# Patient Record
Sex: FEMALE | Race: WHITE | Employment: OTHER | URBAN - METROPOLITAN AREA
[De-identification: names, ages, dates, MRNs, and addresses within clinical notes are randomized per-mention and may not be internally consistent; named-entity substitution may affect disease eponyms.]

---

## 2018-04-10 ENCOUNTER — HOSPITAL ENCOUNTER (OUTPATIENT)
Dept: ENDOSCOPY | Age: 63
Discharge: HOME | End: 2018-04-10
Attending: SURGERY | Admitting: SURGERY

## 2018-04-10 VITALS
OXYGEN SATURATION: 94 % | BODY MASS INDEX: 38.25 KG/M2 | WEIGHT: 238 LBS | TEMPERATURE: 97.9 F | RESPIRATION RATE: 16 BRPM | HEART RATE: 68 BPM | DIASTOLIC BLOOD PRESSURE: 93 MMHG | HEIGHT: 66 IN | SYSTOLIC BLOOD PRESSURE: 172 MMHG

## 2018-04-10 RX ORDER — ZOLPIDEM TARTRATE 10 MG/1
10 TABLET ORAL NIGHTLY PRN
COMMUNITY

## 2018-04-10 RX ORDER — ALBUTEROL SULFATE 90 UG/1
2 AEROSOL, METERED RESPIRATORY (INHALATION) EVERY 6 HOURS PRN
COMMUNITY

## 2018-04-10 RX ORDER — VENLAFAXINE 75 MG/1
75 TABLET ORAL DAILY
COMMUNITY

## 2018-04-10 RX ORDER — MELOXICAM 15 MG/1
15 TABLET ORAL DAILY
COMMUNITY
End: 2018-05-29 | Stop reason: ALTCHOICE

## 2018-04-10 RX ORDER — MIRTAZAPINE 15 MG/1
15 TABLET, FILM COATED ORAL NIGHTLY
COMMUNITY

## 2018-04-10 ASSESSMENT — PAIN SCALES - GENERAL
PAINLEVEL_OUTOF10: 0

## 2018-04-23 ENCOUNTER — HOSPITAL ENCOUNTER (OUTPATIENT)
Dept: PREADMISSION TESTING | Age: 63
Discharge: HOME | End: 2018-04-24
Attending: SURGERY | Admitting: SURGERY

## 2018-04-23 VITALS — HEIGHT: 66 IN | BODY MASS INDEX: 38.25 KG/M2 | WEIGHT: 238 LBS

## 2018-04-23 RX ORDER — DEXAMETHASONE SODIUM PHOSPHATE 4 MG/ML
10 INJECTION, SOLUTION INTRA-ARTICULAR; INTRALESIONAL; INTRAMUSCULAR; INTRAVENOUS; SOFT TISSUE ONCE
Status: CANCELLED | OUTPATIENT
Start: 2018-04-29 | End: 2018-04-29

## 2018-04-23 RX ORDER — APREPITANT 40 MG/1
40 CAPSULE ORAL ONCE
Status: CANCELLED | OUTPATIENT
Start: 2018-04-29 | End: 2018-04-29

## 2018-04-23 RX ORDER — ACETAMINOPHEN 10 MG/ML
1000 INJECTION, SOLUTION INTRAVENOUS ONCE
Status: CANCELLED | OUTPATIENT
Start: 2018-04-29 | End: 2018-04-29

## 2018-04-23 RX ORDER — ONDANSETRON 2 MG/ML
4 INJECTION INTRAMUSCULAR; INTRAVENOUS ONCE
Status: CANCELLED | OUTPATIENT
Start: 2018-04-29 | End: 2018-04-29

## 2018-04-23 RX ORDER — HEPARIN SODIUM 5000 [USP'U]/ML
5000 INJECTION, SOLUTION INTRAVENOUS; SUBCUTANEOUS ONCE
Status: CANCELLED | OUTPATIENT
Start: 2018-04-29 | End: 2018-04-29

## 2018-04-23 RX ORDER — METOCLOPRAMIDE HYDROCHLORIDE 5 MG/ML
10 INJECTION INTRAMUSCULAR; INTRAVENOUS ONCE
Status: CANCELLED | OUTPATIENT
Start: 2018-04-29 | End: 2018-04-29

## 2018-04-30 PROBLEM — E66.9 OBESITY: Status: ACTIVE | Noted: 2018-04-30

## 2018-08-31 ENCOUNTER — ANESTHESIA (OUTPATIENT)
Dept: ENDOSCOPY | Age: 63
End: 2018-08-31
Payer: MEDICARE

## 2018-08-31 ENCOUNTER — ANESTHESIA EVENT (OUTPATIENT)
Dept: ENDOSCOPY | Age: 63
End: 2018-08-31
Payer: MEDICARE

## 2018-08-31 ENCOUNTER — HOSPITAL ENCOUNTER (OUTPATIENT)
Age: 63
Setting detail: OUTPATIENT SURGERY
Discharge: HOME | End: 2018-08-31
Attending: SURGERY | Admitting: SURGERY
Payer: MEDICARE

## 2018-08-31 VITALS
OXYGEN SATURATION: 99 % | DIASTOLIC BLOOD PRESSURE: 92 MMHG | RESPIRATION RATE: 3 BRPM | SYSTOLIC BLOOD PRESSURE: 130 MMHG

## 2018-08-31 VITALS
HEIGHT: 66 IN | RESPIRATION RATE: 18 BRPM | OXYGEN SATURATION: 92 % | TEMPERATURE: 98.1 F | WEIGHT: 160 LBS | SYSTOLIC BLOOD PRESSURE: 145 MMHG | HEART RATE: 112 BPM | BODY MASS INDEX: 25.71 KG/M2 | DIASTOLIC BLOOD PRESSURE: 79 MMHG

## 2018-08-31 PROBLEM — M54.50 CHRONIC BILATERAL LOW BACK PAIN WITHOUT SCIATICA: Status: ACTIVE | Noted: 2017-09-12

## 2018-08-31 PROBLEM — G89.29 CHRONIC BILATERAL LOW BACK PAIN WITHOUT SCIATICA: Status: ACTIVE | Noted: 2017-09-12

## 2018-08-31 PROCEDURE — 3700000001 HC ADD 15 MINUTES (ANESTHESIA): Performed by: SURGERY

## 2018-08-31 PROCEDURE — 2580000003 HC RX 258: Performed by: NURSE ANESTHETIST, CERTIFIED REGISTERED

## 2018-08-31 PROCEDURE — C1773 RET DEV, INSERTABLE: HCPCS | Performed by: SURGERY

## 2018-08-31 PROCEDURE — 3609012400 HC EGD TRANSORAL BIOPSY SINGLE/MULTIPLE: Performed by: SURGERY

## 2018-08-31 PROCEDURE — 88305 TISSUE EXAM BY PATHOLOGIST: CPT

## 2018-08-31 PROCEDURE — 3700000000 HC ANESTHESIA ATTENDED CARE: Performed by: SURGERY

## 2018-08-31 PROCEDURE — 6360000002 HC RX W HCPCS: Performed by: NURSE ANESTHETIST, CERTIFIED REGISTERED

## 2018-08-31 PROCEDURE — 2500000003 HC RX 250 WO HCPCS: Performed by: NURSE ANESTHETIST, CERTIFIED REGISTERED

## 2018-08-31 PROCEDURE — 94640 AIRWAY INHALATION TREATMENT: CPT

## 2018-08-31 PROCEDURE — 7100000001 HC PACU RECOVERY - ADDTL 15 MIN: Performed by: SURGERY

## 2018-08-31 PROCEDURE — 7100000010 HC PHASE II RECOVERY - FIRST 15 MIN: Performed by: SURGERY

## 2018-08-31 PROCEDURE — 94664 DEMO&/EVAL PT USE INHALER: CPT

## 2018-08-31 PROCEDURE — 7100000011 HC PHASE II RECOVERY - ADDTL 15 MIN: Performed by: SURGERY

## 2018-08-31 PROCEDURE — 94761 N-INVAS EAR/PLS OXIMETRY MLT: CPT

## 2018-08-31 PROCEDURE — 2700000000 HC OXYGEN THERAPY PER DAY

## 2018-08-31 PROCEDURE — 6370000000 HC RX 637 (ALT 250 FOR IP): Performed by: ANESTHESIOLOGY

## 2018-08-31 PROCEDURE — 2709999900 HC NON-CHARGEABLE SUPPLY: Performed by: SURGERY

## 2018-08-31 PROCEDURE — 7100000000 HC PACU RECOVERY - FIRST 15 MIN: Performed by: SURGERY

## 2018-08-31 PROCEDURE — 6360000002 HC RX W HCPCS: Performed by: ANESTHESIOLOGY

## 2018-08-31 PROCEDURE — 3609013800 HC EGD SUBMUCOSAL/BOTOX INJECTION: Performed by: SURGERY

## 2018-08-31 RX ORDER — GLYCOPYRROLATE 0.2 MG/ML
INJECTION INTRAMUSCULAR; INTRAVENOUS PRN
Status: DISCONTINUED | OUTPATIENT
Start: 2018-08-31 | End: 2018-08-31 | Stop reason: SDUPTHER

## 2018-08-31 RX ORDER — SUCCINYLCHOLINE CHLORIDE 20 MG/ML
INJECTION INTRAMUSCULAR; INTRAVENOUS PRN
Status: DISCONTINUED | OUTPATIENT
Start: 2018-08-31 | End: 2018-08-31 | Stop reason: SDUPTHER

## 2018-08-31 RX ORDER — LABETALOL HYDROCHLORIDE 5 MG/ML
5 INJECTION, SOLUTION INTRAVENOUS EVERY 10 MIN PRN
Status: DISCONTINUED | OUTPATIENT
Start: 2018-08-31 | End: 2018-08-31 | Stop reason: HOSPADM

## 2018-08-31 RX ORDER — MIDAZOLAM HYDROCHLORIDE 1 MG/ML
INJECTION INTRAMUSCULAR; INTRAVENOUS PRN
Status: DISCONTINUED | OUTPATIENT
Start: 2018-08-31 | End: 2018-08-31 | Stop reason: SDUPTHER

## 2018-08-31 RX ORDER — MORPHINE SULFATE 2 MG/ML
2 INJECTION, SOLUTION INTRAMUSCULAR; INTRAVENOUS EVERY 5 MIN PRN
Status: DISCONTINUED | OUTPATIENT
Start: 2018-08-31 | End: 2018-08-31 | Stop reason: HOSPADM

## 2018-08-31 RX ORDER — PROPOFOL 10 MG/ML
INJECTION, EMULSION INTRAVENOUS PRN
Status: DISCONTINUED | OUTPATIENT
Start: 2018-08-31 | End: 2018-08-31 | Stop reason: SDUPTHER

## 2018-08-31 RX ORDER — ONDANSETRON 2 MG/ML
INJECTION INTRAMUSCULAR; INTRAVENOUS PRN
Status: DISCONTINUED | OUTPATIENT
Start: 2018-08-31 | End: 2018-08-31 | Stop reason: SDUPTHER

## 2018-08-31 RX ORDER — ONDANSETRON 2 MG/ML
4 INJECTION INTRAMUSCULAR; INTRAVENOUS
Status: DISCONTINUED | OUTPATIENT
Start: 2018-08-31 | End: 2018-08-31 | Stop reason: HOSPADM

## 2018-08-31 RX ORDER — NEOSTIGMINE METHYLSULFATE 5 MG/5 ML
SYRINGE (ML) INTRAVENOUS PRN
Status: DISCONTINUED | OUTPATIENT
Start: 2018-08-31 | End: 2018-08-31 | Stop reason: SDUPTHER

## 2018-08-31 RX ORDER — MEPERIDINE HYDROCHLORIDE 25 MG/ML
12.5 INJECTION INTRAMUSCULAR; INTRAVENOUS; SUBCUTANEOUS EVERY 5 MIN PRN
Status: DISCONTINUED | OUTPATIENT
Start: 2018-08-31 | End: 2018-08-31 | Stop reason: HOSPADM

## 2018-08-31 RX ORDER — FENTANYL CITRATE 50 UG/ML
INJECTION, SOLUTION INTRAMUSCULAR; INTRAVENOUS PRN
Status: DISCONTINUED | OUTPATIENT
Start: 2018-08-31 | End: 2018-08-31 | Stop reason: SDUPTHER

## 2018-08-31 RX ORDER — FENTANYL CITRATE 50 UG/ML
50 INJECTION, SOLUTION INTRAMUSCULAR; INTRAVENOUS EVERY 5 MIN PRN
Status: DISCONTINUED | OUTPATIENT
Start: 2018-08-31 | End: 2018-08-31 | Stop reason: HOSPADM

## 2018-08-31 RX ORDER — ALBUTEROL SULFATE 2.5 MG/3ML
2.5 SOLUTION RESPIRATORY (INHALATION) EVERY 10 MIN PRN
Status: DISCONTINUED | OUTPATIENT
Start: 2018-08-31 | End: 2018-08-31 | Stop reason: HOSPADM

## 2018-08-31 RX ORDER — ROCURONIUM BROMIDE 10 MG/ML
INJECTION, SOLUTION INTRAVENOUS PRN
Status: DISCONTINUED | OUTPATIENT
Start: 2018-08-31 | End: 2018-08-31 | Stop reason: SDUPTHER

## 2018-08-31 RX ORDER — SODIUM CHLORIDE, SODIUM LACTATE, POTASSIUM CHLORIDE, CALCIUM CHLORIDE 600; 310; 30; 20 MG/100ML; MG/100ML; MG/100ML; MG/100ML
INJECTION, SOLUTION INTRAVENOUS CONTINUOUS PRN
Status: DISCONTINUED | OUTPATIENT
Start: 2018-08-31 | End: 2018-08-31 | Stop reason: SDUPTHER

## 2018-08-31 RX ADMIN — PROPOFOL 150 MG: 10 INJECTION, EMULSION INTRAVENOUS at 07:41

## 2018-08-31 RX ADMIN — SODIUM CHLORIDE, SODIUM LACTATE, POTASSIUM CHLORIDE, AND CALCIUM CHLORIDE: 600; 310; 30; 20 INJECTION, SOLUTION INTRAVENOUS at 07:36

## 2018-08-31 RX ADMIN — GLYCOPYRROLATE 0.4 MG: 0.2 INJECTION INTRAMUSCULAR; INTRAVENOUS at 07:55

## 2018-08-31 RX ADMIN — FENTANYL CITRATE 50 MCG: 50 INJECTION INTRAMUSCULAR; INTRAVENOUS at 08:00

## 2018-08-31 RX ADMIN — ALBUTEROL SULFATE 2.5 MG: 2.5 SOLUTION RESPIRATORY (INHALATION) at 08:42

## 2018-08-31 RX ADMIN — MIDAZOLAM HYDROCHLORIDE 2 MG: 1 INJECTION INTRAMUSCULAR; INTRAVENOUS at 07:38

## 2018-08-31 RX ADMIN — GLYCOPYRROLATE 0.2 MG: 0.2 INJECTION INTRAMUSCULAR; INTRAVENOUS at 07:59

## 2018-08-31 RX ADMIN — ROCURONIUM BROMIDE 20 MG: 10 INJECTION, SOLUTION INTRAVENOUS at 07:49

## 2018-08-31 RX ADMIN — BENZOCAINE AND MENTHOL 1 LOZENGE: 15; 3.6 LOZENGE ORAL at 09:09

## 2018-08-31 RX ADMIN — Medication 2 MG: at 07:55

## 2018-08-31 RX ADMIN — FENTANYL CITRATE 50 MCG: 50 INJECTION INTRAMUSCULAR; INTRAVENOUS at 07:38

## 2018-08-31 RX ADMIN — Medication 1.5 MG: at 07:59

## 2018-08-31 RX ADMIN — ONDANSETRON 4 MG: 2 INJECTION INTRAMUSCULAR; INTRAVENOUS at 07:51

## 2018-08-31 RX ADMIN — Medication 80 MG: at 07:41

## 2018-08-31 ASSESSMENT — PULMONARY FUNCTION TESTS
PIF_VALUE: 1
PIF_VALUE: 21
PIF_VALUE: 26
PIF_VALUE: 1
PIF_VALUE: 4
PIF_VALUE: 25
PIF_VALUE: 2
PIF_VALUE: 26
PIF_VALUE: 1
PIF_VALUE: 2
PIF_VALUE: 22
PIF_VALUE: 23
PIF_VALUE: 1
PIF_VALUE: 20
PIF_VALUE: 24
PIF_VALUE: 26
PIF_VALUE: 21
PIF_VALUE: 23
PIF_VALUE: 21
PIF_VALUE: 1
PIF_VALUE: 2
PIF_VALUE: 21
PIF_VALUE: 1
PIF_VALUE: 22
PIF_VALUE: 23
PIF_VALUE: 21
PIF_VALUE: 1
PIF_VALUE: 19
PIF_VALUE: 21
PIF_VALUE: 20
PIF_VALUE: 21
PIF_VALUE: 1
PIF_VALUE: 0
PIF_VALUE: 2
PIF_VALUE: 3
PIF_VALUE: 22
PIF_VALUE: 2
PIF_VALUE: 22
PIF_VALUE: 23
PIF_VALUE: 21

## 2018-08-31 ASSESSMENT — LIFESTYLE VARIABLES: SMOKING_STATUS: 1

## 2018-08-31 NOTE — ANESTHESIA POSTPROCEDURE EVALUATION
Department of Anesthesiology  Postprocedure Note    Patient: Pily Steen  MRN: [id number]  YOB: 1955  Date of evaluation: 8/31/2018  Time:  8:36 AM     Procedure Summary     Date:  08/31/18 Room / Location:  [facility] ENDO 02 / [facility] ENDOSCOPY    Anesthesia Start:  0735 Anesthesia Stop:      Procedures:       EGD SUBMUCOSAL/BOTOX INJECTION (N/A )      EGD BIOPSY Diagnosis:  (DYSPHAGIA  R13.10, VOMITING)    Surgeon:  Symone Avalos DO Responsible Provider:  Layne Monteiro MD    Anesthesia Type:  general ASA Status:  3          Anesthesia Type: general    Rishabh Phase I: Rishabh Score: 7    Rishabh Phase II:      Last vitals: Reviewed and per EMR flowsheets.        Anesthesia Post Evaluation    Patient location during evaluation: PACU  Patient participation: complete - patient participated  Level of consciousness: awake and alert  Airway patency: patent  Nausea & Vomiting: no vomiting and no nausea  Complications: no  Cardiovascular status: hemodynamically stable  Respiratory status: acceptable  Hydration status: euvolemic

## 2018-08-31 NOTE — PROGRESS NOTES
PACU Transfer to [facility]    Vitals:    08/31/18 0916   BP: 125/66   Pulse: 91   Resp: 16   Temp: 98   SpO2: 92%         Intake/Output Summary (Last 24 hours) at 08/31/18 0926  Last data filed at 08/31/18 0916   Gross per 24 hour   Intake              800 ml   Output                0 ml   Net              800 ml       Pain assessment: 0       Patient transferred to care of [facility] RN.    8/31/2018 9:26 AM

## 2018-08-31 NOTE — PROGRESS NOTES
Ambulatory Surgery/Procedure Discharge Note    Vitals:    08/31/18 0925   BP: (!) 145/79   Pulse: 112   Resp: 18   Temp: 98.1 °F (36.7 °C)   SpO2: 92%       In: 800 [P.O.:50; I.V.:750]  Out: -     Pain assessment:  none       Patient discharged to home/self care. Patient discharged via wheel chair by transporter to waiting family/S. O. Pt has cough, drinking pop. Denies sore throat. Denies urge to void.   Ride called for , meets gabby discharge criteria      8/31/2018 9:40 AM

## 2018-08-31 NOTE — ANESTHESIA PRE PROCEDURE
Department of Anesthesiology  Preprocedure Note       Name:  Mark Lu   Age:  60 y.o.  :  1955                                          MRN:  [id number]         Date:  2018      Surgeon: Turner Barthel):  Jannette Abrams DO    Procedure: Procedure(s):  ESOPHAGOGASTRODUODENOSCOPY, POSSIBLE ACHALASIA BALLOON DILATION, BOTOX INJECTION, GENERAL ANESTHESIA    Medications prior to admission:   Prior to Admission medications    Medication Sig Start Date End Date Taking? Authorizing Provider   omeprazole (PRILOSEC) 20 MG delayed release capsule Take 40 mg by mouth 2 times daily    Historical Provider, MD   ondansetron (ZOFRAN ODT) 4 MG disintegrating tablet Take 1 tablet by mouth every 8 hours as needed for Nausea 18   Ginger Fish MD   Hyoscyamine Sulfate SL (LEVSIN/SL) 0.125 MG SUBL Place 0.125 mg under the tongue every 4 hours 18   Ginger Fish MD   venlafaxine (EFFEXOR) 75 MG tablet Take 75 mg by mouth daily    Historical Provider, MD   mirtazapine (REMERON) 15 MG tablet Take 15 mg by mouth nightly    Historical Provider, MD   zolpidem (AMBIEN) 10 MG tablet Take 10 mg by mouth nightly as needed for Sleep. Historical Provider, MD   albuterol sulfate  (90 Base) MCG/ACT inhaler Inhale 2 puffs into the lungs every 6 hours as needed for Wheezing    Historical Provider, MD   ROPINIRole HCl (REQUIP PO) Take 0.5 mg by mouth 2 times daily     Historical Provider, MD   hydrochlorothiazide (MICROZIDE) 12.5 MG capsule Take 12.5 mg by mouth daily. Historical Provider, MD       Current medications:    No current facility-administered medications for this encounter. Allergies:     Allergies   Allergen Reactions    Cat Hair Extract     Lisinopril Other (See Comments)     cough    Pcn [Penicillins] Hives and Swelling    Bactrim [Sulfamethoxazole-Trimethoprim] Rash    Red Dye Rash       Problem List:    Patient Active Problem List   Diagnosis Code    Obesity E66.9       Past 05/29/2018    ALKPHOS 82 05/29/2018    AST 33 05/29/2018    ALT 27 05/29/2018       POC Tests: No results for input(s): POCGLU, POCNA, POCK, POCCL, POCBUN, POCHEMO, POCHCT in the last 72 hours. Coags: No results found for: PROTIME, INR, APTT    HCG (If Applicable): No results found for: PREGTESTUR, PREGSERUM, HCG, HCGQUANT     ABGs: No results found for: PHART, PO2ART, UWR9WYY, SGF0GLD, BEART, K2HGJMXB     Type & Screen (If Applicable):  No results found for: LABABO, [address]    Anesthesia Evaluation  Patient summary reviewed no history of anesthetic complications:   Airway: Mallampati: II  TM distance: <3 FB   Neck ROM: full  Mouth opening: > = 3 FB Dental: normal exam   (+) partials      Pulmonary:normal exam    (+) sleep apnea: on CPAP,  current smoker                           Cardiovascular:Negative CV ROS                      Neuro/Psych:   Negative Neuro/Psych ROS              GI/Hepatic/Renal:   (+) GERD:,           Endo/Other: Negative Endo/Other ROS                    Abdominal:           Vascular:                                        Anesthesia Plan      general     ASA 3       Induction: intravenous. Anesthetic plan and risks discussed with patient. Plan discussed with CRNA.     Attending anesthesiologist reviewed and agrees with Tressa Wiggins MD   8/31/2018

## 2018-09-02 NOTE — H&P
Pre-Op History & Physical   18      Patient Name: Shawn Lu : 51-  Meg Olea is a 60year old female that presents for her endoscopic procedure today. s/p Sleeve gastrectomy with c/o  dysphagia. Risks of pain, bleeding, infection, failure to improve, perforation all discussed. All questions answered, ready to proceed. She and I have reviewed the procedure in detail, and have also reviewed other options treatment. She is ready to proceed  today. Surgical History  Previous surgeries include: Lap Sleeve Gastrectomy   Dr. Anibal Osborne  and Open Cholecystectomy. lumbar discectomy 2008  & 2001   Hospitalization History  No previous hospitalizations. Current Medications  SureScripts Medication 4 mg: Dissolve 1-2 tablet(s) under tongue every six hours  SureScripts Medication 25 mg: Take 1-2 tablet(s) by mouth every four to six hours  SureScripts Medication 40 mg/0.4 mL: Inject 1 syringe(s) subQ twice a day  SureScripts Medication 0.125 mg: Take 1-2 tablet(s) under tongue every six hours  HCTZ 25mg 1 PO QD 25 MG/1:  1 tablet(s) by mouth   Meloxicam 15 mg tablet 15 mg: Take 1 tablet(s) by mouth once a day  MIRTAZAPINE 15 MG/1:    ROPINIROLE 1 MG/1:    TRAMADOL:    Venlafaxine 75mg 1 PO QD 75 MG/1:  1 tablet(s) by mouth   Zantac 150mg 1 PO  MG/1:  1 tablet(s) by mouth   Zolpidem 6.25mg 1 PO QD 6.25 MG/1:  1 tablet(s) by mouth   Allergies  red dye    penicillin    Past Medical History   \" Dyspnea with Exertion  \" Hypertension  \" Peripheral Edema  \" Varicose Veins  \" GERD  \" Degenerative Disc Disease  \" Osteoarthritis-Hip  \" Osteoarthritis-Knee  \" Anxiety  \" Depression  \" Morbid Obesity  \" Sleep Apnea, CPAP   Weight History  Valentine has been dieting for many years with limited success including fasting, low carbohydrates and low fat. None of  these attempts to lose weight have been significantly successful or had durable results.  The patient has tried behavioral  changes like walking (and/or

## 2020-01-16 ENCOUNTER — HOSPITAL ENCOUNTER (EMERGENCY)
Facility: HOSPITAL | Age: 65
Discharge: HOME | End: 2020-01-16
Attending: FAMILY MEDICINE | Admitting: FAMILY MEDICINE

## 2020-01-16 ENCOUNTER — APPOINTMENT (OUTPATIENT)
Dept: GENERAL RADIOLOGY | Facility: HOSPITAL | Age: 65
End: 2020-01-16

## 2020-01-16 VITALS
RESPIRATION RATE: 18 BRPM | TEMPERATURE: 98.3 F | OXYGEN SATURATION: 100 % | HEIGHT: 66 IN | BODY MASS INDEX: 24.11 KG/M2 | WEIGHT: 150 LBS | HEART RATE: 60 BPM | DIASTOLIC BLOOD PRESSURE: 67 MMHG | SYSTOLIC BLOOD PRESSURE: 149 MMHG

## 2020-01-16 DIAGNOSIS — M16.11 OSTEOARTHRITIS OF RIGHT HIP, UNSPECIFIED OSTEOARTHRITIS TYPE: Primary | ICD-10-CM

## 2020-01-16 PROCEDURE — 73502 X-RAY EXAM HIP UNI 2-3 VIEWS: CPT

## 2020-01-16 PROCEDURE — 25010000002 KETOROLAC TROMETHAMINE PER 15 MG: Performed by: PHYSICIAN ASSISTANT

## 2020-01-16 PROCEDURE — 99283 EMERGENCY DEPT VISIT LOW MDM: CPT

## 2020-01-16 PROCEDURE — 25010000002 DEXAMETHASONE SODIUM PHOSPHATE 20 MG/5ML SOLUTION: Performed by: PHYSICIAN ASSISTANT

## 2020-01-16 PROCEDURE — 96372 THER/PROPH/DIAG INJ SC/IM: CPT

## 2020-01-16 RX ORDER — DEXAMETHASONE SODIUM PHOSPHATE 4 MG/ML
10 INJECTION, SOLUTION INTRA-ARTICULAR; INTRALESIONAL; INTRAMUSCULAR; INTRAVENOUS; SOFT TISSUE ONCE
Status: COMPLETED | OUTPATIENT
Start: 2020-01-16 | End: 2020-01-16

## 2020-01-16 RX ORDER — HYDROCODONE BITARTRATE AND ACETAMINOPHEN 5; 325 MG/1; MG/1
1 TABLET ORAL ONCE
Status: COMPLETED | OUTPATIENT
Start: 2020-01-16 | End: 2020-01-16

## 2020-01-16 RX ORDER — KETOROLAC TROMETHAMINE 30 MG/ML
30 INJECTION, SOLUTION INTRAMUSCULAR; INTRAVENOUS ONCE
Status: COMPLETED | OUTPATIENT
Start: 2020-01-16 | End: 2020-01-16

## 2020-01-16 RX ADMIN — DEXAMETHASONE SODIUM PHOSPHATE 10 MG: 4 INJECTION, SOLUTION INTRAMUSCULAR; INTRAVENOUS at 00:54

## 2020-01-16 RX ADMIN — HYDROCODONE BITARTRATE AND ACETAMINOPHEN 1 TABLET: 5; 325 TABLET ORAL at 01:45

## 2020-01-16 RX ADMIN — KETOROLAC TROMETHAMINE 30 MG: 30 INJECTION, SOLUTION INTRAMUSCULAR at 00:54

## 2020-01-16 NOTE — DISCHARGE INSTRUCTIONS
Please utilize cane, rest, and ice. Please follow up with ortho or return to ER if symptoms worsen.

## 2020-01-16 NOTE — ED PROVIDER NOTES
Subjective     Hip Pain   Location:  Right hip   Quality:  Constant aching  Severity:  Moderate  Onset quality:  Gradual  Duration:  6 months  Timing:  Constant  Progression:  Worsening  Chronicity:  New  Context:  No injury; diagnosed with osteoarthritis in the past   Relieved by:  Rest  Worsened by:  Ambulation and weight bearing   Ineffective treatments:  None tried  Associated symptoms: no abdominal pain, no chest pain, no fever and no myalgias        Review of Systems   Constitutional: Negative.  Negative for fever.   HENT: Negative.    Respiratory: Negative.    Cardiovascular: Negative.  Negative for chest pain.   Gastrointestinal: Negative.  Negative for abdominal pain.   Endocrine: Negative.    Genitourinary: Negative.  Negative for dysuria.   Musculoskeletal: Positive for arthralgias. Negative for back pain, gait problem, joint swelling, myalgias, neck pain and neck stiffness.        Right hip pain    Skin: Negative.    Neurological: Negative.    Psychiatric/Behavioral: Negative.    All other systems reviewed and are negative.      No past medical history on file.    Allergies   Allergen Reactions   • Penicillins Hives and Swelling       No past surgical history on file.    No family history on file.    Social History     Socioeconomic History   • Marital status: Single     Spouse name: Not on file   • Number of children: Not on file   • Years of education: Not on file   • Highest education level: Not on file           Objective   Physical Exam   Constitutional: She is oriented to person, place, and time. She appears well-developed and well-nourished. No distress.   HENT:   Head: Normocephalic and atraumatic.   Right Ear: External ear normal.   Left Ear: External ear normal.   Nose: Nose normal.   Eyes: Pupils are equal, round, and reactive to light. Conjunctivae and EOM are normal.   Neck: Normal range of motion. Neck supple. No JVD present. No tracheal deviation present.   Cardiovascular: Normal rate,  regular rhythm and normal heart sounds.   No murmur heard.  Pulmonary/Chest: Effort normal and breath sounds normal. No respiratory distress. She has no wheezes.   Abdominal: Soft. Bowel sounds are normal. There is no tenderness.   Musculoskeletal: She exhibits tenderness. She exhibits no edema or deformity.   Right hip skin intact with no bruising or abrasion or edema. ROM is active but limited and painful. Neurovascular status and sensation RLE intact.    Neurological: She is alert and oriented to person, place, and time. No cranial nerve deficit.   Skin: Skin is warm and dry. No rash noted. She is not diaphoretic. No erythema. No pallor.   Psychiatric: She has a normal mood and affect. Her behavior is normal. Thought content normal.   Nursing note and vitals reviewed.      Procedures           ED Course  ED Course as of Jan 16 0214   Thu Jan 16, 2020   0209 Patient diagnosed with right hip osteoarthritis. Will be d/c home with rx for cane and f/u with ortho. Will return to ER if symptoms worsen.     [MM]      ED Course User Index  [MM] Betina Fraser PA                                               MDM  Number of Diagnoses or Management Options  Osteoarthritis of right hip, unspecified osteoarthritis type:      Amount and/or Complexity of Data Reviewed  Tests in the radiology section of CPT®: ordered and reviewed  Discuss the patient with other providers: yes        Final diagnoses:   Osteoarthritis of right hip, unspecified osteoarthritis type            Betina Fraser PA  01/16/20 0214

## 2020-03-06 ENCOUNTER — LAB REQUISITION (OUTPATIENT)
Dept: LAB | Facility: HOSPITAL | Age: 65
End: 2020-03-06

## 2020-03-06 DIAGNOSIS — M62.81 MUSCLE WEAKNESS (GENERALIZED): ICD-10-CM

## 2020-03-06 LAB
ALBUMIN SERPL-MCNC: 3.33 G/DL (ref 3.5–5.2)
ALBUMIN/GLOB SERPL: 1.1 G/DL
ALP SERPL-CCNC: 77 U/L (ref 39–117)
ALT SERPL W P-5'-P-CCNC: 16 U/L (ref 1–33)
ANION GAP SERPL CALCULATED.3IONS-SCNC: 10.8 MMOL/L (ref 5–15)
AST SERPL-CCNC: 20 U/L (ref 1–32)
BASOPHILS # BLD AUTO: 0.09 10*3/MM3 (ref 0–0.2)
BASOPHILS NFR BLD AUTO: 1 % (ref 0–1.5)
BILIRUB SERPL-MCNC: 0.2 MG/DL (ref 0.2–1.2)
BUN BLD-MCNC: 25 MG/DL (ref 8–23)
BUN/CREAT SERPL: 31.3 (ref 7–25)
CALCIUM SPEC-SCNC: 9 MG/DL (ref 8.6–10.5)
CHLORIDE SERPL-SCNC: 104 MMOL/L (ref 98–107)
CO2 SERPL-SCNC: 28.2 MMOL/L (ref 22–29)
CREAT BLD-MCNC: 0.8 MG/DL (ref 0.57–1)
DEPRECATED RDW RBC AUTO: 51.7 FL (ref 37–54)
EOSINOPHIL # BLD AUTO: 0.56 10*3/MM3 (ref 0–0.4)
EOSINOPHIL NFR BLD AUTO: 6.4 % (ref 0.3–6.2)
ERYTHROCYTE [DISTWIDTH] IN BLOOD BY AUTOMATED COUNT: 16.7 % (ref 12.3–15.4)
GFR SERPL CREATININE-BSD FRML MDRD: 72 ML/MIN/1.73
GLOBULIN UR ELPH-MCNC: 3 GM/DL
GLUCOSE BLD-MCNC: 104 MG/DL (ref 65–99)
HCT VFR BLD AUTO: 26.3 % (ref 34–46.6)
HGB BLD-MCNC: 7.9 G/DL (ref 12–15.9)
IMM GRANULOCYTES # BLD AUTO: 0.03 10*3/MM3 (ref 0–0.05)
IMM GRANULOCYTES NFR BLD AUTO: 0.3 % (ref 0–0.5)
LYMPHOCYTES # BLD AUTO: 2.29 10*3/MM3 (ref 0.7–3.1)
LYMPHOCYTES NFR BLD AUTO: 26.3 % (ref 19.6–45.3)
MCH RBC QN AUTO: 25.9 PG (ref 26.6–33)
MCHC RBC AUTO-ENTMCNC: 30 G/DL (ref 31.5–35.7)
MCV RBC AUTO: 86.2 FL (ref 79–97)
MONOCYTES # BLD AUTO: 0.81 10*3/MM3 (ref 0.1–0.9)
MONOCYTES NFR BLD AUTO: 9.3 % (ref 5–12)
NEUTROPHILS # BLD AUTO: 4.93 10*3/MM3 (ref 1.7–7)
NEUTROPHILS NFR BLD AUTO: 56.7 % (ref 42.7–76)
NRBC BLD AUTO-RTO: 0 /100 WBC (ref 0–0.2)
PLATELET # BLD AUTO: 367 10*3/MM3 (ref 140–450)
PMV BLD AUTO: 10.2 FL (ref 6–12)
POTASSIUM BLD-SCNC: 3.2 MMOL/L (ref 3.5–5.2)
PROT SERPL-MCNC: 6.3 G/DL (ref 6–8.5)
RBC # BLD AUTO: 3.05 10*6/MM3 (ref 3.77–5.28)
SODIUM BLD-SCNC: 143 MMOL/L (ref 136–145)
WBC NRBC COR # BLD: 8.71 10*3/MM3 (ref 3.4–10.8)

## 2020-03-06 PROCEDURE — 85025 COMPLETE CBC W/AUTO DIFF WBC: CPT | Performed by: INTERNAL MEDICINE

## 2020-03-06 PROCEDURE — 80053 COMPREHEN METABOLIC PANEL: CPT | Performed by: INTERNAL MEDICINE

## 2020-03-07 ENCOUNTER — LAB REQUISITION (OUTPATIENT)
Dept: LAB | Facility: HOSPITAL | Age: 65
End: 2020-03-07

## 2020-03-07 DIAGNOSIS — E87.5 HYPERKALEMIA: ICD-10-CM

## 2020-03-07 LAB — POTASSIUM BLD-SCNC: 3.9 MMOL/L (ref 3.5–5.2)

## 2020-03-07 PROCEDURE — 84132 ASSAY OF SERUM POTASSIUM: CPT | Performed by: INTERNAL MEDICINE

## 2020-03-09 ENCOUNTER — LAB REQUISITION (OUTPATIENT)
Dept: LAB | Facility: HOSPITAL | Age: 65
End: 2020-03-09

## 2020-03-09 DIAGNOSIS — D64.9 ANEMIA, UNSPECIFIED: ICD-10-CM

## 2020-03-09 LAB
BASOPHILS # BLD AUTO: 0.08 10*3/MM3 (ref 0–0.2)
BASOPHILS NFR BLD AUTO: 1.1 % (ref 0–1.5)
DEPRECATED RDW RBC AUTO: 51.6 FL (ref 37–54)
EOSINOPHIL # BLD AUTO: 0.47 10*3/MM3 (ref 0–0.4)
EOSINOPHIL NFR BLD AUTO: 6.2 % (ref 0.3–6.2)
ERYTHROCYTE [DISTWIDTH] IN BLOOD BY AUTOMATED COUNT: 16.5 % (ref 12.3–15.4)
FOLATE SERPL-MCNC: 4.91 NG/ML (ref 4.78–24.2)
HCT VFR BLD AUTO: 25.7 % (ref 34–46.6)
HGB BLD-MCNC: 7.6 G/DL (ref 12–15.9)
IMM GRANULOCYTES # BLD AUTO: 0.02 10*3/MM3 (ref 0–0.05)
IMM GRANULOCYTES NFR BLD AUTO: 0.3 % (ref 0–0.5)
IRON 24H UR-MRATE: 21 MCG/DL (ref 37–145)
IRON SATN MFR SERPL: 5 % (ref 20–50)
LYMPHOCYTES # BLD AUTO: 2.76 10*3/MM3 (ref 0.7–3.1)
LYMPHOCYTES NFR BLD AUTO: 36.6 % (ref 19.6–45.3)
MCH RBC QN AUTO: 25.8 PG (ref 26.6–33)
MCHC RBC AUTO-ENTMCNC: 29.6 G/DL (ref 31.5–35.7)
MCV RBC AUTO: 87.1 FL (ref 79–97)
MONOCYTES # BLD AUTO: 0.48 10*3/MM3 (ref 0.1–0.9)
MONOCYTES NFR BLD AUTO: 6.4 % (ref 5–12)
NEUTROPHILS # BLD AUTO: 3.74 10*3/MM3 (ref 1.7–7)
NEUTROPHILS NFR BLD AUTO: 49.4 % (ref 42.7–76)
NRBC BLD AUTO-RTO: 0 /100 WBC (ref 0–0.2)
PLATELET # BLD AUTO: 392 10*3/MM3 (ref 140–450)
PMV BLD AUTO: 10.5 FL (ref 6–12)
RBC # BLD AUTO: 2.95 10*6/MM3 (ref 3.77–5.28)
TIBC SERPL-MCNC: 460 MCG/DL (ref 298–536)
TRANSFERRIN SERPL-MCNC: 309 MG/DL (ref 200–360)
VIT B12 BLD-MCNC: 326 PG/ML (ref 211–946)
WBC NRBC COR # BLD: 7.55 10*3/MM3 (ref 3.4–10.8)

## 2020-03-09 PROCEDURE — 83540 ASSAY OF IRON: CPT | Performed by: INTERNAL MEDICINE

## 2020-03-09 PROCEDURE — 82607 VITAMIN B-12: CPT | Performed by: INTERNAL MEDICINE

## 2020-03-09 PROCEDURE — 82746 ASSAY OF FOLIC ACID SERUM: CPT | Performed by: INTERNAL MEDICINE

## 2020-03-09 PROCEDURE — 85025 COMPLETE CBC W/AUTO DIFF WBC: CPT | Performed by: INTERNAL MEDICINE

## 2020-03-09 PROCEDURE — 84466 ASSAY OF TRANSFERRIN: CPT | Performed by: INTERNAL MEDICINE

## 2020-03-13 ENCOUNTER — LAB REQUISITION (OUTPATIENT)
Dept: LAB | Facility: HOSPITAL | Age: 65
End: 2020-03-13

## 2020-03-13 DIAGNOSIS — M62.81 MUSCLE WEAKNESS (GENERALIZED): ICD-10-CM

## 2020-03-13 LAB
HCT VFR BLD AUTO: 26 % (ref 34–46.6)
HGB BLD-MCNC: 7.7 G/DL (ref 12–15.9)

## 2020-03-13 PROCEDURE — 85014 HEMATOCRIT: CPT | Performed by: INTERNAL MEDICINE

## 2020-03-13 PROCEDURE — 85018 HEMOGLOBIN: CPT | Performed by: INTERNAL MEDICINE

## 2020-04-04 ENCOUNTER — HOSPITAL ENCOUNTER (EMERGENCY)
Facility: HOSPITAL | Age: 65
Discharge: HOME | End: 2020-04-04
Attending: FAMILY MEDICINE

## 2020-04-04 ENCOUNTER — APPOINTMENT (OUTPATIENT)
Dept: GENERAL RADIOLOGY | Facility: HOSPITAL | Age: 65
End: 2020-04-04

## 2020-04-04 VITALS
OXYGEN SATURATION: 99 % | RESPIRATION RATE: 20 BRPM | WEIGHT: 150 LBS | HEART RATE: 93 BPM | BODY MASS INDEX: 24.11 KG/M2 | HEIGHT: 66 IN | DIASTOLIC BLOOD PRESSURE: 97 MMHG | SYSTOLIC BLOOD PRESSURE: 159 MMHG | TEMPERATURE: 99.7 F

## 2020-04-04 DIAGNOSIS — R53.83 FATIGUE, UNSPECIFIED TYPE: Primary | ICD-10-CM

## 2020-04-04 LAB
ABO GROUP BLD: NORMAL
ABO GROUP BLD: NORMAL
ALBUMIN SERPL-MCNC: 3.67 G/DL (ref 3.5–5.2)
ALBUMIN/GLOB SERPL: 1.1 G/DL
ALP SERPL-CCNC: 86 U/L (ref 39–117)
ALT SERPL W P-5'-P-CCNC: 10 U/L (ref 1–33)
ANION GAP SERPL CALCULATED.3IONS-SCNC: 10 MMOL/L (ref 5–15)
ANISOCYTOSIS BLD QL: NORMAL
AST SERPL-CCNC: 14 U/L (ref 1–32)
BASOPHILS # BLD AUTO: 0.07 10*3/MM3 (ref 0–0.2)
BASOPHILS NFR BLD AUTO: 0.9 % (ref 0–1.5)
BILIRUB SERPL-MCNC: 0.2 MG/DL (ref 0.2–1.2)
BILIRUB UR QL STRIP: NEGATIVE
BLD GP AB SCN SERPL QL: NEGATIVE
BUN BLD-MCNC: 11 MG/DL (ref 8–23)
BUN/CREAT SERPL: 16.7 (ref 7–25)
CALCIUM SPEC-SCNC: 9.2 MG/DL (ref 8.6–10.5)
CHLORIDE SERPL-SCNC: 106 MMOL/L (ref 98–107)
CLARITY UR: CLEAR
CO2 SERPL-SCNC: 28 MMOL/L (ref 22–29)
COLOR UR: YELLOW
CREAT BLD-MCNC: 0.66 MG/DL (ref 0.57–1)
DEPRECATED RDW RBC AUTO: 48.3 FL (ref 37–54)
EOSINOPHIL # BLD AUTO: 0.27 10*3/MM3 (ref 0–0.4)
EOSINOPHIL NFR BLD AUTO: 3.4 % (ref 0.3–6.2)
ERYTHROCYTE [DISTWIDTH] IN BLOOD BY AUTOMATED COUNT: 15.8 % (ref 12.3–15.4)
GFR SERPL CREATININE-BSD FRML MDRD: 90 ML/MIN/1.73
GLOBULIN UR ELPH-MCNC: 3.3 GM/DL
GLUCOSE BLD-MCNC: 127 MG/DL (ref 65–99)
GLUCOSE UR STRIP-MCNC: NEGATIVE MG/DL
HCT VFR BLD AUTO: 25.3 % (ref 34–46.6)
HGB BLD-MCNC: 7.2 G/DL (ref 12–15.9)
HGB UR QL STRIP.AUTO: NEGATIVE
HYPOCHROMIA BLD QL: NORMAL
IMM GRANULOCYTES # BLD AUTO: 0.01 10*3/MM3 (ref 0–0.05)
IMM GRANULOCYTES NFR BLD AUTO: 0.1 % (ref 0–0.5)
KETONES UR QL STRIP: NEGATIVE
LEUKOCYTE ESTERASE UR QL STRIP.AUTO: NEGATIVE
LYMPHOCYTES # BLD AUTO: 2.44 10*3/MM3 (ref 0.7–3.1)
LYMPHOCYTES NFR BLD AUTO: 31.1 % (ref 19.6–45.3)
MAGNESIUM SERPL-MCNC: 1.8 MG/DL (ref 1.6–2.4)
MCH RBC QN AUTO: 24 PG (ref 26.6–33)
MCHC RBC AUTO-ENTMCNC: 28.5 G/DL (ref 31.5–35.7)
MCV RBC AUTO: 84.3 FL (ref 79–97)
MONOCYTES # BLD AUTO: 0.62 10*3/MM3 (ref 0.1–0.9)
MONOCYTES NFR BLD AUTO: 7.9 % (ref 5–12)
NEUTROPHILS # BLD AUTO: 4.44 10*3/MM3 (ref 1.7–7)
NEUTROPHILS NFR BLD AUTO: 56.6 % (ref 42.7–76)
NITRITE UR QL STRIP: NEGATIVE
NRBC BLD AUTO-RTO: 0 /100 WBC (ref 0–0.2)
NT-PROBNP SERPL-MCNC: 739.6 PG/ML (ref 5–900)
PH UR STRIP.AUTO: 8 [PH] (ref 5–8)
PLATELET # BLD AUTO: 290 10*3/MM3 (ref 140–450)
PMV BLD AUTO: 9.5 FL (ref 6–12)
POTASSIUM BLD-SCNC: 3.8 MMOL/L (ref 3.5–5.2)
PROT SERPL-MCNC: 7 G/DL (ref 6–8.5)
PROT UR QL STRIP: NEGATIVE
RBC # BLD AUTO: 3 10*6/MM3 (ref 3.77–5.28)
RH BLD: POSITIVE
RH BLD: POSITIVE
SMALL PLATELETS BLD QL SMEAR: ADEQUATE
SODIUM BLD-SCNC: 144 MMOL/L (ref 136–145)
SP GR UR STRIP: 1.02 (ref 1–1.03)
T&S EXPIRATION DATE: NORMAL
TROPONIN T SERPL-MCNC: <0.01 NG/ML (ref 0–0.03)
TSH SERPL DL<=0.05 MIU/L-ACNC: 3.13 UIU/ML (ref 0.27–4.2)
UROBILINOGEN UR QL STRIP: NORMAL
WBC NRBC COR # BLD: 7.85 10*3/MM3 (ref 3.4–10.8)

## 2020-04-04 PROCEDURE — 80053 COMPREHEN METABOLIC PANEL: CPT | Performed by: FAMILY MEDICINE

## 2020-04-04 PROCEDURE — 86900 BLOOD TYPING SEROLOGIC ABO: CPT | Performed by: FAMILY MEDICINE

## 2020-04-04 PROCEDURE — 85007 BL SMEAR W/DIFF WBC COUNT: CPT | Performed by: FAMILY MEDICINE

## 2020-04-04 PROCEDURE — 83880 ASSAY OF NATRIURETIC PEPTIDE: CPT | Performed by: FAMILY MEDICINE

## 2020-04-04 PROCEDURE — 71045 X-RAY EXAM CHEST 1 VIEW: CPT

## 2020-04-04 PROCEDURE — 84484 ASSAY OF TROPONIN QUANT: CPT | Performed by: FAMILY MEDICINE

## 2020-04-04 PROCEDURE — 93010 ELECTROCARDIOGRAM REPORT: CPT | Performed by: SPECIALIST

## 2020-04-04 PROCEDURE — 93005 ELECTROCARDIOGRAM TRACING: CPT | Performed by: FAMILY MEDICINE

## 2020-04-04 PROCEDURE — 86901 BLOOD TYPING SEROLOGIC RH(D): CPT | Performed by: FAMILY MEDICINE

## 2020-04-04 PROCEDURE — 81003 URINALYSIS AUTO W/O SCOPE: CPT | Performed by: FAMILY MEDICINE

## 2020-04-04 PROCEDURE — 84443 ASSAY THYROID STIM HORMONE: CPT | Performed by: FAMILY MEDICINE

## 2020-04-04 PROCEDURE — 86900 BLOOD TYPING SEROLOGIC ABO: CPT

## 2020-04-04 PROCEDURE — 85025 COMPLETE CBC W/AUTO DIFF WBC: CPT | Performed by: FAMILY MEDICINE

## 2020-04-04 PROCEDURE — 99284 EMERGENCY DEPT VISIT MOD MDM: CPT

## 2020-04-04 PROCEDURE — 86901 BLOOD TYPING SEROLOGIC RH(D): CPT

## 2020-04-04 PROCEDURE — 83735 ASSAY OF MAGNESIUM: CPT | Performed by: FAMILY MEDICINE

## 2020-04-04 PROCEDURE — 86850 RBC ANTIBODY SCREEN: CPT | Performed by: FAMILY MEDICINE

## 2020-04-04 RX ORDER — ROPINIROLE 5 MG/1
5 TABLET, FILM COATED ORAL NIGHTLY
COMMUNITY

## 2020-04-04 RX ORDER — MIRTAZAPINE 15 MG/1
15 TABLET, FILM COATED ORAL NIGHTLY
COMMUNITY

## 2020-04-04 RX ORDER — HYDROCHLOROTHIAZIDE 12.5 MG/1
12.5 TABLET ORAL DAILY
COMMUNITY
End: 2022-03-16

## 2020-04-04 RX ORDER — SODIUM CHLORIDE 0.9 % (FLUSH) 0.9 %
10 SYRINGE (ML) INJECTION AS NEEDED
Status: DISCONTINUED | OUTPATIENT
Start: 2020-04-04 | End: 2020-04-04 | Stop reason: HOSPADM

## 2020-04-04 RX ORDER — TRAMADOL HYDROCHLORIDE 50 MG/1
50 TABLET ORAL EVERY 6 HOURS PRN
COMMUNITY

## 2020-04-04 RX ORDER — DICLOFENAC SODIUM 75 MG/1
75 TABLET, DELAYED RELEASE ORAL 2 TIMES DAILY
COMMUNITY

## 2020-04-04 RX ORDER — VENLAFAXINE 75 MG/1
75 TABLET ORAL 2 TIMES DAILY
COMMUNITY

## 2020-04-04 RX ORDER — ZOLPIDEM TARTRATE 10 MG/1
10 TABLET ORAL NIGHTLY PRN
COMMUNITY

## 2020-04-04 RX ORDER — ONDANSETRON 4 MG/1
4 TABLET, FILM COATED ORAL EVERY 8 HOURS PRN
COMMUNITY
End: 2020-04-08

## 2020-04-04 RX ORDER — OXYCODONE AND ACETAMINOPHEN 10; 325 MG/1; MG/1
1 TABLET ORAL ONCE
Status: COMPLETED | OUTPATIENT
Start: 2020-04-04 | End: 2020-04-04

## 2020-04-04 RX ADMIN — OXYCODONE HYDROCHLORIDE AND ACETAMINOPHEN 1 TABLET: 10; 325 TABLET ORAL at 17:51

## 2020-04-04 NOTE — ED PROVIDER NOTES
Subjective   Patient is a 64-year-old female who presents emergency department saying she feels weak, fatigued, and is slightly short of breath.  She states that she has had the symptoms in the past when she is needed a blood transfusion.  She states that she is needed to blood transfusion since having her hip replaced in February.  She states that the reason for blood transfusion was blood loss from her surgery.  She denies any nausea, vomiting, abdominal pain or diarrhea.  She states that she is not on any blood thinners and has never been diagnosed with a GI bleed.  The patient denies any recent falls or injuries.  She states that her surgery was done in the middle of February.  She states that she has not had any coughing, fever, chills or any recent travel.  She mentions that she is had some lower extremity edema which is somewhat normal for her but is been worse over the past few days.  She states that overall she is felt fatigued for approximately 4 days.  She denies any chest pain, rash or additional symptoms at this time.          Review of Systems   Constitutional: Positive for activity change and fatigue. Negative for appetite change, chills, diaphoresis and fever.   HENT: Negative for congestion, sinus pressure, sinus pain, sneezing, sore throat and tinnitus.    Eyes: Negative for photophobia, pain, discharge, redness and itching.   Respiratory: Positive for shortness of breath. Negative for apnea, cough, choking, chest tightness, wheezing and stridor.    Cardiovascular: Positive for leg swelling. Negative for chest pain and palpitations.   Gastrointestinal: Negative for abdominal distention, abdominal pain, diarrhea, nausea and vomiting.   Genitourinary: Negative for difficulty urinating and flank pain.   Musculoskeletal: Negative for arthralgias and back pain.   Neurological: Negative for dizziness and headaches.   Psychiatric/Behavioral: Negative for agitation, confusion, decreased concentration and  dysphoric mood.       Past Medical History:   Diagnosis Date   • Anemia    • Anxiety    • Depression        Allergies   Allergen Reactions   • Penicillins Hives and Swelling       Past Surgical History:   Procedure Laterality Date   • HIP ARTHROSCOPY     • JOINT REPLACEMENT         History reviewed. No pertinent family history.    Social History     Socioeconomic History   • Marital status: Single     Spouse name: Not on file   • Number of children: Not on file   • Years of education: Not on file   • Highest education level: Not on file   Tobacco Use   • Smoking status: Current Every Day Smoker     Packs/day: 1.00     Types: Cigarettes   • Smokeless tobacco: Never Used   Substance and Sexual Activity   • Alcohol use: Yes     Alcohol/week: 1.0 standard drinks     Types: 1 Glasses of wine per week   • Drug use: Never   • Sexual activity: Defer           Objective   Physical Exam   Constitutional: She is oriented to person, place, and time. She appears well-developed and well-nourished.  Non-toxic appearance. She does not appear ill. No distress. She is not intubated.   HENT:   Head: Normocephalic and atraumatic.   Mouth/Throat: Oropharynx is clear and moist. No oropharyngeal exudate or posterior oropharyngeal edema.   Eyes: Pupils are equal, round, and reactive to light. EOM are normal.   Neck: Normal range of motion. Neck supple. No hepatojugular reflux and no JVD present. No tracheal deviation present. No thyromegaly present.   Cardiovascular: Normal rate, regular rhythm, normal heart sounds and intact distal pulses.  No extrasystoles are present. Exam reveals no gallop, no friction rub and no decreased pulses.   No murmur heard.  Pulmonary/Chest: Effort normal and breath sounds normal. No accessory muscle usage or stridor. No apnea, no tachypnea and no bradypnea. She is not intubated. No respiratory distress. She has no decreased breath sounds. She has no wheezes. She has no rhonchi. She has no rales. She exhibits  no mass, no tenderness, no laceration, no crepitus, no edema and no retraction.   Abdominal: Soft. Bowel sounds are normal. She exhibits no distension, no ascites and no mass. There is no splenomegaly or hepatomegaly. There is no tenderness. There is no rebound and no guarding.   Musculoskeletal: Normal range of motion.        Right lower leg: Normal. She exhibits no tenderness and no edema.        Left lower leg: Normal. She exhibits no tenderness and no edema.   Lymphadenopathy:     She has no cervical adenopathy.   Neurological: She is alert and oriented to person, place, and time. She is not disoriented. No cranial nerve deficit.   Skin: Skin is warm and dry. Capillary refill takes less than 2 seconds. No ecchymosis and no rash noted. She is not diaphoretic. No cyanosis or erythema. No pallor. Nails show no clubbing.   Psychiatric: She has a normal mood and affect. Her behavior is normal. Her mood appears not anxious. She is not agitated.   Nursing note and vitals reviewed.      Procedures           ED Course  ED Course as of Apr 04 1815   Sat Apr 04, 2020   1656 EKG is sinus rhythm with a sinus arrhythmia and a short LA interval rate 92 bpm LA interval 100 ms QRS duration 88 ms QT/QTc 382/472 ms there are no ST segment changes or any acute abnormalities on this EKG.    [EG]   1717 Patient's hemoglobin near baseline.    [EG]   1740 Patient states she is due for home dose of percocet    [EG]      ED Course User Index  [EG] Usha Trotter, DO                                           MDM  Number of Diagnoses or Management Options  Fatigue, unspecified type: new and requires workup     Amount and/or Complexity of Data Reviewed  Clinical lab tests: ordered and reviewed  Tests in the radiology section of CPT®: ordered and reviewed  Tests in the medicine section of CPT®: ordered and reviewed  Independent visualization of images, tracings, or specimens: yes    Risk of Complications, Morbidity, and/or  Mortality  Presenting problems: high  Diagnostic procedures: high  Management options: high    Patient Progress  Patient progress: stable      Final diagnoses:   Fatigue, unspecified type            Usha Trotter DO  04/04/20 0519

## 2020-04-04 NOTE — DISCHARGE INSTRUCTIONS
Call one of the offices below to establish a primary care provider.  If you are unable to get an appointment and feel it is an emergency and need to be seen immediately please return to the Emergency Department.    Call one of the office below to set up a primary care provider.    Dr. Kenneth Moctezuma                                                                                                       [address] [facility] [address]  [phone]    Dr. Garcia, Dr. DARSHAN Burton, Dr. GARCIA Burton ([facility])  [address] [facility] [address]  [phone]    Dr. Charlton, Dr. Mathew, Dr. Alvarado ([facility])  [address] [facility] [address]  [phone]    Dr. Hansen  [address] [facility] [address]  [phone]    Dr. Saldana, Dr. Self, Dr. Strong, Dr. Serrato ([facility])  [address] [place] [address] [address]  [facility] [address]  [phone]    Dr. Jihan Cote  [address] [facility] [place] [address]  [phone]    Dr. Pamela Colbert  [address]   [address]  [facility] [address]  [phone]    Dr. Moctezuma  [address] [facility] [address]  [phone]    Dr. Sloan, Dr. Mackey  [address] [facility] [place] [address]  [phone]    Dr. Shah  [address]                                                              [address]  [facility] [address]  [phone]    Dr. Finn  [address] [facility] [id number] [phone]    [address]. Lela Edwards  [address]  [address]  [facility] [address]  [phone]

## 2020-04-04 NOTE — ED NOTES
Patient arrives to the ER with complaints of abnormal labs. Patient states that she has trouble with being anemic and that she had to receive a blood transfusion. Patient denies any other problems at this time. Patient states that she feels like she is weaker than normal and feel tired. Patient also complains of swelling of the lower extremities. Patient denies shortness opf breath or chest pain. Patient denies any other problems. Patient is noted to have a normal physical exam.      Cristian Stewart RN  04/04/20 2590

## 2020-04-08 ENCOUNTER — CONSULT (OUTPATIENT)
Dept: ONCOLOGY | Facility: CLINIC | Age: 65
End: 2020-04-08

## 2020-04-08 ENCOUNTER — DOCUMENTATION (OUTPATIENT)
Dept: ONCOLOGY | Facility: HOSPITAL | Age: 65
End: 2020-04-08

## 2020-04-08 VITALS
RESPIRATION RATE: 18 BRPM | DIASTOLIC BLOOD PRESSURE: 84 MMHG | SYSTOLIC BLOOD PRESSURE: 143 MMHG | HEIGHT: 66 IN | BODY MASS INDEX: 24.91 KG/M2 | TEMPERATURE: 98.4 F | OXYGEN SATURATION: 97 % | WEIGHT: 155 LBS | HEART RATE: 87 BPM

## 2020-04-08 DIAGNOSIS — D50.0 IRON DEFICIENCY ANEMIA DUE TO CHRONIC BLOOD LOSS: Primary | ICD-10-CM

## 2020-04-08 DIAGNOSIS — D64.9 NORMOCYTIC ANEMIA: ICD-10-CM

## 2020-04-08 LAB
BASOPHILS # BLD AUTO: 0.1 10*3/MM3 (ref 0–0.2)
BASOPHILS NFR BLD AUTO: 0.9 % (ref 0–1.5)
CRP SERPL-MCNC: 0.36 MG/DL (ref 0–0.5)
DEPRECATED RDW RBC AUTO: 49.8 FL (ref 37–54)
EOSINOPHIL # BLD AUTO: 0.31 10*3/MM3 (ref 0–0.4)
EOSINOPHIL NFR BLD AUTO: 2.9 % (ref 0.3–6.2)
ERYTHROCYTE [DISTWIDTH] IN BLOOD BY AUTOMATED COUNT: 16.1 % (ref 12.3–15.4)
FERRITIN SERPL-MCNC: 27.16 NG/ML (ref 13–150)
HCT VFR BLD AUTO: 32.2 % (ref 34–46.6)
HGB BLD-MCNC: 9.7 G/DL (ref 12–15.9)
IMM GRANULOCYTES # BLD AUTO: 0.02 10*3/MM3 (ref 0–0.05)
IMM GRANULOCYTES NFR BLD AUTO: 0.2 % (ref 0–0.5)
IRON 24H UR-MRATE: 32 MCG/DL (ref 37–145)
IRON SATN MFR SERPL: 6 % (ref 20–50)
LDH SERPL-CCNC: 214 U/L (ref 135–214)
LYMPHOCYTES # BLD AUTO: 2.53 10*3/MM3 (ref 0.7–3.1)
LYMPHOCYTES NFR BLD AUTO: 23.8 % (ref 19.6–45.3)
MCH RBC QN AUTO: 25.3 PG (ref 26.6–33)
MCHC RBC AUTO-ENTMCNC: 30.1 G/DL (ref 31.5–35.7)
MCV RBC AUTO: 84.1 FL (ref 79–97)
MONOCYTES # BLD AUTO: 0.84 10*3/MM3 (ref 0.1–0.9)
MONOCYTES NFR BLD AUTO: 7.9 % (ref 5–12)
NEUTROPHILS # BLD AUTO: 6.82 10*3/MM3 (ref 1.7–7)
NEUTROPHILS NFR BLD AUTO: 64.3 % (ref 42.7–76)
NRBC BLD AUTO-RTO: 0 /100 WBC (ref 0–0.2)
PLATELET # BLD AUTO: 345 10*3/MM3 (ref 140–450)
PMV BLD AUTO: 9.3 FL (ref 6–12)
RBC # BLD AUTO: 3.83 10*6/MM3 (ref 3.77–5.28)
RETICS # AUTO: 0.08 10*6/MM3 (ref 0.02–0.13)
RETICS/RBC NFR AUTO: 1.98 % (ref 0.7–1.9)
TIBC SERPL-MCNC: 581 MCG/DL (ref 298–536)
TRANSFERRIN SERPL-MCNC: 390 MG/DL (ref 200–360)
WBC NRBC COR # BLD: 10.62 10*3/MM3 (ref 3.4–10.8)

## 2020-04-08 PROCEDURE — 84630 ASSAY OF ZINC: CPT | Performed by: INTERNAL MEDICINE

## 2020-04-08 PROCEDURE — 83540 ASSAY OF IRON: CPT | Performed by: INTERNAL MEDICINE

## 2020-04-08 PROCEDURE — 83516 IMMUNOASSAY NONANTIBODY: CPT | Performed by: INTERNAL MEDICINE

## 2020-04-08 PROCEDURE — 36415 COLL VENOUS BLD VENIPUNCTURE: CPT | Performed by: INTERNAL MEDICINE

## 2020-04-08 PROCEDURE — 99204 OFFICE O/P NEW MOD 45 MIN: CPT | Performed by: INTERNAL MEDICINE

## 2020-04-08 PROCEDURE — 85025 COMPLETE CBC W/AUTO DIFF WBC: CPT | Performed by: INTERNAL MEDICINE

## 2020-04-08 PROCEDURE — 83010 ASSAY OF HAPTOGLOBIN QUANT: CPT | Performed by: INTERNAL MEDICINE

## 2020-04-08 PROCEDURE — 83615 LACTATE (LD) (LDH) ENZYME: CPT | Performed by: INTERNAL MEDICINE

## 2020-04-08 PROCEDURE — 84466 ASSAY OF TRANSFERRIN: CPT | Performed by: INTERNAL MEDICINE

## 2020-04-08 PROCEDURE — 86140 C-REACTIVE PROTEIN: CPT | Performed by: INTERNAL MEDICINE

## 2020-04-08 PROCEDURE — 82525 ASSAY OF COPPER: CPT | Performed by: INTERNAL MEDICINE

## 2020-04-08 PROCEDURE — 85045 AUTOMATED RETICULOCYTE COUNT: CPT | Performed by: INTERNAL MEDICINE

## 2020-04-08 PROCEDURE — 82728 ASSAY OF FERRITIN: CPT | Performed by: INTERNAL MEDICINE

## 2020-04-08 PROCEDURE — 82607 VITAMIN B-12: CPT | Performed by: INTERNAL MEDICINE

## 2020-04-08 PROCEDURE — 82746 ASSAY OF FOLIC ACID SERUM: CPT | Performed by: INTERNAL MEDICINE

## 2020-04-08 RX ORDER — SODIUM CHLORIDE 9 MG/ML
250 INJECTION, SOLUTION INTRAVENOUS ONCE
Status: CANCELLED | OUTPATIENT
Start: 2020-04-20

## 2020-04-08 RX ORDER — SODIUM CHLORIDE 9 MG/ML
250 INJECTION, SOLUTION INTRAVENOUS ONCE
Status: CANCELLED | OUTPATIENT
Start: 2020-04-15

## 2020-04-08 NOTE — PROGRESS NOTES
Regine Beckham  [id number]  1955  4/8/2020      Referring Provider:   Dread Burton MD    Reason for Consultation:   Normocytic, Iron deficiency anemia    Chief Complaint:   Fatigue       History of Present Illness   Regine Beckham is a very pleasant 64 y.o.  female who presents in new consultation at the request of Dr. Dread Burton for further management and evaluation of iron deficiency anemia.    Ms. Beckham underwent hip surgery February 2020 in which she received two units of PRBCs. Since that time she has continued to remain quite fatigued and anemic in which she required another two units of blood yesterday in the infusion center. She also notes increased cravings for corn bread since her surgery. She denies of any known history significant for anemia, iron deficiency, or blood transfusions prior to her surgery. She also has a past medical history significant for gastric sleeve that was performed on April 30th 2018 and is currently only on a women's multivitamin daily. Since her surgery she has also been experiencing bilateral lower extremity edema and was given Lasix between her two blood transfusions yesterday. She denies of any current abnormal or active blood loss. She states that her last endoscopic procedure was several years ago and normal.       The following portions of the patient's history were reviewed and updated as appropriate: allergies, current medications, past family history, past medical history, past social history, past surgical history and problem list.    Allergies   Allergen Reactions   • Penicillins Hives and Swelling       Past Medical History:   Diagnosis Date   • Anemia    • Anxiety    • Arthritis    • Depression    • Sleep apnea          Past Surgical History:   Procedure Laterality Date   • BACK SURGERY     • GALLBLADDER SURGERY     • HIP ARTHROSCOPY     • JOINT REPLACEMENT     Gastric sleeve - April 30th 2018 in [place]         Social History     Socioeconomic  History   • Marital status:      Spouse name: Not on file   • Number of children: Not on file   • Years of education: Not on file   • Highest education level: Not on file   Tobacco Use   • Smoking status: Current Every Day Smoker     Packs/day: 1.00     Types: Cigarettes   • Smokeless tobacco: Never Used   Substance and Sexual Activity   • Alcohol use: Yes     Alcohol/week: 1.0 standard drinks     Types: 1 Glasses of wine per week     Comment: rarely   • Drug use: Never   • Sexual activity: Defer         History reviewed. No pertinent family history.        Current Outpatient Medications:   •  Albuterol (VENTOLIN IN), Inhale 90 mcg 2 (Two) Times a Day., Disp: , Rfl:   •  diclofenac (VOLTAREN) 75 MG EC tablet, Take 75 mg by mouth 2 (Two) Times a Day., Disp: , Rfl:   •  hydroCHLOROthiazide (HYDRODIURIL) 12.5 MG tablet, Take 12.5 mg by mouth Daily., Disp: , Rfl:   •  mirtazapine (REMERON) 15 MG tablet, Take 15 mg by mouth Every Night., Disp: , Rfl:   •  rOPINIRole (REQUIP) 5 MG tablet, Take 5 mg by mouth Every Night., Disp: , Rfl:   •  traMADol (ULTRAM) 50 MG tablet, Take 50 mg by mouth Every 6 (Six) Hours As Needed for Moderate Pain ., Disp: , Rfl:   •  venlafaxine (EFFEXOR) 75 MG tablet, Take 75 mg by mouth 2 (Two) Times a Day., Disp: , Rfl:   •  zolpidem (AMBIEN) 10 MG tablet, Take 10 mg by mouth At Night As Needed for Sleep., Disp: , Rfl:   Dexter   Lasix         Review of Systems  Constitutional: No fever, chills, night sweats, positive weight loss since gastric sleeve - 96 pounds.   HEENT:  No headaches, vision changes or hearing changes, no sinus drainage, sore throat.   Cardiovascular:  No palpitations, chest pain, syncopal episodes, positive lower extremity edema.   Pulmonary:  Positive shortness of breath on exertion, hemoptysis, or cough.   Gastrointestinal:  No nausea or vomiting. No constipation or diarrhea. No abdominal pain. No melena or hematochezia.   Genitourinary:  No hematuria, or changes in  urination.   Musculoskeletal: Positive hip pain, no joint problems.   Skin: No rashes or pruritus.   Endocrine:  No hot flashes or chills   Hematologic:  No history of free bleeding, spontaneous bleeding or clotting problems. No lymphadenopathy.    Immunologic:  No allergies or frequent infections.   Neurologic: No numbness, tingling, or weakness.   Psychiatric: Positive anxiety and depression controlled with medications             Physical Exam  Vital Signs: These were reviewed and listed as per patient’s electronic medical chart  Vitals:    04/08/20 1149   BP: 143/84   Pulse: 87   Resp: 18   Temp: 98.4 °F (36.9 °C)   SpO2: 97%     General: Awake, alert and oriented, in no distress, in wheelchair   HEENT: Head is atraumatic, normocephalic, extraocular movements full, oropharynx clear, no scleral icterus, pink moist mucous membranes  Neck: supple, no jvd, lymphadenopathy or masses  Cardiovascular: regular rate and rhythm without murmurs, rubs or gallops  Pulmonary: non-labored, clear to auscultation bilaterally, no wheezing  Abdomen: soft, non-tender, non-distended, normal active bowel sounds present   Extremities: No clubbing, cyanosis or edema  Lymph: No cervical, supraclavicular adenopathy  Neurologic: Mental status as above, alert, awake and oriented, grossly non-focal exam  Skin: warm, dry, intact        Pain Score:  Pain Score    04/08/20 1149   PainSc:   7   PainLoc: Hip       PHQ-Score Total:  PHQ-9 Total Score: 3        Labs / Studies:    Consult on 04/08/2020   Component Date Value   • Iron 04/08/2020 32*   • Iron Saturation 04/08/2020 6*   • Transferrin 04/08/2020 390*   • TIBC 04/08/2020 581*   • Ferritin 04/08/2020 27.16    • C-Reactive Protein 04/08/2020 0.36    • Reticulocyte % 04/08/2020 1.98*   • Reticulocyte Absolute 04/08/2020 0.0758    • LDH 04/08/2020 214    • WBC 04/08/2020 10.62    • RBC 04/08/2020 3.83    • Hemoglobin 04/08/2020 9.7*   • Hematocrit 04/08/2020 32.2*   • MCV 04/08/2020 84.1     • MCH 04/08/2020 25.3*   • MCHC 04/08/2020 30.1*   • RDW 04/08/2020 16.1*   • RDW-SD 04/08/2020 49.8    • MPV 04/08/2020 9.3    • Platelets 04/08/2020 345    • Neutrophil % 04/08/2020 64.3    • Lymphocyte % 04/08/2020 23.8    • Monocyte % 04/08/2020 7.9    • Eosinophil % 04/08/2020 2.9    • Basophil % 04/08/2020 0.9    • Immature Grans % 04/08/2020 0.2    • Neutrophils, Absolute 04/08/2020 6.82    • Lymphocytes, Absolute 04/08/2020 2.53    • Monocytes, Absolute 04/08/2020 0.84    • Eosinophils, Absolute 04/08/2020 0.31    • Basophils, Absolute 04/08/2020 0.10    • Immature Grans, Absolute 04/08/2020 0.02    • nRBC 04/08/2020 0.0    Admission on 04/04/2020, Discharged on 04/04/2020   Component Date Value   • Glucose 04/04/2020 127*   • BUN 04/04/2020 11    • Creatinine 04/04/2020 0.66    • Sodium 04/04/2020 144    • Potassium 04/04/2020 3.8    • Chloride 04/04/2020 106    • CO2 04/04/2020 28.0    • Calcium 04/04/2020 9.2    • Total Protein 04/04/2020 7.0    • Albumin 04/04/2020 3.67    • ALT (SGPT) 04/04/2020 10    • AST (SGOT) 04/04/2020 14    • Alkaline Phosphatase 04/04/2020 86    • Total Bilirubin 04/04/2020 0.2    • eGFR Non African Amer 04/04/2020 90    • Globulin 04/04/2020 3.3    • A/G Ratio 04/04/2020 1.1    • BUN/Creatinine Ratio 04/04/2020 16.7    • Anion Gap 04/04/2020 10.0    • Color, UA 04/04/2020 Yellow    • Appearance, UA 04/04/2020 Clear    • pH, UA 04/04/2020 8.0    • Specific Gravity, UA 04/04/2020 1.019    • Glucose, UA 04/04/2020 Negative    • Ketones, UA 04/04/2020 Negative    • Bilirubin, UA 04/04/2020 Negative    • Blood, UA 04/04/2020 Negative    • Protein, UA 04/04/2020 Negative    • Leuk Esterase, UA 04/04/2020 Negative    • Nitrite, UA 04/04/2020 Negative    • Urobilinogen, UA 04/04/2020 0.2 E.U./dL    • ABO Type 04/04/2020 A    • RH type 04/04/2020 Positive    • Antibody Screen 04/04/2020 Negative    • T&S Expiration Date 04/04/2020 4/7/2020 11:59:59 PM    • Troponin T 04/04/2020 <0.010     • proBNP 04/04/2020 739.6    • TSH 04/04/2020 3.130    • Magnesium 04/04/2020 1.8    • WBC 04/04/2020 7.85    • RBC 04/04/2020 3.00*   • Hemoglobin 04/04/2020 7.2*   • Hematocrit 04/04/2020 25.3*   • MCV 04/04/2020 84.3    • MCH 04/04/2020 24.0*   • MCHC 04/04/2020 28.5*   • RDW 04/04/2020 15.8*   • RDW-SD 04/04/2020 48.3    • MPV 04/04/2020 9.5    • Platelets 04/04/2020 290    • Neutrophil % 04/04/2020 56.6    • Lymphocyte % 04/04/2020 31.1    • Monocyte % 04/04/2020 7.9    • Eosinophil % 04/04/2020 3.4    • Basophil % 04/04/2020 0.9    • Immature Grans % 04/04/2020 0.1    • Neutrophils, Absolute 04/04/2020 4.44    • Lymphocytes, Absolute 04/04/2020 2.44    • Monocytes, Absolute 04/04/2020 0.62    • Eosinophils, Absolute 04/04/2020 0.27    • Basophils, Absolute 04/04/2020 0.07    • Immature Grans, Absolute 04/04/2020 0.01    • nRBC 04/04/2020 0.0    • Anisocytosis 04/04/2020 Slight/1+    • Hypochromia 04/04/2020 Slight/1+    • Platelet Estimate 04/04/2020 Adequate    • ABO Type 04/04/2020 A    • RH type 04/04/2020 Positive    Lab Requisition on 03/13/2020   Component Date Value   • Hemoglobin 03/13/2020 7.7*   • Hematocrit 03/13/2020 26.0*   Lab Requisition on 03/09/2020   Component Date Value   • Iron 03/09/2020 21*   • Iron Saturation 03/09/2020 5*   • Transferrin 03/09/2020 309    • TIBC 03/09/2020 460    • Vitamin B-12 03/09/2020 326    • Folate 03/09/2020 4.91    • WBC 03/09/2020 7.55    • RBC 03/09/2020 2.95*   • Hemoglobin 03/09/2020 7.6*   • Hematocrit 03/09/2020 25.7*   • MCV 03/09/2020 87.1    • MCH 03/09/2020 25.8*   • MCHC 03/09/2020 29.6*   • RDW 03/09/2020 16.5*   • RDW-SD 03/09/2020 51.6    • MPV 03/09/2020 10.5    • Platelets 03/09/2020 392    • Neutrophil % 03/09/2020 49.4    • Lymphocyte % 03/09/2020 36.6    • Monocyte % 03/09/2020 6.4    • Eosinophil % 03/09/2020 6.2    • Basophil % 03/09/2020 1.1    • Immature Grans % 03/09/2020 0.3    • Neutrophils, Absolute 03/09/2020 3.74    • Lymphocytes,  Absolute 03/09/2020 2.76    • Monocytes, Absolute 03/09/2020 0.48    • Eosinophils, Absolute 03/09/2020 0.47*   • Basophils, Absolute 03/09/2020 0.08    • Immature Grans, Absolute 03/09/2020 0.02    • nRBC 03/09/2020 0.0    Lab Requisition on 03/07/2020   Component Date Value   • Potassium 03/07/2020 3.9    Lab Requisition on 03/06/2020   Component Date Value   • Glucose 03/06/2020 104*   • BUN 03/06/2020 25*   • Creatinine 03/06/2020 0.80    • Sodium 03/06/2020 143    • Potassium 03/06/2020 3.2*   • Chloride 03/06/2020 104    • CO2 03/06/2020 28.2    • Calcium 03/06/2020 9.0    • Total Protein 03/06/2020 6.3    • Albumin 03/06/2020 3.33*   • ALT (SGPT) 03/06/2020 16    • AST (SGOT) 03/06/2020 20    • Alkaline Phosphatase 03/06/2020 77    • Total Bilirubin 03/06/2020 0.2    • eGFR Non African Amer 03/06/2020 72    • Globulin 03/06/2020 3.0    • A/G Ratio 03/06/2020 1.1    • BUN/Creatinine Ratio 03/06/2020 31.3*   • Anion Gap 03/06/2020 10.8    • WBC 03/06/2020 8.71    • RBC 03/06/2020 3.05*   • Hemoglobin 03/06/2020 7.9*   • Hematocrit 03/06/2020 26.3*   • MCV 03/06/2020 86.2    • MCH 03/06/2020 25.9*   • MCHC 03/06/2020 30.0*   • RDW 03/06/2020 16.7*   • RDW-SD 03/06/2020 51.7    • MPV 03/06/2020 10.2    • Platelets 03/06/2020 367    • Neutrophil % 03/06/2020 56.7    • Lymphocyte % 03/06/2020 26.3    • Monocyte % 03/06/2020 9.3    • Eosinophil % 03/06/2020 6.4*   • Basophil % 03/06/2020 1.0    • Immature Grans % 03/06/2020 0.3    • Neutrophils, Absolute 03/06/2020 4.93    • Lymphocytes, Absolute 03/06/2020 2.29    • Monocytes, Absolute 03/06/2020 0.81    • Eosinophils, Absolute 03/06/2020 0.56*   • Basophils, Absolute 03/06/2020 0.09    • Immature Grans, Absolute 03/06/2020 0.03    • nRBC 03/06/2020 0.0           Assessment/Plan   Regine Beckham is a very pleasant 64 y.o.  female who presents in new consultation at the request of Dr. Dread Burton for further management and evaluation of iron deficiency  anemia.    Iron deficiency, normocytic anemia  - Patients normocytic anemia is secondary to iron deficiency likely related to blood loss from her hip surgery as well as possible malabsorption due to gastric sleeve procedure. Given her significant anemia and likely malabsorption will start IV Feraheme. Side effects were discussed in clinic and she is agreeable.   - Fatigue and cornbread cravings are likely due to her iron deficiency anemia. She had good response to two units of PRBCs that she received yesterday.   - I will also assess for copper, zinc, and tissue transglutaminase to further evaluate as well as B12 and folate levels given her history of gastric sleeve procedure. Although this is likely related to her most surgery will also refer to gastroenterology for possible endoscopic evaluation.   - There does not appear to be any hemolysis and CRP is normal     ACO / MOE/Other  Quality measures  -  Patient's Body mass index is 25.02 kg/m². BMI is within normal parameters. No follow-up required..  -  Regine Beckham  reports that she has been smoking cigarettes. She has been smoking about 1.00 pack per day. She has never used smokeless tobacco. I advised her to quit and she is trying to cut back on her own and declines interventions such as chantix.  -  Regine Beckham received 2019 flu vaccine.  -  Regine Beckham reports a pain score of 7. Given her pain assessment as noted, she will continue to follow with her orthopedic physician for pain management.  -  Current outpatient and discharge medications have been reconciled for the patient.  Reviewed by: Daina Fletcher MD    I will have the patient return in six weeks for repeat laboratory evaluation and in 3 months for follow up appointment. She understands that should she have any questions or concerns prior to her appointment she should give us a call at any time and I would be happy to see her sooner. It was a pleasure to see this patient in clinic today,  thank you for allowing me to participate in the care of this patient.      Daina Fletcher MD  04/08/2020  16:00     Addendum:  Patient is also B12 deficient. Will start her on SC injections once weekly for four weeks, then monthly thereafter.

## 2020-04-09 LAB
FOLATE SERPL-MCNC: 8.27 NG/ML (ref 4.78–24.2)
HAPTOGLOB SERPL-MCNC: 156 MG/DL (ref 30–200)
TTG IGA SER-ACNC: <2 U/ML (ref 0–3)
VIT B12 BLD-MCNC: 197 PG/ML (ref 211–946)

## 2020-04-10 LAB
COPPER SERPL-MCNC: 168 UG/DL (ref 72–166)
ZINC SERPL-MCNC: 62 UG/DL (ref 56–134)

## 2020-04-15 ENCOUNTER — INFUSION (OUTPATIENT)
Dept: ONCOLOGY | Facility: HOSPITAL | Age: 65
End: 2020-04-15

## 2020-04-15 VITALS
WEIGHT: 170.9 LBS | OXYGEN SATURATION: 97 % | SYSTOLIC BLOOD PRESSURE: 160 MMHG | DIASTOLIC BLOOD PRESSURE: 87 MMHG | TEMPERATURE: 97.9 F | HEART RATE: 105 BPM | BODY MASS INDEX: 27.58 KG/M2 | RESPIRATION RATE: 18 BRPM

## 2020-04-15 DIAGNOSIS — D50.0 IRON DEFICIENCY ANEMIA DUE TO CHRONIC BLOOD LOSS: Primary | ICD-10-CM

## 2020-04-15 PROCEDURE — 25010000002 FERUMOXYTOL 510 MG/17ML SOLUTION 510 MG VIAL: Performed by: INTERNAL MEDICINE

## 2020-04-15 PROCEDURE — 96374 THER/PROPH/DIAG INJ IV PUSH: CPT

## 2020-04-15 PROCEDURE — 96365 THER/PROPH/DIAG IV INF INIT: CPT

## 2020-04-15 RX ORDER — SODIUM CHLORIDE 9 MG/ML
250 INJECTION, SOLUTION INTRAVENOUS ONCE
Status: COMPLETED | OUTPATIENT
Start: 2020-04-15 | End: 2020-04-15

## 2020-04-15 RX ADMIN — SODIUM CHLORIDE 250 ML: 9 INJECTION, SOLUTION INTRAVENOUS at 15:24

## 2020-04-15 RX ADMIN — FERUMOXYTOL 510 MG: 510 INJECTION INTRAVENOUS at 15:26

## 2020-04-17 ENCOUNTER — TELEPHONE (OUTPATIENT)
Dept: ONCOLOGY | Facility: HOSPITAL | Age: 65
End: 2020-04-17

## 2020-04-17 NOTE — TELEPHONE ENCOUNTER
Patient contacted for 2019 Novel Coronavirus screening.  Patient denies any fever, cough, or SOA.  Patient has not traveled out of the country in the past 14 days.  Patient has not had contact with anyone who is known to have, or is suspected of having the coronavirus.  Patient made aware of visitor policy.

## 2020-04-20 ENCOUNTER — INFUSION (OUTPATIENT)
Dept: ONCOLOGY | Facility: HOSPITAL | Age: 65
End: 2020-04-20

## 2020-04-20 VITALS
TEMPERATURE: 97.8 F | WEIGHT: 170 LBS | RESPIRATION RATE: 18 BRPM | HEART RATE: 89 BPM | SYSTOLIC BLOOD PRESSURE: 146 MMHG | OXYGEN SATURATION: 98 % | BODY MASS INDEX: 27.44 KG/M2 | DIASTOLIC BLOOD PRESSURE: 76 MMHG

## 2020-04-20 DIAGNOSIS — D50.0 IRON DEFICIENCY ANEMIA DUE TO CHRONIC BLOOD LOSS: Primary | ICD-10-CM

## 2020-04-20 PROCEDURE — 25010000002 FERUMOXYTOL 510 MG/17ML SOLUTION 510 MG VIAL: Performed by: INTERNAL MEDICINE

## 2020-04-20 PROCEDURE — 96374 THER/PROPH/DIAG INJ IV PUSH: CPT

## 2020-04-20 RX ORDER — SODIUM CHLORIDE 9 MG/ML
250 INJECTION, SOLUTION INTRAVENOUS ONCE
Status: COMPLETED | OUTPATIENT
Start: 2020-04-20 | End: 2020-04-20

## 2020-04-20 RX ADMIN — SODIUM CHLORIDE 250 ML: 9 INJECTION, SOLUTION INTRAVENOUS at 15:17

## 2020-04-20 RX ADMIN — FERUMOXYTOL 510 MG: 510 INJECTION INTRAVENOUS at 15:17

## 2020-04-22 ENCOUNTER — TELEPHONE (OUTPATIENT)
Dept: ONCOLOGY | Facility: CLINIC | Age: 65
End: 2020-04-22

## 2020-04-22 ENCOUNTER — APPOINTMENT (OUTPATIENT)
Dept: ONCOLOGY | Facility: HOSPITAL | Age: 65
End: 2020-04-22

## 2020-04-22 NOTE — TELEPHONE ENCOUNTER
Patient [place] a gastro referral for [place] and patient found a gastro dr closer to home.     Call patient for the dr. Information [phone]

## 2021-02-25 DIAGNOSIS — Z23 IMMUNIZATION DUE: ICD-10-CM

## 2021-04-26 ENCOUNTER — IMMUNIZATION (OUTPATIENT)
Dept: VACCINE CLINIC | Facility: HOSPITAL | Age: 66
End: 2021-04-26

## 2021-04-26 PROCEDURE — 0001A: CPT | Performed by: INTERNAL MEDICINE

## 2021-04-26 PROCEDURE — 91300 HC SARSCOV02 VAC 30MCG/0.3ML IM: CPT | Performed by: INTERNAL MEDICINE

## 2021-05-18 ENCOUNTER — IMMUNIZATION (OUTPATIENT)
Dept: VACCINE CLINIC | Facility: HOSPITAL | Age: 66
End: 2021-05-18

## 2021-05-18 PROCEDURE — 91300 HC SARSCOV02 VAC 30MCG/0.3ML IM: CPT | Performed by: INTERNAL MEDICINE

## 2021-05-18 PROCEDURE — 0002A: CPT | Performed by: INTERNAL MEDICINE

## 2022-02-28 ENCOUNTER — TRANSCRIBE ORDERS (OUTPATIENT)
Dept: ADMINISTRATIVE | Facility: HOSPITAL | Age: 67
End: 2022-02-28

## 2022-02-28 DIAGNOSIS — R06.00 DYSPNEA, UNSPECIFIED TYPE: Primary | ICD-10-CM

## 2022-03-09 ENCOUNTER — HOSPITAL ENCOUNTER (OUTPATIENT)
Dept: CARDIOLOGY | Facility: HOSPITAL | Age: 67
Discharge: HOME | End: 2022-03-09
Admitting: NURSE PRACTITIONER

## 2022-03-09 DIAGNOSIS — R06.00 DYSPNEA, UNSPECIFIED TYPE: ICD-10-CM

## 2022-03-09 PROCEDURE — 93306 TTE W/DOPPLER COMPLETE: CPT | Performed by: INTERNAL MEDICINE

## 2022-03-09 PROCEDURE — 93306 TTE W/DOPPLER COMPLETE: CPT

## 2022-03-11 LAB
BH CV ECHO MEAS - AO ROOT AREA (BSA CORRECTED): 1.3
BH CV ECHO MEAS - AO ROOT AREA: 4.9 CM^2
BH CV ECHO MEAS - AO ROOT DIAM: 2.5 CM
BH CV ECHO MEAS - BSA(HAYCOCK): 1.9 M^2
BH CV ECHO MEAS - BSA: 1.9 M^2
BH CV ECHO MEAS - BZI_BMI: 27.4 KILOGRAMS/M^2
BH CV ECHO MEAS - BZI_METRIC_HEIGHT: 167.6 CM
BH CV ECHO MEAS - BZI_METRIC_WEIGHT: 77.1 KG
BH CV ECHO MEAS - EDV(MOD-SP4): 47 ML
BH CV ECHO MEAS - EF(MOD-SP4): 59.4 %
BH CV ECHO MEAS - ESV(MOD-SP4): 19.1 ML
BH CV ECHO MEAS - LA DIMENSION: 4 CM
BH CV ECHO MEAS - LA/AO: 1.6
BH CV ECHO MEAS - LV DIASTOLIC VOL/BSA (35-75): 25.2 ML/M^2
BH CV ECHO MEAS - LV SYSTOLIC VOL/BSA (12-30): 10.2 ML/M^2
BH CV ECHO MEAS - LVLD AP4: 6.2 CM
BH CV ECHO MEAS - LVLS AP4: 6 CM
BH CV ECHO MEAS - LVOT AREA (M): 2.5 CM^2
BH CV ECHO MEAS - LVOT AREA: 2.5 CM^2
BH CV ECHO MEAS - LVOT DIAM: 1.8 CM
BH CV ECHO MEAS - MV A MAX VEL: 108 CM/SEC
BH CV ECHO MEAS - MV E MAX VEL: 124 CM/SEC
BH CV ECHO MEAS - MV E/A: 1.1
BH CV ECHO MEAS - RAP SYSTOLE: 10 MMHG
BH CV ECHO MEAS - RVSP: 43.2 MMHG
BH CV ECHO MEAS - SI(MOD-SP4): 14.9 ML/M^2
BH CV ECHO MEAS - SV(MOD-SP4): 27.9 ML
BH CV ECHO MEAS - TR MAX VEL: 288 CM/SEC
MAXIMAL PREDICTED HEART RATE: 153 BPM
STRESS TARGET HR: 130 BPM

## 2022-03-16 ENCOUNTER — OFFICE VISIT (OUTPATIENT)
Dept: CARDIOLOGY | Facility: CLINIC | Age: 67
End: 2022-03-16

## 2022-03-16 ENCOUNTER — PATIENT ROUNDING (BHMG ONLY) (OUTPATIENT)
Dept: CARDIOLOGY | Facility: CLINIC | Age: 67
End: 2022-03-16

## 2022-03-16 VITALS
WEIGHT: 189.2 LBS | HEIGHT: 65 IN | HEART RATE: 96 BPM | SYSTOLIC BLOOD PRESSURE: 178 MMHG | DIASTOLIC BLOOD PRESSURE: 83 MMHG | BODY MASS INDEX: 31.52 KG/M2

## 2022-03-16 DIAGNOSIS — R60.0 BILATERAL LEG EDEMA: Primary | ICD-10-CM

## 2022-03-16 DIAGNOSIS — D50.0 IRON DEFICIENCY ANEMIA DUE TO CHRONIC BLOOD LOSS: ICD-10-CM

## 2022-03-16 DIAGNOSIS — I50.32 CHRONIC DIASTOLIC CONGESTIVE HEART FAILURE: Primary | ICD-10-CM

## 2022-03-16 DIAGNOSIS — I10 PRIMARY HYPERTENSION: ICD-10-CM

## 2022-03-16 DIAGNOSIS — R60.0 BILATERAL LEG EDEMA: ICD-10-CM

## 2022-03-16 DIAGNOSIS — I50.32 CHRONIC DIASTOLIC CONGESTIVE HEART FAILURE: ICD-10-CM

## 2022-03-16 PROCEDURE — 99204 OFFICE O/P NEW MOD 45 MIN: CPT | Performed by: INTERNAL MEDICINE

## 2022-03-16 RX ORDER — CARVEDILOL 3.12 MG/1
3.12 TABLET ORAL 2 TIMES DAILY
Qty: 180 TABLET | Refills: 3 | Status: SHIPPED | OUTPATIENT
Start: 2022-03-16

## 2022-03-16 RX ORDER — FUROSEMIDE 20 MG/1
20 TABLET ORAL DAILY
Qty: 30 TABLET | Refills: 11 | Status: SHIPPED | OUTPATIENT
Start: 2022-03-16

## 2022-03-16 RX ORDER — DEXLANSOPRAZOLE 30 MG/1
30 CAPSULE, DELAYED RELEASE ORAL DAILY
COMMUNITY

## 2022-03-16 RX ORDER — OXYBUTYNIN CHLORIDE 5 MG/1
5 TABLET ORAL 3 TIMES DAILY
COMMUNITY

## 2022-03-16 RX ORDER — SPIRONOLACTONE 25 MG/1
25 TABLET ORAL DAILY
Qty: 90 TABLET | Refills: 3 | Status: SHIPPED | OUTPATIENT
Start: 2022-03-16

## 2022-03-16 NOTE — PROGRESS NOTES
[facility] CARDIOLOGY  [address] [facility] [place] [address]  Phone: [phone]  Fax: [phone]    03/16/2022    Chief complaint: Lower extremity edema, bilateral    History:   Regine Beckham is a 66 y.o. female who has no known past cardiac history was referred by Dr. Burton for evaluation of lower extremity edema.  She had a recent echocardiogram which showed a grade 2 diastolic dysfunction.  She said she has been having lower extremity swelling over the past 3 to 4 weeks, also noted pain in her bilateral lower extremities and it was tender to touch on exam.  She also has mild shortness of breath with no significant orthopnea.  Denies chest pain, palpitations.  Her blood pressure was elevated in the office along with higher heart rate.  Reviewed EKG done a few weeks ago which showed a sinus rhythm.      Past Medical History:   Diagnosis Date   • Anemia    • Anxiety    • Arthritis    • Depression    • Legally blind    • Restless leg syndrome    • Sleep apnea        Past Surgical History:   Procedure Laterality Date   • BACK SURGERY     • GALLBLADDER SURGERY     • HIP ARTHROSCOPY     • JOINT REPLACEMENT          Past Social History:  Social History     Socioeconomic History   • Marital status:    Tobacco Use   • Smoking status: Current Every Day Smoker     Packs/day: 1.00     Types: Cigarettes   • Smokeless tobacco: Never Used   Vaping Use   • Vaping Use: Never used   Substance and Sexual Activity   • Alcohol use: Yes     Alcohol/week: 1.0 standard drink     Types: 1 Glasses of wine per week     Comment: rarely   • Drug use: Never   • Sexual activity: Defer       Past Family History:  History reviewed. No pertinent family history.    Review of Systems:   ROS       Current Outpatient Medications   Medication Sig Dispense Refill   • Albuterol (VENTOLIN IN) Inhale 90 mcg 2 (Two) Times a Day.     • dexlansoprazole (DEXILANT) 30 MG capsule Take 30 mg by mouth Daily.     •  "diclofenac (VOLTAREN) 75 MG EC tablet Take 75 mg by mouth 2 (Two) Times a Day.     • mirtazapine (REMERON) 15 MG tablet Take 15 mg by mouth Every Night.     • oxybutynin (DITROPAN) 5 MG tablet Take 5 mg by mouth 3 (Three) Times a Day.     • rOPINIRole (REQUIP) 5 MG tablet Take 5 mg by mouth Every Night.     • venlafaxine (EFFEXOR) 75 MG tablet Take 75 mg by mouth 2 (Two) Times a Day.     • zolpidem (AMBIEN) 10 MG tablet Take 10 mg by mouth At Night As Needed for Sleep.     • carvedilol (COREG) 3.125 MG tablet Take 1 tablet by mouth 2 (Two) Times a Day. 180 tablet 3   • furosemide (LASIX) 20 MG tablet Take 1 tablet by mouth Daily. 30 tablet 11   • spironolactone (ALDACTONE) 25 MG tablet Take 1 tablet by mouth Daily. 90 tablet 3   • traMADol (ULTRAM) 50 MG tablet Take 50 mg by mouth Every 6 (Six) Hours As Needed for Moderate Pain .       No current facility-administered medications for this visit.        Allergies   Allergen Reactions   • Penicillins Hives and Swelling       Objective     /83   Pulse 96   Ht 165.1 cm (65\")   Wt 85.8 kg (189 lb 3.2 oz)   BMI 31.48 kg/m²     Physical Exam  Constitutional:       Appearance: She is well-developed.   HENT:      Head: Normocephalic and atraumatic.   Eyes:      Pupils: Pupils are equal, round, and reactive to light.   Neck:      Vascular: No JVD.   Cardiovascular:      Rate and Rhythm: Normal rate and regular rhythm.   Pulmonary:      Effort: Pulmonary effort is normal. No respiratory distress.      Breath sounds: Wheezing present.   Abdominal:      General: Bowel sounds are normal.      Palpations: Abdomen is soft.      Tenderness: There is no abdominal tenderness.   Musculoskeletal:         General: Swelling and tenderness present. Normal range of motion.      Cervical back: Normal range of motion and neck supple.      Right lower leg: Edema present.      Left lower leg: Edema present.   Skin:     General: Skin is warm and dry.      Capillary Refill: Capillary " refill takes less than 2 seconds.      Findings: No erythema.   Neurological:      Mental Status: She is alert and oriented to person, place, and time.   Psychiatric:         Behavior: Behavior normal.            DATA:   Results for orders placed during the hospital encounter of 03/09/22    Adult Transthoracic Echo Complete W/ Cont if Necessary Per Protocol    Interpretation Summary  · Left ventricular ejection fraction appears to be 56 - 60%. Left ventricular systolic function is normal.  · Left ventricular diastolic function is consistent with (grade II w/high LAP) pseudonormalization.  · Trace tricuspid valve regurgitation is present. Estimated right ventricular systolic pressure from tricuspid regurgitation is mildly elevated (35-45 mmHg). Mild pulmonary hypertension is present.  · There is no evidence of pericardial effusion       Procedures     No results found for: CHOL, CHLPL  No results found for: TRIG  No results found for: HDL  No results found for: LDL, LDLDIRECT    Lab Results   Component Value Date    TSH 3.130 04/04/2020               Invalid input(s): LABALBU, PROT        Assessment and Plan     Diagnosis Plan   1. Bilateral leg edema  Duplex Venous Lower Extremity - Bilateral CAR   2. Chronic diastolic congestive heart failure (HCC)   we will discontinue the HCTZ and put on Lasix 20 mg daily along with Aldactone 20 mg daily.  Check BMP BMP in 4 to 6 weeks before next follow-up visit.   3. Primary hypertension   we will add Coreg 3.125 twice daily, will consider adding ARB in the next follow-up visit.  She did not want to have to many medications at once.  If her CHF symptoms improve we will plan on getting a stress perfusion imaging study to evaluate possibility of CAD.           Recommended increase activity to 30 minutes of walking daily, most days of the week    Thank you for allowing me to participate in the care of Regine Beckham. Feel free to contact me directly with any further questions or  concerns.          Stefano Waters MD, FACC  Interventional Cardiology

## 2022-03-16 NOTE — PROGRESS NOTES
March 16, 2022    Hello, may I speak with Regine Beckham?    My name is LUIS ALBERTO      [facility] [place]  [facility] CARDIOLOGY  [address]  [place] [address] [phone].    Before we get started may I verify your date of birth? 1955    I am calling to officially welcome you to our practice and ask about your recent visit. Is this a good time to talk? yes    Tell me about your visit with us. What things went well?  PATIENT STATES SHE REALLY LIKES THE DR. AND EVERY THING WENT GOOD.       We're always looking for ways to make our patients' experiences even better. Do you have recommendations on ways we may improve?  no    Overall were you satisfied with your first visit to our practice? yes       I appreciate you taking the time to speak with me today. Is there anything else I can do for you? no      Thank you, and have a great day.

## 2022-03-18 DIAGNOSIS — R60.0 BILATERAL LEG EDEMA: Primary | ICD-10-CM

## 2022-03-21 ENCOUNTER — APPOINTMENT (OUTPATIENT)
Dept: CARDIOLOGY | Facility: HOSPITAL | Age: 67
End: 2022-03-21

## 2022-03-22 ENCOUNTER — TRANSCRIBE ORDERS (OUTPATIENT)
Dept: ADMINISTRATIVE | Facility: HOSPITAL | Age: 67
End: 2022-03-22

## 2022-03-22 DIAGNOSIS — R07.9 CHEST PAIN, UNSPECIFIED TYPE: Primary | ICD-10-CM

## 2022-03-23 ENCOUNTER — HOSPITAL ENCOUNTER (OUTPATIENT)
Dept: CARDIOLOGY | Facility: HOSPITAL | Age: 67
Discharge: HOME | End: 2022-03-23
Admitting: INTERNAL MEDICINE

## 2022-03-23 DIAGNOSIS — R60.0 BILATERAL LEG EDEMA: ICD-10-CM

## 2022-03-23 PROCEDURE — 93970 EXTREMITY STUDY: CPT | Performed by: RADIOLOGY

## 2022-03-23 PROCEDURE — 93970 EXTREMITY STUDY: CPT

## 2022-03-29 ENCOUNTER — TRANSCRIBE ORDERS (OUTPATIENT)
Dept: ADMINISTRATIVE | Facility: HOSPITAL | Age: 67
End: 2022-03-29

## 2022-03-29 DIAGNOSIS — F17.210 CIGARETTE SMOKER: Primary | ICD-10-CM

## 2022-04-14 ENCOUNTER — APPOINTMENT (OUTPATIENT)
Dept: NUCLEAR MEDICINE | Facility: HOSPITAL | Age: 67
End: 2022-04-14

## 2022-04-14 ENCOUNTER — APPOINTMENT (OUTPATIENT)
Dept: CARDIOLOGY | Facility: HOSPITAL | Age: 67
End: 2022-04-14

## 2022-04-27 ENCOUNTER — TELEPHONE (OUTPATIENT)
Dept: ONCOLOGY | Facility: CLINIC | Age: 67
End: 2022-04-27

## 2022-04-27 NOTE — TELEPHONE ENCOUNTER
Provider: DR SANTACRUZ  Caller: ARABELLA  Relationship to Patient: SELF    Reason for Call: ARABELLA HAS MISSED HER APPT YESTERDAY AND WOULD LIKE TO GET R/S    PLEASE ADVISE

## 2022-05-06 ENCOUNTER — TELEPHONE (OUTPATIENT)
Dept: CARDIOLOGY | Facility: CLINIC | Age: 67
End: 2022-05-06

## 2022-05-06 NOTE — TELEPHONE ENCOUNTER
----- Message from Stefano Waters MD sent at 4/8/2022  1:23 PM EDT -----  Please call patient to and tell him his test was normal.   Thanks.

## 2022-05-13 ENCOUNTER — TELEPHONE (OUTPATIENT)
Dept: ONCOLOGY | Facility: CLINIC | Age: 67
End: 2022-05-13

## 2022-05-18 ENCOUNTER — TELEPHONE (OUTPATIENT)
Dept: CARDIOLOGY | Facility: CLINIC | Age: 67
End: 2022-05-18

## 2023-03-18 ENCOUNTER — HOSPITAL ENCOUNTER (EMERGENCY)
Facility: HOSPITAL | Age: 68
Discharge: HOME | End: 2023-03-18
Admitting: STUDENT IN AN ORGANIZED HEALTH CARE EDUCATION/TRAINING PROGRAM
Payer: MEDICARE

## 2023-03-18 VITALS
RESPIRATION RATE: 16 BRPM | WEIGHT: 155 LBS | HEIGHT: 66 IN | BODY MASS INDEX: 24.91 KG/M2 | DIASTOLIC BLOOD PRESSURE: 111 MMHG | TEMPERATURE: 99 F | HEART RATE: 74 BPM | SYSTOLIC BLOOD PRESSURE: 174 MMHG | OXYGEN SATURATION: 100 %

## 2023-03-18 DIAGNOSIS — K04.7 DENTAL ABSCESS: Primary | ICD-10-CM

## 2023-03-18 PROCEDURE — 99283 EMERGENCY DEPT VISIT LOW MDM: CPT

## 2023-03-18 PROCEDURE — 25010000002 CEFTRIAXONE PER 250 MG: Performed by: PHYSICIAN ASSISTANT

## 2023-03-18 PROCEDURE — 96372 THER/PROPH/DIAG INJ SC/IM: CPT

## 2023-03-18 PROCEDURE — 25010000002 KETOROLAC TROMETHAMINE PER 15 MG: Performed by: PHYSICIAN ASSISTANT

## 2023-03-18 RX ORDER — CEPHALEXIN 500 MG/1
500 CAPSULE ORAL 4 TIMES DAILY
Qty: 28 CAPSULE | Refills: 0 | Status: SHIPPED | OUTPATIENT
Start: 2023-03-18 | End: 2023-03-25

## 2023-03-18 RX ORDER — KETOROLAC TROMETHAMINE 30 MG/ML
30 INJECTION, SOLUTION INTRAMUSCULAR; INTRAVENOUS ONCE
Status: COMPLETED | OUTPATIENT
Start: 2023-03-18 | End: 2023-03-18

## 2023-03-18 RX ADMIN — LIDOCAINE HYDROCHLORIDE 1 G: 10 INJECTION, SOLUTION EPIDURAL; INFILTRATION; INTRACAUDAL; PERINEURAL at 20:17

## 2023-03-18 RX ADMIN — BENZOCAINE: 200 LIQUID DENTAL; ORAL; PERIODONTAL at 20:17

## 2023-03-18 RX ADMIN — KETOROLAC TROMETHAMINE 30 MG: 30 INJECTION, SOLUTION INTRAMUSCULAR; INTRAVENOUS at 20:17

## 2023-03-19 NOTE — DISCHARGE INSTRUCTIONS
Please take your antibiotics and follow up with a dentist ASAP or return to ER if symptoms worsen.

## 2023-03-19 NOTE — ED PROVIDER NOTES
Subjective   History of Present Illness  This is a 67 year old female patient who presents to the ER with chief complaint of dental pain. Patient has had issues with her right sided lower dental region for several days to include pain, gum swelling and redness. Denies fever.         Review of Systems   Constitutional: Negative.  Negative for fever.   HENT: Positive for dental problem. Negative for congestion, drooling, ear discharge, ear pain, facial swelling, hearing loss, mouth sores, nosebleeds, postnasal drip, rhinorrhea, sinus pressure, sinus pain, sneezing, sore throat, tinnitus, trouble swallowing and voice change.    Respiratory: Negative.    Cardiovascular: Negative.  Negative for chest pain.   Gastrointestinal: Negative.  Negative for abdominal pain.   Endocrine: Negative.    Genitourinary: Negative.  Negative for dysuria.   Skin: Negative.    Neurological: Negative.    Psychiatric/Behavioral: Negative.    All other systems reviewed and are negative.      Past Medical History:   Diagnosis Date   • Anemia    • Anxiety    • Arthritis    • Depression    • Legally blind    • Restless leg syndrome    • Sleep apnea        Allergies   Allergen Reactions   • Penicillins Hives and Swelling       Past Surgical History:   Procedure Laterality Date   • BACK SURGERY     • GALLBLADDER SURGERY     • HIP ARTHROSCOPY     • JOINT REPLACEMENT         History reviewed. No pertinent family history.    Social History     Socioeconomic History   • Marital status:    Tobacco Use   • Smoking status: Every Day     Packs/day: 1.00     Types: Cigarettes   • Smokeless tobacco: Never   Vaping Use   • Vaping Use: Never used   Substance and Sexual Activity   • Alcohol use: Yes     Alcohol/week: 1.0 standard drink     Types: 1 Glasses of wine per week     Comment: rarely   • Drug use: Never   • Sexual activity: Defer           Objective   Physical Exam  Vitals and nursing note reviewed.   Constitutional:       General: She is not  in acute distress.     Appearance: She is well-developed. She is not diaphoretic.   HENT:      Head: Normocephalic and atraumatic.      Right Ear: External ear normal.      Left Ear: External ear normal.      Nose: Nose normal.      Mouth/Throat:      Comments: Multiple dental caries, gum edema/erythema noted on right lower front teeth.   Eyes:      Conjunctiva/sclera: Conjunctivae normal.      Pupils: Pupils are equal, round, and reactive to light.   Neck:      Vascular: No JVD.      Trachea: No tracheal deviation.   Cardiovascular:      Rate and Rhythm: Normal rate and regular rhythm.      Heart sounds: Normal heart sounds. No murmur heard.  Pulmonary:      Effort: Pulmonary effort is normal. No respiratory distress.      Breath sounds: Normal breath sounds. No wheezing.   Abdominal:      General: Bowel sounds are normal.      Palpations: Abdomen is soft.      Tenderness: There is no abdominal tenderness.   Musculoskeletal:         General: No deformity. Normal range of motion.      Cervical back: Normal range of motion and neck supple.   Skin:     General: Skin is warm and dry.      Coloration: Skin is not pale.      Findings: No erythema or rash.   Neurological:      Mental Status: She is alert and oriented to person, place, and time.      Cranial Nerves: No cranial nerve deficit.   Psychiatric:         Behavior: Behavior normal.         Thought Content: Thought content normal.         Procedures              ED Course  ED Course as of 03/18/23 2100   Sat Mar 18, 2023   2048 Patient diagnosed with dental abscess. Will be d/c home to f/u with dentist ASAP. Will be given rx for keflex. Will return to ER if symptoms worsen.  [MM]      ED Course User Index  [MM] Betina Fraser PA                                           Medical Decision Making    This is a 67 year old female patient who presents to the ER with chief complaint of dental pain. Patient has had issues with her right sided lower dental region for  several days to include pain, gum swelling and redness. Denies fever.         Dental abscess: complicated acute illness or injury  Risk  Prescription drug management.          Final diagnoses:   Dental abscess       ED Disposition  ED Disposition     ED Disposition   Discharge    Condition   Stable    Comment   --             [facility]  [address] [facility] [address]  [phone]  Call on 3/20/2023           Medication List      New Prescriptions    cephalexin 500 MG capsule  Commonly known as: KEFLEX  Take 1 capsule by mouth 4 (Four) Times a Day for 7 days.           Where to Get Your Medications      These medications were sent to [id number] - [place], [place] - [address] [facility] [address] - [phone] PH - [phone] FX  [id number] [place] [address]    Phone: [phone]   · cephalexin 500 MG capsule          Betina Fraser PA  03/18/23 9970

## 2023-12-20 ENCOUNTER — HOSPITAL ENCOUNTER (EMERGENCY)
Facility: HOSPITAL | Age: 68
Discharge: STILL ADMITTED OR ADMINISTRATIVE | DRG: 885 | End: 2023-12-21
Attending: STUDENT IN AN ORGANIZED HEALTH CARE EDUCATION/TRAINING PROGRAM
Payer: MEDICARE

## 2023-12-20 VITALS
HEIGHT: 66 IN | BODY MASS INDEX: 25.71 KG/M2 | TEMPERATURE: 98.1 F | WEIGHT: 160 LBS | SYSTOLIC BLOOD PRESSURE: 150 MMHG | DIASTOLIC BLOOD PRESSURE: 93 MMHG | OXYGEN SATURATION: 96 % | HEART RATE: 89 BPM | RESPIRATION RATE: 18 BRPM

## 2023-12-20 DIAGNOSIS — T14.91XA SUICIDAL BEHAVIOR WITH ATTEMPTED SELF-INJURY: Primary | ICD-10-CM

## 2023-12-20 LAB
ALBUMIN SERPL-MCNC: 3.8 G/DL (ref 3.5–5.2)
ALBUMIN/GLOB SERPL: 1 G/DL
ALP SERPL-CCNC: 98 U/L (ref 39–117)
ALT SERPL W P-5'-P-CCNC: 17 U/L (ref 1–33)
AMPHET+METHAMPHET UR QL: NEGATIVE
AMPHETAMINES UR QL: NEGATIVE
ANION GAP SERPL CALCULATED.3IONS-SCNC: 11.4 MMOL/L (ref 5–15)
APAP SERPL-MCNC: <5 MCG/ML (ref 0–30)
AST SERPL-CCNC: 34 U/L (ref 1–32)
BARBITURATES UR QL SCN: NEGATIVE
BASOPHILS # BLD AUTO: 0.1 10*3/MM3 (ref 0–0.2)
BASOPHILS NFR BLD AUTO: 0.7 % (ref 0–1.5)
BENZODIAZ UR QL SCN: NEGATIVE
BILIRUB SERPL-MCNC: 0.6 MG/DL (ref 0–1.2)
BILIRUB UR QL STRIP: NEGATIVE
BUN SERPL-MCNC: 22 MG/DL (ref 8–23)
BUN/CREAT SERPL: 20.6 (ref 7–25)
BUPRENORPHINE SERPL-MCNC: NEGATIVE NG/ML
CALCIUM SPEC-SCNC: 9.4 MG/DL (ref 8.6–10.5)
CANNABINOIDS SERPL QL: POSITIVE
CHLORIDE SERPL-SCNC: 100 MMOL/L (ref 98–107)
CLARITY UR: CLEAR
CO2 SERPL-SCNC: 24.6 MMOL/L (ref 22–29)
COCAINE UR QL: NEGATIVE
COLOR UR: YELLOW
CREAT SERPL-MCNC: 1.07 MG/DL (ref 0.57–1)
DEPRECATED RDW RBC AUTO: 43.3 FL (ref 37–54)
EGFRCR SERPLBLD CKD-EPI 2021: 56.3 ML/MIN/1.73
EOSINOPHIL # BLD AUTO: 0.04 10*3/MM3 (ref 0–0.4)
EOSINOPHIL NFR BLD AUTO: 0.3 % (ref 0.3–6.2)
ERYTHROCYTE [DISTWIDTH] IN BLOOD BY AUTOMATED COUNT: 12.5 % (ref 12.3–15.4)
ETHANOL BLD-MCNC: <10 MG/DL (ref 0–10)
ETHANOL UR QL: <0.01 %
FENTANYL UR-MCNC: NEGATIVE NG/ML
GLOBULIN UR ELPH-MCNC: 4 GM/DL
GLUCOSE SERPL-MCNC: 146 MG/DL (ref 65–99)
GLUCOSE UR STRIP-MCNC: NEGATIVE MG/DL
HCT VFR BLD AUTO: 47.7 % (ref 34–46.6)
HGB BLD-MCNC: 16 G/DL (ref 12–15.9)
HGB UR QL STRIP.AUTO: NEGATIVE
IMM GRANULOCYTES # BLD AUTO: 0.03 10*3/MM3 (ref 0–0.05)
IMM GRANULOCYTES NFR BLD AUTO: 0.2 % (ref 0–0.5)
KETONES UR QL STRIP: NEGATIVE
LEUKOCYTE ESTERASE UR QL STRIP.AUTO: NEGATIVE
LYMPHOCYTES # BLD AUTO: 2.68 10*3/MM3 (ref 0.7–3.1)
LYMPHOCYTES NFR BLD AUTO: 20 % (ref 19.6–45.3)
MAGNESIUM SERPL-MCNC: 1.9 MG/DL (ref 1.6–2.4)
MCH RBC QN AUTO: 31.7 PG (ref 26.6–33)
MCHC RBC AUTO-ENTMCNC: 33.5 G/DL (ref 31.5–35.7)
MCV RBC AUTO: 94.6 FL (ref 79–97)
METHADONE UR QL SCN: NEGATIVE
MONOCYTES # BLD AUTO: 1.26 10*3/MM3 (ref 0.1–0.9)
MONOCYTES NFR BLD AUTO: 9.4 % (ref 5–12)
NEUTROPHILS NFR BLD AUTO: 69.4 % (ref 42.7–76)
NEUTROPHILS NFR BLD AUTO: 9.3 10*3/MM3 (ref 1.7–7)
NITRITE UR QL STRIP: NEGATIVE
NRBC BLD AUTO-RTO: 0 /100 WBC (ref 0–0.2)
OPIATES UR QL: NEGATIVE
OXYCODONE UR QL SCN: NEGATIVE
PCP UR QL SCN: NEGATIVE
PH UR STRIP.AUTO: 6 [PH] (ref 5–8)
PLATELET # BLD AUTO: 269 10*3/MM3 (ref 140–450)
PMV BLD AUTO: 9.1 FL (ref 6–12)
POTASSIUM SERPL-SCNC: 3.7 MMOL/L (ref 3.5–5.2)
PROT SERPL-MCNC: 7.8 G/DL (ref 6–8.5)
PROT UR QL STRIP: NEGATIVE
RBC # BLD AUTO: 5.04 10*6/MM3 (ref 3.77–5.28)
SALICYLATES SERPL-MCNC: <0.3 MG/DL
SODIUM SERPL-SCNC: 136 MMOL/L (ref 136–145)
SP GR UR STRIP: 1.01 (ref 1–1.03)
TRICYCLICS UR QL SCN: NEGATIVE
TROPONIN T SERPL HS-MCNC: 19 NG/L
UROBILINOGEN UR QL STRIP: NORMAL
WBC NRBC COR # BLD AUTO: 13.41 10*3/MM3 (ref 3.4–10.8)

## 2023-12-20 PROCEDURE — 93005 ELECTROCARDIOGRAM TRACING: CPT | Performed by: EMERGENCY MEDICINE

## 2023-12-20 PROCEDURE — 80179 DRUG ASSAY SALICYLATE: CPT | Performed by: STUDENT IN AN ORGANIZED HEALTH CARE EDUCATION/TRAINING PROGRAM

## 2023-12-20 PROCEDURE — 83735 ASSAY OF MAGNESIUM: CPT | Performed by: PHYSICIAN ASSISTANT

## 2023-12-20 PROCEDURE — 36415 COLL VENOUS BLD VENIPUNCTURE: CPT

## 2023-12-20 PROCEDURE — 80143 DRUG ASSAY ACETAMINOPHEN: CPT | Performed by: STUDENT IN AN ORGANIZED HEALTH CARE EDUCATION/TRAINING PROGRAM

## 2023-12-20 PROCEDURE — 82077 ASSAY SPEC XCP UR&BREATH IA: CPT | Performed by: PHYSICIAN ASSISTANT

## 2023-12-20 PROCEDURE — 93005 ELECTROCARDIOGRAM TRACING: CPT | Performed by: STUDENT IN AN ORGANIZED HEALTH CARE EDUCATION/TRAINING PROGRAM

## 2023-12-20 PROCEDURE — 85025 COMPLETE CBC W/AUTO DIFF WBC: CPT | Performed by: PHYSICIAN ASSISTANT

## 2023-12-20 PROCEDURE — 80053 COMPREHEN METABOLIC PANEL: CPT | Performed by: PHYSICIAN ASSISTANT

## 2023-12-20 PROCEDURE — 99285 EMERGENCY DEPT VISIT HI MDM: CPT

## 2023-12-20 PROCEDURE — 81003 URINALYSIS AUTO W/O SCOPE: CPT | Performed by: PHYSICIAN ASSISTANT

## 2023-12-20 PROCEDURE — 93010 ELECTROCARDIOGRAM REPORT: CPT | Performed by: INTERNAL MEDICINE

## 2023-12-20 PROCEDURE — 84484 ASSAY OF TROPONIN QUANT: CPT | Performed by: EMERGENCY MEDICINE

## 2023-12-20 PROCEDURE — 80307 DRUG TEST PRSMV CHEM ANLYZR: CPT | Performed by: PHYSICIAN ASSISTANT

## 2023-12-20 RX ORDER — ACETAMINOPHEN 500 MG
1000 TABLET ORAL ONCE
Status: COMPLETED | OUTPATIENT
Start: 2023-12-20 | End: 2023-12-20

## 2023-12-20 RX ADMIN — ACETAMINOPHEN 1000 MG: 500 TABLET ORAL at 23:25

## 2023-12-20 NOTE — NURSING NOTE
Patient reports she took 60 ambien last night around 9pm in an attempt to overdose. ED provider aware.

## 2023-12-20 NOTE — NURSING NOTE
Spoke with Live from poison control, she recommends routine lab work and in addition a Tylenol and aspirin level, ETOH, and EKG. If everything comes back normal patient can be cleared.

## 2023-12-21 ENCOUNTER — APPOINTMENT (OUTPATIENT)
Dept: GENERAL RADIOLOGY | Facility: HOSPITAL | Age: 68
DRG: 885 | End: 2023-12-21
Payer: MEDICARE

## 2023-12-21 ENCOUNTER — APPOINTMENT (OUTPATIENT)
Dept: ULTRASOUND IMAGING | Facility: HOSPITAL | Age: 68
DRG: 885 | End: 2023-12-21
Payer: MEDICARE

## 2023-12-21 ENCOUNTER — HOSPITAL ENCOUNTER (INPATIENT)
Facility: HOSPITAL | Age: 68
LOS: 3 days | Discharge: HOME | DRG: 885 | End: 2023-12-24
Attending: PSYCHIATRY & NEUROLOGY | Admitting: PSYCHIATRY & NEUROLOGY
Payer: MEDICARE

## 2023-12-21 ENCOUNTER — APPOINTMENT (OUTPATIENT)
Dept: MAMMOGRAPHY | Facility: HOSPITAL | Age: 68
DRG: 885 | End: 2023-12-21
Payer: MEDICARE

## 2023-12-21 DIAGNOSIS — N63.20 MASS OF LEFT BREAST, UNSPECIFIED QUADRANT: Primary | ICD-10-CM

## 2023-12-21 DIAGNOSIS — G47.00 INSOMNIA, UNSPECIFIED TYPE: ICD-10-CM

## 2023-12-21 DIAGNOSIS — F33.2 SEVERE EPISODE OF RECURRENT MAJOR DEPRESSIVE DISORDER, WITHOUT PSYCHOTIC FEATURES: ICD-10-CM

## 2023-12-21 PROBLEM — R45.851 SUICIDAL IDEATION: Status: ACTIVE | Noted: 2023-12-21

## 2023-12-21 PROBLEM — Z88.0 ALLERGY TO PENICILLIN: Status: ACTIVE | Noted: 2023-12-21

## 2023-12-21 PROBLEM — G25.81 RLS (RESTLESS LEGS SYNDROME): Status: ACTIVE | Noted: 2023-12-21

## 2023-12-21 PROBLEM — F33.9 RECURRENT MAJOR DEPRESSION: Status: ACTIVE | Noted: 2023-12-21

## 2023-12-21 PROBLEM — K21.9 GERD (GASTROESOPHAGEAL REFLUX DISEASE): Status: ACTIVE | Noted: 2023-12-21

## 2023-12-21 LAB
ALBUMIN SERPL-MCNC: 3.5 G/DL (ref 3.5–5.2)
ALBUMIN/GLOB SERPL: 1.1 G/DL
ALP SERPL-CCNC: 84 U/L (ref 39–117)
ALT SERPL W P-5'-P-CCNC: 15 U/L (ref 1–33)
ANION GAP SERPL CALCULATED.3IONS-SCNC: 9.4 MMOL/L (ref 5–15)
AST SERPL-CCNC: 28 U/L (ref 1–32)
BASOPHILS # BLD AUTO: 0.11 10*3/MM3 (ref 0–0.2)
BASOPHILS NFR BLD AUTO: 1.1 % (ref 0–1.5)
BILIRUB SERPL-MCNC: 0.4 MG/DL (ref 0–1.2)
BUN SERPL-MCNC: 22 MG/DL (ref 8–23)
BUN/CREAT SERPL: 22.7 (ref 7–25)
CALCIUM SPEC-SCNC: 9.6 MG/DL (ref 8.6–10.5)
CHLORIDE SERPL-SCNC: 102 MMOL/L (ref 98–107)
CO2 SERPL-SCNC: 26.6 MMOL/L (ref 22–29)
CREAT SERPL-MCNC: 0.97 MG/DL (ref 0.57–1)
DEPRECATED RDW RBC AUTO: 43.2 FL (ref 37–54)
EGFRCR SERPLBLD CKD-EPI 2021: 63.4 ML/MIN/1.73
EOSINOPHIL # BLD AUTO: 0.12 10*3/MM3 (ref 0–0.4)
EOSINOPHIL NFR BLD AUTO: 1.3 % (ref 0.3–6.2)
ERYTHROCYTE [DISTWIDTH] IN BLOOD BY AUTOMATED COUNT: 12.5 % (ref 12.3–15.4)
GEN 5 2HR TROPONIN T REFLEX: 17 NG/L
GLOBULIN UR ELPH-MCNC: 3.3 GM/DL
GLUCOSE SERPL-MCNC: 133 MG/DL (ref 65–99)
HAV IGM SERPL QL IA: NORMAL
HBV CORE IGM SERPL QL IA: NORMAL
HBV SURFACE AG SERPL QL IA: NORMAL
HCT VFR BLD AUTO: 43.1 % (ref 34–46.6)
HCV AB SER DONR QL: NORMAL
HGB BLD-MCNC: 14.6 G/DL (ref 12–15.9)
IMM GRANULOCYTES # BLD AUTO: 0.02 10*3/MM3 (ref 0–0.05)
IMM GRANULOCYTES NFR BLD AUTO: 0.2 % (ref 0–0.5)
LYMPHOCYTES # BLD AUTO: 3.34 10*3/MM3 (ref 0.7–3.1)
LYMPHOCYTES NFR BLD AUTO: 34.8 % (ref 19.6–45.3)
MCH RBC QN AUTO: 31.8 PG (ref 26.6–33)
MCHC RBC AUTO-ENTMCNC: 33.9 G/DL (ref 31.5–35.7)
MCV RBC AUTO: 93.9 FL (ref 79–97)
MONOCYTES # BLD AUTO: 0.94 10*3/MM3 (ref 0.1–0.9)
MONOCYTES NFR BLD AUTO: 9.8 % (ref 5–12)
NEUTROPHILS NFR BLD AUTO: 5.07 10*3/MM3 (ref 1.7–7)
NEUTROPHILS NFR BLD AUTO: 52.8 % (ref 42.7–76)
NRBC BLD AUTO-RTO: 0 /100 WBC (ref 0–0.2)
PLATELET # BLD AUTO: 246 10*3/MM3 (ref 140–450)
PMV BLD AUTO: 9.1 FL (ref 6–12)
POTASSIUM SERPL-SCNC: 3.4 MMOL/L (ref 3.5–5.2)
PROT SERPL-MCNC: 6.8 G/DL (ref 6–8.5)
QT INTERVAL: 358 MS
QT INTERVAL: 374 MS
QTC INTERVAL: 442 MS
QTC INTERVAL: 469 MS
RBC # BLD AUTO: 4.59 10*6/MM3 (ref 3.77–5.28)
SODIUM SERPL-SCNC: 138 MMOL/L (ref 136–145)
TROPONIN T DELTA: 1 NG/L
TROPONIN T SERPL HS-MCNC: 16 NG/L
TROPONIN T SERPL HS-MCNC: 17 NG/L
WBC NRBC COR # BLD AUTO: 9.6 10*3/MM3 (ref 3.4–10.8)

## 2023-12-21 PROCEDURE — G0279 TOMOSYNTHESIS, MAMMO: HCPCS | Performed by: RADIOLOGY

## 2023-12-21 PROCEDURE — 93005 ELECTROCARDIOGRAM TRACING: CPT | Performed by: INTERNAL MEDICINE

## 2023-12-21 PROCEDURE — 63710000001 ONDANSETRON PER 8 MG: Performed by: PSYCHIATRY & NEUROLOGY

## 2023-12-21 PROCEDURE — 80074 ACUTE HEPATITIS PANEL: CPT | Performed by: PSYCHIATRY & NEUROLOGY

## 2023-12-21 PROCEDURE — 93010 ELECTROCARDIOGRAM REPORT: CPT | Performed by: INTERNAL MEDICINE

## 2023-12-21 PROCEDURE — 85025 COMPLETE CBC W/AUTO DIFF WBC: CPT | Performed by: PSYCHIATRY & NEUROLOGY

## 2023-12-21 PROCEDURE — 80053 COMPREHEN METABOLIC PANEL: CPT | Performed by: PSYCHIATRY & NEUROLOGY

## 2023-12-21 PROCEDURE — 77066 DX MAMMO INCL CAD BI: CPT | Performed by: RADIOLOGY

## 2023-12-21 PROCEDURE — 77066 DX MAMMO INCL CAD BI: CPT

## 2023-12-21 PROCEDURE — 84484 ASSAY OF TROPONIN QUANT: CPT | Performed by: EMERGENCY MEDICINE

## 2023-12-21 PROCEDURE — 84484 ASSAY OF TROPONIN QUANT: CPT | Performed by: PSYCHIATRY & NEUROLOGY

## 2023-12-21 PROCEDURE — 99223 1ST HOSP IP/OBS HIGH 75: CPT | Performed by: PSYCHIATRY & NEUROLOGY

## 2023-12-21 PROCEDURE — 71046 X-RAY EXAM CHEST 2 VIEWS: CPT | Performed by: RADIOLOGY

## 2023-12-21 PROCEDURE — 76642 ULTRASOUND BREAST LIMITED: CPT

## 2023-12-21 PROCEDURE — G0279 TOMOSYNTHESIS, MAMMO: HCPCS

## 2023-12-21 PROCEDURE — 76642 ULTRASOUND BREAST LIMITED: CPT | Performed by: RADIOLOGY

## 2023-12-21 PROCEDURE — 71046 X-RAY EXAM CHEST 2 VIEWS: CPT

## 2023-12-21 RX ORDER — FUROSEMIDE 20 MG/1
20 TABLET ORAL DAILY
Status: DISCONTINUED | OUTPATIENT
Start: 2023-12-21 | End: 2023-12-21 | Stop reason: ALTCHOICE

## 2023-12-21 RX ORDER — OXYBUTYNIN CHLORIDE 5 MG/1
5 TABLET ORAL 3 TIMES DAILY
Status: DISCONTINUED | OUTPATIENT
Start: 2023-12-21 | End: 2023-12-21 | Stop reason: ALTCHOICE

## 2023-12-21 RX ORDER — ROPINIROLE 1 MG/1
5 TABLET, FILM COATED ORAL 3 TIMES DAILY
Status: CANCELLED | OUTPATIENT
Start: 2023-12-21

## 2023-12-21 RX ORDER — IBUPROFEN 400 MG/1
400 TABLET ORAL EVERY 6 HOURS PRN
Status: DISCONTINUED | OUTPATIENT
Start: 2023-12-21 | End: 2023-12-24 | Stop reason: HOSPADM

## 2023-12-21 RX ORDER — ALBUTEROL SULFATE 90 UG/1
2 AEROSOL, METERED RESPIRATORY (INHALATION) EVERY 6 HOURS PRN
Status: CANCELLED | OUTPATIENT
Start: 2023-12-21

## 2023-12-21 RX ORDER — BENZONATATE 100 MG/1
100 CAPSULE ORAL 3 TIMES DAILY PRN
Status: DISCONTINUED | OUTPATIENT
Start: 2023-12-21 | End: 2023-12-24 | Stop reason: HOSPADM

## 2023-12-21 RX ORDER — ZOLPIDEM TARTRATE 5 MG/1
10 TABLET ORAL NIGHTLY PRN
Status: CANCELLED | OUTPATIENT
Start: 2023-12-21

## 2023-12-21 RX ORDER — LOSARTAN POTASSIUM 50 MG/1
50 TABLET ORAL DAILY
Status: DISCONTINUED | OUTPATIENT
Start: 2023-12-21 | End: 2023-12-24 | Stop reason: HOSPADM

## 2023-12-21 RX ORDER — ONDANSETRON 4 MG/1
4 TABLET, FILM COATED ORAL EVERY 6 HOURS PRN
Status: DISCONTINUED | OUTPATIENT
Start: 2023-12-21 | End: 2023-12-24 | Stop reason: HOSPADM

## 2023-12-21 RX ORDER — NICOTINE 21 MG/24HR
1 PATCH, TRANSDERMAL 24 HOURS TRANSDERMAL
Status: DISCONTINUED | OUTPATIENT
Start: 2023-12-21 | End: 2023-12-24 | Stop reason: HOSPADM

## 2023-12-21 RX ORDER — ONDANSETRON 4 MG/1
4 TABLET, ORALLY DISINTEGRATING ORAL EVERY 8 HOURS PRN
Status: ON HOLD | COMMUNITY

## 2023-12-21 RX ORDER — ALUMINA, MAGNESIA, AND SIMETHICONE 2400; 2400; 240 MG/30ML; MG/30ML; MG/30ML
15 SUSPENSION ORAL EVERY 6 HOURS PRN
Status: DISCONTINUED | OUTPATIENT
Start: 2023-12-21 | End: 2023-12-24 | Stop reason: HOSPADM

## 2023-12-21 RX ORDER — CARVEDILOL 3.12 MG/1
3.12 TABLET ORAL 2 TIMES DAILY WITH MEALS
Status: DISCONTINUED | OUTPATIENT
Start: 2023-12-21 | End: 2023-12-21 | Stop reason: ALTCHOICE

## 2023-12-21 RX ORDER — PANTOPRAZOLE SODIUM 40 MG/1
40 TABLET, DELAYED RELEASE ORAL
Status: CANCELLED | OUTPATIENT
Start: 2023-12-22

## 2023-12-21 RX ORDER — ACETAMINOPHEN 325 MG/1
650 TABLET ORAL EVERY 6 HOURS PRN
Status: DISCONTINUED | OUTPATIENT
Start: 2023-12-21 | End: 2023-12-24 | Stop reason: HOSPADM

## 2023-12-21 RX ORDER — HYDROCHLOROTHIAZIDE 12.5 MG/1
12.5 TABLET ORAL DAILY
Status: ON HOLD | COMMUNITY

## 2023-12-21 RX ORDER — LANOLIN ALCOHOL/MO/W.PET/CERES
3 CREAM (GRAM) TOPICAL NIGHTLY PRN
Status: DISCONTINUED | OUTPATIENT
Start: 2023-12-23 | End: 2023-12-24 | Stop reason: HOSPADM

## 2023-12-21 RX ORDER — LOSARTAN POTASSIUM 50 MG/1
50 TABLET ORAL DAILY
Status: ON HOLD | COMMUNITY

## 2023-12-21 RX ORDER — SPIRONOLACTONE 25 MG/1
25 TABLET ORAL DAILY
Status: DISCONTINUED | OUTPATIENT
Start: 2023-12-21 | End: 2023-12-24 | Stop reason: HOSPADM

## 2023-12-21 RX ORDER — ALBUTEROL SULFATE 90 UG/1
2 AEROSOL, METERED RESPIRATORY (INHALATION) EVERY 6 HOURS PRN
Status: DISCONTINUED | OUTPATIENT
Start: 2023-12-21 | End: 2023-12-24 | Stop reason: HOSPADM

## 2023-12-21 RX ORDER — OXYBUTYNIN CHLORIDE 5 MG/1
10 TABLET, EXTENDED RELEASE ORAL DAILY
Status: DISCONTINUED | OUTPATIENT
Start: 2023-12-21 | End: 2023-12-24 | Stop reason: HOSPADM

## 2023-12-21 RX ORDER — TRAZODONE HYDROCHLORIDE 50 MG/1
12.5 TABLET ORAL NIGHTLY PRN
Status: DISCONTINUED | OUTPATIENT
Start: 2023-12-21 | End: 2023-12-21

## 2023-12-21 RX ORDER — NAPROXEN 250 MG/1
500 TABLET ORAL 2 TIMES DAILY WITH MEALS
Status: CANCELLED | OUTPATIENT
Start: 2023-12-21

## 2023-12-21 RX ORDER — LOPERAMIDE HYDROCHLORIDE 2 MG/1
2 CAPSULE ORAL
Status: DISCONTINUED | OUTPATIENT
Start: 2023-12-21 | End: 2023-12-24 | Stop reason: HOSPADM

## 2023-12-21 RX ORDER — BISACODYL 5 MG/1
5 TABLET, DELAYED RELEASE ORAL DAILY PRN
Status: DISCONTINUED | OUTPATIENT
Start: 2023-12-21 | End: 2023-12-24 | Stop reason: HOSPADM

## 2023-12-21 RX ORDER — ROPINIROLE 1 MG/1
4 TABLET, FILM COATED ORAL NIGHTLY
Status: DISCONTINUED | OUTPATIENT
Start: 2023-12-21 | End: 2023-12-24 | Stop reason: HOSPADM

## 2023-12-21 RX ORDER — ECHINACEA PURPUREA EXTRACT 125 MG
2 TABLET ORAL AS NEEDED
Status: DISCONTINUED | OUTPATIENT
Start: 2023-12-21 | End: 2023-12-24 | Stop reason: HOSPADM

## 2023-12-21 RX ORDER — HYDROCHLOROTHIAZIDE 25 MG/1
12.5 TABLET ORAL DAILY
Status: DISCONTINUED | OUTPATIENT
Start: 2023-12-21 | End: 2023-12-24 | Stop reason: HOSPADM

## 2023-12-21 RX ORDER — ALBUTEROL SULFATE 2.5 MG/3ML
2.5 SOLUTION RESPIRATORY (INHALATION) EVERY 6 HOURS PRN
Status: DISCONTINUED | OUTPATIENT
Start: 2023-12-21 | End: 2023-12-21

## 2023-12-21 RX ORDER — ONDANSETRON 4 MG/1
4 TABLET, ORALLY DISINTEGRATING ORAL EVERY 8 HOURS PRN
Status: CANCELLED | OUTPATIENT
Start: 2023-12-21

## 2023-12-21 RX ORDER — MIRTAZAPINE 15 MG/1
30 TABLET, FILM COATED ORAL NIGHTLY
Status: DISCONTINUED | OUTPATIENT
Start: 2023-12-21 | End: 2023-12-24 | Stop reason: HOSPADM

## 2023-12-21 RX ORDER — MIRTAZAPINE 15 MG/1
15 TABLET, FILM COATED ORAL NIGHTLY
Status: CANCELLED | OUTPATIENT
Start: 2023-12-21

## 2023-12-21 RX ORDER — PANTOPRAZOLE SODIUM 40 MG/1
40 TABLET, DELAYED RELEASE ORAL
Status: DISCONTINUED | OUTPATIENT
Start: 2023-12-21 | End: 2023-12-24 | Stop reason: HOSPADM

## 2023-12-21 RX ADMIN — MIRTAZAPINE 30 MG: 15 TABLET, FILM COATED ORAL at 20:02

## 2023-12-21 RX ADMIN — ACETAMINOPHEN 650 MG: 325 TABLET ORAL at 17:03

## 2023-12-21 RX ADMIN — LOSARTAN POTASSIUM 50 MG: 50 TABLET, FILM COATED ORAL at 13:53

## 2023-12-21 RX ADMIN — TRAZODONE HYDROCHLORIDE 12.5 MG: 50 TABLET ORAL at 03:34

## 2023-12-21 RX ADMIN — ROPINIROLE HYDROCHLORIDE 4 MG: 1 TABLET, FILM COATED ORAL at 20:03

## 2023-12-21 RX ADMIN — ALUMINUM HYDROXIDE, MAGNESIUM HYDROXIDE, AND DIMETHICONE 15 ML: 400; 400; 40 SUSPENSION ORAL at 09:08

## 2023-12-21 RX ADMIN — PANTOPRAZOLE SODIUM 40 MG: 40 TABLET, DELAYED RELEASE ORAL at 10:12

## 2023-12-21 RX ADMIN — ALUMINUM HYDROXIDE, MAGNESIUM HYDROXIDE, AND DIMETHICONE 15 ML: 400; 400; 40 SUSPENSION ORAL at 15:56

## 2023-12-21 RX ADMIN — IBUPROFEN 400 MG: 400 TABLET, FILM COATED ORAL at 08:04

## 2023-12-21 RX ADMIN — ONDANSETRON HYDROCHLORIDE 4 MG: 4 TABLET, FILM COATED ORAL at 10:09

## 2023-12-21 RX ADMIN — OXYBUTYNIN CHLORIDE 10 MG: 5 TABLET, EXTENDED RELEASE ORAL at 13:56

## 2023-12-21 RX ADMIN — ONDANSETRON HYDROCHLORIDE 4 MG: 4 TABLET, FILM COATED ORAL at 20:04

## 2023-12-21 RX ADMIN — HYDROCHLOROTHIAZIDE 12.5 MG: 25 TABLET ORAL at 13:53

## 2023-12-21 RX ADMIN — SPIRONOLACTONE 25 MG: 25 TABLET, FILM COATED ORAL at 12:37

## 2023-12-21 NOTE — NURSING NOTE
Spoke to Dr. ARY Asher via phone. Intake information provided. Instructed to admit the patient. Admit orders received. SP3 routine orders. CBC, CMP in am. Troponin in 6 hrs. RBTOx2. Patient and ED provider/Shivanig made aware of plan of care. Safety precautions maintained.

## 2023-12-21 NOTE — PLAN OF CARE
Problem: Adult Behavioral Health Plan of Care  Goal: Plan of Care Review  Outcome: Ongoing, Progressing  Flowsheets (Taken 12/21/2023 1126)  Consent Given to Review Plan with: declines  Progress: no change  Patient Agreement with Plan of Care: agrees  Outcome Evaluation: New admit Completed social history and integrated summary  Goal: Patient-Specific Goal (Individualization)  Outcome: Ongoing, Progressing  Flowsheets  Taken 12/21/2023 1126  Patient-Specific Goals (Include Timeframe): Patient will deny SI/HI prior to discharge. Patient will identify 1 healthy coping skill for stress prior to discharge. Patient will consent to appropriate aftercare plan prior to discharge.  Individualized Care Needs: Therapist will offer 1-4 individual sessions, family education, aftercare planning  Anxieties, Fears or Concerns: none reported  Taken 12/21/2023 1121  Patient Personal Strengths:   resilient   resourceful   spiritual/Protestant support  Patient Vulnerabilities:   legal concerns   poor impulse control   limited support system  Goal: Optimized Coping Skills in Response to Life Stressors  Outcome: Ongoing, Progressing  Intervention: Promote Effective Coping Strategies  Flowsheets (Taken 12/21/2023 1126)  Supportive Measures:   counseling provided   active listening utilized  Goal: Develops/Participates in Therapeutic [place] to Support Successful Transition  Outcome: Ongoing, Progressing  Intervention: Foster Therapeutic [place]  Flowsheets (Taken 12/21/2023 1126)  Trust Relationship/Rapport:   care explained   questions encouraged   choices provided   reassurance provided   thoughts/feelings acknowledged   emotional support provided   empathic listening provided   questions answered  Intervention: Mutually Develop Transition Plan  Flowsheets  Taken 12/21/2023 1126  Outpatient/Agency/Support Group Needs:   outpatient counseling   outpatient medication management   outpatient psychiatric care (specify)  Transition  Support:   community resources reviewed   follow-up care discussed   crisis management plan promoted   crisis management plan verbalized   follow-up care coordinated  Anticipated Discharge Disposition: home or self-care  Taken 12/21/2023 1124  Discharge Coordination/Progress: Patient has [place] Medicare Replacement for discharge planning and consents to [facility] referral.  Concerns Comments: NA  Transportation Anticipated: family or friend will provide  Transportation Concerns: no car  Current Discharge Risk:   psychiatric illness   legal concerns  Concerns to be Addressed:   coping/stress   discharge planning   mental health   medication   home safety   grief and loss   suicidal   legal  Readmission Within the Last 30 Days: no previous admission in last 30 days  Patient/Family Anticipated Services at Transition:   outpatient care   mental health services  Patient's Choice of Community Agency(s): [facility]  Patient/Family Anticipates Transition to: home  Offered/Gave Vendor List: no   Goal Outcome Evaluation:     Patient Agreement with Plan of Care: agrees  Consent Given to Review Plan with: declines  Progress: no change  Outcome Evaluation: New admit Completed social history and integrated summary

## 2023-12-21 NOTE — PROGRESS NOTES
"1030      DATA:      Therapist met individually with patient this date to introduce role and to discuss hospitalization expectations. Patient agreeable. Reviewed medical record and staffed case with treatment team this date. No major issues identified.       Clinical Maneuvering/Intervention:     Therapist assisted patient in processing above session content; acknowledged and normalized patient’s thoughts, feelings, and concerns.  Discussed the therapist/patient relationship and explain the parameters and limitations of relative confidentiality.  Also discussed the importance of active participation, and honesty to the treatment process.  Encouraged the patient to discuss/vent their feelings, frustrations, and fears concerning their ongoing medical issues and validated their feelings..      Concern was voiced regarding substance use and educated patient on risks associated with use. Patient was advised to abstain from use and educated on community resources that can help with sobriety and recovery.      Therapist addressed discharge safety planning this date. Assisted patient in identifying risk factors which would indicate the need for higher level of care after discharge;  including thoughts to harm self or others and/or self-harming behavior. Encouraged patient to call [phone],  [phone], or present to the nearest emergency room should any of these events occur. Discussed crisis intervention services and means to access.  Encouraged securing any objects of harm.       Therapist completed integrated summary, treatment plan, and initiated social history this date.  Therapist is strongly encouraging family involvement in treatment.       ASSESSMENT:      The patient is a 68 year old  female. Per report, \"67 y/o presents with reports of feeling suicidal - \"I took about sixty Ambien last night\". Reports, \"I called a friend today and ask they bring me here\".  Pt expressed concern/statements regarding some alleged tax " "fraud against her.  Pt reports her spouse committed suicide ten yrs ago this month. \"I hate [place]\".  Pt denies HI/AVH. Depression 7/10. Anxiety 6/10.\"     Today, patient was seen 1-1 at bedside. Patient calm, cooperative and oriented x4.  Patient noted to have depressed affect, congruent mood, normal thought content.  Patient reports that she is here to get help with depression.  Patient now denies SI/HIAVH.  She rates depression/anxiety at 8/10.  Patient appears fatigued and is minimally talkative this date. Therapist introduced role and discussed the importance of safety planning. Patient reports that she will be going to her own place and takes care of herself. She reports that he does not want her friends involved in her treatment and would like every thing to stay private. Therapist updated Dr. Asher this date.      Goals for treatment:  SI attempt      Prior Hospitalizations / Dates/current treatment:  Denies      Childhood History:  Raised in [place] by parents, denies history of abuse. Currently living: Apartment in [facility] alone.  Current support; friends      Suicide Attempts:  Reports taking an overdose of Ambien PTA. History of spouse committing suicide.       Alcohol:  denies     Substance use: denies     Legal:  Possible IRS case     Relationship/support: , no children.     Spiritual:  Non-Shinto attending [employer]      Education:   1 year college, retired [employer]        Access to firearms:  denies          PLAN:       Patient to remain hospitalized this date.      Treatment team will focus efforts on stabilizing patient's acute symptoms while providing education on healthy coping and crisis management to reduce hospitalizations.   Patient requires daily psychiatrist evaluation and 24/7 nursing supervision to promote patient  safety.     Therapist will offer 1-4 individual sessions, family education, and appropriate referral.     Therapist recommends continued evaluation. " Patient gives verbal consent for [facility] referral.  Patient to return home to self care at discharge.  She was recommended to involve friend in safety planning before discharge.

## 2023-12-21 NOTE — NURSING NOTE
"67 y/o presents with reports of feeling suicidal - \"I took about sixty Ambien last night\". Reports, \"I called a friend today and ask they bring me here\".     Pt expressed concern/statements regarding some alleged tax fraud against her.     Pt reports her spouse committed suicide ten yrs ago this month. \"I hate [place]\".     Pt denies HI/AVH.    Depression 7/10.  Anxiety 6/10.    Reports poor sleep.     Complaints of tenderness to chest wall - questionable fall last night.     Creatinine 1.07  eGFT 56.3  Glucose 146  AST 34  WBC 13.41  Hgb 16.0  Hct 47.7  Troponin 19, 17    UDS  THC    EKG  Sinus rhythm with marked sinus arrhythmia with occasional premature ventricular complexes and fusion complexes. Cannot rule out inferior infarct, age undetermined.   Vent rate 95, Atrial rate 95    Repeat EKG - Sinus rhythm with marked sinus arrhythmia. Possible left atrial enlargement. T wave abnormality, consider inferior ischemia.     "

## 2023-12-21 NOTE — PLAN OF CARE
"Goal Outcome Evaluation:  Plan of Care Reviewed With: patient  Patient Agreement with Plan of Care: agrees     Progress: no change  Outcome Evaluation: PATIENT HAS BEEN IN BED MOST OF THE DAY.  HAVING FREQUENT COMPLAINTS OF \"HEARTBURN\".  PRN MEDS AS ORDERED WITH RELIEF NOTED AND PROTONIX STARTED TODAY.  ANXIETY 3, DEPRESSION 5, DENIES S/I IDEATIONS.         "

## 2023-12-21 NOTE — PLAN OF CARE
Goal Outcome Evaluation:  Plan of Care Reviewed With: patient  Patient Agreement with Plan of Care: agrees     Progress: no change  Outcome Evaluation: Pt new admit for suicidal ideations and an attempt to OD on Ambien.

## 2023-12-21 NOTE — H&P
"INITIAL PSYCHIATRIC HISTORY & PHYSICAL    Patient Identification:  Name:    Regine Beckham   Age:   68 y.o.  Sex:   female  :   1955  MRN:   [id number]  Visit Number:   [id number]  Primary Care Physician:   Dread Burton MD  Admission Date: 2023    SUBJECTIVE    CC/Focus of Exam: Depression/suicide attempt    HPI:     [facility] for Regine Beckham  a 68 y.o.  (13 years after 18 years of marriage) childless high school educated (1 year college) nonchurch attending [employer] retired (worked at a [employer] job 24 years retiring )  female who lives alone here in [place] who was hospitalized following a potentially serious overdose of Ambien (states she took 65 mg tablets) day of admission.  Patient states she had been depressed for several weeks and that she typically gets depressed at this time of year being the anniversary of her 's suicide and the holiday season.  Patient experienced a sense of hopelessness, helplessness and worthlessness, anhedoniistic and hypersomnia. Had planned suicide 2 days before the OD.  Patient states she had contacted a friend for support only to have a friend say: \"You have talked about this before why don't you just go ahead and do it\".  Additional stressor has been notification the patient received from the IRS regarding either being a part or a victim of a fraud scheme.  The way the patient presents the issue perhaps more the latter.  States the IRS offered her a whistle blower option.  In any event she received papers and talked to the IRS several days prior to the suicide attempt.  Patient stated she had taken Ambien for the past 3 to 4 years as prescribed 5 mg nightly for sleep.  Patient states she has 1 other friend that she can rely on who brought her to the hospital ER the day of admission.  Patient is admitted on a voluntary basis and participating develop a treatment plan.    Available " "medical/psychiatric records reviewed and incorporated into the current document.     PAST PSYCHIATRIC HX: No prior psychiatric treatment.    SUBSTANCE USE HX: Patient is sporadically smoked marijuana, denies other substance abuse.  Does smoke cigarettes 1 to 2 packs/day    FAMILY HX:Patient had a brothers who is , had major issues with alcohol dependency and depression.  No suicides among first-degree relatives or other major mental illness.         SOCIAL HX: Raised in [place] by her parents, no history of childhood abuse.  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  Lives alone in an apartment [place] for the past 4 years.  Returned to this area with family in [place] and old friends here.  Legal issues as described in HPI.    Past Medical History:   Diagnosis Date    Anemia     Anxiety     Arthritis     Depression     Legally blind     Restless leg syndrome     Sleep apnea     \"I don't use a cpap anymore since losing 106 lbs\"    Water retention        Past Surgical History:   Procedure Laterality Date    BACK SURGERY      GALLBLADDER SURGERY      HIP ARTHROSCOPY      JOINT REPLACEMENT Right      Status post gastric bypass surgery SLUMS 5 years ago, has lost 106 pounds.          Medications Prior to Admission   Medication Sig Dispense Refill Last Dose    Albuterol (VENTOLIN IN) Inhale 90 mcg 2 (Two) Times a Day.       carvedilol (COREG) 3.125 MG tablet Take 1 tablet by mouth 2 (Two) Times a Day. 180 tablet 3     dexlansoprazole (DEXILANT) 30 MG capsule Take 30 mg by mouth Daily.       diclofenac (VOLTAREN) 75 MG EC tablet Take 75 mg by mouth 2 (Two) Times a Day.       furosemide (LASIX) 20 MG tablet Take 1 tablet by mouth Daily. 30 tablet 11     mirtazapine (REMERON) 15 MG tablet Take 15 mg by mouth Every Night.       oxybutynin (DITROPAN) 5 MG tablet Take 5 mg by mouth 3 (Three) Times a Day.       rOPINIRole (REQUIP) 5 MG tablet Take 5 mg by mouth Every Night.       spironolactone (ALDACTONE) " 25 MG tablet Take 1 tablet by mouth Daily. 90 tablet 3     traMADol (ULTRAM) 50 MG tablet Take 50 mg by mouth Every 6 (Six) Hours As Needed for Moderate Pain .       venlafaxine (EFFEXOR) 75 MG tablet Take 75 mg by mouth 2 (Two) Times a Day.       zolpidem (AMBIEN) 10 MG tablet Take 10 mg by mouth At Night As Needed for Sleep.              ALLERGIES:  Penicillins    Temp:  [97 °F (36.1 °C)-98.1 °F (36.7 °C)] 97.8 °F (36.6 °C)  Heart Rate:  [] 78  Resp:  [16-18] 16  BP: (134-156)/(81-95) 156/94    REVIEW OF SYSTEMS:  Review of Systems   Constitutional: Negative.    HENT: Negative.     Eyes:  Positive for visual disturbance.   Respiratory:  Positive for cough.    Cardiovascular: Negative.    Gastrointestinal: Negative.    Endocrine: Negative.    Genitourinary: Negative.    Musculoskeletal: Negative.         Patient explains she had a fall with residual pain left lateral upper chest.   Skin: Negative.    Allergic/Immunologic: Negative.    Neurological: Negative.    Hematological: Negative.       See HPI for psychiatric ROS  OBJECTIVE    PHYSICAL EXAM:  Physical Exam  Constitutional:       Appearance: Normal appearance.   HENT:      Head: Normocephalic and atraumatic.      Right Ear: External ear normal.      Left Ear: External ear normal.      Nose: Nose normal.      Mouth/Throat:      Pharynx: Oropharynx is clear.   Eyes:      Extraocular Movements: Extraocular movements intact.      Conjunctiva/sclera: Conjunctivae normal.      Pupils: Pupils are equal, round, and reactive to light.   Cardiovascular:      Rate and Rhythm: Normal rate and regular rhythm.   Pulmonary:      Effort: Pulmonary effort is normal.      Breath sounds: Normal breath sounds.      Comments: Chest clear with auscultation and percussion, however exam of her left breast reveals a 10 cm tumor she attributes to having fallen day of admission.  Does not appear to be a bruise or hematoma, suspect mass.  Abdominal:      General: Abdomen is flat.  "     Palpations: Abdomen is soft.   Musculoskeletal:         General: Normal range of motion.      Cervical back: Normal range of motion.   Skin:     General: Skin is warm and dry.   Neurological:      General: No focal deficit present.      Mental Status: She is alert and oriented to person, place, and time.         MENTAL STATUS EXAM:   Hygiene:   fair  Cooperation:  Cooperative  Eye Contact:  Fair  Psychomotor Behavior:  Slow  Affect:   Depressed  Hopelessness: 8,\" at life's rope's end\"  Speech:  Normal, raspy voice  Thought Progression: Linear  Thought Content:  Mood congruent  Suicidal:   Denies currently but did make the suicide attempt after several days of consideration, clearly not impulsive  Homicidal:  None  Hallucinations:  Not demonstrated today  Delusion:  None  Memory:  Intact  Orientation:  Person, Place, Time, and Situation  Reliability:  fair  Insight:  Fair  Judgement:  Poor  Impulse Control:  Fair    Imaging Results (Last 24 Hours)       ** No results found for the last 24 hours. **             ECG/EMG Results (most recent)       None             Lab Results   Component Value Date    GLUCOSE 133 (H) 12/21/2023    BUN 22 12/21/2023    CREATININE 0.97 12/21/2023    EGFRIFNONA 90 04/04/2020    BCR 22.7 12/21/2023    CO2 26.6 12/21/2023    CALCIUM 9.6 12/21/2023    ALBUMIN 3.5 12/21/2023    AST 28 12/21/2023    ALT 15 12/21/2023       Lab Results   Component Value Date    WBC 9.60 12/21/2023    HGB 14.6 12/21/2023    HCT 43.1 12/21/2023    MCV 93.9 12/21/2023     12/21/2023       Pain Management Panel          Latest Ref Rng & Units 12/20/2023   Pain Management Panel   Amphetamine, Urine Qual Negative Negative    Barbiturates Screen, Urine Negative Negative    Benzodiazepine Screen, Urine Negative Negative    Buprenorphine, Screen, Urine Negative Negative    Cocaine Screen, Urine Negative Negative    Fentanyl, Urine Negative Negative    Methadone Screen , Urine Negative Negative  "   Methamphetamine, Ur Negative Negative        Brief Urine Lab Results  (Last result in the past 365 days)        Color   Clarity   Blood   Leuk Est   Nitrite   Protein   CREAT   Urine HCG        12/20/23 2003 Yellow   Clear   Negative   Negative   Negative   Negative                   Reviewed labs and studies done with this admission.       Hospital bed: Yes patient is fall risk.      ASSESSMENT & PLAN:      Suicidal ideation  Patient is on precautions      Recurrent major depression, severe, without psychosis  We will start with mirtazapine plus a individual and group psychotherapeutic effort      RLS (restless legs syndrome)  And would just think about you just looking at the medicine      GERD (gastroesophageal reflux disease)  Protonix      Left breast mass  Surgical consult    Tobacco abuse  Nicotine supplement    The patient has been admitted for safety and stabilization.  Patient will be monitored for suicidality daily and maintained on Special Precautions Level 3 (q15 min checks) . The patient will have individual and group therapy with a master's level therapist. A master treatment plan will be developed and agreed upon by the patient and his/her treatment team.  The patient's estimated length of stay in the hospital is 5-7 days.       I spent a total of 75 minutes in direct patient care, greater than 40 minutes (greater than 50%) were spent face-to-face with assessment, coordination of care, counseling,  and answering any questions the patient had regarding  her status and the treatment plan.     KARLEE Asher MD    12/21/23  10:21 AM EST

## 2023-12-21 NOTE — ED PROVIDER NOTES
"Subjective   History of Present Illness  See note from Nikole Bolivar PA-C        Review of Systems    Past Medical History:   Diagnosis Date    Anemia     Anxiety     Arthritis     Depression     Legally blind     Restless leg syndrome     Sleep apnea     \"I don't use a cpap anymore since losing 106 lbs\"    Water retention        Allergies   Allergen Reactions    Penicillins Hives and Swelling       Past Surgical History:   Procedure Laterality Date    BACK SURGERY      GALLBLADDER SURGERY      HIP ARTHROSCOPY      JOINT REPLACEMENT Right        History reviewed. No pertinent family history.    Social History     Socioeconomic History    Marital status:     Number of children: 0    Years of education: 1 yr college   Tobacco Use    Smoking status: Every Day     Packs/day: 1.50     Years: 51.00     Additional pack years: 0.00     Total pack years: 76.50     Types: Cigarettes    Smokeless tobacco: Never   Vaping Use    Vaping Use: Never used   Substance and Sexual Activity    Alcohol use: Yes     Alcohol/week: 1.0 standard drink of alcohol     Types: 1 Glasses of wine per week     Comment: \"occasionally - once every two months\"    Drug use: Yes     Comment: alcohol - occasionally    Sexual activity: Defer           Objective   Physical Exam    Procedures           ED Course  ED Course as of 12/21/23 0440   Wed Dec 20, 2023   2249 Vent. Rate :  92 BPM     Atrial Rate :  92 BPM     P-R Int : 132 ms          QRS Dur :  86 ms      QT Int : 358 ms       P-R-T Axes :  61  50  19 degrees     QTc Int : 442 ms     Sinus rhythm with marked sinus arrhythmia  Possible Left atrial enlargement  T wave abnormality, consider inferior ischemia  Abnormal ECG  When compared with ECG of 20-DEC-2023 20:13, (Unconfirmed)  fusion complexes are no longer present  premature ventricular complexes are no longer present  Minimal criteria for Inferior infarct are no longer present  ST no longer elevated in Inferior leads   [ES]   2251 ECG " 12 Lead Other; OD  Vent. Rate :  95 BPM     Atrial Rate :  95 BPM     P-R Int : 138 ms          QRS Dur :  86 ms      QT Int : 374 ms       P-R-T Axes :  49  56 -11 degrees     QTc Int : 469 ms     Sinus rhythm with marked sinus arrhythmia with occasional premature ventricular complexes and fusion complexes  Cannot rule out Inferior infarct , age undetermined  Abnormal ECG  When compared with ECG of 04-APR-2020 15:27,  fusion complexes are now present  premature ventricular complexes are now present  Minimal criteria for Inferior infarct are now present  ST elevation now present in Inferior leads  T wave inversion now evident in Inferior leads  T wave inversion now evident in Anterior leads   [ES]      ED Course User Index  [ES] Robert Sosa MD                                             Medical Decision Making  68-year-old with suicidal ideations attempted overdose    Problems Addressed:  Suicidal behavior with attempted self-injury: acute illness or injury     Details: Patient underwent evaluation and deemed this patient would benefit from inpatient behavioral health admission secondary to her suicidal ideations.    Amount and/or Complexity of Data Reviewed  Labs: ordered.  ECG/medicine tests: ordered. Decision-making details documented in ED Course.    Risk  OTC drugs.  Decision regarding hospitalization.        Final diagnoses:   Suicidal behavior with attempted self-injury       ED Disposition  ED Disposition       ED Disposition   DC/Transfer to Behavioral Health    Condition   --    Comment   --               No follow-up provider specified.       Medication List      No changes were made to your prescriptions during this visit.            Maverick Asencio II, PA  12/21/23 1588

## 2023-12-22 LAB
QT INTERVAL: 418 MS
QTC INTERVAL: 441 MS

## 2023-12-22 PROCEDURE — 99232 SBSQ HOSP IP/OBS MODERATE 35: CPT | Performed by: PSYCHIATRY & NEUROLOGY

## 2023-12-22 PROCEDURE — 99222 1ST HOSP IP/OBS MODERATE 55: CPT | Performed by: SPECIALIST

## 2023-12-22 RX ADMIN — MIRTAZAPINE 30 MG: 15 TABLET, FILM COATED ORAL at 20:55

## 2023-12-22 RX ADMIN — LOSARTAN POTASSIUM 50 MG: 50 TABLET, FILM COATED ORAL at 08:24

## 2023-12-22 RX ADMIN — HYDROCHLOROTHIAZIDE 12.5 MG: 25 TABLET ORAL at 08:24

## 2023-12-22 RX ADMIN — SPIRONOLACTONE 25 MG: 25 TABLET, FILM COATED ORAL at 08:24

## 2023-12-22 RX ADMIN — Medication 1 PATCH: at 08:24

## 2023-12-22 RX ADMIN — PANTOPRAZOLE SODIUM 40 MG: 40 TABLET, DELAYED RELEASE ORAL at 05:56

## 2023-12-22 RX ADMIN — ROPINIROLE HYDROCHLORIDE 4 MG: 1 TABLET, FILM COATED ORAL at 20:54

## 2023-12-22 RX ADMIN — OXYBUTYNIN CHLORIDE 10 MG: 5 TABLET, EXTENDED RELEASE ORAL at 08:24

## 2023-12-22 RX ADMIN — ACETAMINOPHEN 650 MG: 325 TABLET ORAL at 19:25

## 2023-12-22 NOTE — PLAN OF CARE
Goal Outcome Evaluation:  Plan of Care Reviewed With: patient  Patient Agreement with Plan of Care: agrees     Progress: no change  Outcome Evaluation: PATIENT HAS BEEN IN BED MOST OF DAY EXCEPT FOR SHOWER AND MEALS.  TOOK SHOWER TODAY, EDUCATED REGARDING NO CAFFEINE TONIGHT AFTER 7PM AND NPO AFTER MN.  NO COMPLAINTS TODAY, HAD ECHO TODAY.

## 2023-12-22 NOTE — PROGRESS NOTES
"INPATIENT PSYCHIATRIC PROGRESS NOTE    Name:  Regine Beckham  :  1955  MRN:  [id number]  Visit Number:  [id number]  Length of stay:  1    Behavioral Health Treatment Plan and Problem List: I have reviewed and approved the Behavioral Health Treatment Plan and Problem list.    SUBJECTIVE    CC/ Focus of exam:  depresson    Patient's subjective status: \"So so\"    INTERVAL HISTORY: The patient reports she is less depressed, slept reasonable well, no longer experiencing feeling that she would be better off dead. However, continues to feel hopeless and doesn't have plans beyond \"going back to my apartment\". She has contacted a friend Jihan ([phone]) but Jihan unable to be with the patient due to her job, thus patient would be alone till  when Jihan could be a .      Depression rating 5/10  Anxiety rating 5/10  Hopelessness: 5/10  Sleep:7-8 hours with several brief interruptions.      ROS: No cardiovascular or Neurological complaints. Complains of acid reflux and lumbar back pain (chronic). No drug seeking behavior.     Mass left breast hematoma from recent fall per surgical consult (no note as yet). Mammograms negative.   Cardiology consult results pending. (No note as yet)    IRS issue: patient had contacted them to report potential freud issue with a third party she had interacted with and had thought of working for. Not a scam.     Able to make an appointment with [facility]UNDY for  at 11 AM in the [facility]. Might be able to safely discharge  when her friend Jihan available to be with her.     OBJECTIVE    Vitals:    23 0800   BP:    Pulse: 88   Resp: 18   Temp: 98.2 °F (36.8 °C)   SpO2:       Temp:  [97.5 °F (36.4 °C)-98.2 °F (36.8 °C)] 98.2 °F (36.8 °C)  Heart Rate:  [82-94] 88  Resp:  [18-20] 18  BP: ()/(58-98) 96/58    MENTAL STATUS EXAM:       Psychomotor: No psychomotor agitation/retardation, No EPS, No motor tics  Speech-normal rate, " amount.  Mood/Affect: Depressed  Thought Processes: coherent  Thought Content: mood congruent  Hallucination(s): none  Hopelessness: Intermittent  Optimistic: minimally  Suicidal Thoughts: No  Suicidal Plan/Intent: No  Homicidal Thoughts: absent  Orientation: oriented x 3  Memory: Immediate, recent, recent remote, remote intact    Lab Results (last 24 hours)       ** No results found for the last 24 hours. **             Imaging Results (Last 24 Hours)       Procedure Component Value Units Date/Time    Mammo Diagnostic Digital Tomosynthesis Bilateral With CAD [[id number]] Collected: 12/21/23 1409     Updated: 12/21/23 1416    Narrative:      BILATERAL DIAGNOSTIC MAMMOGRAM AND LEFT BREAST ULTRASOUND     CLINICAL INDICATION: 68-year-old patient presents for a painful palpable  area of concern in the far superior aspect of the left breast which she  reports developed after a recent fall.     TECHNIQUE: Low dose full field digital breast tomosynthesis imaging was  performed with 2D and 3D acquisitions consisting of bilateral CC and MLO  views. Focused ultrasound evaluation of the left breast was also  performed.        COMPARISON: No prior exam is available for comparison.     FINDINGS: The breasts are almost entirely fatty.     The breasts are asymmetric in size, with the right larger than the left.  There is ill-defined increased tissue density present in the far  superior aspect of the left breast suggestive of trauma. No dominant  mass, suspicious calcifications, or areas of architectural distortion  are seen in either breast.     Physical exam demonstrates a large palpable area superior to the left  breast whose appearance is suggestive of a developing hematoma. This  mass is exquisitely tender to palpation. Focused sonographic evaluation  of this area demonstrates underlying hyperechogenicity in the tissue. No  discrete solid or cystic mass is seen nor is there an abnormal area of  shadowing.          Impression:          1. The patient's painful palpable area of concern likely represents an  evolving hematoma related to recent fall. Recommend clinical and short  interval mammographic follow-up.     2. Negative right mammogram.     BI-RADS CATEGORY: 3, PROBABLY BENIGN        RECOMMENDATION: Recommend clinical follow-up of the left breast. Also  recommend 6-month follow-up diagnostic left mammogram.     CAD was utilized.     The standard false-negative rate of mammography is between 10% and 25%.   Complex patterns or increased breast density will markedly elevate the  false-negative rate of mammography.        The patient was notified of the results and recommendations at the time  of her visit.  Additionally, a letter, in lay terminology, with the  results of this exam will be mailed to the patient.     This report was finalized on 12/21/2023 2:14 PM by Dr. Anca Majano MD.         US Breast Left Limited [[id number]] Collected: 12/21/23 1409     Updated: 12/21/23 1416    Narrative:      BILATERAL DIAGNOSTIC MAMMOGRAM AND LEFT BREAST ULTRASOUND     CLINICAL INDICATION: 68-year-old patient presents for a painful palpable  area of concern in the far superior aspect of the left breast which she  reports developed after a recent fall.     TECHNIQUE: Low dose full field digital breast tomosynthesis imaging was  performed with 2D and 3D acquisitions consisting of bilateral CC and MLO  views. Focused ultrasound evaluation of the left breast was also  performed.        COMPARISON: No prior exam is available for comparison.     FINDINGS: The breasts are almost entirely fatty.     The breasts are asymmetric in size, with the right larger than the left.  There is ill-defined increased tissue density present in the far  superior aspect of the left breast suggestive of trauma. No dominant  mass, suspicious calcifications, or areas of architectural distortion  are seen in either breast.     Physical exam demonstrates a large palpable area  superior to the left  breast whose appearance is suggestive of a developing hematoma. This  mass is exquisitely tender to palpation. Focused sonographic evaluation  of this area demonstrates underlying hyperechogenicity in the tissue. No  discrete solid or cystic mass is seen nor is there an abnormal area of  shadowing.          Impression:         1. The patient's painful palpable area of concern likely represents an  evolving hematoma related to recent fall. Recommend clinical and short  interval mammographic follow-up.     2. Negative right mammogram.     BI-RADS CATEGORY: 3, PROBABLY BENIGN        RECOMMENDATION: Recommend clinical follow-up of the left breast. Also  recommend 6-month follow-up diagnostic left mammogram.     CAD was utilized.     The standard false-negative rate of mammography is between 10% and 25%.   Complex patterns or increased breast density will markedly elevate the  false-negative rate of mammography.        The patient was notified of the results and recommendations at the time  of her visit.  Additionally, a letter, in lay terminology, with the  results of this exam will be mailed to the patient.     This report was finalized on 12/21/2023 2:14 PM by Dr. Anca Majano MD.         XR Chest PA & Lateral [[id number]] Collected: 12/21/23 1315     Updated: 12/21/23 1318    Narrative:      EXAM:    XR Chest, 2 Views     EXAM DATE:    12/21/2023 12:54 PM     CLINICAL HISTORY:    sob, cough, tumor left anterior upper lateral chest;  N63.20-Unspecified lump in the left breast, unspecified quadrant     TECHNIQUE:    Frontal and lateral views of the chest.     COMPARISON:    No relevant prior studies available.     FINDINGS:    LUNGS AND PLEURAL SPACES:  Unremarkable as visualized.  No  consolidation.  No pneumothorax.    HEART:  Unremarkable as visualized.  No cardiomegaly.    MEDIASTINUM:  Unremarkable as visualized.    BONES/JOINTS:  Unremarkable as visualized.       Impression:         Unremarkable radiographic appearance of the chest.        This report was finalized on 12/21/2023 1:16 PM by Dr. Raymundo Menon MD.                Lab and x-ray studies reviewed.    ECG/EMG Results (most recent)       Procedure Component Value Units Date/Time    ECG 12 Lead Other; abnormal ECG [[id number]] Collected: 12/21/23 1612     Updated: 12/21/23 1658     QT Interval 418 ms      QTC Interval 441 ms     Narrative:      Test Reason : Other~  Blood Pressure :   */*   mmHG  Vent. Rate :  67 BPM     Atrial Rate :  71 BPM     P-R Int : 152 ms          QRS Dur :  90 ms      QT Int : 418 ms       P-R-T Axes :  65  60  53 degrees     QTc Int : 441 ms    Sinus rhythm with marked sinus arrhythmia  Nonspecific ST and T wave abnormality  Abnormal ECG  When compared with ECG of 21-DEC-2023 16:12, (Unconfirmed)  premature atrial complexes are no longer present  Inverted T waves have replaced nonspecific T wave abnormality in Lateral leads    Referred By: BRENDA           Confirmed By:              ALLERGIES: Penicillins    Medications:     hydroCHLOROthiazide, 12.5 mg, Oral, Daily  losartan, 50 mg, Oral, Daily  mirtazapine, 30 mg, Oral, Nightly  nicotine, 1 patch, Transdermal, Q24H  oxybutynin XL, 10 mg, Oral, Daily  pantoprazole, 40 mg, Oral, Q AM  rOPINIRole, 4 mg, Oral, Nightly  spironolactone, 25 mg, Oral, Daily       ASSESSMENT & PLAN      Suicidal ideation  Patient is on precautions       Recurrent major depression, severe, without psychosis   mirtazapine plus a individual and group psychotherapeutic effort       RLS (restless legs syndrome)  And would just think about you just looking at the medicine       GERD (gastroesophageal reflux disease)  Protonix       Left breast mass  Surgical consult     Tobacco abuse  Nicotine supplement      Special precautions: Special Precautions Level 3 (q15 min checks)     Behavioral Health Treatment Plan and Problem List: I have reviewed and approved the Behavioral Health Treatment  Plan and Problem list.    I spent a total of 26 minutes in direct patient care including  17 minutes face to face with the patient assessment, coordination of care, and counseling the patient on the current and follow-up treatment plans regarding her status and situation.  Patient had no additional questions.     KARLEE Asher MD    Clinician:  Tulio Asher MD  12/22/23  08:55 EST    Dictated utilizing Dragon dictation

## 2023-12-22 NOTE — PAYOR COMM NOTE
"Regine Lock \"NEGIN\" (68 y.o. Female)       Date of Birth   1955    Social Security Number       Address   [address] [place], [place] [address] [address]    Home Phone   [phone]    MRN   [id number]       Adventist   [facility]    Marital Status                               Admission Date   12/21/23    Admission Type   Emergency    Admitting Provider   Agusto Asher MD    Attending Provider   Tulio Asher MD    Department, Room/Bed   [facility] SENIOR PSYCHIATRIC, 1025/1S       Discharge Date       Discharge Disposition       Discharge Destination                                 Attending Provider: Tulio Asher MD    Allergies: Penicillins    Isolation: None   Infection: None   Code Status: CPR    Ht: 167.6 cm (66\")   Wt: 70.5 kg (155 lb 7.7 oz)    Admission Cmt: None   Principal Problem: Recurrent major depression [F33.9]                   Active Insurance as of 12/21/2023       Primary Coverage       Payor Plan Insurance Group Employer/Plan Group    ANTH MEDICARE REPLACEMENT ANTH MEDICARE ADVANTAGE [id number]       Payor Plan Address Payor Plan Phone Number Payor Plan Fax Number Effective Dates    [address] [phone]  7/1/2023 - None Entered    [facility] [id number]         Subscriber Name Subscriber Birth Date Member ID       REGINE LOCK 1955 [id number]                     Emergency Contacts            No emergency contacts on file.            PLEASE ATTACH THIS CLINICAL UPDATE TO AUTH. # [id number]     INPATIENT BEHAVIORAL HEALTH CONTINUED STAY REQUEST   FACILITY: [facility] (NPI [id number]) (TAX ID [id number])  ATTENDING MD: TULIO ASHER (NPI [id number])  MD CLINIC PHONE NUMBER: [phone].  PLEASE CALL U.R. STAFF IF A MORE SPECIFIC PHONE NUMBER FOR THE MD IS NEEDED.  DIAGNOSIS PER H&P:   Recurrent major depression, severe, without psychosis    Recurrent major depression ICD-10-CM: F33.9  ICD-9-CM: " "296.30   12/21/2023 - Present        U.R. CONTACT:   ESTELLE KURTZ   PHONE [phone] EXT. [phone]  [phone]      DISCHARGE PLAN PER THERAPY NOTE 12/21/2023: \"Patient gives verbal consent for [facility] referral.  Patient to return home to self care at discharge.  She was recommended to involve friend in safety planning before discharge.\"      Housing/Living Environment    Row Name 12/21/23 1121 12/21/23 0319 12/20/23 1941     Housing/Living Environment   Current Living Arrangements -- home apartment     People in Home -- -- alone     How long have you lived with the others in your household? -- -- --     Will your living arrangements affect your treatment/recovery? No -- No     Do you need assistance with housing options? -- -- No     Living Arrangements -- -- --     Lives With -- -- --         Support System    Row Name 12/20/23 1941       Support Systems   Community resources/support systems current used --       Who would patient like involved in treatment? --       Will family be involved? No       Effect of patient's condition on family --       Effect of family on patient's condition --           BAL/UDS 12/20/2023:  TOX, BLOOD    Ethanol %  <0.010        Ethanol  <10        Acetaminophen  <5.0        TOX, URINE    Amphetamine, Urine Qual    Negative      Barbiturates Screen, Urine    Negative      Benzodiazepine Screen, Urine    Negative      Buprenorphine, Screen, Urine    Negative      Cocaine Screen, Urine    Negative      Fentanyl, Urine    Negative      Methamphetamine, Ur    Negative      Methadone Screen , Urine    Negative      Opiate Screen    Negative      Oxycodone Screen, Urine    Negative      Phencyclidine (PCP), Urine    Negative      THC Screen, Urine    Positive      Tricyclic Antidepressants Screen    Negative      SENDOUT GENERAL LAB      Fentanyl, Urine    Negative          Current Scheduled Medications  Expand  Hide  (From now, onward)    Start   Ordered Stop   12/21/23 2100  " mirtazapine (REMERON) tablet 30 mg  30 mg,   Oral,   Nightly         12/21/23 1125 --   12/21/23 2100  rOPINIRole (REQUIP) tablet 4 mg  4 mg,   Oral,   Nightly         12/21/23 1125 --   12/21/23 1315  hydroCHLOROthiazide (HYDRODIURIL) tablet 12.5 mg  12.5 mg,   Oral,   Daily         12/21/23 1220 --   12/21/23 1315  losartan (COZAAR) tablet 50 mg  50 mg,   Oral,   Daily         12/21/23 1220 --   12/21/23 1315  oxybutynin XL (DITROPAN-XL) 24 hr tablet 10 mg  10 mg,   Oral,   Daily         12/21/23 1220 --   12/21/23 1215  spironolactone (ALDACTONE) tablet 25 mg  25 mg,   Oral,   Daily         12/21/23 1125 --   12/21/23 1100  pantoprazole (PROTONIX) EC tablet 40 mg  40 mg,   Oral,   Every Early Morning         12/21/23 1009 --   12/21/23 0900  nicotine (NICODERM CQ) 21 MG/24HR patch 1 patch  1 patch,   Transdermal,   Every 24 Hours Scheduled         12/21/23 0237         PSYCH RELATED PRN MEDICATION UTILIZED:  traZODone (DESYREL) tablet 12.5 mg  Dose: 12.5 mg  Freq: Nightly PRN Route: PO  PRN Reason: Sleep  Start: 12/21/23 0237 End: 12/21/23 1125   RECEIVED 12/21/2023 AT 0334      PER NURSING  NOTES 12/22/2023 AT 0800: FLAT AFFECT, SAD, ANXIOUS, DEPRESSED, HOPELESSNESS, HELPLESSNESS, WORTHLESSNESS, SUICIDAL THOUGHTS:    Adult PCS Body System    Row Name 12/22/23 0800 12/21/23 2104 12/21/23 1919 12/21/23 1900 12/21/23 1800   Pain/Comfort/Sleep   Preferred Pain Scale number (Numeric Rating Pain Scale) -- number (Numeric Rating Pain Scale) -- --   Patient requested Medication Prescribed for Lower Pain Scale No -- -- -- --   Pain Goal/Acceptable Level -- -- -- -- --   Chronic Pain Baseline at Rest -- -- -- -- --   Chronic Pain Baseline with Activity -- -- -- -- --   (0-10) Pain Rating: Rest -- -- 4 -- --   (0-10) Pain Rating: Activity -- -- 4 -- --   Pain Location -- -- hip -- --   Pain Side/Orientation -- -- right -- --   Pain Description -- -- constant;aching -- --   Nonverbal Indicators of Pain -- -- -- -- --   Pain  Management Interventions -- -- -- -- --   Response to Pain Interventions -- -- -- -- --   POSS (Pasero Opioid-Induced Sed Scale) -- -- -- -- --   Sleep/Rest/Relaxation no problem identified -- difficulty falling asleep;unable to stay asleep -- --   Additional Documentation -- -- -- -- --   Patient requested Medication Prescribed for Lower Pain Scale -- -- -- -- --   Pain Rating: Rest -- -- -- -- --   Pain Rating: Activity -- -- -- -- --   Response to Pain Interventions -- -- -- -- --   Coping/Psychosocial   Observed Emotional State -- -- -- -- --   Verbalized Emotional State anxiety;depression;frustration -- anxiety;depression;frustration -- --   Trust Relationship/Rapport -- -- care explained;thoughts/feelings acknowledged -- --   Family/Support Persons -- -- -- -- --   Involvement in Care -- -- -- -- --   Family/Support System Care -- -- -- -- --   Diversional Activities -- -- -- -- --   Additional Documentation -- -- -- -- --   Coping/Psychosocial Interventions   Supportive Measures active listening utilized;goal-setting facilitated;relaxation techniques promoted;self-care encouraged;self-reflection promoted;self-responsibility promoted;verbalization of feelings encouraged -- active listening utilized;verbalization of feelings encouraged -- --   HEENT   HEENT WDL WDL -- WDL -- --   Additional Documentation -- -- -- -- --   Mouth/Teeth WDL   Mouth/Teeth WDL X;teeth -- X;teeth -- --   Teeth Symptoms -- -- dental appliance present;teeth absent -- --   HEENT Interventions   Visual Performance Enhancement -- -- corrective lenses used -- --   Cognitive   Cognitive/Neuro/Behavioral WDL X -- X -- --   Mood/Behavior flat affect;sad -- flat affect;sad -- --   Additional Documentation -- -- -- -- --   Retired Level of Consciousness -- -- -- -- --   CIWA-Ar (Alcohol Withdrawal Assessment)   Nausea and Vomiting -- -- -- -- --   COWS (Clinical Opiate Withdrawal Scale)   Resting Pulse Rate -- -- -- -- --   Cognitive  Interventions   Communication Enhancement Strategies -- -- -- -- --   Neuro   Additional Documentation -- -- -- -- --   Retired Left-Side Extremities Muscle Tone -- -- -- -- --   Retired Right-Side Extremities Muscle Tone -- -- -- -- --   Retired LUE Muscle Tone -- -- -- -- --   Retired RUE Muscle Tone -- -- -- -- --   Retired Bilateral Upper Extremities Muscle Tone -- -- -- -- --   Retired LLE Muscle Tone -- -- -- -- --   Retired RLE Muscle Tone -- -- -- -- --   Retired Jaw Muscle Tone -- -- -- -- --   Retired Neck Muscle Tone -- -- -- -- --   Retired Trunk Muscle Tone -- -- -- -- --   Retired Bilateral Lower Extremities Muscle Tone -- -- -- -- --   Pupils   Pupil PERRLA yes -- yes -- --   Timblin Coma Scale   Best Eye Response 4-->(E4) spontaneous -- 4-->(E4) spontaneous -- --   Best Motor Response 6-->(M6) obeys commands -- 6-->(M6) obeys commands -- --   Best Verbal Response 5-->(V5) oriented -- 5-->(V5) oriented -- --   Timblin Coma Scale Score 15 -- 15 -- --   Neurologic Interventions   Hearing/Communication Enhancement -- -- -- -- --   Behavioral   Additional Documentation -- -- -- -- --   General Appearance WDL   General Appearance WDL WDL -- WDL -- --   Behavior WDL   Behavior WDL WDL -- WDL -- --   Emotion Mood WDL   Emotion/Mood/Affect WDL X -- X -- --   Affect flat -- flat -- --   Emotion/Mood anxious;depressed;sad -- anxious;depressed;sad -- --   Patient rated anxiety level 4 -- 5 -- --   Patient rated depression level 3 -- 5 -- --   Speech WDL   Speech WDL WDL -- WDL -- --   Perceptual State WDL   Perceptual State WDL WDL -- WDL -- --   Thought Process WDL   Thought Process WDL X;judgment and insight;thought content -- X;judgment and insight;thought content -- --   Judgment and Insight insight not appropriate to situation;judgment not appropriate to situation -- insight not appropriate to situation;judgment not appropriate to situation -- --   Thought Content  "helplessness;hopelessness;worthlessness;suicidal thoughts -- helplessness;hopelessness;worthlessness;suicidal thoughts -- --   Intellectual Performance WDL   Intellectual Performance WDL WDL -- WDL -- --   Respiratory   Respiratory WDL WDL;breath sounds  pt denies any issues at this time -- WDL;breath sounds  pt denies any issues at this time -- --   Cough And Deep Breathing -- --  -- --   Additional Documentation --         LATEST MD NOTE/H&P 2023:     Tulio Asher MD   Physician  Psychiatry     H&P     Addendum     Date of Service: 23 1021  Creation Time: 23 102     Expand All Collapse All    INITIAL PSYCHIATRIC HISTORY & PHYSICAL     Patient Identification:  Name:    Regine Beckham   Age:   68 y.o.  Sex:   female  :   1955  MRN:   [id number]  Visit Number:   [id number]  Primary Care Physician:   Dread Burton MD  Admission Date: 2023     SUBJECTIVE     CC/Focus of Exam: Depression/suicide attempt     HPI:      First psychiatric and [facility] admission for Regine Beckham  a 68 y.o.  (13 years after 18 years of marriage) childless high school educated (1 year college) nonchurch attending [employer] retired (worked at a [employer] job 24 years retiring )  female who lives alone here in [place] who was hospitalized following a potentially serious overdose of Ambien (states she took 65 mg tablets) day of admission.  Patient states she had been depressed for several weeks and that she typically gets depressed at this time of year being the anniversary of her 's suicide and the holiday season.  Patient experienced a sense of hopelessness, helplessness and worthlessness, anhedoniistic and hypersomnia. Had planned suicide 2 days before the OD.  Patient states she had contacted a friend for support only to have a friend say: \"You have talked about this before why don't you just go ahead and do it\".  Additional stressor has been " "notification the patient received from the IRS regarding either being a part or a victim of a fraud scheme.  The way the patient presents the issue perhaps more the latter.  States the IRS offered her a whistle blower option.  In any event she received papers and talked to the IRS several days prior to the suicide attempt.  Patient stated she had taken Ambien for the past 3 to 4 years as prescribed 5 mg nightly for sleep.  Patient states she has 1 other friend that she can rely on who brought her to the hospital ER the day of admission.  Patient is admitted on a voluntary basis and participating develop a treatment plan.     Available medical/psychiatric records reviewed and incorporated into the current document.      PAST PSYCHIATRIC HX: No prior psychiatric treatment.     SUBSTANCE USE HX: Patient is sporadically smoked marijuana, denies other substance abuse.  Does smoke cigarettes 1 to 2 packs/day     FAMILY HX:Patient had a brothers who is , had major issues with alcohol dependency and depression.  No suicides among first-degree relatives or other major mental illness.          SOCIAL HX: Raised in [place] by her parents, no history of childhood abuse.  As noted worked for the [employer] 24 years before retiring.  Lives alone in an apartment [address] for the past 4 years.  Returned to this area with family in [place] and old friends here.  Legal issues as described in HPI.     Medical History        Past Medical History:   Diagnosis Date    Anemia      Anxiety      Arthritis      Depression      Legally blind      Restless leg syndrome      Sleep apnea       \"I don't use a cpap anymore since losing 106 lbs\"    Water retention              Surgical History         Past Surgical History:   Procedure Laterality Date    BACK SURGERY        GALLBLADDER SURGERY        HIP ARTHROSCOPY        JOINT REPLACEMENT Right           Status post gastric bypass surgery SL[facility]MS 5 years ago, has lost 106 pounds.            "   Prescriptions Prior to Admission           Medications Prior to Admission   Medication Sig Dispense Refill Last Dose    Albuterol (VENTOLIN IN) Inhale 90 mcg 2 (Two) Times a Day.          carvedilol (COREG) 3.125 MG tablet Take 1 tablet by mouth 2 (Two) Times a Day. 180 tablet 3      dexlansoprazole (DEXILANT) 30 MG capsule Take 30 mg by mouth Daily.          diclofenac (VOLTAREN) 75 MG EC tablet Take 75 mg by mouth 2 (Two) Times a Day.          furosemide (LASIX) 20 MG tablet Take 1 tablet by mouth Daily. 30 tablet 11      mirtazapine (REMERON) 15 MG tablet Take 15 mg by mouth Every Night.          oxybutynin (DITROPAN) 5 MG tablet Take 5 mg by mouth 3 (Three) Times a Day.          rOPINIRole (REQUIP) 5 MG tablet Take 5 mg by mouth Every Night.          spironolactone (ALDACTONE) 25 MG tablet Take 1 tablet by mouth Daily. 90 tablet 3      traMADol (ULTRAM) 50 MG tablet Take 50 mg by mouth Every 6 (Six) Hours As Needed for Moderate Pain .          venlafaxine (EFFEXOR) 75 MG tablet Take 75 mg by mouth 2 (Two) Times a Day.          zolpidem (AMBIEN) 10 MG tablet Take 10 mg by mouth At Night As Needed for Sleep.                        ALLERGIES:  Penicillins     Temp:  [97 °F (36.1 °C)-98.1 °F (36.7 °C)] 97.8 °F (36.6 °C)  Heart Rate:  [] 78  Resp:  [16-18] 16  BP: (134-156)/(81-95) 156/94     REVIEW OF SYSTEMS:  Review of Systems   Constitutional: Negative.    HENT: Negative.     Eyes:  Positive for visual disturbance.   Respiratory:  Positive for cough.    Cardiovascular: Negative.    Gastrointestinal: Negative.    Endocrine: Negative.    Genitourinary: Negative.    Musculoskeletal: Negative.         Patient explains she had a fall with residual pain left lateral upper chest.   Skin: Negative.    Allergic/Immunologic: Negative.    Neurological: Negative.    Hematological: Negative.       See HPI for psychiatric ROS  OBJECTIVE    PHYSICAL EXAM:  Physical Exam  Constitutional:       Appearance: Normal  "appearance.   HENT:      Head: Normocephalic and atraumatic.      Right Ear: External ear normal.      Left Ear: External ear normal.      Nose: Nose normal.      Mouth/Throat:      Pharynx: Oropharynx is clear.   Eyes:      Extraocular Movements: Extraocular movements intact.      Conjunctiva/sclera: Conjunctivae normal.      Pupils: Pupils are equal, round, and reactive to light.   Cardiovascular:      Rate and Rhythm: Normal rate and regular rhythm.   Pulmonary:      Effort: Pulmonary effort is normal.      Breath sounds: Normal breath sounds.      Comments: Chest clear with auscultation and percussion, however exam of her left breast reveals a 10 cm tumor she attributes to having fallen day of admission.  Does not appear to be a bruise or hematoma, suspect mass.  Abdominal:      General: Abdomen is flat.      Palpations: Abdomen is soft.   Musculoskeletal:         General: Normal range of motion.      Cervical back: Normal range of motion.   Skin:     General: Skin is warm and dry.   Neurological:      General: No focal deficit present.      Mental Status: She is alert and oriented to person, place, and time.            MENTAL STATUS EXAM:   Hygiene:   fair  Cooperation:  Cooperative  Eye Contact:  Fair  Psychomotor Behavior:  Slow  Affect:   Depressed  Hopelessness: 8,\" at life's rope's end\"  Speech:  Normal, raspy voice  Thought Progression: Linear  Thought Content:  Mood congruent  Suicidal:   Denies currently but did make the suicide attempt after several days of consideration, clearly not impulsive  Homicidal:  None  Hallucinations:  Not demonstrated today  Delusion:  None  Memory:  Intact  Orientation:  Person, Place, Time, and Situation  Reliability:  fair  Insight:  Fair  Judgement:  Poor  Impulse Control:  Fair     Imaging Results (Last 24 Hours)         ** No results found for the last 24 hours. **                ECG/EMG Results (most recent)         None                      Lab Results   Component " Value Date     GLUCOSE 133 (H) 12/21/2023     BUN 22 12/21/2023     CREATININE 0.97 12/21/2023     EGFRIFNONA 90 04/04/2020     BCR 22.7 12/21/2023     CO2 26.6 12/21/2023     CALCIUM 9.6 12/21/2023     ALBUMIN 3.5 12/21/2023     AST 28 12/21/2023     ALT 15 12/21/2023               Lab Results   Component Value Date     WBC 9.60 12/21/2023     HGB 14.6 12/21/2023     HCT 43.1 12/21/2023     MCV 93.9 12/21/2023      12/21/2023         Pain Management Panel               Latest Ref Rng & Units 12/20/2023   Pain Management Panel   Amphetamine, Urine Qual Negative Negative    Barbiturates Screen, Urine Negative Negative    Benzodiazepine Screen, Urine Negative Negative    Buprenorphine, Screen, Urine Negative Negative    Cocaine Screen, Urine Negative Negative    Fentanyl, Urine Negative Negative    Methadone Screen , Urine Negative Negative    Methamphetamine, Ur Negative Negative             Brief Urine Lab Results  (Last result in the past 365 days)          Color   Clarity   Blood   Leuk Est   Nitrite   Protein   CREAT   Urine HCG         12/20/23 2003 Yellow    Clear    Negative    Negative    Negative    Negative                             Reviewed labs and studies done with this admission.         Hospital bed: Yes patient is fall risk.        ASSESSMENT & PLAN:       Suicidal ideation  Patient is on precautions       Recurrent major depression, severe, without psychosis  We will start with mirtazapine plus a individual and group psychotherapeutic effort       RLS (restless legs syndrome)  And would just think about you just looking at the medicine       GERD (gastroesophageal reflux disease)  Protonix       Left breast mass  Surgical consult     Tobacco abuse  Nicotine supplement     The patient has been admitted for safety and stabilization.  Patient will be monitored for suicidality daily and maintained on Special Precautions Level 3 (q15 min checks) . The patient will have individual and group  therapy with a master's level therapist. A master treatment plan will be developed and agreed upon by the patient and his/her treatment team.  The patient's estimated length of stay in the hospital is 5-7 days.         I spent a total of 75 minutes in direct patient care, greater than 40 minutes (greater than 50%) were spent face-to-face with assessment, coordination of care, counseling,  and answering any questions the patient had regarding  her status and the treatment plan.      KARLEE Asher MD     12/21/23  10:21 AM EST        LATEST THERAPY NOTES FROM 12/21/2023:       Blanche Coy   Therapist  Psychiatry     Progress Notes     Addendum     Date of Service: 12/21/23 1029  Creation Time: 12/21/23 1029       1030        DATA:      Therapist met individually with patient this date to introduce role and to discuss hospitalization expectations. Patient agreeable. Reviewed medical record and staffed case with treatment team this date. No major issues identified.       Clinical Maneuvering/Intervention:     Therapist assisted patient in processing above session content; acknowledged and normalized patient’s thoughts, feelings, and concerns.  Discussed the therapist/patient relationship and explain the parameters and limitations of relative confidentiality.  Also discussed the importance of active participation, and honesty to the treatment process.  Encouraged the patient to discuss/vent their feelings, frustrations, and fears concerning their ongoing medical issues and validated their feelings..      Concern was voiced regarding substance use and educated patient on risks associated with use. Patient was advised to abstain from use and educated on community resources that can help with sobriety and recovery.       Therapist addressed discharge safety planning this date. Assisted patient in identifying risk factors which would indicate the need for higher level of care after discharge;  including  "thoughts to harm self or others and/or self-harming behavior. Encouraged patient to call [phone],  [phone], or present to the nearest emergency room should any of these events occur. Discussed crisis intervention services and means to access.  Encouraged securing any objects of harm.       Therapist completed integrated summary, treatment plan, and initiated social history this date.  Therapist is strongly encouraging family involvement in treatment.       ASSESSMENT:      The patient is a 68 year old  female. Per report, \"67 y/o presents with reports of feeling suicidal - \"I took about sixty Ambien last night\". Reports, \"I called a friend today and ask they bring me here\".  Pt expressed concern/statements regarding some alleged tax fraud against her.  Pt reports her spouse committed suicide ten yrs ago this month. \"I hate Maggie\".  Pt denies HI/AVH. Depression 7/10. Anxiety 6/10.\"      Today, patient was seen 1-1 at bedside. Patient calm, cooperative and oriented x4.  Patient noted to have depressed affect, congruent mood, normal thought content.  Patient reports that she is here to get help with depression.  Patient now denies SI/HIAVH.  She rates depression/anxiety at 8/10.  Patient appears fatigued and is minimally talkative this date. Therapist introduced role and discussed the importance of safety planning. Patient reports that she will be going to her own place and takes care of herself. She reports that he does not want her friends involved in her treatment and would like every thing to stay private. Therapist updated Dr. Asher this date.      Goals for treatment:  SI attempt      Prior Hospitalizations / Dates/current treatment:  Denies      Childhood History:  Raised in [place] by parents, denies history of abuse. Currently living: Apartment in [facility] alone.  Current support; friends      Suicide Attempts:  Reports taking an overdose of Ambien PTA. History of spouse committing suicide.       Alcohol: "  denies      Substance use: denies      Legal:  Possible IRS case      Relationship/support: , no children.      Spiritual:  Non-Confucianism attending Taoism      Education:   1 year college, retired [employer]         Access to firearms:  denies          PLAN:       Patient to remain hospitalized this date.      Treatment team will focus efforts on stabilizing patient's acute symptoms while providing education on healthy coping and crisis management to reduce hospitalizations.   Patient requires daily psychiatrist evaluation and 24/7 nursing supervision to promote patient  safety.     Therapist will offer 1-4 individual sessions, family education, and appropriate referral.     Therapist recommends continued evaluation. Patient gives verbal consent for [facility] referral.  Patient to return home to self care at discharge.  She was recommended to involve friend in safety planning before discharge.

## 2023-12-22 NOTE — CONSULTS
[facility]  CONSULT  NOTE      Patient information:  Date of Admit: 12/21/2023  Date of Consult: 12/22/23  Hospitalist/Referring MD:Agusto Asher MD;   PCP: Dread Burton MD  MRN:  [id number]  Visit Number:  [id number]    LOS: 1  CODE STATUS:  Code Status and Medical Interventions:   Ordered at: 12/21/23 0237     Code Status (Patient has no pulse and is not breathing):    CPR (Attempt to Resuscitate)     Medical Interventions (Patient has pulse or is breathing):    Full Support       PROBLEM LIST: Principal Problem:    Recurrent major depression  Active Problems:    Suicidal ideation    RLS (restless legs syndrome)    GERD (gastroesophageal reflux disease)    Left breast mass    Allergy to penicillin      Inpatient Cardiology Consult  Consult performed by: Bhavani Moore APRN  Consult ordered by: Tulio Asher MD        11:21 EST  12/22/2023    General Cardiology Consulting Physician: Dr. Louann Soria MD    Assessment    Mild elevated high-sensitivity troponin with mostly flat trend, with unclear significance, no chest pain reported  Suicidal ideation and attempt with major depression  Legally blind  Obstructive sleep apnea not compliant with CPAP  Ongoing tobacco abuse  Unknown lipid status          Recommendations   1.  Will proceed an echocardiac to assess cardiac function wall motion valve morphology and also stress testing for ruling out ischemia  2.  Will get lipid profile  3.  Strongly advised to quit smoking  4.  Blood pressure is little bit labile will monitor for now  5.  Advised to use CPAP for sleep apnea      Reason for Cardiology consultation: Abnormal EKG and elevated troponin: Cleared in ER but asking for second opinion.    Subjective Data   ADMISSION INFORMATION:  No chief complaint on file.    History of Present Illness    Regine Beckham is a 68 y.o. female with a past medical history significant for legally blind, sleep apnea  not using CPAP currently, RLS, GERD, CESIA, anxiety/ depression, and arthritis    Patient presented to [facility] emergency room (ER) on 12/21/2023 with complaints of suicidal ideations with an attempted overdose.     Cardiology has been consulted for further evaluation and management abnormal EKG and elevated troponin.     Primary Cardiologist: none since 03/16/2022    Patient is in room Senior psych 1025 A and was examined by Dr. Soria.  Patient denies any history of diabetes or hypertension.  Patient does report a history of smoking 1 1/2 PPD since she was 17 years old.  Patient reports family history of her mother having mitral valve repair.  She is unsure if her cholesterols ever been checked.  Patient does have a cough but currently denies any chest pain, shortness of breath or palpitations.  Patient does agree to have a Cielo stress test and echo done.    ORDERS:   N.p.o. after midnight 12/22/2023, Friday  No caffeine after 7 PM (1900) 12/22/2023, Friday  Cielo stress test 12/23/2023, Saturday    Cardiac risk factors:smoking      Last Echo: Results for orders placed during the hospital encounter of 03/09/22    Adult Transthoracic Echo Complete W/ Cont if Necessary Per Protocol    Interpretation Summary  · Left ventricular ejection fraction appears to be 56 - 60%. Left ventricular systolic function is normal.  · Left ventricular diastolic function is consistent with (grade II w/high LAP) pseudonormalization.  · Trace tricuspid valve regurgitation is present. Estimated right ventricular systolic pressure from tricuspid regurgitation is mildly elevated (35-45 mmHg). Mild pulmonary hypertension is present.  · There is no evidence of pericardial effusion         Last Stress: None found in patient's chart.      Last Cath:  None found in patient's chart.                           Past Medical History:   Diagnosis Date    Anemia     Anxiety     Arthritis     Depression     Legally blind     Restless leg  "syndrome     Sleep apnea     \"I don't use a cpap anymore since losing 106 lbs\"    Water retention      Past Surgical History:   Procedure Laterality Date    BACK SURGERY      GALLBLADDER SURGERY      HIP ARTHROSCOPY      JOINT REPLACEMENT Right      Family History   Problem Relation Age of Onset    Breast cancer Neg Hx      Social History     Tobacco Use    Smoking status: Every Day     Packs/day: 1.50     Years: 51.00     Additional pack years: 0.00     Total pack years: 76.50     Types: Cigarettes    Smokeless tobacco: Never   Vaping Use    Vaping Use: Never used   Substance Use Topics    Alcohol use: Yes     Alcohol/week: 1.0 standard drink of alcohol     Types: 1 Glasses of wine per week     Comment: \"occasionally - once every two months\"    Drug use: Yes     Comment: alcohol - occasionally     ALLERGIES: Penicillins    Medications listed below are reported home medications pulling from within the system:  Medications Prior to Admission   Medication Sig Dispense Refill Last Dose    Albuterol (VENTOLIN IN) Inhale 2 puffs Every 6 (Six) Hours As Needed.   Past Week    dexlansoprazole (DEXILANT) 30 MG capsule Take 1 capsule by mouth Daily.   Past Week    diclofenac (VOLTAREN) 75 MG EC tablet Take 1 tablet by mouth 2 (Two) Times a Day.   Past Week    hydroCHLOROthiazide (HYDRODIURIL) 12.5 MG tablet Take 1 tablet by mouth Daily.   Past Week    losartan (COZAAR) 50 MG tablet Take 1 tablet by mouth Daily.   Past Week    Mirabegron ER (MYRBETRIQ) 50 MG tablet sustained-release 24 hour 24 hr tablet Take 50 mg by mouth Daily.   Past Week    mirtazapine (REMERON) 15 MG tablet Take 1 tablet by mouth Every Night.   Past Week    ondansetron ODT (ZOFRAN-ODT) 4 MG disintegrating tablet Place 1 tablet on the tongue Every 8 (Eight) Hours As Needed for Nausea or Vomiting.   Past Week    rOPINIRole (REQUIP) 5 MG tablet Take 1 tablet by mouth 3 times a day.   Past Week    zolpidem (AMBIEN) 10 MG tablet Take 1 tablet by mouth At Night " As Needed for Sleep.   12/20/2023       Review of Systems   Constitutional:  Negative for activity change, appetite change and diaphoresis.   HENT:  Negative for facial swelling and trouble swallowing.    Eyes:  Negative for visual disturbance.   Respiratory:  Positive for cough. Negative for shortness of breath.    Cardiovascular:  Negative for chest pain, palpitations and leg swelling.        Left anterior chest wall tenderness   Gastrointestinal:  Negative for blood in stool, nausea and vomiting.   Endocrine: Negative.    Genitourinary:  Negative for hematuria.   Musculoskeletal:  Negative for gait problem.   Skin:  Negative for color change.   Neurological:  Negative for dizziness, syncope, speech difficulty, weakness, light-headedness and headaches.   Psychiatric/Behavioral:  Negative for agitation and behavioral problems.      Objective Data   Vital Signs  Temp:  [97.5 °F (36.4 °C)-98.2 °F (36.8 °C)] 98.2 °F (36.8 °C)  Heart Rate:  [82-94] 88  Resp:  [18-20] 18  BP: ()/(58-98) 96/58  Device (Oxygen Therapy): room air  Vital Signs (last 72 hrs)         12/19 0700  12/20 0659 12/20 0700  12/21 0659 12/21 0700  12/22 0659 12/22 0700  12/22 1121   Most Recent      Temp (°F)     97    97.5 -  97.8      98.2     98.2 (36.8) 12/22 0800    Heart Rate   70 -  74    78 -  94      88     88 12/22 0800    Resp     18    16 -  20      18     18 12/22 0800    BP   134/81 -  153/82    96/58 -  158/98       96/58 12/21 1925    SpO2 (%)     97    94 -  96       96 12/21 1900          BMI:   Body mass index is 25.1 kg/m².  WEIGHT:  Wt Readings from Last 3 Encounters:   12/21/23 70.5 kg (155 lb 7.7 oz)   12/20/23 72.6 kg (160 lb)   03/18/23 70.3 kg (155 lb)     DIET:  Diet: Regular/House Diet; Safe Tray; Texture: Regular Texture (IDDSI 7); Fluid Consistency: Thin (IDDSI 0)  I&O:  Intake & Output (last 3 days)       None            Physical Exam  Constitutional:       Appearance: Normal appearance.   HENT:      Head:  "Normocephalic and atraumatic.      Nose: Nose normal.      Mouth/Throat:      Mouth: Mucous membranes are moist.      Pharynx: Oropharynx is clear.   Eyes:      Conjunctiva/sclera: Conjunctivae normal.   Cardiovascular:      Rate and Rhythm: Normal rate and regular rhythm.      Pulses: Normal pulses.      Heart sounds: Normal heart sounds.      Comments: Left anterior chest wall tenderness with palpitation.  Pulmonary:      Effort: Pulmonary effort is normal.      Breath sounds: Normal breath sounds.   Abdominal:      General: Bowel sounds are normal.      Palpations: Abdomen is soft.   Musculoskeletal:         General: Normal range of motion.      Cervical back: Normal range of motion.   Skin:     General: Skin is warm and dry.   Neurological:      General: No focal deficit present.      Mental Status: She is alert and oriented to person, place, and time.   Psychiatric:         Mood and Affect: Mood normal.         Behavior: Behavior normal.       Results review   Results Review:    I have reviewed the patient's new clinical results. 12/22/23 12:21 EST    Results from last 7 days   Lab Units 12/21/23  0701 12/21/23  0509 12/21/23  0103 12/20/23  1840   HSTROP T ng/L 17* 16* 17* 19*     Lab Results   Component Value Date    PROBNP 739.6 04/04/2020     Results from last 7 days   Lab Units 12/21/23  0509 12/20/23  1840   WBC 10*3/mm3 9.60 13.41*   HEMOGLOBIN g/dL 14.6 16.0*   PLATELETS 10*3/mm3 246 269     Results from last 7 days   Lab Units 12/21/23  0509 12/20/23  1840   SODIUM mmol/L 138 136   POTASSIUM mmol/L 3.4* 3.7   CHLORIDE mmol/L 102 100   CO2 mmol/L 26.6 24.6   BUN mg/dL 22 22   CREATININE mg/dL 0.97 1.07*   CALCIUM mg/dL 9.6 9.4   GLUCOSE mg/dL 133* 146*   ALT (SGPT) U/L 15 17   AST (SGOT) U/L 28 34*     Lab Results   Component Value Date    MG 1.9 12/20/2023    MG 1.8 04/04/2020     No results found for: \"CHOL\", \"TRIG\", \"HDL\", \"LDL\"  Estimated Creatinine Clearance: 60.9 mL/min (by C-G formula based on " "SCr of 0.97 mg/dL).  No results found for: \"HGBA1C\"  No results found for: \"INR\"  No results found for: \"LABHEPA\"  No components found for: \"DIG\"  Lab Results   Component Value Date    TSH 3.130 2020      No results found for: \"URICACID\"  Pain Management Panel          Latest Ref Rng & Units 2023   Pain Management Panel   Amphetamine, Urine Qual Negative Negative    Barbiturates Screen, Urine Negative Negative    Benzodiazepine Screen, Urine Negative Negative    Buprenorphine, Screen, Urine Negative Negative    Cocaine Screen, Urine Negative Negative    Fentanyl, Urine Negative Negative    Methadone Screen , Urine Negative Negative    Methamphetamine, Ur Negative Negative      Microbiology Results (last 10 days)       ** No results found for the last 240 hours. **           No results found for: \"BLOODCX\"      EC2023            ECG/EMG Results (last 24 hours)       Procedure Component Value Units Date/Time    ECG 12 Lead Other; abnormal ECG [[id number]] Collected: 23 1612     Updated: 23 1658     QT Interval 418 ms      QTC Interval 441 ms     Narrative:      Test Reason : Other~  Blood Pressure :   */*   mmHG  Vent. Rate :  67 BPM     Atrial Rate :  71 BPM     P-R Int : 152 ms          QRS Dur :  90 ms      QT Int : 418 ms       P-R-T Axes :  65  60  53 degrees     QTc Int : 441 ms    Sinus rhythm with marked sinus arrhythmia  Nonspecific ST and T wave abnormality  Abnormal ECG  When compared with ECG of 21-DEC-2023 16:12, (Unconfirmed)  premature atrial complexes are no longer present  Inverted T waves have replaced nonspecific T wave abnormality in Lateral leads    Referred By: BRENDA           Confirmed By:           TELEMETRY:   Patient is not on continuous cardiac monitor at this time.    RADIOLOGY STUDIES:  Imaging Results (Last 72 Hours)       Procedure Component Value Units Date/Time    Mammo Diagnostic Digital Tomosynthesis Bilateral With CAD [[id number]] " Collected: 12/21/23 1409     Updated: 12/21/23 1416    Narrative:      BILATERAL DIAGNOSTIC MAMMOGRAM AND LEFT BREAST ULTRASOUND     CLINICAL INDICATION: 68-year-old patient presents for a painful palpable  area of concern in the far superior aspect of the left breast which she  reports developed after a recent fall.     TECHNIQUE: Low dose full field digital breast tomosynthesis imaging was  performed with 2D and 3D acquisitions consisting of bilateral CC and MLO  views. Focused ultrasound evaluation of the left breast was also  performed.        COMPARISON: No prior exam is available for comparison.     FINDINGS: The breasts are almost entirely fatty.     The breasts are asymmetric in size, with the right larger than the left.  There is ill-defined increased tissue density present in the far  superior aspect of the left breast suggestive of trauma. No dominant  mass, suspicious calcifications, or areas of architectural distortion  are seen in either breast.     Physical exam demonstrates a large palpable area superior to the left  breast whose appearance is suggestive of a developing hematoma. This  mass is exquisitely tender to palpation. Focused sonographic evaluation  of this area demonstrates underlying hyperechogenicity in the tissue. No  discrete solid or cystic mass is seen nor is there an abnormal area of  shadowing.          Impression:         1. The patient's painful palpable area of concern likely represents an  evolving hematoma related to recent fall. Recommend clinical and short  interval mammographic follow-up.     2. Negative right mammogram.     BI-RADS CATEGORY: 3, PROBABLY BENIGN        RECOMMENDATION: Recommend clinical follow-up of the left breast. Also  recommend 6-month follow-up diagnostic left mammogram.     CAD was utilized.     The standard false-negative rate of mammography is between 10% and 25%.   Complex patterns or increased breast density will markedly elevate the  false-negative  rate of mammography.        The patient was notified of the results and recommendations at the time  of her visit.  Additionally, a letter, in lay terminology, with the  results of this exam will be mailed to the patient.     This report was finalized on 12/21/2023 2:14 PM by Dr. Anca Majano MD.         US Breast Left Limited [[id number]] Collected: 12/21/23 1409     Updated: 12/21/23 1416    Narrative:      BILATERAL DIAGNOSTIC MAMMOGRAM AND LEFT BREAST ULTRASOUND     CLINICAL INDICATION: 68-year-old patient presents for a painful palpable  area of concern in the far superior aspect of the left breast which she  reports developed after a recent fall.     TECHNIQUE: Low dose full field digital breast tomosynthesis imaging was  performed with 2D and 3D acquisitions consisting of bilateral CC and MLO  views. Focused ultrasound evaluation of the left breast was also  performed.        COMPARISON: No prior exam is available for comparison.     FINDINGS: The breasts are almost entirely fatty.     The breasts are asymmetric in size, with the right larger than the left.  There is ill-defined increased tissue density present in the far  superior aspect of the left breast suggestive of trauma. No dominant  mass, suspicious calcifications, or areas of architectural distortion  are seen in either breast.     Physical exam demonstrates a large palpable area superior to the left  breast whose appearance is suggestive of a developing hematoma. This  mass is exquisitely tender to palpation. Focused sonographic evaluation  of this area demonstrates underlying hyperechogenicity in the tissue. No  discrete solid or cystic mass is seen nor is there an abnormal area of  shadowing.          Impression:         1. The patient's painful palpable area of concern likely represents an  evolving hematoma related to recent fall. Recommend clinical and short  interval mammographic follow-up.     2. Negative right mammogram.     BI-RADS  CATEGORY: 3, PROBABLY BENIGN        RECOMMENDATION: Recommend clinical follow-up of the left breast. Also  recommend 6-month follow-up diagnostic left mammogram.     CAD was utilized.     The standard false-negative rate of mammography is between 10% and 25%.   Complex patterns or increased breast density will markedly elevate the  false-negative rate of mammography.        The patient was notified of the results and recommendations at the time  of her visit.  Additionally, a letter, in lay terminology, with the  results of this exam will be mailed to the patient.     This report was finalized on 12/21/2023 2:14 PM by Dr. Anca Majano MD.         XR Chest PA & Lateral [[id number]] Collected: 12/21/23 1315     Updated: 12/21/23 1318    Narrative:      EXAM:    XR Chest, 2 Views     EXAM DATE:    12/21/2023 12:54 PM     CLINICAL HISTORY:    sob, cough, tumor left anterior upper lateral chest;  N63.20-Unspecified lump in the left breast, unspecified quadrant     TECHNIQUE:    Frontal and lateral views of the chest.     COMPARISON:    No relevant prior studies available.     FINDINGS:    LUNGS AND PLEURAL SPACES:  Unremarkable as visualized.  No  consolidation.  No pneumothorax.    HEART:  Unremarkable as visualized.  No cardiomegaly.    MEDIASTINUM:  Unremarkable as visualized.    BONES/JOINTS:  Unremarkable as visualized.       Impression:        Unremarkable radiographic appearance of the chest.        This report was finalized on 12/21/2023 1:16 PM by Dr. Raymundo Menon MD.               ALLERGIES: Penicillins    CURRENT MEDICATIONS:  Current list of medications may not reflect those currently placed in orders that are not signed or are being held.     hydroCHLOROthiazide, 12.5 mg, Oral, Daily  losartan, 50 mg, Oral, Daily  mirtazapine, 30 mg, Oral, Nightly  nicotine, 1 patch, Transdermal, Q24H  oxybutynin XL, 10 mg, Oral, Daily  pantoprazole, 40 mg, Oral, Q AM  rOPINIRole, 4 mg, Oral, Nightly  spironolactone, 25  mg, Oral, Daily           acetaminophen    albuterol sulfate HFA    aluminum-magnesium hydroxide-simethicone    benzonatate    bisacodyl    ibuprofen    loperamide    magnesium hydroxide    [START ON 12/23/2023] melatonin    ondansetron    sodium chloride        Thank you very much for asking us to be involved in this patient's care.  We will follow along with you. Please do not hesitate to call for any questions or concerns.     I have discussed the patients findings and recommendations with patient.    Electronically signed by NIKKIE Suárez, 12/22/23, 12:21 PM EST.   Electronically signed by Louann Soria MD, 12/22/23, 12:23 PM EST.                        Please note that portions of this note were copied and has been reviewed and is accurate as of 12/22/2023 .      Please note that portions of this note were completed with a voice recognition program.

## 2023-12-22 NOTE — PLAN OF CARE
Problem: Adult Behavioral Health Plan of Care  Goal: Develops/Participates in Therapeutic [place] to Support Successful Transition  Intervention: Mutually Develop Transition Plan  Recent Flowsheet Documentation  Taken 12/22/2023 1633 by Aminah Lorenz LCSW  Transportation Anticipated: family or friend will provide  Transportation Concerns: no car  Current Discharge Risk:   psychiatric illness   legal concerns  Concerns to be Addressed:   coping/stress   discharge planning   mental health   medication   home safety   grief and loss   suicidal   legal  Readmission Within the Last 30 Days: no previous admission in last 30 days  Patient/Family Anticipated Services at Transition:   mental health services   outpatient care  Patient/Family Anticipates Transition to: home  Offered/Gave Vendor List: no       Patient has been resting in bed today, reports ongoing hopelessness and depression.  Dr. Asher did reach out to a friend with patient who reported that she could be with patient [place] day but has to work up until that day, some concerns for patient to be safe alone if discharged today.  Patient will continue hospitalization and follow up in the [facility] clinic.                           64 y/o male PMH anxiety, morbid obesity, "fast heart rate" (not on blood thinners) who c/o SOB, N&V, night sweats and  palpitations for the last few days which progressively worsened. PT was brought in by EMS to [facility] found to be in rapid atrial fibrillation -200, HTN with expiratory wheezing. Given Cardizem and albuterol which decreased to HR and wheezing. Placed on heparin drip at that time and given lasix after CXR showed pulmonary edema. Troponin found to be elevated diagnosed with NSTEMI. Subsequent cardiac cath showed 2 vessel CAD mLAD 60% mCX 50% with EF 40% and medical management recommended. Subsequent OLEG showed severe central MR

## 2023-12-22 NOTE — PLAN OF CARE
Goal Outcome Evaluation:  Plan of Care Reviewed With: patient  Patient Agreement with Plan of Care: agrees     Progress: no change  Outcome Evaluation: Pt has been calm and cooperative. Reports difficulty falling asleep and fair appetite. Rates A/D 5/5. Denies SI/HI or hallucinations. Reports feeling helpless, hopeless and worthless.

## 2023-12-23 ENCOUNTER — APPOINTMENT (OUTPATIENT)
Dept: NUCLEAR MEDICINE | Facility: HOSPITAL | Age: 68
DRG: 885 | End: 2023-12-23
Payer: MEDICARE

## 2023-12-23 LAB
BH CV NUCLEAR PRIOR STUDY: 3
BH CV REST NUCLEAR ISOTOPE DOSE: 10 MCI
BH CV STRESS COMMENTS STAGE 1: NORMAL
BH CV STRESS DOSE REGADENOSON STAGE 1: 0.4
BH CV STRESS DURATION MIN STAGE 1: 0
BH CV STRESS DURATION SEC STAGE 1: 10
BH CV STRESS NUCLEAR ISOTOPE DOSE: 33.6 MCI
BH CV STRESS PROTOCOL 1: NORMAL
BH CV STRESS RECOVERY BP: NORMAL MMHG
BH CV STRESS RECOVERY HR: 85 BPM
BH CV STRESS STAGE 1: 1
LV EF NUC BP: 67 %
MAXIMAL PREDICTED HEART RATE: 151 BPM
PERCENT MAX PREDICTED HR: 70.86 %
STRESS BASELINE BP: NORMAL MMHG
STRESS BASELINE HR: 80 BPM
STRESS PERCENT HR: 83 %
STRESS POST PEAK BP: NORMAL MMHG
STRESS POST PEAK HR: 107 BPM
STRESS TARGET HR: 128 BPM

## 2023-12-23 PROCEDURE — 78452 HT MUSCLE IMAGE SPECT MULT: CPT | Performed by: INTERNAL MEDICINE

## 2023-12-23 PROCEDURE — 25010000002 REGADENOSON 0.4 MG/5ML SOLUTION: Performed by: PSYCHIATRY & NEUROLOGY

## 2023-12-23 PROCEDURE — 99232 SBSQ HOSP IP/OBS MODERATE 35: CPT | Performed by: PSYCHIATRY & NEUROLOGY

## 2023-12-23 PROCEDURE — 0 TECHNETIUM SESTAMIBI: Performed by: PSYCHIATRY & NEUROLOGY

## 2023-12-23 PROCEDURE — 78452 HT MUSCLE IMAGE SPECT MULT: CPT

## 2023-12-23 PROCEDURE — 93017 CV STRESS TEST TRACING ONLY: CPT

## 2023-12-23 PROCEDURE — 93018 CV STRESS TEST I&R ONLY: CPT | Performed by: INTERNAL MEDICINE

## 2023-12-23 PROCEDURE — 25010000002 AMINOPHYLLINE PER 250 MG: Performed by: NURSE PRACTITIONER

## 2023-12-23 PROCEDURE — A9500 TC99M SESTAMIBI: HCPCS | Performed by: PSYCHIATRY & NEUROLOGY

## 2023-12-23 RX ORDER — AMINOPHYLLINE 25 MG/ML
125 INJECTION, SOLUTION INTRAVENOUS
Status: COMPLETED | OUTPATIENT
Start: 2023-12-23 | End: 2023-12-23

## 2023-12-23 RX ORDER — REGADENOSON 0.08 MG/ML
0.4 INJECTION, SOLUTION INTRAVENOUS
Status: COMPLETED | OUTPATIENT
Start: 2023-12-23 | End: 2023-12-23

## 2023-12-23 RX ADMIN — ROPINIROLE HYDROCHLORIDE 4 MG: 1 TABLET, FILM COATED ORAL at 20:25

## 2023-12-23 RX ADMIN — REGADENOSON 0.4 MG: 0.08 INJECTION, SOLUTION INTRAVENOUS at 09:48

## 2023-12-23 RX ADMIN — IBUPROFEN 400 MG: 400 TABLET, FILM COATED ORAL at 14:54

## 2023-12-23 RX ADMIN — TECHNETIUM TC 99M SESTAMIBI 1 DOSE: 1 INJECTION INTRAVENOUS at 09:50

## 2023-12-23 RX ADMIN — IBUPROFEN 400 MG: 400 TABLET, FILM COATED ORAL at 20:25

## 2023-12-23 RX ADMIN — Medication 1 PATCH: at 11:09

## 2023-12-23 RX ADMIN — MIRTAZAPINE 30 MG: 15 TABLET, FILM COATED ORAL at 20:25

## 2023-12-23 RX ADMIN — OXYBUTYNIN CHLORIDE 10 MG: 5 TABLET, EXTENDED RELEASE ORAL at 11:10

## 2023-12-23 RX ADMIN — ACETAMINOPHEN 650 MG: 325 TABLET ORAL at 12:18

## 2023-12-23 RX ADMIN — TECHNETIUM TC 99M SESTAMIBI 1 DOSE: 1 INJECTION INTRAVENOUS at 08:50

## 2023-12-23 RX ADMIN — AMINOPHYLLINE 50 MG: 25 INJECTION, SOLUTION INTRAVENOUS at 09:50

## 2023-12-23 RX ADMIN — LOSARTAN POTASSIUM 50 MG: 50 TABLET, FILM COATED ORAL at 11:07

## 2023-12-23 RX ADMIN — SPIRONOLACTONE 25 MG: 25 TABLET, FILM COATED ORAL at 11:07

## 2023-12-23 RX ADMIN — HYDROCHLOROTHIAZIDE 12.5 MG: 25 TABLET ORAL at 11:07

## 2023-12-23 NOTE — PLAN OF CARE
Goal Outcome Evaluation:  Plan of Care Reviewed With: patient  Patient Agreement with Plan of Care: agrees     Progress: no change  Outcome Evaluation: PATIENT HAS BEEN IN BED MOST OF THE DAY.  TOLERATED STRESS TEST THIS AM, DENIES ANY PAINS.  ANXIETY 8, DEPRESSION 10, DENIES S/I IDEATIONS.

## 2023-12-23 NOTE — PLAN OF CARE
"Goal Outcome Evaluation:  Plan of Care Reviewed With: patient  Patient Agreement with Plan of Care: agrees        Outcome Evaluation: Patient reports appetite good and sleep fair. Patient anxiety 6/10 and depression 5/10 with overall mood \"upset with myself.\" Patient denies SI, HI, or AVH with no plans this shift. Patient complains of headache and right hip rated 5/10;medication discussed and given. Patient admits to bowel movement this shift. Patient up at [facility], talking with staff, called friends, and watched tv for free time. Patient is NPO after midnight, no coffee after 7:00 pm, lipid panel in the am, stress test also. SP3/FALLS         " details…

## 2023-12-23 NOTE — PROGRESS NOTES
"INPATIENT PSYCHIATRIC PROGRESS NOTE    Name:  Regine Beckham  :  1955  MRN:  [id number]  Visit Number:  [id number]  Length of stay:  2    SUBJECTIVE    CC/Focus of Exam: Suicide attempt    INTERVAL HISTORY:  First time seeing patient.  Chart, notes, vitals, labs and EKG personally reviewed.  Glucose 133, troponin 17, UDS + THC    Patient reports focus at this point is \"getting out of here and going back home.\"  Patient bluntly states that she took 60 Ambien, then called a friend to bring her to the hospital.  Denies significant effects or sedation from the overdose.  Patient reports feelings of hopelessness due to ongoing tax fraud committed against her and dealing with the IRS to sort that out.  Patient also reports that her  committed suicide in 2010, which continues to be a source of distress for her.  Patient denies significant depressive symptoms otherwise, saying the overdose was impulsive because she felt overwhelmed.  She denies current SI.  We are attempting further safety planning.    Depression rating 2/10  Anxiety rating 5/10  Sleep: Fair  Withdrawal sx: Denied  Cravin/10    Review of Systems   Constitutional: Negative.    Respiratory: Negative.     Cardiovascular: Negative.    Gastrointestinal: Negative.    Musculoskeletal: Negative.    Psychiatric/Behavioral:  The patient is nervous/anxious.        OBJECTIVE    Temp:  [97.3 °F (36.3 °C)-97.9 °F (36.6 °C)] 97.3 °F (36.3 °C)  Heart Rate:  [] 101  Resp:  [18] 18  BP: (108-130)/(62-74) 125/74    MENTAL STATUS EXAM:  Appearance: Casually dressed, good hygeine.   Cooperation: Cooperative  Psychomotor: No psychomotor agitation/retardation, No EPS, No motor tics  Speech: normal rate, amount.  Mood: \"Ready to go\"   Affect: congruent, appropriate  Thought Content: goal directed, no delusional material present  Thought process: linear, organized.  Suicidality: Denies SI  Homicidality: No HI  Perception: No AH/VH  Insight: fair "   Judgment: fair    Lab Results (last 24 hours)       ** No results found for the last 24 hours. **               Imaging Results (Last 24 Hours)       ** No results found for the last 24 hours. **               ECG/EMG Results (most recent)       Procedure Component Value Units Date/Time    Adult Transthoracic Echo Complete W/ Cont if Necessary Per Protocol [[id number]] Resulted: 12/22/23 1349     Updated: 12/22/23 1349     LVIDd 3.9 cm      LVIDs 2.9 cm      IVSd 1.04 cm      LVPWd 1.06 cm      FS 26.2 %      IVS/LVPW 0.98 cm      ESV(cubed) 23.6 ml      LV Sys Vol (BSA corrected) 8.0 cm2      EDV(cubed) 58.9 ml      LV Martino Vol (BSA corrected) 20.4 cm2      LV mass(C)d 130.4 grams      LVOT area 2.5 cm2      LVOT diam 1.80 cm      EDV(MOD-sp4) 36.6 ml      ESV(MOD-sp4) 14.4 ml      SV(MOD-sp4) 22.2 ml      SI(MOD-sp4) 12.4 ml/m2      EF(MOD-sp4) 60.7 %      MV E max roberto 71.0 cm/sec      MV A max roberto 113.0 cm/sec      MV E/A 0.63     LA ESV Index (BP) 21.7 ml/m2      Med Peak E' Roberto 4.2 cm/sec      Lat Peak E' Roberto 6.1 cm/sec      TR max roberto 241.0 cm/sec      Avg E/e' ratio 13.79     SV(LVOT) 65.1 ml      TAPSE (>1.6) 1.35 cm      LA dimension (2D)  3.6 cm      LV V1 max 145.0 cm/sec      LV V1 max PG 8.4 mmHg      LV V1 mean PG 5.0 mmHg      LV V1 VTI 25.6 cm      Ao pk roberto 179.0 cm/sec      Ao max PG 12.8 mmHg      Ao mean PG 7.0 mmHg      Ao V2 VTI 29.5 cm      RENNY(I,D) 2.21 cm2      TR max PG 23.2 mmHg      RVSP(TR) 33.2 mmHg      RAP systole 10.0 mmHg      PA acc time 0.12 sec      Ao root diam 3.1 cm      ACS 0.90 cm     Narrative:        Estimated right ventricular systolic pressure from tricuspid   regurgitation is normal (<35 mmHg).      Impression:          ECG 12 Lead Other; abnormal ECG [[id number]] Collected: 12/21/23 1612     Updated: 12/22/23 1820     QT Interval 418 ms      QTC Interval 441 ms     Narrative:      Test Reason : Other~  Blood Pressure :   */*   mmHG  Vent. Rate :  67 BPM     Atrial Rate  :  71 BPM     P-R Int : 152 ms          QRS Dur :  90 ms      QT Int : 418 ms       P-R-T Axes :  65  60  53 degrees     QTc Int : 441 ms    Sinus rhythm with marked sinus arrhythmia  Nonspecific ST and T wave abnormality  Abnormal ECG  When compared with ECG of 21-DEC-2023 16:12, (Unconfirmed)  premature atrial complexes are no longer present  Inverted T waves have replaced nonspecific T wave abnormality in Lateral leads  Confirmed by Susan Mederos ([id number]) on 12/22/2023 6:16:59 PM    Referred By: BRENDA           Confirmed By: Susan Mederos             ALLERGIES: Penicillins      Current Facility-Administered Medications:     acetaminophen (TYLENOL) tablet 650 mg, 650 mg, Oral, Q6H PRN, Agusto Asher MD, 650 mg at 12/22/23 1925    albuterol sulfate HFA (PROVENTIL HFA;VENTOLIN HFA;PROAIR HFA) inhaler 2 puff, 2 puff, Inhalation, Q6H PRN, Tulio Asher MD    aluminum-magnesium hydroxide-simethicone (MAALOX MAX) 400-400-40 MG/5ML suspension 15 mL, 15 mL, Oral, Q6H PRN, Agusto Asher MD, 15 mL at 12/21/23 1556    benzonatate (TESSALON) capsule 100 mg, 100 mg, Oral, TID PRN, Agusto Asher MD    bisacodyl (DULCOLAX) EC tablet 5 mg, 5 mg, Oral, Daily PRN, Agusto Asher MD    hydroCHLOROthiazide (HYDRODIURIL) tablet 12.5 mg, 12.5 mg, Oral, Daily, Tulio Asher MD, 12.5 mg at 12/23/23 1107    ibuprofen (ADVIL,MOTRIN) tablet 400 mg, 400 mg, Oral, Q6H PRN, Agusto Asher MD, 400 mg at 12/21/23 0804    loperamide (IMODIUM) capsule 2 mg, 2 mg, Oral, Q2H PRN, Agusto Asher MD    losartan (COZAAR) tablet 50 mg, 50 mg, Oral, Daily, Tulio Asher MD, 50 mg at 12/23/23 1107    magnesium hydroxide (MILK OF MAGNESIA) suspension 10 mL, 10 mL, Oral, Daily PRN, Agusto Asher MD    melatonin tablet 3 mg, 3 mg, Oral, Nightly PRN, Agusto Asher MD    mirtazapine (REMERON) tablet 30 mg, 30 mg, Oral, Nightly, Tulio Asher MD, 30 mg at 12/22/23 2055    nicotine  (NICODERM CQ) 21 MG/24HR patch 1 patch, 1 patch, Transdermal, Q24H, Agusto Asher MD, 1 patch at 12/22/23 0824    ondansetron (ZOFRAN) tablet 4 mg, 4 mg, Oral, Q6H PRN, Agusto Asher MD, 4 mg at 12/21/23 2004    oxybutynin XL (DITROPAN-XL) 24 hr tablet 10 mg, 10 mg, Oral, Daily, Tulio Asher MD, 10 mg at 12/22/23 0824    pantoprazole (PROTONIX) EC tablet 40 mg, 40 mg, Oral, Q AM, Tulio Asher MD, 40 mg at 12/22/23 0556    rOPINIRole (REQUIP) tablet 4 mg, 4 mg, Oral, Nightly, Tulio Asher MD, 4 mg at 12/22/23 2054    sodium chloride nasal spray 2 spray, 2 spray, Each Nare, PRN, Agusto Asher MD    spironolactone (ALDACTONE) tablet 25 mg, 25 mg, Oral, Daily, Tulio Asher MD, 25 mg at 12/23/23 1107    Reviewed chart, notes, vitals, labs and EKG personally reviewed.    ASSESSMENT & PLAN:        Recurrent major depression    Suicidal ideation    RLS (restless legs syndrome)    GERD (gastroesophageal reflux disease)    Left breast mass    Allergy to penicillin     Suicidal ideation  Patient is on precautions       Recurrent major depression, severe, without psychosis   mirtazapine plus a individual and group psychotherapeutic effort       RLS (restless legs syndrome)  Ropinirole 4 mg nightly       Hypertension  -Continue losartan 50 mg daily  -Continue HCTZ 12.5 mg daily  -Continue spironolactone 25 mg daily      Elevated troponin  -Cardiology consulted.  Appreciate involvement  -Stress test pending for this morning      GERD (gastroesophageal reflux disease)  Protonix       Left breast mass  Surgical consult     Tobacco abuse  Nicotine supplement    Special precautions: Special Precautions Level 3 (q15 min checks)     Behavioral Health Treatment Plan and Problem List: I have reviewed and approved the Behavioral Health Treatment Plan and Problem list.  The patient has had a chance to review and agrees with the treatment plan.    I have reviewed the copied  text and it is accurate as of 12/23/23     Clinician:  Manuelito Campa MD  12/23/23  11:09 EST

## 2023-12-23 NOTE — PLAN OF CARE
Goal Outcome Evaluation:  Plan of Care Reviewed With: patient  Patient Agreement with Plan of Care: agrees     Progress: no change  Outcome Evaluation: PATIENT HAS BEEN IN ROOM MOST OF THE DAY.  TOLERATED STRESS TEST, DENIES ANY CHEST PAINS.  ANXIETY 5, DEPRESSION 4.

## 2023-12-24 VITALS
RESPIRATION RATE: 18 BRPM | WEIGHT: 155.48 LBS | OXYGEN SATURATION: 97 % | HEART RATE: 75 BPM | TEMPERATURE: 97 F | DIASTOLIC BLOOD PRESSURE: 70 MMHG | SYSTOLIC BLOOD PRESSURE: 114 MMHG | HEIGHT: 66 IN | BODY MASS INDEX: 24.99 KG/M2

## 2023-12-24 PROBLEM — R45.851 SUICIDAL IDEATION: Status: RESOLVED | Noted: 2023-12-21 | Resolved: 2023-12-24

## 2023-12-24 PROBLEM — F33.2 SEVERE EPISODE OF RECURRENT MAJOR DEPRESSIVE DISORDER, WITHOUT PSYCHOTIC FEATURES: Status: ACTIVE | Noted: 2023-12-21

## 2023-12-24 LAB
CHOLEST SERPL-MCNC: 179 MG/DL (ref 0–200)
HDLC SERPL-MCNC: 49 MG/DL (ref 40–60)
LDLC SERPL CALC-MCNC: 108 MG/DL (ref 0–100)
LDLC/HDLC SERPL: 2.15 {RATIO}
TRIGL SERPL-MCNC: 124 MG/DL (ref 0–150)
VLDLC SERPL-MCNC: 22 MG/DL (ref 5–40)

## 2023-12-24 PROCEDURE — 80061 LIPID PANEL: CPT | Performed by: PSYCHIATRY & NEUROLOGY

## 2023-12-24 PROCEDURE — 99239 HOSP IP/OBS DSCHRG MGMT >30: CPT | Performed by: PSYCHIATRY & NEUROLOGY

## 2023-12-24 RX ORDER — MIRTAZAPINE 30 MG/1
30 TABLET, FILM COATED ORAL NIGHTLY
Qty: 30 TABLET | Refills: 0 | Status: SHIPPED | OUTPATIENT
Start: 2023-12-24 | End: 2023-12-24 | Stop reason: SDUPTHER

## 2023-12-24 RX ORDER — PANTOPRAZOLE SODIUM 40 MG/1
40 TABLET, DELAYED RELEASE ORAL
Qty: 30 TABLET | Refills: 0 | Status: SHIPPED | OUTPATIENT
Start: 2023-12-25 | End: 2023-12-24 | Stop reason: SDUPTHER

## 2023-12-24 RX ORDER — SPIRONOLACTONE 25 MG/1
25 TABLET ORAL DAILY
Qty: 30 TABLET | Refills: 0 | Status: SHIPPED | OUTPATIENT
Start: 2023-12-25 | End: 2023-12-24 | Stop reason: SDUPTHER

## 2023-12-24 RX ORDER — MIRTAZAPINE 30 MG/1
30 TABLET, FILM COATED ORAL NIGHTLY
Qty: 30 TABLET | Refills: 0 | Status: ON HOLD | OUTPATIENT
Start: 2023-12-24

## 2023-12-24 RX ORDER — ZOLPIDEM TARTRATE 5 MG/1
5 TABLET ORAL NIGHTLY PRN
Qty: 5 TABLET | Refills: 0 | Status: SHIPPED | OUTPATIENT
Start: 2023-12-24 | End: 2023-12-24 | Stop reason: SDUPTHER

## 2023-12-24 RX ORDER — ROPINIROLE 4 MG/1
4 TABLET, FILM COATED ORAL NIGHTLY
Qty: 30 TABLET | Refills: 0 | Status: ON HOLD | OUTPATIENT
Start: 2023-12-24

## 2023-12-24 RX ORDER — ROPINIROLE 4 MG/1
4 TABLET, FILM COATED ORAL NIGHTLY
Qty: 30 TABLET | Refills: 0 | Status: SHIPPED | OUTPATIENT
Start: 2023-12-24 | End: 2023-12-24 | Stop reason: SDUPTHER

## 2023-12-24 RX ORDER — SPIRONOLACTONE 25 MG/1
25 TABLET ORAL DAILY
Qty: 30 TABLET | Refills: 0 | Status: SHIPPED | OUTPATIENT
Start: 2023-12-25 | End: 2023-12-26

## 2023-12-24 RX ORDER — ZOLPIDEM TARTRATE 5 MG/1
5 TABLET ORAL NIGHTLY PRN
Qty: 5 TABLET | Refills: 0 | Status: ON HOLD | OUTPATIENT
Start: 2023-12-24

## 2023-12-24 RX ORDER — PANTOPRAZOLE SODIUM 40 MG/1
40 TABLET, DELAYED RELEASE ORAL
Qty: 30 TABLET | Refills: 0 | Status: SHIPPED | OUTPATIENT
Start: 2023-12-25 | End: 2023-12-26

## 2023-12-24 RX ADMIN — SPIRONOLACTONE 25 MG: 25 TABLET, FILM COATED ORAL at 08:32

## 2023-12-24 RX ADMIN — HYDROCHLOROTHIAZIDE 12.5 MG: 25 TABLET ORAL at 08:32

## 2023-12-24 RX ADMIN — LOSARTAN POTASSIUM 50 MG: 50 TABLET, FILM COATED ORAL at 08:32

## 2023-12-24 RX ADMIN — OXYBUTYNIN CHLORIDE 10 MG: 5 TABLET, EXTENDED RELEASE ORAL at 08:32

## 2023-12-24 RX ADMIN — PANTOPRAZOLE SODIUM 40 MG: 40 TABLET, DELAYED RELEASE ORAL at 08:31

## 2023-12-24 NOTE — PAYOR COMM NOTE
"Regine Lock \"NEGIN\" (68 y.o. Female)       Date of Birth   1955    Social Security Number       Address   [address] [place], [place] [address]    Home Phone   [phone]    MRN   [id number]       [facility]    Marital Status                               Admission Date   12/21/23    Admission Type   Emergency    Admitting Provider   Agusto Asher MD    Attending Provider       Department, Room/Bed   [facility], 1025/1S       Discharge Date   12/24/2023    Discharge Disposition   Home or Self Care    Discharge Destination   Home                              Attending Provider: (none)   Allergies: Penicillins    Isolation: None   Infection: None   Code Status: CPR    Ht: 167.6 cm (65.98\")   Wt: 70.5 kg (155 lb 7.7 oz)    Admission Cmt: None   Principal Problem: Severe episode of recurrent major depressive disorder, without psychotic features [F33.2]                   Active Insurance as of 12/21/2023       Primary Coverage       Payor Plan Insurance Group Employer/Plan Group    [facility] MEDICARE REPLACEMENT ANTH MEDICARE ADVANTAGE [id number]       Payor Plan Address Payor Plan Phone Number Payor Plan Fax Number Effective Dates    [address] [phone]  7/1/2023 - None Entered    [facility] [id number]         Subscriber Name Subscriber Birth Date Member ID       REGINE LOCK 1955 [id number]                     Emergency Contacts            No emergency contacts on file.          PLEASE ATTACH THIS DISCHARGE INFORMATION TO AUTHORIZATION # [id number]    ADMISSION DATE:  12/21/2023  DISCHARGE DATE:  12/24/2023  Discharge Diagnosis:  Principal Problem:    Severe episode of recurrent major depressive disorder, without psychotic features (F33.2)    FOLLOW UP:    DEC    28 Initial Evaluation with Ngoc Aguiar  Thursday Dec 28, 2023 11:00 AM  New: Please arrive 30 min prior to appointment and bring all medication, insurance cards, " and ID.  Established: Please make sure to bring all medication, insurance cards, and ID. [facility]  [address] [place], [place] [id number]  [phone]     DISCHARGE SUMMARY:     Manuelito Campa MD   Physician  Psychiatry     Discharge Summary     Signed     Date of Service: 23 1243  Creation Time: 23 125     Signed                 PSYCHIATRIC DISCHARGE SUMMARY      Patient Identification:  Name:  Regine Beckham  Age:  68 y.o.  Sex:  female  :  1955  MRN:  [id number]  Visit Number:  [id number]     Date of Admission:2023   Date of Discharge: 2023      Discharge Diagnosis:  Principal Problem:    Severe episode of recurrent major depressive disorder, without psychotic features  Active Problems:    RLS (restless legs syndrome)    GERD (gastroesophageal reflux disease)    Left breast mass    Allergy to penicillin        Admission Diagnosis:  Suicidal ideation [R45.851]          Hospital Course  Patient is a 68 y.o. female presented with mood disturbance and suicide attempt by overdose.  Admitted for crisis stabilization.  Admission labs with glucose 133, troponin 17, UDS + THC.  Cardiology consulted for elevated troponin and make medication recommendations.  Patient reported impulsively taking an overdose on Ambien, citing acutely worsening anxiety following the discovery that her boss appeared to have committed tax fraud by using her information and the IRS contacted her to say she owed them $300,000.  Patient also reports that her  completed suicide around Maggie 13 years ago.  Medications were continued.  Mirtazapine increased.  Patient adamantly denied further suicidality, saying the attempt was impulsive and she has no intention to do that again.  She also reports plans on how she plans to contact the IRS to deal with the fraud mentioned above.  Patient agreeable for safety planning with close friend, Jihan Elizondo, who vouched for patient's  "safety and agreed for patient to spend the holiday with her.  Patient reported improvement of symptoms, consistently denied SI and reported readiness to return home for the holiday.  Treatment and safe discharge planning completed.     By the conclusion of this hospitalization, patient is exhibiting no acutely concerning symptoms of mood, psychotic or thought disorder that would necessitate further inpatient care. Patient is also denying SI, HI, and AVH. Patient has shown improvement of presenting symptoms, exhibited no behavior concerning for harm to self or others, and is considered appropriate for discharge to a lower level of care today. Treatment and safe discharge planning completed. Outpatient care ascertained.      Mental Status Exam upon discharge:   Mood \"better\"   Affect-congruent, appropriate, stable  Thought Content-goal directed, no delusional material present  Thought process-linear, organized.  Suicidality: No SI  Homicidality: No HI  Perception: No AH/VH     Procedures Performed        Consults:   Consults         Date and Time Order Name Status Description     12/21/2023 11:25 AM Inpatient General Surgery Consult         12/21/2023 11:25 AM Inpatient Cardiology Consult Completed                  Pertinent Test Results:   Lab Results (last 7 days)         Procedure Component Value Units Date/Time     Lipid Panel [[id number]]  (Abnormal) Collected: 12/24/23 0721     Specimen: Blood Updated: 12/24/23 0818       Total Cholesterol 179 mg/dL         Triglycerides 124 mg/dL         HDL Cholesterol 49 mg/dL         LDL Cholesterol  108 mg/dL         VLDL Cholesterol 22 mg/dL         LDL/HDL Ratio 2.15     Narrative:       Cholesterol Reference Ranges  (U.S. Department of Health and Human Services ATP III Classifications)     Desirable          <200 mg/dL  Borderline High    200-239 mg/dL  High Risk          >240 mg/dL        Triglyceride Reference Ranges  (U.S. Department of Health and Human Services ATP " III Classifications)     Normal           <150 mg/dL  Borderline High  150-199 mg/dL  High             200-499 mg/dL  Very High        >500 mg/dL     HDL Reference Ranges  (U.S. Department of Health and Human Services ATP III Classifications)     Low     <40 mg/dl (major risk factor for CHD)  High    >60 mg/dl ('negative' risk factor for CHD)           LDL Reference Ranges  (U.S. Department of Health and Human Services ATP III Classifications)     Optimal          <100 mg/dL  Near Optimal     100-129 mg/dL  Borderline High  130-159 mg/dL  High             160-189 mg/dL  Very High        >189 mg/dL     High Sensitivity Troponin T 2Hr [[id number]]  (Abnormal) Collected: 12/21/23 0701     Specimen: Blood Updated: 12/21/23 0733       HS Troponin T 17 ng/L         Troponin T Delta 1 ng/L       Narrative:       High Sensitive Troponin T Reference Range:  <14.0 ng/L- Negative Female for AMI  <22.0 ng/L- Negative Male for AMI  >=14 - Abnormal Female indicating possible myocardial injury.  >=22 - Abnormal Male indicating possible myocardial injury.   Clinicians would have to utilize clinical acumen, EKG, Troponin, and serial changes to determine if it is an Acute Myocardial Infarction or myocardial injury due to an underlying chronic condition.           High Sensitivity Troponin T [[id number]]  (Abnormal) Collected: 12/21/23 0509     Specimen: Blood Updated: 12/21/23 0620       HS Troponin T 16 ng/L       Narrative:       High Sensitive Troponin T Reference Range:  <14.0 ng/L- Negative Female for AMI  <22.0 ng/L- Negative Male for AMI  >=14 - Abnormal Female indicating possible myocardial injury.  >=22 - Abnormal Male indicating possible myocardial injury.   Clinicians would have to utilize clinical acumen, EKG, Troponin, and serial changes to determine if it is an Acute Myocardial Infarction or myocardial injury due to an underlying chronic condition.           Hepatitis Panel, Acute [[id number]]  (Normal) Collected: 12/21/23  0509     Specimen: Blood Updated: 12/21/23 0609       Hepatitis B Surface Ag Non-Reactive       Hep A IgM Non-Reactive       Hep B C IgM Non-Reactive       Hepatitis C Ab Non-Reactive     Narrative:       Results may be falsely decreased if patient taking Biotin.      Comprehensive Metabolic Panel [[id number]]  (Abnormal) Collected: 12/21/23 0509     Specimen: Blood Updated: 12/21/23 0556       Glucose 133 mg/dL         BUN 22 mg/dL         Creatinine 0.97 mg/dL         Sodium 138 mmol/L         Potassium 3.4 mmol/L         Chloride 102 mmol/L         CO2 26.6 mmol/L         Calcium 9.6 mg/dL         Total Protein 6.8 g/dL         Albumin 3.5 g/dL         ALT (SGPT) 15 U/L         AST (SGOT) 28 U/L         Alkaline Phosphatase 84 U/L         Total Bilirubin 0.4 mg/dL         Globulin 3.3 gm/dL         A/G Ratio 1.1 g/dL         BUN/Creatinine Ratio 22.7       Anion Gap 9.4 mmol/L         eGFR 63.4 mL/min/1.73       Narrative:       GFR Normal >60  Chronic Kidney Disease <60  Kidney Failure <15        CBC & Differential [[id number]]  (Abnormal) Collected: 12/21/23 0509     Specimen: Blood Updated: 12/21/23 0520     Narrative:       The following orders were created for panel order CBC & Differential.  Procedure                               Abnormality         Status                     ---------                               -----------         ------                     CBC Auto Differential[[id number]]        Abnormal            Final result                  Please view results for these tests on the individual orders.     CBC Auto Differential [[id number]]  (Abnormal) Collected: 12/21/23 0509     Specimen: Blood Updated: 12/21/23 0520       WBC 9.60 10*3/mm3         RBC 4.59 10*6/mm3         Hemoglobin 14.6 g/dL         Hematocrit 43.1 %         MCV 93.9 fL         MCH 31.8 pg         MCHC 33.9 g/dL         RDW 12.5 %         RDW-SD 43.2 fl         MPV 9.1 fL         Platelets 246 10*3/mm3         Neutrophil % 52.8 %          Lymphocyte % 34.8 %         Monocyte % 9.8 %         Eosinophil % 1.3 %         Basophil % 1.1 %         Immature Grans % 0.2 %         Neutrophils, Absolute 5.07 10*3/mm3         Lymphocytes, Absolute 3.34 10*3/mm3         Monocytes, Absolute 0.94 10*3/mm3         Eosinophils, Absolute 0.12 10*3/mm3         Basophils, Absolute 0.11 10*3/mm3         Immature Grans, Absolute 0.02 10*3/mm3         nRBC 0.0 /100 WBC                  Condition on Discharge:  improved     Vital Signs  Temp:  [97 °F (36.1 °C)-97.3 °F (36.3 °C)] 97 °F (36.1 °C)  Heart Rate:  [75-93] 75  Resp:  [18] 18  BP: (114-164)/(64-79) 114/70     Discharge Disposition:  Home or Self Care     Discharge Medications:      Discharge Medications          New Medications         Instructions Start Date   pantoprazole 40 MG EC tablet  Commonly known as: PROTONIX    40 mg, Oral, Every Early Morning    Start Date: December 25, 2023      spironolactone 25 MG tablet  Commonly known as: ALDACTONE    25 mg, Oral, Daily    Start Date: December 25, 2023                Changes to Medications         Instructions Start Date   mirtazapine 30 MG tablet  Commonly known as: REMERON  What changed:   medication strength  how much to take    30 mg, Oral, Nightly        rOPINIRole 4 MG tablet  Commonly known as: REQUIP  What changed:   medication strength  how much to take  when to take this  additional instructions    4 mg, Oral, Nightly, Take 1 hour before bedtime.        zolpidem 5 MG tablet  Commonly known as: AMBIEN  What changed:   medication strength  how much to take    5 mg, Oral, Nightly PRN                  Continue These Medications         Instructions Start Date   diclofenac 75 MG EC tablet  Commonly known as: VOLTAREN    75 mg, Oral, 2 Times Daily        hydroCHLOROthiazide 12.5 MG tablet  Commonly known as: HYDRODIURIL    12.5 mg, Oral, Daily        losartan 50 MG tablet  Commonly known as: COZAAR    50 mg, Oral, Daily        Mirabegron ER 50 MG tablet  sustained-release 24 hour 24 hr tablet  Commonly known as: MYRBETRIQ    50 mg, Oral, Daily        ondansetron ODT 4 MG disintegrating tablet  Commonly known as: ZOFRAN-ODT    4 mg, Translingual, Every 8 Hours PRN        VENTOLIN IN    2 puffs, Inhalation, Every 6 Hours PRN                  Stop These Medications       dexlansoprazole 30 MG capsule  Commonly known as: DEXILANT                   Discharge Diet: Normal  Diet Instructions    As tolerated               Activity at Discharge: Normal  Activity Instructions    As tolerated               Follow-up Appointments         Future Appointments   Date Time Provider Department Center   12/28/2023 11:00 AM Ngoc Aguiar APRN [facility]            Test Results Pending at Discharge  None      Time: I spent greater than 30 minutes on this discharge activity which included: face-to-face encounter with the patient, reviewing the data in the system, coordination of the care with the nursing staff as well as consultants, documentation, and entering orders.       Clinician:   Manuelito Campa MD  12/24/23  12:55 EST

## 2023-12-24 NOTE — DISCHARGE SUMMARY
PSYCHIATRIC DISCHARGE SUMMARY     Patient Identification:  Name:  Regine Beckham  Age:  68 y.o.  Sex:  female  :  1955  MRN:  [id number]  Visit Number:  [id number]    Date of Admission:2023   Date of Discharge: 2023     Discharge Diagnosis:  Principal Problem:    Severe episode of recurrent major depressive disorder, without psychotic features  Active Problems:    RLS (restless legs syndrome)    GERD (gastroesophageal reflux disease)    Left breast mass    Allergy to penicillin      Admission Diagnosis:  Suicidal ideation [R45.851]     Hospital Course  Patient is a 68 y.o. female presented with mood disturbance and suicide attempt by overdose.  Admitted for crisis stabilization.  Admission labs with glucose 133, troponin 17, UDS + THC.  Cardiology consulted for elevated troponin and make medication recommendations.  Patient reported impulsively taking an overdose on Ambien, citing acutely worsening anxiety following the discovery that her boss appeared to have committed tax fraud by using her information and the IRS contacted her to say she owed them $300,000.  Patient also reports that her  completed suicide around [place] 13 years ago.  Medications were continued.  Mirtazapine increased.  Patient adamantly denied further suicidality, saying the attempt was impulsive and she has no intention to do that again.  She also reports plans on how she plans to contact the IRS to deal with the fraud mentioned above.  Patient agreeable for safety planning with close friend, Jihan Elizondo, who vouched for patient's safety and agreed for patient to spend the holiday with her.  Patient reported improvement of symptoms, consistently denied SI and reported readiness to return home for the holiday.  Treatment and safe discharge planning completed.    By the conclusion of this hospitalization, patient is exhibiting no acutely concerning symptoms of mood, psychotic or thought disorder that would  "necessitate further inpatient care. Patient is also denying SI, HI, and AVH. Patient has shown improvement of presenting symptoms, exhibited no behavior concerning for harm to self or others, and is considered appropriate for discharge to a lower level of care today. Treatment and safe discharge planning completed. Outpatient care ascertained.     Mental Status Exam upon discharge:   Mood \"better\"   Affect-congruent, appropriate, stable  Thought Content-goal directed, no delusional material present  Thought process-linear, organized.  Suicidality: No SI  Homicidality: No HI  Perception: No AH/VH    Procedures Performed         Consults:   Consults       Date and Time Order Name Status Description    12/21/2023 11:25 AM Inpatient General Surgery Consult      12/21/2023 11:25 AM Inpatient Cardiology Consult Completed             Pertinent Test Results:   Lab Results (last 7 days)       Procedure Component Value Units Date/Time    Lipid Panel [[id number]]  (Abnormal) Collected: 12/24/23 0721    Specimen: Blood Updated: 12/24/23 0818     Total Cholesterol 179 mg/dL      Triglycerides 124 mg/dL      HDL Cholesterol 49 mg/dL      LDL Cholesterol  108 mg/dL      VLDL Cholesterol 22 mg/dL      LDL/HDL Ratio 2.15    Narrative:      Cholesterol Reference Ranges  (U.S. Department of Health and Human Services ATP III Classifications)    Desirable          <200 mg/dL  Borderline High    200-239 mg/dL  High Risk          >240 mg/dL      Triglyceride Reference Ranges  (U.S. Department of Health and Human Services ATP III Classifications)    Normal           <150 mg/dL  Borderline High  150-199 mg/dL  High             200-499 mg/dL  Very High        >500 mg/dL    HDL Reference Ranges  (U.S. Department of Health and Human Services ATP III Classifications)    Low     <40 mg/dl (major risk factor for CHD)  High    >60 mg/dl ('negative' risk factor for CHD)        LDL Reference Ranges  (U.S. Department of Health and Human Services ATP " III Classifications)    Optimal          <100 mg/dL  Near Optimal     100-129 mg/dL  Borderline High  130-159 mg/dL  High             160-189 mg/dL  Very High        >189 mg/dL    High Sensitivity Troponin T 2Hr [[id number]]  (Abnormal) Collected: 12/21/23 0701    Specimen: Blood Updated: 12/21/23 0733     HS Troponin T 17 ng/L      Troponin T Delta 1 ng/L     Narrative:      High Sensitive Troponin T Reference Range:  <14.0 ng/L- Negative Female for AMI  <22.0 ng/L- Negative Male for AMI  >=14 - Abnormal Female indicating possible myocardial injury.  >=22 - Abnormal Male indicating possible myocardial injury.   Clinicians would have to utilize clinical acumen, EKG, Troponin, and serial changes to determine if it is an Acute Myocardial Infarction or myocardial injury due to an underlying chronic condition.         High Sensitivity Troponin T [[id number]]  (Abnormal) Collected: 12/21/23 0509    Specimen: Blood Updated: 12/21/23 0620     HS Troponin T 16 ng/L     Narrative:      High Sensitive Troponin T Reference Range:  <14.0 ng/L- Negative Female for AMI  <22.0 ng/L- Negative Male for AMI  >=14 - Abnormal Female indicating possible myocardial injury.  >=22 - Abnormal Male indicating possible myocardial injury.   Clinicians would have to utilize clinical acumen, EKG, Troponin, and serial changes to determine if it is an Acute Myocardial Infarction or myocardial injury due to an underlying chronic condition.         Hepatitis Panel, Acute [[id number]]  (Normal) Collected: 12/21/23 0509    Specimen: Blood Updated: 12/21/23 0609     Hepatitis B Surface Ag Non-Reactive     Hep A IgM Non-Reactive     Hep B C IgM Non-Reactive     Hepatitis C Ab Non-Reactive    Narrative:      Results may be falsely decreased if patient taking Biotin.     Comprehensive Metabolic Panel [[id number]]  (Abnormal) Collected: 12/21/23 0509    Specimen: Blood Updated: 12/21/23 0556     Glucose 133 mg/dL      BUN 22 mg/dL      Creatinine 0.97 mg/dL       Sodium 138 mmol/L      Potassium 3.4 mmol/L      Chloride 102 mmol/L      CO2 26.6 mmol/L      Calcium 9.6 mg/dL      Total Protein 6.8 g/dL      Albumin 3.5 g/dL      ALT (SGPT) 15 U/L      AST (SGOT) 28 U/L      Alkaline Phosphatase 84 U/L      Total Bilirubin 0.4 mg/dL      Globulin 3.3 gm/dL      A/G Ratio 1.1 g/dL      BUN/Creatinine Ratio 22.7     Anion Gap 9.4 mmol/L      eGFR 63.4 mL/min/1.73     Narrative:      GFR Normal >60  Chronic Kidney Disease <60  Kidney Failure <15      CBC & Differential [[id number]]  (Abnormal) Collected: 12/21/23 0509    Specimen: Blood Updated: 12/21/23 0520    Narrative:      The following orders were created for panel order CBC & Differential.  Procedure                               Abnormality         Status                     ---------                               -----------         ------                     CBC Auto Differential[[id number]]        Abnormal            Final result                 Please view results for these tests on the individual orders.    CBC Auto Differential [[id number]]  (Abnormal) Collected: 12/21/23 0509    Specimen: Blood Updated: 12/21/23 0520     WBC 9.60 10*3/mm3      RBC 4.59 10*6/mm3      Hemoglobin 14.6 g/dL      Hematocrit 43.1 %      MCV 93.9 fL      MCH 31.8 pg      MCHC 33.9 g/dL      RDW 12.5 %      RDW-SD 43.2 fl      MPV 9.1 fL      Platelets 246 10*3/mm3      Neutrophil % 52.8 %      Lymphocyte % 34.8 %      Monocyte % 9.8 %      Eosinophil % 1.3 %      Basophil % 1.1 %      Immature Grans % 0.2 %      Neutrophils, Absolute 5.07 10*3/mm3      Lymphocytes, Absolute 3.34 10*3/mm3      Monocytes, Absolute 0.94 10*3/mm3      Eosinophils, Absolute 0.12 10*3/mm3      Basophils, Absolute 0.11 10*3/mm3      Immature Grans, Absolute 0.02 10*3/mm3      nRBC 0.0 /100 WBC             Condition on Discharge:  improved    Vital Signs  Temp:  [97 °F (36.1 °C)-97.3 °F (36.3 °C)] 97 °F (36.1 °C)  Heart Rate:  [75-93] 75  Resp:  [18] 18  BP:  (964-164)/(24-79 114/70    Discharge Disposition:  Home or Self Care    Discharge Medications:     Discharge Medications        New Medications        Instructions Start Date   pantoprazole 40 MG EC tablet  Commonly known as: PROTONIX   40 mg, Oral, Every Early Morning   Start Date: December 25, 2023     spironolactone 25 MG tablet  Commonly known as: ALDACTONE   25 mg, Oral, Daily   Start Date: December 25, 2023            Changes to Medications        Instructions Start Date   mirtazapine 30 MG tablet  Commonly known as: REMERON  What changed:   medication strength  how much to take   30 mg, Oral, Nightly      rOPINIRole 4 MG tablet  Commonly known as: REQUIP  What changed:   medication strength  how much to take  when to take this  additional instructions   4 mg, Oral, Nightly, Take 1 hour before bedtime.      zolpidem 5 MG tablet  Commonly known as: AMBIEN  What changed:   medication strength  how much to take   5 mg, Oral, Nightly PRN             Continue These Medications        Instructions Start Date   diclofenac 75 MG EC tablet  Commonly known as: VOLTAREN   75 mg, Oral, 2 Times Daily      hydroCHLOROthiazide 12.5 MG tablet  Commonly known as: HYDRODIURIL   12.5 mg, Oral, Daily      losartan 50 MG tablet  Commonly known as: COZAAR   50 mg, Oral, Daily      Mirabegron ER 50 MG tablet sustained-release 24 hour 24 hr tablet  Commonly known as: MYRBETRIQ   50 mg, Oral, Daily      ondansetron ODT 4 MG disintegrating tablet  Commonly known as: ZOFRAN-ODT   4 mg, Translingual, Every 8 Hours PRN      VENTOLIN IN   2 puffs, Inhalation, Every 6 Hours PRN             Stop These Medications      dexlansoprazole 30 MG capsule  Commonly known as: DEXILANT              Discharge Diet: Normal  Diet Instructions    As tolerated           Activity at Discharge: Normal  Activity Instructions    As tolerated           Follow-up Appointments  Future Appointments   Date Time Provider Department Center   12/28/2023 11:00 AM  Ngoc Aguiar APRN [facility]         Test Results Pending at Discharge  None     Time: I spent greater than 30 minutes on this discharge activity which included: face-to-face encounter with the patient, reviewing the data in the system, coordination of the care with the nursing staff as well as consultants, documentation, and entering orders.      Clinician:   Manuelito Campa MD  12/24/23  12:55 EST

## 2023-12-24 NOTE — NURSING NOTE
Advised pt to call [facility] to make an follow up appointment. Patient verbalized understanding.

## 2023-12-24 NOTE — PLAN OF CARE
Goal Outcome Evaluation:  Plan of Care Reviewed With: patient  Patient Agreement with Plan of Care: agrees     Progress: improving          Pt reports poor sleep and good appetite. Pt rates anx 4/10 and dep 3/10. Pt denies SI/HI/AVH. Pt hopeful for discharge tomorrow.     Pt up and down first part of the shift. Pt disoriented to where she was and was wanting to call a friend to come get her and was wanting to get her purse so she could get her cigarettes out of it. Pt redirected.

## 2023-12-24 NOTE — PLAN OF CARE
Goal Outcome Evaluation:  Plan of Care Reviewed With: patient  Patient Agreement with Plan of Care: agrees           Patient discharged to home today.

## 2023-12-26 LAB
BH CV ECHO MEAS - ACS: 0.9 CM
BH CV ECHO MEAS - AO MAX PG: 12.8 MMHG
BH CV ECHO MEAS - AO MEAN PG: 7 MMHG
BH CV ECHO MEAS - AO ROOT DIAM: 3.1 CM
BH CV ECHO MEAS - AO V2 MAX: 179 CM/SEC
BH CV ECHO MEAS - AO V2 VTI: 29.5 CM
BH CV ECHO MEAS - AVA(I,D): 2.21 CM2
BH CV ECHO MEAS - EDV(CUBED): 58.9 ML
BH CV ECHO MEAS - EDV(MOD-SP4): 36.6 ML
BH CV ECHO MEAS - EF(MOD-SP4): 60.7 %
BH CV ECHO MEAS - ESV(CUBED): 23.6 ML
BH CV ECHO MEAS - ESV(MOD-SP4): 14.4 ML
BH CV ECHO MEAS - FS: 26.2 %
BH CV ECHO MEAS - IVS/LVPW: 0.98 CM
BH CV ECHO MEAS - IVSD: 1.04 CM
BH CV ECHO MEAS - LA DIMENSION: 3.6 CM
BH CV ECHO MEAS - LAT PEAK E' VEL: 6.1 CM/SEC
BH CV ECHO MEAS - LV DIASTOLIC VOL/BSA (35-75): 20.4 CM2
BH CV ECHO MEAS - LV MASS(C)D: 130.4 GRAMS
BH CV ECHO MEAS - LV MAX PG: 8.4 MMHG
BH CV ECHO MEAS - LV MEAN PG: 5 MMHG
BH CV ECHO MEAS - LV SYSTOLIC VOL/BSA (12-30): 8 CM2
BH CV ECHO MEAS - LV V1 MAX: 145 CM/SEC
BH CV ECHO MEAS - LV V1 VTI: 25.6 CM
BH CV ECHO MEAS - LVIDD: 3.9 CM
BH CV ECHO MEAS - LVIDS: 2.9 CM
BH CV ECHO MEAS - LVOT AREA: 2.5 CM2
BH CV ECHO MEAS - LVOT DIAM: 1.8 CM
BH CV ECHO MEAS - LVPWD: 1.06 CM
BH CV ECHO MEAS - MED PEAK E' VEL: 4.2 CM/SEC
BH CV ECHO MEAS - MV A MAX VEL: 113 CM/SEC
BH CV ECHO MEAS - MV E MAX VEL: 71 CM/SEC
BH CV ECHO MEAS - MV E/A: 0.63
BH CV ECHO MEAS - PA ACC TIME: 0.12 SEC
BH CV ECHO MEAS - RAP SYSTOLE: 10 MMHG
BH CV ECHO MEAS - RVSP: 33.2 MMHG
BH CV ECHO MEAS - SI(MOD-SP4): 12.4 ML/M2
BH CV ECHO MEAS - SV(LVOT): 65.1 ML
BH CV ECHO MEAS - SV(MOD-SP4): 22.2 ML
BH CV ECHO MEAS - TAPSE (>1.6): 1.35 CM
BH CV ECHO MEAS - TR MAX PG: 23.2 MMHG
BH CV ECHO MEAS - TR MAX VEL: 241 CM/SEC
BH CV ECHO MEASUREMENTS AVERAGE E/E' RATIO: 13.79
LEFT ATRIUM VOLUME INDEX: 21.7 ML/M2

## 2023-12-26 RX ORDER — PANTOPRAZOLE SODIUM 40 MG/1
TABLET, DELAYED RELEASE ORAL
Qty: 90 TABLET | Refills: 0 | Status: SHIPPED | OUTPATIENT
Start: 2023-12-26

## 2023-12-26 RX ORDER — SPIRONOLACTONE 25 MG/1
25 TABLET ORAL DAILY
Qty: 30 TABLET | Refills: 0 | Status: SHIPPED | OUTPATIENT
Start: 2023-12-26

## 2024-01-04 ENCOUNTER — APPOINTMENT (OUTPATIENT)
Dept: GENERAL RADIOLOGY | Facility: HOSPITAL | Age: 69
End: 2024-01-04
Payer: MEDICARE

## 2024-01-04 ENCOUNTER — HOSPITAL ENCOUNTER (EMERGENCY)
Facility: HOSPITAL | Age: 69
Discharge: HOME | End: 2024-01-04
Attending: STUDENT IN AN ORGANIZED HEALTH CARE EDUCATION/TRAINING PROGRAM
Payer: MEDICARE

## 2024-01-04 VITALS
OXYGEN SATURATION: 97 % | SYSTOLIC BLOOD PRESSURE: 162 MMHG | DIASTOLIC BLOOD PRESSURE: 87 MMHG | RESPIRATION RATE: 17 BRPM | HEIGHT: 66 IN | HEART RATE: 99 BPM | BODY MASS INDEX: 24.91 KG/M2 | WEIGHT: 155 LBS | TEMPERATURE: 98.4 F

## 2024-01-04 DIAGNOSIS — M79.18 MUSCULOSKELETAL PAIN: ICD-10-CM
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
## 2024-01-04 PROCEDURE — 99282 EMERGENCY DEPT VISIT SF MDM: CPT

## 2024-01-04 PROCEDURE — 73502 X-RAY EXAM HIP UNI 2-3 VIEWS: CPT

## 2024-01-04 PROCEDURE — 72220 X-RAY EXAM SACRUM TAILBONE: CPT | Performed by: RADIOLOGY

## 2024-01-04 PROCEDURE — 73502 X-RAY EXAM HIP UNI 2-3 VIEWS: CPT | Performed by: RADIOLOGY

## 2024-01-04 PROCEDURE — 72110 X-RAY EXAM L-2 SPINE 4/>VWS: CPT

## 2024-01-04 PROCEDURE — 72110 X-RAY EXAM L-2 SPINE 4/>VWS: CPT | Performed by: RADIOLOGY

## 2024-01-04 PROCEDURE — 72220 X-RAY EXAM SACRUM TAILBONE: CPT

## 2024-01-04 NOTE — ED PROVIDER NOTES
"Subjective   History of Present Illness  68 year old female with PMHx of anemia, anxiety, arthritis, depression, legal blindness, RLS, and ZORAIDA presents to the ED after a fall she sustained last night with left hip pain. Patient states she tripped, landing on her left hip and since that time has continued to have pain. She is ambulatory and reports prior right hip replacement, but denies any previous injuries to left side. Denies any other injuries, complaints, or concerns at this time.    History provided by:  Patient   used: No        Review of Systems   Constitutional: Negative.  Negative for fever.   HENT: Negative.     Respiratory: Negative.     Cardiovascular: Negative.  Negative for chest pain.   Gastrointestinal: Negative.  Negative for abdominal pain.   Endocrine: Negative.    Genitourinary: Negative.  Negative for dysuria.   Musculoskeletal:         (+) left hip pain   Skin: Negative.    Neurological: Negative.    Psychiatric/Behavioral: Negative.     All other systems reviewed and are negative.      Past Medical History:   Diagnosis Date    Anemia     Anxiety     Arthritis     Depression     Legally blind     Restless leg syndrome     Sleep apnea     \"I don't use a cpap anymore since losing 106 lbs\"    Water retention        Allergies   Allergen Reactions    Penicillins Hives and Swelling       Past Surgical History:   Procedure Laterality Date    BACK SURGERY      GALLBLADDER SURGERY      HIP ARTHROSCOPY      JOINT REPLACEMENT Right        Family History   Problem Relation Age of Onset    Breast cancer Neg Hx        Social History     Socioeconomic History    Marital status:     Number of children: 0    Years of education: 1 yr college   Tobacco Use    Smoking status: Every Day     Packs/day: 1.50     Years: 51.00     Additional pack years: 0.00     Total pack years: 76.50     Types: Cigarettes    Smokeless tobacco: Never   Vaping Use    Vaping Use: Never used   Substance and " "Sexual Activity    Alcohol use: Yes     Alcohol/week: 1.0 standard drink of alcohol     Types: 1 Glasses of wine per week     Comment: \"occasionally - once every two months\"    Drug use: Yes     Comment: alcohol - occasionally    Sexual activity: Defer           Objective   Physical Exam  Vitals and nursing note reviewed.   Constitutional:       General: She is not in acute distress.     Appearance: She is well-developed. She is not diaphoretic.   HENT:      Head: Normocephalic and atraumatic.      Right Ear: External ear normal.      Left Ear: External ear normal.      Nose: Nose normal.   Eyes:      Conjunctiva/sclera: Conjunctivae normal.      Pupils: Pupils are equal, round, and reactive to light.   Neck:      Vascular: No JVD.      Trachea: No tracheal deviation.   Cardiovascular:      Rate and Rhythm: Normal rate and regular rhythm.      Heart sounds: Normal heart sounds. No murmur heard.  Pulmonary:      Effort: Pulmonary effort is normal. No respiratory distress.      Breath sounds: Normal breath sounds. No wheezing.   Abdominal:      General: Bowel sounds are normal.      Palpations: Abdomen is soft.      Tenderness: There is no abdominal tenderness.   Musculoskeletal:         General: No deformity. Normal range of motion.      Cervical back: Normal range of motion and neck supple.        Back:       Right hip: Normal.      Left hip: Tenderness present. Decreased range of motion.   Skin:     General: Skin is warm and dry.      Coloration: Skin is not pale.      Findings: No erythema or rash.   Neurological:      Mental Status: She is alert and oriented to person, place, and time.      Cranial Nerves: No cranial nerve deficit.   Psychiatric:         Behavior: Behavior normal.         Thought Content: Thought content normal.         Procedures           ED Course  ED Course as of 01/04/24 2354   u Jan 04, 2024 2103 XR Hip With or Without Pelvis 2 - 3 View Left [TK]   2103 XR Spine Lumbar Complete 4+VW " [TK]   2103 XR Sacrum & Coccyx [TK]      ED Course User Index  [TK] Puja Brito PA-C                                   XR Spine Lumbar Complete 4+VW   Final Result   Impression:       No acute bony process.       This report was finalized on 1/4/2024 8:42 PM by Demarcus Cano MD.          XR Sacrum & Coccyx   Final Result   Impression:       No acute bony process.       This report was finalized on 1/4/2024 8:44 PM by Demarcus Cano MD.          XR Hip With or Without Pelvis 2 - 3 View Left   Final Result   Impression:       No acute bony process.       This report was finalized on 1/4/2024 8:40 PM by Demarcus Cano MD.                        Medical Decision Making  68 year old female with PMHx of anemia, anxiety, arthritis, depression, legal blindness, RLS, and ZORAIDA presents to the ED after a fall she sustained last night with left hip pain. Patient states she tripped, landing on her left hip and since that time has continued to have pain. She is ambulatory and reports prior right hip replacement, but denies any previous injuries to left side. Denies any other injuries, complaints, or concerns at this time.    Problems Addressed:  Fall, initial encounter: complicated acute illness or injury  Musculoskeletal pain: complicated acute illness or injury    Amount and/or Complexity of Data Reviewed  Radiology: ordered. Decision-making details documented in ED Course.        Final diagnoses:   Fall, initial encounter   Musculoskeletal pain       ED Disposition  ED Disposition       ED Disposition   Discharge    Condition   Stable    Comment   --               Dread Burton MD  [address] [place], [place] [id number]  [phone]    In 2 days           Medication List      No changes were made to your prescriptions during this visit.            Puja Brito PA-C  01/04/24 9673

## 2024-01-08 RX ORDER — ROPINIROLE 4 MG/1
TABLET, FILM COATED ORAL
Qty: 90 TABLET | OUTPATIENT
Start: 2024-01-08

## 2024-01-11 NOTE — ED PROVIDER NOTES
"Subjective   History of Present Illness  I tried to overdose last night     History provided by:  Patient   used: No    Mental Health Problem  Presenting symptoms: depression and suicidal thoughts    Degree of incapacity (severity):  Moderate  Onset quality:  Sudden  Chronicity:  New  Treatment compliance:  Untreated  Relieved by:  Nothing  Ineffective treatments:  None tried  Associated symptoms: anxiety and feelings of worthlessness    Risk factors: hx of mental illness        Review of Systems   Psychiatric/Behavioral:  Positive for suicidal ideas. The patient is nervous/anxious.        Past Medical History:   Diagnosis Date    Anemia     Anxiety     Arthritis     Depression     Legally blind     Restless leg syndrome     Sleep apnea     \"I don't use a cpap anymore since losing 106 lbs\"    Water retention        Allergies   Allergen Reactions    Penicillins Hives and Swelling       Past Surgical History:   Procedure Laterality Date    BACK SURGERY      GALLBLADDER SURGERY      HIP ARTHROSCOPY      JOINT REPLACEMENT Right        Family History   Problem Relation Age of Onset    Breast cancer Neg Hx        Social History     Socioeconomic History    Marital status:     Number of children: 0    Years of education: 1 yr college   Tobacco Use    Smoking status: Every Day     Packs/day: 1.50     Years: 51.00     Additional pack years: 0.00     Total pack years: 76.50     Types: Cigarettes    Smokeless tobacco: Never   Vaping Use    Vaping Use: Never used   Substance and Sexual Activity    Alcohol use: Yes     Alcohol/week: 1.0 standard drink of alcohol     Types: 1 Glasses of wine per week     Comment: \"occasionally - once every two months\"    Drug use: Yes     Comment: alcohol - occasionally    Sexual activity: Defer           Objective   Physical Exam  Vitals and nursing note reviewed.   Constitutional:       Appearance: She is well-developed.   HENT:      Head: Normocephalic. "   Cardiovascular:      Rate and Rhythm: Normal rate and regular rhythm.   Pulmonary:      Effort: Pulmonary effort is normal.      Breath sounds: Normal breath sounds.   Abdominal:      General: Bowel sounds are normal.      Palpations: Abdomen is soft.      Tenderness: There is no abdominal tenderness.   Musculoskeletal:         General: Normal range of motion.      Cervical back: Neck supple.   Skin:     General: Skin is warm and dry.   Neurological:      Mental Status: She is alert and oriented to person, place, and time.   Psychiatric:         Behavior: Behavior normal.         Thought Content: Thought content normal.         Judgment: Judgment normal.         Procedures       Results for orders placed or performed during the hospital encounter of 12/20/23   Magnesium    Specimen: Blood   Result Value Ref Range    Magnesium 1.9 1.6 - 2.4 mg/dL   Comprehensive Metabolic Panel    Specimen: Blood   Result Value Ref Range    Glucose 146 (H) 65 - 99 mg/dL    BUN 22 8 - 23 mg/dL    Creatinine 1.07 (H) 0.57 - 1.00 mg/dL    Sodium 136 136 - 145 mmol/L    Potassium 3.7 3.5 - 5.2 mmol/L    Chloride 100 98 - 107 mmol/L    CO2 24.6 22.0 - 29.0 mmol/L    Calcium 9.4 8.6 - 10.5 mg/dL    Total Protein 7.8 6.0 - 8.5 g/dL    Albumin 3.8 3.5 - 5.2 g/dL    ALT (SGPT) 17 1 - 33 U/L    AST (SGOT) 34 (H) 1 - 32 U/L    Alkaline Phosphatase 98 39 - 117 U/L    Total Bilirubin 0.6 0.0 - 1.2 mg/dL    Globulin 4.0 gm/dL    A/G Ratio 1.0 g/dL    BUN/Creatinine Ratio 20.6 7.0 - 25.0    Anion Gap 11.4 5.0 - 15.0 mmol/L    eGFR 56.3 (L) >60.0 mL/min/1.73   Ethanol    Specimen: Blood   Result Value Ref Range    Ethanol <10 0 - 10 mg/dL    Ethanol % <0.010 %   Urine Drug Screen - Urine, Clean Catch    Specimen: Urine, Clean Catch   Result Value Ref Range    THC, Screen, Urine Positive (A) Negative    Phencyclidine (PCP), Urine Negative Negative    Cocaine Screen, Urine Negative Negative    Methamphetamine, Ur Negative Negative    Opiate Screen  Negative Negative    Amphetamine Screen, Urine Negative Negative    Benzodiazepine Screen, Urine Negative Negative    Tricyclic Antidepressants Screen Negative Negative    Methadone Screen, Urine Negative Negative    Barbiturates Screen, Urine Negative Negative    Oxycodone Screen, Urine Negative Negative    Buprenorphine, Screen, Urine Negative Negative   Urinalysis With Culture If Indicated - Urine, Clean Catch    Specimen: Urine, Clean Catch   Result Value Ref Range    Color, UA Yellow Yellow, Straw    Appearance, UA Clear Clear    pH, UA 6.0 5.0 - 8.0    Specific Gravity, UA 1.013 1.005 - 1.030    Glucose, UA Negative Negative    Ketones, UA Negative Negative    Bilirubin, UA Negative Negative    Blood, UA Negative Negative    Protein, UA Negative Negative    Leuk Esterase, UA Negative Negative    Nitrite, UA Negative Negative    Urobilinogen, UA 0.2 E.U./dL 0.2 - 1.0 E.U./dL   CBC Auto Differential    Specimen: Blood   Result Value Ref Range    WBC 13.41 (H) 3.40 - 10.80 10*3/mm3    RBC 5.04 3.77 - 5.28 10*6/mm3    Hemoglobin 16.0 (H) 12.0 - 15.9 g/dL    Hematocrit 47.7 (H) 34.0 - 46.6 %    MCV 94.6 79.0 - 97.0 fL    MCH 31.7 26.6 - 33.0 pg    MCHC 33.5 31.5 - 35.7 g/dL    RDW 12.5 12.3 - 15.4 %    RDW-SD 43.3 37.0 - 54.0 fl    MPV 9.1 6.0 - 12.0 fL    Platelets 269 140 - 450 10*3/mm3    Neutrophil % 69.4 42.7 - 76.0 %    Lymphocyte % 20.0 19.6 - 45.3 %    Monocyte % 9.4 5.0 - 12.0 %    Eosinophil % 0.3 0.3 - 6.2 %    Basophil % 0.7 0.0 - 1.5 %    Immature Grans % 0.2 0.0 - 0.5 %    Neutrophils, Absolute 9.30 (H) 1.70 - 7.00 10*3/mm3    Lymphocytes, Absolute 2.68 0.70 - 3.10 10*3/mm3    Monocytes, Absolute 1.26 (H) 0.10 - 0.90 10*3/mm3    Eosinophils, Absolute 0.04 0.00 - 0.40 10*3/mm3    Basophils, Absolute 0.10 0.00 - 0.20 10*3/mm3    Immature Grans, Absolute 0.03 0.00 - 0.05 10*3/mm3    nRBC 0.0 0.0 - 0.2 /100 WBC   Acetaminophen Level    Specimen: Blood   Result Value Ref Range    Acetaminophen <5.0 0.0 -  30.0 mcg/mL   Salicylate Level    Specimen: Blood   Result Value Ref Range    Salicylate <0.3 <=30.0 mg/dL   Fentanyl, Urine - Urine, Clean Catch    Specimen: Urine, Clean Catch   Result Value Ref Range    Fentanyl, Urine Negative Negative   Single High Sensitivity Troponin T    Specimen: Blood   Result Value Ref Range    HS Troponin T 19 (H) <14 ng/L   Single High Sensitivity Troponin T    Specimen: Arm, Right; Blood   Result Value Ref Range    HS Troponin T 17 (H) <14 ng/L   ECG 12 Lead Other; OD   Result Value Ref Range    QT Interval 374 ms    QTC Interval 469 ms   ECG 12 Lead Chest Pain   Result Value Ref Range    QT Interval 358 ms    QTC Interval 442 ms          ED Course  ED Course as of 01/11/24 1027   Wed Dec 20, 2023   2249 Vent. Rate :  92 BPM     Atrial Rate :  92 BPM     P-R Int : 132 ms          QRS Dur :  86 ms      QT Int : 358 ms       P-R-T Axes :  61  50  19 degrees     QTc Int : 442 ms     Sinus rhythm with marked sinus arrhythmia  Possible Left atrial enlargement  T wave abnormality, consider inferior ischemia  Abnormal ECG  When compared with ECG of 20-DEC-2023 20:13, (Unconfirmed)  fusion complexes are no longer present  premature ventricular complexes are no longer present  Minimal criteria for Inferior infarct are no longer present  ST no longer elevated in Inferior leads   [ES]   2251 ECG 12 Lead Other; OD  Vent. Rate :  95 BPM     Atrial Rate :  95 BPM     P-R Int : 138 ms          QRS Dur :  86 ms      QT Int : 374 ms       P-R-T Axes :  49  56 -11 degrees     QTc Int : 469 ms     Sinus rhythm with marked sinus arrhythmia with occasional premature ventricular complexes and fusion complexes  Cannot rule out Inferior infarct , age undetermined  Abnormal ECG  When compared with ECG of 04-APR-2020 15:27,  fusion complexes are now present  premature ventricular complexes are now present  Minimal criteria for Inferior infarct are now present  ST elevation now present in Inferior leads  T wave  inversion now evident in Inferior leads  T wave inversion now evident in Anterior leads   [ES]      ED Course User Index  [ES] Robert Sosa MD                                             Medical Decision Making  Problems Addressed:  Suicidal behavior with attempted self-injury: complicated acute illness or injury    Amount and/or Complexity of Data Reviewed  Labs: ordered.  ECG/medicine tests: ordered. Decision-making details documented in ED Course.    Risk  OTC drugs.        Final diagnoses:   Suicidal behavior with attempted self-injury       ED Disposition  ED Disposition       ED Disposition   DC/Transfer to Behavioral Health Condition   --    Comment   --               No follow-up provider specified.       Medication List      No changes were made to your prescriptions during this visit.            Nikole Bolivar PA  01/11/24 102

## 2024-01-19 ENCOUNTER — APPOINTMENT (OUTPATIENT)
Dept: GENERAL RADIOLOGY | Facility: HOSPITAL | Age: 69
DRG: 023 | End: 2024-01-19
Payer: MEDICARE

## 2024-01-19 ENCOUNTER — APPOINTMENT (OUTPATIENT)
Dept: MRI IMAGING | Facility: HOSPITAL | Age: 69
DRG: 023 | End: 2024-01-19
Payer: MEDICARE

## 2024-01-19 ENCOUNTER — HOSPITAL ENCOUNTER (INPATIENT)
Facility: HOSPITAL | Age: 69
LOS: 13 days | Discharge: NURSING, REHAB OR LONG-TERM CARE | DRG: 023 | End: 2024-02-02
Attending: STUDENT IN AN ORGANIZED HEALTH CARE EDUCATION/TRAINING PROGRAM | Admitting: HOSPITALIST
Payer: MEDICARE

## 2024-01-19 ENCOUNTER — APPOINTMENT (OUTPATIENT)
Dept: CT IMAGING | Facility: HOSPITAL | Age: 69
DRG: 023 | End: 2024-01-19
Payer: MEDICARE

## 2024-01-19 ENCOUNTER — HOSPITAL ENCOUNTER (EMERGENCY)
Facility: HOSPITAL | Age: 69
Discharge: OTHER INSTITUTION | DRG: 023 | End: 2024-01-19
Attending: EMERGENCY MEDICINE
Payer: MEDICARE

## 2024-01-19 VITALS
WEIGHT: 155 LBS | RESPIRATION RATE: 16 BRPM | OXYGEN SATURATION: 96 % | TEMPERATURE: 97.8 F | HEIGHT: 66 IN | HEART RATE: 61 BPM | SYSTOLIC BLOOD PRESSURE: 175 MMHG | BODY MASS INDEX: 24.91 KG/M2 | DIASTOLIC BLOOD PRESSURE: 96 MMHG

## 2024-01-19 DIAGNOSIS — I63.511 ACUTE ISCHEMIC RIGHT MCA STROKE: ICD-10-CM

## 2024-01-19 DIAGNOSIS — R13.12 OROPHARYNGEAL DYSPHAGIA: Primary | ICD-10-CM

## 2024-01-19 DIAGNOSIS — R41.841 COGNITIVE COMMUNICATION DEFICIT: ICD-10-CM

## 2024-01-19 DIAGNOSIS — I63.511 CEREBROVASCULAR ACCIDENT (CVA) DUE TO OCCLUSION OF RIGHT MIDDLE CEREBRAL ARTERY: Primary | ICD-10-CM

## 2024-01-19 DIAGNOSIS — R47.1 DYSARTHRIA: ICD-10-CM

## 2024-01-19 PROBLEM — I51.89 GRADE II DIASTOLIC DYSFUNCTION: Chronic | Status: ACTIVE | Noted: 2024-01-19

## 2024-01-19 PROBLEM — F32.9 MAJOR DEPRESSIVE DISORDER: Chronic | Status: ACTIVE | Noted: 2023-12-21

## 2024-01-19 PROBLEM — F32.9 MAJOR DEPRESSIVE DISORDER: Status: ACTIVE | Noted: 2023-12-21

## 2024-01-19 PROBLEM — G25.81 RLS (RESTLESS LEGS SYNDROME): Chronic | Status: ACTIVE | Noted: 2023-12-21

## 2024-01-19 PROBLEM — D50.0 IRON DEFICIENCY ANEMIA DUE TO CHRONIC BLOOD LOSS: Chronic | Status: ACTIVE | Noted: 2020-04-08

## 2024-01-19 PROBLEM — F33.2 SEVERE EPISODE OF RECURRENT MAJOR DEPRESSIVE DISORDER, WITHOUT PSYCHOTIC FEATURES: Chronic | Status: ACTIVE | Noted: 2023-12-21

## 2024-01-19 PROBLEM — K21.9 GERD (GASTROESOPHAGEAL REFLUX DISEASE): Chronic | Status: ACTIVE | Noted: 2023-12-21

## 2024-01-19 PROBLEM — I51.89 GRADE II DIASTOLIC DYSFUNCTION: Status: ACTIVE | Noted: 2024-01-19

## 2024-01-19 LAB
ABO GROUP BLD: NORMAL
ALBUMIN SERPL-MCNC: 3.9 G/DL (ref 3.5–5.2)
ALBUMIN/GLOB SERPL: 1.1 G/DL
ALP SERPL-CCNC: 85 U/L (ref 39–117)
ALT SERPL W P-5'-P-CCNC: 10 U/L (ref 1–33)
ANION GAP SERPL CALCULATED.3IONS-SCNC: 8.8 MMOL/L (ref 5–15)
AST SERPL-CCNC: 17 U/L (ref 1–32)
BASOPHILS # BLD AUTO: 0.04 10*3/MM3 (ref 0–0.2)
BASOPHILS NFR BLD AUTO: 0.3 % (ref 0–1.5)
BILIRUB SERPL-MCNC: 0.4 MG/DL (ref 0–1.2)
BLD GP AB SCN SERPL QL: NEGATIVE
BUN SERPL-MCNC: 15 MG/DL (ref 8–23)
BUN/CREAT SERPL: 15.2 (ref 7–25)
CALCIUM SPEC-SCNC: 9.7 MG/DL (ref 8.6–10.5)
CHLORIDE SERPL-SCNC: 105 MMOL/L (ref 98–107)
CO2 SERPL-SCNC: 29.2 MMOL/L (ref 22–29)
CREAT BLDA-MCNC: 0.9 MG/DL (ref 0.6–1.3)
CREAT SERPL-MCNC: 0.99 MG/DL (ref 0.57–1)
DEPRECATED RDW RBC AUTO: 43.3 FL (ref 37–54)
EGFRCR SERPLBLD CKD-EPI 2021: 61.9 ML/MIN/1.73
EOSINOPHIL # BLD AUTO: 0.11 10*3/MM3 (ref 0–0.4)
EOSINOPHIL NFR BLD AUTO: 1 % (ref 0.3–6.2)
ERYTHROCYTE [DISTWIDTH] IN BLOOD BY AUTOMATED COUNT: 12.4 % (ref 12.3–15.4)
GLOBULIN UR ELPH-MCNC: 3.5 GM/DL
GLUCOSE BLDC GLUCOMTR-MCNC: 114 MG/DL (ref 70–130)
GLUCOSE BLDC GLUCOMTR-MCNC: 129 MG/DL (ref 70–130)
GLUCOSE SERPL-MCNC: 127 MG/DL (ref 65–99)
HCT VFR BLD AUTO: 45 % (ref 34–46.6)
HGB BLD-MCNC: 15.1 G/DL (ref 12–15.9)
HOLD SPECIMEN: NORMAL
HOLD SPECIMEN: NORMAL
IMM GRANULOCYTES # BLD AUTO: 0.04 10*3/MM3 (ref 0–0.05)
IMM GRANULOCYTES NFR BLD AUTO: 0.3 % (ref 0–0.5)
LYMPHOCYTES # BLD AUTO: 2.01 10*3/MM3 (ref 0.7–3.1)
LYMPHOCYTES NFR BLD AUTO: 17.5 % (ref 19.6–45.3)
MCH RBC QN AUTO: 31.7 PG (ref 26.6–33)
MCHC RBC AUTO-ENTMCNC: 33.6 G/DL (ref 31.5–35.7)
MCV RBC AUTO: 94.5 FL (ref 79–97)
MONOCYTES # BLD AUTO: 0.55 10*3/MM3 (ref 0.1–0.9)
MONOCYTES NFR BLD AUTO: 4.8 % (ref 5–12)
NEUTROPHILS NFR BLD AUTO: 76.1 % (ref 42.7–76)
NEUTROPHILS NFR BLD AUTO: 8.72 10*3/MM3 (ref 1.7–7)
NRBC BLD AUTO-RTO: 0 /100 WBC (ref 0–0.2)
PLATELET # BLD AUTO: 258 10*3/MM3 (ref 140–450)
PMV BLD AUTO: 9.4 FL (ref 6–12)
POTASSIUM SERPL-SCNC: 4.4 MMOL/L (ref 3.5–5.2)
PROT SERPL-MCNC: 7.4 G/DL (ref 6–8.5)
QT INTERVAL: 520 MS
QTC INTERVAL: 501 MS
RBC # BLD AUTO: 4.76 10*6/MM3 (ref 3.77–5.28)
RH BLD: POSITIVE
SODIUM SERPL-SCNC: 143 MMOL/L (ref 136–145)
T&S EXPIRATION DATE: NORMAL
WBC NRBC COR # BLD AUTO: 11.47 10*3/MM3 (ref 3.4–10.8)
WHOLE BLOOD HOLD COAG: NORMAL
WHOLE BLOOD HOLD SPECIMEN: NORMAL

## 2024-01-19 PROCEDURE — 25810000003 SODIUM CHLORIDE 0.9 % SOLUTION 250 ML FLEX CONT: Performed by: STUDENT IN AN ORGANIZED HEALTH CARE EDUCATION/TRAINING PROGRAM

## 2024-01-19 PROCEDURE — B316YZZ FLUOROSCOPY OF RIGHT INTERNAL CAROTID ARTERY USING OTHER CONTRAST: ICD-10-PCS | Performed by: STUDENT IN AN ORGANIZED HEALTH CARE EDUCATION/TRAINING PROGRAM

## 2024-01-19 PROCEDURE — 99291 CRITICAL CARE FIRST HOUR: CPT

## 2024-01-19 PROCEDURE — C1887 CATHETER, GUIDING: HCPCS | Performed by: STUDENT IN AN ORGANIZED HEALTH CARE EDUCATION/TRAINING PROGRAM

## 2024-01-19 PROCEDURE — C1769 GUIDE WIRE: HCPCS | Performed by: STUDENT IN AN ORGANIZED HEALTH CARE EDUCATION/TRAINING PROGRAM

## 2024-01-19 PROCEDURE — 37215 TRANSCATH STENT CCA W/EPS: CPT | Performed by: STUDENT IN AN ORGANIZED HEALTH CARE EDUCATION/TRAINING PROGRAM

## 2024-01-19 PROCEDURE — 61645 PERQ ART M-THROMBECT &/NFS: CPT | Performed by: STUDENT IN AN ORGANIZED HEALTH CARE EDUCATION/TRAINING PROGRAM

## 2024-01-19 PROCEDURE — 25010000002 EPTIFIBATIDE 20 MG/10ML SOLUTION: Performed by: STUDENT IN AN ORGANIZED HEALTH CARE EDUCATION/TRAINING PROGRAM

## 2024-01-19 PROCEDURE — 99152 MOD SED SAME PHYS/QHP 5/>YRS: CPT | Performed by: STUDENT IN AN ORGANIZED HEALTH CARE EDUCATION/TRAINING PROGRAM

## 2024-01-19 PROCEDURE — 25810000003 SODIUM CHLORIDE 0.9 % SOLUTION: Performed by: EMERGENCY MEDICINE

## 2024-01-19 PROCEDURE — 70496 CT ANGIOGRAPHY HEAD: CPT | Performed by: RADIOLOGY

## 2024-01-19 PROCEDURE — 25510000001 IOPAMIDOL PER 1 ML: Performed by: EMERGENCY MEDICINE

## 2024-01-19 PROCEDURE — 70496 CT ANGIOGRAPHY HEAD: CPT

## 2024-01-19 PROCEDURE — 74018 RADEX ABDOMEN 1 VIEW: CPT

## 2024-01-19 PROCEDURE — 85025 COMPLETE CBC W/AUTO DIFF WBC: CPT | Performed by: EMERGENCY MEDICINE

## 2024-01-19 PROCEDURE — 25010000002 NICARDIPINE 2.5 MG/ML SOLUTION 10 ML VIAL: Performed by: NURSE PRACTITIONER

## 2024-01-19 PROCEDURE — 99153 MOD SED SAME PHYS/QHP EA: CPT | Performed by: STUDENT IN AN ORGANIZED HEALTH CARE EDUCATION/TRAINING PROGRAM

## 2024-01-19 PROCEDURE — 25810000003 SODIUM CHLORIDE 0.9 % SOLUTION 250 ML FLEX CONT

## 2024-01-19 PROCEDURE — 25810000003 SODIUM CHLORIDE 0.9 % SOLUTION 250 ML FLEX CONT: Performed by: NURSE PRACTITIONER

## 2024-01-19 PROCEDURE — 70498 CT ANGIOGRAPHY NECK: CPT

## 2024-01-19 PROCEDURE — C1894 INTRO/SHEATH, NON-LASER: HCPCS | Performed by: STUDENT IN AN ORGANIZED HEALTH CARE EDUCATION/TRAINING PROGRAM

## 2024-01-19 PROCEDURE — 25010000002 NICARDIPINE 2.5 MG/ML SOLUTION 10 ML VIAL: Performed by: STUDENT IN AN ORGANIZED HEALTH CARE EDUCATION/TRAINING PROGRAM

## 2024-01-19 PROCEDURE — 25010000002 HEPARIN (PORCINE) PER 1000 UNITS: Performed by: STUDENT IN AN ORGANIZED HEALTH CARE EDUCATION/TRAINING PROGRAM

## 2024-01-19 PROCEDURE — C1766 INTRO/SHEATH,STRBLE,NON-PEEL: HCPCS | Performed by: STUDENT IN AN ORGANIZED HEALTH CARE EDUCATION/TRAINING PROGRAM

## 2024-01-19 PROCEDURE — B313YZZ FLUOROSCOPY OF RIGHT COMMON CAROTID ARTERY USING OTHER CONTRAST: ICD-10-PCS | Performed by: STUDENT IN AN ORGANIZED HEALTH CARE EDUCATION/TRAINING PROGRAM

## 2024-01-19 PROCEDURE — 86850 RBC ANTIBODY SCREEN: CPT | Performed by: EMERGENCY MEDICINE

## 2024-01-19 PROCEDURE — 99204 OFFICE O/P NEW MOD 45 MIN: CPT | Performed by: STUDENT IN AN ORGANIZED HEALTH CARE EDUCATION/TRAINING PROGRAM

## 2024-01-19 PROCEDURE — 0 IODIXANOL PER 1 ML: Performed by: STUDENT IN AN ORGANIZED HEALTH CARE EDUCATION/TRAINING PROGRAM

## 2024-01-19 PROCEDURE — 93005 ELECTROCARDIOGRAM TRACING: CPT | Performed by: EMERGENCY MEDICINE

## 2024-01-19 PROCEDURE — 96360 HYDRATION IV INFUSION INIT: CPT

## 2024-01-19 PROCEDURE — C1884 EMBOLIZATION PROTECT SYST: HCPCS | Performed by: STUDENT IN AN ORGANIZED HEALTH CARE EDUCATION/TRAINING PROGRAM

## 2024-01-19 PROCEDURE — C1876 STENT, NON-COA/NON-COV W/DEL: HCPCS | Performed by: STUDENT IN AN ORGANIZED HEALTH CARE EDUCATION/TRAINING PROGRAM

## 2024-01-19 PROCEDURE — 25010000002 ATROPINE PER 0.01 MG: Performed by: STUDENT IN AN ORGANIZED HEALTH CARE EDUCATION/TRAINING PROGRAM

## 2024-01-19 PROCEDURE — 80053 COMPREHEN METABOLIC PANEL: CPT | Performed by: EMERGENCY MEDICINE

## 2024-01-19 PROCEDURE — 03CK3Z7 EXTIRPATION OF MATTER FROM RIGHT INTERNAL CAROTID ARTERY USING STENT RETRIEVER, PERCUTANEOUS APPROACH: ICD-10-PCS | Performed by: STUDENT IN AN ORGANIZED HEALTH CARE EDUCATION/TRAINING PROGRAM

## 2024-01-19 PROCEDURE — 25010000002 NICARDIPINE 2.5 MG/ML SOLUTION 10 ML VIAL

## 2024-01-19 PROCEDURE — C1760 CLOSURE DEV, VASC: HCPCS

## 2024-01-19 PROCEDURE — 25010000002 HYDRALAZINE PER 20 MG

## 2024-01-19 PROCEDURE — 037K3DZ DILATION OF RIGHT INTERNAL CAROTID ARTERY WITH INTRALUMINAL DEVICE, PERCUTANEOUS APPROACH: ICD-10-PCS | Performed by: STUDENT IN AN ORGANIZED HEALTH CARE EDUCATION/TRAINING PROGRAM

## 2024-01-19 PROCEDURE — 70450 CT HEAD/BRAIN W/O DYE: CPT

## 2024-01-19 PROCEDURE — 36415 COLL VENOUS BLD VENIPUNCTURE: CPT

## 2024-01-19 PROCEDURE — 82948 REAGENT STRIP/BLOOD GLUCOSE: CPT

## 2024-01-19 PROCEDURE — 93010 ELECTROCARDIOGRAM REPORT: CPT | Performed by: INTERNAL MEDICINE

## 2024-01-19 PROCEDURE — 0042T HC CT CEREBRAL PERFUSION W/WO CONTRAST: CPT

## 2024-01-19 PROCEDURE — 82565 ASSAY OF CREATININE: CPT

## 2024-01-19 PROCEDURE — 25010000002 FENTANYL CITRATE (PF) 50 MCG/ML SOLUTION: Performed by: STUDENT IN AN ORGANIZED HEALTH CARE EDUCATION/TRAINING PROGRAM

## 2024-01-19 PROCEDURE — 86900 BLOOD TYPING SEROLOGIC ABO: CPT | Performed by: EMERGENCY MEDICINE

## 2024-01-19 PROCEDURE — C1725 CATH, TRANSLUMIN NON-LASER: HCPCS | Performed by: STUDENT IN AN ORGANIZED HEALTH CARE EDUCATION/TRAINING PROGRAM

## 2024-01-19 PROCEDURE — 25010000002 EPTIFIBATIDE PER 5 MG: Performed by: STUDENT IN AN ORGANIZED HEALTH CARE EDUCATION/TRAINING PROGRAM

## 2024-01-19 PROCEDURE — 93010 ELECTROCARDIOGRAM REPORT: CPT | Performed by: SPECIALIST

## 2024-01-19 PROCEDURE — 70498 CT ANGIOGRAPHY NECK: CPT | Performed by: RADIOLOGY

## 2024-01-19 PROCEDURE — 99222 1ST HOSP IP/OBS MODERATE 55: CPT | Performed by: INTERNAL MEDICINE

## 2024-01-19 PROCEDURE — 93005 ELECTROCARDIOGRAM TRACING: CPT

## 2024-01-19 PROCEDURE — 70551 MRI BRAIN STEM W/O DYE: CPT

## 2024-01-19 PROCEDURE — 0042T CT CEREBRAL PERFUSION W WO CONTRAST: CPT | Performed by: RADIOLOGY

## 2024-01-19 PROCEDURE — 86901 BLOOD TYPING SEROLOGIC RH(D): CPT | Performed by: EMERGENCY MEDICINE

## 2024-01-19 RX ORDER — EPTIFIBATIDE 2 MG/ML
INJECTION, SOLUTION INTRAVENOUS
Status: DISCONTINUED | OUTPATIENT
Start: 2024-01-19 | End: 2024-01-19 | Stop reason: HOSPADM

## 2024-01-19 RX ORDER — EPTIFIBATIDE 0.75 MG/ML
INJECTION, SOLUTION INTRAVENOUS
Status: COMPLETED | OUTPATIENT
Start: 2024-01-19 | End: 2024-01-19

## 2024-01-19 RX ORDER — LIDOCAINE HYDROCHLORIDE 10 MG/ML
INJECTION, SOLUTION EPIDURAL; INFILTRATION; INTRACAUDAL; PERINEURAL
Status: DISCONTINUED | OUTPATIENT
Start: 2024-01-19 | End: 2024-01-19 | Stop reason: HOSPADM

## 2024-01-19 RX ORDER — HEPARIN SODIUM 1000 [USP'U]/ML
INJECTION, SOLUTION INTRAVENOUS; SUBCUTANEOUS
Status: DISCONTINUED | OUTPATIENT
Start: 2024-01-19 | End: 2024-01-19 | Stop reason: HOSPADM

## 2024-01-19 RX ORDER — SODIUM CHLORIDE 0.9 % (FLUSH) 0.9 %
10 SYRINGE (ML) INJECTION AS NEEDED
Status: DISCONTINUED | OUTPATIENT
Start: 2024-01-19 | End: 2024-02-02 | Stop reason: HOSPADM

## 2024-01-19 RX ORDER — SODIUM CHLORIDE 0.9 % (FLUSH) 0.9 %
10 SYRINGE (ML) INJECTION EVERY 12 HOURS SCHEDULED
Status: DISCONTINUED | OUTPATIENT
Start: 2024-01-19 | End: 2024-02-02 | Stop reason: HOSPADM

## 2024-01-19 RX ORDER — ATORVASTATIN CALCIUM 40 MG/1
80 TABLET, FILM COATED ORAL NIGHTLY
Status: DISCONTINUED | OUTPATIENT
Start: 2024-01-20 | End: 2024-01-30

## 2024-01-19 RX ORDER — HYDRALAZINE HYDROCHLORIDE 20 MG/ML
20 INJECTION INTRAMUSCULAR; INTRAVENOUS EVERY 4 HOURS PRN
Status: DISCONTINUED | OUTPATIENT
Start: 2024-01-19 | End: 2024-01-23

## 2024-01-19 RX ORDER — IODIXANOL 320 MG/ML
INJECTION, SOLUTION INTRAVASCULAR
Status: DISCONTINUED | OUTPATIENT
Start: 2024-01-19 | End: 2024-01-19 | Stop reason: HOSPADM

## 2024-01-19 RX ORDER — ATORVASTATIN CALCIUM 40 MG/1
80 TABLET, FILM COATED ORAL NIGHTLY
Status: DISCONTINUED | OUTPATIENT
Start: 2024-01-19 | End: 2024-01-19

## 2024-01-19 RX ORDER — ASPIRIN 300 MG/1
300 SUPPOSITORY RECTAL DAILY
Status: DISCONTINUED | OUTPATIENT
Start: 2024-01-19 | End: 2024-01-20

## 2024-01-19 RX ORDER — FENTANYL CITRATE 50 UG/ML
INJECTION, SOLUTION INTRAMUSCULAR; INTRAVENOUS
Status: DISCONTINUED | OUTPATIENT
Start: 2024-01-19 | End: 2024-01-19 | Stop reason: HOSPADM

## 2024-01-19 RX ORDER — NICOTINE 21 MG/24HR
1 PATCH, TRANSDERMAL 24 HOURS TRANSDERMAL
Status: DISCONTINUED | OUTPATIENT
Start: 2024-01-19 | End: 2024-02-02 | Stop reason: HOSPADM

## 2024-01-19 RX ORDER — SODIUM CHLORIDE 9 MG/ML
75 INJECTION, SOLUTION INTRAVENOUS ONCE
Status: COMPLETED | OUTPATIENT
Start: 2024-01-19 | End: 2024-01-19

## 2024-01-19 RX ORDER — ATROPINE SULFATE 1 MG/ML
INJECTION, SOLUTION INTRAMUSCULAR; INTRAVENOUS; SUBCUTANEOUS
Status: DISCONTINUED | OUTPATIENT
Start: 2024-01-19 | End: 2024-01-19 | Stop reason: HOSPADM

## 2024-01-19 RX ORDER — SODIUM CHLORIDE 9 MG/ML
40 INJECTION, SOLUTION INTRAVENOUS AS NEEDED
Status: DISCONTINUED | OUTPATIENT
Start: 2024-01-19 | End: 2024-02-02 | Stop reason: HOSPADM

## 2024-01-19 RX ORDER — ASPIRIN 325 MG
325 TABLET ORAL DAILY
Status: DISCONTINUED | OUTPATIENT
Start: 2024-01-19 | End: 2024-01-20

## 2024-01-19 RX ORDER — SODIUM CHLORIDE 0.9 % (FLUSH) 0.9 %
10 SYRINGE (ML) INJECTION AS NEEDED
Status: DISCONTINUED | OUTPATIENT
Start: 2024-01-19 | End: 2024-01-19 | Stop reason: HOSPADM

## 2024-01-19 RX ADMIN — ASPIRIN 325 MG: 325 TABLET ORAL at 19:46

## 2024-01-19 RX ADMIN — SODIUM CHLORIDE 75 ML/HR: 9 INJECTION, SOLUTION INTRAVENOUS at 13:22

## 2024-01-19 RX ADMIN — Medication 10 ML: at 20:24

## 2024-01-19 RX ADMIN — NICARDIPINE HYDROCHLORIDE 15 MG/HR: 25 INJECTION, SOLUTION INTRAVENOUS at 20:24

## 2024-01-19 RX ADMIN — NICARDIPINE HYDROCHLORIDE 15 MG/HR: 25 INJECTION, SOLUTION INTRAVENOUS at 22:37

## 2024-01-19 RX ADMIN — NICARDIPINE HYDROCHLORIDE 5 MG/HR: 25 INJECTION, SOLUTION INTRAVENOUS at 18:23

## 2024-01-19 RX ADMIN — TICAGRELOR 180 MG: 90 TABLET ORAL at 19:46

## 2024-01-19 RX ADMIN — ATORVASTATIN CALCIUM 80 MG: 40 TABLET, FILM COATED ORAL at 20:24

## 2024-01-19 RX ADMIN — HYDRALAZINE HYDROCHLORIDE 20 MG: 20 INJECTION INTRAMUSCULAR; INTRAVENOUS at 19:13

## 2024-01-19 RX ADMIN — IOPAMIDOL 150 ML: 755 INJECTION, SOLUTION INTRAVENOUS at 13:13

## 2024-01-19 NOTE — H&P
"Intensive Care Admission Note     Acute ischemic right MCA stroke    History of Present Illness     Regine Beckham is a 68 year-old female with grade II diastolic dysfunction, anemia, RLS, ZORAIDA (no longer uses CPAP), severe depression with recent suicide attempt via OD (12/24/23), and legal blindness that presents to [facility] ICU from [facility] for higher level of care.     Patient was found by her family this morning with symptoms of left-sided weakness, neglect, and right gaze deviation. She presented to [facility] ED on 1/19/24 for evaluation. LKW 2200 on 1/18/24. NIHSS 26. CT head demonstrated right MCA territory ischemia. CTA showed right ICA complete occlusion. CT perfusion showed ischemic tissue volume of 180 cc in the R MCA territory. Thrombolytics not given 2* LKW > 4.5 hours. He was transferred to [facility] for potential thrombectomy with Dr. Hurley. Upon arrival to [facility], she was taken directly to Cath lab where she was found to have tandem ICA and ICA terminus occlusion. NIHSS 19. Mechanical thrombectomy performed of right ICA terminus occlusion resulting in TICI 3 flow and stent successfully placed to the proximal right ICA occlusion. Patient presents to the ICU postoperatively for further care.     Time spent: 4 minutes  Electronically signed by NIKKIE Ramos, 01/19/24, 6:00 PM EST.    Problem List, Surgical History, Family, Social History, and ROS     Past Medical History:   Diagnosis Date    Anemia     Anxiety     Arthritis     Depression     Legally blind     Restless leg syndrome     Sleep apnea     \"I don't use a cpap anymore since losing 106 lbs\"    Water retention       Past Surgical History:   Procedure Laterality Date    BACK SURGERY      GALLBLADDER SURGERY      HIP ARTHROSCOPY      JOINT REPLACEMENT Right        Allergies   Allergen Reactions    Penicillins Hives and Swelling     Current Facility-Administered Medications on File Prior to Encounter   Medication    [COMPLETED] iopamidol (ISOVUE-370) 76 % " "injection 100 mL    [COMPLETED] sodium chloride 0.9 % infusion    [DISCONTINUED] sodium chloride 0.9 % flush 10 mL     Current Outpatient Medications on File Prior to Encounter   Medication Sig    Albuterol (VENTOLIN IN) Inhale 2 puffs Every 6 (Six) Hours As Needed.    diclofenac (VOLTAREN) 75 MG EC tablet Take 1 tablet by mouth 2 (Two) Times a Day.    hydroCHLOROthiazide (HYDRODIURIL) 12.5 MG tablet Take 1 tablet by mouth Daily.    losartan (COZAAR) 50 MG tablet Take 1 tablet by mouth Daily.    Mirabegron ER (MYRBETRIQ) 50 MG tablet sustained-release 24 hour 24 hr tablet Take 50 mg by mouth Daily.    mirtazapine (REMERON) 30 MG tablet Take 1 tablet by mouth Every Night. Indications: Major Depressive Disorder    ondansetron ODT (ZOFRAN-ODT) 4 MG disintegrating tablet Place 1 tablet on the tongue Every 8 (Eight) Hours As Needed for Nausea or Vomiting.    pantoprazole (PROTONIX) 40 MG EC tablet TAKE 1 TABLET BY MOUTH EVERY MORNING FOR HEARTBURN    rOPINIRole (REQUIP) 4 MG tablet Take 1 tablet by mouth Every Night. Take 1 hour before bedtime.  Indications: Restless Leg Syndrome    spironolactone (ALDACTONE) 25 MG tablet TAKE 1 TABLET BY MOUTH DAILY FOR HIGH BLOOD PRESSURE    zolpidem (AMBIEN) 5 MG tablet Take 1 tablet by mouth At Night As Needed for Sleep. Indications: Trouble Sleeping     MEDICATION LIST AND ALLERGIES REVIEWED.    Family History   Problem Relation Age of Onset    Breast cancer Neg Hx      Social History     Tobacco Use    Smoking status: Every Day     Packs/day: 1.50     Years: 51.00     Additional pack years: 0.00     Total pack years: 76.50     Types: Cigarettes    Smokeless tobacco: Never   Vaping Use    Vaping Use: Never used   Substance Use Topics    Alcohol use: Yes     Alcohol/week: 1.0 standard drink of alcohol     Types: 1 Glasses of wine per week     Comment: \"occasionally - once every two months\"    Drug use: Yes     Comment: alcohol - occasionally     Social History     Social History " Narrative    Not on file     FAMILY AND SOCIAL HISTORY REVIEWED.    Review of Systems  ALL OTHER SYSTEMS REVIEWED AND ARE NEGATIVE.    Physical Exam and Clinical Information   /72   Pulse 98   Temp 97.5 °F (36.4 °C) (Axillary)   Resp 18   SpO2 98%   Physical Exam  General Appearance: Pt awake, no acute distress  Head:    Atraumatic, Normocephalic, without obvious abnormality, Pupils reactive & symmetrical B/L. Deviated eyes to right and left neglect. Left facial droop  Lungs:   B/L Breath sounds present with decreased breath sounds on bases, no wheezing heard, no crackles.   Heart: S1 and S2 present, no murmur  Abdomen: Soft, nontender, no guarding or rigidity, bowel sounds positive, no masses.  Extremities:  Groin access site without any hematoma or bruising,  no edema, warm to touch.  Pulses: Positive and symmetric.  Neurologic:  Dense hemiplegia left.   Interval:  (to 2B)  1a. Level of Consciousness: 2-->Not alert, requires repeated stimulation to attend, or is obtunded and requires strong or painful stimulation to make movements (not stereotyped)  1b. LOC Questions: 1-->Answers one question correctly  1c. LOC Commands: 1-->Performs one task correctly  2. Best Gaze: 2-->Forced deviation, or total gaze paresis not overcome by the oculocephalic maneuver  3. Visual: 2-->Complete hemianopia  4. Facial Palsy: 2-->Partial paralysis (total or near-total paralysis of lower face)  5a. Motor Arm, Left: 4-->No movement  5b. Motor Arm, Right: 0-->No drift, limb holds 90 (or 45) degrees for full 10 secs  6a. Motor Leg, Left: 3-->No effort against gravity, leg falls to bed immediately  6b. Motor Leg, Right: 0-->No drift, leg holds 30 degree position for full 5 secs  7. Limb Ataxia: 0-->Absent  8. Sensory: 2-->Severe to total sensory loss, patient is not aware of being touched in the face, arm, and leg  9. Best Language: 2-->Severe aphasia, all communication is through fragmentary expression, great need for inference,  "questioning, and guessing by the listener. Range of information that can be exchanged is limited, listener carries burden of. . . (see row details)  10. Dysarthria: 2-->Severe dysarthria, patients speech is so slurred as to be unintelligible in the absence of or out of proportion to any dysphasia, or is mute/anarthric  11. Extinction and Inattention (formerly Neglect): 1-->Visual, tactile, auditory, spatial, or personal inattention or extinction to bilateral simultaneous stimulation in one of the sensory modalities    Total (NIH Stroke Scale): 24       Results from last 7 days   Lab Units 01/19/24  1257   WBC 10*3/mm3 11.47*   HEMOGLOBIN g/dL 15.1   PLATELETS 10*3/mm3 258     Results from last 7 days   Lab Units 01/19/24  1302 01/19/24  1257   SODIUM mmol/L  --  143   POTASSIUM mmol/L  --  4.4   CO2 mmol/L  --  29.2*   BUN mg/dL  --  15   CREATININE mg/dL 0.90 0.99   GLUCOSE mg/dL  --  127*     Estimated Creatinine Clearance: 65.5 mL/min (by C-G formula based on SCr of 0.9 mg/dL).          No results found for: \"LACTATE\"     Images:   Abdominal x-ray reviewed and showed proximal port of NG tube above the diaphragm.  Will need advancement.  Nursing staff aware.    I reviewed the patient's results and images.       Report of CT perfusion reviewed.   IMPRESSION:   Results suggestive of recent ischemic event in the right middle cerebral  artery territory     This report was finalized on 1/19/2024 1:45 PM by Dr. Hu Obrien MD.      CTA neck:  IMPRESSION:  1.  Moderate to high-grade stenosis in the proximal left internal  carotid artery.  2.  Occlusion of the entire right internal carotid artery.        This report was finalized on 1/19/2024 1:43 PM by Dr. Hu Obrien MD.    EKG reviewed and showed normal sinus rhythm.  Q waves noted in lead III and aVF.  No acute ST changes.  QTc 490.    Impression     Stroke    Iron deficiency anemia due to chronic blood loss    Major depressive disorder    RLS (restless legs " syndrome)    GERD (gastroesophageal reflux disease)    Grade I diastolic dysfunction    Plan/Recommendations     68-year-old female with anemia, restless leg syndrome, sleep apnea not on any CPAP, severe depression with recent suicide attempt by overdose on December 24, 2023 and legal blindness.  Patient was found by family this morning with some dull left-sided weakness, left-sided neglect and right gaze deviation.  She presented to [facility] for evaluation.  Last known well was 10 PM on 1/18/2024.  NIHSS was 26.  CT head, CTA and perfusion study were consistent with right MCA territory ischemia.  Patient was discussed with stroke team and accepted in transfer for potential thrombectomy.  Patient was found to have tandem ICA and ICA terminus occlusion. Mechanical thrombectomy performed of right ICA terminus occlusion resulting in TICI 3 flow and stent successfully placed to the proximal right ICA occlusion.  Patient was transferred to ICU for closer monitoring.    Patient currently on Integrilin drip per protocol.  Patient is also on nicardipine 15 mg/h to keep systolic blood pressure less than 140.    1.  Admit to intensive care unit.  2.  Follow neurological status closely.  Patient has not shown any meaningful neurological recovery after thrombectomy and stent placement.  stroke neurology and interventional neurosurgery following closely.  3.  Aspirin, statin and Brilinta.  4.  Nicardipine infusion to keep systolic blood pressure less than 140.  5.  NG tube to be advanced.  6.  MRI brain pending.  7.  Check hemoglobin A1c and lipid panel.  8.  Echocardiogram with bubble study in the morning.  9.  PT/OT/speech to follow.    Check labs in the morning.    Patient with guarded prognosis.  Will continue supportive care at this point.    Time spent  40 min  (exclusive of procedure time)  including high complexity decision making to assess, manipulate, and support vital organ system failure in this  individual who has impairment of one or more vital organ systems such that there is a high probability of imminent or life threatening deterioration in the patient’s condition.      Gm Marie MD, [facility]  Pulmonary and Critical Care Medicine  01/19/24 18:55 EST     CC: Dread Burton MD

## 2024-01-19 NOTE — ED NOTES
Code stroke called at this time, Dr. Arreaga at bedside, pt taken to CT at this time with code stroke team.

## 2024-01-19 NOTE — ED PROVIDER NOTES
"Subjective   History of Present Illness  68-year-old female who presents today for possible stroke.  Last seen normal was last night.  Patient currently has no family, was at home by herself, she texted her neighbors last night, and apparently was in normal state of mind and health, however this morning, neighbors found the patient to be altered, therefore called EMS.  EMS arrived patient was found to have left-sided hemiparalysis, rightward gaze, left-sided facial droop, significant dysarthria.  Code stroke was activated in the field by EMS.  Blood glucose was 141, blood pressure 165/75.  On chart review, patient is on a blood thinning medication.        Review of Systems   All other systems reviewed and are negative.      Past Medical History:   Diagnosis Date    Anemia     Anxiety     Arthritis     Depression     Legally blind     Restless leg syndrome     Sleep apnea     \"I don't use a cpap anymore since losing 106 lbs\"    Water retention        Allergies   Allergen Reactions    Penicillins Hives and Swelling       Past Surgical History:   Procedure Laterality Date    BACK SURGERY      GALLBLADDER SURGERY      HIP ARTHROSCOPY      JOINT REPLACEMENT Right        Family History   Problem Relation Age of Onset    Breast cancer Neg Hx        Social History     Socioeconomic History    Marital status:     Number of children: 0    Years of education: 1 yr college   Tobacco Use    Smoking status: Every Day     Packs/day: 1.50     Years: 51.00     Additional pack years: 0.00     Total pack years: 76.50     Types: Cigarettes    Smokeless tobacco: Never   Vaping Use    Vaping Use: Never used   Substance and Sexual Activity    Alcohol use: Yes     Alcohol/week: 1.0 standard drink of alcohol     Types: 1 Glasses of wine per week     Comment: \"occasionally - once every two months\"    Drug use: Yes     Comment: alcohol - occasionally    Sexual activity: Defer           Objective   Physical Exam  Vitals and nursing note " reviewed.   Constitutional:       Appearance: She is well-developed.      Comments: 68-year-old female, lying in bed with rightward gaze   HENT:      Head: Normocephalic.      Nose: Nose normal.   Eyes:      Conjunctiva/sclera: Conjunctivae normal.      Pupils: Pupils are equal, round, and reactive to light.      Comments: Rightward gaze, she will move her eyes to midline however has very minimal movement of her eyes past midline to the left   Neck:      Vascular: No JVD.      Trachea: No tracheal deviation.   Cardiovascular:      Rate and Rhythm: Normal rate and regular rhythm.   Pulmonary:      Effort: Pulmonary effort is normal.   Abdominal:      Palpations: Abdomen is soft.   Musculoskeletal:         General: Normal range of motion.      Cervical back: Normal range of motion and neck supple.   Skin:     General: Skin is warm and dry.   Neurological:      Mental Status: She is alert.      Comments: Patient has facial droop and left-sided in the V1 and V2 distribution, there is forehead sparing, she has moderate dysarthria, there is no aphasia she is able to recognize writing object, tell me her name.  She has 5-5 strength the right upper and right lower extremity, she has 0-5 strength of her left upper and left lower extremity.  She has neglect         Critical Care    Performed by: Shiva Arreaga MD  Authorized by: Shiva Arreaga MD    Critical care provider statement:     Critical care time (minutes):  55    Critical care time was exclusive of:  Separately billable procedures and treating other patients and teaching time    Critical care was necessary to treat or prevent imminent or life-threatening deterioration of the following conditions:  CNS failure or compromise    Critical care was time spent personally by me on the following activities:  Examination of patient, obtaining history from patient or surrogate, discussions with consultants, development of treatment plan with patient or  surrogate, ordering and performing treatments and interventions, ordering and review of laboratory studies, ordering and review of radiographic studies, pulse oximetry, re-evaluation of patient's condition and review of old charts    Care discussed with: admitting provider               ED Course  ED Course as of 01/19/24 1509   Fri Jan 19, 2024   1357 ECG 12 Lead ED Triage Standing Order; Stroke (Onset >12 hrs)  Sinus bradycardia rate of 56, QRS 98 QTc 501, T wave version seen in the anterior lateral leads, no gross ST segment elevation, T wave versions may related to patient's acute CVA, no signs of acute dysrhythmia other than sinus bradycardia.  Interpreted by myself. [SM]      ED Course User Index  [SM] Shiva Arreaga MD                          Total (NIH Stroke Scale): 26                  Medical Decision Making  68-year-old female, who presents today for evaluation of possible CVA, found to have dense hemiparesis on the left, rightward gaze, and left-sided neglect.  CT head showed edema in the right MCA territory, CTA showed complete occlusion of the internal carotid artery, with no flow in the right middle cerebral artery.  Neurology was consulted, and arrange patient to be transferred to [facility] for neurointervention and possible thrombectomy.  Patient given rectal aspirin, and IV fluids here in the emergency department.  Patient will be transferred in critical condition for definitive neurointervention and management.  Last seen normal was last night at 10 PM, not a thrombolytic candidate.    Problems Addressed:  Cerebrovascular accident (CVA) due to occlusion of right middle cerebral artery: complicated acute illness or injury    Amount and/or Complexity of Data Reviewed  Labs: ordered.  Radiology: ordered.  ECG/medicine tests: ordered. Decision-making details documented in ED Course.    Risk  Prescription drug management.        Final diagnoses:   Cerebrovascular accident (CVA) due to  occlusion of right middle cerebral artery       ED Disposition  ED Disposition       ED Disposition   Transfer to Another Facility     Condition   --    Comment   --               No follow-up provider specified.       Medication List      No changes were made to your prescriptions during this visit.            Shiva Arreaga MD  01/19/24 3771

## 2024-01-19 NOTE — CONSULTS
[facility]   Teleneurology Note    Patient Name: Regine Beckham  : 1955  MRN: [id number]  Primary Care Physician: Dread Burton MD  Referring Site: [place]  Location of Neurologist: An    Subjective   Teleneurology Initial Data     Arrival Date Telestroke Site: 24 Arrival Time Telestroke Site: 1247   Neurologist Evaluation Date: 24 Neurologist Evaluation Time: 1318   Date Last Known Well: 24 Time Last Known Well:      History     Chief Complaint: left sided weakness  HPI: 68 female who was at her usual state of health last night as she texted family at around 10 PM, she was found by the family today morning  with left sided weakness and neglect and right gaze deviation.    Stroke Risk Factors/ Pertinent Data     Stroke risk factors: none  Anticoagulants prior to arrival: not applicable  Antiplatelets prior to arrival: not applicable  Statins prior to arrival: not applicable     Scoring Scales     Modified Kendall Scale  Pre-Stroke Modified Kendall Scale: 0 - No Symptoms at all.  Intracerebral Hemmorhage (ICH) Score  [place] Coma Score: 5-12  Age>=80: no  [place] Coma Scale  Best Eye Response: Spontaneous  Best Verbal Response: None  Best Motor Response: Follows commands  [place] Coma Scale Score: 11    NIH Stroke Scale     NIHSS Performed Date: 24 NIHSS Performed Time: 1322   Interval: baseline  1a. Level of Consciousness: 2-->Not alert, requires repeated stimulation to attend, or is obtunded and requires strong or painful stimulation to make movements (not stereotyped)  1b. LOC Questions: 1-->Answers one question correctly  1c. LOC Commands: 1-->Performs one task correctly  2. Best Gaze: 2-->Forced deviation, or total gaze paresis not overcome by the oculocephalic maneuver  3. Visual: 2-->Complete hemianopia  4. Facial Palsy: 2-->Partial paralysis (total or near-total paralysis of lower face)  5a. Motor Arm, Left: 4-->No movement  5b. Motor Arm, Right: 0-->No drift,  limb holds 90 (or 45) degrees for full 10 secs  6a. Motor Leg, Left: 4-->No movement  6b. Motor Leg, Right: 0-->No drift, leg holds 30 degree position for full 5 secs  7. Limb Ataxia: 0-->Absent  8. Sensory: 2-->Severe to total sensory loss, patient is not aware of being touched in the face, arm, and leg  9. Best Language: 3-->Mute, global aphasia, no usable speech or auditory comprehension  10. Dysarthria: 1-->Mild-to-moderate dysarthria, patient slurs at least some words and, at worst, can be understood with some difficulty  11. Extinction and Inattention (formerly Neglect): 2-->Profound dao-inattention/extinction more than 1 modality  Total (NIH Stroke Scale): 26     Review of Systems     Review of Systems  UTO  Objective   Exam     Exam performed with the help of support staff from the referring site  Neurological Exam   Patient mute, left arm and leg flaccid  Right gaze, and left dao-neglect   Result Review    Results          Personal review of CNS imaging:(Official report by radiologist pending)  Imaging  CT Imaging Review: CT Imaging reviewed, POSITIVE for abnormal finding (Reviewed the scanat 1:02 PM at 1/19/2024)  CTA Imaging Review: CTA Imaging reviewed, POSITIVE for large vessel occlusion or stenosis  Large vessel occlusion/ stenosis:  (Right ICA occlusion)  CT Perfusion Review: CT perfusion reviewed, ABNORMAL  ASPECTS Involved Locations: Internal capsule, Lentiform nucleus, Anterior MCA cortex, MCA cortex lateral to the insular ribbon  ASPECTS Score: 6    Thrombolytic   Thrombolytics: thrombolytic not given  Thrombolytic Exclusion Criteria: Onset unknown or GREATER than 4.5 hours     Assessment & Plan   Assessment/ Plan     Assessment:  Acute Stroke Evaluation: Acute ishemic stroke     68 year old female who is independent at baseline, presents as a wake up stroke with right MCA syndrome. CT head shows early ischemic changes. CTA showed right ICA occlusion. TNK was deffered due to changes on non Cont  CT. Patient is transferred to  [facility] for mechanical thrombectomy.    Discussed the case with neuro-intervention team at [facility] and Intensivist Dr. Sanchez.       Disposition     Disposition: The patient will be transfered to another institution for further evaluation and management    Medical Decision Making  Medical Data Reviewed: Data reviewed including: clinical labs, radiology and/or medical tests, Independent review of CNS images    Farhan SABA MD, saw the patient on 01/19/24 at 1318 for an initial in-patient or emergency room telememedicine face to face consult using interactive technology for  . The location of the patient was [place]. I was located at [place].    I have proceeded with this evaluation at the request of the referring practitioner as it is felt to be an emergency setting and no appropriate specialist is available to perform this evaluation. The originating hospital has reported that this is the correct patient and has obtained consent from the patient/surrogate to perform this telemedicine evaluation(including obtaining history, performing examination and reviewing data provided by the patient an/or originating site of care provider)    I have introduced myself to the patient, provided my credentials, disclosed my location, and determined that, based on review of the patient's chart and discussion with the patient's primary team, telemedicine via a HIPAA compliant, real-time, face-to-face two-way, interactive audio and video platform is an appropriate and effective means of providing the service.    The patient/surrogate has a right to refuse this evaluation as they have been explained risks including potential loss of confidentiality, benefits, alternatives, and the potential need for subsequent face-to-face care. In this evaluation, we will be providing recommendations only.  The ultimate decision to follow or not to follow these recommendations will be left  to the bedside treating/requesting practitioner.    The patient/surrogate has been notified that other healthcare professionals including technical person may be involved in this A/V evaluation.  All laws concerning confidentiality and patient access to medical records and copies of medical records apply to telemedicine.  The patient/surrogate has received the originating site's Health Notice of Privacy Practices.    Farhan Lai MD

## 2024-01-19 NOTE — ED NOTES
Spoke to [facility] and declined request for route to [facility]. Spoke to [facility] and will give call back on an update on a ride spoke to mindi [facility]

## 2024-01-19 NOTE — SIGNIFICANT NOTE
Regine Beckham is a 69 yo female with PMH anemia, RLS, ZORAIDA, anxiety, and legal blindness with LKW @ 2200 on 1/18. Family found her this morning with left-sided weakness, neglect, and right gaze deviation.  She was evaluated at [facility] ED. emergent CT heads showed right MCA territory ischemia.  CTA showed right ICA complete occlusion.  [facility] neurology was consulted and patient was arranged to transfer via EMS for neurointervention and thrombectomy. Patient arrived at [facility] ED @ 1555. On arrival patient was alert and oriented.  She has significant left-sided weakness.  Homonymous hemianopsia with a right gaze deviation, severe dysarthria, and mild to moderate sensory deficits.  NIH 19.  She was transported directly to the Cath Lab for mechanical thrombectomy.     Thrombectomy performed by Dr. Russell.  Patient had a tandem ICA and ICA terminus occlusion with a proximal ICA stent.  Patient given Brilinta 180 mg once on 1/19 and continue 60 mg twice daily starting on 1/20.  Orders to D/C Integrilin drip 30 minutes after Brilinta.     MODIFIED NEAL SCALE (to be assessed for each patient having history of stroke) []Stroke history but not assessed  [x]0: No symptoms at all  []1: No significant disability despite symptoms  []2: Slight disability  []3: Moderate disability  []4: Moderately severe disability  []5: Severe disability  []6: Death      [place] Coma Scale Score: 11       1a. Level of Consciousness: 0-->Alert, keenly responsive  1b. LOC Questions: 0-->Answers both questions correctly  1c. LOC Commands: 0-->Performs both tasks correctly  2. Best Gaze: 2-->Forced deviation, or total gaze paresis not overcome by the oculocephalic maneuver  3. Visual: 2-->Complete hemianopia  4. Facial Palsy: 2-->Partial paralysis (total or near-total paralysis of lower face)  5a. Motor Arm, Left: 4-->No movement  5b. Motor Arm, Right: 0-->No drift, limb holds 90 (or 45) degrees for full 10 secs  6a. Motor Leg, Left: 4-->No  movement  6b. Motor Leg, Right: 0-->No drift, leg holds 30 degree position for full 5 secs  7. Limb Ataxia: 0-->Absent  8. Sensory: 1-->Mild-to-moderate sensory loss, patient feels pinprick is less sharp or is dull on the affected side, or there is a loss of superficial pain with pinprick, but patient is aware of being touched  9. Best Language: 1-->Mild-to-moderate aphasia, some obvious loss of fluency or facility of comprehension, without significant limitation on ideas expressed or form of expression. Reduction of speech and/or comprehension, however, makes conversation. . . (see row details)  10. Dysarthria: 2-->Severe dysarthria, patients speech is so slurred as to be unintelligible in the absence of or out of proportion to any dysphasia, or is mute/anarthric  11. Extinction and Inattention (formerly Neglect): 1-->Visual, tactile, auditory, spatial, or personal inattention or extinction to bilateral simultaneous stimulation in one of the sensory modalities    Total (NIH Stroke Scale): 19     Plan of care discussed with Dr. Russell, Dr. Lai, and Hospital Medical Team.  Neurology stroke will continue to follow.  Please call with any questions or concerns

## 2024-01-19 NOTE — BRIEF OP NOTE
CV PERCUTANEOUS MANUAL THROMBECTOMY  Progress Note    Reigne Beckham  1/19/2024    Pre-op Diagnosis:   Tandem proximal ICA and ICA terminus occlusion on the right       Post-Op Diagnosis Codes:  Same as preoperative    Procedure/CPT® Codes:        Procedure(s):  Percutaneous Manual Thrombectomy              Surgeon(s):  Efrain Hurley MD    Anesthesia: Local    Staff:   Documenter: Jacques, Ju, RN  Invasive Nurse: Joceline Hay RN; Lela Chandler RN  Invasive Technologist: Ivy Weinstein         Estimated Blood Loss: minimal    Urine Voided: * No values recorded between 1/19/2024  3:56 PM and 1/19/2024  5:11 PM *    Specimens:                None          Drains: * No LDAs found *    Findings: Successful placement of carotid artery stent for proximal right ICA occlusion.  TICI 3 thrombectomy of an right ICA terminus occlusion.        Complications: None apparent          Efrain uHrley MD     Date: 1/19/2024  Time: 17:21 EST

## 2024-01-19 NOTE — ED NOTES
[facility] ADMISSIONS CALLED SPOKE TO DENNIS AND WILL CALL [phone] FOR CHARGE NURSE TO GIVE REPORT FOR PATIENT AND ADMIT PHYSICIANS NAME IS DR GALVIN

## 2024-01-20 ENCOUNTER — APPOINTMENT (OUTPATIENT)
Dept: GENERAL RADIOLOGY | Facility: HOSPITAL | Age: 69
DRG: 023 | End: 2024-01-20
Payer: MEDICARE

## 2024-01-20 ENCOUNTER — APPOINTMENT (OUTPATIENT)
Dept: CARDIOLOGY | Facility: HOSPITAL | Age: 69
DRG: 023 | End: 2024-01-20
Payer: MEDICARE

## 2024-01-20 ENCOUNTER — APPOINTMENT (OUTPATIENT)
Dept: CT IMAGING | Facility: HOSPITAL | Age: 69
DRG: 023 | End: 2024-01-20
Payer: MEDICARE

## 2024-01-20 LAB
ANION GAP SERPL CALCULATED.3IONS-SCNC: 12 MMOL/L (ref 5–15)
BASOPHILS # BLD AUTO: 0.02 10*3/MM3 (ref 0–0.2)
BASOPHILS NFR BLD AUTO: 0.2 % (ref 0–1.5)
BH CV ECHO MEAS - EDV(CUBED): 85.2 ML
BH CV ECHO MEAS - EDV(MOD-SP2): 99 ML
BH CV ECHO MEAS - EDV(MOD-SP4): 88 ML
BH CV ECHO MEAS - EF(MOD-BP): 63.6 %
BH CV ECHO MEAS - EF(MOD-SP2): 66.3 %
BH CV ECHO MEAS - EF(MOD-SP4): 61.5 %
BH CV ECHO MEAS - ESV(CUBED): 13.8 ML
BH CV ECHO MEAS - ESV(MOD-SP2): 33.4 ML
BH CV ECHO MEAS - ESV(MOD-SP4): 33.9 ML
BH CV ECHO MEAS - FS: 45.5 %
BH CV ECHO MEAS - IVS/LVPW: 1.14 CM
BH CV ECHO MEAS - IVSD: 0.8 CM
BH CV ECHO MEAS - LV DIASTOLIC VOL/BSA (35-75): 48.6 CM2
BH CV ECHO MEAS - LV MASS(C)D: 100.6 GRAMS
BH CV ECHO MEAS - LV SYSTOLIC VOL/BSA (12-30): 18.7 CM2
BH CV ECHO MEAS - LVIDD: 4.4 CM
BH CV ECHO MEAS - LVIDS: 2.4 CM
BH CV ECHO MEAS - LVPWD: 0.7 CM
BH CV ECHO MEAS - SI(MOD-SP2): 36.3 ML/M2
BH CV ECHO MEAS - SI(MOD-SP4): 29.9 ML/M2
BH CV ECHO MEAS - SV(MOD-SP2): 65.6 ML
BH CV ECHO MEAS - SV(MOD-SP4): 54.1 ML
BH CV ECHO SHUNT ASSESSMENT PERFORMED (HIDDEN SCRIPTING): 1
BH CV RIGHT CCA HIDDEN LRR: 1 CM/S
BH CV VAS CAROTID RIGHT DISTAL STENT EDV: 16.5 CM/S
BH CV VAS CAROTID RIGHT DISTAL STENT PSV: 57.1 CM/S
BH CV VAS CAROTID RIGHT DISTAL TO STENT NATIVE VESSEL E: 24.7 CM/S
BH CV VAS CAROTID RIGHT DISTAL TO STENT PSV: 91.1 CM/S
BH CV VAS CAROTID RIGHT MID STENT EDV: 17 CM/S
BH CV VAS CAROTID RIGHT MID STENT PSV: 93.3 CM/S
BH CV VAS CAROTID RIGHT PROXIMAL STENT EDV: 15.9 CM/S
BH CV VAS CAROTID RIGHT PROXIMAL STENT PSV: 87.3 CM/S
BH CV VAS CAROTID RIGHT STENT NATIVE VESSEL PROXIMAL EDV: 13.7 CM/S
BH CV VAS CAROTID RIGHT STENT NATIVE VESSEL PROXIMAL PS: 65.9 CM/S
BH CV XLRA MEAS LEFT DIST CCA EDV: 18 CM/SEC
BH CV XLRA MEAS LEFT DIST CCA PSV: 111 CM/SEC
BH CV XLRA MEAS LEFT DIST ICA EDV: 14.7 CM/SEC
BH CV XLRA MEAS LEFT DIST ICA PSV: 56 CM/SEC
BH CV XLRA MEAS LEFT ICA/CCA RATIO: 1.5
BH CV XLRA MEAS LEFT MID CCA EDV: 16.5 CM/SEC
BH CV XLRA MEAS LEFT MID CCA PSV: 105 CM/SEC
BH CV XLRA MEAS LEFT MID ICA EDV: 13 CM/SEC
BH CV XLRA MEAS LEFT MID ICA PSV: 75.2 CM/SEC
BH CV XLRA MEAS LEFT PROX CCA EDV: 11.8 CM/SEC
BH CV XLRA MEAS LEFT PROX CCA PSV: 89.4 CM/SEC
BH CV XLRA MEAS LEFT PROX ECA EDV: 9.8 CM/SEC
BH CV XLRA MEAS LEFT PROX ECA PSV: 87.5 CM/SEC
BH CV XLRA MEAS LEFT PROX ICA EDV: 26.7 CM/SEC
BH CV XLRA MEAS LEFT PROX ICA PSV: 136 CM/SEC
BH CV XLRA MEAS LEFT PROX SCLA PSV: 150 CM/SEC
BH CV XLRA MEAS LEFT VERTEBRAL A EDV: 8.2 CM/SEC
BH CV XLRA MEAS LEFT VERTEBRAL A PSV: 46.7 CM/SEC
BH CV XLRA MEAS RIGHT DIST CCA EDV: 15.9 CM/SEC
BH CV XLRA MEAS RIGHT DIST CCA PSV: 87.3 CM/SEC
BH CV XLRA MEAS RIGHT DIST ICA EDV: 27.4 CM/SEC
BH CV XLRA MEAS RIGHT DIST ICA PSV: 118 CM/SEC
BH CV XLRA MEAS RIGHT ICA/CCA RATIO: 1.3
BH CV XLRA MEAS RIGHT MID CCA EDV: 15.4 CM/SEC
BH CV XLRA MEAS RIGHT MID CCA PSV: 68.6 CM/SEC
BH CV XLRA MEAS RIGHT MID ICA EDV: 24.7 CM/SEC
BH CV XLRA MEAS RIGHT MID ICA PSV: 91.1 CM/SEC
BH CV XLRA MEAS RIGHT PROX CCA EDV: 14 CM/SEC
BH CV XLRA MEAS RIGHT PROX CCA PSV: 70.8 CM/SEC
BH CV XLRA MEAS RIGHT PROX ECA EDV: 19.1 CM/SEC
BH CV XLRA MEAS RIGHT PROX ECA PSV: 112 CM/SEC
BH CV XLRA MEAS RIGHT PROX ICA EDV: 16.5 CM/SEC
BH CV XLRA MEAS RIGHT PROX ICA PSV: 91.6 CM/SEC
BH CV XLRA MEAS RIGHT PROX SCLA PSV: 200 CM/SEC
BH CV XLRA MEAS RIGHT VERTEBRAL A EDV: 10.1 CM/SEC
BH CV XLRA MEAS RIGHT VERTEBRAL A PSV: 36.9 CM/SEC
BH CVPROX RIGHT ICA HIDDEN LRR: 1 CM
BUN SERPL-MCNC: 15 MG/DL (ref 8–23)
BUN/CREAT SERPL: 19.2 (ref 7–25)
CALCIUM SPEC-SCNC: 8.7 MG/DL (ref 8.6–10.5)
CHLORIDE SERPL-SCNC: 104 MMOL/L (ref 98–107)
CHOLEST SERPL-MCNC: 160 MG/DL (ref 0–200)
CO2 SERPL-SCNC: 22 MMOL/L (ref 22–29)
CREAT SERPL-MCNC: 0.78 MG/DL (ref 0.57–1)
DEPRECATED RDW RBC AUTO: 43.2 FL (ref 37–54)
EGFRCR SERPLBLD CKD-EPI 2021: 82.3 ML/MIN/1.73
EOSINOPHIL # BLD AUTO: 0.01 10*3/MM3 (ref 0–0.4)
EOSINOPHIL NFR BLD AUTO: 0.1 % (ref 0.3–6.2)
ERYTHROCYTE [DISTWIDTH] IN BLOOD BY AUTOMATED COUNT: 12.6 % (ref 12.3–15.4)
GLUCOSE BLDC GLUCOMTR-MCNC: 128 MG/DL (ref 70–130)
GLUCOSE BLDC GLUCOMTR-MCNC: 155 MG/DL (ref 70–130)
GLUCOSE BLDC GLUCOMTR-MCNC: 203 MG/DL (ref 70–130)
GLUCOSE SERPL-MCNC: 182 MG/DL (ref 65–99)
HBA1C MFR BLD: 6.2 % (ref 4.8–5.6)
HCT VFR BLD AUTO: 39.1 % (ref 34–46.6)
HDLC SERPL-MCNC: 44 MG/DL (ref 40–60)
HGB BLD-MCNC: 13.5 G/DL (ref 12–15.9)
IMM GRANULOCYTES # BLD AUTO: 0.04 10*3/MM3 (ref 0–0.05)
IMM GRANULOCYTES NFR BLD AUTO: 0.3 % (ref 0–0.5)
LDLC SERPL CALC-MCNC: 95 MG/DL (ref 0–100)
LDLC/HDLC SERPL: 2.1 {RATIO}
LEFT ARM BP: NORMAL MMHG
LV EF 2D ECHO EST: 60 %
LYMPHOCYTES # BLD AUTO: 1.21 10*3/MM3 (ref 0.7–3.1)
LYMPHOCYTES NFR BLD AUTO: 10.5 % (ref 19.6–45.3)
MAGNESIUM SERPL-MCNC: 1.8 MG/DL (ref 1.6–2.4)
MCH RBC QN AUTO: 32.2 PG (ref 26.6–33)
MCHC RBC AUTO-ENTMCNC: 34.5 G/DL (ref 31.5–35.7)
MCV RBC AUTO: 93.3 FL (ref 79–97)
MONOCYTES # BLD AUTO: 0.47 10*3/MM3 (ref 0.1–0.9)
MONOCYTES NFR BLD AUTO: 4.1 % (ref 5–12)
NEUTROPHILS NFR BLD AUTO: 84.8 % (ref 42.7–76)
NEUTROPHILS NFR BLD AUTO: 9.78 10*3/MM3 (ref 1.7–7)
NRBC BLD AUTO-RTO: 0 /100 WBC (ref 0–0.2)
PHOSPHATE SERPL-MCNC: 2.8 MG/DL (ref 2.5–4.5)
PLATELET # BLD AUTO: 262 10*3/MM3 (ref 140–450)
PMV BLD AUTO: 9.5 FL (ref 6–12)
POTASSIUM SERPL-SCNC: 3.4 MMOL/L (ref 3.5–5.2)
POTASSIUM SERPL-SCNC: 4.1 MMOL/L (ref 3.5–5.2)
PROCALCITONIN SERPL-MCNC: 0.15 NG/ML (ref 0–0.25)
RBC # BLD AUTO: 4.19 10*6/MM3 (ref 3.77–5.28)
SODIUM SERPL-SCNC: 138 MMOL/L (ref 136–145)
TRIGL SERPL-MCNC: 118 MG/DL (ref 0–150)
VLDLC SERPL-MCNC: 21 MG/DL (ref 5–40)
WBC NRBC COR # BLD AUTO: 11.53 10*3/MM3 (ref 3.4–10.8)

## 2024-01-20 PROCEDURE — 70450 CT HEAD/BRAIN W/O DYE: CPT

## 2024-01-20 PROCEDURE — 83735 ASSAY OF MAGNESIUM: CPT | Performed by: INTERNAL MEDICINE

## 2024-01-20 PROCEDURE — 25810000003 SODIUM CHLORIDE 0.9 % SOLUTION 250 ML FLEX CONT

## 2024-01-20 PROCEDURE — 93308 TTE F-UP OR LMTD: CPT | Performed by: INTERNAL MEDICINE

## 2024-01-20 PROCEDURE — 25010000002 NICARDIPINE 2.5 MG/ML SOLUTION 10 ML VIAL

## 2024-01-20 PROCEDURE — 84145 PROCALCITONIN (PCT): CPT

## 2024-01-20 PROCEDURE — 82948 REAGENT STRIP/BLOOD GLUCOSE: CPT

## 2024-01-20 PROCEDURE — 85025 COMPLETE CBC W/AUTO DIFF WBC: CPT | Performed by: INTERNAL MEDICINE

## 2024-01-20 PROCEDURE — 25010000002 NICARDIPINE 2.5 MG/ML SOLUTION 10 ML VIAL: Performed by: NURSE PRACTITIONER

## 2024-01-20 PROCEDURE — 93880 EXTRACRANIAL BILAT STUDY: CPT | Performed by: INTERNAL MEDICINE

## 2024-01-20 PROCEDURE — 80048 BASIC METABOLIC PNL TOTAL CA: CPT | Performed by: INTERNAL MEDICINE

## 2024-01-20 PROCEDURE — 97162 PT EVAL MOD COMPLEX 30 MIN: CPT

## 2024-01-20 PROCEDURE — 71045 X-RAY EXAM CHEST 1 VIEW: CPT

## 2024-01-20 PROCEDURE — 97530 THERAPEUTIC ACTIVITIES: CPT

## 2024-01-20 PROCEDURE — 84132 ASSAY OF SERUM POTASSIUM: CPT | Performed by: INTERNAL MEDICINE

## 2024-01-20 PROCEDURE — 83036 HEMOGLOBIN GLYCOSYLATED A1C: CPT

## 2024-01-20 PROCEDURE — 97166 OT EVAL MOD COMPLEX 45 MIN: CPT

## 2024-01-20 PROCEDURE — 93880 EXTRACRANIAL BILAT STUDY: CPT

## 2024-01-20 PROCEDURE — 93308 TTE F-UP OR LMTD: CPT

## 2024-01-20 PROCEDURE — 25810000003 SODIUM CHLORIDE 0.9 % SOLUTION 250 ML FLEX CONT: Performed by: NURSE PRACTITIONER

## 2024-01-20 PROCEDURE — 80061 LIPID PANEL: CPT

## 2024-01-20 PROCEDURE — 99024 POSTOP FOLLOW-UP VISIT: CPT | Performed by: STUDENT IN AN ORGANIZED HEALTH CARE EDUCATION/TRAINING PROGRAM

## 2024-01-20 PROCEDURE — 99232 SBSQ HOSP IP/OBS MODERATE 35: CPT | Performed by: INTERNAL MEDICINE

## 2024-01-20 PROCEDURE — 94640 AIRWAY INHALATION TREATMENT: CPT

## 2024-01-20 PROCEDURE — 84100 ASSAY OF PHOSPHORUS: CPT | Performed by: INTERNAL MEDICINE

## 2024-01-20 RX ORDER — ACETAMINOPHEN 325 MG/1
650 TABLET ORAL EVERY 6 HOURS PRN
Status: DISCONTINUED | OUTPATIENT
Start: 2024-01-20 | End: 2024-01-21

## 2024-01-20 RX ORDER — GUAIFENESIN 200 MG/10ML
200 LIQUID ORAL EVERY 4 HOURS PRN
Status: DISCONTINUED | OUTPATIENT
Start: 2024-01-20 | End: 2024-01-20

## 2024-01-20 RX ORDER — ROPINIROLE 2 MG/1
4 TABLET, FILM COATED ORAL NIGHTLY
Status: DISCONTINUED | OUTPATIENT
Start: 2024-01-21 | End: 2024-01-21

## 2024-01-20 RX ORDER — ALBUTEROL SULFATE 2.5 MG/3ML
2.5 SOLUTION RESPIRATORY (INHALATION) EVERY 6 HOURS PRN
Status: DISCONTINUED | OUTPATIENT
Start: 2024-01-20 | End: 2024-02-02 | Stop reason: HOSPADM

## 2024-01-20 RX ORDER — MIRTAZAPINE 15 MG/1
30 TABLET, FILM COATED ORAL NIGHTLY
Status: DISCONTINUED | OUTPATIENT
Start: 2024-01-20 | End: 2024-01-20

## 2024-01-20 RX ORDER — PANTOPRAZOLE SODIUM 40 MG/10ML
40 INJECTION, POWDER, LYOPHILIZED, FOR SOLUTION INTRAVENOUS
Status: DISCONTINUED | OUTPATIENT
Start: 2024-01-21 | End: 2024-01-30

## 2024-01-20 RX ORDER — GUAIFENESIN 200 MG/10ML
400 LIQUID ORAL EVERY 4 HOURS PRN
Status: DISCONTINUED | OUTPATIENT
Start: 2024-01-20 | End: 2024-01-21

## 2024-01-20 RX ORDER — POTASSIUM CHLORIDE 1.5 G/1.58G
40 POWDER, FOR SOLUTION ORAL EVERY 4 HOURS
Status: COMPLETED | OUTPATIENT
Start: 2024-01-20 | End: 2024-01-20

## 2024-01-20 RX ORDER — ASPIRIN 300 MG/1
300 SUPPOSITORY RECTAL DAILY
Status: DISCONTINUED | OUTPATIENT
Start: 2024-01-20 | End: 2024-01-24

## 2024-01-20 RX ORDER — PANTOPRAZOLE SODIUM 40 MG/1
40 TABLET, DELAYED RELEASE ORAL
Status: DISCONTINUED | OUTPATIENT
Start: 2024-01-21 | End: 2024-01-20

## 2024-01-20 RX ORDER — MIRTAZAPINE 15 MG/1
30 TABLET, FILM COATED ORAL NIGHTLY
Status: DISCONTINUED | OUTPATIENT
Start: 2024-01-20 | End: 2024-01-30

## 2024-01-20 RX ORDER — ASPIRIN 325 MG
325 TABLET ORAL DAILY
Status: DISCONTINUED | OUTPATIENT
Start: 2024-01-20 | End: 2024-01-24

## 2024-01-20 RX ORDER — ONDANSETRON 2 MG/ML
4 INJECTION INTRAMUSCULAR; INTRAVENOUS ONCE
Status: DISCONTINUED | OUTPATIENT
Start: 2024-01-20 | End: 2024-01-21

## 2024-01-20 RX ADMIN — TICAGRELOR 60 MG: 60 TABLET ORAL at 17:30

## 2024-01-20 RX ADMIN — GUAIFENESIN 200 MG: 200 SOLUTION ORAL at 20:42

## 2024-01-20 RX ADMIN — Medication 10 ML: at 20:42

## 2024-01-20 RX ADMIN — ASPIRIN 325 MG ORAL TABLET 325 MG: 325 PILL ORAL at 08:04

## 2024-01-20 RX ADMIN — TICAGRELOR 60 MG: 60 TABLET ORAL at 05:51

## 2024-01-20 RX ADMIN — PHENOL 1 SPRAY: 1.4 SPRAY ORAL at 19:04

## 2024-01-20 RX ADMIN — ALBUTEROL SULFATE 2.5 MG: 2.5 SOLUTION RESPIRATORY (INHALATION) at 22:43

## 2024-01-20 RX ADMIN — PHENOL 1 SPRAY: 1.4 SPRAY ORAL at 00:16

## 2024-01-20 RX ADMIN — NICARDIPINE HYDROCHLORIDE 7.5 MG/HR: 25 INJECTION, SOLUTION INTRAVENOUS at 21:54

## 2024-01-20 RX ADMIN — Medication 10 ML: at 08:21

## 2024-01-20 RX ADMIN — PHENOL 1 SPRAY: 1.4 SPRAY ORAL at 03:05

## 2024-01-20 RX ADMIN — POTASSIUM CHLORIDE 40 MEQ: 1.5 POWDER, FOR SOLUTION ORAL at 08:03

## 2024-01-20 RX ADMIN — NICARDIPINE HYDROCHLORIDE 10 MG/HR: 25 INJECTION, SOLUTION INTRAVENOUS at 00:16

## 2024-01-20 RX ADMIN — ATORVASTATIN CALCIUM 80 MG: 40 TABLET, FILM COATED ORAL at 20:42

## 2024-01-20 RX ADMIN — POTASSIUM CHLORIDE 40 MEQ: 1.5 POWDER, FOR SOLUTION ORAL at 11:49

## 2024-01-20 RX ADMIN — NICARDIPINE HYDROCHLORIDE 5 MG/HR: 25 INJECTION, SOLUTION INTRAVENOUS at 17:02

## 2024-01-20 RX ADMIN — Medication 1 PATCH: at 08:05

## 2024-01-20 RX ADMIN — ROPINIROLE HYDROCHLORIDE 4 MG: 2 TABLET, FILM COATED ORAL at 23:51

## 2024-01-20 RX ADMIN — ACETAMINOPHEN 650 MG: 325 TABLET ORAL at 17:31

## 2024-01-20 RX ADMIN — MIRTAZAPINE 30 MG: 15 TABLET, FILM COATED ORAL at 20:42

## 2024-01-20 RX ADMIN — ACETAMINOPHEN 650 MG: 325 TABLET ORAL at 12:45

## 2024-01-20 NOTE — PLAN OF CARE
Goal Outcome Evaluation:  Plan of Care Reviewed With: patient        Progress: no change  Outcome Evaluation: PT initial evaluation complted. Pt demonstrates L sided weakness (UE >LE), L sensation deficits, and decreased indep/ balance re: functional mobility, warranting further skilled PT services to promote PLOF. Limited today by L inattention, but pt very motivated to participate and able to perform STS (min x2) and steps to/ from recliner (4 total ft mod x2A). Recommend IP rehab at d/c.      Anticipated Discharge Disposition (PT): inpatient rehabilitation facility

## 2024-01-20 NOTE — THERAPY EVALUATION
"Patient Name: Regine Beckham  : 1955    MRN: [id number]                              Today's Date: 2024       Admit Date: 2024    Visit Dx: No diagnosis found.  Patient Active Problem List   Diagnosis    Iron deficiency anemia due to chronic blood loss    Major depressive disorder    RLS (restless legs syndrome)    GERD (gastroesophageal reflux disease)    Left breast mass    Allergy to penicillin    Stroke    Grade I diastolic dysfunction     Past Medical History:   Diagnosis Date    Anemia     Anxiety     Arthritis     Depression     Legally blind     Restless leg syndrome     Sleep apnea     \"I don't use a cpap anymore since losing 106 lbs\"    Water retention      Past Surgical History:   Procedure Laterality Date    BACK SURGERY      GALLBLADDER SURGERY      HIP ARTHROSCOPY      JOINT REPLACEMENT Right       General Information       Row Name 24 1212          Physical Therapy Time and Intention    Document Type evaluation  -     Mode of Treatment physical therapy  -       Row Name 24 1212          General Information    Patient Profile Reviewed yes  -LS     Prior Level of Function independent:;all household mobility;ADL's  -     Existing Precautions/Restrictions fall;other (see comments)  L weakness (UE>LE); dysarthria; L inattention  -     Barriers to Rehab medically complex  -       Row Name 24 1212          Living Environment    People in Home alone  -       Row Name 24 1212          Home Main Entrance    Number of Stairs, Main Entrance six  -       Row Name 24 1212          Cognition    Orientation Status (Cognition) oriented x 3  -       Row Name 24 1212          Safety Issues, Functional Mobility    Impairments Affecting Function (Mobility) balance;coordination;endurance/activity tolerance;shortness of breath;strength;postural/trunk control;sensation/sensory awareness;motor planning;muscle tone abnormal  -               User Key  " (r) = Recorded By, (t) = Taken By, (c) = Cosigned By      Initials Name Provider Type    Nikole Davis, PT Physical Therapist                   Mobility       Row Name 01/20/24 1214          Bed Mobility    Bed Mobility sit-supine  -LS     Sit-Supine [place] (Bed Mobility) maximum assist (25% patient effort);2 person assist;verbal cues  -LS     Comment, (Bed Mobility) EOB with OT upon transition to PT evaluation  -       Row Name 01/20/24 1214          Sit-Stand Transfer    Sit-Stand [place] (Transfers) minimum assist (75% patient effort);2 person assist;verbal cues;other (see comments)  BUE support  -LS       Row Name 01/20/24 1214          Gait/Stairs (Locomotion)    [place] Level (Gait) moderate assist (50% patient effort);2 person assist;verbal cues  -LS     Distance in Feet (Gait) 4 total  -LS     Deviations/Abnormal Patterns (Gait) memo decreased;weight shifting decreased  -LS     Bilateral Gait Deviations forward flexed posture  -LS     Comment, (Gait/Stairs) Pt able to take small steps EOB to recliner; assist required for weightshifting and appropriate advancement of LLE. Prior to sitting in recliner, RN requested pt return to bed for testing. Pt with increased difficulty taking small pivot steps towards her left side.  -LS               User Key  (r) = Recorded By, (t) = Taken By, (c) = Cosigned By      Initials Name Provider Type    Nikole Davis, PT Physical Therapist                   Obj/Interventions       Row Name 01/20/24 1217          Range of Motion Comprehensive    General Range of Motion bilateral lower extremity ROM WFL  -       Row Name 01/20/24 1217          Strength Comprehensive (MMT)    General Manual Muscle Testing (MMT) Assessment lower extremity strength deficits identified  -     Comment, General Manual Muscle Testing (MMT) Assessment RLE 3+/5; LLE 3-/5 grossly  -       Row Name 01/20/24 1217          Motor Skills    Motor Skills coordination  -      Coordination left;lower extremity;moderate impairment;heel to shin  -LS       Row Name 01/20/24 1217          Balance    Balance Assessment sitting static balance;standing static balance  -LS     Static Sitting Balance moderate assist  -LS     Position, Sitting Balance sitting edge of bed  -LS     Static Standing Balance minimal assist;2-person assist;verbal cues  -LS     Position/Device Used, Standing Balance supported  -LS     Balance Interventions standing;dynamic;weight shifting activity  -LS     Comment, Balance Cues for achieving midline standing posture prior to steps towards recliner  -LS       Row Name 01/20/24 1217          Sensory Assessment (Somatosensory)    Sensory Assessment (Somatosensory) left LE  -LS     Left LE Sensory Assessment light touch awareness;general sensation;light touch localization;impaired  -LS               User Key  (r) = Recorded By, (t) = Taken By, (c) = Cosigned By      Initials Name Provider Type    LS Nikole Herrmann, PT Physical Therapist                   Goals/Plan       Row Name 01/20/24 1220          Bed Mobility Goal 1 (PT)    Activity/Assistive Device (Bed Mobility Goal 1, PT) sit to supine/supine to sit  -LS     [place] Level/Cues Needed (Bed Mobility Goal 1, PT) minimum assist (75% or more patient effort)  -LS     Time Frame (Bed Mobility Goal 1, PT) 2 weeks;long term goal (LTG)  -       Row Name 01/20/24 1220          Transfer Goal 1 (PT)    Activity/Assistive Device (Transfer Goal 1, PT) sit-to-stand/stand-to-sit  -LS     [place] Level/Cues Needed (Transfer Goal 1, PT) supervision required  -LS     Time Frame (Transfer Goal 1, PT) 2 weeks;long term goal (LTG)  -       Row Name 01/20/24 1220          Gait Training Goal 1 (PT)    Activity/Assistive Device (Gait Training Goal 1, PT) gait (walking locomotion);assistive device use  -LS     [place] Level (Gait Training Goal 1, PT) minimum assist (75% or more patient effort)  -LS     Distance (Gait  Training Goal 1, PT) 50  -LS     Time Frame (Gait Training Goal 1, PT) 2 weeks;long term goal (LTG)  -       Row Name 01/20/24 1220          Therapy Assessment/Plan (PT)    Planned Therapy Interventions (PT) balance training;bed mobility training;gait training;home exercise program;stair training;transfer training;strengthening;patient/family education  -               User Key  (r) = Recorded By, (t) = Taken By, (c) = Cosigned By      Initials Name Provider Type     Nikole Herrmann, PT Physical Therapist                   Clinical Impression       Row Name 01/20/24 1222          Pain    Pretreatment Pain Rating 0/10 - no pain  -LS     Posttreatment Pain Rating 4/10  -     Pain Location - head  -LS     Pre/Posttreatment Pain Comment tolerated; RN aware  -     Pain Intervention(s) Repositioned;Ambulation/increased activity  -       Row Name 01/20/24 1222          Plan of Care Review    Plan of Care Reviewed With patient  -     Progress no change  -     Outcome Evaluation PT initial evaluation complted. Pt demonstrates L sided weakness (UE >LE), L sensation deficits, and decreased indep/ balance re: functional mobility, warranting further skilled PT services to promote PLOF. Limited today by L inattention, but pt very motivated to participate and able to perform STS (min x2) and steps to/ from recliner (4 total ft mod x2A). Recommend IP rehab at d/c.  -       Row Name 01/20/24 1222          Therapy Assessment/Plan (PT)    Patient/Family Therapy Goals Statement (PT) return to PLOF  -     Rehab Potential (PT) good, to achieve stated therapy goals  -     Criteria for Skilled Interventions Met (PT) yes;skilled treatment is necessary  -     Therapy Frequency (PT) daily  -       Row Name 01/20/24 1222          Vital Signs    Pre Systolic BP Rehab 129  -LS     Pre Treatment Diastolic BP 59  -LS     Post Systolic BP Rehab 124  -LS     Post Treatment Diastolic BP 61  -LS     Pretreatment Heart Rate  (beats/min) 69  -LS     Posttreatment Heart Rate (beats/min) 78  -LS     Pre SpO2 (%) 98  -LS     O2 Delivery Pre Treatment room air  -LS     O2 Delivery Intra Treatment room air  -LS     O2 Delivery Post Treatment room air  -LS     Pre Patient Position Sitting  -LS     Intra Patient Position Standing  -LS     Post Patient Position Supine  -LS       Row Name 01/20/24 1222          Positioning and Restraints    Pre-Treatment Position in bed  -LS     Post Treatment Position bed  -LS     In Bed notified nsg;side lying right;exit alarm on;call light within reach;with other staff;with nsg  -LS               User Key  (r) = Recorded By, (t) = Taken By, (c) = Cosigned By      Initials Name Provider Type    Nikole Davis, PT Physical Therapist                   Outcome Measures       Row Name 01/20/24 1220 01/20/24 0800       How much help from another person do you currently need...    Turning from your back to your side while in flat bed without using bedrails? 2  -LS 3  -DM    Moving from lying on back to sitting on the side of a flat bed without bedrails? 2  -LS 3  -DM    Moving to and from a bed to a chair (including a wheelchair)? 2  -LS 2  -DM    Standing up from a chair using your arms (e.g., wheelchair, bedside chair)? 3  -LS 2  -DM    Climbing 3-5 steps with a railing? 1  -LS 2  -DM    To walk in hospital room? 2  -LS 2  -DM    AM-PAC 6 Clicks Score (PT) 12  -LS 14  -DM    Highest Level of Mobility Goal 4 --> Transfer to chair/commode  -LS 4 --> Transfer to chair/commode  -DM      Row Name 01/20/24 1220          Modified [place] Scale    Pre-Stroke Modified Suzette Scale 6 - Unable to determine (UTD) from the medical record documentation  -LS     Modified [place] Scale 4 - Moderately severe disability.  Unable to walk without assistance, and unable to attend to own bodily needs without assistance.  -       Row Name 01/20/24 1220          Functional Assessment    Outcome Measure Options AM-PAC 6 Clicks Basic  Mobility (PT);Modified [place]  -               User Key  (r) = Recorded By, (t) = Taken By, (c) = Cosigned By      Initials Name Provider Type    LS Nikole Herrmann, PT Physical Therapist    Keyona Baig, RN Registered Nurse                                 Physical Therapy Education       Title: PT OT SLP Therapies (In Progress)       Topic: Physical Therapy (In Progress)       Point: Mobility training (Done)       Learning Progress Summary             Patient Acceptance, E,D, VU,NR by LS at 1/20/2024 1221                         Point: Home exercise program (Not Started)       Learner Progress:  Not documented in this visit.              Point: Body mechanics (Done)       Learning Progress Summary             Patient Acceptance, E,D, VU,NR by  at 1/20/2024 1221                         Point: Precautions (Done)       Learning Progress Summary             Patient Acceptance, E,D, VU,NR by  at 1/20/2024 1221                                         User Key       Initials Effective Dates Name Provider Type Discipline     02/03/23 -  Nikole Herrmann, PT Physical Therapist PT                  PT Recommendation and Plan  Planned Therapy Interventions (PT): balance training, bed mobility training, gait training, home exercise program, stair training, transfer training, strengthening, patient/family education  Plan of Care Reviewed With: patient  Progress: no change  Outcome Evaluation: PT initial evaluation complted. Pt demonstrates L sided weakness (UE >LE), L sensation deficits, and decreased indep/ balance re: functional mobility, warranting further skilled PT services to promote PLOF. Limited today by L inattention, but pt very motivated to participate and able to perform STS (min x2) and steps to/ from recliner (4 total ft mod x2A). Recommend IP rehab at d/c.     Time Calculation:   PT Evaluation Complexity  History, PT Evaluation Complexity: 3 or more personal factors and/or comorbidities  Examination  of Body Systems (PT Eval Complexity): total of 4 or more elements  Clinical Presentation (PT Evaluation Complexity): evolving  Clinical Decision Making (PT Evaluation Complexity): moderate complexity  Overall Complexity (PT Evaluation Complexity): moderate complexity     PT Charges       Row Name 01/20/24 1226             Time Calculation    Start Time 1016  -LS      PT Received On 01/20/24  -LS      PT Goal Re-Cert Due Date 01/30/24  -LS         Timed Charges    20243 - PT Therapeutic Activity Minutes 10  -LS         Untimed Charges    PT Eval/Re-eval Minutes 34  -LS         Total Minutes    Timed Charges Total Minutes 10  -LS      Untimed Charges Total Minutes 34  -LS       Total Minutes 44  -LS                User Key  (r) = Recorded By, (t) = Taken By, (c) = Cosigned By      Initials Name Provider Type     Nikole Herrmann, PT Physical Therapist                  Therapy Charges for Today       Code Description Service Date Service Provider Modifiers Qty    49328332874 HC PT EVAL MOD COMPLEXITY 3 1/20/2024 Nikole Herrmann, PT GP 1    24250501651 HC PT THERAPEUTIC ACT EA 15 MIN 1/20/2024 Nikole Herrmann, PT GP 1            PT G-Codes  Outcome Measure Options: AM-PAC 6 Clicks Basic Mobility (PT), Modified [place]  AM-PAC 6 Clicks Score (PT): 12  Modified [place] Scale: 4 - Moderately severe disability.  Unable to walk without assistance, and unable to attend to own bodily needs without assistance.  PT Discharge Summary  Anticipated Discharge Disposition (PT): inpatient rehabilitation facility    Nikole Herrmann, PT  1/20/2024

## 2024-01-20 NOTE — PROGRESS NOTES
"Critical Care Note     LOS: 1 day   Patient Care Team:  Dread Burton MD as PCP - General (Family Medicine)  Daina Fletcher MD as Consulting Physician (Hematology)    Chief Complaint/Reason for visit:      Acute ischemic stroke, right hemisphere  Right ICA stent post thrombectomy  Hypertension  Atrial fibrillation, new    Subjective     Interval History:     Admitted yesterday with left-sided weakness and neglect and found to have right ICA occlusion.  She underwent thrombectomy and stent.  NIH improved from 19 to a 16.  Blood pressure is 100-182.  Cardene drip has been tapered off.  She is in sinus rhythm.  Room air saturation is 98%.    Review of Systems:    All systems were reviewed and negative except as noted in subjective.    Medical history, surgical history, social history, family history reviewed    Objective     Intake/Output:    Intake/Output Summary (Last 24 hours) at 1/20/2024 1234  Last data filed at 1/20/2024 1200  Gross per 24 hour   Intake 1404.03 ml   Output 1175 ml   Net 229.03 ml       Nutrition:  NPO Diet NPO Type: Strict NPO    Infusions:  niCARdipine, 5-15 mg/hr, Last Rate: Stopped (01/20/24 0401)            Telemetry: Sinus rhythm             Vital Signs  Blood pressure 130/83, pulse 73, temperature 98.3 °F (36.8 °C), temperature source Oral, resp. rate 18, height 167.6 cm (65.98\"), weight 72 kg (158 lb 11.7 oz), SpO2 98%, not currently breastfeeding.    Physical Exam:  General Appearance:  Older woman supine in bed in no distress   Head:  No visible trauma   Eyes:          Eyes gazing toward the left   Ears:     Throat: No thrush   Neck: Trachea midline, no crepitus   Back:      Lungs:   Symmetric chest expansion without wheeze or rhonchi    Heart:  Irregular rhythm, S1, S2 auscultated   Abdomen:   Bowel sounds present, soft   Rectal:   Deferred   Extremities: Catheterization site without hematoma   Pulses: Palpable distal pulses   Skin: Warm and dry   Lymph nodes: No cervical " adenopathy   Neurologic: Tells me her name.  Eyes deviated to the right.  Weak left upper extremity, flaccid, lower extremity can move but drifts.  Severe dysarthria, follows commands with the right side    Interval:  (to 2B)  1a. Level of Consciousness: 1-->Not alert, but arousable by minor stimulation to obey, answer, or respond  1b. LOC Questions: 0-->Answers both questions correctly  1c. LOC Commands: 0-->Performs both tasks correctly  2. Best Gaze: 1-->Partial gaze palsy, gaze is abnormal in one or both eyes, but forced deviation or total gaze paresis is not present  3. Visual: 1-->Partial hemianopia  4. Facial Palsy: 1-->Minor paralysis (flattened nasolabial fold, asymmetry on smiling)  5a. Motor Arm, Left: 4-->No movement  5b. Motor Arm, Right: 0-->No drift, limb holds 90 (or 45) degrees for full 10 secs  6a. Motor Leg, Left: 2-->Some effort against gravity, leg falls to bed by 5 secs, but has some effort against gravity  6b. Motor Leg, Right: 0-->No drift, leg holds 30 degree position for full 5 secs  7. Limb Ataxia: 0-->Absent  8. Sensory: 1-->Mild-to-moderate sensory loss, patient feels pinprick is less sharp or is dull on the affected side, or there is a loss of superficial pain with pinprick, but patient is aware of being touched  9. Best Language: 2-->Severe aphasia, all communication is through fragmentary expression, great need for inference, questioning, and guessing by the listener. Range of information that can be exchanged is limited, listener carries burden of. . . (see row details)  10. Dysarthria: 2-->Severe dysarthria, patients speech is so slurred as to be unintelligible in the absence of or out of proportion to any dysphasia, or is mute/anarthric  11. Extinction and Inattention (formerly Neglect): 1-->Visual, tactile, auditory, spatial, or personal inattention or extinction to bilateral simultaneous stimulation in one of the sensory modalities    Total (NIH Stroke Scale): 16   Results  "Review:     I reviewed the patient's new clinical results.   Results from last 7 days   Lab Units 01/20/24  0408 01/19/24  1302 01/19/24  1257   SODIUM mmol/L 138  --  143   POTASSIUM mmol/L 3.4*  --  4.4   CHLORIDE mmol/L 104  --  105   CO2 mmol/L 22.0  --  29.2*   BUN mg/dL 15  --  15   CREATININE mg/dL 0.78 0.90 0.99   CALCIUM mg/dL 8.7  --  9.7   BILIRUBIN mg/dL  --   --  0.4   ALK PHOS U/L  --   --  85   ALT (SGPT) U/L  --   --  10   AST (SGOT) U/L  --   --  17   GLUCOSE mg/dL 182*  --  127*     Results from last 7 days   Lab Units 01/20/24  0408 01/19/24  1257   WBC 10*3/mm3 11.53* 11.47*   HEMOGLOBIN g/dL 13.5 15.1   HEMATOCRIT % 39.1 45.0   PLATELETS 10*3/mm3 262 258         No results found for: \"BLOODCX\"  No results found for: \"URINECX\"    I reviewed the patient's new imaging including images and reports.    CT HEAD WO CONTRAST    Date of Exam: 1/20/2024 4:50 AM EST    Indication: Stroke, follow up.    Comparison: CT head from earlier today    Technique: Axial CT images were obtained of the head without contrast administration.  Automated exposure control and iterative construction methods were used.      Findings:  There is an evolving right MCA territory subacute infarction. There is no hemorrhagic transformation. The ventricles are stable in caliber, with no midline shift. The basal cisterns appear patent. Degenerative changes appear stable.    The osseous structures appear intact. There is mild mucosal disease in the right sphenoid sinus. The mastoid air cells are well aerated.   Impression:     Impression:  1.Evolving subacute infarction within right MCA territory. No acute herniation.  2.No hemorrhagic transformation.        Electronically Signed: Ravi Hart MD   1/20/2024 10:48 AM EST       All medications reviewed.   aspirin, 325 mg, Nasogastric, Daily   Or  aspirin, 300 mg, Rectal, Daily  atorvastatin, 80 mg, Nasogastric, Nightly  nicotine, 1 patch, Transdermal, Q24H  ondansetron, 4 mg, " Intravenous, Once  sodium chloride, 10 mL, Intravenous, Q12H  ticagrelor, 60 mg, Nasogastric, BID          Assessment & Plan       Stroke    Iron deficiency anemia due to chronic blood loss    Major depressive disorder    RLS (restless legs syndrome)    GERD (gastroesophageal reflux disease)    Grade I diastolic dysfunction    68-year-old woman with hypertension, chronic anemia, sleep apnea intolerant of CPAP, severe depression with recent overdose attempt, legal blindness, presenting with left-sided weakness and found to have right middle cerebral artery ischemia and 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  She underwent thrombectomy and carotid stent.  She did not receive thrombolysis because she was outside the time window.  This morning her NIH stroke score is a 16 compared to 19.  Systolic blood pressure is 100-132 and Cardene has been tapered off.  Antihypertensives have not been restarted.  She does take losartan, hydrochlorothiazide, spironolactone as an outpatient.  Will avoid diuretics for now as I wish to avoid dehydration  She has a known history of iron deficiency anemia due to chronic blood loss.  This morning her hemoglobin is normal at 13.5.  In February 2022 her hemoglobin was 9.9.  She apparently has a previous history of gastric sleeve.  She received some IV iron infusion in 2020.  She was hospitalized December 21 through December 24 for severe depression with suicidal ideation, overdose of Ambien.  She is currently taking mirabegron  Risk factors for stroke include tobacco use, hypertension.  Her A1c is 6.2.  Lipid panel revealed a total cholesterol of 160.    PLAN:    PT, OT, speech therapy  Aspirin, statin  Brilinta  Nicotine patch  Monitor blood pressure, as needed Apresoline, Cardene systolic greater than 180  Monitor NIH stroke scale  24-hour CT      VTE Prophylaxis:SCDS    Stress Ulcer Prophylaxis:none    Vangie López MD  01/20/24  12:34 EST      Time: 25 minutes.

## 2024-01-20 NOTE — SIGNIFICANT NOTE
01/20/24 1529   SLP Deferred Reason   SLP Deferred Reason Unable to evaluate, medical status change  (Spoke with RN, pt not medically ready for swallow eval today. Will check back tomorrow for eval.)

## 2024-01-20 NOTE — PROGRESS NOTES
"NEUROSURGERY PROGRESS NOTE    Chief Complaint: Tandem proximal and terminus ICA occlusions on the right    Subjective: Patient's neurological exam has improved after carotid stent and thrombectomy.    Objective    Vital Signs: Blood pressure 124/63, pulse 76, temperature 98.5 °F (36.9 °C), temperature source Oral, resp. rate 16, height 167.6 cm (66\"), weight 72 kg (158 lb 11.7 oz), SpO2 96%, not currently breastfeeding.    Physical Exam  Awake, alert and conversive  Opens eyes spont  Pupils 3 mm reactive bilaterally  Right gaze deviation, but is able to cross midline to the left.  Extraocular muscles intact bilaterally  Left facial droop.  5 out of 5 in the right upper and right lower extremities.  She is antigravity in the left upper and left lower.    Intake/Output:   Intake/Output Summary (Last 24 hours) at 1/20/2024 0914  Last data filed at 1/20/2024 0800  Gross per 24 hour   Intake 1284.03 ml   Output 550 ml   Net 734.03 ml       Current Medications:   Current Facility-Administered Medications:     aspirin tablet 325 mg, 325 mg, Nasogastric, Daily, 325 mg at 01/20/24 0804 **OR** aspirin suppository 300 mg, 300 mg, Rectal, Daily, Vangie López MD    atorvastatin (LIPITOR) tablet 80 mg, 80 mg, Nasogastric, Nightly, Efrain Hurley MD    Calcium Replacement - Follow Nurse / BPA Driven Protocol, , Does not apply, PRN, Vangie López MD    hydrALAZINE (APRESOLINE) injection 20 mg, 20 mg, Intravenous, Q4H PRN, Cyndee Ardon, APRN, 20 mg at 01/19/24 1913    Magnesium Standard Dose Replacement - Follow Nurse / BPA Driven Protocol, , Does not apply, PRN, Vangie López MD    niCARdipine (CARDENE) 25mg in 250mL NS infusion, 5-15 mg/hr, Intravenous, Titrated, Jair Quinteros PA-C, Stopped at 01/20/24 0401    nicotine (NICODERM CQ) 21 MG/24HR patch 1 patch, 1 patch, Transdermal, Q24H, Gm Marie MD, 1 patch at 01/20/24 0805    ondansetron (ZOFRAN) injection 4 mg, 4 mg, Intravenous, " Once, Parul Alvarado APRN    phenol (CHLORASEPTIC) 1.4 % liquid 1 spray, 1 spray, Mouth/Throat, Q2H PRN, Cyndee Ardon APRN, 1 spray at 01/20/24 0305    Phosphorus Replacement - Follow Nurse / BPA Driven Protocol, , Does not apply, PRN, Vangie López MD    potassium chloride (KLOR-CON) packet 40 mEq, 40 mEq, Nasogastric, Q4H, Vangie López MD, 40 mEq at 01/20/24 0803    Potassium Replacement - Follow Nurse / BPA Driven Protocol, , Does not apply, PRN, Vangie López MD    sodium chloride 0.9 % flush 10 mL, 10 mL, Intravenous, Q12H, Jair Quinteros PA-C, 10 mL at 01/20/24 0821    sodium chloride 0.9 % flush 10 mL, 10 mL, Intravenous, PRN, Jair Quinteros PA-C    sodium chloride 0.9 % infusion 40 mL, 40 mL, Intravenous, PRN, Jair Quinteros PA-C    ticagrelor (BRILINTA) tablet 60 mg, 60 mg, Nasogastric, BID, Efrain Hurley MD, 60 mg at 01/20/24 0551     Laboratory Results:       Lab 01/20/24  0408 01/19/24  1257   WBC 11.53* 11.47*   HEMOGLOBIN 13.5 15.1   HEMATOCRIT 39.1 45.0   PLATELETS 262 258   NEUTROS ABS 9.78* 8.72*   IMMATURE GRANS (ABS) 0.04 0.04   LYMPHS ABS 1.21 2.01   MONOS ABS 0.47 0.55   EOS ABS 0.01 0.11   MCV 93.3 94.5         Lab 01/20/24  0408 01/19/24  1302 01/19/24  1257   SODIUM 138  --  143   POTASSIUM 3.4*  --  4.4   CHLORIDE 104  --  105   CO2 22.0  --  29.2*   ANION GAP 12.0  --  8.8   BUN 15  --  15   CREATININE 0.78 0.90 0.99   EGFR 82.3  --  61.9   GLUCOSE 182*  --  127*   CALCIUM 8.7  --  9.7   MAGNESIUM 1.8  --   --    PHOSPHORUS 2.8  --   --          Lab 01/19/24  1257   TOTAL PROTEIN 7.4   ALBUMIN 3.9   GLOBULIN 3.5   ALT (SGPT) 10   AST (SGOT) 17   BILIRUBIN 0.4   ALK PHOS 85             Lab 01/20/24  0408   CHOLESTEROL 160   LDL CHOL 95   HDL CHOL 44   TRIGLYCERIDES 118         Lab 01/19/24  1257   ABO TYPING A   RH TYPING Positive   ANTIBODY SCREEN Negative         Brief Urine Lab Results  (Last result in the past 365 days)        Color    Clarity   Blood   Leuk Est   Nitrite   Protein   CREAT   Urine HCG        12/20/23 2003 Yellow   Clear   Negative   Negative   Negative   Negative                 Microbiology Results (last 10 days)       ** No results found for the last 240 hours. **             Diagnostic Imaging: I reviewed and independently interpreted the new imaging.     Assessment/Plan:  This is a 68-year-old female status post carotid artery stent placement and thrombectomy for tandem right ICA occlusions on 1/19.  Neurologically, the patient is improved. Post-op MRI does show a moderate-sized stroke in the right hemisphere.  We will continue twice daily Brilinta and daily aspirin.  Will get carotid Dopplers today.  Appreciate neurology and ICU assistance.      Any copied data from previous notes included in the (1) History of Present Illness, (2) Physical Examination and (3) Medical Decision Making and/or Assessment and Plan has been reviewed and is accurate as of 01/20/24      Efrain Hurley MD  01/20/24  09:14 EST

## 2024-01-20 NOTE — PLAN OF CARE
Goal Outcome Evaluation:  Plan of Care Reviewed With: patient        Progress: improving  Outcome Evaluation: OT eval complete. Pt presents w/ L sided weakness (UE>LE), cognitive deficits, and L visual deficits warranting cont skilled IPOT POC to promote return to PLOF. Recommend pt DC to IP rehab.      Anticipated Discharge Disposition (OT): inpatient rehabilitation facility

## 2024-01-20 NOTE — THERAPY EVALUATION
"Patient Name: Regine Beckham  : 1955    MRN: [id number]                              Today's Date: 2024       Admit Date: 2024    Visit Dx: No diagnosis found.  Patient Active Problem List   Diagnosis    Iron deficiency anemia due to chronic blood loss    Major depressive disorder    RLS (restless legs syndrome)    GERD (gastroesophageal reflux disease)    Left breast mass    Allergy to penicillin    Stroke    Grade I diastolic dysfunction     Past Medical History:   Diagnosis Date    Anemia     Anxiety     Arthritis     Depression     Legally blind     Restless leg syndrome     Sleep apnea     \"I don't use a cpap anymore since losing 106 lbs\"    Water retention      Past Surgical History:   Procedure Laterality Date    BACK SURGERY      GALLBLADDER SURGERY      HIP ARTHROSCOPY      JOINT REPLACEMENT Right       General Information       Row Name 24 1349          OT Time and Intention    Document Type evaluation  -CS     Mode of Treatment occupational therapy  -CS       Row Name 24 1349          General Information    Patient Profile Reviewed yes  -CS     Prior Level of Function independent:;all household mobility;ADL's  -CS     Existing Precautions/Restrictions fall;other (see comments)  L weakness (UE>LE); dysarthria; L inattention  -CS     Barriers to Rehab medically complex;cognitive status;visual deficit  -CS       Row Name 24 1349          Living Environment    People in Home alone  -CS       Row Name 24 1349          Cognition    Orientation Status (Cognition) oriented x 3  -CS       Row Name 24 1349          Safety Issues, Functional Mobility    Impairments Affecting Function (Mobility) balance;coordination;endurance/activity tolerance;shortness of breath;strength;postural/trunk control;sensation/sensory awareness;motor planning;muscle tone abnormal;cognition;grasp  -CS     Cognitive Impairments, Mobility Safety/Performance attention;insight into " deficits/self-awareness;sequencing abilities;judgment;problem-solving/reasoning;awareness, need for assistance;safety precaution awareness;safety precaution follow-through;impulsivity  -CS               User Key  (r) = Recorded By, (t) = Taken By, (c) = Cosigned By      Initials Name Provider Type    CS Maribel Valdez OT Occupational Therapist                     Mobility/ADL's       Row Name 01/20/24 1350          Bed Mobility    Bed Mobility supine-sit  -CS     Supine-Sit Bradley (Bed Mobility) maximum assist (25% patient effort);2 person assist;verbal cues  -     Assistive Device (Bed Mobility) draw sheet;head of bed elevated;bed rails  -       Row Name 01/20/24 1350          Transfers    Transfers sit-stand transfer;stand-sit transfer  -     Comment, (Transfers) BUE support w/ L knee blocked  -       Row Name 01/20/24 1350          Sit-Stand Transfer    Sit-Stand Bradley (Transfers) minimum assist (75% patient effort);2 person assist;verbal cues  -       Row Name 01/20/24 1350          Stand-Sit Transfer    Stand-Sit Bradley (Transfers) minimum assist (75% patient effort);2 person assist;verbal cues  -CS       Row Name 01/20/24 1350          Activities of Daily Living    BADL Assessment/Intervention lower body dressing;grooming  -CS       Row Name 01/20/24 1350          Lower Body Dressing Assessment/Training    Bradley Level (Lower Body Dressing) don;socks;dependent (less than 25% patient effort)  -CS     Position (Lower Body Dressing) supine  -CS       Row Name 01/20/24 1350          Grooming Assessment/Training    [place] Level (Grooming) wash face, hands;maximum assist (25% patient effort)  -CS     Position (Grooming) sitting up in bed  -CS               User Key  (r) = Recorded By, (t) = Taken By, (c) = Cosigned By      Initials Name Provider Type    CS Maribel Valdez OT Occupational Therapist                   Obj/Interventions       Row Name 01/20/24 8253           Sensory Assessment (Somatosensory)    Sensory Assessment (Somatosensory) left UE  -CS     Left UE Sensory Assessment light touch awareness;impaired  -CS       Row Name 01/20/24 1351          Vision Assessment/Intervention    Visual Impairment/Limitations peripheral vision impaired left  -CS     Visual Motor Impairment visual tracking, left  -CS     Visual Processing Deficit visual attention, left  -CS       Row Name 01/20/24 1351          Range of Motion Comprehensive    General Range of Motion bilateral upper extremity ROM WFL  -       Row Name 01/20/24 1351          Strength Comprehensive (MMT)    Comment, General Manual Muscle Testing (MMT) Assessment LUE shldr 2-/5, remainder grossly 0/5, RUE grossly 3+/5  -CS       Row Name 01/20/24 1351          Balance    Balance Assessment sitting static balance;sitting dynamic balance;standing static balance;standing dynamic balance  -CS     Static Sitting Balance minimal assist  -CS     Dynamic Sitting Balance moderate assist  -CS     Position, Sitting Balance sitting edge of bed  -CS     Static Standing Balance minimal assist;2-person assist  -CS     Dynamic Standing Balance moderate assist;2-person assist  -CS     Position/Device Used, Standing Balance supported  -CS     Balance Interventions sitting;standing;static;dynamic;dynamic reaching;occupation based/functional task;weight shifting activity  -CS               User Key  (r) = Recorded By, (t) = Taken By, (c) = Cosigned By      Initials Name Provider Type    CS Maribel Valdez OT Occupational Therapist                   Goals/Plan       Row Name 01/20/24 135          Transfer Goal 1 (OT)    Activity/Assistive Device (Transfer Goal 1, OT) sit-to-stand/stand-to-sit;toilet  -CS     [place] Level/Cues Needed (Transfer Goal 1, OT) minimum assist (75% or more patient effort)  -CS     Time Frame (Transfer Goal 1, OT) long term goal (LTG);10 days  -CS     Progress/Outcome (Transfer Goal 1, OT) goal ongoing  -CS        Row Name 01/20/24 [facility]          Dressing Goal 1 (OT)    Activity/Device (Dressing Goal 1, OT) upper body dressing  -CS     [place]/Cues Needed (Dressing Goal 1, OT) moderate assist (50-74% patient effort)  -CS     Time Frame (Dressing Goal 1, OT) long term goal (LTG);10 days  -CS     Strategies/Barriers (Dressing Goal 1, OT) dao-technique w/ hosp gown  -CS     Progress/Outcome (Dressing Goal 1, OT) goal ongoing  -CS       Row Name 01/20/24 [facility]          Grooming Goal 1 (OT)    Activity/Device (Grooming Goal 1, OT) hair care;wash face, hands  -CS     [place] (Grooming Goal 1, OT) minimum assist (75% or more patient effort)  -CS     Time Frame (Grooming Goal 1, OT) long term goal (LTG);10 days  -CS     Strategies/Barriers (Grooming Goal 1, OT) unsupported sitting  -CS     Progress/Outcome (Grooming Goal 1, OT) goal ongoing  -CS       Row Name 01/20/24 [facility]          Therapy Assessment/Plan (OT)    Planned Therapy Interventions (OT) activity tolerance training;adaptive equipment training;BADL retraining;functional balance retraining;occupation/activity based interventions;ROM/therapeutic exercise;strengthening exercise;transfer/mobility retraining;cognitive/visual perception retraining;neuromuscular control/coordination retraining  -CS               User Key  (r) = Recorded By, (t) = Taken By, (c) = Cosigned By      Initials Name Provider Type    CS Maribel Valdez, OT Occupational Therapist                   Clinical Impression       Row Name 01/20/24 [facility]          Pain Scale: FACES Pre/Post-Treatment    Pain: FACES Scale, Pretreatment 0-->no hurt  -CS     Posttreatment Pain Rating 0-->no hurt  -CS       Row Name 01/20/24 [facility]          Plan of Care Review    Plan of Care Reviewed With patient  -CS     Progress improving  -CS     Outcome Evaluation OT eval complete. Pt presents w/ L sided weakness (UE>LE), cognitive deficits, and L visual deficits warranting cont skilled IPOT POC to promote return to  PLOF. Recommend pt DC to IP rehab.  -CS       Row Name 01/20/24 1351          Therapy Assessment/Plan (OT)    Patient/Family Therapy Goal Statement (OT) Return to PLOF  -CS     Rehab Potential (OT) good, to achieve stated therapy goals  -CS     Criteria for Skilled Therapeutic Interventions Met (OT) yes;skilled treatment is necessary  -CS     Therapy Frequency (OT) daily  -CS       Row Name 01/20/24 1351          Therapy Plan Review/Discharge Plan (OT)    Anticipated Discharge Disposition (OT) inpatient rehabilitation facility  -CS       Row Name 01/20/24 1351          Vital Signs    Pre Systolic BP Rehab 129  -CS     Pre Treatment Diastolic BP 59  -CS     Post Systolic BP Rehab 124  -CS     Post Treatment Diastolic BP 51  -CS     Pretreatment Heart Rate (beats/min) 69  -CS     Posttreatment Heart Rate (beats/min) 78  -CS     Pre SpO2 (%) 98  -CS     O2 Delivery Pre Treatment room air  -CS     Post SpO2 (%) 99  -CS     O2 Delivery Post Treatment room air  -CS     Pre Patient Position Supine  -CS     Intra Patient Position Standing  -CS     Post Patient Position Sitting  -CS       Row Name 01/20/24 1351          Positioning and Restraints    Pre-Treatment Position in bed  -CS     Post Treatment Position bed  -CS     In Bed notified nsg;sitting EOB;with PT;call light within reach;encouraged to call for assist;with nsg  -CS               User Key  (r) = Recorded By, (t) = Taken By, (c) = Cosigned By      Initials Name Provider Type    CS Maribel Valdez, BLU Occupational Therapist                   Outcome Measures       Row Name 01/20/24 1353          How much help from another is currently needed...    Putting on and taking off regular lower body clothing? 1  -CS     Bathing (including washing, rinsing, and drying) 2  -CS     Toileting (which includes using toilet bed pan or urinal) 1  -CS     Putting on and taking off regular upper body clothing 2  -CS     Taking care of personal grooming (such as brushing teeth) 2   -CS     Eating meals 2  -CS     AM-PAC 6 Clicks Score (OT) 10  -CS       Row Name 01/20/24 1220 01/20/24 0800       How much help from another person do you currently need...    Turning from your back to your side while in flat bed without using bedrails? 2  -LS 3  -DM    Moving from lying on back to sitting on the side of a flat bed without bedrails? 2  -LS 3  -DM    Moving to and from a bed to a chair (including a wheelchair)? 2  -LS 2  -DM    Standing up from a chair using your arms (e.g., wheelchair, bedside chair)? 3  -LS 2  -DM    Climbing 3-5 steps with a railing? 1  -LS 2  -DM    To walk in hospital room? 2  -LS 2  -DM    AM-PAC 6 Clicks Score (PT) 12  -LS 14  -DM    Highest Level of Mobility Goal 4 --> Transfer to chair/commode  -LS 4 --> Transfer to chair/commode  -DM      Row Name 01/20/24 1353 01/20/24 1220       Modified Walker Scale    Pre-Stroke Modified Walker Scale 6 - Unable to determine (UTD) from the medical record documentation  -CS 6 - Unable to determine (UTD) from the medical record documentation  -LS    Modified Suzette Scale 4 - Moderately severe disability.  Unable to walk without assistance, and unable to attend to own bodily needs without assistance.  -CS 4 - Moderately severe disability.  Unable to walk without assistance, and unable to attend to own bodily needs without assistance.  -      Row Name 01/20/24 1353 01/20/24 1220       Functional Assessment    Outcome Measure Options AM-PAC 6 Clicks Daily Activity (OT);Modified Walker  -CS AM-PAC 6 Clicks Basic Mobility (PT);Modified Walker  -LS              User Key  (r) = Recorded By, (t) = Taken By, (c) = Cosigned By      Initials Name Provider Type    Nikole Davis, PT Physical Therapist    Maribel Sterling, OT Occupational Therapist    Keyona Baig, RN Registered Nurse                    Occupational Therapy Education       Title: PT OT SLP Therapies (In Progress)       Topic: Occupational Therapy (In Progress)        Point: ADL training (Done)       Description:   Instruct learner(s) on proper safety adaptation and remediation techniques during self care or transfers.   Instruct in proper use of assistive devices.                  Learning Progress Summary             Patient Acceptance, E, VU by  at 1/20/2024 1354                         Point: Home exercise program (Not Started)       Description:   Instruct learner(s) on appropriate technique for monitoring, assisting and/or progressing therapeutic exercises/activities.                  Learner Progress:  Not documented in this visit.              Point: Precautions (Done)       Description:   Instruct learner(s) on prescribed precautions during self-care and functional transfers.                  Learning Progress Summary             Patient Acceptance, E, VU by  at 1/20/2024 1354                         Point: Body mechanics (Done)       Description:   Instruct learner(s) on proper positioning and spine alignment during self-care, functional mobility activities and/or exercises.                  Learning Progress Summary             Patient Acceptance, E, VU by  at 1/20/2024 1354                                         User Key       Initials Effective Dates Name Provider Type Discipline     09/02/21 -  Maribel Valdez OT Occupational Therapist OT                  OT Recommendation and Plan  Planned Therapy Interventions (OT): activity tolerance training, adaptive equipment training, BADL retraining, functional balance retraining, occupation/activity based interventions, ROM/therapeutic exercise, strengthening exercise, transfer/mobility retraining, cognitive/visual perception retraining, neuromuscular control/coordination retraining  Therapy Frequency (OT): daily  Plan of Care Review  Plan of Care Reviewed With: patient  Progress: improving  Outcome Evaluation: OT eval complete. Pt presents w/ L sided weakness (UE>LE), cognitive deficits, and L visual deficits  warranting cont skilled IPOT POC to promote return to PLOF. Recommend pt DC to IP rehab.     Time Calculation:   Evaluation Complexity (OT)  Review Occupational Profile/Medical/Therapy History Complexity: expanded/moderate complexity  Assessment, Occupational Performance/Identification of Deficit Complexity: 5 or more performance deficits  Clinical Decision Making Complexity (OT): detailed assessment/moderate complexity  Overall Complexity of Evaluation (OT): moderate complexity     Time Calculation- OT       Row Name 01/20/24 1354             Time Calculation- OT    OT Start Time 1001  -CS      OT Received On 01/20/24  -CS      OT Goal Re-Cert Due Date 01/30/24  -CS         Untimed Charges    OT Eval/Re-eval Minutes 46  -CS         Total Minutes    Untimed Charges Total Minutes 46  -CS       Total Minutes 46  -CS                User Key  (r) = Recorded By, (t) = Taken By, (c) = Cosigned By      Initials Name Provider Type    CS Maribel Valdez OT Occupational Therapist                  Therapy Charges for Today       Code Description Service Date Service Provider Modifiers Qty    [id number] HC OT EVAL MOD COMPLEXITY 4 1/20/2024 Maribel Valdez OT GO 1                 Maribel Valdez OT  1/20/2024

## 2024-01-21 ENCOUNTER — APPOINTMENT (OUTPATIENT)
Dept: CT IMAGING | Facility: HOSPITAL | Age: 69
DRG: 023 | End: 2024-01-21
Payer: MEDICARE

## 2024-01-21 ENCOUNTER — APPOINTMENT (OUTPATIENT)
Dept: GENERAL RADIOLOGY | Facility: HOSPITAL | Age: 69
DRG: 023 | End: 2024-01-21
Payer: MEDICARE

## 2024-01-21 LAB
ANION GAP SERPL CALCULATED.3IONS-SCNC: 11 MMOL/L (ref 5–15)
BASOPHILS # BLD AUTO: 0.04 10*3/MM3 (ref 0–0.2)
BASOPHILS NFR BLD AUTO: 0.3 % (ref 0–1.5)
BUN SERPL-MCNC: 10 MG/DL (ref 8–23)
BUN/CREAT SERPL: 14.9 (ref 7–25)
CALCIUM SPEC-SCNC: 9.1 MG/DL (ref 8.6–10.5)
CHLORIDE SERPL-SCNC: 107 MMOL/L (ref 98–107)
CO2 SERPL-SCNC: 21 MMOL/L (ref 22–29)
CREAT SERPL-MCNC: 0.67 MG/DL (ref 0.57–1)
DEPRECATED RDW RBC AUTO: 44.5 FL (ref 37–54)
EGFRCR SERPLBLD CKD-EPI 2021: 94.7 ML/MIN/1.73
EOSINOPHIL # BLD AUTO: 0.05 10*3/MM3 (ref 0–0.4)
EOSINOPHIL NFR BLD AUTO: 0.3 % (ref 0.3–6.2)
ERYTHROCYTE [DISTWIDTH] IN BLOOD BY AUTOMATED COUNT: 12.8 % (ref 12.3–15.4)
GLUCOSE BLDC GLUCOMTR-MCNC: 125 MG/DL (ref 70–130)
GLUCOSE BLDC GLUCOMTR-MCNC: 140 MG/DL (ref 70–130)
GLUCOSE BLDC GLUCOMTR-MCNC: 146 MG/DL (ref 70–130)
GLUCOSE BLDC GLUCOMTR-MCNC: 207 MG/DL (ref 70–130)
GLUCOSE SERPL-MCNC: 173 MG/DL (ref 65–99)
HCT VFR BLD AUTO: 38.7 % (ref 34–46.6)
HGB BLD-MCNC: 13.2 G/DL (ref 12–15.9)
IMM GRANULOCYTES # BLD AUTO: 0.06 10*3/MM3 (ref 0–0.05)
IMM GRANULOCYTES NFR BLD AUTO: 0.4 % (ref 0–0.5)
LYMPHOCYTES # BLD AUTO: 1.77 10*3/MM3 (ref 0.7–3.1)
LYMPHOCYTES NFR BLD AUTO: 11.9 % (ref 19.6–45.3)
MCH RBC QN AUTO: 32.4 PG (ref 26.6–33)
MCHC RBC AUTO-ENTMCNC: 34.1 G/DL (ref 31.5–35.7)
MCV RBC AUTO: 95.1 FL (ref 79–97)
MONOCYTES # BLD AUTO: 1.17 10*3/MM3 (ref 0.1–0.9)
MONOCYTES NFR BLD AUTO: 7.9 % (ref 5–12)
NEUTROPHILS NFR BLD AUTO: 11.75 10*3/MM3 (ref 1.7–7)
NEUTROPHILS NFR BLD AUTO: 79.2 % (ref 42.7–76)
NRBC BLD AUTO-RTO: 0 /100 WBC (ref 0–0.2)
PLATELET # BLD AUTO: 256 10*3/MM3 (ref 140–450)
PMV BLD AUTO: 9.7 FL (ref 6–12)
POTASSIUM SERPL-SCNC: 3.7 MMOL/L (ref 3.5–5.2)
QT INTERVAL: 386 MS
QTC INTERVAL: 490 MS
RBC # BLD AUTO: 4.07 10*6/MM3 (ref 3.77–5.28)
SODIUM SERPL-SCNC: 139 MMOL/L (ref 136–145)
WBC NRBC COR # BLD AUTO: 14.84 10*3/MM3 (ref 3.4–10.8)

## 2024-01-21 PROCEDURE — 82948 REAGENT STRIP/BLOOD GLUCOSE: CPT

## 2024-01-21 PROCEDURE — 25010000002 NICARDIPINE 2.5 MG/ML SOLUTION 10 ML VIAL

## 2024-01-21 PROCEDURE — 92610 EVALUATE SWALLOWING FUNCTION: CPT

## 2024-01-21 PROCEDURE — 71045 X-RAY EXAM CHEST 1 VIEW: CPT

## 2024-01-21 PROCEDURE — 70450 CT HEAD/BRAIN W/O DYE: CPT

## 2024-01-21 PROCEDURE — 85025 COMPLETE CBC W/AUTO DIFF WBC: CPT

## 2024-01-21 PROCEDURE — 99232 SBSQ HOSP IP/OBS MODERATE 35: CPT | Performed by: NURSE PRACTITIONER

## 2024-01-21 PROCEDURE — 99233 SBSQ HOSP IP/OBS HIGH 50: CPT | Performed by: INTERNAL MEDICINE

## 2024-01-21 PROCEDURE — 94799 UNLISTED PULMONARY SVC/PX: CPT

## 2024-01-21 PROCEDURE — 99024 POSTOP FOLLOW-UP VISIT: CPT | Performed by: STUDENT IN AN ORGANIZED HEALTH CARE EDUCATION/TRAINING PROGRAM

## 2024-01-21 PROCEDURE — 80048 BASIC METABOLIC PNL TOTAL CA: CPT

## 2024-01-21 PROCEDURE — 92523 SPEECH SOUND LANG COMPREHEN: CPT

## 2024-01-21 PROCEDURE — 25810000003 SODIUM CHLORIDE 0.9 % SOLUTION 250 ML FLEX CONT

## 2024-01-21 RX ORDER — ACETAMINOPHEN 325 MG/1
650 TABLET ORAL EVERY 4 HOURS PRN
Status: DISCONTINUED | OUTPATIENT
Start: 2024-01-21 | End: 2024-01-30

## 2024-01-21 RX ORDER — ROPINIROLE 1 MG/1
4 TABLET, FILM COATED ORAL NIGHTLY
Status: DISCONTINUED | OUTPATIENT
Start: 2024-01-21 | End: 2024-01-30

## 2024-01-21 RX ORDER — GUAIFENESIN 200 MG/10ML
400 LIQUID ORAL EVERY 4 HOURS
Status: DISCONTINUED | OUTPATIENT
Start: 2024-01-22 | End: 2024-01-26

## 2024-01-21 RX ORDER — LOSARTAN POTASSIUM 50 MG/1
50 TABLET ORAL
Status: DISCONTINUED | OUTPATIENT
Start: 2024-01-21 | End: 2024-01-30

## 2024-01-21 RX ORDER — GUAIFENESIN 200 MG/10ML
400 LIQUID ORAL EVERY 4 HOURS PRN
Status: DISCONTINUED | OUTPATIENT
Start: 2024-01-21 | End: 2024-01-21

## 2024-01-21 RX ORDER — IPRATROPIUM BROMIDE AND ALBUTEROL SULFATE 2.5; .5 MG/3ML; MG/3ML
3 SOLUTION RESPIRATORY (INHALATION)
Status: DISCONTINUED | OUTPATIENT
Start: 2024-01-21 | End: 2024-02-02 | Stop reason: HOSPADM

## 2024-01-21 RX ADMIN — NICARDIPINE HYDROCHLORIDE 7.5 MG/HR: 25 INJECTION, SOLUTION INTRAVENOUS at 08:41

## 2024-01-21 RX ADMIN — PHENOL 1 SPRAY: 1.4 SPRAY ORAL at 20:57

## 2024-01-21 RX ADMIN — PHENOL 1 SPRAY: 1.4 SPRAY ORAL at 00:02

## 2024-01-21 RX ADMIN — GUAIFENESIN 400 MG: 200 SOLUTION ORAL at 08:17

## 2024-01-21 RX ADMIN — TICAGRELOR 60 MG: 60 TABLET ORAL at 18:03

## 2024-01-21 RX ADMIN — LOSARTAN POTASSIUM 50 MG: 50 TABLET, FILM COATED ORAL at 13:58

## 2024-01-21 RX ADMIN — Medication 10 ML: at 08:17

## 2024-01-21 RX ADMIN — TICAGRELOR 60 MG: 60 TABLET ORAL at 05:30

## 2024-01-21 RX ADMIN — ACETAMINOPHEN 650 MG: 325 TABLET ORAL at 18:03

## 2024-01-21 RX ADMIN — PHENOL 1 SPRAY: 1.4 SPRAY ORAL at 18:03

## 2024-01-21 RX ADMIN — ROPINIROLE HYDROCHLORIDE 4 MG: 2 TABLET, FILM COATED ORAL at 20:54

## 2024-01-21 RX ADMIN — GUAIFENESIN 400 MG: 200 SOLUTION ORAL at 12:01

## 2024-01-21 RX ADMIN — NICARDIPINE HYDROCHLORIDE 7.5 MG/HR: 25 INJECTION, SOLUTION INTRAVENOUS at 05:30

## 2024-01-21 RX ADMIN — NICARDIPINE HYDROCHLORIDE 7.5 MG/HR: 25 INJECTION, SOLUTION INTRAVENOUS at 03:06

## 2024-01-21 RX ADMIN — Medication 1 PATCH: at 08:17

## 2024-01-21 RX ADMIN — ATORVASTATIN CALCIUM 80 MG: 40 TABLET, FILM COATED ORAL at 20:54

## 2024-01-21 RX ADMIN — PANTOPRAZOLE SODIUM 40 MG: 40 INJECTION, POWDER, FOR SOLUTION INTRAVENOUS at 05:30

## 2024-01-21 RX ADMIN — ACETAMINOPHEN 650 MG: 325 TABLET ORAL at 13:57

## 2024-01-21 RX ADMIN — ASPIRIN 325 MG ORAL TABLET 325 MG: 325 PILL ORAL at 08:17

## 2024-01-21 RX ADMIN — NICARDIPINE HYDROCHLORIDE 7.5 MG/HR: 25 INJECTION, SOLUTION INTRAVENOUS at 12:01

## 2024-01-21 RX ADMIN — Medication 10 ML: at 20:55

## 2024-01-21 RX ADMIN — MIRTAZAPINE 30 MG: 15 TABLET, FILM COATED ORAL at 20:54

## 2024-01-21 RX ADMIN — GUAIFENESIN 400 MG: 200 SOLUTION ORAL at 00:02

## 2024-01-21 RX ADMIN — ACETAMINOPHEN 650 MG: 325 TABLET ORAL at 04:21

## 2024-01-21 RX ADMIN — NICARDIPINE HYDROCHLORIDE 7.5 MG/HR: 25 INJECTION, SOLUTION INTRAVENOUS at 00:01

## 2024-01-21 RX ADMIN — IPRATROPIUM BROMIDE AND ALBUTEROL SULFATE 3 ML: 2.5; .5 SOLUTION RESPIRATORY (INHALATION) at 22:41

## 2024-01-21 RX ADMIN — PHENOL 1 SPRAY: 1.4 SPRAY ORAL at 04:21

## 2024-01-21 RX ADMIN — GUAIFENESIN 400 MG: 200 SOLUTION ORAL at 04:21

## 2024-01-21 RX ADMIN — ALBUTEROL SULFATE 2.5 MG: 2.5 SOLUTION RESPIRATORY (INHALATION) at 20:51

## 2024-01-21 RX ADMIN — GUAIFENESIN 400 MG: 200 SOLUTION ORAL at 20:54

## 2024-01-21 NOTE — PROGRESS NOTES
"Critical Care Note     LOS: 2 days   Patient Care Team:  Dread Burton MD as PCP - General (Family Medicine)  Daina Fletcher MD as Consulting Physician (Hematology)    Chief Complaint/Reason for visit:      Acute ischemic stroke, right hemisphere  Right ICA stent post thrombectomy  Hypertension  Atrial fibrillation, new    Subjective     Interval History:     Currently on Cardene 7.5 mg/h for blood pressure control.  Afebrile.  Remains in sinus rhythm.  Continues to have severe dysarthria.    Review of Systems:    All systems were reviewed and negative except as noted in subjective.    Medical history, surgical history, social history, family history reviewed    Objective     Intake/Output:    Intake/Output Summary (Last 24 hours) at 1/21/2024 1237  Last data filed at 1/21/2024 1201  Gross per 24 hour   Intake 2217.5 ml   Output 775 ml   Net 1442.5 ml       Nutrition:  NPO Diet NPO Type: Strict NPO    Infusions:  niCARdipine, 5-15 mg/hr, Last Rate: 7.5 mg/hr (01/21/24 1201)            Telemetry: Sinus rhythm             Vital Signs  Blood pressure 122/60, pulse 86, temperature 97.4 °F (36.3 °C), temperature source Axillary, resp. rate 18, height 167.6 cm (65.98\"), weight 70.1 kg (154 lb 8.7 oz), SpO2 98%, not currently breastfeeding.    Physical Exam:  General Appearance:  Older woman supine in bed in no distress   Head:  No visible trauma   Eyes:          Eyes gazing toward the left   Ears:     Throat: No thrush   Neck: Trachea midline, no crepitus   Back:      Lungs:   Symmetric chest expansion without wheeze or rhonchi    Heart:  Irregular rhythm, S1, S2 auscultated   Abdomen:   Bowel sounds present, soft   Rectal:   Deferred   Extremities: Catheterization site without hematoma   Pulses: Palpable distal pulses   Skin: Warm and dry   Lymph nodes: No cervical adenopathy   Neurologic: Tells me her name.  Eyes deviated to the right.  Weak left upper extremity, flaccid, lower extremity can move but drifts.  " Severe dysarthria, follows commands with the right side, neglecting the right side    Interval:  (handoff)  1a. Level of Consciousness: 0-->Alert, keenly responsive  1b. LOC Questions: 0-->Answers both questions correctly  1c. LOC Commands: 0-->Performs both tasks correctly  2. Best Gaze: 1-->Partial gaze palsy, gaze is abnormal in one or both eyes, but forced deviation or total gaze paresis is not present  3. Visual: 1-->Partial hemianopia  4. Facial Palsy: 1-->Minor paralysis (flattened nasolabial fold, asymmetry on smiling)  5a. Motor Arm, Left: 2-->Some effort against gravity, limb cannot get to or maintain (if cued) 90 (or 45) degrees, drifts down to bed, but has some effort against gravity  5b. Motor Arm, Right: 0-->No drift, limb holds 90 (or 45) degrees for full 10 secs  6a. Motor Leg, Left: 1-->Drift, leg falls by the end of the 5-sec period but does not hit bed  6b. Motor Leg, Right: 0-->No drift, leg holds 30 degree position for full 5 secs  7. Limb Ataxia: 0-->Absent  8. Sensory: 2-->Severe to total sensory loss, patient is not aware of being touched in the face, arm, and leg  9. Best Language: 1-->Mild-to-moderate aphasia, some obvious loss of fluency or facility of comprehension, without significant limitation on ideas expressed or form of expression. Reduction of speech and/or comprehension, however, makes conversation. . . (see row details)  10. Dysarthria: 1-->Mild-to-moderate dysarthria, patient slurs at least some words and, at worst, can be understood with some difficulty  11. Extinction and Inattention (formerly Neglect): 2-->Profound dao-inattention/extinction more than 1 modality    Total (NIH Stroke Scale): 12   Results Review:     I reviewed the patient's new clinical results.   Results from last 7 days   Lab Units 01/21/24  0227 01/20/24  1530 01/20/24  0408 01/19/24  1302 01/19/24  1257   SODIUM mmol/L 139  --  138  --  143   POTASSIUM mmol/L 3.7 4.1 3.4*  --  4.4   CHLORIDE mmol/L 107   "--  104  --  105   CO2 mmol/L 21.0*  --  22.0  --  29.2*   BUN mg/dL 10  --  15  --  15   CREATININE mg/dL 0.67  --  0.78 0.90 0.99   CALCIUM mg/dL 9.1  --  8.7  --  9.7   BILIRUBIN mg/dL  --   --   --   --  0.4   ALK PHOS U/L  --   --   --   --  85   ALT (SGPT) U/L  --   --   --   --  10   AST (SGOT) U/L  --   --   --   --  17   GLUCOSE mg/dL 173*  --  182*  --  127*     Results from last 7 days   Lab Units 01/21/24  0227 01/20/24  0408 01/19/24  1257   WBC 10*3/mm3 14.84* 11.53* 11.47*   HEMOGLOBIN g/dL 13.2 13.5 15.1   HEMATOCRIT % 38.7 39.1 45.0   PLATELETS 10*3/mm3 256 262 258         No results found for: \"BLOODCX\"  No results found for: \"URINECX\"    I reviewed the patient's new imaging including images and reports.    XR CHEST 1 VW    Date of Exam: 1/21/2024 3:20 AM EST    Indication: Persistent cough    Comparison: January 20, 2024    Findings:  An NG tube has its tip below the diaphragm. The lungs are clear. The heart and mediastinal contours appear normal. There is no pleural effusion. The pulmonary vasculature appears normal. The osseous structures appear intact.   Impression:     Impression:  No acute cardiopulmonary process.      Electronically Signed: Ravi Hart MD   1/21/2024 8:55 AM EST       CT HEAD WO CONTRAST    Date of Exam: 1/20/2024 4:50 AM EST    Indication: Stroke, follow up.    Comparison: CT head from earlier today    Technique: Axial CT images were obtained of the head without contrast administration.  Automated exposure control and iterative construction methods were used.      Findings:  There is an evolving right MCA territory subacute infarction. There is no hemorrhagic transformation. The ventricles are stable in caliber, with no midline shift. The basal cisterns appear patent. Degenerative changes appear stable.    The osseous structures appear intact. There is mild mucosal disease in the right sphenoid sinus. The mastoid air cells are well aerated.   Impression:   "   Impression:  1.Evolving subacute infarction within right MCA territory. No acute herniation.  2.No hemorrhagic transformation.        Electronically Signed: Ravi Hart MD   1/20/2024 10:48 AM EST       All medications reviewed.   aspirin, 325 mg, Nasogastric, Daily   Or  aspirin, 300 mg, Rectal, Daily  atorvastatin, 80 mg, Nasogastric, Nightly  [Held by provider] Mirabegron ER, 50 mg, Oral, Daily  mirtazapine, 30 mg, Nasogastric, Nightly  nicotine, 1 patch, Transdermal, Q24H  pantoprazole, 40 mg, Intravenous, Q AM  rOPINIRole, 4 mg, Nasogastric, Nightly  sodium chloride, 10 mL, Intravenous, Q12H  ticagrelor, 60 mg, Nasogastric, BID          Assessment & Plan       Stroke    Iron deficiency anemia due to chronic blood loss    Major depressive disorder    RLS (restless legs syndrome)    GERD (gastroesophageal reflux disease)    Grade I diastolic dysfunction    68-year-old woman with hypertension, chronic anemia, sleep apnea intolerant of CPAP, severe depression with recent overdose attempt, legal blindness, presenting with left-sided weakness and found to have right middle cerebral artery ischemia and 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  She underwent thrombectomy and carotid stent.  She did not receive thrombolysis because she was outside the time window.  This morning her NIH stroke score is a 12 compared to 19 on admission.  Systolic blood pressure is 110-135, Cardene has been restarted and is currently at 7.5 mg/h    and Cardene has been tapered off.    She does take losartan, hydrochlorothiazide, spironolactone as an outpatient.  Will avoid diuretics for now as I wish to avoid dehydration.   She has a known history of iron deficiency anemia due to chronic blood loss.  This morning her hemoglobin is normal at 13.5.  In February 2022 her hemoglobin was 9.9.  She apparently has a previous history of gastric sleeve.  She received some IV iron infusion in 2020.  She was hospitalized December 21  through December 24 for severe depression with suicidal ideation, overdose of Ambien.  She is currently taking mirabegron  Risk factors for stroke include tobacco use, hypertension.  Her A1c is 6.2.  Lipid panel revealed a total cholesterol of 160.Today glucose 140-207.    PLAN:    PT, OT, speech therapy  Aspirin, statin  Brilinta  Nicotine patch  Start losartan  Monitor blood pressure, as needed Apresoline, Cardene systolic greater than 180  Monitor NIH stroke scale  Consider tube feeding, although NIHSS is improving, still has dysarthria  SS insulin    VTE Prophylaxis:SCDS    Stress Ulcer Prophylaxis:none    Vangie López MD  01/21/24  12:37 EST      Time: 25 minutes.

## 2024-01-21 NOTE — PLAN OF CARE
Goal Outcome Evaluation:  Plan of Care Reviewed With: patient        Progress: improving         Anticipated Discharge Disposition (SLP): inpatient rehabilitation facility    SLP Diagnosis: cognitive-linguistic disorder, severe, dysarthria (01/21/24 0830)  SLP Diagnosis Comments: Will need further dxtx when pt better able to participate/less lethargic. Suspect that difficulty with language tasks are more cognitive based. (01/21/24 0830)  SLP Swallowing Diagnosis: R/O pharyngeal dysphagia (01/21/24 0830)

## 2024-01-21 NOTE — PROGRESS NOTES
"NEUROSURGERY PROGRESS NOTE    Chief Complaint: Stroke    Subjective: Stable overnight.  Neurological exam slightly improved.    Objective    Vital Signs: Blood pressure 110/58, pulse 89, temperature 97.9 °F (36.6 °C), temperature source Oral, resp. rate 20, height 167.6 cm (65.98\"), weight 70.1 kg (154 lb 8.7 oz), SpO2 97%, not currently breastfeeding.    Physical Exam  Awake, alert and conversive  Opens eyes spont  Pupils 3 mm reactive bilaterally  Right gaze deviation, but is able to cross midline to the left.  Extraocular muscles intact bilaterally  Left facial droop.  5 out of 5 in the right upper and right lower extremities.  She is antigravity in the left upper and left lower.    Intake/Output:   Intake/Output Summary (Last 24 hours) at 1/21/2024 0934  Last data filed at 1/21/2024 0800  Gross per 24 hour   Intake 2069.7 ml   Output 775 ml   Net 1294.7 ml       Current Medications:   Current Facility-Administered Medications:     acetaminophen (TYLENOL) tablet 650 mg, 650 mg, Nasogastric, Q6H PRN, Valdemar Zaidi, APRN, 650 mg at 01/21/24 0421    albuterol (PROVENTIL) nebulizer solution 0.083% 2.5 mg/3mL, 2.5 mg, Nebulization, Q6H PRN, Cyndee Ardon APRN, 2.5 mg at 01/20/24 2243    aspirin tablet 325 mg, 325 mg, Nasogastric, Daily, 325 mg at 01/21/24 0817 **OR** aspirin suppository 300 mg, 300 mg, Rectal, Daily, Vangie López MD    atorvastatin (LIPITOR) tablet 80 mg, 80 mg, Nasogastric, Nightly, Efrain Hurley MD, 80 mg at 01/20/24 2042    Calcium Replacement - Follow Nurse / BPA Driven Protocol, , Does not apply, PRN, Vangie López MD    guaifenesin (ROBITUSSIN) 100 MG/5ML liquid 400 mg, 400 mg, Nasogastric, Q4H PRN, Melanie Salmon, PharmD, 400 mg at 01/21/24 0817    hydrALAZINE (APRESOLINE) injection 20 mg, 20 mg, Intravenous, Q4H PRN, Cyndee Ardon, APRN, 20 mg at 01/19/24 1913    Magnesium Standard Dose Replacement - Follow Nurse / BPA Driven Protocol, , Does not apply, PRN, " Vangie López MD    [Held by provider] Mirabegron ER (MYRBETRIQ) 24 hr tablet 50 mg, 50 mg, Oral, Daily, Vangie López MD    mirtazapine (REMERON) tablet 30 mg, 30 mg, Nasogastric, Nightly, Vangie López MD, 30 mg at 01/20/24 2042    niCARdipine (CARDENE) 25mg in 250mL NS infusion, 5-15 mg/hr, Intravenous, Titrated, Jair Quinteros PA-C, Last Rate: 75 mL/hr at 01/21/24 0841, 7.5 mg/hr at 01/21/24 0841    nicotine (NICODERM CQ) 21 MG/24HR patch 1 patch, 1 patch, Transdermal, Q24H, Gm Marie MD, 1 patch at 01/21/24 0817    pantoprazole (PROTONIX) injection 40 mg, 40 mg, Intravenous, Q AM, Melanie Salmon PharmD, 40 mg at 01/21/24 0530    phenol (CHLORASEPTIC) 1.4 % liquid 1 spray, 1 spray, Mouth/Throat, Q2H PRN, Cyndee Ardon, APRN, 1 spray at 01/21/24 0421    Phosphorus Replacement - Follow Nurse / BPA Driven Protocol, , Does not apply, PRN, Vangie López MD    Potassium Replacement - Follow Nurse / BPA Driven Protocol, , Does not apply, PRN, Vangie López MD    rOPINIRole (REQUIP) tablet 4 mg, 4 mg, Nasogastric, Nightly, Melanie Salmon, PharmD    sodium chloride 0.9 % flush 10 mL, 10 mL, Intravenous, Q12H, Jair Quinteros PA-C, 10 mL at 01/21/24 0817    sodium chloride 0.9 % flush 10 mL, 10 mL, Intravenous, PRN, Jair Quinteros PA-C    sodium chloride 0.9 % infusion 40 mL, 40 mL, Intravenous, PRN, Jair Quinteros PA-C    ticagrelor (BRILINTA) tablet 60 mg, 60 mg, Nasogastric, BID, Efrain Hurley MD, 60 mg at 01/21/24 0530     Laboratory Results:       Lab 01/21/24  0227 01/20/24  2224 01/20/24  0408 01/19/24  1257   WBC 14.84*  --  11.53* 11.47*   HEMOGLOBIN 13.2  --  13.5 15.1   HEMATOCRIT 38.7  --  39.1 45.0   PLATELETS 256  --  262 258   NEUTROS ABS 11.75*  --  9.78* 8.72*   IMMATURE GRANS (ABS) 0.06*  --  0.04 0.04   LYMPHS ABS 1.77  --  1.21 2.01   MONOS ABS 1.17*  --  0.47 0.55   EOS ABS 0.05  --  0.01 0.11   MCV 95.1  --  93.3 94.5   PROCALCITONIN  --   0.15  --   --          Lab 01/21/24  0227 01/20/24  1530 01/20/24  0408 01/19/24  1302 01/19/24  1257   SODIUM 139  --  138  --  143   POTASSIUM 3.7 4.1 3.4*  --  4.4   CHLORIDE 107  --  104  --  105   CO2 21.0*  --  22.0  --  29.2*   ANION GAP 11.0  --  12.0  --  8.8   BUN 10  --  15  --  15   CREATININE 0.67  --  0.78 0.90 0.99   EGFR 94.7  --  82.3  --  61.9   GLUCOSE 173*  --  182*  --  127*   CALCIUM 9.1  --  8.7  --  9.7   MAGNESIUM  --   --  1.8  --   --    PHOSPHORUS  --   --  2.8  --   --    HEMOGLOBIN A1C  --   --  6.20*  --   --          Lab 01/19/24  1257   TOTAL PROTEIN 7.4   ALBUMIN 3.9   GLOBULIN 3.5   ALT (SGPT) 10   AST (SGOT) 17   BILIRUBIN 0.4   ALK PHOS 85             Lab 01/20/24  0408   CHOLESTEROL 160   LDL CHOL 95   HDL CHOL 44   TRIGLYCERIDES 118         Lab 01/19/24  1257   ABO TYPING A   RH TYPING Positive   ANTIBODY SCREEN Negative         Brief Urine Lab Results  (Last result in the past 365 days)        Color   Clarity   Blood   Leuk Est   Nitrite   Protein   CREAT   Urine HCG        12/20/23 2003 Yellow   Clear   Negative   Negative   Negative   Negative                 Microbiology Results (last 10 days)       ** No results found for the last 240 hours. **             Diagnostic Imaging: I reviewed and independently interpreted the new imaging.     Assessment/Plan:  This is a 68-year-old female status post carotid artery stent placement and thrombectomy for tandem right ICA occlusions on 1/19.  Neurologically, the patient is improved. Post-op MRI does show a moderate-sized stroke in the right hemisphere.  We will continue twice daily Brilinta and daily aspirin.  Carotid Doppler showed good patency of stent.  Appreciate neurology and ICU assistance.  We will plan for the patient to follow-up in the neurosurgery clinic in 4 weeks.  Please call with any questions.      Any copied data from previous notes included in the (1) History of Present Illness, (2) Physical Examination and (3)  Medical Decision Making and/or Assessment and Plan has been reviewed and is accurate as of 01/21/24      Efrain Hurley MD  01/21/24  09:34 EST

## 2024-01-21 NOTE — THERAPY EVALUATION
"Acute Care - Speech Language Pathology Initial Evaluation  [facility]  Cognitive-Communication Evaluation  + Clinical Swallow Evaluation       Patient Name: Regine Beckham  : 1955  MRN: [id number]  Today's Date: 2024               Admit Date: 2024     Visit Dx:    ICD-10-CM ICD-9-CM   1. Dysphagia, unspecified type  R13.10 787.20   2. Dysarthria  R47.1 784.51   3. Cognitive communication deficit  R41.841 799.52     Patient Active Problem List   Diagnosis    Iron deficiency anemia due to chronic blood loss    Major depressive disorder    RLS (restless legs syndrome)    GERD (gastroesophageal reflux disease)    Left breast mass    Allergy to penicillin    Stroke    Grade I diastolic dysfunction     Past Medical History:   Diagnosis Date    Anemia     Anxiety     Arthritis     Depression     Legally blind     Restless leg syndrome     Sleep apnea     \"I don't use a cpap anymore since losing 106 lbs\"    Water retention      Past Surgical History:   Procedure Laterality Date    BACK SURGERY      GALLBLADDER SURGERY      HIP ARTHROSCOPY      JOINT REPLACEMENT Right        SLP Recommendation and Plan  SLP Diagnosis: cognitive-linguistic disorder, severe, dysarthria (24)  SLP Diagnosis Comments: Will need further dxtx when pt better able to participate/less lethargic. Suspect that difficulty with language tasks are more cognitive based. (24)        Swallow Criteria for Skilled Therapeutic Interventions Met: demonstrates skilled criteria (24)  SLC Criteria for Skilled Therapy Interventions Met: yes (24)  Anticipated Discharge Disposition (SLP): inpatient rehabilitation facility (24)     Therapy Frequency (Swallow): 5 days per week (24)  Therapy Frequency (SLP SLC): 5 days per week (24)  Predicted Duration Therapy Intervention (Days): until discharge (24)  Oral Care Recommendations: Oral Care BID/PRN, Suction " toothbrush (01/21/24 0830)                          Plan of Care Reviewed With: patient (01/21/24 0921)  Progress: improving (01/21/24 0921)      SLP EVALUATION (last 72 hours)       SLP SLC Evaluation       Row Name 01/21/24 0830                   General Information    Prior Level of Function-Communication WFL  -RS        Plans/Goals Discussed with patient  -RS        Patient's Goals for Discharge patient did not state  -RS           Pain    Additional Documentation Pain Scale: FACES Pre/Post-Treatment (Group)  -RS           Pain Scale: FACES Pre/Post-Treatment    Pain: FACES Scale, Pretreatment 4-->hurts little more  -RS        Posttreatment Pain Rating 4-->hurts little more  -RS        Pain Location - Side/Orientation Bilateral  -RS        Pain Location generalized  -RS        Pain Location - throat  -RS        Pre/Posttreatment Pain Comment RN notified and aware  -RS           Comprehension Assessment/Intervention    Comprehension Assessment/Intervention Auditory Comprehension  -RS           Auditory Comprehension Assessment/Intervention    Able to Follow Commands (Communication) 2-step;moderate impairment  -RS           Expression Assessment/Intervention    Expression Assessment/Intervention verbal expression  -RS           Verbal Expression Assessment/Intervention    Phrase Completion mild impairment  -RS           Oral Musculature and Cranial Nerve Assessment    Oral Motor General Assessment oral labial or buccal impairment;lingual impairment;generalized oral motor weakness  -RS        Oral Labial or Buccal Impairment, Detail, Cranial Nerve VII (Facial): reduced strength bilaterally;left labial droop  -RS        Lingual Impairment, Detail. Cranial Nerves IX, XII (Glossopharyngeal and Hypoglossal) reduced lingual ROM;reduced strength;other (see comments)  tongue thrust posture  -RS           Motor Speech Assessment/Intervention    Motor Speech Function severe impairment  -RS        Characteristics Consistent  with Dysarthria decreased articulation  -RS           Cognitive Assessment Intervention- SLP    Cognitive Function (Cognition) moderate impairment  -RS        Orientation Status (Cognition) moderate impairment  -RS           SLP Evaluation Clinical Impressions    SLP Diagnosis cognitive-linguistic disorder;severe;dysarthria  -RS        SLP Diagnosis Comments Will need further dxtx when pt better able to participate/less lethargic. Suspect that difficulty with language tasks are more cognitive based.  -RS        Rehab Potential/Prognosis good  -RS        SLC Criteria for Skilled Therapy Interventions Met yes  -RS        Functional Impact difficulty communicating wants, needs  -RS           Recommendations    Therapy Frequency (SLP SLC) 5 days per week  -RS           Communication Treatment Objective and Progress Goals (SLP)    [facility] Patient will demonstrate functional speech skills for return to discharge environment;Patient will demonstrate functional cognitive-linguistic skills for return to discharge environment  -RS        Diagnostic Treatment Objective Diagnostic Treatment Objectives (Group)  -RS        Motor Speech/Dysarthria Treatment Objectives Motor Speech/Dysarthria Treatment Objectives (Group)  -RS           Patient will demonstrate functional speech skills for return to discharge environment    [place] with moderate cues  -RS        Time frame by discharge  -RS        Progress/Outcomes new goal  -RS           Patient will demonstrate functional cognitive-linguistic skills for return to discharge environment    [place] with moderate cues  -RS        Time frame by discharge  -RS        Progress/Outcomes new goal  -RS           Diagnostic Treatment Objectives    Diagnostic Treatment Objective Selection Diagnostic Treatment Objectives  -RS           SLP Diagnostic Treatment     Patient will participate in further assessment in the following areas cognitive-linguistic  -RS        Time Frame  (Diagnostic) short term goal (STG)  -RS        Barriers (Diagnostic) Lethargy;Cognitive status  -RS        Progress/Outcomes (Additional Goal 1, SLP) new goal  -RS           Motor Speech/Dysarthria Treatment Objectives    Articulation Selection articulation, SLP goal 1  -RS           Articulation Goal 1 (SLP)    Improve Articulation Goal 1 (SLP) by over-articulating at word level;80%;with moderate cues (50-74%)  -RS        Time Frame (Articulation Goal 1, SLP) short term goal (STG)  -RS        Progress/Outcomes (Articulation Goal 1, SLP) new goal  -RS                  User Key  (r) = Recorded By, (t) = Taken By, (c) = Cosigned By      Initials Name Effective Dates    RS Rito Morrissey MS CCC-SLP 09/14/23 -                        EDUCATION  The patient has been educated in the following areas:     Cognitive Impairment Communication Impairment Dysphagia (Swallowing Impairment) NPO rationale.           SLP GOALS       Row Name 01/21/24 0830             Patient will demonstrate functional speech skills for return to discharge environment    [place] with moderate cues  -RS      Time frame by discharge  -RS      Progress/Outcomes new goal  -RS         Patient will demonstrate functional cognitive-linguistic skills for return to discharge environment    [place] with moderate cues  -RS      Time frame by discharge  -RS      Progress/Outcomes new goal  -RS         SLP Diagnostic Treatment     Patient will participate in further assessment in the following areas cognitive-linguistic  -RS      Time Frame (Diagnostic) short term goal (STG)  -RS      Barriers (Diagnostic) Lethargy;Cognitive status  -RS      Progress/Outcomes (Additional Goal 1, SLP) new goal  -RS         Articulation Goal 1 (SLP)    Improve Articulation Goal 1 (SLP) by over-articulating at word level;80%;with moderate cues (50-74%)  -RS      Time Frame (Articulation Goal 1, SLP) short term goal (STG)  -RS      Progress/Outcomes (Articulation Goal 1, SLP)  new goal  -RS                User Key  (r) = Recorded By, (t) = Taken By, (c) = Cosigned By      Initials Name Provider Type    Rito Mata MS CCC-SLP Speech and Language Pathologist                            Time Calculation:      Time Calculation- SLP       Row Name 24 0921             Time Calculation- SLP    SLP Start Time 0840  -RS      SLP Received On 24  -RS         Untimed Charges    54013-ZA Eval Speech and Production w/ Language Minutes 30  -RS      38204-KC Eval Oral Pharyng Swallow Minutes 40  -RS         Total Minutes    Untimed Charges Total Minutes 70  -RS       Total Minutes 70  -RS                User Key  (r) = Recorded By, (t) = Taken By, (c) = Cosigned By      Initials Name Provider Type    Rito Mata MS CCC-SLP Speech and Language Pathologist                    Therapy Charges for Today       Code Description Service Date Service Provider Modifiers Qty    25982721757 HC ST EVAL ORAL PHARYNG SWALLOW 3 2024 Rito Morrissey MS CCC-SLP GN 1    87528140345 HC ST EVAL SPEECH AND PROD W LANG  2 2024 Rito Morrissey MS CCC-CHAYO GN 1                       MS VIGNESH Stark  2024   and Acute Care - Speech Language Pathology   Swallow Initial Evaluation [facility]  Clinical Swallow Evaluation       Patient Name: Regine Beckham  : 1955  MRN: [id number]  Today's Date: 2024               Admit Date: 2024    Visit Dx:     ICD-10-CM ICD-9-CM   1. Dysphagia, unspecified type  R13.10 787.20   2. Dysarthria  R47.1 784.51   3. Cognitive communication deficit  R41.841 799.52     Patient Active Problem List   Diagnosis    Iron deficiency anemia due to chronic blood loss    Major depressive disorder    RLS (restless legs syndrome)    GERD (gastroesophageal reflux disease)    Left breast mass    Allergy to penicillin    Stroke    Grade I diastolic dysfunction     Past Medical History:   Diagnosis Date    Anemia     Anxiety     Arthritis     Depression      "Legally blind     Restless leg syndrome     Sleep apnea     \"I don't use a cpap anymore since losing 106 lbs\"    Water retention      Past Surgical History:   Procedure Laterality Date    BACK SURGERY      GALLBLADDER SURGERY      HIP ARTHROSCOPY      JOINT REPLACEMENT Right        SLP Recommendation and Plan  SLP Swallowing Diagnosis: R/O pharyngeal dysphagia (01/21/24 0830)  SLP Diet Recommendation: NPO, temporary alternate methods of nutrition/hydration (01/21/24 0830)  Recommended Precautions and Strategies: general aspiration precautions (01/21/24 0830)  SLP Rec. for Method of Medication Administration: meds via alternate route (01/21/24 0830)     Monitor for Signs of Aspiration: yes, notify SLP if any concerns (01/21/24 0830)  Recommended Diagnostics: reassess via clinical swallow evaluation (01/21/24 0830)  Swallow Criteria for Skilled Therapeutic Interventions Met: demonstrates skilled criteria (01/21/24 0830)  Anticipated Discharge Disposition (SLP): inpatient rehabilitation facility (01/21/24 0830)  Rehab Potential/Prognosis, Swallowing: good, to achieve stated therapy goals (01/21/24 0830)  Therapy Frequency (Swallow): 5 days per week (01/21/24 0830)  Predicted Duration Therapy Intervention (Days): until discharge (01/21/24 0830)  Oral Care Recommendations: Oral Care BID/PRN, Suction toothbrush (01/21/24 0830)                                      Oral Care Recommendations: Oral Care BID/PRN, Suction toothbrush (01/21/24 0830)    Plan of Care Reviewed With: patient  Progress: improving      SWALLOW EVALUATION (last 72 hours)       SLP Adult Swallow Evaluation       Row Name 01/21/24 0830                   Rehab Evaluation    Document Type evaluation  -RS        Subjective Information no complaints  -RS        Patient Observations lethargic  -RS        Patient/Family/Caregiver Comments/Observations no family present  -RS        Patient Effort fair  -RS        Symptoms Noted During/After Treatment none  -RS  "       Oral Care teeth brushed - suction toothbrush  -RS           General Information    Patient Profile Reviewed yes  -RS        Pertinent History Of Current Problem Pt is a 68-year-old woman with PMHx of HTN, , chronic anemia, ZORAIDA, severe depression with recent overdose attempt, legal blindness, presenting with left-sided weakness and found to have right middle cerebral artery ischemia and 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  She underwent thrombectomy and carotid stent. Her NIH has improved from a 19 to a 16  -RS        Current Method of Nutrition NPO;nasogastric feedings;large-bore  -RS        Precautions/Limitations, Vision vision impairment, bilaterally  -RS        Precautions/Limitations, Hearing WFL;for purposes of eval  -RS        Prior Level of Function-Communication WFL  -RS        Prior Level of Function-Swallowing no diet consistency restrictions  -RS           Oral Motor Structure and Function    Secretion Management wet vocal quality;problems swallowing secretions;requires suctioning to control secretions  -RS        Mucosal Quality moist, healthy  -RS        Volitional Swallow delayed  -RS        Volitional Cough unable to elicit  -RS           General Eating/Swallowing Observations    Respiratory Support Currently in Use nasal cannula  -RS        Eating/Swallowing Skills fed by SLP;unable to perform self-feeding  -RS        Positioning During Eating upright in bed  -RS        Utensils Used spoon  -RS        Consistencies Trialed ice chips  -RS           Respiratory    Respiratory Status increase in respiratory rate  -RS           Clinical Swallow Eval    Oral Prep Phase impaired  -RS        Oral Transit impaired  -RS        Oral Residue WFL  -RS        Pharyngeal Phase suspected pharyngeal impairment  -RS        Esophageal Phase suspected esophageal impairment  -RS        Clinical Swallow Evaluation Summary Significant oral and pharyngeal dysphagia patterns with ice chips. Pt is not  managing her own secretions and requires oral suction throughout. Immediate and violent coughing with limited ice chips. Difficult to appreciate laryngeal movement given submental tissue. Not safe for PO intake or appropriate for instrumental at this time  -RS           Oral Prep Concerns    Oral Prep Concerns poor rotary chew;reduced lip opening;incomplete or weak lip closure around spoon;anterior loss;incomplete bolus preparation  -RS        Poor Rotary Chew all consistencies  -RS        Reduced Lip Opening all consistencies  -RS        Incomplete or Weak Lip Closure Around Spoon all consistencies  -RS        Anterior Loss all consistencies  -RS        Incomplete Bolus Preparation all consistencies  -RS           Oral Transit Concerns    Increased Oral Transit Time all consistencies  -RS        Oral Transit Concerns, Comment Extremely rapid oral transit, suspect that pt swallowed ice chips whole  -RS           Pharyngeal Phase Concerns    Pharyngeal Phase Concerns wet vocal quality;multiple swallows;cough  -RS        Wet Vocal Quality thin  -RS        Multiple Swallows thin  -RS        Cough thin  -RS           Esophageal Phase Concerns    Esophageal Phase Concerns belching  -RS           SLP Evaluation Clinical Impression    SLP Swallowing Diagnosis R/O pharyngeal dysphagia  -RS        Functional Impact risk of aspiration/pneumonia  -RS        Rehab Potential/Prognosis, Swallowing good, to achieve stated therapy goals  -RS        Swallow Criteria for Skilled Therapeutic Interventions Met demonstrates skilled criteria  -RS           Recommendations    Therapy Frequency (Swallow) 5 days per week  -RS        Predicted Duration Therapy Intervention (Days) until discharge  -RS        SLP Diet Recommendation NPO;temporary alternate methods of nutrition/hydration  -RS        Recommended Diagnostics reassess via clinical swallow evaluation  -RS        Recommended Precautions and Strategies general aspiration precautions   -RS        Oral Care Recommendations Oral Care BID/PRN;Suction toothbrush  -RS        SLP Rec. for Method of Medication Administration meds via alternate route  -RS        Monitor for Signs of Aspiration yes;notify SLP if any concerns  -RS        Anticipated Discharge Disposition (SLP) inpatient rehabilitation facility  -RS                  User Key  (r) = Recorded By, (t) = Taken By, (c) = Cosigned By      Initials Name Effective Dates    Rito Mata MS CCC-SLP 09/14/23 -                     EDUCATION  The patient has been educated in the following areas:   Cognitive Impairment Communication Impairment Dysphagia (Swallowing Impairment) NPO rationale.        SLP GOALS       Row Name 01/21/24 0830             Patient will demonstrate functional speech skills for return to discharge environment    [place] with moderate cues  -RS      Time frame by discharge  -RS      Progress/Outcomes new goal  -RS         Patient will demonstrate functional cognitive-linguistic skills for return to discharge environment    [place] with moderate cues  -RS      Time frame by discharge  -RS      Progress/Outcomes new goal  -RS         SLP Diagnostic Treatment     Patient will participate in further assessment in the following areas cognitive-linguistic  -RS      Time Frame (Diagnostic) short term goal (STG)  -RS      Barriers (Diagnostic) Lethargy;Cognitive status  -RS      Progress/Outcomes (Additional Goal 1, SLP) new goal  -RS         Articulation Goal 1 (SLP)    Improve Articulation Goal 1 (SLP) by over-articulating at word level;80%;with moderate cues (50-74%)  -RS      Time Frame (Articulation Goal 1, SLP) short term goal (STG)  -RS      Progress/Outcomes (Articulation Goal 1, SLP) new goal  -RS                User Key  (r) = Recorded By, (t) = Taken By, (c) = Cosigned By      Initials Name Provider Type    Rito Mata MS CCC-SLP Speech and Language Pathologist                       Time Calculation:    Time  Calculation- SLP       Row Name 01/21/24 0921             Time Calculation- SLP    SLP Start Time 0840  -RS      SLP Received On 01/21/24  -RS         Untimed Charges    47495-[id number] Eval Speech and Production w/ Language Minutes 30  -RS      [id number] Eval Oral Pharyng Swallow Minutes 40  -RS         Total Minutes    Untimed Charges Total Minutes 70  -RS       Total Minutes 70  -RS                User Key  (r) = Recorded By, (t) = Taken By, (c) = Cosigned By      Initials Name Provider Type    RS Rito Morrissey MS CCC-SLP Speech and Language Pathologist                    Therapy Charges for Today       Code Description Service Date Service Provider Modifiers Qty    65721868141 HC ST EVAL ORAL PHARYNG SWALLOW 3 1/21/2024 Rito Morrissey MS CCC-SLP GN 1    03995807573 HC ST EVAL SPEECH AND PROD W LANG  2 1/21/2024 Rito Morrissey MS CCC-CHAYO GN 1                 MS VIGNESH Stark  1/21/2024

## 2024-01-21 NOTE — PROGRESS NOTES
Stroke Progress Note       Chief Complaint:  Left sided weakness    Subjective    Subjective     Subjective: No adverse events overnight.  Patient seen and evaluated at the bedside alone.  She was evaluated by speech this morning, she will remain n.p.o. with plans for a fees possibly on Tuesday.  She continues to have left hemiparesis, facial droop and dysarthria.  Currently on a Cardene drip to maintain systolics <140      Review of Systems   HENT:  Positive for trouble swallowing.    Eyes:  Negative for visual disturbance.   Respiratory:  Positive for cough. Negative for shortness of breath.    Cardiovascular:  Negative for chest pain and palpitations.   Neurological:  Positive for facial asymmetry, speech difficulty, weakness, numbness and headaches.            Objective    Objective      Temp:  [97.5 °F (36.4 °C)-98.7 °F (37.1 °C)] 97.9 °F (36.6 °C)  Heart Rate:  [] 89  Resp:  [14-24] 20  BP: (102-163)/() 110/58        Neurological Exam  Mental Status  Alert. Oriented to person, place, and time. Moderate dysarthria present. Expressive aphasia present.    Cranial Nerves  CN II: Visual fields full to confrontation.  CN III, IV, VI: Extraocular movements intact bilaterally. Normal lids and orbits bilaterally. Pupils equal round and reactive to light bilaterally.  CN V:  Right: Facial sensation is normal.  Left: Diminished sensation of the entire left side of the face.  CN VII:  Right: There is no facial weakness.  Left: There is central facial weakness.    Motor  Normal muscle bulk throughout. No fasciculations present. Normal muscle tone. No abnormal involuntary movements. Strength is 5/5 throughout all four extremities.  LUE strength 1/5, LLE strength 2/5.    Sensory  Light touch abnormality:   Decreased on the left.    Coordination    No dysmetria out of proportion to weakness.    Gait    Not tested.      Physical Exam  Vitals reviewed.   HENT:      Head: Normocephalic and atraumatic.   Eyes:       General: Lids are normal.      Extraocular Movements: Extraocular movements intact.      Pupils: Pupils are equal, round, and reactive to light.   Cardiovascular:      Rate and Rhythm: Normal rate.   Pulmonary:      Effort: Pulmonary effort is normal. No respiratory distress.   Abdominal:      Comments: NG in place   Musculoskeletal:      Cervical back: Normal range of motion.      Right lower leg: No edema.      Left lower leg: No edema.   Skin:     General: Skin is warm and dry.   Neurological:      Mental Status: She is alert and oriented to person, place, and time.      Cranial Nerves: Cranial nerve deficit and dysarthria present.      Sensory: Sensory deficit present.      Motor: Motor strength is normal.Weakness present.   Psychiatric:         Mood and Affect: Mood normal.         Behavior: Behavior normal.         Results Review:    I reviewed the patient's new clinical results.  WBC   Date Value Ref Range Status   01/21/2024 14.84 (H) 3.40 - 10.80 10*3/mm3 Final     RBC   Date Value Ref Range Status   01/21/2024 4.07 3.77 - 5.28 10*6/mm3 Final     Hemoglobin   Date Value Ref Range Status   01/21/2024 13.2 12.0 - 15.9 g/dL Final     Hematocrit   Date Value Ref Range Status   01/21/2024 38.7 34.0 - 46.6 % Final     MCV   Date Value Ref Range Status   01/21/2024 95.1 79.0 - 97.0 fL Final     MCH   Date Value Ref Range Status   01/21/2024 32.4 26.6 - 33.0 pg Final     MCHC   Date Value Ref Range Status   01/21/2024 34.1 31.5 - 35.7 g/dL Final     RDW   Date Value Ref Range Status   01/21/2024 12.8 12.3 - 15.4 % Final     RDW-SD   Date Value Ref Range Status   01/21/2024 44.5 37.0 - 54.0 fl Final     MPV   Date Value Ref Range Status   01/21/2024 9.7 6.0 - 12.0 fL Final     Platelets   Date Value Ref Range Status   01/21/2024 256 140 - 450 10*3/mm3 Final     Neutrophil %   Date Value Ref Range Status   01/21/2024 79.2 (H) 42.7 - 76.0 % Final     Lymphocyte %   Date Value Ref Range Status   01/21/2024 11.9 (L)  19.6 - 45.3 % Final     Monocyte %   Date Value Ref Range Status   01/21/2024 7.9 5.0 - 12.0 % Final     Eosinophil %   Date Value Ref Range Status   01/21/2024 0.3 0.3 - 6.2 % Final     Basophil %   Date Value Ref Range Status   01/21/2024 0.3 0.0 - 1.5 % Final     Immature Grans %   Date Value Ref Range Status   01/21/2024 0.4 0.0 - 0.5 % Final     Neutrophils, Absolute   Date Value Ref Range Status   01/21/2024 11.75 (H) 1.70 - 7.00 10*3/mm3 Final     Lymphocytes, Absolute   Date Value Ref Range Status   01/21/2024 1.77 0.70 - 3.10 10*3/mm3 Final     Monocytes, Absolute   Date Value Ref Range Status   01/21/2024 1.17 (H) 0.10 - 0.90 10*3/mm3 Final     Eosinophils, Absolute   Date Value Ref Range Status   01/21/2024 0.05 0.00 - 0.40 10*3/mm3 Final     Basophils, Absolute   Date Value Ref Range Status   01/21/2024 0.04 0.00 - 0.20 10*3/mm3 Final     Immature Grans, Absolute   Date Value Ref Range Status   01/21/2024 0.06 (H) 0.00 - 0.05 10*3/mm3 Final     nRBC   Date Value Ref Range Status   01/21/2024 0.0 0.0 - 0.2 /100 WBC Final     Lab Results   Component Value Date    GLUCOSE 173 (H) 01/21/2024    BUN 10 01/21/2024    CREATININE 0.67 01/21/2024    EGFR 94.7 01/21/2024    BCR 14.9 01/21/2024    K 3.7 01/21/2024    CO2 21.0 (L) 01/21/2024    CALCIUM 9.1 01/21/2024    ALBUMIN 3.9 01/19/2024    BILITOT 0.4 01/19/2024    AST 17 01/19/2024    ALT 10 01/19/2024     A1c 6.2  LDL 95      CT Head Without Contrast    Result Date: 1/20/2024  Impression: Evolving subacute right MCA territory infarct without additional acute process identified. Electronically Signed: Hayden Robison MD  1/20/2024 3:42 PM CST  Workstation ID: [id number]    CT Head Without Contrast    Result Date: 1/20/2024  Impression: 1.Evolving subacute infarction within right MCA territory. No acute herniation. 2.No hemorrhagic transformation. Electronically Signed: Ravi Hart MD  1/20/2024 10:48 AM EST  Workstation ID: [id number]    CT Head Without  Contrast    Result Date: 1/20/2024  Impression: 1.Stable appearance of right MCA territory infarct. 2.Stable advanced chronic small vessel ischemic change. 3.Stable minor petechial bleeding in the right caudate nucleus and putamen. Electronically Signed: Teo Carpio MD  1/20/2024 1:14 AM EST  Workstation ID: [id number]    MRI Brain Without Contrast    Result Date: 1/19/2024  Impression: Moderate sized acute right-sided MCA infarct as described above. There is evidence of small foci petechial hemorrhage in the posterior putamen, corona radiata, and high anterior right parietal lobe. Extensive changes of chronic microvascular ischemia. Findings discussed with Parul from the stroke team performed on January 19, 2024 at 10:19 p.m. Electronically Signed: Mo Marshall MD  1/19/2024 10:21 PM EST  Workstation ID: [id number]    CT CEREBRAL PERFUSION WITH & WITHOUT CONTRAST    Result Date: 1/19/2024   IMPRESSION: Results suggestive of recent ischemic event in the right middle cerebral artery territory  This report was finalized on 1/19/2024 1:45 PM by Dr. Hu Obrien MD.      CT Angiogram Neck    Result Date: 1/19/2024  1.  Moderate to high-grade stenosis in the proximal left internal carotid artery. 2.  Occlusion of the entire right internal carotid artery.   This report was finalized on 1/19/2024 1:43 PM by Dr. Hu Obrien MD.      CT Angiogram Head    Result Date: 1/19/2024   1. Barely detectable flow in the proximal right middle cerebral artery and no flow distally in the right middle cerebral artery. 2. Occlusion of the right internal carotid artery.   This report was finalized on 1/19/2024 1:27 PM by Dr. Hu Obrien MD.      CT Head Without Contrast Stroke Protocol    Result Date: 1/19/2024   1. Appearance concerning for recent right MCA ischemia with sulcal effacement but no evidence of hemorrhage, mass, or midline shift  Results have been relayed to the emergency department at 1:03 p.m.  This report was  finalized on 1/19/2024 1:04 PM by Dr. Hu Obrien MD.       Results for orders placed during the hospital encounter of 01/19/24    Adult Transthoracic Echo Limited W/ Cont if Necessary Per Protocol    Interpretation Summary    Left ventricular systolic function is normal. Estimated left ventricular EF = 60%    Saline test results are negative for intracardiac shunting..            Assessment/Plan     Assessment/Plan: 68-year-old female with history of grade 2 diastolic dysfunction, iron deficiency anemia, ZORAIDA (not currently using CPAP), RLS, history of gastric sleeve, severe depression with recent suicide attempt (OD 12/24/2023) the presents as a transfer from [facility] after developing left-sided weakness neglect and right gaze deviation.  LKW 2200 on 1/18, NIHSS 26, CT head showed right MCA ischemia.  CTA showed complete right ICA occlusion corresponding to a large perfusion defect on CTP.  Not a candidate for thrombolytics secondary to last known well > 4.5 hours.  NIHSS 19 on arrival to [facility] emergently transferred to [facility] directly to the Cath Lab where she is found to have a tandem  occlusion and underwent a thrombectomy and right ICA stent placement resulting in TICI 3 flow.    PTA antiplatelet: None  PTA anticoagulant: None      Acute right MCA stroke secondary to tandem right ICA occlusions s/p mechanical thrombectomy and right ICA stent placement on 1/19  -MRI shows moderately sized right MCA territory stroke  -TTE shows EF of 60%, negative bubble study  -Carotid ultrasound showing patency of right carotid stent without evidence of restenosis, <50% left ICA stenosis  -Continue aspirin and Brilinta 60 mg twice daily per Dr. Hurley  -HTN: Normal blood pressure parameters, avoid hypotension.  Wean Cardene drip when able  -HLD: LDL 95, goal <70; ontinue with atorvastatin 80 mg daily  -Suicide precautions  -Continue PT/OT, therapy recommending inpatient rehab at discharge  -N.p.o.; SLP  following-planning for fees on Tuesday  -Stroke neurology following  -Outpatient follow-up neurosurgery in 4 weeks    Plan of care reviewed with patient, Dr. Sanchez and patient's nurse.  Hopefully can transfer to telemetry once off of Cardene.  Stroke neurology will continue to follow    Maureen Anguiano, NIKKIE  01/21/24  09:29 EST

## 2024-01-22 ENCOUNTER — APPOINTMENT (OUTPATIENT)
Dept: GENERAL RADIOLOGY | Facility: HOSPITAL | Age: 69
DRG: 023 | End: 2024-01-22
Payer: MEDICARE

## 2024-01-22 LAB
ANION GAP SERPL CALCULATED.3IONS-SCNC: 12 MMOL/L (ref 5–15)
BASOPHILS # BLD AUTO: 0.04 10*3/MM3 (ref 0–0.2)
BASOPHILS NFR BLD AUTO: 0.3 % (ref 0–1.5)
BUN SERPL-MCNC: 13 MG/DL (ref 8–23)
BUN/CREAT SERPL: 19.7 (ref 7–25)
CALCIUM SPEC-SCNC: 8.8 MG/DL (ref 8.6–10.5)
CHLORIDE SERPL-SCNC: 104 MMOL/L (ref 98–107)
CO2 SERPL-SCNC: 21 MMOL/L (ref 22–29)
CREAT SERPL-MCNC: 0.66 MG/DL (ref 0.57–1)
DEPRECATED RDW RBC AUTO: 44.6 FL (ref 37–54)
EGFRCR SERPLBLD CKD-EPI 2021: 95.1 ML/MIN/1.73
EOSINOPHIL # BLD AUTO: 0.01 10*3/MM3 (ref 0–0.4)
EOSINOPHIL NFR BLD AUTO: 0.1 % (ref 0.3–6.2)
ERYTHROCYTE [DISTWIDTH] IN BLOOD BY AUTOMATED COUNT: 12.8 % (ref 12.3–15.4)
GLUCOSE BLDC GLUCOMTR-MCNC: 115 MG/DL (ref 70–130)
GLUCOSE BLDC GLUCOMTR-MCNC: 117 MG/DL (ref 70–130)
GLUCOSE BLDC GLUCOMTR-MCNC: 138 MG/DL (ref 70–130)
GLUCOSE BLDC GLUCOMTR-MCNC: 140 MG/DL (ref 70–130)
GLUCOSE SERPL-MCNC: 143 MG/DL (ref 65–99)
HCT VFR BLD AUTO: 39.2 % (ref 34–46.6)
HGB BLD-MCNC: 13.2 G/DL (ref 12–15.9)
IMM GRANULOCYTES # BLD AUTO: 0.04 10*3/MM3 (ref 0–0.05)
IMM GRANULOCYTES NFR BLD AUTO: 0.3 % (ref 0–0.5)
LYMPHOCYTES # BLD AUTO: 1.1 10*3/MM3 (ref 0.7–3.1)
LYMPHOCYTES NFR BLD AUTO: 9.1 % (ref 19.6–45.3)
MAGNESIUM SERPL-MCNC: 1.8 MG/DL (ref 1.6–2.4)
MCH RBC QN AUTO: 31.7 PG (ref 26.6–33)
MCHC RBC AUTO-ENTMCNC: 33.7 G/DL (ref 31.5–35.7)
MCV RBC AUTO: 94 FL (ref 79–97)
MONOCYTES # BLD AUTO: 0.81 10*3/MM3 (ref 0.1–0.9)
MONOCYTES NFR BLD AUTO: 6.7 % (ref 5–12)
NEUTROPHILS NFR BLD AUTO: 10.13 10*3/MM3 (ref 1.7–7)
NEUTROPHILS NFR BLD AUTO: 83.5 % (ref 42.7–76)
NRBC BLD AUTO-RTO: 0 /100 WBC (ref 0–0.2)
PHOSPHATE SERPL-MCNC: 2.6 MG/DL (ref 2.5–4.5)
PLATELET # BLD AUTO: 242 10*3/MM3 (ref 140–450)
PMV BLD AUTO: 9.4 FL (ref 6–12)
POTASSIUM SERPL-SCNC: 3.7 MMOL/L (ref 3.5–5.2)
RBC # BLD AUTO: 4.17 10*6/MM3 (ref 3.77–5.28)
SODIUM SERPL-SCNC: 137 MMOL/L (ref 136–145)
WBC NRBC COR # BLD AUTO: 12.13 10*3/MM3 (ref 3.4–10.8)

## 2024-01-22 PROCEDURE — 85025 COMPLETE CBC W/AUTO DIFF WBC: CPT | Performed by: INTERNAL MEDICINE

## 2024-01-22 PROCEDURE — 94664 DEMO&/EVAL PT USE INHALER: CPT

## 2024-01-22 PROCEDURE — 25010000002 MAGNESIUM SULFATE IN D5W 1G/100ML (PREMIX) 1-5 GM/100ML-% SOLUTION: Performed by: NURSE PRACTITIONER

## 2024-01-22 PROCEDURE — 97110 THERAPEUTIC EXERCISES: CPT

## 2024-01-22 PROCEDURE — 71045 X-RAY EXAM CHEST 1 VIEW: CPT

## 2024-01-22 PROCEDURE — 82948 REAGENT STRIP/BLOOD GLUCOSE: CPT

## 2024-01-22 PROCEDURE — 94761 N-INVAS EAR/PLS OXIMETRY MLT: CPT

## 2024-01-22 PROCEDURE — 84100 ASSAY OF PHOSPHORUS: CPT | Performed by: INTERNAL MEDICINE

## 2024-01-22 PROCEDURE — 97116 GAIT TRAINING THERAPY: CPT

## 2024-01-22 PROCEDURE — 94799 UNLISTED PULMONARY SVC/PX: CPT

## 2024-01-22 PROCEDURE — 99232 SBSQ HOSP IP/OBS MODERATE 35: CPT | Performed by: NURSE PRACTITIONER

## 2024-01-22 PROCEDURE — 83735 ASSAY OF MAGNESIUM: CPT | Performed by: INTERNAL MEDICINE

## 2024-01-22 PROCEDURE — 92610 EVALUATE SWALLOWING FUNCTION: CPT | Performed by: SPEECH-LANGUAGE PATHOLOGIST

## 2024-01-22 PROCEDURE — 87205 SMEAR GRAM STAIN: CPT | Performed by: INTERNAL MEDICINE

## 2024-01-22 PROCEDURE — 92507 TX SP LANG VOICE COMM INDIV: CPT | Performed by: SPEECH-LANGUAGE PATHOLOGIST

## 2024-01-22 PROCEDURE — 25010000002 HYDRALAZINE PER 20 MG

## 2024-01-22 PROCEDURE — 99233 SBSQ HOSP IP/OBS HIGH 50: CPT | Performed by: INTERNAL MEDICINE

## 2024-01-22 PROCEDURE — 87070 CULTURE OTHR SPECIMN AEROBIC: CPT | Performed by: INTERNAL MEDICINE

## 2024-01-22 PROCEDURE — 80048 BASIC METABOLIC PNL TOTAL CA: CPT | Performed by: INTERNAL MEDICINE

## 2024-01-22 RX ORDER — MAGNESIUM SULFATE 1 G/100ML
1 INJECTION INTRAVENOUS ONCE
Status: COMPLETED | OUTPATIENT
Start: 2024-01-22 | End: 2024-01-22

## 2024-01-22 RX ADMIN — PHENOL 1 SPRAY: 1.4 SPRAY ORAL at 04:49

## 2024-01-22 RX ADMIN — GUAIFENESIN 400 MG: 200 SOLUTION ORAL at 16:21

## 2024-01-22 RX ADMIN — MAGNESIUM SULFATE HEPTAHYDRATE 1 G: 1 INJECTION, SOLUTION INTRAVENOUS at 14:47

## 2024-01-22 RX ADMIN — ACETAMINOPHEN 650 MG: 325 TABLET ORAL at 04:47

## 2024-01-22 RX ADMIN — GUAIFENESIN 400 MG: 200 SOLUTION ORAL at 04:47

## 2024-01-22 RX ADMIN — IPRATROPIUM BROMIDE AND ALBUTEROL SULFATE 3 ML: 2.5; .5 SOLUTION RESPIRATORY (INHALATION) at 20:18

## 2024-01-22 RX ADMIN — PHENOL 1 SPRAY: 1.4 SPRAY ORAL at 08:09

## 2024-01-22 RX ADMIN — ACETAMINOPHEN 650 MG: 325 TABLET ORAL at 22:16

## 2024-01-22 RX ADMIN — IPRATROPIUM BROMIDE AND ALBUTEROL SULFATE 3 ML: 2.5; .5 SOLUTION RESPIRATORY (INHALATION) at 10:43

## 2024-01-22 RX ADMIN — Medication 10 ML: at 08:00

## 2024-01-22 RX ADMIN — IPRATROPIUM BROMIDE AND ALBUTEROL SULFATE 3 ML: 2.5; .5 SOLUTION RESPIRATORY (INHALATION) at 16:49

## 2024-01-22 RX ADMIN — Medication 1 PATCH: at 08:00

## 2024-01-22 RX ADMIN — ROPINIROLE HYDROCHLORIDE 4 MG: 2 TABLET, FILM COATED ORAL at 20:11

## 2024-01-22 RX ADMIN — GUAIFENESIN 400 MG: 200 SOLUTION ORAL at 20:11

## 2024-01-22 RX ADMIN — PHENOL 1 SPRAY: 1.4 SPRAY ORAL at 14:58

## 2024-01-22 RX ADMIN — TICAGRELOR 60 MG: 60 TABLET ORAL at 18:10

## 2024-01-22 RX ADMIN — PHENOL 1 SPRAY: 1.4 SPRAY ORAL at 12:20

## 2024-01-22 RX ADMIN — MIRTAZAPINE 30 MG: 15 TABLET, FILM COATED ORAL at 20:11

## 2024-01-22 RX ADMIN — ACETAMINOPHEN 650 MG: 325 TABLET ORAL at 13:06

## 2024-01-22 RX ADMIN — ACETAMINOPHEN 650 MG: 325 TABLET ORAL at 18:10

## 2024-01-22 RX ADMIN — GUAIFENESIN 400 MG: 200 SOLUTION ORAL at 12:10

## 2024-01-22 RX ADMIN — IPRATROPIUM BROMIDE AND ALBUTEROL SULFATE 3 ML: 2.5; .5 SOLUTION RESPIRATORY (INHALATION) at 22:56

## 2024-01-22 RX ADMIN — TICAGRELOR 60 MG: 60 TABLET ORAL at 05:50

## 2024-01-22 RX ADMIN — PANTOPRAZOLE SODIUM 40 MG: 40 INJECTION, POWDER, FOR SOLUTION INTRAVENOUS at 05:49

## 2024-01-22 RX ADMIN — GUAIFENESIN 400 MG: 200 SOLUTION ORAL at 00:22

## 2024-01-22 RX ADMIN — ATORVASTATIN CALCIUM 80 MG: 40 TABLET, FILM COATED ORAL at 20:11

## 2024-01-22 RX ADMIN — GUAIFENESIN 400 MG: 200 SOLUTION ORAL at 08:00

## 2024-01-22 RX ADMIN — ASPIRIN 325 MG ORAL TABLET 325 MG: 325 PILL ORAL at 08:00

## 2024-01-22 RX ADMIN — PHENOL 1 SPRAY: 1.4 SPRAY ORAL at 20:13

## 2024-01-22 RX ADMIN — IPRATROPIUM BROMIDE AND ALBUTEROL SULFATE 3 ML: 2.5; .5 SOLUTION RESPIRATORY (INHALATION) at 07:10

## 2024-01-22 RX ADMIN — HYDRALAZINE HYDROCHLORIDE 20 MG: 20 INJECTION INTRAMUSCULAR; INTRAVENOUS at 18:10

## 2024-01-22 RX ADMIN — LOSARTAN POTASSIUM 50 MG: 50 TABLET, FILM COATED ORAL at 08:00

## 2024-01-22 RX ADMIN — Medication 10 ML: at 20:12

## 2024-01-22 NOTE — PROGRESS NOTES
"                  Clinical Nutrition     Nutrition Support Assessment  Reason for Visit: MST score 2+, Difficulty chewing/swallowing, \"Unsure\" unintentional weight loss    Patient Name: Regine Beckham  YOB: 1955  MRN: [id number]  Date of Encounter: 01/22/24 15:35 EST  Admission date: 1/19/2024    Pt day 3 NPO and failed SLP clinical re-eval today. Tentative plan for FEES tomorrow, if fails will need EN.     Pt with hx gastric bypass recommend [facility] surgery 4 years ago at [facility], unsure exact procedure.     If needed, recommend the following for Nutrition Support:  (Once tube placement verified postpyloric)  Initiate continuous EN regimen of Vital 1.5 @ 15 ml/hr and advance by 10 ml/hr Q 8 hr as tolerated to goal rate of 55 ml/hr, water flushes 20 ml/hr. Provide prosource QD.     At goal this regimen will provide: 1100 ml, 1710 kcal(101% est. needs), 90 g protein(99% est. needs), 6 g soluble fiber, and 736 ml FW in formula +400 ml flushes = 1436ml total water    Nutrition Assessment   Admission Diagnosis:  Acute ischemic right MCA stroke [I63.511]      Problem List:    Stroke    Iron deficiency anemia due to chronic blood loss    Major depressive disorder    RLS (restless legs syndrome)    GERD (gastroesophageal reflux disease)    Grade I diastolic dysfunction        PMH:   She  has a past medical history of Anemia, Anxiety, Arthritis, Depression, Legally blind, Restless leg syndrome, Sleep apnea, and Water retention.    PSH:  She  has a past surgical history that includes Hip arthroscopy; Joint replacement (Right); Gallbladder surgery; and Back surgery.      Applicable Nutrition Concerns:   Skin:  Oral:  GI: hx gastric bypass 4 years ago      Applicable Interval History:         Reported/Observed/Food/Nutrition Related History:     Pt presented with stroke and is day 3 NPO due to residual dysphagia. Has not been appropriate for instrumental eval, failed repeat clinical eval " today and FEES now tentatively planned for tomorrow. Visited with pt at bedside, alert and oriented. Denies any significant nutritional concerns PTA. Does let me know she had gastric bypass 4 years ago at [facility], unsure exact procedure. States she has been eating normally recently PTA. Of note pt recently admitted less than a month ago for attempted OD/SI. Discussed nutritional care plan with pt, agreeable to EN if needed but hopeful to have NG removed.     Labs    Labs Reviewed: Yes     Results from last 7 days   Lab Units 01/22/24  0230 01/21/24  0227 01/20/24  1530 01/20/24  0408 01/19/24  1302 01/19/24  1257   GLUCOSE mg/dL 143* 173*  --  182*  --  127*   BUN mg/dL 13 10  --  15  --  15   CREATININE mg/dL 0.66 0.67  --  0.78   < > 0.99   SODIUM mmol/L 137 139  --  138  --  143   CHLORIDE mmol/L 104 107  --  104  --  105   POTASSIUM mmol/L 3.7 3.7 4.1 3.4*  --  4.4   PHOSPHORUS mg/dL 2.6  --   --  2.8  --   --    MAGNESIUM mg/dL 1.8  --   --  1.8  --   --    ALT (SGPT) U/L  --   --   --   --   --  10    < > = values in this interval not displayed.       Results from last 7 days   Lab Units 01/20/24  0408 01/19/24  1257   ALBUMIN g/dL  --  3.9   CHOLESTEROL mg/dL 160  --    TRIGLYCERIDES mg/dL 118  --        Results from last 7 days   Lab Units 01/22/24  1122 01/22/24  0525 01/21/24  2336 01/21/24  1746 01/21/24  1146 01/21/24  0538   GLUCOSE mg/dL 115 140* 146* 125 140* 207*     Lab Results   Lab Value Date/Time    HGBA1C 6.20 (H) 01/20/2024 0408                 Medications    Medications Reviewed: Yes  Pertinent: remeron, protonix  Infusion:  PRN:     Intake/Ouptut 24 hrs (0701 - 0700)   I&O's Reviewed: Yes   Intake & Output (last day)         01/21 0701 01/22 0700 01/22 0701 01/23 0700    P.O. 0     I.V. (mL/kg) 564.5 (8.1)     Other 310     NG/ 300    Total Intake(mL/kg) 1404.5 (20) 300 (4.3)    Urine (mL/kg/hr) 950 (0.6) 650 (1.1)    Stool 0 0    Total Output 950 650    Net +454.5 -350        "   Urine Unmeasured Occurrence 1 x     Stool Unmeasured Occurrence 1 x 1 x          Anthropometrics     Height: Height: 167.6 cm (65.98\")  Last Filed Weight: Weight: 70.1 kg (154 lb 8.7 oz) (01/21/24 0000)  Method: Weight Method: Bed scale  BMI: BMI (Calculated): 25  BMI classification: Normal: 18.5-24.9kg/m2  IBW:      UBW: ~150 lb several years per EMR   Weight       Weight (kg) Weight (lbs) Weight Method Visit Report   12/20/2023 72.576 kg  160 lb  Stated     12/21/2023 70.525 kg  155 lb 7.7 oz      12/22/2023 70.525 kg  155 lb 7.7 oz  Standing scale     1/4/2024 70.308 kg  155 lb  Stated     1/19/2024 70.308 kg  155 lb  Stated     1/20/2024 72 kg  158 lb 11.7 oz  Bed scale      72 kg  158 lb 11.7 oz      1/21/2024 70.1 kg  154 lb 8.7 oz  Bed scale       Weight change: unchanged    Nutrition Focused Physical Exam     Date: 1/22  NFPE completed, patient does not meet criteria for MSA at this time. Some mild muscle wasting and moderate to dorsal noted.     Needs Assessment   Date:1/22    Height used:Height: 167.6 cm (65.98\")  Weights used: 70 kg (ABW)      Estimated Calorie needs: ~1700 Kcal/day  Method:  Kcals/KG 25 =1753  Method:   HBD  1303 X 1.3 = 1694    Estimated Protein needs: ~91 g PRO/day  Method: 1.3 g/Kg    Estimated Fluid needs: ~ ml/day   Per clinical status    Current Nutrition Prescription     PO: NPO Diet NPO Type: Strict NPO  Oral Nutrition Supplement:   Intake: N/A    Nutrition Diagnosis   Date:  Updated:   Problem Inadequate oral intake   Etiology Stroke, residual dysphagia   Signs/Symptoms NPO per SLP   Status: new    Goal:   General: Nutrition support treatment, Nutrition to support treatment  PO: Advace diet as medically feasible/appropriate  EN/PN: Initiate EN if needed    Nutrition Intervention      Follow treatment progress, Care plan reviewed, Education provided for dysphagia, nutrition support    Initiate EN if needed s/p FEES    Monitoring/Evaluation:   Per protocol, I&O, Pertinent labs, " Weight, GI status, Symptoms, POC/GOC, Swallow function      Jaqueline Zavala RD, CNSC  Time Spent: 35m

## 2024-01-22 NOTE — THERAPY RE-EVALUATION
"Acute Care - Speech Language Pathology   Swallow Re-Evaluation [facility]  Clinical Swallow Evaluation       Patient Name: Regine Beckham  : 1955  MRN: [id number]  Today's Date: 2024               Admit Date: 2024    Visit Dx:     ICD-10-CM ICD-9-CM   1. Dysphagia, unspecified type  R13.10 787.20   2. Dysarthria  R47.1 784.51   3. Cognitive communication deficit  R41.841 799.52     Patient Active Problem List   Diagnosis    Iron deficiency anemia due to chronic blood loss    Major depressive disorder    RLS (restless legs syndrome)    GERD (gastroesophageal reflux disease)    Left breast mass    Allergy to penicillin    Stroke    Grade I diastolic dysfunction     Past Medical History:   Diagnosis Date    Anemia     Anxiety     Arthritis     Depression     Legally blind     Restless leg syndrome     Sleep apnea     \"I don't use a cpap anymore since losing 106 lbs\"    Water retention      Past Surgical History:   Procedure Laterality Date    BACK SURGERY      GALLBLADDER SURGERY      HIP ARTHROSCOPY      JOINT REPLACEMENT Right        SLP Recommendation and Plan  SLP Swallowing Diagnosis: suspected pharyngeal dysphagia (24 133)  SLP Diet Recommendation: NPO, temporary alternate methods of nutrition/hydration (1-2 ice chips per hour as tolerated) (24 1330)  Recommended Precautions and Strategies: general aspiration precautions (24 133)  SLP Rec. for Method of Medication Administration: meds via alternate route (24 133)     Monitor for Signs of Aspiration: yes, notify SLP if any concerns (24 1330)  Recommended Diagnostics: reassess via FEES (tentative plan for FEES tomorrow) (24 1330)  Swallow Criteria for Skilled Therapeutic Interventions Met: demonstrates skilled criteria (24 1330)  Anticipated Discharge Disposition (SLP): inpatient rehabilitation facility (24 1330)  Rehab Potential/Prognosis, Swallowing: good, to achieve stated therapy goals " (01/22/24 1330)  Therapy Frequency (Swallow): 5 days per week (01/22/24 1330)  Predicted Duration Therapy Intervention (Days): until discharge (01/22/24 1330)  Oral Care Recommendations: Oral Care BID/PRN, Suction toothbrush (01/22/24 1330)        Daily Summary of Progress (SLP): progress towards functional goals is fair (01/22/24 1330)               Treatment Assessment (SLP): continued, dysarthria, cognitive-linguistic disorder (01/22/24 1330)  Treatment Assessment Comments (SLP): Pt seen for tx this date. Somewhat improved dysarthria as after cued cough able to communicate at phrase level. Pt eager for po intake. Will continue to address deficits (01/22/24 1330)  Plan for Continued Treatment (SLP): continue treatment per plan of care, further assessment needed (see comments) (01/22/24 1330)       Oral Care Recommendations: Oral Care BID/PRN, Suction toothbrush (01/22/24 1330)    Plan of Care Reviewed With: patient  Progress: improving      SWALLOW EVALUATION (last 72 hours)       SLP Adult Swallow Evaluation       Row Name 01/22/24 1330       Rehab Evaluation    Document Type re-evaluation  -CJ    Subjective Information no complaints  -CJ    Patient Observations alert;cooperative  -CJ    Patient/Family/Caregiver Comments/Observations no family present  -CJ    Patient Effort good  -CJ    Symptoms Noted During/After Treatment none  -CJ    Oral Care --       General Information    Patient Profile Reviewed yes  -CJ    Pertinent History Of Current Problem see initial eval;  -CJ    Current Method of Nutrition NPO;nasogastric feedings;large-bore  -CJ    Precautions/Limitations, Vision vision impairment, bilaterally  -CJ    Precautions/Limitations, Hearing WFL;for purposes of eval  -CJ    Prior Level of Function-Communication WFL  -CJ    Prior Level of Function-Swallowing no diet consistency restrictions  -CJ    Plans/Goals Discussed with patient;agreed upon  -    Barriers to Rehab medically complex  -CJ    Patient's  Goals for Discharge return to PO diet  -       Pain    Additional Documentation Pain Scale: FACES Pre/Post-Treatment (Group)  -       Pain Scale: FACES Pre/Post-Treatment    Pain: FACES Scale, Pretreatment 0-->no hurt  -CJ    Posttreatment Pain Rating 0-->no hurt  -CJ       Oral Motor Structure and Function    Secretion Management wet vocal quality  -    Mucosal Quality moist, healthy  -    Volitional Swallow --    Volitional Cough --       Oral Musculature and Cranial Nerve Assessment    Oral Motor General Assessment oral labial or buccal impairment;lingual impairment;generalized oral motor weakness  -    Oral Labial or Buccal Impairment, Detail, Cranial Nerve VII (Facial): left labial droop  -    Lingual Impairment, Detail. Cranial Nerves IX, XII (Glossopharyngeal and Hypoglossal) reduced strength left  -       General Eating/Swallowing Observations    Respiratory Support Currently in Use nasal cannula  -    Eating/Swallowing Skills fed by SLP  -    Positioning During Eating upright in chair  -    Utensils Used spoon;cup;straw  -    Consistencies Trialed ice chips;thin liquids  -       Respiratory    Respiratory Status --       Clinical Swallow Eval    Oral Prep Phase --    Oral Transit --    Oral Residue --    Pharyngeal Phase suspected pharyngeal impairment  -CJ    Esophageal Phase --    Clinical Swallow Evaluation Summary Pt w/ improved alertness. able to manipulate ice chips w/o difficulty. Cough response in ~50% of trials of ice chips or thin liquids. After cued cough significant improvement in vocal quality. Will tentatively plan for FEES tomorrow to help determine further POC.  -       Oral Prep Concerns    Oral Prep Concerns --    Poor Rotary Chew --    Reduced Lip Opening --    Incomplete or Weak Lip Closure Around Spoon --    Anterior Loss --    Incomplete Bolus Preparation --       Oral Transit Concerns    Increased Oral Transit Time --    Oral Transit Concerns, Comment --        Pharyngeal Phase Concerns    Pharyngeal Phase Concerns --    Wet Vocal Quality --    Multiple Swallows --    Cough --       Esophageal Phase Concerns    Esophageal Phase Concerns --       SLP Evaluation Clinical Impression    SLP Swallowing Diagnosis suspected pharyngeal dysphagia  -    Functional Impact risk of aspiration/pneumonia;risk of malnutrition;risk of dehydration  -    Rehab Potential/Prognosis, Swallowing good, to achieve stated therapy goals  -    Swallow Criteria for Skilled Therapeutic Interventions Met demonstrates skilled criteria  -       SLP Treatment Clinical Impressions    Treatment Assessment (SLP) continued;dysarthria;cognitive-linguistic disorder  -    Treatment Assessment Comments (SLP) Pt seen for tx this date. Somewhat improved dysarthria as after cued cough able to communicate at phrase level. Pt eager for po intake. Will continue to address deficits  -    Daily Summary of Progress (SLP) progress towards functional goals is fair  -    Barriers to Overall Progress (SLP) Medically complex  -    Plan for Continued Treatment (SLP) continue treatment per plan of care;further assessment needed (see comments)  -    Care Plan Review evaluation/treatment results reviewed;care plan/treatment goals reviewed  -       Recommendations    Therapy Frequency (Swallow) 5 days per week  -    Predicted Duration Therapy Intervention (Days) until discharge  -    SLP Diet Recommendation NPO;temporary alternate methods of nutrition/hydration  1-2 ice chips per hour as tolerated  -    Recommended Diagnostics reassess via FEES  tentative plan for FEES tomorrow  -    Recommended Precautions and Strategies general aspiration precautions  -    Oral Care Recommendations Oral Care BID/PRN;Suction toothbrush  -    SLP Rec. for Method of Medication Administration meds via alternate route  -    Monitor for Signs of Aspiration yes;notify SLP if any concerns  -    Anticipated Discharge  Disposition (SLP) inpatient rehabilitation facility  -              User Key  (r) = Recorded By, (t) = Taken By, (c) = Cosigned By      Initials Name Effective Dates    CJ Anh Ross, MS CCC-SLP 07/11/23 -     RS Rito Morrissey, MS CCC-SLP 09/14/23 -                     EDUCATION  The patient has been educated in the following areas:   Cognitive Impairment Communication Impairment Dysphagia (Swallowing Impairment) Oral Care/Hydration NPO rationale.        SLP GOALS       Row Name 01/22/24 1330       Patient will demonstrate functional speech skills for return to discharge environment    [place] with moderate cues  -CJ    Time frame by discharge  -CJ    Progress/Outcomes continuing progress toward goal  -CJ       Patient will demonstrate functional cognitive-linguistic skills for return to discharge environment    [place] with moderate cues  -CJ    Time frame by discharge  -CJ    Progress/Outcomes continuing progress toward goal  -CJ       SLP Diagnostic Treatment     Patient will participate in further assessment in the following areas cognitive-linguistic  -CJ    Time Frame (Diagnostic) short term goal (STG)  -CJ    Barriers (Diagnostic) --    Progress/Outcomes (Additional Goal 1, SLP) goal ongoing  -CJ       Phonation Goal 1 (SLP)    Improve Phonation By Goal 1 (SLP) using loud speech;80%;with moderate cues (50-74%)  -CJ    Time Frame (Phonation Goal 1, SLP) short term goal (STG)  -CJ    Progress (Phonation Goal 1, SLP) 60%;with moderate cues (50-74%)  -CJ    Progress/Outcomes (Phonation Goal 1, SLP) new goal;continuing progress toward goal  -CJ       Articulation Goal 1 (SLP)    Improve Articulation Goal 1 (SLP) by over-articulating at word level;80%;with moderate cues (50-74%)  -CJ    Time Frame (Articulation Goal 1, SLP) short term goal (STG)  -CJ    Progress (Articulation Goal 1, SLP) 60%;70%;with moderate cues (50-74%)  -CJ    Progress/Outcomes (Articulation Goal 1, SLP) continuing progress  toward goal  -CJ    Comment (Articulation Goal 1, SLP) short phrase  -CJ              User Key  (r) = Recorded By, (t) = Taken By, (c) = Cosigned By      Initials Name Provider Type    Anh Cloud MS CCC-SLP Speech and Language Pathologist    Rito Mata MS CCC-SLP Speech and Language Pathologist                       Time Calculation:    Time Calculation- SLP       Row Name 01/22/24 1426             Time Calculation- SLP    SLP Start Time 1330  -CJ      SLP Received On 01/22/24  -CJ         Untimed Charges    94302-RT Eval Oral Pharyng Swallow Minutes 35  -CJ      06870-JE Treatment/ST Modification Prosth Aug Alter  23  -CJ         Total Minutes    Untimed Charges Total Minutes 58  -CJ       Total Minutes 58  -CJ                User Key  (r) = Recorded By, (t) = Taken By, (c) = Cosigned By      Initials Name Provider Type    Anh Cloud MS CCC-SLP Speech and Language Pathologist                    Therapy Charges for Today       Code Description Service Date Service Provider Modifiers Qty    74600695029  ST TREATMENT SPEECH 2 1/22/2024 Anh Ross MS CCC-SLP GN 1    [id number]  ST EVAL ORAL PHARYNG SWALLOW 2 1/22/2024 Anh Ross MS CCC-SLP GN 1                 Anh Ross MS CCC-CHAYO  1/22/2024

## 2024-01-22 NOTE — PLAN OF CARE
Goal Outcome Evaluation:  Plan of Care Reviewed With: patient        Progress: improving  Outcome Evaluation: Patient demonstrates improving activity tolerance and independence w/ mobility, but continues to be limited by L sided weakness, impaired balance and coordination, gait instability, L side inattention, and remains below her baseline. She completed sit to stand transfers w/ min A x 2 and ambulated 4ft x 2 w/ BUE support and mod A x 2. PT continues to recommend IP rehab at [facility].      Anticipated Discharge Disposition (PT): inpatient rehabilitation facility

## 2024-01-22 NOTE — THERAPY TREATMENT NOTE
"Patient Name: Regine Beckham  : 1955    MRN: [id number]                              Today's Date: 2024       Admit Date: 2024    Visit Dx:     ICD-10-CM ICD-9-CM   1. Dysphagia, unspecified type  R13.10 787.20   2. Dysarthria  R47.1 784.51   3. Cognitive communication deficit  R41.841 799.52     Patient Active Problem List   Diagnosis    Iron deficiency anemia due to chronic blood loss    Major depressive disorder    RLS (restless legs syndrome)    GERD (gastroesophageal reflux disease)    Left breast mass    Allergy to penicillin    Stroke    Grade I diastolic dysfunction     Past Medical History:   Diagnosis Date    Anemia     Anxiety     Arthritis     Depression     Legally blind     Restless leg syndrome     Sleep apnea     \"I don't use a cpap anymore since losing 106 lbs\"    Water retention      Past Surgical History:   Procedure Laterality Date    BACK SURGERY      GALLBLADDER SURGERY      HIP ARTHROSCOPY      JOINT REPLACEMENT Right       General Information       Row Name 24 1046          OT Time and Intention    Document Type therapy note (daily note)  -CS     Mode of Treatment occupational therapy  -CS       Row Name 24 1046          General Information    Existing Precautions/Restrictions fall;other (see comments)  L weakness (UE>LE); dysarthria; L inattention  -CS     Barriers to Rehab medically complex;cognitive status;visual deficit  -CS       Row Name 24 1046          Cognition    Orientation Status (Cognition) oriented x 3  -CS       Row Name 24 1046          Safety Issues, Functional Mobility    Impairments Affecting Function (Mobility) balance;coordination;endurance/activity tolerance;shortness of breath;strength;postural/trunk control;sensation/sensory awareness;motor planning;muscle tone abnormal;cognition;grasp  -CS     Cognitive Impairments, Mobility Safety/Performance attention;insight into deficits/self-awareness;sequencing abilities  -CS          "      User Key  (r) = Recorded By, (t) = Taken By, (c) = Cosigned By      Initials Name Provider Type    Maribel Sterling OT Occupational Therapist                     Mobility/ADL's       Row Name 01/22/24 1046          Transfers    Transfers sit-stand transfer;stand-sit transfer  -       Row Name 01/22/24 1046          Sit-Stand Transfer    Sit-Stand [place] (Transfers) minimum assist (75% patient effort);2 person assist;verbal cues  -       Row Name 01/22/24 1046          Stand-Sit Transfer    Stand-Sit [place] (Transfers) minimum assist (75% patient effort);verbal cues  -       Row Name 01/22/24 1046          Activities of Daily Living    BADL Assessment/Intervention grooming;lower body dressing  -       Row Name 01/22/24 1046          Lower Body Dressing Assessment/Training    [place] Level (Lower Body Dressing) don;doff;socks;dependent (less than 25% patient effort)  -     Position (Lower Body Dressing) supine  -       Row Name 01/22/24 1046          Grooming Assessment/Training    [place] Level (Grooming) hair care, combing/brushing;oral care regimen;wash face, hands;maximum assist (25% patient effort)  -     Position (Grooming) supported sitting  -               User Key  (r) = Recorded By, (t) = Taken By, (c) = Cosigned By      Initials Name Provider Type    Maribel Sterling OT Occupational Therapist                   Obj/Interventions       Row Name 01/22/24 1047          Balance    Balance Assessment sitting static balance;sitting dynamic balance;standing static balance  -     Static Sitting Balance contact guard  -CS     Dynamic Sitting Balance minimal assist  -CS     Position, Sitting Balance sitting in chair  -     Static Standing Balance minimal assist;2-person assist  -     Position/Device Used, Standing Balance supported  -     Balance Interventions sitting;standing;static;dynamic;occupation based/functional task;weight shifting activity;dynamic  reaching  -CS               User Key  (r) = Recorded By, (t) = Taken By, (c) = Cosigned By      Initials Name Provider Type    CS Maribel Valdez, BLU Occupational Therapist                   Goals/Plan    No documentation.                  Clinical Impression       Row Name 01/22/24 1047          Pain Assessment    Pain Intervention(s) Repositioned;Ambulation/increased activity  -       Row Name 01/22/24 1047          Pain Scale: FACES Pre/Post-Treatment    Pain: FACES Scale, Pretreatment 4-->hurts little more  -CS     Posttreatment Pain Rating 4-->hurts little more  -CS     Pain Location generalized  -CS     Pain Location - head  -CS     Pre/Posttreatment Pain Comment tolerated  -CS       Row Name 01/22/24 1047          Plan of Care Review    Plan of Care Reviewed With patient  -CS     Progress improving  -CS     Outcome Evaluation Pt conts to require Min Ax2 for t/fs and is Max A for grooming tasks, though session limited d/t nausea. Pt conts to presents w/ L sided weakness/inattention and balance deficits warranting cont skilled IPOT POC to promote return to PLOF. Recommend pt DC to IP rehab.  -CS       Row Name 01/22/24 1047          Therapy Plan Review/Discharge Plan (OT)    Anticipated Discharge Disposition (OT) inpatient rehabilitation facility  -       Row Name 01/22/24 1047          Vital Signs    Pre Systolic BP Rehab 143  -CS     Pre Treatment Diastolic BP 71  -CS     Post Systolic BP Rehab 137  -CS     Post Treatment Diastolic BP 73  -CS     Pretreatment Heart Rate (beats/min) 72  -CS     Posttreatment Heart Rate (beats/min) 79  -CS     Pre SpO2 (%) 97  -CS     O2 Delivery Pre Treatment room air  -CS     Post SpO2 (%) 99  -CS     O2 Delivery Post Treatment room air  -CS     Pre Patient Position Sitting  -CS     Intra Patient Position Standing  -CS     Post Patient Position Sitting  -CS       Row Name 01/22/24 1047          Positioning and Restraints    Pre-Treatment Position sitting in chair/recliner   -CS     Post Treatment Position chair  -CS     In Chair notified nsg;reclined;call light within reach;encouraged to call for assist;exit alarm on;RUE elevated;LUE elevated;waffle cushion;legs elevated;heels elevated  -CS               User Key  (r) = Recorded By, (t) = Taken By, (c) = Cosigned By      Initials Name Provider Type    Maribel Sterling OT Occupational Therapist                   Outcome Measures       Row Name 01/22/24 1049          How much help from another is currently needed...    Putting on and taking off regular lower body clothing? 1  -CS     Bathing (including washing, rinsing, and drying) 2  -CS     Toileting (which includes using toilet bed pan or urinal) 1  -CS     Putting on and taking off regular upper body clothing 2  -CS     Taking care of personal grooming (such as brushing teeth) 2  -CS     Eating meals 2  -CS     AM-PAC 6 Clicks Score (OT) 10  -CS       Row Name 01/22/24 0800          How much help from another person do you currently need...    Turning from your back to your side while in flat bed without using bedrails? 2  -JV     Moving from lying on back to sitting on the side of a flat bed without bedrails? 2  -JV     Moving to and from a bed to a chair (including a wheelchair)? 1  -JV     Standing up from a chair using your arms (e.g., wheelchair, bedside chair)? 1  -JV     Climbing 3-5 steps with a railing? 1  -JV     To walk in hospital room? 1  -JV     AM-PAC 6 Clicks Score (PT) 8  -JV     Highest Level of Mobility Goal 3 --> Sit at edge of bed  -JV       Row Name 01/22/24 1049          Modified Suzette Scale    Modified Suzette Scale 4 - Moderately severe disability.  Unable to walk without assistance, and unable to attend to own bodily needs without assistance.  -CS               User Key  (r) = Recorded By, (t) = Taken By, (c) = Cosigned By      Initials Name Provider Type    Maribel Sterling OT Occupational Therapist    Brady Butt, RN Registered Nurse                     Occupational Therapy Education       Title: PT OT SLP Therapies (In Progress)       Topic: Occupational Therapy (In Progress)       Point: ADL training (In Progress)       Description:   Instruct learner(s) on proper safety adaptation and remediation techniques during self care or transfers.   Instruct in proper use of assistive devices.                  Learning Progress Summary             Patient Acceptance, E, NR by  at 1/22/2024 1055    Acceptance, E, VU by CS at 1/20/2024 1354                         Point: Home exercise program (In Progress)       Description:   Instruct learner(s) on appropriate technique for monitoring, assisting and/or progressing therapeutic exercises/activities.                  Learning Progress Summary             Patient Acceptance, E, NR by CS at 1/22/2024 1055                         Point: Precautions (In Progress)       Description:   Instruct learner(s) on prescribed precautions during self-care and functional transfers.                  Learning Progress Summary             Patient Acceptance, E, NR by  at 1/22/2024 1055    Acceptance, E, VU by CS at 1/20/2024 1354                         Point: Body mechanics (In Progress)       Description:   Instruct learner(s) on proper positioning and spine alignment during self-care, functional mobility activities and/or exercises.                  Learning Progress Summary             Patient Acceptance, E, NR by  at 1/22/2024 1055    Acceptance, E, VU by CS at 1/20/2024 1354                                         User Key       Initials Effective Dates Name Provider Type Discipline     09/02/21 -  Maribel Valdez OT Occupational Therapist OT                  OT Recommendation and Plan  Planned Therapy Interventions (OT): activity tolerance training, adaptive equipment training, BADL retraining, functional balance retraining, occupation/activity based interventions, ROM/therapeutic exercise, strengthening exercise,  transfer/mobility retraining, cognitive/visual perception retraining, neuromuscular control/coordination retraining  Therapy Frequency (OT): daily  Plan of Care Review  Plan of Care Reviewed With: patient  Progress: improving  Outcome Evaluation: Pt conts to require Min Ax2 for t/fs and is Max A for grooming tasks, though session limited d/t nausea. Pt conts to presents w/ L sided weakness/inattention and balance deficits warranting cont skilled IPOT POC to promote return to PLOF. Recommend pt DC to IP rehab.     Time Calculation:   Evaluation Complexity (OT)  Review Occupational Profile/Medical/Therapy History Complexity: expanded/moderate complexity  Assessment, Occupational Performance/Identification of Deficit Complexity: 5 or more performance deficits  Clinical Decision Making Complexity (OT): detailed assessment/moderate complexity  Overall Complexity of Evaluation (OT): moderate complexity     Time Calculation- OT       Row Name 01/22/24 1059             Time Calculation- OT    OT Start Time 0927  -CS      OT Received On 01/22/24  -CS      OT Goal Re-Cert Due Date 01/30/24  -CS         Timed Charges    43759 - OT Therapeutic Exercise Minutes 5  -CS      34473 - OT Therapeutic Activity Minutes 5  -CS      46390 - OT Self Care/Mgmt Minutes 5  -CS         Total Minutes    Timed Charges Total Minutes 15  -CS       Total Minutes 15  -CS                User Key  (r) = Recorded By, (t) = Taken By, (c) = Cosigned By      Initials Name Provider Type    CS Maribel Valdez OT Occupational Therapist                  Therapy Charges for Today       Code Description Service Date Service Provider Modifiers Qty    07327350943 HC OT THER PROC EA 15 MIN 1/22/2024 Maribel Valdez OT GO 1                 Maribel Valdez OT  1/22/2024

## 2024-01-22 NOTE — PLAN OF CARE
Neurointerventional Metrics    Last Known Well:  2200 on 1/18/24    [facility] Arrival (ED/transfer):  1553 on 1/19/24    Code Stroke Initiated (Inpatient):  n/a    Initial NIHSS:  26    Baseline MRS:  0    IV thrombolytic:  No    CT Head: @ [facility], direct transfer to cath lab    CT Perfusion: @ [facility], direct transfer to cath lab    Arrival to Cath Lab:  1556    Groin Access: 1607    Initial Thrombectomy/Intervention (stent retriever deployment/aspiration/IA tPA):  1700    Reperfusion:  1703    Final Angiogram:  1705    End of Procedure:  1711    Pre-Procedure TICI flow:  0    Post-Procedure TICI flow:  3    Emboli to New Vascular Territory:  No

## 2024-01-22 NOTE — THERAPY TREATMENT NOTE
"Patient Name: Regine Beckham  : 1955    MRN: [id number]                              Today's Date: 2024       Admit Date: 2024    Visit Dx:     ICD-10-CM ICD-9-CM   1. Dysphagia, unspecified type  R13.10 787.20   2. Dysarthria  R47.1 784.51   3. Cognitive communication deficit  R41.841 799.52     Patient Active Problem List   Diagnosis    Iron deficiency anemia due to chronic blood loss    Major depressive disorder    RLS (restless legs syndrome)    GERD (gastroesophageal reflux disease)    Left breast mass    Allergy to penicillin    Stroke    Grade I diastolic dysfunction     Past Medical History:   Diagnosis Date    Anemia     Anxiety     Arthritis     Depression     Legally blind     Restless leg syndrome     Sleep apnea     \"I don't use a cpap anymore since losing 106 lbs\"    Water retention      Past Surgical History:   Procedure Laterality Date    BACK SURGERY      GALLBLADDER SURGERY      HIP ARTHROSCOPY      JOINT REPLACEMENT Right       General Information       Row Name 24          Physical Therapy Time and Intention    Document Type therapy note (daily note)  -MB     Mode of Treatment physical therapy  -MB       Row Name 24          General Information    Patient Profile Reviewed yes  -MB     Existing Precautions/Restrictions fall;other (see comments)  L side weakness (UE>LE); dysarthria, L inattention  -MB     Barriers to Rehab medically complex;cognitive status;visual deficit  -MB       Row Name 24          Cognition    Orientation Status (Cognition) oriented x 3  -MB       Row Name 24          Safety Issues, Functional Mobility    Safety Issues Affecting Function (Mobility) insight into deficits/self-awareness;judgment;problem-solving;safety precaution awareness;safety precautions follow-through/compliance;sequencing abilities  -MB     Impairments Affecting Function (Mobility) balance;coordination;endurance/activity tolerance;shortness " of breath;strength;postural/trunk control;sensation/sensory awareness;motor planning;muscle tone abnormal;cognition;grasp  -MB     Cognitive Impairments, Mobility Safety/Performance attention;insight into deficits/self-awareness;judgment;problem-solving/reasoning;safety precaution awareness;safety precaution follow-through;sequencing abilities  -MB               User Key  (r) = Recorded By, (t) = Taken By, (c) = Cosigned By      Initials Name Provider Type    MB Joanne Bermudez, PT Physical Therapist                   Mobility       Row Name 01/22/24 1314          Bed Mobility    Bed Mobility supine-sit  -MB     Supine-Sit [place] (Bed Mobility) moderate assist (50% patient effort);2 person assist;verbal cues;nonverbal cues (demo/gesture)  -MB     Sit-Supine [place] (Bed Mobility) not tested  -MB     Assistive Device (Bed Mobility) draw sheet;head of bed elevated;bed rails  -MB     Comment, (Bed Mobility) Pt. required assist to move LLE towards EOB, raise trunk/shoulders, and scoot hips forward to EOB.  -MB       Row Name 01/22/24 1314          Transfers    Comment, (Transfers) STS from EOB/recliner w/ assist for set up and VCs/tactile cues to push w/ RUE to stand, extend trunk in standing, increase FLORI, and reach back for armrest w/ RUE for controlled sit. No L knee buckling observed.  -MB       Row Name 01/22/24 1314          Sit-Stand Transfer    Sit-Stand [place] (Transfers) minimum assist (75% patient effort);2 person assist;verbal cues  -MB     Assistive Device (Sit-Stand Transfers) other (see comments)  BUE support  -MB       Row Name 01/22/24 1314          Gait/Stairs (Locomotion)    [place] Level (Gait) moderate assist (50% patient effort);2 person assist;verbal cues;nonverbal cues (demo/gesture)  -MB     Assistive Device (Gait) other (see comments)  BUE support  -MB     Distance in Feet (Gait) 4ft x 2  -MB     Deviations/Abnormal Patterns (Gait) memo decreased;weight shifting  decreased;gait speed decreased  -MB     Bilateral Gait Deviations forward flexed posture;heel strike decreased  -MB     Left Sided Gait Deviations weight shift ability decreased;heel strike decreased  -MB     Comment, (Gait/Stairs) Pt. ambulated w/ step to gait mechanics w/ assist to shift weight laterally and advance each LE. VCs for step sequence, increased FLORI, and upright posture.  -MB               User Key  (r) = Recorded By, (t) = Taken By, (c) = Cosigned By      Initials Name Provider Type    MB Joanne Bermudez, PT Physical Therapist                   Obj/Interventions       Row Name 01/22/24 1320          Motor Skills    Therapeutic Exercise hip;knee;ankle  -MB       Row Name 01/22/24 1320          Hip (Therapeutic Exercise)    Hip (Therapeutic Exercise) strengthening exercise  -MB     Hip Strengthening (Therapeutic Exercise) bilateral;heel slides;marching while seated;10 repetitions  -MB       Row Name 01/22/24 1320          Knee (Therapeutic Exercise)    Knee (Therapeutic Exercise) strengthening exercise;isometric exercises;AROM (active range of motion)  -MB     Knee Isometrics (Therapeutic Exercise) bilateral;quad sets;10 repetitions  -MB     Knee Strengthening (Therapeutic Exercise) bilateral;LAQ (long arc quad);10 repetitions  -MB       Row Name 01/22/24 1320          Ankle (Therapeutic Exercise)    Ankle (Therapeutic Exercise) AROM (active range of motion)  -MB     Ankle AROM (Therapeutic Exercise) bilateral;dorsiflexion;plantarflexion;10 repetitions  -MB       Row Name 01/22/24 1320          Balance    Static Sitting Balance contact guard  -MB     Dynamic Sitting Balance minimal assist  -MB     Position, Sitting Balance unsupported;sitting edge of bed  -MB     Static Standing Balance minimal assist;2-person assist  -MB     Dynamic Standing Balance moderate assist;2-person assist  -MB     Position/Device Used, Standing Balance supported;other (see comments)  BUE support  -MB     Balance  Interventions standing;sit to stand;weight shifting activity  -MB               User Key  (r) = Recorded By, (t) = Taken By, (c) = Cosigned By      Initials Name Provider Type    Joanne Strong, PT Physical Therapist                   Goals/Plan    No documentation.                  Clinical Impression       Row Name 01/22/24 1412          Pain    Pretreatment Pain Rating 4/10  -MB     Posttreatment Pain Rating 4/10  -MB     Pain Location - head  -MB     Pre/Posttreatment Pain Comment RN aware and managing.  -MB     Pain Intervention(s) Ambulation/increased activity;Repositioned  -MB       Row Name 01/22/24 1412          Pain Scale: FACES Pre/Post-Treatment    Pain: FACES Scale, Pretreatment 4-->hurts little more  -MB     Posttreatment Pain Rating 4-->hurts little more  -MB       Row Name 01/22/24 1412          Plan of Care Review    Plan of Care Reviewed With patient  -MB     Progress improving  -MB     Outcome Evaluation Patient demonstrates improving activity tolerance and independence w/ mobility, but continues to be limited by L sided weakness, impaired balance and coordination, gait instability, L side inattention, and remains below her baseline. She completed sit to stand transfers w/ min A x 2 and ambulated 4ft x 2 w/ BUE support and mod A x 2. PT continues to recommend IP rehab at D/C.  -MB       Row Name 01/22/24 1412          Vital Signs    Pre Systolic BP Rehab 122  -MB     Pre Treatment Diastolic BP 80  -MB     Intra Systolic BP Rehab 149  -MB     Intra Treatment Diastolic BP 83  -MB     Post Systolic BP Rehab 137  -MB     Post Treatment Diastolic BP 73  -MB     Pretreatment Heart Rate (beats/min) 79  -MB     Posttreatment Heart Rate (beats/min) 74  -MB     Pre SpO2 (%) 99  -MB     O2 Delivery Pre Treatment room air  -MB     O2 Delivery Intra Treatment room air  -MB     Post SpO2 (%) 99  -MB     O2 Delivery Post Treatment room air  -MB     Pre Patient Position Supine  -MB     Intra Patient  Position Standing  -MB     Post Patient Position Sitting  -MB       Row Name 01/22/24 1412          Positioning and Restraints    Pre-Treatment Position in bed  -MB     Post Treatment Position chair  -MB     In Chair notified nsg;reclined;call light within reach;encouraged to call for assist;exit alarm on;on mechanical lift sling;waffle cushion;legs elevated;heels elevated;LUE elevated  -MB               User Key  (r) = Recorded By, (t) = Taken By, (c) = Cosigned By      Initials Name Provider Type    Joanne Strong, PT Physical Therapist                   Outcome Measures       Row Name 01/22/24 1417 01/22/24 0800       How much help from another person do you currently need...    Turning from your back to your side while in flat bed without using bedrails? 2  -MB 2  -JV    Moving from lying on back to sitting on the side of a flat bed without bedrails? 2  -MB 2  -JV    Moving to and from a bed to a chair (including a wheelchair)? 2  -MB 1  -JV    Standing up from a chair using your arms (e.g., wheelchair, bedside chair)? 2  -MB 1  -JV    Climbing 3-5 steps with a railing? 2  -MB 1  -JV    To walk in hospital room? 2  -MB 1  -JV    AM-PAC 6 Clicks Score (PT) 12  -MB 8  -JV    Highest Level of Mobility Goal 4 --> Transfer to chair/commode  -MB 3 --> Sit at edge of bed  -JV      Row Name 01/22/24 1049          Modified [place] Scale    Modified [place] Scale 4 - Moderately severe disability.  Unable to walk without assistance, and unable to attend to own bodily needs without assistance.  -       Row Name 01/22/24 1417          Functional Assessment    Outcome Measure Options AM-PAC 6 Clicks Basic Mobility (PT)  -MB               User Key  (r) = Recorded By, (t) = Taken By, (c) = Cosigned By      Initials Name Provider Type    Joanne Strong, PT Physical Therapist    Maribel Sterling, OT Occupational Therapist    Brady Butt, RN Registered Nurse                                 Physical Therapy  Education       Title: PT OT SLP Therapies (In Progress)       Topic: Physical Therapy (Done)       Point: Mobility training (Done)       Learning Progress Summary             Patient Acceptance, E,D, VU,NR by MB at 1/22/2024 1418    Acceptance, E,D, VU,NR by LS at 1/20/2024 1221                         Point: Home exercise program (Done)       Learning Progress Summary             Patient Acceptance, E,D, VU,NR by MB at 1/22/2024 1418                         Point: Body mechanics (Done)       Learning Progress Summary             Patient Acceptance, E,D, VU,NR by MB at 1/22/2024 1418    Acceptance, E,D, VU,NR by LS at 1/20/2024 1221                         Point: Precautions (Done)       Learning Progress Summary             Patient Acceptance, E,D, VU,NR by MB at 1/22/2024 1418    Acceptance, E,D, VU,NR by LS at 1/20/2024 1221                                         User Key       Initials Effective Dates Name Provider Type Discipline     02/03/23 -  Nikole Herrmann, PT Physical Therapist PT    MB 06/16/21 -  Joanne Bermudez, PT Physical Therapist PT                  PT Recommendation and Plan     Plan of Care Reviewed With: patient  Progress: improving  Outcome Evaluation: Patient demonstrates improving activity tolerance and independence w/ mobility, but continues to be limited by L sided weakness, impaired balance and coordination, gait instability, L side inattention, and remains below her baseline. She completed sit to stand transfers w/ min A x 2 and ambulated 4ft x 2 w/ BUE support and mod A x 2. PT continues to recommend IP rehab at D/C.     Time Calculation:         PT Charges       Row Name 01/22/24 1418             Time Calculation    Start Time 0903  -MB      PT Received On 01/22/24  -MB      PT Goal Re-Cert Due Date 01/30/24  -MB         Timed Charges    65554 - PT Therapeutic Exercise Minutes 18  -MB      35274 - Gait Training Minutes  14  -MB         Total Minutes    Timed Charges Total Minutes  32  -MB       Total Minutes 32  -MB                User Key  (r) = Recorded By, (t) = Taken By, (c) = Cosigned By      Initials Name Provider Type    Joanne Strong, PT Physical Therapist                  Therapy Charges for Today       Code Description Service Date Service Provider Modifiers Qty    [id number]  PT THER PROC EA 15 MIN 1/22/2024 Joanne Bermudez, PT GP 1    [id number] HC GAIT TRAINING EA 15 MIN 1/22/2024 Joanne Bermudez, PT GP 1            PT G-Codes  Outcome Measure Options: AM-PAC 6 Clicks Basic Mobility (PT)  AM-PAC 6 Clicks Score (PT): 12  AM-PAC 6 Clicks Score (OT): 10  Modified [place] Scale: 4 - Moderately severe disability.  Unable to walk without assistance, and unable to attend to own bodily needs without assistance.  PT Discharge Summary  Anticipated Discharge Disposition (PT): inpatient rehabilitation facility    Joanne Bermudez, BRUCE  1/22/2024

## 2024-01-22 NOTE — PROGRESS NOTES
Stroke Progress Note       Chief Complaint:  Left sided weakness    Subjective    Subjective     Subjective:   Sitting up in bed. Audible congestion noted. Requiring frequent NT suctioning per nursing. NG in place. Continues with left sided weakness, LFD, Left tactile neglect.     Review of Systems   HENT:  Positive for trouble swallowing.    Eyes:  Negative for visual disturbance.   Respiratory:  Positive for cough. Negative for shortness of breath.    Cardiovascular:  Negative for chest pain and palpitations.   Neurological:  Positive for facial asymmetry, speech difficulty, weakness, numbness and headaches.            Objective    Objective      Temp:  [97.4 °F (36.3 °C)-98.2 °F (36.8 °C)] 98 °F (36.7 °C)  Heart Rate:  [] 112  Resp:  [16-20] 20  BP: ()/() 129/74        Neurological Exam  Mental Status  Alert. Oriented to person, place, and time. Moderate dysarthria present. Expressive aphasia present.    Cranial Nerves  CN II: Visual fields full to confrontation.  CN III, IV, VI: Extraocular movements intact bilaterally. Normal lids and orbits bilaterally. Pupils equal round and reactive to light bilaterally.  CN V:  Right: Facial sensation is normal.  Left: Diminished sensation of the entire left side of the face.  CN VII:  Right: There is no facial weakness.  Left: There is central facial weakness.    Motor  Normal muscle bulk throughout. No fasciculations present. Normal muscle tone. No abnormal involuntary movements. Strength is 5/5 throughout all four extremities.  LUE strength 1/5, LLE strength 2/5.    Sensory  Light touch abnormality:   Decreased on the left.    Coordination    No dysmetria out of proportion to weakness.    Gait    Not tested.      Physical Exam  Vitals reviewed.   HENT:      Head: Normocephalic and atraumatic.   Eyes:      General: Lids are normal.      Extraocular Movements: Extraocular movements intact.      Pupils: Pupils are equal, round, and reactive to light.    Cardiovascular:      Rate and Rhythm: Normal rate.   Pulmonary:      Effort: Pulmonary effort is normal. No respiratory distress.   Abdominal:      Comments: NG in place   Musculoskeletal:      Cervical back: Normal range of motion.      Right lower leg: No edema.      Left lower leg: No edema.   Skin:     General: Skin is warm and dry.   Neurological:      Mental Status: She is alert and oriented to person, place, and time.      Cranial Nerves: Cranial nerve deficit and dysarthria present.      Sensory: Sensory deficit present.      Motor: Motor strength is normal.Weakness present.      Comments: Left tactile neglect   Psychiatric:         Mood and Affect: Mood normal.         Behavior: Behavior normal.         Results Review:    I reviewed the patient's new clinical results.  WBC   Date Value Ref Range Status   01/22/2024 12.13 (H) 3.40 - 10.80 10*3/mm3 Final     RBC   Date Value Ref Range Status   01/22/2024 4.17 3.77 - 5.28 10*6/mm3 Final     Hemoglobin   Date Value Ref Range Status   01/22/2024 13.2 12.0 - 15.9 g/dL Final     Hematocrit   Date Value Ref Range Status   01/22/2024 39.2 34.0 - 46.6 % Final     MCV   Date Value Ref Range Status   01/22/2024 94.0 79.0 - 97.0 fL Final     MCH   Date Value Ref Range Status   01/22/2024 31.7 26.6 - 33.0 pg Final     MCHC   Date Value Ref Range Status   01/22/2024 33.7 31.5 - 35.7 g/dL Final     RDW   Date Value Ref Range Status   01/22/2024 12.8 12.3 - 15.4 % Final     RDW-SD   Date Value Ref Range Status   01/22/2024 44.6 37.0 - 54.0 fl Final     MPV   Date Value Ref Range Status   01/22/2024 9.4 6.0 - 12.0 fL Final     Platelets   Date Value Ref Range Status   01/22/2024 242 140 - 450 10*3/mm3 Final     Neutrophil %   Date Value Ref Range Status   01/22/2024 83.5 (H) 42.7 - 76.0 % Final     Lymphocyte %   Date Value Ref Range Status   01/22/2024 9.1 (L) 19.6 - 45.3 % Final     Monocyte %   Date Value Ref Range Status   01/22/2024 6.7 5.0 - 12.0 % Final      Eosinophil %   Date Value Ref Range Status   01/22/2024 0.1 (L) 0.3 - 6.2 % Final     Basophil %   Date Value Ref Range Status   01/22/2024 0.3 0.0 - 1.5 % Final     Immature Grans %   Date Value Ref Range Status   01/22/2024 0.3 0.0 - 0.5 % Final     Neutrophils, Absolute   Date Value Ref Range Status   01/22/2024 10.13 (H) 1.70 - 7.00 10*3/mm3 Final     Lymphocytes, Absolute   Date Value Ref Range Status   01/22/2024 1.10 0.70 - 3.10 10*3/mm3 Final     Monocytes, Absolute   Date Value Ref Range Status   01/22/2024 0.81 0.10 - 0.90 10*3/mm3 Final     Eosinophils, Absolute   Date Value Ref Range Status   01/22/2024 0.01 0.00 - 0.40 10*3/mm3 Final     Basophils, Absolute   Date Value Ref Range Status   01/22/2024 0.04 0.00 - 0.20 10*3/mm3 Final     Immature Grans, Absolute   Date Value Ref Range Status   01/22/2024 0.04 0.00 - 0.05 10*3/mm3 Final     nRBC   Date Value Ref Range Status   01/22/2024 0.0 0.0 - 0.2 /100 WBC Final     Lab Results   Component Value Date    GLUCOSE 143 (H) 01/22/2024    BUN 13 01/22/2024    CREATININE 0.66 01/22/2024    EGFR 95.1 01/22/2024    BCR 19.7 01/22/2024    K 3.7 01/22/2024    CO2 21.0 (L) 01/22/2024    CALCIUM 8.8 01/22/2024    ALBUMIN 3.9 01/19/2024    BILITOT 0.4 01/19/2024    AST 17 01/19/2024    ALT 10 01/19/2024     A1c 6.2  LDL 95      CT Head Without Contrast    Result Date: 1/20/2024  Impression: Evolving subacute right MCA territory infarct without additional acute process identified. Electronically Signed: Hayden Robison MD  1/20/2024 3:42 PM CST  Workstation ID: [id number]    CT Head Without Contrast    Result Date: 1/20/2024  Impression: 1.Evolving subacute infarction within right MCA territory. No acute herniation. 2.No hemorrhagic transformation. Electronically Signed: Ravi Hart MD  1/20/2024 10:48 AM EST  Workstation ID: [id number]    CT Head Without Contrast    Result Date: 1/20/2024  Impression: 1.Stable appearance of right MCA territory infarct. 2.Stable  advanced chronic small vessel ischemic change. 3.Stable minor petechial bleeding in the right caudate nucleus and putamen. Electronically Signed: Teo Carpio MD  1/20/2024 1:14 AM EST  Workstation ID: [id number]    MRI Brain Without Contrast    Result Date: 1/19/2024  Impression: Moderate sized acute right-sided MCA infarct as described above. There is evidence of small foci petechial hemorrhage in the posterior putamen, corona radiata, and high anterior right parietal lobe. Extensive changes of chronic microvascular ischemia. Findings discussed with Parul from the stroke team performed on January 19, 2024 at 10:19 p.m. Electronically Signed: Mo Marshall MD  1/19/2024 10:21 PM EST  Workstation ID: [id number]    CT CEREBRAL PERFUSION WITH & WITHOUT CONTRAST    Result Date: 1/19/2024   IMPRESSION: Results suggestive of recent ischemic event in the right middle cerebral artery territory  This report was finalized on 1/19/2024 1:45 PM by Dr. Hu Obrien MD.      CT Angiogram Neck    Result Date: 1/19/2024  1.  Moderate to high-grade stenosis in the proximal left internal carotid artery. 2.  Occlusion of the entire right internal carotid artery.   This report was finalized on 1/19/2024 1:43 PM by Dr. Hu Obrien MD.      CT Angiogram Head    Result Date: 1/19/2024   1. Barely detectable flow in the proximal right middle cerebral artery and no flow distally in the right middle cerebral artery. 2. Occlusion of the right internal carotid artery.   This report was finalized on 1/19/2024 1:27 PM by Dr. Hu Obrien MD.      CT Head Without Contrast Stroke Protocol    Result Date: 1/19/2024   1. Appearance concerning for recent right MCA ischemia with sulcal effacement but no evidence of hemorrhage, mass, or midline shift  Results have been relayed to the emergency department at 1:03 p.m.  This report was finalized on 1/19/2024 1:04 PM by Dr. Hu Obrien MD.       Results for orders placed during the hospital  encounter of 01/19/24    Adult Transthoracic Echo Limited W/ Cont if Necessary Per Protocol    Interpretation Summary    Left ventricular systolic function is normal. Estimated left ventricular EF = 60%    Saline test results are negative for intracardiac shunting..            Assessment/Plan     Assessment/Plan: 68-year-old female with history of grade 2 diastolic dysfunction, iron deficiency anemia, ZORAIDA (not currently using CPAP), RLS, history of gastric sleeve, severe depression with recent suicide attempt (OD 12/24/2023) the presents as a transfer from [facility] after developing left-sided weakness neglect and right gaze deviation.  LKW 2200 on 1/18, NIHSS 26, CTH with RMCA ischemia.  CTA with complete right ICA occlusion corresponding to a large perfusion defect on CTP.  Not a candidate for thrombolytics secondary to last known well > 4.5 hours.  NIHSS 19 on arrival to [facility] emergently transferred to [facility] directly to the Cath Lab where she was found to have a tandem  occlusion and underwent a thrombectomy and RADHA stent placement resulting in TICI 3 flow.    PTA antiplatelet: None  PTA anticoagulant: None      AIS in the R MCA territory D/T tandem right ICA occlusion s/p MT and RADHA stent placement on 1/19/2024  -NIH 12 this a.m.  -MRI with moderate sized right MCA territory stroke  -TTE shows EF of 60%, negative bubble study  -CUS with patency of right carotid stent without evidence of restenosis, <50% left ICA stenosis  -Continue aspirin and Brilinta 60 mg twice daily per Dr. Hurley  -HTN: Normal blood pressure parameters, avoid hypotension.   -HLD: LDL 95, goal <70; ontinue with atorvastatin 80 mg daily  -Suicide precautions; history of major depressive disorder with suicidal ideation.  Home Remeron restarted.   -Continue PT/OT, IPR recommended at d/c  -N.p.o.; SLP following-planning for fees on Tuesday. May ultimately need PEG  -Aspiration precautions d/t diffculty clearing her airway. Requiring frequent NT  suctioning per nursing.   -Outpatient follow-up neurosurgery in 4 weeks    Plan of care reviewed with patient, Dr. Sanchez and patient's nurse.  Recommend that the patient remain in ICU for now due to difficulty maintaining her secretions.  Fees scheduled for tomorrow stroke neurology will continue to follow.  Please call with any questions or concerns.      NIKKIE Felipe, AGACNP-BC  01/22/24  07:28 EST

## 2024-01-22 NOTE — PLAN OF CARE
Goal Outcome Evaluation:  Plan of Care Reviewed With: patient        Progress: improving       SLP re-evaluation and treatment completed. Will continue to address deficits, will tentatively plan for FEES tomorrow. Please see note for further details and recommendations.    Anticipated Discharge Disposition (SLP): inpatient rehabilitation facility          SLP Swallowing Diagnosis: suspected pharyngeal dysphagia (01/22/24 1330)  Treatment Assessment (SLP): continued, dysarthria, cognitive-linguistic disorder (01/22/24 1330)  Treatment Assessment Comments (SLP): Pt seen for tx this date. Somewhat improved dysarthria as after cued cough able to communicate at phrase level. Pt eager for po intake. Will continue to address deficits (01/22/24 1330)  Plan for Continued Treatment (SLP): continue treatment per plan of care, further assessment needed (see comments) (01/22/24 1330)

## 2024-01-22 NOTE — PLAN OF CARE
Goal Outcome Evaluation:  Plan of Care Reviewed With: patient        Progress: improving  Outcome Evaluation: Pt conts to require Min Ax2 for t/fs and is Max A for grooming tasks, though session limited d/t nausea. Pt conts to presents w/ L sided weakness/inattention and balance deficits warranting cont skilled IPOT POC to promote return to PLOF. Recommend pt DC to IP rehab.      Anticipated Discharge Disposition (OT): inpatient rehabilitation facility

## 2024-01-22 NOTE — CASE MANAGEMENT/SOCIAL WORK
Discharge Planning Assessment  [facility]     Patient Name: Regine Beckham  MRN: [id number]  Today's Date: 1/22/2024    Admit Date: 1/19/2024    Plan: IDP   Discharge Needs Assessment       Row Name 01/22/24 1335       Living Environment    People in Home alone    Current Living Arrangements home    Potentially Unsafe Housing Conditions unable to assess    In the past 12 months has the electric, gas, oil, or water company threatened to shut off services in your home? No    Primary Care Provided by self    Provides Primary Care For no one, unable/limited ability to care for self    Family Caregiver if Needed friend(s)    Family Caregiver Names Yaa Mikhail, friend and neighbor    Quality of Family Relationships helpful       Resource/Environmental Concerns    Resource/Environmental Concerns none    Transportation Concerns none       Transportation Needs    In the past 12 months, has lack of transportation kept you from medical appointments or from getting medications? no    In the past 12 months, has lack of transportation kept you from meetings, work, or from getting things needed for daily living? No       Transition Planning    Patient/Family Anticipates Transition to inpatient rehabilitation facility    Patient/Family Anticipated Services at Transition     Transportation Anticipated health plan transportation       Discharge Needs Assessment    Equipment Currently Used at Home none    Concerns to be Addressed discharge planning                   Discharge Plan       Row Name 01/22/24 8356       Plan    Plan IDP    Patient/Family in Agreement with Plan yes    Plan Comments Spoke with patient at bedside to initiate discharge planning.  Patient confirms address in [facility]; PCP is [facility]; insurance is [employer].  Patient reports she is independent with ADLs and mobility prior to this admission; no DME or home health.  Patient is agreeable to short term rehab and will review options from  patient choice list.  CM will continue to follow.                  Continued Care and Services - Admitted Since 1/19/2024    Coordination has not been started for this encounter.       Expected Discharge Date and Time       Expected Discharge Date Expected Discharge Time    Jan 26, 2024            Demographic Summary       Row Name 01/22/24 4010       General Information    Arrived From hospital    Referral Source admission list    Reason for Consult discharge planning    Preferred Language English       Contact Information    Permission Granted to Share Info With                    Functional Status       Row Name 01/22/24 2254       Functional Status    Usual Activity Tolerance moderate    Current Activity Tolerance other (see comments)  see therapy notes       Functional Status, IADL    Medications independent    Meal Preparation independent    Housekeeping independent    Laundry independent    Shopping independent                   Psychosocial    No documentation.                  Abuse/Neglect    No documentation.                  Legal    No documentation.                  Substance Abuse    No documentation.                  Patient Forms    No documentation.                     Ana Pozo RN
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
## 2024-01-22 NOTE — PAYOR COMM NOTE
"Ref# [id number]   Transfer from OSH to [facility] on 1/19 1/20 Admit order put in    Utilization Review  Phone [phone]  Fax [phone]    [place] [address]         Regine Lock \"NEGIN\" (68 y.o. Female)       Date of Birth   1955    Social Security Number       Address   [address] [place], [place] [address]    Home Phone   [phone]    MRN   [id number]       [facility]    Marital Status                               Admission Date   1/19/24    Admission Type   Urgent    Admitting Provider   Gm Marie MD    Attending Provider   Gm Marie MD    Department, Room/Bed   [facility] 2B ICU, N223/1       Discharge Date       Discharge Disposition       Discharge Destination                                 Attending Provider: Gm Marie MD    Allergies: Penicillins    Isolation: None   Infection: None   Code Status: Prior    Ht: 167.6 cm (65.98\")   Wt: 70.1 kg (154 lb 8.7 oz)    Admission Cmt: None   Principal Problem: Stroke [I63.511]                   Active Insurance as of 1/19/2024       Primary Coverage       Payor Plan Insurance Group Employer/Plan Group    AETNA MEDICARE REPLACEMENT AETNA MEDICARE REPLACEMENT [id number]       Payor Plan Address Payor Plan Phone Number Payor Plan Fax Number Effective Dates    [address] [phone]  1/1/2024 - None Entered    [facility] [id number]         Subscriber Name Subscriber Birth Date Member ID       REGINE LOCK 1955 [id number]                     Emergency Contacts        (Rel.) Home Phone Work Phone Mobile Phone    Yaa Elizondo (Friend) -- [phone] --              [place]: NPI [id number] Tax ID [id number]  Insurance Information                  AETNA MEDICARE REPLACEMENT/AETNA MEDICARE REPLACEMENT Phone: [phone]    Subscriber: Regine Lock Subscriber#: [id number]    Group#: [id number] Precert#: -- " "            History & Physical        Gm Marie MD at 01/19/24 5213            Intensive Care Admission Note     Acute ischemic right MCA stroke    History of Present Illness     Regine Beckham is a 68 year-old female with grade II diastolic dysfunction, anemia, RLS, ZORAIDA (no longer uses CPAP), severe depression with recent suicide attempt via OD (12/24/23), and legal blindness that presents to [facility] ICU from [facility] for higher level of care.     Patient was found by her family this morning with symptoms of left-sided weakness, neglect, and right gaze deviation. She presented to [facility] ED on 1/19/24 for evaluation. LKW 2200 on 1/18/24. NIHSS 26. CT head demonstrated right MCA territory ischemia. CTA showed right ICA complete occlusion. CT perfusion showed ischemic tissue volume of 180 cc in the R MCA territory. Thrombolytics not given 2* LKW > 4.5 hours. He was transferred to [facility] for potential thrombectomy with Dr. Hurley. Upon arrival to [facility], she was taken directly to Cath lab where she was found to have tandem ICA and ICA terminus occlusion. NIHSS 19. Mechanical thrombectomy performed of right ICA terminus occlusion resulting in TICI 3 flow and stent successfully placed to the proximal right ICA occlusion. Patient presents to the ICU postoperatively for further care.     Time spent: 4 minutes  Electronically signed by NIKKIE Ramos, 01/19/24, 6:00 PM EST.    Problem List, Surgical History, Family, Social History, and ROS     Past Medical History:   Diagnosis Date    Anemia     Anxiety     Arthritis     Depression     Legally blind     Restless leg syndrome     Sleep apnea     \"I don't use a cpap anymore since losing 106 lbs\"    Water retention       Past Surgical History:   Procedure Laterality Date    BACK SURGERY      GALLBLADDER SURGERY      HIP ARTHROSCOPY      JOINT REPLACEMENT Right        Allergies   Allergen Reactions    Penicillins Hives and Swelling     Current Facility-Administered Medications " "on File Prior to Encounter   Medication    [COMPLETED] iopamidol (ISOVUE-370) 76 % injection 100 mL    [COMPLETED] sodium chloride 0.9 % infusion    [DISCONTINUED] sodium chloride 0.9 % flush 10 mL     Current Outpatient Medications on File Prior to Encounter   Medication Sig    Albuterol (VENTOLIN IN) Inhale 2 puffs Every 6 (Six) Hours As Needed.    diclofenac (VOLTAREN) 75 MG EC tablet Take 1 tablet by mouth 2 (Two) Times a Day.    hydroCHLOROthiazide (HYDRODIURIL) 12.5 MG tablet Take 1 tablet by mouth Daily.    losartan (COZAAR) 50 MG tablet Take 1 tablet by mouth Daily.    Mirabegron ER (MYRBETRIQ) 50 MG tablet sustained-release 24 hour 24 hr tablet Take 50 mg by mouth Daily.    mirtazapine (REMERON) 30 MG tablet Take 1 tablet by mouth Every Night. Indications: Major Depressive Disorder    ondansetron ODT (ZOFRAN-ODT) 4 MG disintegrating tablet Place 1 tablet on the tongue Every 8 (Eight) Hours As Needed for Nausea or Vomiting.    pantoprazole (PROTONIX) 40 MG EC tablet TAKE 1 TABLET BY MOUTH EVERY MORNING FOR HEARTBURN    rOPINIRole (REQUIP) 4 MG tablet Take 1 tablet by mouth Every Night. Take 1 hour before bedtime.  Indications: Restless Leg Syndrome    spironolactone (ALDACTONE) 25 MG tablet TAKE 1 TABLET BY MOUTH DAILY FOR HIGH BLOOD PRESSURE    zolpidem (AMBIEN) 5 MG tablet Take 1 tablet by mouth At Night As Needed for Sleep. Indications: Trouble Sleeping     MEDICATION LIST AND ALLERGIES REVIEWED.    Family History   Problem Relation Age of Onset    Breast cancer Neg Hx      Social History     Tobacco Use    Smoking status: Every Day     Packs/day: 1.50     Years: 51.00     Additional pack years: 0.00     Total pack years: 76.50     Types: Cigarettes    Smokeless tobacco: Never   Vaping Use    Vaping Use: Never used   Substance Use Topics    Alcohol use: Yes     Alcohol/week: 1.0 standard drink of alcohol     Types: 1 Glasses of wine per week     Comment: \"occasionally - once every two months\"    Drug use: " Yes     Comment: alcohol - occasionally     Social History     Social History Narrative    Not on file     FAMILY AND SOCIAL HISTORY REVIEWED.    Review of Systems  ALL OTHER SYSTEMS REVIEWED AND ARE NEGATIVE.    Physical Exam and Clinical Information   /72   Pulse 98   Temp 97.5 °F (36.4 °C) (Axillary)   Resp 18   SpO2 98%   Physical Exam  General Appearance: Pt awake, no acute distress  Head:    Atraumatic, Normocephalic, without obvious abnormality, Pupils reactive & symmetrical B/L. Deviated eyes to right and left neglect. Left facial droop  Lungs:   B/L Breath sounds present with decreased breath sounds on bases, no wheezing heard, no crackles.   Heart: S1 and S2 present, no murmur  Abdomen: Soft, nontender, no guarding or rigidity, bowel sounds positive, no masses.  Extremities:  Groin access site without any hematoma or bruising,  no edema, warm to touch.  Pulses: Positive and symmetric.  Neurologic:  Dense hemiplegia left.   Interval:  (to 2B)  1a. Level of Consciousness: 2-->Not alert, requires repeated stimulation to attend, or is obtunded and requires strong or painful stimulation to make movements (not stereotyped)  1b. LOC Questions: 1-->Answers one question correctly  1c. LOC Commands: 1-->Performs one task correctly  2. Best Gaze: 2-->Forced deviation, or total gaze paresis not overcome by the oculocephalic maneuver  3. Visual: 2-->Complete hemianopia  4. Facial Palsy: 2-->Partial paralysis (total or near-total paralysis of lower face)  5a. Motor Arm, Left: 4-->No movement  5b. Motor Arm, Right: 0-->No drift, limb holds 90 (or 45) degrees for full 10 secs  6a. Motor Leg, Left: 3-->No effort against gravity, leg falls to bed immediately  6b. Motor Leg, Right: 0-->No drift, leg holds 30 degree position for full 5 secs  7. Limb Ataxia: 0-->Absent  8. Sensory: 2-->Severe to total sensory loss, patient is not aware of being touched in the face, arm, and leg  9. Best Language: 2-->Severe aphasia,  "all communication is through fragmentary expression, great need for inference, questioning, and guessing by the listener. Range of information that can be exchanged is limited, listener carries burden of. . . (see row details)  10. Dysarthria: 2-->Severe dysarthria, patients speech is so slurred as to be unintelligible in the absence of or out of proportion to any dysphasia, or is mute/anarthric  11. Extinction and Inattention (formerly Neglect): 1-->Visual, tactile, auditory, spatial, or personal inattention or extinction to bilateral simultaneous stimulation in one of the sensory modalities    Total (NIH Stroke Scale): 24       Results from last 7 days   Lab Units 01/19/24  1257   WBC 10*3/mm3 11.47*   HEMOGLOBIN g/dL 15.1   PLATELETS 10*3/mm3 258     Results from last 7 days   Lab Units 01/19/24  1302 01/19/24  1257   SODIUM mmol/L  --  143   POTASSIUM mmol/L  --  4.4   CO2 mmol/L  --  29.2*   BUN mg/dL  --  15   CREATININE mg/dL 0.90 0.99   GLUCOSE mg/dL  --  127*     Estimated Creatinine Clearance: 65.5 mL/min (by C-G formula based on SCr of 0.9 mg/dL).          No results found for: \"LACTATE\"     Images:   Abdominal x-ray reviewed and showed proximal port of NG tube above the diaphragm.  Will need advancement.  Nursing staff aware.    I reviewed the patient's results and images.       Report of CT perfusion reviewed.   IMPRESSION:   Results suggestive of recent ischemic event in the right middle cerebral  artery territory     This report was finalized on 1/19/2024 1:45 PM by Dr. Hu Obrien MD.      CTA neck:  IMPRESSION:  1.  Moderate to high-grade stenosis in the proximal left internal  carotid artery.  2.  Occlusion of the entire right internal carotid artery.        This report was finalized on 1/19/2024 1:43 PM by Dr. Hu Obrien MD.    EKG reviewed and showed normal sinus rhythm.  Q waves noted in lead III and aVF.  No acute ST changes.  QTc 490.    Impression     Stroke    Iron deficiency anemia due " to chronic blood loss    Major depressive disorder    RLS (restless legs syndrome)    GERD (gastroesophageal reflux disease)    Grade I diastolic dysfunction    Plan/Recommendations     68-year-old female with anemia, restless leg syndrome, sleep apnea not on any CPAP, severe depression with recent suicide attempt by overdose on December 24, 2023 and legal blindness.  Patient was found by family this morning with some dull left-sided weakness, left-sided neglect and right gaze deviation.  She presented to [facility] for evaluation.  Last known well was 10 PM on 1/18/2024.  NIHSS was 26.  CT head, CTA and perfusion study were consistent with right MCA territory ischemia.  Patient was discussed with stroke team and accepted in transfer for potential thrombectomy.  Patient was found to have tandem ICA and ICA terminus occlusion. Mechanical thrombectomy performed of right ICA terminus occlusion resulting in TICI 3 flow and stent successfully placed to the proximal right ICA occlusion.  Patient was transferred to ICU for closer monitoring.    Patient currently on Integrilin drip per protocol.  Patient is also on nicardipine 15 mg/h to keep systolic blood pressure less than 140.    1.  Admit to intensive care unit.  2.  Follow neurological status closely.  Patient has not shown any meaningful neurological recovery after thrombectomy and stent placement.  stroke neurology and interventional neurosurgery following closely.  3.  Aspirin, statin and Brilinta.  4.  Nicardipine infusion to keep systolic blood pressure less than 140.  5.  NG tube to be advanced.  6.  MRI brain pending.  7.  Check hemoglobin A1c and lipid panel.  8.  Echocardiogram with bubble study in the morning.  9.  PT/OT/speech to follow.    Check labs in the morning.    Patient with guarded prognosis.  Will continue supportive care at this point.    Time spent  40 min  (exclusive of procedure time)  including high complexity decision making to  assess, manipulate, and support vital organ system failure in this individual who has impairment of one or more vital organ systems such that there is a high probability of imminent or life threatening deterioration in the patient’s condition.      Gm Marie MD, [facility]  Pulmonary and Critical Care Medicine  01/19/24 18:55 EST     CC: Dread Burton MD     Electronically signed by Gm Marie MD at 01/19/24 1921          Operative/Procedure Notes (all)        Efrain Hurley MD at 01/19/24 1556  Version 1 of 1         CV PERCUTANEOUS MANUAL THROMBECTOMY  Progress Note    Regine Beckham  1/19/2024    Pre-op Diagnosis:   Tandem proximal ICA and ICA terminus occlusion on the right       Post-Op Diagnosis Codes:  Same as preoperative    Procedure/CPT® Codes:        Procedure(s):  Percutaneous Manual Thrombectomy              Surgeon(s):  Efrain Hurley MD    Anesthesia: Local    Staff:   Documenter: Jacques, Ju, RN  Invasive Nurse: Joceline Hay RN; Lela Chandler RN  Invasive Technologist: Ivy Weinstein         Estimated Blood Loss: minimal    Urine Voided: * No values recorded between 1/19/2024  3:56 PM and 1/19/2024  5:11 PM *    Specimens:                None          Drains: * No LDAs found *    Findings: Successful placement of carotid artery stent for proximal right ICA occlusion.  TICI 3 thrombectomy of an right ICA terminus occlusion.        Complications: None apparent          Efrain Hurley MD     Date: 1/19/2024  Time: 17:21 EST          Electronically signed by Efrain Hurley MD at 01/19/24 1723          Physician Progress Notes (last 4 days)        Vangie López MD at 01/21/24 1237          Critical Care Note     LOS: 2 days   Patient Care Team:  Dread Burton MD as PCP - General (Family Medicine)  Daina Fletcher MD as Consulting Physician (Hematology)    Chief Complaint/Reason for visit:      Acute ischemic stroke, right hemisphere  Right ICA  "stent post thrombectomy  Hypertension  Atrial fibrillation, new    Subjective     Interval History:     Currently on Cardene 7.5 mg/h for blood pressure control.  Afebrile.  Remains in sinus rhythm.  Continues to have severe dysarthria.    Review of Systems:    All systems were reviewed and negative except as noted in subjective.    Medical history, surgical history, social history, family history reviewed    Objective     Intake/Output:    Intake/Output Summary (Last 24 hours) at 1/21/2024 1237  Last data filed at 1/21/2024 1201  Gross per 24 hour   Intake 2217.5 ml   Output 775 ml   Net 1442.5 ml       Nutrition:  NPO Diet NPO Type: Strict NPO    Infusions:  niCARdipine, 5-15 mg/hr, Last Rate: 7.5 mg/hr (01/21/24 1201)            Telemetry: Sinus rhythm             Vital Signs  Blood pressure 122/60, pulse 86, temperature 97.4 °F (36.3 °C), temperature source Axillary, resp. rate 18, height 167.6 cm (65.98\"), weight 70.1 kg (154 lb 8.7 oz), SpO2 98%, not currently breastfeeding.    Physical Exam:  General Appearance:  Older woman supine in bed in no distress   Head:  No visible trauma   Eyes:          Eyes gazing toward the left   Ears:     Throat: No thrush   Neck: Trachea midline, no crepitus   Back:      Lungs:   Symmetric chest expansion without wheeze or rhonchi    Heart:  Irregular rhythm, S1, S2 auscultated   Abdomen:   Bowel sounds present, soft   Rectal:   Deferred   Extremities: Catheterization site without hematoma   Pulses: Palpable distal pulses   Skin: Warm and dry   Lymph nodes: No cervical adenopathy   Neurologic: Tells me her name.  Eyes deviated to the right.  Weak left upper extremity, flaccid, lower extremity can move but drifts.  Severe dysarthria, follows commands with the right side, neglecting the right side    Interval:  (handoff)  1a. Level of Consciousness: 0-->Alert, keenly responsive  1b. LOC Questions: 0-->Answers both questions correctly  1c. LOC Commands: 0-->Performs both tasks " correctly  2. Best Gaze: 1-->Partial gaze palsy, gaze is abnormal in one or both eyes, but forced deviation or total gaze paresis is not present  3. Visual: 1-->Partial hemianopia  4. Facial Palsy: 1-->Minor paralysis (flattened nasolabial fold, asymmetry on smiling)  5a. Motor Arm, Left: 2-->Some effort against gravity, limb cannot get to or maintain (if cued) 90 (or 45) degrees, drifts down to bed, but has some effort against gravity  5b. Motor Arm, Right: 0-->No drift, limb holds 90 (or 45) degrees for full 10 secs  6a. Motor Leg, Left: 1-->Drift, leg falls by the end of the 5-sec period but does not hit bed  6b. Motor Leg, Right: 0-->No drift, leg holds 30 degree position for full 5 secs  7. Limb Ataxia: 0-->Absent  8. Sensory: 2-->Severe to total sensory loss, patient is not aware of being touched in the face, arm, and leg  9. Best Language: 1-->Mild-to-moderate aphasia, some obvious loss of fluency or facility of comprehension, without significant limitation on ideas expressed or form of expression. Reduction of speech and/or comprehension, however, makes conversation. . . (see row details)  10. Dysarthria: 1-->Mild-to-moderate dysarthria, patient slurs at least some words and, at worst, can be understood with some difficulty  11. Extinction and Inattention (formerly Neglect): 2-->Profound dao-inattention/extinction more than 1 modality    Total (NIH Stroke Scale): 12   Results Review:     I reviewed the patient's new clinical results.   Results from last 7 days   Lab Units 01/21/24  0227 01/20/24  1530 01/20/24  0408 01/19/24  1302 01/19/24  1257   SODIUM mmol/L 139  --  138  --  143   POTASSIUM mmol/L 3.7 4.1 3.4*  --  4.4   CHLORIDE mmol/L 107  --  104  --  105   CO2 mmol/L 21.0*  --  22.0  --  29.2*   BUN mg/dL 10  --  15  --  15   CREATININE mg/dL 0.67  --  0.78 0.90 0.99   CALCIUM mg/dL 9.1  --  8.7  --  9.7   BILIRUBIN mg/dL  --   --   --   --  0.4   ALK PHOS U/L  --   --   --   --  85   ALT (SGPT) U/L  " --   --   --   --  10   AST (SGOT) U/L  --   --   --   --  17   GLUCOSE mg/dL 173*  --  182*  --  127*     Results from last 7 days   Lab Units 01/21/24  0227 01/20/24  0408 01/19/24  1257   WBC 10*3/mm3 14.84* 11.53* 11.47*   HEMOGLOBIN g/dL 13.2 13.5 15.1   HEMATOCRIT % 38.7 39.1 45.0   PLATELETS 10*3/mm3 256 262 258         No results found for: \"BLOODCX\"  No results found for: \"URINECX\"    I reviewed the patient's new imaging including images and reports.    XR CHEST 1 VW    Date of Exam: 1/21/2024 3:20 AM EST    Indication: Persistent cough    Comparison: January 20, 2024    Findings:  An NG tube has its tip below the diaphragm. The lungs are clear. The heart and mediastinal contours appear normal. There is no pleural effusion. The pulmonary vasculature appears normal. The osseous structures appear intact.   Impression:     Impression:  No acute cardiopulmonary process.      Electronically Signed: Ravi Hart MD   1/21/2024 8:55 AM EST       CT HEAD WO CONTRAST    Date of Exam: 1/20/2024 4:50 AM EST    Indication: Stroke, follow up.    Comparison: CT head from earlier today    Technique: Axial CT images were obtained of the head without contrast administration.  Automated exposure control and iterative construction methods were used.      Findings:  There is an evolving right MCA territory subacute infarction. There is no hemorrhagic transformation. The ventricles are stable in caliber, with no midline shift. The basal cisterns appear patent. Degenerative changes appear stable.    The osseous structures appear intact. There is mild mucosal disease in the right sphenoid sinus. The mastoid air cells are well aerated.   Impression:     Impression:  1.Evolving subacute infarction within right MCA territory. No acute herniation.  2.No hemorrhagic transformation.        Electronically Signed: Ravi Hart MD   1/20/2024 10:48 AM EST       All medications reviewed.   aspirin, 325 mg, Nasogastric, Daily   " Or  aspirin, 300 mg, Rectal, Daily  atorvastatin, 80 mg, Nasogastric, Nightly  [Held by provider] Mirabegron ER, 50 mg, Oral, Daily  mirtazapine, 30 mg, Nasogastric, Nightly  nicotine, 1 patch, Transdermal, Q24H  pantoprazole, 40 mg, Intravenous, Q AM  rOPINIRole, 4 mg, Nasogastric, Nightly  sodium chloride, 10 mL, Intravenous, Q12H  ticagrelor, 60 mg, Nasogastric, BID          Assessment & Plan       Stroke    Iron deficiency anemia due to chronic blood loss    Major depressive disorder    RLS (restless legs syndrome)    GERD (gastroesophageal reflux disease)    Grade I diastolic dysfunction    68-year-old woman with hypertension, chronic anemia, sleep apnea intolerant of CPAP, severe depression with recent overdose attempt, legal blindness, presenting with left-sided weakness and found to have right middle cerebral artery ischemia and 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  She underwent thrombectomy and carotid stent.  She did not receive thrombolysis because she was outside the time window.  This morning her NIH stroke score is a 12 compared to 19 on admission.  Systolic blood pressure is 110-135, Cardene has been restarted and is currently at 7.5 mg/h    and Cardene has been tapered off.    She does take losartan, hydrochlorothiazide, spironolactone as an outpatient.  Will avoid diuretics for now as I wish to avoid dehydration.   She has a known history of iron deficiency anemia due to chronic blood loss.  This morning her hemoglobin is normal at 13.5.  In February 2022 her hemoglobin was 9.9.  She apparently has a previous history of gastric sleeve.  She received some IV iron infusion in 2020.  She was hospitalized December 21 through December 24 for severe depression with suicidal ideation, overdose of Ambien.  She is currently taking mirabegron  Risk factors for stroke include tobacco use, hypertension.  Her A1c is 6.2.  Lipid panel revealed a total cholesterol of 160.Today glucose  "140-207.    PLAN:    PT, OT, speech therapy  Aspirin, statin  Brilinta  Nicotine patch  Start losartan  Monitor blood pressure, as needed Apresoline, Cardene systolic greater than 180  Monitor NIH stroke scale  Consider tube feeding, although NIHSS is improving, still has dysarthria  SS insulin    VTE Prophylaxis:SCDS    Stress Ulcer Prophylaxis:none    Vangie López MD  01/21/24  12:37 EST      Time: 25 minutes.     Electronically signed by Vangie López MD at 01/21/24 1252       Efrain Hurley MD at 01/21/24 0934          NEUROSURGERY PROGRESS NOTE    Chief Complaint: Stroke    Subjective: Stable overnight.  Neurological exam slightly improved.    Objective    Vital Signs: Blood pressure 110/58, pulse 89, temperature 97.9 °F (36.6 °C), temperature source Oral, resp. rate 20, height 167.6 cm (65.98\"), weight 70.1 kg (154 lb 8.7 oz), SpO2 97%, not currently breastfeeding.    Physical Exam  Awake, alert and conversive  Opens eyes spont  Pupils 3 mm reactive bilaterally  Right gaze deviation, but is able to cross midline to the left.  Extraocular muscles intact bilaterally  Left facial droop.  5 out of 5 in the right upper and right lower extremities.  She is antigravity in the left upper and left lower.    Intake/Output:   Intake/Output Summary (Last 24 hours) at 1/21/2024 0934  Last data filed at 1/21/2024 0800  Gross per 24 hour   Intake 2069.7 ml   Output 775 ml   Net 1294.7 ml       Current Medications:   Current Facility-Administered Medications:     acetaminophen (TYLENOL) tablet 650 mg, 650 mg, Nasogastric, Q6H PRN, Valdemar Zaidi APRN, 650 mg at 01/21/24 0421    albuterol (PROVENTIL) nebulizer solution 0.083% 2.5 mg/3mL, 2.5 mg, Nebulization, Q6H PRN, Cyndee Ardon APRN, 2.5 mg at 01/20/24 2243    aspirin tablet 325 mg, 325 mg, Nasogastric, Daily, 325 mg at 01/21/24 0817 **OR** aspirin suppository 300 mg, 300 mg, Rectal, Daily, Vangie López MD    atorvastatin " (LIPITOR) tablet 80 mg, 80 mg, Nasogastric, Nightly, Efrain Hurley MD, 80 mg at 01/20/24 2042    Calcium Replacement - Follow Nurse / BPA Driven Protocol, , Does not apply, Maribel BARBOSA Donna C, MD    guaifenesin (ROBITUSSIN) 100 MG/5ML liquid 400 mg, 400 mg, Nasogastric, Q4H PRN, Melanie Salmon, PharmD, 400 mg at 01/21/24 0817    hydrALAZINE (APRESOLINE) injection 20 mg, 20 mg, Intravenous, Q4H PRN, Cyndee Ardon APRN, 20 mg at 01/19/24 1913    Magnesium Standard Dose Replacement - Follow Nurse / BPA Driven Protocol, , Does not apply, Maribel BARBOSA Donna C, MD    [Held by provider] Mirabegron ER (MYRBETRIQ) 24 hr tablet 50 mg, 50 mg, Oral, Daily, Vangie López MD    mirtazapine (REMERON) tablet 30 mg, 30 mg, Nasogastric, Nightly, Vangie López MD, 30 mg at 01/20/24 2042    niCARdipine (CARDENE) 25mg in 250mL NS infusion, 5-15 mg/hr, Intravenous, Titrated, Jair Quinteros PA-C, Last Rate: 75 mL/hr at 01/21/24 0841, 7.5 mg/hr at 01/21/24 0841    nicotine (NICODERM CQ) 21 MG/24HR patch 1 patch, 1 patch, Transdermal, Q24H, Gm Marie MD, 1 patch at 01/21/24 0817    pantoprazole (PROTONIX) injection 40 mg, 40 mg, Intravenous, Q AM, Melanie Salmon, PharmD, 40 mg at 01/21/24 0530    phenol (CHLORASEPTIC) 1.4 % liquid 1 spray, 1 spray, Mouth/Throat, Q2H PRN, Cyndee Ardon APRN, 1 spray at 01/21/24 0421    Phosphorus Replacement - Follow Nurse / BPA Driven Protocol, , Does not apply, Maribel BARBOSA Donna C, MD    Potassium Replacement - Follow Nurse / BPA Driven Protocol, , Does not apply, PRN, Vangie López MD    rOPINIRole (REQUIP) tablet 4 mg, 4 mg, Nasogastric, Nightly, Melanie Salmon, PharmD    sodium chloride 0.9 % flush 10 mL, 10 mL, Intravenous, Q12H, Jair Quinteros PA-C, 10 mL at 01/21/24 0817    sodium chloride 0.9 % flush 10 mL, 10 mL, Intravenous, PRN, Jair Quinteros PA-C    sodium chloride 0.9 % infusion 40 mL, 40 mL, Intravenous, PRN, Jair Quinteros,  ANGELES    ticagrelor (BRILINTA) tablet 60 mg, 60 mg, Nasogastric, BID, Efrain Hurley MD, 60 mg at 01/21/24 0530     Laboratory Results:       Lab 01/21/24 0227 01/20/24  2224 01/20/24  0408 01/19/24  1257   WBC 14.84*  --  11.53* 11.47*   HEMOGLOBIN 13.2  --  13.5 15.1   HEMATOCRIT 38.7  --  39.1 45.0   PLATELETS 256  --  262 258   NEUTROS ABS 11.75*  --  9.78* 8.72*   IMMATURE GRANS (ABS) 0.06*  --  0.04 0.04   LYMPHS ABS 1.77  --  1.21 2.01   MONOS ABS 1.17*  --  0.47 0.55   EOS ABS 0.05  --  0.01 0.11   MCV 95.1  --  93.3 94.5   PROCALCITONIN  --  0.15  --   --          Lab 01/21/24 0227 01/20/24  1530 01/20/24  0408 01/19/24  1302 01/19/24  1257   SODIUM 139  --  138  --  143   POTASSIUM 3.7 4.1 3.4*  --  4.4   CHLORIDE 107  --  104  --  105   CO2 21.0*  --  22.0  --  29.2*   ANION GAP 11.0  --  12.0  --  8.8   BUN 10  --  15  --  15   CREATININE 0.67  --  0.78 0.90 0.99   EGFR 94.7  --  82.3  --  61.9   GLUCOSE 173*  --  182*  --  127*   CALCIUM 9.1  --  8.7  --  9.7   MAGNESIUM  --   --  1.8  --   --    PHOSPHORUS  --   --  2.8  --   --    HEMOGLOBIN A1C  --   --  6.20*  --   --          Lab 01/19/24  1257   TOTAL PROTEIN 7.4   ALBUMIN 3.9   GLOBULIN 3.5   ALT (SGPT) 10   AST (SGOT) 17   BILIRUBIN 0.4   ALK PHOS 85             Lab 01/20/24  0408   CHOLESTEROL 160   LDL CHOL 95   HDL CHOL 44   TRIGLYCERIDES 118         Lab 01/19/24  1257   ABO TYPING A   RH TYPING Positive   ANTIBODY SCREEN Negative         Brief Urine Lab Results  (Last result in the past 365 days)        Color   Clarity   Blood   Leuk Est   Nitrite   Protein   CREAT   Urine HCG        12/20/23 2003 Yellow   Clear   Negative   Negative   Negative   Negative                 Microbiology Results (last 10 days)       ** No results found for the last 240 hours. **             Diagnostic Imaging: I reviewed and independently interpreted the new imaging.     Assessment/Plan:  This is a 68-year-old female status post carotid artery stent  placement and thrombectomy for tandem right ICA occlusions on 1/19.  Neurologically, the patient is improved. Post-op MRI does show a moderate-sized stroke in the right hemisphere.  We will continue twice daily Brilinta and daily aspirin.  Carotid Doppler showed good patency of stent.  Appreciate neurology and ICU assistance.  We will plan for the patient to follow-up in the neurosurgery clinic in 4 weeks.  Please call with any questions.      Any copied data from previous notes included in the (1) History of Present Illness, (2) Physical Examination and (3) Medical Decision Making and/or Assessment and Plan has been reviewed and is accurate as of 01/21/24      Efrain Hurley MD  01/21/24  09:34 EST        Electronically signed by Efrain Hurley MD at 01/21/24 0936       Maureen Anguiano APRN at 01/21/24 0929          Stroke Progress Note       Chief Complaint:  Left sided weakness    Subjective    Subjective     Subjective: No adverse events overnight.  Patient seen and evaluated at the bedside alone.  She was evaluated by speech this morning, she will remain n.p.o. with plans for a fees possibly on Tuesday.  She continues to have left hemiparesis, facial droop and dysarthria.  Currently on a Cardene drip to maintain systolics <140      Review of Systems   HENT:  Positive for trouble swallowing.    Eyes:  Negative for visual disturbance.   Respiratory:  Positive for cough. Negative for shortness of breath.    Cardiovascular:  Negative for chest pain and palpitations.   Neurological:  Positive for facial asymmetry, speech difficulty, weakness, numbness and headaches.           Objective    Objective      Temp:  [97.5 °F (36.4 °C)-98.7 °F (37.1 °C)] 97.9 °F (36.6 °C)  Heart Rate:  [] 89  Resp:  [14-24] 20  BP: (102-163)/() 110/58        Neurological Exam  Mental Status  Alert. Oriented to person, place, and time. Moderate dysarthria present. Expressive aphasia present.    Cranial Nerves  CN II:  Visual fields full to confrontation.  CN III, IV, VI: Extraocular movements intact bilaterally. Normal lids and orbits bilaterally. Pupils equal round and reactive to light bilaterally.  CN V:  Right: Facial sensation is normal.  Left: Diminished sensation of the entire left side of the face.  CN VII:  Right: There is no facial weakness.  Left: There is central facial weakness.    Motor  Normal muscle bulk throughout. No fasciculations present. Normal muscle tone. No abnormal involuntary movements. Strength is 5/5 throughout all four extremities.  LUE strength 1/5, LLE strength 2/5.    Sensory  Light touch abnormality:   Decreased on the left.    Coordination    No dysmetria out of proportion to weakness.    Gait    Not tested.      Physical Exam  Vitals reviewed.   HENT:      Head: Normocephalic and atraumatic.   Eyes:      General: Lids are normal.      Extraocular Movements: Extraocular movements intact.      Pupils: Pupils are equal, round, and reactive to light.   Cardiovascular:      Rate and Rhythm: Normal rate.   Pulmonary:      Effort: Pulmonary effort is normal. No respiratory distress.   Abdominal:      Comments: NG in place   Musculoskeletal:      Cervical back: Normal range of motion.      Right lower leg: No edema.      Left lower leg: No edema.   Skin:     General: Skin is warm and dry.   Neurological:      Mental Status: She is alert and oriented to person, place, and time.      Cranial Nerves: Cranial nerve deficit and dysarthria present.      Sensory: Sensory deficit present.      Motor: Motor strength is normal.Weakness present.   Psychiatric:         Mood and Affect: Mood normal.         Behavior: Behavior normal.         Results Review:    I reviewed the patient's new clinical results.  WBC   Date Value Ref Range Status   01/21/2024 14.84 (H) 3.40 - 10.80 10*3/mm3 Final     RBC   Date Value Ref Range Status   01/21/2024 4.07 3.77 - 5.28 10*6/mm3 Final     Hemoglobin   Date Value Ref Range Status    01/21/2024 13.2 12.0 - 15.9 g/dL Final     Hematocrit   Date Value Ref Range Status   01/21/2024 38.7 34.0 - 46.6 % Final     MCV   Date Value Ref Range Status   01/21/2024 95.1 79.0 - 97.0 fL Final     MCH   Date Value Ref Range Status   01/21/2024 32.4 26.6 - 33.0 pg Final     MCHC   Date Value Ref Range Status   01/21/2024 34.1 31.5 - 35.7 g/dL Final     RDW   Date Value Ref Range Status   01/21/2024 12.8 12.3 - 15.4 % Final     RDW-SD   Date Value Ref Range Status   01/21/2024 44.5 37.0 - 54.0 fl Final     MPV   Date Value Ref Range Status   01/21/2024 9.7 6.0 - 12.0 fL Final     Platelets   Date Value Ref Range Status   01/21/2024 256 140 - 450 10*3/mm3 Final     Neutrophil %   Date Value Ref Range Status   01/21/2024 79.2 (H) 42.7 - 76.0 % Final     Lymphocyte %   Date Value Ref Range Status   01/21/2024 11.9 (L) 19.6 - 45.3 % Final     Monocyte %   Date Value Ref Range Status   01/21/2024 7.9 5.0 - 12.0 % Final     Eosinophil %   Date Value Ref Range Status   01/21/2024 0.3 0.3 - 6.2 % Final     Basophil %   Date Value Ref Range Status   01/21/2024 0.3 0.0 - 1.5 % Final     Immature Grans %   Date Value Ref Range Status   01/21/2024 0.4 0.0 - 0.5 % Final     Neutrophils, Absolute   Date Value Ref Range Status   01/21/2024 11.75 (H) 1.70 - 7.00 10*3/mm3 Final     Lymphocytes, Absolute   Date Value Ref Range Status   01/21/2024 1.77 0.70 - 3.10 10*3/mm3 Final     Monocytes, Absolute   Date Value Ref Range Status   01/21/2024 1.17 (H) 0.10 - 0.90 10*3/mm3 Final     Eosinophils, Absolute   Date Value Ref Range Status   01/21/2024 0.05 0.00 - 0.40 10*3/mm3 Final     Basophils, Absolute   Date Value Ref Range Status   01/21/2024 0.04 0.00 - 0.20 10*3/mm3 Final     Immature Grans, Absolute   Date Value Ref Range Status   01/21/2024 0.06 (H) 0.00 - 0.05 10*3/mm3 Final     nRBC   Date Value Ref Range Status   01/21/2024 0.0 0.0 - 0.2 /100 WBC Final     Lab Results   Component Value Date    GLUCOSE 173 (H) 01/21/2024     BUN 10 01/21/2024    CREATININE 0.67 01/21/2024    EGFR 94.7 01/21/2024    BCR 14.9 01/21/2024    K 3.7 01/21/2024    CO2 21.0 (L) 01/21/2024    CALCIUM 9.1 01/21/2024    ALBUMIN 3.9 01/19/2024    BILITOT 0.4 01/19/2024    AST 17 01/19/2024    ALT 10 01/19/2024     A1c 6.2  LDL 95      CT Head Without Contrast    Result Date: 1/20/2024  Impression: Evolving subacute right MCA territory infarct without additional acute process identified. Electronically Signed: Hayden Robison MD  1/20/2024 3:42 PM CST  Workstation ID: [id number]    CT Head Without Contrast    Result Date: 1/20/2024  Impression: 1.Evolving subacute infarction within right MCA territory. No acute herniation. 2.No hemorrhagic transformation. Electronically Signed: Ravi Hart MD  1/20/2024 10:48 AM EST  Workstation ID: [id number]    CT Head Without Contrast    Result Date: 1/20/2024  Impression: 1.Stable appearance of right MCA territory infarct. 2.Stable advanced chronic small vessel ischemic change. 3.Stable minor petechial bleeding in the right caudate nucleus and putamen. Electronically Signed: Teo Carpio MD  1/20/2024 1:14 AM EST  Workstation ID: [id number]    MRI Brain Without Contrast    Result Date: 1/19/2024  Impression: Moderate sized acute right-sided MCA infarct as described above. There is evidence of small foci petechial hemorrhage in the posterior putamen, corona radiata, and high anterior right parietal lobe. Extensive changes of chronic microvascular ischemia. Findings discussed with Parul from the stroke team performed on January 19, 2024 at 10:19 p.m. Electronically Signed: Mo Marshall MD  1/19/2024 10:21 PM EST  Workstation ID: [id number]    CT CEREBRAL PERFUSION WITH & WITHOUT CONTRAST    Result Date: 1/19/2024   IMPRESSION: Results suggestive of recent ischemic event in the right middle cerebral artery territory  This report was finalized on 1/19/2024 1:45 PM by Dr. Hu Obrien MD.      CT Angiogram Neck    Result Date:  1/19/2024  1.  Moderate to high-grade stenosis in the proximal left internal carotid artery. 2.  Occlusion of the entire right internal carotid artery.   This report was finalized on 1/19/2024 1:43 PM by Dr. Hu Obrien MD.      CT Angiogram Head    Result Date: 1/19/2024   1. Barely detectable flow in the proximal right middle cerebral artery and no flow distally in the right middle cerebral artery. 2. Occlusion of the right internal carotid artery.   This report was finalized on 1/19/2024 1:27 PM by Dr. Hu Obrien MD.      CT Head Without Contrast Stroke Protocol    Result Date: 1/19/2024   1. Appearance concerning for recent right MCA ischemia with sulcal effacement but no evidence of hemorrhage, mass, or midline shift  Results have been relayed to the emergency department at 1:03 p.m.  This report was finalized on 1/19/2024 1:04 PM by Dr. Hu Obrien MD.       Results for orders placed during the hospital encounter of 01/19/24    Adult Transthoracic Echo Limited W/ Cont if Necessary Per Protocol    Interpretation Summary    Left ventricular systolic function is normal. Estimated left ventricular EF = 60%    Saline test results are negative for intracardiac shunting..           Assessment/Plan     Assessment/Plan: 68-year-old female with history of grade 2 diastolic dysfunction, iron deficiency anemia, ZORAIDA (not currently using CPAP), RLS, history of gastric sleeve, severe depression with recent suicide attempt (OD 12/24/2023) the presents as a transfer from [facility] after developing left-sided weakness neglect and right gaze deviation.  LKW 2200 on 1/18, NIHSS 26, CT head showed right MCA ischemia.  CTA showed complete right ICA occlusion corresponding to a large perfusion defect on CTP.  Not a candidate for thrombolytics secondary to last known well > 4.5 hours.  NIHSS 19 on arrival to [facility] emergently transferred to [facility] directly to the Cath Lab where she is found to have a tandem  occlusion  and underwent a thrombectomy and right ICA stent placement resulting in TICI 3 flow.    PTA antiplatelet: None  PTA anticoagulant: None      Acute right MCA stroke secondary to tandem right ICA occlusions s/p mechanical thrombectomy and right ICA stent placement on 1/19  -MRI shows moderately sized right MCA territory stroke  -TTE shows EF of 60%, negative bubble study  -Carotid ultrasound showing patency of right carotid stent without evidence of restenosis, <50% left ICA stenosis  -Continue aspirin and Brilinta 60 mg twice daily per Dr. Hurley  -HTN: Normal blood pressure parameters, avoid hypotension.  Wean Cardene drip when able  -HLD: LDL 95, goal <70; ontinue with atorvastatin 80 mg daily  -Suicide precautions  -Continue PT/OT, therapy recommending inpatient rehab at discharge  -N.p.o.; SLP following-planning for fees on Tuesday  -Stroke neurology following  -Outpatient follow-up neurosurgery in 4 weeks    Plan of care reviewed with patient, Dr. Sanchez and patient's nurse.  Hopefully can transfer to telemetry once off of Cardene.  Stroke neurology will continue to follow    NIKKIE Bailey  01/21/24  09:29 EST        Electronically signed by Maureen Anguiano APRN at 01/21/24 1113       Vangie López MD at 01/20/24 1234          Critical Care Note     LOS: 1 day   Patient Care Team:  Dread Burton MD as PCP - General (Family Medicine)  Daina Fletcher MD as Consulting Physician (Hematology)    Chief Complaint/Reason for visit:      Acute ischemic stroke, right hemisphere  Right ICA stent post thrombectomy  Hypertension  Atrial fibrillation, new    Subjective     Interval History:     Admitted yesterday with left-sided weakness and neglect and found to have right ICA occlusion.  She underwent thrombectomy and stent.  NIH improved from 19 to a 16.  Blood pressure is 100-182.  Cardene drip has been tapered off.  She is in sinus rhythm.  Room air saturation is 98%.    Review of Systems:  "   All systems were reviewed and negative except as noted in subjective.    Medical history, surgical history, social history, family history reviewed    Objective     Intake/Output:    Intake/Output Summary (Last 24 hours) at 1/20/2024 1234  Last data filed at 1/20/2024 1200  Gross per 24 hour   Intake 1404.03 ml   Output 1175 ml   Net 229.03 ml       Nutrition:  NPO Diet NPO Type: Strict NPO    Infusions:  niCARdipine, 5-15 mg/hr, Last Rate: Stopped (01/20/24 0401)            Telemetry: Sinus rhythm             Vital Signs  Blood pressure 130/83, pulse 73, temperature 98.3 °F (36.8 °C), temperature source Oral, resp. rate 18, height 167.6 cm (65.98\"), weight 72 kg (158 lb 11.7 oz), SpO2 98%, not currently breastfeeding.    Physical Exam:  General Appearance:  Older woman supine in bed in no distress   Head:  No visible trauma   Eyes:          Eyes gazing toward the left   Ears:     Throat: No thrush   Neck: Trachea midline, no crepitus   Back:      Lungs:   Symmetric chest expansion without wheeze or rhonchi    Heart:  Irregular rhythm, S1, S2 auscultated   Abdomen:   Bowel sounds present, soft   Rectal:   Deferred   Extremities: Catheterization site without hematoma   Pulses: Palpable distal pulses   Skin: Warm and dry   Lymph nodes: No cervical adenopathy   Neurologic: Tells me her name.  Eyes deviated to the right.  Weak left upper extremity, flaccid, lower extremity can move but drifts.  Severe dysarthria, follows commands with the right side    Interval:  (to 2B)  1a. Level of Consciousness: 1-->Not alert, but arousable by minor stimulation to obey, answer, or respond  1b. LOC Questions: 0-->Answers both questions correctly  1c. LOC Commands: 0-->Performs both tasks correctly  2. Best Gaze: 1-->Partial gaze palsy, gaze is abnormal in one or both eyes, but forced deviation or total gaze paresis is not present  3. Visual: 1-->Partial hemianopia  4. Facial Palsy: 1-->Minor paralysis (flattened nasolabial fold, " asymmetry on smiling)  5a. Motor Arm, Left: 4-->No movement  5b. Motor Arm, Right: 0-->No drift, limb holds 90 (or 45) degrees for full 10 secs  6a. Motor Leg, Left: 2-->Some effort against gravity, leg falls to bed by 5 secs, but has some effort against gravity  6b. Motor Leg, Right: 0-->No drift, leg holds 30 degree position for full 5 secs  7. Limb Ataxia: 0-->Absent  8. Sensory: 1-->Mild-to-moderate sensory loss, patient feels pinprick is less sharp or is dull on the affected side, or there is a loss of superficial pain with pinprick, but patient is aware of being touched  9. Best Language: 2-->Severe aphasia, all communication is through fragmentary expression, great need for inference, questioning, and guessing by the listener. Range of information that can be exchanged is limited, listener carries burden of. . . (see row details)  10. Dysarthria: 2-->Severe dysarthria, patients speech is so slurred as to be unintelligible in the absence of or out of proportion to any dysphasia, or is mute/anarthric  11. Extinction and Inattention (formerly Neglect): 1-->Visual, tactile, auditory, spatial, or personal inattention or extinction to bilateral simultaneous stimulation in one of the sensory modalities    Total (NIH Stroke Scale): 16   Results Review:     I reviewed the patient's new clinical results.   Results from last 7 days   Lab Units 01/20/24  0408 01/19/24  1302 01/19/24  1257   SODIUM mmol/L 138  --  143   POTASSIUM mmol/L 3.4*  --  4.4   CHLORIDE mmol/L 104  --  105   CO2 mmol/L 22.0  --  29.2*   BUN mg/dL 15  --  15   CREATININE mg/dL 0.78 0.90 0.99   CALCIUM mg/dL 8.7  --  9.7   BILIRUBIN mg/dL  --   --  0.4   ALK PHOS U/L  --   --  85   ALT (SGPT) U/L  --   --  10   AST (SGOT) U/L  --   --  17   GLUCOSE mg/dL 182*  --  127*     Results from last 7 days   Lab Units 01/20/24  0408 01/19/24  1257   WBC 10*3/mm3 11.53* 11.47*   HEMOGLOBIN g/dL 13.5 15.1   HEMATOCRIT % 39.1 45.0   PLATELETS 10*3/mm3 262 258  "        No results found for: \"BLOODCX\"  No results found for: \"URINECX\"    I reviewed the patient's new imaging including images and reports.    CT HEAD WO CONTRAST    Date of Exam: 1/20/2024 4:50 AM EST    Indication: Stroke, follow up.    Comparison: CT head from earlier today    Technique: Axial CT images were obtained of the head without contrast administration.  Automated exposure control and iterative construction methods were used.      Findings:  There is an evolving right MCA territory subacute infarction. There is no hemorrhagic transformation. The ventricles are stable in caliber, with no midline shift. The basal cisterns appear patent. Degenerative changes appear stable.    The osseous structures appear intact. There is mild mucosal disease in the right sphenoid sinus. The mastoid air cells are well aerated.   Impression:     Impression:  1.Evolving subacute infarction within right MCA territory. No acute herniation.  2.No hemorrhagic transformation.        Electronically Signed: Ravi Hart MD   1/20/2024 10:48 AM EST       All medications reviewed.   aspirin, 325 mg, Nasogastric, Daily   Or  aspirin, 300 mg, Rectal, Daily  atorvastatin, 80 mg, Nasogastric, Nightly  nicotine, 1 patch, Transdermal, Q24H  ondansetron, 4 mg, Intravenous, Once  sodium chloride, 10 mL, Intravenous, Q12H  ticagrelor, 60 mg, Nasogastric, BID          Assessment & Plan       Stroke    Iron deficiency anemia due to chronic blood loss    Major depressive disorder    RLS (restless legs syndrome)    GERD (gastroesophageal reflux disease)    Grade I diastolic dysfunction    68-year-old woman with hypertension, chronic anemia, sleep apnea intolerant of CPAP, severe depression with recent overdose attempt, legal blindness, presenting with left-sided weakness and found to have right middle cerebral artery ischemia and 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  She underwent thrombectomy and carotid stent.  She did not " "receive thrombolysis because she was outside the time window.  This morning her NIH stroke score is a 16 compared to 19.  Systolic blood pressure is 100-132 and Cardene has been tapered off.  Antihypertensives have not been restarted.  She does take losartan, hydrochlorothiazide, spironolactone as an outpatient.  Will avoid diuretics for now as I wish to avoid dehydration  She has a known history of iron deficiency anemia due to chronic blood loss.  This morning her hemoglobin is normal at 13.5.  In February 2022 her hemoglobin was 9.9.  She apparently has a previous history of gastric sleeve.  She received some IV iron infusion in 2020.  She was hospitalized December 21 through December 24 for severe depression with suicidal ideation, overdose of Ambien.  She is currently taking mirabegron  Risk factors for stroke include tobacco use, hypertension.  Her A1c is 6.2.  Lipid panel revealed a total cholesterol of 160.    PLAN:    PT, OT, speech therapy  Aspirin, statin  Brilinta  Nicotine patch  Monitor blood pressure, as needed Apresoline, Cardene systolic greater than 180  Monitor NIH stroke scale  24-hour CT      VTE Prophylaxis:SCDS    Stress Ulcer Prophylaxis:none    Vangie López MD  01/20/24  12:34 EST      Time: 25 minutes.     Electronically signed by Vangie López MD at 01/20/24 1252       Efrain Hurley MD at 01/20/24 0914          NEUROSURGERY PROGRESS NOTE    Chief Complaint: Tandem proximal and terminus ICA occlusions on the right    Subjective: Patient's neurological exam has improved after carotid stent and thrombectomy.    Objective    Vital Signs: Blood pressure 124/63, pulse 76, temperature 98.5 °F (36.9 °C), temperature source Oral, resp. rate 16, height 167.6 cm (66\"), weight 72 kg (158 lb 11.7 oz), SpO2 96%, not currently breastfeeding.    Physical Exam  Awake, alert and conversive  Opens eyes spont  Pupils 3 mm reactive bilaterally  Right gaze deviation, but is able to cross " midline to the left.  Extraocular muscles intact bilaterally  Left facial droop.  5 out of 5 in the right upper and right lower extremities.  She is antigravity in the left upper and left lower.    Intake/Output:   Intake/Output Summary (Last 24 hours) at 1/20/2024 0914  Last data filed at 1/20/2024 0800  Gross per 24 hour   Intake 1284.03 ml   Output 550 ml   Net 734.03 ml       Current Medications:   Current Facility-Administered Medications:     aspirin tablet 325 mg, 325 mg, Nasogastric, Daily, 325 mg at 01/20/24 0804 **OR** aspirin suppository 300 mg, 300 mg, Rectal, Daily, Vangie López MD    atorvastatin (LIPITOR) tablet 80 mg, 80 mg, Nasogastric, Nightly, Efrain Hurley MD    Calcium Replacement - Follow Nurse / BPA Driven Protocol, , Does not apply, PRN, Vangie López MD    hydrALAZINE (APRESOLINE) injection 20 mg, 20 mg, Intravenous, Q4H PRN, Cyndee Ardon APRN, 20 mg at 01/19/24 1913    Magnesium Standard Dose Replacement - Follow Nurse / BPA Driven Protocol, , Does not apply, PRN, Vangie López MD    niCARdipine (CARDENE) 25mg in 250mL NS infusion, 5-15 mg/hr, Intravenous, Titrated, Jair Quinteros PA-C, Stopped at 01/20/24 0401    nicotine (NICODERM CQ) 21 MG/24HR patch 1 patch, 1 patch, Transdermal, Q24H, Gm Marie MD, 1 patch at 01/20/24 0805    ondansetron (ZOFRAN) injection 4 mg, 4 mg, Intravenous, Once, Parul Alvarado APRN    phenol (CHLORASEPTIC) 1.4 % liquid 1 spray, 1 spray, Mouth/Throat, Q2H PRN, Cyndee Ardon APRN, 1 spray at 01/20/24 0305    Phosphorus Replacement - Follow Nurse / BPA Driven Protocol, , Does not apply, PRN, Vangie López MD    potassium chloride (KLOR-CON) packet 40 mEq, 40 mEq, Nasogastric, Q4H, Vangie López MD, 40 mEq at 01/20/24 0803    Potassium Replacement - Follow Nurse / BPA Driven Protocol, , Does not apply, PRN, Vangie López MD    sodium chloride 0.9 % flush 10 mL, 10 mL, Intravenous,  Q12H, Jair Quinteros PA-C, 10 mL at 01/20/24 0821    sodium chloride 0.9 % flush 10 mL, 10 mL, Intravenous, PRN, Jair Quinteros PA-C    sodium chloride 0.9 % infusion 40 mL, 40 mL, Intravenous, PRN, Jair Quinteros PA-C    ticagrelor (BRILINTA) tablet 60 mg, 60 mg, Nasogastric, BID, Efrain Hurley MD, 60 mg at 01/20/24 0551     Laboratory Results:       Lab 01/20/24  0408 01/19/24  1257   WBC 11.53* 11.47*   HEMOGLOBIN 13.5 15.1   HEMATOCRIT 39.1 45.0   PLATELETS 262 258   NEUTROS ABS 9.78* 8.72*   IMMATURE GRANS (ABS) 0.04 0.04   LYMPHS ABS 1.21 2.01   MONOS ABS 0.47 0.55   EOS ABS 0.01 0.11   MCV 93.3 94.5         Lab 01/20/24  0408 01/19/24  1302 01/19/24  1257   SODIUM 138  --  143   POTASSIUM 3.4*  --  4.4   CHLORIDE 104  --  105   CO2 22.0  --  29.2*   ANION GAP 12.0  --  8.8   BUN 15  --  15   CREATININE 0.78 0.90 0.99   EGFR 82.3  --  61.9   GLUCOSE 182*  --  127*   CALCIUM 8.7  --  9.7   MAGNESIUM 1.8  --   --    PHOSPHORUS 2.8  --   --          Lab 01/19/24  1257   TOTAL PROTEIN 7.4   ALBUMIN 3.9   GLOBULIN 3.5   ALT (SGPT) 10   AST (SGOT) 17   BILIRUBIN 0.4   ALK PHOS 85             Lab 01/20/24  0408   CHOLESTEROL 160   LDL CHOL 95   HDL CHOL 44   TRIGLYCERIDES 118         Lab 01/19/24  1257   ABO TYPING A   RH TYPING Positive   ANTIBODY SCREEN Negative         Brief Urine Lab Results  (Last result in the past 365 days)        Color   Clarity   Blood   Leuk Est   Nitrite   Protein   CREAT   Urine HCG        12/20/23 2003 Yellow   Clear   Negative   Negative   Negative   Negative                 Microbiology Results (last 10 days)       ** No results found for the last 240 hours. **             Diagnostic Imaging: I reviewed and independently interpreted the new imaging.     Assessment/Plan:  This is a 68-year-old female status post carotid artery stent placement and thrombectomy for tandem right ICA occlusions on 1/19.  Neurologically, the patient is improved. Post-op MRI does show a  moderate-sized stroke in the right hemisphere.  We will continue twice daily Brilinta and daily aspirin.  Will get carotid Dopplers today.  Appreciate neurology and ICU assistance.      Any copied data from previous notes included in the (1) History of Present Illness, (2) Physical Examination and (3) Medical Decision Making and/or Assessment and Plan has been reviewed and is accurate as of 01/20/24      Efrain Hurley MD  01/20/24  09:14 EST        Electronically signed by Efrain Hurley MD at 01/20/24 0916       Consult Notes (last 4 days)  Notes from 01/18/24 0749 through 01/22/24 0749   No notes of this type exist for this encounter.

## 2024-01-23 ENCOUNTER — ANCILLARY PROCEDURE (OUTPATIENT)
Dept: SPEECH THERAPY | Facility: HOSPITAL | Age: 69
DRG: 023 | End: 2024-01-23
Payer: MEDICARE

## 2024-01-23 ENCOUNTER — APPOINTMENT (OUTPATIENT)
Dept: GENERAL RADIOLOGY | Facility: HOSPITAL | Age: 69
DRG: 023 | End: 2024-01-23
Payer: MEDICARE

## 2024-01-23 ENCOUNTER — APPOINTMENT (OUTPATIENT)
Dept: CT IMAGING | Facility: HOSPITAL | Age: 69
DRG: 023 | End: 2024-01-23
Payer: MEDICARE

## 2024-01-23 LAB
ANION GAP SERPL CALCULATED.3IONS-SCNC: 9 MMOL/L (ref 5–15)
BUN SERPL-MCNC: 14 MG/DL (ref 8–23)
BUN/CREAT SERPL: 18.2 (ref 7–25)
CALCIUM SPEC-SCNC: 9.1 MG/DL (ref 8.6–10.5)
CHLORIDE SERPL-SCNC: 101 MMOL/L (ref 98–107)
CO2 SERPL-SCNC: 23 MMOL/L (ref 22–29)
CREAT SERPL-MCNC: 0.77 MG/DL (ref 0.57–1)
EGFRCR SERPLBLD CKD-EPI 2021: 83.6 ML/MIN/1.73
GLUCOSE BLDC GLUCOMTR-MCNC: 114 MG/DL (ref 70–130)
GLUCOSE BLDC GLUCOMTR-MCNC: 120 MG/DL (ref 70–130)
GLUCOSE BLDC GLUCOMTR-MCNC: 122 MG/DL (ref 70–130)
GLUCOSE BLDC GLUCOMTR-MCNC: 138 MG/DL (ref 70–130)
GLUCOSE BLDC GLUCOMTR-MCNC: 144 MG/DL (ref 70–130)
GLUCOSE SERPL-MCNC: 125 MG/DL (ref 65–99)
MAGNESIUM SERPL-MCNC: 2 MG/DL (ref 1.6–2.4)
PHOSPHATE SERPL-MCNC: 3.1 MG/DL (ref 2.5–4.5)
POTASSIUM SERPL-SCNC: 3.6 MMOL/L (ref 3.5–5.2)
POTASSIUM SERPL-SCNC: 4.5 MMOL/L (ref 3.5–5.2)
SODIUM SERPL-SCNC: 133 MMOL/L (ref 136–145)

## 2024-01-23 PROCEDURE — 82948 REAGENT STRIP/BLOOD GLUCOSE: CPT

## 2024-01-23 PROCEDURE — 99232 SBSQ HOSP IP/OBS MODERATE 35: CPT | Performed by: NURSE PRACTITIONER

## 2024-01-23 PROCEDURE — 94761 N-INVAS EAR/PLS OXIMETRY MLT: CPT

## 2024-01-23 PROCEDURE — 99233 SBSQ HOSP IP/OBS HIGH 50: CPT | Performed by: INTERNAL MEDICINE

## 2024-01-23 PROCEDURE — 92507 TX SP LANG VOICE COMM INDIV: CPT

## 2024-01-23 PROCEDURE — 94664 DEMO&/EVAL PT USE INHALER: CPT

## 2024-01-23 PROCEDURE — 94799 UNLISTED PULMONARY SVC/PX: CPT

## 2024-01-23 PROCEDURE — 70450 CT HEAD/BRAIN W/O DYE: CPT

## 2024-01-23 PROCEDURE — 84100 ASSAY OF PHOSPHORUS: CPT | Performed by: INTERNAL MEDICINE

## 2024-01-23 PROCEDURE — 83735 ASSAY OF MAGNESIUM: CPT | Performed by: INTERNAL MEDICINE

## 2024-01-23 PROCEDURE — 80048 BASIC METABOLIC PNL TOTAL CA: CPT | Performed by: INTERNAL MEDICINE

## 2024-01-23 PROCEDURE — 74018 RADEX ABDOMEN 1 VIEW: CPT

## 2024-01-23 PROCEDURE — 25010000002 HYDRALAZINE PER 20 MG

## 2024-01-23 PROCEDURE — 92612 ENDOSCOPY SWALLOW (FEES) VID: CPT

## 2024-01-23 PROCEDURE — 84132 ASSAY OF SERUM POTASSIUM: CPT | Performed by: INTERNAL MEDICINE

## 2024-01-23 RX ORDER — HYDRALAZINE HYDROCHLORIDE 20 MG/ML
20 INJECTION INTRAMUSCULAR; INTRAVENOUS EVERY 4 HOURS PRN
Status: DISCONTINUED | OUTPATIENT
Start: 2024-01-23 | End: 2024-02-02 | Stop reason: HOSPADM

## 2024-01-23 RX ORDER — POTASSIUM CHLORIDE 1.5 G/1.58G
40 POWDER, FOR SOLUTION ORAL EVERY 4 HOURS
Status: DISCONTINUED | OUTPATIENT
Start: 2024-01-23 | End: 2024-01-23

## 2024-01-23 RX ORDER — POTASSIUM CHLORIDE 1.5 G/1.58G
40 POWDER, FOR SOLUTION ORAL EVERY 4 HOURS
Status: COMPLETED | OUTPATIENT
Start: 2024-01-23 | End: 2024-01-23

## 2024-01-23 RX ADMIN — TICAGRELOR 60 MG: 60 TABLET ORAL at 05:56

## 2024-01-23 RX ADMIN — MIRTAZAPINE 30 MG: 15 TABLET, FILM COATED ORAL at 20:00

## 2024-01-23 RX ADMIN — Medication 10 ML: at 06:32

## 2024-01-23 RX ADMIN — Medication 10 ML: at 08:45

## 2024-01-23 RX ADMIN — GUAIFENESIN 400 MG: 200 SOLUTION ORAL at 13:04

## 2024-01-23 RX ADMIN — ASPIRIN 325 MG ORAL TABLET 325 MG: 325 PILL ORAL at 08:46

## 2024-01-23 RX ADMIN — GUAIFENESIN 400 MG: 200 SOLUTION ORAL at 00:03

## 2024-01-23 RX ADMIN — IPRATROPIUM BROMIDE AND ALBUTEROL SULFATE 3 ML: 2.5; .5 SOLUTION RESPIRATORY (INHALATION) at 19:03

## 2024-01-23 RX ADMIN — ACETAMINOPHEN 650 MG: 325 TABLET ORAL at 08:45

## 2024-01-23 RX ADMIN — IPRATROPIUM BROMIDE AND ALBUTEROL SULFATE 3 ML: 2.5; .5 SOLUTION RESPIRATORY (INHALATION) at 07:40

## 2024-01-23 RX ADMIN — ACETAMINOPHEN 650 MG: 325 TABLET ORAL at 19:53

## 2024-01-23 RX ADMIN — LOSARTAN POTASSIUM 50 MG: 50 TABLET, FILM COATED ORAL at 08:45

## 2024-01-23 RX ADMIN — Medication 1 PATCH: at 08:46

## 2024-01-23 RX ADMIN — ATORVASTATIN CALCIUM 80 MG: 40 TABLET, FILM COATED ORAL at 20:00

## 2024-01-23 RX ADMIN — ACETAMINOPHEN 650 MG: 325 TABLET ORAL at 03:55

## 2024-01-23 RX ADMIN — GUAIFENESIN 400 MG: 200 SOLUTION ORAL at 03:55

## 2024-01-23 RX ADMIN — POTASSIUM CHLORIDE 40 MEQ: 1.5 POWDER, FOR SOLUTION ORAL at 13:04

## 2024-01-23 RX ADMIN — ROPINIROLE HYDROCHLORIDE 4 MG: 2 TABLET, FILM COATED ORAL at 20:00

## 2024-01-23 RX ADMIN — IPRATROPIUM BROMIDE AND ALBUTEROL SULFATE 3 ML: 2.5; .5 SOLUTION RESPIRATORY (INHALATION) at 15:25

## 2024-01-23 RX ADMIN — Medication 10 ML: at 05:40

## 2024-01-23 RX ADMIN — POTASSIUM CHLORIDE 40 MEQ: 1.5 POWDER, FOR SOLUTION ORAL at 08:45

## 2024-01-23 RX ADMIN — PANTOPRAZOLE SODIUM 40 MG: 40 INJECTION, POWDER, FOR SOLUTION INTRAVENOUS at 05:40

## 2024-01-23 RX ADMIN — PHENOL 1 SPRAY: 1.4 SPRAY ORAL at 03:56

## 2024-01-23 RX ADMIN — IPRATROPIUM BROMIDE AND ALBUTEROL SULFATE 3 ML: 2.5; .5 SOLUTION RESPIRATORY (INHALATION) at 03:37

## 2024-01-23 RX ADMIN — IPRATROPIUM BROMIDE AND ALBUTEROL SULFATE 3 ML: 2.5; .5 SOLUTION RESPIRATORY (INHALATION) at 10:56

## 2024-01-23 RX ADMIN — Medication 10 ML: at 20:01

## 2024-01-23 RX ADMIN — TICAGRELOR 60 MG: 60 TABLET ORAL at 18:45

## 2024-01-23 RX ADMIN — IPRATROPIUM BROMIDE AND ALBUTEROL SULFATE 3 ML: 2.5; .5 SOLUTION RESPIRATORY (INHALATION) at 23:14

## 2024-01-23 RX ADMIN — HYDRALAZINE HYDROCHLORIDE 20 MG: 20 INJECTION INTRAMUSCULAR; INTRAVENOUS at 06:32

## 2024-01-23 RX ADMIN — HYDRALAZINE HYDROCHLORIDE 20 MG: 20 INJECTION INTRAMUSCULAR; INTRAVENOUS at 11:59

## 2024-01-23 RX ADMIN — GUAIFENESIN 400 MG: 200 SOLUTION ORAL at 19:52

## 2024-01-23 RX ADMIN — GUAIFENESIN 400 MG: 200 SOLUTION ORAL at 08:45

## 2024-01-23 NOTE — PROGRESS NOTES
Stroke Progress Note       Chief Complaint:  Left sided weakness    Subjective    Subjective     Subjective:   Sitting up in bed.  Complains of increased tachypnea this a.m.  Continues with increased secretions.  Continues with left sided weakness, LFD, Left tactile neglect.     Review of Systems   HENT:  Positive for trouble swallowing.    Eyes:  Negative for visual disturbance.   Respiratory:  Positive for cough. Negative for shortness of breath.    Cardiovascular:  Negative for chest pain and palpitations.   Neurological:  Positive for facial asymmetry, speech difficulty, weakness, numbness and headaches.            Objective    Objective      Temp:  [97.7 °F (36.5 °C)-98.2 °F (36.8 °C)] 97.9 °F (36.6 °C)  Heart Rate:  [] 98  Resp:  [16-22] 20  BP: (107-165)/() 142/78        Neurological Exam  Mental Status  Alert. Oriented to person, place, and time. Moderate dysarthria present. Expressive aphasia present.    Cranial Nerves  CN II: Visual fields full to confrontation.  CN III, IV, VI: Extraocular movements intact bilaterally. Normal lids and orbits bilaterally. Pupils equal round and reactive to light bilaterally.  CN V:  Right: Facial sensation is normal.  Left: Diminished sensation of the entire left side of the face.  CN VII:  Right: There is no facial weakness.  Left: There is central facial weakness.    Motor  Normal muscle bulk throughout. No fasciculations present. Normal muscle tone. No abnormal involuntary movements. Strength is 5/5 throughout all four extremities.  LUE strength 1/5, LLE strength 2/5.    Sensory  Light touch abnormality:   Decreased on the left.    Coordination    No dysmetria out of proportion to weakness.    Gait    Not tested.      Physical Exam  Vitals reviewed.   HENT:      Head: Normocephalic and atraumatic.   Eyes:      General: Lids are normal.      Extraocular Movements: Extraocular movements intact.      Pupils: Pupils are equal, round, and reactive to light.    Cardiovascular:      Rate and Rhythm: Normal rate.   Pulmonary:      Effort: Pulmonary effort is normal. No respiratory distress.   Abdominal:      Comments: NG in place   Musculoskeletal:      Cervical back: Normal range of motion.      Right lower leg: No edema.      Left lower leg: No edema.   Skin:     General: Skin is warm and dry.   Neurological:      Mental Status: She is alert and oriented to person, place, and time.      Cranial Nerves: Cranial nerve deficit and dysarthria present.      Sensory: Sensory deficit present.      Motor: Motor strength is normal.Weakness present.      Comments: Left tactile neglect   Psychiatric:         Mood and Affect: Mood normal.         Behavior: Behavior normal.         Results Review:    I reviewed the patient's new clinical results.  WBC   Date Value Ref Range Status   01/22/2024 12.13 (H) 3.40 - 10.80 10*3/mm3 Final     RBC   Date Value Ref Range Status   01/22/2024 4.17 3.77 - 5.28 10*6/mm3 Final     Hemoglobin   Date Value Ref Range Status   01/22/2024 13.2 12.0 - 15.9 g/dL Final     Hematocrit   Date Value Ref Range Status   01/22/2024 39.2 34.0 - 46.6 % Final     MCV   Date Value Ref Range Status   01/22/2024 94.0 79.0 - 97.0 fL Final     MCH   Date Value Ref Range Status   01/22/2024 31.7 26.6 - 33.0 pg Final     MCHC   Date Value Ref Range Status   01/22/2024 33.7 31.5 - 35.7 g/dL Final     RDW   Date Value Ref Range Status   01/22/2024 12.8 12.3 - 15.4 % Final     RDW-SD   Date Value Ref Range Status   01/22/2024 44.6 37.0 - 54.0 fl Final     MPV   Date Value Ref Range Status   01/22/2024 9.4 6.0 - 12.0 fL Final     Platelets   Date Value Ref Range Status   01/22/2024 242 140 - 450 10*3/mm3 Final     Neutrophil %   Date Value Ref Range Status   01/22/2024 83.5 (H) 42.7 - 76.0 % Final     Lymphocyte %   Date Value Ref Range Status   01/22/2024 9.1 (L) 19.6 - 45.3 % Final     Monocyte %   Date Value Ref Range Status   01/22/2024 6.7 5.0 - 12.0 % Final      Eosinophil %   Date Value Ref Range Status   01/22/2024 0.1 (L) 0.3 - 6.2 % Final     Basophil %   Date Value Ref Range Status   01/22/2024 0.3 0.0 - 1.5 % Final     Immature Grans %   Date Value Ref Range Status   01/22/2024 0.3 0.0 - 0.5 % Final     Neutrophils, Absolute   Date Value Ref Range Status   01/22/2024 10.13 (H) 1.70 - 7.00 10*3/mm3 Final     Lymphocytes, Absolute   Date Value Ref Range Status   01/22/2024 1.10 0.70 - 3.10 10*3/mm3 Final     Monocytes, Absolute   Date Value Ref Range Status   01/22/2024 0.81 0.10 - 0.90 10*3/mm3 Final     Eosinophils, Absolute   Date Value Ref Range Status   01/22/2024 0.01 0.00 - 0.40 10*3/mm3 Final     Basophils, Absolute   Date Value Ref Range Status   01/22/2024 0.04 0.00 - 0.20 10*3/mm3 Final     Immature Grans, Absolute   Date Value Ref Range Status   01/22/2024 0.04 0.00 - 0.05 10*3/mm3 Final     nRBC   Date Value Ref Range Status   01/22/2024 0.0 0.0 - 0.2 /100 WBC Final     Lab Results   Component Value Date    GLUCOSE 125 (H) 01/23/2024    BUN 14 01/23/2024    CREATININE 0.77 01/23/2024    EGFR 83.6 01/23/2024    BCR 18.2 01/23/2024    K 3.6 01/23/2024    CO2 23.0 01/23/2024    CALCIUM 9.1 01/23/2024    ALBUMIN 3.9 01/19/2024    BILITOT 0.4 01/19/2024    AST 17 01/19/2024    ALT 10 01/19/2024     A1c 6.2  LDL 95      CT Head Without Contrast    Result Date: 1/20/2024  Impression: Evolving subacute right MCA territory infarct without additional acute process identified. Electronically Signed: Hayden Robison MD  1/20/2024 3:42 PM CST  Workstation ID: [id number]    CT Head Without Contrast    Result Date: 1/20/2024  Impression: 1.Evolving subacute infarction within right MCA territory. No acute herniation. 2.No hemorrhagic transformation. Electronically Signed: Ravi Hart MD  1/20/2024 10:48 AM EST  Workstation ID: [id number]    CT Head Without Contrast    Result Date: 1/20/2024  Impression: 1.Stable appearance of right MCA territory infarct. 2.Stable  advanced chronic small vessel ischemic change. 3.Stable minor petechial bleeding in the right caudate nucleus and putamen. Electronically Signed: Teo Carpio MD  1/20/2024 1:14 AM EST  Workstation ID: [id number]    MRI Brain Without Contrast    Result Date: 1/19/2024  Impression: Moderate sized acute right-sided MCA infarct as described above. There is evidence of small foci petechial hemorrhage in the posterior putamen, corona radiata, and high anterior right parietal lobe. Extensive changes of chronic microvascular ischemia. Findings discussed with Parul from the stroke team performed on January 19, 2024 at 10:19 p.m. Electronically Signed: Mo Marshall MD  1/19/2024 10:21 PM EST  Workstation ID: [id number]    CT CEREBRAL PERFUSION WITH & WITHOUT CONTRAST    Result Date: 1/19/2024   IMPRESSION: Results suggestive of recent ischemic event in the right middle cerebral artery territory  This report was finalized on 1/19/2024 1:45 PM by Dr. Hu Obrien MD.      CT Angiogram Neck    Result Date: 1/19/2024  1.  Moderate to high-grade stenosis in the proximal left internal carotid artery. 2.  Occlusion of the entire right internal carotid artery.   This report was finalized on 1/19/2024 1:43 PM by Dr. Hu Obrien MD.      CT Angiogram Head    Result Date: 1/19/2024   1. Barely detectable flow in the proximal right middle cerebral artery and no flow distally in the right middle cerebral artery. 2. Occlusion of the right internal carotid artery.   This report was finalized on 1/19/2024 1:27 PM by Dr. Hu Obrien MD.      CT Head Without Contrast Stroke Protocol    Result Date: 1/19/2024   1. Appearance concerning for recent right MCA ischemia with sulcal effacement but no evidence of hemorrhage, mass, or midline shift  Results have been relayed to the emergency department at 1:03 p.m.  This report was finalized on 1/19/2024 1:04 PM by Dr. Hu Obrien MD.       Results for orders placed during the hospital  encounter of 01/19/24    Adult Transthoracic Echo Limited W/ Cont if Necessary Per Protocol    Interpretation Summary    Left ventricular systolic function is normal. Estimated left ventricular EF = 60%    Saline test results are negative for intracardiac shunting..            Assessment/Plan     Assessment/Plan: 68-year-old female with history of grade 2 diastolic dysfunction, iron deficiency anemia, ZORAIDA (not currently using CPAP), RLS, history of gastric sleeve, severe depression with recent suicide attempt (OD 12/24/2023) the presents as a transfer from [facility] after developing left-sided weakness neglect and right gaze deviation.  LKW 2200 on 1/18, NIHSS 26, CTH with RMCA ischemia.  CTA with complete right ICA occlusion corresponding to a large perfusion defect on CTP.  Not a candidate for thrombolytics secondary to last known well > 4.5 hours.  NIHSS 19 on arrival to [facility] emergently transferred to [facility] directly to the Cath Lab where she was found to have a tandem  occlusion and underwent a thrombectomy and RADHA stent placement resulting in TICI 3 flow.    PTA antiplatelet: None  PTA anticoagulant: None      AIS in the R MCA territory D/T tandem right ICA occlusion s/p MT and RADHA stent placement on 1/19/2024  -CTH 1/23, evolving RMCA stroke, small areas of petechial hemorrhage stable, Present on prior CTH 1/21  -Continue ASA and Brilinta, benefit outweighs the risk d/t RADHA stent placement  -MRI with moderate sized right MCA territory stroke  -TTE shows EF of 60%, negative bubble study  -CUS with patency of right carotid stent without evidence of restenosis, <50% left ICA stenosis  -HTN: Normal blood pressure parameters, avoid hypotension.   -HLD: LDL 95, goal <70; Continue with atorvastatin 80 mg daily  -Suicide precautions; history of major depressive disorder with suicidal ideation.  Home Remeron restarted.   -Continue PT/OT, IPR recommended at d/c  -N.p.o.; SLP following-planning for fees on Tuesday. May  ultimately need PEG  -Aspiration precautions d/t diffculty clearing her airway. Requiring frequent NT suctioning per nursing.   -Outpatient follow-up neurosurgery in 4 weeks    Plan of care reviewed with patient, Dr. Lai, and patient's nurse.  Recommend that the patient remain in ICU for now due to difficulty maintaining her secretions.  Fees scheduled for today. Stroke neurology will continue to follow.  Please call with any questions or concerns.      NIKKIE Felipe, AGACNP-BC  01/23/24  07:54 EST

## 2024-01-23 NOTE — PLAN OF CARE
Goal Outcome Evaluation:  Plan of Care Reviewed With: patient        Progress: no change  Outcome Evaluation: Pt remains in ICU, Repeat head CT completed. NIH 11-14, L sided weakness, decreased sensation, and facial droop continued. Pt also has significant dysarthria and oral secretions. Weak productive cough continued, suction provided. Prn  hydralazine given for hypertension. Sinus rhythm/ sinus tach. Pt is AAO X 4, occasionally drowsy.

## 2024-01-23 NOTE — PROGRESS NOTES
INTENSIVIST   PROGRESS NOTE     Hospital:  LOS: 4 days     Ms. Regine Beckham, 68 y.o. female is followed for a Chief Complaint of: CVA      Subjective   S     Interval History:  No acute events overnight. Continues to require intermittent suctioning.        The patient's relevant past medical, surgical and social history were reviewed and updated in Epic as appropriate.      ROS:   Constitutional: Negative for fever.   Respiratory: Negative for dyspnea.   Cardiovascular: Negative for chest pain.   Gastrointestinal: Negative for  nausea, vomiting and diarrhea.     Objective   O     Vitals:  Temp  Min: 97.5 °F (36.4 °C)  Max: 98 °F (36.7 °C)  BP  Min: 107/82  Max: 165/100  Pulse  Min: 67  Max: 120  Resp  Min: 16  Max: 24  SpO2  Min: 96 %  Max: 100 % Flow (L/min)  Min: 2  Max: 2    Intake/Ouptut 24 hrs (7:00AM - 6:59 AM)  Intake & Output (last 3 days)         01/19 0701 01/20 0700 01/20 0701  01/21 0700 01/21 0701  01/22 0700 01/22 0701  01/23 0700    P.O. 0 0 0     I.V. (mL/kg) 1014 (14.1) 1036.5 (14.8) 564.5 (8.1)     Other 150 320 310     NG/GT  560 530 180    Total Intake(mL/kg) 1164 (16.2) 1916.5 (27.3) 1404.5 (20) 180 (2.6)    Urine (mL/kg/hr) 550 1300 (0.8) 950 (0.6) 300 (1)    Stool  0 0     Total Output 550 1300 950 300    Net +614 +616.5 +454.5 -120            Urine Unmeasured Occurrence 1 x 2 x 1 x     Stool Unmeasured Occurrence  2 x 1 x             Medications (drips):  niCARdipine, Last Rate: Stopped (01/21/24 1423)          Physical Examination  Telemetry:  Normal sinus rhythm.    Constitutional:  No acute distress.  Sitting up in a chair.    Eyes: No scleral icterus.   PERRL, EOM intact.    Neck:  Supple, FROM   Cardiovascular: Normal rate, regular and rhythm. Normal heart sounds.  No murmurs, gallop or rub.   Respiratory: No respiratory distress. Normal respiratory effort.  Upper airway rhonchi.    Abdominal:  Soft. No masses. Nontender. No distension. No HSM.   Extremities: No digital cyanosis.  No clubbing.  No peripheral edema.   Skin: No rashes, lesions or ulcers   Neurological:   Alert and interactive.  Answers questions.        Interval:  (handoff)  1a. Level of Consciousness: 0-->Alert, keenly responsive  1b. LOC Questions: 0-->Answers both questions correctly  1c. LOC Commands: 0-->Performs both tasks correctly  2. Best Gaze: 0-->Normal  3. Visual: 1-->Partial hemianopia  4. Facial Palsy: 2-->Partial paralysis (total or near-total paralysis of lower face)  5a. Motor Arm, Left: 2-->Some effort against gravity, limb cannot get to or maintain (if cued) 90 (or 45) degrees, drifts down to bed, but has some effort against gravity  5b. Motor Arm, Right: 0-->No drift, limb holds 90 (or 45) degrees for full 10 secs  6a. Motor Leg, Left: 1-->Drift, leg falls by the end of the 5-sec period but does not hit bed  6b. Motor Leg, Right: 0-->No drift, leg holds 30 degree position for full 5 secs  7. Limb Ataxia: 0-->Absent  8. Sensory: 2-->Severe to total sensory loss, patient is not aware of being touched in the face, arm, and leg  9. Best Language: 2-->Severe aphasia, all communication is through fragmentary expression, great need for inference, questioning, and guessing by the listener. Range of information that can be exchanged is limited, listener carries burden of. . . (see row details)  10. Dysarthria: 2-->Severe dysarthria, patients speech is so slurred as to be unintelligible in the absence of or out of proportion to any dysphasia, or is mute/anarthric  11. Extinction and Inattention (formerly Neglect): 2-->Profound dao-inattention/extinction more than 1 modality    Total (NIH Stroke Scale): 14         Results from last 7 days   Lab Units 01/22/24  0245 01/21/24  0227 01/20/24  0408   WBC 10*3/mm3 12.13* 14.84* 11.53*   HEMOGLOBIN g/dL 13.2 13.2 13.5   MCV fL 94.0 95.1 93.3   PLATELETS 10*3/mm3 242 256 262     Results from last 7 days   Lab Units 01/23/24  0419 01/22/24  0230 01/21/24  0227 01/20/24  1536  01/20/24  0408   SODIUM mmol/L 133* 137 139  --  138   POTASSIUM mmol/L 3.6 3.7 3.7   < > 3.4*   CO2 mmol/L 23.0 21.0* 21.0*  --  22.0   CREATININE mg/dL 0.77 0.66 0.67  --  0.78   GLUCOSE mg/dL 125* 143* 173*  --  182*   MAGNESIUM mg/dL 2.0 1.8  --   --  1.8   PHOSPHORUS mg/dL 3.1 2.6  --   --  2.8    < > = values in this interval not displayed.     Estimated Creatinine Clearance: 76.3 mL/min (by C-G formula based on SCr of 0.77 mg/dL).  Results from last 7 days   Lab Units 01/19/24  1257   ALK PHOS U/L 85   BILIRUBIN mg/dL 0.4   ALT (SGPT) U/L 10   AST (SGOT) U/L 17             Images:  Imaging Results (Last 24 Hours)       Procedure Component Value Units Date/Time    SLP FEES - Fiberoptic Endo Eval Swallow [[id number]] Resulted: 01/23/24 1226     Updated: 01/23/24 1226    Narrative:      This procedure was auto-finalized with no dictation required.    CT Head Without Contrast [[id number]] Collected: 01/23/24 0533     Updated: 01/23/24 0538    Narrative:      CT HEAD WO CONTRAST    Date of Exam: 1/23/2024 5:19 AM EST    Indication: Stroke, follow up.    Comparison: None available.    Technique: Axial CT images were obtained of the head without contrast administration.  Automated exposure control and iterative construction methods were used.      Findings:  Stable appearance of right MCA territory infarct with hypoattenuation in the medial right temporal lobe, lateral right frontal lobe and right parietal lobe. There are small areas of increased density within the region of infarct likely reflecting   petechial hemorrhages. There is no discrete measurable hematoma in the brain. There is mild mass effect without midline shift or herniation. No new hemorrhage. Cerebellum is unremarkable. No abnormal extra-axial collection. The orbits and sinuses appear   normal. The mastoids are clear.      Impression:      Impression:  1.Stable appearance of right MCA territory infarct. There are small areas of increased density  within the region of infarct likely reflecting petechial hemorrhages. There is mild mass effect without midline shift or herniation.  2.No new acute abnormality.        Electronically Signed: Teo Carpio MD    1/23/2024 5:35 AM EST    Workstation ID: [id number]    CT Head Without Contrast [[id number]] Collected: 01/23/24 0059     Updated: 01/23/24 0105    Narrative:      CT HEAD WO CONTRAST    Date of Exam: 1/23/2024 12:18 AM EST    Indication: Mental status change, unknown cause  Stroke, follow up.    Comparison: 1/21/2024.    Technique: Axial CT images were obtained of the head without contrast administration.  Automated exposure control and iterative construction methods were used.      Findings:  There is an evolving subacute appearing right MCA territory infarct with areas of hypoattenuation and loss of gray-white differentiation within the lateral inferior right frontal lobe, medial anterior right temporal lobe and right parietal lobe. There   are continued areas of patchy hyperdensity within the regions of infarct likely reflecting  petechial hemorrhages. No measurable parenchymal hematoma. There is stable moderate to advanced white matter hypoattenuation primarily in the periventricular   region. There is mild mass effect in the region of infarct without evidence of midline shift or herniation. No new focal hypoattenuating lesions in the brain. The cerebellum appears unremarkable. There are intracranial vascular calcifications. No acute   calvarial abnormality. The paranasal sinuses and mastoid air cells appear well aerated. Superficial soft tissues are within normal limits.      Impression:      Impression:  1.Evolving subacute appearing right MCA territory infarct with areas of petechial hemorrhages. No measurable parenchymal hematoma.  2.Stable moderate to advanced chronic small vessel ischemic change.        Electronically Signed: Teo Carpio MD    1/23/2024 1:02 AM EST    Workstation ID: [id number]                Results: Reviewed.  I reviewed the patient's new laboratory and imaging results.  I independently reviewed the patient's new images.    Medications: Reviewed.    Assessment & Plan   A / P     Ms. Beckham is a 69yo F with a history of HTN, chronic anemia, sleep apnea intolerant of Cpap, severe depression with an overdose attempt in December 2023, and legally blind who presented to [facility] on 1/19/24 with left sided weakness and was found to have a RMCA infarct and 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 She underwent thrombectomy and carotid stent placement. She was outside the window for thrombolytics.     Cardene has been weaned off. Her hospital course has been complicated by ongoing dysphagia and difficulty managing secretions requiring frequent NT suctioning.      Nutrition:   NPO Diet NPO Type: Strict NPO  Advance Directives:   Code Status and Medical Interventions:   Ordered at: 01/22/24 1115     Code Status (Patient has no pulse and is not breathing):    CPR (Attempt to Resuscitate)     Medical Interventions (Patient has pulse or is breathing):    Full Support       Active Hospital Problems    Diagnosis     **Stroke     Grade I diastolic dysfunction     GERD (gastroesophageal reflux disease)     RLS (restless legs syndrome)     Major depressive disorder     Iron deficiency anemia due to chronic blood loss        Assessment / Plan:    Continue ICU care secondary to frequency of NT suctioning. At risk for respiratory failure.   Speech therapy following. FEES today. Plan to initiate tube feeds if she fails. She may need PEG placement.   PT/OT  Brilinta.   Continue Losartan.   Replace potassium  AM labs    High risk for worsening secondary to inability to manage secretions.     High level of risk due to:  severe exacerbation of chronic illness and illness with threat to life or bodily function.    Plan of care and goals reviewed during interdisciplinary rounds.  I discussed the patient's findings  and my recommendations with patient and nursing staff      Radha Atkinson, DO    Intensive Care Medicine and Pulmonary Medicine

## 2024-01-23 NOTE — MBS/VFSS/FEES
"Acute Care - Speech Language Pathology   Swallow Initial Evaluation [facility]  Fiberoptic Endoscopic Evaluation of Swallowing (FEES)  & Cognitive Communication Treatment      Patient Name: Regine Beckham  : 1955  MRN: [id number]  Today's Date: 2024               Admit Date: 2024    Visit Dx:     ICD-10-CM ICD-9-CM   1. Oropharyngeal dysphagia  R13.12 787.22   2. Dysarthria  R47.1 784.51   3. Cognitive communication deficit  R41.841 799.52     Patient Active Problem List   Diagnosis    Iron deficiency anemia due to chronic blood loss    Major depressive disorder    RLS (restless legs syndrome)    GERD (gastroesophageal reflux disease)    Left breast mass    Allergy to penicillin    Stroke    Grade I diastolic dysfunction     Past Medical History:   Diagnosis Date    Anemia     Anxiety     Arthritis     Depression     Legally blind     Restless leg syndrome     Sleep apnea     \"I don't use a cpap anymore since losing 106 lbs\"    Water retention      Past Surgical History:   Procedure Laterality Date    BACK SURGERY      CARDIAC CATHETERIZATION N/A 2024    Procedure: Percutaneous Manual Thrombectomy;  Surgeon: Efrain Hurley MD;  Location: [facility] INVASIVE LOCATION;  Service: Interventional Radiology;  Laterality: N/A;    GALLBLADDER SURGERY      HIP ARTHROSCOPY      JOINT REPLACEMENT Right        SLP Recommendation and Plan  SLP Swallowing Diagnosis: moderate, oral dysphagia, mod-severe, pharyngeal dysphagia (24 1020)  SLP Diet Recommendation: NPO, temporary alternate methods of nutrition/hydration, other (see comments) (ok three 4oz puree snacks per day, once at breakfast, once at lunch, once at dinner ; ok'd ice chips in between puree snacks after oral care) (24 1020)  Recommended Precautions and Strategies: general aspiration precautions (24 1020)  SLP Rec. for Method of Medication Administration: meds via alternate route (24 1020)     Monitor for Signs " of Aspiration: yes, notify SLP if any concerns (01/23/24 1020)     Swallow Criteria for Skilled Therapeutic Interventions Met: demonstrates skilled criteria (01/23/24 1020)  Anticipated Discharge Disposition (SLP): inpatient rehabilitation facility (01/23/24 1020)  Rehab Potential/Prognosis, Swallowing: good, to achieve stated therapy goals (01/23/24 1020)  Therapy Frequency (Swallow): 5 days per week (01/23/24 1020)  Predicted Duration Therapy Intervention (Days): until discharge (01/23/24 1020)  Oral Care Recommendations: Oral Care BID/PRN, Suction toothbrush (01/23/24 1020)                                        Plan of Care Reviewed With: patient      SWALLOW EVALUATION (last 72 hours)       SLP Adult Swallow Evaluation       Row Name 01/23/24 1020 01/22/24 1330 01/21/24 0830             Rehab Evaluation    Document Type evaluation  -EN re-evaluation  -CJ evaluation  -RS      Subjective Information no complaints  -EN no complaints  -CJ no complaints  -RS      Patient Observations cooperative;alert;agree to therapy  -EN alert;cooperative  -CJ lethargic  -RS      Patient/Family/Caregiver Comments/Observations no family present  -EN no family present  -CJ no family present  -RS      Patient Effort good  -EN good  -CJ fair  -RS      Symptoms Noted During/After Treatment none  -EN none  -CJ none  -RS      Oral Care -- -- teeth brushed - suction toothbrush  -RS         General Information    Patient Profile Reviewed yes  -EN yes  -CJ yes  -RS      Pertinent History Of Current Problem See initial evaluation. FEES today.  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  She underwent thrombectomy and carotid stent. Her NIH has improved from a 19 to a 16  -RS       Current Method of Nutrition NPO;nasogastric feedings;large-bore  -EN NPO;nasogastric feedings;large-bore  -CJ NPO;nasogastric feedings;large-bore  -RS      Precautions/Limitations, Vision vision impairment, bilaterally  -EN vision impairment, bilaterally  -CJ vision impairment, bilaterally  -RS      Precautions/Limitations, Hearing WFL;for purposes of eval  -EN WFL;for purposes of eval  -CJ WFL;for purposes of eval  -RS      Prior Level of Function-Communication WFL  -EN WFL  -CJ WFL  -RS      Prior Level of Function-Swallowing no diet consistency restrictions  -EN no diet consistency restrictions  -CJ no diet consistency restrictions  -RS      Plans/Goals Discussed with patient;agreed upon  -EN patient;agreed upon  -CJ --      Barriers to Rehab medically complex  -EN medically complex  -CJ --      Patient's Goals for Discharge return to PO diet  -EN return to PO diet  -CJ --         Pain    Additional Documentation Pain Scale: FACES Pre/Post-Treatment (Group)  -EN Pain Scale: FACES Pre/Post-Treatment (Group)  -CJ --         Pain Scale: FACES Pre/Post-Treatment    Pain: FACES Scale, Pretreatment 0-->no hurt  -EN 0-->no hurt  -CJ --      Posttreatment Pain Rating 0-->no hurt  -EN 0-->no hurt  -CJ --         Oral Motor Structure and Function    Secretion Management -- wet vocal quality  -CJ wet vocal quality;problems swallowing secretions;requires suctioning to control secretions  -RS      Mucosal Quality -- moist, healthy  -CJ moist, healthy  -RS      Volitional Swallow -- -- delayed  -RS      Volitional Cough -- -- unable to elicit  -RS         Oral Musculature and Cranial Nerve Assessment    Oral Motor General Assessment -- oral labial or buccal impairment;lingual impairment;generalized oral motor weakness  -CJ --      Oral Labial or Buccal Impairment, Detail, Cranial Nerve VII (Facial): -- left labial droop  -CJ --      Lingual Impairment, Detail. Cranial Nerves IX, XII (Glossopharyngeal and Hypoglossal) --  reduced strength left  - --         General Eating/Swallowing Observations    Respiratory Support Currently in Use -- nasal cannula  - nasal cannula  -RS      Eating/Swallowing Skills -- fed by SLP  -CJ fed by SLP;unable to perform self-feeding  -RS      Positioning During Eating -- upright in chair  - upright in bed  -RS      Utensils Used -- spoon;cup;straw  - spoon  -RS      Consistencies Trialed -- ice chips;thin liquids  - ice chips  -RS         Respiratory    Respiratory Status -- -- increase in respiratory rate  -RS         Clinical Swallow Eval    Oral Prep Phase -- -- impaired  -RS      Oral Transit -- -- impaired  -RS      Oral Residue -- -- WFL  -RS      Pharyngeal Phase -- suspected pharyngeal impairment  -CJ suspected pharyngeal impairment  -RS      Esophageal Phase -- -- suspected esophageal impairment  -RS      Clinical Swallow Evaluation Summary -- Pt w/ improved alertness. able to manipulate ice chips w/o difficulty. Cough response in ~50% of trials of ice chips or thin liquids. After cued cough significant improvement in vocal quality. Will tentatively plan for FEES tomorrow to help determine further POC.  - Significant oral and pharyngeal dysphagia patterns with ice chips. Pt is not managing her own secretions and requires oral suction throughout. Immediate and violent coughing with limited ice chips. Difficult to appreciate laryngeal movement given submental tissue. Not safe for PO intake or appropriate for instrumental at this time  -RS         Oral Prep Concerns    Oral Prep Concerns -- -- poor rotary chew;reduced lip opening;incomplete or weak lip closure around spoon;anterior loss;incomplete bolus preparation  -RS      Poor Rotary Chew -- -- all consistencies  -RS      Reduced Lip Opening -- -- all consistencies  -RS      Incomplete or Weak Lip Closure Around Spoon -- -- all consistencies  -RS      Anterior Loss -- -- all consistencies  -RS      Incomplete Bolus Preparation -- --  all consistencies  -RS         Oral Transit Concerns    Increased Oral Transit Time -- -- all consistencies  -RS      Oral Transit Concerns, Comment -- -- Extremely rapid oral transit, suspect that pt swallowed ice chips whole  -RS         Pharyngeal Phase Concerns    Pharyngeal Phase Concerns -- -- wet vocal quality;multiple swallows;cough  -RS      Wet Vocal Quality -- -- thin  -RS      Multiple Swallows -- -- thin  -RS      Cough -- -- thin  -RS         Esophageal Phase Concerns    Esophageal Phase Concerns -- -- belching  -RS         Fiberoptic Endoscopic Evaluation of Swallowing (FEES)    Risks/Benefits Reviewed risks/benefits explained;patient;agreed to eval  -EN -- --      Nasal Entry right:  -EN -- --      Scope serial number/identification 338  -EN -- --         Anatomy and Physiology    Anatomic Considerations edema;erythema;vocal folds;false vocal folds;arytenoids  -EN -- --      Comment Diffuse edema  -EN -- --      Base of Tongue symmetrical;range reduced  -EN -- --      Epiglottis edematous  -EN -- --      Laryngeal Function Breathing symmetrical  -EN -- --      Laryngeal Function Phonation symmetrical  -EN -- --      Laryngeal Function to Breath Hold can't sustain closure  -EN -- --      Secretion Rating Scale (Pradeep et al. 1996) 1- secretions present around the laryngeal vestibule  -EN -- --      Secretion Description thin  -EN -- --      Ice Chips DNA  -EN -- --      Spontaneous Swallow frequency reduced  -EN -- --      Sensory sensed scope  -EN -- --      Utensils Used Straw;Cup;Spoon  -EN -- --      Consistencies Trialed thin liquids;nectar-thick liquids;honey-thick liquids;pudding/puree;regular textures  -EN -- --         FEES Interpretation    Oral Phase reduced lingual control;increased A-P transit time  -EN -- --         Initiation of Pharyngeal Swallow    Initiation of Pharyngeal Swallow bolus in valleculae  -EN -- --      Pharyngeal Phase impaired pharyngeal phase of swallowing  -EN -- --       Aspiration During the Swallow thin liquids;nectar-thick liquids;honey-thick liquids;secondary to reduced laryngeal elevation;secondary to reduced vestibular closure;secondary to delayed swallow initiation or mistiming  -EN -- --      Aspiration After the Swallow thin liquids;nectar-thick liquids;honey-thick liquids;secondary to residue;in valleculae;in pyriform sinuses;in laryngeal vestibule  -EN -- --      Response to Aspiration No  -EN -- --      No spontaneous response to aspiration with non-effective subglottic clearance with cue (see comments);other (see comments)  cued cough; weak and reduced respiratory support  -EN -- --      Rosenbek's Scale thin:;nectar:;honey:;8-->Level 8;pudding/puree:;regular textures:;1-->Level 1  -EN -- --      Residue all consistencies tested;secondary to reduced base of tongue retraction;secondary to reduced posterior pharyngeal wall stripping;secondary to reduced laryngeal elevation;secondary to reduced hyolaryngeal excursion  -EN -- --      Response to Residue cleared residue;with spontaneous subsequent swallow;other (see comments)  cleared reg solid residue hat was evidenced to retrograde through the UES into the pyriform sinuses; this was suspected to be worsened d/t large bore NG tube.  -EN -- --      Attempted Compensatory Maneuvers bolus size;bolus presentation style;additional subsequent swallow  -EN -- --      Response to Attempted Compensatory Maneuvers prevented aspiration;did not reduce residue;other (see comments)  did not reduce residue w/ liquids of any viscosity; able to reduce min pyriform residue from retrograde flow through UES w/ solids.  -EN -- --      FEES Summary Moderate oral and Moderate-severe pharyngeal dysphagia. Pt w/ diffuse weakness and pharyngeal edema contributing to deficts during pharyngeal phase of the swallow. There was aspiration during the swallow w/ trials of thin, nectar-thick, and honey-thick liquids w/ subsequent moderate-severe amount  of residue diffusely in pharynx after the swallow 2' weakness/ reduced squeeze/retraction and elevation/excursion. Did also evidence CRISTOPHER w/ all consistences -- able to sense and subsequently swallow retrograde material w/ puree and regular solids. W/ liquids, retrograde material just contributed to amount of residue. Would benefit from downsizing NG tube. Ok to have three 4oz puree snacks per day (once at breakfast, once at lunch, and once at dinner). Notified RN and RD. SLP will follow for therapy.  -EN -- --         SLP Evaluation Clinical Impression    SLP Swallowing Diagnosis moderate;oral dysphagia;mod-severe;pharyngeal dysphagia  -EN suspected pharyngeal dysphagia  -CJ R/O pharyngeal dysphagia  -RS      Functional Impact risk of aspiration/pneumonia;risk of dehydration;risk of malnutrition  -EN risk of aspiration/pneumonia;risk of malnutrition;risk of dehydration  -CJ risk of aspiration/pneumonia  -RS      Rehab Potential/Prognosis, Swallowing good, to achieve stated therapy goals  -EN good, to achieve stated therapy goals  -CJ good, to achieve stated therapy goals  -RS      Swallow Criteria for Skilled Therapeutic Interventions Met demonstrates skilled criteria  -EN demonstrates skilled criteria  -CJ demonstrates skilled criteria  -RS         SLP Treatment Clinical Impressions    Treatment Assessment (SLP) -- continued;dysarthria;cognitive-linguistic disorder  -CJ --      Treatment Assessment Comments (SLP) -- Pt seen for tx this date. Somewhat improved dysarthria as after cued cough able to communicate at phrase level. Pt eager for po intake. Will continue to address deficits  -CJ --      Daily Summary of Progress (SLP) -- progress towards functional goals is fair  -CJ --      Barriers to Overall Progress (SLP) -- Medically complex  -CJ --      Plan for Continued Treatment (SLP) -- continue treatment per plan of care;further assessment needed (see comments)  -CJ --      Care Plan Review --  evaluation/treatment results reviewed;care plan/treatment goals reviewed  -CJ --         Recommendations    Therapy Frequency (Swallow) 5 days per week  -EN 5 days per week  -CJ 5 days per week  -RS      Predicted Duration Therapy Intervention (Days) until discharge  -EN until discharge  -CJ until discharge  -RS      SLP Diet Recommendation NPO;temporary alternate methods of nutrition/hydration;other (see comments)  ok three 4oz puree snacks per day, once at breakfast, once at lunch, once at dinner ; ok'd ice chips in between puree snacks after oral care  -EN NPO;temporary alternate methods of nutrition/hydration  1-2 ice chips per hour as tolerated  -CJ NPO;temporary alternate methods of nutrition/hydration  -RS      Recommended Diagnostics -- reassess via FEES  tentative plan for FEES tomorrow  -CJ reassess via clinical swallow evaluation  -RS      Recommended Precautions and Strategies general aspiration precautions  -EN general aspiration precautions  -CJ general aspiration precautions  -RS      Oral Care Recommendations Oral Care BID/PRN;Suction toothbrush  -EN Oral Care BID/PRN;Suction toothbrush  -CJ Oral Care BID/PRN;Suction toothbrush  -RS      SLP Rec. for Method of Medication Administration meds via alternate route  -EN meds via alternate route  -CJ meds via alternate route  -RS      Monitor for Signs of Aspiration yes;notify SLP if any concerns  -EN yes;notify SLP if any concerns  -CJ yes;notify SLP if any concerns  -RS      Anticipated Discharge Disposition (SLP) inpatient rehabilitation facility  -EN inpatient rehabilitation facility  -CJ inpatient rehabilitation facility  -RS                User Key  (r) = Recorded By, (t) = Taken By, (c) = Cosigned By      Initials Name Effective Dates     Anh Ross, MS CCC-SLP 07/11/23 -     EN Zakiya Desouza MS CCC-SLP 06/22/22 -     RS Rito Morrissey MS CCC-SLP 09/14/23 -                     EDUCATION  The patient has been educated in the following areas:    Cognitive Impairment Communication Impairment Dysphagia (Swallowing Impairment).        SLP GOALS       Row Name 01/23/24 1020 01/22/24 1330 01/21/24 0893       (LTG) Patient will demonstrate functional swallow for    Diet Texture (Demonstrate functional swallow) regular textures  -EN -- --    Liquid viscosity (Demonstrate functional swallow) thin liquids  -EN -- --    Routt (Demonstrate functional swallow) with minimal cues (75-90% accuracy)  -EN -- --    Time Frame (Demonstrate functional swallow) by discharge  -EN -- --       (STG) Patient will tolerate trials of    Consistencies Trialed (Tolerate trials) pureed textures  -EN -- --    Desired Outcome (Tolerate trials) without signs/symptoms of aspiration  -EN -- --    Routt (Tolerate trials) with minimal cues (75-90% accuracy)  -EN -- --    Time Frame (Tolerate trials) by discharge  -EN -- --       (STG) Patient will tolerate therapeutic trials of    Consistencies Trialed (Tolerate therapeutic trials) nectar/ mildly thick liquids;thin liquids  -EN -- --    Desired Outcome (Tolerate therapeutic trials) without signs/symptoms of aspiration  -EN -- --    Routt (Tolerate therapeutic trials) with minimal cues (75-90% accuracy)  -EN -- --    Time Frame (Tolerate therapeutic trials) by discharge  -EN -- --       (STG) Labial Strengthening Goal 1 (SLP)    Activity (Labial Strengthening Goal 1, SLP) increase labial tone  -EN -- --    Increase Labial Tone labial resistance exercises;swallow trials  -EN -- --    Routt/Accuracy (Labial Strengthening Goal 1, SLP) with minimal cues (75-90% accuracy)  -EN -- --    Time Frame (Labial Strengthening Goal 1, SLP) short term goal (STG)  -EN -- --       (STG) Lingual Strengthening Goal 1 (SLP)    Activity (Lingual Strengthening Goal 1, SLP) increase lingual tone/sensation/control/coordination/movement;increase tongue back strength  -EN -- --    Increase Lingual  Tone/Sensation/Control/Coordination/Movement lingual movement exercises;lingual resistance exercises  -EN -- --    Increase Tongue Back Strength lingual movement exercises;swallow trials;lingual resistance exercises  -EN -- --    [place]/Accuracy (Lingual Strengthening Goal 1, SLP) with minimal cues (75-90% accuracy)  -EN -- --    Time Frame (Lingual Strengthening Goal 1, SLP) short term goal (STG)  -EN -- --       (STG) Pharyngeal Strengthening Exercise Goal 1 (SLP)    Activity (Pharyngeal Strengthening Goal 1, SLP) increase timing;increase superior movement of the hyolaryngeal complex;increase anterior movement of the hyolaryngeal complex;increase closure at entrance to airway/closure of airway at glottis;increase squeeze/positive pressure generation;increase tongue base retraction  -EN -- --    Increase Timing prepping - 3 second prep or suck swallow or 3-step swallow  -EN -- --    Increase Superior Movement of the Hyolaryngeal Complex hard effortful swallow;falsetto  -EN -- --    Increase Anterior Movement of the Hyolaryngeal Complex chin tuck against resistance (CTAR);hard effortful swallow  -EN -- --    Increase Closure at Entrance to Airway/Closure of Airway at Glottis supraglottic swallow;breath hold exercises  -EN -- --    Increase Squeeze/Positive Pressure Generation hard effortful swallow  -EN -- --    Increase Tongue Base Retraction hard effortful swallow  -EN -- --    [place]/Accuracy (Pharyngeal Strengthening Goal 1, SLP) with moderate cues (50-74% accuracy)  -EN -- --    Time Frame (Pharyngeal Strengthening Goal 1, SLP) short term goal (STG)  -EN -- --       Patient will demonstrate functional speech skills for return to discharge environment    [place] -- with moderate cues  -CJ with moderate cues  -RS    Time frame -- by discharge  -CJ by discharge  -RS    Progress/Outcomes -- continuing progress toward goal  -CJ new goal  -RS       Patient will demonstrate functional  cognitive-linguistic skills for return to discharge environment    [place] with moderate cues  -EN with moderate cues  -CJ with moderate cues  -RS    Time frame by discharge  -EN by discharge  -CJ by discharge  -RS    Progress/Outcomes continuing progress toward goal  -EN continuing progress toward goal  -CJ new goal  -RS       SLP Diagnostic Treatment     Patient will participate in further assessment in the following areas cognitive-linguistic  -EN cognitive-linguistic  -CJ cognitive-linguistic  -RS    Time Frame (Diagnostic) short term goal (STG)  -EN short term goal (STG)  -CJ short term goal (STG)  -RS    Barriers (Diagnostic) -- -- Lethargy;Cognitive status  -RS    Progress/Outcomes (Additional Goal 1, SLP) continuing progress toward goal  -EN goal ongoing  -CJ new goal  -RS       Respiratory Support Goal 1 (SLP)    Improve Respiratory Support Goal 1 (SLP) increasing length of exhalation;sustaining a vowel sound on exhalation;80%;with minimal cues (75-90%)  -EN -- --    Time Frame (Respiratory Support Goal 1, SLP) short term goal (STG)  -EN -- --    Progress/Outcomes (Respiratory Support Goal 1, SLP) new goal  -EN -- --       Phonation Goal 1 (SLP)    Improve Phonation By Goal 1 (SLP) using loud speech;80%;with moderate cues (50-74%)  -EN using loud speech;80%;with moderate cues (50-74%)  -CJ --    Time Frame (Phonation Goal 1, SLP) short term goal (STG)  -EN short term goal (STG)  -CJ --    Progress (Phonation Goal 1, SLP) 60%;with moderate cues (50-74%)  -EN 60%;with moderate cues (50-74%)  -CJ --    Progress/Outcomes (Phonation Goal 1, SLP) continuing progress toward goal  -EN new goal;continuing progress toward goal  -CJ --       Articulation Goal 1 (SLP)    Improve Articulation Goal 1 (SLP) by over-articulating at word level;80%;with moderate cues (50-74%)  -EN by over-articulating at word level;80%;with moderate cues (50-74%)  -CJ by over-articulating at word level;80%;with moderate cues (50-74%)   -RS    Time Frame (Articulation Goal 1, SLP) short term goal (STG)  -EN short term goal (STG)  -CJ short term goal (STG)  -RS    Progress (Articulation Goal 1, SLP) 60%;with moderate cues (50-74%)  -EN 60%;70%;with moderate cues (50-74%)  -CJ --    Progress/Outcomes (Articulation Goal 1, SLP) continuing progress toward goal  -EN continuing progress toward goal  -CJ new goal  -RS    Comment (Articulation Goal 1, SLP) -- short phrase  -CJ --       Attention Goal 1 (SLP)    Improve Attention by Goal 1 (SLP) attending to task;complete sustained attention task;80%;with minimal cues (75-90%)  -EN -- --    Time Frame (Attention Goal 1, SLP) short term goal (STG)  -EN -- --    Progress/Outcomes (Attention Goal 1, SLP) new goal  -EN -- --       Reasoning Goal 1 (SLP)    Improve Reasoning Through Goal 1 (SLP) complete basic reasoning task;80%;with minimal cues (75-90%)  -EN -- --    Time Frame (Reasoning Goal 1, SLP) short term goal (STG)  -EN -- --              User Key  (r) = Recorded By, (t) = Taken By, (c) = Cosigned By      Initials Name Provider Type    Anh Cloud, MS CCC-SLP Speech and Language Pathologist    Zakiya King, MS CCC-SLP Speech and Language Pathologist    Rito Mata, MS CCC-SLP Speech and Language Pathologist                       Time Calculation:    Time Calculation- SLP       Row Name 01/23/24 1221             Time Calculation- SLP    SLP Start Time 1020  -EN      SLP Received On 01/23/24  -EN         Untimed Charges    SLP Eval/Re-eval  ST Fiberoptic Endo Eval Swallow - 66744  -EN      [id number] Fiberoptic Endo Eval Swallow Minutes 90  -EN      [id number] Treatment/ST Modification Prosth Aug Alter  38  -EN         Total Minutes    Untimed Charges Total Minutes 128  -EN       Total Minutes 128  -EN                User Key  (r) = Recorded By, (t) = Taken By, (c) = Cosigned By      Initials Name Provider Type    Zakiya King, MS CCC-SLP Speech and Language Pathologist                     Therapy Charges for Today       Code Description Service Date Service Provider Modifiers Qty    [id number] [facility] FIBEROPTIC ENDO EVAL SWALL 6 1/23/2024 Zakiya Desouza, MS CCC-SLP GN 1    [id number]  ST TREATMENT SPEECH 3 1/23/2024 Zakiya Desouza, MS CCC-SLP GN 1                 Zakiya Desouza MS CCC-SLP  1/23/2024

## 2024-01-23 NOTE — PLAN OF CARE
Goal Outcome Evaluation:  Plan of Care Reviewed With: patient                  Anticipated Discharge Disposition (SLP): inpatient rehabilitation facility          SLP Swallowing Diagnosis: moderate, oral dysphagia, mod-severe, pharyngeal dysphagia (01/23/24 2616)

## 2024-01-24 PROBLEM — E44.0 MODERATE MALNUTRITION: Status: ACTIVE | Noted: 2024-01-24

## 2024-01-24 LAB
ANION GAP SERPL CALCULATED.3IONS-SCNC: 10 MMOL/L (ref 5–15)
BACTERIA SPEC RESP CULT: NORMAL
BUN SERPL-MCNC: 16 MG/DL (ref 8–23)
BUN/CREAT SERPL: 21.6 (ref 7–25)
CALCIUM SPEC-SCNC: 9.2 MG/DL (ref 8.6–10.5)
CHLORIDE SERPL-SCNC: 103 MMOL/L (ref 98–107)
CO2 SERPL-SCNC: 23 MMOL/L (ref 22–29)
CREAT SERPL-MCNC: 0.74 MG/DL (ref 0.57–1)
EGFRCR SERPLBLD CKD-EPI 2021: 87.7 ML/MIN/1.73
GLUCOSE BLDC GLUCOMTR-MCNC: 107 MG/DL (ref 70–130)
GLUCOSE BLDC GLUCOMTR-MCNC: 126 MG/DL (ref 70–130)
GLUCOSE BLDC GLUCOMTR-MCNC: 133 MG/DL (ref 70–130)
GLUCOSE BLDC GLUCOMTR-MCNC: 134 MG/DL (ref 70–130)
GLUCOSE SERPL-MCNC: 110 MG/DL (ref 65–99)
GRAM STN SPEC: NORMAL
MAGNESIUM SERPL-MCNC: 2.1 MG/DL (ref 1.6–2.4)
PHOSPHATE SERPL-MCNC: 2.7 MG/DL (ref 2.5–4.5)
POTASSIUM SERPL-SCNC: 4.1 MMOL/L (ref 3.5–5.2)
SODIUM SERPL-SCNC: 136 MMOL/L (ref 136–145)

## 2024-01-24 PROCEDURE — 97110 THERAPEUTIC EXERCISES: CPT

## 2024-01-24 PROCEDURE — 99232 SBSQ HOSP IP/OBS MODERATE 35: CPT | Performed by: INTERNAL MEDICINE

## 2024-01-24 PROCEDURE — 92526 ORAL FUNCTION THERAPY: CPT

## 2024-01-24 PROCEDURE — 99232 SBSQ HOSP IP/OBS MODERATE 35: CPT | Performed by: NURSE PRACTITIONER

## 2024-01-24 PROCEDURE — 25010000002 HYDRALAZINE PER 20 MG: Performed by: NURSE PRACTITIONER

## 2024-01-24 PROCEDURE — 84100 ASSAY OF PHOSPHORUS: CPT | Performed by: INTERNAL MEDICINE

## 2024-01-24 PROCEDURE — 97530 THERAPEUTIC ACTIVITIES: CPT

## 2024-01-24 PROCEDURE — 80048 BASIC METABOLIC PNL TOTAL CA: CPT | Performed by: INTERNAL MEDICINE

## 2024-01-24 PROCEDURE — 82948 REAGENT STRIP/BLOOD GLUCOSE: CPT

## 2024-01-24 PROCEDURE — 92507 TX SP LANG VOICE COMM INDIV: CPT

## 2024-01-24 PROCEDURE — 83735 ASSAY OF MAGNESIUM: CPT | Performed by: INTERNAL MEDICINE

## 2024-01-24 PROCEDURE — 25010000002 ONDANSETRON PER 1 MG: Performed by: INTERNAL MEDICINE

## 2024-01-24 PROCEDURE — 94664 DEMO&/EVAL PT USE INHALER: CPT

## 2024-01-24 PROCEDURE — 94799 UNLISTED PULMONARY SVC/PX: CPT

## 2024-01-24 RX ORDER — GABAPENTIN 100 MG/1
100 CAPSULE ORAL EVERY 8 HOURS SCHEDULED
Status: DISCONTINUED | OUTPATIENT
Start: 2024-01-24 | End: 2024-01-24

## 2024-01-24 RX ORDER — ONDANSETRON 2 MG/ML
4 INJECTION INTRAMUSCULAR; INTRAVENOUS EVERY 6 HOURS PRN
Status: DISCONTINUED | OUTPATIENT
Start: 2024-01-24 | End: 2024-02-02 | Stop reason: HOSPADM

## 2024-01-24 RX ORDER — GABAPENTIN 100 MG/1
100 CAPSULE ORAL EVERY 8 HOURS SCHEDULED
Status: DISCONTINUED | OUTPATIENT
Start: 2024-01-24 | End: 2024-01-30

## 2024-01-24 RX ORDER — ASPIRIN 300 MG/1
300 SUPPOSITORY RECTAL DAILY
Status: CANCELLED | OUTPATIENT
Start: 2024-01-25

## 2024-01-24 RX ORDER — ASPIRIN 81 MG/1
81 TABLET ORAL DAILY
Status: DISCONTINUED | OUTPATIENT
Start: 2024-01-25 | End: 2024-01-24

## 2024-01-24 RX ORDER — ASPIRIN 81 MG/1
81 TABLET, CHEWABLE ORAL DAILY
Status: DISCONTINUED | OUTPATIENT
Start: 2024-01-25 | End: 2024-01-30

## 2024-01-24 RX ORDER — ASPIRIN 81 MG/1
81 TABLET, CHEWABLE ORAL DAILY
Status: CANCELLED | OUTPATIENT
Start: 2024-01-25

## 2024-01-24 RX ADMIN — IPRATROPIUM BROMIDE AND ALBUTEROL SULFATE 3 ML: 2.5; .5 SOLUTION RESPIRATORY (INHALATION) at 19:44

## 2024-01-24 RX ADMIN — GUAIFENESIN 400 MG: 200 SOLUTION ORAL at 12:50

## 2024-01-24 RX ADMIN — ASPIRIN 325 MG ORAL TABLET 325 MG: 325 PILL ORAL at 08:37

## 2024-01-24 RX ADMIN — MIRTAZAPINE 30 MG: 15 TABLET, FILM COATED ORAL at 20:05

## 2024-01-24 RX ADMIN — IPRATROPIUM BROMIDE AND ALBUTEROL SULFATE 3 ML: 2.5; .5 SOLUTION RESPIRATORY (INHALATION) at 12:29

## 2024-01-24 RX ADMIN — HYDRALAZINE HYDROCHLORIDE 20 MG: 20 INJECTION INTRAMUSCULAR; INTRAVENOUS at 05:12

## 2024-01-24 RX ADMIN — GUAIFENESIN 400 MG: 200 SOLUTION ORAL at 20:06

## 2024-01-24 RX ADMIN — PHENOL 1 SPRAY: 1.4 SPRAY ORAL at 02:03

## 2024-01-24 RX ADMIN — IPRATROPIUM BROMIDE AND ALBUTEROL SULFATE 3 ML: 2.5; .5 SOLUTION RESPIRATORY (INHALATION) at 07:35

## 2024-01-24 RX ADMIN — MAGNESIUM OXIDE TAB 400 MG (241.3 MG ELEMENTAL MG) 400 MG: 400 (241.3 MG) TAB at 10:18

## 2024-01-24 RX ADMIN — GUAIFENESIN 400 MG: 200 SOLUTION ORAL at 23:28

## 2024-01-24 RX ADMIN — ONDANSETRON 4 MG: 2 INJECTION INTRAMUSCULAR; INTRAVENOUS at 10:18

## 2024-01-24 RX ADMIN — GUAIFENESIN 400 MG: 200 SOLUTION ORAL at 15:54

## 2024-01-24 RX ADMIN — ATORVASTATIN CALCIUM 80 MG: 40 TABLET, FILM COATED ORAL at 20:05

## 2024-01-24 RX ADMIN — Medication 10 ML: at 05:12

## 2024-01-24 RX ADMIN — TICAGRELOR 60 MG: 60 TABLET ORAL at 05:12

## 2024-01-24 RX ADMIN — PANTOPRAZOLE SODIUM 40 MG: 40 INJECTION, POWDER, FOR SOLUTION INTRAVENOUS at 05:12

## 2024-01-24 RX ADMIN — Medication 10 ML: at 20:12

## 2024-01-24 RX ADMIN — LOSARTAN POTASSIUM 50 MG: 50 TABLET, FILM COATED ORAL at 08:37

## 2024-01-24 RX ADMIN — Medication 10 ML: at 10:18

## 2024-01-24 RX ADMIN — TICAGRELOR 60 MG: 60 TABLET ORAL at 20:05

## 2024-01-24 RX ADMIN — GABAPENTIN 100 MG: 100 CAPSULE ORAL at 23:28

## 2024-01-24 RX ADMIN — GUAIFENESIN 400 MG: 200 SOLUTION ORAL at 08:37

## 2024-01-24 RX ADMIN — ACETAMINOPHEN 650 MG: 325 TABLET ORAL at 08:37

## 2024-01-24 RX ADMIN — GABAPENTIN 100 MG: 100 CAPSULE ORAL at 13:36

## 2024-01-24 RX ADMIN — ROPINIROLE HYDROCHLORIDE 4 MG: 2 TABLET, FILM COATED ORAL at 20:05

## 2024-01-24 RX ADMIN — ACETAMINOPHEN 650 MG: 325 TABLET ORAL at 17:47

## 2024-01-24 RX ADMIN — GUAIFENESIN 400 MG: 200 SOLUTION ORAL at 00:07

## 2024-01-24 RX ADMIN — IPRATROPIUM BROMIDE AND ALBUTEROL SULFATE 3 ML: 2.5; .5 SOLUTION RESPIRATORY (INHALATION) at 03:29

## 2024-01-24 RX ADMIN — ACETAMINOPHEN 650 MG: 325 TABLET ORAL at 05:12

## 2024-01-24 RX ADMIN — Medication 1 PATCH: at 08:37

## 2024-01-24 RX ADMIN — GUAIFENESIN 400 MG: 200 SOLUTION ORAL at 03:56

## 2024-01-24 NOTE — PROGRESS NOTES
Clinical Nutrition     Nutrition Support Assessment  Reason for Visit: Follow-up protocol, EN    Patient Name: Regine Beckham  YOB: 1955  MRN: [id number]  Date of Encounter: 01/24/24 10:26 EST  Admission date: 1/19/2024    Pt meets criteria moderate/non-severe malnutrition in the context of acute illness, see MSA for full assessment.     Per SLP remains NPO other than allowed a singular 4 oz puree snack 3x/day. Plan for repeat FEES Friday. EN to meet needs at this time.     At risk refeeding syndrome due to 5 days NPO, advance EN slow/low per order and monitor lytes per protocol.     RD recommends the following for Nutrition Support:  Initiate continuous EN regimen of Vital 1.5 @ 15 ml/hr and advance by 10 ml/hr Q 8 hr as tolerated to goal rate of 55 ml/hr, water flushes 20 ml/hr. Provide prosource QD.     At goal this regimen will provide: 1100 ml, 1710 kcal(101% est. needs), 90 g protein(99% est. needs), 6 g soluble fiber, and 736 ml FW in formula +400 ml flushes = 1436ml total water    Nutrition Assessment   Admission Diagnosis:  Acute ischemic right MCA stroke [I63.511]      Problem List:    Stroke    Iron deficiency anemia due to chronic blood loss    Major depressive disorder    RLS (restless legs syndrome)    GERD (gastroesophageal reflux disease)    Grade I diastolic dysfunction        PMH:   She  has a past medical history of Anemia, Anxiety, Arthritis, Depression, Legally blind, Restless leg syndrome, Sleep apnea, and Water retention.    PSH:  She  has a past surgical history that includes Hip arthroscopy; Joint replacement (Right); Gallbladder surgery; Back surgery; and Cardiac catheterization (N/A, 1/19/2024).      Applicable Nutrition Concerns:   Skin:  Oral:  GI: hx gastric bypass 4 years ago      Applicable Interval History:       Reported/Observed/Food/Nutrition Related History:     1/24) Pt had FEES yesterday and remains NPO other than allowed a singular 4 oz  "puree snack 3x/day. Due to this providng <10% kcal, pt requires EN to meet needs. Corepak placed yesterday evening. Pt understands plan and hopeful to regain swallowing. Will continue to work with SLP with repeat FEES planned Friday. Pt tells me \"I just want mashed potatoes\" and \"I never thought id have a stroke.\" RD provided empathetic listening. Encouraged pt to stay positive and motivated by progress. She has her speech exercise print out in front of her and tells me will continue working on them. She voices understanding to listen to body as EN initiated/advanced. She states she had some mild nausea this morning that resolved with zofran and she believes and RD agrees likely due to medication on empty stomach. Bowels moving.     1/22) Pt presented with stroke and is day 3 NPO due to residual dysphagia. Has not been appropriate for instrumental eval, failed repeat clinical eval today and FEES now tentatively planned for tomorrow. Visited with pt at bedside, alert and oriented. Denies any significant nutritional concerns PTA. Does let me know she had gastric bypass 4 years ago at [facility], unsure exact procedure. States she has been eating normally recently PTA. Of note pt recently admitted less than a month ago for attempted OD/SI. Discussed nutritional care plan with pt, agreeable to EN if needed but hopeful to have NG removed.     Labs    Labs Reviewed: Yes     Results from last 7 days   Lab Units 01/24/24  0349 01/23/24  1801 01/23/24  0419 01/22/24  0230 01/19/24  1302 01/19/24  1257   GLUCOSE mg/dL 110*  --  125* 143*   < > 127*   BUN mg/dL 16  --  14 13   < > 15   CREATININE mg/dL 0.74  --  0.77 0.66   < > 0.99   SODIUM mmol/L 136  --  133* 137   < > 143   CHLORIDE mmol/L 103  --  101 104   < > 105   POTASSIUM mmol/L 4.1 4.5 3.6 3.7   < > 4.4   PHOSPHORUS mg/dL 2.7  --  3.1 2.6   < >  --    MAGNESIUM mg/dL 2.1  --  2.0 1.8   < >  --    ALT (SGPT) U/L  --   --   --   --   --  10    < > = values in " "this interval not displayed.       Results from last 7 days   Lab Units 01/20/24  0408 01/19/24  1257   ALBUMIN g/dL  --  3.9   CHOLESTEROL mg/dL 160  --    TRIGLYCERIDES mg/dL 118  --        Results from last 7 days   Lab Units 01/24/24  0615 01/24/24  0008 01/23/24  1737 01/23/24  1130 01/23/24  0504 01/22/24  2309   GLUCOSE mg/dL 126 133* 122 114 120 138*     Lab Results   Lab Value Date/Time    HGBA1C 6.20 (H) 01/20/2024 0408                 Medications    Medications Reviewed: Yes  Pertinent: remeron, protonix  Infusion:  PRN: zofran    Intake/Ouptut 24 hrs (0701 - 0700)   I&O's Reviewed: Yes   Intake & Output (last day)         01/23 0701  01/24 0700 01/24 0701 01/25 0700    P.O. 0     I.V. (mL/kg) 10 (0.1)     Other 430 150    NG/GT 90     Total Intake(mL/kg) 530 (7.6) 150 (2.1)    Urine (mL/kg/hr) 1125 (0.7)     Stool 0     Total Output 1125     Net -595 +150          Stool Unmeasured Occurrence 4 x           Anthropometrics     Height: Height: 167.6 cm (65.98\")  Last Filed Weight: Weight: 70.1 kg (154 lb 8.7 oz) (01/21/24 0000)  Method: Weight Method: Bed scale  BMI: BMI (Calculated): 25  BMI classification: Normal: 18.5-24.9kg/m2  IBW:      UBW: ~150 lb several years per EMR   Weight       Weight (kg) Weight (lbs) Weight Method Visit Report   12/20/2023 72.576 kg  160 lb  Stated     12/21/2023 70.525 kg  155 lb 7.7 oz      12/22/2023 70.525 kg  155 lb 7.7 oz  Standing scale     1/4/2024 70.308 kg  155 lb  Stated     1/19/2024 70.308 kg  155 lb  Stated     1/20/2024 72 kg  158 lb 11.7 oz  Bed scale      72 kg  158 lb 11.7 oz      1/21/2024 70.1 kg  154 lb 8.7 oz  Bed scale       Weight change: unchanged    Nutrition Focused Physical Exam     Date: 1/24  Pt meets criteria malnutrition, see MSA for full assessment.     Needs Assessment   Date:1/22    Height used:Height: 167.6 cm (65.98\")  Weights used: 70 kg (ABW)      Estimated Calorie needs: ~1700 Kcal/day  Method:  Kcals/KG 25 =1753  Method:   HBD  1303 X " 1.3 = 1694    Estimated Protein needs: ~91 g PRO/day  Method: 1.3 g/Kg    Estimated Fluid needs: ~ ml/day   Per clinical status    Current Nutrition Prescription     PO: NPO Diet NPO Type: Strict NPO  Oral Nutrition Supplement:   Intake: N/A - dysphagia diet of singular 4 oz puree snack 3x/day     EN: order placed, to initiate today 1/24    Nutrition Diagnosis     Date:  1/24 Updated:  Problem Malnutrition moderate/acute   Etiology Stroke complicated by dysphagia   Signs/Symptoms PO intakes <50% EEN >/=5 days, mild muscle wasting, mild fluid accumulation (1+ edema)   Status:    Date: 1/22 Updated: 1/24  Problem Inadequate oral intake   Etiology Stroke, residual dysphagia   Signs/Symptoms NPO per SLP   Status: ongoing, EN to initiate and now with minimal dysphagia diet    Goal:   General: Nutrition support treatment, Nutrition to support treatment  PO: Advace diet as medically feasible/appropriate  EN/PN: Initiate EN if needed    Nutrition Intervention      Follow treatment progress, Care plan reviewed, Nutrition support order placed, Education provided for dysphagia, nutrition support    Initiate EN     Monitoring/Evaluation:   Per protocol, I&O, Pertinent labs, Weight, GI status, Symptoms, POC/GOC, Swallow function      Jaqueline Zavala RD, CNSC  Time Spent: 35m

## 2024-01-24 NOTE — PLAN OF CARE
Goal Outcome Evaluation:  Plan of Care Reviewed With: patient        Progress: improving  Outcome Evaluation: Pt remains in ICU, Neuro status improving. L side strength improving. Pt able to sit up in chair this am. Vitals stable on room air.

## 2024-01-24 NOTE — THERAPY TREATMENT NOTE
"Acute Care - Speech Language Pathology Treatment Note  [facility]     Patient Name: Regine Beckham  : 1955  MRN: [id number]  Today's Date: 2024               Admit Date: 2024     Visit Dx:    ICD-10-CM ICD-9-CM   1. Oropharyngeal dysphagia  R13.12 787.22   2. Dysarthria  R47.1 784.51   3. Cognitive communication deficit  R41.841 799.52     Patient Active Problem List   Diagnosis    Iron deficiency anemia due to chronic blood loss    Major depressive disorder    RLS (restless legs syndrome)    GERD (gastroesophageal reflux disease)    Left breast mass    Allergy to penicillin    Stroke    Grade I diastolic dysfunction     Past Medical History:   Diagnosis Date    Anemia     Anxiety     Arthritis     Depression     Legally blind     Restless leg syndrome     Sleep apnea     \"I don't use a cpap anymore since losing 106 lbs\"    Water retention      Past Surgical History:   Procedure Laterality Date    BACK SURGERY      CARDIAC CATHETERIZATION N/A 2024    Procedure: Percutaneous Manual Thrombectomy;  Surgeon: Efrain Hurley MD;  Location: [facility] INVASIVE LOCATION;  Service: Interventional Radiology;  Laterality: N/A;    GALLBLADDER SURGERY      HIP ARTHROSCOPY      JOINT REPLACEMENT Right        SLP Recommendation and Plan                 Anticipated Discharge Disposition (SLP): inpatient rehabilitation facility (24)     Therapy Frequency (Swallow): 5 days per week (24)  Therapy Frequency (SLP SLC): 5 days per week (24)  Predicted Duration Therapy Intervention (Days): until discharge (24)  Oral Care Recommendations: Oral Care BID/PRN, Suction toothbrush (24)     Daily Summary of Progress (SLP): progress toward functional goals as expected (24)           Treatment Assessment (SLP): continued, dysarthria, cognitive-linguistic disorder, oral dysphagia, pharyngeal dysphagia (24)  Treatment Assessment Comments " (SLP): Pt seen for tx. Cognitive goals added, multiple packets of exs printed, reviewed, practiced. Pt struggles to elicit second and third cued reswallow. She is anxious for return to PO intake (01/24/24 0853)  Plan for Continued Treatment (SLP): continue treatment per plan of care (01/24/24 0853)  Plan of Care Reviewed With: patient (01/24/24 0953)  Progress: no change (01/24/24 0953)      SLP EVALUATION (last 72 hours)       SLP SLC Evaluation       Row Name 01/24/24 0853                   Communication Assessment/Intervention    Document Type therapy note (daily note)  -RS        Subjective Information no complaints  -RS        Patient Observations alert;cooperative;agree to therapy  -RS        Patient/Family/Caregiver Comments/Observations Pt on edge of chair, feet on blanket on the floor, attempting to lean down and look for phone which was on the floor. Redirected pt and RN and PT assisted with repositioning pt safely in chair. Chair alarm in place  -RS        Patient Effort good  -RS        Symptoms Noted During/After Treatment none  -RS        Oral Care teeth brushed - suction toothbrush  -RS           Pain    Additional Documentation Pain Scale: FACES Pre/Post-Treatment (Group)  -RS           Pain Scale: FACES Pre/Post-Treatment    Pain: FACES Scale, Pretreatment 0-->no hurt  -RS        Posttreatment Pain Rating 0-->no hurt  -RS           SLP Treatment Clinical Impressions    Daily Summary of Progress (SLP) progress toward functional goals as expected  -RS        Plan for Continued Treatment (SLP) continue treatment per plan of care  -RS        Care Plan Review care plan/treatment goals reviewed;patient/other agree to care plan  -RS           Recommendations    Therapy Frequency (SLP SLC) 5 days per week  -RS        Predicted Duration Therapy Intervention (Days) until discharge  -RS        Anticipated Discharge Disposition (SLP) inpatient rehabilitation facility  -RS                  User Key  (r) =  Recorded By, (t) = Taken By, (c) = Cosigned By      Initials Name Effective Dates    RS Rito Morrissey MS CCC-SLP 09/14/23 -                        EDUCATION  The patient has been educated in the following areas:     Cognitive Impairment Communication Impairment Dysphagia (Swallowing Impairment) NPO rationale.           SLP GOALS       Row Name 01/24/24 0853 01/23/24 1020 01/22/24 1330       (LTG) Patient will demonstrate functional swallow for    Diet Texture (Demonstrate functional swallow) regular textures  -RS regular textures  -EN --    Liquid viscosity (Demonstrate functional swallow) thin liquids  -RS thin liquids  -EN --    [place] (Demonstrate functional swallow) with minimal cues (75-90% accuracy)  -RS with minimal cues (75-90% accuracy)  -EN --    Time Frame (Demonstrate functional swallow) by discharge  -RS by discharge  -EN --    Progress/Outcomes (Demonstrate functional swallow) goal ongoing  -RS -- --       (STG) Patient will tolerate trials of    Consistencies Trialed (Tolerate trials) pureed textures  -RS pureed textures  -EN --    Desired Outcome (Tolerate trials) without signs/symptoms of aspiration  -RS without signs/symptoms of aspiration  -EN --    [place] (Tolerate trials) with minimal cues (75-90% accuracy)  -RS with minimal cues (75-90% accuracy)  -EN --    Time Frame (Tolerate trials) by discharge  -RS by discharge  -EN --    Progress/Outcomes (Tolerate trials) continuing progress toward goal  -RS -- --    Comment (Tolerate trials) tolerated pudding snack cup with the addition of pharyngeal exs  -RS -- --       (STG) Patient will tolerate therapeutic trials of    Consistencies Trialed (Tolerate therapeutic trials) nectar/ mildly thick liquids;thin liquids  -RS nectar/ mildly thick liquids;thin liquids  -EN --    Desired Outcome (Tolerate therapeutic trials) without signs/symptoms of aspiration  -RS without signs/symptoms of aspiration  -EN --    [place] (Tolerate therapeutic  trials) with minimal cues (75-90% accuracy)  -RS with minimal cues (75-90% accuracy)  -EN --    Time Frame (Tolerate therapeutic trials) by discharge  -RS by discharge  -EN --    Progress/Outcomes (Tolerate therapeutic trials) goal ongoing  -RS -- --       (STG) Labial Strengthening Goal 1 (SLP)    Activity (Labial Strengthening Goal 1, SLP) increase labial tone  -RS increase labial tone  -EN --    Increase Labial Tone labial resistance exercises;swallow trials  -RS labial resistance exercises;swallow trials  -EN --    [place]/Accuracy (Labial Strengthening Goal 1, SLP) with minimal cues (75-90% accuracy)  -RS with minimal cues (75-90% accuracy)  -EN --    Time Frame (Labial Strengthening Goal 1, SLP) short term goal (STG)  -RS short term goal (STG)  -EN --    Progress/Outcomes (Labial Strengthening Goal 1, SLP) continuing progress toward goal  -RS -- --    Comment (Labial Strengthening Goal 1, SLP) x10  -RS -- --       (STG) Lingual Strengthening Goal 1 (SLP)    Activity (Lingual Strengthening Goal 1, SLP) increase lingual tone/sensation/control/coordination/movement;increase tongue back strength  -RS increase lingual tone/sensation/control/coordination/movement;increase tongue back strength  -EN --    Increase Lingual Tone/Sensation/Control/Coordination/Movement lingual movement exercises;lingual resistance exercises  -RS lingual movement exercises;lingual resistance exercises  -EN --    Increase Tongue Back Strength lingual movement exercises;swallow trials;lingual resistance exercises  -RS lingual movement exercises;swallow trials;lingual resistance exercises  -EN --    [place]/Accuracy (Lingual Strengthening Goal 1, SLP) with minimal cues (75-90% accuracy)  -RS with minimal cues (75-90% accuracy)  -EN --    Time Frame (Lingual Strengthening Goal 1, SLP) short term goal (STG)  -RS short term goal (STG)  -EN --    Progress/Outcomes (Lingual Strengthening Goal 1, SLP) continuing progress toward goal  -RS  -- --    Comment (Lingual Strengthening Goal 1, SLP) x10 each  -RS -- --       (STG) Pharyngeal Strengthening Exercise Goal 1 (SLP)    Activity (Pharyngeal Strengthening Goal 1, SLP) increase timing;increase superior movement of the hyolaryngeal complex;increase anterior movement of the hyolaryngeal complex;increase closure at entrance to airway/closure of airway at glottis;increase squeeze/positive pressure generation;increase tongue base retraction  -RS increase timing;increase superior movement of the hyolaryngeal complex;increase anterior movement of the hyolaryngeal complex;increase closure at entrance to airway/closure of airway at glottis;increase squeeze/positive pressure generation;increase tongue base retraction  -EN --    Increase Timing prepping - 3 second prep or suck swallow or 3-step swallow  -RS prepping - 3 second prep or suck swallow or 3-step swallow  -EN --    Increase Superior Movement of the Hyolaryngeal Complex hard effortful swallow;falsetto  -RS hard effortful swallow;falsetto  -EN --    Increase Anterior Movement of the Hyolaryngeal Complex chin tuck against resistance (CTAR);hard effortful swallow  -RS chin tuck against resistance (CTAR);hard effortful swallow  -EN --    Increase Closure at Entrance to Airway/Closure of Airway at Glottis supraglottic swallow;breath hold exercises  -RS supraglottic swallow;breath hold exercises  -EN --    Increase Squeeze/Positive Pressure Generation hard effortful swallow  -RS hard effortful swallow  -EN --    Increase Tongue Base Retraction hard effortful swallow  -RS hard effortful swallow  -EN --    [place]/Accuracy (Pharyngeal Strengthening Goal 1, SLP) with moderate cues (50-74% accuracy)  -RS with moderate cues (50-74% accuracy)  -EN --    Time Frame (Pharyngeal Strengthening Goal 1, SLP) short term goal (STG)  -RS short term goal (STG)  -EN --    Progress/Outcomes (Pharyngeal Strengthening Goal 1, SLP) continuing progress toward goal  -RS -- --     Comment (Pharyngeal Strengthening Goal 1, SLP) unable to maintain elevation for Mendelsohn. Sequential effortful swallows also challenging  -RS -- --       Patient will demonstrate functional speech skills for return to discharge environment    [place] with moderate cues  -RS -- with moderate cues  -CJ    Time frame by discharge  -RS -- by discharge  -CJ    Progress/Outcomes continuing progress toward goal  -RS -- continuing progress toward goal  -CJ       Patient will demonstrate functional cognitive-linguistic skills for return to discharge environment    [place] with moderate cues  -RS with moderate cues  -EN with moderate cues  -CJ    Time frame by discharge  -RS by discharge  -EN by discharge  -CJ    Progress/Outcomes continuing progress toward goal  -RS continuing progress toward goal  -EN continuing progress toward goal  -CJ       SLP Diagnostic Treatment     Patient will participate in further assessment in the following areas cognitive-linguistic  -RS cognitive-linguistic  -EN cognitive-linguistic  -CJ    Time Frame (Diagnostic) short term goal (STG)  -RS short term goal (STG)  -EN short term goal (STG)  -CJ    Progress/Outcomes (Additional Goal 1, SLP) continuing progress toward goal  -RS continuing progress toward goal  -EN goal ongoing  -CJ       Respiratory Support Goal 1 (SLP)    Improve Respiratory Support Goal 1 (SLP) increasing length of exhalation;sustaining a vowel sound on exhalation;80%;with minimal cues (75-90%)  -RS increasing length of exhalation;sustaining a vowel sound on exhalation;80%;with minimal cues (75-90%)  -EN --    Time Frame (Respiratory Support Goal 1, SLP) short term goal (STG)  -RS short term goal (STG)  -EN --    Progress (Respiratory Support Skills Goal 1, SLP) 50%;with moderate cues (50-74%)  -RS -- --    Progress/Outcomes (Respiratory Support Goal 1, SLP) continuing progress toward goal  -RS new goal  -EN --       Phonation Goal 1 (SLP)    Improve Phonation By  Goal 1 (SLP) using loud speech;80%;with moderate cues (50-74%)  -RS using loud speech;80%;with moderate cues (50-74%)  -EN using loud speech;80%;with moderate cues (50-74%)  -CJ    Time Frame (Phonation Goal 1, SLP) short term goal (STG)  -RS short term goal (STG)  -EN short term goal (STG)  -CJ    Progress (Phonation Goal 1, SLP) 60%;with moderate cues (50-74%)  -RS 60%;with moderate cues (50-74%)  -EN 60%;with moderate cues (50-74%)  -CJ    Progress/Outcomes (Phonation Goal 1, SLP) continuing progress toward goal  -RS continuing progress toward goal  -EN new goal;continuing progress toward goal  -CJ       Articulation Goal 1 (SLP)    Improve Articulation Goal 1 (SLP) by over-articulating at word level;80%;with moderate cues (50-74%)  -RS by over-articulating at word level;80%;with moderate cues (50-74%)  -EN by over-articulating at word level;80%;with moderate cues (50-74%)  -CJ    Time Frame (Articulation Goal 1, SLP) short term goal (STG)  -RS short term goal (STG)  -EN short term goal (STG)  -CJ    Progress (Articulation Goal 1, SLP) 60%;with moderate cues (50-74%)  -RS 60%;with moderate cues (50-74%)  -EN 60%;70%;with moderate cues (50-74%)  -CJ    Progress/Outcomes (Articulation Goal 1, SLP) continuing progress toward goal  -RS continuing progress toward goal  -EN continuing progress toward goal  -CJ    Comment (Articulation Goal 1, SLP) 6-10 word phrases  -RS -- short phrase  -CJ       Attention Goal 1 (SLP)    Improve Attention by Goal 1 (SLP) attending to task;complete sustained attention task;80%;with minimal cues (75-90%)  -RS attending to task;complete sustained attention task;80%;with minimal cues (75-90%)  -EN --    Time Frame (Attention Goal 1, SLP) short term goal (STG)  -RS short term goal (STG)  -EN --    Progress (Attention Goal 1, SLP) 50%;with maximum cues (25-49%)  -RS -- --    Progress/Outcomes (Attention Goal 1, SLP) continuing progress toward goal  -RS new goal  -EN --    Comment (Attention  Goal 1, SLP) pt requires almost constant cues for sustained attention to task  -RS -- --       Reasoning Goal 1 (SLP)    Improve Reasoning Through Goal 1 (SLP) complete basic reasoning task;80%;with minimal cues (75-90%)  -RS complete basic reasoning task;80%;with minimal cues (75-90%)  -EN --    Time Frame (Reasoning Goal 1, SLP) short term goal (STG)  -RS short term goal (STG)  -EN --    Progress/Outcomes (Reasoning Goal 1, SLP) goal ongoing  -RS -- --              User Key  (r) = Recorded By, (t) = Taken By, (c) = Cosigned By      Initials Name Provider Type    Anh Cloud, MS CCC-SLP Speech and Language Pathologist    EN Zakiya Desouza, MS CCC-SLP Speech and Language Pathologist    Rito Mata MS CCC-SLP Speech and Language Pathologist                            Time Calculation:      Time Calculation- SLP       Row Name 01/24/24 0952 01/24/24 0951 01/24/24 0950       Time Calculation- SLP    SLP Start Time -- -- 0853  -RS    SLP Received On -- -- 01/24/24  -RS       Untimed Charges    07298-UI Treatment/ST Modification Prosth Aug Alter  -- -- 38  -RS    68734-RD Treatment Swallow Minutes 60  -RS --  60 mins  -RS 45  -RS       Total Minutes    Untimed Charges Total Minutes 60  -RS -- 83  -RS     Total Minutes 60  -RS -- 83  -RS              User Key  (r) = Recorded By, (t) = Taken By, (c) = Cosigned By      Initials Name Provider Type    Rito Mata MS CCC-SLP Speech and Language Pathologist                    Therapy Charges for Today       Code Description Service Date Service Provider Modifiers Qty    15587721287 HC ST TREATMENT SWALLOW 4 1/24/2024 Rito Morrissey MS CCC-SLP GN 1    [id number] HC ST TREATMENT SPEECH 3 1/24/2024 Rito Morrissey MS CCC-CHAYO GN 1                       MS VIGNESH Stark  1/24/2024

## 2024-01-24 NOTE — PROGRESS NOTES
Malnutrition Severity Assessment    Patient Name:  Regine Beckham  YOB: 1955  MRN: [id number]  Admit Date:  1/19/2024    Patient meets criteria for : Moderate (non-severe) Malnutrition (PO intakes <50% EEN >/=5 days, mild muscle wasting, mild fluid accumulation (1+ edema))    Malnutrition Severity Assessment  Malnutrition Type: Acute Disease or Injury - Related Malnutrition  Malnutrition Type (last 8 hours)       Malnutrition Severity Assessment       Row Name 01/24/24 1041       Malnutrition Severity Assessment    Malnutrition Type Acute Disease or Injury - Related Malnutrition      Row Name 01/24/24 1041       Insufficient Energy Intake     Insufficient Energy Intake Findings Severe  PO intakes <50% EEN >/=5 days    Insufficient Energy Intake  < or equal to 50% of est. energy requirement for > or equal to 5d)      Row Name 01/24/24 1041       Muscle Loss    Loss of Muscle Mass Findings Mild  mild clavicular, temporalis, posterior      Row Name 01/24/24 1041       Fluid Accumulation (Edema)    Fluid Accumulation  Mild equals 1+ pitting edema  mild fluid accumulation (1+ edema)      Row Name 01/24/24 1041       Criteria Met (Must meet criteria for severity in at least 2 of these categories: M Wasting, Fat Loss, Fluid, Secondary Signs, Wt. Status, Intake)    Patient meets criteria for  Moderate (non-severe) Malnutrition  PO intakes <50% EEN >/=5 days, mild muscle wasting, mild fluid accumulation (1+ edema)                    Electronically signed by:  Jaqueline Zavala RD  01/24/24 10:42 EST

## 2024-01-24 NOTE — THERAPY TREATMENT NOTE
"Patient Name: Regine Beckham  : 1955    MRN: [id number]                              Today's Date: 2024       Admit Date: 2024    Visit Dx:     ICD-10-CM ICD-9-CM   1. Oropharyngeal dysphagia  R13.12 787.22   2. Dysarthria  R47.1 784.51   3. Cognitive communication deficit  R41.841 799.52     Patient Active Problem List   Diagnosis    Iron deficiency anemia due to chronic blood loss    Major depressive disorder    RLS (restless legs syndrome)    GERD (gastroesophageal reflux disease)    Left breast mass    Allergy to penicillin    Stroke    Grade I diastolic dysfunction    Moderate malnutrition     Past Medical History:   Diagnosis Date    Anemia     Anxiety     Arthritis     Depression     Legally blind     Restless leg syndrome     Sleep apnea     \"I don't use a cpap anymore since losing 106 lbs\"    Water retention      Past Surgical History:   Procedure Laterality Date    BACK SURGERY      CARDIAC CATHETERIZATION N/A 2024    Procedure: Percutaneous Manual Thrombectomy;  Surgeon: Efrain Hurley MD;  Location: [facility] INVASIVE LOCATION;  Service: Interventional Radiology;  Laterality: N/A;    GALLBLADDER SURGERY      HIP ARTHROSCOPY      JOINT REPLACEMENT Right       General Information       Row Name 24 1307          OT Time and Intention    Document Type therapy note (daily note)  -CS     Mode of Treatment occupational therapy  -CS       Row Name 24 1307          General Information    Existing Precautions/Restrictions fall;other (see comments)  L weakness (UE>LE); dysarthria; L inattention  -CS     Barriers to Rehab medically complex;cognitive status;visual deficit  -CS       Row Name 24 1300          Cognition    Orientation Status (Cognition) oriented to;person;place;disoriented to;time;situation  -CS       Row Name 24 1300          Safety Issues, Functional Mobility    Impairments Affecting Function (Mobility) balance;coordination;endurance/activity " tolerance;shortness of breath;strength;postural/trunk control;sensation/sensory awareness;motor planning;muscle tone abnormal;cognition;grasp  -CS     Cognitive Impairments, Mobility Safety/Performance attention;insight into deficits/self-awareness;sequencing abilities  -CS               User Key  (r) = Recorded By, (t) = Taken By, (c) = Cosigned By      Initials Name Provider Type    CS Maribel Valdez OT Occupational Therapist                     Mobility/ADL's       Row Name 01/24/24 1308          Transfers    Transfers sit-stand transfer;stand-sit transfer  -     Comment, (Transfers) x2 reps w/ BUE support  -       Row Name 01/24/24 1308          Sit-Stand Transfer    Sit-Stand [place] (Transfers) moderate assist (50% patient effort);2 person assist;verbal cues  -       Row Name 01/24/24 1308          Stand-Sit Transfer    Stand-Sit [place] (Transfers) moderate assist (50% patient effort);2 person assist;verbal cues  -       Row Name 01/24/24 1308          Activities of Daily Living    BADL Assessment/Intervention lower body dressing;grooming  -       Row Name 01/24/24 1308          Lower Body Dressing Assessment/Training    [place] Level (Lower Body Dressing) don;doff;socks;dependent (less than 25% patient effort)  -     Position (Lower Body Dressing) supported sitting  -       Row Name 01/24/24 1308          Grooming Assessment/Training    [place] Level (Grooming) hair care, combing/brushing;set up  -CS     Position (Grooming) supported sitting  -               User Key  (r) = Recorded By, (t) = Taken By, (c) = Cosigned By      Initials Name Provider Type    Maribel Sterling OT Occupational Therapist                   Obj/Interventions       Row Name 01/24/24 1308          Shoulder (Therapeutic Exercise)    Shoulder (Therapeutic Exercise) AAROM (active assistive range of motion)  -     Shoulder AAROM (Therapeutic Exercise) right assist left;flexion;10 repetitions  -CS        Row Name 01/24/24 1308          Elbow/Forearm (Therapeutic Exercise)    Elbow/Forearm (Therapeutic Exercise) AAROM (active assistive range of motion)  -     Elbow/Forearm AAROM (Therapeutic Exercise) right assist left;flexion;extension;10 repetitions  -CS       Row Name 01/24/24 1308          Motor Skills    Therapeutic Exercise shoulder;elbow/forearm  -       Row Name 01/24/24 1308          Balance    Balance Assessment sitting static balance;sitting dynamic balance  -CS     Static Sitting Balance contact guard  -CS     Dynamic Sitting Balance minimal assist  -CS     Position, Sitting Balance sitting in chair  -CS     Static Standing Balance minimal assist;2-person assist  -CS     Dynamic Standing Balance moderate assist;2-person assist  -CS     Position/Device Used, Standing Balance supported  -CS     Balance Interventions sitting;standing;static;dynamic;dynamic reaching;occupation based/functional task;weight shifting activity  -CS     Comment, Balance Pt performed R/L weight shifting and took 3 steps foward w/ Mod Ax2  -               User Key  (r) = Recorded By, (t) = Taken By, (c) = Cosigned By      Initials Name Provider Type    CS Maribel Valdez OT Occupational Therapist                   Goals/Plan    No documentation.                  Clinical Impression       Row Name 01/24/24 1310          Pain Assessment    Pain Intervention(s) Repositioned;Ambulation/increased activity  -     Additional Documentation Pain Scale: FACES Pre/Post-Treatment (Group)  -       Row Name 01/24/24 1310          Pain Scale: FACES Pre/Post-Treatment    Pain: FACES Scale, Pretreatment 2-->hurts little bit  -CS     Posttreatment Pain Rating 2-->hurts little bit  -CS     Pain Location generalized  -CS     Pre/Posttreatment Pain Comment tolerated  -       Row Name 01/24/24 1310          Plan of Care Review    Plan of Care Reviewed With patient  -CS     Progress improving  -     Outcome Evaluation Pt demo improved  independence by performing grooming tasks w/ Set-up. Pt conts to be limited d/t L sided weakness and balance deficits warranting cont skilled IPOT POC to promote return to PLOF. Pt educated on UE HEP and encouraged to perform outside of treatment session. Recommend pt DC to IP rehab.  -CS       Row Name 01/24/24 1310          Therapy Plan Review/Discharge Plan (OT)    Anticipated Discharge Disposition (OT) inpatient rehabilitation facility  -CS       Row Name 01/24/24 1310          Vital Signs    Pre Systolic BP Rehab 125  -CS     Pre Treatment Diastolic BP 73  -CS     Post Systolic BP Rehab 123  -CS     Post Treatment Diastolic BP 84  -CS     Pretreatment Heart Rate (beats/min) 84  -CS     Posttreatment Heart Rate (beats/min) 79  -CS     Pre SpO2 (%) 99  -CS     O2 Delivery Pre Treatment room air  -CS     Post SpO2 (%) 99  -CS     O2 Delivery Post Treatment room air  -CS     Pre Patient Position Sitting  -CS     Intra Patient Position Standing  -CS     Post Patient Position Sitting  -CS       Row Name 01/24/24 1310          Positioning and Restraints    Pre-Treatment Position sitting in chair/recliner  -CS     Post Treatment Position chair  -CS     In Chair notified nsg;reclined;call light within reach;encouraged to call for assist;exit alarm on;RUE elevated;LUE elevated;waffle cushion;on mechanical lift sling;with nsg;legs elevated  -CS               User Key  (r) = Recorded By, (t) = Taken By, (c) = Cosigned By      Initials Name Provider Type    CS Maribel Valdez, OT Occupational Therapist                   Outcome Measures       Row Name 01/24/24 1311          How much help from another is currently needed...    Putting on and taking off regular lower body clothing? 1  -CS     Bathing (including washing, rinsing, and drying) 2  -CS     Toileting (which includes using toilet bed pan or urinal) 2  -CS     Putting on and taking off regular upper body clothing 2  -CS     Taking care of personal grooming (such as  brushing teeth) 2  -CS     Eating meals 2  -CS     AM-PAC 6 Clicks Score (OT) 11  -CS       Row Name 01/24/24 1115 01/24/24 0800       How much help from another person do you currently need...    Turning from your back to your side while in flat bed without using bedrails? 3  -AY 3  -BS    Moving from lying on back to sitting on the side of a flat bed without bedrails? 3  -AY 3  -BS    Moving to and from a bed to a chair (including a wheelchair)? 3  -AY 3  -BS    Standing up from a chair using your arms (e.g., wheelchair, bedside chair)? 3  -AY 3  -BS    Climbing 3-5 steps with a railing? 2  -AY 2  -BS    To walk in hospital room? 2  -AY 2  -BS    AM-PAC 6 Clicks Score (PT) 16  -AY 16  -BS    Highest Level of Mobility Goal 5 --> Static standing  -AY 5 --> Static standing  -BS      Row Name 01/24/24 1311          Modified [place] Scale    Modified [place] Scale 4 - Moderately severe disability.  Unable to walk without assistance, and unable to attend to own bodily needs without assistance.  -CS       Row Name 01/24/24 1311 01/24/24 1115       Functional Assessment    Outcome Measure Options AM-PAC 6 Clicks Daily Activity (OT);Modified [place]  -CS AM-PAC 6 Clicks Basic Mobility (PT)  -AY              User Key  (r) = Recorded By, (t) = Taken By, (c) = Cosigned By      Initials Name Provider Type    CS Maribel Valdez, OT Occupational Therapist    Ngoc Yusuf, PT Physical Therapist    Sanaz Holden RN Registered Nurse                    Occupational Therapy Education       Title: PT OT SLP Therapies (In Progress)       Topic: Occupational Therapy (In Progress)       Point: ADL training (In Progress)       Description:   Instruct learner(s) on proper safety adaptation and remediation techniques during self care or transfers.   Instruct in proper use of assistive devices.                  Learning Progress Summary             Patient Acceptance, E, NR by CS at 1/24/2024 1312    Acceptance, E, NR by CS at 1/22/2024  1055    Acceptance, E, VU by CS at 1/20/2024 1354                         Point: Home exercise program (In Progress)       Description:   Instruct learner(s) on appropriate technique for monitoring, assisting and/or progressing therapeutic exercises/activities.                  Learning Progress Summary             Patient Acceptance, E, NR by CS at 1/24/2024 1312    Acceptance, E, NR by CS at 1/22/2024 1055                         Point: Precautions (In Progress)       Description:   Instruct learner(s) on prescribed precautions during self-care and functional transfers.                  Learning Progress Summary             Patient Acceptance, E, NR by CS at 1/24/2024 1312    Acceptance, E, NR by CS at 1/22/2024 1055    Acceptance, E, VU by CS at 1/20/2024 1354                         Point: Body mechanics (In Progress)       Description:   Instruct learner(s) on proper positioning and spine alignment during self-care, functional mobility activities and/or exercises.                  Learning Progress Summary             Patient Acceptance, E, NR by CS at 1/24/2024 1312    Acceptance, E, NR by CS at 1/22/2024 1055    Acceptance, E, VU by CS at 1/20/2024 1354                                         User Key       Initials Effective Dates Name Provider Type Discipline     09/02/21 -  Maribel Valdez, OT Occupational Therapist OT                  OT Recommendation and Plan  Planned Therapy Interventions (OT): activity tolerance training, adaptive equipment training, BADL retraining, functional balance retraining, occupation/activity based interventions, ROM/therapeutic exercise, strengthening exercise, transfer/mobility retraining, cognitive/visual perception retraining, neuromuscular control/coordination retraining  Therapy Frequency (OT): daily  Plan of Care Review  Plan of Care Reviewed With: patient  Progress: improving  Outcome Evaluation: Pt demo improved independence by performing grooming tasks w/ Set-up. Pt  conts to be limited d/t L sided weakness and balance deficits warranting cont skilled IPOT POC to promote return to PLOF. Pt educated on UE HEP and encouraged to perform outside of treatment session. Recommend pt DC to IP rehab.     Time Calculation:   Evaluation Complexity (OT)  Review Occupational Profile/Medical/Therapy History Complexity: expanded/moderate complexity  Assessment, Occupational Performance/Identification of Deficit Complexity: 5 or more performance deficits  Clinical Decision Making Complexity (OT): detailed assessment/moderate complexity  Overall Complexity of Evaluation (OT): moderate complexity     Time Calculation- OT       Row Name 01/24/24 1312             Time Calculation- OT    OT Start Time 0955  -CS      OT Received On 01/24/24  -CS      OT Goal Re-Cert Due Date 01/30/24  -CS         Timed Charges    04511 - OT Therapeutic Exercise Minutes 5  -CS      83397 - OT Therapeutic Activity Minutes 15  -CS      00241 - OT Self Care/Mgmt Minutes 3  -CS         Total Minutes    Timed Charges Total Minutes 23  -CS       Total Minutes 23  -CS                User Key  (r) = Recorded By, (t) = Taken By, (c) = Cosigned By      Initials Name Provider Type    CS Maribel Valdez OT Occupational Therapist                  Therapy Charges for Today       Code Description Service Date Service Provider Modifiers Qty    21146936288 HC OT THER PROC EA 15 MIN 1/24/2024 Maribel Valdez OT GO 1    20306361494 HC OT THERAPEUTIC ACT EA 15 MIN 1/24/2024 Maribel Valdez OT GO 1                 Maribel Valdez OT  1/24/2024

## 2024-01-24 NOTE — PLAN OF CARE
Goal Outcome Evaluation:  Plan of Care Reviewed With: patient        Progress: improving  Outcome Evaluation: Pt demo improved independence by performing grooming tasks w/ Set-up. Pt conts to be limited d/t L sided weakness and balance deficits warranting cont skilled IPOT POC to promote return to PLOF. Pt educated on UE HEP and encouraged to perform outside of treatment session. Recommend pt DC to IP rehab.      Anticipated Discharge Disposition (OT): inpatient rehabilitation facility

## 2024-01-24 NOTE — PLAN OF CARE
Goal Outcome Evaluation:  Plan of Care Reviewed With: patient        Progress: no change  Outcome Evaluation: Pt limited by L weakness/inattention, balance deficit, and decreased functional endurance compared to baseline. Pt progressed to min A to stand, further mobility limited by L knee buckling with weight shifting. Cont to progress as able. d/c rec for IRF.      Anticipated Discharge Disposition (PT): inpatient rehabilitation facility

## 2024-01-24 NOTE — PLAN OF CARE
Goal Outcome Evaluation:  Plan of Care Reviewed With: patient        Progress: no change         Anticipated Discharge Disposition (SLP): inpatient rehabilitation facility             Treatment Assessment (SLP): continued, dysarthria, cognitive-linguistic disorder, oral dysphagia, pharyngeal dysphagia (01/24/24 0853)  Treatment Assessment Comments (SLP): Pt seen for tx. Cognitive goals added, multiple packets of exs printed, reviewed, practiced. Pt struggles to elicit second and third cued reswallow. She is anxious for return to PO intake (01/24/24 0853)  Plan for Continued Treatment (SLP): continue treatment per plan of care (01/24/24 0853)

## 2024-01-24 NOTE — PROGRESS NOTES
Stroke Progress Note       Chief Complaint:  Left sided weakness    Subjective    Subjective     Subjective:   Sitting up in bed.Tachypnea improved. Complains of headache this am. Neuro exam unchanged. Continues with left sided weakness, LFD, Left tactile neglect.     Review of Systems   HENT:  Positive for trouble swallowing.    Eyes:  Negative for visual disturbance.   Respiratory:  Positive for cough. Negative for shortness of breath.    Cardiovascular:  Negative for chest pain and palpitations.   Neurological:  Positive for facial asymmetry, speech difficulty, weakness, numbness and headaches.            Objective    Objective      Temp:  [97.9 °F (36.6 °C)-98.3 °F (36.8 °C)] 98.3 °F (36.8 °C)  Heart Rate:  [] 77  Resp:  [16-24] 16  BP: (106-156)/() 111/70        Neurological Exam  Mental Status  Alert. Oriented to person, place, and time. Moderate dysarthria present. Expressive aphasia present.    Cranial Nerves  CN II: Visual fields full to confrontation.  CN III, IV, VI: Extraocular movements intact bilaterally. Normal lids and orbits bilaterally. Pupils equal round and reactive to light bilaterally.  CN V:  Right: Facial sensation is normal.  Left: Diminished sensation of the entire left side of the face.  CN VII:  Right: There is no facial weakness.  Left: There is central facial weakness.    Motor  Normal muscle bulk throughout. No fasciculations present. Normal muscle tone. No abnormal involuntary movements. Strength is 5/5 throughout all four extremities.  LUE strength 1/5, LLE strength 2/5.    Sensory  Light touch abnormality:   Decreased on the left.    Coordination    No dysmetria out of proportion to weakness.    Gait    Not tested.      Physical Exam  Vitals reviewed.   HENT:      Head: Normocephalic and atraumatic.   Eyes:      General: Lids are normal.      Extraocular Movements: Extraocular movements intact.      Pupils: Pupils are equal, round, and reactive to light.    Cardiovascular:      Rate and Rhythm: Normal rate.   Pulmonary:      Effort: Pulmonary effort is normal. No respiratory distress.   Abdominal:      Comments: NG in place   Musculoskeletal:      Cervical back: Normal range of motion.      Right lower leg: No edema.      Left lower leg: No edema.   Skin:     General: Skin is warm and dry.   Neurological:      Mental Status: She is alert and oriented to person, place, and time.      Cranial Nerves: Cranial nerve deficit and dysarthria present.      Sensory: Sensory deficit present.      Motor: Motor strength is normal.Weakness present.      Comments: Left tactile neglect   Psychiatric:         Mood and Affect: Mood normal.         Behavior: Behavior normal.         Results Review:    I reviewed the patient's new clinical results.  WBC   Date Value Ref Range Status   01/22/2024 12.13 (H) 3.40 - 10.80 10*3/mm3 Final     RBC   Date Value Ref Range Status   01/22/2024 4.17 3.77 - 5.28 10*6/mm3 Final     Hemoglobin   Date Value Ref Range Status   01/22/2024 13.2 12.0 - 15.9 g/dL Final     Hematocrit   Date Value Ref Range Status   01/22/2024 39.2 34.0 - 46.6 % Final     MCV   Date Value Ref Range Status   01/22/2024 94.0 79.0 - 97.0 fL Final     MCH   Date Value Ref Range Status   01/22/2024 31.7 26.6 - 33.0 pg Final     MCHC   Date Value Ref Range Status   01/22/2024 33.7 31.5 - 35.7 g/dL Final     RDW   Date Value Ref Range Status   01/22/2024 12.8 12.3 - 15.4 % Final     RDW-SD   Date Value Ref Range Status   01/22/2024 44.6 37.0 - 54.0 fl Final     MPV   Date Value Ref Range Status   01/22/2024 9.4 6.0 - 12.0 fL Final     Platelets   Date Value Ref Range Status   01/22/2024 242 140 - 450 10*3/mm3 Final     Neutrophil %   Date Value Ref Range Status   01/22/2024 83.5 (H) 42.7 - 76.0 % Final     Lymphocyte %   Date Value Ref Range Status   01/22/2024 9.1 (L) 19.6 - 45.3 % Final     Monocyte %   Date Value Ref Range Status   01/22/2024 6.7 5.0 - 12.0 % Final      Eosinophil %   Date Value Ref Range Status   01/22/2024 0.1 (L) 0.3 - 6.2 % Final     Basophil %   Date Value Ref Range Status   01/22/2024 0.3 0.0 - 1.5 % Final     Immature Grans %   Date Value Ref Range Status   01/22/2024 0.3 0.0 - 0.5 % Final     Neutrophils, Absolute   Date Value Ref Range Status   01/22/2024 10.13 (H) 1.70 - 7.00 10*3/mm3 Final     Lymphocytes, Absolute   Date Value Ref Range Status   01/22/2024 1.10 0.70 - 3.10 10*3/mm3 Final     Monocytes, Absolute   Date Value Ref Range Status   01/22/2024 0.81 0.10 - 0.90 10*3/mm3 Final     Eosinophils, Absolute   Date Value Ref Range Status   01/22/2024 0.01 0.00 - 0.40 10*3/mm3 Final     Basophils, Absolute   Date Value Ref Range Status   01/22/2024 0.04 0.00 - 0.20 10*3/mm3 Final     Immature Grans, Absolute   Date Value Ref Range Status   01/22/2024 0.04 0.00 - 0.05 10*3/mm3 Final     nRBC   Date Value Ref Range Status   01/22/2024 0.0 0.0 - 0.2 /100 WBC Final     Lab Results   Component Value Date    GLUCOSE 110 (H) 01/24/2024    BUN 16 01/24/2024    CREATININE 0.74 01/24/2024    EGFR 87.7 01/24/2024    BCR 21.6 01/24/2024    K 4.1 01/24/2024    CO2 23.0 01/24/2024    CALCIUM 9.2 01/24/2024    ALBUMIN 3.9 01/19/2024    BILITOT 0.4 01/19/2024    AST 17 01/19/2024    ALT 10 01/19/2024     A1c 6.2  LDL 95      CT Head Without Contrast    Result Date: 1/20/2024  Impression: Evolving subacute right MCA territory infarct without additional acute process identified. Electronically Signed: Hayden Robison MD  1/20/2024 3:42 PM CST  Workstation ID: [id number]    CT Head Without Contrast    Result Date: 1/20/2024  Impression: 1.Evolving subacute infarction within right MCA territory. No acute herniation. 2.No hemorrhagic transformation. Electronically Signed: Ravi Hart MD  1/20/2024 10:48 AM EST  Workstation ID: [id number]    CT Head Without Contrast    Result Date: 1/20/2024  Impression: 1.Stable appearance of right MCA territory infarct. 2.Stable  advanced chronic small vessel ischemic change. 3.Stable minor petechial bleeding in the right caudate nucleus and putamen. Electronically Signed: Teo Carpio MD  1/20/2024 1:14 AM EST  Workstation ID: [id number]    MRI Brain Without Contrast    Result Date: 1/19/2024  Impression: Moderate sized acute right-sided MCA infarct as described above. There is evidence of small foci petechial hemorrhage in the posterior putamen, corona radiata, and high anterior right parietal lobe. Extensive changes of chronic microvascular ischemia. Findings discussed with Parul from the stroke team performed on January 19, 2024 at 10:19 p.m. Electronically Signed: Mo Marshall MD  1/19/2024 10:21 PM EST  Workstation ID: [id number]    CT CEREBRAL PERFUSION WITH & WITHOUT CONTRAST    Result Date: 1/19/2024   IMPRESSION: Results suggestive of recent ischemic event in the right middle cerebral artery territory  This report was finalized on 1/19/2024 1:45 PM by Dr. Hu Obrien MD.      CT Angiogram Neck    Result Date: 1/19/2024  1.  Moderate to high-grade stenosis in the proximal left internal carotid artery. 2.  Occlusion of the entire right internal carotid artery.   This report was finalized on 1/19/2024 1:43 PM by Dr. Hu Obrien MD.      CT Angiogram Head    Result Date: 1/19/2024   1. Barely detectable flow in the proximal right middle cerebral artery and no flow distally in the right middle cerebral artery. 2. Occlusion of the right internal carotid artery.   This report was finalized on 1/19/2024 1:27 PM by Dr. Hu Obrien MD.      CT Head Without Contrast Stroke Protocol    Result Date: 1/19/2024   1. Appearance concerning for recent right MCA ischemia with sulcal effacement but no evidence of hemorrhage, mass, or midline shift  Results have been relayed to the emergency department at 1:03 p.m.  This report was finalized on 1/19/2024 1:04 PM by Dr. Hu Obrien MD.       Results for orders placed during the hospital  encounter of 01/19/24    Adult Transthoracic Echo Limited W/ Cont if Necessary Per Protocol    Interpretation Summary    Left ventricular systolic function is normal. Estimated left ventricular EF = 60%    Saline test results are negative for intracardiac shunting..            Assessment/Plan     Assessment/Plan: 68-year-old female with history of grade 2 diastolic dysfunction, iron deficiency anemia, ZORAIDA (not currently using CPAP), RLS, history of gastric sleeve, severe depression with recent suicide attempt (OD 12/24/2023) the presents as a transfer from [facility] after developing left-sided weakness neglect and right gaze deviation.  LKW 2200 on 1/18, NIHSS 26, CTH with RMCA ischemia.  CTA with complete right ICA occlusion corresponding to a large perfusion defect on CTP.  Not a candidate for thrombolytics secondary to last known well > 4.5 hours.  NIHSS 19 on arrival to [facility] emergently transferred to [facility] directly to the Cath Lab where she was found to have a tandem  occlusion and underwent a thrombectomy and RADHA stent placement resulting in TICI 3 flow.    PTA antiplatelet: None  PTA anticoagulant: None      AIS in the R MCA territory D/T tandem right ICA occlusion s/p MT and RADHA stent placement on 1/19/2024  -CTH 1/23, evolving RMCA stroke, small areas of petechial hemorrhage stable, Present on prior CTH 1/21  -Continue ASA and Brilinta, benefit outweighs the risk d/t RADHA stent placement  -MRI with moderate sized right MCA territory stroke  -TTE shows EF of 60%, negative bubble study  -CUS with patency of right carotid stent without evidence of restenosis, <50% left ICA stenosis  -HTN: Normal blood pressure parameters, avoid hypotension.   -HLD: LDL 95, goal <70; Continue with atorvastatin 80 mg daily  -Headache; Start gabapentin 100mg TID and  Magnesium 400mg daily.   -Suicide precautions; history of major depressive disorder with suicidal ideation.  Home Remeron restarted.   -Continue PT/OT, IPR recommended at  d/c  -N.p.o.; SLP following-planning for fees on Tuesday. May ultimately need PEG  -Aspiration precautions d/t diffculty clearing her airway. Secretions improving.Consider transfer to the floor this evening or tomorrow if respiratory status remains stable.   -Outpatient follow-up neurosurgery in 4 weeks    Plan of care reviewed with patient, Dr. Lai, and patient's nurse.  Consider transfer to the floor this evening or tomorrow if respiratory status remains stable. Stroke neurology will continue to follow.  Please call with any questions or concerns.      NIKKIE Felipe, AGACNP-BC  01/24/24  09:29 EST

## 2024-01-24 NOTE — THERAPY TREATMENT NOTE
"Patient Name: Regine Beckham  : 1955    MRN: [id number]                              Today's Date: 2024       Admit Date: 2024    Visit Dx:     ICD-10-CM ICD-9-CM   1. Oropharyngeal dysphagia  R13.12 787.22   2. Dysarthria  R47.1 784.51   3. Cognitive communication deficit  R41.841 799.52     Patient Active Problem List   Diagnosis    Iron deficiency anemia due to chronic blood loss    Major depressive disorder    RLS (restless legs syndrome)    GERD (gastroesophageal reflux disease)    Left breast mass    Allergy to penicillin    Stroke    Grade I diastolic dysfunction     Past Medical History:   Diagnosis Date    Anemia     Anxiety     Arthritis     Depression     Legally blind     Restless leg syndrome     Sleep apnea     \"I don't use a cpap anymore since losing 106 lbs\"    Water retention      Past Surgical History:   Procedure Laterality Date    BACK SURGERY      CARDIAC CATHETERIZATION N/A 2024    Procedure: Percutaneous Manual Thrombectomy;  Surgeon: Efrain Hurley MD;  Location: [facility] CATH INVASIVE LOCATION;  Service: Interventional Radiology;  Laterality: N/A;    GALLBLADDER SURGERY      HIP ARTHROSCOPY      JOINT REPLACEMENT Right       General Information       Row Name 24 1111          Physical Therapy Time and Intention    Document Type therapy note (daily note)  -AY     Mode of Treatment physical therapy  -AY       Row Name 24 1111          General Information    Patient Profile Reviewed yes  -AY     Existing Precautions/Restrictions fall;other (see comments)  L weakness (UE>LE); dysarthria; L inattention  -AY     Barriers to Rehab visual deficit;medically complex;cognitive status  -AY       Row Name 24 1111          Cognition    Orientation Status (Cognition) oriented to;person;place;disoriented to;time;situation  -AY       Row Name 24 1111          Safety Issues, Functional Mobility    Safety Issues Affecting Function (Mobility) insight into " deficits/self-awareness;sequencing abilities;awareness of need for assistance;safety precaution awareness;impulsivity  -AY     Impairments Affecting Function (Mobility) balance;coordination;endurance/activity tolerance;shortness of breath;strength;postural/trunk control;sensation/sensory awareness;motor planning;muscle tone abnormal;cognition;grasp  -AY               User Key  (r) = Recorded By, (t) = Taken By, (c) = Cosigned By      Initials Name Provider Type    Ngoc Yusuf PT Physical Therapist                   Mobility       Row Name 01/24/24 1112          Bed Mobility    Comment, (Bed Mobility) recieved attempting to get up from chair impulsively.  -AY       Row Name 01/24/24 1112          Sit-Stand Transfer    Sit-Stand [place] (Transfers) moderate assist (50% patient effort);1 person assist;verbal cues;nonverbal cues (demo/gesture)  -AY     Assistive Device (Sit-Stand Transfers) other (see comments)  BUE support  -AY     Comment, (Sit-Stand Transfer) perfomed x2 reps. progressed to min A with cueing  -AY       Row Name 01/24/24 1112          Gait/Stairs (Locomotion)    Comment, (Gait/Stairs) deferred d/t L knee buckling with standing weight shifting  -AY               User Key  (r) = Recorded By, (t) = Taken By, (c) = Cosigned By      Initials Name Provider Type    Ngoc Yusuf PT Physical Therapist                   Obj/Interventions       Row Name 01/24/24 1113          Motor Skills    Therapeutic Exercise hip;knee;ankle  -AY       Row Name 01/24/24 1113          Hip (Therapeutic Exercise)    Hip (Therapeutic Exercise) strengthening exercise  -AY     Hip Strengthening (Therapeutic Exercise) left;aBduction;aDduction;sitting;5 repetitions  -AY       Row Name 01/24/24 1113          Knee (Therapeutic Exercise)    Knee (Therapeutic Exercise) strengthening exercise  -AY     Knee Strengthening (Therapeutic Exercise) left;LAQ (long arc quad);5 repetitions  -AY       Row Name 01/24/24 1113           Ankle (Therapeutic Exercise)    Ankle (Therapeutic Exercise) AROM (active range of motion)  -AY     Ankle AROM (Therapeutic Exercise) bilateral;dorsiflexion;plantarflexion;10 repetitions;sitting  -AY       Row Name 01/24/24 1113          Balance    Balance Assessment sitting static balance;sitting dynamic balance;sit to stand dynamic balance;standing dynamic balance;standing static balance  -AY     Static Sitting Balance contact guard  -AY     Dynamic Sitting Balance contact guard  -AY     Position, Sitting Balance unsupported;sitting in chair  -AY     Sit to Stand Dynamic Balance minimal assist  -AY     Static Standing Balance minimal assist  -AY     Dynamic Standing Balance moderate assist  -AY     Position/Device Used, Standing Balance supported  -AY     Comment, Balance Lateral weight shifting. L knee buckling noted. Frequent cueing required for attention to task throughout.  -AY               User Key  (r) = Recorded By, (t) = Taken By, (c) = Cosigned By      Initials Name Provider Type    Ngoc Yusuf, BRUCE Physical Therapist                   Goals/Plan    No documentation.                  Clinical Impression       Row Name 01/24/24 1114          Pain    Pain Intervention(s) Ambulation/increased activity;Repositioned  -AY     Additional Documentation Pain Scale: FACES Pre/Post-Treatment (Group)  -AY       Row Name 01/24/24 1114          Pain Scale: FACES Pre/Post-Treatment    Pain: FACES Scale, Pretreatment 2-->hurts little bit  -AY     Posttreatment Pain Rating 2-->hurts little bit  -AY     Pain Location generalized  -AY       Row Name 01/24/24 1114          Plan of Care Review    Plan of Care Reviewed With patient  -AY     Progress no change  -AY     Outcome Evaluation Pt limited by L weakness/inattention, balance deficit, and decreased functional endurance compared to baseline. Pt progressed to min A to stand, further mobility limited by L knee buckling with weight shifting. Cont to progress as able.  d/c rec for IRF.  -AY       Row Name 01/24/24 1114          Vital Signs    O2 Delivery Pre Treatment room air  -AY     O2 Delivery Intra Treatment room air  -AY     O2 Delivery Post Treatment room air  -AY     Pre Patient Position Sitting  -AY     Intra Patient Position Standing  -AY     Post Patient Position Sitting  -AY       Row Name 01/24/24 1114          Positioning and Restraints    Pre-Treatment Position sitting in chair/recliner  -AY     Post Treatment Position chair  -AY     In Chair notified nsg;reclined;sitting;call light within reach;encouraged to call for assist;exit alarm on;legs elevated;waffle cushion;on mechanical lift sling;with SLP;LUE elevated;RUE elevated  -AY               User Key  (r) = Recorded By, (t) = Taken By, (c) = Cosigned By      Initials Name Provider Type    Ngoc Yusuf, BRUCE Physical Therapist                   Outcome Measures       Row Name 01/24/24 1115 01/24/24 0800       How much help from another person do you currently need...    Turning from your back to your side while in flat bed without using bedrails? 3  -AY 3  -BS    Moving from lying on back to sitting on the side of a flat bed without bedrails? 3  -AY 3  -BS    Moving to and from a bed to a chair (including a wheelchair)? 3  -AY 3  -BS    Standing up from a chair using your arms (e.g., wheelchair, bedside chair)? 3  -AY 3  -BS    Climbing 3-5 steps with a railing? 2  -AY 2  -BS    To walk in hospital room? 2  -AY 2  -BS    AM-PAC 6 Clicks Score (PT) 16  -AY 16  -BS    Highest Level of Mobility Goal 5 --> Static standing  -AY 5 --> Static standing  -BS      Row Name 01/24/24 1115          Functional Assessment    Outcome Measure Options AM-PAC 6 Clicks Basic Mobility (PT)  -AY               User Key  (r) = Recorded By, (t) = Taken By, (c) = Cosigned By      Initials Name Provider Type    Ngoc Yusuf, PT Physical Therapist    Sanaz Holden, RN Registered Nurse                                 Physical Therapy  Education       Title: PT OT SLP Therapies (In Progress)       Topic: Physical Therapy (In Progress)       Point: Mobility training (In Progress)       Learning Progress Summary             Patient Acceptance, E,TB, NR by AY at 1/24/2024 1116    Acceptance, E,D, VU,NR by MB at 1/22/2024 1418    Acceptance, E,D, VU,NR by LS at 1/20/2024 1221                         Point: Home exercise program (In Progress)       Learning Progress Summary             Patient Acceptance, E,TB, NR by AY at 1/24/2024 1116    Acceptance, E,D, VU,NR by MB at 1/22/2024 1418                         Point: Body mechanics (In Progress)       Learning Progress Summary             Patient Acceptance, E,TB, NR by AY at 1/24/2024 1116    Acceptance, E,D, VU,NR by MB at 1/22/2024 1418    Acceptance, E,D, VU,NR by LS at 1/20/2024 1221                         Point: Precautions (In Progress)       Learning Progress Summary             Patient Acceptance, E,TB, NR by AY at 1/24/2024 1116    Acceptance, E,D, VU,NR by MB at 1/22/2024 1418    Acceptance, E,D, VU,NR by LS at 1/20/2024 1221                                         User Key       Initials Effective Dates Name Provider Type Discipline     02/03/23 -  Nikole Herrmann, PT Physical Therapist PT    MB 06/16/21 -  Joanne Bermudez, PT Physical Therapist PT     11/10/20 -  Ngoc Glynn, PT Physical Therapist PT                  PT Recommendation and Plan     Plan of Care Reviewed With: patient  Progress: no change  Outcome Evaluation: Pt limited by L weakness/inattention, balance deficit, and decreased functional endurance compared to baseline. Pt progressed to min A to stand, further mobility limited by L knee buckling with weight shifting. Cont to progress as able. d/c rec for IRF.     Time Calculation:         PT Charges       Row Name 01/24/24 1116             Time Calculation    Start Time 0900  -AY      PT Received On 01/24/24  -AY         Timed Charges    34448 - PT Therapeutic  Exercise Minutes 5  -AY      22695 - PT Therapeutic Activity Minutes 4  -AY         Total Minutes    Timed Charges Total Minutes 9  -AY       Total Minutes 9  -AY                User Key  (r) = Recorded By, (t) = Taken By, (c) = Cosigned By      Initials Name Provider Type    Ngoc Yusuf, BRUCE Physical Therapist                  Therapy Charges for Today       Code Description Service Date Service Provider Modifiers Qty    40407006111 HC PT THER PROC EA 15 MIN 1/24/2024 Ngoc Glynn PT GP 1            PT G-Codes  Outcome Measure Options: AM-PAC 6 Clicks Basic Mobility (PT)  AM-PAC 6 Clicks Score (PT): 16  AM-PAC 6 Clicks Score (OT): 10  Modified [place] Scale: 4 - Moderately severe disability.  Unable to walk without assistance, and unable to attend to own bodily needs without assistance.  PT Discharge Summary  Anticipated Discharge Disposition (PT): inpatient rehabilitation facility    Ngoc Glynn PT  1/24/2024

## 2024-01-24 NOTE — PROGRESS NOTES
INTENSIVIST   PROGRESS NOTE     Hospital:  LOS: 5 days     Ms. Regine Beckham, 68 y.o. female is followed for a Chief Complaint of: CVA      Subjective   S     Interval History:  No acute events overnight. Clearance of secretions is improving.        The patient's relevant past medical, surgical and social history were reviewed and updated in Epic as appropriate.      ROS:   Constitutional: Negative for fever.   Respiratory: Negative for dyspnea.   Cardiovascular: Negative for chest pain.   Gastrointestinal: Negative for  nausea, vomiting and diarrhea.     Objective   O     Vitals:  Temp  Min: 97.6 °F (36.4 °C)  Max: 98.3 °F (36.8 °C)  BP  Min: 106/64  Max: 156/90  Pulse  Min: 67  Max: 113  Resp  Min: 16  Max: 24  SpO2  Min: 91 %  Max: 100 % Flow (L/min)  Min: 2  Max: 2    Intake/Ouptut 24 hrs (7:00AM - 6:59 AM)  Intake & Output (last 3 days)         01/19 0701 01/20 0700 01/20 0701  01/21 0700 01/21 0701  01/22 0700 01/22 0701  01/23 0700    P.O. 0 0 0     I.V. (mL/kg) 1014 (14.1) 1036.5 (14.8) 564.5 (8.1)     Other 150 320 310     NG/GT  560 530 180    Total Intake(mL/kg) 1164 (16.2) 1916.5 (27.3) 1404.5 (20) 180 (2.6)    Urine (mL/kg/hr) 550 1300 (0.8) 950 (0.6) 300 (1)    Stool  0 0     Total Output 550 1300 950 300    Net +614 +616.5 +454.5 -120            Urine Unmeasured Occurrence 1 x 2 x 1 x     Stool Unmeasured Occurrence  2 x 1 x             Medications (drips):           Physical Examination  Telemetry:  Normal sinus rhythm.    Constitutional:  No acute distress.  Sitting up in a chair.   Keofeed in place.    Eyes: No scleral icterus.   PERRL, EOM intact.    Neck:  Supple, FROM   Cardiovascular: Normal rate, regular and rhythm. Normal heart sounds.  No murmurs, gallop or rub.   Respiratory: No respiratory distress. Normal respiratory effort.  Upper airway rhonchi.    Abdominal:  Soft. No masses. Nontender. No distension. No HSM.   Extremities: No digital cyanosis. No clubbing.  No peripheral edema.    Skin: No rashes, lesions or ulcers   Neurological:   Alert and interactive.  Answers questions.        Interval:  (shift change)  1a. Level of Consciousness: 0-->Alert, keenly responsive  1b. LOC Questions: 0-->Answers both questions correctly  1c. LOC Commands: 0-->Performs both tasks correctly  2. Best Gaze: 0-->Normal  3. Visual: 1-->Partial hemianopia  4. Facial Palsy: 2-->Partial paralysis (total or near-total paralysis of lower face)  5a. Motor Arm, Left: 1-->Drift, limb holds 90 (or 45) degrees, but drifts down before full 10 seconds, does not hit bed or other support  5b. Motor Arm, Right: 0-->No drift, limb holds 90 (or 45) degrees for full 10 secs  6a. Motor Leg, Left: 0-->No drift, leg holds 30 degree position for full 5 secs  6b. Motor Leg, Right: 0-->No drift, leg holds 30 degree position for full 5 secs  7. Limb Ataxia: 0-->Absent  8. Sensory: 1-->Mild-to-moderate sensory loss, patient feels pinprick is less sharp or is dull on the affected side, or there is a loss of superficial pain with pinprick, but patient is aware of being touched  9. Best Language: 1-->Mild-to-moderate aphasia, some obvious loss of fluency or facility of comprehension, without significant limitation on ideas expressed or form of expression. Reduction of speech and/or comprehension, however, makes conversation. . . (see row details)  10. Dysarthria: 2-->Severe dysarthria, patients speech is so slurred as to be unintelligible in the absence of or out of proportion to any dysphasia, or is mute/anarthric  11. Extinction and Inattention (formerly Neglect): 2-->Profound dao-inattention/extinction more than 1 modality    Total (NIH Stroke Scale): 10         Results from last 7 days   Lab Units 01/22/24  0245 01/21/24  0227 01/20/24  0408   WBC 10*3/mm3 12.13* 14.84* 11.53*   HEMOGLOBIN g/dL 13.2 13.2 13.5   MCV fL 94.0 95.1 93.3   PLATELETS 10*3/mm3 242 256 262     Results from last 7 days   Lab Units 01/24/24  0349 01/23/24  1805  01/23/24  0419 01/22/24  0230   SODIUM mmol/L 136  --  133* 137   POTASSIUM mmol/L 4.1 4.5 3.6 3.7   CO2 mmol/L 23.0  --  23.0 21.0*   CREATININE mg/dL 0.74  --  0.77 0.66   GLUCOSE mg/dL 110*  --  125* 143*   MAGNESIUM mg/dL 2.1  --  2.0 1.8   PHOSPHORUS mg/dL 2.7  --  3.1 2.6     Estimated Creatinine Clearance: 79.4 mL/min (by C-G formula based on SCr of 0.74 mg/dL).  Results from last 7 days   Lab Units 01/19/24  1257   ALK PHOS U/L 85   BILIRUBIN mg/dL 0.4   ALT (SGPT) U/L 10   AST (SGOT) U/L 17             Images:  Imaging Results (Last 24 Hours)       Procedure Component Value Units Date/Time    XR Abdomen KUB [[id number]] Collected: 01/23/24 1814     Updated: 01/23/24 1819    Narrative:      XR ABDOMEN KUB    Date of Exam: 1/23/2024 5:54 PM EST    Indication: tube placement 1/19/2024    Comparison: None available.    Findings:  Weighted enteric tube is noted with the distal tip in the distal stomach/proximal duodenum.    Nonspecific but nonobstructive bowel gas pattern.    The visualized soft tissues are unremarkable.    No definite acute osseous abnormality.      Impression:      Impression:  Weighted enteric tube with the distal tip in the distal stomach/proximal duodenum      Electronically Signed: Randolph Silva DO    1/23/2024 6:15 PM EST    Workstation ID: [id number]    SLP FEES - Fiberoptic Endo Eval Swallow [[id number]] Resulted: 01/23/24 1226     Updated: 01/23/24 1226    Narrative:      This procedure was auto-finalized with no dictation required.               Results: Reviewed.  I reviewed the patient's new laboratory and imaging results.  I independently reviewed the patient's new images.    Medications: Reviewed.    Assessment & Plan   A / P     Ms. Beckham is a 69yo F with a history of HTN, chronic anemia, sleep apnea intolerant of Cpap, severe depression with an overdose attempt in December 2023, and legally blind who presented to [facility] on 1/19/24 with left sided weakness and was found to have a  RMCA infarct and 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 She underwent thrombectomy and carotid stent placement. She was outside the window for thrombolytics.     Cardene has been weaned off. Her hospital course has been complicated by ongoing dysphagia and difficulty managing secretions.     Speech therapy is following and recommends a repeat FEES on 1/26 as she has shown improvement.      Nutrition:   NPO Diet NPO Type: Strict NPO  Advance Directives:   Code Status and Medical Interventions:   Ordered at: 01/22/24 1115     Code Status (Patient has no pulse and is not breathing):    CPR (Attempt to Resuscitate)     Medical Interventions (Patient has pulse or is breathing):    Full Support       Active Hospital Problems    Diagnosis     **Stroke     Moderate malnutrition     Grade I diastolic dysfunction     GERD (gastroesophageal reflux disease)     RLS (restless legs syndrome)     Major depressive disorder     Iron deficiency anemia due to chronic blood loss        Assessment / Plan:     Speech therapy following. FEES again on 1/26.   Start tube feeds via Keofeed until cleared by Speech therapy.   PT/OT. Will need Rehab placement when medically ready.   Brilinta.   Continue Losartan.   Neurontin for headache per Neurology.   AM labs   Okay to transfer to telemetry.     High level of risk due to:  severe exacerbation of chronic illness and illness with threat to life or bodily function.    Plan of care and goals reviewed during interdisciplinary rounds.  I discussed the patient's findings and my recommendations with patient and nursing staff      Radha Atkinson, DO    Intensive Care Medicine and Pulmonary Medicine

## 2024-01-25 LAB
ANION GAP SERPL CALCULATED.3IONS-SCNC: 12 MMOL/L (ref 5–15)
BUN SERPL-MCNC: 15 MG/DL (ref 8–23)
BUN/CREAT SERPL: 23.4 (ref 7–25)
CALCIUM SPEC-SCNC: 8.7 MG/DL (ref 8.6–10.5)
CHLORIDE SERPL-SCNC: 102 MMOL/L (ref 98–107)
CO2 SERPL-SCNC: 25 MMOL/L (ref 22–29)
CREAT SERPL-MCNC: 0.64 MG/DL (ref 0.57–1)
EGFRCR SERPLBLD CKD-EPI 2021: 95.8 ML/MIN/1.73
GLUCOSE BLDC GLUCOMTR-MCNC: 120 MG/DL (ref 70–130)
GLUCOSE BLDC GLUCOMTR-MCNC: 124 MG/DL (ref 70–130)
GLUCOSE BLDC GLUCOMTR-MCNC: 127 MG/DL (ref 70–130)
GLUCOSE BLDC GLUCOMTR-MCNC: 132 MG/DL (ref 70–130)
GLUCOSE BLDC GLUCOMTR-MCNC: 134 MG/DL (ref 70–130)
GLUCOSE SERPL-MCNC: 118 MG/DL (ref 65–99)
MAGNESIUM SERPL-MCNC: 2 MG/DL (ref 1.6–2.4)
PHOSPHATE SERPL-MCNC: 2.4 MG/DL (ref 2.5–4.5)
POTASSIUM SERPL-SCNC: 3.6 MMOL/L (ref 3.5–5.2)
POTASSIUM SERPL-SCNC: 4.5 MMOL/L (ref 3.5–5.2)
SODIUM SERPL-SCNC: 139 MMOL/L (ref 136–145)

## 2024-01-25 PROCEDURE — 99232 SBSQ HOSP IP/OBS MODERATE 35: CPT | Performed by: NURSE PRACTITIONER

## 2024-01-25 PROCEDURE — 82948 REAGENT STRIP/BLOOD GLUCOSE: CPT

## 2024-01-25 PROCEDURE — 80048 BASIC METABOLIC PNL TOTAL CA: CPT | Performed by: INTERNAL MEDICINE

## 2024-01-25 PROCEDURE — 99232 SBSQ HOSP IP/OBS MODERATE 35: CPT | Performed by: INTERNAL MEDICINE

## 2024-01-25 PROCEDURE — 94799 UNLISTED PULMONARY SVC/PX: CPT

## 2024-01-25 PROCEDURE — 84100 ASSAY OF PHOSPHORUS: CPT | Performed by: INTERNAL MEDICINE

## 2024-01-25 PROCEDURE — 83735 ASSAY OF MAGNESIUM: CPT | Performed by: INTERNAL MEDICINE

## 2024-01-25 PROCEDURE — 94664 DEMO&/EVAL PT USE INHALER: CPT

## 2024-01-25 PROCEDURE — 84132 ASSAY OF SERUM POTASSIUM: CPT | Performed by: INTERNAL MEDICINE

## 2024-01-25 RX ORDER — POTASSIUM CHLORIDE 1.5 G/1.58G
40 POWDER, FOR SOLUTION ORAL EVERY 4 HOURS
Status: COMPLETED | OUTPATIENT
Start: 2024-01-25 | End: 2024-01-25

## 2024-01-25 RX ADMIN — LOSARTAN POTASSIUM 50 MG: 50 TABLET, FILM COATED ORAL at 08:39

## 2024-01-25 RX ADMIN — TICAGRELOR 60 MG: 60 TABLET ORAL at 17:46

## 2024-01-25 RX ADMIN — POTASSIUM CHLORIDE 40 MEQ: 1.5 POWDER, FOR SOLUTION ORAL at 14:20

## 2024-01-25 RX ADMIN — Medication 1 PATCH: at 09:51

## 2024-01-25 RX ADMIN — PANTOPRAZOLE SODIUM 40 MG: 40 INJECTION, POWDER, FOR SOLUTION INTRAVENOUS at 05:09

## 2024-01-25 RX ADMIN — IPRATROPIUM BROMIDE AND ALBUTEROL SULFATE 3 ML: 2.5; .5 SOLUTION RESPIRATORY (INHALATION) at 07:25

## 2024-01-25 RX ADMIN — GABAPENTIN 100 MG: 100 CAPSULE ORAL at 20:23

## 2024-01-25 RX ADMIN — MAGNESIUM OXIDE TAB 400 MG (241.3 MG ELEMENTAL MG) 400 MG: 400 (241.3 MG) TAB at 08:38

## 2024-01-25 RX ADMIN — ASPIRIN 81 MG: 81 TABLET, CHEWABLE ORAL at 08:39

## 2024-01-25 RX ADMIN — Medication 10 ML: at 20:22

## 2024-01-25 RX ADMIN — MIRTAZAPINE 30 MG: 15 TABLET, FILM COATED ORAL at 20:22

## 2024-01-25 RX ADMIN — ROPINIROLE HYDROCHLORIDE 4 MG: 2 TABLET, FILM COATED ORAL at 20:22

## 2024-01-25 RX ADMIN — IPRATROPIUM BROMIDE AND ALBUTEROL SULFATE 3 ML: 2.5; .5 SOLUTION RESPIRATORY (INHALATION) at 16:20

## 2024-01-25 RX ADMIN — POTASSIUM CHLORIDE 40 MEQ: 1.5 POWDER, FOR SOLUTION ORAL at 08:38

## 2024-01-25 RX ADMIN — ACETAMINOPHEN 650 MG: 325 TABLET ORAL at 05:09

## 2024-01-25 RX ADMIN — ATORVASTATIN CALCIUM 80 MG: 40 TABLET, FILM COATED ORAL at 20:22

## 2024-01-25 RX ADMIN — GABAPENTIN 100 MG: 100 CAPSULE ORAL at 05:09

## 2024-01-25 RX ADMIN — IPRATROPIUM BROMIDE AND ALBUTEROL SULFATE 3 ML: 2.5; .5 SOLUTION RESPIRATORY (INHALATION) at 19:59

## 2024-01-25 RX ADMIN — Medication 10 ML: at 09:50

## 2024-01-25 RX ADMIN — GUAIFENESIN 400 MG: 200 SOLUTION ORAL at 20:21

## 2024-01-25 RX ADMIN — IPRATROPIUM BROMIDE AND ALBUTEROL SULFATE 3 ML: 2.5; .5 SOLUTION RESPIRATORY (INHALATION) at 11:57

## 2024-01-25 RX ADMIN — GUAIFENESIN 400 MG: 200 SOLUTION ORAL at 08:38

## 2024-01-25 RX ADMIN — GUAIFENESIN 400 MG: 200 SOLUTION ORAL at 05:09

## 2024-01-25 RX ADMIN — TICAGRELOR 60 MG: 60 TABLET ORAL at 06:57

## 2024-01-25 RX ADMIN — GABAPENTIN 100 MG: 100 CAPSULE ORAL at 14:21

## 2024-01-25 RX ADMIN — GUAIFENESIN 400 MG: 200 SOLUTION ORAL at 17:43

## 2024-01-25 NOTE — PROGRESS NOTES
[facility] Medicine Services  PROGRESS NOTE    Patient Name: Regine Beckham  : 1955  MRN: [id number]    Date of Admission: 2024  Primary Care Physician: Dread Burton MD    Subjective   Subjective     CC: f/u CVA    HPI: Up in bed. Asking for phone  and says she really just wants a feliz.      Objective   Objective     Vital Signs:   Temp:  [97.3 °F (36.3 °C)-98.8 °F (37.1 °C)] 97.3 °F (36.3 °C)  Heart Rate:  [77-95] 95  Resp:  [16-18] 18  BP: (102-155)/() 155/86     Physical Exam:  Constitutional: No acute distress, awake, alert  HENT: NCAT, mucous membranes moist, keofeed  Respiratory: Clear to auscultation bilaterally, respiratory effort normal   Cardiovascular: RRR, no murmurs, rubs, or gallops  Gastrointestinal: Positive bowel sounds, soft, nontender, nondistended  Musculoskeletal: No bilateral ankle edema  Psychiatric: Appropriate affect, cooperative  Neurologic: Oriented x 3, dysarthria, left sided weakness  Skin: No rashes     Results Reviewed:  LAB RESULTS:      Lab 24  0245 24  0227 24  2224 24  0408 24  1257   WBC 12.13* 14.84*  --  11.53* 11.47*   HEMOGLOBIN 13.2 13.2  --  13.5 15.1   HEMATOCRIT 39.2 38.7  --  39.1 45.0   PLATELETS 242 256  --  262 258   NEUTROS ABS 10.13* 11.75*  --  9.78* 8.72*   IMMATURE GRANS (ABS) 0.04 0.06*  --  0.04 0.04   LYMPHS ABS 1.10 1.77  --  1.21 2.01   MONOS ABS 0.81 1.17*  --  0.47 0.55   EOS ABS 0.01 0.05  --  0.01 0.11   MCV 94.0 95.1  --  93.3 94.5   PROCALCITONIN  --   --  0.15  --   --          Lab 24  0418 24  0349 24  1801 24  0419 24  0230 24  0227 24  1530 24  0408   SODIUM 139 136  --  133* 137 139  --  138   POTASSIUM 3.6 4.1 4.5 3.6 3.7 3.7   < > 3.4*   CHLORIDE 102 103  --  101 104 107  --  104   CO2 25.0 23.0  --  23.0 21.0* 21.0*  --  22.0   ANION GAP 12.0 10.0  --  9.0 12.0 11.0  --  12.0   BUN 15 16  --  14 13 10  --   15   CREATININE 0.64 0.74  --  0.77 0.66 0.67  --  0.78   EGFR 95.8 87.7  --  83.6 95.1 94.7  --  82.3   GLUCOSE 118* 110*  --  125* 143* 173*  --  182*   CALCIUM 8.7 9.2  --  9.1 8.8 9.1  --  8.7   MAGNESIUM 2.0 2.1  --  2.0 1.8  --   --  1.8   PHOSPHORUS 2.4* 2.7  --  3.1 2.6  --   --  2.8   HEMOGLOBIN A1C  --   --   --   --   --   --   --  6.20*    < > = values in this interval not displayed.         Lab 01/19/24  1257   TOTAL PROTEIN 7.4   ALBUMIN 3.9   GLOBULIN 3.5   ALT (SGPT) 10   AST (SGOT) 17   BILIRUBIN 0.4   ALK PHOS 85             Lab 01/20/24  0408   CHOLESTEROL 160   LDL CHOL 95   HDL CHOL 44   TRIGLYCERIDES 118         Lab 01/19/24  1257   ABO TYPING A   RH TYPING Positive   ANTIBODY SCREEN Negative         Brief Urine Lab Results  (Last result in the past 365 days)        Color   Clarity   Blood   Leuk Est   Nitrite   Protein   CREAT   Urine HCG        12/20/23 2003 Yellow   Clear   Negative   Negative   Negative   Negative                   Microbiology Results Abnormal       Procedure Component Value - Date/Time    Respiratory Culture - Sputum, NT Suction [[id number]] Collected: 01/22/24 0808    Lab Status: Final result Specimen: Sputum from NT Suction Updated: 01/24/24 1052     Respiratory Culture Light growth (2+) Normal respiratory sudhir. No S. aureus or Pseudomonas aeruginosa detected. Final report.     Gram Stain Moderate (3+) WBCs per low power field      Rare (1+) Epithelial cells per low power field      Few (2+) Gram positive cocci in pairs            XR Abdomen KUB    Result Date: 1/23/2024  XR ABDOMEN KUB Date of Exam: 1/23/2024 5:54 PM EST Indication: tube placement 1/19/2024 Comparison: None available. Findings: Weighted enteric tube is noted with the distal tip in the distal stomach/proximal duodenum. Nonspecific but nonobstructive bowel gas pattern. The visualized soft tissues are unremarkable. No definite acute osseous abnormality.     Impression: Impression: Weighted enteric tube  with the distal tip in the distal stomach/proximal duodenum Electronically Signed: Randolph DO Shira  1/23/2024 6:15 PM EST  Workstation ID: [id number]    SLP FEES - Fiberoptic Endo Eval Swallow    Result Date: 1/23/2024  This procedure was auto-finalized with no dictation required.     Results for orders placed during the hospital encounter of 01/19/24    Adult Transthoracic Echo Limited W/ Cont if Necessary Per Protocol    Interpretation Summary    Left ventricular systolic function is normal. Estimated left ventricular EF = 60%    Saline test results are negative for intracardiac shunting..      Current medications:  Scheduled Meds:aspirin, 81 mg, Nasogastric, Daily  atorvastatin, 80 mg, Nasogastric, Nightly  gabapentin, 100 mg, Nasogastric, Q8H  guaifenesin, 400 mg, Nasogastric, Q4H  ipratropium-albuterol, 3 mL, Nebulization, Q4H - RT  losartan, 50 mg, Nasogastric, Q24H  magnesium oxide, 400 mg, Nasogastric, Daily  mirtazapine, 30 mg, Nasogastric, Nightly  nicotine, 1 patch, Transdermal, Q24H  pantoprazole, 40 mg, Intravenous, Q AM  potassium chloride, 40 mEq, Oral, Q4H  rOPINIRole, 4 mg, Nasogastric, Nightly  sodium chloride, 10 mL, Intravenous, Q12H  ticagrelor, 60 mg, Nasogastric, BID      Continuous Infusions:   PRN Meds:.  acetaminophen    albuterol    Calcium Replacement - Follow Nurse / BPA Driven Protocol    hydrALAZINE    Magnesium Standard Dose Replacement - Follow Nurse / BPA Driven Protocol    ondansetron    phenol    Phosphorus Replacement - Follow Nurse / BPA Driven Protocol    Potassium Replacement - Follow Nurse / BPA Driven Protocol    sodium chloride    sodium chloride    Assessment & Plan   Assessment & Plan     Active Hospital Problems    Diagnosis  POA    **Stroke [I63.511]  Unknown    Moderate malnutrition [E44.0]  Yes    Grade I diastolic dysfunction [I51.89]  Unknown    GERD (gastroesophageal reflux disease) [K21.9]  Yes    RLS (restless legs syndrome) [G25.81]  Yes    Major depressive  disorder [F32.9]  Yes    Iron deficiency anemia due to chronic blood loss [D50.0]  Yes      Resolved Hospital Problems   No resolved problems to display.        Brief Hospital Course to date:  Ms. Beckham is a 69yo F with a history of HTN, chronic anemia, sleep apnea intolerant of Cpap, severe depression with an overdose attempt in December 2023, and legally blind who presented to [facility] on 1/19/24 with left sided weakness and was found to have a RMCA infarct and 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 She underwent thrombectomy and carotid stent placement. She was outside the window for thrombolytics. Initially placed on Cardene which has been successfully weaned off. Her hospital course has been complicated by ongoing dysphagia and difficulty managing secretions. Transferred to tele 1/25.    CVA  Dysphagia  --Stroke team follows, continue DAPT/statin.  --SLP follows, repeat FEES 1/26.  --PT/OT recs IRF - referrals pending however until dysphagia addressed on hold.    Chronic HFpEF  Chronic CESIA  GERD  RLS    Expected Discharge Location and Transportation:   Expected Discharge   Expected Discharge Date: 1/29/2024; Expected Discharge Time:      DVT prophylaxis:  Mechanical DVT prophylaxis orders are present.         AM-PAC 6 Clicks Score (PT): 16 (01/24/24 1115)    CODE STATUS:   Code Status and Medical Interventions:   Ordered at: 01/22/24 1115     Code Status (Patient has no pulse and is not breathing):    CPR (Attempt to Resuscitate)     Medical Interventions (Patient has pulse or is breathing):    Full Support       Roya Mohan II, DO  01/25/24

## 2024-01-25 NOTE — DISCHARGE INSTRUCTIONS
Please continue to take aspirin 81 mg once daily and Brilinta 60 mg twice a day until your neurosurgery follow up appointment    Please continue to take atorvastatin 80 mg daily

## 2024-01-25 NOTE — PROGRESS NOTES
Brief EN Review Note    Patient Name: Regine Beckham  Date of Encounter: 01/25/24 09:58 EST  MRN: [id number]  Admission date: 1/19/2024    Reason for visit: EN review . RD to continue to follow per protocol.     Information Obtained: EN running @ 30ml/hr, water flushes @ 20ml/hr. Patient feels she is tolerating feeding well.  Reports some nausea, however she feels that is medication related. She had 3BM yesterday, typically has one BM a day.  Eager to a start a diet and be able to eat mashed potato. Advised patient diet initiation will be based on SLP eval/monitoring/assessment.      No documented BM yesterday. Patient reports she had 3 BM.      EMR reviewed:   Medication reviewed: magnesium oxide, remeron, protonix,   Labs reviewed:  phos 2.4, mag 2.0, K 3.6.     Estimated Calorie needs: ~1700 Kcal/day  Method:  Kcals/KG 25 =1753  Method:   HBD  1303 X 1.3 = 1694     Estimated Protein needs: ~91 g PRO/day  Method: 1.3 g/Kg    Current diet: NPO Diet NPO Type: Strict NPO    EN:  Vital 1.5  Goal Rate: 55ml/hr Water Flushes: 20ml/hr  Modular: Prosource -no carb 1/day  Route: NG in stomach per KUBW 1/23  Tube: Small bore    At goal over: 22Hrs/day    Rx will supply:   Goal Volume 1210 mL/day     Flush Volume 440 mL/day     Energy 1875 Kcal/day 110 % Est Need   Protein 96 g/day 105 % Est Need   Fiber 7.2 g/day     Water in   mL     Total Water 1360 mL     Meet DRI Yes        --------------------------------------------------------------------------  Product/Rate verified at bedside: Yes  Infusing Rate at time of visit: 30ml/hr; water 20ml/hr     Average Delivery from Charting: Insufficient data    Intervention:  Follow treatment plan  Care plan reviewed  To better meet needs will adjust EN to goal rate 50nl/hr. GV x 22hrs infusion 1100ml, + prosource to provide: 1710 kcal(101% est. needs), 90 g protein(99% est. needs), 6 g soluble fiber, and 736 ml FW in formula +400 ml flushes = 1436ml total water       Follow  up:   Per protocol      Ondina Garcia, DENYS  09:58 EST  Time: 30min

## 2024-01-25 NOTE — CASE MANAGEMENT/SOCIAL WORK
Continued Stay Note   An     Patient Name: Regine Beckham  MRN: [id number]  Today's Date: 1/25/2024    Admit Date: 1/19/2024    Plan: Ongoing   Discharge Plan       Row Name 01/25/24 1052       Plan    Plan Ongoing    Patient/Family in Agreement with Plan yes    Plan Comments Discussed patient is MDR. She is scheduled for a FEES on friday to determine if she can have a diet or if a PEG is needed. Rehab is recommended. CM will make referrals when appropriate. CM will continue to follow up.    Final Discharge Disposition Code 30 - still a patient                   Discharge Codes    No documentation.                 Expected Discharge Date and Time       Expected Discharge Date Expected Discharge Time    Jan 29, 2024               Debbie Mcghee RN

## 2024-01-25 NOTE — PROGRESS NOTES
Stroke Progress Note       Chief Complaint:  Left sided weakness    Subjective    Subjective     Subjective: No adverse events overnight patient is sitting up in bed.  Follows commands, able to squeeze my hand on the left.      Review of Systems   HENT:  Positive for trouble swallowing.    Eyes:  Negative for visual disturbance.   Respiratory:  Positive for cough. Negative for shortness of breath.    Cardiovascular:  Negative for chest pain and palpitations.   Neurological:  Positive for facial asymmetry, speech difficulty, weakness, numbness and headaches.            Objective    Objective      Temp:  [97.3 °F (36.3 °C)-98.8 °F (37.1 °C)] 97.3 °F (36.3 °C)  Heart Rate:  [77-95] 95  Resp:  [16-18] 18  BP: (102-155)/() 155/86        Neurological Exam  Mental Status  Alert. Oriented to person, place, and time. Mild dysarthria present. Expressive aphasia present.    Cranial Nerves  CN II: Visual fields full to confrontation.  CN III, IV, VI: Extraocular movements intact bilaterally. Normal lids and orbits bilaterally. Pupils equal round and reactive to light bilaterally.  CN V:  Right: Facial sensation is normal.  Left: Diminished sensation of the entire left side of the face.  CN VII:  Right: There is no facial weakness.  Left: There is central facial weakness.    Motor  Normal muscle bulk throughout. No fasciculations present. Normal muscle tone. No abnormal involuntary movements. Strength is 5/5 throughout all four extremities.  LUE strength 2/5, LLE strength 3/5.    Sensory  Light touch abnormality:   Decreased on the left.    Coordination    No dysmetria out of proportion to weakness.    Gait    Not tested.      Physical Exam  Vitals reviewed.   HENT:      Head: Normocephalic and atraumatic.   Eyes:      General: Lids are normal.      Extraocular Movements: Extraocular movements intact.      Pupils: Pupils are equal, round, and reactive to light.   Cardiovascular:      Rate and Rhythm: Normal rate.  "  Pulmonary:      Effort: Pulmonary effort is normal. No respiratory distress.   Abdominal:      Comments: NG in place   Musculoskeletal:      Cervical back: Normal range of motion.      Right lower leg: No edema.      Left lower leg: No edema.   Skin:     General: Skin is warm and dry.   Neurological:      Mental Status: She is alert and oriented to person, place, and time.      Cranial Nerves: Cranial nerve deficit and dysarthria present.      Sensory: Sensory deficit present.      Motor: Motor strength is normal.Weakness present.      Comments: Left tactile neglect   Psychiatric:         Mood and Affect: Mood normal.         Behavior: Behavior normal.         Results Review:    I reviewed the patient's new clinical results.  No results found for: \"WBC\", \"RBC\", \"HGB\", \"HCT\", \"MCV\", \"MCH\", \"MCHC\", \"RDW\", \"RDWSD\", \"MPV\", \"PLT\", \"NEUTRORELPCT\", \"LYMPHORELPCT\", \"MONORELPCT\", \"EOSRELPCT\", \"BASORELPCT\", \"AUTOIGPER\", \"NEUTROABS\", \"LYMPHSABS\", \"MONOSABS\", \"EOSABS\", \"BASOSABS\", \"AUTOIGNUM\", \"NRBC\"    Lab Results   Component Value Date    GLUCOSE 118 (H) 01/25/2024    BUN 15 01/25/2024    CREATININE 0.64 01/25/2024    EGFR 95.8 01/25/2024    BCR 23.4 01/25/2024    K 3.6 01/25/2024    CO2 25.0 01/25/2024    CALCIUM 8.7 01/25/2024    ALBUMIN 3.9 01/19/2024    BILITOT 0.4 01/19/2024    AST 17 01/19/2024    ALT 10 01/19/2024     A1c 6.2  LDL 95    CT head 1/23/2024 shows stable right MCA infarct.  Small areas of increased density reflective of petechial hemorrhage.  No acute abnormality  CT Head Without Contrast    Result Date: 1/20/2024  Impression: Evolving subacute right MCA territory infarct without additional acute process identified. Electronically Signed: Hayden Robison MD  1/20/2024 3:42 PM CST  Workstation ID: [id number]    CT Head Without Contrast    Result Date: 1/20/2024  Impression: 1.Evolving subacute infarction within right MCA territory. No acute herniation. 2.No hemorrhagic transformation. Electronically Signed: " Ravi Hart MD  1/20/2024 10:48 AM EST  Workstation ID: [id number]    CT Head Without Contrast    Result Date: 1/20/2024  Impression: 1.Stable appearance of right MCA territory infarct. 2.Stable advanced chronic small vessel ischemic change. 3.Stable minor petechial bleeding in the right caudate nucleus and putamen. Electronically Signed: Teo Carpio MD  1/20/2024 1:14 AM EST  Workstation ID: [id number]    MRI Brain Without Contrast    Result Date: 1/19/2024  Impression: Moderate sized acute right-sided MCA infarct as described above. There is evidence of small foci petechial hemorrhage in the posterior putamen, corona radiata, and high anterior right parietal lobe. Extensive changes of chronic microvascular ischemia. Findings discussed with Parul from the stroke team performed on January 19, 2024 at 10:19 p.m. Electronically Signed: Mo Marshall MD  1/19/2024 10:21 PM EST  Workstation ID: [id number]    CT CEREBRAL PERFUSION WITH & WITHOUT CONTRAST    Result Date: 1/19/2024   IMPRESSION: Results suggestive of recent ischemic event in the right middle cerebral artery territory  This report was finalized on 1/19/2024 1:45 PM by Dr. Hu Obrien MD.      CT Angiogram Neck    Result Date: 1/19/2024  1.  Moderate to high-grade stenosis in the proximal left internal carotid artery. 2.  Occlusion of the entire right internal carotid artery.   This report was finalized on 1/19/2024 1:43 PM by Dr. Hu Obrien MD.      CT Angiogram Head    Result Date: 1/19/2024   1. Barely detectable flow in the proximal right middle cerebral artery and no flow distally in the right middle cerebral artery. 2. Occlusion of the right internal carotid artery.   This report was finalized on 1/19/2024 1:27 PM by Dr. Hu Obrien MD.      CT Head Without Contrast Stroke Protocol    Result Date: 1/19/2024   1. Appearance concerning for recent right MCA ischemia with sulcal effacement but no evidence of hemorrhage, mass, or midline shift   Results have been relayed to the emergency department at 1:03 p.m.  This report was finalized on 1/19/2024 1:04 PM by Dr. Hu Obrien MD.       Results for orders placed during the hospital encounter of 01/19/24    Adult Transthoracic Echo Limited W/ Cont if Necessary Per Protocol    Interpretation Summary    Left ventricular systolic function is normal. Estimated left ventricular EF = 60%    Saline test results are negative for intracardiac shunting..            Assessment/Plan     Assessment/Plan: 68-year-old female with history of grade 2 diastolic dysfunction, iron deficiency anemia, ZORAIDA (not currently using CPAP), RLS, history of gastric sleeve, severe depression with recent suicide attempt (OD 12/24/2023) the presents as a transfer from [facility] after developing left-sided weakness neglect and right gaze deviation.  LKW 2200 on 1/18, NIHSS 26, CTH with RMCA ischemia.  CTA with complete right ICA occlusion corresponding to a large perfusion defect on CTP.  Not a candidate for thrombolytics secondary to last known well > 4.5 hours.  NIHSS 19 on arrival to [facility] emergently transferred to [facility] directly to the Cath Lab where she was found to have a tandem  occlusion and underwent a thrombectomy and RADHA stent placement resulting in TICI 3 flow.    PTA antiplatelet: None  PTA anticoagulant: None      AIS in the R MCA territory D/T tandem right ICA occlusion s/p MT and RADHA stent placement on 1/19/2024  -CTH 1/23, evolving RMCA stroke, small areas of petechial hemorrhage stable, Present on prior CTH 1/21  -Continue ASA and Brilinta, benefit outweighs the risk d/t RADHA stent placement  -MRI with moderate sized right MCA territory stroke  -TTE shows EF of 60%, negative bubble study  -CUS with patency of right carotid stent without evidence of restenosis, <50% left ICA stenosis  -HTN: Normal blood pressure parameters, avoid hypotension.   -HLD: LDL 95, goal <70; Continue with atorvastatin 80 mg daily  -Headache improved;  continue gabapentin 100mg TID and  Magnesium 400mg daily.   -Suicide precautions; history of major depressive disorder with suicidal ideation.  Home Remeron restarted.   -Continue PT/OT, IPR recommended at d/c.  -N.p.o.; SLP following-planning for fees on Friday. May ultimately need PEG  -Outpatient follow-up neurosurgery in 4 weeks  -Follow-up in stroke clinic in 6 to 8 weeks    Discussed the importance of medication compliance Aspirin 81mg daily, Brilinta 60 mg twice daily, and Atorvastatin 80mg nightly and lifestyle modifications adequate control of blood pressure, adequate control of cholesterol (goal LDL <70), increased physical activity, compliance with CPAP therapy, and implementation of healthy diet to help reduce the risk of future cerebrovascular events.  Also discussed the signs symptoms that would warrant the patient return back to the emergency department including unilateral weakness, unilateral numbness, visual disturbances, loss of balance, speech difficulties, and/or a sudden severe headache.  Patient verbalized understanding.      Plan of care reviewed with patient, Dr. Lai, and patient's nurse.  No further stroke work up is needed, will sign off and follow in clinic.  Please call [phone] if questions arise, thank you.      NIKKIE Bailey, AGACNP-BC  01/25/24  09:30 EST

## 2024-01-26 ENCOUNTER — ANCILLARY PROCEDURE (OUTPATIENT)
Dept: SPEECH THERAPY | Facility: HOSPITAL | Age: 69
DRG: 023 | End: 2024-01-26
Payer: MEDICARE

## 2024-01-26 LAB
GLUCOSE BLDC GLUCOMTR-MCNC: 115 MG/DL (ref 70–130)
GLUCOSE BLDC GLUCOMTR-MCNC: 116 MG/DL (ref 70–130)
GLUCOSE BLDC GLUCOMTR-MCNC: 129 MG/DL (ref 70–130)
GLUCOSE BLDC GLUCOMTR-MCNC: 132 MG/DL (ref 70–130)
GLUCOSE BLDC GLUCOMTR-MCNC: 45 MG/DL (ref 70–130)
QT INTERVAL: 376 MS
QTC INTERVAL: 472 MS

## 2024-01-26 PROCEDURE — 93005 ELECTROCARDIOGRAM TRACING: CPT | Performed by: INTERNAL MEDICINE

## 2024-01-26 PROCEDURE — 82948 REAGENT STRIP/BLOOD GLUCOSE: CPT

## 2024-01-26 PROCEDURE — 94664 DEMO&/EVAL PT USE INHALER: CPT

## 2024-01-26 PROCEDURE — 99232 SBSQ HOSP IP/OBS MODERATE 35: CPT | Performed by: INTERNAL MEDICINE

## 2024-01-26 PROCEDURE — 94799 UNLISTED PULMONARY SVC/PX: CPT

## 2024-01-26 PROCEDURE — 25010000002 ONDANSETRON PER 1 MG: Performed by: INTERNAL MEDICINE

## 2024-01-26 PROCEDURE — 92612 ENDOSCOPY SWALLOW (FEES) VID: CPT | Performed by: SPEECH-LANGUAGE PATHOLOGIST

## 2024-01-26 PROCEDURE — 94761 N-INVAS EAR/PLS OXIMETRY MLT: CPT

## 2024-01-26 PROCEDURE — 93010 ELECTROCARDIOGRAM REPORT: CPT | Performed by: INTERNAL MEDICINE

## 2024-01-26 RX ORDER — POLYETHYLENE GLYCOL 3350 17 G/17G
17 POWDER, FOR SOLUTION ORAL DAILY PRN
Status: DISCONTINUED | OUTPATIENT
Start: 2024-01-26 | End: 2024-02-02 | Stop reason: HOSPADM

## 2024-01-26 RX ORDER — AMOXICILLIN 250 MG
2 CAPSULE ORAL 2 TIMES DAILY
Status: DISCONTINUED | OUTPATIENT
Start: 2024-01-26 | End: 2024-02-02 | Stop reason: HOSPADM

## 2024-01-26 RX ORDER — FAMOTIDINE 20 MG/1
20 TABLET, FILM COATED ORAL
Status: DISCONTINUED | OUTPATIENT
Start: 2024-01-26 | End: 2024-01-28

## 2024-01-26 RX ORDER — BISACODYL 5 MG/1
5 TABLET, DELAYED RELEASE ORAL DAILY PRN
Status: DISCONTINUED | OUTPATIENT
Start: 2024-01-26 | End: 2024-02-02 | Stop reason: HOSPADM

## 2024-01-26 RX ORDER — GUAIFENESIN 600 MG/1
600 TABLET, EXTENDED RELEASE ORAL EVERY 12 HOURS SCHEDULED
Status: DISCONTINUED | OUTPATIENT
Start: 2024-01-26 | End: 2024-02-02 | Stop reason: HOSPADM

## 2024-01-26 RX ORDER — BISACODYL 10 MG
10 SUPPOSITORY, RECTAL RECTAL DAILY PRN
Status: DISCONTINUED | OUTPATIENT
Start: 2024-01-26 | End: 2024-02-02 | Stop reason: HOSPADM

## 2024-01-26 RX ADMIN — GABAPENTIN 100 MG: 100 CAPSULE ORAL at 14:02

## 2024-01-26 RX ADMIN — FAMOTIDINE 20 MG: 20 TABLET ORAL at 20:54

## 2024-01-26 RX ADMIN — Medication 10 ML: at 21:00

## 2024-01-26 RX ADMIN — GUAIFENESIN 400 MG: 200 SOLUTION ORAL at 03:55

## 2024-01-26 RX ADMIN — TICAGRELOR 60 MG: 60 TABLET ORAL at 06:37

## 2024-01-26 RX ADMIN — IPRATROPIUM BROMIDE AND ALBUTEROL SULFATE 3 ML: 2.5; .5 SOLUTION RESPIRATORY (INHALATION) at 15:56

## 2024-01-26 RX ADMIN — GUAIFENESIN 400 MG: 200 SOLUTION ORAL at 08:01

## 2024-01-26 RX ADMIN — ACETAMINOPHEN 650 MG: 325 TABLET ORAL at 11:59

## 2024-01-26 RX ADMIN — GABAPENTIN 100 MG: 100 CAPSULE ORAL at 06:37

## 2024-01-26 RX ADMIN — GABAPENTIN 100 MG: 100 CAPSULE ORAL at 20:55

## 2024-01-26 RX ADMIN — MAGNESIUM OXIDE TAB 400 MG (241.3 MG ELEMENTAL MG) 400 MG: 400 (241.3 MG) TAB at 08:00

## 2024-01-26 RX ADMIN — GUAIFENESIN 600 MG: 600 TABLET, EXTENDED RELEASE ORAL at 12:01

## 2024-01-26 RX ADMIN — ONDANSETRON 4 MG: 2 INJECTION INTRAMUSCULAR; INTRAVENOUS at 12:51

## 2024-01-26 RX ADMIN — TICAGRELOR 60 MG: 60 TABLET ORAL at 20:54

## 2024-01-26 RX ADMIN — SENNOSIDES AND DOCUSATE SODIUM 2 TABLET: 8.6; 5 TABLET ORAL at 20:54

## 2024-01-26 RX ADMIN — ATORVASTATIN CALCIUM 80 MG: 40 TABLET, FILM COATED ORAL at 20:54

## 2024-01-26 RX ADMIN — IPRATROPIUM BROMIDE AND ALBUTEROL SULFATE 3 ML: 2.5; .5 SOLUTION RESPIRATORY (INHALATION) at 07:30

## 2024-01-26 RX ADMIN — ASPIRIN 81 MG: 81 TABLET, CHEWABLE ORAL at 08:00

## 2024-01-26 RX ADMIN — GUAIFENESIN 400 MG: 200 SOLUTION ORAL at 00:34

## 2024-01-26 RX ADMIN — Medication 1 PATCH: at 08:04

## 2024-01-26 RX ADMIN — ROPINIROLE HYDROCHLORIDE 4 MG: 2 TABLET, FILM COATED ORAL at 20:55

## 2024-01-26 RX ADMIN — LOSARTAN POTASSIUM 50 MG: 50 TABLET, FILM COATED ORAL at 08:00

## 2024-01-26 RX ADMIN — IPRATROPIUM BROMIDE AND ALBUTEROL SULFATE 3 ML: 2.5; .5 SOLUTION RESPIRATORY (INHALATION) at 12:10

## 2024-01-26 RX ADMIN — IPRATROPIUM BROMIDE AND ALBUTEROL SULFATE 3 ML: 2.5; .5 SOLUTION RESPIRATORY (INHALATION) at 20:03

## 2024-01-26 RX ADMIN — Medication 10 ML: at 08:04

## 2024-01-26 RX ADMIN — PANTOPRAZOLE SODIUM 40 MG: 40 INJECTION, POWDER, FOR SOLUTION INTRAVENOUS at 06:37

## 2024-01-26 NOTE — PLAN OF CARE
Goal Outcome Evaluation:  Plan of Care Reviewed With: patient        Progress: improving       SLP re-evaluation completed. Will continue to address dysphagia. FEES completed okay for puree w/ honey thick liquids. Please see note for further details and recommendations. Would most benefit from inpatient rehab to maximize function.    Anticipated Discharge Disposition (SLP): inpatient rehabilitation facility          SLP Swallowing Diagnosis: moderate, oral dysphagia, pharyngeal dysphagia (01/26/24 2258)

## 2024-01-26 NOTE — PROGRESS NOTES
[facility] Medicine Services  PROGRESS NOTE    Patient Name: Regine Beckham  : 1955  MRN: [id number]    Date of Admission: 2024  Primary Care Physician: Dread Burton MD    Subjective   Subjective     CC: f/u CVA    HPI: Up in bed. Asking for coffee. Hopeful that she will pass her FEES.      Objective   Objective     Vital Signs:   Temp:  [97.2 °F (36.2 °C)-98.8 °F (37.1 °C)] 98.3 °F (36.8 °C)  Heart Rate:  [] 104  Resp:  [18-20] 18  BP: (121-153)/(71-91) 135/82  Flow (L/min):  [2] 2     Physical Exam:  Constitutional: No acute distress, awake, alert,   HENT: NCAT, mucous membranes moist, keofeed, visually impaired  Respiratory: Clear to auscultation bilaterally, respiratory effort normal   Cardiovascular: RRR, no murmurs, rubs, or gallops  Gastrointestinal: Positive bowel sounds, soft, nontender, nondistended  Musculoskeletal: No bilateral ankle edema  Psychiatric: Appropriate affect, cooperative  Neurologic: Oriented x 3, dysarthria, left hemiparesis  Skin: No rashes     Results Reviewed:  LAB RESULTS:      Lab 24  0245 24  0227 24  2224 24  0408 24  1257   WBC 12.13* 14.84*  --  11.53* 11.47*   HEMOGLOBIN 13.2 13.2  --  13.5 15.1   HEMATOCRIT 39.2 38.7  --  39.1 45.0   PLATELETS 242 256  --  262 258   NEUTROS ABS 10.13* 11.75*  --  9.78* 8.72*   IMMATURE GRANS (ABS) 0.04 0.06*  --  0.04 0.04   LYMPHS ABS 1.10 1.77  --  1.21 2.01   MONOS ABS 0.81 1.17*  --  0.47 0.55   EOS ABS 0.01 0.05  --  0.01 0.11   MCV 94.0 95.1  --  93.3 94.5   PROCALCITONIN  --   --  0.15  --   --          Lab 24  1630 24  0418 24  0349 24  1801 24  0419 24  0230 24  0227 24  1530 24  0408   SODIUM  --  139 136  --  133* 137 139  --  138   POTASSIUM 4.5 3.6 4.1 4.5 3.6 3.7 3.7   < > 3.4*   CHLORIDE  --  102 103  --  101 104 107  --  104   CO2  --  25.0 23.0  --  23.0 21.0* 21.0*  --  22.0   ANION GAP  --   12.0 10.0  --  9.0 12.0 11.0  --  12.0   BUN  --  15 16  --  14 13 10  --  15   CREATININE  --  0.64 0.74  --  0.77 0.66 0.67  --  0.78   EGFR  --  95.8 87.7  --  83.6 95.1 94.7  --  82.3   GLUCOSE  --  118* 110*  --  125* 143* 173*  --  182*   CALCIUM  --  8.7 9.2  --  9.1 8.8 9.1  --  8.7   MAGNESIUM  --  2.0 2.1  --  2.0 1.8  --   --  1.8   PHOSPHORUS  --  2.4* 2.7  --  3.1 2.6  --   --  2.8   HEMOGLOBIN A1C  --   --   --   --   --   --   --   --  6.20*    < > = values in this interval not displayed.         Lab 01/19/24  1257   TOTAL PROTEIN 7.4   ALBUMIN 3.9   GLOBULIN 3.5   ALT (SGPT) 10   AST (SGOT) 17   BILIRUBIN 0.4   ALK PHOS 85             Lab 01/20/24  0408   CHOLESTEROL 160   LDL CHOL 95   HDL CHOL 44   TRIGLYCERIDES 118         Lab 01/19/24  1257   ABO TYPING A   RH TYPING Positive   ANTIBODY SCREEN Negative         Brief Urine Lab Results  (Last result in the past 365 days)        Color   Clarity   Blood   Leuk Est   Nitrite   Protein   CREAT   Urine HCG        12/20/23 2003 Yellow   Clear   Negative   Negative   Negative   Negative                   Microbiology Results Abnormal       Procedure Component Value - Date/Time    Respiratory Culture - Sputum, NT Suction [[id number]] Collected: 01/22/24 0808    Lab Status: Final result Specimen: Sputum from NT Suction Updated: 01/24/24 1052     Respiratory Culture Light growth (2+) Normal respiratory sudhir. No S. aureus or Pseudomonas aeruginosa detected. Final report.     Gram Stain Moderate (3+) WBCs per low power field      Rare (1+) Epithelial cells per low power field      Few (2+) Gram positive cocci in pairs            SLP FEES - Fiberoptic Endo Eval Swallow    Result Date: 1/26/2024  This procedure was auto-finalized with no dictation required.     Results for orders placed during the hospital encounter of 01/19/24    Adult Transthoracic Echo Limited W/ Cont if Necessary Per Protocol    Interpretation Summary    Left ventricular systolic function is  normal. Estimated left ventricular EF = 60%    Saline test results are negative for intracardiac shunting..      Current medications:  Scheduled Meds:aspirin, 81 mg, Nasogastric, Daily  atorvastatin, 80 mg, Nasogastric, Nightly  gabapentin, 100 mg, Nasogastric, Q8H  guaifenesin, 400 mg, Nasogastric, Q4H  ipratropium-albuterol, 3 mL, Nebulization, Q4H - RT  losartan, 50 mg, Nasogastric, Q24H  magnesium oxide, 400 mg, Nasogastric, Daily  mirtazapine, 30 mg, Nasogastric, Nightly  nicotine, 1 patch, Transdermal, Q24H  pantoprazole, 40 mg, Intravenous, Q AM  rOPINIRole, 4 mg, Nasogastric, Nightly  sodium chloride, 10 mL, Intravenous, Q12H  ticagrelor, 60 mg, Nasogastric, BID      Continuous Infusions:   PRN Meds:.  acetaminophen    albuterol    Calcium Replacement - Follow Nurse / BPA Driven Protocol    hydrALAZINE    Magnesium Standard Dose Replacement - Follow Nurse / BPA Driven Protocol    ondansetron    phenol    Phosphorus Replacement - Follow Nurse / BPA Driven Protocol    Potassium Replacement - Follow Nurse / BPA Driven Protocol    sodium chloride    sodium chloride    Assessment & Plan   Assessment & Plan     Active Hospital Problems    Diagnosis  POA    **Stroke [I63.511]  Unknown    Moderate malnutrition [E44.0]  Yes    Grade I diastolic dysfunction [I51.89]  Unknown    GERD (gastroesophageal reflux disease) [K21.9]  Yes    RLS (restless legs syndrome) [G25.81]  Yes    Major depressive disorder [F32.9]  Yes    Iron deficiency anemia due to chronic blood loss [D50.0]  Yes      Resolved Hospital Problems   No resolved problems to display.        Brief Hospital Course to date:  Ms. Beckhma is a 67yo F with a history of HTN, chronic anemia, sleep apnea intolerant of Cpap, severe depression with an overdose attempt in December 2023, and legally blind who presented to [facility] on 1/19/24 with left sided weakness and was found to have a RMCA infarct and 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 She underwent  thrombectomy and carotid stent placement. She was outside the window for thrombolytics. Initially placed on Cardene which has been successfully weaned off. Her hospital course has been complicated by ongoing dysphagia and difficulty managing secretions. Transferred to tele 1/25.     CVA  Dysphagia  --Stroke team follows, continue DAPT/statin. D/W Dr. Lai today. NS follow up in 1 month, stroke clinic in 2 months.  --SLP follows, d/w them today - passed fees w/ modified diet recommended. Pull keofeed.  --PT/OT recs IRF - referrals pending.      Chronic HFpEF  Chronic CESIA  GERD  RLS    Expected Discharge Location and Transportation:   Expected Discharge   Expected Discharge Date: 1/29/2024; Expected Discharge Time:      DVT prophylaxis:  Mechanical DVT prophylaxis orders are present.         AM-PAC 6 Clicks Score (PT): 13 (01/26/24 0800)    CODE STATUS:   Code Status and Medical Interventions:   Ordered at: 01/22/24 1115     Code Status (Patient has no pulse and is not breathing):    CPR (Attempt to Resuscitate)     Medical Interventions (Patient has pulse or is breathing):    Full Support       Roya Mohan II, DO  01/26/24

## 2024-01-26 NOTE — CASE MANAGEMENT/SOCIAL WORK
Continued Stay Note   [facility]     Patient Name: Regine Beckham  MRN: [id number]  Today's Date: 1/26/2024    Admit Date: 1/19/2024    Plan: Rehab   Discharge Plan       Row Name 01/26/24 1121       Plan    Plan Rehab    Patient/Family in Agreement with Plan other (see comments)    Plan Comments Pt was discussed in Medical Rounds today. D/C plan is for rehab. Dr. Mohan states that the pt passed her FEES today. He states to remove pt's keofeed.  attempted to call Milady at [facility], left a voicemail and faxed referral today. ANGELICA spoke with Jeremy at [facility] and made referral.  will continue to follow.    Final Discharge Disposition Code 30 - still a patient                   Discharge Codes    No documentation.                 Expected Discharge Date and Time       Expected Discharge Date Expected Discharge Time    Jan 29, 2024               Radha Greenwood RN

## 2024-01-26 NOTE — DISCHARGE PLACEMENT REQUEST
" [phone]    Regine Beckham \"NEGIN\" (68 y.o. Female)       Date of Birth   1955    Social Security Number       Address   [address] [place], [place] [address]    Home Phone   [phone]    MRN   [id number]       [facility]    Marital Status                               Admission Date   24    Admission Type   Urgent    Admitting Provider   Roya Mohan II, DO    Attending Provider   Roya Mohan II, DO    Department, Room/Bed   [facility] 3F, S302/1       Discharge Date       Discharge Disposition       Discharge Destination                                 Attending Provider: Roya Mohan II, DO    Allergies: Penicillins    Isolation: None   Infection: None   Code Status: CPR    Ht: 167.6 cm (65.98\")   Wt: 71.8 kg (158 lb 4.8 oz)    Admission Cmt: None   Principal Problem: Stroke [I63.511]                   Active Insurance as of 2024       Primary Coverage       Payor Plan Insurance Group Employer/Plan Group    AETNA MEDICARE REPLACEMENT AETNA MEDICARE REPLACEMENT [id number]       Payor Plan Address Payor Plan Phone Number Payor Plan Fax Number Effective Dates    [address] [phone]  2024 - None Entered    [facility] [id number]         Subscriber Name Subscriber Birth Date Member ID       REGINE BECKHAM 1955 [id number]                     Emergency Contacts        (Rel.) Home Phone Work Phone Mobile Phone    MikhailYaa (Friend) -- [phone] --                 Physical Therapy Notes (most recent note)        Ngoc Glynn, PT at 24 0900  Version 1 of 1         Patient Name: Regine Beckham  : 1955    MRN: [id number]                              Today's Date: 2024       Admit Date: 2024    Visit Dx:     ICD-10-CM ICD-9-CM   1. Oropharyngeal dysphagia  R13.12 787.22   2. Dysarthria  R47.1 784.51   3. Cognitive communication deficit  R41.841 799.52     Patient Active " "Problem List   Diagnosis    Iron deficiency anemia due to chronic blood loss    Major depressive disorder    RLS (restless legs syndrome)    GERD (gastroesophageal reflux disease)    Left breast mass    Allergy to penicillin    Stroke    Grade I diastolic dysfunction     Past Medical History:   Diagnosis Date    Anemia     Anxiety     Arthritis     Depression     Legally blind     Restless leg syndrome     Sleep apnea     \"I don't use a cpap anymore since losing 106 lbs\"    Water retention      Past Surgical History:   Procedure Laterality Date    BACK SURGERY      CARDIAC CATHETERIZATION N/A 1/19/2024    Procedure: Percutaneous Manual Thrombectomy;  Surgeon: Efrain Hurley MD;  Location: [facility] CATH INVASIVE LOCATION;  Service: Interventional Radiology;  Laterality: N/A;    GALLBLADDER SURGERY      HIP ARTHROSCOPY      JOINT REPLACEMENT Right       General Information       Row Name 01/24/24 1111          Physical Therapy Time and Intention    Document Type therapy note (daily note)  -AY     Mode of Treatment physical therapy  -AY       Row Name 01/24/24 1111          General Information    Patient Profile Reviewed yes  -AY     Existing Precautions/Restrictions fall;other (see comments)  L weakness (UE>LE); dysarthria; L inattention  -AY     Barriers to Rehab visual deficit;medically complex;cognitive status  -AY       Row Name 01/24/24 1111          Cognition    Orientation Status (Cognition) oriented to;person;place;disoriented to;time;situation  -AY       Row Name 01/24/24 1111          Safety Issues, Functional Mobility    Safety Issues Affecting Function (Mobility) insight into deficits/self-awareness;sequencing abilities;awareness of need for assistance;safety precaution awareness;impulsivity  -AY     Impairments Affecting Function (Mobility) balance;coordination;endurance/activity tolerance;shortness of breath;strength;postural/trunk control;sensation/sensory awareness;motor planning;muscle tone " abnormal;cognition;grasp  -AY               User Key  (r) = Recorded By, (t) = Taken By, (c) = Cosigned By      Initials Name Provider Type    Ngoc Yusuf, PT Physical Therapist                   Mobility       Row Name 01/24/24 1112          Bed Mobility    Comment, (Bed Mobility) recieved attempting to get up from chair impulsively.  -AY       Row Name 01/24/24 1112          Sit-Stand Transfer    Sit-Stand [place] (Transfers) moderate assist (50% patient effort);1 person assist;verbal cues;nonverbal cues (demo/gesture)  -AY     Assistive Device (Sit-Stand Transfers) other (see comments)  BUE support  -AY     Comment, (Sit-Stand Transfer) perfomed x2 reps. progressed to min A with cueing  -AY       Row Name 01/24/24 1112          Gait/Stairs (Locomotion)    Comment, (Gait/Stairs) deferred d/t L knee buckling with standing weight shifting  -AY               User Key  (r) = Recorded By, (t) = Taken By, (c) = Cosigned By      Initials Name Provider Type    Ngoc Yusuf PT Physical Therapist                   Obj/Interventions       Row Name 01/24/24 1113          Motor Skills    Therapeutic Exercise hip;knee;ankle  -AY       Row Name 01/24/24 1113          Hip (Therapeutic Exercise)    Hip (Therapeutic Exercise) strengthening exercise  -AY     Hip Strengthening (Therapeutic Exercise) left;aBduction;aDduction;sitting;5 repetitions  -AY       Row Name 01/24/24 1113          Knee (Therapeutic Exercise)    Knee (Therapeutic Exercise) strengthening exercise  -AY     Knee Strengthening (Therapeutic Exercise) left;LAQ (long arc quad);5 repetitions  -AY       Row Name 01/24/24 1113          Ankle (Therapeutic Exercise)    Ankle (Therapeutic Exercise) AROM (active range of motion)  -AY     Ankle AROM (Therapeutic Exercise) bilateral;dorsiflexion;plantarflexion;10 repetitions;sitting  -AY       Row Name 01/24/24 1113          Balance    Balance Assessment sitting static balance;sitting dynamic balance;sit to  stand dynamic balance;standing dynamic balance;standing static balance  -AY     Static Sitting Balance contact guard  -AY     Dynamic Sitting Balance contact guard  -AY     Position, Sitting Balance unsupported;sitting in chair  -AY     Sit to Stand Dynamic Balance minimal assist  -AY     Static Standing Balance minimal assist  -AY     Dynamic Standing Balance moderate assist  -AY     Position/Device Used, Standing Balance supported  -AY     Comment, Balance Lateral weight shifting. L knee buckling noted. Frequent cueing required for attention to task throughout.  -AY               User Key  (r) = Recorded By, (t) = Taken By, (c) = Cosigned By      Initials Name Provider Type    AY Ngoc Glynn, PT Physical Therapist                   Goals/Plan    No documentation.                  Clinical Impression       Row Name 01/24/24 1114          Pain    Pain Intervention(s) Ambulation/increased activity;Repositioned  -AY     Additional Documentation Pain Scale: FACES Pre/Post-Treatment (Group)  -AY       Row Name 01/24/24 1114          Pain Scale: FACES Pre/Post-Treatment    Pain: FACES Scale, Pretreatment 2-->hurts little bit  -AY     Posttreatment Pain Rating 2-->hurts little bit  -AY     Pain Location generalized  -AY       Row Name 01/24/24 1114          Plan of Care Review    Plan of Care Reviewed With patient  -AY     Progress no change  -AY     Outcome Evaluation Pt limited by L weakness/inattention, balance deficit, and decreased functional endurance compared to baseline. Pt progressed to min A to stand, further mobility limited by L knee buckling with weight shifting. Cont to progress as able. d/c rec for IRF.  -AY       Row Name 01/24/24 1114          Vital Signs    O2 Delivery Pre Treatment room air  -AY     O2 Delivery Intra Treatment room air  -AY     O2 Delivery Post Treatment room air  -AY     Pre Patient Position Sitting  -AY     Intra Patient Position Standing  -AY     Post Patient Position Sitting  -AY        Row Name 01/24/24 1114          Positioning and Restraints    Pre-Treatment Position sitting in chair/recliner  -AY     Post Treatment Position chair  -AY     In Chair notified nsg;reclined;sitting;call light within reach;encouraged to call for assist;exit alarm on;legs elevated;waffle cushion;on mechanical lift sling;with SLP;LUE elevated;RUE elevated  -AY               User Key  (r) = Recorded By, (t) = Taken By, (c) = Cosigned By      Initials Name Provider Type    Ngoc Yusuf PT Physical Therapist                   Outcome Measures       Row Name 01/24/24 1115 01/24/24 0800       How much help from another person do you currently need...    Turning from your back to your side while in flat bed without using bedrails? 3  -AY 3  -BS    Moving from lying on back to sitting on the side of a flat bed without bedrails? 3  -AY 3  -BS    Moving to and from a bed to a chair (including a wheelchair)? 3  -AY 3  -BS    Standing up from a chair using your arms (e.g., wheelchair, bedside chair)? 3  -AY 3  -BS    Climbing 3-5 steps with a railing? 2  -AY 2  -BS    To walk in hospital room? 2  -AY 2  -BS    AM-PAC 6 Clicks Score (PT) 16  -AY 16  -BS    Highest Level of Mobility Goal 5 --> Static standing  -AY 5 --> Static standing  -BS      Row Name 01/24/24 1115          Functional Assessment    Outcome Measure Options AM-PAC 6 Clicks Basic Mobility (PT)  -AY               User Key  (r) = Recorded By, (t) = Taken By, (c) = Cosigned By      Initials Name Provider Type    Ngoc Yusuf, PT Physical Therapist    Sanaz Holden RN Registered Nurse                                 Physical Therapy Education       Title: PT OT SLP Therapies (In Progress)       Topic: Physical Therapy (In Progress)       Point: Mobility training (In Progress)       Learning Progress Summary             Patient Acceptance, E,TB, NR by ETSEVAN at 1/24/2024 1116    Acceptance, E,D, VU,NR by MB at 1/22/2024 1418    Acceptance, E,D, VU,NR by LUIS FERNANDO  at 1/20/2024 1221                         Point: Home exercise program (In Progress)       Learning Progress Summary             Patient Acceptance, E,TB, NR by AY at 1/24/2024 1116    Acceptance, E,D, VU,NR by MB at 1/22/2024 1418                         Point: Body mechanics (In Progress)       Learning Progress Summary             Patient Acceptance, E,TB, NR by AY at 1/24/2024 1116    Acceptance, E,D, VU,NR by MB at 1/22/2024 1418    Acceptance, E,D, VU,NR by LS at 1/20/2024 1221                         Point: Precautions (In Progress)       Learning Progress Summary             Patient Acceptance, E,TB, NR by AY at 1/24/2024 1116    Acceptance, E,D, VU,NR by MB at 1/22/2024 1418    Acceptance, E,D, VU,NR by LS at 1/20/2024 1221                                         User Key       Initials Effective Dates Name Provider Type Discipline     02/03/23 -  Nikole Herrmann, PT Physical Therapist PT    MB 06/16/21 -  Joanne Bermudez, PT Physical Therapist PT    ESTEVAN 11/10/20 -  Ngoc Glynn, BRUCE Physical Therapist PT                  PT Recommendation and Plan     Plan of Care Reviewed With: patient  Progress: no change  Outcome Evaluation: Pt limited by L weakness/inattention, balance deficit, and decreased functional endurance compared to baseline. Pt progressed to min A to stand, further mobility limited by L knee buckling with weight shifting. Cont to progress as able. d/c rec for IRF.     Time Calculation:         PT Charges       Row Name 01/24/24 1116             Time Calculation    Start Time 0900  -AY      PT Received On 01/24/24  -AY         Timed Charges    74708 - PT Therapeutic Exercise Minutes 5  -AY      21712 - PT Therapeutic Activity Minutes 4  -AY         Total Minutes    Timed Charges Total Minutes 9  -AY       Total Minutes 9  -AY                User Key  (r) = Recorded By, (t) = Taken By, (c) = Cosigned By      Initials Name Provider Type    AY Ngoc Glynn, PT Physical Therapist               "    Therapy Charges for Today       Code Description Service Date Service Provider Modifiers Qty    41444503223 HC PT THER PROC EA 15 MIN 2024 Ngoc Glynn, PT GP 1            PT G-Codes  Outcome Measure Options: AM-PAC 6 Clicks Basic Mobility (PT)  AM-PAC 6 Clicks Score (PT): 16  AM-PAC 6 Clicks Score (OT): 10  Modified [place] Scale: 4 - Moderately severe disability.  Unable to walk without assistance, and unable to attend to own bodily needs without assistance.  PT Discharge Summary  Anticipated Discharge Disposition (PT): inpatient rehabilitation facility    Ngoc Glynn PT  2024      Electronically signed by Ngoc Glynn, PT at 24 1116          Occupational Therapy Notes (most recent note)        Maribel Valdez, OT at 24 0955          Patient Name: Regine Beckham  : 1955    MRN: [id number]                              Today's Date: 2024       Admit Date: 2024    Visit Dx:     ICD-10-CM ICD-9-CM   1. Oropharyngeal dysphagia  R13.12 787.22   2. Dysarthria  R47.1 784.51   3. Cognitive communication deficit  R41.841 799.52     Patient Active Problem List   Diagnosis    Iron deficiency anemia due to chronic blood loss    Major depressive disorder    RLS (restless legs syndrome)    GERD (gastroesophageal reflux disease)    Left breast mass    Allergy to penicillin    Stroke    Grade I diastolic dysfunction    Moderate malnutrition     Past Medical History:   Diagnosis Date    Anemia     Anxiety     Arthritis     Depression     Legally blind     Restless leg syndrome     Sleep apnea     \"I don't use a cpap anymore since losing 106 lbs\"    Water retention      Past Surgical History:   Procedure Laterality Date    BACK SURGERY      CARDIAC CATHETERIZATION N/A 2024    Procedure: Percutaneous Manual Thrombectomy;  Surgeon: Efrain Hurley MD;  Location: [facility] CATH INVASIVE LOCATION;  Service: Interventional Radiology;  Laterality: N/A;    GALLBLADDER SURGERY      HIP " ARTHROSCOPY      JOINT REPLACEMENT Right       General Information       Row Name 01/24/24 1307          OT Time and Intention    Document Type therapy note (daily note)  -CS     Mode of Treatment occupational therapy  -       Row Name 01/24/24 1307          General Information    Existing Precautions/Restrictions fall;other (see comments)  L weakness (UE>LE); dysarthria; L inattention  -CS     Barriers to Rehab medically complex;cognitive status;visual deficit  -       Row Name 01/24/24 1307          Cognition    Orientation Status (Cognition) oriented to;person;place;disoriented to;time;situation  -       Row Name 01/24/24 1307          Safety Issues, Functional Mobility    Impairments Affecting Function (Mobility) balance;coordination;endurance/activity tolerance;shortness of breath;strength;postural/trunk control;sensation/sensory awareness;motor planning;muscle tone abnormal;cognition;grasp  -CS     Cognitive Impairments, Mobility Safety/Performance attention;insight into deficits/self-awareness;sequencing abilities  -CS               User Key  (r) = Recorded By, (t) = Taken By, (c) = Cosigned By      Initials Name Provider Type    CS Maribel Valdez, BLU Occupational Therapist                     Mobility/ADL's       Row Name 01/24/24 1308          Transfers    Transfers sit-stand transfer;stand-sit transfer  -     Comment, (Transfers) x2 reps w/ BUE support  -       Row Name 01/24/24 1308          Sit-Stand Transfer    Sit-Stand [place] (Transfers) moderate assist (50% patient effort);2 person assist;verbal cues  -       Row Name 01/24/24 1308          Stand-Sit Transfer    Stand-Sit [place] (Transfers) moderate assist (50% patient effort);2 person assist;verbal cues  -       Row Name 01/24/24 1308          Activities of Daily Living    BADL Assessment/Intervention lower body dressing;grooming  -       Row Name 01/24/24 1308          Lower Body Dressing Assessment/Training     [place] Level (Lower Body Dressing) don;doff;socks;dependent (less than 25% patient effort)  -     Position (Lower Body Dressing) supported sitting  -       Row Name 01/24/24 1308          Grooming Assessment/Training    [place] Level (Grooming) hair care, combing/brushing;set up  -     Position (Grooming) supported sitting  -               User Key  (r) = Recorded By, (t) = Taken By, (c) = Cosigned By      Initials Name Provider Type     Maribel Valdez OT Occupational Therapist                   Obj/Interventions       Row Name 01/24/24 1308          Shoulder (Therapeutic Exercise)    Shoulder (Therapeutic Exercise) AAROM (active assistive range of motion)  -     Shoulder AAROM (Therapeutic Exercise) right assist left;flexion;10 repetitions  -       Row Name 01/24/24 1308          Elbow/Forearm (Therapeutic Exercise)    Elbow/Forearm (Therapeutic Exercise) AAROM (active assistive range of motion)  -     Elbow/Forearm AAROM (Therapeutic Exercise) right assist left;flexion;extension;10 repetitions  -       Row Name 01/24/24 1308          Motor Skills    Therapeutic Exercise shoulder;elbow/forearm  -       Row Name 01/24/24 1308          Balance    Balance Assessment sitting static balance;sitting dynamic balance  -     Static Sitting Balance contact guard  -     Dynamic Sitting Balance minimal assist  -     Position, Sitting Balance sitting in chair  -     Static Standing Balance minimal assist;2-person assist  -     Dynamic Standing Balance moderate assist;2-person assist  -     Position/Device Used, Standing Balance supported  -     Balance Interventions sitting;standing;static;dynamic;dynamic reaching;occupation based/functional task;weight shifting activity  -     Comment, Balance Pt performed R/L weight shifting and took 3 steps foward w/ Mod Ax2  -               User Key  (r) = Recorded By, (t) = Taken By, (c) = Cosigned By      Initials Name Provider Type      Maribel Valdez, OT Occupational Therapist                   Goals/Plan    No documentation.                  Clinical Impression       Row Name 01/24/24 1310          Pain Assessment    Pain Intervention(s) Repositioned;Ambulation/increased activity  -CS     Additional Documentation Pain Scale: FACES Pre/Post-Treatment (Group)  -       Row Name 01/24/24 1310          Pain Scale: FACES Pre/Post-Treatment    Pain: FACES Scale, Pretreatment 2-->hurts little bit  -CS     Posttreatment Pain Rating 2-->hurts little bit  -CS     Pain Location generalized  -CS     Pre/Posttreatment Pain Comment tolerated  -       Row Name 01/24/24 1310          Plan of Care Review    Plan of Care Reviewed With patient  -CS     Progress improving  -CS     Outcome Evaluation Pt demo improved independence by performing grooming tasks w/ Set-up. Pt conts to be limited d/t L sided weakness and balance deficits warranting cont skilled IPOT POC to promote return to PLOF. Pt educated on UE HEP and encouraged to perform outside of treatment session. Recommend pt DC to IP rehab.  -       Row Name 01/24/24 1310          Therapy Plan Review/Discharge Plan (OT)    Anticipated Discharge Disposition (OT) inpatient rehabilitation facility  -       Row Name 01/24/24 1310          Vital Signs    Pre Systolic BP Rehab 125  -CS     Pre Treatment Diastolic BP 73  -CS     Post Systolic BP Rehab 123  -CS     Post Treatment Diastolic BP 84  -CS     Pretreatment Heart Rate (beats/min) 84  -CS     Posttreatment Heart Rate (beats/min) 79  -CS     Pre SpO2 (%) 99  -CS     O2 Delivery Pre Treatment room air  -CS     Post SpO2 (%) 99  -CS     O2 Delivery Post Treatment room air  -CS     Pre Patient Position Sitting  -CS     Intra Patient Position Standing  -CS     Post Patient Position Sitting  -CS       Row Name 01/24/24 1310          Positioning and Restraints    Pre-Treatment Position sitting in chair/recliner  -CS     Post Treatment Position chair  -CS      In Chair notified nsg;reclined;call light within reach;encouraged to call for assist;exit alarm on;RUE elevated;LUE elevated;waffle cushion;on mechanical lift sling;with nsg;legs elevated  -CS               User Key  (r) = Recorded By, (t) = Taken By, (c) = Cosigned By      Initials Name Provider Type    CS Maribel Valdez, BLU Occupational Therapist                   Outcome Measures       Row Name 01/24/24 1311          How much help from another is currently needed...    Putting on and taking off regular lower body clothing? 1  -CS     Bathing (including washing, rinsing, and drying) 2  -CS     Toileting (which includes using toilet bed pan or urinal) 2  -CS     Putting on and taking off regular upper body clothing 2  -CS     Taking care of personal grooming (such as brushing teeth) 2  -CS     Eating meals 2  -CS     AM-PAC 6 Clicks Score (OT) 11  -CS       Row Name 01/24/24 1115 01/24/24 0800       How much help from another person do you currently need...    Turning from your back to your side while in flat bed without using bedrails? 3  -AY 3  -BS    Moving from lying on back to sitting on the side of a flat bed without bedrails? 3  -AY 3  -BS    Moving to and from a bed to a chair (including a wheelchair)? 3  -AY 3  -BS    Standing up from a chair using your arms (e.g., wheelchair, bedside chair)? 3  -AY 3  -BS    Climbing 3-5 steps with a railing? 2  -AY 2  -BS    To walk in hospital room? 2  -AY 2  -BS    AM-PAC 6 Clicks Score (PT) 16  -AY 16  -BS    Highest Level of Mobility Goal 5 --> Static standing  -AY 5 --> Static standing  -BS      Row Name 01/24/24 1311          Modified Davis Scale    Modified Davis Scale 4 - Moderately severe disability.  Unable to walk without assistance, and unable to attend to own bodily needs without assistance.  -CS       Row Name 01/24/24 1311 01/24/24 1115       Functional Assessment    Outcome Measure Options AM-PAC 6 Clicks Daily Activity (OT);Modified Davis  -CS AM-PAC  6 Clicks Basic Mobility (PT)  -AY              User Key  (r) = Recorded By, (t) = Taken By, (c) = Cosigned By      Initials Name Provider Type    CS Maribel Valdez, OT Occupational Therapist    Ngoc Yusuf, PT Physical Therapist    Sanaz Holden, RN Registered Nurse                    Occupational Therapy Education       Title: PT OT SLP Therapies (In Progress)       Topic: Occupational Therapy (In Progress)       Point: ADL training (In Progress)       Description:   Instruct learner(s) on proper safety adaptation and remediation techniques during self care or transfers.   Instruct in proper use of assistive devices.                  Learning Progress Summary             Patient Acceptance, E, NR by CS at 1/24/2024 1312    Acceptance, E, NR by CS at 1/22/2024 1055    Acceptance, E, VU by CS at 1/20/2024 1354                         Point: Home exercise program (In Progress)       Description:   Instruct learner(s) on appropriate technique for monitoring, assisting and/or progressing therapeutic exercises/activities.                  Learning Progress Summary             Patient Acceptance, E, NR by CS at 1/24/2024 1312    Acceptance, E, NR by CS at 1/22/2024 1055                         Point: Precautions (In Progress)       Description:   Instruct learner(s) on prescribed precautions during self-care and functional transfers.                  Learning Progress Summary             Patient Acceptance, E, NR by CS at 1/24/2024 1312    Acceptance, E, NR by CS at 1/22/2024 1055    Acceptance, E, VU by CS at 1/20/2024 1354                         Point: Body mechanics (In Progress)       Description:   Instruct learner(s) on proper positioning and spine alignment during self-care, functional mobility activities and/or exercises.                  Learning Progress Summary             Patient Acceptance, E, NR by CS at 1/24/2024 1312    Acceptance, E, NR by CS at 1/22/2024 1055    Acceptance, E, VU by CS at  1/20/2024 1354                                         User Key       Initials Effective Dates Name Provider Type Discipline     09/02/21 -  Maribel Valdez, OT Occupational Therapist OT                  OT Recommendation and Plan  Planned Therapy Interventions (OT): activity tolerance training, adaptive equipment training, BADL retraining, functional balance retraining, occupation/activity based interventions, ROM/therapeutic exercise, strengthening exercise, transfer/mobility retraining, cognitive/visual perception retraining, neuromuscular control/coordination retraining  Therapy Frequency (OT): daily  Plan of Care Review  Plan of Care Reviewed With: patient  Progress: improving  Outcome Evaluation: Pt demo improved independence by performing grooming tasks w/ Set-up. Pt conts to be limited d/t L sided weakness and balance deficits warranting cont skilled IPOT POC to promote return to PLOF. Pt educated on UE HEP and encouraged to perform outside of treatment session. Recommend pt DC to IP rehab.     Time Calculation:   Evaluation Complexity (OT)  Review Occupational Profile/Medical/Therapy History Complexity: expanded/moderate complexity  Assessment, Occupational Performance/Identification of Deficit Complexity: 5 or more performance deficits  Clinical Decision Making Complexity (OT): detailed assessment/moderate complexity  Overall Complexity of Evaluation (OT): moderate complexity     Time Calculation- OT       Row Name 01/24/24 1312             Time Calculation- OT    OT Start Time 0955  -CS      OT Received On 01/24/24  -CS      OT Goal Re-Cert Due Date 01/30/24  -CS         Timed Charges    98537 - OT Therapeutic Exercise Minutes 5  -CS      63527 - OT Therapeutic Activity Minutes 15  -CS      32616 - OT Self Care/Mgmt Minutes 3  -CS         Total Minutes    Timed Charges Total Minutes 23  -CS       Total Minutes 23  -CS                User Key  (r) = Recorded By, (t) = Taken By, (c) = Cosigned By       "Initials Name Provider Type     Maribel Valdez OT Occupational Therapist                  Therapy Charges for Today       Code Description Service Date Service Provider Modifiers Qty    15373700006  OT THER PROC EA 15 MIN 2024 Maribel Valdez OT GO 1    [id number]  OT THERAPEUTIC ACT EA 15 MIN 2024 Maribel Valdez OT GO 1                 Maribel Valdez OT  2024    Electronically signed by Maribel Valdez OT at 24 1313          Speech Language Pathology Notes (most recent note)        Anh Ross MS CCC-SLP at 24 1108          Acute Care - Speech Language Pathology   Swallow Re-Evaluation [facility]  Fiberoptic Endoscopic Evaluation of Swallowing (FEES)       Patient Name: Regine Beckham  : 1955  MRN: [id number]  Today's Date: 2024               Admit Date: 2024    Visit Dx:     ICD-10-CM ICD-9-CM   1. Oropharyngeal dysphagia  R13.12 787.22   2. Dysarthria  R47.1 784.51   3. Cognitive communication deficit  R41.841 799.52   4. Stroke  I63.511 434.91     Patient Active Problem List   Diagnosis    Iron deficiency anemia due to chronic blood loss    Major depressive disorder    RLS (restless legs syndrome)    GERD (gastroesophageal reflux disease)    Left breast mass    Allergy to penicillin    Stroke    Grade I diastolic dysfunction    Moderate malnutrition     Past Medical History:   Diagnosis Date    Anemia     Anxiety     Arthritis     Depression     Legally blind     Restless leg syndrome     Sleep apnea     \"I don't use a cpap anymore since losing 106 lbs\"    Water retention      Past Surgical History:   Procedure Laterality Date    BACK SURGERY      CARDIAC CATHETERIZATION N/A 2024    Procedure: Percutaneous Manual Thrombectomy;  Surgeon: Efrain Hurley MD;  Location: [facility] INVASIVE LOCATION;  Service: Interventional Radiology;  Laterality: N/A;    GALLBLADDER SURGERY      HIP ARTHROSCOPY      JOINT REPLACEMENT Right        SLP " Recommendation and Plan  SLP Swallowing Diagnosis: moderate, oral dysphagia, pharyngeal dysphagia (01/26/24 0940)  SLP Diet Recommendation: puree, no mixed consistencies, honey thick liquids (okay for straws) (01/26/24 0940)  Recommended Precautions and Strategies: upright posture during/after eating, small bites of food and sips of liquid, general aspiration precautions, multiple swallows per bite of food, multiple swallows per sip of liquid, reflux precautions, fatigue precautions (01/26/24 0940)  SLP Rec. for Method of Medication Administration: meds whole, meds crushed, with puree, as tolerated (01/26/24 0940)     Monitor for Signs of Aspiration: yes, notify SLP if any concerns (01/26/24 0940)     Swallow Criteria for Skilled Therapeutic Interventions Met: demonstrates skilled criteria (01/26/24 0940)  Anticipated Discharge Disposition (SLP): inpatient rehabilitation facility (01/26/24 0940)  Rehab Potential/Prognosis, Swallowing: good, to achieve stated therapy goals (01/26/24 0940)  Therapy Frequency (Swallow): 5 days per week (01/26/24 0940)  Predicted Duration Therapy Intervention (Days): until discharge (01/26/24 0940)  Oral Care Recommendations: Oral Care BID/PRN, Suction toothbrush (01/26/24 0940)           Plan of Care Reviewed With: patient  Progress: improving      SWALLOW EVALUATION (last 72 hours)       SLP Adult Swallow Evaluation       Row Name 01/26/24 0940       Rehab Evaluation    Document Type re-evaluation  -CJ    Subjective Information no complaints  -CJ    Patient Observations alert;cooperative  -CJ    Patient/Family/Caregiver Comments/Observations no family present  -CJ    Patient Effort good  -CJ    Symptoms Noted During/After Treatment none  -CJ       General Information    Patient Profile Reviewed yes  -CJ    Pertinent History Of Current Problem see initial eval; referred for repeat FEES to determine need for long term nutrition  -CJ    Current Method of Nutrition NPO;nasogastric  feedings;small-bore  -CJ    Precautions/Limitations, Vision WFL with corrective lenses;for purposes of eval  -CJ    Precautions/Limitations, Hearing WFL;for purposes of eval  -CJ    Prior Level of Function-Communication WFL  -CJ    Prior Level of Function-Swallowing NPO  -CJ    Plans/Goals Discussed with patient;agreed upon  -CJ    Barriers to Rehab medically complex  -CJ    Patient's Goals for Discharge return to PO diet  -CJ       Pain    Additional Documentation Pain Scale: FACES Pre/Post-Treatment (Group)  -CJ       Pain Scale: FACES Pre/Post-Treatment    Pain: FACES Scale, Pretreatment 2-->hurts little bit  -CJ    Posttreatment Pain Rating 2-->hurts little bit  -CJ       Fiberoptic Endoscopic Evaluation of Swallowing (FEES)    Risks/Benefits Reviewed risks/benefits explained;patient;agreed to eval  -    Nasal Entry right:  -    Scope serial number/identification [id number]  -    Special Considerations keofeed in L nare  -CJ       Anatomy and Physiology    Anatomic Considerations edema;erythema;vocal folds;false vocal folds;arytenoids  -CJ    Comment seems improved from previous FEES  -CJ    Velopharyngeal WFL  -CJ    Base of Tongue symmetrical;range reduced  -CJ    Epiglottis edematous  -CJ    Laryngeal Function Breathing symmetrical  -CJ    Laryngeal Function Phonation symmetrical  -CJ    Laryngeal Function to Breath Hold incomplete closure  -CJ    Secretion Rating Scale (Pradeep et al. 1996) 2- secretions initially outside the vestibule but later entered the vestibule  -CJ    Secretion Description thin;clear;white  white tinged  -CJ    Ice Chips DNA  -CJ    Spontaneous Swallow frequency reduced  -CJ    Sensory sensed scope  -CJ    Utensils Used Straw;Cup;Spoon  -CJ    Consistencies Trialed thin liquids;nectar-thick liquids;honey-thick liquids;pudding/puree;regular textures  -CJ       FEES Interpretation    Oral Phase reduced lingual control;increased A-P transit time  -CJ       Initiation of Pharyngeal Swallow     Initiation of Pharyngeal Swallow bolus in valleculae  -CJ    Pharyngeal Phase impaired pharyngeal phase of swallowing  -CJ    Aspiration During the Swallow thin liquids;nectar-thick liquids;secondary to delayed swallow initiation or mistiming;secondary to reduced laryngeal elevation;secondary to reduced vestibular closure  -CJ    Aspiration After the Swallow thin liquids;nectar-thick liquids;secondary to residue;in pyriform sinuses;in laryngeal vestibule  -CJ    Depth of Penetration deep  -CJ    Response to Penetration No  -CJ    No spontaneous response to penetration and non-effective laryngeal clearance with cue (see comments)  -CJ    Response to Aspiration No  -CJ    No spontaneous response to aspiration with non-effective subglottic clearance with cue (see comments);other (see comments)  weak cough,reduced respiratory support  -CJ    Rosenbek's Scale thin:;nectar:;8-->Level 8  -CJ    Residue all consistencies tested;secondary to reduced base of tongue retraction;secondary to reduced posterior pharyngeal wall stripping;secondary to reduced laryngeal elevation;secondary to reduced hyolaryngeal excursion  worse w/ thin/nectar  -CJ    Response to Residue cleared residue;with spontaneous subsequent swallow;other (see comments)  cleared majority  -CJ    Attempted Compensatory Maneuvers bolus size;bolus presentation style;additional subsequent swallow;head turn to left;throat clear after swallow  -CJ    Response to Attempted Compensatory Maneuvers did not prevent aspiration  -CJ    Successful Compensatory Maneuver Competency patient able to;demonstrate compensations  -CJ    FEES Summary Moderate oropharyngeal dysphagia. Continues w/ diffuse weakness and edema contributing but has improved. Silent aspiration of thin and nectar thick liquids occurring during the swallow. Continued silent aspiration of thin/nectar residue after the swallow. Cued cough is weak and ineffective to clear aspirated material. No penetration  or aspiration w/ pudding, solids or honey thick liquids. Pt able to sense majority of residue and clear w/ consecutive swallow. [place] to be somewhat at risk for difficulty w/ solids 2/2 edentulous and lingual weakness. Can likely advance solids at bedside. Recommend puree w/ honey thick liquids, okay for straws, no mixed consistencies at this time. Will continue to f/u for dysphagia tx. Would benefit from inpatient rehab to maximize function  -       SLP Evaluation Clinical Impression    SLP Swallowing Diagnosis moderate;oral dysphagia;pharyngeal dysphagia  -    Functional Impact risk of aspiration/pneumonia;risk of dehydration;risk of malnutrition  -    Rehab Potential/Prognosis, Swallowing good, to achieve stated therapy goals  -    Swallow Criteria for Skilled Therapeutic Interventions Met demonstrates skilled criteria  -       SLP Treatment Clinical Impressions    Treatment Assessment (SLP) --    Treatment Assessment Comments (SLP) --       Recommendations    Therapy Frequency (Swallow) 5 days per week  -    Predicted Duration Therapy Intervention (Days) until discharge  -    SLP Diet Recommendation puree;no mixed consistencies;honey thick liquids  okay for straws  -    Recommended Precautions and Strategies upright posture during/after eating;small bites of food and sips of liquid;general aspiration precautions;multiple swallows per bite of food;multiple swallows per sip of liquid;reflux precautions;fatigue precautions  -    Oral Care Recommendations Oral Care BID/PRN;Suction toothbrush  -    SLP Rec. for Method of Medication Administration meds whole;meds crushed;with puree;as tolerated  -    Monitor for Signs of Aspiration yes;notify SLP if any concerns  -    Anticipated Discharge Disposition (SLP) inpatient rehabilitation facility  -              User Key  (r) = Recorded By, (t) = Taken By, (c) = Cosigned By      Initials Name Effective Dates    Anh Cloud, MS CCC-SLP  07/11/23 -     Rito Mata MS CCC-SLP 09/14/23 -                     EDUCATION  The patient has been educated in the following areas:   Dysphagia (Swallowing Impairment) Oral Care/Hydration Modified Diet Instruction.        SLP GOALS       Row Name 01/26/24 0940       (LTG) Patient will demonstrate functional swallow for    Diet Texture (Demonstrate functional swallow) regular textures  -CJ    Liquid viscosity (Demonstrate functional swallow) thin liquids  -CJ    [place] (Demonstrate functional swallow) with minimal cues (75-90% accuracy)  -CJ    Time Frame (Demonstrate functional swallow) by discharge  -CJ    Progress/Outcomes (Demonstrate functional swallow) goal ongoing  -CJ       (STG) Patient will tolerate trials of    Consistencies Trialed (Tolerate trials) pureed textures;honey/ moderately thick liquids  -CJ    Desired Outcome (Tolerate trials) without signs/symptoms of aspiration  -CJ    [place] (Tolerate trials) with minimal cues (75-90% accuracy)  -CJ    Time Frame (Tolerate trials) by discharge  -CJ    Progress/Outcomes (Tolerate trials) goal revised this date  -CJ    Comment (Tolerate trials) --       (STG) Patient will tolerate therapeutic trials of    Consistencies Trialed (Tolerate therapeutic trials) --    Desired Outcome (Tolerate therapeutic trials) --    Eddy (Tolerate therapeutic trials) --    Time Frame (Tolerate therapeutic trials) --    Progress/Outcomes (Tolerate therapeutic trials) goal no longer appropriate  -CJ       (STG) Labial Strengthening Goal 1 (SLP)    Activity (Labial Strengthening Goal 1, SLP) increase labial tone  -CJ    Increase Labial Tone labial resistance exercises;swallow trials  -CJ    [place]/Accuracy (Labial Strengthening Goal 1, SLP) with minimal cues (75-90% accuracy)  -CJ    Time Frame (Labial Strengthening Goal 1, SLP) short term goal (STG)  -CJ    Progress/Outcomes (Labial Strengthening Goal 1, SLP) goal ongoing  -CJ    Comment (Labial  Strengthening Goal 1, SLP) --       (STG) Lingual Strengthening Goal 1 (SLP)    Activity (Lingual Strengthening Goal 1, SLP) increase lingual tone/sensation/control/coordination/movement;increase tongue back strength  -CJ    Increase Lingual Tone/Sensation/Control/Coordination/Movement lingual movement exercises;lingual resistance exercises  -CJ    Increase Tongue Back Strength lingual movement exercises;swallow trials;lingual resistance exercises  -CJ    [place]/Accuracy (Lingual Strengthening Goal 1, SLP) with minimal cues (75-90% accuracy)  -CJ    Time Frame (Lingual Strengthening Goal 1, SLP) short term goal (STG)  -CJ    Progress/Outcomes (Lingual Strengthening Goal 1, SLP) goal ongoing  -CJ    Comment (Lingual Strengthening Goal 1, SLP) --       (STG) Pharyngeal Strengthening Exercise Goal 1 (SLP)    Activity (Pharyngeal Strengthening Goal 1, SLP) increase timing;increase superior movement of the hyolaryngeal complex;increase anterior movement of the hyolaryngeal complex;increase closure at entrance to airway/closure of airway at glottis;increase squeeze/positive pressure generation;increase tongue base retraction  -CJ    Increase Timing prepping - 3 second prep or suck swallow or 3-step swallow  -CJ    Increase Superior Movement of the Hyolaryngeal Complex hard effortful swallow;falsetto  -CJ    Increase Anterior Movement of the Hyolaryngeal Complex chin tuck against resistance (CTAR);hard effortful swallow  -CJ    Increase Closure at Entrance to Airway/Closure of Airway at Glottis supraglottic swallow;breath hold exercises;EMST  -CJ    Increase Squeeze/Positive Pressure Generation hard effortful swallow  -CJ    Increase Tongue Base Retraction hard effortful swallow  -CJ    [place]/Accuracy (Pharyngeal Strengthening Goal 1, SLP) with moderate cues (50-74% accuracy)  -CJ    Time Frame (Pharyngeal Strengthening Goal 1, SLP) short term goal (STG)  -CJ    Progress/Outcomes (Pharyngeal Strengthening Goal  1, SLP) goal revised this date  -CJ    Comment (Pharyngeal Strengthening Goal 1, SLP) added EMST  -CJ       Patient will demonstrate functional speech skills for return to discharge environment    [place] with moderate cues  -CJ    Time frame by discharge  -CJ    Progress/Outcomes goal ongoing  -CJ       Patient will demonstrate functional cognitive-linguistic skills for return to discharge environment    [place] with moderate cues  -CJ    Time frame by discharge  -CJ    Progress/Outcomes goal ongoing  -CJ       SLP Diagnostic Treatment     Patient will participate in further assessment in the following areas cognitive-linguistic  -CJ    Time Frame (Diagnostic) short term goal (STG)  -CJ    Progress/Outcomes (Additional Goal 1, SLP) goal ongoing  -CJ       Respiratory Support Goal 1 (SLP)    Improve Respiratory Support Goal 1 (SLP) increasing length of exhalation;sustaining a vowel sound on exhalation;80%;with minimal cues (75-90%)  -CJ    Time Frame (Respiratory Support Goal 1, SLP) short term goal (STG)  -CJ    Progress (Respiratory Support Skills Goal 1, SLP) --    Progress/Outcomes (Respiratory Support Goal 1, SLP) goal ongoing  -CJ       Phonation Goal 1 (SLP)    Improve Phonation By Goal 1 (SLP) using loud speech;80%;with moderate cues (50-74%)  -CJ    Time Frame (Phonation Goal 1, SLP) short term goal (STG)  -CJ    Progress (Phonation Goal 1, SLP) --    Progress/Outcomes (Phonation Goal 1, SLP) goal ongoing  -CJ       Articulation Goal 1 (SLP)    Improve Articulation Goal 1 (SLP) by over-articulating at word level;80%;with moderate cues (50-74%)  -CJ    Time Frame (Articulation Goal 1, SLP) short term goal (STG)  -CJ    Progress (Articulation Goal 1, SLP) --    Progress/Outcomes (Articulation Goal 1, SLP) goal ongoing  -CJ    Comment (Articulation Goal 1, SLP) --       Attention Goal 1 (SLP)    Improve Attention by Goal 1 (SLP) attending to task;complete sustained attention task;80%;with minimal  cues (75-90%)  -CJ    Time Frame (Attention Goal 1, SLP) short term goal (STG)  -CJ    Progress (Attention Goal 1, SLP) --    Progress/Outcomes (Attention Goal 1, SLP) goal ongoing  -CJ    Comment (Attention Goal 1, SLP) --       Reasoning Goal 1 (SLP)    Improve Reasoning Through Goal 1 (SLP) complete basic reasoning task;80%;with minimal cues (75-90%)  -CJ    Time Frame (Reasoning Goal 1, SLP) short term goal (STG)  -CJ    Progress/Outcomes (Reasoning Goal 1, SLP) goal ongoing  -CJ              User Key  (r) = Recorded By, (t) = Taken By, (c) = Cosigned By      Initials Name Provider Type    Anh Cloud MS CCC-SLP Speech and Language Pathologist    Rito Mata MS CCC-SLP Speech and Language Pathologist                       Time Calculation:    Time Calculation- SLP       Row Name 01/26/24 1107             Time Calculation- SLP    SLP Start Time 0940  -CJ      SLP Received On 01/26/24  -CJ         Untimed Charges    60317-XZ Treatment Swallow Minutes 99  -CJ         Total Minutes    Untimed Charges Total Minutes 99  -CJ       Total Minutes 99  -CJ                User Key  (r) = Recorded By, (t) = Taken By, (c) = Cosigned By      Initials Name Provider Type    Anh Cloud MS CCC-SLP Speech and Language Pathologist                    Therapy Charges for Today       Code Description Service Date Service Provider Modifiers Qty    [id number] HC ST FIBEROPTIC ENDO EVAL SWALL 7 1/26/2024 Anh Ross MS CCC-SLP GN 1    [id number] HC ST FIBEROPTIC ENDO EVAL SWALL 7 1/26/2024 Anh Ross MS CCC-SLP GN 1                 Anh Ross MS CCC-CHAYO  1/26/2024    Electronically signed by Anh Ross MS CCC-SLP at 01/26/24 1111

## 2024-01-26 NOTE — MBS/VFSS/FEES
"Acute Care - Speech Language Pathology   Swallow Re-Evaluation [facility]  Fiberoptic Endoscopic Evaluation of Swallowing (FEES)       Patient Name: Regine Beckham  : 1955  MRN: [id number]  Today's Date: 2024               Admit Date: 2024    Visit Dx:     ICD-10-CM ICD-9-CM   1. Oropharyngeal dysphagia  R13.12 787.22   2. Dysarthria  R47.1 784.51   3. Cognitive communication deficit  R41.841 799.52   4. Stroke  I63.511 434.91     Patient Active Problem List   Diagnosis    Iron deficiency anemia due to chronic blood loss    Major depressive disorder    RLS (restless legs syndrome)    GERD (gastroesophageal reflux disease)    Left breast mass    Allergy to penicillin    Stroke    Grade I diastolic dysfunction    Moderate malnutrition     Past Medical History:   Diagnosis Date    Anemia     Anxiety     Arthritis     Depression     Legally blind     Restless leg syndrome     Sleep apnea     \"I don't use a cpap anymore since losing 106 lbs\"    Water retention      Past Surgical History:   Procedure Laterality Date    BACK SURGERY      CARDIAC CATHETERIZATION N/A 2024    Procedure: Percutaneous Manual Thrombectomy;  Surgeon: Efrain Hurley MD;  Location: [facility] INVASIVE LOCATION;  Service: Interventional Radiology;  Laterality: N/A;    GALLBLADDER SURGERY      HIP ARTHROSCOPY      JOINT REPLACEMENT Right        SLP Recommendation and Plan  SLP Swallowing Diagnosis: moderate, oral dysphagia, pharyngeal dysphagia (24)  SLP Diet Recommendation: puree, no mixed consistencies, honey thick liquids (okay for straws) (24)  Recommended Precautions and Strategies: upright posture during/after eating, small bites of food and sips of liquid, general aspiration precautions, multiple swallows per bite of food, multiple swallows per sip of liquid, reflux precautions, fatigue precautions (24)  SLP Rec. for Method of Medication Administration: meds whole, meds crushed, " with puree, as tolerated (01/26/24 0940)     Monitor for Signs of Aspiration: yes, notify SLP if any concerns (01/26/24 0940)     Swallow Criteria for Skilled Therapeutic Interventions Met: demonstrates skilled criteria (01/26/24 0940)  Anticipated Discharge Disposition (SLP): inpatient rehabilitation facility (01/26/24 0940)  Rehab Potential/Prognosis, Swallowing: good, to achieve stated therapy goals (01/26/24 0940)  Therapy Frequency (Swallow): 5 days per week (01/26/24 0940)  Predicted Duration Therapy Intervention (Days): until discharge (01/26/24 0940)  Oral Care Recommendations: Oral Care BID/PRN, Suction toothbrush (01/26/24 0940)           Plan of Care Reviewed With: patient  Progress: improving      SWALLOW EVALUATION (last 72 hours)       SLP Adult Swallow Evaluation       Row Name 01/26/24 0940       Rehab Evaluation    Document Type re-evaluation  -CJ    Subjective Information no complaints  -CJ    Patient Observations alert;cooperative  -CJ    Patient/Family/Caregiver Comments/Observations no family present  -CJ    Patient Effort good  -CJ    Symptoms Noted During/After Treatment none  -CJ       General Information    Patient Profile Reviewed yes  -CJ    Pertinent History Of Current Problem see initial eval; referred for repeat FEES to determine need for long term nutrition  -CJ    Current Method of Nutrition NPO;nasogastric feedings;small-bore  -CJ    Precautions/Limitations, Vision WFL with corrective lenses;for purposes of eval  -CJ    Precautions/Limitations, Hearing WFL;for purposes of eval  -CJ    Prior Level of Function-Communication WFL  -CJ    Prior Level of Function-Swallowing NPO  -CJ    Plans/Goals Discussed with patient;agreed upon  -    Barriers to Rehab medically complex  -CJ    Patient's Goals for Discharge return to PO diet  -CJ       Pain    Additional Documentation Pain Scale: FACES Pre/Post-Treatment (Group)  -CJ       Pain Scale: FACES Pre/Post-Treatment    Pain: FACES Scale,  Pretreatment 2-->hurts little bit  -CJ    Posttreatment Pain Rating 2-->hurts little bit  -CJ       Fiberoptic Endoscopic Evaluation of Swallowing (FEES)    Risks/Benefits Reviewed risks/benefits explained;patient;agreed to eval  -CJ    Nasal Entry right:  -CJ    Scope serial number/identification [id number]  -CJ    Special Considerations keofeed in L nare  -CJ       Anatomy and Physiology    Anatomic Considerations edema;erythema;vocal folds;false vocal folds;arytenoids  -CJ    Comment seems improved from previous FEES  -CJ    Velopharyngeal WFL  -CJ    Base of Tongue symmetrical;range reduced  -CJ    Epiglottis edematous  -CJ    Laryngeal Function Breathing symmetrical  -CJ    Laryngeal Function Phonation symmetrical  -CJ    Laryngeal Function to Breath Hold incomplete closure  -CJ    Secretion Rating Scale (Pradeep et al. 1996) 2- secretions initially outside the vestibule but later entered the vestibule  -CJ    Secretion Description thin;clear;white  white tinged  -CJ    Ice Chips DNA  -CJ    Spontaneous Swallow frequency reduced  -CJ    Sensory sensed scope  -CJ    Utensils Used Straw;Cup;Spoon  -CJ    Consistencies Trialed thin liquids;nectar-thick liquids;honey-thick liquids;pudding/puree;regular textures  -CJ       FEES Interpretation    Oral Phase reduced lingual control;increased A-P transit time  -CJ       Initiation of Pharyngeal Swallow    Initiation of Pharyngeal Swallow bolus in valleculae  -CJ    Pharyngeal Phase impaired pharyngeal phase of swallowing  -CJ    Aspiration During the Swallow thin liquids;nectar-thick liquids;secondary to delayed swallow initiation or mistiming;secondary to reduced laryngeal elevation;secondary to reduced vestibular closure  -CJ    Aspiration After the Swallow thin liquids;nectar-thick liquids;secondary to residue;in pyriform sinuses;in laryngeal vestibule  -CJ    Depth of Penetration deep  -CJ    Response to Penetration No  -CJ    No spontaneous response to penetration and  non-effective laryngeal clearance with cue (see comments)  -CJ    Response to Aspiration No  -CJ    No spontaneous response to aspiration with non-effective subglottic clearance with cue (see comments);other (see comments)  weak cough,reduced respiratory support  -CJ    Rosenbek's Scale thin:;nectar:;8-->Level 8  -CJ    Residue all consistencies tested;secondary to reduced base of tongue retraction;secondary to reduced posterior pharyngeal wall stripping;secondary to reduced laryngeal elevation;secondary to reduced hyolaryngeal excursion  worse w/ thin/nectar  -CJ    Response to Residue cleared residue;with spontaneous subsequent swallow;other (see comments)  cleared majority  -CJ    Attempted Compensatory Maneuvers bolus size;bolus presentation style;additional subsequent swallow;head turn to left;throat clear after swallow  -CJ    Response to Attempted Compensatory Maneuvers did not prevent aspiration  -CJ    Successful Compensatory Maneuver Competency patient able to;demonstrate compensations  -CJ    FEES Summary Moderate oropharyngeal dysphagia. Continues w/ diffuse weakness and edema contributing but has improved. Silent aspiration of thin and nectar thick liquids occurring during the swallow. Continued silent aspiration of thin/nectar residue after the swallow. Cued cough is weak and ineffective to clear aspirated material. No penetration or aspiration w/ pudding, solids or honey thick liquids. Pt able to sense majority of residue and clear w/ consecutive swallow. Dolores to be somewhat at risk for difficulty w/ solids 2/2 edentulous and lingual weakness. Can likely advance solids at bedside. Recommend puree w/ honey thick liquids, okay for straws, no mixed consistencies at this time. Will continue to f/u for dysphagia tx. Would benefit from inpatient rehab to maximize function  -CJ       SLP Evaluation Clinical Impression    SLP Swallowing Diagnosis moderate;oral dysphagia;pharyngeal dysphagia  -CJ    Functional  Impact risk of aspiration/pneumonia;risk of dehydration;risk of malnutrition  -    Rehab Potential/Prognosis, Swallowing good, to achieve stated therapy goals  -    Swallow Criteria for Skilled Therapeutic Interventions Met demonstrates skilled criteria  -       SLP Treatment Clinical Impressions    Treatment Assessment (SLP) --    Treatment Assessment Comments (SLP) --       Recommendations    Therapy Frequency (Swallow) 5 days per week  -    Predicted Duration Therapy Intervention (Days) until discharge  -    SLP Diet Recommendation puree;no mixed consistencies;honey thick liquids  okay for straws  -    Recommended Precautions and Strategies upright posture during/after eating;small bites of food and sips of liquid;general aspiration precautions;multiple swallows per bite of food;multiple swallows per sip of liquid;reflux precautions;fatigue precautions  -    Oral Care Recommendations Oral Care BID/PRN;Suction toothbrush  -    SLP Rec. for Method of Medication Administration meds whole;meds crushed;with puree;as tolerated  -    Monitor for Signs of Aspiration yes;notify SLP if any concerns  -    Anticipated Discharge Disposition (SLP) inpatient rehabilitation facility  -              User Key  (r) = Recorded By, (t) = Taken By, (c) = Cosigned By      Initials Name Effective Dates     Anh Ross, MS CCC-SLP 07/11/23 -     RS Rito Morrissey MS CCC-SLP 09/14/23 -                     EDUCATION  The patient has been educated in the following areas:   Dysphagia (Swallowing Impairment) Oral Care/Hydration Modified Diet Instruction.        SLP GOALS       Row Name 01/26/24 0940       (LTG) Patient will demonstrate functional swallow for    Diet Texture (Demonstrate functional swallow) regular textures  -CJ    Liquid viscosity (Demonstrate functional swallow) thin liquids  -    [place] (Demonstrate functional swallow) with minimal cues (75-90% accuracy)  -    Time Frame (Demonstrate  functional swallow) by discharge  -CJ    Progress/Outcomes (Demonstrate functional swallow) goal ongoing  -CJ       (STG) Patient will tolerate trials of    Consistencies Trialed (Tolerate trials) pureed textures;honey/ moderately thick liquids  -CJ    Desired Outcome (Tolerate trials) without signs/symptoms of aspiration  -CJ    [place] (Tolerate trials) with minimal cues (75-90% accuracy)  -CJ    Time Frame (Tolerate trials) by discharge  -CJ    Progress/Outcomes (Tolerate trials) goal revised this date  -CJ    Comment (Tolerate trials) --       (STG) Patient will tolerate therapeutic trials of    Consistencies Trialed (Tolerate therapeutic trials) --    Desired Outcome (Tolerate therapeutic trials) --    [place] (Tolerate therapeutic trials) --    Time Frame (Tolerate therapeutic trials) --    Progress/Outcomes (Tolerate therapeutic trials) goal no longer appropriate  -CJ       (STG) Labial Strengthening Goal 1 (SLP)    Activity (Labial Strengthening Goal 1, SLP) increase labial tone  -CJ    Increase Labial Tone labial resistance exercises;swallow trials  -CJ    [place]/Accuracy (Labial Strengthening Goal 1, SLP) with minimal cues (75-90% accuracy)  -CJ    Time Frame (Labial Strengthening Goal 1, SLP) short term goal (STG)  -CJ    Progress/Outcomes (Labial Strengthening Goal 1, SLP) goal ongoing  -CJ    Comment (Labial Strengthening Goal 1, SLP) --       (STG) Lingual Strengthening Goal 1 (SLP)    Activity (Lingual Strengthening Goal 1, SLP) increase lingual tone/sensation/control/coordination/movement;increase tongue back strength  -CJ    Increase Lingual Tone/Sensation/Control/Coordination/Movement lingual movement exercises;lingual resistance exercises  -CJ    Increase Tongue Back Strength lingual movement exercises;swallow trials;lingual resistance exercises  -CJ    [place]/Accuracy (Lingual Strengthening Goal 1, SLP) with minimal cues (75-90% accuracy)  -CJ    Time Frame (Lingual  Strengthening Goal 1, SLP) short term goal (STG)  -CJ    Progress/Outcomes (Lingual Strengthening Goal 1, SLP) goal ongoing  -CJ    Comment (Lingual Strengthening Goal 1, SLP) --       (STG) Pharyngeal Strengthening Exercise Goal 1 (SLP)    Activity (Pharyngeal Strengthening Goal 1, SLP) increase timing;increase superior movement of the hyolaryngeal complex;increase anterior movement of the hyolaryngeal complex;increase closure at entrance to airway/closure of airway at glottis;increase squeeze/positive pressure generation;increase tongue base retraction  -CJ    Increase Timing prepping - 3 second prep or suck swallow or 3-step swallow  -CJ    Increase Superior Movement of the Hyolaryngeal Complex hard effortful swallow;falsetto  -CJ    Increase Anterior Movement of the Hyolaryngeal Complex chin tuck against resistance (CTAR);hard effortful swallow  -CJ    Increase Closure at Entrance to Airway/Closure of Airway at Glottis supraglottic swallow;breath hold exercises;EMST  -CJ    Increase Squeeze/Positive Pressure Generation hard effortful swallow  -CJ    Increase Tongue Base Retraction hard effortful swallow  -CJ    [place]/Accuracy (Pharyngeal Strengthening Goal 1, SLP) with moderate cues (50-74% accuracy)  -CJ    Time Frame (Pharyngeal Strengthening Goal 1, SLP) short term goal (STG)  -CJ    Progress/Outcomes (Pharyngeal Strengthening Goal 1, SLP) goal revised this date  -CJ    Comment (Pharyngeal Strengthening Goal 1, SLP) added EMST  -CJ       Patient will demonstrate functional speech skills for return to discharge environment    [place] with moderate cues  -CJ    Time frame by discharge  -CJ    Progress/Outcomes goal ongoing  -CJ       Patient will demonstrate functional cognitive-linguistic skills for return to discharge environment    [place] with moderate cues  -CJ    Time frame by discharge  -CJ    Progress/Outcomes goal ongoing  -CJ       SLP Diagnostic Treatment     Patient will participate  in further assessment in the following areas cognitive-linguistic  -CJ    Time Frame (Diagnostic) short term goal (STG)  -CJ    Progress/Outcomes (Additional Goal 1, SLP) goal ongoing  -CJ       Respiratory Support Goal 1 (SLP)    Improve Respiratory Support Goal 1 (SLP) increasing length of exhalation;sustaining a vowel sound on exhalation;80%;with minimal cues (75-90%)  -CJ    Time Frame (Respiratory Support Goal 1, SLP) short term goal (STG)  -CJ    Progress (Respiratory Support Skills Goal 1, SLP) --    Progress/Outcomes (Respiratory Support Goal 1, SLP) goal ongoing  -CJ       Phonation Goal 1 (SLP)    Improve Phonation By Goal 1 (SLP) using loud speech;80%;with moderate cues (50-74%)  -CJ    Time Frame (Phonation Goal 1, SLP) short term goal (STG)  -CJ    Progress (Phonation Goal 1, SLP) --    Progress/Outcomes (Phonation Goal 1, SLP) goal ongoing  -CJ       Articulation Goal 1 (SLP)    Improve Articulation Goal 1 (SLP) by over-articulating at word level;80%;with moderate cues (50-74%)  -CJ    Time Frame (Articulation Goal 1, SLP) short term goal (STG)  -CJ    Progress (Articulation Goal 1, SLP) --    Progress/Outcomes (Articulation Goal 1, SLP) goal ongoing  -CJ    Comment (Articulation Goal 1, SLP) --       Attention Goal 1 (SLP)    Improve Attention by Goal 1 (SLP) attending to task;complete sustained attention task;80%;with minimal cues (75-90%)  -CJ    Time Frame (Attention Goal 1, SLP) short term goal (STG)  -CJ    Progress (Attention Goal 1, SLP) --    Progress/Outcomes (Attention Goal 1, SLP) goal ongoing  -CJ    Comment (Attention Goal 1, SLP) --       Reasoning Goal 1 (SLP)    Improve Reasoning Through Goal 1 (SLP) complete basic reasoning task;80%;with minimal cues (75-90%)  -CJ    Time Frame (Reasoning Goal 1, SLP) short term goal (STG)  -CJ    Progress/Outcomes (Reasoning Goal 1, SLP) goal ongoing  -CJ              User Key  (r) = Recorded By, (t) = Taken By, (c) = Cosigned By      Initials Name  Provider Type    Anh Cloud MS CCC-SLP Speech and Language Pathologist    Rito Mata MS CCC-SLP Speech and Language Pathologist                       Time Calculation:    Time Calculation- SLP       Row Name 01/26/24 1107             Time Calculation- SLP    SLP Start Time 0940  -      SLP Received On 01/26/24  -         Untimed Charges    25953-CJ Treatment Swallow Minutes 99  -CJ         Total Minutes    Untimed Charges Total Minutes 99  -CJ       Total Minutes 99  -CJ                User Key  (r) = Recorded By, (t) = Taken By, (c) = Cosigned By      Initials Name Provider Type    Anh Cloud MS CCC-SLP Speech and Language Pathologist                    Therapy Charges for Today       Code Description Service Date Service Provider Modifiers Qty    89803692350 HC ST FIBEROPTIC ENDO EVAL SWALL 7 1/26/2024 Anh Ross MS CCC-SLP GN 1    16392212955 HC ST FIBEROPTIC ENDO EVAL SWALL 7 1/26/2024 Anh Ross MS CCC-SLP GN 1                 Anh Ross MS CCC-SLP  1/26/2024

## 2024-01-27 LAB
ANION GAP SERPL CALCULATED.3IONS-SCNC: 11 MMOL/L (ref 5–15)
BUN SERPL-MCNC: 16 MG/DL (ref 8–23)
BUN/CREAT SERPL: 22.5 (ref 7–25)
CALCIUM SPEC-SCNC: 9.1 MG/DL (ref 8.6–10.5)
CHLORIDE SERPL-SCNC: 102 MMOL/L (ref 98–107)
CO2 SERPL-SCNC: 27 MMOL/L (ref 22–29)
CREAT SERPL-MCNC: 0.71 MG/DL (ref 0.57–1)
EGFRCR SERPLBLD CKD-EPI 2021: 92.2 ML/MIN/1.73
GLUCOSE BLDC GLUCOMTR-MCNC: 116 MG/DL (ref 70–130)
GLUCOSE BLDC GLUCOMTR-MCNC: 139 MG/DL (ref 70–130)
GLUCOSE BLDC GLUCOMTR-MCNC: 150 MG/DL (ref 70–130)
GLUCOSE BLDC GLUCOMTR-MCNC: 173 MG/DL (ref 70–130)
GLUCOSE SERPL-MCNC: 122 MG/DL (ref 65–99)
POTASSIUM SERPL-SCNC: 4.3 MMOL/L (ref 3.5–5.2)
SODIUM SERPL-SCNC: 140 MMOL/L (ref 136–145)

## 2024-01-27 PROCEDURE — 94799 UNLISTED PULMONARY SVC/PX: CPT

## 2024-01-27 PROCEDURE — 99232 SBSQ HOSP IP/OBS MODERATE 35: CPT | Performed by: NURSE PRACTITIONER

## 2024-01-27 PROCEDURE — 94761 N-INVAS EAR/PLS OXIMETRY MLT: CPT

## 2024-01-27 PROCEDURE — 80048 BASIC METABOLIC PNL TOTAL CA: CPT | Performed by: INTERNAL MEDICINE

## 2024-01-27 PROCEDURE — 82948 REAGENT STRIP/BLOOD GLUCOSE: CPT

## 2024-01-27 RX ADMIN — GABAPENTIN 100 MG: 100 CAPSULE ORAL at 21:15

## 2024-01-27 RX ADMIN — MIRTAZAPINE 30 MG: 15 TABLET, FILM COATED ORAL at 21:15

## 2024-01-27 RX ADMIN — IPRATROPIUM BROMIDE AND ALBUTEROL SULFATE 3 ML: 2.5; .5 SOLUTION RESPIRATORY (INHALATION) at 19:01

## 2024-01-27 RX ADMIN — GUAIFENESIN 600 MG: 600 TABLET, EXTENDED RELEASE ORAL at 09:07

## 2024-01-27 RX ADMIN — FAMOTIDINE 20 MG: 20 TABLET ORAL at 17:08

## 2024-01-27 RX ADMIN — Medication 1 PATCH: at 09:07

## 2024-01-27 RX ADMIN — GABAPENTIN 100 MG: 100 CAPSULE ORAL at 14:28

## 2024-01-27 RX ADMIN — FAMOTIDINE 20 MG: 20 TABLET ORAL at 09:07

## 2024-01-27 RX ADMIN — ROPINIROLE HYDROCHLORIDE 4 MG: 2 TABLET, FILM COATED ORAL at 21:15

## 2024-01-27 RX ADMIN — ASPIRIN 81 MG: 81 TABLET, CHEWABLE ORAL at 09:07

## 2024-01-27 RX ADMIN — ACETAMINOPHEN 650 MG: 325 TABLET ORAL at 10:06

## 2024-01-27 RX ADMIN — MAGNESIUM OXIDE TAB 400 MG (241.3 MG ELEMENTAL MG) 400 MG: 400 (241.3 MG) TAB at 09:06

## 2024-01-27 RX ADMIN — SENNOSIDES AND DOCUSATE SODIUM 2 TABLET: 8.6; 5 TABLET ORAL at 21:15

## 2024-01-27 RX ADMIN — IPRATROPIUM BROMIDE AND ALBUTEROL SULFATE 3 ML: 2.5; .5 SOLUTION RESPIRATORY (INHALATION) at 07:00

## 2024-01-27 RX ADMIN — IPRATROPIUM BROMIDE AND ALBUTEROL SULFATE 3 ML: 2.5; .5 SOLUTION RESPIRATORY (INHALATION) at 10:46

## 2024-01-27 RX ADMIN — LOSARTAN POTASSIUM 50 MG: 50 TABLET, FILM COATED ORAL at 09:06

## 2024-01-27 RX ADMIN — GABAPENTIN 100 MG: 100 CAPSULE ORAL at 05:22

## 2024-01-27 RX ADMIN — Medication 10 ML: at 21:16

## 2024-01-27 RX ADMIN — IPRATROPIUM BROMIDE AND ALBUTEROL SULFATE 3 ML: 2.5; .5 SOLUTION RESPIRATORY (INHALATION) at 14:55

## 2024-01-27 RX ADMIN — TICAGRELOR 60 MG: 60 TABLET ORAL at 21:15

## 2024-01-27 RX ADMIN — PANTOPRAZOLE SODIUM 40 MG: 40 INJECTION, POWDER, FOR SOLUTION INTRAVENOUS at 05:22

## 2024-01-27 RX ADMIN — TICAGRELOR 60 MG: 60 TABLET ORAL at 09:06

## 2024-01-27 RX ADMIN — IPRATROPIUM BROMIDE AND ALBUTEROL SULFATE 3 ML: 2.5; .5 SOLUTION RESPIRATORY (INHALATION) at 22:38

## 2024-01-27 RX ADMIN — ATORVASTATIN CALCIUM 80 MG: 40 TABLET, FILM COATED ORAL at 21:15

## 2024-01-27 NOTE — PROGRESS NOTES
[facility] Medicine Services  PROGRESS NOTE    Patient Name: Regine Beckham  : 1955  MRN: [id number]    Date of Admission: 2024  Primary Care Physician: Dread Burton MD    Subjective   Subjective     CC: f/u CVA    HPI: Upright in bed. Ate her modified breakfast today. States that she isn't so sure about nectar thick liquids.  Wants to talk to  about discharge plans.        Objective   Objective     Vital Signs:   Temp:  [97.7 °F (36.5 °C)-98.1 °F (36.7 °C)] 98 °F (36.7 °C)  Heart Rate:  [] 104  Resp:  [16-18] 16  BP: (115-152)/(69-83) 152/83     Physical Exam:  Constitutional: No acute distress, awake, alert  HENT: NCAT, mucous membranes moist  Respiratory: Clear to auscultation bilaterally, respiratory effort normal   Cardiovascular: RRR, no murmurs, rubs, or gallops  Gastrointestinal: Positive bowel sounds, soft, nontender, nondistended  Musculoskeletal: No bilateral ankle edema  Psychiatric: Appropriate affect, cooperative  Neurologic: Oriented x 3, dysarthria, left sided weakness  Skin: No rashes  noted to exposed skin     Results Reviewed:  LAB RESULTS:      Lab 24  0245 24  0227 24  2224   WBC 12.13* 14.84*  --    HEMOGLOBIN 13.2 13.2  --    HEMATOCRIT 39.2 38.7  --    PLATELETS 242 256  --    NEUTROS ABS 10.13* 11.75*  --    IMMATURE GRANS (ABS) 0.04 0.06*  --    LYMPHS ABS 1.10 1.77  --    MONOS ABS 0.81 1.17*  --    EOS ABS 0.01 0.05  --    MCV 94.0 95.1  --    PROCALCITONIN  --   --  0.15         Lab 24  0505 24  1630 24  0418 24  0349 24  1801 24  0419 24  0230   SODIUM 140  --  139 136  --  133* 137   POTASSIUM 4.3 4.5 3.6 4.1 4.5 3.6 3.7   CHLORIDE 102  --  102 103  --  101 104   CO2 27.0  --  25.0 23.0  --  23.0 21.0*   ANION GAP 11.0  --  12.0 10.0  --  9.0 12.0   BUN 16  --  15 16  --  14 13   CREATININE 0.71  --  0.64 0.74  --  0.77 0.66   EGFR 92.2  --  95.8 87.7  --  83.6  95.1   GLUCOSE 122*  --  118* 110*  --  125* 143*   CALCIUM 9.1  --  8.7 9.2  --  9.1 8.8   MAGNESIUM  --   --  2.0 2.1  --  2.0 1.8   PHOSPHORUS  --   --  2.4* 2.7  --  3.1 2.6                               Brief Urine Lab Results  (Last result in the past 365 days)        Color   Clarity   Blood   Leuk Est   Nitrite   Protein   CREAT   Urine HCG        12/20/23 2003 Yellow   Clear   Negative   Negative   Negative   Negative                   Microbiology Results Abnormal       Procedure Component Value - Date/Time    Respiratory Culture - Sputum, NT Suction [[id number]] Collected: 01/22/24 0808    Lab Status: Final result Specimen: Sputum from NT Suction Updated: 01/24/24 1052     Respiratory Culture Light growth (2+) Normal respiratory sudhir. No S. aureus or Pseudomonas aeruginosa detected. Final report.     Gram Stain Moderate (3+) WBCs per low power field      Rare (1+) Epithelial cells per low power field      Few (2+) Gram positive cocci in pairs            SLP FEES - Fiberoptic Endo Eval Swallow    Result Date: 1/26/2024  This procedure was auto-finalized with no dictation required.     Results for orders placed during the hospital encounter of 01/19/24    Adult Transthoracic Echo Limited W/ Cont if Necessary Per Protocol    Interpretation Summary    Left ventricular systolic function is normal. Estimated left ventricular EF = 60%    Saline test results are negative for intracardiac shunting..      Current medications:  Scheduled Meds:aspirin, 81 mg, Nasogastric, Daily  atorvastatin, 80 mg, Nasogastric, Nightly  famotidine, 20 mg, Oral, BID AC  gabapentin, 100 mg, Nasogastric, Q8H  guaiFENesin, 600 mg, Oral, Q12H  ipratropium-albuterol, 3 mL, Nebulization, Q4H - RT  losartan, 50 mg, Nasogastric, Q24H  magnesium oxide, 400 mg, Nasogastric, Daily  mirtazapine, 30 mg, Nasogastric, Nightly  nicotine, 1 patch, Transdermal, Q24H  pantoprazole, 40 mg, Intravenous, Q AM  rOPINIRole, 4 mg, Nasogastric,  Nightly  senna-docusate sodium, 2 tablet, Oral, BID  sodium chloride, 10 mL, Intravenous, Q12H  ticagrelor, 60 mg, Nasogastric, BID      Continuous Infusions:   PRN Meds:.  acetaminophen    albuterol    senna-docusate sodium **AND** polyethylene glycol **AND** bisacodyl **AND** bisacodyl    Calcium Replacement - Follow Nurse / BPA Driven Protocol    hydrALAZINE    Magnesium Standard Dose Replacement - Follow Nurse / BPA Driven Protocol    ondansetron    phenol    Phosphorus Replacement - Follow Nurse / BPA Driven Protocol    Potassium Replacement - Follow Nurse / BPA Driven Protocol    sodium chloride    sodium chloride    Assessment & Plan   Assessment & Plan     Active Hospital Problems    Diagnosis  POA    **Stroke [I63.511]  Unknown    Moderate malnutrition [E44.0]  Yes    Grade I diastolic dysfunction [I51.89]  Unknown    GERD (gastroesophageal reflux disease) [K21.9]  Yes    RLS (restless legs syndrome) [G25.81]  Yes    Major depressive disorder [F32.9]  Yes    Iron deficiency anemia due to chronic blood loss [D50.0]  Yes      Resolved Hospital Problems   No resolved problems to display.        Brief Hospital Course to date:  Ms. 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 She underwent thrombectomy and carotid stent placement. She was outside the window for thrombolytics. Initially placed on Cardene which has been successfully weaned off. Her hospital course has been complicated by ongoing dysphagia and difficulty managing secretions. Transferred to [facility] 1/25.    CVA  Dysphagia  --Stroke team follows, continue DAPT/statin.  --SLP follows, repeat FEES 1/26. Now on nectar thickened liquids  --PT/OT recs IRF     Chronic HFpEF  Chronic  CESIA  GERD  RLS    Expected Discharge Location and Transportation:   Expected Discharge   Expected Discharge Date: 1/29/2024; Expected Discharge Time:      DVT prophylaxis:  Mechanical DVT prophylaxis orders are present.         AM-PAC 6 Clicks Score (PT): 13 (01/26/24 0800)    CODE STATUS:   Code Status and Medical Interventions:   Ordered at: 01/22/24 1115     Code Status (Patient has no pulse and is not breathing):    CPR (Attempt to Resuscitate)     Medical Interventions (Patient has pulse or is breathing):    Full Support       Thalia Weiss, NIKKIE  01/27/24

## 2024-01-27 NOTE — CASE MANAGEMENT/SOCIAL WORK
Continued Stay Note  [facility]     Patient Name: Regine Beckham  MRN: [id number]  Today's Date: 1/27/2024    Admit Date: 1/19/2024    Plan: Rehab   Discharge Plan       Row Name 01/27/24 1236       Plan    Plan Rehab    Patient/Family in Agreement with Plan yes    Plan Comments Pt requested to speak with . CM spoke with Ms. Beckham, at the bedside. She states she does not want to go to [facility] for rehab. She only wants to go to [facility]. Pt states she has been there previously, for rehab, and loves their rehab staff.  will follow up with both facilities, on Monday. No other needs voiced or identified.    Final Discharge Disposition Code 03 - skilled nursing facility (SNF)                   Discharge Codes    No documentation.                 Expected Discharge Date and Time       Expected Discharge Date Expected Discharge Time    Jan 29, 2024               Radha Greenwood RN

## 2024-01-28 LAB
GLUCOSE BLDC GLUCOMTR-MCNC: 126 MG/DL (ref 70–130)
GLUCOSE BLDC GLUCOMTR-MCNC: 151 MG/DL (ref 70–130)
GLUCOSE BLDC GLUCOMTR-MCNC: 165 MG/DL (ref 70–130)
GLUCOSE BLDC GLUCOMTR-MCNC: 186 MG/DL (ref 70–130)

## 2024-01-28 PROCEDURE — 94761 N-INVAS EAR/PLS OXIMETRY MLT: CPT

## 2024-01-28 PROCEDURE — 99232 SBSQ HOSP IP/OBS MODERATE 35: CPT | Performed by: PEDIATRICS

## 2024-01-28 PROCEDURE — 94799 UNLISTED PULMONARY SVC/PX: CPT

## 2024-01-28 PROCEDURE — 94664 DEMO&/EVAL PT USE INHALER: CPT

## 2024-01-28 PROCEDURE — 97110 THERAPEUTIC EXERCISES: CPT

## 2024-01-28 PROCEDURE — 82948 REAGENT STRIP/BLOOD GLUCOSE: CPT

## 2024-01-28 RX ADMIN — Medication 1 PATCH: at 08:07

## 2024-01-28 RX ADMIN — GUAIFENESIN 600 MG: 600 TABLET, EXTENDED RELEASE ORAL at 08:06

## 2024-01-28 RX ADMIN — GABAPENTIN 100 MG: 100 CAPSULE ORAL at 06:28

## 2024-01-28 RX ADMIN — IPRATROPIUM BROMIDE AND ALBUTEROL SULFATE 3 ML: 2.5; .5 SOLUTION RESPIRATORY (INHALATION) at 10:34

## 2024-01-28 RX ADMIN — TICAGRELOR 60 MG: 60 TABLET ORAL at 20:23

## 2024-01-28 RX ADMIN — Medication 10 ML: at 08:06

## 2024-01-28 RX ADMIN — IPRATROPIUM BROMIDE AND ALBUTEROL SULFATE 3 ML: 2.5; .5 SOLUTION RESPIRATORY (INHALATION) at 19:15

## 2024-01-28 RX ADMIN — BISACODYL 5 MG: 5 TABLET, COATED ORAL at 13:21

## 2024-01-28 RX ADMIN — ASPIRIN 81 MG: 81 TABLET, CHEWABLE ORAL at 08:06

## 2024-01-28 RX ADMIN — ACETAMINOPHEN 650 MG: 325 TABLET ORAL at 06:28

## 2024-01-28 RX ADMIN — IPRATROPIUM BROMIDE AND ALBUTEROL SULFATE 3 ML: 2.5; .5 SOLUTION RESPIRATORY (INHALATION) at 02:44

## 2024-01-28 RX ADMIN — IPRATROPIUM BROMIDE AND ALBUTEROL SULFATE 3 ML: 2.5; .5 SOLUTION RESPIRATORY (INHALATION) at 14:41

## 2024-01-28 RX ADMIN — ROPINIROLE HYDROCHLORIDE 4 MG: 2 TABLET, FILM COATED ORAL at 20:23

## 2024-01-28 RX ADMIN — IPRATROPIUM BROMIDE AND ALBUTEROL SULFATE 3 ML: 2.5; .5 SOLUTION RESPIRATORY (INHALATION) at 23:22

## 2024-01-28 RX ADMIN — LOSARTAN POTASSIUM 50 MG: 50 TABLET, FILM COATED ORAL at 08:05

## 2024-01-28 RX ADMIN — Medication 10 ML: at 20:23

## 2024-01-28 RX ADMIN — FAMOTIDINE 20 MG: 20 TABLET ORAL at 08:06

## 2024-01-28 RX ADMIN — ATORVASTATIN CALCIUM 80 MG: 40 TABLET, FILM COATED ORAL at 20:23

## 2024-01-28 RX ADMIN — GABAPENTIN 100 MG: 100 CAPSULE ORAL at 22:48

## 2024-01-28 RX ADMIN — IPRATROPIUM BROMIDE AND ALBUTEROL SULFATE 3 ML: 2.5; .5 SOLUTION RESPIRATORY (INHALATION) at 06:41

## 2024-01-28 RX ADMIN — GABAPENTIN 100 MG: 100 CAPSULE ORAL at 13:21

## 2024-01-28 RX ADMIN — GUAIFENESIN 600 MG: 600 TABLET, EXTENDED RELEASE ORAL at 20:23

## 2024-01-28 RX ADMIN — PANTOPRAZOLE SODIUM 40 MG: 40 INJECTION, POWDER, FOR SOLUTION INTRAVENOUS at 06:28

## 2024-01-28 RX ADMIN — MAGNESIUM OXIDE TAB 400 MG (241.3 MG ELEMENTAL MG) 400 MG: 400 (241.3 MG) TAB at 08:06

## 2024-01-28 RX ADMIN — MIRTAZAPINE 30 MG: 15 TABLET, FILM COATED ORAL at 20:23

## 2024-01-28 RX ADMIN — TICAGRELOR 60 MG: 60 TABLET ORAL at 11:29

## 2024-01-28 RX ADMIN — ACETAMINOPHEN 650 MG: 325 TABLET ORAL at 11:29

## 2024-01-28 RX ADMIN — ACETAMINOPHEN 650 MG: 325 TABLET ORAL at 22:48

## 2024-01-28 RX ADMIN — SENNOSIDES AND DOCUSATE SODIUM 2 TABLET: 8.6; 5 TABLET ORAL at 08:06

## 2024-01-28 NOTE — PROGRESS NOTES
[facility] Medicine Services  PROGRESS NOTE    Patient Name: Regine Beckham  : 1955  MRN: [id number]    Date of Admission: 2024  Primary Care Physician: Dread Burton MD    Subjective   Subjective     CC: f/u CVA    HPI:   No issues this morning.  Currently awaiting placement.  Tolerated some of her breakfast this morning.  No nausea or vomiting.      Objective   Objective     Vital Signs:   Temp:  [97.2 °F (36.2 °C)-99.3 °F (37.4 °C)] 98.3 °F (36.8 °C)  Heart Rate:  [] 84  Resp:  [16] 16  BP: (102-123)/(57-78) 118/78     Physical Exam:  Constitutional: No acute distress, awake, alert  HENT: NCAT, mucous membranes moist  Respiratory: Clear to auscultation bilaterally, respiratory effort normal   Cardiovascular: RRR, no murmurs, rubs, or gallops  Gastrointestinal: Positive bowel sounds, soft, nontender, nondistended  Musculoskeletal: No bilateral ankle edema  Psychiatric: Appropriate affect, cooperative  Neurologic: Oriented x 3, dysarthria with left-sided facial droop, left sided weakness most pronounced in her left upper extremity  Skin: No rashes  noted to exposed skin     Results Reviewed:  LAB RESULTS:      Lab 24  0245   WBC 12.13*   HEMOGLOBIN 13.2   HEMATOCRIT 39.2   PLATELETS 242   NEUTROS ABS 10.13*   IMMATURE GRANS (ABS) 0.04   LYMPHS ABS 1.10   MONOS ABS 0.81   EOS ABS 0.01   MCV 94.0         Lab 24  0505 24  1630 24  0418 24  0349 24  1801 24  0419 24  0230   SODIUM 140  --  139 136  --  133* 137   POTASSIUM 4.3 4.5 3.6 4.1 4.5 3.6 3.7   CHLORIDE 102  --  102 103  --  101 104   CO2 27.0  --  25.0 23.0  --  23.0 21.0*   ANION GAP 11.0  --  12.0 10.0  --  9.0 12.0   BUN 16  --  15 16  --  14 13   CREATININE 0.71  --  0.64 0.74  --  0.77 0.66   EGFR 92.2  --  95.8 87.7  --  83.6 95.1   GLUCOSE 122*  --  118* 110*  --  125* 143*   CALCIUM 9.1  --  8.7 9.2  --  9.1 8.8   MAGNESIUM  --   --  2.0 2.1  --  2.0 1.8    PHOSPHORUS  --   --  2.4* 2.7  --  3.1 2.6                               Brief Urine Lab Results  (Last result in the past 365 days)        Color   Clarity   Blood   Leuk Est   Nitrite   Protein   CREAT   Urine HCG        12/20/23 2003 Yellow   Clear   Negative   Negative   Negative   Negative                   Microbiology Results Abnormal       Procedure Component Value - Date/Time    Respiratory Culture - Sputum, NT Suction [[id number]] Collected: 01/22/24 0808    Lab Status: Final result Specimen: Sputum from NT Suction Updated: 01/24/24 1052     Respiratory Culture Light growth (2+) Normal respiratory sudhir. No S. aureus or Pseudomonas aeruginosa detected. Final report.     Gram Stain Moderate (3+) WBCs per low power field      Rare (1+) Epithelial cells per low power field      Few (2+) Gram positive cocci in pairs            No radiology results from the last 24 hrs    Results for orders placed during the hospital encounter of 01/19/24    Adult Transthoracic Echo Limited W/ Cont if Necessary Per Protocol    Interpretation Summary    Left ventricular systolic function is normal. Estimated left ventricular EF = 60%    Saline test results are negative for intracardiac shunting..      Current medications:  Scheduled Meds:aspirin, 81 mg, Nasogastric, Daily  atorvastatin, 80 mg, Nasogastric, Nightly  famotidine, 20 mg, Oral, BID AC  gabapentin, 100 mg, Nasogastric, Q8H  guaiFENesin, 600 mg, Oral, Q12H  ipratropium-albuterol, 3 mL, Nebulization, Q4H - RT  losartan, 50 mg, Nasogastric, Q24H  magnesium oxide, 400 mg, Nasogastric, Daily  mirtazapine, 30 mg, Nasogastric, Nightly  nicotine, 1 patch, Transdermal, Q24H  pantoprazole, 40 mg, Intravenous, Q AM  rOPINIRole, 4 mg, Nasogastric, Nightly  senna-docusate sodium, 2 tablet, Oral, BID  sodium chloride, 10 mL, Intravenous, Q12H  ticagrelor, 60 mg, Nasogastric, BID      Continuous Infusions:   PRN Meds:.  acetaminophen    albuterol    senna-docusate sodium **AND**  polyethylene glycol **AND** bisacodyl **AND** bisacodyl    Calcium Replacement - Follow Nurse / BPA Driven Protocol    hydrALAZINE    Magnesium Standard Dose Replacement - Follow Nurse / BPA Driven Protocol    ondansetron    phenol    Phosphorus Replacement - Follow Nurse / BPA Driven Protocol    Potassium Replacement - Follow Nurse / BPA Driven Protocol    sodium chloride    sodium chloride    Assessment & Plan   Assessment & Plan     Active Hospital Problems    Diagnosis  POA    **Stroke [I63.511]  Unknown    Moderate malnutrition [E44.0]  Yes    Grade I diastolic dysfunction [I51.89]  Unknown    GERD (gastroesophageal reflux disease) [K21.9]  Yes    RLS (restless legs syndrome) [G25.81]  Yes    Major depressive disorder [F32.9]  Yes    Iron deficiency anemia due to chronic blood loss [D50.0]  Yes      Resolved Hospital Problems   No resolved problems to display.        Brief Hospital Course to date:  Ms. Beckham is a 67yo F with a history of HTN, chronic anemia, sleep apnea intolerant of Cpap, severe depression with an overdose attempt in December 2023, and legally blind who presented to [facility] on 1/19/24 with left sided weakness and was found to have a RMCA infarct and 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 She underwent thrombectomy and carotid stent placement on 1/19. She was outside the window for thrombolytics. Initially placed on Cardene which has been successfully weaned off. Her hospital course has been complicated by ongoing dysphagia and difficulty managing secretions. Transferred to [facility] 1/25.    CVA-right MCA  Right internal carotid artery occlusion status post thrombectomy and carotid stent placement  Dysphagia  --Stroke team follows, continue DAPT with brilinta/statin.  --SLP follows, repeat FEES 1/26. Now on nectar thickened liquids  --PT/OT recs IRF     Chronic HFpEF  HTN  --cont ASA, statin, losartan.  Holding HCTZ and aldactone for now    Chronic CESIA  -CBC in AM     GERD  -Cont  protonix    RLS  -Stable, continue requip, neurontin    Tob dependency  -cont nicotine patch  -discuss cessation    Expected Discharge Location and Transportation:   Expected Discharge   Expected Discharge Date: 1/29/2024; Expected Discharge Time:      DVT prophylaxis:  Mechanical DVT prophylaxis orders are present.         AM-PAC 6 Clicks Score (PT): 12 (01/28/24 1012)    CODE STATUS:   Code Status and Medical Interventions:   Ordered at: 01/22/24 1115     Code Status (Patient has no pulse and is not breathing):    CPR (Attempt to Resuscitate)     Medical Interventions (Patient has pulse or is breathing):    Full Support       Avani Herrera MD  01/28/24

## 2024-01-28 NOTE — THERAPY TREATMENT NOTE
"Patient Name: Regine Beckham  : 1955    MRN: [id number]                              Today's Date: 2024       Admit Date: 2024    Visit Dx:     ICD-10-CM ICD-9-CM   1. Oropharyngeal dysphagia  R13.12 787.22   2. Dysarthria  R47.1 784.51   3. Cognitive communication deficit  R41.841 799.52   4. Stroke  I63.511 434.91     Patient Active Problem List   Diagnosis    Iron deficiency anemia due to chronic blood loss    Major depressive disorder    RLS (restless legs syndrome)    GERD (gastroesophageal reflux disease)    Left breast mass    Allergy to penicillin    Stroke    Grade I diastolic dysfunction    Moderate malnutrition     Past Medical History:   Diagnosis Date    Anemia     Anxiety     Arthritis     Depression     Legally blind     Restless leg syndrome     Sleep apnea     \"I don't use a cpap anymore since losing 106 lbs\"    Water retention      Past Surgical History:   Procedure Laterality Date    BACK SURGERY      CARDIAC CATHETERIZATION N/A 2024    Procedure: Percutaneous Manual Thrombectomy;  Surgeon: Efrain Hurley MD;  Location: [facility] INVASIVE LOCATION;  Service: Interventional Radiology;  Laterality: N/A;    GALLBLADDER SURGERY      HIP ARTHROSCOPY      JOINT REPLACEMENT Right       General Information       Row Name 24 0956          Physical Therapy Time and Intention    Document Type therapy note (daily note)  -AS     Mode of Treatment physical therapy  -AS       Row Name 24 0956          General Information    Patient Profile Reviewed yes  -AS     Existing Precautions/Restrictions fall;other (see comments)  L inattention, L weakness (UE>LE), dysarthria, lethargy  -AS     Barriers to Rehab medically complex;cognitive status;visual deficit  -AS       Row Name 24 0956          Cognition    Orientation Status (Cognition) oriented to;person;place;disoriented to;time;situation  -AS       Row Name 24 0956          Safety Issues, Functional Mobility "    Safety Issues Affecting Function (Mobility) awareness of need for assistance;insight into deficits/self-awareness;safety precaution awareness;safety precautions follow-through/compliance;ability to follow commands;sequencing abilities  -AS     Impairments Affecting Function (Mobility) balance;coordination;endurance/activity tolerance;shortness of breath;strength;postural/trunk control;sensation/sensory awareness;motor planning;muscle tone abnormal;cognition;grasp  -AS     Comment, Safety Issues/Impairments (Mobility) patient with increased lethargy this morning, decreasing activity tolerance  -AS               User Key  (r) = Recorded By, (t) = Taken By, (c) = Cosigned By      Initials Name Provider Type    AS Ngoc Grayson PTA Physical Therapist Assistant                   Mobility       Row Name 01/28/24 0959          Bed Mobility    Scooting/Bridging [place] (Bed Mobility) dependent (less than 25% patient effort);2 person assist  -AS     Assistive Device (Bed Mobility) draw sheet  -AS     Comment, (Bed Mobility) patient repositioned in supine for comfort, deferred EOB/OOB activity due to increased lethargy and safety concerns  -AS       Row Name 01/28/24 0959          Transfers    Comment, (Transfers) deferred due to weakness, lethargy and decreased activity tolerance  -AS       Row Name 01/28/24 0959          Bed-Chair Transfer    Bed-Chair [place] (Transfers) unable to assess  -AS       Row Name 01/28/24 0959          Sit-Stand Transfer    Sit-Stand [place] (Transfers) unable to assess  -AS       Row Name 01/28/24 0959          Gait/Stairs (Locomotion)    [place] Level (Gait) unable to assess  -AS               User Key  (r) = Recorded By, (t) = Taken By, (c) = Cosigned By      Initials Name Provider Type    AS Ngoc Grayson PTA Physical Therapist Assistant                   Obj/Interventions       Row Name 01/28/24 1001          Motor Skills    Therapeutic Exercise  shoulder;hip;knee;ankle  -AS       Row Name 01/28/24 1001          Shoulder (Therapeutic Exercise)    Shoulder AAROM (Therapeutic Exercise) bilateral;flexion;extension;aBduction;aDduction;supine;10 repetitions  bicep curls x10, increased time and effort to complete exercises due to increased lethargy/weakness, cues for attention to task.  -AS       Row Name 01/28/24 1001          Hip (Therapeutic Exercise)    Hip (Therapeutic Exercise) AAROM (active assistive range of motion)  -AS     Hip Strengthening (Therapeutic Exercise) bilateral;aBduction;aDduction;external rotation;internal rotation;supine;10 repetitions  -AS       Row Name 01/28/24 1001          Knee (Therapeutic Exercise)    Knee (Therapeutic Exercise) AAROM (active assistive range of motion)  -AS     Knee AAROM (Therapeutic Exercise) bilateral;flexion;extension;supine;10 repetitions  -AS       Row Name 01/28/24 1001          Ankle (Therapeutic Exercise)    Ankle (Therapeutic Exercise) AAROM (active assistive range of motion)  -AS     Ankle AROM (Therapeutic Exercise) bilateral;dorsiflexion;plantarflexion;supine;10 repetitions  -AS               User Key  (r) = Recorded By, (t) = Taken By, (c) = Cosigned By      Initials Name Provider Type    AS Ngoc Grayson PTA Physical Therapist Assistant                   Goals/Plan    No documentation.                  Clinical Impression       Row Name 01/28/24 1002          Pain Scale: FACES Pre/Post-Treatment    Pain: FACES Scale, Pretreatment 0-->no hurt  -AS     Posttreatment Pain Rating 0-->no hurt  -AS       Row Name 01/28/24 1002          Plan of Care Review    Plan of Care Reviewed With patient  -AS     Progress no change  -AS     Outcome Evaluation Patient completed AAROM BUE/LE exercises with constant verbal cues for attention to task and completion due to increased lethargy, weakness and decreased activity tolerance, nursing notified and aware. Recommend IRF at D/C for best functional outcome.  -AS        Row Name 01/28/24 1002          Positioning and Restraints    Pre-Treatment Position in bed  -AS     Post Treatment Position bed  -AS     In Bed supine;call light within reach;encouraged to call for assist;exit alarm on  -AS               User Key  (r) = Recorded By, (t) = Taken By, (c) = Cosigned By      Initials Name Provider Type    AS Ngoc Grayson PTA Physical Therapist Assistant                   Outcome Measures       Row Name 01/28/24 1012          How much help from another person do you currently need...    Turning from your back to your side while in flat bed without using bedrails? 2  -AS     Moving from lying on back to sitting on the side of a flat bed without bedrails? 2  -AS     Moving to and from a bed to a chair (including a wheelchair)? 2  -AS     Standing up from a chair using your arms (e.g., wheelchair, bedside chair)? 2  -AS     Climbing 3-5 steps with a railing? 2  -AS     To walk in hospital room? 2  -AS     AM-PAC 6 Clicks Score (PT) 12  -AS     Highest Level of Mobility Goal 4 --> Transfer to chair/commode  -AS       Row Name 01/28/24 1012          Functional Assessment    Outcome Measure Options AM-PAC 6 Clicks Basic Mobility (PT)  -AS               User Key  (r) = Recorded By, (t) = Taken By, (c) = Cosigned By      Initials Name Provider Type    AS Ngoc Grayson PTA Physical Therapist Assistant                                 Physical Therapy Education       Title: PT OT SLP Therapies (In Progress)       Topic: Physical Therapy (In Progress)       Point: Mobility training (In Progress)       Learning Progress Summary             Patient Acceptance, E, NR by AS at 1/28/2024 1013    Acceptance, E,TB, NR by ESTEVAN at 1/24/2024 1116    Acceptance, E,D, VU,NR by MB at 1/22/2024 1418    Acceptance, E,D, VU,NR by LUIS FERNANDO at 1/20/2024 1221                         Point: Home exercise program (In Progress)       Learning Progress Summary             Patient Acceptance, E, NR by AS at  1/28/2024 1013    Acceptance, E,TB, NR by AY at 1/24/2024 1116    Acceptance, E,D, VU,NR by MB at 1/22/2024 1418                         Point: Body mechanics (In Progress)       Learning Progress Summary             Patient Acceptance, E, NR by AS at 1/28/2024 1013    Acceptance, E,TB, NR by AY at 1/24/2024 1116    Acceptance, E,D, VU,NR by MB at 1/22/2024 1418    Acceptance, E,D, VU,NR by LS at 1/20/2024 1221                         Point: Precautions (In Progress)       Learning Progress Summary             Patient Acceptance, E, NR by AS at 1/28/2024 1013    Acceptance, E,TB, NR by AY at 1/24/2024 1116    Acceptance, E,D, VU,NR by MB at 1/22/2024 1418    Acceptance, E,D, VU,NR by LS at 1/20/2024 1221                                         User Key       Initials Effective Dates Name Provider Type Discipline    AS 04/28/23 -  Ngoc Grayson, PTA Physical Therapist Assistant PT     02/03/23 -  Nikole Herrmann, PT Physical Therapist PT    MB 06/16/21 -  Joanne Bermudez, PT Physical Therapist PT     11/10/20 -  Ngoc Glynn, PT Physical Therapist PT                  PT Recommendation and Plan     Plan of Care Reviewed With: patient  Progress: no change  Outcome Evaluation: Patient completed AAROM BUE/LE exercises with constant verbal cues for attention to task and completion due to increased lethargy, weakness and decreased activity tolerance, nursing notified and aware. Recommend IRF at D/C for best functional outcome.     Time Calculation:         PT Charges       Row Name 01/28/24 1013             Time Calculation    Start Time 0921  -AS      PT Received On 01/28/24  -AS      PT Goal Re-Cert Due Date 01/30/24  -AS         Timed Charges    56349 - PT Therapeutic Exercise Minutes 23  -AS         Total Minutes    Timed Charges Total Minutes 23  -AS       Total Minutes 23  -AS                User Key  (r) = Recorded By, (t) = Taken By, (c) = Cosigned By      Initials Name Provider Type    AS Kenan  Ngoc Perez PTA Physical Therapist Assistant                  Therapy Charges for Today       Code Description Service Date Service Provider Modifiers Qty    [id number] HC PT THER PROC EA 15 MIN 1/28/2024 Ngoc Grayson PTA GP 2            PT G-Codes  Outcome Measure Options: AM-PAC 6 Clicks Basic Mobility (PT)  AM-PAC 6 Clicks Score (PT): 12  AM-PAC 6 Clicks Score (OT): 11  Modified Suzette Scale: 4 - Moderately severe disability.  Unable to walk without assistance, and unable to attend to own bodily needs without assistance.       Ngoc Grayson PTA  1/28/2024

## 2024-01-28 NOTE — PLAN OF CARE
Goal Outcome Evaluation:  Plan of Care Reviewed With: patient        Progress: no change  Outcome Evaluation: Patient completed AAROM BUE/LE exercises with constant verbal cues for attention to task and completion due to increased lethargy, weakness and decreased activity tolerance, nursing notified and aware. Recommend IRF at D/C for best functional outcome.

## 2024-01-29 ENCOUNTER — APPOINTMENT (OUTPATIENT)
Dept: GENERAL RADIOLOGY | Facility: HOSPITAL | Age: 69
DRG: 023 | End: 2024-01-29
Payer: MEDICARE

## 2024-01-29 LAB
ANION GAP SERPL CALCULATED.3IONS-SCNC: 9 MMOL/L (ref 5–15)
B PARAPERT DNA SPEC QL NAA+PROBE: NOT DETECTED
B PERT DNA SPEC QL NAA+PROBE: NOT DETECTED
BASOPHILS # BLD AUTO: 0.05 10*3/MM3 (ref 0–0.2)
BASOPHILS NFR BLD AUTO: 0.5 % (ref 0–1.5)
BUN SERPL-MCNC: 19 MG/DL (ref 8–23)
BUN/CREAT SERPL: 26 (ref 7–25)
C PNEUM DNA NPH QL NAA+NON-PROBE: NOT DETECTED
CALCIUM SPEC-SCNC: 9.6 MG/DL (ref 8.6–10.5)
CHLORIDE SERPL-SCNC: 99 MMOL/L (ref 98–107)
CO2 SERPL-SCNC: 27 MMOL/L (ref 22–29)
CREAT SERPL-MCNC: 0.73 MG/DL (ref 0.57–1)
DEPRECATED RDW RBC AUTO: 46.5 FL (ref 37–54)
EGFRCR SERPLBLD CKD-EPI 2021: 89.2 ML/MIN/1.73
EOSINOPHIL # BLD AUTO: 0.16 10*3/MM3 (ref 0–0.4)
EOSINOPHIL NFR BLD AUTO: 1.6 % (ref 0.3–6.2)
ERYTHROCYTE [DISTWIDTH] IN BLOOD BY AUTOMATED COUNT: 13.2 % (ref 12.3–15.4)
FLUAV SUBTYP SPEC NAA+PROBE: NOT DETECTED
FLUBV RNA ISLT QL NAA+PROBE: NOT DETECTED
GLUCOSE BLDC GLUCOMTR-MCNC: 142 MG/DL (ref 70–130)
GLUCOSE BLDC GLUCOMTR-MCNC: 147 MG/DL (ref 70–130)
GLUCOSE BLDC GLUCOMTR-MCNC: 154 MG/DL (ref 70–130)
GLUCOSE BLDC GLUCOMTR-MCNC: 160 MG/DL (ref 70–130)
GLUCOSE SERPL-MCNC: 137 MG/DL (ref 65–99)
HADV DNA SPEC NAA+PROBE: NOT DETECTED
HCOV 229E RNA SPEC QL NAA+PROBE: NOT DETECTED
HCOV HKU1 RNA SPEC QL NAA+PROBE: NOT DETECTED
HCOV NL63 RNA SPEC QL NAA+PROBE: NOT DETECTED
HCOV OC43 RNA SPEC QL NAA+PROBE: NOT DETECTED
HCT VFR BLD AUTO: 32.7 % (ref 34–46.6)
HGB BLD-MCNC: 11.3 G/DL (ref 12–15.9)
HMPV RNA NPH QL NAA+NON-PROBE: NOT DETECTED
HPIV1 RNA ISLT QL NAA+PROBE: NOT DETECTED
HPIV2 RNA SPEC QL NAA+PROBE: NOT DETECTED
HPIV3 RNA NPH QL NAA+PROBE: NOT DETECTED
HPIV4 P GENE NPH QL NAA+PROBE: NOT DETECTED
IMM GRANULOCYTES # BLD AUTO: 0.16 10*3/MM3 (ref 0–0.05)
IMM GRANULOCYTES NFR BLD AUTO: 1.6 % (ref 0–0.5)
LYMPHOCYTES # BLD AUTO: 2.29 10*3/MM3 (ref 0.7–3.1)
LYMPHOCYTES NFR BLD AUTO: 22.4 % (ref 19.6–45.3)
M PNEUMO IGG SER IA-ACNC: NOT DETECTED
MAGNESIUM SERPL-MCNC: 2.1 MG/DL (ref 1.6–2.4)
MCH RBC QN AUTO: 33.1 PG (ref 26.6–33)
MCHC RBC AUTO-ENTMCNC: 34.6 G/DL (ref 31.5–35.7)
MCV RBC AUTO: 95.9 FL (ref 79–97)
MONOCYTES # BLD AUTO: 1.22 10*3/MM3 (ref 0.1–0.9)
MONOCYTES NFR BLD AUTO: 11.9 % (ref 5–12)
NEUTROPHILS NFR BLD AUTO: 6.33 10*3/MM3 (ref 1.7–7)
NEUTROPHILS NFR BLD AUTO: 62 % (ref 42.7–76)
NRBC BLD AUTO-RTO: 0 /100 WBC (ref 0–0.2)
PLATELET # BLD AUTO: 300 10*3/MM3 (ref 140–450)
PMV BLD AUTO: 9.4 FL (ref 6–12)
POTASSIUM SERPL-SCNC: 3.9 MMOL/L (ref 3.5–5.2)
RBC # BLD AUTO: 3.41 10*6/MM3 (ref 3.77–5.28)
RHINOVIRUS RNA SPEC NAA+PROBE: NOT DETECTED
RSV RNA NPH QL NAA+NON-PROBE: NOT DETECTED
SARS-COV-2 RNA NPH QL NAA+NON-PROBE: NOT DETECTED
SODIUM SERPL-SCNC: 135 MMOL/L (ref 136–145)
WBC NRBC COR # BLD AUTO: 10.21 10*3/MM3 (ref 3.4–10.8)

## 2024-01-29 PROCEDURE — 94799 UNLISTED PULMONARY SVC/PX: CPT

## 2024-01-29 PROCEDURE — 97530 THERAPEUTIC ACTIVITIES: CPT

## 2024-01-29 PROCEDURE — 97110 THERAPEUTIC EXERCISES: CPT

## 2024-01-29 PROCEDURE — 80048 BASIC METABOLIC PNL TOTAL CA: CPT | Performed by: PEDIATRICS

## 2024-01-29 PROCEDURE — 99232 SBSQ HOSP IP/OBS MODERATE 35: CPT | Performed by: PEDIATRICS

## 2024-01-29 PROCEDURE — 93005 ELECTROCARDIOGRAM TRACING: CPT | Performed by: NURSE PRACTITIONER

## 2024-01-29 PROCEDURE — 0202U NFCT DS 22 TRGT SARS-COV-2: CPT | Performed by: NURSE PRACTITIONER

## 2024-01-29 PROCEDURE — 82948 REAGENT STRIP/BLOOD GLUCOSE: CPT

## 2024-01-29 PROCEDURE — 83735 ASSAY OF MAGNESIUM: CPT | Performed by: PEDIATRICS

## 2024-01-29 PROCEDURE — 71045 X-RAY EXAM CHEST 1 VIEW: CPT

## 2024-01-29 PROCEDURE — 85025 COMPLETE CBC W/AUTO DIFF WBC: CPT | Performed by: PEDIATRICS

## 2024-01-29 PROCEDURE — 25810000003 SODIUM CHLORIDE 0.9 % SOLUTION: Performed by: NURSE PRACTITIONER

## 2024-01-29 PROCEDURE — 93010 ELECTROCARDIOGRAM REPORT: CPT | Performed by: INTERNAL MEDICINE

## 2024-01-29 RX ADMIN — Medication 10 ML: at 08:00

## 2024-01-29 RX ADMIN — GABAPENTIN 100 MG: 100 CAPSULE ORAL at 06:35

## 2024-01-29 RX ADMIN — IPRATROPIUM BROMIDE AND ALBUTEROL SULFATE 3 ML: 2.5; .5 SOLUTION RESPIRATORY (INHALATION) at 22:55

## 2024-01-29 RX ADMIN — ACETAMINOPHEN 650 MG: 325 TABLET ORAL at 03:49

## 2024-01-29 RX ADMIN — ATORVASTATIN CALCIUM 80 MG: 40 TABLET, FILM COATED ORAL at 22:32

## 2024-01-29 RX ADMIN — GABAPENTIN 100 MG: 100 CAPSULE ORAL at 13:23

## 2024-01-29 RX ADMIN — ACETAMINOPHEN 650 MG: 325 TABLET ORAL at 17:54

## 2024-01-29 RX ADMIN — GUAIFENESIN 600 MG: 600 TABLET, EXTENDED RELEASE ORAL at 22:31

## 2024-01-29 RX ADMIN — IPRATROPIUM BROMIDE AND ALBUTEROL SULFATE 3 ML: 2.5; .5 SOLUTION RESPIRATORY (INHALATION) at 11:56

## 2024-01-29 RX ADMIN — TICAGRELOR 60 MG: 60 TABLET ORAL at 08:00

## 2024-01-29 RX ADMIN — PANTOPRAZOLE SODIUM 40 MG: 40 INJECTION, POWDER, FOR SOLUTION INTRAVENOUS at 06:35

## 2024-01-29 RX ADMIN — ACETAMINOPHEN 650 MG: 325 TABLET ORAL at 22:31

## 2024-01-29 RX ADMIN — Medication 10 ML: at 22:32

## 2024-01-29 RX ADMIN — SENNOSIDES AND DOCUSATE SODIUM 2 TABLET: 8.6; 5 TABLET ORAL at 08:00

## 2024-01-29 RX ADMIN — SODIUM CHLORIDE 250 ML: 9 INJECTION, SOLUTION INTRAVENOUS at 01:24

## 2024-01-29 RX ADMIN — IPRATROPIUM BROMIDE AND ALBUTEROL SULFATE 3 ML: 2.5; .5 SOLUTION RESPIRATORY (INHALATION) at 03:18

## 2024-01-29 RX ADMIN — IPRATROPIUM BROMIDE AND ALBUTEROL SULFATE 3 ML: 2.5; .5 SOLUTION RESPIRATORY (INHALATION) at 18:26

## 2024-01-29 RX ADMIN — TICAGRELOR 60 MG: 60 TABLET ORAL at 22:32

## 2024-01-29 RX ADMIN — LOSARTAN POTASSIUM 50 MG: 50 TABLET, FILM COATED ORAL at 08:00

## 2024-01-29 RX ADMIN — SENNOSIDES AND DOCUSATE SODIUM 2 TABLET: 8.6; 5 TABLET ORAL at 22:30

## 2024-01-29 RX ADMIN — ROPINIROLE HYDROCHLORIDE 4 MG: 2 TABLET, FILM COATED ORAL at 22:31

## 2024-01-29 RX ADMIN — MIRTAZAPINE 30 MG: 15 TABLET, FILM COATED ORAL at 22:31

## 2024-01-29 RX ADMIN — Medication 1 PATCH: at 08:01

## 2024-01-29 RX ADMIN — MAGNESIUM OXIDE TAB 400 MG (241.3 MG ELEMENTAL MG) 400 MG: 400 (241.3 MG) TAB at 08:00

## 2024-01-29 RX ADMIN — GABAPENTIN 100 MG: 100 CAPSULE ORAL at 22:32

## 2024-01-29 RX ADMIN — GUAIFENESIN 600 MG: 600 TABLET, EXTENDED RELEASE ORAL at 08:00

## 2024-01-29 RX ADMIN — IPRATROPIUM BROMIDE AND ALBUTEROL SULFATE 3 ML: 2.5; .5 SOLUTION RESPIRATORY (INHALATION) at 07:53

## 2024-01-29 RX ADMIN — ACETAMINOPHEN 650 MG: 325 TABLET ORAL at 08:11

## 2024-01-29 RX ADMIN — ASPIRIN 81 MG: 81 TABLET, CHEWABLE ORAL at 08:00

## 2024-01-29 NOTE — SIGNIFICANT NOTE
Pt w/ productive cough (tan sputum production per RN) and tachycardia. Unclear if cough is new. Current HR ~110-117bpm, /51 (68), temp 98.4, 95% on room air. Per RN, pt currently sleepy but does wake and follow commands, no new focal deficits noted. Pt did receive Requip, Remeron and Gabapentin w/ nightly meds. Pt w/ no urine output this shift. Will bladder scan now.   -CXR, respiratory panel pcr  -EKG  -250ml NS bolus now for worsening tachycardia, decreased urine output.   Will monitor closely.

## 2024-01-29 NOTE — PROGRESS NOTES
[facility] Medicine Services  PROGRESS NOTE    Patient Name: Regine Beckham  : 1955  MRN: [id number]    Date of Admission: 2024  Primary Care Physician: Dread Burton MD    Subjective   Subjective     CC: f/u CVA    HPI:   Last night developed cough and SOB.  Seems better today.  States feels well.  Having continued weakness of her L UE.      Objective   Objective     Vital Signs:   Temp:  [97.7 °F (36.5 °C)-98.1 °F (36.7 °C)] 97.9 °F (36.6 °C)  Heart Rate:  [] 85  Resp:  [16-20] 16  BP: (115-140)/(57-95) 115/57     Physical Exam:  Constitutional: No acute distress, awake, alert  HENT: NCAT, mucous membranes moist  Respiratory: Clear to auscultation bilaterally, respiratory effort normal   Cardiovascular: RRR, no murmurs, rubs, or gallops  Gastrointestinal: Positive bowel sounds, soft, nontender, nondistended  Musculoskeletal: No bilateral ankle edema  Psychiatric: Appropriate affect, cooperative  Neurologic: Oriented x 3, dysarthria with left-sided facial droop, left sided weakness most pronounced in her left upper extremity-unchanged  Skin: No rashes  noted to exposed skin     Results Reviewed:  LAB RESULTS:      Lab 24  0607   WBC 10.21   HEMOGLOBIN 11.3*   HEMATOCRIT 32.7*   PLATELETS 300   NEUTROS ABS 6.33   IMMATURE GRANS (ABS) 0.16*   LYMPHS ABS 2.29   MONOS ABS 1.22*   EOS ABS 0.16   MCV 95.9         Lab 24  0607 24  0505 24  1630 24  0418 24  0349 24  1801 24  0419   SODIUM 135* 140  --  139 136  --  133*   POTASSIUM 3.9 4.3 4.5 3.6 4.1   < > 3.6   CHLORIDE 99 102  --  102 103  --  101   CO2 27.0 27.0  --  25.0 23.0  --  23.0   ANION GAP 9.0 11.0  --  12.0 10.0  --  9.0   BUN 19 16  --  15 16  --  14   CREATININE 0.73 0.71  --  0.64 0.74  --  0.77   EGFR 89.2 92.2  --  95.8 87.7  --  83.6   GLUCOSE 137* 122*  --  118* 110*  --  125*   CALCIUM 9.6 9.1  --  8.7 9.2  --  9.1   MAGNESIUM 2.1  --   --  2.0 2.1  --   2.0   PHOSPHORUS  --   --   --  2.4* 2.7  --  3.1    < > = values in this interval not displayed.                               Brief Urine Lab Results  (Last result in the past 365 days)        Color   Clarity   Blood   Leuk Est   Nitrite   Protein   CREAT   Urine HCG        12/20/23 2003 Yellow   Clear   Negative   Negative   Negative   Negative                   Microbiology Results Abnormal       Procedure Component Value - Date/Time    Respiratory Panel PCR w/COVID-19(SARS-CoV-2) SHEILA/TAYLOR/ANDRÉS/PAD/COR/KOREY In-House, NP Swab in UTM/VTM, 2 HR TAT - Swab, Nasopharynx [[id number]]  (Normal) Collected: 01/29/24 0132    Lab Status: Final result Specimen: Swab from Nasopharynx Updated: 01/29/24 0249     ADENOVIRUS, PCR Not Detected     Coronavirus 229E Not Detected     Coronavirus HKU1 Not Detected     Coronavirus NL63 Not Detected     Coronavirus OC43 Not Detected     COVID19 Not Detected     Human Metapneumovirus Not Detected     Human Rhinovirus/Enterovirus Not Detected     Influenza A PCR Not Detected     Influenza B PCR Not Detected     Parainfluenza Virus 1 Not Detected     Parainfluenza Virus 2 Not Detected     Parainfluenza Virus 3 Not Detected     Parainfluenza Virus 4 Not Detected     RSV, PCR Not Detected     Bordetella pertussis pcr Not Detected     Bordetella parapertussis PCR Not Detected     Chlamydophila pneumoniae PCR Not Detected     Mycoplasma pneumo by PCR Not Detected    Narrative:      In the setting of a positive respiratory panel with a viral infection PLUS a negative procalcitonin without other underlying concern for bacterial infection, consider observing off antibiotics or discontinuation of antibiotics and continue supportive care. If the respiratory panel is positive for atypical bacterial infection (Bordetella pertussis, Chlamydophila pneumoniae, or Mycoplasma pneumoniae), consider antibiotic de-escalation to target atypical bacterial infection.    Respiratory Culture - Sputum, NT Suction  [[id number]] Collected: 01/22/24 0808    Lab Status: Final result Specimen: Sputum from NT Suction Updated: 01/24/24 1052     Respiratory Culture Light growth (2+) Normal respiratory sudhir. No S. aureus or Pseudomonas aeruginosa detected. Final report.     Gram Stain Moderate (3+) WBCs per low power field      Rare (1+) Epithelial cells per low power field      Few (2+) Gram positive cocci in pairs            XR Chest 1 View    Result Date: 1/29/2024  XR CHEST 1 VW Date of Exam: 1/29/2024 12:58 AM EST Indication: cough Comparison: 1/22/2024. Findings: There are no airspace consolidations. No pleural fluid. No pneumothorax. The pulmonary vasculature appears within normal limits. The cardiac and mediastinal silhouette appear unremarkable. No acute osseous abnormality identified.     Impression: Impression: No acute cardiopulmonary process. Electronically Signed: Steffanie Ramirez MD  1/29/2024 1:31 AM EST  Workstation ID: [id number]     Results for orders placed during the hospital encounter of 01/19/24    Adult Transthoracic Echo Limited W/ Cont if Necessary Per Protocol    Interpretation Summary    Left ventricular systolic function is normal. Estimated left ventricular EF = 60%    Saline test results are negative for intracardiac shunting..      Current medications:  Scheduled Meds:aspirin, 81 mg, Nasogastric, Daily  atorvastatin, 80 mg, Nasogastric, Nightly  gabapentin, 100 mg, Nasogastric, Q8H  guaiFENesin, 600 mg, Oral, Q12H  ipratropium-albuterol, 3 mL, Nebulization, Q4H - RT  losartan, 50 mg, Nasogastric, Q24H  magnesium oxide, 400 mg, Nasogastric, Daily  mirtazapine, 30 mg, Nasogastric, Nightly  nicotine, 1 patch, Transdermal, Q24H  pantoprazole, 40 mg, Intravenous, Q AM  rOPINIRole, 4 mg, Nasogastric, Nightly  senna-docusate sodium, 2 tablet, Oral, BID  sodium chloride, 10 mL, Intravenous, Q12H  ticagrelor, 60 mg, Nasogastric, BID      Continuous Infusions:   PRN Meds:.  acetaminophen    albuterol    senna-docusate  sodium **AND** polyethylene glycol **AND** bisacodyl **AND** bisacodyl    Calcium Replacement - Follow Nurse / BPA Driven Protocol    hydrALAZINE    Magnesium Standard Dose Replacement - Follow Nurse / BPA Driven Protocol    ondansetron    phenol    Phosphorus Replacement - Follow Nurse / BPA Driven Protocol    Potassium Replacement - Follow Nurse / BPA Driven Protocol    sodium chloride    sodium chloride    Assessment & Plan   Assessment & Plan     Active Hospital Problems    Diagnosis  POA    **Stroke [I63.511]  Unknown    Moderate malnutrition [E44.0]  Yes    Grade I diastolic dysfunction [I51.89]  Unknown    GERD (gastroesophageal reflux disease) [K21.9]  Yes    RLS (restless legs syndrome) [G25.81]  Yes    Major depressive disorder [F32.9]  Yes    Iron deficiency anemia due to chronic blood loss [D50.0]  Yes      Resolved Hospital Problems   No resolved problems to display.        Brief Hospital Course to date:  Ms. Beckham is a 67yo F with a history of HTN, chronic anemia, sleep apnea intolerant of Cpap, severe depression with an overdose attempt in December 2023, and legally blind who presented to [facility] on 1/19/24 with left sided weakness and was found to have a RMCA infarct and 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 She underwent thrombectomy and carotid stent placement on 1/19. She was outside the window for thrombolytics. Initially placed on Cardene which has been successfully weaned off. Her hospital course has been complicated by ongoing dysphagia and difficulty managing secretions. Transferred to [facility] 1/25.    CVA-right MCA  Right internal carotid artery occlusion status post thrombectomy and carotid stent placement  Dysphagia  --Stroke team follows, continue DAPT with brilinta/statin.  --SLP follows, repeat FEES 1/26. Now on nectar thickened liquids  --PT/OT recs IRF     Cough, SOB  --Resp viral panel negative  --CXR negative    Chronic HFpEF  HTN  --cont ASA, statin, losartan.  Holding HCTZ and  aldactone for now    Chronic CESIA  H/H stable.    GERD  -Cont protonix    RLS  -Stable, continue requip, neurontin    Tob dependency  -cont nicotine patch  -discuss cessation    Expected Discharge Location and Transportation: Awaiting placement  Expected Discharge   Expected Discharge Date: 1/31/2024; Expected Discharge Time:      DVT prophylaxis:  Mechanical DVT prophylaxis orders are present.         AM-PAC 6 Clicks Score (PT): 12 (01/28/24 1012)    CODE STATUS:   Code Status and Medical Interventions:   Ordered at: 01/22/24 1115     Code Status (Patient has no pulse and is not breathing):    CPR (Attempt to Resuscitate)     Medical Interventions (Patient has pulse or is breathing):    Full Support       Avani Herrera MD  01/29/24

## 2024-01-29 NOTE — PLAN OF CARE
Goal Outcome Evaluation:  Plan of Care Reviewed With: patient        Progress: no change  Outcome Evaluation: Pt continues to present below baseline function d/t L sided deficits (UE>LE), decreased balance, and decreased activity tolerace. Pt required modA for STS and to take 4 steps forward and backward w/ B UE support. No knee buckling noted this date. Pt puts forth good effort toward all mobility. Pt would continue to benefit from skilled IP PT. Recommend IRF at d/c for best functional outcome.      Anticipated Discharge Disposition (PT): inpatient rehabilitation facility

## 2024-01-29 NOTE — CASE MANAGEMENT/SOCIAL WORK
Continued Stay Note   An     Patient Name: Regine Beckham  MRN: [id number]  Today's Date: 1/29/2024    Admit Date: 1/19/2024    Plan: Rehab   Discharge Plan       Row Name 01/29/24 1441       Plan    Plan Rehab    Patient/Family in Agreement with Plan yes    Plan Comments Spoke with Ms. Beckham at the bedside. Attempted to follow up on rehab referral for [facility]. Left message with Milady, awaiting a call back. Ms. Beckham is also interested in [facility]. Spoke with Suad with [facility] and faxed referral to [phone]. CM will continue to follow up.    Final Discharge Disposition Code 03 - skilled nursing facility (SNF)                   Discharge Codes    No documentation.                 Expected Discharge Date and Time       Expected Discharge Date Expected Discharge Time    Jan 31, 2024               Debbie Mcghee RN

## 2024-01-29 NOTE — DISCHARGE PLACEMENT REQUEST
"Regine Lock \"NEGIN\" (68 y.o. Female)     ANGELICA Mcghee BSN, RN    Rehab Referral      [phone]        Date of Birth   1955    Social Security Number       Address   [address] [place] [id number]    Home Phone   [phone]    MRN   [id number]       Christianity   Christianity    Marital Status                               Admission Date   1/19/24    Admission Type   Urgent    Admitting Provider   Avani Herrera MD    Attending Provider   Avani Herrera MD    Department, Room/Bed   [address], S302/1       Discharge Date       Discharge Disposition       Discharge Destination                                 Attending Provider: Avani Herrera MD    Allergies: Penicillins    Isolation: None   Infection: None   Code Status: CPR    Ht: 167.6 cm (65.98\")   Wt: 71.8 kg (158 lb 4.8 oz)    Admission Cmt: None   Principal Problem: Stroke [I63.511]                   Active Insurance as of 1/19/2024       Primary Coverage       Payor Plan Insurance Group Employer/Plan Group    AETNA MEDICARE REPLACEMENT AETNA MEDICARE REPLACEMENT [id number]       Payor Plan Address Payor Plan Phone Number Payor Plan Fax Number Effective Dates    [address] [phone]  1/1/2024 - None Entered    [facility] [id number]         Subscriber Name Subscriber Birth Date Member ID       REGINE LOCK 1955 [id number]                     Emergency Contacts        (Rel.) Home Phone Work Phone Mobile Phone    Yaa Elizondo (Friend) -- [phone] --                 History & Physical        Gm Marie MD at 01/19/24 1834            Intensive Care Admission Note     Acute ischemic right MCA stroke    History of Present Illness     Regine Lock is a 68 year-old female with grade II diastolic dysfunction, anemia, RLS, ZORAIDA (no longer uses CPAP), severe depression with recent suicide attempt via OD (12/24/23), and legal blindness that presents to " "[facility] ICU from [facility] for higher level of care.     Patient was found by her family this morning with symptoms of left-sided weakness, neglect, and right gaze deviation. She presented to [facility] ED on 1/19/24 for evaluation. LKW 2200 on 1/18/24. NIHSS 26. CT head demonstrated right MCA territory ischemia. CTA showed right ICA complete occlusion. CT perfusion showed ischemic tissue volume of 180 cc in the R MCA territory. Thrombolytics not given 2* LKW > 4.5 hours. He was transferred to [facility] for potential thrombectomy with Dr. Hurlye. Upon arrival to [facility], she was taken directly to Cath lab where she was found to have tandem ICA and ICA terminus occlusion. NIHSS 19. Mechanical thrombectomy performed of right ICA terminus occlusion resulting in TICI 3 flow and stent successfully placed to the proximal right ICA occlusion. Patient presents to the ICU postoperatively for further care.     Time spent: 4 minutes  Electronically signed by NIKKIE Ramos, 01/19/24, 6:00 PM EST.    Problem List, Surgical History, Family, Social History, and ROS     Past Medical History:   Diagnosis Date    Anemia     Anxiety     Arthritis     Depression     Legally blind     Restless leg syndrome     Sleep apnea     \"I don't use a cpap anymore since losing 106 lbs\"    Water retention       Past Surgical History:   Procedure Laterality Date    BACK SURGERY      GALLBLADDER SURGERY      HIP ARTHROSCOPY      JOINT REPLACEMENT Right        Allergies   Allergen Reactions    Penicillins Hives and Swelling     Current Facility-Administered Medications on File Prior to Encounter   Medication    [COMPLETED] iopamidol (ISOVUE-370) 76 % injection 100 mL    [COMPLETED] sodium chloride 0.9 % infusion    [DISCONTINUED] sodium chloride 0.9 % flush 10 mL     Current Outpatient Medications on File Prior to Encounter   Medication Sig    Albuterol (VENTOLIN IN) Inhale 2 puffs Every 6 (Six) Hours As Needed.    diclofenac (VOLTAREN) 75 MG EC tablet Take 1 " "tablet by mouth 2 (Two) Times a Day.    hydroCHLOROthiazide (HYDRODIURIL) 12.5 MG tablet Take 1 tablet by mouth Daily.    losartan (COZAAR) 50 MG tablet Take 1 tablet by mouth Daily.    Mirabegron ER (MYRBETRIQ) 50 MG tablet sustained-release 24 hour 24 hr tablet Take 50 mg by mouth Daily.    mirtazapine (REMERON) 30 MG tablet Take 1 tablet by mouth Every Night. Indications: Major Depressive Disorder    ondansetron ODT (ZOFRAN-ODT) 4 MG disintegrating tablet Place 1 tablet on the tongue Every 8 (Eight) Hours As Needed for Nausea or Vomiting.    pantoprazole (PROTONIX) 40 MG EC tablet TAKE 1 TABLET BY MOUTH EVERY MORNING FOR HEARTBURN    rOPINIRole (REQUIP) 4 MG tablet Take 1 tablet by mouth Every Night. Take 1 hour before bedtime.  Indications: Restless Leg Syndrome    spironolactone (ALDACTONE) 25 MG tablet TAKE 1 TABLET BY MOUTH DAILY FOR HIGH BLOOD PRESSURE    zolpidem (AMBIEN) 5 MG tablet Take 1 tablet by mouth At Night As Needed for Sleep. Indications: Trouble Sleeping     MEDICATION LIST AND ALLERGIES REVIEWED.    Family History   Problem Relation Age of Onset    Breast cancer Neg Hx      Social History     Tobacco Use    Smoking status: Every Day     Packs/day: 1.50     Years: 51.00     Additional pack years: 0.00     Total pack years: 76.50     Types: Cigarettes    Smokeless tobacco: Never   Vaping Use    Vaping Use: Never used   Substance Use Topics    Alcohol use: Yes     Alcohol/week: 1.0 standard drink of alcohol     Types: 1 Glasses of wine per week     Comment: \"occasionally - once every two months\"    Drug use: Yes     Comment: alcohol - occasionally     Social History     Social History Narrative    Not on file     FAMILY AND SOCIAL HISTORY REVIEWED.    Review of Systems  ALL OTHER SYSTEMS REVIEWED AND ARE NEGATIVE.    Physical Exam and Clinical Information   /72   Pulse 98   Temp 97.5 °F (36.4 °C) (Axillary)   Resp 18   SpO2 98%   Physical Exam  General Appearance: Pt awake, no acute " distress  Head:    Atraumatic, Normocephalic, without obvious abnormality, Pupils reactive & symmetrical B/L. Deviated eyes to right and left neglect. Left facial droop  Lungs:   B/L Breath sounds present with decreased breath sounds on bases, no wheezing heard, no crackles.   Heart: S1 and S2 present, no murmur  Abdomen: Soft, nontender, no guarding or rigidity, bowel sounds positive, no masses.  Extremities:  Groin access site without any hematoma or bruising,  no edema, warm to touch.  Pulses: Positive and symmetric.  Neurologic:  Dense hemiplegia left.   Interval:  (to 2B)  1a. Level of Consciousness: 2-->Not alert, requires repeated stimulation to attend, or is obtunded and requires strong or painful stimulation to make movements (not stereotyped)  1b. LOC Questions: 1-->Answers one question correctly  1c. LOC Commands: 1-->Performs one task correctly  2. Best Gaze: 2-->Forced deviation, or total gaze paresis not overcome by the oculocephalic maneuver  3. Visual: 2-->Complete hemianopia  4. Facial Palsy: 2-->Partial paralysis (total or near-total paralysis of lower face)  5a. Motor Arm, Left: 4-->No movement  5b. Motor Arm, Right: 0-->No drift, limb holds 90 (or 45) degrees for full 10 secs  6a. Motor Leg, Left: 3-->No effort against gravity, leg falls to bed immediately  6b. Motor Leg, Right: 0-->No drift, leg holds 30 degree position for full 5 secs  7. Limb Ataxia: 0-->Absent  8. Sensory: 2-->Severe to total sensory loss, patient is not aware of being touched in the face, arm, and leg  9. Best Language: 2-->Severe aphasia, all communication is through fragmentary expression, great need for inference, questioning, and guessing by the listener. Range of information that can be exchanged is limited, listener carries burden of. . . (see row details)  10. Dysarthria: 2-->Severe dysarthria, patients speech is so slurred as to be unintelligible in the absence of or out of proportion to any dysphasia, or is  "mute/anarthric  11. Extinction and Inattention (formerly Neglect): 1-->Visual, tactile, auditory, spatial, or personal inattention or extinction to bilateral simultaneous stimulation in one of the sensory modalities    Total (NIH Stroke Scale): 24       Results from last 7 days   Lab Units 01/19/24  1257   WBC 10*3/mm3 11.47*   HEMOGLOBIN g/dL 15.1   PLATELETS 10*3/mm3 258     Results from last 7 days   Lab Units 01/19/24  1302 01/19/24  1257   SODIUM mmol/L  --  143   POTASSIUM mmol/L  --  4.4   CO2 mmol/L  --  29.2*   BUN mg/dL  --  15   CREATININE mg/dL 0.90 0.99   GLUCOSE mg/dL  --  127*     Estimated Creatinine Clearance: 65.5 mL/min (by C-G formula based on SCr of 0.9 mg/dL).          No results found for: \"LACTATE\"     Images:   Abdominal x-ray reviewed and showed proximal port of NG tube above the diaphragm.  Will need advancement.  Nursing staff aware.    I reviewed the patient's results and images.       Report of CT perfusion reviewed.   IMPRESSION:   Results suggestive of recent ischemic event in the right middle cerebral  artery territory     This report was finalized on 1/19/2024 1:45 PM by Dr. Hu Obrien MD.      CTA neck:  IMPRESSION:  1.  Moderate to high-grade stenosis in the proximal left internal  carotid artery.  2.  Occlusion of the entire right internal carotid artery.        This report was finalized on 1/19/2024 1:43 PM by Dr. Hu Obrien MD.    EKG reviewed and showed normal sinus rhythm.  Q waves noted in lead III and aVF.  No acute ST changes.  QTc 490.    Impression     Stroke    Iron deficiency anemia due to chronic blood loss    Major depressive disorder    RLS (restless legs syndrome)    GERD (gastroesophageal reflux disease)    Grade I diastolic dysfunction    Plan/Recommendations     68-year-old female with anemia, restless leg syndrome, sleep apnea not on any CPAP, severe depression with recent suicide attempt by overdose on December 24, 2023 and legal blindness.  Patient was " found by family this morning with some dull left-sided weakness, left-sided neglect and right gaze deviation.  She presented to [facility] for evaluation.  Last known well was 10 PM on 1/18/2024.  NIHSS was 26.  CT head, CTA and perfusion study were consistent with right MCA territory ischemia.  Patient was discussed with stroke team and accepted in transfer for potential thrombectomy.  Patient was found to have tandem ICA and ICA terminus occlusion. Mechanical thrombectomy performed of right ICA terminus occlusion resulting in TICI 3 flow and stent successfully placed to the proximal right ICA occlusion.  Patient was transferred to ICU for closer monitoring.    Patient currently on Integrilin drip per protocol.  Patient is also on nicardipine 15 mg/h to keep systolic blood pressure less than 140.    1.  Admit to intensive care unit.  2.  Follow neurological status closely.  Patient has not shown any meaningful neurological recovery after thrombectomy and stent placement.  stroke neurology and interventional neurosurgery following closely.  3.  Aspirin, statin and Brilinta.  4.  Nicardipine infusion to keep systolic blood pressure less than 140.  5.  NG tube to be advanced.  6.  MRI brain pending.  7.  Check hemoglobin A1c and lipid panel.  8.  Echocardiogram with bubble study in the morning.  9.  PT/OT/speech to follow.    Check labs in the morning.    Patient with guarded prognosis.  Will continue supportive care at this point.    Time spent  40 min  (exclusive of procedure time)  including high complexity decision making to assess, manipulate, and support vital organ system failure in this individual who has impairment of one or more vital organ systems such that there is a high probability of imminent or life threatening deterioration in the patient’s condition.      Gm Marie MD, [facility]  Pulmonary and Critical Care Medicine  01/19/24 18:55 EST     CC: Dread Burton MD     Electronically  signed by Gm Marie MD at 24 1921          Physician Progress Notes (most recent note)        Avani Herrera MD at 24 1105              [facility] Medicine Services  PROGRESS NOTE    Patient Name: Regine Beckham  : 1955  MRN: [id number]    Date of Admission: 2024  Primary Care Physician: Dread Burton MD    Subjective   Subjective     CC: f/u CVA    HPI:   No issues this morning.  Currently awaiting placement.  Tolerated some of her breakfast this morning.  No nausea or vomiting.      Objective   Objective     Vital Signs:   Temp:  [97.2 °F (36.2 °C)-99.3 °F (37.4 °C)] 98.3 °F (36.8 °C)  Heart Rate:  [] 84  Resp:  [16] 16  BP: (102-123)/(57-78) 118/78     Physical Exam:  Constitutional: No acute distress, awake, alert  HENT: NCAT, mucous membranes moist  Respiratory: Clear to auscultation bilaterally, respiratory effort normal   Cardiovascular: RRR, no murmurs, rubs, or gallops  Gastrointestinal: Positive bowel sounds, soft, nontender, nondistended  Musculoskeletal: No bilateral ankle edema  Psychiatric: Appropriate affect, cooperative  Neurologic: Oriented x 3, dysarthria with left-sided facial droop, left sided weakness most pronounced in her left upper extremity  Skin: No rashes  noted to exposed skin     Results Reviewed:  LAB RESULTS:      Lab 24  0245   WBC 12.13*   HEMOGLOBIN 13.2   HEMATOCRIT 39.2   PLATELETS 242   NEUTROS ABS 10.13*   IMMATURE GRANS (ABS) 0.04   LYMPHS ABS 1.10   MONOS ABS 0.81   EOS ABS 0.01   MCV 94.0         Lab 24  0505 24  1630 24  0418 24  0349 24  1801 24  0419 24  0230   SODIUM 140  --  139 136  --  133* 137   POTASSIUM 4.3 4.5 3.6 4.1 4.5 3.6 3.7   CHLORIDE 102  --  102 103  --  101 104   CO2 27.0  --  25.0 23.0  --  23.0 21.0*   ANION GAP 11.0  --  12.0 10.0  --  9.0 12.0   BUN 16  --  15 16  --  14 13   CREATININE 0.71  --  0.64 0.74  --  0.77 0.66   EGFR  92.2  --  95.8 87.7  --  83.6 95.1   GLUCOSE 122*  --  118* 110*  --  125* 143*   CALCIUM 9.1  --  8.7 9.2  --  9.1 8.8   MAGNESIUM  --   --  2.0 2.1  --  2.0 1.8   PHOSPHORUS  --   --  2.4* 2.7  --  3.1 2.6                               Brief Urine Lab Results  (Last result in the past 365 days)        Color   Clarity   Blood   Leuk Est   Nitrite   Protein   CREAT   Urine HCG        12/20/23 2003 Yellow   Clear   Negative   Negative   Negative   Negative                   Microbiology Results Abnormal       Procedure Component Value - Date/Time    Respiratory Culture - Sputum, NT Suction [[id number]] Collected: 01/22/24 0808    Lab Status: Final result Specimen: Sputum from NT Suction Updated: 01/24/24 1052     Respiratory Culture Light growth (2+) Normal respiratory sudhir. No S. aureus or Pseudomonas aeruginosa detected. Final report.     Gram Stain Moderate (3+) WBCs per low power field      Rare (1+) Epithelial cells per low power field      Few (2+) Gram positive cocci in pairs            No radiology results from the last 24 hrs    Results for orders placed during the hospital encounter of 01/19/24    Adult Transthoracic Echo Limited W/ Cont if Necessary Per Protocol    Interpretation Summary    Left ventricular systolic function is normal. Estimated left ventricular EF = 60%    Saline test results are negative for intracardiac shunting..      Current medications:  Scheduled Meds:aspirin, 81 mg, Nasogastric, Daily  atorvastatin, 80 mg, Nasogastric, Nightly  famotidine, 20 mg, Oral, BID AC  gabapentin, 100 mg, Nasogastric, Q8H  guaiFENesin, 600 mg, Oral, Q12H  ipratropium-albuterol, 3 mL, Nebulization, Q4H - RT  losartan, 50 mg, Nasogastric, Q24H  magnesium oxide, 400 mg, Nasogastric, Daily  mirtazapine, 30 mg, Nasogastric, Nightly  nicotine, 1 patch, Transdermal, Q24H  pantoprazole, 40 mg, Intravenous, Q AM  rOPINIRole, 4 mg, Nasogastric, Nightly  senna-docusate sodium, 2 tablet, Oral, BID  sodium chloride, 10  mL, Intravenous, Q12H  ticagrelor, 60 mg, Nasogastric, BID      Continuous Infusions:   PRN Meds:.  acetaminophen    albuterol    senna-docusate sodium **AND** polyethylene glycol **AND** bisacodyl **AND** bisacodyl    Calcium Replacement - Follow Nurse / BPA Driven Protocol    hydrALAZINE    Magnesium Standard Dose Replacement - Follow Nurse / BPA Driven Protocol    ondansetron    phenol    Phosphorus Replacement - Follow Nurse / BPA Driven Protocol    Potassium Replacement - Follow Nurse / BPA Driven Protocol    sodium chloride    sodium chloride    Assessment & Plan   Assessment & Plan     Active Hospital Problems    Diagnosis  POA    **Stroke [I63.511]  Unknown    Moderate malnutrition [E44.0]  Yes    Grade I diastolic dysfunction [I51.89]  Unknown    GERD (gastroesophageal reflux disease) [K21.9]  Yes    RLS (restless legs syndrome) [G25.81]  Yes    Major depressive disorder [F32.9]  Yes    Iron deficiency anemia due to chronic blood loss [D50.0]  Yes      Resolved Hospital Problems   No resolved problems to display.        Brief Hospital Course to date:  Ms. Beckham is a 69yo F with a history of HTN, chronic anemia, sleep apnea intolerant of Cpap, severe depression with an overdose attempt in December 2023, and legally blind who presented to [facility] on 1/19/24 with left sided weakness and was found to have a RMCA infarct and 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 She underwent thrombectomy and carotid stent placement on 1/19. She was outside the window for thrombolytics. Initially placed on Cardene which has been successfully weaned off. Her hospital course has been complicated by ongoing dysphagia and difficulty managing secretions. Transferred to [facility] 1/25.    CVA-right MCA  Right internal carotid artery occlusion status post thrombectomy and carotid stent placement  Dysphagia  --Stroke team follows, continue DAPT with brilinta/statin.  --SLP follows, repeat FEES 1/26. Now on nectar thickened  "liquids  --PT/OT recs IRF     Chronic HFpEF  HTN  --cont ASA, statin, losartan.  Holding HCTZ and aldactone for now    Chronic CESIA  -CBC in AM     GERD  -Cont protonix    RLS  -Stable, continue requip, neurontin    Tob dependency  -cont nicotine patch  -discuss cessation    Expected Discharge Location and Transportation:   Expected Discharge   Expected Discharge Date: 2024; Expected Discharge Time:      DVT prophylaxis:  Mechanical DVT prophylaxis orders are present.         AM-PAC 6 Clicks Score (PT): 12 (24 1012)    CODE STATUS:   Code Status and Medical Interventions:   Ordered at: 24 1115     Code Status (Patient has no pulse and is not breathing):    CPR (Attempt to Resuscitate)     Medical Interventions (Patient has pulse or is breathing):    Full Support       Avani Herrera MD  24       Electronically signed by Avani Herrera MD at 24 1112          Physical Therapy Notes (most recent note)        Ngoc Grayson PTA at 24 0921  Version 1 of 1         Patient Name: Regine Beckham  : 1955    MRN: [id number]                              Today's Date: 2024       Admit Date: 2024    Visit Dx:     ICD-10-CM ICD-9-CM   1. Oropharyngeal dysphagia  R13.12 787.22   2. Dysarthria  R47.1 784.51   3. Cognitive communication deficit  R41.841 799.52   4. Stroke  I63.511 434.91     Patient Active Problem List   Diagnosis    Iron deficiency anemia due to chronic blood loss    Major depressive disorder    RLS (restless legs syndrome)    GERD (gastroesophageal reflux disease)    Left breast mass    Allergy to penicillin    Stroke    Grade I diastolic dysfunction    Moderate malnutrition     Past Medical History:   Diagnosis Date    Anemia     Anxiety     Arthritis     Depression     Legally blind     Restless leg syndrome     Sleep apnea     \"I don't use a cpap anymore since losing 106 lbs\"    Water retention      Past Surgical History:   Procedure " Laterality Date    BACK SURGERY      CARDIAC CATHETERIZATION N/A 1/19/2024    Procedure: Percutaneous Manual Thrombectomy;  Surgeon: Efrain Hurley MD;  Location: [facility] CATH INVASIVE LOCATION;  Service: Interventional Radiology;  Laterality: N/A;    GALLBLADDER SURGERY      HIP ARTHROSCOPY      JOINT REPLACEMENT Right       General Information       Row Name 01/28/24 0956          Physical Therapy Time and Intention    Document Type therapy note (daily note)  -AS     Mode of Treatment physical therapy  -AS       Row Name 01/28/24 0956          General Information    Patient Profile Reviewed yes  -AS     Existing Precautions/Restrictions fall;other (see comments)  L inattention, L weakness (UE>LE), dysarthria, lethargy  -AS     Barriers to Rehab medically complex;cognitive status;visual deficit  -AS       Row Name 01/28/24 0956          Cognition    Orientation Status (Cognition) oriented to;person;place;disoriented to;time;situation  -AS       Row Name 01/28/24 0956          Safety Issues, Functional Mobility    Safety Issues Affecting Function (Mobility) awareness of need for assistance;insight into deficits/self-awareness;safety precaution awareness;safety precautions follow-through/compliance;ability to follow commands;sequencing abilities  -AS     Impairments Affecting Function (Mobility) balance;coordination;endurance/activity tolerance;shortness of breath;strength;postural/trunk control;sensation/sensory awareness;motor planning;muscle tone abnormal;cognition;grasp  -AS     Comment, Safety Issues/Impairments (Mobility) patient with increased lethargy this morning, decreasing activity tolerance  -AS               User Key  (r) = Recorded By, (t) = Taken By, (c) = Cosigned By      Initials Name Provider Type    AS Ngoc Grayson PTA Physical Therapist Assistant                   Mobility       Row Name 01/28/24 0959          Bed Mobility    Scooting/Bridging [place] (Bed Mobility) dependent (less  than 25% patient effort);2 person assist  -AS     Assistive Device (Bed Mobility) draw sheet  -AS     Comment, (Bed Mobility) patient repositioned in supine for comfort, deferred EOB/OOB activity due to increased lethargy and safety concerns  -AS       Row Name 01/28/24 0959          Transfers    Comment, (Transfers) deferred due to weakness, lethargy and decreased activity tolerance  -AS       Row Name 01/28/24 0959          Bed-Chair Transfer    Bed-Chair [place] (Transfers) unable to assess  -AS       Row Name 01/28/24 0959          Sit-Stand Transfer    Sit-Stand [place] (Transfers) unable to assess  -AS       Row Name 01/28/24 0959          Gait/Stairs (Locomotion)    [place] Level (Gait) unable to assess  -AS               User Key  (r) = Recorded By, (t) = Taken By, (c) = Cosigned By      Initials Name Provider Type    AS Ngoc Grayson PTA Physical Therapist Assistant                   Obj/Interventions       [facility] Name 01/28/24 1001          Motor Skills    Therapeutic Exercise shoulder;hip;knee;ankle  -AS       Row Name 01/28/24 1001          Shoulder (Therapeutic Exercise)    Shoulder AAROM (Therapeutic Exercise) bilateral;flexion;extension;aBduction;aDduction;supine;10 repetitions  bicep curls x10, increased time and effort to complete exercises due to increased lethargy/weakness, cues for attention to task.  -AS       Row Name 01/28/24 1001          Hip (Therapeutic Exercise)    Hip (Therapeutic Exercise) AAROM (active assistive range of motion)  -AS     Hip Strengthening (Therapeutic Exercise) bilateral;aBduction;aDduction;external rotation;internal rotation;supine;10 repetitions  -AS       Row Name 01/28/24 1001          Knee (Therapeutic Exercise)    Knee (Therapeutic Exercise) AAROM (active assistive range of motion)  -AS     Knee AAROM (Therapeutic Exercise) bilateral;flexion;extension;supine;10 repetitions  -AS       Row Name 01/28/24 1001          Ankle (Therapeutic Exercise)     Ankle (Therapeutic Exercise) AAROM (active assistive range of motion)  -AS     Ankle AROM (Therapeutic Exercise) bilateral;dorsiflexion;plantarflexion;supine;10 repetitions  -AS               User Key  (r) = Recorded By, (t) = Taken By, (c) = Cosigned By      Initials Name Provider Type    AS Ngoc Grayson PTA Physical Therapist Assistant                   Goals/Plan    No documentation.                  Clinical Impression       Row Name 01/28/24 1002          Pain Scale: FACES Pre/Post-Treatment    Pain: FACES Scale, Pretreatment 0-->no hurt  -AS     Posttreatment Pain Rating 0-->no hurt  -AS       Row Name 01/28/24 1002          Plan of Care Review    Plan of Care Reviewed With patient  -AS     Progress no change  -AS     Outcome Evaluation Patient completed AAROM BUE/LE exercises with constant verbal cues for attention to task and completion due to increased lethargy, weakness and decreased activity tolerance, nursing notified and aware. Recommend IRF at D/C for best functional outcome.  -AS       Row Name 01/28/24 1002          Positioning and Restraints    Pre-Treatment Position in bed  -AS     Post Treatment Position bed  -AS     In Bed supine;call light within reach;encouraged to call for assist;exit alarm on  -AS               User Key  (r) = Recorded By, (t) = Taken By, (c) = Cosigned By      Initials Name Provider Type    AS Ngoc Grayson PTA Physical Therapist Assistant                   Outcome Measures       Row Name 01/28/24 1012          How much help from another person do you currently need...    Turning from your back to your side while in flat bed without using bedrails? 2  -AS     Moving from lying on back to sitting on the side of a flat bed without bedrails? 2  -AS     Moving to and from a bed to a chair (including a wheelchair)? 2  -AS     Standing up from a chair using your arms (e.g., wheelchair, bedside chair)? 2  -AS     Climbing 3-5 steps with a railing? 2  -AS     To  walk in hospital room? 2  -AS     AM-PAC 6 Clicks Score (PT) 12  -AS     Highest Level of Mobility Goal 4 --> Transfer to chair/commode  -AS       Row Name 01/28/24 1012          Functional Assessment    Outcome Measure Options AM-PAC 6 Clicks Basic Mobility (PT)  -AS               User Key  (r) = Recorded By, (t) = Taken By, (c) = Cosigned By      Initials Name Provider Type    AS Ngoc Grayson, TK Physical Therapist Assistant                                 Physical Therapy Education       Title: PT OT SLP Therapies (In Progress)       Topic: Physical Therapy (In Progress)       Point: Mobility training (In Progress)       Learning Progress Summary             Patient Acceptance, E, NR by AS at 1/28/2024 1013    Acceptance, E,TB, NR by ESTEVAN at 1/24/2024 1116    Acceptance, E,D, VU,NR by MB at 1/22/2024 1418    Acceptance, E,D, VU,NR by LS at 1/20/2024 1221                         Point: Home exercise program (In Progress)       Learning Progress Summary             Patient Acceptance, E, NR by AS at 1/28/2024 1013    Acceptance, E,TB, NR by ESTEVAN at 1/24/2024 1116    Acceptance, E,D, VU,NR by MB at 1/22/2024 1418                         Point: Body mechanics (In Progress)       Learning Progress Summary             Patient Acceptance, E, NR by AS at 1/28/2024 1013    Acceptance, E,TB, NR by ESTEVAN at 1/24/2024 1116    Acceptance, E,D, VU,NR by MB at 1/22/2024 1418    Acceptance, E,D, VU,NR by LS at 1/20/2024 1221                         Point: Precautions (In Progress)       Learning Progress Summary             Patient Acceptance, E, NR by AS at 1/28/2024 1013    Acceptance, E,TB, NR by ESTEVAN at 1/24/2024 1116    Acceptance, E,D, VU,NR by MB at 1/22/2024 1418    Acceptance, E,D, VU,NR by LS at 1/20/2024 1221                                         User Key       Initials Effective Dates Name Provider Type Discipline    AS 04/28/23 -  Ngoc Grayson, TK Physical Therapist Assistant PT    LS 02/03/23 -  Alize  Nikole JUDGE, PT Physical Therapist PT    MB 21 -  Joanne Bermudez, PT Physical Therapist PT    AY 11/10/20 -  Ngoc Glynn PT Physical Therapist PT                  PT Recommendation and Plan     Plan of Care Reviewed With: patient  Progress: no change  Outcome Evaluation: Patient completed AAROM BUE/LE exercises with constant verbal cues for attention to task and completion due to increased lethargy, weakness and decreased activity tolerance, nursing notified and aware. Recommend IRF at D/C for best functional outcome.     Time Calculation:         PT Charges       Row Name 24 1013             Time Calculation    Start Time 09  -AS      PT Received On 24  -AS      PT Goal Re-Cert Due Date 24  -AS         Timed Charges    22022 - PT Therapeutic Exercise Minutes 23  -AS         Total Minutes    Timed Charges Total Minutes 23  -AS       Total Minutes 23  -AS                User Key  (r) = Recorded By, (t) = Taken By, (c) = Cosigned By      Initials Name Provider Type    AS Ngoc Grayson PTA Physical Therapist Assistant                  Therapy Charges for Today       Code Description Service Date Service Provider Modifiers Qty    48002349780 HC PT THER PROC EA 15 MIN 2024 Ngoc Grayson PTA GP 2            PT G-Codes  Outcome Measure Options: AM-PAC 6 Clicks Basic Mobility (PT)  AM-PAC 6 Clicks Score (PT): 12  AM-PAC 6 Clicks Score (OT): 11  Modified Suzette Scale: 4 - Moderately severe disability.  Unable to walk without assistance, and unable to attend to own bodily needs without assistance.       Ngoc Grayson PTA  2024      Electronically signed by Ngoc Grayson PTA at 24 1014          Occupational Therapy Notes (most recent note)        Maribel Valdez, OT at 24 0932          Patient Name: Regine Beckham  : 1955    MRN: [id number]                              Today's Date: 2024       Admit Date: 2024    Visit Dx:      "ICD-10-CM ICD-9-CM   1. Oropharyngeal dysphagia  R13.12 787.22   2. Dysarthria  R47.1 784.51   3. Cognitive communication deficit  R41.841 799.52     Patient Active Problem List   Diagnosis    Iron deficiency anemia due to chronic blood loss    Major depressive disorder    RLS (restless legs syndrome)    GERD (gastroesophageal reflux disease)    Left breast mass    Allergy to penicillin    Stroke    Grade I diastolic dysfunction    Moderate malnutrition     Past Medical History:   Diagnosis Date    Anemia     Anxiety     Arthritis     Depression     Legally blind     Restless leg syndrome     Sleep apnea     \"I don't use a cpap anymore since losing 106 lbs\"    Water retention      Past Surgical History:   Procedure Laterality Date    BACK SURGERY      CARDIAC CATHETERIZATION N/A 1/19/2024    Procedure: Percutaneous Manual Thrombectomy;  Surgeon: Efrain Hurley MD;  Location: [facility] INVASIVE LOCATION;  Service: Interventional Radiology;  Laterality: N/A;    GALLBLADDER SURGERY      HIP ARTHROSCOPY      JOINT REPLACEMENT Right       General Information       Row Name 01/24/24 1302          OT Time and Intention    Document Type therapy note (daily note)  -CS     Mode of Treatment occupational therapy  -CS       Row Name 01/24/24 1307          General Information    Existing Precautions/Restrictions fall;other (see comments)  L weakness (UE>LE); dysarthria; L inattention  -CS     Barriers to Rehab medically complex;cognitive status;visual deficit  -CS       Row Name 01/24/24 1307          Cognition    Orientation Status (Cognition) oriented to;person;place;disoriented to;time;situation  -CS       Row Name 01/24/24 1309          Safety Issues, Functional Mobility    Impairments Affecting Function (Mobility) balance;coordination;endurance/activity tolerance;shortness of breath;strength;postural/trunk control;sensation/sensory awareness;motor planning;muscle tone abnormal;cognition;grasp  -CS     Cognitive " Impairments, Mobility Safety/Performance attention;insight into deficits/self-awareness;sequencing abilities  -               User Key  (r) = Recorded By, (t) = Taken By, (c) = Cosigned By      Initials Name Provider Type    CS Maribel Valdez OT Occupational Therapist                     Mobility/ADL's       Row Name 01/24/24 1308          Transfers    Transfers sit-stand transfer;stand-sit transfer  -     Comment, (Transfers) x2 reps w/ BUE support  -       Row Name 01/24/24 1308          Sit-Stand Transfer    Sit-Stand [place] (Transfers) moderate assist (50% patient effort);2 person assist;verbal cues  -       Row Name 01/24/24 1308          Stand-Sit Transfer    Stand-Sit [place] (Transfers) moderate assist (50% patient effort);2 person assist;verbal cues  -       Row Name 01/24/24 1308          Activities of Daily Living    BADL Assessment/Intervention lower body dressing;grooming  -       Row Name 01/24/24 1308          Lower Body Dressing Assessment/Training    [place] Level (Lower Body Dressing) don;doff;socks;dependent (less than 25% patient effort)  -CS     Position (Lower Body Dressing) supported sitting  -       Row Name 01/24/24 1308          Grooming Assessment/Training    [place] Level (Grooming) hair care, combing/brushing;set up  -CS     Position (Grooming) supported sitting  -               User Key  (r) = Recorded By, (t) = Taken By, (c) = Cosigned By      Initials Name Provider Type    Maribel Sterling OT Occupational Therapist                   Obj/Interventions       Row Name 01/24/24 1308          Shoulder (Therapeutic Exercise)    Shoulder (Therapeutic Exercise) AAROM (active assistive range of motion)  -     Shoulder AAROM (Therapeutic Exercise) right assist left;flexion;10 repetitions  -       Row Name 01/24/24 1308          Elbow/Forearm (Therapeutic Exercise)    Elbow/Forearm (Therapeutic Exercise) AAROM (active assistive range of motion)  -      Elbow/Forearm AAROM (Therapeutic Exercise) right assist left;flexion;extension;10 repetitions  -CS       Row Name 01/24/24 1308          Motor Skills    Therapeutic Exercise shoulder;elbow/forearm  -       Row Name 01/24/24 1308          Balance    Balance Assessment sitting static balance;sitting dynamic balance  -CS     Static Sitting Balance contact guard  -CS     Dynamic Sitting Balance minimal assist  -CS     Position, Sitting Balance sitting in chair  -CS     Static Standing Balance minimal assist;2-person assist  -CS     Dynamic Standing Balance moderate assist;2-person assist  -CS     Position/Device Used, Standing Balance supported  -CS     Balance Interventions sitting;standing;static;dynamic;dynamic reaching;occupation based/functional task;weight shifting activity  -CS     Comment, Balance Pt performed R/L weight shifting and took 3 steps foward w/ Mod Ax2  -CS               User Key  (r) = Recorded By, (t) = Taken By, (c) = Cosigned By      Initials Name Provider Type    CS Maribel Valdez, BLU Occupational Therapist                   Goals/Plan    No documentation.                  Clinical Impression       Row Name 01/24/24 1310          Pain Assessment    Pain Intervention(s) Repositioned;Ambulation/increased activity  -     Additional Documentation Pain Scale: FACES Pre/Post-Treatment (Group)  -       Row Name 01/24/24 1310          Pain Scale: FACES Pre/Post-Treatment    Pain: FACES Scale, Pretreatment 2-->hurts little bit  -CS     Posttreatment Pain Rating 2-->hurts little bit  -CS     Pain Location generalized  -CS     Pre/Posttreatment Pain Comment tolerated  -       Row Name 01/24/24 1310          Plan of Care Review    Plan of Care Reviewed With patient  -CS     Progress improving  -     Outcome Evaluation Pt demo improved independence by performing grooming tasks w/ Set-up. Pt conts to be limited d/t L sided weakness and balance deficits warranting cont skilled IPOT POC to promote  return to PLOF. Pt educated on UE HEP and encouraged to perform outside of treatment session. Recommend pt DC to IP rehab.  -CS       Row Name 01/24/24 1310          Therapy Plan Review/Discharge Plan (OT)    Anticipated Discharge Disposition (OT) inpatient rehabilitation facility  -CS       Row Name 01/24/24 1310          Vital Signs    Pre Systolic BP Rehab 125  -CS     Pre Treatment Diastolic BP 73  -CS     Post Systolic BP Rehab 123  -CS     Post Treatment Diastolic BP 84  -CS     Pretreatment Heart Rate (beats/min) 84  -CS     Posttreatment Heart Rate (beats/min) 79  -CS     Pre SpO2 (%) 99  -CS     O2 Delivery Pre Treatment room air  -CS     Post SpO2 (%) 99  -CS     O2 Delivery Post Treatment room air  -CS     Pre Patient Position Sitting  -CS     Intra Patient Position Standing  -CS     Post Patient Position Sitting  -CS       Row Name 01/24/24 1310          Positioning and Restraints    Pre-Treatment Position sitting in chair/recliner  -CS     Post Treatment Position chair  -CS     In Chair notified nsg;reclined;call light within reach;encouraged to call for assist;exit alarm on;RUE elevated;LUE elevated;waffle cushion;on mechanical lift sling;with nsg;legs elevated  -CS               User Key  (r) = Recorded By, (t) = Taken By, (c) = Cosigned By      Initials Name Provider Type    CS Maribel Valdez, OT Occupational Therapist                   Outcome Measures       Row Name 01/24/24 1311          How much help from another is currently needed...    Putting on and taking off regular lower body clothing? 1  -CS     Bathing (including washing, rinsing, and drying) 2  -CS     Toileting (which includes using toilet bed pan or urinal) 2  -CS     Putting on and taking off regular upper body clothing 2  -CS     Taking care of personal grooming (such as brushing teeth) 2  -CS     Eating meals 2  -CS     AM-PAC 6 Clicks Score (OT) 11  -CS       Row Name 01/24/24 1115 01/24/24 0800       How much help from another  person do you currently need...    Turning from your back to your side while in flat bed without using bedrails? 3  -AY 3  -BS    Moving from lying on back to sitting on the side of a flat bed without bedrails? 3  -AY 3  -BS    Moving to and from a bed to a chair (including a wheelchair)? 3  -AY 3  -BS    Standing up from a chair using your arms (e.g., wheelchair, bedside chair)? 3  -AY 3  -BS    Climbing 3-5 steps with a railing? 2  -AY 2  -BS    To walk in hospital room? 2  -AY 2  -BS    AM-PAC 6 Clicks Score (PT) 16  -AY 16  -BS    Highest Level of Mobility Goal 5 --> Static standing  -AY 5 --> Static standing  -BS      Row Name 01/24/24 1311          Modified [place] Scale    Modified [place] Scale 4 - Moderately severe disability.  Unable to walk without assistance, and unable to attend to own bodily needs without assistance.  -       Row Name 01/24/24 1311 01/24/24 1115       Functional Assessment    Outcome Measure Options AM-PAC 6 Clicks Daily Activity (OT);Modified Suzette  -CS AM-PAC 6 Clicks Basic Mobility (PT)  -AY              User Key  (r) = Recorded By, (t) = Taken By, (c) = Cosigned By      Initials Name Provider Type    CS Maribel Valdez, OT Occupational Therapist    Ngoc Yusuf, PT Physical Therapist    Sanaz Holden, RN Registered Nurse                    Occupational Therapy Education       Title: PT OT SLP Therapies (In Progress)       Topic: Occupational Therapy (In Progress)       Point: ADL training (In Progress)       Description:   Instruct learner(s) on proper safety adaptation and remediation techniques during self care or transfers.   Instruct in proper use of assistive devices.                  Learning Progress Summary             Patient Acceptance, E, NR by CS at 1/24/2024 1312    Acceptance, E, NR by CS at 1/22/2024 1055    Acceptance, E, VU by  at 1/20/2024 1354                         Point: Home exercise program (In Progress)       Description:   Instruct learner(s) on  appropriate technique for monitoring, assisting and/or progressing therapeutic exercises/activities.                  Learning Progress Summary             Patient Acceptance, E, NR by CS at 1/24/2024 1312    Acceptance, E, NR by CS at 1/22/2024 1055                         Point: Precautions (In Progress)       Description:   Instruct learner(s) on prescribed precautions during self-care and functional transfers.                  Learning Progress Summary             Patient Acceptance, E, NR by CS at 1/24/2024 1312    Acceptance, E, NR by CS at 1/22/2024 1055    Acceptance, E, VU by CS at 1/20/2024 1354                         Point: Body mechanics (In Progress)       Description:   Instruct learner(s) on proper positioning and spine alignment during self-care, functional mobility activities and/or exercises.                  Learning Progress Summary             Patient Acceptance, E, NR by  at 1/24/2024 1312    Acceptance, E, NR by CS at 1/22/2024 1055    Acceptance, E, VU by CS at 1/20/2024 1354                                         User Key       Initials Effective Dates Name Provider Type Discipline     09/02/21 -  Maribel Valdez, BLU Occupational Therapist OT                  OT Recommendation and Plan  Planned Therapy Interventions (OT): activity tolerance training, adaptive equipment training, BADL retraining, functional balance retraining, occupation/activity based interventions, ROM/therapeutic exercise, strengthening exercise, transfer/mobility retraining, cognitive/visual perception retraining, neuromuscular control/coordination retraining  Therapy Frequency (OT): daily  Plan of Care Review  Plan of Care Reviewed With: patient  Progress: improving  Outcome Evaluation: Pt demo improved independence by performing grooming tasks w/ Set-up. Pt conts to be limited d/t L sided weakness and balance deficits warranting cont skilled IPOT POC to promote return to PLOF. Pt educated on UE HEP and encouraged  to perform outside of treatment session. Recommend pt DC to IP rehab.     Time Calculation:   Evaluation Complexity (OT)  Review Occupational Profile/Medical/Therapy History Complexity: expanded/moderate complexity  Assessment, Occupational Performance/Identification of Deficit Complexity: 5 or more performance deficits  Clinical Decision Making Complexity (OT): detailed assessment/moderate complexity  Overall Complexity of Evaluation (OT): moderate complexity     Time Calculation- OT       Row Name 24 1312             Time Calculation- OT    OT Start Time 0955  -CS      OT Received On 24  -CS      OT Goal Re-Cert Due Date 24  -CS         Timed Charges    33374 - OT Therapeutic Exercise Minutes 5  -CS      96441 - OT Therapeutic Activity Minutes 15  -CS      00453 - OT Self Care/Mgmt Minutes 3  -CS         Total Minutes    Timed Charges Total Minutes 23  -CS       Total Minutes 23  -CS                User Key  (r) = Recorded By, (t) = Taken By, (c) = Cosigned By      Initials Name Provider Type    CS Maribel Valdez OT Occupational Therapist                  Therapy Charges for Today       Code Description Service Date Service Provider Modifiers Qty    22012252654  OT THER PROC EA 15 MIN 2024 Maribel Valdez OT GO 1    [id number]  OT THERAPEUTIC ACT EA 15 MIN 2024 Maribel Valdez OT GO 1                 Maribel Valdez OT  2024    Electronically signed by Maribel Valdez OT at 24 1313          Speech Language Pathology Notes (most recent note)        Anh Ross MS CCC-SLP at 24 1108          Acute Care - Speech Language Pathology   Swallow Re-Evaluation [facility]  Fiberoptic Endoscopic Evaluation of Swallowing (FEES)       Patient Name: Regine Beckham  : 1955  MRN: [id number]  Today's Date: 2024               Admit Date: 2024    Visit Dx:     ICD-10-CM ICD-9-CM   1. Oropharyngeal dysphagia  R13.12 787.22   2. Dysarthria  R47.1 784.51  "  3. Cognitive communication deficit  R41.841 799.52   4. Stroke  I63.511 434.91     Patient Active Problem List   Diagnosis    Iron deficiency anemia due to chronic blood loss    Major depressive disorder    RLS (restless legs syndrome)    GERD (gastroesophageal reflux disease)    Left breast mass    Allergy to penicillin    Stroke    Grade I diastolic dysfunction    Moderate malnutrition     Past Medical History:   Diagnosis Date    Anemia     Anxiety     Arthritis     Depression     Legally blind     Restless leg syndrome     Sleep apnea     \"I don't use a cpap anymore since losing 106 lbs\"    Water retention      Past Surgical History:   Procedure Laterality Date    BACK SURGERY      CARDIAC CATHETERIZATION N/A 1/19/2024    Procedure: Percutaneous Manual Thrombectomy;  Surgeon: Efrain Hurley MD;  Location: [facility] CATH INVASIVE LOCATION;  Service: Interventional Radiology;  Laterality: N/A;    GALLBLADDER SURGERY      HIP ARTHROSCOPY      JOINT REPLACEMENT Right        SLP Recommendation and Plan  SLP Swallowing Diagnosis: moderate, oral dysphagia, pharyngeal dysphagia (01/26/24 0940)  SLP Diet Recommendation: puree, no mixed consistencies, honey thick liquids (okay for straws) (01/26/24 0940)  Recommended Precautions and Strategies: upright posture during/after eating, small bites of food and sips of liquid, general aspiration precautions, multiple swallows per bite of food, multiple swallows per sip of liquid, reflux precautions, fatigue precautions (01/26/24 0940)  SLP Rec. for Method of Medication Administration: meds whole, meds crushed, with puree, as tolerated (01/26/24 0940)     Monitor for Signs of Aspiration: yes, notify SLP if any concerns (01/26/24 0940)     Swallow Criteria for Skilled Therapeutic Interventions Met: demonstrates skilled criteria (01/26/24 0940)  Anticipated Discharge Disposition (SLP): inpatient rehabilitation facility (01/26/24 0940)  Rehab Potential/Prognosis, Swallowing: " good, to achieve stated therapy goals (01/26/24 0940)  Therapy Frequency (Swallow): 5 days per week (01/26/24 0940)  Predicted Duration Therapy Intervention (Days): until discharge (01/26/24 0940)  Oral Care Recommendations: Oral Care BID/PRN, Suction toothbrush (01/26/24 0940)           Plan of Care Reviewed With: patient  Progress: improving      SWALLOW EVALUATION (last 72 hours)       SLP Adult Swallow Evaluation       Row Name 01/26/24 0940       Rehab Evaluation    Document Type re-evaluation  -CJ    Subjective Information no complaints  -CJ    Patient Observations alert;cooperative  -CJ    Patient/Family/Caregiver Comments/Observations no family present  -CJ    Patient Effort good  -CJ    Symptoms Noted During/After Treatment none  -CJ       General Information    Patient Profile Reviewed yes  -CJ    Pertinent History Of Current Problem see initial eval; referred for repeat FEES to determine need for long term nutrition  -CJ    Current Method of Nutrition NPO;nasogastric feedings;small-bore  -CJ    Precautions/Limitations, Vision WFL with corrective lenses;for purposes of eval  -CJ    Precautions/Limitations, Hearing WFL;for purposes of eval  -CJ    Prior Level of Function-Communication WFL  -    Prior Level of Function-Swallowing NPO  -    Plans/Goals Discussed with patient;agreed upon  -    Barriers to Rehab medically complex  -    Patient's Goals for Discharge return to PO diet  -       Pain    Additional Documentation Pain Scale: FACES Pre/Post-Treatment (Group)  -       Pain Scale: FACES Pre/Post-Treatment    Pain: FACES Scale, Pretreatment 2-->hurts little bit  -CJ    Posttreatment Pain Rating 2-->hurts little bit  -       Fiberoptic Endoscopic Evaluation of Swallowing (FEES)    Risks/Benefits Reviewed risks/benefits explained;patient;agreed to eval  -    Nasal Entry right:  -    Scope serial number/identification [id number]  -    Special Considerations keofeed in L nare  -       Anatomy  and Physiology    Anatomic Considerations edema;erythema;vocal folds;false vocal folds;arytenoids  -CJ    Comment seems improved from previous FEES  -CJ    Velopharyngeal WFL  -CJ    Base of Tongue symmetrical;range reduced  -CJ    Epiglottis edematous  -CJ    Laryngeal Function Breathing symmetrical  -CJ    Laryngeal Function Phonation symmetrical  -CJ    Laryngeal Function to Breath Hold incomplete closure  -CJ    Secretion Rating Scale (Pradeep et al. 1996) 2- secretions initially outside the vestibule but later entered the vestibule  -CJ    Secretion Description thin;clear;white  white tinged  -CJ    Ice Chips DNA  -CJ    Spontaneous Swallow frequency reduced  -CJ    Sensory sensed scope  -CJ    Utensils Used Straw;Cup;Spoon  -CJ    Consistencies Trialed thin liquids;nectar-thick liquids;honey-thick liquids;pudding/puree;regular textures  -CJ       FEES Interpretation    Oral Phase reduced lingual control;increased A-P transit time  -CJ       Initiation of Pharyngeal Swallow    Initiation of Pharyngeal Swallow bolus in valleculae  -CJ    Pharyngeal Phase impaired pharyngeal phase of swallowing  -CJ    Aspiration During the Swallow thin liquids;nectar-thick liquids;secondary to delayed swallow initiation or mistiming;secondary to reduced laryngeal elevation;secondary to reduced vestibular closure  -CJ    Aspiration After the Swallow thin liquids;nectar-thick liquids;secondary to residue;in pyriform sinuses;in laryngeal vestibule  -CJ    Depth of Penetration deep  -CJ    Response to Penetration No  -CJ    No spontaneous response to penetration and non-effective laryngeal clearance with cue (see comments)  -CJ    Response to Aspiration No  -CJ    No spontaneous response to aspiration with non-effective subglottic clearance with cue (see comments);other (see comments)  weak cough,reduced respiratory support  -CJ    Rosenbek's Scale thin:;nectar:;8-->Level 8  -CJ    Residue all consistencies tested;secondary to  reduced base of tongue retraction;secondary to reduced posterior pharyngeal wall stripping;secondary to reduced laryngeal elevation;secondary to reduced hyolaryngeal excursion  worse w/ thin/nectar  -CJ    Response to Residue cleared residue;with spontaneous subsequent swallow;other (see comments)  cleared majority  -CJ    Attempted Compensatory Maneuvers bolus size;bolus presentation style;additional subsequent swallow;head turn to left;throat clear after swallow  -CJ    Response to Attempted Compensatory Maneuvers did not prevent aspiration  -    Successful Compensatory Maneuver Competency patient able to;demonstrate compensations  -    FEES Summary Moderate oropharyngeal dysphagia. Continues w/ diffuse weakness and edema contributing but has improved. Silent aspiration of thin and nectar thick liquids occurring during the swallow. Continued silent aspiration of thin/nectar residue after the swallow. Cued cough is weak and ineffective to clear aspirated material. No penetration or aspiration w/ pudding, solids or honey thick liquids. Pt able to sense majority of residue and clear w/ consecutive swallow. [place] to be somewhat at risk for difficulty w/ solids 2/2 edentulous and lingual weakness. Can likely advance solids at bedside. Recommend puree w/ honey thick liquids, okay for straws, no mixed consistencies at this time. Will continue to f/u for dysphagia tx. Would benefit from inpatient rehab to maximize function  -       SLP Evaluation Clinical Impression    SLP Swallowing Diagnosis moderate;oral dysphagia;pharyngeal dysphagia  -    Functional Impact risk of aspiration/pneumonia;risk of dehydration;risk of malnutrition  -    Rehab Potential/Prognosis, Swallowing good, to achieve stated therapy goals  -    Swallow Criteria for Skilled Therapeutic Interventions Met demonstrates skilled criteria  -       SLP Treatment Clinical Impressions    Treatment Assessment (SLP) --    Treatment Assessment  Comments (SLP) --       Recommendations    Therapy Frequency (Swallow) 5 days per week  -CJ    Predicted Duration Therapy Intervention (Days) until discharge  -    SLP Diet Recommendation puree;no mixed consistencies;honey thick liquids  okay for straws  -CJ    Recommended Precautions and Strategies upright posture during/after eating;small bites of food and sips of liquid;general aspiration precautions;multiple swallows per bite of food;multiple swallows per sip of liquid;reflux precautions;fatigue precautions  -    Oral Care Recommendations Oral Care BID/PRN;Suction toothbrush  -    SLP Rec. for Method of Medication Administration meds whole;meds crushed;with puree;as tolerated  -    Monitor for Signs of Aspiration yes;notify SLP if any concerns  -CJ    Anticipated Discharge Disposition (SLP) inpatient rehabilitation facility  -              User Key  (r) = Recorded By, (t) = Taken By, (c) = Cosigned By      Initials Name Effective Dates     Anh Ross MS CCC-SLP 07/11/23 -     RS Rito Morrissey MS CCC-SLP 09/14/23 -                     EDUCATION  The patient has been educated in the following areas:   Dysphagia (Swallowing Impairment) Oral Care/Hydration Modified Diet Instruction.        SLP GOALS       Row Name 01/26/24 0940       (LTG) Patient will demonstrate functional swallow for    Diet Texture (Demonstrate functional swallow) regular textures  -CJ    Liquid viscosity (Demonstrate functional swallow) thin liquids  -CJ    [place] (Demonstrate functional swallow) with minimal cues (75-90% accuracy)  -CJ    Time Frame (Demonstrate functional swallow) by discharge  -CJ    Progress/Outcomes (Demonstrate functional swallow) goal ongoing  -CJ       (STG) Patient will tolerate trials of    Consistencies Trialed (Tolerate trials) pureed textures;honey/ moderately thick liquids  -CJ    Desired Outcome (Tolerate trials) without signs/symptoms of aspiration  -CJ    [place] (Tolerate trials)  with minimal cues (75-90% accuracy)  -CJ    Time Frame (Tolerate trials) by discharge  -CJ    Progress/Outcomes (Tolerate trials) goal revised this date  -CJ    Comment (Tolerate trials) --       (STG) Patient will tolerate therapeutic trials of    Consistencies Trialed (Tolerate therapeutic trials) --    Desired Outcome (Tolerate therapeutic trials) --    [place] (Tolerate therapeutic trials) --    Time Frame (Tolerate therapeutic trials) --    Progress/Outcomes (Tolerate therapeutic trials) goal no longer appropriate  -CJ       (STG) Labial Strengthening Goal 1 (SLP)    Activity (Labial Strengthening Goal 1, SLP) increase labial tone  -CJ    Increase Labial Tone labial resistance exercises;swallow trials  -CJ    [place]/Accuracy (Labial Strengthening Goal 1, SLP) with minimal cues (75-90% accuracy)  -CJ    Time Frame (Labial Strengthening Goal 1, SLP) short term goal (STG)  -CJ    Progress/Outcomes (Labial Strengthening Goal 1, SLP) goal ongoing  -CJ    Comment (Labial Strengthening Goal 1, SLP) --       (STG) Lingual Strengthening Goal 1 (SLP)    Activity (Lingual Strengthening Goal 1, SLP) increase lingual tone/sensation/control/coordination/movement;increase tongue back strength  -CJ    Increase Lingual Tone/Sensation/Control/Coordination/Movement lingual movement exercises;lingual resistance exercises  -CJ    Increase Tongue Back Strength lingual movement exercises;swallow trials;lingual resistance exercises  -CJ    [place]/Accuracy (Lingual Strengthening Goal 1, SLP) with minimal cues (75-90% accuracy)  -CJ    Time Frame (Lingual Strengthening Goal 1, SLP) short term goal (STG)  -CJ    Progress/Outcomes (Lingual Strengthening Goal 1, SLP) goal ongoing  -CJ    Comment (Lingual Strengthening Goal 1, SLP) --       (STG) Pharyngeal Strengthening Exercise Goal 1 (SLP)    Activity (Pharyngeal Strengthening Goal 1, SLP) increase timing;increase superior movement of the hyolaryngeal complex;increase  anterior movement of the hyolaryngeal complex;increase closure at entrance to airway/closure of airway at glottis;increase squeeze/positive pressure generation;increase tongue base retraction  -CJ    Increase Timing prepping - 3 second prep or suck swallow or 3-step swallow  -CJ    Increase Superior Movement of the Hyolaryngeal Complex hard effortful swallow;falsetto  -CJ    Increase Anterior Movement of the Hyolaryngeal Complex chin tuck against resistance (CTAR);hard effortful swallow  -CJ    Increase Closure at Entrance to Airway/Closure of Airway at Glottis supraglottic swallow;breath hold exercises;EMST  -CJ    Increase Squeeze/Positive Pressure Generation hard effortful swallow  -CJ    Increase Tongue Base Retraction hard effortful swallow  -CJ    [place]/Accuracy (Pharyngeal Strengthening Goal 1, SLP) with moderate cues (50-74% accuracy)  -CJ    Time Frame (Pharyngeal Strengthening Goal 1, SLP) short term goal (STG)  -CJ    Progress/Outcomes (Pharyngeal Strengthening Goal 1, SLP) goal revised this date  -CJ    Comment (Pharyngeal Strengthening Goal 1, SLP) added EMST  -CJ       Patient will demonstrate functional speech skills for return to discharge environment    [place] with moderate cues  -CJ    Time frame by discharge  -CJ    Progress/Outcomes goal ongoing  -CJ       Patient will demonstrate functional cognitive-linguistic skills for return to discharge environment    [place] with moderate cues  -CJ    Time frame by discharge  -CJ    Progress/Outcomes goal ongoing  -CJ       SLP Diagnostic Treatment     Patient will participate in further assessment in the following areas cognitive-linguistic  -CJ    Time Frame (Diagnostic) short term goal (STG)  -CJ    Progress/Outcomes (Additional Goal 1, SLP) goal ongoing  -CJ       Respiratory Support Goal 1 (SLP)    Improve Respiratory Support Goal 1 (SLP) increasing length of exhalation;sustaining a vowel sound on exhalation;80%;with minimal cues  (75-90%)  -CJ    Time Frame (Respiratory Support Goal 1, SLP) short term goal (STG)  -CJ    Progress (Respiratory Support Skills Goal 1, SLP) --    Progress/Outcomes (Respiratory Support Goal 1, SLP) goal ongoing  -CJ       Phonation Goal 1 (SLP)    Improve Phonation By Goal 1 (SLP) using loud speech;80%;with moderate cues (50-74%)  -CJ    Time Frame (Phonation Goal 1, SLP) short term goal (STG)  -CJ    Progress (Phonation Goal 1, SLP) --    Progress/Outcomes (Phonation Goal 1, SLP) goal ongoing  -CJ       Articulation Goal 1 (SLP)    Improve Articulation Goal 1 (SLP) by over-articulating at word level;80%;with moderate cues (50-74%)  -CJ    Time Frame (Articulation Goal 1, SLP) short term goal (STG)  -CJ    Progress (Articulation Goal 1, SLP) --    Progress/Outcomes (Articulation Goal 1, SLP) goal ongoing  -CJ    Comment (Articulation Goal 1, SLP) --       Attention Goal 1 (SLP)    Improve Attention by Goal 1 (SLP) attending to task;complete sustained attention task;80%;with minimal cues (75-90%)  -CJ    Time Frame (Attention Goal 1, SLP) short term goal (STG)  -CJ    Progress (Attention Goal 1, SLP) --    Progress/Outcomes (Attention Goal 1, SLP) goal ongoing  -CJ    Comment (Attention Goal 1, SLP) --       Reasoning Goal 1 (SLP)    Improve Reasoning Through Goal 1 (SLP) complete basic reasoning task;80%;with minimal cues (75-90%)  -CJ    Time Frame (Reasoning Goal 1, SLP) short term goal (STG)  -CJ    Progress/Outcomes (Reasoning Goal 1, SLP) goal ongoing  -CJ              User Key  (r) = Recorded By, (t) = Taken By, (c) = Cosigned By      Initials Name Provider Type    Anh Cloud MS CCC-SLP Speech and Language Pathologist    Rito Mata MS CCC-SLP Speech and Language Pathologist                       Time Calculation:    Time Calculation- SLP       Row Name 01/26/24 1107             Time Calculation- SLP    SLP Start Time 0940  -      SLP Received On 01/26/24  -         Untimed Charges     [id number] Treatment Swallow Minutes 99  -CJ         Total Minutes    Untimed Charges Total Minutes 99  -CJ       Total Minutes 99  -CJ                User Key  (r) = Recorded By, (t) = Taken By, (c) = Cosigned By      Initials Name Provider Type    Anh Cloud, MS CCC-SLP Speech and Language Pathologist                    Therapy Charges for Today       Code Description Service Date Service Provider Modifiers Qty    [id number]  ST FIBEROPTIC ENDO EVAL SWALL 7 1/26/2024 Anh Ross MS CCC-SLP GN 1    [id number]  ST FIBEROPTIC ENDO EVAL SWALL 7 1/26/2024 Anh Ross MS CCC-SLP GN 1                 MS VIGNESH Alvarenga  1/26/2024    Electronically signed by Anh Ross MS CCC-SLP at 01/26/24 1111

## 2024-01-29 NOTE — THERAPY PROGRESS REPORT/RE-CERT
"Patient Name: Regine Beckham  : 1955    MRN: [id number]                              Today's Date: 2024       Admit Date: 2024    Visit Dx:     ICD-10-CM ICD-9-CM   1. Oropharyngeal dysphagia  R13.12 787.22   2. Dysarthria  R47.1 784.51   3. Cognitive communication deficit  R41.841 799.52   4. Stroke  I63.511 434.91     Patient Active Problem List   Diagnosis    Iron deficiency anemia due to chronic blood loss    Major depressive disorder    RLS (restless legs syndrome)    GERD (gastroesophageal reflux disease)    Left breast mass    Allergy to penicillin    Stroke    Grade I diastolic dysfunction    Moderate malnutrition     Past Medical History:   Diagnosis Date    Anemia     Anxiety     Arthritis     Depression     Legally blind     Restless leg syndrome     Sleep apnea     \"I don't use a cpap anymore since losing 106 lbs\"    Water retention      Past Surgical History:   Procedure Laterality Date    BACK SURGERY      CARDIAC CATHETERIZATION N/A 2024    Procedure: Percutaneous Manual Thrombectomy;  Surgeon: Efrain Hurley MD;  Location: [facility] INVASIVE LOCATION;  Service: Interventional Radiology;  Laterality: N/A;    GALLBLADDER SURGERY      HIP ARTHROSCOPY      JOINT REPLACEMENT Right       General Information       Row Name 24 1517          OT Time and Intention    Document Type progress note/recertification  -CS     Mode of Treatment occupational therapy  -CS       Row Name 24 1517          General Information    Patient Profile Reviewed yes  -CS     Existing Precautions/Restrictions fall;other (see comments)  L inattention, L weakness (UE>LE), dysarthria  -CS     Barriers to Rehab medically complex;cognitive status;visual deficit  -CS       Row Name 24 1517          Cognition    Orientation Status (Cognition) oriented to;person;place;situation;verbal cues/prompts needed for orientation;time  -CS       Row Name 24 1517          Safety Issues, " Functional Mobility    Safety Issues Affecting Function (Mobility) awareness of need for assistance;insight into deficits/self-awareness;safety precaution awareness;safety precautions follow-through/compliance;sequencing abilities  -CS     Impairments Affecting Function (Mobility) balance;coordination;endurance/activity tolerance;shortness of breath;strength;postural/trunk control;sensation/sensory awareness;motor planning;muscle tone abnormal;cognition;grasp;motor control;visual/perceptual  -CS     Cognitive Impairments, Mobility Safety/Performance insight into deficits/self-awareness;safety precaution awareness;safety precaution follow-through;sequencing abilities  -CS               User Key  (r) = Recorded By, (t) = Taken By, (c) = Cosigned By      Initials Name Provider Type    CS Adam Claros OT Occupational Therapist                     Mobility/ADL's       Row Name 01/29/24 1518          Bed Mobility    Bed Mobility rolling right;rolling left  -     Rolling Left [place] (Bed Mobility) moderate assist (50% patient effort);2 person assist;verbal cues;nonverbal cues (demo/gesture)  -     Rolling Right [place] (Bed Mobility) maximum assist (25% patient effort);2 person assist;verbal cues;nonverbal cues (demo/gesture)  -     Assistive Device (Bed Mobility) draw sheet;bed rails  -CS     Comment, (Bed Mobility) cues for sequencing RUE reach to bedrail and LE positioning/pushthrough, AAROM assist for LUE reach, performed for sling placement  -       Row Name 01/29/24 1518          Transfers    Transfers sit-stand transfer;stand-sit transfer;bed-chair transfer  -     Comment, (Transfers) lifted for safety and energy conservation, STS x 5 during session w/ LUE support and cues for RUE pushthrough/reachback  -       Row Name 01/29/24 1518          Bed-Chair Transfer    Bed-Chair [place] (Transfers) dependent (less than 25% patient effort);2 person assist;verbal cues  -       Row Name  01/29/24 1518          Sit-Stand Transfer    Sit-Stand [place] (Transfers) minimum assist (75% patient effort);nonverbal cues (demo/gesture);verbal cues;2 person assist  -     Assistive Device (Sit-Stand Transfers) other (see comments)  -       Row Name 01/29/24 1518          Functional Mobility    Functional Mobility- Comment Pt tolerated 2 x10ft with BUE support and assist for lateral weight shifting in order to facilitate LLE step-through, LLE buckling observed, limited by strength/fatigue  -       Row Name 01/29/24 1518          Activities of Daily Living    BADL Assessment/Intervention lower body dressing;grooming  -       Row Name 01/29/24 1518          Lower Body Dressing Assessment/Training    [place] Level (Lower Body Dressing) don;socks;maximum assist (25% patient effort)  -CS     Position (Lower Body Dressing) unsupported sitting  -CS     Comment, (Lower Body Dressing) Pt able to assist with RUE pulling up sock in supported figure four  -       Row Name 01/29/24 1518          Grooming Assessment/Training    [place] Level (Grooming) wash face, hands;set up  -CS     Position (Grooming) supported sitting  -CS     Comment, (Grooming) RUE, progressing LUE strength to facilite improved bimanual skills at midline  -CS               User Key  (r) = Recorded By, (t) = Taken By, (c) = Cosigned By      Initials Name Provider Type     Adam Claros, OT Occupational Therapist                   Obj/Interventions       Row Name 01/29/24 1522          Shoulder (Therapeutic Exercise)    Shoulder (Therapeutic Exercise) AAROM (active assistive range of motion)  -     Shoulder AAROM (Therapeutic Exercise) right assist left;flexion;extension;10 repetitions;other (see comments);left  x2 sets  -       Row Name 01/29/24 1522          Elbow/Forearm (Therapeutic Exercise)    Elbow/Forearm (Therapeutic Exercise) AAROM (active assistive range of motion)  -     Elbow/Forearm AAROM (Therapeutic  Exercise) right assist left;flexion;extension;10 repetitions  x2 sets  -       Row Name 01/29/24 1522          Motor Skills    Motor Skills motor control/coordination interventions  -     Motor Control/Coordination Interventions gross motor coordination activities;therapeutic exercise/ROM;occupation/activity based treatment  -     Therapeutic Exercise shoulder;elbow/forearm  -       Row Name 01/29/24 1522          Balance    Balance Assessment sitting static balance;sitting dynamic balance;standing static balance;standing dynamic balance  -     Static Sitting Balance standby assist  -     Dynamic Sitting Balance contact guard  -     Position, Sitting Balance unsupported;sitting in chair  -     Static Standing Balance minimal assist;2-person assist  -     Dynamic Standing Balance 2-person assist;moderate assist  -CS     Position/Device Used, Standing Balance supported  -     Balance Interventions sitting;sit to stand;standing;occupation based/functional task;UE activity with balance activity;dynamic reaching;weight shifting activity  -               User Key  (r) = Recorded By, (t) = Taken By, (c) = Cosigned By      Initials Name Provider Type     Adam Claros, OT Occupational Therapist                   Goals/Plan       [facility] Name 01/29/24 1527          Transfer Goal 1 (OT)    Activity/Assistive Device (Transfer Goal 1, OT) bed-to-chair/chair-to-bed;toilet  -     [place] Level/Cues Needed (Transfer Goal 1, OT) minimum assist (75% or more patient effort)  -CS     Time Frame (Transfer Goal 1, OT) long term goal (LTG);10 days  -     Progress/Outcome (Transfer Goal 1, OT) continuing progress toward goal  -       Row Name 01/29/24 1527          Dressing Goal 1 (OT)    Activity/Device (Dressing Goal 1, OT) upper body dressing  -CS     [place]/Cues Needed (Dressing Goal 1, OT) minimum assist (75% or more patient effort)  -CS     Time Frame (Dressing Goal 1, OT) long term goal  (LTG);10 days  -CS     Strategies/Barriers (Dressing Goal 1, OT) dao-technique w/ hosp gown  -CS     Progress/Outcome (Dressing Goal 1, OT) continuing progress toward goal  -CS       Row Name 01/29/24 1527          Grooming Goal 1 (OT)    Activity/Device (Grooming Goal 1, OT) hair care;wash face, hands  -CS     [place] (Grooming Goal 1, OT) minimum assist (75% or more patient effort)  -CS     Time Frame (Grooming Goal 1, OT) long term goal (LTG);10 days  -CS     Strategies/Barriers (Grooming Goal 1, OT) unsupported sitting  -CS     Progress/Outcome (Grooming Goal 1, OT) continuing progress toward goal  -CS       Row Name 01/29/24 1527          Therapy Assessment/Plan (OT)    Planned Therapy Interventions (OT) functional balance retraining;occupation/activity based interventions;ROM/therapeutic exercise;strengthening exercise;transfer/mobility retraining;neuromuscular control/coordination retraining;patient/caregiver education/training;adaptive equipment training  -CS               User Key  (r) = Recorded By, (t) = Taken By, (c) = Cosigned By      Initials Name Provider Type    CS Adam Claros, OT Occupational Therapist                   Clinical Impression       Row Name 01/29/24 1523          Pain Assessment    Additional Documentation Pain Scale: FACES Pre/Post-Treatment (Group)  -[facility] Name 01/29/24 1523          Pain Scale: FACES Pre/Post-Treatment    Pain: FACES Scale, Pretreatment 0-->no hurt  -CS     Posttreatment Pain Rating 0-->no hurt  -CS       [facility] Name 01/29/24 1523          Plan of Care Review    Plan of Care Reviewed With patient  -CS     Progress improving  -CS     Outcome Evaluation OT re-cert completed, goals adjusted appropriately. Baseline ADL completion remains limited by R-sided strength and visual-perceptual deficits, impaired balance/coordination, and decreased functional endurance warranting continued IP OT services to promote return to PLOF. Excellent effort this date  during LUE ther-ex, balance activities, and 10ft x 2 forward mobility with ModA and chair follow. Rec d/c to IRF.  -[facility] Name 01/29/24 1523          Therapy Plan Review/Discharge Plan (OT)    Anticipated Discharge Disposition (OT) inpatient rehabilitation facility  -[facility] Name 01/29/24 1523          Vital Signs    Pre Patient Position Supine  -CS     Intra Patient Position Standing  -CS     Post Patient Position Sitting  -[facility] Name 01/29/24 1523          Positioning and Restraints    Pre-Treatment Position in bed  -CS     Post Treatment Position chair  -CS     In Chair notified nsg;reclined;sitting;call light within reach;encouraged to call for assist;exit alarm on;LUE elevated;RUE elevated;legs elevated;on mechanical lift sling;waffle cushion  -CS               User Key  (r) = Recorded By, (t) = Taken By, (c) = Cosigned By      Initials Name Provider Type    Adam Bhakta, OT Occupational Therapist                   Outcome Measures       [facility] Name 01/29/24 1528          How much help from another is currently needed...    Putting on and taking off regular lower body clothing? 2  -CS     Bathing (including washing, rinsing, and drying) 2  -CS     Toileting (which includes using toilet bed pan or urinal) 2  -CS     Putting on and taking off regular upper body clothing 2  -CS     Taking care of personal grooming (such as brushing teeth) 2  -CS     Eating meals 2  -CS     AM-PAC 6 Clicks Score (OT) 12  -CS       [facility] Name 01/29/24 1528          Modified [place] Scale    Modified [place] Scale 4 - Moderately severe disability.  Unable to walk without assistance, and unable to attend to own bodily needs without assistance.  -[facility] Name 01/29/24 1528          Functional Assessment    Outcome Measure Options AM-PAC 6 Clicks Daily Activity (OT)  -CS               User Key  (r) = Recorded By, (t) = Taken By, (c) = Cosigned By      Initials Name Provider Type    Adam Bhakta, OT  Occupational Therapist                    Occupational Therapy Education       Title: PT OT SLP Therapies (In Progress)       Topic: Occupational Therapy (Done)       Point: ADL training (Done)       Description:   Instruct learner(s) on proper safety adaptation and remediation techniques during self care or transfers.   Instruct in proper use of assistive devices.                  Learning Progress Summary             Patient Acceptance, E,D, VU,DU by CS at 1/29/2024 1529    Acceptance, E, NR by CS1 at 1/24/2024 1312    Acceptance, E, NR by CS1 at 1/22/2024 1055    Acceptance, E, VU by 1 at 1/20/2024 1354                         Point: Home exercise program (Done)       Description:   Instruct learner(s) on appropriate technique for monitoring, assisting and/or progressing therapeutic exercises/activities.                  Learning Progress Summary             Patient Acceptance, E,D, VU,DU by CS at 1/29/2024 1529    Acceptance, E, NR by CS1 at 1/24/2024 1312    Acceptance, E, NR by CS at 1/22/2024 1055                         Point: Precautions (Done)       Description:   Instruct learner(s) on prescribed precautions during self-care and functional transfers.                  Learning Progress Summary             Patient Acceptance, E,D, VU,DU by  at 1/29/2024 1529    Acceptance, E, NR by 1 at 1/24/2024 1312    Acceptance, E, NR by [facility] at 1/22/2024 1055    Acceptance, E, VU by [facility] at 1/20/2024 1354                         Point: Body mechanics (Done)       Description:   Instruct learner(s) on proper positioning and spine alignment during self-care, functional mobility activities and/or exercises.                  Learning Progress Summary             Patient Acceptance, E,D, VU,DU by  at 1/29/2024 1529    Acceptance, E, NR by CS1 at 1/24/2024 1312    Acceptance, E, NR by CS at 1/22/2024 1055    Acceptance, E, VU by [facility] at 1/20/2024 1354                                         User Key       Initials  Effective Dates Name Provider Type Discipline    CS1 09/02/21 -  Maribel Valdez OT Occupational Therapist OT     06/16/21 -  Adam Claros OT Occupational Therapist OT                  OT Recommendation and Plan  Planned Therapy Interventions (OT): functional balance retraining, occupation/activity based interventions, ROM/therapeutic exercise, strengthening exercise, transfer/mobility retraining, neuromuscular control/coordination retraining, patient/caregiver education/training, adaptive equipment training  Plan of Care Review  Plan of Care Reviewed With: patient  Progress: improving  Outcome Evaluation: OT re-cert completed, goals adjusted appropriately. Baseline ADL completion remains limited by R-sided strength and visual-perceptual deficits, impaired balance/coordination, and decreased functional endurance warranting continued IP OT services to promote return to PLOF. Excellent effort this date during LUE ther-ex, balance activities, and 10ft x 2 forward mobility with ModA and chair follow. Rec d/c to IRF.     Time Calculation:         Time Calculation- OT       Row Name 01/29/24 1509             Time Calculation- OT    OT Start Time 1436  -CS      OT Received On 01/29/24  -CS      OT Goal Re-Cert Due Date 02/08/24  -CS         Timed Charges    76496 - OT Therapeutic Exercise Minutes 10  -CS      92256 - OT Therapeutic Activity Minutes 15  -CS         Total Minutes    Timed Charges Total Minutes 25  -CS       Total Minutes 25  -CS                User Key  (r) = Recorded By, (t) = Taken By, (c) = Cosigned By      Initials Name Provider Type    CS Adam Claros OT Occupational Therapist                  Therapy Charges for Today       Code Description Service Date Service Provider Modifiers Qty    29637384111 HC OT THER PROC EA 15 MIN 1/29/2024 Adam Claros OT GO 1    66252354432 HC OT THERAPEUTIC ACT EA 15 MIN 1/29/2024 Adam Claros OT GO 1    72025763039 HC OT THER SUPP EA 15 MIN 1/29/2024  [place], Adam DENIS, OT GO 2                 Adam Claros, OT  1/29/2024

## 2024-01-29 NOTE — PLAN OF CARE
Goal Outcome Evaluation:  Plan of Care Reviewed With: patient        Progress: improving  Outcome Evaluation: OT re-cert completed, goals adjusted appropriately. Baseline ADL completion remains limited by R-sided strength and visual-perceptual deficits, impaired balance/coordination, and decreased functional endurance warranting continued IP OT services to promote return to PLOF. Excellent effort this date during LUE ther-ex, balance activities, and 10ft x 2 forward mobility with ModA and chair follow. Rec d/c to IRF.      Anticipated Discharge Disposition (OT): inpatient rehabilitation facility

## 2024-01-29 NOTE — PROGRESS NOTES
"                  Clinical Nutrition     Nutrition Support Assessment  Reason for Visit: Follow-up protocol, EN    Patient Name: eRgine Beckham  YOB: 1955  MRN: [id number]  Date of Encounter: 01/29/24 10:14 EST  Admission date: 1/19/2024    Comments:  - Modified diet explained   - No intake documented, unable to determine how much patient is consuming. Anticipate poor intake due to current diet modification restriction.    - unable to send ONS due to thickened liquids order - in house ONS not compatible with current diet.     Nutrition Assessment   Admission Diagnosis:  Acute ischemic right MCA stroke [I63.511]      Problem List:    Stroke    Iron deficiency anemia due to chronic blood loss    Major depressive disorder    RLS (restless legs syndrome)    GERD (gastroesophageal reflux disease)    Grade I diastolic dysfunction    Moderate malnutrition        PMH:   She  has a past medical history of Anemia, Anxiety, Arthritis, Depression, Legally blind, Restless leg syndrome, Sleep apnea, and Water retention.    PSH:  She  has a past surgical history that includes Hip arthroscopy; Joint replacement (Right); Gallbladder surgery; Back surgery; and Cardiac catheterization (N/A, 1/19/2024).      Applicable Nutrition Concerns:   Skin:  Oral:  GI: hx gastric bypass 4 years ago    Applicable Interval History:   (1/26) FEES: pureed, honey thick liquids; EN D/Stephen     Reported/Observed/Food/Nutrition Related History:   1/29  Very drowsy at time of visit. Reports eating ok, asking for cheese burger. Current diet explained. Report she is going to rehab this afternoon.       1/24) Pt had FEES yesterday and remains NPO other than allowed a singular 4 oz puree snack 3x/day. Due to this providng <10% kcal, pt requires EN to meet needs. Corepak placed yesterday evening. Pt understands plan and hopeful to regain swallowing. Will continue to work with SLP with repeat FEES planned Friday. Pt tells me \"I just want mashed " "potatoes\" and \"I never thought id have a stroke.\" RD provided empathetic listening. Encouraged pt to stay positive and motivated by progress. She has her speech exercise print out in front of her and tells me will continue working on them. She voices understanding to listen to body as EN initiated/advanced. She states she had some mild nausea this morning that resolved with zofran and she believes and RD agrees likely due to medication on empty stomach. Bowels moving.     1/22) Pt presented with stroke and is day 3 NPO due to residual dysphagia. Has not been appropriate for instrumental eval, failed repeat clinical eval today and FEES now tentatively planned for tomorrow. Visited with pt at bedside, alert and oriented. Denies any significant nutritional concerns PTA. Does let me know she had gastric bypass 4 years ago at [facility], unsure exact procedure. States she has been eating normally recently PTA. Of note pt recently admitted less than a month ago for attempted OD/SI. Discussed nutritional care plan with pt, agreeable to EN if needed but hopeful to have NG removed.     Labs    Labs Reviewed: Yes     Results from last 7 days   Lab Units 01/29/24  0607 01/27/24  0505 01/25/24  1630 01/25/24  0418 01/24/24  0349 01/23/24  1801 01/23/24  0419   GLUCOSE mg/dL 137* 122*  --  118* 110*  --  125*   BUN mg/dL 19 16  --  15 16  --  14   CREATININE mg/dL 0.73 0.71  --  0.64 0.74  --  0.77   SODIUM mmol/L 135* 140  --  139 136  --  133*   CHLORIDE mmol/L 99 102  --  102 103  --  101   POTASSIUM mmol/L 3.9 4.3 4.5 3.6 4.1   < > 3.6   PHOSPHORUS mg/dL  --   --   --  2.4* 2.7  --  3.1   MAGNESIUM mg/dL 2.1  --   --  2.0 2.1  --  2.0    < > = values in this interval not displayed.             Invalid input(s): \"PLAT\"      Results from last 7 days   Lab Units 01/29/24  0657 01/28/24  1920 01/28/24  1701 01/28/24  1146 01/28/24  0755 01/27/24 2014   GLUCOSE mg/dL 142* 165* 126 151* 186* 116     Lab Results   Lab Value " "Date/Time    HGBA1C 6.20 (H) 01/20/2024 0408                 Medications    Medications Reviewed: Yes  Pertinent: remeron, protonix, pericolace   Infusion:  PRN: zofran    Intake/Ouptut 24 hrs (0701 - 0700)   I&O's Reviewed: Yes   Intake & Output (last day)         01/28 0701  01/29 0700 01/29 0701 01/30 0700    Urine (mL/kg/hr) 700 (0.4)     Total Output 700     Net -700                 Anthropometrics     Height: Height: 167.6 cm (65.98\")  Last Filed Weight: Weight: 71.8 kg (158 lb 4.8 oz) (01/25/24 0243)  Method: Weight Method: Bed scale  BMI: BMI (Calculated): 25.6  BMI classification: Normal: 18.5-24.9kg/m2  IBW:      UBW: ~150 lb several years per EMR   Weight       Weight (kg) Weight (lbs) Weight Method Visit Report   12/20/2023 72.576 kg  160 lb  Stated     12/21/2023 70.525 kg  155 lb 7.7 oz      12/22/2023 70.525 kg  155 lb 7.7 oz  Standing scale     1/4/2024 70.308 kg  155 lb  Stated     1/19/2024 70.308 kg  155 lb  Stated     1/20/2024 72 kg  158 lb 11.7 oz  Bed scale      72 kg  158 lb 11.7 oz      1/21/2024 70.1 kg  154 lb 8.7 oz  Bed scale       Weight change: unchanged    Nutrition Focused Physical Exam     Date: 1/24  Pt meets criteria malnutrition, see MSA for full assessment.     Needs Assessment   Date:1/22    Height used:Height: 167.6 cm (65.98\")  Weights used: 70 kg (ABW)      Estimated Calorie needs: ~1700 Kcal/day  Method:  Kcals/KG 25 =1753  Method:   HBD  1303 X 1.3 = 1694    Estimated Protein needs: ~91 g PRO/day  Method: 1.3 g/Kg    Estimated Fluid needs: ~ ml/day   Per clinical status    Current Nutrition Prescription     PO: Diet: Cardiac Diets, Diabetic Diets; Healthy Heart (2-3 Na+); Consistent Carbohydrate; No Mixed Consistencies; Texture: Pureed (NDD 1); Fluid Consistency: Honey Thick  Oral Nutrition Supplement:   Intake: Insufficient data no intake documented      EN: order placed, to initiate today 1/24- D/Stephen 1/26    Nutrition Diagnosis     Date:  1/24 Updated:  Problem " Malnutrition moderate/acute   Etiology Stroke complicated by dysphagia   Signs/Symptoms PO intakes <50% EEN >/=5 days, mild muscle wasting, mild fluid accumulation (1+ edema)   Status:    Date: 1/22 Updated: 1/24, 1/29  Problem Inadequate oral intake   Etiology Stroke, residual dysphagia   Signs/Symptoms NPO per SLP   Status: modified diet started; EN D/Stephen    Goal:   General: Nutrition support treatment, Nutrition to support treatment  PO: Advace diet as medically feasible/appropriate  EN/PN: Initiate EN if needed    Nutrition Intervention      Follow treatment progress, Care plan reviewed, Interview for preferences f    - Modified diet explained   - No intake documented, unable to determine how much patient is consuming. Anticipate poor intake due to current diet modification restriction.    - unable to send ONS due to thickened liquids order - in house ONS not compatible with current diet.     Monitoring/Evaluation:   Per protocol, I&O, Pertinent labs, Weight, GI status, Symptoms, POC/GOC, Swallow function      Ondina Garcia RD, CNSC  Time Spent: 35m

## 2024-01-29 NOTE — THERAPY TREATMENT NOTE
"Patient Name: Regine Beckham  : 1955    MRN: [id number]                              Today's Date: 2024       Admit Date: 2024    Visit Dx:     ICD-10-CM ICD-9-CM   1. Oropharyngeal dysphagia  R13.12 787.22   2. Dysarthria  R47.1 784.51   3. Cognitive communication deficit  R41.841 799.52   4. Stroke  I63.511 434.91     Patient Active Problem List   Diagnosis    Iron deficiency anemia due to chronic blood loss    Major depressive disorder    RLS (restless legs syndrome)    GERD (gastroesophageal reflux disease)    Left breast mass    Allergy to penicillin    Stroke    Grade I diastolic dysfunction    Moderate malnutrition     Past Medical History:   Diagnosis Date    Anemia     Anxiety     Arthritis     Depression     Legally blind     Restless leg syndrome     Sleep apnea     \"I don't use a cpap anymore since losing 106 lbs\"    Water retention      Past Surgical History:   Procedure Laterality Date    BACK SURGERY      CARDIAC CATHETERIZATION N/A 2024    Procedure: Percutaneous Manual Thrombectomy;  Surgeon: Efrain Hurley MD;  Location: [facility] INVASIVE LOCATION;  Service: Interventional Radiology;  Laterality: N/A;    GALLBLADDER SURGERY      HIP ARTHROSCOPY      JOINT REPLACEMENT Right       General Information       Row Name 24 1557          Physical Therapy Time and Intention    Document Type therapy note (daily note)  -     Mode of Treatment physical therapy  -       Row Name 24 1557          General Information    Patient Profile Reviewed yes  -     Existing Precautions/Restrictions fall;other (see comments)  L inattention, L weakness (UE>LE), dysarthria  -     Barriers to Rehab medically complex;cognitive status;visual deficit  -       Row Name 24 3827          Cognition    Orientation Status (Cognition) oriented to;person;place;verbal cues/prompts needed for orientation;time  -       Row Name 24 1559          Safety Issues, Functional " Mobility    Safety Issues Affecting Function (Mobility) awareness of need for assistance;insight into deficits/self-awareness;safety precaution awareness;safety precautions follow-through/compliance;sequencing abilities;judgment  -     Impairments Affecting Function (Mobility) balance;coordination;endurance/activity tolerance;shortness of breath;strength;postural/trunk control;sensation/sensory awareness;motor planning;muscle tone abnormal;cognition;grasp;motor control;visual/perceptual;pain  -               User Key  (r) = Recorded By, (t) = Taken By, (c) = Cosigned By      Initials Name Provider Type     Nereida Goode, PT Physical Therapist                   Mobility       Row Name 01/29/24 1558          Bed Mobility    Comment, (Bed Mobility) Pt received and left UIC  -       Row Name 01/29/24 1558          Transfers    Comment, (Transfers) VCs for hand placement and sequencing  -       Row Name 01/29/24 1558          Sit-Stand Transfer    Sit-Stand [place] (Transfers) moderate assist (50% patient effort);1 person assist;verbal cues  -     Assistive Device (Sit-Stand Transfers) other (see comments)  B UE support  -     Comment, (Sit-Stand Transfer) STS x3 from recliner. Cues to push up from chair and for optimal foot position. Cues for upright posture and forward gaze upon standing  -       Row Name 01/29/24 1551          Gait/Stairs (Locomotion)    [place] Level (Gait) moderate assist (50% patient effort);1 person assist;verbal cues  -     Assistive Device (Gait) other (see comments)  B UE support  -     Distance in Feet (Gait) 4+4  -     Deviations/Abnormal Patterns (Gait) bilateral deviations;base of support, narrow;memo decreased;gait speed decreased;weight shifting decreased  -     Bilateral Gait Deviations forward flexed posture;heel strike decreased  -     Left Sided Gait Deviations weight shift ability decreased;heel strike decreased  -     Comment, (Gait/Stairs)  Pt took 4 steps forward and 4 steps backward at very slow pace. VCs for sequencing, upright posture, forward gaze, and to widen FLORI. Pt mildly unsteady but no overt LOB or buckling noted. Distance limited by fatigue and weakness  -               User Key  (r) = Recorded By, (t) = Taken By, (c) = Cosigned By      Initials Name Provider Type     Nereida Goode PT Physical Therapist                   Obj/Interventions       Row Name 01/29/24 1602          Motor Skills    Therapeutic Exercise hip;knee;ankle  -       Row Name 01/29/24 1602          Hip (Therapeutic Exercise)    Hip (Therapeutic Exercise) strengthening exercise  -     Hip Strengthening (Therapeutic Exercise) bilateral;heel slides;10 repetitions  Angela w/ L heel slides  -       Row Name 01/29/24 1602          Knee (Therapeutic Exercise)    Knee (Therapeutic Exercise) strengthening exercise  -     Knee Strengthening (Therapeutic Exercise) bilateral;SLR (straight leg raise);LAQ (long arc quad);10 repetitions  Angela w/ L SLR  -       Row Name 01/29/24 1602          Ankle (Therapeutic Exercise)    Ankle (Therapeutic Exercise) AROM (active range of motion)  -     Ankle AROM (Therapeutic Exercise) bilateral;dorsiflexion;plantarflexion;10 repetitions  -       Row Name 01/29/24 1602          Balance    Balance Assessment sitting static balance;sitting dynamic balance;sit to stand dynamic balance;standing static balance;standing dynamic balance  -     Static Sitting Balance standby assist  -     Dynamic Sitting Balance contact guard  -     Position, Sitting Balance unsupported;sitting in chair  -     Sit to Stand Dynamic Balance moderate assist;1-person assist;verbal cues  -     Static Standing Balance minimal assist;1-person assist;verbal cues  -     Dynamic Standing Balance moderate assist;1-person assist;verbal cues  -     Position/Device Used, Standing Balance supported;other (see comments)  B UE support  -     Balance  Interventions sitting;standing;sit to stand;supported;static;dynamic  -     Comment, Balance Lateral weigthshifting performed in standing w/ Angela  -               User Key  (r) = Recorded By, (t) = Taken By, (c) = Cosigned By      Initials Name Provider Type     Nereida Goode, PT Physical Therapist                   Goals/Plan    No documentation.                  Clinical Impression       [facility] Name 01/29/24 1606          Pain    Pretreatment Pain Rating 5/10  -     Posttreatment Pain Rating 5/10  -     Pain Location - Side/Orientation Right  -     Pain Location generalized  -     Pain Location - hip  -     Pain Intervention(s) Repositioned;Ambulation/increased activity;Elevated  -       Row Name 01/29/24 1606          Plan of Care Review    Plan of Care Reviewed With patient  -     Progress no change  -     Outcome Evaluation Pt continues to present below baseline function d/t L sided deficits (UE>LE), decreased balance, and decreased activity tolerace. Pt required modA for STS and to take 4 steps forward and backward w/ B UE support. No knee buckling noted this date. Pt puts forth good effort toward all mobility. Pt would continue to benefit from skilled IP PT. Recommend IRF at d/c for best functional outcome.  -       Row Name 01/29/24 1606          Therapy Assessment/Plan (PT)    Rehab Potential (PT) good, to achieve stated therapy goals  -     Criteria for Skilled Interventions Met (PT) yes;skilled treatment is necessary  -     Therapy Frequency (PT) daily  -       Row Name 01/29/24 1606          Vital Signs    Pre Systolic BP Rehab 121  -     Pre Treatment Diastolic BP 96  -     Post Systolic BP Rehab 131  -LH     Post Treatment Diastolic BP 97  -     Pretreatment Heart Rate (beats/min) 99  -     Intratreatment Heart Rate (beats/min) 120  -     Posttreatment Heart Rate (beats/min) 106  -     O2 Delivery Pre Treatment room air  -     O2 Delivery Intra Treatment room  air  -     O2 Delivery Post Treatment room air  -     Pre Patient Position Sitting  -     Intra Patient Position Standing  -LH     Post Patient Position Sitting  -       Row Name 01/29/24 1606          Positioning and Restraints    Pre-Treatment Position sitting in chair/recliner  -LH     Post Treatment Position chair  -LH     In Chair reclined;sitting;call light within reach;encouraged to call for assist;exit alarm on;waffle cushion;on mechanical lift sling;RUE elevated;LUE elevated;legs elevated;notified ns  -               User Key  (r) = Recorded By, (t) = Taken By, (c) = Cosigned By      Initials Name Provider Type     Nereida Goode, PT Physical Therapist                   Outcome Measures       Row Name 01/29/24 1611          How much help from another person do you currently need...    Turning from your back to your side while in flat bed without using bedrails? 2  -LH     Moving from lying on back to sitting on the side of a flat bed without bedrails? 2  -LH     Moving to and from a bed to a chair (including a wheelchair)? 2  -LH     Standing up from a chair using your arms (e.g., wheelchair, bedside chair)? 2  -LH     Climbing 3-5 steps with a railing? 2  -LH     To walk in hospital room? 2  -     AM-PAC 6 Clicks Score (PT) 12  -     Highest Level of Mobility Goal 4 --> Transfer to chair/commode  -       Row Name 01/29/24 1611 01/29/24 1528       Modified Suzette Scale    Modified Sanders Scale 4 - Moderately severe disability.  Unable to walk without assistance, and unable to attend to own bodily needs without assistance.  - 4 - Moderately severe disability.  Unable to walk without assistance, and unable to attend to own bodily needs without assistance.  -      Row Name 01/29/24 1611 01/29/24 1528       Functional Assessment    Outcome Measure Options AM-PAC 6 Clicks Basic Mobility (PT)  - AM-PAC 6 Clicks Daily Activity (OT)  -              User Key  (r) = Recorded By, (t) =  Taken By, (c) = Cosigned By      Initials Name Provider Type     Adam Claros, OT Occupational Therapist     Nereida Goode, PT Physical Therapist                                 Physical Therapy Education       Title: PT OT SLP Therapies (In Progress)       Topic: Physical Therapy (In Progress)       Point: Mobility training (Done)       Learning Progress Summary             Patient Acceptance, E, VU,NR by  at 1/29/2024 1611    Acceptance, E, NR by AS at 1/28/2024 1013    Acceptance, E,TB, NR by AY at 1/24/2024 1116    Acceptance, E,D, VU,NR by MB at 1/22/2024 1418    Acceptance, E,D, VU,NR by LS at 1/20/2024 1221                         Point: Home exercise program (In Progress)       Learning Progress Summary             Patient Acceptance, E, NR by AS at 1/28/2024 1013    Acceptance, E,TB, NR by AY at 1/24/2024 1116    Acceptance, E,D, VU,NR by MB at 1/22/2024 1418                         Point: Body mechanics (Done)       Learning Progress Summary             Patient Acceptance, E, VU,NR by  at 1/29/2024 1611    Acceptance, E, NR by AS at 1/28/2024 1013    Acceptance, E,TB, NR by AY at 1/24/2024 1116    Acceptance, E,D, VU,NR by MB at 1/22/2024 1418    Acceptance, E,D, VU,NR by LS at 1/20/2024 1221                         Point: Precautions (Done)       Learning Progress Summary             Patient Acceptance, E, VU,NR by  at 1/29/2024 1611    Acceptance, E, NR by AS at 1/28/2024 1013    Acceptance, E,TB, NR by AY at 1/24/2024 1116    Acceptance, E,D, VU,NR by MB at 1/22/2024 1418    Acceptance, E,D, VU,NR by LS at 1/20/2024 1221                                         User Key       Initials Effective Dates Name Provider Type Discipline    AS 04/28/23 -  Ngoc Grayson, PTA Physical Therapist Assistant PT    LS 02/03/23 -  Nikole Herrmann, PT Physical Therapist PT    MB 06/16/21 -  Joanne Bermudez, PT Physical Therapist PT    AY 11/10/20 -  Ngoc Glynn, PT Physical Therapist PT    LH  09/21/23 -  Nereida Goode, PT Physical Therapist PT                  PT Recommendation and Plan     Plan of Care Reviewed With: patient  Progress: no change  Outcome Evaluation: Pt continues to present below baseline function d/t L sided deficits (UE>LE), decreased balance, and decreased activity tolerace. Pt required modA for STS and to take 4 steps forward and backward w/ B UE support. No knee buckling noted this date. Pt puts forth good effort toward all mobility. Pt would continue to benefit from skilled IP PT. Recommend IRF at d/c for best functional outcome.     Time Calculation:         PT Charges       Row Name 01/29/24 1611             Time Calculation    Start Time 1525  -LH      PT Received On 01/29/24  -      PT Goal Re-Cert Due Date 01/30/24  -         Timed Charges    72630 - PT Therapeutic Exercise Minutes 10  -      50664 - Gait Training Minutes  5  -LH      13303 - PT Therapeutic Activity Minutes 13  -LH         Total Minutes    Timed Charges Total Minutes 28  -       Total Minutes 28  -                User Key  (r) = Recorded By, (t) = Taken By, (c) = Cosigned By      Initials Name Provider Type     Nereida Goode PT Physical Therapist                  Therapy Charges for Today       Code Description Service Date Service Provider Modifiers Qty    71790189111 HC PT THER PROC EA 15 MIN 1/29/2024 Nereida Goode, PT GP 1    15470923153 HC PT THERAPEUTIC ACT EA 15 MIN 1/29/2024 Nereida Goode, PT GP 1            PT G-Codes  Outcome Measure Options: AM-PAC 6 Clicks Basic Mobility (PT)  AM-PAC 6 Clicks Score (PT): 12  AM-PAC 6 Clicks Score (OT): 12  Modified Suzette Scale: 4 - Moderately severe disability.  Unable to walk without assistance, and unable to attend to own bodily needs without assistance.  PT Discharge Summary  Anticipated Discharge Disposition (PT): inpatient rehabilitation facility    Nereida Goode PT  1/29/2024

## 2024-01-30 LAB
GLUCOSE BLDC GLUCOMTR-MCNC: 120 MG/DL (ref 70–130)
GLUCOSE BLDC GLUCOMTR-MCNC: 161 MG/DL (ref 70–130)
GLUCOSE BLDC GLUCOMTR-MCNC: 167 MG/DL (ref 70–130)
GLUCOSE BLDC GLUCOMTR-MCNC: 201 MG/DL (ref 70–130)

## 2024-01-30 PROCEDURE — 92526 ORAL FUNCTION THERAPY: CPT | Performed by: SPEECH-LANGUAGE PATHOLOGIST

## 2024-01-30 PROCEDURE — 92507 TX SP LANG VOICE COMM INDIV: CPT | Performed by: SPEECH-LANGUAGE PATHOLOGIST

## 2024-01-30 PROCEDURE — 82948 REAGENT STRIP/BLOOD GLUCOSE: CPT

## 2024-01-30 PROCEDURE — 94799 UNLISTED PULMONARY SVC/PX: CPT

## 2024-01-30 PROCEDURE — 94664 DEMO&/EVAL PT USE INHALER: CPT

## 2024-01-30 PROCEDURE — 97530 THERAPEUTIC ACTIVITIES: CPT

## 2024-01-30 PROCEDURE — 97110 THERAPEUTIC EXERCISES: CPT

## 2024-01-30 PROCEDURE — 99232 SBSQ HOSP IP/OBS MODERATE 35: CPT | Performed by: NURSE PRACTITIONER

## 2024-01-30 RX ORDER — ROPINIROLE 1 MG/1
4 TABLET, FILM COATED ORAL NIGHTLY
Status: DISCONTINUED | OUTPATIENT
Start: 2024-01-30 | End: 2024-02-02 | Stop reason: HOSPADM

## 2024-01-30 RX ORDER — PANTOPRAZOLE SODIUM 40 MG/1
40 TABLET, DELAYED RELEASE ORAL
Status: DISCONTINUED | OUTPATIENT
Start: 2024-01-31 | End: 2024-02-02 | Stop reason: HOSPADM

## 2024-01-30 RX ORDER — ACETAMINOPHEN 325 MG/1
650 TABLET ORAL EVERY 4 HOURS PRN
Status: DISCONTINUED | OUTPATIENT
Start: 2024-01-30 | End: 2024-02-02 | Stop reason: HOSPADM

## 2024-01-30 RX ORDER — LOSARTAN POTASSIUM 50 MG/1
50 TABLET ORAL
Status: DISCONTINUED | OUTPATIENT
Start: 2024-01-31 | End: 2024-02-02 | Stop reason: HOSPADM

## 2024-01-30 RX ORDER — MIRTAZAPINE 15 MG/1
30 TABLET, FILM COATED ORAL NIGHTLY
Status: DISCONTINUED | OUTPATIENT
Start: 2024-01-30 | End: 2024-02-02 | Stop reason: HOSPADM

## 2024-01-30 RX ORDER — ATORVASTATIN CALCIUM 40 MG/1
80 TABLET, FILM COATED ORAL NIGHTLY
Status: DISCONTINUED | OUTPATIENT
Start: 2024-01-30 | End: 2024-02-02 | Stop reason: HOSPADM

## 2024-01-30 RX ORDER — ASPIRIN 81 MG/1
81 TABLET, CHEWABLE ORAL DAILY
Status: DISCONTINUED | OUTPATIENT
Start: 2024-01-31 | End: 2024-02-02 | Stop reason: HOSPADM

## 2024-01-30 RX ORDER — GABAPENTIN 100 MG/1
100 CAPSULE ORAL EVERY 8 HOURS SCHEDULED
Status: DISCONTINUED | OUTPATIENT
Start: 2024-01-30 | End: 2024-02-02 | Stop reason: HOSPADM

## 2024-01-30 RX ADMIN — PANTOPRAZOLE SODIUM 40 MG: 40 INJECTION, POWDER, FOR SOLUTION INTRAVENOUS at 05:53

## 2024-01-30 RX ADMIN — IPRATROPIUM BROMIDE AND ALBUTEROL SULFATE 3 ML: 2.5; .5 SOLUTION RESPIRATORY (INHALATION) at 22:48

## 2024-01-30 RX ADMIN — GABAPENTIN 100 MG: 100 CAPSULE ORAL at 14:28

## 2024-01-30 RX ADMIN — IPRATROPIUM BROMIDE AND ALBUTEROL SULFATE 3 ML: 2.5; .5 SOLUTION RESPIRATORY (INHALATION) at 07:45

## 2024-01-30 RX ADMIN — MIRTAZAPINE 30 MG: 15 TABLET, FILM COATED ORAL at 21:39

## 2024-01-30 RX ADMIN — MAGNESIUM OXIDE TAB 400 MG (241.3 MG ELEMENTAL MG) 400 MG: 400 (241.3 MG) TAB at 08:01

## 2024-01-30 RX ADMIN — ATORVASTATIN CALCIUM 80 MG: 40 TABLET, FILM COATED ORAL at 21:39

## 2024-01-30 RX ADMIN — TICAGRELOR 60 MG: 60 TABLET ORAL at 21:39

## 2024-01-30 RX ADMIN — ACETAMINOPHEN 650 MG: 325 TABLET ORAL at 21:39

## 2024-01-30 RX ADMIN — Medication 1 PATCH: at 08:02

## 2024-01-30 RX ADMIN — GABAPENTIN 100 MG: 100 CAPSULE ORAL at 05:53

## 2024-01-30 RX ADMIN — GABAPENTIN 100 MG: 100 CAPSULE ORAL at 21:39

## 2024-01-30 RX ADMIN — TICAGRELOR 60 MG: 60 TABLET ORAL at 08:01

## 2024-01-30 RX ADMIN — GUAIFENESIN 600 MG: 600 TABLET, EXTENDED RELEASE ORAL at 21:39

## 2024-01-30 RX ADMIN — IPRATROPIUM BROMIDE AND ALBUTEROL SULFATE 3 ML: 2.5; .5 SOLUTION RESPIRATORY (INHALATION) at 13:24

## 2024-01-30 RX ADMIN — GUAIFENESIN 600 MG: 600 TABLET, EXTENDED RELEASE ORAL at 08:01

## 2024-01-30 RX ADMIN — Medication 10 ML: at 21:40

## 2024-01-30 RX ADMIN — ASPIRIN 81 MG: 81 TABLET, CHEWABLE ORAL at 08:01

## 2024-01-30 RX ADMIN — IPRATROPIUM BROMIDE AND ALBUTEROL SULFATE 3 ML: 2.5; .5 SOLUTION RESPIRATORY (INHALATION) at 18:33

## 2024-01-30 RX ADMIN — ROPINIROLE HYDROCHLORIDE 4 MG: 1 TABLET, FILM COATED ORAL at 21:38

## 2024-01-30 RX ADMIN — Medication 10 ML: at 08:01

## 2024-01-30 RX ADMIN — SENNOSIDES AND DOCUSATE SODIUM 2 TABLET: 8.6; 5 TABLET ORAL at 08:01

## 2024-01-30 RX ADMIN — LOSARTAN POTASSIUM 50 MG: 50 TABLET, FILM COATED ORAL at 08:01

## 2024-01-30 NOTE — THERAPY TREATMENT NOTE
"Acute Care - Speech Language Pathology   Swallow Treatment Note [facility]     Patient Name: Regine Beckham  : 1955  MRN: [id number]  Today's Date: 2024               Admit Date: 2024    Visit Dx:     ICD-10-CM ICD-9-CM   1. Oropharyngeal dysphagia  R13.12 787.22   2. Dysarthria  R47.1 784.51   3. Cognitive communication deficit  R41.841 799.52   4. Stroke  I63.511 434.91     Patient Active Problem List   Diagnosis    Iron deficiency anemia due to chronic blood loss    Major depressive disorder    RLS (restless legs syndrome)    GERD (gastroesophageal reflux disease)    Left breast mass    Allergy to penicillin    Stroke    Grade I diastolic dysfunction    Moderate malnutrition     Past Medical History:   Diagnosis Date    Anemia     Anxiety     Arthritis     Depression     Legally blind     Restless leg syndrome     Sleep apnea     \"I don't use a cpap anymore since losing 106 lbs\"    Water retention      Past Surgical History:   Procedure Laterality Date    BACK SURGERY      CARDIAC CATHETERIZATION N/A 2024    Procedure: Percutaneous Manual Thrombectomy;  Surgeon: Efrain Hurley MD;  Location: [facility] INVASIVE LOCATION;  Service: Interventional Radiology;  Laterality: N/A;    GALLBLADDER SURGERY      HIP ARTHROSCOPY      JOINT REPLACEMENT Right        SLP Recommendation and Plan     SLP Diet Recommendation: puree, no mixed consistencies, honey thick liquids (24 1025)  Recommended Precautions and Strategies: upright posture during/after eating, small bites of food and sips of liquid, general aspiration precautions, multiple swallows per bite of food, multiple swallows per sip of liquid, reflux precautions, fatigue precautions (24 1025)  SLP Rec. for Method of Medication Administration: meds whole, meds crushed, with puree, as tolerated (24 1025)     Monitor for Signs of Aspiration: yes, notify SLP if any concerns (24 1025)        Anticipated Discharge " Disposition (SLP): inpatient rehabilitation facility (01/30/24 1025)     Therapy Frequency (Swallow): 5 days per week (01/30/24 1025)  Predicted Duration Therapy Intervention (Days): until discharge (01/30/24 1025)  Oral Care Recommendations: Oral Care BID/PRN, Suction toothbrush (01/30/24 1025)        Daily Summary of Progress (SLP): progress toward functional goals as expected (01/30/24 1025)               Treatment Assessment (SLP): continued, oral dysphagia, pharyngeal dysphagia, toleration of diet, dysarthria (01/30/24 1025)  Treatment Assessment Comments (SLP): Pt participate in tx this date, no overt s/s of aspiration. Provided EMST-lite and reviewed dysphagia tx exercises again. Will continue to f/u to address deficits. Would benefit from Inpatient rehab to maximize function (01/30/24 1025)  Plan for Continued Treatment (SLP): continue treatment per plan of care (01/30/24 1025)         Plan of Care Reviewed With: patient  Progress: no change      SWALLOW EVALUATION (last 72 hours)       SLP Adult Swallow Evaluation       Row Name 01/30/24 1025       Rehab Evaluation    Document Type therapy note (daily note)  -CJ    Subjective Information no complaints  -CJ    Patient Observations alert;cooperative;agree to therapy  -CJ    Patient/Family/Caregiver Comments/Observations no family present  -CJ    Patient Effort good  -CJ    Symptoms Noted During/After Treatment none  -CJ       Pain    Additional Documentation Pain Scale: FACES Pre/Post-Treatment (Group)  -CJ       Pain Scale: FACES Pre/Post-Treatment    Pain: FACES Scale, Pretreatment 0-->no hurt  -CJ    Posttreatment Pain Rating 0-->no hurt  -CJ       SLP Treatment Clinical Impressions    Treatment Assessment (SLP) continued;oral dysphagia;pharyngeal dysphagia;toleration of diet;dysarthria  -CJ    Treatment Assessment Comments (SLP) Pt participate in tx this date, no overt s/s of aspiration. Provided EMST-lite and reviewed dysphagia tx exercises again. Will  continue to f/u to address deficits. Would benefit from Inpatient rehab to maximize function  -    Daily Summary of Progress (SLP) progress toward functional goals as expected  -CJ    Plan for Continued Treatment (SLP) continue treatment per plan of care  -    Care Plan Review care plan/treatment goals reviewed  -       Recommendations    Therapy Frequency (Swallow) 5 days per week  -    Predicted Duration Therapy Intervention (Days) until discharge  -    SLP Diet Recommendation puree;no mixed consistencies;honey thick liquids  -    Recommended Precautions and Strategies upright posture during/after eating;small bites of food and sips of liquid;general aspiration precautions;multiple swallows per bite of food;multiple swallows per sip of liquid;reflux precautions;fatigue precautions  -    Oral Care Recommendations Oral Care BID/PRN;Suction toothbrush  -    SLP Rec. for Method of Medication Administration meds whole;meds crushed;with puree;as tolerated  -    Monitor for Signs of Aspiration yes;notify SLP if any concerns  -    Anticipated Discharge Disposition (SLP) inpatient rehabilitation facility  -              User Key  (r) = Recorded By, (t) = Taken By, (c) = Cosigned By      Initials Name Effective Dates    Anh Cloud, MS CCC-SLP 07/11/23 -                     EDUCATION  The patient has been educated in the following areas:   Dysphagia (Swallowing Impairment) Oral Care/Hydration Modified Diet Instruction.        SLP GOALS       Row Name 01/30/24 1025             (LTG) Patient will demonstrate functional swallow for    Diet Texture (Demonstrate functional swallow) regular textures  -CJ      Liquid viscosity (Demonstrate functional swallow) thin liquids  -CJ      [place] (Demonstrate functional swallow) with minimal cues (75-90% accuracy)  -CJ      Time Frame (Demonstrate functional swallow) by discharge  -      Progress/Outcomes (Demonstrate functional swallow) continuing  progress toward goal  -CJ         (STG) Patient will tolerate trials of    Consistencies Trialed (Tolerate trials) pureed textures;honey/ moderately thick liquids  -CJ      Desired Outcome (Tolerate trials) without signs/symptoms of aspiration  -CJ      [place] (Tolerate trials) with minimal cues (75-90% accuracy)  -CJ      Time Frame (Tolerate trials) by discharge  -CJ      Progress/Outcomes (Tolerate trials) continuing progress toward goal  -CJ      Comment (Tolerate trials) no overt s/s of aspiration  -CJ         (STG) Patient will tolerate therapeutic trials of    Progress/Outcomes (Tolerate therapeutic trials) goal no longer appropriate  -CJ         (STG) Labial Strengthening Goal 1 (SLP)    Activity (Labial Strengthening Goal 1, SLP) increase labial tone  -CJ      Increase Labial Tone labial resistance exercises;swallow trials  -CJ      [place]/Accuracy (Labial Strengthening Goal 1, SLP) with minimal cues (75-90% accuracy)  -CJ      Time Frame (Labial Strengthening Goal 1, SLP) short term goal (STG)  -CJ      Progress/Outcomes (Labial Strengthening Goal 1, SLP) continuing progress toward goal  -CJ      Comment (Labial Strengthening Goal 1, SLP) x10  -CJ         (STG) Lingual Strengthening Goal 1 (SLP)    Activity (Lingual Strengthening Goal 1, SLP) increase lingual tone/sensation/control/coordination/movement;increase tongue back strength  -CJ      Increase Lingual Tone/Sensation/Control/Coordination/Movement lingual movement exercises;lingual resistance exercises  -CJ      Increase Tongue Back Strength lingual movement exercises;swallow trials;lingual resistance exercises  -CJ      [place]/Accuracy (Lingual Strengthening Goal 1, SLP) with minimal cues (75-90% accuracy)  -CJ      Time Frame (Lingual Strengthening Goal 1, SLP) short term goal (STG)  -CJ      Progress/Outcomes (Lingual Strengthening Goal 1, SLP) continuing progress toward goal  -CJ      Comment (Lingual Strengthening Goal 1, SLP)  x2 sets x5 reps  -CJ         (STG) Pharyngeal Strengthening Exercise Goal 1 (SLP)    Activity (Pharyngeal Strengthening Goal 1, SLP) increase timing;increase superior movement of the hyolaryngeal complex;increase anterior movement of the hyolaryngeal complex;increase closure at entrance to airway/closure of airway at glottis;increase squeeze/positive pressure generation;increase tongue base retraction  -CJ      Increase Timing prepping - 3 second prep or suck swallow or 3-step swallow  -CJ      Increase Superior Movement of the Hyolaryngeal Complex hard effortful swallow;falsetto  -CJ      Increase Anterior Movement of the Hyolaryngeal Complex chin tuck against resistance (CTAR);hard effortful swallow  -CJ      Increase Closure at Entrance to Airway/Closure of Airway at Glottis supraglottic swallow;breath hold exercises;EMST  -CJ      Increase Squeeze/Positive Pressure Generation hard effortful swallow  -CJ      Increase Tongue Base Retraction hard effortful swallow  -CJ      [place]/Accuracy (Pharyngeal Strengthening Goal 1, SLP) with moderate cues (50-74% accuracy)  -CJ      Time Frame (Pharyngeal Strengthening Goal 1, SLP) short term goal (STG)  -CJ      Progress/Outcomes (Pharyngeal Strengthening Goal 1, SLP) continuing progress toward goal  -CJ      Comment (Pharyngeal Strengthening Goal 1, SLP) CTAR, effortful, EMST-lite, effortful, SG all performed x @ least 5 reps  -CJ         Patient will demonstrate functional speech skills for return to discharge environment    [place] with moderate cues  -CJ      Time frame by discharge  -CJ      Progress/Outcomes continuing progress toward goal  -CJ         Patient will demonstrate functional cognitive-linguistic skills for return to discharge environment    [place] with moderate cues  -CJ      Time frame by discharge  -CJ      Progress/Outcomes continuing progress toward goal  -CJ         SLP Diagnostic Treatment     Patient will participate in further  assessment in the following areas cognitive-linguistic  -CJ      Time Frame (Diagnostic) short term goal (STG)  -CJ      Barriers (Diagnostic) Lethargy;Cognitive status  -CJ      Progress/Outcomes (Additional Goal 1, SLP) continuing progress toward goal  -CJ         Respiratory Support Goal 1 (SLP)    Improve Respiratory Support Goal 1 (SLP) increasing length of exhalation;sustaining a vowel sound on exhalation;80%;with minimal cues (75-90%)  -CJ      Time Frame (Respiratory Support Goal 1, SLP) short term goal (STG)  -CJ      Progress (Respiratory Support Skills Goal 1, SLP) 60%;with moderate cues (50-74%)  -CJ      Progress/Outcomes (Respiratory Support Goal 1, SLP) continuing progress toward goal  -CJ         Phonation Goal 1 (SLP)    Improve Phonation By Goal 1 (SLP) using loud speech;80%;with moderate cues (50-74%)  -CJ      Time Frame (Phonation Goal 1, SLP) short term goal (STG)  -CJ      Progress (Phonation Goal 1, SLP) 60%;with moderate cues (50-74%)  -CJ      Progress/Outcomes (Phonation Goal 1, SLP) continuing progress toward goal  -CJ         Articulation Goal 1 (SLP)    Improve Articulation Goal 1 (SLP) by over-articulating at phrase level;80%;with moderate cues (50-74%)  -CJ      Time Frame (Articulation Goal 1, SLP) short term goal (STG)  -CJ      Progress (Articulation Goal 1, SLP) 50%;with moderate cues (50-74%)  -CJ      Progress/Outcomes (Articulation Goal 1, SLP) goal revised this date  -CJ      Comment (Articulation Goal 1, SLP) revised to phrases  -CJ         Attention Goal 1 (SLP)    Improve Attention by Goal 1 (SLP) attending to task;complete sustained attention task;80%;with minimal cues (75-90%)  -CJ      Time Frame (Attention Goal 1, SLP) short term goal (STG)  -CJ      Progress (Attention Goal 1, SLP) 50%;with moderate cues (50-74%)  -CJ      Progress/Outcomes (Attention Goal 1, SLP) continuing progress toward goal  -CJ         Reasoning Goal 1 (SLP)    Improve Reasoning Through Goal 1  (SLP) complete basic reasoning task;80%;with minimal cues (75-90%)  -CJ      Time Frame (Reasoning Goal 1, SLP) short term goal (STG)  -CJ      Progress (Reasoning Goal 1, SLP) 60%;with moderate cues (50-74%)  -CJ      Progress/Outcomes (Reasoning Goal 1, SLP) continuing progress toward goal  -CJ                User Key  (r) = Recorded By, (t) = Taken By, (c) = Cosigned By      Initials Name Provider Type    Anh Cloud MS CCC-SLP Speech and Language Pathologist                       Time Calculation:    Time Calculation- SLP       Row Name 01/30/24 1106             Time Calculation- SLP    SLP Start Time 1025  -CJ      SLP Received On 01/30/24  -         Untimed Charges    07888-ZU Treatment/ST Modification Prosth Aug Alter  23  -CJ      22939-JL Treatment Swallow Minutes 24  -CJ         Total Minutes    Untimed Charges Total Minutes 47  -CJ       Total Minutes 47  -CJ                User Key  (r) = Recorded By, (t) = Taken By, (c) = Cosigned By      Initials Name Provider Type    Anh Cloud MS CCC-SLP Speech and Language Pathologist                    Therapy Charges for Today       Code Description Service Date Service Provider Modifiers Qty    10769632588 HC ST TREATMENT SPEECH 2 1/30/2024 Anh Ross MS CCC-SLP GN 1    89683714087 HC ST TREATMENT SWALLOW 2 1/30/2024 Anh Ross MS CCC-SLP GN 1                 MS VIGNESH Alvarenga  1/30/2024

## 2024-01-30 NOTE — PLAN OF CARE
Goal Outcome Evaluation:  Plan of Care Reviewed With: patient           Outcome Evaluation: PT NEEDS CUES FOR SEQUENCING AND SAFETY WITH MOBILITY. REQUIRES MOD TO MAX ASSIST FOR TRANSFERS AND GAIT SHORT DISTANCE. PT IS MOTIVATED TO WORK WITH THERAPY. CONTINUES WITH L SIDED WEAKNESS, IMPAIRED BALANCE AND DECLINE IN FUNCTIONAL MOBILITY AND IS ALSO IMPACTED BY BASELINE VISUAL DEFICITS. RECOMMEND IRF AT D/C FOR BEST OUTCOME. GOALS MODIFIED TO REFLECT CURRENT FUNCTIONAL STAUS.      Anticipated Discharge Disposition (PT): inpatient rehabilitation facility

## 2024-01-30 NOTE — DISCHARGE PLACEMENT REQUEST
"Regine Lock \"NEGIN\" (68 y.o. Female)     CM Debbie Mcghee BSN RN    [phone]  Rehab Referral                Date of Birth   1955    Social Security Number       Address   [address] [place], [place] [address]    Home Phone   [phone]    MRN   [id number]       Adventism   Voodoo    Marital Status                               Admission Date   1/19/24    Admission Type   Urgent    Admitting Provider   Avani Herrera MD    Attending Provider   Avani Herrera MD    Department, Room/Bed   [address], S302/1       Discharge Date       Discharge Disposition       Discharge Destination                                 Attending Provider: Avani Herrera MD    Allergies: Penicillins    Isolation: None   Infection: None   Code Status: CPR    Ht: 167.6 cm (65.98\")   Wt: 71.8 kg (158 lb 4.8 oz)    Admission Cmt: None   Principal Problem: Stroke [I63.511]                   Active Insurance as of 1/19/2024       Primary Coverage       Payor Plan Insurance Group Employer/Plan Group    AETNA MEDICARE REPLACEMENT AETNA MEDICARE REPLACEMENT [id number]       Payor Plan Address Payor Plan Phone Number Payor Plan Fax Number Effective Dates    [address] [phone]  1/1/2024 - None Entered    [place] [address]         Subscriber Name Subscriber Birth Date Member ID       REGINE LOCK 1955 [id number]                     Emergency Contacts        (Rel.) Home Phone Work Phone Mobile Phone    Yaa Elizondo (Friend) -- [phone] --                 History & Physical        Gm Marie MD at 01/19/24 1834            Intensive Care Admission Note     Acute ischemic right MCA stroke    History of Present Illness     Regine Lock is a 68 year-old female with grade II diastolic dysfunction, anemia, RLS, ZORAIDA (no longer uses CPAP), severe depression with recent suicide attempt via OD (12/24/23), and legal blindness that presents " "to [facility] ICU from [facility] for higher level of care.     Patient was found by her family this morning with symptoms of left-sided weakness, neglect, and right gaze deviation. She presented to [facility] ED on 1/19/24 for evaluation. LKW 2200 on 1/18/24. NIHSS 26. CT head demonstrated right MCA territory ischemia. CTA showed right ICA complete occlusion. CT perfusion showed ischemic tissue volume of 180 cc in the R MCA territory. Thrombolytics not given 2* LKW > 4.5 hours. He was transferred to [facility] for potential thrombectomy with Dr. Hurley. Upon arrival to [facility], she was taken directly to Cath lab where she was found to have tandem ICA and ICA terminus occlusion. NIHSS 19. Mechanical thrombectomy performed of right ICA terminus occlusion resulting in TICI 3 flow and stent successfully placed to the proximal right ICA occlusion. Patient presents to the ICU postoperatively for further care.     Time spent: 4 minutes  Electronically signed by NIKKIE Ramos, 01/19/24, 6:00 PM EST.    Problem List, Surgical History, Family, Social History, and ROS     Past Medical History:   Diagnosis Date    Anemia     Anxiety     Arthritis     Depression     Legally blind     Restless leg syndrome     Sleep apnea     \"I don't use a cpap anymore since losing 106 lbs\"    Water retention       Past Surgical History:   Procedure Laterality Date    BACK SURGERY      GALLBLADDER SURGERY      HIP ARTHROSCOPY      JOINT REPLACEMENT Right        Allergies   Allergen Reactions    Penicillins Hives and Swelling     Current Facility-Administered Medications on File Prior to Encounter   Medication    [COMPLETED] iopamidol (ISOVUE-370) 76 % injection 100 mL    [COMPLETED] sodium chloride 0.9 % infusion    [DISCONTINUED] sodium chloride 0.9 % flush 10 mL     Current Outpatient Medications on File Prior to Encounter   Medication Sig    Albuterol (VENTOLIN IN) Inhale 2 puffs Every 6 (Six) Hours As Needed.    diclofenac (VOLTAREN) 75 MG EC tablet Take 1 " "tablet by mouth 2 (Two) Times a Day.    hydroCHLOROthiazide (HYDRODIURIL) 12.5 MG tablet Take 1 tablet by mouth Daily.    losartan (COZAAR) 50 MG tablet Take 1 tablet by mouth Daily.    Mirabegron ER (MYRBETRIQ) 50 MG tablet sustained-release 24 hour 24 hr tablet Take 50 mg by mouth Daily.    mirtazapine (REMERON) 30 MG tablet Take 1 tablet by mouth Every Night. Indications: Major Depressive Disorder    ondansetron ODT (ZOFRAN-ODT) 4 MG disintegrating tablet Place 1 tablet on the tongue Every 8 (Eight) Hours As Needed for Nausea or Vomiting.    pantoprazole (PROTONIX) 40 MG EC tablet TAKE 1 TABLET BY MOUTH EVERY MORNING FOR HEARTBURN    rOPINIRole (REQUIP) 4 MG tablet Take 1 tablet by mouth Every Night. Take 1 hour before bedtime.  Indications: Restless Leg Syndrome    spironolactone (ALDACTONE) 25 MG tablet TAKE 1 TABLET BY MOUTH DAILY FOR HIGH BLOOD PRESSURE    zolpidem (AMBIEN) 5 MG tablet Take 1 tablet by mouth At Night As Needed for Sleep. Indications: Trouble Sleeping     MEDICATION LIST AND ALLERGIES REVIEWED.    Family History   Problem Relation Age of Onset    Breast cancer Neg Hx      Social History     Tobacco Use    Smoking status: Every Day     Packs/day: 1.50     Years: 51.00     Additional pack years: 0.00     Total pack years: 76.50     Types: Cigarettes    Smokeless tobacco: Never   Vaping Use    Vaping Use: Never used   Substance Use Topics    Alcohol use: Yes     Alcohol/week: 1.0 standard drink of alcohol     Types: 1 Glasses of wine per week     Comment: \"occasionally - once every two months\"    Drug use: Yes     Comment: alcohol - occasionally     Social History     Social History Narrative    Not on file     FAMILY AND SOCIAL HISTORY REVIEWED.    Review of Systems  ALL OTHER SYSTEMS REVIEWED AND ARE NEGATIVE.    Physical Exam and Clinical Information   /72   Pulse 98   Temp 97.5 °F (36.4 °C) (Axillary)   Resp 18   SpO2 98%   Physical Exam  General Appearance: Pt awake, no acute " distress  Head:    Atraumatic, Normocephalic, without obvious abnormality, Pupils reactive & symmetrical B/L. Deviated eyes to right and left neglect. Left facial droop  Lungs:   B/L Breath sounds present with decreased breath sounds on bases, no wheezing heard, no crackles.   Heart: S1 and S2 present, no murmur  Abdomen: Soft, nontender, no guarding or rigidity, bowel sounds positive, no masses.  Extremities:  Groin access site without any hematoma or bruising,  no edema, warm to touch.  Pulses: Positive and symmetric.  Neurologic:  Dense hemiplegia left.   Interval:  (to 2B)  1a. Level of Consciousness: 2-->Not alert, requires repeated stimulation to attend, or is obtunded and requires strong or painful stimulation to make movements (not stereotyped)  1b. LOC Questions: 1-->Answers one question correctly  1c. LOC Commands: 1-->Performs one task correctly  2. Best Gaze: 2-->Forced deviation, or total gaze paresis not overcome by the oculocephalic maneuver  3. Visual: 2-->Complete hemianopia  4. Facial Palsy: 2-->Partial paralysis (total or near-total paralysis of lower face)  5a. Motor Arm, Left: 4-->No movement  5b. Motor Arm, Right: 0-->No drift, limb holds 90 (or 45) degrees for full 10 secs  6a. Motor Leg, Left: 3-->No effort against gravity, leg falls to bed immediately  6b. Motor Leg, Right: 0-->No drift, leg holds 30 degree position for full 5 secs  7. Limb Ataxia: 0-->Absent  8. Sensory: 2-->Severe to total sensory loss, patient is not aware of being touched in the face, arm, and leg  9. Best Language: 2-->Severe aphasia, all communication is through fragmentary expression, great need for inference, questioning, and guessing by the listener. Range of information that can be exchanged is limited, listener carries burden of. . . (see row details)  10. Dysarthria: 2-->Severe dysarthria, patients speech is so slurred as to be unintelligible in the absence of or out of proportion to any dysphasia, or is  "mute/anarthric  11. Extinction and Inattention (formerly Neglect): 1-->Visual, tactile, auditory, spatial, or personal inattention or extinction to bilateral simultaneous stimulation in one of the sensory modalities    Total (NIH Stroke Scale): 24       Results from last 7 days   Lab Units 01/19/24  1257   WBC 10*3/mm3 11.47*   HEMOGLOBIN g/dL 15.1   PLATELETS 10*3/mm3 258     Results from last 7 days   Lab Units 01/19/24  1302 01/19/24  1257   SODIUM mmol/L  --  143   POTASSIUM mmol/L  --  4.4   CO2 mmol/L  --  29.2*   BUN mg/dL  --  15   CREATININE mg/dL 0.90 0.99   GLUCOSE mg/dL  --  127*     Estimated Creatinine Clearance: 65.5 mL/min (by C-G formula based on SCr of 0.9 mg/dL).          No results found for: \"LACTATE\"     Images:   Abdominal x-ray reviewed and showed proximal port of NG tube above the diaphragm.  Will need advancement.  Nursing staff aware.    I reviewed the patient's results and images.       Report of CT perfusion reviewed.   IMPRESSION:   Results suggestive of recent ischemic event in the right middle cerebral  artery territory     This report was finalized on 1/19/2024 1:45 PM by Dr. Hu Obrien MD.      CTA neck:  IMPRESSION:  1.  Moderate to high-grade stenosis in the proximal left internal  carotid artery.  2.  Occlusion of the entire right internal carotid artery.        This report was finalized on 1/19/2024 1:43 PM by Dr. Hu Obrien MD.    EKG reviewed and showed normal sinus rhythm.  Q waves noted in lead III and aVF.  No acute ST changes.  QTc 490.    Impression     Stroke    Iron deficiency anemia due to chronic blood loss    Major depressive disorder    RLS (restless legs syndrome)    GERD (gastroesophageal reflux disease)    Grade I diastolic dysfunction    Plan/Recommendations     68-year-old female with anemia, restless leg syndrome, sleep apnea not on any CPAP, severe depression with recent suicide attempt by overdose on December 24, 2023 and legal blindness.  Patient was " found by family this morning with some dull left-sided weakness, left-sided neglect and right gaze deviation.  She presented to [facility] for evaluation.  Last known well was 10 PM on 1/18/2024.  NIHSS was 26.  CT head, CTA and perfusion study were consistent with right MCA territory ischemia.  Patient was discussed with stroke team and accepted in transfer for potential thrombectomy.  Patient was found to have tandem ICA and ICA terminus occlusion. Mechanical thrombectomy performed of right ICA terminus occlusion resulting in TICI 3 flow and stent successfully placed to the proximal right ICA occlusion.  Patient was transferred to ICU for closer monitoring.    Patient currently on Integrilin drip per protocol.  Patient is also on nicardipine 15 mg/h to keep systolic blood pressure less than 140.    1.  Admit to intensive care unit.  2.  Follow neurological status closely.  Patient has not shown any meaningful neurological recovery after thrombectomy and stent placement.  stroke neurology and interventional neurosurgery following closely.  3.  Aspirin, statin and Brilinta.  4.  Nicardipine infusion to keep systolic blood pressure less than 140.  5.  NG tube to be advanced.  6.  MRI brain pending.  7.  Check hemoglobin A1c and lipid panel.  8.  Echocardiogram with bubble study in the morning.  9.  PT/OT/speech to follow.    Check labs in the morning.    Patient with guarded prognosis.  Will continue supportive care at this point.    Time spent  40 min  (exclusive of procedure time)  including high complexity decision making to assess, manipulate, and support vital organ system failure in this individual who has impairment of one or more vital organ systems such that there is a high probability of imminent or life threatening deterioration in the patient’s condition.      Gm Marie MD, [facility]  Pulmonary and Critical Care Medicine  01/19/24 18:55 EST     CC: Dread Burton MD     Electronically  signed by Gm Marie MD at 24 1921          Physician Progress Notes (most recent note)        Radha Carvalho, APRN at 24 1015              [facility] Medicine Services  PROGRESS NOTE    Patient Name: Regine Beckham  : 1955  MRN: [id number]    Date of Admission: 2024  Primary Care Physician: Dread Burton MD    Subjective   Subjective     CC:  F/u CVA    HPI:  Patient is resting in bed in NAD. She says she feels soa at times. Denies any congestion. Plan for rehab       Objective   Objective     Vital Signs:   Temp:  [98.3 °F (36.8 °C)-98.9 °F (37.2 °C)] 98.3 °F (36.8 °C)  Heart Rate:  [] 95  Resp:  [16-18] 18  BP: (115-124)/(71-94) 120/71     Physical Exam:  Constitutional: No acute distress, awake, alert  HENT: NCAT, mucous membranes moist  Respiratory: Clear to auscultation bilaterally, respiratory effort normal room air 96%  Cardiovascular: RRR, no murmurs, rubs, or gallops  Gastrointestinal: Positive bowel sounds, soft, nontender, nondistended  Musculoskeletal: No bilateral ankle edema  Psychiatric: Appropriate affect, cooperative  Neurologic: Oriented x 3, left sided weakness, left facial droop  dysarthria   Skin: No rashes      Results Reviewed:  LAB RESULTS:      Lab 24  0607   WBC 10.21   HEMOGLOBIN 11.3*   HEMATOCRIT 32.7*   PLATELETS 300   NEUTROS ABS 6.33   IMMATURE GRANS (ABS) 0.16*   LYMPHS ABS 2.29   MONOS ABS 1.22*   EOS ABS 0.16   MCV 95.9         Lab 24  0607 24  0505 24  1630 24  0418 24  0349   SODIUM 135* 140  --  139 136   POTASSIUM 3.9 4.3 4.5 3.6 4.1   CHLORIDE 99 102  --  102 103   CO2 27.0 27.0  --  25.0 23.0   ANION GAP 9.0 11.0  --  12.0 10.0   BUN 19 16  --  15 16   CREATININE 0.73 0.71  --  0.64 0.74   EGFR 89.2 92.2  --  95.8 87.7   GLUCOSE 137* 122*  --  118* 110*   CALCIUM 9.6 9.1  --  8.7 9.2   MAGNESIUM 2.1  --   --  2.0 2.1   PHOSPHORUS  --   --   --  2.4* 2.7                          Brief Urine Lab Results  (Last result in the past 365 days)        Color   Clarity   Blood   Leuk Est   Nitrite   Protein   CREAT   Urine HCG        12/20/23 2003 Yellow   Clear   Negative   Negative   Negative   Negative                   Microbiology Results Abnormal       Procedure Component Value - Date/Time    Respiratory Panel PCR w/COVID-19(SARS-CoV-2) SHEILA/TAYLOR/ANDRÉS/PAD/COR/KOREY In-House, NP Swab in UTM/VTM, 2 HR TAT - Swab, Nasopharynx [[id number]]  (Normal) Collected: 01/29/24 0132    Lab Status: Final result Specimen: Swab from Nasopharynx Updated: 01/29/24 0249     ADENOVIRUS, PCR Not Detected     Coronavirus 229E Not Detected     Coronavirus HKU1 Not Detected     Coronavirus NL63 Not Detected     Coronavirus OC43 Not Detected     COVID19 Not Detected     Human Metapneumovirus Not Detected     Human Rhinovirus/Enterovirus Not Detected     Influenza A PCR Not Detected     Influenza B PCR Not Detected     Parainfluenza Virus 1 Not Detected     Parainfluenza Virus 2 Not Detected     Parainfluenza Virus 3 Not Detected     Parainfluenza Virus 4 Not Detected     RSV, PCR Not Detected     Bordetella pertussis pcr Not Detected     Bordetella parapertussis PCR Not Detected     Chlamydophila pneumoniae PCR Not Detected     Mycoplasma pneumo by PCR Not Detected    Narrative:      In the setting of a positive respiratory panel with a viral infection PLUS a negative procalcitonin without other underlying concern for bacterial infection, consider observing off antibiotics or discontinuation of antibiotics and continue supportive care. If the respiratory panel is positive for atypical bacterial infection (Bordetella pertussis, Chlamydophila pneumoniae, or Mycoplasma pneumoniae), consider antibiotic de-escalation to target atypical bacterial infection.    Respiratory Culture - Sputum, NT Suction [[id number]] Collected: 01/22/24 0808    Lab Status: Final result Specimen: Sputum from NT Suction Updated: 01/24/24 1052      Respiratory Culture Light growth (2+) Normal respiratory sudhir. No S. aureus or Pseudomonas aeruginosa detected. Final report.     Gram Stain Moderate (3+) WBCs per low power field      Rare (1+) Epithelial cells per low power field      Few (2+) Gram positive cocci in pairs            XR Chest 1 View    Result Date: 1/29/2024  XR CHEST 1 VW Date of Exam: 1/29/2024 12:58 AM EST Indication: cough Comparison: 1/22/2024. Findings: There are no airspace consolidations. No pleural fluid. No pneumothorax. The pulmonary vasculature appears within normal limits. The cardiac and mediastinal silhouette appear unremarkable. No acute osseous abnormality identified.     Impression: Impression: No acute cardiopulmonary process. Electronically Signed: Steffanie Ramirez MD  1/29/2024 1:31 AM EST  Workstation ID: [id number]     Results for orders placed during the hospital encounter of 01/19/24    Adult Transthoracic Echo Limited W/ Cont if Necessary Per Protocol    Interpretation Summary    Left ventricular systolic function is normal. Estimated left ventricular EF = 60%    Saline test results are negative for intracardiac shunting..      Current medications:  Scheduled Meds:[START ON 1/31/2024] aspirin, 81 mg, Oral, Daily  atorvastatin, 80 mg, Oral, Nightly  gabapentin, 100 mg, Oral, Q8H  guaiFENesin, 600 mg, Oral, Q12H  ipratropium-albuterol, 3 mL, Nebulization, Q4H - RT  [START ON 1/31/2024] losartan, 50 mg, Oral, Q24H  [START ON 1/31/2024] magnesium oxide, 400 mg, Oral, Daily  mirtazapine, 30 mg, Oral, Nightly  nicotine, 1 patch, Transdermal, Q24H  [START ON 1/31/2024] pantoprazole, 40 mg, Oral, QAM AC  rOPINIRole, 4 mg, Oral, Nightly  senna-docusate sodium, 2 tablet, Oral, BID  sodium chloride, 10 mL, Intravenous, Q12H  ticagrelor, 60 mg, Oral, BID      Continuous Infusions:   PRN Meds:.  acetaminophen    albuterol    senna-docusate sodium **AND** polyethylene glycol **AND** bisacodyl **AND** bisacodyl    Calcium Replacement -  Follow Nurse / BPA Driven Protocol    hydrALAZINE    Magnesium Standard Dose Replacement - Follow Nurse / BPA Driven Protocol    ondansetron    phenol    Phosphorus Replacement - Follow Nurse / BPA Driven Protocol    Potassium Replacement - Follow Nurse / BPA Driven Protocol    sodium chloride    sodium chloride    Assessment & Plan   Assessment & Plan     Active Hospital Problems    Diagnosis  POA    **Stroke [I63.511]  Unknown    Moderate malnutrition [E44.0]  Yes    Grade I diastolic dysfunction [I51.89]  Unknown    GERD (gastroesophageal reflux disease) [K21.9]  Yes    RLS (restless legs syndrome) [G25.81]  Yes    Major depressive disorder [F32.9]  Yes    Iron deficiency anemia due to chronic blood loss [D50.0]  Yes      Resolved Hospital Problems   No resolved problems to display.        Brief Hospital Course to date:  Regine Beckham is a 68 y.o. female  with a history of HTN, chronic anemia, sleep apnea intolerant of Cpap, severe depression with an overdose attempt in December 2023, and legally blind who presented to [facility] on 1/19/24 with left sided weakness and was found to have a RMCA infarct and 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 She underwent thrombectomy and carotid stent placement on 1/19. She was outside the window for thrombolytics. Initially placed on Cardene which has been successfully weaned off. Her hospital course has been complicated by ongoing dysphagia and difficulty managing secretions. Transferred to [facility] 1/25.    Plan was partially entered by my partner and I have reviewed and updated as appropriate on 1/30/24     CVA-right MCA  Right internal carotid artery occlusion status post thrombectomy and carotid stent placement  Dysphagia  --Stroke team follows, continue DAPT with brilinta/statin.  --SLP follows, repeat FEES 1/26. Now on nectar thickened liquids  --PT/OT recs IRF   -- follow up in stroke clinic 6-8 weeks      Cough, SOB  --Resp viral panel negative  --CXR negative  --  "stable on room air   -- nebs prn      Chronic HFpEF  HTN  --cont ASA, statin, losartan.  Holding HCTZ and aldactone for now     Chronic CESIA  H/H stable.     GERD  -Cont protonix     RLS  -Stable, continue requip, neurontin     Tob dependency  -cont nicotine patch  -discuss cessation    Expected Discharge Location and Transportation: rehab   Expected Discharge   Expected Discharge Date: 2024; Expected Discharge Time:      DVT prophylaxis:  Mechanical DVT prophylaxis orders are present.         AM-PAC 6 Clicks Score (PT): 12 (24 1611)    CODE STATUS:   Code Status and Medical Interventions:   Ordered at: 24 1115     Code Status (Patient has no pulse and is not breathing):    CPR (Attempt to Resuscitate)     Medical Interventions (Patient has pulse or is breathing):    Full Support       NIKKIE Woodward  24        Electronically signed by Radha Carvalho APRN at 24 1204          Physical Therapy Notes (most recent note)        Nereida Goode, PT at 24 1525  Version 1 of 1         Patient Name: Regine Beckham  : 1955    MRN: [id number]                              Today's Date: 2024       Admit Date: 2024    Visit Dx:     ICD-10-CM ICD-9-CM   1. Oropharyngeal dysphagia  R13.12 787.22   2. Dysarthria  R47.1 784.51   3. Cognitive communication deficit  R41.841 799.52   4. Stroke  I63.511 434.91     Patient Active Problem List   Diagnosis    Iron deficiency anemia due to chronic blood loss    Major depressive disorder    RLS (restless legs syndrome)    GERD (gastroesophageal reflux disease)    Left breast mass    Allergy to penicillin    Stroke    Grade I diastolic dysfunction    Moderate malnutrition     Past Medical History:   Diagnosis Date    Anemia     Anxiety     Arthritis     Depression     Legally blind     Restless leg syndrome     Sleep apnea     \"I don't use a cpap anymore since losing 106 lbs\"    Water retention      Past Surgical History: "   Procedure Laterality Date    BACK SURGERY      CARDIAC CATHETERIZATION N/A 1/19/2024    Procedure: Percutaneous Manual Thrombectomy;  Surgeon: Efrain Hurley MD;  Location: [facility] CATH INVASIVE LOC[facility];  Service: Interventional Radiology;  Laterality: N/A;    GALLBLADDER SURGERY      HIP ARTHROSCOPY      JOINT REPLACEMENT Right       General Information       [facility] Name 01/29/24 1557          Physical Therapy Time and Intention    Document Type therapy note (daily note)  -     Mode of Treatment physical therapy  -       Row Name 01/29/24 1558          General Information    Patient Profile Reviewed yes  -     Existing Precautions/Restrictions fall;other (see comments)  L inattention, L weakness (UE>LE), dysarthria  -     Barriers to Rehab medically complex;cognitive status;visual deficit  -       Row Name 01/29/24 8647          Cognition    Orientation Status (Cognition) oriented to;person;place;verbal cues/prompts needed for orientation;time  -       Row Name 01/29/24 7966          Safety Issues, Functional Mobility    Safety Issues Affecting Function (Mobility) awareness of need for assistance;insight into deficits/self-awareness;safety precaution awareness;safety precautions follow-through/compliance;sequencing abilities;judgment  -     Impairments Affecting Function (Mobility) balance;coordination;endurance/activity tolerance;shortness of breath;strength;postural/trunk control;sensation/sensory awareness;motor planning;muscle tone abnormal;cognition;grasp;motor control;visual/perceptual;pain  -               User Key  (r) = Recorded By, (t) = Taken By, (c) = Cosigned By      Initials Name Provider Type     Nereida Goode PT Physical Therapist                   Mobility       Row Name 01/29/24 1558          Bed Mobility    Comment, (Bed Mobility) Pt received and left UIC  -[facility] Name 01/29/24 1554          Transfers    Comment, (Transfers) VCs for hand placement and sequencing  -        Row Name 01/29/24 1558          Sit-Stand Transfer    Sit-Stand [place] (Transfers) moderate assist (50% patient effort);1 person assist;verbal cues  -     Assistive Device (Sit-Stand Transfers) other (see comments)  B UE support  -     Comment, (Sit-Stand Transfer) STS x3 from recliner. Cues to push up from chair and for optimal foot position. Cues for upright posture and forward gaze upon standing  -       Row Name 01/29/24 1558          Gait/Stairs (Locomotion)    [place] Level (Gait) moderate assist (50% patient effort);1 person assist;verbal cues  -     Assistive Device (Gait) other (see comments)  B UE support  -     Distance in Feet (Gait) 4+4  -     Deviations/Abnormal Patterns (Gait) bilateral deviations;base of support, narrow;memo decreased;gait speed decreased;weight shifting decreased  -     Bilateral Gait Deviations forward flexed posture;heel strike decreased  -     Left Sided Gait Deviations weight shift ability decreased;heel strike decreased  -     Comment, (Gait/Stairs) Pt took 4 steps forward and 4 steps backward at very slow pace. VCs for sequencing, upright posture, forward gaze, and to widen FLORI. Pt mildly unsteady but no overt LOB or buckling noted. Distance limited by fatigue and weakness  -               User Key  (r) = Recorded By, (t) = Taken By, (c) = Cosigned By      Initials Name Provider Type     Nereida Goode PT Physical Therapist                   Obj/Interventions       Row Name 01/29/24 1602          Motor Skills    Therapeutic Exercise hip;knee;ankle  -[facility] Name 01/29/24 1602          Hip (Therapeutic Exercise)    Hip (Therapeutic Exercise) strengthening exercise  -     Hip Strengthening (Therapeutic Exercise) bilateral;heel slides;10 repetitions  Angela w/ L heel slides  -[facility] Name 01/29/24 1602          Knee (Therapeutic Exercise)    Knee (Therapeutic Exercise) strengthening exercise  -     Knee Strengthening  (Therapeutic Exercise) bilateral;SLR (straight leg raise);LAQ (long arc quad);10 repetitions  Angela w/ L SLR  -[facility] Name 01/29/24 1602          Ankle (Therapeutic Exercise)    Ankle (Therapeutic Exercise) AROM (active range of motion)  -     Ankle AROM (Therapeutic Exercise) bilateral;dorsiflexion;plantarflexion;10 repetitions  -[facility] Name 01/29/24 1602          Balance    Balance Assessment sitting static balance;sitting dynamic balance;sit to stand dynamic balance;standing static balance;standing dynamic balance  -     Static Sitting Balance standby assist  -     Dynamic Sitting Balance contact guard  -     Position, Sitting Balance unsupported;sitting in chair  -     Sit to Stand Dynamic Balance moderate assist;1-person assist;verbal cues  -     Static Standing Balance minimal assist;1-person assist;verbal cues  -     Dynamic Standing Balance moderate assist;1-person assist;verbal cues  -     Position/Device Used, Standing Balance supported;other (see comments)  B UE support  -     Balance Interventions sitting;standing;sit to stand;supported;static;dynamic  -     Comment, Balance Lateral weigthshifting performed in standing w/ Angela  -               User Key  (r) = Recorded By, (t) = Taken By, (c) = Cosigned By      Initials Name Provider Type     Nereida Goode, BRUCE Physical Therapist                   Goals/Plan    No documentation.                  Clinical Impression       [facility] Name 01/29/24 1606          Pain    Pretreatment Pain Rating 5/10  -     Posttreatment Pain Rating 5/10  -     Pain Location - Side/Orientation Right  -     Pain Location generalized  -     Pain Location - hip  -     Pain Intervention(s) Repositioned;Ambulation/increased activity;Elevated  -[facility] Name 01/29/24 1606          Plan of Care Review    Plan of Care Reviewed With patient  -     Progress no change  -     Outcome Evaluation Pt continues to present below baseline function  d/t L sided deficits (UE>LE), decreased balance, and decreased activity tolerace. Pt required modA for STS and to take 4 steps forward and backward w/ B UE support. No knee buckling noted this date. Pt puts forth good effort toward all mobility. Pt would continue to benefit from skilled IP PT. Recommend IRF at d/c for best functional outcome.  -       Row Name 01/29/24 1606          Therapy Assessment/Plan (PT)    Rehab Potential (PT) good, to achieve stated therapy goals  -     Criteria for Skilled Interventions Met (PT) yes;skilled treatment is necessary  -     Therapy Frequency (PT) daily  -       Row Name 01/29/24 1606          Vital Signs    Pre Systolic BP Rehab 121  -     Pre Treatment Diastolic BP 96  -     Post Systolic BP Rehab 131  -     Post Treatment Diastolic BP 97  -     Pretreatment Heart Rate (beats/min) 99  -     Intratreatment Heart Rate (beats/min) 120  -     Posttreatment Heart Rate (beats/min) 106  -     O2 Delivery Pre Treatment room air  -     O2 Delivery Intra Treatment room air  -     O2 Delivery Post Treatment room air  -     Pre Patient Position Sitting  -     Intra Patient Position Standing  -     Post Patient Position Sitting  -       Row Name 01/29/24 1606          Positioning and Restraints    Pre-Treatment Position sitting in chair/recliner  -     Post Treatment Position chair  -     In Chair reclined;sitting;call light within reach;encouraged to call for assist;exit alarm on;waffle cushion;on mechanical lift sling;RUE elevated;LUE elevated;legs elevated;notified [facility]               User Key  (r) = Recorded By, (t) = Taken By, (c) = Cosigned By      Initials Name Provider Type     Nereida Goode, PT Physical Therapist                   Outcome Measures       Row Name 01/29/24 1611          How much help from another person do you currently need...    Turning from your back to your side while in flat bed without using bedrails? 2  -      Moving from lying on back to sitting on the side of a flat bed without bedrails? 2  -LH     Moving to and from a bed to a chair (including a wheelchair)? 2  -LH     Standing up from a chair using your arms (e.g., wheelchair, bedside chair)? 2  -LH     Climbing 3-5 steps with a railing? 2  -LH     To walk in hospital room? 2  -     AM-PAC 6 Clicks Score (PT) 12  -     Highest Level of Mobility Goal 4 --> Transfer to chair/commode  -       Row Name 01/29/24 1611 01/29/24 1528       Modified [place] Scale    Modified [place] Scale 4 - Moderately severe disability.  Unable to walk without assistance, and unable to attend to own bodily needs without assistance.  - 4 - Moderately severe disability.  Unable to walk without assistance, and unable to attend to own bodily needs without assistance.  -      Row Name 01/29/24 1611 01/29/24 1528       Functional Assessment    Outcome Measure Options AM-PAC 6 Clicks Basic Mobility (PT)  - AM-PAC 6 Clicks Daily Activity (OT)  -CS              User Key  (r) = Recorded By, (t) = Taken By, (c) = Cosigned By      Initials Name Provider Type    CS Adam Claros OT Occupational Therapist     Nereida Goode PT Physical Therapist                                 Physical Therapy Education       Title: PT OT SLP Therapies (In Progress)       Topic: Physical Therapy (In Progress)       Point: Mobility training (Done)       Learning Progress Summary             Patient Acceptance, E, VU,NR by  at 1/29/2024 1611    Acceptance, E, NR by AS at 1/28/2024 1013    Acceptance, E,TB, NR by ESTEVAN at 1/24/2024 1116    Acceptance, E,D, VU,NR by MB at 1/22/2024 1418    Acceptance, E,D, VU,NR by LUIS FERNANDO at 1/20/2024 1221                         Point: Home exercise program (In Progress)       Learning Progress Summary             Patient Acceptance, E, NR by AS at 1/28/2024 1013    Acceptance, E,TB, NR by ESTEVAN at 1/24/2024 1116    Acceptance, E,D, VU,NR by MB at 1/22/2024 1418                          Point: Body mechanics (Done)       Learning Progress Summary             Patient Acceptance, E, VU,NR by  at 1/29/2024 1611    Acceptance, E, NR by AS at 1/28/2024 1013    Acceptance, E,TB, NR by AY at 1/24/2024 1116    Acceptance, E,D, VU,NR by MB at 1/22/2024 1418    Acceptance, E,D, VU,NR by LS at 1/20/2024 1221                         Point: Precautions (Done)       Learning Progress Summary             Patient Acceptance, E, VU,NR by  at 1/29/2024 1611    Acceptance, E, NR by AS at 1/28/2024 1013    Acceptance, E,TB, NR by AY at 1/24/2024 1116    Acceptance, E,D, VU,NR by MB at 1/22/2024 1418    Acceptance, E,D, VU,NR by LS at 1/20/2024 1221                                         User Key       Initials Effective Dates Name Provider Type Discipline    AS 04/28/23 -  Ngoc Grayson, PTA Physical Therapist Assistant PT     02/03/23 -  Nikole Herrmann, PT Physical Therapist PT    MB 06/16/21 -  Joanne Bermudez, PT Physical Therapist PT     11/10/20 -  Ngoc Glynn, PT Physical Therapist PT     09/21/23 -  Nereida Goode, PT Physical Therapist PT                  PT Recommendation and Plan     Plan of Care Reviewed With: patient  Progress: no change  Outcome Evaluation: Pt continues to present below baseline function d/t L sided deficits (UE>LE), decreased balance, and decreased activity tolerace. Pt required modA for STS and to take 4 steps forward and backward w/ B UE support. No knee buckling noted this date. Pt puts forth good effort toward all mobility. Pt would continue to benefit from skilled IP PT. Recommend IRF at d/c for best functional outcome.     Time Calculation:         PT Charges       Row Name 01/29/24 1611             Time Calculation    Start Time 1525  -      PT Received On 01/29/24  -      PT Goal Re-Cert Due Date 01/30/24  -         Timed Charges    33290 - PT Therapeutic Exercise Minutes 10  -      66795 - Gait Training Minutes  5  -      80587 - PT  Therapeutic Activity Minutes 13  -LH         Total Minutes    Timed Charges Total Minutes 28  -LH       Total Minutes 28  -LH                User Key  (r) = Recorded By, (t) = Taken By, (c) = Cosigned By      Initials Name Provider Type     Nereida Goode, PT Physical Therapist                  Therapy Charges for Today       Code Description Service Date Service Provider Modifiers Qty    88917591595  PT THER PROC EA 15 MIN 2024 Nereida Goode, PT GP 1    30814274132 HC PT THERAPEUTIC ACT EA 15 MIN 2024 Nereida Goode, PT GP 1            PT G-Codes  Outcome Measure Options: AM-PAC 6 Clicks Basic Mobility (PT)  AM-PAC 6 Clicks Score (PT): 12  AM-PAC 6 Clicks Score (OT): 12  Modified [place] Scale: 4 - Moderately severe disability.  Unable to walk without assistance, and unable to attend to own bodily needs without assistance.  PT Discharge Summary  Anticipated Discharge Disposition (PT): inpatient rehabilitation facility    Nereida Goode PT  2024      Electronically signed by Nereida Goode, PT at 24 1613          Occupational Therapy Notes (most recent note)        Adam Claros, OT at 24 1436          Patient Name: Regine Beckham  : 1955    MRN: [id number]                              Today's Date: 2024       Admit Date: 2024    Visit Dx:     ICD-10-CM ICD-9-CM   1. Oropharyngeal dysphagia  R13.12 787.22   2. Dysarthria  R47.1 784.51   3. Cognitive communication deficit  R41.841 799.52   4. Stroke  I63.511 434.91     Patient Active Problem List   Diagnosis    Iron deficiency anemia due to chronic blood loss    Major depressive disorder    RLS (restless legs syndrome)    GERD (gastroesophageal reflux disease)    Left breast mass    Allergy to penicillin    Stroke    Grade I diastolic dysfunction    Moderate malnutrition     Past Medical History:   Diagnosis Date    Anemia     Anxiety     Arthritis     Depression     Legally blind     Restless leg syndrome   "   Sleep apnea     \"I don't use a cpap anymore since losing 106 lbs\"    Water retention      Past Surgical History:   Procedure Laterality Date    BACK SURGERY      CARDIAC CATHETERIZATION N/A 1/19/2024    Procedure: Percutaneous Manual Thrombectomy;  Surgeon: Efrain Hurley MD;  Location: [facility] INVASIVE LOCATION;  Service: Interventional Radiology;  Laterality: N/A;    GALLBLADDER SURGERY      HIP ARTHROSCOPY      JOINT REPLACEMENT Right       General Information       Row Name 01/29/24 1517          OT Time and Intention    Document Type progress note/recertification  -CS     Mode of Treatment occupational therapy  -CS       Row Name 01/29/24 1517          General Information    Patient Profile Reviewed yes  -CS     Existing Precautions/Restrictions fall;other (see comments)  L inattention, L weakness (UE>LE), dysarthria  -CS     Barriers to Rehab medically complex;cognitive status;visual deficit  -CS       Row Name 01/29/24 1517          Cognition    Orientation Status (Cognition) oriented to;person;place;situation;verbal cues/prompts needed for orientation;time  -CS       Row Name 01/29/24 1517          Safety Issues, Functional Mobility    Safety Issues Affecting Function (Mobility) awareness of need for assistance;insight into deficits/self-awareness;safety precaution awareness;safety precautions follow-through/compliance;sequencing abilities  -CS     Impairments Affecting Function (Mobility) balance;coordination;endurance/activity tolerance;shortness of breath;strength;postural/trunk control;sensation/sensory awareness;motor planning;muscle tone abnormal;cognition;grasp;motor control;visual/perceptual  -CS     Cognitive Impairments, Mobility Safety/Performance insight into deficits/self-awareness;safety precaution awareness;safety precaution follow-through;sequencing abilities  -CS               User Key  (r) = Recorded By, (t) = Taken By, (c) = Cosigned By      Initials Name Provider Type    CS " Adam Claros, OT Occupational Therapist                     Mobility/ADL's       Row Name 01/29/24 1518          Bed Mobility    Bed Mobility rolling right;rolling left  -     Rolling Left [place] (Bed Mobility) moderate assist (50% patient effort);2 person assist;verbal cues;nonverbal cues (demo/gesture)  -     Rolling Right [place] (Bed Mobility) maximum assist (25% patient effort);2 person assist;verbal cues;nonverbal cues (demo/gesture)  -     Assistive Device (Bed Mobility) draw sheet;bed rails  -CS     Comment, (Bed Mobility) cues for sequencing RUE reach to bedrail and LE positioning/pushthrough, AAROM assist for LUE reach, performed for sling placement  -       Row Name 01/29/24 1518          Transfers    Transfers sit-stand transfer;stand-sit transfer;bed-chair transfer  -     Comment, (Transfers) lifted for safety and energy conservation, STS x 5 during session w/ LUE support and cues for RUE pushthrough/reachback  -       Row Name 01/29/24 1518          Bed-Chair Transfer    Bed-Chair [place] (Transfers) dependent (less than 25% patient effort);2 person assist;verbal cues  -       Row Name 01/29/24 1518          Sit-Stand Transfer    Sit-Stand [place] (Transfers) minimum assist (75% patient effort);nonverbal cues (demo/gesture);verbal cues;2 person assist  -     Assistive Device (Sit-Stand Transfers) other (see comments)  -       Row Name 01/29/24 1518          Functional Mobility    Functional Mobility- Comment Pt tolerated 2 x10ft with BUE support and assist for lateral weight shifting in order to facilitate LLE step-through, LLE buckling observed, limited by strength/fatigue  -       Row Name 01/29/24 1518          Activities of Daily Living    BADL Assessment/Intervention lower body dressing;grooming  -       Row Name 01/29/24 1518          Lower Body Dressing Assessment/Training    [place] Level (Lower Body Dressing) don;socks;maximum assist (25%  patient effort)  -CS     Position (Lower Body Dressing) unsupported sitting  -CS     Comment, (Lower Body Dressing) Pt able to assist with RUE pulling up sock in supported figure four  -       Row Name 01/29/24 1518          Grooming Assessment/Training    [place] Level (Grooming) wash face, hands;set up  -CS     Position (Grooming) supported sitting  -CS     Comment, (Grooming) RUE, progressing LUE strength to facilite improved bimanual skills at midline  -               User Key  (r) = Recorded By, (t) = Taken By, (c) = Cosigned By      Initials Name Provider Type     Adam Claros, OT Occupational Therapist                   Obj/Interventions       Row Name 01/29/24 1522          Shoulder (Therapeutic Exercise)    Shoulder (Therapeutic Exercise) AAROM (active assistive range of motion)  -     Shoulder AAROM (Therapeutic Exercise) right assist left;flexion;extension;10 repetitions;other (see comments);left  x2 sets  -       Row Name 01/29/24 1522          Elbow/Forearm (Therapeutic Exercise)    Elbow/Forearm (Therapeutic Exercise) AAROM (active assistive range of motion)  -     Elbow/Forearm AAROM (Therapeutic Exercise) right assist left;flexion;extension;10 repetitions  x2 sets  -       Row Name 01/29/24 1522          Motor Skills    Motor Skills motor control/coordination interventions  -     Motor Control/Coordination Interventions gross motor coordination activities;therapeutic exercise/ROM;occupation/activity based treatment  -     Therapeutic Exercise shoulder;elbow/forearm  -       Row Name 01/29/24 1522          Balance    Balance Assessment sitting static balance;sitting dynamic balance;standing static balance;standing dynamic balance  -     Static Sitting Balance standby assist  -     Dynamic Sitting Balance contact guard  -CS     Position, Sitting Balance unsupported;sitting in chair  -     Static Standing Balance minimal assist;2-person assist  -     Dynamic Standing  Balance 2-person assist;moderate assist  -CS     Position/Device Used, Standing Balance supported  -CS     Balance Interventions sitting;sit to stand;standing;occupation based/functional task;UE activity with balance activity;dynamic reaching;weight shifting activity  -CS               User Key  (r) = Recorded By, (t) = Taken By, (c) = Cosigned By      Initials Name Provider Type    CS Adam Claros, OT Occupational Therapist                   Goals/Plan       Row Name 01/29/24 1527          Transfer Goal 1 (OT)    Activity/Assistive Device (Transfer Goal 1, OT) bed-to-chair/chair-to-bed;toilet  -CS     [place] Level/Cues Needed (Transfer Goal 1, OT) minimum assist (75% or more patient effort)  -CS     Time Frame (Transfer Goal 1, OT) long term goal (LTG);10 days  -CS     Progress/Outcome (Transfer Goal 1, OT) continuing progress toward goal  -CS       [facility] Name 01/29/24 1527          Dressing Goal 1 (OT)    Activity/Device (Dressing Goal 1, OT) upper body dressing  -CS     [place]/Cues Needed (Dressing Goal 1, OT) minimum assist (75% or more patient effort)  -CS     Time Frame (Dressing Goal 1, OT) long term goal (LTG);10 days  -CS     Strategies/Barriers (Dressing Goal 1, OT) dao-technique w/ hosp gown  -CS     Progress/Outcome (Dressing Goal 1, OT) continuing progress toward goal  -CS       Row Name 01/29/24 1527          Grooming Goal 1 (OT)    Activity/Device (Grooming Goal 1, OT) hair care;wash face, hands  -CS     [place] (Grooming Goal 1, OT) minimum assist (75% or more patient effort)  -CS     Time Frame (Grooming Goal 1, OT) long term goal (LTG);10 days  -CS     Strategies/Barriers (Grooming Goal 1, OT) unsupported sitting  -CS     Progress/Outcome (Grooming Goal 1, OT) continuing progress toward goal  -CS       Row Name 01/29/24 1527          Therapy Assessment/Plan (OT)    Planned Therapy Interventions (OT) functional balance retraining;occupation/activity based  interventions;ROM/therapeutic exercise;strengthening exercise;transfer/mobility retraining;neuromuscular control/coordination retraining;patient/caregiver education/training;adaptive equipment training  -               User Key  (r) = Recorded By, (t) = Taken By, (c) = Cosigned By      Initials Name Provider Type    CS Adam Claros OT Occupational Therapist                   Clinical Impression       Row Name 01/29/24 1523          Pain Assessment    Additional Documentation Pain Scale: FACES Pre/Post-Treatment (Group)  -       Row Name 01/29/24 1523          Pain Scale: FACES Pre/Post-Treatment    Pain: FACES Scale, Pretreatment 0-->no hurt  -CS     Posttreatment Pain Rating 0-->no hurt  -CS       Row Name 01/29/24 1523          Plan of Care Review    Plan of Care Reviewed With patient  -CS     Progress improving  -CS     Outcome Evaluation OT re-cert completed, goals adjusted appropriately. Baseline ADL completion remains limited by R-sided strength and visual-perceptual deficits, impaired balance/coordination, and decreased functional endurance warranting continued IP OT services to promote return to PLOF. Excellent effort this date during LUE ther-ex, balance activities, and 10ft x 2 forward mobility with ModA and chair follow. Rec d/c to IRF.  -       Row Name 01/29/24 1523          Therapy Plan Review/Discharge Plan (OT)    Anticipated Discharge Disposition (OT) inpatient rehabilitation facility  -       Row Name 01/29/24 1523          Vital Signs    Pre Patient Position Supine  -CS     Intra Patient Position Standing  -CS     Post Patient Position Sitting  -       Row Name 01/29/24 1523          Positioning and Restraints    Pre-Treatment Position in bed  -CS     Post Treatment Position chair  -CS     In Chair notified nsg;reclined;sitting;call light within reach;encouraged to call for assist;exit alarm on;LUE elevated;RUE elevated;legs elevated;on mechanical lift sling;waffle cushion  -CS                User Key  (r) = Recorded By, (t) = Taken By, (c) = Cosigned By      Initials Name Provider Type    Adam Bhakta OT Occupational Therapist                   Outcome Measures       Row Name 01/29/24 1528          How much help from another is currently needed...    Putting on and taking off regular lower body clothing? 2  -CS     Bathing (including washing, rinsing, and drying) 2  -CS     Toileting (which includes using toilet bed pan or urinal) 2  -CS     Putting on and taking off regular upper body clothing 2  -CS     Taking care of personal grooming (such as brushing teeth) 2  -CS     Eating meals 2  -CS     AM-PAC 6 Clicks Score (OT) 12  -CS       Row Name 01/29/24 1528          Modified [place] Scale    Modified [place] Scale 4 - Moderately severe disability.  Unable to walk without assistance, and unable to attend to own bodily needs without assistance.  -CS       Row Name 01/29/24 1528          Functional Assessment    Outcome Measure Options AM-PAC 6 Clicks Daily Activity (OT)  -CS               User Key  (r) = Recorded By, (t) = Taken By, (c) = Cosigned By      Initials Name Provider Type    Adam Bhakta OT Occupational Therapist                    Occupational Therapy Education       Title: PT OT SLP Therapies (In Progress)       Topic: Occupational Therapy (Done)       Point: ADL training (Done)       Description:   Instruct learner(s) on proper safety adaptation and remediation techniques during self care or transfers.   Instruct in proper use of assistive devices.                  Learning Progress Summary             Patient Acceptance, E,D, VU,DU by  at 1/29/2024 1529    Acceptance, E, NR by [facility] at 1/24/2024 1312    Acceptance, E, NR by [facility] at 1/22/2024 1055    Acceptance, E, VU by [facility] at 1/20/2024 1354                         Point: Home exercise program (Done)       Description:   Instruct learner(s) on appropriate technique for monitoring, assisting and/or progressing  therapeutic exercises/activities.                  Learning Progress Summary             Patient Acceptance, E,D, VU,DU by  at 1/29/2024 1529    Acceptance, E, NR by 1 at 1/24/2024 1312    Acceptance, E, NR by 1 at 1/22/2024 1055                         Point: Precautions (Done)       Description:   Instruct learner(s) on prescribed precautions during self-care and functional transfers.                  Learning Progress Summary             Patient Acceptance, E,D, VU,DU by  at 1/29/2024 1529    Acceptance, E, NR by 1 at 1/24/2024 1312    Acceptance, E, NR by 1 at 1/22/2024 1055    Acceptance, E, VU by 1 at 1/20/2024 1354                         Point: Body mechanics (Done)       Description:   Instruct learner(s) on proper positioning and spine alignment during self-care, functional mobility activities and/or exercises.                  Learning Progress Summary             Patient Acceptance, E,D, VU,DU by  at 1/29/2024 1529    Acceptance, E, NR by [facility] at 1/24/2024 1312    Acceptance, E, NR by [facility] at 1/22/2024 1055    Acceptance, E, VU by [facility] at 1/20/2024 1354                                         User Key       Initials Effective Dates Name Provider Type Discipline    [facility] 09/02/21 -  Maribel [place], OT Occupational Therapist OT     06/16/21 -  Adam Claros OT Occupational Therapist OT                  OT Recommendation and Plan  Planned Therapy Interventions (OT): functional balance retraining, occupation/activity based interventions, ROM/therapeutic exercise, strengthening exercise, transfer/mobility retraining, neuromuscular control/coordination retraining, patient/caregiver education/training, adaptive equipment training  Plan of Care Review  Plan of Care Reviewed With: patient  Progress: improving  Outcome Evaluation: OT re-cert completed, goals adjusted appropriately. Baseline ADL completion remains limited by R-sided strength and visual-perceptual deficits, impaired  balance/coordination, and decreased functional endurance warranting continued IP OT services to promote return to PLOF. Excellent effort this date during LUE ther-ex, balance activities, and 10ft x 2 forward mobility with ModA and chair follow. Rec d/c to IRF.     Time Calculation:         Time Calculation- OT       Row Name 24 1509             Time Calculation- OT    OT Start Time 1436  -CS      OT Received On 24  -CS      OT Goal Re-Cert Due Date 24  -CS         Timed Charges    69298 - OT Therapeutic Exercise Minutes 10  -CS      37569 - OT Therapeutic Activity Minutes 15  -CS         Total Minutes    Timed Charges Total Minutes 25  -CS       Total Minutes 25  -CS                User Key  (r) = Recorded By, (t) = Taken By, (c) = Cosigned By      Initials Name Provider Type    CS Adam Claros OT Occupational Therapist                  Therapy Charges for Today       Code Description Service Date Service Provider Modifiers Qty    54941789563 HC OT THER PROC EA 15 MIN 2024 Adam Claros OT GO 1    96379360072 HC OT THERAPEUTIC ACT EA 15 MIN 2024 Adam Claros, OT GO 1    13913526128 HC OT THER SUPP EA 15 MIN 2024 Adam Claros, OT GO 2                 Adam Claros OT  2024    Electronically signed by Adam Claros OT at 24 1530          Speech Language Pathology Notes (most recent note)        Anh Ross, MS CCC-SLP at 24 1106          Acute Care - Speech Language Pathology   Swallow Treatment Note  An     Patient Name: Regine Beckham  : 1955  MRN: [id number]  Today's Date: 2024               Admit Date: 2024    Visit Dx:     ICD-10-CM ICD-9-CM   1. Oropharyngeal dysphagia  R13.12 787.22   2. Dysarthria  R47.1 784.51   3. Cognitive communication deficit  R41.841 799.52   4. Stroke  I63.511 434.91     Patient Active Problem List   Diagnosis    Iron deficiency anemia due to chronic blood loss    Major  "depressive disorder    RLS (restless legs syndrome)    GERD (gastroesophageal reflux disease)    Left breast mass    Allergy to penicillin    Stroke    Grade I diastolic dysfunction    Moderate malnutrition     Past Medical History:   Diagnosis Date    Anemia     Anxiety     Arthritis     Depression     Legally blind     Restless leg syndrome     Sleep apnea     \"I don't use a cpap anymore since losing 106 lbs\"    Water retention      Past Surgical History:   Procedure Laterality Date    BACK SURGERY      CARDIAC CATHETERIZATION N/A 1/19/2024    Procedure: Percutaneous Manual Thrombectomy;  Surgeon: Efrain Hurley MD;  Location: [facility] INVASIVE LOCATION;  Service: Interventional Radiology;  Laterality: N/A;    GALLBLADDER SURGERY      HIP ARTHROSCOPY      JOINT REPLACEMENT Right        SLP Recommendation and Plan     SLP Diet Recommendation: puree, no mixed consistencies, honey thick liquids (01/30/24 1025)  Recommended Precautions and Strategies: upright posture during/after eating, small bites of food and sips of liquid, general aspiration precautions, multiple swallows per bite of food, multiple swallows per sip of liquid, reflux precautions, fatigue precautions (01/30/24 1025)  SLP Rec. for Method of Medication Administration: meds whole, meds crushed, with puree, as tolerated (01/30/24 1025)     Monitor for Signs of Aspiration: yes, notify SLP if any concerns (01/30/24 1025)        Anticipated Discharge Disposition (SLP): inpatient rehabilitation facility (01/30/24 1025)     Therapy Frequency (Swallow): 5 days per week (01/30/24 1025)  Predicted Duration Therapy Intervention (Days): until discharge (01/30/24 1025)  Oral Care Recommendations: Oral Care BID/PRN, Suction toothbrush (01/30/24 1025)        Daily Summary of Progress (SLP): progress toward functional goals as expected (01/30/24 1025)               Treatment Assessment (SLP): continued, oral dysphagia, pharyngeal dysphagia, toleration of diet, " dysarthria (01/30/24 1025)  Treatment Assessment Comments (SLP): Pt participate in tx this date, no overt s/s of aspiration. Provided EMST-lite and reviewed dysphagia tx exercises again. Will continue to f/u to address deficits. Would benefit from Inpatient rehab to maximize function (01/30/24 1025)  Plan for Continued Treatment (SLP): continue treatment per plan of care (01/30/24 1025)         Plan of Care Reviewed With: patient  Progress: no change      SWALLOW EVALUATION (last 72 hours)       SLP Adult Swallow Evaluation       Row Name 01/30/24 1025       Rehab Evaluation    Document Type therapy note (daily note)  -CJ    Subjective Information no complaints  -CJ    Patient Observations alert;cooperative;agree to therapy  -CJ    Patient/Family/Caregiver Comments/Observations no family present  -CJ    Patient Effort good  -CJ    Symptoms Noted During/After Treatment none  -CJ       Pain    Additional Documentation Pain Scale: FACES Pre/Post-Treatment (Group)  -       Pain Scale: FACES Pre/Post-Treatment    Pain: FACES Scale, Pretreatment 0-->no hurt  -CJ    Posttreatment Pain Rating 0-->no hurt  -CJ       SLP Treatment Clinical Impressions    Treatment Assessment (SLP) continued;oral dysphagia;pharyngeal dysphagia;toleration of diet;dysarthria  -CJ    Treatment Assessment Comments (SLP) Pt participate in tx this date, no overt s/s of aspiration. Provided EMST-lite and reviewed dysphagia tx exercises again. Will continue to f/u to address deficits. Would benefit from Inpatient rehab to maximize function  -CJ    Daily Summary of Progress (SLP) progress toward functional goals as expected  -CJ    Plan for Continued Treatment (SLP) continue treatment per plan of care  -CJ    Care Plan Review care plan/treatment goals reviewed  -CJ       Recommendations    Therapy Frequency (Swallow) 5 days per week  -CJ    Predicted Duration Therapy Intervention (Days) until discharge  -    SLP Diet Recommendation puree;no mixed  consistencies;honey thick liquids  -    Recommended Precautions and Strategies upright posture during/after eating;small bites of food and sips of liquid;general aspiration precautions;multiple swallows per bite of food;multiple swallows per sip of liquid;reflux precautions;fatigue precautions  -CJ    Oral Care Recommendations Oral Care BID/PRN;Suction toothbrush  -    SLP Rec. for Method of Medication Administration meds whole;meds crushed;with puree;as tolerated  -CJ    Monitor for Signs of Aspiration yes;notify SLP if any concerns  -CJ    Anticipated Discharge Disposition (SLP) inpatient rehabilitation facility  -              User Key  (r) = Recorded By, (t) = Taken By, (c) = Cosigned By      Initials Name Effective Dates    Anh Cloud, MS CCC-SLP 07/11/23 -                     EDUCATION  The patient has been educated in the following areas:   Dysphagia (Swallowing Impairment) Oral Care/Hydration Modified Diet Instruction.        SLP GOALS       Row Name 01/30/24 1025             (LTG) Patient will demonstrate functional swallow for    Diet Texture (Demonstrate functional swallow) regular textures  -CJ      Liquid viscosity (Demonstrate functional swallow) thin liquids  -CJ      [place] (Demonstrate functional swallow) with minimal cues (75-90% accuracy)  -CJ      Time Frame (Demonstrate functional swallow) by discharge  -CJ      Progress/Outcomes (Demonstrate functional swallow) continuing progress toward goal  -CJ         (STG) Patient will tolerate trials of    Consistencies Trialed (Tolerate trials) pureed textures;honey/ moderately thick liquids  -CJ      Desired Outcome (Tolerate trials) without signs/symptoms of aspiration  -CJ      [place] (Tolerate trials) with minimal cues (75-90% accuracy)  -CJ      Time Frame (Tolerate trials) by discharge  -CJ      Progress/Outcomes (Tolerate trials) continuing progress toward goal  -CJ      Comment (Tolerate trials) no overt s/s of  aspiration  -CJ         (STG) Patient will tolerate therapeutic trials of    Progress/Outcomes (Tolerate therapeutic trials) goal no longer appropriate  -CJ         (STG) Labial Strengthening Goal 1 (SLP)    Activity (Labial Strengthening Goal 1, SLP) increase labial tone  -CJ      Increase Labial Tone labial resistance exercises;swallow trials  -CJ      [place]/Accuracy (Labial Strengthening Goal 1, SLP) with minimal cues (75-90% accuracy)  -CJ      Time Frame (Labial Strengthening Goal 1, SLP) short term goal (STG)  -CJ      Progress/Outcomes (Labial Strengthening Goal 1, SLP) continuing progress toward goal  -CJ      Comment (Labial Strengthening Goal 1, SLP) x10  -CJ         (STG) Lingual Strengthening Goal 1 (SLP)    Activity (Lingual Strengthening Goal 1, SLP) increase lingual tone/sensation/control/coordination/movement;increase tongue back strength  -CJ      Increase Lingual Tone/Sensation/Control/Coordination/Movement lingual movement exercises;lingual resistance exercises  -CJ      Increase Tongue Back Strength lingual movement exercises;swallow trials;lingual resistance exercises  -CJ      [place]/Accuracy (Lingual Strengthening Goal 1, SLP) with minimal cues (75-90% accuracy)  -CJ      Time Frame (Lingual Strengthening Goal 1, SLP) short term goal (STG)  -CJ      Progress/Outcomes (Lingual Strengthening Goal 1, SLP) continuing progress toward goal  -CJ      Comment (Lingual Strengthening Goal 1, SLP) x2 sets x5 reps  -CJ         (STG) Pharyngeal Strengthening Exercise Goal 1 (SLP)    Activity (Pharyngeal Strengthening Goal 1, SLP) increase timing;increase superior movement of the hyolaryngeal complex;increase anterior movement of the hyolaryngeal complex;increase closure at entrance to airway/closure of airway at glottis;increase squeeze/positive pressure generation;increase tongue base retraction  -CJ      Increase Timing prepping - 3 second prep or suck swallow or 3-step swallow  -CJ       Increase Superior Movement of the Hyolaryngeal Complex hard effortful swallow;falsetto  -CJ      Increase Anterior Movement of the Hyolaryngeal Complex chin tuck against resistance (CTAR);hard effortful swallow  -CJ      Increase Closure at Entrance to Airway/Closure of Airway at Glottis supraglottic swallow;breath hold exercises;EMST  -CJ      Increase Squeeze/Positive Pressure Generation hard effortful swallow  -CJ      Increase Tongue Base Retraction hard effortful swallow  -CJ      [place]/Accuracy (Pharyngeal Strengthening Goal 1, SLP) with moderate cues (50-74% accuracy)  -CJ      Time Frame (Pharyngeal Strengthening Goal 1, SLP) short term goal (STG)  -CJ      Progress/Outcomes (Pharyngeal Strengthening Goal 1, SLP) continuing progress toward goal  -CJ      Comment (Pharyngeal Strengthening Goal 1, SLP) CTAR, effortful, EMST-lite, effortful, SG all performed x @ least 5 reps  -CJ         Patient will demonstrate functional speech skills for return to discharge environment    [place] with moderate cues  -CJ      Time frame by discharge  -CJ      Progress/Outcomes continuing progress toward goal  -CJ         Patient will demonstrate functional cognitive-linguistic skills for return to discharge environment    [place] with moderate cues  -CJ      Time frame by discharge  -CJ      Progress/Outcomes continuing progress toward goal  -CJ         SLP Diagnostic Treatment     Patient will participate in further assessment in the following areas cognitive-linguistic  -CJ      Time Frame (Diagnostic) short term goal (STG)  -CJ      Barriers (Diagnostic) Lethargy;Cognitive status  -CJ      Progress/Outcomes (Additional Goal 1, SLP) continuing progress toward goal  -CJ         Respiratory Support Goal 1 (SLP)    Improve Respiratory Support Goal 1 (SLP) increasing length of exhalation;sustaining a vowel sound on exhalation;80%;with minimal cues (75-90%)  -CJ      Time Frame (Respiratory Support Goal 1, SLP)  short term goal (STG)  -CJ      Progress (Respiratory Support Skills Goal 1, SLP) 60%;with moderate cues (50-74%)  -CJ      Progress/Outcomes (Respiratory Support Goal 1, SLP) continuing progress toward goal  -CJ         Phonation Goal 1 (SLP)    Improve Phonation By Goal 1 (SLP) using loud speech;80%;with moderate cues (50-74%)  -CJ      Time Frame (Phonation Goal 1, SLP) short term goal (STG)  -CJ      Progress (Phonation Goal 1, SLP) 60%;with moderate cues (50-74%)  -CJ      Progress/Outcomes (Phonation Goal 1, SLP) continuing progress toward goal  -CJ         Articulation Goal 1 (SLP)    Improve Articulation Goal 1 (SLP) by over-articulating at phrase level;80%;with moderate cues (50-74%)  -CJ      Time Frame (Articulation Goal 1, SLP) short term goal (STG)  -CJ      Progress (Articulation Goal 1, SLP) 50%;with moderate cues (50-74%)  -CJ      Progress/Outcomes (Articulation Goal 1, SLP) goal revised this date  -CJ      Comment (Articulation Goal 1, SLP) revised to phrases  -CJ         Attention Goal 1 (SLP)    Improve Attention by Goal 1 (SLP) attending to task;complete sustained attention task;80%;with minimal cues (75-90%)  -CJ      Time Frame (Attention Goal 1, SLP) short term goal (STG)  -CJ      Progress (Attention Goal 1, SLP) 50%;with moderate cues (50-74%)  -CJ      Progress/Outcomes (Attention Goal 1, SLP) continuing progress toward goal  -CJ         Reasoning Goal 1 (SLP)    Improve Reasoning Through Goal 1 (SLP) complete basic reasoning task;80%;with minimal cues (75-90%)  -CJ      Time Frame (Reasoning Goal 1, SLP) short term goal (STG)  -CJ      Progress (Reasoning Goal 1, SLP) 60%;with moderate cues (50-74%)  -CJ      Progress/Outcomes (Reasoning Goal 1, SLP) continuing progress toward goal  -CJ                User Key  (r) = Recorded By, (t) = Taken By, (c) = Cosigned By      Initials Name Provider Type    Anh Cloud MS CCC-SLP Speech and Language Pathologist                       Time  Calculation:    Time Calculation- SLP       Row Name 01/30/24 1106             Time Calculation- SLP    SLP Start Time 1025  -CJ      SLP Received On 01/30/24  -         Untimed Charges    80866-RO Treatment/ST Modification Prosth Aug Alter  23  -      31446-QJ Treatment Swallow Minutes 24  -CJ         Total Minutes    Untimed Charges Total Minutes 47  -CJ       Total Minutes 47  -CJ                User Key  (r) = Recorded By, (t) = Taken By, (c) = Cosigned By      Initials Name Provider Type     Anh Ross MS CCC-SLP Speech and Language Pathologist                    Therapy Charges for Today       Code Description Service Date Service Provider Modifiers Qty    42241389988 HC ST TREATMENT SPEECH 2 1/30/2024 Anh Ross, MS CCC-SLP GN 1    65167909049 HC ST TREATMENT SWALLOW 2 1/30/2024 Anh Ross, MS HUYNH-SLP GN 1                 MS VIGNESH Alvarenga  1/30/2024    Electronically signed by Anh Ross MS CCC-SLP at 01/30/24 110

## 2024-01-30 NOTE — PLAN OF CARE
Goal Outcome Evaluation:  Plan of Care Reviewed With: patient        Progress: no change         Anticipated Discharge Disposition (SLP): inpatient rehabilitation facility             Treatment Assessment (SLP): continued, oral dysphagia, pharyngeal dysphagia, toleration of diet, dysarthria (01/30/24 1025)  Treatment Assessment Comments (SLP): Pt participate in tx this date, no overt s/s of aspiration. Provided EMST-lite and reviewed dysphagia tx exercises again. Will continue to f/u to address deficits. Would benefit from Inpatient rehab to maximize function (01/30/24 1025)  Plan for Continued Treatment (SLP): continue treatment per plan of care (01/30/24 1025)

## 2024-01-30 NOTE — PROGRESS NOTES
[facility] Medicine Services  PROGRESS NOTE    Patient Name: Regine Beckham  : 1955  MRN: [id number]    Date of Admission: 2024  Primary Care Physician: Dread Burton MD    Subjective   Subjective     CC:  F/u CVA    HPI:  Patient is resting in bed in NAD. She says she feels soa at times. Denies any congestion. Plan for rehab       Objective   Objective     Vital Signs:   Temp:  [98.3 °F (36.8 °C)-98.9 °F (37.2 °C)] 98.3 °F (36.8 °C)  Heart Rate:  [] 95  Resp:  [16-18] 18  BP: (115-124)/(71-94) 120/71     Physical Exam:  Constitutional: No acute distress, awake, alert  HENT: NCAT, mucous membranes moist  Respiratory: Clear to auscultation bilaterally, respiratory effort normal room air 96%  Cardiovascular: RRR, no murmurs, rubs, or gallops  Gastrointestinal: Positive bowel sounds, soft, nontender, nondistended  Musculoskeletal: No bilateral ankle edema  Psychiatric: Appropriate affect, cooperative  Neurologic: Oriented x 3, left sided weakness, left facial droop  dysarthria   Skin: No rashes      Results Reviewed:  LAB RESULTS:      Lab 24  0607   WBC 10.21   HEMOGLOBIN 11.3*   HEMATOCRIT 32.7*   PLATELETS 300   NEUTROS ABS 6.33   IMMATURE GRANS (ABS) 0.16*   LYMPHS ABS 2.29   MONOS ABS 1.22*   EOS ABS 0.16   MCV 95.9         Lab 24  0607 24  0505 24  1630 24  0418 24  0349   SODIUM 135* 140  --  139 136   POTASSIUM 3.9 4.3 4.5 3.6 4.1   CHLORIDE 99 102  --  102 103   CO2 27.0 27.0  --  25.0 23.0   ANION GAP 9.0 11.0  --  12.0 10.0   BUN 19 16  --  15 16   CREATININE 0.73 0.71  --  0.64 0.74   EGFR 89.2 92.2  --  95.8 87.7   GLUCOSE 137* 122*  --  118* 110*   CALCIUM 9.6 9.1  --  8.7 9.2   MAGNESIUM 2.1  --   --  2.0 2.1   PHOSPHORUS  --   --   --  2.4* 2.7                         Brief Urine Lab Results  (Last result in the past 365 days)        Color   Clarity   Blood   Leuk Est   Nitrite   Protein   CREAT   Urine HCG         12/20/23 2003 Yellow   Clear   Negative   Negative   Negative   Negative                   Microbiology Results Abnormal       Procedure Component Value - Date/Time    Respiratory Panel PCR w/COVID-19(SARS-CoV-2) SHEILA/TAYLOR/ANDRÉS/PAD/COR/KOREY In-House, NP Swab in UTM/VTM, 2 HR TAT - Swab, Nasopharynx [[id number]]  (Normal) Collected: 01/29/24 0132    Lab Status: Final result Specimen: Swab from Nasopharynx Updated: 01/29/24 0249     ADENOVIRUS, PCR Not Detected     Coronavirus 229E Not Detected     Coronavirus HKU1 Not Detected     Coronavirus NL63 Not Detected     Coronavirus OC43 Not Detected     COVID19 Not Detected     Human Metapneumovirus Not Detected     Human Rhinovirus/Enterovirus Not Detected     Influenza A PCR Not Detected     Influenza B PCR Not Detected     Parainfluenza Virus 1 Not Detected     Parainfluenza Virus 2 Not Detected     Parainfluenza Virus 3 Not Detected     Parainfluenza Virus 4 Not Detected     RSV, PCR Not Detected     Bordetella pertussis pcr Not Detected     Bordetella parapertussis PCR Not Detected     Chlamydophila pneumoniae PCR Not Detected     Mycoplasma pneumo by PCR Not Detected    Narrative:      In the setting of a positive respiratory panel with a viral infection PLUS a negative procalcitonin without other underlying concern for bacterial infection, consider observing off antibiotics or discontinuation of antibiotics and continue supportive care. If the respiratory panel is positive for atypical bacterial infection (Bordetella pertussis, Chlamydophila pneumoniae, or Mycoplasma pneumoniae), consider antibiotic de-escalation to target atypical bacterial infection.    Respiratory Culture - Sputum, NT Suction [[id number]] Collected: 01/22/24 0808    Lab Status: Final result Specimen: Sputum from NT Suction Updated: 01/24/24 1052     Respiratory Culture Light growth (2+) Normal respiratory sudhir. No S. aureus or Pseudomonas aeruginosa detected. Final report.     Gram Stain Moderate (3+)  WBCs per low power field      Rare (1+) Epithelial cells per low power field      Few (2+) Gram positive cocci in pairs            XR Chest 1 View    Result Date: 1/29/2024  XR CHEST 1 VW Date of Exam: 1/29/2024 12:58 AM EST Indication: cough Comparison: 1/22/2024. Findings: There are no airspace consolidations. No pleural fluid. No pneumothorax. The pulmonary vasculature appears within normal limits. The cardiac and mediastinal silhouette appear unremarkable. No acute osseous abnormality identified.     Impression: Impression: No acute cardiopulmonary process. Electronically Signed: Steffanie Ramirez MD  1/29/2024 1:31 AM EST  Workstation ID: [id number]     Results for orders placed during the hospital encounter of 01/19/24    Adult Transthoracic Echo Limited W/ Cont if Necessary Per Protocol    Interpretation Summary    Left ventricular systolic function is normal. Estimated left ventricular EF = 60%    Saline test results are negative for intracardiac shunting..      Current medications:  Scheduled Meds:[START ON 1/31/2024] aspirin, 81 mg, Oral, Daily  atorvastatin, 80 mg, Oral, Nightly  gabapentin, 100 mg, Oral, Q8H  guaiFENesin, 600 mg, Oral, Q12H  ipratropium-albuterol, 3 mL, Nebulization, Q4H - RT  [START ON 1/31/2024] losartan, 50 mg, Oral, Q24H  [START ON 1/31/2024] magnesium oxide, 400 mg, Oral, Daily  mirtazapine, 30 mg, Oral, Nightly  nicotine, 1 patch, Transdermal, Q24H  [START ON 1/31/2024] pantoprazole, 40 mg, Oral, QAM AC  rOPINIRole, 4 mg, Oral, Nightly  senna-docusate sodium, 2 tablet, Oral, BID  sodium chloride, 10 mL, Intravenous, Q12H  ticagrelor, 60 mg, Oral, BID      Continuous Infusions:   PRN Meds:.  acetaminophen    albuterol    senna-docusate sodium **AND** polyethylene glycol **AND** bisacodyl **AND** bisacodyl    Calcium Replacement - Follow Nurse / BPA Driven Protocol    hydrALAZINE    Magnesium Standard Dose Replacement - Follow Nurse / BPA Driven Protocol    ondansetron    phenol     Phosphorus Replacement - Follow Nurse / BPA Driven Protocol    Potassium Replacement - Follow Nurse / BPA Driven Protocol    sodium chloride    sodium chloride    Assessment & Plan   Assessment & Plan     Active Hospital Problems    Diagnosis  POA    **Stroke [I63.511]  Unknown    Moderate malnutrition [E44.0]  Yes    Grade I diastolic dysfunction [I51.89]  Unknown    GERD (gastroesophageal reflux disease) [K21.9]  Yes    RLS (restless legs syndrome) [G25.81]  Yes    Major depressive disorder [F32.9]  Yes    Iron deficiency anemia due to chronic blood loss [D50.0]  Yes      Resolved Hospital Problems   No resolved problems to display.        Brief Hospital Course to date:  Regine Beckham is a 68 y.o. female  with a history of HTN, chronic anemia, sleep apnea intolerant of Cpap, severe depression with an overdose attempt in December 2023, and legally blind who presented to [facility] on 1/19/24 with left sided weakness and was found to have a RMCA infarct and 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 She underwent thrombectomy and carotid stent placement on 1/19. She was outside the window for thrombolytics. Initially placed on Cardene which has been successfully weaned off. Her hospital course has been complicated by ongoing dysphagia and difficulty managing secretions. Transferred to tele 1/25.    Plan was partially entered by my partner and I have reviewed and updated as appropriate on 1/30/24     CVA-right MCA  Right internal carotid artery occlusion status post thrombectomy and carotid stent placement  Dysphagia  --Stroke team follows, continue DAPT with brilinta/statin.  --SLP follows, repeat FEES 1/26. Now on nectar thickened liquids  --PT/OT recs IRF   -- follow up in stroke clinic 6-8 weeks      Cough, SOB  --Resp viral panel negative  --CXR negative  -- stable on room air   -- nebs prn      Chronic HFpEF  HTN  --cont ASA, statin, losartan.  Holding HCTZ and aldactone for now     Chronic CESIA  H/H stable.      GERD  -Cont protonix     RLS  -Stable, continue requip, neurontin     Tob dependency  -cont nicotine patch  -discuss cessation    Expected Discharge Location and Transportation: rehab   Expected Discharge   Expected Discharge Date: 2/1/2024; Expected Discharge Time:      DVT prophylaxis:  Mechanical DVT prophylaxis orders are present.         AM-PAC 6 Clicks Score (PT): 12 (01/29/24 3451)    CODE STATUS:   Code Status and Medical Interventions:   Ordered at: 01/22/24 1115     Code Status (Patient has no pulse and is not breathing):    CPR (Attempt to Resuscitate)     Medical Interventions (Patient has pulse or is breathing):    Full Support       Radha Carvalho, APRN  01/30/24

## 2024-01-30 NOTE — THERAPY PROGRESS REPORT/RE-CERT
"Patient Name: Regine Beckham  : 1955    MRN: [id number]                              Today's Date: 2024       Admit Date: 2024    Visit Dx:     ICD-10-CM ICD-9-CM   1. Oropharyngeal dysphagia  R13.12 787.22   2. Dysarthria  R47.1 784.51   3. Cognitive communication deficit  R41.841 799.52   4. Stroke  I63.511 434.91     Patient Active Problem List   Diagnosis    Iron deficiency anemia due to chronic blood loss    Major depressive disorder    RLS (restless legs syndrome)    GERD (gastroesophageal reflux disease)    Left breast mass    Allergy to penicillin    Stroke    Grade I diastolic dysfunction    Moderate malnutrition     Past Medical History:   Diagnosis Date    Anemia     Anxiety     Arthritis     Depression     Legally blind     Restless leg syndrome     Sleep apnea     \"I don't use a cpap anymore since losing 106 lbs\"    Water retention      Past Surgical History:   Procedure Laterality Date    BACK SURGERY      CARDIAC CATHETERIZATION N/A 2024    Procedure: Percutaneous Manual Thrombectomy;  Surgeon: Efrain Hurley MD;  Location: [facility] INVASIVE LOCATION;  Service: Interventional Radiology;  Laterality: N/A;    GALLBLADDER SURGERY      HIP ARTHROSCOPY      JOINT REPLACEMENT Right       General Information       Row Name 24 1541          Physical Therapy Time and Intention    Document Type therapy note (daily note);progress note/recertification  -CD     Mode of Treatment physical therapy  -CD       Row Name 24 1541          General Information    Patient Profile Reviewed yes  -CD     Existing Precautions/Restrictions fall  L inattention, L weakness (UE>LE), dysarthria, LEGALLY BLIND  -CD     Barriers to Rehab medically complex;cognitive status;visual deficit  -CD       Row Name 24 1541          Cognition    Orientation Status (Cognition) oriented x 3  -CD       Row Name 24 1541          Safety Issues, Functional Mobility    Safety Issues Affecting " Function (Mobility) safety precaution awareness;safety precautions follow-through/compliance;sequencing abilities;insight into deficits/self-awareness  -CD     Impairments Affecting Function (Mobility) balance;coordination;endurance/activity tolerance;strength;visual/perceptual;sensation/sensory awareness;motor planning;motor control;pain  -CD     Cognitive Impairments, Mobility Safety/Performance awareness, need for assistance;insight into deficits/self-awareness;safety precaution awareness;safety precaution follow-through;sequencing abilities  -CD               User Key  (r) = Recorded By, (t) = Taken By, (c) = Cosigned By      Initials Name Provider Type    CD Pamela Price, PT Physical Therapist                   Mobility       Row Name 01/30/24 1544          Bed Mobility    Bed Mobility sit-supine  -CD     Sit-Supine [place] (Bed Mobility) maximum assist (25% patient effort)  -CD     Comment, (Bed Mobility) MAX ASSIST TO LIFT LE'S INTO BED FROM R SIDELYING.  -CD       Row Name 01/30/24 1545          Transfers    Comment, (Transfers) CUES FOR HAND PLACEMENT. STS FROM RECLINER X1, FROM BSC X 1 AND FROM EOB X 3 REPS VIA B UE SUPPORT.. COMPLETED STAND STEP PIVOT TO THE R X 2 VIA B UE SUPPORT. NSG IN TO ASSIST WITH HYGIENE AFTER BM ON BSC.  -CD       Row Name 01/30/24 1548          Bed-Chair Transfer    Bed-Chair [place] (Transfers) maximum assist (25% patient effort);verbal cues  -CD     Assistive Device (Bed-Chair Transfers) --  B UE SUPPORT.  -CD       Row Name 01/30/24 1540          Sit-Stand Transfer    Sit-Stand [place] (Transfers) moderate assist (50% patient effort);verbal cues  -CD     Assistive Device (Sit-Stand Transfers) --  B UE SUPPORT.  -CD     Comment, (Sit-Stand Transfer) CUES FOR UPRIGHT POSTURE AND FOWARWD GAZE IN STANDING.  -CD       Row Name 01/30/24 1549          Gait/Stairs (Locomotion)    [place] Level (Gait) verbal cues;maximum assist (25% patient effort)  -CD      Assistive Device (Gait) --  VIA B UE SUPPORT.  -CD     Distance in Feet (Gait) 2+2+ 2  -CD     Deviations/Abnormal Patterns (Gait) base of support, narrow;gait speed decreased;stride length decreased  -CD     Bilateral Gait Deviations forward flexed posture  -CD     Left Sided Gait Deviations weight shift ability decreased;heel strike decreased  -CD     Comment, (Gait/Stairs) PIVOT STEPS TO BSC AND EOB AND SIDESTEPPING TO THE L TOWARDS HOB.  -CD               User Key  (r) = Recorded By, (t) = Taken By, (c) = Cosigned By      Initials Name Provider Type     Pamela Price PT Physical Therapist                   Obj/Interventions       Row Name 01/30/24 1549          Motor Skills    Therapeutic Exercise --  COMPLETED SUPINE THER EX WITH MANUAL RESISTANCE: HEEL SLIDES, HIP ABD/ADD, AP'S X 10 REPS EACH.,  -CD       Row Name 01/30/24 1549          Balance    Static Sitting Balance standby assist  -CD     Dynamic Sitting Balance contact guard  -CD     Position, Sitting Balance unsupported;sitting in chair;sitting edge of bed  -CD     Sit to Stand Dynamic Balance moderate assist  -CD     Static Standing Balance minimal assist  -CD     Dynamic Standing Balance maximum assist  PROGRESSED TO MOD ASSIST.  -CD     Position/Device Used, Standing Balance supported  B UE SUPPORT.  -CD     Balance Interventions sitting;standing;sit to stand;supported;static;dynamic;weight shifting activity  -CD     Comment, Balance WORKED ON STANDING MIDLINE ORIENTATION AND WEIGHTSHIFTING R/L WITH UPRIGHT POSTURE VIA B UE SUPPORT.  -CD               User Key  (r) = Recorded By, (t) = Taken By, (c) = Cosigned By      Initials Name Provider Type     Pamela Price PT Physical Therapist                   Goals/Plan       Row Name 01/30/24 1555          Bed Mobility Goal 1 (PT)    Activity/Assistive Device (Bed Mobility Goal 1, PT) sit to supine/supine to sit  -CD     [place] Level/Cues Needed (Bed Mobility Goal 1, PT) minimum assist (75%  or more patient effort)  -CD     Time Frame (Bed Mobility Goal 1, PT) 2 weeks;long term goal (LTG)  -CD     Progress/Outcomes (Bed Mobility Goal 1, PT) goal ongoing  -CD       Row Name 01/30/24 1695          Transfer Goal 1 (PT)    Activity/Assistive Device (Transfer Goal 1, PT) sit-to-stand/stand-to-sit  -CD     [place] Level/Cues Needed (Transfer Goal 1, PT) contact guard required  -CD     Time Frame (Transfer Goal 1, PT) 2 weeks;long term goal (LTG)  -CD     Progress/Outcome (Transfer Goal 1, PT) goal revised this date;continuing progress toward goal  -CD       Row Name 01/30/24 0052          Gait Training Goal 1 (PT)    Activity/Assistive Device (Gait Training Goal 1, PT) gait (walking locomotion);assistive device use  -CD     S[place]th Level (Gait Training Goal 1, PT) minimum assist (75% or more patient effort)  -CD     Distance (Gait Training Goal 1, PT) 50  -CD     Time Frame (Gait Training Goal 1, PT) 2 weeks;long term goal (LTG)  -CD     Progress/Outcome (Gait Training Goal 1, PT) goal ongoing;continuing progress toward goal  -CD       Row Name 01/30/24 7420          Therapy Assessment/Plan (PT)    Planned Therapy Interventions (PT) balance training;bed mobility training;gait training;strengthening;patient/family education;transfer training;neuromuscular re-education;motor coordination training;postural re-education;home exercise program  -CD               User Key  (r) = Recorded By, (t) = Taken By, (c) = Cosigned By      Initials Name Provider Type    Pamela Avitia, PT Physical Therapist                   Clinical Impression       Row Name 01/30/24 1555          Pain    Pretreatment Pain Rating 0/10 - no pain  -CD     Posttreatment Pain Rating 0/10 - no pain  -CD       Row Name 01/30/24 1557          Plan of Care Review    Plan of Care Reviewed With patient  -CD     Outcome Evaluation PT NEEDS CUES FOR SEQUENCING AND SAFETY WITH MOBILITY. REQUIRES MOD TO MAX ASSIST FOR TRANSFERS AND GAIT SHORT  DISTANCE. PT IS MOTIVATED TO WORK WITH THERAPY. CONTINUES WITH L SIDED WEAKNESS, IMPAIRED BALANCE AND DECLINE IN FUNCTIONAL MOBILITY AND IS ALSO IMPACTED BY BASELINE VISUAL DEFICITS. RECOMMEND IRF AT D/C FOR BEST OUTCOME. GOALS MODIFIED TO REFLECT CURRENT FUNCTIONAL STAUS.  -CD       Row Name 01/30/24 1372          Therapy Assessment/Plan (PT)    Patient/Family Therapy Goals Statement (PT) TO RETURN TO PLOF.  -CD     Rehab Potential (PT) good, to achieve stated therapy goals  -CD     Criteria for Skilled Interventions Met (PT) yes;skilled treatment is necessary  -CD     Therapy Frequency (PT) daily  -CD       Row Name 01/30/24 3812          Vital Signs    Pre Systolic BP Rehab 135  -CD     Pre Treatment Diastolic BP 80  -CD     Post Systolic BP Rehab 125  -CD     Post Treatment Diastolic BP 72  -CD     Pre SpO2 (%) 99  -CD     O2 Delivery Pre Treatment room air  -CD     O2 Delivery Intra Treatment room air  -CD     Post SpO2 (%) 99  -CD     O2 Delivery Post Treatment room air  -CD     Pre Patient Position Supine  -CD     Intra Patient Position Standing  -CD     Post Patient Position Sitting  -CD       Row Name 01/30/24 6581          Positioning and Restraints    Pre-Treatment Position in bed  -CD     Post Treatment Position chair  -CD     In Bed supine;call light within reach;exit alarm on;notified nsg;RUE elevated;LUE elevated;heels elevated  -CD               User Key  (r) = Recorded By, (t) = Taken By, (c) = Cosigned By      Initials Name Provider Type    CD Pamela Price, PT Physical Therapist                   Outcome Measures       Row Name 01/30/24 0170          How much help from another person do you currently need...    Turning from your back to your side while in flat bed without using bedrails? 2  -CD     Moving from lying on back to sitting on the side of a flat bed without bedrails? 2  -CD     Standing up from a chair using your arms (e.g., wheelchair, bedside chair)? 2  -CD     Climbing 3-5 steps  with a railing? 2  -CD     To walk in hospital room? 2  -CD       Row Name 01/30/24 1558          Modified [place] Scale    Modified Suzette Scale 4 - Moderately severe disability.  Unable to walk without assistance, and unable to attend to own bodily needs without assistance.  -CD       Row Name 01/30/24 1558          Functional Assessment    Outcome Measure Options AM-PAC 6 Clicks Basic Mobility (PT);Modified Suzette  -CD               User Key  (r) = Recorded By, (t) = Taken By, (c) = Cosigned By      Initials Name Provider Type    Pamela Avitia PT Physical Therapist                                 Physical Therapy Education       Title: PT OT SLP Therapies (In Progress)       Topic: Physical Therapy (Done)       Point: Mobility training (Done)       Learning Progress Summary             Patient Acceptance, E, VU,NR by CD at 1/30/2024 1558    Comment: SEE FLOWSHEET    Acceptance, E, VU,NR by  at 1/29/2024 1611    Acceptance, E, NR by AS at 1/28/2024 1013    Acceptance, E,TB, NR by ESTEVAN at 1/24/2024 1116    Acceptance, E,D, VU,NR by MB at 1/22/2024 1418    Acceptance, E,D, VU,NR by LUIS FERNANDO at 1/20/2024 1221                         Point: Home exercise program (Done)       Learning Progress Summary             Patient Acceptance, E, VU,NR by CD at 1/30/2024 1558    Comment: SEE FLOWSHEET    Acceptance, E, NR by AS at 1/28/2024 1013    Acceptance, E,TB, NR by ESTEVAN at 1/24/2024 1116    Acceptance, E,D, VU,NR by MB at 1/22/2024 1418                         Point: Body mechanics (Done)       Learning Progress Summary             Patient Acceptance, E, VU,NR by CD at 1/30/2024 1558    Comment: SEE FLOWSHEET    Acceptance, E, VU,NR by  at 1/29/2024 1611    Acceptance, E, NR by AS at 1/28/2024 1013    Acceptance, E,TB, NR by ESTEVAN at 1/24/2024 1116    Acceptance, E,D, VU,NR by MB at 1/22/2024 1418    Acceptance, E,D, VU,NR by LS at 1/20/2024 1221                         Point: Precautions (Done)       Learning Progress Summary              Patient Acceptance, E, VU,NR by CD at 1/30/2024 1558    Comment: SEE FLOWSHEET    Acceptance, E, VU,NR by LH at 1/29/2024 1611    Acceptance, E, NR by AS at 1/28/2024 1013    Acceptance, E,TB, NR by AY at 1/24/2024 1116    Acceptance, E,D, VU,NR by MB at 1/22/2024 1418    Acceptance, E,D, VU,NR by LS at 1/20/2024 1221                                         User Key       Initials Effective Dates Name Provider Type Discipline    CD 02/03/23 -  Pamela Price, PT Physical Therapist PT    AS 04/28/23 -  Ngoc Grayson, PTA Physical Therapist Assistant PT    LS 02/03/23 -  Nikole Herrmann, PT Physical Therapist PT    MB 06/16/21 -  Joanne Bermudez, PT Physical Therapist PT    AY 11/10/20 -  Ngco Glynn, PT Physical Therapist PT     09/21/23 -  Nereida Goode, PT Physical Therapist PT                  PT Recommendation and Plan  Planned Therapy Interventions (PT): balance training, bed mobility training, gait training, strengthening, patient/family education, transfer training, neuromuscular re-education, motor coordination training, postural re-education, home exercise program  Plan of Care Reviewed With: patient  Outcome Evaluation: PT NEEDS CUES FOR SEQUENCING AND SAFETY WITH MOBILITY. REQUIRES MOD TO MAX ASSIST FOR TRANSFERS AND GAIT SHORT DISTANCE. PT IS MOTIVATED TO WORK WITH THERAPY. CONTINUES WITH L SIDED WEAKNESS, IMPAIRED BALANCE AND DECLINE IN FUNCTIONAL MOBILITY AND IS ALSO IMPACTED BY BASELINE VISUAL DEFICITS. RECOMMEND IRF AT D/C FOR BEST OUTCOME. GOALS MODIFIED TO REFLECT CURRENT FUNCTIONAL STAUS.     Time Calculation:         PT Charges       Row Name 01/30/24 1559             Time Calculation    Start Time 1505  -CD      PT Received On 01/30/24  -CD         Time Calculation- PT    Total Timed Code Minutes- PT 33 minute(s)  -CD         Timed Charges    05975 - PT Therapeutic Exercise Minutes 13  -CD      02122 - PT Therapeutic Activity Minutes 20  -CD         Total Minutes     Timed Charges Total Minutes 33  -CD       Total Minutes 33  -CD                User Key  (r) = Recorded By, (t) = Taken By, (c) = Cosigned By      Initials Name Provider Type    CD Pamela Price, PT Physical Therapist                  Therapy Charges for Today       Code Description Service Date Service Provider Modifiers Qty    [id number]  PT THER PROC EA 15 MIN 1/30/2024 Pamela Price, PT GP 1    [id number]  PT THERAPEUTIC ACT EA 15 MIN 1/30/2024 Pamela Price, PT GP 1            PT G-Codes  Outcome Measure Options: AM-PAC 6 Clicks Basic Mobility (PT), Modified Suzette  AM-PAC 6 Clicks Score (PT): 12  AM-PAC 6 Clicks Score (OT): 12  Modified [place] Scale: 4 - Moderately severe disability.  Unable to walk without assistance, and unable to attend to own bodily needs without assistance.  PT Discharge Summary  Anticipated Discharge Disposition (PT): inpatient rehabilitation facility    Pamela Price, PT  1/30/2024

## 2024-01-30 NOTE — CASE MANAGEMENT/SOCIAL WORK
Continued Stay Note  [facility]     Patient Name: Regine Beckham  MRN: [id number]  Today's Date: 1/30/2024    Admit Date: 1/19/2024    Plan: Rehab   Discharge Plan       Row Name 01/30/24 1439       Plan    Plan Rehab    Patient/Family in Agreement with Plan yes    Plan Comments Spoke with Ms. Beckham at the bedside. Rehab referrals were sent to [facility] and [facility] and they do not have any rehab beds available. Discussed with patient and she is agreeable to [facility] and [facility]. [facility] is out of network with patient's insurance and [facility] does not have any beds. CM faxed additonal referrals to [facility] and rehab at [phone] and [facility] and Rehab at [phone]. CM will continue to follow up.    Final Discharge Disposition Code 03 - skilled nursing facility (SNF)                   Discharge Codes    No documentation.                 Expected Discharge Date and Time       Expected Discharge Date Expected Discharge Time    Feb 1, 2024               Debbie Mcghee RN

## 2024-01-31 LAB
GLUCOSE BLDC GLUCOMTR-MCNC: 142 MG/DL (ref 70–130)
GLUCOSE BLDC GLUCOMTR-MCNC: 157 MG/DL (ref 70–130)
GLUCOSE BLDC GLUCOMTR-MCNC: 157 MG/DL (ref 70–130)
GLUCOSE BLDC GLUCOMTR-MCNC: 160 MG/DL (ref 70–130)
QT INTERVAL: 348 MS
QTC INTERVAL: 464 MS

## 2024-01-31 PROCEDURE — 94799 UNLISTED PULMONARY SVC/PX: CPT

## 2024-01-31 PROCEDURE — 94761 N-INVAS EAR/PLS OXIMETRY MLT: CPT

## 2024-01-31 PROCEDURE — 82948 REAGENT STRIP/BLOOD GLUCOSE: CPT

## 2024-01-31 PROCEDURE — 94664 DEMO&/EVAL PT USE INHALER: CPT

## 2024-01-31 PROCEDURE — 99232 SBSQ HOSP IP/OBS MODERATE 35: CPT | Performed by: NURSE PRACTITIONER

## 2024-01-31 RX ADMIN — GUAIFENESIN 600 MG: 600 TABLET, EXTENDED RELEASE ORAL at 20:50

## 2024-01-31 RX ADMIN — TICAGRELOR 60 MG: 60 TABLET ORAL at 09:17

## 2024-01-31 RX ADMIN — GABAPENTIN 100 MG: 100 CAPSULE ORAL at 05:39

## 2024-01-31 RX ADMIN — GABAPENTIN 100 MG: 100 CAPSULE ORAL at 21:03

## 2024-01-31 RX ADMIN — TICAGRELOR 60 MG: 60 TABLET ORAL at 20:49

## 2024-01-31 RX ADMIN — Medication 1 PATCH: at 09:16

## 2024-01-31 RX ADMIN — GABAPENTIN 100 MG: 100 CAPSULE ORAL at 14:52

## 2024-01-31 RX ADMIN — MAGNESIUM OXIDE TAB 400 MG (241.3 MG ELEMENTAL MG) 400 MG: 400 (241.3 MG) TAB at 09:15

## 2024-01-31 RX ADMIN — Medication 10 ML: at 20:50

## 2024-01-31 RX ADMIN — IPRATROPIUM BROMIDE AND ALBUTEROL SULFATE 3 ML: 2.5; .5 SOLUTION RESPIRATORY (INHALATION) at 11:31

## 2024-01-31 RX ADMIN — ASPIRIN 81 MG: 81 TABLET, CHEWABLE ORAL at 09:15

## 2024-01-31 RX ADMIN — PANTOPRAZOLE SODIUM 40 MG: 40 TABLET, DELAYED RELEASE ORAL at 09:15

## 2024-01-31 RX ADMIN — GUAIFENESIN 600 MG: 600 TABLET, EXTENDED RELEASE ORAL at 09:15

## 2024-01-31 RX ADMIN — ATORVASTATIN CALCIUM 80 MG: 40 TABLET, FILM COATED ORAL at 20:49

## 2024-01-31 RX ADMIN — Medication 10 ML: at 09:15

## 2024-01-31 RX ADMIN — SENNOSIDES AND DOCUSATE SODIUM 2 TABLET: 8.6; 5 TABLET ORAL at 09:15

## 2024-01-31 RX ADMIN — ROPINIROLE HYDROCHLORIDE 4 MG: 1 TABLET, FILM COATED ORAL at 20:49

## 2024-01-31 RX ADMIN — ACETAMINOPHEN 650 MG: 325 TABLET ORAL at 05:39

## 2024-01-31 RX ADMIN — ACETAMINOPHEN 650 MG: 325 TABLET ORAL at 22:27

## 2024-01-31 RX ADMIN — IPRATROPIUM BROMIDE AND ALBUTEROL SULFATE 3 ML: 2.5; .5 SOLUTION RESPIRATORY (INHALATION) at 15:17

## 2024-01-31 RX ADMIN — MIRTAZAPINE 30 MG: 15 TABLET, FILM COATED ORAL at 20:49

## 2024-01-31 RX ADMIN — SENNOSIDES AND DOCUSATE SODIUM 2 TABLET: 8.6; 5 TABLET ORAL at 20:49

## 2024-01-31 RX ADMIN — LOSARTAN POTASSIUM 50 MG: 50 TABLET, FILM COATED ORAL at 09:15

## 2024-01-31 RX ADMIN — IPRATROPIUM BROMIDE AND ALBUTEROL SULFATE 3 ML: 2.5; .5 SOLUTION RESPIRATORY (INHALATION) at 23:46

## 2024-01-31 RX ADMIN — IPRATROPIUM BROMIDE AND ALBUTEROL SULFATE 3 ML: 2.5; .5 SOLUTION RESPIRATORY (INHALATION) at 19:49

## 2024-01-31 RX ADMIN — IPRATROPIUM BROMIDE AND ALBUTEROL SULFATE 3 ML: 2.5; .5 SOLUTION RESPIRATORY (INHALATION) at 06:59

## 2024-01-31 NOTE — CASE MANAGEMENT/SOCIAL WORK
Continued Stay Note  [facility]     Patient Name: Regine Beckham  MRN: [id number]  Today's Date: 1/31/2024    Admit Date: 1/19/2024    Plan: Rehab   Discharge Plan       Row Name 01/31/24 1537       Plan    Plan Rehab    Patient/Family in Agreement with Plan yes    Plan Comments Spoke with Ms. Beckham at the bedside. [facility] are unable to offer Ms. Beckham a bed. Referral given to Jeremy with [facility] and he is unable to offer a bed as well. Referral given to Daphne with [facility] facilities. CM will continue to follow up.    Final Discharge Disposition Code 03 - skilled nursing facility (SNF)                   Discharge Codes    No documentation.                 Expected Discharge Date and Time       Expected Discharge Date Expected Discharge Time    Feb 2, 2024               Debbie Mcghee RN

## 2024-01-31 NOTE — PROGRESS NOTES
[facility] Medicine Services  PROGRESS NOTE    Patient Name: Regine Beckham  : 1955  MRN: [id number]    Date of Admission: 2024  Primary Care Physician: Dread Burton MD    Subjective   Subjective     CC:  F/u CVA    HPI:  Patient is sleeping in bed. She is drowsy but does awaken and talk to me. She states she did not sleep well last night. No acute events overnight per nursing       Objective   Objective     Vital Signs:   Temp:  [97.6 °F (36.4 °C)-98.5 °F (36.9 °C)] 98.1 °F (36.7 °C)  Heart Rate:  [] 80  Resp:  [16-18] 17  BP: (123)/(67-73) 123/67     Physical Exam:  Constitutional: No acute distress, awake, alert  HENT: NCAT, mucous membranes moist  Respiratory: Clear to auscultation bilaterally, respiratory effort normal room air 96%  Cardiovascular: RRR, no murmurs, rubs, or gallops  Gastrointestinal: Positive bowel sounds, soft, nontender, nondistended  Musculoskeletal: No bilateral ankle edema  Psychiatric: Appropriate affect, cooperative  Neurologic: Oriented x 3, left sided weakness, left facial droop  dysarthria   Skin: No rashes      Results Reviewed:  LAB RESULTS:      Lab 24  0607   WBC 10.21   HEMOGLOBIN 11.3*   HEMATOCRIT 32.7*   PLATELETS 300   NEUTROS ABS 6.33   IMMATURE GRANS (ABS) 0.16*   LYMPHS ABS 2.29   MONOS ABS 1.22*   EOS ABS 0.16   MCV 95.9         Lab 24  0607 24  0505 24  1630 24  0418   SODIUM 135* 140  --  139   POTASSIUM 3.9 4.3 4.5 3.6   CHLORIDE 99 102  --  102   CO2 27.0 27.0  --  25.0   ANION GAP 9.0 11.0  --  12.0   BUN 19 16  --  15   CREATININE 0.73 0.71  --  0.64   EGFR 89.2 92.2  --  95.8   GLUCOSE 137* 122*  --  118*   CALCIUM 9.6 9.1  --  8.7   MAGNESIUM 2.1  --   --  2.0   PHOSPHORUS  --   --   --  2.4*                         Brief Urine Lab Results  (Last result in the past 365 days)        Color   Clarity   Blood   Leuk Est   Nitrite   Protein   CREAT   Urine HCG        23  Yellow   Clear   Negative   Negative   Negative   Negative                   Microbiology Results Abnormal       Procedure Component Value - Date/Time    Respiratory Panel PCR w/COVID-19(SARS-CoV-2) SHEILA/TAYLOR/ANDRÉS/PAD/COR/KOREY In-House, NP Swab in UTM/VTM, 2 HR TAT - Swab, Nasopharynx [[id number]]  (Normal) Collected: 01/29/24 0132    Lab Status: Final result Specimen: Swab from Nasopharynx Updated: 01/29/24 0249     ADENOVIRUS, PCR Not Detected     Coronavirus 229E Not Detected     Coronavirus HKU1 Not Detected     Coronavirus NL63 Not Detected     Coronavirus OC43 Not Detected     COVID19 Not Detected     Human Metapneumovirus Not Detected     Human Rhinovirus/Enterovirus Not Detected     Influenza A PCR Not Detected     Influenza B PCR Not Detected     Parainfluenza Virus 1 Not Detected     Parainfluenza Virus 2 Not Detected     Parainfluenza Virus 3 Not Detected     Parainfluenza Virus 4 Not Detected     RSV, PCR Not Detected     Bordetella pertussis pcr Not Detected     Bordetella parapertussis PCR Not Detected     Chlamydophila pneumoniae PCR Not Detected     Mycoplasma pneumo by PCR Not Detected    Narrative:      In the setting of a positive respiratory panel with a viral infection PLUS a negative procalcitonin without other underlying concern for bacterial infection, consider observing off antibiotics or discontinuation of antibiotics and continue supportive care. If the respiratory panel is positive for atypical bacterial infection (Bordetella pertussis, Chlamydophila pneumoniae, or Mycoplasma pneumoniae), consider antibiotic de-escalation to target atypical bacterial infection.    Respiratory Culture - Sputum, NT Suction [[id number]] Collected: 01/22/24 0808    Lab Status: Final result Specimen: Sputum from NT Suction Updated: 01/24/24 1052     Respiratory Culture Light growth (2+) Normal respiratory sudhir. No S. aureus or Pseudomonas aeruginosa detected. Final report.     Gram Stain Moderate (3+) WBCs per low  power field      Rare (1+) Epithelial cells per low power field      Few (2+) Gram positive cocci in pairs            No radiology results from the last 24 hrs    Results for orders placed during the hospital encounter of 01/19/24    Adult Transthoracic Echo Limited W/ Cont if Necessary Per Protocol    Interpretation Summary    Left ventricular systolic function is normal. Estimated left ventricular EF = 60%    Saline test results are negative for intracardiac shunting..      Current medications:  Scheduled Meds:aspirin, 81 mg, Oral, Daily  atorvastatin, 80 mg, Oral, Nightly  gabapentin, 100 mg, Oral, Q8H  guaiFENesin, 600 mg, Oral, Q12H  ipratropium-albuterol, 3 mL, Nebulization, Q4H - RT  losartan, 50 mg, Oral, Q24H  magnesium oxide, 400 mg, Oral, Daily  mirtazapine, 30 mg, Oral, Nightly  nicotine, 1 patch, Transdermal, Q24H  pantoprazole, 40 mg, Oral, QAM AC  rOPINIRole, 4 mg, Oral, Nightly  senna-docusate sodium, 2 tablet, Oral, BID  sodium chloride, 10 mL, Intravenous, Q12H  ticagrelor, 60 mg, Oral, BID      Continuous Infusions:   PRN Meds:.  acetaminophen    albuterol    senna-docusate sodium **AND** polyethylene glycol **AND** bisacodyl **AND** bisacodyl    Calcium Replacement - Follow Nurse / BPA Driven Protocol    hydrALAZINE    Magnesium Standard Dose Replacement - Follow Nurse / BPA Driven Protocol    ondansetron    phenol    Phosphorus Replacement - Follow Nurse / BPA Driven Protocol    Potassium Replacement - Follow Nurse / BPA Driven Protocol    sodium chloride    sodium chloride    Assessment & Plan   Assessment & Plan     Active Hospital Problems    Diagnosis  POA    **Stroke [I63.511]  Unknown    Moderate malnutrition [E44.0]  Yes    Grade I diastolic dysfunction [I51.89]  Unknown    GERD (gastroesophageal reflux disease) [K21.9]  Yes    RLS (restless legs syndrome) [G25.81]  Yes    Major depressive disorder [F32.9]  Yes    Iron deficiency anemia due to chronic blood loss [D50.0]  Yes      Resolved  Hospital Problems   No resolved problems to display.        Brief Hospital Course to date:  Regine Beckham is a 68 y.o. female  with a history of HTN, chronic anemia, sleep apnea intolerant of Cpap, severe depression with an overdose attempt in December 2023, and legally blind who presented to [facility] on 1/19/24 with left sided weakness and was found to have a RMCA infarct and 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 She underwent thrombectomy and carotid stent placement on 1/19. She was outside the window for thrombolytics. Initially placed on Cardene which has been successfully weaned off. Her hospital course has been complicated by ongoing dysphagia and difficulty managing secretions. Transferred to tele 1/25.    Plan was partially entered by my partner and I have reviewed and updated as appropriate on 1/31/24     CVA-right MCA  Right internal carotid artery occlusion status post thrombectomy and carotid stent placement  Dysphagia  --Stroke team follows, continue DAPT with brilinta/statin.  --SLP follows, repeat FEES 1/26. Now on nectar thickened liquids  --PT/OT recs IRF   -- follow up in stroke clinic 6-8 weeks      Cough, SOB  --Resp viral panel negative  --CXR negative  -- stable on room air   -- nebs prn      Chronic HFpEF  HTN  --cont ASA, statin, losartan.  Holding HCTZ and aldactone for now     Chronic CESIA  H/H stable.     GERD  -Cont protonix     RLS  -Stable, continue requip, neurontin     Tob dependency  -cont nicotine patch  -discuss cessation    Expected Discharge Location and Transportation: rehab   Expected Discharge   Expected Discharge Date: 2/2/2024; Expected Discharge Time:      DVT prophylaxis:  Mechanical DVT prophylaxis orders are present.         AM-PAC 6 Clicks Score (PT): 12 (01/30/24 2107)    CODE STATUS:   Code Status and Medical Interventions:   Ordered at: 01/22/24 1116     Code Status (Patient has no pulse and is not breathing):    CPR (Attempt to Resuscitate)     Medical  Interventions (Patient has pulse or is breathing):    Full Support       Radha Carvalho, APRN  01/31/24

## 2024-02-01 ENCOUNTER — ANCILLARY PROCEDURE (OUTPATIENT)
Dept: SPEECH THERAPY | Facility: HOSPITAL | Age: 69
DRG: 023 | End: 2024-02-01
Payer: MEDICARE

## 2024-02-01 LAB
GLUCOSE BLDC GLUCOMTR-MCNC: 117 MG/DL (ref 70–130)
GLUCOSE BLDC GLUCOMTR-MCNC: 153 MG/DL (ref 70–130)
GLUCOSE BLDC GLUCOMTR-MCNC: 191 MG/DL (ref 70–130)

## 2024-02-01 PROCEDURE — 99232 SBSQ HOSP IP/OBS MODERATE 35: CPT | Performed by: HOSPITALIST

## 2024-02-01 PROCEDURE — 94664 DEMO&/EVAL PT USE INHALER: CPT

## 2024-02-01 PROCEDURE — 92612 ENDOSCOPY SWALLOW (FEES) VID: CPT

## 2024-02-01 PROCEDURE — 92526 ORAL FUNCTION THERAPY: CPT

## 2024-02-01 PROCEDURE — 82948 REAGENT STRIP/BLOOD GLUCOSE: CPT

## 2024-02-01 PROCEDURE — 97110 THERAPEUTIC EXERCISES: CPT

## 2024-02-01 PROCEDURE — 94799 UNLISTED PULMONARY SVC/PX: CPT

## 2024-02-01 PROCEDURE — 92507 TX SP LANG VOICE COMM INDIV: CPT

## 2024-02-01 PROCEDURE — 97116 GAIT TRAINING THERAPY: CPT

## 2024-02-01 PROCEDURE — 94761 N-INVAS EAR/PLS OXIMETRY MLT: CPT

## 2024-02-01 RX ORDER — CALCIUM CARBONATE 500 MG/1
1 TABLET, CHEWABLE ORAL ONCE
Status: COMPLETED | OUTPATIENT
Start: 2024-02-01 | End: 2024-02-01

## 2024-02-01 RX ADMIN — GUAIFENESIN 600 MG: 600 TABLET, EXTENDED RELEASE ORAL at 20:13

## 2024-02-01 RX ADMIN — LOSARTAN POTASSIUM 50 MG: 50 TABLET, FILM COATED ORAL at 08:45

## 2024-02-01 RX ADMIN — MIRTAZAPINE 30 MG: 15 TABLET, FILM COATED ORAL at 20:13

## 2024-02-01 RX ADMIN — PANTOPRAZOLE SODIUM 40 MG: 40 TABLET, DELAYED RELEASE ORAL at 08:45

## 2024-02-01 RX ADMIN — IPRATROPIUM BROMIDE AND ALBUTEROL SULFATE 3 ML: 2.5; .5 SOLUTION RESPIRATORY (INHALATION) at 06:58

## 2024-02-01 RX ADMIN — Medication 1 PATCH: at 08:45

## 2024-02-01 RX ADMIN — MAGNESIUM OXIDE TAB 400 MG (241.3 MG ELEMENTAL MG) 400 MG: 400 (241.3 MG) TAB at 08:45

## 2024-02-01 RX ADMIN — ASPIRIN 81 MG: 81 TABLET, CHEWABLE ORAL at 08:45

## 2024-02-01 RX ADMIN — GABAPENTIN 100 MG: 100 CAPSULE ORAL at 13:14

## 2024-02-01 RX ADMIN — TICAGRELOR 60 MG: 60 TABLET ORAL at 08:45

## 2024-02-01 RX ADMIN — IPRATROPIUM BROMIDE AND ALBUTEROL SULFATE 3 ML: 2.5; .5 SOLUTION RESPIRATORY (INHALATION) at 11:37

## 2024-02-01 RX ADMIN — ROPINIROLE HYDROCHLORIDE 4 MG: 1 TABLET, FILM COATED ORAL at 20:12

## 2024-02-01 RX ADMIN — IPRATROPIUM BROMIDE AND ALBUTEROL SULFATE 3 ML: 2.5; .5 SOLUTION RESPIRATORY (INHALATION) at 03:09

## 2024-02-01 RX ADMIN — GABAPENTIN 100 MG: 100 CAPSULE ORAL at 05:42

## 2024-02-01 RX ADMIN — IPRATROPIUM BROMIDE AND ALBUTEROL SULFATE 3 ML: 2.5; .5 SOLUTION RESPIRATORY (INHALATION) at 15:08

## 2024-02-01 RX ADMIN — SENNOSIDES AND DOCUSATE SODIUM 2 TABLET: 8.6; 5 TABLET ORAL at 20:12

## 2024-02-01 RX ADMIN — IPRATROPIUM BROMIDE AND ALBUTEROL SULFATE 3 ML: 2.5; .5 SOLUTION RESPIRATORY (INHALATION) at 23:34

## 2024-02-01 RX ADMIN — TICAGRELOR 60 MG: 60 TABLET ORAL at 21:31

## 2024-02-01 RX ADMIN — ATORVASTATIN CALCIUM 80 MG: 40 TABLET, FILM COATED ORAL at 20:12

## 2024-02-01 RX ADMIN — GABAPENTIN 100 MG: 100 CAPSULE ORAL at 22:50

## 2024-02-01 RX ADMIN — IPRATROPIUM BROMIDE AND ALBUTEROL SULFATE 3 ML: 2.5; .5 SOLUTION RESPIRATORY (INHALATION) at 19:38

## 2024-02-01 RX ADMIN — Medication 10 ML: at 08:46

## 2024-02-01 RX ADMIN — Medication 10 ML: at 20:13

## 2024-02-01 RX ADMIN — CALCIUM CARBONATE (ANTACID) CHEW TAB 500 MG 1 TABLET: 500 CHEW TAB at 22:52

## 2024-02-01 RX ADMIN — GUAIFENESIN 600 MG: 600 TABLET, EXTENDED RELEASE ORAL at 08:45

## 2024-02-01 NOTE — MBS/VFSS/FEES
"Acute Care - Speech Language Pathology   Swallow Re-Evaluation [facility]  Fiberoptic Endoscopic Evaluation of Swallowing (FEES)     Patient Name: Regine Beckham  : 1955  MRN: [id number]  Today's Date: 2024               Admit Date: 2024    Visit Dx:     ICD-10-CM ICD-9-CM   1. Oropharyngeal dysphagia  R13.12 787.22   2. Dysarthria  R47.1 784.51   3. Cognitive communication deficit  R41.841 799.52   4. Stroke  I63.511 434.91     Patient Active Problem List   Diagnosis    Iron deficiency anemia due to chronic blood loss    Major depressive disorder    RLS (restless legs syndrome)    GERD (gastroesophageal reflux disease)    Left breast mass    Allergy to penicillin    Stroke    Grade I diastolic dysfunction    Moderate malnutrition     Past Medical History:   Diagnosis Date    Anemia     Anxiety     Arthritis     Depression     Legally blind     Restless leg syndrome     Sleep apnea     \"I don't use a cpap anymore since losing 106 lbs\"    Water retention      Past Surgical History:   Procedure Laterality Date    BACK SURGERY      CARDIAC CATHETERIZATION N/A 2024    Procedure: Percutaneous Manual Thrombectomy;  Surgeon: Efrain Hurley MD;  Location: [facility] INVASIVE LOCATION;  Service: Interventional Radiology;  Laterality: N/A;    GALLBLADDER SURGERY      HIP ARTHROSCOPY      JOINT REPLACEMENT Right        SLP Recommendation and Plan  SLP Swallowing Diagnosis: mild-moderate, oral dysphagia, mild, pharyngeal dysphagia (24 1205)  SLP Diet Recommendation: soft to chew textures, chopped, thin liquids, other (see comments) (single sips only) (24 1205)  Recommended Precautions and Strategies: upright posture during/after eating, small bites of food and sips of liquid, general aspiration precautions, reflux precautions, fatigue precautions, other (see comments) (single sips) (24 1205)  SLP Rec. for Method of Medication Administration: meds whole, with puree, as tolerated " (02/01/24 1205)     Monitor for Signs of Aspiration: yes, notify SLP if any concerns (02/01/24 1205)  Recommended Diagnostics: reassess via FEES (02/01/24 0845)  Swallow Criteria for Skilled Therapeutic Interventions Met: demonstrates skilled criteria (02/01/24 1205)  Anticipated Discharge Disposition (SLP): inpatient rehabilitation facility (02/01/24 1205)  Rehab Potential/Prognosis, Swallowing: good, to achieve stated therapy goals (02/01/24 1205)  Therapy Frequency (Swallow): 5 days per week (02/01/24 1205)  Predicted Duration Therapy Intervention (Days): until discharge (02/01/24 1205)  Oral Care Recommendations: Oral Care BID/PRN, Suction toothbrush (02/01/24 1205)        Daily Summary of Progress (SLP): progress toward functional goals is good (02/01/24 0845)               Treatment Assessment (SLP): improved, clinical signs of, aspiration (02/01/24 0845)  Treatment Assessment Comments (SLP): Ready for repeat FEES. Will plan to complete today. Further recommendations to follow. Speech more intelligible and pt continues to utilize EMST-lite. (02/01/24 0845)  Plan for Continued Treatment (SLP): continue treatment per plan of care (02/01/24 0845)         Plan of Care Reviewed With: patient  Progress: improving      SWALLOW EVALUATION (last 72 hours)       SLP Adult Swallow Evaluation       Row Name 02/01/24 1205 02/01/24 0845 01/30/24 1025             Rehab Evaluation    Document Type re-evaluation  -EN therapy note (daily note)  -EN therapy note (daily note)  -CJ      Subjective Information no complaints  -EN no complaints  -EN no complaints  -CJ      Patient Observations alert;cooperative;agree to therapy  -EN alert;cooperative;agree to therapy  -EN alert;cooperative;agree to therapy  -CJ      Patient/Family/Caregiver Comments/Observations none  -EN none  -EN no family present  -CJ      Patient Effort good  -EN good  -EN good  -CJ      Symptoms Noted During/After Treatment none  -EN none  -EN none  -CJ          General Information    Patient Profile Reviewed yes  -EN yes  -EN --      Pertinent History Of Current Problem See initial evaluation; repeat FEES for potential upgrade  -EN -- --      Current Method of Nutrition soft to chew textures;no mixed consistencies;honey-thick liquids  -EN -- --      Precautions/Limitations, Vision WFL with corrective lenses;for purposes of eval  -EN -- --      Precautions/Limitations, Hearing WFL;for purposes of eval  -EN -- --      Prior Level of Function-Communication WFL  -EN -- --      Prior Level of Function-Swallowing safe, efficient swallowing in all situations  -EN -- --      Plans/Goals Discussed with patient;agreed upon  -EN -- --      Barriers to Rehab medically complex  -EN -- --      Patient's Goals for Discharge return to regular diet  -EN -- --      Family Goals for Discharge family did not state  -EN -- --         Pain    Additional Documentation Pain Scale: FACES Pre/Post-Treatment (Group)  -EN -- Pain Scale: FACES Pre/Post-Treatment (Group)  -CJ         Pain Scale: FACES Pre/Post-Treatment    Pain: FACES Scale, Pretreatment 0-->no hurt  -EN 0-->no hurt  -EN 0-->no hurt  -CJ      Posttreatment Pain Rating 0-->no hurt  -EN 0-->no hurt  -EN 0-->no hurt  -CJ         Fiberoptic Endoscopic Evaluation of Swallowing (FEES)    Risks/Benefits Reviewed risks/benefits explained;patient;agreed to eval  -EN -- --      Nasal Entry right:  -EN -- --      Scope serial number/identification [id number]  -EN -- --         Anatomy and Physiology    Anatomic Considerations edema;arytenoids  -EN -- --      Velopharyngeal WFL  -EN -- --      Base of Tongue asymmetrical;range reduced  -EN -- --      Epiglottis WFL  -EN -- --      Laryngeal Function Breathing symmetrical  -EN -- --      Laryngeal Function Phonation symmetrical  -EN -- --      Laryngeal Function to Breath Hold TVF/FVF/Arytenoid;can't sustain closure  -EN -- --      Secretion Rating Scale (Pradeep et alJami 1996) 0- normal, no visible  secretions  -EN -- --      Sensory sensed scope  -EN -- --      Utensils Used Spoon;Cup;Straw  -EN -- --      Consistencies Trialed thin liquids;pudding/puree;regular textures  -EN -- --         FEES Interpretation    Oral Phase reduced lingual control;increased A-P transit time  -EN -- --         Initiation of Pharyngeal Swallow    Initiation of Pharyngeal Swallow bolus in pyriform sinuses  -EN -- --      Pharyngeal Phase impaired pharyngeal phase of swallowing  -EN -- --      Penetration During the Swallow thin liquids;secondary to reduced laryngeal elevation;secondary to delayed swallow initiation or mistiming;secondary to reduced vestibular closure;other (see comments)  thin via consecutive sips only  -EN -- --      Aspiration After the Swallow thin liquids;secondary to residue;in pyriform sinuses;in laryngeal vestibule  -EN -- --      Depth of Penetration deep  -EN -- --      Response to Penetration Yes  -EN -- --      Responsed to penetration with delayed;throat clear;non-effective  -EN -- --      Response to Aspiration Yes  -EN -- --      Responsed to aspiration with inconsistent;throat clear;cough;non-effective  -EN -- --      No spontaneous response to aspiration with effective subglottic clearance with cue (see comments);other (see comments)  multiple hard coughs  -EN -- --      Rosenbek's Scale thin:;6-->Level 6;pudding/puree:;regular textures:;1-->Level 1  thin via consecutive straw sips only  -EN -- --      Residue thin liquids;laryngeal vestibule;secondary to reduced base of tongue retraction;secondary to reduced posterior pharyngeal wall stripping;secondary to reduced laryngeal elevation;secondary to reduced hyolaryngeal excursion  -EN -- --      Response to Residue cleared residue;with compensatory maneuver (see comments);other (see comments)  w/ cued hard cough; unable to expel vestibular residue w/o cues  -EN -- --      Attempted Compensatory Maneuvers bolus size  -EN -- --      Response to  Attempted Compensatory Maneuvers prevented penetration;prevented aspiration;reduced residue  -EN -- --      Successful Compensatory Maneuver Competency patient able to;teach back compensations;demonstrate compensations;with cues  -EN -- --      FEES Summary Improved oral and pharyngeal phase of the swallow. Pt's only demonstration of penetration/aspiration was w/ thin liquids via large consecutive straw sips. No penetration or aspiration w/ thin liquids via single straw or cup sips. Pt was able to demonstrate ability to slow pace of liquid intake. Unable to independently clear aspirated material -- was eventually able to w/ multiple cued coughs. Recommend soft/chopped diet w/ thin liquids; single sips only.  -EN -- --         SLP Evaluation Clinical Impression    SLP Swallowing Diagnosis mild-moderate;oral dysphagia;mild;pharyngeal dysphagia  -EN moderate;oral dysphagia;pharyngeal dysphagia  -EN --      Functional Impact risk of aspiration/pneumonia  -EN risk of aspiration/pneumonia;risk of dehydration;risk of malnutrition  -EN --      Rehab Potential/Prognosis, Swallowing good, to achieve stated therapy goals  -EN good, to achieve stated therapy goals  -EN --      Swallow Criteria for Skilled Therapeutic Interventions Met demonstrates skilled criteria  -EN demonstrates skilled criteria  -EN --         SLP Treatment Clinical Impressions    Treatment Assessment (SLP) -- improved;clinical signs of;aspiration  -EN continued;oral dysphagia;pharyngeal dysphagia;toleration of diet;dysarthria  -CJ      Treatment Assessment Comments (SLP) -- Ready for repeat FEES. Will plan to complete today. Further recommendations to follow. Speech more intelligible and pt continues to utilize EMST-lite.  -EN Pt participate in tx this date, no overt s/s of aspiration. Provided EMST-lite and reviewed dysphagia tx exercises again. Will continue to f/u to address deficits. Would benefit from Inpatient rehab to maximize function  -CJ       Daily Summary of Progress (SLP) -- progress toward functional goals is good  -EN progress toward functional goals as expected  -      Plan for Continued Treatment (SLP) -- continue treatment per plan of care  -EN continue treatment per plan of care  -CJ      Care Plan Review -- care plan/treatment goals reviewed  -EN care plan/treatment goals reviewed  -         Recommendations    Therapy Frequency (Swallow) 5 days per week  -EN 5 days per week  -EN 5 days per week  -CJ      Predicted Duration Therapy Intervention (Days) until discharge  -EN until discharge  -EN until discharge  -      SLP Diet Recommendation soft to chew textures;chopped;thin liquids;other (see comments)  single sips only  -EN soft to chew textures;chopped;no mixed consistencies;honey thick liquids  -EN puree;no mixed consistencies;honey thick liquids  -      Recommended Diagnostics -- reassess via FEES  -EN --      Recommended Precautions and Strategies upright posture during/after eating;small bites of food and sips of liquid;general aspiration precautions;reflux precautions;fatigue precautions;other (see comments)  single sips  -EN upright posture during/after eating;small bites of food and sips of liquid;general aspiration precautions;multiple swallows per bite of food;multiple swallows per sip of liquid;reflux precautions;fatigue precautions  -EN upright posture during/after eating;small bites of food and sips of liquid;general aspiration precautions;multiple swallows per bite of food;multiple swallows per sip of liquid;reflux precautions;fatigue precautions  -CJ      Oral Care Recommendations Oral Care BID/PRN;Suction toothbrush  -EN Oral Care BID/PRN;Suction toothbrush  -EN Oral Care BID/PRN;Suction toothbrush  -CJ      SLP Rec. for Method of Medication Administration meds whole;with puree;as tolerated  -EN meds whole;meds crushed;with puree;as tolerated  -EN meds whole;meds crushed;with puree;as tolerated  -CJ      Monitor for Signs  of Aspiration yes;notify SLP if any concerns  -EN yes;notify SLP if any concerns  -EN yes;notify SLP if any concerns  -CJ      Anticipated Discharge Disposition (SLP) inpatient rehabilitation facility  -EN inpatient rehabilitation facility  -EN inpatient rehabilitation facility  -CJ                User Key  (r) = Recorded By, (t) = Taken By, (c) = Cosigned By      Initials Name Effective Dates     Sonja Rosssea ADEEL, MS CCC-SLP 07/11/23 -     EN Zakiya Desouza, MS CCC-SLP 06/22/22 -                     EDUCATION  The patient has been educated in the following areas:   Dysphagia (Swallowing Impairment).        SLP GOALS       Row Name 02/01/24 1205 02/01/24 0845 01/30/24 1025       (LTG) Patient will demonstrate functional swallow for    Diet Texture (Demonstrate functional swallow) regular textures  -EN regular textures  -EN regular textures  -CJ    Liquid viscosity (Demonstrate functional swallow) thin liquids  -EN thin liquids  -EN thin liquids  -CJ    [place] (Demonstrate functional swallow) with minimal cues (75-90% accuracy)  -EN with minimal cues (75-90% accuracy)  -EN with minimal cues (75-90% accuracy)  -CJ    Time Frame (Demonstrate functional swallow) by discharge  -EN by discharge  -EN by discharge  -CJ    Progress/Outcomes (Demonstrate functional swallow) goal ongoing  -EN continuing progress toward goal  -EN continuing progress toward goal  -CJ       (STG) Patient will tolerate trials of    Consistencies Trialed (Tolerate trials) soft to chew (chopped) textures;thin liquids  -EN pureed textures;honey/ moderately thick liquids  -EN pureed textures;honey/ moderately thick liquids  -CJ    Desired Outcome (Tolerate trials) without signs/symptoms of aspiration;with use of compensatory strategies (see comments)  -EN without signs/symptoms of aspiration  -EN without signs/symptoms of aspiration  -CJ    [place] (Tolerate trials) with minimal cues (75-90% accuracy)  -EN with minimal cues (75-90%  accuracy)  -EN with minimal cues (75-90% accuracy)  -CJ    Time Frame (Tolerate trials) by discharge  -EN by discharge  -EN by discharge  -CJ    Progress/Outcomes (Tolerate trials) goal revised this date  -EN continuing progress toward goal  -EN continuing progress toward goal  -CJ    Comment (Tolerate trials) -- -- no overt s/s of aspiration  -CJ       (STG) Patient will tolerate therapeutic trials of    Consistencies Trialed (Tolerate therapeutic trials) nectar/ mildly thick liquids;thin liquids  -EN nectar/ mildly thick liquids;thin liquids  -EN --    Desired Outcome (Tolerate therapeutic trials) without signs/symptoms of aspiration  -EN without signs/symptoms of aspiration  -EN --    [place] (Tolerate therapeutic trials) with minimal cues (75-90% accuracy)  -EN with minimal cues (75-90% accuracy)  -EN --    Time Frame (Tolerate therapeutic trials) by discharge  -EN by discharge  -EN --    Progress/Outcomes (Tolerate therapeutic trials) goal no longer appropriate  -EN goal no longer appropriate  -EN goal no longer appropriate  -CJ       (STG) Labial Strengthening Goal 1 (SLP)    Activity (Labial Strengthening Goal 1, SLP) increase labial tone  -EN increase labial tone  -EN increase labial tone  -CJ    Increase Labial Tone labial resistance exercises;swallow trials  -EN labial resistance exercises;swallow trials  -EN labial resistance exercises;swallow trials  -CJ    [place]/Accuracy (Labial Strengthening Goal 1, SLP) with minimal cues (75-90% accuracy)  -EN with minimal cues (75-90% accuracy)  -EN with minimal cues (75-90% accuracy)  -CJ    Time Frame (Labial Strengthening Goal 1, SLP) short term goal (STG)  -EN short term goal (STG)  -EN short term goal (STG)  -CJ    Progress/Outcomes (Labial Strengthening Goal 1, SLP) goal ongoing  -EN good progress toward goal  -EN continuing progress toward goal  -CJ    Comment (Labial Strengthening Goal 1, SLP) -- -- x10  -CJ       (STG) Lingual Strengthening Goal  1 (SLP)    Activity (Lingual Strengthening Goal 1, SLP) increase lingual tone/sensation/control/coordination/movement;increase tongue back strength  -EN increase lingual tone/sensation/control/coordination/movement;increase tongue back strength  -EN increase lingual tone/sensation/control/coordination/movement;increase tongue back strength  -CJ    Increase Lingual Tone/Sensation/Control/Coordination/Movement lingual movement exercises;lingual resistance exercises  -EN lingual movement exercises;lingual resistance exercises  -EN lingual movement exercises;lingual resistance exercises  -CJ    Increase Tongue Back Strength lingual movement exercises;swallow trials;lingual resistance exercises  -EN lingual movement exercises;swallow trials;lingual resistance exercises  -EN lingual movement exercises;swallow trials;lingual resistance exercises  -CJ    [place]/Accuracy (Lingual Strengthening Goal 1, SLP) with minimal cues (75-90% accuracy)  -EN with minimal cues (75-90% accuracy)  -EN with minimal cues (75-90% accuracy)  -CJ    Time Frame (Lingual Strengthening Goal 1, SLP) short term goal (STG)  -EN short term goal (STG)  -EN short term goal (STG)  -CJ    Progress/Outcomes (Lingual Strengthening Goal 1, SLP) goal ongoing  -EN continuing progress toward goal  -EN continuing progress toward goal  -CJ    Comment (Lingual Strengthening Goal 1, SLP) -- -- x2 sets x5 reps  -CJ       (STG) Pharyngeal Strengthening Exercise Goal 1 (SLP)    Activity (Pharyngeal Strengthening Goal 1, SLP) increase timing;increase superior movement of the hyolaryngeal complex;increase anterior movement of the hyolaryngeal complex;increase closure at entrance to airway/closure of airway at glottis;increase squeeze/positive pressure generation;increase tongue base retraction  -EN increase timing;increase superior movement of the hyolaryngeal complex;increase anterior movement of the hyolaryngeal complex;increase closure at entrance to  airway/closure of airway at glottis;increase squeeze/positive pressure generation;increase tongue base retraction  -EN increase timing;increase superior movement of the hyolaryngeal complex;increase anterior movement of the hyolaryngeal complex;increase closure at entrance to airway/closure of airway at glottis;increase squeeze/positive pressure generation;increase tongue base retraction  -CJ    Increase Timing prepping - 3 second prep or suck swallow or 3-step swallow  -EN prepping - 3 second prep or suck swallow or 3-step swallow  -EN prepping - 3 second prep or suck swallow or 3-step swallow  -CJ    Increase Superior Movement of the Hyolaryngeal Complex hard effortful swallow;falsetto  -EN hard effortful swallow;falsetto  -EN hard effortful swallow;falsetto  -CJ    Increase Anterior Movement of the Hyolaryngeal Complex chin tuck against resistance (CTAR);hard effortful swallow  -EN chin tuck against resistance (CTAR);hard effortful swallow  -EN chin tuck against resistance (CTAR);hard effortful swallow  -CJ    Increase Closure at Entrance to Airway/Closure of Airway at Glottis supraglottic swallow;breath hold exercises;EMST  -EN supraglottic swallow;breath hold exercises;EMST  -EN supraglottic swallow;breath hold exercises;EMST  -CJ    Increase Squeeze/Positive Pressure Generation hard effortful swallow  -EN hard effortful swallow  -EN hard effortful swallow  -CJ    Increase Tongue Base Retraction hard effortful swallow  -EN hard effortful swallow  -EN hard effortful swallow  -CJ    [place]/Accuracy (Pharyngeal Strengthening Goal 1, SLP) with moderate cues (50-74% accuracy)  -EN with moderate cues (50-74% accuracy)  -EN with moderate cues (50-74% accuracy)  -CJ    Time Frame (Pharyngeal Strengthening Goal 1, SLP) short term goal (STG)  -EN short term goal (STG)  -EN short term goal (STG)  -CJ    Progress/Outcomes (Pharyngeal Strengthening Goal 1, SLP) goal ongoing  -EN continuing progress toward goal  -EN  continuing progress toward goal  -CJ    Comment (Pharyngeal Strengthening Goal 1, SLP) -- EMST-lite  -EN CTAR, effortful, EMST-lite, effortful, SG all performed x @ least 5 reps  -CJ       Patient will demonstrate functional speech skills for return to discharge environment    [place] -- -- with moderate cues  -CJ    Time frame -- -- by discharge  -CJ    Progress/Outcomes -- -- continuing progress toward goal  -CJ       Patient will demonstrate functional cognitive-linguistic skills for return to discharge environment    [place] -- -- with moderate cues  -CJ    Time frame -- -- by discharge  -CJ    Progress/Outcomes -- -- continuing progress toward goal  -CJ       SLP Diagnostic Treatment     Patient will participate in further assessment in the following areas -- cognitive-linguistic  -EN cognitive-linguistic  -CJ    Time Frame (Diagnostic) -- short term goal (STG)  -EN short term goal (STG)  -CJ    Barriers (Diagnostic) -- -- Lethargy;Cognitive status  -CJ    Progress/Outcomes (Additional Goal 1, SLP) -- continuing progress toward goal  -EN continuing progress toward goal  -CJ       Respiratory Support Goal 1 (SLP)    Improve Respiratory Support Goal 1 (SLP) -- increasing length of exhalation;sustaining a vowel sound on exhalation;80%;with minimal cues (75-90%)  -EN increasing length of exhalation;sustaining a vowel sound on exhalation;80%;with minimal cues (75-90%)  -CJ    Time Frame (Respiratory Support Goal 1, SLP) -- short term goal (STG)  -EN short term goal (STG)  -CJ    Progress (Respiratory Support Skills Goal 1, SLP) -- 70%;with minimal cues (75-90%)  -EN 60%;with moderate cues (50-74%)  -CJ    Progress/Outcomes (Respiratory Support Goal 1, SLP) -- continuing progress toward goal  -EN continuing progress toward goal  -CJ       Phonation Goal 1 (SLP)    Improve Phonation By Goal 1 (SLP) -- using loud speech;80%;with moderate cues (50-74%)  -EN using loud speech;80%;with moderate cues (50-74%)   -CJ    Time Frame (Phonation Goal 1, SLP) -- short term goal (STG)  -EN short term goal (STG)  -CJ    Progress (Phonation Goal 1, SLP) -- 80%;with minimal cues (75-90%)  -EN 60%;with moderate cues (50-74%)  -CJ    Progress/Outcomes (Phonation Goal 1, SLP) -- continuing progress toward goal  -EN continuing progress toward goal  -CJ       Articulation Goal 1 (SLP)    Improve Articulation Goal 1 (SLP) -- by over-articulating at phrase level;80%;with moderate cues (50-74%)  -EN by over-articulating at phrase level;80%;with moderate cues (50-74%)  -CJ    Time Frame (Articulation Goal 1, SLP) -- short term goal (STG)  -EN short term goal (STG)  -CJ    Progress (Articulation Goal 1, SLP) -- 50%;with moderate cues (50-74%)  -EN 50%;with moderate cues (50-74%)  -CJ    Progress/Outcomes (Articulation Goal 1, SLP) -- continuing progress toward goal  -EN goal revised this date  -CJ    Comment (Articulation Goal 1, SLP) -- -- revised to phrases  -CJ       Attention Goal 1 (SLP)    Improve Attention by Goal 1 (SLP) -- attending to task;complete sustained attention task;80%;with minimal cues (75-90%)  -EN attending to task;complete sustained attention task;80%;with minimal cues (75-90%)  -CJ    Time Frame (Attention Goal 1, SLP) -- short term goal (STG)  -EN short term goal (STG)  -CJ    Progress (Attention Goal 1, SLP) -- 70%;with moderate cues (50-74%)  -EN 50%;with moderate cues (50-74%)  -CJ    Progress/Outcomes (Attention Goal 1, SLP) -- continuing progress toward goal  -EN continuing progress toward goal  -CJ       Reasoning Goal 1 (SLP)    Improve Reasoning Through Goal 1 (SLP) -- complete basic reasoning task;80%;with minimal cues (75-90%)  -EN complete basic reasoning task;80%;with minimal cues (75-90%)  -CJ    Time Frame (Reasoning Goal 1, SLP) -- short term goal (STG)  -EN short term goal (STG)  -CJ    Progress (Reasoning Goal 1, SLP) -- 60%;with moderate cues (50-74%)  -EN 60%;with moderate cues (50-74%)  -CJ     Progress/Outcomes (Reasoning Goal 1, SLP) -- continuing progress toward goal  -EN continuing progress toward goal  -CJ              User Key  (r) = Recorded By, (t) = Taken By, (c) = Cosigned By      Initials Name Provider Type    Anh Cloud, MS CCC-SLP Speech and Language Pathologist    EN Zakiya Desouza MS CCC-SLP Speech and Language Pathologist                       Time Calculation:    Time Calculation- SLP       Row Name 02/01/24 1358 02/01/24 1005          Time Calculation- SLP    SLP Start Time 1205  -EN 0845  -EN     SLP Received On 02/01/24  -EN 02/01/24  -EN        Untimed Charges    SLP Eval/Re-eval  [facility] Fiberoptic Endo Eval Swallow - [id number]  -EN --     92472-PK Fiberoptic Endo Eval Swallow Minutes 90  -EN --     28198-FR Treatment/ST Modification Prosth Aug Alter  -- 25  -EN     04234-YS Treatment Swallow Minutes -- 25  -EN        Total Minutes    Untimed Charges Total Minutes 90  -EN 50  -EN      Total Minutes 90  -EN 50  -EN               User Key  (r) = Recorded By, (t) = Taken By, (c) = Cosigned By      Initials Name Provider Type    Zakiya King MS CCC-SLP Speech and Language Pathologist                    Therapy Charges for Today       Code Description Service Date Service Provider Modifiers Qty    23482063613 HC ST TREATMENT SWALLOW 2 2/1/2024 Zakiya Desouza, MS CCC-SLP GN 1    87185125391 HC ST TREATMENT SPEECH 2 2/1/2024 Zakiya Desouza MS CCC-SLP GN 1    44395324371 HC ST FIBEROPTIC ENDO EVAL SWALL 6 2/1/2024 Zakiya Desouza MS CCC-SLP GN 1                 MS JORDAN GutierrezSLP  2/1/2024

## 2024-02-01 NOTE — PLAN OF CARE
Goal Outcome Evaluation:  Plan of Care Reviewed With: patient        Progress: improving         Anticipated Discharge Disposition (SLP): inpatient rehabilitation facility          SLP Swallowing Diagnosis: moderate, oral dysphagia, pharyngeal dysphagia (02/01/24 0845)  Treatment Assessment (SLP): improved, clinical signs of, aspiration (02/01/24 0845)  Treatment Assessment Comments (SLP): Ready for repeat FEES. Will plan to complete today. Further recommendations to follow. Speech more intelligible and pt continues to utilize EMST-lite. (02/01/24 0845)  Plan for Continued Treatment (SLP): continue treatment per plan of care (02/01/24 0845)

## 2024-02-01 NOTE — CASE MANAGEMENT/SOCIAL WORK
Discharge Planning Assessment  [facility]     Patient Name: Regine Beckham  MRN: [id number]  Today's Date: 2/1/2024    Admit Date: 1/19/2024    Plan: SNF   Discharge Needs Assessment    No documentation.                  Discharge Plan       Row Name 02/01/24 1133       Plan    Plan SNF    Patient/Family in Agreement with Plan yes    Plan Comments Spoke with Ms. Beckham at the bedside. [facility] in [facility] is able to offer a bed and Ms. Beckham accepted. She will need insurance approval from her [employer] for the rehab at [facility]. CM will continue to follow up.                          Debbie Mcghee RN

## 2024-02-01 NOTE — THERAPY TREATMENT NOTE
"Patient Name: Regine Beckham  : 1955    MRN: [id number]                              Today's Date: 2024       Admit Date: 2024    Visit Dx:     ICD-10-CM ICD-9-CM   1. Oropharyngeal dysphagia  R13.12 787.22   2. Dysarthria  R47.1 784.51   3. Cognitive communication deficit  R41.841 799.52   4. Stroke  I63.511 434.91     Patient Active Problem List   Diagnosis    Iron deficiency anemia due to chronic blood loss    Major depressive disorder    RLS (restless legs syndrome)    GERD (gastroesophageal reflux disease)    Left breast mass    Allergy to penicillin    Stroke    Grade I diastolic dysfunction    Moderate malnutrition     Past Medical History:   Diagnosis Date    Anemia     Anxiety     Arthritis     Depression     Legally blind     Restless leg syndrome     Sleep apnea     \"I don't use a cpap anymore since losing 106 lbs\"    Water retention      Past Surgical History:   Procedure Laterality Date    BACK SURGERY      CARDIAC CATHETERIZATION N/A 2024    Procedure: Percutaneous Manual Thrombectomy;  Surgeon: Efrain Hurley MD;  Location: [facility] INVASIVE LOCATION;  Service: Interventional Radiology;  Laterality: N/A;    GALLBLADDER SURGERY      HIP ARTHROSCOPY      JOINT REPLACEMENT Right       General Information       Row Name 24 1027          Physical Therapy Time and Intention    Document Type therapy note (daily note)  -CD     Mode of Treatment physical therapy  -CD       Row Name 24 1027          General Information    Patient Profile Reviewed yes  -CD     Prior Level of Function independent:;all household mobility;community mobility;ADL's  -CD     Existing Precautions/Restrictions fall  L inattention, L weakness (UE>LE), dysarthria, LEGALLY BLIND  -CD     Barriers to Rehab medically complex;cognitive status;visual deficit  -CD       Row Name 24 1027          Cognition    Orientation Status (Cognition) oriented x 3  -CD       Row Name 24 1027          " Safety Issues, Functional Mobility    Safety Issues Affecting Function (Mobility) safety precaution awareness;safety precautions follow-through/compliance;insight into deficits/self-awareness;sequencing abilities;positioning of assistive device  -CD     Impairments Affecting Function (Mobility) balance;coordination;endurance/activity tolerance;grasp;strength;sensation/sensory awareness;muscle tone abnormal;pain  -CD     Cognitive Impairments, Mobility Safety/Performance insight into deficits/self-awareness;safety precaution awareness;safety precaution follow-through;sequencing abilities  -CD     Comment, Safety Issues/Impairments (Mobility) PT ALERT AND FOLLOWING ALL COMMANDS. LUE >LLE.  -CD               User Key  (r) = Recorded By, (t) = Taken By, (c) = Cosigned By      Initials Name Provider Type    CD Pamela Price, PT Physical Therapist                   Mobility       Row Name 02/01/24 1040          Bed Mobility    Scooting/Bridging [place] (Bed Mobility) moderate assist (50% patient effort)  -CD     Supine-Sit [place] (Bed Mobility) moderate assist (50% patient effort)  -CD     Assistive Device (Bed Mobility) draw sheet;head of bed elevated  -CD     Comment, (Bed Mobility) MOD ASSIST VIA DRAW SHEET TO SCOOT L HIP TO EOB ONCE SITTING.  -CD       Row Name 02/01/24 1040          Transfers    Comment, (Transfers) CUES FOR HAND PLACEMENT. STS FROM EOB X 1 AND FROM RECLINER X 2. REQUIRES MANUAL ASSIST TO MAINTAIN FUNCTIONAL L  ON WALKER.  -CD       Row Name 02/01/24 1040          Sit-Stand Transfer    Sit-Stand [place] (Transfers) minimum assist (75% patient effort);2 person assist  -CD     Assistive Device (Sit-Stand Transfers) walker, front-wheeled  -CD       Row Name 02/01/24 1040          Gait/Stairs (Locomotion)    [place] Level (Gait) moderate assist (50% patient effort);verbal cues  -CD     Assistive Device (Gait) walker, front-wheeled  -CD     Distance in Feet (Gait) 8+38+40 FEET   -CD     Deviations/Abnormal Patterns (Gait) base of support, narrow;gait speed decreased;stride length decreased;memo decreased;weight shifting decreased  -CD     Bilateral Gait Deviations forward flexed posture  -CD     Left Sided Gait Deviations weight shift ability decreased  -CD     Comment, (Gait/Stairs) MANUAL ASSIST TO GUIDE R WALKER, WT SHIFT AND TO MAINTAIN L  ON WALKER. FOLLOWED CLOSELY WITH RECLINER AND REQUIRED SEVERAL SITTING REST BREAKS.  -CD               User Key  (r) = Recorded By, (t) = Taken By, (c) = Cosigned By      Initials Name Provider Type    CD Pamela Price PT Physical Therapist                   Obj/Interventions       Row Name 02/01/24 1046          Motor Skills    Therapeutic Exercise --  COMPLETED SEATED RECIPROCAL LAQ AND HIP FLEXION  X 10 REPS EACH WITH CUES FOR FULL AVAILABLE RANGE ON L.  -CD       Row Name 02/01/24 1046          Balance    Balance Assessment standing dynamic balance  -CD     Static Sitting Balance standby assist  -CD     Dynamic Sitting Balance contact guard  -CD     Position, Sitting Balance unsupported;sitting in chair;sitting edge of bed  -CD     Sit to Stand Dynamic Balance minimal assist  -CD     Static Standing Balance minimal assist;verbal cues  -CD     Dynamic Standing Balance verbal cues;moderate assist  -CD     Position/Device Used, Standing Balance walker, rolling  -CD     Balance Interventions sitting;standing;sit to stand;supported;static;dynamic  -CD     Comment, Balance WORKED ON MIDLINE ORIENTATION AND LATERAL WT SHIFTING IN STANDING AT WALKER. PT LEANS L AND NEEDS MANUAL ASSIST TO WT SHIFT R DURING GAIT FOR IMPROVED ADVANCEMENT OF L LE.  -CD               User Key  (r) = Recorded By, (t) = Taken By, (c) = Cosigned By      Initials Name Provider Type    Pamela Avitia PT Physical Therapist                   Goals/Plan       Row Name 02/01/24 1105          Bed Mobility Goal 1 (PT)    Activity/Assistive Device (Bed Mobility Goal 1, PT)  sit to supine/supine to sit  -CD     [place] Level/Cues Needed (Bed Mobility Goal 1, PT) minimum assist (75% or more patient effort)  -CD     Time Frame (Bed Mobility Goal 1, PT) 2 weeks;long term goal (LTG)  -CD               User Key  (r) = Recorded By, (t) = Taken By, (c) = Cosigned By      Initials Name Provider Type     Pamela Price, PT Physical Therapist                   Clinical Impression       Row Name 02/01/24 1055          Pain    Pretreatment Pain Rating 0/10 - no pain  -CD     Posttreatment Pain Rating 0/10 - no pain  -CD       Row Name 02/01/24 1055          Plan of Care Review    Plan of Care Reviewed With patient  -CD     Outcome Evaluation INCREASED DISTANCE AMBULATED. REQUIRED MANUAL ASSIST TO GUIDE R WALKER, WT SHIFT AND TO MAINTAIN L  ON WALKER. PT IS MOTIVATED TO WORK WITH THERAPY. CONTINUES WITH L SIDED WEAKNESS, IMPAIRED BALANCE BUT IS SHOWING IMPROVEMENT WITH FUNCTIONAL MOBILITY. RECOMMEND IRF AT D/C.  -CD       Row Name 02/01/24 1055          Therapy Assessment/Plan (PT)    Rehab Potential (PT) good, to achieve stated therapy goals  -CD     Criteria for Skilled Interventions Met (PT) yes;meets criteria;skilled treatment is necessary  -CD     Therapy Frequency (PT) daily  -CD       Row Name 02/01/24 1055          Vital Signs    Post Systolic BP Rehab 127  -CD     Post Treatment Diastolic BP 80  -CD     Posttreatment Heart Rate (beats/min) 90  -CD     Pre SpO2 (%) 99  -CD     O2 Delivery Pre Treatment room air  -CD     O2 Delivery Intra Treatment room air  -CD     Post SpO2 (%) 97  -CD     O2 Delivery Post Treatment room air  -CD     Pre Patient Position Supine  -CD     Intra Patient Position Standing  -CD     Post Patient Position Sitting  -CD       Row Name 02/01/24 1055          Positioning and Restraints    Pre-Treatment Position in bed  -CD     Post Treatment Position chair  -CD     In Chair reclined;call light within reach;encouraged to call for assist;exit alarm on;RUE  elevated;LUE elevated;on mechanical lift sling;waffle cushion;notified nsg;legs elevated  -CD               User Key  (r) = Recorded By, (t) = Taken By, (c) = Cosigned By      Initials Name Provider Type    CD Pamela Price PT Physical Therapist                   Outcome Measures       Row Name 02/01/24 1103 02/01/24 0800       How much help from another person do you currently need...    Turning from your back to your side while in flat bed without using bedrails? 2  -CD 2  -AS    Moving from lying on back to sitting on the side of a flat bed without bedrails? 2  -CD 2  -AS    Moving to and from a bed to a chair (including a wheelchair)? 2  -CD 2  -AS    Standing up from a chair using your arms (e.g., wheelchair, bedside chair)? 3  -CD 2  -AS    Climbing 3-5 steps with a railing? 2  -CD 2  -AS    To walk in hospital room? 2  -CD 3  -AS    AM-PAC 6 Clicks Score (PT) 13  -CD 13  -AS    Highest Level of Mobility Goal 4 --> Transfer to chair/commode  -CD 4 --> Transfer to chair/commode  -AS      Row Name 02/01/24 1103          Modified Monte[place] Scale    Modified [place] Scale 4 - Moderately severe disability.  Unable to walk without assistance, and unable to attend to own bodily needs without assistance.  -CD       Row Name 02/01/24 1103          Functional Assessment    Outcome Measure Options AM-PAC 6 Clicks Basic Mobility (PT);Modified [place]  -CD               User Key  (r) = Recorded By, (t) = Taken By, (c) = Cosigned By      Initials Name Provider Type    CD Pamela Price PT Physical Therapist    AS Rica Wade, RN Registered Nurse                                 Physical Therapy Education       Title: PT OT SLP Therapies (In Progress)       Topic: Physical Therapy (Done)       Point: Mobility training (Done)       Learning Progress Summary             Patient Acceptance, E, VU,NR by CD at 2/1/2024 1104    Comment: SEE FLOWSHEET    Acceptance, E, VU,NR by CD at 1/30/2024 2208    Comment: SEE FLOWSHEET     Acceptance, E, VU,NR by LH at 1/29/2024 1611    Acceptance, E, NR by AS at 1/28/2024 1013    Acceptance, E,TB, NR by AY at 1/24/2024 1116    Acceptance, E,D, VU,NR by MB at 1/22/2024 1418    Acceptance, E,D, VU,NR by LUIS FERNANDO at 1/20/2024 1221                         Point: Home exercise program (Done)       Learning Progress Summary             Patient Acceptance, E, VU,NR by CD at 2/1/2024 1104    Comment: SEE FLOWSHEET    Acceptance, E, VU,NR by CD at 1/30/2024 1558    Comment: SEE FLOWSHEET    Acceptance, E, NR by AS at 1/28/2024 1013    Acceptance, E,TB, NR by ESTEVAN at 1/24/2024 1116    Acceptance, E,D, VU,NR by MB at 1/22/2024 1418                         Point: Body mechanics (Done)       Learning Progress Summary             Patient Acceptance, E, VU,NR by CD at 2/1/2024 1104    Comment: SEE FLOWSHEET    Acceptance, E, VU,NR by CD at 1/30/2024 1558    Comment: SEE FLOWSHEET    Acceptance, E, VU,NR by LH at 1/29/2024 1611    Acceptance, E, NR by AS at 1/28/2024 1013    Acceptance, E,TB, NR by ESTEVAN at 1/24/2024 1116    Acceptance, E,D, VU,NR by MB at 1/22/2024 1418    Acceptance, E,D, VU,NR by LUIS FERNANDO at 1/20/2024 1221                         Point: Precautions (Done)       Learning Progress Summary             Patient Acceptance, E, VU,NR by CD at 2/1/2024 1104    Comment: SEE FLOWSHEET    Acceptance, E, VU,NR by CD at 1/30/2024 1558    Comment: SEE FLOWSHEET    Acceptance, E, VU,NR by  at 1/29/2024 1611    Acceptance, E, NR by AS at 1/28/2024 1013    Acceptance, E,TB, NR by ESTEVAN at 1/24/2024 1116    Acceptance, E,D, VU,NR by MB at 1/22/2024 1418    Acceptance, E,D, VU,NR by LUIS FERNANDO at 1/20/2024 1221                                         User Key       Initials Effective Dates Name Provider Type Discipline    CD 02/03/23 -  Pamela Price, PT Physical Therapist PT    AS 04/28/23 -  Ngoc Grayson, PTA Physical Therapist Assistant PT    LS 02/03/23 -  Nikole Herrmann, PT Physical Therapist PT    MB 06/16/21 -  Lara,  Joanne CARRILLO, PT Physical Therapist PT    AY 11/10/20 -  Ngoc Glynn, PT Physical Therapist PT     09/21/23 -  Nereida Goode, PT Physical Therapist PT                  PT Recommendation and Plan  Planned Therapy Interventions (PT): balance training, bed mobility training, gait training, strengthening, patient/family education, transfer training, neuromuscular re-education, motor coordination training, postural re-education, home exercise program  Plan of Care Reviewed With: patient  Outcome Evaluation: INCREASED DISTANCE AMBULATED. REQUIRED MANUAL ASSIST TO GUIDE R WALKER, WT SHIFT AND TO MAINTAIN L  ON WALKER. PT IS MOTIVATED TO WORK WITH THERAPY. CONTINUES WITH L SIDED WEAKNESS, IMPAIRED BALANCE BUT IS SHOWING IMPROVEMENT WITH FUNCTIONAL MOBILITY. RECOMMEND IRF AT D/C.     Time Calculation:         PT Charges       Row Name 02/01/24 1104             Time Calculation    Start Time 0951  -CD      PT Received On 02/01/24  -CD      PT Goal Re-Cert Due Date 02/09/24  -CD         Time Calculation- PT    Total Timed Code Minutes- PT 30 minute(s)  -CD         Timed Charges    69334 - PT Therapeutic Exercise Minutes 5  -CD      02069 - Gait Training Minutes  20  -CD      69079 - PT Therapeutic Activity Minutes 5  -CD         Total Minutes    Timed Charges Total Minutes 30  -CD       Total Minutes 30  -CD                User Key  (r) = Recorded By, (t) = Taken By, (c) = Cosigned By      Initials Name Provider Type    CD Pamela Price PT Physical Therapist                  Therapy Charges for Today       Code Description Service Date Service Provider Modifiers Qty    86420429862 HC PT THER PROC EA 15 MIN 2/1/2024 Pamela Price, PT GP 1    11135324359 HC GAIT TRAINING EA 15 MIN 2/1/2024 Pamela Price, PT GP 1    54128528588 HC PT THER SUPP EA 15 MIN 2/1/2024 Pamela Price, PT GP 2            PT G-Codes  Outcome Measure Options: AM-PAC 6 Clicks Basic Mobility (PT), Modified [place]  AM-PAC 6 Clicks Score (PT):  13  AM-PAC 6 Clicks Score (OT): 12  Modified [place] Scale: 4 - Moderately severe disability.  Unable to walk without assistance, and unable to attend to own bodily needs without assistance.  PT Discharge Summary  Anticipated Discharge Disposition (PT): inpatient rehabilitation facility    Pamela Price, PT  2/1/2024

## 2024-02-01 NOTE — THERAPY TREATMENT NOTE
"Acute Care - Speech Language Pathology   Swallow Treatment Note [facility]     Patient Name: Regine Beckham  : 1955  MRN: [id number]  Today's Date: 2024               Admit Date: 2024    Visit Dx:     ICD-10-CM ICD-9-CM   1. Oropharyngeal dysphagia  R13.12 787.22   2. Dysarthria  R47.1 784.51   3. Cognitive communication deficit  R41.841 799.52   4. Stroke  I63.511 434.91     Patient Active Problem List   Diagnosis    Iron deficiency anemia due to chronic blood loss    Major depressive disorder    RLS (restless legs syndrome)    GERD (gastroesophageal reflux disease)    Left breast mass    Allergy to penicillin    Stroke    Grade I diastolic dysfunction    Moderate malnutrition     Past Medical History:   Diagnosis Date    Anemia     Anxiety     Arthritis     Depression     Legally blind     Restless leg syndrome     Sleep apnea     \"I don't use a cpap anymore since losing 106 lbs\"    Water retention      Past Surgical History:   Procedure Laterality Date    BACK SURGERY      CARDIAC CATHETERIZATION N/A 2024    Procedure: Percutaneous Manual Thrombectomy;  Surgeon: Efrain Hurley MD;  Location: [facility] INVASIVE LOCATION;  Service: Interventional Radiology;  Laterality: N/A;    GALLBLADDER SURGERY      HIP ARTHROSCOPY      JOINT REPLACEMENT Right        SLP Recommendation and Plan  SLP Swallowing Diagnosis: moderate, oral dysphagia, pharyngeal dysphagia (24)  SLP Diet Recommendation: soft to chew textures, chopped, no mixed consistencies, honey thick liquids (24)  Recommended Precautions and Strategies: upright posture during/after eating, small bites of food and sips of liquid, general aspiration precautions, multiple swallows per bite of food, multiple swallows per sip of liquid, reflux precautions, fatigue precautions (24)  SLP Rec. for Method of Medication Administration: meds whole, meds crushed, with puree, as tolerated (24)   "   Monitor for Signs of Aspiration: yes, notify SLP if any concerns (02/01/24 0845)  Recommended Diagnostics: reassess via FEES (02/01/24 0845)  Swallow Criteria for Skilled Therapeutic Interventions Met: demonstrates skilled criteria (02/01/24 0845)  Anticipated Discharge Disposition (SLP): inpatient rehabilitation facility (02/01/24 0845)  Rehab Potential/Prognosis, Swallowing: good, to achieve stated therapy goals (02/01/24 0845)  Therapy Frequency (Swallow): 5 days per week (02/01/24 0845)  Predicted Duration Therapy Intervention (Days): until discharge (02/01/24 0845)  Oral Care Recommendations: Oral Care BID/PRN, Suction toothbrush (02/01/24 0845)        Daily Summary of Progress (SLP): progress toward functional goals is good (02/01/24 0845)               Treatment Assessment (SLP): improved, clinical signs of, aspiration (02/01/24 0845)  Treatment Assessment Comments (SLP): Ready for repeat FEES. Will plan to complete today. Further recommendations to follow. Speech more intelligible and pt continues to utilize EMST-lite. (02/01/24 0845)  Plan for Continued Treatment (SLP): continue treatment per plan of care (02/01/24 0845)         Plan of Care Reviewed With: patient  Progress: improving      SWALLOW EVALUATION (last 72 hours)       SLP Adult Swallow Evaluation       Row Name 02/01/24 0845 01/30/24 1025                Rehab Evaluation    Document Type therapy note (daily note)  -EN therapy note (daily note)  -CJ       Subjective Information no complaints  -EN no complaints  -CJ       Patient Observations alert;cooperative;agree to therapy  -EN alert;cooperative;agree to therapy  -CJ       Patient/Family/Caregiver Comments/Observations none  -EN no family present  -CJ       Patient Effort good  -EN good  -CJ       Symptoms Noted During/After Treatment none  -EN none  -CJ          General Information    Patient Profile Reviewed yes  -EN --          Pain    Additional Documentation -- Pain Scale: FACES  Pre/Post-Treatment (Group)  -          Pain Scale: FACES Pre/Post-Treatment    Pain: FACES Scale, Pretreatment 0-->no hurt  -EN 0-->no hurt  -       Posttreatment Pain Rating 0-->no hurt  -EN 0-->no hurt  -CJ          SLP Evaluation Clinical Impression    SLP Swallowing Diagnosis moderate;oral dysphagia;pharyngeal dysphagia  -EN --       Functional Impact risk of aspiration/pneumonia;risk of dehydration;risk of malnutrition  -EN --       Rehab Potential/Prognosis, Swallowing good, to achieve stated therapy goals  -EN --       Swallow Criteria for Skilled Therapeutic Interventions Met demonstrates skilled criteria  -EN --          SLP Treatment Clinical Impressions    Treatment Assessment (SLP) improved;clinical signs of;aspiration  -EN continued;oral dysphagia;pharyngeal dysphagia;toleration of diet;dysarthria  -       Treatment Assessment Comments (SLP) Ready for repeat FEES. Will plan to complete today. Further recommendations to follow. Speech more intelligible and pt continues to utilize EMST-lite.  -EN Pt participate in tx this date, no overt s/s of aspiration. Provided EMST-lite and reviewed dysphagia tx exercises again. Will continue to f/u to address deficits. Would benefit from Inpatient rehab to maximize function  -       Daily Summary of Progress (SLP) progress toward functional goals is good  -EN progress toward functional goals as expected  -       Plan for Continued Treatment (SLP) continue treatment per plan of care  -EN continue treatment per plan of care  -CJ       Care Plan Review care plan/treatment goals reviewed  -EN care plan/treatment goals reviewed  -          Recommendations    Therapy Frequency (Swallow) 5 days per week  -EN 5 days per week  -       Predicted Duration Therapy Intervention (Days) until discharge  -EN until discharge  -       SLP Diet Recommendation soft to chew textures;chopped;no mixed consistencies;honey thick liquids  -EN puree;no mixed consistencies;honey  thick liquids  -CJ       Recommended Diagnostics reassess via FEES  -EN --       Recommended Precautions and Strategies upright posture during/after eating;small bites of food and sips of liquid;general aspiration precautions;multiple swallows per bite of food;multiple swallows per sip of liquid;reflux precautions;fatigue precautions  -EN upright posture during/after eating;small bites of food and sips of liquid;general aspiration precautions;multiple swallows per bite of food;multiple swallows per sip of liquid;reflux precautions;fatigue precautions  -CJ       Oral Care Recommendations Oral Care BID/PRN;Suction toothbrush  -EN Oral Care BID/PRN;Suction toothbrush  -CJ       SLP Rec. for Method of Medication Administration meds whole;meds crushed;with puree;as tolerated  -EN meds whole;meds crushed;with puree;as tolerated  -CJ       Monitor for Signs of Aspiration yes;notify SLP if any concerns  -EN yes;notify SLP if any concerns  -CJ       Anticipated Discharge Disposition (SLP) inpatient rehabilitation facility  -EN inpatient rehabilitation facility  -                 User Key  (r) = Recorded By, (t) = Taken By, (c) = Cosigned By      Initials Name Effective Dates     Anh Ross, MS CCC-SLP 07/11/23 -     EN Zakiya Desouza, MS CCC-SLP 06/22/22 -                     EDUCATION  The patient has been educated in the following areas:   Cognitive Impairment Communication Impairment Dysphagia (Swallowing Impairment) Oral Care/Hydration Modified Diet Instruction.        SLP GOALS       Row Name 02/01/24 0845 01/30/24 1025          (LTG) Patient will demonstrate functional swallow for    Diet Texture (Demonstrate functional swallow) regular textures  -EN regular textures  -CJ     Liquid viscosity (Demonstrate functional swallow) thin liquids  -EN thin liquids  -CJ     [place] (Demonstrate functional swallow) with minimal cues (75-90% accuracy)  -EN with minimal cues (75-90% accuracy)  -CJ     Time Frame  (Demonstrate functional swallow) by discharge  -EN by discharge  -CJ     Progress/Outcomes (Demonstrate functional swallow) continuing progress toward goal  -EN continuing progress toward goal  -CJ        (STG) Patient will tolerate trials of    Consistencies Trialed (Tolerate trials) pureed textures;honey/ moderately thick liquids  -EN pureed textures;honey/ moderately thick liquids  -CJ     Desired Outcome (Tolerate trials) without signs/symptoms of aspiration  -EN without signs/symptoms of aspiration  -CJ     [place] (Tolerate trials) with minimal cues (75-90% accuracy)  -EN with minimal cues (75-90% accuracy)  -CJ     Time Frame (Tolerate trials) by discharge  -EN by discharge  -CJ     Progress/Outcomes (Tolerate trials) continuing progress toward goal  -EN continuing progress toward goal  -CJ     Comment (Tolerate trials) -- no overt s/s of aspiration  -CJ        (STG) Patient will tolerate therapeutic trials of    Consistencies Trialed (Tolerate therapeutic trials) nectar/ mildly thick liquids;thin liquids  -EN --     Desired Outcome (Tolerate therapeutic trials) without signs/symptoms of aspiration  -EN --     [place] (Tolerate therapeutic trials) with minimal cues (75-90% accuracy)  -EN --     Time Frame (Tolerate therapeutic trials) by discharge  -EN --     Progress/Outcomes (Tolerate therapeutic trials) goal no longer appropriate  -EN goal no longer appropriate  -CJ        (STG) Labial Strengthening Goal 1 (SLP)    Activity (Labial Strengthening Goal 1, SLP) increase labial tone  -EN increase labial tone  -CJ     Increase Labial Tone labial resistance exercises;swallow trials  -EN labial resistance exercises;swallow trials  -CJ     [place]/Accuracy (Labial Strengthening Goal 1, SLP) with minimal cues (75-90% accuracy)  -EN with minimal cues (75-90% accuracy)  -CJ     Time Frame (Labial Strengthening Goal 1, SLP) short term goal (STG)  -EN short term goal (STG)  -CJ     Progress/Outcomes  (Labial Strengthening Goal 1, SLP) good progress toward goal  -EN continuing progress toward goal  -CJ     Comment (Labial Strengthening Goal 1, SLP) -- x10  -CJ        (STG) Lingual Strengthening Goal 1 (SLP)    Activity (Lingual Strengthening Goal 1, SLP) increase lingual tone/sensation/control/coordination/movement;increase tongue back strength  -EN increase lingual tone/sensation/control/coordination/movement;increase tongue back strength  -CJ     Increase Lingual Tone/Sensation/Control/Coordination/Movement lingual movement exercises;lingual resistance exercises  -EN lingual movement exercises;lingual resistance exercises  -CJ     Increase Tongue Back Strength lingual movement exercises;swallow trials;lingual resistance exercises  -EN lingual movement exercises;swallow trials;lingual resistance exercises  -CJ     [place]/Accuracy (Lingual Strengthening Goal 1, SLP) with minimal cues (75-90% accuracy)  -EN with minimal cues (75-90% accuracy)  -CJ     Time Frame (Lingual Strengthening Goal 1, SLP) short term goal (STG)  -EN short term goal (STG)  -CJ     Progress/Outcomes (Lingual Strengthening Goal 1, SLP) continuing progress toward goal  -EN continuing progress toward goal  -CJ     Comment (Lingual Strengthening Goal 1, SLP) -- x2 sets x5 reps  -CJ        ([facility]) Pharyngeal Strengthening Exercise Goal 1 (SLP)    Activity (Pharyngeal Strengthening Goal 1, SLP) increase timing;increase superior movement of the hyolaryngeal complex;increase anterior movement of the hyolaryngeal complex;increase closure at entrance to airway/closure of airway at glottis;increase squeeze/positive pressure generation;increase tongue base retraction  -EN increase timing;increase superior movement of the hyolaryngeal complex;increase anterior movement of the hyolaryngeal complex;increase closure at entrance to airway/closure of airway at glottis;increase squeeze/positive pressure generation;increase tongue base retraction  -CJ      Increase Timing prepping - 3 second prep or suck swallow or 3-step swallow  -EN prepping - 3 second prep or suck swallow or 3-step swallow  -CJ     Increase Superior Movement of the Hyolaryngeal Complex hard effortful swallow;falsetto  -EN hard effortful swallow;falsetto  -CJ     Increase Anterior Movement of the Hyolaryngeal Complex chin tuck against resistance (CTAR);hard effortful swallow  -EN chin tuck against resistance (CTAR);hard effortful swallow  -CJ     Increase Closure at Entrance to Airway/Closure of Airway at Glottis supraglottic swallow;breath hold exercises;EMST  -EN supraglottic swallow;breath hold exercises;EMST  -CJ     Increase Squeeze/Positive Pressure Generation hard effortful swallow  -EN hard effortful swallow  -CJ     Increase Tongue Base Retraction hard effortful swallow  -EN hard effortful swallow  -CJ     [place]/Accuracy (Pharyngeal Strengthening Goal 1, SLP) with moderate cues (50-74% accuracy)  -EN with moderate cues (50-74% accuracy)  -CJ     Time Frame (Pharyngeal Strengthening Goal 1, SLP) short term goal (STG)  -EN short term goal (STG)  -CJ     Progress/Outcomes (Pharyngeal Strengthening Goal 1, SLP) continuing progress toward goal  -EN continuing progress toward goal  -CJ     Comment (Pharyngeal Strengthening Goal 1, SLP) EMST-lite  -EN CTAR, effortful, EMST-lite, effortful, SG all performed x @ least 5 reps  -CJ        Patient will demonstrate functional speech skills for return to discharge environment    [place] -- with moderate cues  -CJ     Time frame -- by discharge  -CJ     Progress/Outcomes -- continuing progress toward goal  -CJ        Patient will demonstrate functional cognitive-linguistic skills for return to discharge environment    [place] -- with moderate cues  -CJ     Time frame -- by discharge  -CJ     Progress/Outcomes -- continuing progress toward goal  -CJ        SLP Diagnostic Treatment     Patient will participate in further assessment in the  following areas cognitive-linguistic  -EN cognitive-linguistic  -CJ     Time Frame (Diagnostic) short term goal (STG)  -EN short term goal (STG)  -CJ     Barriers (Diagnostic) -- Lethargy;Cognitive status  -CJ     Progress/Outcomes (Additional Goal 1, SLP) continuing progress toward goal  -EN continuing progress toward goal  -CJ        Respiratory Support Goal 1 (SLP)    Improve Respiratory Support Goal 1 (SLP) increasing length of exhalation;sustaining a vowel sound on exhalation;80%;with minimal cues (75-90%)  -EN increasing length of exhalation;sustaining a vowel sound on exhalation;80%;with minimal cues (75-90%)  -CJ     Time Frame (Respiratory Support Goal 1, SLP) short term goal (STG)  -EN short term goal (STG)  -CJ     Progress (Respiratory Support Skills Goal 1, SLP) 70%;with minimal cues (75-90%)  -EN 60%;with moderate cues (50-74%)  -CJ     Progress/Outcomes (Respiratory Support Goal 1, SLP) continuing progress toward goal  -EN continuing progress toward goal  -CJ        Phonation Goal 1 (SLP)    Improve Phonation By Goal 1 (SLP) using loud speech;80%;with moderate cues (50-74%)  -EN using loud speech;80%;with moderate cues (50-74%)  -CJ     Time Frame (Phonation Goal 1, SLP) short term goal (STG)  -EN short term goal (STG)  -CJ     Progress (Phonation Goal 1, SLP) 80%;with minimal cues (75-90%)  -EN 60%;with moderate cues (50-74%)  -CJ     Progress/Outcomes (Phonation Goal 1, SLP) continuing progress toward goal  -EN continuing progress toward goal  -CJ        Articulation Goal 1 (SLP)    Improve Articulation Goal 1 (SLP) by over-articulating at phrase level;80%;with moderate cues (50-74%)  -EN by over-articulating at phrase level;80%;with moderate cues (50-74%)  -CJ     Time Frame (Articulation Goal 1, SLP) short term goal (STG)  -EN short term goal (STG)  -CJ     Progress (Articulation Goal 1, SLP) 50%;with moderate cues (50-74%)  -EN 50%;with moderate cues (50-74%)  -CJ     Progress/Outcomes  (Articulation Goal 1, SLP) continuing progress toward goal  -EN goal revised this date  -CJ     Comment (Articulation Goal 1, SLP) -- revised to phrases  -CJ        Attention Goal 1 (SLP)    Improve Attention by Goal 1 (SLP) attending to task;complete sustained attention task;80%;with minimal cues (75-90%)  -EN attending to task;complete sustained attention task;80%;with minimal cues (75-90%)  -CJ     Time Frame (Attention Goal 1, SLP) short term goal (STG)  -EN short term goal (STG)  -CJ     Progress (Attention Goal 1, SLP) 70%;with moderate cues (50-74%)  -EN 50%;with moderate cues (50-74%)  -CJ     Progress/Outcomes (Attention Goal 1, SLP) continuing progress toward goal  -EN continuing progress toward goal  -CJ        Reasoning Goal 1 (SLP)    Improve Reasoning Through Goal 1 (SLP) complete basic reasoning task;80%;with minimal cues (75-90%)  -EN complete basic reasoning task;80%;with minimal cues (75-90%)  -CJ     Time Frame (Reasoning Goal 1, SLP) short term goal (STG)  -EN short term goal (STG)  -CJ     Progress (Reasoning Goal 1, SLP) 60%;with moderate cues (50-74%)  -EN 60%;with moderate cues (50-74%)  -CJ     Progress/Outcomes (Reasoning Goal 1, SLP) continuing progress toward goal  -EN continuing progress toward goal  -CJ               User Key  (r) = Recorded By, (t) = Taken By, (c) = Cosigned By      Initials Name Provider Type    CJ Anh Ross, MS CCC-SLP Speech and Language Pathologist    Zakiya King MS CCC-SLP Speech and Language Pathologist                       Time Calculation:    Time Calculation- SLP       Row Name 02/01/24 1005             Time Calculation- SLP    SLP Start Time 0845  -EN      SLP Received On 02/01/24  -EN         Untimed Charges    83168-CT Treatment/ST Modification Prosth Aug Alter  25  -EN      68642-RN Treatment Swallow Minutes 25  -EN         Total Minutes    Untimed Charges Total Minutes 50  -EN       Total Minutes 50  -EN                User Key  (r) =  Recorded By, (t) = Taken By, (c) = Cosigned By      Initials Name Provider Type    EN Zakiya Desouza, MS CCC-SLP Speech and Language Pathologist                    Therapy Charges for Today       Code Description Service Date Service Provider Modifiers Qty    77645763346 HC ST TREATMENT SWALLOW 2 2/1/2024 Zakiya Desouza, MS CCC-SLP GN 1    05190080690 HC ST TREATMENT SPEECH 2 2/1/2024 Zakiya Desouza, MS HUYNH-SLP GN 1                 MS VIGNESH Gutierrez  2/1/2024

## 2024-02-01 NOTE — PLAN OF CARE
0951  Goal Outcome Evaluation:  Plan of Care Reviewed With: patient           Outcome Evaluation: INCREASED DISTANCE AMBULATED. REQUIRED MANUAL ASSIST TO GUIDE R WALKER, WT SHIFT AND TO MAINTAIN L  ON WALKER. PT IS MOTIVATED TO WORK WITH THERAPY. CONTINUES WITH L SIDED WEAKNESS, IMPAIRED BALANCE BUT IS SHOWING IMPROVEMENT WITH FUNCTIONAL MOBILITY. RECOMMEND IRF AT D/C.      Anticipated Discharge Disposition (PT): inpatient rehabilitation facility

## 2024-02-01 NOTE — PROGRESS NOTES
[facility] Medicine Services  PROGRESS NOTE    Patient Name: Regine Beckham  : 1955  MRN: [id number]    Date of Admission: 2024  Primary Care Physician: Dread Burton MD    Subjective   Subjective     CC:  F/u CVA    HPI:  No acute events over the night.  Patient was working with therapy this morning.  Blood sugar stable.  DC frequent blood sugar checks.  Waiting for rehab.      Objective   Objective     Vital Signs:   Temp:  [96.4 °F (35.8 °C)-98.3 °F (36.8 °C)] 96.4 °F (35.8 °C)  Heart Rate:  [] 92  Resp:  [16-20] 18  BP: (112-123)/(59-77) 123/63     Physical Exam:  Constitutional: No acute distress, awake, alert  HENT: NCAT, mucous membranes moist  Respiratory: Clear to auscultation bilaterally, respiratory effort normal room air 96%  Cardiovascular: RRR, no murmurs, rubs, or gallops  Gastrointestinal: Positive bowel sounds, soft, nontender, nondistended  Musculoskeletal: No bilateral ankle edema  Psychiatric: Appropriate affect, cooperative  Neurologic: Oriented x 3, left sided weakness, left facial droop  dysarthria   Skin: No rashes      Results Reviewed:  LAB RESULTS:      Lab 24  0607   WBC 10.21   HEMOGLOBIN 11.3*   HEMATOCRIT 32.7*   PLATELETS 300   NEUTROS ABS 6.33   IMMATURE GRANS (ABS) 0.16*   LYMPHS ABS 2.29   MONOS ABS 1.22*   EOS ABS 0.16   MCV 95.9         Lab 24  0607 24  0505   SODIUM 135* 140   POTASSIUM 3.9 4.3   CHLORIDE 99 102   CO2 27.0 27.0   ANION GAP 9.0 11.0   BUN 19 16   CREATININE 0.73 0.71   EGFR 89.2 92.2   GLUCOSE 137* 122*   CALCIUM 9.6 9.1   MAGNESIUM 2.1  --                          Brief Urine Lab Results  (Last result in the past 365 days)        Color   Clarity   Blood   Leuk Est   Nitrite   Protein   CREAT   Urine HCG        23 Yellow   Clear   Negative   Negative   Negative   Negative                   Microbiology Results Abnormal       Procedure Component Value - Date/Time    Respiratory Panel  PCR w/COVID-19(SARS-CoV-2) SHEILA/TAYLOR/ANDRÉS/PAD/COR/KOREY In-House, NP Swab in UTM/VTM, 2 HR TAT - Swab, Nasopharynx [[id number]]  (Normal) Collected: 01/29/24 0132    Lab Status: Final result Specimen: Swab from Nasopharynx Updated: 01/29/24 0249     ADENOVIRUS, PCR Not Detected     Coronavirus 229E Not Detected     Coronavirus HKU1 Not Detected     Coronavirus NL63 Not Detected     Coronavirus OC43 Not Detected     COVID19 Not Detected     Human Metapneumovirus Not Detected     Human Rhinovirus/Enterovirus Not Detected     Influenza A PCR Not Detected     Influenza B PCR Not Detected     Parainfluenza Virus 1 Not Detected     Parainfluenza Virus 2 Not Detected     Parainfluenza Virus 3 Not Detected     Parainfluenza Virus 4 Not Detected     RSV, PCR Not Detected     Bordetella pertussis pcr Not Detected     Bordetella parapertussis PCR Not Detected     Chlamydophila pneumoniae PCR Not Detected     Mycoplasma pneumo by PCR Not Detected    Narrative:      In the setting of a positive respiratory panel with a viral infection PLUS a negative procalcitonin without other underlying concern for bacterial infection, consider observing off antibiotics or discontinuation of antibiotics and continue supportive care. If the respiratory panel is positive for atypical bacterial infection (Bordetella pertussis, Chlamydophila pneumoniae, or Mycoplasma pneumoniae), consider antibiotic de-escalation to target atypical bacterial infection.    Respiratory Culture - Sputum, NT Suction [[id number]] Collected: 01/22/24 0808    Lab Status: Final result Specimen: Sputum from NT Suction Updated: 01/24/24 1052     Respiratory Culture Light growth (2+) Normal respiratory sudhir. No S. aureus or Pseudomonas aeruginosa detected. Final report.     Gram Stain Moderate (3+) WBCs per low power field      Rare (1+) Epithelial cells per low power field      Few (2+) Gram positive cocci in pairs            SLP FEES - Fiberoptic Endo Eval Swallow    Result  Date: 2/1/2024  This procedure was auto-finalized with no dictation required.     Results for orders placed during the hospital encounter of 01/19/24    Adult Transthoracic Echo Limited W/ Cont if Necessary Per Protocol    Interpretation Summary    Left ventricular systolic function is normal. Estimated left ventricular EF = 60%    Saline test results are negative for intracardiac shunting..      Current medications:  Scheduled Meds:aspirin, 81 mg, Oral, Daily  atorvastatin, 80 mg, Oral, Nightly  gabapentin, 100 mg, Oral, Q8H  guaiFENesin, 600 mg, Oral, Q12H  ipratropium-albuterol, 3 mL, Nebulization, Q4H - RT  losartan, 50 mg, Oral, Q24H  magnesium oxide, 400 mg, Oral, Daily  mirtazapine, 30 mg, Oral, Nightly  nicotine, 1 patch, Transdermal, Q24H  pantoprazole, 40 mg, Oral, QAM AC  rOPINIRole, 4 mg, Oral, Nightly  senna-docusate sodium, 2 tablet, Oral, BID  sodium chloride, 10 mL, Intravenous, Q12H  ticagrelor, 60 mg, Oral, BID      Continuous Infusions:   PRN Meds:.  acetaminophen    albuterol    senna-docusate sodium **AND** polyethylene glycol **AND** bisacodyl **AND** bisacodyl    Calcium Replacement - Follow Nurse / BPA Driven Protocol    hydrALAZINE    Magnesium Standard Dose Replacement - Follow Nurse / BPA Driven Protocol    ondansetron    phenol    Phosphorus Replacement - Follow Nurse / BPA Driven Protocol    Potassium Replacement - Follow Nurse / BPA Driven Protocol    sodium chloride    sodium chloride    Assessment & Plan   Assessment & Plan     Active Hospital Problems    Diagnosis  POA    **Stroke [I63.511]  Unknown    Moderate malnutrition [E44.0]  Yes    Grade I diastolic dysfunction [I51.89]  Unknown    GERD (gastroesophageal reflux disease) [K21.9]  Yes    RLS (restless legs syndrome) [G25.81]  Yes    Major depressive disorder [F32.9]  Yes    Iron deficiency anemia due to chronic blood loss [D50.0]  Yes      Resolved Hospital Problems   No resolved problems to display.        Brief Hospital  Course to date:  Regine Beckham is a 68 y.o. female  with a history of HTN, chronic anemia, sleep apnea intolerant of Cpap, severe depression with an overdose attempt in December 2023, and legally blind who presented to [facility] on 1/19/24 with left sided weakness and was found to have a RMCA infarct and 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 She underwent thrombectomy and carotid stent placement on 1/19. She was outside the window for thrombolytics. Initially placed on Cardene which has been successfully weaned off. Her hospital course has been complicated by ongoing dysphagia and difficulty managing secretions. Transferred to [facility] 1/25.    Plan was partially entered by my partner and I have reviewed and updated as appropriate on 02/01/24     CVA-right MCA  Right internal carotid artery occlusion status post thrombectomy and carotid stent placement  Dysphagia  --Stroke team follows, continue DAPT with brilinta/statin.  --SLP follows, repeat FEES 1/26. Now on soft to chew textures, chopped, thin liquid   --PT/OT recs IRF   -- follow up in stroke clinic 6-8 weeks      Cough, SOB  --Resp viral panel negative  --CXR negative  -- stable on room air   -- nebs prn      Chronic HFpEF  HTN  --cont ASA, statin, losartan.  Holding HCTZ and aldactone for now     Chronic CESIA  H/H stable.     GERD  -Cont protonix     RLS  -Stable, continue requip, neurontin     Tob dependency  -cont nicotine patch  -discuss cessation    Expected Discharge Location and Transportation: Medically stable for discharge.  Waiting for placement.  Rehab   Expected Discharge   Expected Discharge Date: 2/3/2024; Expected Discharge Time:      DVT prophylaxis:  Mechanical DVT prophylaxis orders are present.         AM-PAC 6 Clicks Score (PT): 13 (02/01/24 1103)    CODE STATUS:   Code Status and Medical Interventions:   Ordered at: 01/22/24 1115     Code Status (Patient has no pulse and is not breathing):    CPR (Attempt to Resuscitate)     Medical  Interventions (Patient has pulse or is breathing):    Full Support       Irais Boyer MD  02/01/24

## 2024-02-02 VITALS
WEIGHT: 158.3 LBS | HEIGHT: 66 IN | SYSTOLIC BLOOD PRESSURE: 115 MMHG | TEMPERATURE: 98.2 F | DIASTOLIC BLOOD PRESSURE: 85 MMHG | OXYGEN SATURATION: 97 % | HEART RATE: 90 BPM | BODY MASS INDEX: 25.44 KG/M2 | RESPIRATION RATE: 18 BRPM

## 2024-02-02 LAB
GLUCOSE BLDC GLUCOMTR-MCNC: 115 MG/DL (ref 70–130)
GLUCOSE BLDC GLUCOMTR-MCNC: 146 MG/DL (ref 70–130)

## 2024-02-02 PROCEDURE — 94799 UNLISTED PULMONARY SVC/PX: CPT

## 2024-02-02 PROCEDURE — 94761 N-INVAS EAR/PLS OXIMETRY MLT: CPT

## 2024-02-02 PROCEDURE — 97530 THERAPEUTIC ACTIVITIES: CPT

## 2024-02-02 PROCEDURE — 99239 HOSP IP/OBS DSCHRG MGMT >30: CPT | Performed by: HOSPITALIST

## 2024-02-02 PROCEDURE — 97535 SELF CARE MNGMENT TRAINING: CPT

## 2024-02-02 PROCEDURE — 82948 REAGENT STRIP/BLOOD GLUCOSE: CPT

## 2024-02-02 PROCEDURE — 94664 DEMO&/EVAL PT USE INHALER: CPT

## 2024-02-02 PROCEDURE — 97110 THERAPEUTIC EXERCISES: CPT

## 2024-02-02 RX ORDER — CALCIUM CARBONATE 500 MG/1
2 TABLET, CHEWABLE ORAL 3 TIMES DAILY PRN
Status: DISCONTINUED | OUTPATIENT
Start: 2024-02-02 | End: 2024-02-02 | Stop reason: HOSPADM

## 2024-02-02 RX ORDER — ATORVASTATIN CALCIUM 80 MG/1
80 TABLET, FILM COATED ORAL NIGHTLY
Qty: 90 TABLET | Refills: 0 | Status: SHIPPED | OUTPATIENT
Start: 2024-02-02 | End: 2024-02-02 | Stop reason: SDUPTHER

## 2024-02-02 RX ORDER — ASPIRIN 81 MG/1
81 TABLET, CHEWABLE ORAL DAILY
Qty: 30 TABLET | Refills: 0 | Status: SHIPPED | OUTPATIENT
Start: 2024-02-03 | End: 2024-02-02 | Stop reason: SDUPTHER

## 2024-02-02 RX ORDER — ATORVASTATIN CALCIUM 80 MG/1
80 TABLET, FILM COATED ORAL NIGHTLY
Qty: 90 TABLET | Refills: 0 | Status: SHIPPED | OUTPATIENT
Start: 2024-02-02

## 2024-02-02 RX ORDER — ASPIRIN 81 MG/1
81 TABLET, CHEWABLE ORAL DAILY
Qty: 30 TABLET | Refills: 0 | Status: SHIPPED | OUTPATIENT
Start: 2024-02-03

## 2024-02-02 RX ADMIN — ACETAMINOPHEN 650 MG: 325 TABLET ORAL at 00:04

## 2024-02-02 RX ADMIN — GUAIFENESIN 600 MG: 600 TABLET, EXTENDED RELEASE ORAL at 09:50

## 2024-02-02 RX ADMIN — ASPIRIN 81 MG: 81 TABLET, CHEWABLE ORAL at 09:50

## 2024-02-02 RX ADMIN — Medication 10 ML: at 09:51

## 2024-02-02 RX ADMIN — IPRATROPIUM BROMIDE AND ALBUTEROL SULFATE 3 ML: 2.5; .5 SOLUTION RESPIRATORY (INHALATION) at 03:43

## 2024-02-02 RX ADMIN — Medication 1 PATCH: at 09:51

## 2024-02-02 RX ADMIN — ACETAMINOPHEN 650 MG: 325 TABLET ORAL at 09:50

## 2024-02-02 RX ADMIN — GABAPENTIN 100 MG: 100 CAPSULE ORAL at 05:54

## 2024-02-02 RX ADMIN — IPRATROPIUM BROMIDE AND ALBUTEROL SULFATE 3 ML: 2.5; .5 SOLUTION RESPIRATORY (INHALATION) at 07:24

## 2024-02-02 RX ADMIN — PANTOPRAZOLE SODIUM 40 MG: 40 TABLET, DELAYED RELEASE ORAL at 09:50

## 2024-02-02 RX ADMIN — MAGNESIUM OXIDE TAB 400 MG (241.3 MG ELEMENTAL MG) 400 MG: 400 (241.3 MG) TAB at 09:50

## 2024-02-02 RX ADMIN — LOSARTAN POTASSIUM 50 MG: 50 TABLET, FILM COATED ORAL at 09:50

## 2024-02-02 NOTE — CASE MANAGEMENT/SOCIAL WORK
Continued Stay Note  [facility]     Patient Name: Regine Beckham  MRN: [id number]  Today's Date: 2/2/2024    Admit Date: 1/19/2024    Plan: SNF   Discharge Plan       Row Name 02/02/24 1114       Plan    Plan Comments MS[facility]. MSW assisted the facility in getting the [facility] hospital exempt form. Form was signed by the physician. MSW faxed signed [facility] form to [facility] with [facility] in [facility].                   Discharge Codes    No documentation.                 Expected Discharge Date and Time       Expected Discharge Date Expected Discharge Time    Feb 5, 2024               SAMARA Bustamante

## 2024-02-02 NOTE — DISCHARGE SUMMARY
[facility] Medicine Services  DISCHARGE SUMMARY    Patient Name: Regine Beckham  : 1955  MRN: [id number]    Date of Admission: 2024  5:30 PM  Date of Discharge:  24  Primary Care Physician: Dread Burton MD    Consults       Date and Time Order Name Status Description    2023 11:25 AM Inpatient Cardiology Consult Completed             Hospital Course     Presenting Problem: Stroke    Active Hospital Problems    Diagnosis  POA   • **Stroke [I63.511]  Unknown   • Moderate malnutrition [E44.0]  Yes   • Grade I diastolic dysfunction [I51.89]  Unknown   • GERD (gastroesophageal reflux disease) [K21.9]  Yes   • RLS (restless legs syndrome) [G25.81]  Yes   • Major depressive disorder [F32.9]  Yes   • Iron deficiency anemia due to chronic blood loss [D50.0]  Yes      Resolved Hospital Problems   No resolved problems to display.          Hospital Course:  Regine Beckham is a 68 y.o. female  with a history of HTN, chronic anemia, sleep apnea intolerant of Cpap, severe depression with an overdose attempt in 2023, and legally blind who presented to [facility] on 24 with left sided weakness and was found to have a RMCA infarct and 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 She underwent thrombectomy and carotid stent placement on . She was outside the window for thrombolytics. Initially placed on Cardene which has been successfully weaned off. Her hospital course has been complicated by ongoing dysphagia and difficulty managing secretions. Transferred to [facility] .     Plan was partially entered by my partner and I have reviewed and updated as appropriate on 24     CVA-right MCA  Right internal carotid artery occlusion status post thrombectomy and carotid stent placement  Dysphagia  --Stroke consulted. continue DAPT with ASA/brilinta/statin.  --SLP follows, repeat FEES . Now on soft to chew textures, chopped, thin liquid   -- follow up in  stroke clinic 6-8 weeks   -- Discharge to rehab.        Chronic HFpEF  HTN  --cont ASA, statin, losartan.  Holding HCTZ and aldactone on discharge due to soft BP.     Chronic CESIA  H/H stable.     GERD  -Cont protonix     RLS  -Stable, continue requip,       Pt was discharged to rehab in a stable condition.    Discharge Follow Up Recommendations for outpatient labs/diagnostics:  Follow up with stroke clinic in 6-8 weeks.    Day of Discharge     HPI:   Seen and examined today.    Review of Systems  No fever, chills, sob or CP.    Vital Signs:   Temp:  [96.4 °F (35.8 °C)-98.4 °F (36.9 °C)] 98.2 °F (36.8 °C)  Heart Rate:  [] 93  Resp:  [16-18] 18  BP: (109-138)/(63-85) 115/85      Physical Exam:  Constitutional: No acute distress, awake, alert  HENT: NCAT, mucous membranes moist  Respiratory:  respiratory effort normal room air 96%  Gastrointestinal:nondistended  Psychiatric:cooperative  Neurologic: Oriented x 2, left sided weakness, left facial droop  dysarthria   Skin: No rashes    Pertinent  and/or Most Recent Results     LAB RESULTS:      Lab 01/29/24  0607   WBC 10.21   HEMOGLOBIN 11.3*   HEMATOCRIT 32.7*   PLATELETS 300   NEUTROS ABS 6.33   IMMATURE GRANS (ABS) 0.16*   LYMPHS ABS 2.29   MONOS ABS 1.22*   EOS ABS 0.16   MCV 95.9         Lab 01/29/24  0607 01/27/24  0505   SODIUM 135* 140   POTASSIUM 3.9 4.3   CHLORIDE 99 102   CO2 27.0 27.0   ANION GAP 9.0 11.0   BUN 19 16   CREATININE 0.73 0.71   EGFR 89.2 92.2   GLUCOSE 137* 122*   CALCIUM 9.6 9.1   MAGNESIUM 2.1  --                          Brief Urine Lab Results  (Last result in the past 365 days)        Color   Clarity   Blood   Leuk Est   Nitrite   Protein   CREAT   Urine HCG        12/20/23 2003 Yellow   Clear   Negative   Negative   Negative   Negative                 Microbiology Results (last 10 days)       Procedure Component Value - Date/Time    Respiratory Panel PCR w/COVID-19(SARS-CoV-2) SHEILA/TAYLOR/ANDRÉS/PAD/COR/KOREY In-House, NP Swab in UTM/VTM, 2  HR TAT - Swab, Nasopharynx [[id number]]  (Normal) Collected: 01/29/24 0132    Lab Status: Final result Specimen: Swab from Nasopharynx Updated: 01/29/24 0249     ADENOVIRUS, PCR Not Detected     Coronavirus 229E Not Detected     Coronavirus HKU1 Not Detected     Coronavirus NL63 Not Detected     Coronavirus OC43 Not Detected     COVID19 Not Detected     Human Metapneumovirus Not Detected     Human Rhinovirus/Enterovirus Not Detected     Influenza A PCR Not Detected     Influenza B PCR Not Detected     Parainfluenza Virus 1 Not Detected     Parainfluenza Virus 2 Not Detected     Parainfluenza Virus 3 Not Detected     Parainfluenza Virus 4 Not Detected     RSV, PCR Not Detected     Bordetella pertussis pcr Not Detected     Bordetella parapertussis PCR Not Detected     Chlamydophila pneumoniae PCR Not Detected     Mycoplasma pneumo by PCR Not Detected    Narrative:      In the setting of a positive respiratory panel with a viral infection PLUS a negative procalcitonin without other underlying concern for bacterial infection, consider observing off antibiotics or discontinuation of antibiotics and continue supportive care. If the respiratory panel is positive for atypical bacterial infection (Bordetella pertussis, Chlamydophila pneumoniae, or Mycoplasma pneumoniae), consider antibiotic de-escalation to target atypical bacterial infection.            SLP FEES - Fiberoptic Endo Eval Swallow    Result Date: 2/1/2024  This procedure was auto-finalized with no dictation required.    XR Chest 1 View    Result Date: 1/29/2024  XR CHEST 1 VW Date of Exam: 1/29/2024 12:58 AM EST Indication: cough Comparison: 1/22/2024. Findings: There are no airspace consolidations. No pleural fluid. No pneumothorax. The pulmonary vasculature appears within normal limits. The cardiac and mediastinal silhouette appear unremarkable. No acute osseous abnormality identified.     Impression: No acute cardiopulmonary process. Electronically Signed:  Steffanie Ramirez MD  1/29/2024 1:31 AM EST  Workstation ID: [id number]    [facility] FEES - Fiberoptic Endo Eval Swallow    Result Date: 1/26/2024  This procedure was auto-finalized with no dictation required.    XR Abdomen KUB    Result Date: 1/23/2024  XR ABDOMEN KUB Date of Exam: 1/23/2024 5:54 PM EST Indication: tube placement 1/19/2024 Comparison: None available. Findings: Weighted enteric tube is noted with the distal tip in the distal stomach/proximal duodenum. Nonspecific but nonobstructive bowel gas pattern. The visualized soft tissues are unremarkable. No definite acute osseous abnormality.     Impression: Weighted enteric tube with the distal tip in the distal stomach/proximal duodenum Electronically Signed: Randolph Silva DO  1/23/2024 6:15 PM EST  Workstation ID: [id number]    [facility] FEES - Fiberoptic Endo Eval Swallow    Result Date: 1/23/2024  This procedure was auto-finalized with no dictation required.    CT Head Without Contrast    Result Date: 1/23/2024  CT HEAD WO CONTRAST Date of Exam: 1/23/2024 5:19 AM EST Indication: Stroke, follow up. Comparison: None available. Technique: Axial CT images were obtained of the head without contrast administration.  Automated exposure control and iterative construction methods were used. Findings: Stable appearance of right MCA territory infarct with hypoattenuation in the medial right temporal lobe, lateral right frontal lobe and right parietal lobe. There are small areas of increased density within the region of infarct likely reflecting petechial hemorrhages. There is no discrete measurable hematoma in the brain. There is mild mass effect without midline shift or herniation. No new hemorrhage. Cerebellum is unremarkable. No abnormal extra-axial collection. The orbits and sinuses appear normal. The mastoids are clear.     Impression: 1.Stable appearance of right MCA territory infarct. There are small areas of increased density within the region of infarct likely reflecting  petechial hemorrhages. There is mild mass effect without midline shift or herniation. 2.No new acute abnormality. Electronically Signed: Teo Carpio MD  1/23/2024 5:35 AM EST  Workstation ID: [id number]    CT Head Without Contrast    Result Date: 1/23/2024  CT HEAD WO CONTRAST Date of Exam: 1/23/2024 12:18 AM EST Indication: Mental status change, unknown cause Stroke, follow up. Comparison: 1/21/2024. Technique: Axial CT images were obtained of the head without contrast administration.  Automated exposure control and iterative construction methods were used. Findings: There is an evolving subacute appearing right MCA territory infarct with areas of hypoattenuation and loss of gray-white differentiation within the lateral inferior right frontal lobe, medial anterior right temporal lobe and right parietal lobe. There are continued areas of patchy hyperdensity within the regions of infarct likely reflecting  petechial hemorrhages. No measurable parenchymal hematoma. There is stable moderate to advanced white matter hypoattenuation primarily in the periventricular region. There is mild mass effect in the region of infarct without evidence of midline shift or herniation. No new focal hypoattenuating lesions in the brain. The cerebellum appears unremarkable. There are intracranial vascular calcifications. No acute calvarial abnormality. The paranasal sinuses and mastoid air cells appear well aerated. Superficial soft tissues are within normal limits.     Impression: 1.Evolving subacute appearing right MCA territory infarct with areas of petechial hemorrhages. No measurable parenchymal hematoma. 2.Stable moderate to advanced chronic small vessel ischemic change. Electronically Signed: Teo Carpio MD  1/23/2024 1:02 AM EST  Workstation ID: [id number]     Results for orders placed during the hospital encounter of 01/19/24    Duplex Carotid Ultrasound CAR    Interpretation Summary  •  Right carotid stent is patent without  evidence of in-stent restenosis.  •  Left internal carotid artery demonstrates a less than 50% stenosis.  Mild heterogeneous plaque      Results for orders placed during the hospital encounter of 01/19/24    Duplex Carotid Ultrasound CAR    Interpretation Summary  •  Right carotid stent is patent without evidence of in-stent restenosis.  •  Left internal carotid artery demonstrates a less than 50% stenosis.  Mild heterogeneous plaque      Results for orders placed during the hospital encounter of 01/19/24    Adult Transthoracic Echo Limited W/ Cont if Necessary Per Protocol    Interpretation Summary  •  Left ventricular systolic function is normal. Estimated left ventricular EF = 60%  •  Saline test results are negative for intracardiac shunting..      Plan for Follow-up of Pending Labs/Results:     Discharge Details        Discharge Medications        New Medications        Instructions Start Date   aspirin 81 MG chewable tablet   81 mg, Oral, Daily   Start Date: February 3, 2024     atorvastatin 80 MG tablet  Commonly known as: LIPITOR   80 mg, Oral, Nightly      ticagrelor 60 MG tablet tablet  Commonly known as: BRILINTA   60 mg, Oral, 2 Times Daily             Continue These Medications        Instructions Start Date   losartan 50 MG tablet  Commonly known as: COZAAR   50 mg, Oral, Daily      Mirabegron ER 50 MG tablet sustained-release 24 hour 24 hr tablet  Commonly known as: MYRBETRIQ   50 mg, Oral, Daily      mirtazapine 30 MG tablet  Commonly known as: REMERON   30 mg, Oral, Nightly      ondansetron ODT 4 MG disintegrating tablet  Commonly known as: ZOFRAN-ODT   4 mg, Translingual, Every 8 Hours PRN      pantoprazole 40 MG EC tablet  Commonly known as: PROTONIX   TAKE 1 TABLET BY MOUTH EVERY MORNING FOR HEARTBURN      rOPINIRole 4 MG tablet  Commonly known as: REQUIP   4 mg, Oral, Nightly, Take 1 hour before bedtime.      VENTOLIN IN   2 puffs, Inhalation, Every 6 Hours PRN      zolpidem 5 MG  tablet  Commonly known as: AMBIEN   5 mg, Oral, Nightly PRN             Stop These Medications      diclofenac 75 MG EC tablet  Commonly known as: VOLTAREN     hydroCHLOROthiazide 12.5 MG tablet  Commonly known as: HYDRODIURIL     spironolactone 25 MG tablet  Commonly known as: ALDACTONE              Allergies   Allergen Reactions   • Penicillins Hives and Swelling         Discharge Disposition:  Rehab Facility or Unit (DC - External)    Diet:  Hospital:  Diet Order   Procedures   • Diet: Cardiac Diets, Diabetic Diets; Healthy Heart (2-3 Na+); Consistent Carbohydrate; Texture: Soft to Chew (NDD 3); Soft to Chew: Chopped Meat; Fluid Consistency: Thin (IDDSI 0)            Activity: as tolerated.           CODE STATUS:    Code Status and Medical Interventions:   Ordered at: 01/22/24 1115     Code Status (Patient has no pulse and is not breathing):    CPR (Attempt to Resuscitate)     Medical Interventions (Patient has pulse or is breathing):    Full Support       Future Appointments   Date Time Provider Department Center   2/16/2024 11:00 AM Jillian Danielle PA-C [facility]                 Irais Boeyr MD  02/02/24      Time Spent on Discharge:  I spent  35  minutes on this discharge activity which included: face-to-face encounter with the patient, reviewing the data in the system, coordination of the care with the nursing staff as well as consultants, documentation, and entering orders.

## 2024-02-02 NOTE — THERAPY TREATMENT NOTE
"Patient Name: Regine Beckham  : 1955    MRN: [id number]                              Today's Date: 2024       Admit Date: 2024    Visit Dx:     ICD-10-CM ICD-9-CM   1. Oropharyngeal dysphagia  R13.12 787.22   2. Dysarthria  R47.1 784.51   3. Cognitive communication deficit  R41.841 799.52   4. Stroke  I63.511 434.91     Patient Active Problem List   Diagnosis    Iron deficiency anemia due to chronic blood loss    Major depressive disorder    RLS (restless legs syndrome)    GERD (gastroesophageal reflux disease)    Left breast mass    Allergy to penicillin    Stroke    Grade I diastolic dysfunction    Moderate malnutrition     Past Medical History:   Diagnosis Date    Anemia     Anxiety     Arthritis     Depression     Legally blind     Restless leg syndrome     Sleep apnea     \"I don't use a cpap anymore since losing 106 lbs\"    Water retention      Past Surgical History:   Procedure Laterality Date    BACK SURGERY      CARDIAC CATHETERIZATION N/A 2024    Procedure: Percutaneous Manual Thrombectomy;  Surgeon: Efrain Hurley MD;  Location: [facility] INVASIVE LOCATION;  Service: Interventional Radiology;  Laterality: N/A;    GALLBLADDER SURGERY      HIP ARTHROSCOPY      JOINT REPLACEMENT Right       General Information       Row Name 24 1015          OT Time and Intention    Document Type therapy note (daily note)  -CS     Mode of Treatment occupational therapy  -CS       Row Name 24 1015          General Information    Patient Profile Reviewed yes  -CS     Existing Precautions/Restrictions fall;other (see comments)  L inattention, L weakness (UE>LE), dysarthria, LEGALLY BLIND  -CS     Barriers to Rehab medically complex;cognitive status;visual deficit  -CS       Row Name 24 1015          Cognition    Orientation Status (Cognition) oriented x 3  -CS       Row Name 24 1015          Safety Issues, Functional Mobility    Safety Issues Affecting Function (Mobility) " insight into deficits/self-awareness;safety precaution awareness;safety precautions follow-through/compliance  -     Impairments Affecting Function (Mobility) balance;coordination;endurance/activity tolerance;grasp;strength;sensation/sensory awareness;muscle tone abnormal;pain  -               User Key  (r) = Recorded By, (t) = Taken By, (c) = Cosigned By      Initials Name Provider Type     Adam Claros OT Occupational Therapist                     Mobility/ADL's       Row Name 02/02/24 1018          Bed Mobility    Comment, (Bed Mobility) Pt received on BSC, conluded in recliner  -[facility] Name 02/02/24 1018          Transfers    Transfers sit-stand transfer;toilet transfer  -     Comment, (Transfers) Yocha Dehe and AAROM assist to for functional use of LUE during RW use  -[facility] Name 02/02/24 1018          Sit-Stand Transfer    Sit-Stand [place] (Transfers) minimum assist (75% patient effort);2 person assist  -     Assistive Device (Sit-Stand Transfers) walker, front-wheeled  -[facility] Name 02/02/24 1018          Toilet Transfer    Type (Toilet Transfer) stand pivot/stand step  -     [place] Level (Toilet Transfer) minimum assist (75% patient effort);1 person assist;1 person to manage equipment;verbal cues;nonverbal cues (demo/gesture)  -     Assistive Device (Toilet Transfer) walker, front-wheeled  -       Row Name 02/02/24 1018          Functional Mobility    Functional Mobility- Comment Pt tolerated 2 x 15ft w/ ModA and chair follow, LLE fatigues easily requiring increased cueing for improved step-through  -[facility] Name 02/02/24 1018          Activities of Daily Living    BADL Assessment/Intervention lower body dressing;upper body dressing;toileting;grooming  -       Row Name 02/02/24 1018          Lower Body Dressing Assessment/Training    [place] Level (Lower Body Dressing) doff;don;socks;maximum assist (25% patient effort)  -[facility] Name 02/02/24 1018           Grooming Assessment/Training    [place] Level (Grooming) wash face, hands;set up;hair care, combing/brushing;moderate assist (50% patient effort)  -     Position (Grooming) supported sitting  -       Row Name 02/02/24 1018          Upper Body Dressing Assessment/Training    [place] Level (Upper Body Dressing) don;pajama/robe;moderate assist (50% patient effort)  -     Comment, (Upper Body Dressing) educated on simple dao-dressing concepts this date  -       Row Name 02/02/24 1018          Toileting Assessment/Training    [place] Level (Toileting) perform perineal hygiene;moderate assist (50% patient effort);adjust/manage clothing;maximum assist (25% patient effort)  -               User Key  (r) = Recorded By, (t) = Taken By, (c) = Cosigned By      Initials Name Provider Type     Adam Claros, OT Occupational Therapist                   Obj/Interventions       [facility] Name 02/02/24 1023          Shoulder (Therapeutic Exercise)    Shoulder (Therapeutic Exercise) AAROM (active assistive range of motion)  -     Shoulder AAROM (Therapeutic Exercise) right assist left;flexion;extension;horizontal aBduction/aDduction;10 repetitions  -       Row Name 02/02/24 1023          Elbow/Forearm (Therapeutic Exercise)    Elbow/Forearm (Therapeutic Exercise) AAROM (active assistive range of motion)  -     Elbow/Forearm AAROM (Therapeutic Exercise) right assist left;flexion;extension;10 repetitions  -       Row Name 02/02/24 1023          Motor Skills    Motor Skills functional endurance;motor control/coordination interventions  -     Functional Endurance improving  -     Motor Control/Coordination Interventions gross motor coordination activities;therapeutic exercise/ROM;occupation/activity based treatment  -     Therapeutic Exercise shoulder;elbow/forearm  -       Row Name 02/02/24 1023          Balance    Balance Assessment sitting static balance;sitting dynamic balance;standing  static balance;standing dynamic balance  -CS     Static Sitting Balance standby assist  -CS     Dynamic Sitting Balance contact guard  -CS     Position, Sitting Balance unsupported;sitting edge of bed  -CS     Static Standing Balance minimal assist  -CS     Dynamic Standing Balance moderate assist  -CS     Position/Device Used, Standing Balance supported;walker, front-wheeled  -CS     Balance Interventions sitting;sit to stand;standing;occupation based/functional task;weight shifting activity;UE activity with balance activity;dynamic reaching  -CS     Comment, Balance L lateral lean persists in standing  -CS               User Key  (r) = Recorded By, (t) = Taken By, (c) = Cosigned By      Initials Name Provider Type    Adam Bhakta, OT Occupational Therapist                   Goals/Plan    No documentation.                  Clinical Impression       Row Name 02/02/24 1025          Pain Assessment    Additional Documentation Pain Scale: FACES Pre/Post-Treatment (Group)  -       Row Name 02/02/24 1025          Pain Scale: FACES Pre/Post-Treatment    Pain: FACES Scale, Pretreatment 0-->no hurt  -CS     Posttreatment Pain Rating 0-->no hurt  -CS       Row Name 02/02/24 1025          Plan of Care Review    Plan of Care Reviewed With patient  -CS     Progress improving  -     Outcome Evaluation Pt demonstrates good recall on LUE HEP, excellent effort during ADL transfer training, and engaged in dao-dressing education. Remains below baseline due to ongoing L-sided deficits, will cont IPOT per POC as tolerated. Rec d/c to IRF for best functional outcomes.  -       Row Name 02/02/24 1025          Therapy Plan Review/Discharge Plan (OT)    Anticipated Discharge Disposition (OT) inpatient rehabilitation facility  -       Row Name 02/02/24 1025          Vital Signs    Pre Systolic BP Rehab --  RN cleared for tx  -CS     O2 Delivery Pre Treatment room air  -CS     O2 Delivery Intra Treatment room air  -CS     O2  Delivery Post Treatment room air  -CS     Pre Patient Position Sitting  -CS     Intra Patient Position Standing  -CS     Post Patient Position Sitting  -CS       Row Name 02/02/24 1025          Positioning and Restraints    Pre-Treatment Position bedside commode  -CS     Post Treatment Position chair  -CS     In Chair notified nsg;reclined;call light within reach;sitting;with nsg;encouraged to call for assist;exit alarm on;legs elevated;LUE elevated  -CS               User Key  (r) = Recorded By, (t) = Taken By, (c) = Cosigned By      Initials Name Provider Type    CS Adam Claros, OT Occupational Therapist                   Outcome Measures       Row Name 02/02/24 1028          How much help from another is currently needed...    Putting on and taking off regular lower body clothing? 2  -CS     Bathing (including washing, rinsing, and drying) 2  -CS     Toileting (which includes using toilet bed pan or urinal) 2  -CS     Putting on and taking off regular upper body clothing 3  -CS     Taking care of personal grooming (such as brushing teeth) 3  -CS     Eating meals 2  -CS     AM-PAC 6 Clicks Score (OT) 14  -CS       Row Name 02/02/24 0800          How much help from another person do you currently need...    Turning from your back to your side while in flat bed without using bedrails? 2  -AS     Moving from lying on back to sitting on the side of a flat bed without bedrails? 2  -AS     Moving to and from a bed to a chair (including a wheelchair)? 3  -AS     Standing up from a chair using your arms (e.g., wheelchair, bedside chair)? 3  -AS     Climbing 3-5 steps with a railing? 2  -AS     To walk in hospital room? 3  -AS     AM-PAC 6 Clicks Score (PT) 15  -AS     Highest Level of Mobility Goal 4 --> Transfer to chair/commode  -AS       Row Name 02/02/24 1028          Modified [place] Scale    Modified [place] Scale 4 - Moderately severe disability.  Unable to walk without assistance, and unable to attend to own  bodily needs without assistance.  -       Row Name 02/02/24 1028          Functional Assessment    Outcome Measure Options AM-PAC 6 Clicks Daily Activity (OT);Modified [place]  -               User Key  (r) = Recorded By, (t) = Taken By, (c) = Cosigned By      Initials Name Provider Type     Adam Claros OT Occupational Therapist    AS Rica Wade, RN Registered Nurse                    Occupational Therapy Education       Title: PT OT SLP Therapies (In Progress)       Topic: Occupational Therapy (Done)       Point: ADL training (Done)       Description:   Instruct learner(s) on proper safety adaptation and remediation techniques during self care or transfers.   Instruct in proper use of assistive devices.                  Learning Progress Summary             Patient Acceptance, E,TB,D, VU,DU by  at 2/2/2024 1028    Acceptance, E,D, VU,DU by  at 1/29/2024 1529    Acceptance, E, NR by 1 at 1/24/2024 1312    Acceptance, E, NR by [facility] at 1/22/2024 1055    Acceptance, E, VU by [facility] at 1/20/2024 1354                         Point: Home exercise program (Done)       Description:   Instruct learner(s) on appropriate technique for monitoring, assisting and/or progressing therapeutic exercises/activities.                  Learning Progress Summary             Patient Acceptance, E,TB,D, VU,DU by  at 2/2/2024 1028    Acceptance, E,D, VU,DU by  at 1/29/2024 1529    Acceptance, E, NR by 1 at 1/24/2024 1312    Acceptance, E, NR by [facility] at 1/22/2024 1055                         Point: Precautions (Done)       Description:   Instruct learner(s) on prescribed precautions during self-care and functional transfers.                  Learning Progress Summary             Patient Acceptance, E,TB,D, VU,DU by  at 2/2/2024 1028    Acceptance, E,D, VU,DU by  at 1/29/2024 1529    Acceptance, E, NR by 1 at 1/24/2024 1312    Acceptance, E, NR by [facility] at 1/22/2024 1055    Acceptance, E, VU by [facility] at 1/20/2024 1354                          Point: Body mechanics (Done)       Description:   Instruct learner(s) on proper positioning and spine alignment during self-care, functional mobility activities and/or exercises.                  Learning Progress Summary             Patient Acceptance, E,TB,D, VU,DU by  at 2/2/2024 1028    Acceptance, E,D, VU,DU by  at 1/29/2024 1529    Acceptance, E, NR by 1 at 1/24/2024 1312    Acceptance, E, NR by 1 at 1/22/2024 1055    Acceptance, E, VU by [facility] at 1/20/2024 1354                                         User Key       Initials Effective Dates Name Provider Type Discipline    [facility] 09/02/21 -  Maribel Valdez, OT Occupational Therapist OT     06/16/21 -  Adam Claros OT Occupational Therapist OT                  OT Recommendation and Plan  Planned Therapy Interventions (OT): functional balance retraining, occupation/activity based interventions, ROM/therapeutic exercise, strengthening exercise, transfer/mobility retraining, neuromuscular control/coordination retraining, patient/caregiver education/training, adaptive equipment training  Plan of Care Review  Plan of Care Reviewed With: patient  Progress: improving  Outcome Evaluation: Pt demonstrates good recall on LUE HEP, excellent effort during ADL transfer training, and engaged in dao-dressing education. Remains below baseline due to ongoing L-sided deficits, will cont IPOT per POC as tolerated. Rec d/c to IRF for best functional outcomes.     Time Calculation:         Time Calculation- OT       Row Name 02/02/24 1028             Time Calculation- OT    OT Start Time 0951  -CS      OT Received On 02/02/24  -      OT Goal Re-Cert Due Date 02/08/24  -CS         Timed Charges    72277 - OT Therapeutic Exercise Minutes 10  -CS      79896 - OT Therapeutic Activity Minutes 20  -CS      20719 - OT Self Care/Mgmt Minutes 10  -CS         Total Minutes    Timed Charges Total Minutes 40  -CS       Total Minutes 40  -CS                 User Key  (r) = Recorded By, (t) = Taken By, (c) = Cosigned By      Initials Name Provider Type    CS Adam Claros OT Occupational Therapist                  Therapy Charges for Today       Code Description Service Date Service Provider Modifiers Qty    70153597015 HC OT THER PROC EA 15 MIN 2/2/2024 Adam Claros, OT GO 1    77443281305 HC OT THERAPEUTIC ACT EA 15 MIN 2/2/2024 Adam Claros OT GO 1    06856120790 HC OT SELF CARE/MGMT/TRAIN EA 15 MIN 2/2/2024 Adam Claros, OT GO 1    07717681164 HC OT THER SUPP EA 15 MIN 2/2/2024 Adam Claros OT GO 2                 Adam Claros OT  2/2/2024

## 2024-02-02 NOTE — CASE MANAGEMENT/SOCIAL WORK
Case Management Discharge Note      Final Note: Ms. Beckham has a rehab bed at [facility] today if medically ready. Confirmed with Daphne with facility. Updated Ms. Beckham and she is agreeable with discharge plan. Per patient, she has no one to transport her to facility. She will be transported by [employer] wheelchair van at 1:00pm. Transportation approved by Karen PASCUAL. [facility] by facility [facility]. Please call report to [phone]. No need to fax discharge summary.         Selected Continued Care - Admitted Since 1/19/2024       Destination Coordination complete.      Service Provider Selected Services Address Phone Fax Patient Preferred    [address] [address] [phone] --                          Transportation Services  W/C Van:  ([employer] wheelchair van)    Final Discharge Disposition Code: 03 - skilled nursing facility (SNF)

## 2024-02-02 NOTE — PLAN OF CARE
Goal Outcome Evaluation:  Plan of Care Reviewed With: patient        Progress: improving  Outcome Evaluation: Pt demonstrates good recall on LUE HEP, excellent effort during ADL transfer training, and engaged in dao-dressing education. Remains below baseline due to ongoing L-sided deficits, will cont IPOT per POC as tolerated. Rec d/c to IRF for best functional outcomes.      Anticipated Discharge Disposition (OT): inpatient rehabilitation facility

## 2024-02-04 ENCOUNTER — APPOINTMENT (OUTPATIENT)
Dept: CT IMAGING | Facility: HOSPITAL | Age: 69
End: 2024-02-04
Payer: MEDICARE

## 2024-02-04 ENCOUNTER — APPOINTMENT (OUTPATIENT)
Dept: ULTRASOUND IMAGING | Facility: HOSPITAL | Age: 69
End: 2024-02-04
Payer: MEDICARE

## 2024-02-04 ENCOUNTER — APPOINTMENT (OUTPATIENT)
Dept: GENERAL RADIOLOGY | Facility: HOSPITAL | Age: 69
End: 2024-02-04
Payer: MEDICARE

## 2024-02-04 ENCOUNTER — HOSPITAL ENCOUNTER (EMERGENCY)
Facility: HOSPITAL | Age: 69
Discharge: NURSING, REHAB OR LONG-TERM CARE | End: 2024-02-05
Attending: EMERGENCY MEDICINE | Admitting: EMERGENCY MEDICINE
Payer: MEDICARE

## 2024-02-04 DIAGNOSIS — M79.602 LEFT ARM PAIN: ICD-10-CM

## 2024-02-04 DIAGNOSIS — R51.9 INTRACTABLE HEADACHE, UNSPECIFIED CHRONICITY PATTERN, UNSPECIFIED HEADACHE TYPE: Primary | ICD-10-CM

## 2024-02-04 LAB
ALBUMIN SERPL-MCNC: 3.4 G/DL (ref 3.5–5.2)
ALBUMIN/GLOB SERPL: 0.9 G/DL
ALP SERPL-CCNC: 103 U/L (ref 39–117)
ALT SERPL W P-5'-P-CCNC: 34 U/L (ref 1–33)
ANION GAP SERPL CALCULATED.3IONS-SCNC: 10.6 MMOL/L (ref 5–15)
AST SERPL-CCNC: 28 U/L (ref 1–32)
BASOPHILS # BLD AUTO: 0.07 10*3/MM3 (ref 0–0.2)
BASOPHILS NFR BLD AUTO: 0.7 % (ref 0–1.5)
BILIRUB SERPL-MCNC: 0.2 MG/DL (ref 0–1.2)
BUN SERPL-MCNC: 18 MG/DL (ref 8–23)
BUN/CREAT SERPL: 18 (ref 7–25)
CALCIUM SPEC-SCNC: 9.2 MG/DL (ref 8.6–10.5)
CHLORIDE SERPL-SCNC: 107 MMOL/L (ref 98–107)
CO2 SERPL-SCNC: 23.4 MMOL/L (ref 22–29)
CREAT SERPL-MCNC: 1 MG/DL (ref 0.57–1)
DEPRECATED RDW RBC AUTO: 47 FL (ref 37–54)
EGFRCR SERPLBLD CKD-EPI 2021: 61.1 ML/MIN/1.73
EOSINOPHIL # BLD AUTO: 0.25 10*3/MM3 (ref 0–0.4)
EOSINOPHIL NFR BLD AUTO: 2.4 % (ref 0.3–6.2)
ERYTHROCYTE [DISTWIDTH] IN BLOOD BY AUTOMATED COUNT: 13.3 % (ref 12.3–15.4)
GLOBULIN UR ELPH-MCNC: 3.6 GM/DL
GLUCOSE SERPL-MCNC: 152 MG/DL (ref 65–99)
HCT VFR BLD AUTO: 30 % (ref 34–46.6)
HGB BLD-MCNC: 9.9 G/DL (ref 12–15.9)
HOLD SPECIMEN: NORMAL
HOLD SPECIMEN: NORMAL
IMM GRANULOCYTES # BLD AUTO: 0.05 10*3/MM3 (ref 0–0.05)
IMM GRANULOCYTES NFR BLD AUTO: 0.5 % (ref 0–0.5)
LYMPHOCYTES # BLD AUTO: 1.67 10*3/MM3 (ref 0.7–3.1)
LYMPHOCYTES NFR BLD AUTO: 16.2 % (ref 19.6–45.3)
MCH RBC QN AUTO: 32.2 PG (ref 26.6–33)
MCHC RBC AUTO-ENTMCNC: 33 G/DL (ref 31.5–35.7)
MCV RBC AUTO: 97.7 FL (ref 79–97)
MONOCYTES # BLD AUTO: 0.77 10*3/MM3 (ref 0.1–0.9)
MONOCYTES NFR BLD AUTO: 7.5 % (ref 5–12)
NEUTROPHILS NFR BLD AUTO: 7.49 10*3/MM3 (ref 1.7–7)
NEUTROPHILS NFR BLD AUTO: 72.7 % (ref 42.7–76)
NRBC BLD AUTO-RTO: 0 /100 WBC (ref 0–0.2)
PLATELET # BLD AUTO: 426 10*3/MM3 (ref 140–450)
PMV BLD AUTO: 8.7 FL (ref 6–12)
POTASSIUM SERPL-SCNC: 3.7 MMOL/L (ref 3.5–5.2)
PROT SERPL-MCNC: 7 G/DL (ref 6–8.5)
RBC # BLD AUTO: 3.07 10*6/MM3 (ref 3.77–5.28)
SODIUM SERPL-SCNC: 141 MMOL/L (ref 136–145)
WBC NRBC COR # BLD AUTO: 10.3 10*3/MM3 (ref 3.4–10.8)
WHOLE BLOOD HOLD COAG: NORMAL
WHOLE BLOOD HOLD SPECIMEN: NORMAL

## 2024-02-04 PROCEDURE — 93005 ELECTROCARDIOGRAM TRACING: CPT | Performed by: EMERGENCY MEDICINE

## 2024-02-04 PROCEDURE — 70450 CT HEAD/BRAIN W/O DYE: CPT

## 2024-02-04 PROCEDURE — 80053 COMPREHEN METABOLIC PANEL: CPT | Performed by: EMERGENCY MEDICINE

## 2024-02-04 PROCEDURE — 73090 X-RAY EXAM OF FOREARM: CPT

## 2024-02-04 PROCEDURE — 84484 ASSAY OF TROPONIN QUANT: CPT | Performed by: EMERGENCY MEDICINE

## 2024-02-04 PROCEDURE — 73130 X-RAY EXAM OF HAND: CPT

## 2024-02-04 PROCEDURE — 99284 EMERGENCY DEPT VISIT MOD MDM: CPT

## 2024-02-04 PROCEDURE — 73060 X-RAY EXAM OF HUMERUS: CPT

## 2024-02-04 PROCEDURE — 93971 EXTREMITY STUDY: CPT

## 2024-02-04 PROCEDURE — 93010 ELECTROCARDIOGRAM REPORT: CPT | Performed by: INTERNAL MEDICINE

## 2024-02-04 PROCEDURE — 70450 CT HEAD/BRAIN W/O DYE: CPT | Performed by: RADIOLOGY

## 2024-02-04 PROCEDURE — 85025 COMPLETE CBC W/AUTO DIFF WBC: CPT | Performed by: EMERGENCY MEDICINE

## 2024-02-04 RX ORDER — HYDROCODONE BITARTRATE AND ACETAMINOPHEN 5; 325 MG/1; MG/1
1 TABLET ORAL ONCE
Status: COMPLETED | OUTPATIENT
Start: 2024-02-04 | End: 2024-02-04

## 2024-02-04 RX ADMIN — HYDROCODONE BITARTRATE AND ACETAMINOPHEN 1 TABLET: 5; 325 TABLET ORAL at 23:21

## 2024-02-05 VITALS
SYSTOLIC BLOOD PRESSURE: 142 MMHG | RESPIRATION RATE: 16 BRPM | HEIGHT: 66 IN | BODY MASS INDEX: 25.55 KG/M2 | WEIGHT: 159 LBS | HEART RATE: 78 BPM | OXYGEN SATURATION: 97 % | DIASTOLIC BLOOD PRESSURE: 65 MMHG | TEMPERATURE: 98.1 F

## 2024-02-05 LAB
QT INTERVAL: 330 MS
QTC INTERVAL: 412 MS
TROPONIN T SERPL HS-MCNC: 18 NG/L
TROPONIN T SERPL HS-MCNC: 20 NG/L

## 2024-02-05 PROCEDURE — 73090 X-RAY EXAM OF FOREARM: CPT | Performed by: RADIOLOGY

## 2024-02-05 PROCEDURE — 36415 COLL VENOUS BLD VENIPUNCTURE: CPT

## 2024-02-05 PROCEDURE — 93971 EXTREMITY STUDY: CPT | Performed by: RADIOLOGY

## 2024-02-05 PROCEDURE — 73130 X-RAY EXAM OF HAND: CPT | Performed by: RADIOLOGY

## 2024-02-05 PROCEDURE — 73060 X-RAY EXAM OF HUMERUS: CPT | Performed by: RADIOLOGY

## 2024-02-05 PROCEDURE — 84484 ASSAY OF TROPONIN QUANT: CPT | Performed by: EMERGENCY MEDICINE

## 2024-02-05 NOTE — DISCHARGE INSTRUCTIONS
We have consulted  to try to get you placed soon at the [facility].  Follow-up with your primary care doctor in the next couple days for recheck.  Return to the emergency department right away if symptoms worsen/any problems.

## 2024-02-05 NOTE — CASE MANAGEMENT/SOCIAL WORK
Discharge Planning Assessment  [facility]     Patient Name: Regine Beckham  MRN: [id number]  Today's Date: 2/5/2024    Admit Date: 2/4/2024        Discharge Plan       Row Name 02/05/24 1130       Plan    Final Discharge Disposition Code 03 - skilled nursing facility (SNF)    Final Note SS received consult pt currently in nursing home in [facility], pt requesting to see about being moved to nursing home in [place] due to family and self from [place] ([facility]errably the [facility]). Pt was discharged from ED. No needs identified.                  Selected Continued Care - Prior Encounters Includes continued care and service providers with selected services from prior encounters from 11/6/2023 to 2/5/2024      Discharged on 2/2/2024 Admission date: 1/19/2024 - Discharge disposition: Rehab Facility or Unit (DC - External)      Destination       Service Provider Selected Services Address Phone Fax Patient Preferred    [facility] [address] [address] [phone] --                        SAMARA Burnett

## 2024-02-05 NOTE — ED PROVIDER NOTES
"Subjective   History of Present Illness  Patient is 68-year-old female who is sent from [facility] in [facility].  They reported that she slid out of her wheelchair without apparent injury but then complained of pain in her left arm.  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  Her chief complaint to me upon arrival here is \"I don't like the nursing home where I am staying, I want you to get me admitted to the [facility] here in [place]\".  She states that she is from [place] and all of her friends and family are here.  She states that there were no beds available at the [facility] or in [place] when she was discharged from [facility] on Friday.  She does complain that she has had a right frontal headache ever since having this stroke.  She complains that she has had pain throughout her left arm, but primarily in the hand at the base of the left thumb, since sliding out of her wheelchair.  She states that the recent stroke left her with a left-sided facial droop and left arm weakness, she states that these symptoms are at baseline and unchanged from previous.  She denies any new neurological symptoms of any kind.  She denies head injury, neck pain, back pain, chest pain, shortness of breath, abdominal pain, vomiting, diarrhea, hematemesis, hematochezia or melena, syncope or near syncope, focal numbness or weakness, other symptoms or other complaints.      Review of Systems   All other systems reviewed and are negative.      Past Medical History:   Diagnosis Date    Anemia     Anxiety     Arthritis     Depression     Legally blind     Restless leg syndrome     Sleep apnea     \"I don't use a cpap anymore since losing 106 lbs\"    Water retention        Allergies   Allergen Reactions    Penicillins Hives and Swelling       Past Surgical History:   Procedure Laterality Date    BACK SURGERY      CARDIAC " "CATHETERIZATION N/A 1/19/2024    Procedure: Percutaneous Manual Thrombectomy;  Surgeon: Efrain Hurley MD;  Location: [facility] CATH INVASIVE LOCATION;  Service: Interventional Radiology;  Laterality: N/A;    GALLBLADDER SURGERY      HIP ARTHROSCOPY      JOINT REPLACEMENT Right        Family History   Problem Relation Age of Onset    Breast cancer Neg Hx        Social History     Socioeconomic History    Marital status:     Number of children: 0    Years of education: 1 yr college   Tobacco Use    Smoking status: Every Day     Packs/day: 1.50     Years: 51.00     Additional pack years: 0.00     Total pack years: 76.50     Types: Cigarettes    Smokeless tobacco: Never   Vaping Use    Vaping Use: Never used   Substance and Sexual Activity    Alcohol use: Yes     Alcohol/week: 1.0 standard drink of alcohol     Types: 1 Glasses of wine per week     Comment: \"occasionally - once every two months\"    Drug use: Yes     Comment: alcohol - occasionally    Sexual activity: Defer           Objective   Physical Exam  Vitals and nursing note reviewed.   Constitutional:       General: She is not in acute distress.     Appearance: She is well-developed. She is not toxic-appearing or diaphoretic.      Comments: Well-developed well-nourished female, alert and well-oriented, in no apparent acute distress.  She does not appear acutely ill.   HENT:      Head: Normocephalic and atraumatic.   Eyes:      General: No scleral icterus.     Pupils: Pupils are equal, round, and reactive to light.   Neck:      Trachea: No tracheal deviation.   Cardiovascular:      Rate and Rhythm: Normal rate and regular rhythm.   Pulmonary:      Effort: Pulmonary effort is normal. No respiratory distress.      Breath sounds: Normal breath sounds.   Chest:      Chest wall: No tenderness.   Abdominal:      General: Bowel sounds are normal.      Palpations: Abdomen is soft.      Tenderness: There is no abdominal tenderness. There is no guarding or " rebound.   Musculoskeletal:         General: No tenderness. Normal range of motion.      Cervical back: Normal range of motion and neck supple. No rigidity or tenderness.      Right lower leg: No edema.      Left lower leg: No edema.      Comments: There is some tenderness of the left hand at the first metacarpal area.  There is a Coban dressing in the mid left forearm that the patient states is still in place from when she left [facility], presumably at an IV site.  This appears to be a bit tight, and there is some soft tissue swelling distal to this.  Good radial pulse, brisk capillary refill.  This dressing is removed.  No other localized musculoskeletal tenderness.   Skin:     General: Skin is warm and dry.      Capillary Refill: Capillary refill takes less than 2 seconds.      Coloration: Skin is not pale.   Neurological:      Mental Status: She is alert and oriented to person, place, and time.      GCS: GCS eye subscore is 4. GCS verbal subscore is 5. GCS motor subscore is 6.      Motor: No abnormal muscle tone.      Comments: Left facial droop.  Left arm drift, does not hit the bed.  No other focal neurological deficits.   Psychiatric:         Mood and Affect: Mood normal.         Behavior: Behavior normal.         Procedures  EKG shows sinus rhythm, rate 94.  MD interval 142, QRS duration 92, QTc 412 ms.  Nonspecific T wave abnormality.  No evidence for STEMI.  US Venous Doppler Upper Extremity Left (duplex)   Final Result       1.  No DVT in the left upper extremity.       This report was finalized on 2/5/2024 1:29 AM by Michael Abarca MD.          XR Hand 3+ View Left   Final Result       1.  No acute fracture or dislocation.   2.  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   3.  Small osteochondral bodies noted proximal to the base of the left   first metacarpal bone.       This report was finalized on 2/5/2024 1:48 AM by Michael Abarca MD.          XR Forearm 2 View Left   Final Result        1.  No acute fracture or dislocation.   2.  No soft tissue swelling.   3.  No foreign body.       This report was finalized on 2/5/2024 1:46 AM by Michael Abarca MD.          XR Humerus Left   Final Result   Impression:       1.  No acute fracture or dislocation.   2.  No lytic or blastic lesion.       This report was finalized on 2/5/2024 1:45 AM by Michael Abarca MD.          CT Head Without Contrast   Final Result       1.  Mild generalized brain volume loss with moderate chronic small   vessel ischemic type changes in the white matter.   2.  No acute intracranial hemorrhage, mass, infarct, or edema.   3.  Acute sinusitis involving the right sphenoid sinus.   4.  No fracture or scalp hematoma.       This report was finalized on 2/4/2024 10:47 PM by Michael Abarca MD.            Results for orders placed or performed during the hospital encounter of 02/04/24   Comprehensive Metabolic Panel    Specimen: Arm, Left; Blood   Result Value Ref Range    Glucose 152 (H) 65 - 99 mg/dL    BUN 18 8 - 23 mg/dL    Creatinine 1.00 0.57 - 1.00 mg/dL    Sodium 141 136 - 145 mmol/L    Potassium 3.7 3.5 - 5.2 mmol/L    Chloride 107 98 - 107 mmol/L    CO2 23.4 22.0 - 29.0 mmol/L    Calcium 9.2 8.6 - 10.5 mg/dL    Total Protein 7.0 6.0 - 8.5 g/dL    Albumin 3.4 (L) 3.5 - 5.2 g/dL    ALT (SGPT) 34 (H) 1 - 33 U/L    AST (SGOT) 28 1 - 32 U/L    Alkaline Phosphatase 103 39 - 117 U/L    Total Bilirubin 0.2 0.0 - 1.2 mg/dL    Globulin 3.6 gm/dL    A/G Ratio 0.9 g/dL    BUN/Creatinine Ratio 18.0 7.0 - 25.0    Anion Gap 10.6 5.0 - 15.0 mmol/L    eGFR 61.1 >60.0 mL/min/1.73   CBC Auto Differential    Specimen: Arm, Left; Blood   Result Value Ref Range    WBC 10.30 3.40 - 10.80 10*3/mm3    RBC 3.07 (L) 3.77 - 5.28 10*6/mm3    Hemoglobin 9.9 (L) 12.0 - 15.9 g/dL    Hematocrit 30.0 (L) 34.0 - 46.6 %    MCV 97.7 (H) 79.0 - 97.0 fL    MCH 32.2 26.6 - 33.0 pg    MCHC 33.0 31.5 - 35.7 g/dL    RDW 13.3 12.3 - 15.4 %    RDW-SD 47.0 37.0 - 54.0  fl    MPV 8.7 6.0 - 12.0 fL    Platelets 426 140 - 450 10*3/mm3    Neutrophil % 72.7 42.7 - 76.0 %    Lymphocyte % 16.2 (L) 19.6 - 45.3 %    Monocyte % 7.5 5.0 - 12.0 %    Eosinophil % 2.4 0.3 - 6.2 %    Basophil % 0.7 0.0 - 1.5 %    Immature Grans % 0.5 0.0 - 0.5 %    Neutrophils, Absolute 7.49 (H) 1.70 - 7.00 10*3/mm3    Lymphocytes, Absolute 1.67 0.70 - 3.10 10*3/mm3    Monocytes, Absolute 0.77 0.10 - 0.90 10*3/mm3    Eosinophils, Absolute 0.25 0.00 - 0.40 10*3/mm3    Basophils, Absolute 0.07 0.00 - 0.20 10*3/mm3    Immature Grans, Absolute 0.05 0.00 - 0.05 10*3/mm3    nRBC 0.0 0.0 - 0.2 /100 WBC   Single High Sensitivity Troponin T    Specimen: Arm, Left; Blood   Result Value Ref Range    HS Troponin T 18 (H) <14 ng/L   Single High Sensitivity Troponin T    Specimen: Arm, Left; Blood   Result Value Ref Range    HS Troponin T 20 (H) <14 ng/L   ECG 12 Lead QT Measurement   Result Value Ref Range    QT Interval 330 ms    QTC Interval 412 ms   Green Top (Gel)   Result Value Ref Range    Extra Tube Hold for add-ons.    Lavender Top   Result Value Ref Range    Extra Tube hold for add-on    Gold Top - SST   Result Value Ref Range    Extra Tube Hold for add-ons.    Light Blue Top   Result Value Ref Range    Extra Tube Hold for add-ons.                 ED Course  ED Course as of 02/05/24 0243   Mon Feb 05, 2024   0105 CT head has been resulted.  Still awaiting radiologist interpretation of the other studies.  Advised radiology to contact the radiologist regarding these study results. [CM]   0204 Radiology studies have now resulted.  The patient's emergency department stay has been uneventful.  She has shown no signs of acute distress.  Our lead nurse did put in a  consult to have them work on getting the patient transferred to a nursing home here in [place], preferably the [facility]. [CM]      ED Course User Index  [CM] Tulio Perez MD                                             Medical  Decision Making  Amount and/or Complexity of Data Reviewed  Labs: ordered. Decision-making details documented in ED Course.  Radiology: ordered. Decision-making details documented in ED Course.  ECG/medicine tests: ordered. Decision-making details documented in ED Course.    Risk  Prescription drug management.        Final diagnoses:   Intractable headache, unspecified chronicity pattern, unspecified headache type   Left arm pain       ED Disposition  ED Disposition       ED Disposition   Discharge    Condition   Stable    Comment   --               Dread Burton MD  [address] [place] [id number]  [phone]    Go in 2 days      [facility] EMERGENCY DEPARTMENT  [address] [place] [id number]  [phone]  Go to            Medication List      No changes were made to your prescriptions during this visit.       Please note that portions of this note were completed with a voice recognition program.        Tulio Perez MD  02/05/24 1582

## 2024-02-05 NOTE — ED NOTES
Contacted [facility] at this time and provided report back to her. I informed her about the  consult to be placed in a closer facility.

## 2024-02-13 ENCOUNTER — APPOINTMENT (OUTPATIENT)
Dept: GENERAL RADIOLOGY | Facility: HOSPITAL | Age: 69
End: 2024-02-13
Payer: MEDICARE

## 2024-02-13 ENCOUNTER — HOSPITAL ENCOUNTER (EMERGENCY)
Facility: HOSPITAL | Age: 69
Discharge: NURSING, REHAB OR LONG-TERM CARE | End: 2024-02-13
Attending: STUDENT IN AN ORGANIZED HEALTH CARE EDUCATION/TRAINING PROGRAM | Admitting: STUDENT IN AN ORGANIZED HEALTH CARE EDUCATION/TRAINING PROGRAM
Payer: MEDICARE

## 2024-02-13 VITALS
WEIGHT: 159 LBS | HEIGHT: 66 IN | HEART RATE: 86 BPM | OXYGEN SATURATION: 96 % | SYSTOLIC BLOOD PRESSURE: 147 MMHG | BODY MASS INDEX: 25.55 KG/M2 | RESPIRATION RATE: 18 BRPM | TEMPERATURE: 98.2 F | DIASTOLIC BLOOD PRESSURE: 104 MMHG

## 2024-02-13 DIAGNOSIS — M79.602 LEFT ARM PAIN: Primary | ICD-10-CM

## 2024-02-13 DIAGNOSIS — S30.0XXA CONTUSION OF BUTTOCK, INITIAL ENCOUNTER: ICD-10-CM
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
## 2024-02-13 PROCEDURE — 72170 X-RAY EXAM OF PELVIS: CPT | Performed by: RADIOLOGY

## 2024-02-13 PROCEDURE — 72170 X-RAY EXAM OF PELVIS: CPT

## 2024-02-13 PROCEDURE — 99283 EMERGENCY DEPT VISIT LOW MDM: CPT

## 2024-02-13 RX ORDER — CYCLOBENZAPRINE HCL 10 MG
5 TABLET ORAL ONCE
Status: COMPLETED | OUTPATIENT
Start: 2024-02-13 | End: 2024-02-13

## 2024-02-13 RX ADMIN — CYCLOBENZAPRINE 5 MG: 10 TABLET, FILM COATED ORAL at 06:31

## 2024-02-13 NOTE — DISCHARGE INSTRUCTIONS
The pain in your arm is likely from your muscles being tight, also called spasticity, caused by your stroke. Please follow up with your doctor for your pain.    We did an xray of you pelvis which did not show any fractures.

## 2024-02-13 NOTE — ED PROVIDER NOTES
"Subjective   History of Present Illness    68-year-old female with past ministry of recent CVA with left-sided deficits and speech deficits  and spasticity presenting for multiple complaints.  Primary complaint is that she feels that the nursing home staff are not caring enough and she wants to go to a different nursing home.  Patient has chronic left arm pain since her stroke which is unchanged from baseline.  No new deficits.  Patient states that she fell several days ago onto her buttocks and reports pain to her buttock area.  No reported head strike.  States that she was evaluated at the nursing home but denies having any imaging done of her pelvis area.    Review of Systems    Past Medical History:   Diagnosis Date    Anemia     Anxiety     Arthritis     Depression     Legally blind     Restless leg syndrome     Sleep apnea     \"I don't use a cpap anymore since losing 106 lbs\"    Water retention        Allergies   Allergen Reactions    Penicillins Hives and Swelling       Past Surgical History:   Procedure Laterality Date    BACK SURGERY      CARDIAC CATHETERIZATION N/A 1/19/2024    Procedure: Percutaneous Manual Thrombectomy;  Surgeon: Efrain Hurley MD;  [facility]: [facility] C[facility] INVASIVE LOCATION;  Service: Interventional Radiology;  Laterality: N/A;    GALLBLADDER SURGERY      HIP ARTHROSCOPY      JOINT REPLACEMENT Right        Family History   Problem Relation Age of Onset    Breast cancer Neg Hx        Social History     Socioeconomic History    Marital status:     Number of children: 0    Years of education: 1 yr college   Tobacco Use    Smoking status: Every Day     Packs/day: 1.50     Years: 51.00     Additional pack years: 0.00     Total pack years: 76.50     Types: Cigarettes    Smokeless tobacco: Never   Vaping Use    Vaping Use: Never used   Substance and Sexual Activity    Alcohol use: Yes     Alcohol/week: 1.0 standard drink of alcohol     Types: 1 Glasses of wine per week     Comment: " "\"occasionally - once every two months\"    Drug use: Yes     Comment: alcohol - occasionally    Sexual activity: Defer           Objective   Physical Exam  Vitals and nursing note reviewed.   Constitutional:       General: She is not in acute distress.  HENT:      Head: Normocephalic and atraumatic.      Mouth/Throat:      Mouth: Mucous membranes are moist.   Eyes:      Extraocular Movements: Extraocular movements intact.      Pupils: Pupils are equal, round, and reactive to light.   Cardiovascular:      Rate and Rhythm: Normal rate and regular rhythm.      Pulses: Normal pulses.   Pulmonary:      Effort: Pulmonary effort is normal.      Breath sounds: Normal breath sounds.   Abdominal:      General: Abdomen is flat. There is no distension.      Palpations: Abdomen is soft.      Tenderness: There is no abdominal tenderness.   Musculoskeletal:      Cervical back: Normal range of motion and neck supple.   Skin:     General: Skin is warm and dry.      Comments: Small healing contusion to right buttock    Neurological:      Mental Status: She is alert and oriented to person, place, and time.      Comments: Mild left facial droop, mild dysarthria, left arm and leg weakness, left arm with mild spasticity         Procedures           ED Course                                             Medical Decision Making  Problems Addressed:  Contusion of buttock, initial encounter: complicated acute illness or injury  Fall, initial encounter: complicated acute illness or injury  Left arm pain: complicated acute illness or injury    Amount and/or Complexity of Data Reviewed  Radiology: ordered.    Risk  Prescription drug management.      Left arm pain chronic at baseline.  Appears to be related to spasticity from her prior stroke.  Will give dose of Flexeril.  Patient denies any injury to her left arm.  Doubt fracture.    Small healing contusion to right buttock, xray imaging reviewed and without obvious fracture.  Low suspicion for " occult fracture.    Patient does not have any acute neurologic deficits.  Doubt acute CVA.    Patient primary complaint is that she wishes to be in a different nursing home.  Explained to patient that there is a process that she or family can initiate at the nursing home to start transfer.  Patient then states that she is aware of this process but it is taking too long. Explained to patient that she needs to continue to pursue transfer as an outpatient.  Do not have any concerns for abuse or neglect at this time.    Final diagnoses:   Left arm pain   Fall, initial encounter   Contusion of buttock, initial encounter       ED Disposition  ED Disposition       ED Disposition   Discharge    Condition   Stable    Comment   --               Dread Burton MD  [address] [place], [place] [id number]  [phone]    Schedule an appointment as soon as possible for a visit            Medication List      No changes were made to your prescriptions during this visit.            Jihan Gross MD  02/13/24 0645

## 2024-03-17 ENCOUNTER — APPOINTMENT (OUTPATIENT)
Dept: CT IMAGING | Facility: HOSPITAL | Age: 69
End: 2024-03-17
Payer: MEDICARE

## 2024-03-17 ENCOUNTER — APPOINTMENT (OUTPATIENT)
Dept: GENERAL RADIOLOGY | Facility: HOSPITAL | Age: 69
End: 2024-03-17
Payer: MEDICARE

## 2024-03-17 ENCOUNTER — HOSPITAL ENCOUNTER (INPATIENT)
Facility: HOSPITAL | Age: 69
LOS: 7 days | Discharge: ANOTHER HOSPITAL | End: 2024-03-27
Attending: STUDENT IN AN ORGANIZED HEALTH CARE EDUCATION/TRAINING PROGRAM | Admitting: HOSPITALIST
Payer: MEDICARE
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
## 2024-03-17 DIAGNOSIS — R62.7 FAILURE TO THRIVE IN ADULT: ICD-10-CM

## 2024-03-17 DIAGNOSIS — M25.512 ACUTE PAIN OF LEFT SHOULDER: ICD-10-CM

## 2024-03-17 DIAGNOSIS — M25.552 LEFT HIP PAIN: Primary | ICD-10-CM

## 2024-03-17 PROBLEM — R29.6 FALLS FREQUENTLY: Status: ACTIVE | Noted: 2024-03-17

## 2024-03-17 LAB
ALBUMIN SERPL-MCNC: 3.8 G/DL (ref 3.5–5.2)
ALBUMIN/GLOB SERPL: 1.2 G/DL
ALP SERPL-CCNC: 97 U/L (ref 39–117)
ALT SERPL W P-5'-P-CCNC: 12 U/L (ref 1–33)
ANION GAP SERPL CALCULATED.3IONS-SCNC: 11.3 MMOL/L (ref 5–15)
AST SERPL-CCNC: 18 U/L (ref 1–32)
BASOPHILS # BLD AUTO: 0.09 10*3/MM3 (ref 0–0.2)
BASOPHILS NFR BLD AUTO: 1.1 % (ref 0–1.5)
BILIRUB SERPL-MCNC: 0.6 MG/DL (ref 0–1.2)
BUN SERPL-MCNC: 20 MG/DL (ref 8–23)
BUN/CREAT SERPL: 25.3 (ref 7–25)
CALCIUM SPEC-SCNC: 10 MG/DL (ref 8.6–10.5)
CHLORIDE SERPL-SCNC: 104 MMOL/L (ref 98–107)
CO2 SERPL-SCNC: 29.7 MMOL/L (ref 22–29)
CREAT SERPL-MCNC: 0.79 MG/DL (ref 0.57–1)
DEPRECATED RDW RBC AUTO: 45.6 FL (ref 37–54)
EGFRCR SERPLBLD CKD-EPI 2021: 81.1 ML/MIN/1.73
EOSINOPHIL # BLD AUTO: 0.26 10*3/MM3 (ref 0–0.4)
EOSINOPHIL NFR BLD AUTO: 3 % (ref 0.3–6.2)
ERYTHROCYTE [DISTWIDTH] IN BLOOD BY AUTOMATED COUNT: 13.3 % (ref 12.3–15.4)
GLOBULIN UR ELPH-MCNC: 3.3 GM/DL
GLUCOSE SERPL-MCNC: 116 MG/DL (ref 65–99)
HCT VFR BLD AUTO: 38.1 % (ref 34–46.6)
HGB BLD-MCNC: 12.2 G/DL (ref 12–15.9)
IMM GRANULOCYTES # BLD AUTO: 0.01 10*3/MM3 (ref 0–0.05)
IMM GRANULOCYTES NFR BLD AUTO: 0.1 % (ref 0–0.5)
LYMPHOCYTES # BLD AUTO: 2.28 10*3/MM3 (ref 0.7–3.1)
LYMPHOCYTES NFR BLD AUTO: 26.6 % (ref 19.6–45.3)
MCH RBC QN AUTO: 30.3 PG (ref 26.6–33)
MCHC RBC AUTO-ENTMCNC: 32 G/DL (ref 31.5–35.7)
MCV RBC AUTO: 94.8 FL (ref 79–97)
MONOCYTES # BLD AUTO: 0.64 10*3/MM3 (ref 0.1–0.9)
MONOCYTES NFR BLD AUTO: 7.5 % (ref 5–12)
NEUTROPHILS NFR BLD AUTO: 5.29 10*3/MM3 (ref 1.7–7)
NEUTROPHILS NFR BLD AUTO: 61.7 % (ref 42.7–76)
NRBC BLD AUTO-RTO: 0 /100 WBC (ref 0–0.2)
PLATELET # BLD AUTO: 307 10*3/MM3 (ref 140–450)
PMV BLD AUTO: 9.4 FL (ref 6–12)
POTASSIUM SERPL-SCNC: 4.3 MMOL/L (ref 3.5–5.2)
PROT SERPL-MCNC: 7.1 G/DL (ref 6–8.5)
RBC # BLD AUTO: 4.02 10*6/MM3 (ref 3.77–5.28)
SODIUM SERPL-SCNC: 145 MMOL/L (ref 136–145)
TSH SERPL DL<=0.05 MIU/L-ACNC: 2.88 UIU/ML (ref 0.27–4.2)
WBC NRBC COR # BLD AUTO: 8.57 10*3/MM3 (ref 3.4–10.8)

## 2024-03-17 PROCEDURE — 73030 X-RAY EXAM OF SHOULDER: CPT | Performed by: RADIOLOGY

## 2024-03-17 PROCEDURE — 99285 EMERGENCY DEPT VISIT HI MDM: CPT

## 2024-03-17 PROCEDURE — 82746 ASSAY OF FOLIC ACID SERUM: CPT | Performed by: HOSPITALIST

## 2024-03-17 PROCEDURE — 85025 COMPLETE CBC W/AUTO DIFF WBC: CPT | Performed by: STUDENT IN AN ORGANIZED HEALTH CARE EDUCATION/TRAINING PROGRAM

## 2024-03-17 PROCEDURE — 70450 CT HEAD/BRAIN W/O DYE: CPT | Performed by: RADIOLOGY

## 2024-03-17 PROCEDURE — 73502 X-RAY EXAM HIP UNI 2-3 VIEWS: CPT | Performed by: RADIOLOGY

## 2024-03-17 PROCEDURE — 82607 VITAMIN B-12: CPT | Performed by: HOSPITALIST

## 2024-03-17 PROCEDURE — 71045 X-RAY EXAM CHEST 1 VIEW: CPT

## 2024-03-17 PROCEDURE — 73502 X-RAY EXAM HIP UNI 2-3 VIEWS: CPT

## 2024-03-17 PROCEDURE — 71045 X-RAY EXAM CHEST 1 VIEW: CPT | Performed by: RADIOLOGY

## 2024-03-17 PROCEDURE — 80053 COMPREHEN METABOLIC PANEL: CPT | Performed by: STUDENT IN AN ORGANIZED HEALTH CARE EDUCATION/TRAINING PROGRAM

## 2024-03-17 PROCEDURE — G0378 HOSPITAL OBSERVATION PER HR: HCPCS

## 2024-03-17 PROCEDURE — 99223 1ST HOSP IP/OBS HIGH 75: CPT | Performed by: HOSPITALIST

## 2024-03-17 PROCEDURE — 70450 CT HEAD/BRAIN W/O DYE: CPT

## 2024-03-17 PROCEDURE — 73030 X-RAY EXAM OF SHOULDER: CPT

## 2024-03-17 PROCEDURE — P9612 CATHETERIZE FOR URINE SPEC: HCPCS

## 2024-03-17 PROCEDURE — 84443 ASSAY THYROID STIM HORMONE: CPT | Performed by: HOSPITALIST

## 2024-03-17 PROCEDURE — 93005 ELECTROCARDIOGRAM TRACING: CPT | Performed by: STUDENT IN AN ORGANIZED HEALTH CARE EDUCATION/TRAINING PROGRAM

## 2024-03-17 PROCEDURE — 25010000002 HEPARIN (PORCINE) PER 1000 UNITS: Performed by: HOSPITALIST

## 2024-03-17 RX ORDER — ASPIRIN 81 MG/1
81 TABLET ORAL DAILY
Status: CANCELLED | OUTPATIENT
Start: 2024-03-17

## 2024-03-17 RX ORDER — HEPARIN SODIUM 5000 [USP'U]/ML
5000 INJECTION, SOLUTION INTRAVENOUS; SUBCUTANEOUS EVERY 12 HOURS SCHEDULED
Status: DISCONTINUED | OUTPATIENT
Start: 2024-03-17 | End: 2024-03-27 | Stop reason: HOSPADM

## 2024-03-17 RX ORDER — PANTOPRAZOLE SODIUM 40 MG/1
40 TABLET, DELAYED RELEASE ORAL
Status: DISCONTINUED | OUTPATIENT
Start: 2024-03-17 | End: 2024-03-27 | Stop reason: HOSPADM

## 2024-03-17 RX ORDER — AMOXICILLIN 250 MG
2 CAPSULE ORAL 2 TIMES DAILY
Status: DISCONTINUED | OUTPATIENT
Start: 2024-03-17 | End: 2024-03-27 | Stop reason: HOSPADM

## 2024-03-17 RX ORDER — ASPIRIN 81 MG/1
81 TABLET ORAL DAILY
Status: ON HOLD | COMMUNITY

## 2024-03-17 RX ORDER — ASPIRIN 81 MG/1
81 TABLET, CHEWABLE ORAL DAILY
Status: DISCONTINUED | OUTPATIENT
Start: 2024-03-17 | End: 2024-03-27 | Stop reason: HOSPADM

## 2024-03-17 RX ORDER — ATORVASTATIN CALCIUM 40 MG/1
80 TABLET, FILM COATED ORAL NIGHTLY
Status: DISCONTINUED | OUTPATIENT
Start: 2024-03-17 | End: 2024-03-27 | Stop reason: HOSPADM

## 2024-03-17 RX ORDER — BISACODYL 5 MG/1
5 TABLET, DELAYED RELEASE ORAL DAILY PRN
Status: DISCONTINUED | OUTPATIENT
Start: 2024-03-17 | End: 2024-03-27 | Stop reason: HOSPADM

## 2024-03-17 RX ORDER — SODIUM CHLORIDE 9 MG/ML
40 INJECTION, SOLUTION INTRAVENOUS AS NEEDED
Status: DISCONTINUED | OUTPATIENT
Start: 2024-03-17 | End: 2024-03-27 | Stop reason: HOSPADM

## 2024-03-17 RX ORDER — GABAPENTIN 300 MG/1
300 CAPSULE ORAL 3 TIMES DAILY
Status: ON HOLD | COMMUNITY

## 2024-03-17 RX ORDER — MIRTAZAPINE 15 MG/1
30 TABLET, FILM COATED ORAL NIGHTLY
Status: DISCONTINUED | OUTPATIENT
Start: 2024-03-17 | End: 2024-03-27 | Stop reason: HOSPADM

## 2024-03-17 RX ORDER — LOSARTAN POTASSIUM 50 MG/1
50 TABLET ORAL DAILY
Status: DISCONTINUED | OUTPATIENT
Start: 2024-03-17 | End: 2024-03-27 | Stop reason: HOSPADM

## 2024-03-17 RX ORDER — SODIUM CHLORIDE 0.9 % (FLUSH) 0.9 %
10 SYRINGE (ML) INJECTION EVERY 12 HOURS SCHEDULED
Status: DISCONTINUED | OUTPATIENT
Start: 2024-03-17 | End: 2024-03-27 | Stop reason: HOSPADM

## 2024-03-17 RX ORDER — BISACODYL 10 MG
10 SUPPOSITORY, RECTAL RECTAL DAILY PRN
Status: DISCONTINUED | OUTPATIENT
Start: 2024-03-17 | End: 2024-03-27 | Stop reason: HOSPADM

## 2024-03-17 RX ORDER — POLYETHYLENE GLYCOL 3350 17 G/17G
17 POWDER, FOR SOLUTION ORAL DAILY PRN
Status: DISCONTINUED | OUTPATIENT
Start: 2024-03-17 | End: 2024-03-27 | Stop reason: HOSPADM

## 2024-03-17 RX ORDER — SODIUM CHLORIDE 0.9 % (FLUSH) 0.9 %
10 SYRINGE (ML) INJECTION AS NEEDED
Status: DISCONTINUED | OUTPATIENT
Start: 2024-03-17 | End: 2024-03-27 | Stop reason: HOSPADM

## 2024-03-17 RX ADMIN — Medication 10 ML: at 20:07

## 2024-03-17 RX ADMIN — MIRTAZAPINE 30 MG: 15 TABLET, FILM COATED ORAL at 20:07

## 2024-03-17 RX ADMIN — LOSARTAN POTASSIUM 50 MG: 50 TABLET, FILM COATED ORAL at 17:41

## 2024-03-17 RX ADMIN — PANTOPRAZOLE SODIUM 40 MG: 40 TABLET, DELAYED RELEASE ORAL at 17:41

## 2024-03-17 RX ADMIN — DOCUSATE SODIUM 50 MG AND SENNOSIDES 8.6 MG 2 TABLET: 8.6; 5 TABLET, FILM COATED ORAL at 20:07

## 2024-03-17 RX ADMIN — ATORVASTATIN CALCIUM 80 MG: 40 TABLET, FILM COATED ORAL at 20:07

## 2024-03-17 RX ADMIN — ASPIRIN 81 MG: 81 TABLET, CHEWABLE ORAL at 17:41

## 2024-03-17 RX ADMIN — HEPARIN SODIUM 5000 UNITS: 5000 INJECTION INTRAVENOUS; SUBCUTANEOUS at 20:07

## 2024-03-17 RX ADMIN — TICAGRELOR 60 MG: 60 TABLET ORAL at 17:41

## 2024-03-17 NOTE — PLAN OF CARE
Goal Outcome Evaluation:  Plan of Care Reviewed With: patient        Progress: improving               Patient admitted from ER this shift. No s/s of acute distress noted at this time. Plan of care ongoing.

## 2024-03-17 NOTE — ED PROVIDER NOTES
"Subjective   History of Present Illness  68-year-old female with a past medical history of stroke with chronic left-sided weakness presents to the ER after a fall at home.  Patient notes she was attempting to ambulate with her walker when she fell and struck her left side.  Patient had left hip pain and left shoulder pain.  No chest pain.  No syncopal symptoms.  Patient cannot confirm or deny head trauma.  No obvious signs of loss of consciousness.  Patient is awake and alert.  GCS 15.  Vital signs stable      Review of Systems   Musculoskeletal:         Left shoulder pain, left hip pain.   All other systems reviewed and are negative.      Past Medical History:   Diagnosis Date    Anemia     Anxiety     Arthritis     Depression     Legally blind     Restless leg syndrome     Sleep apnea     \"I don't use a cpap anymore since losing 106 lbs\"    Water retention        Allergies   Allergen Reactions    Penicillins Hives and Swelling       Past Surgical History:   Procedure Laterality Date    BACK SURGERY      CARDIAC CATHETERIZATION N/A 1/19/2024    Procedure: Percutaneous Manual Thrombectomy;  Surgeon: Efrain Hurley MD;  Location: [facility] CATH INVASIVE LOCATION;  Service: Interventional Radiology;  Laterality: N/A;    GALLBLADDER SURGERY      HIP ARTHROSCOPY      JOINT REPLACEMENT Right        Family History   Problem Relation Age of Onset    Breast cancer Neg Hx        Social History     Socioeconomic History    Marital status:     Number of children: 0    Years of education: 1 yr college   Tobacco Use    Smoking status: Every Day     Current packs/day: 1.50     Average packs/day: 1.5 packs/day for 51.0 years (76.5 ttl pk-yrs)     Types: Cigarettes    Smokeless tobacco: Never   Vaping Use    Vaping status: Never Used   Substance and Sexual Activity    Alcohol use: Yes     Alcohol/week: 1.0 standard drink of alcohol     Types: 1 Glasses of wine per week     Comment: \"occasionally - once every two months\"    " Drug use: Yes     Comment: alcohol - occasionally    Sexual activity: Defer           Objective   Physical Exam  Constitutional:       General: She is not in acute distress.     Appearance: Normal appearance. She is not ill-appearing.   HENT:      Head: Normocephalic and atraumatic.      Right Ear: External ear normal.      Left Ear: External ear normal.      Nose: Nose normal.      Mouth/Throat:      Mouth: Mucous membranes are moist.   Eyes:      Extraocular Movements: Extraocular movements intact.      Pupils: Pupils are equal, round, and reactive to light.   Cardiovascular:      Rate and Rhythm: Normal rate and regular rhythm.      Heart sounds: No murmur heard.  Pulmonary:      Effort: Pulmonary effort is normal. No respiratory distress.      Breath sounds: Normal breath sounds. No wheezing.   Abdominal:      General: Bowel sounds are normal.      Palpations: Abdomen is soft.      Tenderness: There is no abdominal tenderness.   Musculoskeletal:         General: No deformity or signs of injury. Normal range of motion.      Cervical back: Normal range of motion and neck supple.   Skin:     General: Skin is warm and dry.      Findings: No erythema.   Neurological:      General: No focal deficit present.      Mental Status: She is alert and oriented to person, place, and time. Mental status is at baseline.      Cranial Nerves: No cranial nerve deficit.      Comments: Chronic left-sided weakness.   Psychiatric:         Mood and Affect: Mood normal.         Behavior: Behavior normal.         Thought Content: Thought content normal.         Procedures           ED Course  ED Course as of 03/17/24 1409   Sun Mar 17, 2024   1351 EKG notes sinus rhythm.  63 bpm.  No acute ST elevation.  QTc 435.  Electronically signed by Fransisco Marie DO, 03/17/24, 1:51 PM EDT.   [SF]      ED Course User Index  [SF] Fransisco Marie DO                                 XR Chest 1 View    Result Date: 3/17/2024  No radiographic evidence  of acute cardiac or pulmonary disease.    This report was finalized on 3/17/2024 1:50 PM by Dr. Hu Obrien MD.      XR Shoulder 2+ View Left    Result Date: 3/17/2024    No acute findings in the left shoulder.   This report was finalized on 3/17/2024 11:13 AM by Dr. Hu Obrien MD.      XR Hip With or Without Pelvis 2 - 3 View Left    Result Date: 3/17/2024  1.  No acute fracture or dislocation. 2.  Moderate to extensive degenerative osteoarthritis in the left hip.   This report was finalized on 3/17/2024 11:13 AM by Dr. Hu Obrien MD.      CT Head Without Contrast    Result Date: 3/17/2024   1. Atrophy and chronic microangiopathic changes 2. Apparent suggestive of encephalomalacia in the right parietal region 3. No focal parenchymal mass, hemorrhage, or midline shift  This report was finalized on 3/17/2024 10:59 AM by Dr. Hu Obrien MD.                     Medical Decision Making  CT imaging of the head revealed no acute abnormality.  Plain film imaging of the left shoulder and left hip revealed no acute abnormality.  During preparations for possible discharge, it was revealed that this patient has no family at home available to help take care of her.  This patient has also been evaluated in the ER several times for multiple falls.  Given the patient's stroke history and difficulty with movement at home, concerns for further falls and possible fracture/injury.  Recommend admission for further work up and treatment.  Hospitalist team consulted and made aware of the patient.  Consults and orders placed per hospitalist request.  Patient was agreeable to admission plan.  Vitals stable on admission.    Problems Addressed:  Acute pain of left shoulder: complicated acute illness or injury  Failure to thrive in adult: complicated acute illness or injury  Fall from standing, initial encounter: complicated acute illness or injury  Left hip pain: complicated acute illness or injury    Amount and/or Complexity of  Data Reviewed  Labs: ordered. Decision-making details documented in ED Course.  Radiology: ordered. Decision-making details documented in ED Course.  ECG/medicine tests: ordered.        Final diagnoses:   Left hip pain   Acute pain of left shoulder   Fall from standing, initial encounter   Failure to thrive in adult       ED Disposition  ED Disposition       ED Disposition   Intended Admit    Condition   --    Comment   --               Dread Burton MD  [address] [place] [address]  [phone]    In 1 week      [facility] EMERGENCY DEPARTMENT  [address] [place] [phone]  [phone]    If symptoms worsen         Medication List      No changes were made to your prescriptions during this visit.            Fransisco Marie,   03/17/24 1148       Fransisco Marie,   03/17/24 140

## 2024-03-17 NOTE — H&P
[facility]    Patient Identification:  Name:  Regine Beckham  Age:  68 y.o.  Sex:  female  :  1955  MRN:  [id number]   Visit Number:  [id number]  Admit Date: 3/17/2024   Room number:  109/09  Primary Care Physician:  Dread Burton MD     Chief complaint:    Chief Complaint   Patient presents with    Fall       History of presenting illness:  68 y.o. female with history significant for CVA January of this year with right internal carotid artery disease status post thrombectomy and stent placement, patient claims after stroke she was temporary placed at the nursing home.  she was eventually discharged.  She lives alone she has no family, she uses a walker at home.  Her neighbors help her prepare her food.  She stated that since then she has been frequently falling, at the minimum once a day fortunately she never sustained fracture from falling.  She did claim that yesterday she did not fall.  Today while attempting to walk she fell again she has EMS was contacted.  Denies loss of consciousness no incontinence no paresthesia no seizure.  Emergency room patient was complaining of pain mostly left shoulder and left hip workup was negative for fracture, for safety, patient agreed for admission and evaluation by PT OT.  ---------------------------------------------------------------------------------------------------------------------   Review of Systems   Constitutional:  Negative for activity change, appetite change, chills, diaphoresis, fatigue, fever and unexpected weight change.   HENT: Negative.     Eyes: Negative.    Respiratory: Negative.     Cardiovascular: Negative.    Gastrointestinal: Negative.    Endocrine: Negative.    Genitourinary: Negative.    Musculoskeletal: Negative.    Skin: Negative.    Allergic/Immunologic: Negative.    Neurological:  Positive for weakness (Chronic left-sided weakness).   Hematological: Negative.   "  Psychiatric/Behavioral: Negative.         ---------------------------------------------------------------------------------------------------------------------   Past Medical History:   Diagnosis Date    Anemia     Anxiety     Arthritis     Depression     Legally blind     Restless leg syndrome     Sleep apnea     \"I don't use a cpap anymore since losing 106 lbs\"    Water retention      Past Surgical History:   Procedure Laterality Date    BACK SURGERY      CARDIAC CATHETERIZATION N/A 1/19/2024    Procedure: Percutaneous Manual Thrombectomy;  Surgeon: Efrain Hurley MD;  Location:  [facility];  Service: Interventional Radiology;  Laterality: N/A;    GALLBLADDER SURGERY      HIP ARTHROSCOPY      JOINT REPLACEMENT Right      Family History   Problem Relation Age of Onset    Breast cancer Neg Hx      Social History     Socioeconomic History    Marital status:     Number of children: 0    Years of education: 1 yr college   Tobacco Use    Smoking status: Every Day     Current packs/day: 1.50     Average packs/day: 1.5 packs/day for 51.0 years (76.5 ttl pk-yrs)     Types: Cigarettes    Smokeless tobacco: Never   Vaping Use    Vaping status: Never Used   Substance and Sexual Activity    Alcohol use: Yes     Alcohol/week: 1.0 standard drink of alcohol     Types: 1 Glasses of wine per week     Comment: \"occasionally - once every two months\"    Drug use: Yes     Comment: alcohol - occasionally    Sexual activity: Defer     ---------------------------------------------------------------------------------------------------------------------   Allergies:  Penicillins  ---------------------------------------------------------------------------------------------------------------------   Prior to Admission Medications       Prescriptions Last Dose Informant Patient Reported? Taking?    Albuterol (VENTOLIN IN)  Pharmacy Yes No    Inhale 2 puffs Every 6 (Six) Hours As Needed.    aspirin 81 MG chewable " tablet   No No    Chew 1 tablet Daily.    atorvastatin (LIPITOR) 80 MG tablet   No No    Take 1 tablet by mouth Every Night.    losartan (COZAAR) 50 MG tablet  Pharmacy Yes No    Take 1 tablet by mouth Daily. Indications: High Blood Pressure Disorder    Mirabegron ER (MYRBETRIQ) 50 MG tablet sustained-release 24 hour 24 hr tablet  Pharmacy Yes No    Take 50 mg by mouth Daily. Indications: Urinary Urgency    mirtazapine (REMERON) 30 MG tablet   No No    Take 1 tablet by mouth Every Night. Indications: Major Depressive Disorder    ondansetron ODT (ZOFRAN-ODT) 4 MG disintegrating tablet  Pharmacy Yes No    Place 1 tablet on the tongue Every 8 (Eight) Hours As Needed for Nausea or Vomiting.    pantoprazole (PROTONIX) 40 MG EC tablet   No No    TAKE 1 TABLET BY MOUTH EVERY MORNING FOR HEARTBURN    rOPINIRole (REQUIP) 4 MG tablet   No No    Take 1 tablet by mouth Every Night. Take 1 hour before bedtime.  Indications: Restless Leg Syndrome    ticagrelor (BRILINTA) 60 MG tablet tablet   No No    Take 1 tablet by mouth 2 (Two) Times a Day.    zolpidem (AMBIEN) 5 MG tablet   No No    Take 1 tablet by mouth At Night As Needed for Sleep. Indications: Trouble Sleeping          ---------------------------------------------------------------------------------------------------------------------   Vital Signs:  Temp:  [97.7 °F (36.5 °C)] 97.7 °F (36.5 °C)  Heart Rate:  [62-84] 74  Resp:  [18] 18  BP: (100-167)/(72-96) 118/88    Mean Arterial Pressure (Non-Invasive) for the past 24 hrs (Last 3 readings):   Noninvasive MAP (mmHg)   03/17/24 1400 100   03/17/24 1345 137   03/17/24 1330 114     SpO2:  [93 %-98 %] 98 %  on   ;   Device (Oxygen Therapy): room air  Body mass index is 25.66 kg/m².    Wt Readings from Last 3 Encounters:   03/17/24 72.1 kg (159 lb)   02/13/24 72.1 kg (159 lb)   02/04/24 72.1 kg (159 lb)                ---------------------------------------------------------------------------------------------------------------------   Physical Exam:  Constitutional:  Well-developed, awake alert oriented, no confusion.    No respiratory distress.      HENT:  Head: Normocephalic and atraumatic.  Mouth:  Moist mucous membranes.    Eyes:  Conjunctivae and EOM are normal.  Pupils are equal, round, and reactive to light.  No scleral icterus.  Neck:  Neck supple.  No JVD present.  No bruit  Cardiovascular:  Normal rate, regular rhythm and normal heart sounds with no murmur.  Pulmonary/Chest: Fading hematoma yellowish hue left anterior chest wall along the subclavian area no respiratory distress, no wheezes, no crackles, with normal breath sounds and good air movement.  Abdominal:  Soft.  Bowel sounds are normal.  No distension and no tenderness.   Musculoskeletal: Trace edema left upper extremity/hands, good radial pulse good pedal pulse bilateral.  Neurological:  Alert and oriented to person, place, and time.  No cranial nerve deficit.  No tongue deviation.  No facial droop.  No slurred speech.  Motor strength left upper extremity is 3/5, lower extremity left is 3-4/5.  Skin:  Skin is warm and dry.  No rash noted.  No pallor.   Peripheral vascular:  No edema and strong pulses on all 4 extremities.  Genitourinary: No Arce catheter  ---------------------------------------------------------------------------------------------------------------------  EKG:        Telemetry: Rhythm 61 bpm  I have personally looked at both the EKG and the telemetry strips.  --------------------------------------------------------------------------------------------------------------------    Results from last 7 days   Lab Units 03/17/24  1303   WBC 10*3/mm3 8.57   HEMOGLOBIN g/dL 12.2   HEMATOCRIT % 38.1   MCV fL 94.8   MCHC g/dL 32.0   PLATELETS 10*3/mm3 307     Results from last 7 days   Lab Units 03/17/24  1303   SODIUM mmol/L 145   POTASSIUM mmol/L 4.3  "  CHLORIDE mmol/L 104   CO2 mmol/L 29.7*   BUN mg/dL 20   CREATININE mg/dL 0.79   CALCIUM mg/dL 10.0   GLUCOSE mg/dL 116*   ALBUMIN g/dL 3.8   BILIRUBIN mg/dL 0.6   ALK PHOS U/L 97   AST (SGOT) U/L 18   ALT (SGPT) U/L 12   Estimated Creatinine Clearance: 68.3 mL/min (by C-G formula based on SCr of 0.79 mg/dL).    No results found for: \"HGBA1C\", \"POCGLU\"  No results found for: \"AMMONIA\"        No results found for: \"BLOODCX\"No results found for: \"RESPCX\"No results found for: \"URINECX\"No results found for: \"WOUNDCX\"No results found for: \"BODYFLDCX\"No results found for: \"STOOLCX\"  pH No results found for: \"PHART\"   pO2 No results found for: \"PO2ART\"   pCO2 No results found for: \"MTF5BWX\"   HCO3 No results found for: \"GWM5CNE\"     I have personally looked at the labs and they are summarized above.  ----------------------------------------------------------------------------------------------------------------------  Imaging Results (Last 24 Hours)       Procedure Component Value Units Date/Time    XR Chest 1 View [[id number]] Collected: 03/17/24 1347     Updated: 03/17/24 1353    Narrative:      XR CHEST 1 VW-     CLINICAL INDICATION: screening; M25.552-Pain in left hip; M25.512-Pain  in left shoulder; W19.XXXA-Unspecified fall, initial encounter        COMPARISON: None immediately available      TECHNIQUE: Single frontal view of the chest.     FINDINGS:     LUNGS: Lungs are adequately aerated.      HEART AND MEDIASTINUM: Heart and mediastinal contours are unremarkable        SKELETON: Bony and soft tissue structures are unremarkable.             Impression:      No radiographic evidence of acute cardiac or pulmonary disease.           This report was finalized on 3/17/2024 1:50 PM by Dr. Hu Obrien MD.       XR Shoulder 2+ View Left [[id number]] Collected: 03/17/24 1113     Updated: 03/17/24 1115    Narrative:      EXAM:    XR Left Shoulder Complete, 2 or More Views     EXAM DATE:    3/17/2024 11:00 AM     CLINICAL " HISTORY:    pain     TECHNIQUE:    Two or more views of the left shoulder.     COMPARISON:    No relevant prior studies available.     FINDINGS:    BONES/JOINTS:  Unremarkable.  No acute fracture.  No dislocation.    SOFT TISSUES:  Unremarkable.       Impression:        No acute findings in the left shoulder.        This report was finalized on 3/17/2024 11:13 AM by Dr. Hu Obrien MD.       XR Hip With or Without Pelvis 2 - 3 View Left [[id number]] Collected: 03/17/24 1112     Updated: 03/17/24 1115    Narrative:      EXAM:    XR Left Hip With Pelvis When Performed, 2 or 3 Views     EXAM DATE:    3/17/2024 11:00 AM     CLINICAL HISTORY:    pain     TECHNIQUE:    Two or three views of the left hip with pelvis when performed.     COMPARISON:    2/13/2024     FINDINGS:    Bones/joints:  Moderate to extensive degenerative osteoarthritis in  the left hip.  No acute fracture or dislocation.    Soft tissues:  Unremarkable.       Impression:      1.  No acute fracture or dislocation.  2.  Moderate to extensive degenerative osteoarthritis in the left hip.        This report was finalized on 3/17/2024 11:13 AM by Dr. Hu Obrien MD.       CT Head Without Contrast [[id number]] Collected: 03/17/24 1058     Updated: 03/17/24 1101    Narrative:      CT HEAD WO CONTRAST-     CLINICAL INDICATION: Head trauma, minor (Age >= 65y)        COMPARISON: 2/4/2024     TECHNIQUE: Axial images of the brain were obtained with out intravenous  contrast.  Reformatted images were created in the sagittal and coronal  planes.     DOSE:     Radiation dose reduction techniques were utilized per ALARA protocol.  Automated exposure control was initiated through either or [employer] or  D[employer]Right software packages by  protocol.        FINDINGS:    BRAIN: Apparent suggestive of encephalomalacia in the right parietal  region.    VENTRICLES:  Unremarkable.  No ventriculomegaly.       BONES/JOINTS:  Unremarkable.  No acute fracture.       SOFT  TISSUES:  Unremarkable.       SINUSES:  no air fluid levels       MASTOID AIR CELLS:  Unremarkable as visualized.  No mastoid effusion.          Impression:         1. Atrophy and chronic microangiopathic changes  2. Apparent suggestive of encephalomalacia in the right parietal region  3. No focal parenchymal mass, hemorrhage, or midline shift     This report was finalized on 3/17/2024 10:59 AM by Dr. Hu Obrien MD.             I have personally reviewed the radiology images and read the final radiology report.  ----------------------------------------------------------------------------------------------------------------------  Assessment and Plan:  -Frequent fall  -History of CVA right MCA with left-sided weakness  -History of right internal carotid artery disease with occlusion status post thrombectomy and stent placement   -History of iron deficiency anemia   -History of dysphagia as a complication of CVA   -History of restless leg syndrome   -History of heart failure with preserved ejection fraction   -History of hyperlipidemia  -Severe arthritis left hip  -History of right hip arthroplasty from osteoarthritis  -History of tobacco use patient quit January of this year after the stroke.    Admit observation, pain control, PT OT and speech consult.   consult, aspiration precaution, DVT and GI prophylaxis.      Yaritza Rao MD  03/17/24  14:20 EDT

## 2024-03-17 NOTE — CASE MANAGEMENT/SOCIAL WORK
Discharge Planning Assessment  [facility]     Patient Name: Regine Beckham  MRN: [id number]  Today's Date: 3/17/2024    Admit Date: 3/17/2024    Plan: Pt lives at home alone states she was recently discharged from [facility]. Pt is a  and states she has no children. She states she lives at [address] [place] [address] and her phone number is [phone]. She says she has two neighbors Shaneka Ortega and Yaa Elizondo who check on her and a friend Teo Sanchez who helps her occasionally with adl's at home. She uses a rollator and she does not use home o2. Pt states she was not set up with home health at discharge from nursing home. 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   Discharge Needs Assessment       Row Name 03/17/24 1534       Living Environment    People in Home alone    Current Living Arrangements apartment    Primary Care Provided by self    Family Caregiver if Needed none    Quality of Family Relationships other (see comments)    Able to Return to Prior Arrangements other (see comments)       Resource/Environmental Concerns    Resource/Environmental Concerns none       Transportation Needs    In the past 12 months, has lack of transportation kept you from medical appointments or from getting medications? no    In the past 12 months, has lack of transportation kept you from meetings, work, or from getting things needed for daily living? No       Food Insecurity    Within the past 12 months, you worried that your food would run out before you got the money to buy more. Never true    Within the past 12 months, the food you bought just didn't last and you didn't have money to get more. Never true       Transition Planning    Patient/Family Anticipates Transition to home with help/services;long-term care facility    Patient/Family Anticipated Services at Transition        Discharge Needs Assessment    Readmission Within the Last 30 Days no previous  admission in last 30 days    Equipment Currently Used at Home rollator    Concerns to be Addressed discharge planning    Anticipated Changes Related to Illness inability to care for self                   Discharge Plan       Row Name 03/17/24 1535       Plan    Plan Pt lives at home alone states she was recently discharged from [facility]. Pt is a  and states she has no children. She states she lives at [address] [place] [address] and her phone number is [phone]. She says she has two neighbors Shaneka Ortega and Yaa Mikhail who check on her and a friend Teo Sanchez who helps her occasionally with adl's at home. She uses a rollator and she does not use home o2. Pt states she was not set up with home health at discharge from nursing home. 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    Patient/Family in Agreement with Plan yes                  Continued Care and Services - Admitted Since 3/17/2024    No active coordination exists for this encounter.       Selected Continued Care - Prior Encounters Includes continued care and service providers with selected services from prior encounters from 12/18/2023 to 3/17/2024      Discharged on 2/2/2024 Admission date: 1/19/2024 - Discharge disposition: Skilled Nursing Facility (DC - External)      Destination       Service Provider Selected Services Address Phone Fax Patient Preferred    [facility] [address] [id number] [phone] --                          Expected Discharge Date and Time       Expected Discharge Date Expected Discharge Time    Mar 19, 2024            Demographic Summary       Row Name 03/17/24 1536       General Information    Admission Type observation    Arrived From home    Referral Source emergency department    Reason for Consult discharge planning                   Functional Status       Row Name 03/17/24 153        Functional Status    Usual Activity Tolerance fair    Current Activity Tolerance poor                      Evelin Stephenson RN

## 2024-03-18 LAB
A-A DO2: 22.7 MMHG (ref 0–300)
ALBUMIN SERPL-MCNC: 3.5 G/DL (ref 3.5–5.2)
ALBUMIN/GLOB SERPL: 1 G/DL
ALP SERPL-CCNC: 95 U/L (ref 39–117)
ALT SERPL W P-5'-P-CCNC: 13 U/L (ref 1–33)
ANION GAP SERPL CALCULATED.3IONS-SCNC: 14.5 MMOL/L (ref 5–15)
ARTERIAL PATENCY WRIST A: POSITIVE
AST SERPL-CCNC: 19 U/L (ref 1–32)
ATMOSPHERIC PRESS: 729 MMHG
BACTERIA UR QL AUTO: ABNORMAL /HPF
BASE EXCESS BLDA CALC-SCNC: 2.4 MMOL/L (ref 0–2)
BASOPHILS # BLD AUTO: 0.1 10*3/MM3 (ref 0–0.2)
BASOPHILS NFR BLD AUTO: 1.3 % (ref 0–1.5)
BDY SITE: ABNORMAL
BILIRUB SERPL-MCNC: 0.8 MG/DL (ref 0–1.2)
BILIRUB UR QL STRIP: NEGATIVE
BUN SERPL-MCNC: 20 MG/DL (ref 8–23)
BUN/CREAT SERPL: 26 (ref 7–25)
CALCIUM SPEC-SCNC: 9.6 MG/DL (ref 8.6–10.5)
CHLORIDE SERPL-SCNC: 105 MMOL/L (ref 98–107)
CLARITY UR: CLEAR
CO2 BLDA-SCNC: 27.4 MMOL/L (ref 22–33)
CO2 SERPL-SCNC: 23.5 MMOL/L (ref 22–29)
COHGB MFR BLD: 2.4 % (ref 0–5)
COLOR UR: ABNORMAL
CREAT SERPL-MCNC: 0.77 MG/DL (ref 0.57–1)
DEPRECATED RDW RBC AUTO: 45.9 FL (ref 37–54)
EGFRCR SERPLBLD CKD-EPI 2021: 83.6 ML/MIN/1.73
EOSINOPHIL # BLD AUTO: 0.45 10*3/MM3 (ref 0–0.4)
EOSINOPHIL NFR BLD AUTO: 6 % (ref 0.3–6.2)
ERYTHROCYTE [DISTWIDTH] IN BLOOD BY AUTOMATED COUNT: 13.2 % (ref 12.3–15.4)
FOLATE SERPL-MCNC: 4.47 NG/ML (ref 4.78–24.2)
GLOBULIN UR ELPH-MCNC: 3.6 GM/DL
GLUCOSE BLDC GLUCOMTR-MCNC: 111 MG/DL (ref 70–130)
GLUCOSE SERPL-MCNC: 102 MG/DL (ref 65–99)
GLUCOSE UR STRIP-MCNC: NEGATIVE MG/DL
HCO3 BLDA-SCNC: 26.3 MMOL/L (ref 20–26)
HCT VFR BLD AUTO: 37.3 % (ref 34–46.6)
HCT VFR BLD CALC: 37.3 % (ref 38–51)
HGB BLD-MCNC: 11.9 G/DL (ref 12–15.9)
HGB BLDA-MCNC: 12.2 G/DL (ref 13.5–17.5)
HGB UR QL STRIP.AUTO: NEGATIVE
HYALINE CASTS UR QL AUTO: ABNORMAL /LPF
IMM GRANULOCYTES # BLD AUTO: 0.03 10*3/MM3 (ref 0–0.05)
IMM GRANULOCYTES NFR BLD AUTO: 0.4 % (ref 0–0.5)
INHALED O2 CONCENTRATION: 21 %
KETONES UR QL STRIP: ABNORMAL
LEUKOCYTE ESTERASE UR QL STRIP.AUTO: ABNORMAL
LYMPHOCYTES # BLD AUTO: 2.52 10*3/MM3 (ref 0.7–3.1)
LYMPHOCYTES NFR BLD AUTO: 33.7 % (ref 19.6–45.3)
Lab: ABNORMAL
MAGNESIUM SERPL-MCNC: 1.8 MG/DL (ref 1.6–2.4)
MCH RBC QN AUTO: 30.3 PG (ref 26.6–33)
MCHC RBC AUTO-ENTMCNC: 31.9 G/DL (ref 31.5–35.7)
MCV RBC AUTO: 94.9 FL (ref 79–97)
METHGB BLD QL: <-0.1 % (ref 0–3)
MODALITY: ABNORMAL
MONOCYTES # BLD AUTO: 0.66 10*3/MM3 (ref 0.1–0.9)
MONOCYTES NFR BLD AUTO: 8.8 % (ref 5–12)
NEUTROPHILS NFR BLD AUTO: 3.71 10*3/MM3 (ref 1.7–7)
NEUTROPHILS NFR BLD AUTO: 49.8 % (ref 42.7–76)
NITRITE UR QL STRIP: NEGATIVE
NRBC BLD AUTO-RTO: 0 /100 WBC (ref 0–0.2)
OXYHGB MFR BLDV: 95 % (ref 94–99)
PCO2 BLDA: 37.4 MM HG (ref 35–45)
PCO2 TEMP ADJ BLD: ABNORMAL MM[HG]
PH BLDA: 7.46 PH UNITS (ref 7.35–7.45)
PH UR STRIP.AUTO: 7.5 [PH] (ref 5–8)
PH, TEMP CORRECTED: ABNORMAL
PLATELET # BLD AUTO: 324 10*3/MM3 (ref 140–450)
PMV BLD AUTO: 9.7 FL (ref 6–12)
PO2 BLDA: 78.3 MM HG (ref 83–108)
PO2 TEMP ADJ BLD: ABNORMAL MM[HG]
POTASSIUM SERPL-SCNC: 3.1 MMOL/L (ref 3.5–5.2)
POTASSIUM SERPL-SCNC: 4 MMOL/L (ref 3.5–5.2)
PROT SERPL-MCNC: 7.1 G/DL (ref 6–8.5)
PROT UR QL STRIP: ABNORMAL
QT INTERVAL: 426 MS
QTC INTERVAL: 435 MS
RBC # BLD AUTO: 3.93 10*6/MM3 (ref 3.77–5.28)
RBC # UR STRIP: ABNORMAL /HPF
REF LAB TEST METHOD: ABNORMAL
SAO2 % BLDCOA: 96.6 % (ref 94–99)
SODIUM SERPL-SCNC: 143 MMOL/L (ref 136–145)
SP GR UR STRIP: 1.02 (ref 1–1.03)
SQUAMOUS #/AREA URNS HPF: ABNORMAL /HPF
UROBILINOGEN UR QL STRIP: ABNORMAL
VENTILATOR MODE: ABNORMAL
VIT B12 BLD-MCNC: 392 PG/ML (ref 211–946)
WBC # UR STRIP: ABNORMAL /HPF
WBC NRBC COR # BLD AUTO: 7.47 10*3/MM3 (ref 3.4–10.8)

## 2024-03-18 PROCEDURE — 25010000002 HEPARIN (PORCINE) PER 1000 UNITS: Performed by: HOSPITALIST

## 2024-03-18 PROCEDURE — 97162 PT EVAL MOD COMPLEX 30 MIN: CPT

## 2024-03-18 PROCEDURE — 80053 COMPREHEN METABOLIC PANEL: CPT | Performed by: HOSPITALIST

## 2024-03-18 PROCEDURE — 82375 ASSAY CARBOXYHB QUANT: CPT

## 2024-03-18 PROCEDURE — G0378 HOSPITAL OBSERVATION PER HR: HCPCS

## 2024-03-18 PROCEDURE — 99231 SBSQ HOSP IP/OBS SF/LOW 25: CPT | Performed by: INTERNAL MEDICINE

## 2024-03-18 PROCEDURE — 97166 OT EVAL MOD COMPLEX 45 MIN: CPT

## 2024-03-18 PROCEDURE — 81001 URINALYSIS AUTO W/SCOPE: CPT | Performed by: STUDENT IN AN ORGANIZED HEALTH CARE EDUCATION/TRAINING PROGRAM

## 2024-03-18 PROCEDURE — 83050 HGB METHEMOGLOBIN QUAN: CPT

## 2024-03-18 PROCEDURE — 36600 WITHDRAWAL OF ARTERIAL BLOOD: CPT

## 2024-03-18 PROCEDURE — 82948 REAGENT STRIP/BLOOD GLUCOSE: CPT

## 2024-03-18 PROCEDURE — 92610 EVALUATE SWALLOWING FUNCTION: CPT | Performed by: SPEECH-LANGUAGE PATHOLOGIST

## 2024-03-18 PROCEDURE — 85025 COMPLETE CBC W/AUTO DIFF WBC: CPT | Performed by: HOSPITALIST

## 2024-03-18 PROCEDURE — 82805 BLOOD GASES W/O2 SATURATION: CPT

## 2024-03-18 PROCEDURE — 83735 ASSAY OF MAGNESIUM: CPT | Performed by: INTERNAL MEDICINE

## 2024-03-18 PROCEDURE — 84132 ASSAY OF SERUM POTASSIUM: CPT

## 2024-03-18 RX ORDER — FOLIC ACID 1 MG/1
1 TABLET ORAL DAILY
Status: DISCONTINUED | OUTPATIENT
Start: 2024-03-18 | End: 2024-03-27 | Stop reason: HOSPADM

## 2024-03-18 RX ORDER — POTASSIUM CHLORIDE 20 MEQ/1
40 TABLET, EXTENDED RELEASE ORAL EVERY 4 HOURS
Status: COMPLETED | OUTPATIENT
Start: 2024-03-18 | End: 2024-03-18

## 2024-03-18 RX ADMIN — LOSARTAN POTASSIUM 50 MG: 50 TABLET, FILM COATED ORAL at 08:25

## 2024-03-18 RX ADMIN — HEPARIN SODIUM 5000 UNITS: 5000 INJECTION INTRAVENOUS; SUBCUTANEOUS at 21:32

## 2024-03-18 RX ADMIN — TICAGRELOR 60 MG: 60 TABLET ORAL at 08:25

## 2024-03-18 RX ADMIN — Medication 10 ML: at 21:33

## 2024-03-18 RX ADMIN — HEPARIN SODIUM 5000 UNITS: 5000 INJECTION INTRAVENOUS; SUBCUTANEOUS at 08:25

## 2024-03-18 RX ADMIN — ASPIRIN 81 MG: 81 TABLET, CHEWABLE ORAL at 08:25

## 2024-03-18 RX ADMIN — Medication 10 ML: at 08:25

## 2024-03-18 RX ADMIN — DOCUSATE SODIUM 50 MG AND SENNOSIDES 8.6 MG 2 TABLET: 8.6; 5 TABLET, FILM COATED ORAL at 08:25

## 2024-03-18 RX ADMIN — POTASSIUM CHLORIDE 40 MEQ: 1500 TABLET, EXTENDED RELEASE ORAL at 12:09

## 2024-03-18 RX ADMIN — PANTOPRAZOLE SODIUM 40 MG: 40 TABLET, DELAYED RELEASE ORAL at 05:44

## 2024-03-18 RX ADMIN — POTASSIUM CHLORIDE 40 MEQ: 1500 TABLET, EXTENDED RELEASE ORAL at 02:31

## 2024-03-18 RX ADMIN — POTASSIUM CHLORIDE 40 MEQ: 1500 TABLET, EXTENDED RELEASE ORAL at 05:45

## 2024-03-18 NOTE — PROGRESS NOTES
[facility] HOSPITALIST PROGRESS NOTE     Patient Identification:  Name:  Regine Beckham  Age:  68 y.o.  Sex:  female  :  1955  MRN:  [id number]  Visit Number:  [id number]  ROOM: [address] [place] [address]     Primary Care Provider:  Dread Burton MD    Length of stay in inpatient status:  0    Subjective     Chief Compliant:    Chief Complaint   Patient presents with    Fall       History of Presenting Illness:    Patient denies any new complaints. Plans to work with PT/OT today. No family bedside.     ROS:  Otherwise 10 point ROS negative other than documented above in HPI.     Objective     Current Hospital Meds:aspirin, 81 mg, Oral, Daily  atorvastatin, 80 mg, Oral, Nightly  heparin (porcine), 5,000 Units, Subcutaneous, Q12H  losartan, 50 mg, Oral, Daily  mirtazapine, 30 mg, Oral, Nightly  pantoprazole, 40 mg, Oral, Q AM  senna-docusate sodium, 2 tablet, Oral, BID  sodium chloride, 10 mL, Intravenous, Q12H  ticagrelor, 60 mg, Oral, BID         Current Antimicrobial Therapy:  Anti-Infectives (From admission, onward)      None          Current Diuretic Therapy:  Diuretics (From admission, onward)      None          ----------------------------------------------------------------------------------------------------------------------  Vital Signs:  Temp:  [97.8 °F (36.6 °C)-98.2 °F (36.8 °C)] 98 °F (36.7 °C)  Heart Rate:  [73-89] 87  Resp:  [18-20] 18  BP: (114-178)/(61-98) 114/61  SpO2:  [96 %-97 %] 97 %  on   ;   Device (Oxygen Therapy): room air  Body mass index is 23.58 kg/m².    Wt Readings from Last 3 Encounters:   24 66.3 kg (146 lb 1.6 oz)   24 72.1 kg (159 lb)   24 72.1 kg (159 lb)     Intake & Output (last 3 days)          07 07 07 07 07 0700    P.O.   360    Total Intake(mL/kg)   360 (5.4)    Urine (mL/kg/hr)  300     Total Output  300     Net  -300 +360           Urine Unmeasured Occurrence  1 x 1 x    Stool Unmeasured Occurrence    8 x          Diet: Cardiac; Healthy Heart (2-3 Na+); Texture: Soft to Chew (NDD 3); Soft to Chew: Chopped Meat; Fluid Consistency: Thin (IDDSI 0)  ----------------------------------------------------------------------------------------------------------------------  Physical exam:  Constitutional:  Well-developed and well-nourished.  No respiratory distress.      HENT:  Head:  Normocephalic and atraumatic.  Mouth:  Moist mucous membranes.    Eyes:  Conjunctivae and EOM are normal. No scleral icterus.    Neck:  Neck supple.  No JVD present.    Cardiovascular:  Normal rate, regular rhythm and normal heart sounds with no murmur.  Pulmonary/Chest:  No respiratory distress, no wheezes, no crackles, with normal breath sounds and good air movement.  Abdominal:  Soft.  Bowel sounds are normal.  No distension and no tenderness.   Musculoskeletal:  No edema, no tenderness, and no deformity.  No red or swollen joints anywhere.    Neurological:  Alert and oriented to person, place, and time.  No cranial nerve deficit.  No tongue deviation.  No facial droop.  No slurred speech.   Skin:  Skin is warm and dry. No rash noted. No pallor.   Peripheral vascular:  Pulses in all 4 extremities with no clubbing, no cyanosis, no edema.  ----------------------------------------------------------------------------------------------------------------------  Tele:    ----------------------------------------------------------------------------------------------------------------------  Results from last 7 days   Lab Units 03/18/24  0058 03/17/24  1303   WBC 10*3/mm3 7.47 8.57   HEMOGLOBIN g/dL 11.9* 12.2   HEMATOCRIT % 37.3 38.1   MCV fL 94.9 94.8   MCHC g/dL 31.9 32.0   PLATELETS 10*3/mm3 324 307         Results from last 7 days   Lab Units 03/18/24  1506 03/18/24  0058 03/17/24  1303   SODIUM mmol/L  --  143 145   POTASSIUM mmol/L 4.0 3.1* 4.3   MAGNESIUM mg/dL  --  1.8  --    CHLORIDE mmol/L  --  105 104   CO2 mmol/L  --  23.5 29.7*   BUN  "mg/dL  --  20 20   CREATININE mg/dL  --  0.77 0.79   CALCIUM mg/dL  --  9.6 10.0   GLUCOSE mg/dL  --  102* 116*   ALBUMIN g/dL  --  3.5 3.8   BILIRUBIN mg/dL  --  0.8 0.6   ALK PHOS U/L  --  95 97   AST (SGOT) U/L  --  19 18   ALT (SGPT) U/L  --  13 12   Estimated Creatinine Clearance: 72.2 mL/min (by C-G formula based on SCr of 0.77 mg/dL).  No results found for: \"AMMONIA\"              No results found for: \"HGBA1C\", \"POCGLU\"  Lab Results   Component Value Date    TSH 2.880 03/17/2024     No results found for: \"PREGTESTUR\", \"PREGSERUM\", \"HCG\", \"HCGQUANT\"  Pain Management Panel          Latest Ref Rng & Units 12/20/2023   Pain Management Panel   Amphetamine, Urine Qual Negative Negative    Barbiturates Screen, Urine Negative Negative    Benzodiazepine Screen, Urine Negative Negative    Buprenorphine, Screen, Urine Negative Negative    Cocaine Screen, Urine Negative Negative    Fentanyl, Urine Negative Negative    Methadone Screen , Urine Negative Negative    Methamphetamine, Ur Negative Negative      Brief Urine Lab Results  (Last result in the past 365 days)        Color   Clarity   Blood   Leuk Est   Nitrite   Protein   CREAT   Urine HCG        03/18/24 0132 Dark Yellow   Clear   Negative   Trace   Negative   Trace                 No results found for: \"BLOODCX\"  No results found for: \"URINECX\"  No results found for: \"WOUNDCX\"  No results found for: \"STOOLCX\"  No results found for: \"RESPCX\"  No results found for: \"AFBCX\"        I have personally looked at the labs and they are summarized above.  ----------------------------------------------------------------------------------------------------------------------  Detailed radiology reports for the last 24 hours:    Imaging Results (Last 24 Hours)       ** No results found for the last 24 hours. **          Assessment & Plan    -Frequent fall  -Hypokalemia  -Folate deficency   -History of CVA right MCA with left-sided weakness  -History of right internal carotid " artery disease with occlusion status post thrombectomy and stent placement   -History of iron deficiency anemia   -History of dysphagia as a complication of CVA   -History of restless leg syndrome   -History of heart failure with preserved ejection fraction   -History of hyperlipidemia  -Severe arthritis left hip  -History of right hip arthroplasty from osteoarthritis  -History of tobacco use patient quit January of this year after the stroke.    PT/OT/SW/SLP all following. Potassium replaced and mag level ordered. Folate replaced.     Code status :Full     Dispo: Pending PT/OT    VTE Prophylaxis:   Mechanical Order History:       None          Pharmalogical Order History:        Ordered     Dose Route Frequency Stop    03/17/24 1611  heparin (porcine) 5000 UNIT/ML injection 5,000 Units         5,000 Units SC Every 12 Hours Scheduled --                    Ok Marin MD  [facility]  03/18/24  19:32 EDT

## 2024-03-18 NOTE — THERAPY EVALUATION
"Acute Care - Physical Therapy Initial Evaluation   [place]     Patient Name: Regine Beckham  : 1955  MRN: [id number]  Today's Date: 3/18/2024      Visit Dx:     ICD-10-CM ICD-9-CM   1. Left hip pain  M25.552 719.45   2. Acute pain of left shoulder  M25.512 719.41   3. Fall from standing, initial encounter  W19.XXXA E888.9   4. Failure to thrive in adult  R62.7 783.7     Patient Active Problem List   Diagnosis    Iron deficiency anemia due to chronic blood loss    Major depressive disorder    RLS (restless legs syndrome)    GERD (gastroesophageal reflux disease)    Left breast mass    Allergy to penicillin    Stroke    Grade I diastolic dysfunction    Moderate malnutrition    Falls frequently     Past Medical History:   Diagnosis Date    Anemia     Anxiety     Arthritis     Depression     Legally blind     Restless leg syndrome     Sleep apnea     \"I don't use a cpap anymore since losing 106 lbs\"    Water retention      Past Surgical History:   Procedure Laterality Date    BACK SURGERY      CARDIAC CATHETERIZATION N/A 2024    Procedure: Percutaneous Manual Thrombectomy;  Surgeon: Efrain Hurley MD;  Location:  [facility];  Service: Interventional Radiology;  Laterality: N/A;    GALLBLADDER SURGERY      HIP ARTHROSCOPY      JOINT REPLACEMENT Right      PT Assessment (Last 12 Hours)       PT Evaluation and Treatment       Row Name 24 1523          Physical Therapy Time and Intention    Document Type evaluation  -KM     Mode of Treatment physical therapy  -KM     Patient Effort adequate  -KM     Symptoms Noted During/After Treatment fatigue  -KM       Row Name 24 1523          General Information    Patient Profile Reviewed yes  -KM     Patient Observations alert;cooperative;agree to therapy  -KM     Prior Level of Function --  pt. had CVA in January, but was able to recover. She has since had functional decline w/ mobility and ADLs  -KM     Existing " Precautions/Restrictions fall  -KM     Risks Reviewed patient:;LOB;nausea/vomiting;dizziness;increased discomfort  -KM     Benefits Reviewed patient:;improve function;increase independence;increase strength;increase balance  -KM     Barriers to Rehab previous functional deficit  -KM       Row Name 03/18/24 1523          Living Environment    Current Living Arrangements apartment  -KM     People in Home alone  -KM     Primary Care Provided by self  -KM       Row Name 03/18/24 1523          Home Use of Assistive/Adaptive Equipment    Equipment Currently Used at Home walker, rolling  -KM       Row Name 03/18/24 1523          Cognition    Affect/Mental Status (Cognition) WFL  -KM     Orientation Status (Cognition) oriented x 3  -KM     Follows Commands (Cognition) follows one-step commands  -KM       Row Name 03/18/24 1523          Range of Motion (ROM)    Range of Motion bilateral lower extremities;ROM is WFL  -[facility] Name 03/18/24 1523          Strength (Manual Muscle Testing)    Strength (Manual Muscle Testing) --  BLE strength grossly 4/5  -KM       Row Name 03/18/24 1523          Bed Mobility    Bed Mobility supine-sit-supine  -KM     Supine-Sit-Supine [place] (Bed Mobility) moderate assist (50% patient effort)  -KM     Bed Mobility, Safety Issues decreased use of arms for pushing/pulling  -KM     Assistive Device (Bed Mobility) bed rails;head of bed elevated  -KM       Row Name 03/18/24 1523          Transfers    Transfers sit-stand transfer;stand-sit transfer  -KM       Row Name 03/18/24 1523          Sit-Stand Transfer    Sit-Stand [place] (Transfers) minimum assist (75% patient effort);moderate assist (50% patient effort);2 person assist  -KM     Assistive Device (Sit-Stand Transfers) --  HHA  -KM       Row Name 03/18/24 1523          Stand-Sit Transfer    Stand-Sit [place] (Transfers) minimum assist (75% patient effort);2 person assist  -KM     Assistive Device (Stand-Sit Transfers) --   HHA  -KM       Row Name 03/18/24 1523          Gait/Stairs (Locomotion)    Comment, (Gait/Stairs) pt. attempted side stepping L and was unable to lift BLEs to perform stepping  -KM       Row Name 03/18/24 1523          Safety Issues, Functional Mobility    Safety Issues Affecting Function (Mobility) problem-solving  -KM     Impairments Affecting Function (Mobility) balance;coordination;endurance/activity tolerance;muscle tone abnormal;strength  -KM       Row Name 03/18/24 1523          Balance    Balance Assessment sitting static balance;standing static balance  -KM     Static Sitting Balance minimal assist  -KM     Position, Sitting Balance sitting edge of bed;unsupported  -KM     Static Standing Balance minimal assist;moderate assist  -KM       Row Name 03/18/24 1523          Plan of Care Review    Plan of Care Reviewed With patient  -KM     Outcome Evaluation Pt. evaluation completed during PT session. She was able to perform bed mobility w/ modA. She was able to perform transfers w/ Angela-modA. She was unable to ambulate d/t weakness. Pt. would benefit from skilled PT services.  -       Row Name 03/18/24 1523          Therapy Assessment/Plan (PT)    Patient/Family Therapy Goals Statement (PT) improve mobility  -KM     Functional Level at Time of Evaluation (PT) modA  -KM     PT Diagnosis (PT) decreased mobility  -KM     Rehab Potential (PT) good, to achieve stated therapy goals  -KM     Criteria for Skilled Interventions Met (PT) yes;skilled treatment is necessary  -     Therapy Frequency (PT) 2 times/wk  2-5x/wk  -KM     Predicted Duration of Therapy Intervention (PT) until discharge  -     Problem List (PT) problems related to;balance;mobility;strength  -KM     Activity Limitations Related to Problem List (PT) unable to ambulate safely;unable to transfer safely  -KM       Row Name 03/18/24 1523          Therapy Plan Review/Discharge Plan (PT)    Therapy Plan Review (PT) evaluation/treatment results  reviewed;care plan/treatment goals reviewed;risks/benefits reviewed;patient  -KM       Row Name 03/18/24 1523          Physical Therapy Goals    Bed Mobility Goal Selection (PT) bed mobility, PT goal 1  -KM     Transfer Goal Selection (PT) transfer, PT goal 1  -KM     Gait Training Goal Selection (PT) gait training, PT goal 1  -KM       Row Name 03/18/24 1523          Bed Mobility Goal 1 (PT)    Activity/Assistive Device (Bed Mobility Goal 1, PT) bed mobility activities, all  -KM     [place] Level/Cues Needed (Bed Mobility Goal 1, PT) modified independence  -KM     Time Frame (Bed Mobility Goal 1, PT) by discharge  -KM       Row Name 03/18/24 1523          Transfer Goal 1 (PT)    Activity/Assistive Device (Transfer Goal 1, PT) transfers, all;walker, rolling  -KM     [place] Level/Cues Needed (Transfer Goal 1, PT) modified independence  -KM     Time Frame (Transfer Goal 1, PT) by discharge  -KM       Row Name 03/18/24 1523          Gait Training Goal 1 (PT)    Activity/Assistive Device (Gait Training Goal 1, PT) gait (walking locomotion);assistive device use;walker, rolling  -KM     [employer] Level (Gait Training Goal 1, PT) standby assist  -KM     Distance (Gait Training Goal 1, PT) 10'  -KM     Time Frame (Gait Training Goal 1, PT) by discharge  -               User Key  (r) = Recorded By, (t) = Taken By, (c) = Cosigned By      Initials Name Provider Type    Eugene Angel, PT Physical Therapist                    Physical Therapy Education       Title: PT OT SLP Therapies (Done)       Topic: Physical Therapy (Done)       Point: Mobility training (Done)       Learning Progress Summary             Patient Acceptance, E,TB, VU by IRENE at 3/18/2024 1547                         Point: Home exercise program (Done)       Learning Progress Summary             Patient Acceptance, E,TB, VU by IRENE at 3/18/2024 1547                         Point: Body mechanics (Done)       Learning Progress Summary              Patient Acceptance, E,TB, VU by  at 3/18/2024 1547                         Point: Precautions (Done)       Learning Progress Summary             Patient Acceptance, E,TB, VU by  at 3/18/2024 1547                                         User Key       Initials Effective Dates Name Provider Type Discipline     05/24/22 -  Eugene Cuevas, PT Physical Therapist PT                  PT Recommendation and Plan  Anticipated Discharge Disposition (PT):  (TBD based on pt. progress)  Planned Therapy Interventions (PT): balance training, bed mobility training, gait training, home exercise program, patient/family education, postural re-education, ROM (range of motion), stair training, strengthening, stretching, transfer training  Therapy Frequency (PT): 2 times/wk (2-5x/wk)  Plan of Care Reviewed With: patient  Outcome Evaluation: Pt. evaluation completed during PT session. She was able to perform bed mobility w/ modA. She was able to perform transfers w/ Angela-modA. She was unable to ambulate d/t weakness. Pt. would benefit from skilled PT services.       Time Calculation:    PT Charges       Row Name 03/18/24 1522             Time Calculation    PT Received On 03/18/24  -KM      PT Goal Re-Cert Due Date 04/01/24  -                User Key  (r) = Recorded By, (t) = Taken By, (c) = Cosigned By      Initials Name Provider Type    Eugene Angel, BRUCE Physical Therapist                  Therapy Charges for Today       Code Description Service Date Service Provider Modifiers Qty    84042013869 HC PT EVAL MOD COMPLEXITY 4 3/18/2024 Eugene Cuevas, PT GP 1            PT G-Codes  AM-PAC 6 Clicks Score (PT): 14    Eugene Cuevas PT  3/18/2024

## 2024-03-18 NOTE — PLAN OF CARE
Goal Outcome Evaluation:      Pt has been resting this shift. Pt refused bath this shift, stated she had a bath before she came into the hospital. Pt was reeducated on the importance of a bath. Currently replacing pt's potassium. No signs and symptoms of acute distress noted. No complaints of chest pain or SOB reported.

## 2024-03-18 NOTE — PLAN OF CARE
Goal Outcome Evaluation:   Patient has been pleasant. Compliant with care and medications as ordered. Patient is currently resting in bed. No S&S of distress noted at this time. Bed alarm on, bed in lowest position, call light within reach. Plan of care ongoing.

## 2024-03-18 NOTE — PHARMACY PATIENT ASSISTANCE
Pharmacy evaluated the cost of Brilinta for patient. Brilinta is covered on the patient's insurance with a copay of $11.20.     Thank you,    Collette Servin, PharmD  03/18/24  07:56 EDT

## 2024-03-18 NOTE — THERAPY EVALUATION
"Patient Name: Regine Beckham  : 1955    MRN: [id number]                              Today's Date: 3/18/2024       Admit Date: 3/17/2024    Visit Dx:     ICD-10-CM ICD-9-CM   1. Left hip pain  M25.552 719.45   2. Acute pain of left shoulder  M25.512 719.41   3. Fall from standing, initial encounter  W19.XXXA E888.9   4. Failure to thrive in adult  R62.7 783.7     Patient Active Problem List   Diagnosis    Iron deficiency anemia due to chronic blood loss    Major depressive disorder    RLS (restless legs syndrome)    GERD (gastroesophageal reflux disease)    Left breast mass    Allergy to penicillin    Stroke    Grade I diastolic dysfunction    Moderate malnutrition    Falls frequently     Past Medical History:   Diagnosis Date    Anemia     Anxiety     Arthritis     Depression     Legally blind     Restless leg syndrome     Sleep apnea     \"I don't use a cpap anymore since losing 106 lbs\"    Water retention      Past Surgical History:   Procedure Laterality Date    BACK SURGERY      CARDIAC CATHETERIZATION N/A 2024    Procedure: Percutaneous Manual Thrombectomy;  Surgeon: Efrain Hurley MD;  Location: [facility] INVAS[facility] LOC[facility];  Service: Interventional Radiology;  Laterality: N/A;    GALLBLADDER SURGERY      HIP ARTHROSCOPY      JOINT REPLACEMENT Right       General Information       Row Name 24 1339          OT Time and Intention    Document Type evaluation  -     Mode of Treatment individual therapy;occupational therapy  -       Row Name 24 3589          General Information    Patient Profile Reviewed yes  -     Prior Level of Function --  CVA 2024 with residual deficits; patient had completed rehab and returned home then with progress decline; frequent falls; walker  -     Existing Precautions/Restrictions fall  -     Barriers to Rehab previous functional deficit  -       Row Name 24 0923          Occupational Profile    Reason for Services/Referral " (Occupational Profile) Patient admitted to [facility] on 3/17/2024. She was referred for OT evaluation due to change in functional performance with ADLs, functional mobility, and/or transfers.  -       Row Name 03/18/24 1339          Living Environment    People in Home alone  -       Row Name 03/18/24 1339          Cognition    Orientation Status (Cognition) oriented to;person;place;situation  -       Row Name 03/18/24 1339          Safety Issues, Functional Mobility    Safety Issues Affecting Function (Mobility) problem-solving;other (see comments)  slow processing at times  -     Impairments Affecting Function (Mobility) balance;coordination;endurance/activity tolerance;muscle tone abnormal;strength  -               User Key  (r) = Recorded By, (t) = Taken By, (c) = Cosigned By      Initials Name Provider Type     Maria Del Rosario Padgett, OT Occupational Therapist                     Mobility/ADL's       Row Name 03/18/24 1342          Bed Mobility    Bed Mobility supine-sit-supine  -     Supine-Sit-Supine [place] (Bed Mobility) moderate assist (50% patient effort)  -     Bed Mobility, Safety Issues decreased use of arms for pushing/pulling  -     Assistive Device (Bed Mobility) bed rails;head of bed elevated  -       Row Name 03/18/24 1343          Transfers    Transfers sit-stand transfer;stand-sit transfer  -[facility] Name 03/18/24 1343          Sit-Stand Transfer    Sit-Stand [place] (Transfers) minimum assist (75% patient effort);moderate assist (50% patient effort);2 person assist  -[facility] Name 03/18/24 1343          Stand-Sit Transfer    Stand-Sit [place] (Transfers) minimum assist (75% patient effort);2 person assist  -[facility] Name 03/18/24 1343          Activities of Daily Living    BADL Assessment/Intervention bathing;upper body dressing;lower body dressing;grooming;toileting  -       Row Name 03/18/24 1343          Bathing Assessment/Intervention     [place] Level (Bathing) bathing skills;maximum assist (25% patient effort)  -KP       Row Name 03/18/24 1343          Upper Body Dressing Assessment/Training    [place] Level (Upper Body Dressing) upper body dressing skills;moderate assist (50% patient effort)  -KP       Row Name 03/18/24 1343          Lower Body Dressing Assessment/Training    [place] Level (Lower Body Dressing) lower body dressing skills;moderate assist (50% patient effort)  -KP       Row Name 03/18/24 1343          Grooming Assessment/Training    [place] Level (Grooming) grooming skills;moderate assist (50% patient effort)  -KP       Row Name 03/18/24 1343          Toileting Assessment/Training    [place] Level (Toileting) toileting skills;maximum assist (25% patient effort)  -               User Key  (r) = Recorded By, (t) = Taken By, (c) = Cosigned By      Initials Name Provider Type     Maria Del Rosario Pagdett OT Occupational Therapist                   Obj/Interventions       Row Name 03/18/24 1343          Sensory Assessment (Somatosensory)    Sensory Assessment (Somatosensory) left UE  -     Left UE Sensory Assessment general sensation;impaired  -KP       Row Name 03/18/24 1343          Vision Assessment/Intervention    Visual Impairment/Limitations legally blind  -     Vision Assessment Comment macular degeneratio - per patient report  -KP       Row Name 03/18/24 1343          Strength Comprehensive (MMT)    Comment, General Manual Muscle Testing (MMT) Assessment LUE 2-/5, RUE 4+/5  -KP       Row Name 03/18/24 1343          Motor Skills    Motor Skills coordination;functional endurance  -     Coordination left;upper extremity;severe impairment  -     Functional Endurance fair minus  -KP       Row Name 03/18/24 1343          Balance    Balance Assessment sitting static balance;standing static balance  -     Static Sitting Balance minimal assist  -     Position, Sitting Balance unsupported;sitting edge of bed   -KP     Static Standing Balance minimal assist;moderate assist  -               User Key  (r) = Recorded By, (t) = Taken By, (c) = Cosigned By      Initials Name Provider Type    Maria Del Rosario Briceño OT Occupational Therapist                   Goals/Plan       [facility] Name 03/18/24 7090          Transfer Goal 1 (OT)    Activity/Assistive Device (Transfer Goal 1, OT) toilet  -     [place] Level/Cues Needed (Transfer Goal 1, OT) standby assist  -     Time Frame (Transfer Goal 1, OT) by discharge  -[facility] Name 03/18/24 1341          Dressing Goal 1 (OT)    Activity/Device (Dressing Goal 1, OT) dressing skills, all  -KP     [place]/Cues Needed (Dressing Goal 1, OT) standby assist  -     Time Frame (Dressing Goal 1, OT) by discharge  -[facility] Name 03/18/24 8567          Problem Specific Goal 1 (OT)    Problem Specific Goal 1 (OT) Patient will perform sustained activity X10 minutes to promote highest level of independence and safety prior to discharge.  -     Time Frame (Problem Specific Goal 1, OT) by discharge  -[facility] Name 03/18/24 0360          Therapy Assessment/Plan (OT)    Planned Therapy Interventions (OT) activity tolerance training;BADL retraining;adaptive equipment training;functional balance retraining;transfer/mobility retraining;passive ROM/stretching;occupation/activity based interventions;ROM/therapeutic exercise;strengthening exercise;patient/caregiver education/training;neuromuscular control/coordination retraining  -               User Key  (r) = Recorded By, (t) = Taken By, (c) = Cosigned By      Initials Name Provider Type    Maria Del Rosario Briceño OT Occupational Therapist                   Clinical Impression       Row Name 03/18/24 6019          Pain Assessment    Pretreatment Pain Rating 0/10 - no pain  -     Posttreatment Pain Rating 0/10 - no pain  -[facility] Name 03/18/24 1349          Plan of Care Review    Plan of Care Reviewed With patient  -     Progress  no change  -     Outcome Evaluation Patient seen for OT evaluation. She presents with functional limitations including generalized weakness, impaired activity tolerance/functional endurance, weakness LUE, and impaired balance. Safety limitations as well with increased processing observed at times. Patient would benefit from ongoing OT services to promote highest level of safety and independence prior to discharge.  -       Row Name 03/18/24 1345          Therapy Assessment/Plan (OT)    Patient/Family Therapy Goal Statement (OT) return home  -     Rehab Potential (OT) good, to achieve stated therapy goals  -     Criteria for Skilled Therapeutic Interventions Met (OT) yes;meets criteria;skilled treatment is necessary  -     Therapy Frequency (OT) 3 times/wk  3-5x/week as able and available  -     Predicted Duration of Therapy Intervention (OT) discharge  -       Row Name 03/18/24 1347          Therapy Plan Review/Discharge Plan (OT)    Anticipated Discharge Disposition (OT) skilled nursing facility  -       Row Name 03/18/24 1345          Positioning and Restraints    Pre-Treatment Position in bed  -     Post Treatment Position bed  -     In Bed fowlers;call light within reach;encouraged to call for assist;exit alarm on  -               User Key  (r) = Recorded By, (t) = Taken By, (c) = Cosigned By      Initials Name Provider Type     Maria Del Rosario Padgett, BLU Occupational Therapist                   Outcome Measures       Row Name 03/18/24 0706          How much help from another person do you currently need...    Turning from your back to your side while in flat bed without using bedrails? 3  -KF     Moving from lying on back to sitting on the side of a flat bed without bedrails? 3  -KF     Moving to and from a bed to a chair (including a wheelchair)? 2  -KF     Standing up from a chair using your arms (e.g., wheelchair, bedside chair)? 2  -KF     Climbing 3-5 steps with a railing? 2  -KF     To  walk in hospital room? 2  -KF     AM-PAC 6 Clicks Score (PT) 14  -KF     Highest Level of Mobility Goal 4 --> Transfer to chair/commode  -               User Key  (r) = Recorded By, (t) = Taken By, (c) = Cosigned By      Initials Name Provider Type    Abebe Jean Baptiste, RN Registered Nurse                      OT Recommendation and Plan  Planned Therapy Interventions (OT): activity tolerance training, BADL retraining, adaptive equipment training, functional balance retraining, transfer/mobility retraining, passive ROM/stretching, occupation/activity based interventions, ROM/therapeutic exercise, strengthening exercise, patient/caregiver education/training, neuromuscular control/coordination retraining  Therapy Frequency (OT): 3 times/wk (3-5x/week as able and available)  Plan of Care Review  Plan of Care Reviewed With: patient  Progress: no change  Outcome Evaluation: Patient seen for OT evaluation. She presents with functional limitations including generalized weakness, impaired activity tolerance/functional endurance, weakness LUE, and impaired balance. Safety limitations as well with increased processing observed at times. Patient would benefit from ongoing OT services to promote highest level of safety and independence prior to discharge.     Time Calculation:         Time Calculation- OT       Row Name 03/18/24 1348             Time Calculation- OT    OT Received On 03/18/24  -                User Key  (r) = Recorded By, (t) = Taken By, (c) = Cosigned By      Initials Name Provider Type    Maria Del Rosario Briceño OT Occupational Therapist                  Therapy Charges for Today       Code Description Service Date Service Provider Modifiers Qty    96266559955  OT EVAL MOD COMPLEXITY 4 3/18/2024 Maria Del Rosario Padgett OT GO 1                 Maria Del Rosario Padgett OT  3/18/2024

## 2024-03-18 NOTE — CASE MANAGEMENT/SOCIAL WORK
Discharge Planning Assessment  [facility]     Patient Name: Regine Beckham  MRN: [id number]  Today's Date: 3/18/2024    Admit Date: 3/17/2024    Plan: SS received consult per Case Management for discharge planning.  SS noted ED Case Management completed initial referral.  SS spoke with pt at bedside.  Pt resides at home alone at [address] [place] [address] [address] [facility] in [facility] and plans to return home at discharge.  Pt currently does not utilize home health services.  Pt requests home heatlh at discharge.  Pt stated no preference for home health provider.  Pt has rollator via unknown provider.  SS  will follow     Discharge Plan       Row Name 03/18/24 1400       Plan    Plan SS received consult per Case Management for discharge planning.  SS noted ED Case Management completed initial referral.  SS spoke with pt at bedside.  Pt resides at home alone at [address] [place] [address] [address] [facility] in [facility] and plans to return home at discharge.  Pt currently does not utilize home health services.  Pt requests home heatlh at discharge.  Pt stated no preference for home health provider.  Pt has rollator via unknown provider.  SS  will follow                    BATOOL DunneW

## 2024-03-18 NOTE — THERAPY EVALUATION
"Acute Care - Speech Language Pathology   Swallow Initial Evaluation  [place]  CLINICAL DYSPHAGIA ASSESSMENT     Patient Name: Regine Beckham  : 1955  MRN: [id number]  Today's Date: 3/18/2024               Admit Date: 3/17/2024    Visit Dx:     ICD-10-CM ICD-9-CM   1. Left hip pain  M25.552 719.45   2. Acute pain of left shoulder  M25.512 719.41   3. Fall from standing, initial encounter  W19.XXXA E888.9   4. Failure to thrive in adult  R62.7 783.7     Patient Active Problem List   Diagnosis    Iron deficiency anemia due to chronic blood loss    Major depressive disorder    RLS (restless legs syndrome)    GERD (gastroesophageal reflux disease)    Left breast mass    Allergy to penicillin    Stroke    Grade I diastolic dysfunction    Moderate malnutrition    Falls frequently     Past Medical History:   Diagnosis Date    Anemia     Anxiety     Arthritis     Depression     Legally blind     Restless leg syndrome     Sleep apnea     \"I don't use a cpap anymore since losing 106 lbs\"    Water retention      Past Surgical History:   Procedure Laterality Date    BACK SURGERY      CARDIAC CATHETERIZATION N/A 2024    Procedure: Percutaneous Manual Thrombectomy;  Surgeon: Efrain Hurley MD;  Location:  [facility];  Service: Interventional Radiology;  Laterality: N/A;    GALLBLADDER SURGERY      HIP ARTHROSCOPY      JOINT REPLACEMENT Right      Regine Beckham  was seen at bedside this AM on 3S to assess safety/efficacy of swallowing fnx, determine safest/least restrictive diet tolerance.     Per report: \"68 y.o. female with history significant for CVA January of this year with right internal carotid artery disease status post thrombectomy and stent placement, patient claims after stroke she was temporary placed at the nursing home.  she was eventually discharged.  She lives alone she has no family, she uses a walker at home.  Her neighbors help her prepare her food.  She stated that " "since then she has been frequently falling, at the minimum once a day fortunately she never sustained fracture from falling.  She did claim that yesterday she did not fall.  Today while attempting to walk she fell again she has EMS was contacted.  Denies loss of consciousness no incontinence no paresthesia no seizure.  Emergency room patient was complaining of pain mostly left shoulder and left hip workup was negative for fracture, for safety, patient agreed for admission and evaluation by PT OT.\"  Social History     Socioeconomic History    Marital status:     Number of children: 0    Years of education: 1 yr college   Tobacco Use    Smoking status: Every Day     Current packs/day: 1.50     Average packs/day: 1.5 packs/day for 51.0 years (76.5 ttl pk-yrs)     Types: Cigarettes    Smokeless tobacco: Never   Vaping Use    Vaping status: Never Used   Substance and Sexual Activity    Alcohol use: Yes     Alcohol/week: 1.0 standard drink of alcohol     Types: 1 Glasses of wine per week     Comment: \"occasionally - once every two months\"    Drug use: Not Currently     Comment: alcohol - occasionally    Sexual activity: Defer        Imaging:  Study Result    Narrative & Impression   XR CHEST 1 VW-     CLINICAL INDICATION: screening; M25.552-Pain in left hip; M25.512-Pain  in left shoulder; W19.XXXA-Unspecified fall, initial encounter        COMPARISON: None immediately available      TECHNIQUE: Single frontal view of the chest.     FINDINGS:     LUNGS: Lungs are adequately aerated.      HEART AND MEDIASTINUM: Heart and mediastinal contours are unremarkable        SKELETON: Bony and soft tissue structures are unremarkable.           IMPRESSION:  No radiographic evidence of acute cardiac or pulmonary disease.           This report was finalized on 3/17/2024 1:50 PM by Dr. Hu Obrine MD.     Labs:   Latest Reference Range & Units 02/04/24 21:54 03/17/24 13:03 03/18/24 00:58   Sodium 136 - 145 mmol/L 141 145 143 "   Potassium 3.5 - 5.2 mmol/L 3.7 4.3 3.1 (L)   Chloride 98 - 107 mmol/L 107 104 105   CO2 22.0 - 29.0 mmol/L 23.4 29.7 (H) 23.5   Anion Gap 5.0 - 15.0 mmol/L 10.6 11.3 14.5   BUN 8 - 23 mg/dL 18 20 20   Creatinine 0.57 - 1.00 mg/dL 1.00 0.79 0.77   BUN/Creatinine Ratio 7.0 - 25.0  18.0 25.3 (H) 26.0 (H)   eGFR >60.0 mL/min/1.73 61.1 81.1 83.6   Glucose 65 - 99 mg/dL 152 (H) 116 (H) 102 (H)   Calcium 8.6 - 10.5 mg/dL 9.2 10.0 9.6   Magnesium 1.6 - 2.4 mg/dL   1.8   Alkaline Phosphatase 39 - 117 U/L 103 97 95   Total Protein 6.0 - 8.5 g/dL 7.0 7.1 7.1   Albumin 3.5 - 5.2 g/dL 3.4 (L) 3.8 3.5   Globulin gm/dL 3.6 3.3 3.6   A/G Ratio g/dL 0.9 1.2 1.0   AST (SGOT) 1 - 32 U/L 28 18 19   ALT (SGPT) 1 - 33 U/L 34 (H) 12 13   Total Bilirubin 0.0 - 1.2 mg/dL 0.2 0.6 0.8   (L): Data is abnormally low  (H): Data is abnormally high  Diet Orders (active) (From admission, onward)       Start     Ordered    03/17/24 1612  Diet: Cardiac; Healthy Heart (2-3 Na+); Fluid Consistency: Thin (IDDSI 0)  Diet Effective Now         03/17/24 1611                Pt w/ h/o CVA 1/19/24 w/ dysphagia and cognitive communication disorder reported. Pt w/ FEES 2/1/24 at [facility] with recommendations for soft foods, thin liquids w/ small sips.     Pt observed on RA.     Patient was positioned upright and centered in bed to accept multiple po presentations of ice chips, solid cracker, puree, and thin liquids via spoon, cup, and straw.      Facial/oral structures were slightly aymmetrical upon observation. Lingual protrusion revealed no deviation. Oral mucosa were moist, pink, and clean. Secretions were clear, thin, and well controlled. OROM/GRACE was wfl to imitate oral postures. Gag is not assessed. Volitional cough was intact w/ adequate  intensity, clear in quality, non-productive. Voice was adequate in intensity, clear in quality w/ intelligible speech.    Upon po presentations, adequate bolus anticipation and acceptance w/ good labial seal for bolus  clearance via spoon bowl, cup rim stability and suction via straw. Bolus formation, manipulation and control were generally weak with extended mastication with solids. A-p transit was timely w/o significant oral residue appreciated. No overt s/s aspiration before the swallow.      Pharyngeal swallow was timely w/ adequate hyolaryngeal elevation per palpation. No overt s/s aspiration evidenced across this evaluation. No silent aspiration suspected. Patient denied odynophagia.    Impression: Patient presented w/ mild oral dysphagia, wfl pharyngeal swallow w/o s/s aspiration.No s/s indicative of silent aspiration. No odynophagia reported.      SLP Recommendation and Plan      1. Mechanical soft consistencies, thin liquids.    2. Medications whole in puree/thins.   3. Upright and centered for all po intake.  4. CRISTOPHER precautions.  5. Oral care protocol.    SLP to follow up for diet safety, evaluate for S/L/C.     D/w patient results and recommendations w/ verbal agreement.    Thank you for allowing me to participate in the care of your patient-  Marbella Padgett M.A., CCC-SLP     EDUCATION  The patient has been educated in the following areas:   Oral Care/Hydration.     Time Calculation:    Time Calculation- SLP       Row Name 03/18/24 1238             Time Calculation- SLP    SLP - Next Appointment 03/19/24  -                User Key  (r) = Recorded By, (t) = Taken By, (c) = Cosigned By      Initials Name Provider Type     Marbella Padgett MA,CCC-SLP Speech and Language Pathologist                    Therapy Charges for Today       Code Description Service Date Service Provider Modifiers Qty    11205572341  ST EVAL ORAL PHARYNG SWALLOW 4 3/18/2024 Marbella Padgett MA,CCC-SLP GN 1                 Marbella Padgett MA, CCC-SLP  3/18/2024

## 2024-03-19 LAB
ANION GAP SERPL CALCULATED.3IONS-SCNC: 10.6 MMOL/L (ref 5–15)
BUN SERPL-MCNC: 23 MG/DL (ref 8–23)
BUN/CREAT SERPL: 24.7 (ref 7–25)
CALCIUM SPEC-SCNC: 9.8 MG/DL (ref 8.6–10.5)
CHLORIDE SERPL-SCNC: 110 MMOL/L (ref 98–107)
CO2 SERPL-SCNC: 24.4 MMOL/L (ref 22–29)
CREAT SERPL-MCNC: 0.93 MG/DL (ref 0.57–1)
EGFRCR SERPLBLD CKD-EPI 2021: 66.7 ML/MIN/1.73
GLUCOSE BLDC GLUCOMTR-MCNC: 120 MG/DL (ref 70–130)
GLUCOSE SERPL-MCNC: 114 MG/DL (ref 65–99)
MAGNESIUM SERPL-MCNC: 1.9 MG/DL (ref 1.6–2.4)
POTASSIUM SERPL-SCNC: 3.9 MMOL/L (ref 3.5–5.2)
SODIUM SERPL-SCNC: 145 MMOL/L (ref 136–145)

## 2024-03-19 PROCEDURE — 25010000002 CYANOCOBALAMIN PER 1000 MCG: Performed by: STUDENT IN AN ORGANIZED HEALTH CARE EDUCATION/TRAINING PROGRAM

## 2024-03-19 PROCEDURE — 83735 ASSAY OF MAGNESIUM: CPT | Performed by: INTERNAL MEDICINE

## 2024-03-19 PROCEDURE — 97116 GAIT TRAINING THERAPY: CPT

## 2024-03-19 PROCEDURE — 25010000002 HEPARIN (PORCINE) PER 1000 UNITS: Performed by: HOSPITALIST

## 2024-03-19 PROCEDURE — 99231 SBSQ HOSP IP/OBS SF/LOW 25: CPT | Performed by: INTERNAL MEDICINE

## 2024-03-19 PROCEDURE — 97530 THERAPEUTIC ACTIVITIES: CPT

## 2024-03-19 PROCEDURE — G0378 HOSPITAL OBSERVATION PER HR: HCPCS

## 2024-03-19 PROCEDURE — 99214 OFFICE O/P EST MOD 30 MIN: CPT | Performed by: STUDENT IN AN ORGANIZED HEALTH CARE EDUCATION/TRAINING PROGRAM

## 2024-03-19 PROCEDURE — 82948 REAGENT STRIP/BLOOD GLUCOSE: CPT

## 2024-03-19 PROCEDURE — 92523 SPEECH SOUND LANG COMPREHEN: CPT | Performed by: SPEECH-LANGUAGE PATHOLOGIST

## 2024-03-19 PROCEDURE — 80048 BASIC METABOLIC PNL TOTAL CA: CPT | Performed by: INTERNAL MEDICINE

## 2024-03-19 RX ORDER — CYANOCOBALAMIN 1000 UG/ML
1000 INJECTION, SOLUTION INTRAMUSCULAR; SUBCUTANEOUS ONCE
Status: COMPLETED | OUTPATIENT
Start: 2024-03-19 | End: 2024-03-19

## 2024-03-19 RX ORDER — OXYBUTYNIN CHLORIDE 5 MG/1
10 TABLET, EXTENDED RELEASE ORAL DAILY
Status: DISCONTINUED | OUTPATIENT
Start: 2024-03-19 | End: 2024-03-27 | Stop reason: HOSPADM

## 2024-03-19 RX ORDER — ROPINIROLE 1 MG/1
4 TABLET, FILM COATED ORAL NIGHTLY
Status: DISCONTINUED | OUTPATIENT
Start: 2024-03-19 | End: 2024-03-27 | Stop reason: HOSPADM

## 2024-03-19 RX ORDER — GABAPENTIN 300 MG/1
300 CAPSULE ORAL 3 TIMES DAILY
Status: DISCONTINUED | OUTPATIENT
Start: 2024-03-19 | End: 2024-03-19

## 2024-03-19 RX ORDER — ZOLPIDEM TARTRATE 5 MG/1
5 TABLET ORAL NIGHTLY PRN
Status: DISCONTINUED | OUTPATIENT
Start: 2024-03-19 | End: 2024-03-27 | Stop reason: HOSPADM

## 2024-03-19 RX ADMIN — LOSARTAN POTASSIUM 50 MG: 50 TABLET, FILM COATED ORAL at 08:29

## 2024-03-19 RX ADMIN — TICAGRELOR 60 MG: 60 TABLET ORAL at 20:04

## 2024-03-19 RX ADMIN — OXYBUTYNIN CHLORIDE 10 MG: 5 TABLET, EXTENDED RELEASE ORAL at 10:20

## 2024-03-19 RX ADMIN — DOCUSATE SODIUM 50 MG AND SENNOSIDES 8.6 MG 2 TABLET: 8.6; 5 TABLET, FILM COATED ORAL at 20:05

## 2024-03-19 RX ADMIN — PANTOPRAZOLE SODIUM 40 MG: 40 TABLET, DELAYED RELEASE ORAL at 05:01

## 2024-03-19 RX ADMIN — TICAGRELOR 60 MG: 60 TABLET ORAL at 08:30

## 2024-03-19 RX ADMIN — Medication 1 MG: at 08:31

## 2024-03-19 RX ADMIN — HEPARIN SODIUM 5000 UNITS: 5000 INJECTION INTRAVENOUS; SUBCUTANEOUS at 20:04

## 2024-03-19 RX ADMIN — ASPIRIN 81 MG: 81 TABLET, CHEWABLE ORAL at 08:30

## 2024-03-19 RX ADMIN — Medication 10 ML: at 08:32

## 2024-03-19 RX ADMIN — HEPARIN SODIUM 5000 UNITS: 5000 INJECTION INTRAVENOUS; SUBCUTANEOUS at 08:31

## 2024-03-19 RX ADMIN — MIRTAZAPINE 30 MG: 15 TABLET, FILM COATED ORAL at 20:05

## 2024-03-19 RX ADMIN — ROPINIROLE HYDROCHLORIDE 4 MG: 1 TABLET, FILM COATED ORAL at 20:05

## 2024-03-19 RX ADMIN — ATORVASTATIN CALCIUM 80 MG: 40 TABLET, FILM COATED ORAL at 20:05

## 2024-03-19 RX ADMIN — CYANOCOBALAMIN 1000 MCG: 1000 INJECTION, SOLUTION INTRAMUSCULAR; SUBCUTANEOUS at 17:45

## 2024-03-19 RX ADMIN — Medication 10 ML: at 20:04

## 2024-03-19 NOTE — CONSULTS
"Neurology Consult Note    Consult Date: 3/19/2024    Referring MD: Provider, No Known    Reason for Consult I have been asked to see the patient in neurological consultation to render advice and opinion regarding left-sided weakness, frequent falls    Regine Beckham is a 68 y.o. handed white female with known diagnosis of anxiety, depression, restless leg syndrome, obstructive sleep apnea not compliant with CPAP machine, right MCA stroke in January 2024 secondary to right ICA occlusion status post mechanical thrombectomy tiki 3 with residual left-sided weakness presented to the hospital complaining of frequent falls.  From prior note her exam was noticeable for decree sensation on the left side, 2/5 on the left upper extremity, 3/5 on the left lower extremity, left-sided neglect and left hemianopsia, on my examination today exam was exactly the same.  I asked the patient if her exam improved after rehab and she denied and stated that it was the same when she was discharged from rehab.  She stated compliant with her medications.  Losing consciousness or seizure-like activity.    Past Medical History:   Diagnosis Date    Anemia     Anxiety     Arthritis     Depression     Legally blind     Restless leg syndrome     Sleep apnea     \"I don't use a cpap anymore since losing 106 lbs\"    Water retention        Exam  /91 (BP Location: Left arm, Patient Position: Lying)   Pulse 77   Temp 97.9 °F (36.6 °C) (Infrared)   Resp 16   Ht 167.6 cm (66\")   Wt 61.5 kg (135 lb 9.6 oz) Comment: STALIN Krishnamurthy aware  SpO2 96%   BMI 21.89 kg/m²   Gen: NAD, vitals reviewed  MS: oriented x3, recent/remote memory intact, normal attention/concentration, language intact, tactile neglect on the left.  CN: Left hemianopsia, PERRL, EOMI, left lower facial droop, mild to moderate dysarthria  Motor: 2/5 on the left upper extremity, 3/5 on the left lower extremity extremities  Sensory exam: Decreased on the left    NIH Stroke Scale " :    (0_) 1a. Level of consciousness (LOC): 0=alert;1=arousable by minor stimulation;2=obtunded or needs strong stimulation to attend;3=unresponsive or reflex responses only  (0_) 1b. LOC Questions: 0=answers both;1=answers one;2=answers neither  (0_) 1c. LOC Commands: 0=performs both tasks;1=performs one task;2=performs neither task  (0_) 2. Best Gaze: 0=normal;1=partial gaze palsy;2=forced deviation or total gaze paresis  (2_) 3. Visual: 0=normal;1=partial hemianopia;2=complete hemianopia;3=blind  (1_) 4. Facial palsy: 0=normal;1=minor paresis;2=partial paralysis;3=complete paralysis  FOR 5 AND 6 BELOW: 0=normal;1=drifts but maintains in air;2=unable to maintain in air;3=moves but unable to lift against gravity;4=no movement  (0_)0 (_)1 (_)2 (x)3 (_)4 (_)NA 5a. Motor arm-left  (0_)0 (_)1 (_)2 (_)3 (_)4 (_)NA 5b. Motor arm-right  (0_)0 (_)1 (x)2 (_)3 (_)4 (_)NA 6a. Motor leg-left  (0_)0 (_)1 (_)2 (_)3 (_)4 (_)NA 6ba. Motor leg-right  (0_) 7. Limb ataxia:0=absent;1=unilateral;2=bilateral; NA=unable to test  (1_) 8. Sensory: 0=normal;1=mild-moderate loss;2-severe or total loss  (0_) 9. Best language: 0=normal;1=mild-moderate aphasia, some deficits apparent but able to communicate;2=severe aphasia, fragmentary expression only, unable to communicate well;3=global aphasia, mute and no comprehension  (1_)10. Dysarthria: 0=normal;1=mild-moderate, slurs some words;2=severe, speech mostly unintelligible; NA=unable to test (e.g.,intubation)  (1_)11. Extinction/Inattention: 0=normal;1=visual,tactile,auditory or other extinction to bilateral simultaneous stimulation, but no severe neglect;2=answers neither      NIH Score: Complete  10    DATA:    Lab Results   Component Value Date    GLUCOSE 114 (H) 03/19/2024    CALCIUM 9.8 03/19/2024     03/19/2024    K 3.9 03/19/2024    CO2 24.4 03/19/2024     (H) 03/19/2024    BUN 23 03/19/2024    CREATININE 0.93 03/19/2024    EGFRIFNONA 90 04/04/2020    BCR 24.7 03/19/2024     "ANIONGAP 10.6 03/19/2024     Lab Results   Component Value Date    WBC 7.47 03/18/2024    HGB 11.9 (L) 03/18/2024    HCT 37.3 03/18/2024    MCV 94.9 03/18/2024     03/18/2024       Lab review: TSH normal, B12 392, urinalysis negative for UTI    Imaging review: I personally reviewed her prior imaging from January 2024, MRI showed large right MCA ischemic stroke.  CT angio head and neck showed right ICA occlusion.  CT head on this admission showed encephalomalacia in the right parietal region, no acute hypodensities..    Diagnoses:  -Frequent falls likely related to her disability from the stroke in January 2024, rule out more strokes.    Pre-stroke MRS: 4  NIHSS: 10    PLAN:   -Repeat MRI brain without contrast to rule out new strokes  -PT/OT evaluation  -Falls precautions  -Goal B12 more than 400 currently at 392 recommend replacement.  -Official swallow evaluation due to dysarthria and facial droop    Will follow-up on the MRI brain for further recommendations.    Discussed with Dr. Crabtree, patient and nursing staff.    This was an audio and video enabled telemedicine encounter.    \"Dictated utilizing Dragon dictation\".    Addendum:  I personally reviewed MRI brain on this admission which showed no acute ischemic stroke.  No new neurological complaint and her weakness related to prior right MCA stroke that happened on January 19th, 2024.   IMPRESSION:  Findings consistent with chronic small vessel ischemic  change with encephalomalacia in the right frontal lobe from prior  Infarct.    -Continue same stroke home medications prior to this admission  -Follow-up with [place] [facility] as needed    Will sign off and see again as needed.       "

## 2024-03-19 NOTE — PLAN OF CARE
Speech Language Cognition Plan of Care:      Regine Beckham will benefit from formal s/l cognition therapy  3-5 days per week at 15-60 minute sessions, as functionally tolerated, for 7-10 days, to address: dysarthria, aphasia.     Long Term Goal:  Patient will demonstrate cognitive-linguistic skills representative of  premorbid baseline functional status to allow for participation in complex conversation and participation in ADLS.     Short Term Goals:  1. Patient will tolerate facial massage to L facial surface for 3-7 minutes to increase surface blood flow, sensation and ROM over 3 consecutive sessions.     2. Patient will demonstrate orientation to time to improve safety with return to home environment over 3 consecutive sessions.    3. Patient will complete complex word finding tasks in conversation and structured tasks to improve ability to make needs known, complete ADLs  over 3 consecutive sessions.    4. Patient will read simple sentences with minimal delay to improve safety with return to home environment over 3 consecutive sessions.     5. Patient will complete problem solving tasks for ADLs to improve safety with return to home environment over 3 consecutive sessions.     6. Patient will recall recent events and ADLs with minimal delay to improve safety with return to home environment over 3 consecutive sessions.    7. Patient will respond to OE questions with minimal delay to improve language processing  over 3 consecutive sessions.    8. Patient will compare/contrast with minimal delay to improve language processing over 3 consecutive sessions.      Thank you for allowing me to participate in the care of your patient-  Marbella Padgett M.A., CCC-SLP

## 2024-03-19 NOTE — THERAPY TREATMENT NOTE
"Patient Name: Regine Beckham  : 1955    MRN: [id number]                              Today's Date: 3/19/2024       Admit Date: 3/17/2024    Visit Dx:     ICD-10-CM ICD-9-CM   1. Left hip pain  M25.552 719.45   2. Acute pain of left shoulder  M25.512 719.41   3. Fall from standing, initial encounter  W19.XXXA E888.9   4. Failure to thrive in adult  R62.7 783.7     Patient Active Problem List   Diagnosis    Iron deficiency anemia due to chronic blood loss    Major depressive disorder    RLS (restless legs syndrome)    GERD (gastroesophageal reflux disease)    Left breast mass    Allergy to penicillin    Stroke    Grade I diastolic dysfunction    Moderate malnutrition    Falls frequently     Past Medical History:   Diagnosis Date    Anemia     Anxiety     Arthritis     Depression     Legally blind     Restless leg syndrome     Sleep apnea     \"I don't use a cpap anymore since losing 106 lbs\"    Water retention      Past Surgical History:   Procedure Laterality Date    BACK SURGERY      CARDIAC CATHETERIZATION N/A 2024    Procedure: Percutaneous Manual Thrombectomy;  Surgeon: Efrain Hurley MD;  Location: [facility] INVAS[facility] LOCATION;  Service: Interventional Radiology;  Laterality: N/A;    GALLBLADDER SURGERY      HIP ARTHROSCOPY      JOINT REPLACEMENT Right       General Information       Row Name 24 1534          OT Time and Intention    Document Type therapy note (daily note)  -     Mode of Treatment individual therapy;occupational therapy  -       Row Name 24 1534          General Information    Patient Profile Reviewed yes  -KP     Existing Precautions/Restrictions fall  -KP     Barriers to Rehab previous functional deficit  -KP       Row Name 24 1534          Cognition    Orientation Status (Cognition) oriented x 3  -KP       Row Name 24 1530          Safety Issues, Functional Mobility    Impairments Affecting Function (Mobility) " balance;coordination;endurance/activity tolerance;muscle tone abnormal;strength  -               User Key  (r) = Recorded By, (t) = Taken By, (c) = Cosigned By      Initials Name Provider Type    Maria Del Rosario Briceño OT Occupational Therapist                     Mobility/ADL's       Row Name 03/19/24 1534          Bed Mobility    Bed Mobility supine-sit;sit-supine  -     Supine-Sit [place] (Bed Mobility) moderate assist (50% patient effort);verbal cues  -     Sit-Supine [place] (Bed Mobility) minimum assist (75% patient effort);verbal cues  -     Assistive Device (Bed Mobility) bed rails;head of bed elevated  -       Row Name 03/19/24 1534          Transfers    Transfers sit-stand transfer;stand-sit transfer  -       Row Name 03/19/24 1534          Sit-Stand Transfer    Sit-Stand [place] (Transfers) minimum assist (75% patient effort)  -       Row Name 03/19/24 1534          Stand-Sit Transfer    Stand-Sit [place] (Transfers) minimum assist (75% patient effort)  -       Row Name 03/19/24 1534          Bathing Assessment/Intervention    [place] Level (Bathing) bathing skills;moderate assist (50% patient effort)  -       Row Name 03/19/24 1534          Upper Body Dressing Assessment/Training    [place] Level (Upper Body Dressing) upper body dressing skills;minimum assist (75% patient effort)  -[facility] Name 03/19/24 1534          Lower Body Dressing Assessment/Training    [place] Level (Lower Body Dressing) lower body dressing skills;moderate assist (50% patient effort)  -               User Key  (r) = Recorded By, (t) = Taken By, (c) = Cosigned By      Initials Name Provider Type    Maria Del Rosario Briceño OT Occupational Therapist                   Obj/Interventions       Row Name 03/19/24 1535          Motor Skills    Motor Skills functional endurance  -     Functional Endurance fair plus  -               User Key  (r) = Recorded By, (t) = Taken By, (c) =  Cosigned By      Initials Name Provider Type    Maria Del Rosario Briceño OT Occupational Therapist                   Goals/Plan    No documentation.                  Clinical Impression       Row Name 03/19/24 1535          Pain Assessment    Pretreatment Pain Rating 0/10 - no pain  -     Posttreatment Pain Rating 0/10 - no pain  -       Row Name 03/19/24 1535          Plan of Care Review    Plan of Care Reviewed With patient  -     Progress improving  -     Outcome Evaluation Patient seen for OT treatment. She presented with improved activity tolerance on this date and tolerated out of bed activity. Continue plan of care.  -       Row Name 03/19/24 1535          Therapy Plan Review/Discharge Plan (OT)    Anticipated Discharge Disposition (OT) skilled nursing facility;home with 24/7 care  -       Row Name 03/19/24 1535          Positioning and Restraints    Pre-Treatment Position in bed  -     Post Treatment Position bed  -     In Bed fowlers;call light within reach;encouraged to call for assist;exit alarm on  -               User Key  (r) = Recorded By, (t) = Taken By, (c) = Cosigned By      Initials Name Provider Type    Maria Del Rosario Briceño OT Occupational Therapist                   Outcome Measures       Row Name 03/19/24 0830          How much help from another person do you currently need...    Turning from your back to your side while in flat bed without using bedrails? 3  -KA     Moving from lying on back to sitting on the side of a flat bed without bedrails? 3  -KA     Moving to and from a bed to a chair (including a wheelchair)? 2  -KA     Standing up from a chair using your arms (e.g., wheelchair, bedside chair)? 2  -KA     Climbing 3-5 steps with a railing? 2  -KA     To walk in hospital room? 2  -KA     AM-PAC 6 Clicks Score (PT) 14  -KA     Highest Level of Mobility Goal 4 --> Transfer to chair/commode  -               User Key  (r) = Recorded By, (t) = Taken By, (c) = Cosigned By       Initials Name Provider Type    Ivy Lugo, RN Registered Nurse                      OT Recommendation and Plan  Planned Therapy Interventions (OT): activity tolerance training, BADL retraining, adaptive equipment training, functional balance retraining, transfer/mobility retraining, passive ROM/stretching, occupation/activity based interventions, ROM/therapeutic exercise, strengthening exercise, patient/caregiver education/training, neuromuscular control/coordination retraining  Therapy Frequency (OT): 3 times/wk (3-5x/week as able and available)  Plan of Care Review  Plan of Care Reviewed With: patient  Progress: improving  Outcome Evaluation: Patient seen for OT treatment. She presented with improved activity tolerance on this date and tolerated out of bed activity. Continue plan of care.     Time Calculation:         Time Calculation- OT       Row Name 03/19/24 1536             Time Calculation- OT    OT Received On 03/19/24  -                User Key  (r) = Recorded By, (t) = Taken By, (c) = Cosigned By      Initials Name Provider Type    Maria Del Rosario Briceño OT Occupational Therapist                  Therapy Charges for Today       Code Description Service Date Service Provider Modifiers Qty    76343629871  OT EVAL MOD COMPLEXITY 4 3/18/2024 Maria Del Rosario Padgett OT GO 1    [id number]  OT THERAPEUTIC ACT EA 15 MIN 3/19/2024 Maria Del Rosario Padgett OT GO 1                 Maria Del Rosario Padgett OT  3/19/2024

## 2024-03-19 NOTE — PLAN OF CARE
Goal Outcome Evaluation:      Pt has been resting this shift. No signs and symptoms of acute distress noted. No complaints of chest pain or SOB reported.

## 2024-03-19 NOTE — PLAN OF CARE
Goal Outcome Evaluation:  Plan of Care Reviewed With: patient        Progress: no change  Outcome Evaluation: Patient is resting in bed at this time. Patient has had no complaints or concerns this shift. VSS. No acute changes noted at this time. Will continue with POC.

## 2024-03-19 NOTE — THERAPY TREATMENT NOTE
"Acute Care - Physical Therapy Treatment Note   [facility]     Patient Name: Regine Beckham  : 1955  MRN: [id number]  Today's Date: 3/19/2024      Visit Dx:     ICD-10-CM ICD-9-CM   1. Left hip pain  M25.552 719.45   2. Acute pain of left shoulder  M25.512 719.41   3. Fall from standing, initial encounter  W19.XXXA E888.9   4. Failure to thrive in adult  R62.7 783.7     Patient Active Problem List   Diagnosis    Iron deficiency anemia due to chronic blood loss    Major depressive disorder    RLS (restless legs syndrome)    GERD (gastroesophageal reflux disease)    Left breast mass    Allergy to penicillin    Stroke    Grade I diastolic dysfunction    Moderate malnutrition    Falls frequently     Past Medical History:   Diagnosis Date    Anemia     Anxiety     Arthritis     Depression     Legally blind     Restless leg syndrome     Sleep apnea     \"I don't use a cpap anymore since losing 106 lbs\"    Water retention      Past Surgical History:   Procedure Laterality Date    BACK SURGERY      CARDIAC CATHETERIZATION N/A 2024    Procedure: Percutaneous Manual Thrombectomy;  Surgeon: Efrain Hurley MD;  Location:  [facility];  Service: Interventional Radiology;  Laterality: N/A;    GALLBLADDER SURGERY      HIP ARTHROSCOPY      JOINT REPLACEMENT Right      PT Assessment (Last 12 Hours)       PT Evaluation and Treatment       Row Name 24 1501          Physical Therapy Time and Intention    Document Type therapy note (daily note)  -KM     Mode of Treatment physical therapy  -KM     Patient Effort good  -KM     Symptoms Noted During/After Treatment fatigue  -KM       Row Name 24 1501          General Information    Patient Profile Reviewed yes  -KM     Patient Observations alert;cooperative;agree to therapy  -KM     Existing Precautions/Restrictions fall  -KM       Row Name 24 1501          Cognition    Affect/Mental Status (Cognition) WFL  -KM     Follows Commands " (Cognition) WFL  -KM       Row Name 03/19/24 1501          Bed Mobility    Bed Mobility supine-sit;sit-supine  -KM     Supine-Sit Grainger (Bed Mobility) moderate assist (50% patient effort);verbal cues  -KM     Sit-Supine Grainger (Bed Mobility) minimum assist (75% patient effort);verbal cues  -KM     Bed Mobility, Safety Issues decreased use of arms for pushing/pulling  -KM     Assistive Device (Bed Mobility) bed rails;head of bed elevated  -KM       Row Name 03/19/24 1501          Transfers    Transfers sit-stand transfer;stand-sit transfer  -KM       Row Name 03/19/24 1501          Sit-Stand Transfer    Sit-Stand Grainger (Transfers) minimum assist (75% patient effort)  -KM     Assistive Device (Sit-Stand Transfers) --  HHA  -KM       Row Name 03/19/24 1501          Stand-Sit Transfer    Stand-Sit Grainger (Transfers) minimum assist (75% patient effort)  -KM     Assistive Device (Stand-Sit Transfers) --  HHA  -KM       Row Name 03/19/24 1501          Gait/Stairs (Locomotion)    Gait/Stairs Locomotion gait/ambulation independence;distance ambulated  -KM     Grainger Level (Gait) minimum assist (75% patient effort);verbal cues;2 person assist  2nd person for balance  -KM     Assistive Device (Gait) --  BUE HHA  -KM     Patient was able to Ambulate yes  -KM     Distance in Feet (Gait) 40  -KM     Pattern (Gait) step-to  -KM     Deviations/Abnormal Patterns (Gait) ataxic;base of support, narrow;gait speed decreased  -KM     Left Sided Gait Deviations weight shift ability decreased  -KM       Row Name 03/19/24 1501          Safety Issues, Functional Mobility    Safety Issues Affecting Function (Mobility) problem-solving  -KM     Impairments Affecting Function (Mobility) balance;coordination;endurance/activity tolerance;muscle tone abnormal;strength  -KM       Row Name 03/19/24 1501          Balance    Balance Assessment sitting static balance;standing dynamic balance  -KM     Static Sitting  Balance standby assist;contact guard  -KM     Position, Sitting Balance sitting edge of bed;unsupported  -KM     Dynamic Standing Balance minimal assist;2-person assist;verbal cues  -KM       Row Name 03/19/24 1501          Progress Summary (PT)    Daily Progress Summary (PT) Pt. was able to perform bed mobility w/ Angela-modA. She was able to perform transfers w/ Angela. She ambulated short distance w/ Angela and bilateral HHA for steadying. Pt. would continue to benefit from skilled PT services to address fall risk, safety precautions, and decreased mobility.  -KM               User Key  (r) = Recorded By, (t) = Taken By, (c) = Cosigned By      Initials Name Provider Type    Eugene Angel, BRUCE Physical Therapist                    Physical Therapy Education       Title: PT OT SLP Therapies (Done)       Topic: Physical Therapy (Done)       Point: Mobility training (Done)       Learning Progress Summary             Patient Acceptance, E,TB, VU by  at 3/18/2024 1547                         Point: Home exercise program (Done)       Learning Progress Summary             Patient Acceptance, E,TB, VU by  at 3/18/2024 1547                         Point: Body mechanics (Done)       Learning Progress Summary             Patient Acceptance, E,TB, VU by  at 3/18/2024 1547                         Point: Precautions (Done)       Learning Progress Summary             Patient Acceptance, E,TB, VU by  at 3/18/2024 1547                                         User Key       Initials Effective Dates Name Provider Type Jacqui BONILLA 05/24/22 -  Eugene Cuevas, PT Physical Therapist PT                  PT Recommendation and Plan  Anticipated Discharge Disposition (PT): inpatient rehabilitation facility, skilled nursing facility (further physical rehab needed prior to return home by herself)  Planned Therapy Interventions (PT): balance training, bed mobility training, gait training, home exercise program, patient/family  education, postural re-education, ROM (range of motion), stair training, strengthening, stretching, transfer training  Therapy Frequency (PT): 2 times/wk (2-5x/wk)  Progress Summary (PT)  Daily Progress Summary (PT): Pt. was able to perform bed mobility w/ Angela-modA. She was able to perform transfers w/ Angela. She ambulated short distance w/ Angela and bilateral HHA for steadying. Pt. would continue to benefit from skilled PT services to address fall risk, safety precautions, and decreased mobility.  Plan of Care Reviewed With: patient  Outcome Evaluation: Pt. evaluation completed during PT session. She was able to perform bed mobility w/ modA. She was able to perform transfers w/ Angela-modA. She was unable to ambulate d/t weakness. Pt. would benefit from skilled PT services.       Time Calculation:    PT Charges       Row Name 03/19/24 1459             Time Calculation    PT Received On 03/19/24  -KM         Time Calculation- PT    Total Timed Code Minutes- PT 23 minute(s)  -KM                User Key  (r) = Recorded By, (t) = Taken By, (c) = Cosigned By      Initials Name Provider Type    Eugene Angel, PT Physical Therapist                  Therapy Charges for Today       Code Description Service Date Service Provider Modifiers Qty    26615731298 HC PT EVAL MOD COMPLEXITY 4 3/18/2024 Eugene Cuevas, PT GP 1    61017564186 HC PT THERAPEUTIC ACT EA 15 MIN 3/19/2024 Eugene Cuevas, PT GP 1    08184982187 HC GAIT TRAINING EA 15 MIN 3/19/2024 Eugene Cuevas, PT GP 1            PT G-Codes  AM-PAC 6 Clicks Score (PT): 14    Eugene Cuevas PT  3/19/2024

## 2024-03-19 NOTE — THERAPY EVALUATION
"Acute Care - Speech Language Pathology Initial Evaluation  [facility]  Speech Language Cognitive Assessment     Patient Name: Regine Beckham  : 1955  MRN: [id number]  Today's Date: 3/19/2024               Admit Date: 3/17/2024   Regine Beckham  was seen this PM on 3S to participate in s/l and cognitive assessment for safety/function in adls. Patient was positioned upright in bed to cooperatively participate. All results and observations of this evaluation are felt to be representative of patient's current functional status.    Social History     Socioeconomic History    Marital status:     Number of children: 0    Years of education: 1 yr college   Tobacco Use    Smoking status: Every Day     Current packs/day: 1.50     Average packs/day: 1.5 packs/day for 51.0 years (76.5 ttl pk-yrs)     Types: Cigarettes    Smokeless tobacco: Never   Vaping Use    Vaping status: Never Used   Substance and Sexual Activity    Alcohol use: Yes     Alcohol/week: 1.0 standard drink of alcohol     Types: 1 Glasses of wine per week     Comment: \"occasionally - once every two months\"    Drug use: Not Currently     Comment: alcohol - occasionally    Sexual activity: Defer      Per report: \"68 y.o. female with history significant for CVA January of this year with right internal carotid artery disease status post thrombectomy and stent placement, patient claims after stroke she was temporary placed at the nursing home.  she was eventually discharged.  She lives alone she has no family, she uses a walker at home. Her neighbors help her prepare her food.  She stated that since then she has been frequently falling, at the minimum once a day fortunately she never sustained fracture from falling.  She did claim that yesterday she did not fall.  Today while attempting to walk she fell again she has EMS was contacted.  Denies loss of consciousness no incontinence no paresthesia no seizure.  Emergency room patient was complaining " "of pain mostly left shoulder and left hip workup was negative for fracture, for safety, patient agreed for admission and evaluation by PT OT.\"   Diet Orders (active) (From admission, onward)       Start     Ordered    03/18/24 1253  Diet: Cardiac; Healthy Heart (2-3 Na+); Texture: Soft to Chew (NDD 3); Soft to Chew: Chopped Meat; Fluid Consistency: Thin (IDDSI 0)  Diet Effective Now         03/18/24 1252                  Ms. Beckham is familiar to SLP as Clinical Dysphagia Assessment performed 3/18/24. Pt with h/o dysarthria, aphasia following CVA in January 2023.     Pt is observed on RA.     Receptive language skills were intact. Patient was able to id common objects and personal body parts, follow simple and multi-step directives, understand complex \"wh\" questions and participate in conversational exchange w/o difficulties.    Expressive language skills were impaired. Patient was able to complete automatic speech tasks, confrontation naming tasks, mild difficulty completing complex oe sentences, respond to complete oe \"wh\" questions, repeat at word/sentence and multiple digit levels. Pt with word finding difficulty noted during conversation.    Patient was independently oriented to person, place, with incorrect month/year. Immediate, STM, and LTM were WFL w/ patient recalling recent adls and providing personal information w/ minimal delays. Thought organization, processing, and problem solving were mildly impaired. Pt with delayed processing noted in conversation and OE questions.Pt with appropriate comparing/contrasting, understanding of idiomatic language, convergent and divergent thinking skills.    Facial/oral structures were asymmetrical upon observation w/ mild L side facial weakness. Oral mucosa were moist, pink and clean. Secretions were clear, thin, and controlled. OROM/GRACE was WFL w/o lingual deviation upon protrusion. Speech intensity, clarity, and intelligibility were WFL w/ mild dysarthria " "noted.    Graphic and reading skills were impaired. Pt with premorbid vision deficits. Pt is unable to read sentences with SLP; pt does read individual words/letters. Pt able to write name.     Pragmatic skills were WFL w/ appropriate eye gaze patterns and visual tracking. Language was appropriate in context w/ topic initiation and maintenance independently. No neglect evidenced. Humor response was intact w/ appropriate affect.    Impression:  Patient presents with mild speech, language, and cognitive deficits as a result of recent CVA.     Visit Dx:    ICD-10-CM ICD-9-CM   1. Left hip pain  M25.552 719.45   2. Acute pain of left shoulder  M25.512 719.41   3. Fall from standing, initial encounter  W19.XXXA E888.9   4. Failure to thrive in adult  R62.7 783.7     Patient Active Problem List   Diagnosis    Iron deficiency anemia due to chronic blood loss    Major depressive disorder    RLS (restless legs syndrome)    GERD (gastroesophageal reflux disease)    Left breast mass    Allergy to penicillin    Stroke    Grade I diastolic dysfunction    Moderate malnutrition    Falls frequently     Past Medical History:   Diagnosis Date    Anemia     Anxiety     Arthritis     Depression     Legally blind     Restless leg syndrome     Sleep apnea     \"I don't use a cpap anymore since losing 106 lbs\"    Water retention      Past Surgical History:   Procedure Laterality Date    BACK SURGERY      CARDIAC CATHETERIZATION N/A 1/19/2024    Procedure: Percutaneous Manual Thrombectomy;  Surgeon: Efrain Hurley MD;  Location: [facility] INVASIVE LOCATION;  Service: Interventional Radiology;  Laterality: N/A;    GALLBLADDER SURGERY      HIP ARTHROSCOPY      JOINT REPLACEMENT Right        SLP Recommendation and Plan      SLP to follow up with dysarthria and aphasia POC.     D/w patient results and recommendations w/ verbal understanding and agreement.    Thank you for allowing me to participate in the care of your patient-  Marbella" MARINA Padgett, CCC-SLP        EDUCATION  The patient has been educated in the following areas:     Cognitive Impairment Communication Impairment.    Time Calculation:      Time Calculation- SLP       Row Name 03/19/24 1445             Time Calculation- SLP    SLP - Next Appointment 03/20/24  -                User Key  (r) = Recorded By, (t) = Taken By, (c) = Cosigned By      Initials Name Provider Type     Marbella Padgett MA,CCC-SLP Speech and Language Pathologist                    Therapy Charges for Today       Code Description Service Date Service Provider Modifiers Qty    04359908334 HC ST EVAL ORAL PHARYNG SWALLOW 4 3/18/2024 Marbella Padgett MA,CCC-SLP GN 1    42325587730 HC ST EVAL SPEECH AND PROD W LANG  4 3/19/2024 Marbella Padgett MA,CCC-SLP GN 1                       Marbella Padgett MA,CCC-SLP  3/19/2024

## 2024-03-19 NOTE — PROGRESS NOTES
[facility] HOSPITALIST PROGRESS NOTE     Patient Identification:  Name:  Regine Beckham  Age:  68 y.o.  Sex:  female  :  1955  MRN:  [id number]  Visit Number:  [id number]  ROOM: [address] [place] [address]     Primary Care Provider:  Dread Burton MD    Length of stay in inpatient status:  0    Subjective     Chief Compliant:    Chief Complaint   Patient presents with    Fall       History of Presenting Illness:    Patient drowsy overnight but she reports she was just tired and resting. Today she is more awake and interactive. Reports continued weakness otherwise no new complaints. NO familysupportive bedside.     ROS:  Otherwise 10 point ROS negative other than documented above in HPI.     Objective     Current Hospital Meds:aspirin, 81 mg, Oral, Daily  atorvastatin, 80 mg, Oral, Nightly  folic acid, 1 mg, Oral, Daily  heparin (porcine), 5,000 Units, Subcutaneous, Q12H  losartan, 50 mg, Oral, Daily  mirtazapine, 30 mg, Oral, Nightly  oxybutynin XL, 10 mg, Oral, Daily  pantoprazole, 40 mg, Oral, Q AM  rOPINIRole, 4 mg, Oral, Nightly  senna-docusate sodium, 2 tablet, Oral, BID  sodium chloride, 10 mL, Intravenous, Q12H  ticagrelor, 60 mg, Oral, BID         Current Antimicrobial Therapy:  Anti-Infectives (From admission, onward)      None          Current Diuretic Therapy:  Diuretics (From admission, onward)      None          ----------------------------------------------------------------------------------------------------------------------  Vital Signs:  Temp:  [97.7 °F (36.5 °C)-98.6 °F (37 °C)] 97.9 °F (36.6 °C)  Heart Rate:  [] 77  Resp:  [16-18] 16  BP: (143-178)/(69-99) 154/91  SpO2:  [94 %-98 %] 96 %  on   ;   Device (Oxygen Therapy): room air  Body mass index is 21.89 kg/m².    Wt Readings from Last 3 Encounters:   24 61.5 kg (135 lb 9.6 oz)   24 72.1 kg (159 lb)   24 72.1 kg (159 lb)     Intake & Output (last 3 days)          0701   0700  0701   0700  03/18 0701  03/19 0700 03/19 0701  03/20 0700    P.O.   360 240    Total Intake(mL/kg)   360 (5.9) 240 (3.9)    Urine (mL/kg/hr)  300 250 (0.2) 300 (0.4)    Total Output  300 250 300    Net  -300 +110 -60            Urine Unmeasured Occurrence  1 x 2 x     Stool Unmeasured Occurrence   8 x           Diet: Cardiac; Healthy Heart (2-3 Na+); Texture: Soft to Chew (NDD 3); Soft to Chew: Chopped Meat; Fluid Consistency: Thin (IDDSI 0)  ----------------------------------------------------------------------------------------------------------------------  Physical exam:  Constitutional:  Well-developed and well-nourished.  No respiratory distress.      HENT:  Head:  Normocephalic and atraumatic.  Mouth:  Moist mucous membranes.    Eyes:  Conjunctivae and EOM are normal. No scleral icterus.    Neck:  Neck supple.  No JVD present.    Cardiovascular:  Normal rate, regular rhythm and normal heart sounds with no murmur.  Pulmonary/Chest:  No respiratory distress, no wheezes, no crackles, with normal breath sounds and good air movement.  Abdominal:  Soft.  Bowel sounds are normal.  No distension and no tenderness.   Musculoskeletal:  No edema, no tenderness, and no deformity.  No red or swollen joints anywhere.    Neurological:  Alert and oriented to person, place, and time.  No cranial nerve deficit.  No tongue deviation.  No facial droop.  No slurred speech.   Skin:  Skin is warm and dry. No rash noted. No pallor.   Peripheral vascular:  Pulses in all 4 extremities with no clubbing, no cyanosis, no edema.  ----------------------------------------------------------------------------------------------------------------------  Tele:    ----------------------------------------------------------------------------------------------------------------------  Results from last 7 days   Lab Units 03/18/24  0058 03/17/24  1303   WBC 10*3/mm3 7.47 8.57   HEMOGLOBIN g/dL 11.9* 12.2   HEMATOCRIT % 37.3 38.1   MCV fL 94.9 94.8   MCHC g/dL  "31.9 32.0   PLATELETS 10*3/mm3 324 307     Results from last 7 days   Lab Units 03/18/24  2231   PH, ARTERIAL pH units 7.455*   PO2 ART mm Hg 78.3*   PCO2, ARTERIAL mm Hg 37.4   HCO3 ART mmol/L 26.3*     Results from last 7 days   Lab Units 03/19/24  0218 03/18/24  1506 03/18/24  0058 03/17/24  1303   SODIUM mmol/L 145  --  143 145   POTASSIUM mmol/L 3.9 4.0 3.1* 4.3   MAGNESIUM mg/dL 1.9  --  1.8  --    CHLORIDE mmol/L 110*  --  105 104   CO2 mmol/L 24.4  --  23.5 29.7*   BUN mg/dL 23  --  20 20   CREATININE mg/dL 0.93  --  0.77 0.79   CALCIUM mg/dL 9.8  --  9.6 10.0   GLUCOSE mg/dL 114*  --  102* 116*   ALBUMIN g/dL  --   --  3.5 3.8   BILIRUBIN mg/dL  --   --  0.8 0.6   ALK PHOS U/L  --   --  95 97   AST (SGOT) U/L  --   --  19 18   ALT (SGPT) U/L  --   --  13 12   Estimated Creatinine Clearance: 55.4 mL/min (by C-G formula based on SCr of 0.93 mg/dL).  No results found for: \"AMMONIA\"              Glucose   Date/Time Value Ref Range Status   03/18/2024 2157 111 70 - 130 mg/dL Final     Lab Results   Component Value Date    TSH 2.880 03/17/2024     No results found for: \"PREGTESTUR\", \"PREGSERUM\", \"HCG\", \"HCGQUANT\"  Pain Management Panel          Latest Ref Rng & Units 12/20/2023   Pain Management Panel   Amphetamine, Urine Qual Negative Negative    Barbiturates Screen, Urine Negative Negative    Benzodiazepine Screen, Urine Negative Negative    Buprenorphine, Screen, Urine Negative Negative    Cocaine Screen, Urine Negative Negative    Fentanyl, Urine Negative Negative    Methadone Screen , Urine Negative Negative    Methamphetamine, Ur Negative Negative      Brief Urine Lab Results  (Last result in the past 365 days)        Color   Clarity   Blood   Leuk Est   Nitrite   Protein   CREAT   Urine HCG        03/18/24 0132 Dark Yellow   Clear   Negative   Trace   Negative   Trace                 No results found for: \"BLOODCX\"  No results found for: \"URINECX\"  No results found for: \"WOUNDCX\"  No results found for: " "\"STOOLCX\"  No results found for: \"RESPCX\"  No results found for: \"AFBCX\"        I have personally looked at the labs and they are summarized above.  ----------------------------------------------------------------------------------------------------------------------  Detailed radiology reports for the last 24 hours:    Imaging Results (Last 24 Hours)       ** No results found for the last 24 hours. **          Assessment & Plan    -Frequent fall  -Hypokalemia  -Folate deficency   -History of CVA right MCA with left-sided weakness  -History of right internal carotid artery disease with occlusion status post thrombectomy and stent placement   -History of iron deficiency anemia   -History of dysphagia as a complication of CVA   -History of restless leg syndrome   -History of heart failure with preserved ejection fraction   -History of hyperlipidemia  -Severe arthritis left hip  -History of right hip arthroplasty from osteoarthritis  -History of tobacco use patient quit January of this year after the stroke.     PT/OT/SW/SLP all following. Potassium replaced and mag level ordered. Folate replaced.     ABG did not reveal hypercapnia overnight and AMS has resolved. Patient still weak and repeat CT head showed encephalomalacia of right parietal lobe. I was not sure if this was normal evolution of stroke and consulted neurology who has ordered MRI.      Code status :Full      Dispo: Pending PT/OT    VTE Prophylaxis:   Mechanical Order History:       None          Pharmalogical Order History:        Ordered     Dose Route Frequency Stop    03/17/24 1611  heparin (porcine) 5000 UNIT/ML injection 5,000 Units         5,000 Units SC Every 12 Hours Scheduled --                      Ok Marin MD  [facility]ist  03/19/24  18:51 EDT  "

## 2024-03-20 ENCOUNTER — APPOINTMENT (OUTPATIENT)
Dept: MRI IMAGING | Facility: HOSPITAL | Age: 69
End: 2024-03-20
Payer: MEDICARE

## 2024-03-20 PROBLEM — R29.90 STROKE-LIKE SYMPTOMS: Status: ACTIVE | Noted: 2024-03-20

## 2024-03-20 PROCEDURE — 25010000002 HEPARIN (PORCINE) PER 1000 UNITS: Performed by: HOSPITALIST

## 2024-03-20 PROCEDURE — 97530 THERAPEUTIC ACTIVITIES: CPT

## 2024-03-20 PROCEDURE — 70551 MRI BRAIN STEM W/O DYE: CPT | Performed by: RADIOLOGY

## 2024-03-20 PROCEDURE — 97116 GAIT TRAINING THERAPY: CPT

## 2024-03-20 PROCEDURE — 70551 MRI BRAIN STEM W/O DYE: CPT

## 2024-03-20 PROCEDURE — 99231 SBSQ HOSP IP/OBS SF/LOW 25: CPT | Performed by: INTERNAL MEDICINE

## 2024-03-20 RX ORDER — ACETAMINOPHEN 325 MG/1
650 TABLET ORAL EVERY 6 HOURS PRN
Status: DISCONTINUED | OUTPATIENT
Start: 2024-03-20 | End: 2024-03-23

## 2024-03-20 RX ADMIN — ATORVASTATIN CALCIUM 80 MG: 40 TABLET, FILM COATED ORAL at 20:27

## 2024-03-20 RX ADMIN — OXYBUTYNIN CHLORIDE 10 MG: 5 TABLET, EXTENDED RELEASE ORAL at 08:13

## 2024-03-20 RX ADMIN — TICAGRELOR 60 MG: 60 TABLET ORAL at 08:12

## 2024-03-20 RX ADMIN — Medication 10 ML: at 08:13

## 2024-03-20 RX ADMIN — PANTOPRAZOLE SODIUM 40 MG: 40 TABLET, DELAYED RELEASE ORAL at 06:24

## 2024-03-20 RX ADMIN — ZOLPIDEM TARTRATE 5 MG: 5 TABLET ORAL at 21:26

## 2024-03-20 RX ADMIN — ACETAMINOPHEN 650 MG: 325 TABLET ORAL at 21:26

## 2024-03-20 RX ADMIN — MIRTAZAPINE 30 MG: 15 TABLET, FILM COATED ORAL at 20:27

## 2024-03-20 RX ADMIN — TICAGRELOR 60 MG: 60 TABLET ORAL at 20:27

## 2024-03-20 RX ADMIN — Medication 1 MG: at 08:12

## 2024-03-20 RX ADMIN — ASPIRIN 81 MG: 81 TABLET, CHEWABLE ORAL at 08:10

## 2024-03-20 RX ADMIN — HEPARIN SODIUM 5000 UNITS: 5000 INJECTION INTRAVENOUS; SUBCUTANEOUS at 20:26

## 2024-03-20 RX ADMIN — ROPINIROLE HYDROCHLORIDE 4 MG: 1 TABLET, FILM COATED ORAL at 20:27

## 2024-03-20 RX ADMIN — HEPARIN SODIUM 5000 UNITS: 5000 INJECTION INTRAVENOUS; SUBCUTANEOUS at 08:06

## 2024-03-20 RX ADMIN — Medication 10 ML: at 20:27

## 2024-03-20 RX ADMIN — LOSARTAN POTASSIUM 50 MG: 50 TABLET, FILM COATED ORAL at 08:11

## 2024-03-20 NOTE — PLAN OF CARE
Goal Outcome Evaluation:   Patient has been pleasant. Compliant with care and medications as ordered. Patient is currently resting in bed. No S&S of distress noted at this time. VSS. Bed alarm on, call light within reach, bed in lowest position. Friends/family at bedside. Plan of care ongoing.

## 2024-03-20 NOTE — THERAPY TREATMENT NOTE
"Acute Care - Physical Therapy Treatment Note   [facility]     Patient Name: Regine Beckham  : 1955  MRN: [id number]  Today's Date: 3/20/2024      Visit Dx:     ICD-10-CM ICD-9-CM   1. Left hip pain  M25.552 719.45   2. Acute pain of left shoulder  M25.512 719.41   3. Fall from standing, initial encounter  W19.XXXA E888.9   4. Failure to thrive in adult  R62.7 783.7     Patient Active Problem List   Diagnosis    Iron deficiency anemia due to chronic blood loss    Major depressive disorder    RLS (restless legs syndrome)    GERD (gastroesophageal reflux disease)    Left breast mass    Allergy to penicillin    Stroke    Grade I diastolic dysfunction    Moderate malnutrition    Falls frequently     Past Medical History:   Diagnosis Date    Anemia     Anxiety     Arthritis     Depression     Legally blind     Restless leg syndrome     Sleep apnea     \"I don't use a cpap anymore since losing 106 lbs\"    Water retention      Past Surgical History:   Procedure Laterality Date    BACK SURGERY      CARDIAC CATHETERIZATION N/A 2024    Procedure: Percutaneous Manual Thrombectomy;  Surgeon: Efrain Hurley MD;  Location:  [facility];  Service: Interventional Radiology;  Laterality: N/A;    GALLBLADDER SURGERY      HIP ARTHROSCOPY      JOINT REPLACEMENT Right      PT Assessment (Last 12 Hours)       PT Evaluation and Treatment       Row Name 24 4037          Physical Therapy Time and Intention    Document Type therapy note (daily note)  -KM     Mode of Treatment individual therapy;physical therapy  -KM     Patient Effort good  -KM     Symptoms Noted During/After Treatment fatigue;other (see comments)  impaired balance  -KM       Row Name 24 6440          General Information    Patient Profile Reviewed yes  -KM     Patient Observations alert;cooperative;agree to therapy  -KM     Existing Precautions/Restrictions fall  -KM       Row Name 24 9190          Cognition    " Affect/Mental Status (Cognition) WFL  -KM     Follows Commands (Cognition) WFL  -KM       Row Name 03/20/24 1337          Bed Mobility    Bed Mobility supine-sit;sit-supine  -KM     Supine-Sit Preston (Bed Mobility) moderate assist (50% patient effort);verbal cues  -KM     Sit-Supine Preston (Bed Mobility) minimum assist (75% patient effort);verbal cues  -KM     Bed Mobility, Safety Issues decreased use of arms for pushing/pulling  -     Assistive Device (Bed Mobility) bed rails;head of bed elevated  -       Row Name 03/20/24 1337          Transfers    Transfers sit-stand transfer;stand-sit transfer  -       Row Name 03/20/24 1337          Sit-Stand Transfer    Sit-Stand Preston (Transfers) minimum assist (75% patient effort)  -     Assistive Device (Sit-Stand Transfers) --  A  -KM       Row Name 03/20/24 1337          Stand-Sit Transfer    Stand-Sit Preston (Transfers) minimum assist (75% patient effort)  -     Assistive Device (Stand-Sit Transfers) --  A  -KM       Row Name 03/20/24 1337          Gait/Stairs (Locomotion)    Gait/Stairs Locomotion gait/ambulation independence;distance ambulated  -KM     Preston Level (Gait) minimum assist (75% patient effort);verbal cues;2 person assist  2nd person for balance  -     Assistive Device (Gait) --  A  [facility]     Patient was able to Ambulate yes  -KM     Distance in Feet (Gait) 80  -KM     Pattern (Gait) step-to  -KM     Deviations/Abnormal Patterns (Gait) ataxic;base of support, narrow;gait speed decreased  -KM     Left Sided Gait Deviations weight shift ability decreased  -KM     Comment, (Gait/Stairs) pt. demonstrates increased difficulty turning left while ambulating  -       Row Name 03/20/24 1337          Safety Issues, Functional Mobility    Impairments Affecting Function (Mobility) balance;coordination;endurance/activity tolerance;muscle tone abnormal;strength  -       Row Name 03/20/24 0387          Progress Summary  (PT)    Daily Progress Summary (PT) Pt. was able to demonstrate improved functional mobility skills during PT session. She was able to demonstrate improved ambulation distance w/ HHA. She tolerated increased activity compared to prior session. Pt. would conitnue to benefit from PT services at this time.  -               User Key  (r) = Recorded By, (t) = Taken By, (c) = Cosigned By      Initials Name Provider Type    Eugene Angel, PT Physical Therapist                    Physical Therapy Education       Title: PT OT SLP Therapies (In Progress)       Topic: Physical Therapy (In Progress)       Point: Mobility training (In Progress)       Learning Progress Summary             Patient Acceptance, E,TB, NR by PIETRO at 3/19/2024 1521    Acceptance, E,TB, VU by IRENE at 3/18/2024 1547                         Point: Home exercise program (In Progress)       Learning Progress Summary             Patient Acceptance, E,TB, NR by PIETRO at 3/19/2024 1521    Acceptance, E,TB, VU by IRENE at 3/18/2024 1547                         Point: Body mechanics (In Progress)       Learning Progress Summary             Patient Acceptance, E,TB, NR by PIETRO at 3/19/2024 1521    Acceptance, E,TB, VU by IRENE at 3/18/2024 1547                         Point: Precautions (In Progress)       Learning Progress Summary             Patient Acceptance, E,TB, NR by PIETRO at 3/19/2024 1521    Acceptance, E,TB, VU by IRENE at 3/18/2024 1547                                         User Key       Initials Effective Dates Name Provider Type Discipline     06/06/23 -  Ivy Gordillo RN Registered Nurse Nurse     05/24/22 -  Eugene Cuevas, PT Physical Therapist PT                  PT Recommendation and Plan  Anticipated Discharge Disposition (PT): inpatient rehabilitation facility, skilled nursing facility (further physical rehab needed prior to return home by herself)  Planned Therapy Interventions (PT): balance training, bed mobility training, gait training, home  exercise program, patient/family education, postural re-education, ROM (range of motion), stair training, strengthening, stretching, transfer training  Therapy Frequency (PT): 2 times/wk (2-5x/wk)  Progress Summary (PT)  Daily Progress Summary (PT): Pt. was able to demonstrate improved functional mobility skills during PT session. She was able to demonstrate improved ambulation distance w/ HHA. She tolerated increased activity compared to prior session. Pt. would conitnue to benefit from PT services at this time.  Plan of Care Reviewed With: patient  Outcome Evaluation: Pt. evaluation completed during PT session. She was able to perform bed mobility w/ modA. She was able to perform transfers w/ Angela-modA. She was unable to ambulate d/t weakness. Pt. would benefit from skilled PT services.       Time Calculation:    PT Charges       Row Name 03/20/24 1337             Time Calculation    PT Received On 03/20/24  -KM         Time Calculation- PT    Total Timed Code Minutes- PT 23 minute(s)  -KM                User Key  (r) = Recorded By, (t) = Taken By, (c) = Cosigned By      Initials Name Provider Type    Eugene Angel, PT Physical Therapist                  Therapy Charges for Today       Code Description Service Date Service Provider Modifiers Qty    10697734887 HC PT THERAPEUTIC ACT EA 15 MIN 3/19/2024 Eugene Cuevas, PT GP 1    65508941621 HC GAIT TRAINING EA 15 MIN 3/19/2024 Eugene Cuevas, PT GP 1    12633803975 HC PT THERAPEUTIC ACT EA 15 MIN 3/20/2024 Eugene Cuevas, PT GP 1    21927091383 HC GAIT TRAINING EA 15 MIN 3/20/2024 Eugene Cuevas, PT GP 1            PT G-Codes  AM-PAC 6 Clicks Score (PT): 15    Eugene Cuevas PT  3/20/2024

## 2024-03-20 NOTE — PROGRESS NOTES
[facility] HOSPITALIST PROGRESS NOTE     Patient Identification:  Name:  Regine Beckham  Age:  68 y.o.  Sex:  female  :  1955  MRN:  [id number]  Visit Number:  [id number]  ROOM: [address] [place] [address]     Primary Care Provider:  Dread Burton MD    Length of stay in inpatient status:  0    Subjective     Chief Compliant:    Chief Complaint   Patient presents with    Fall       History of Presenting Illness:    Patient denies any new complaints. She reports overall feeling slightly better. Patient's neighbors supportive bedside. Patient agrees with them she would not be safe at home and is interested in short term rehab before trying to get back home.     ROS:  Otherwise 10 point ROS negative other than documented above in HPI.     Objective     Current Hospital Meds:aspirin, 81 mg, Oral, Daily  atorvastatin, 80 mg, Oral, Nightly  folic acid, 1 mg, Oral, Daily  heparin (porcine), 5,000 Units, Subcutaneous, Q12H  losartan, 50 mg, Oral, Daily  mirtazapine, 30 mg, Oral, Nightly  oxybutynin XL, 10 mg, Oral, Daily  pantoprazole, 40 mg, Oral, Q AM  rOPINIRole, 4 mg, Oral, Nightly  senna-docusate sodium, 2 tablet, Oral, BID  sodium chloride, 10 mL, Intravenous, Q12H  ticagrelor, 60 mg, Oral, BID         Current Antimicrobial Therapy:  Anti-Infectives (From admission, onward)      None          Current Diuretic Therapy:  Diuretics (From admission, onward)      None          ----------------------------------------------------------------------------------------------------------------------  Vital Signs:  Temp:  [97.6 °F (36.4 °C)-98.3 °F (36.8 °C)] 97.8 °F (36.6 °C)  Heart Rate:  [68-99] 68  Resp:  [16-20] 18  BP: (129-175)/(63-93) 129/63  SpO2:  [94 %-97 %] 95 %  on   ;   Device (Oxygen Therapy): room air  Body mass index is 22.55 kg/m².    Wt Readings from Last 3 Encounters:   24 63.4 kg (139 lb 11.2 oz)   24 72.1 kg (159 lb)   24 72.1 kg (159 lb)     Intake & Output (last 3 days)          03/17 0701  03/18 0700 03/18 0701  03/19 0700 03/19 0701  03/20 0700 03/20 0701  03/21 0700    P.O.  360 240 840    Total Intake(mL/kg)  360 (5.9) 240 (3.8) 840 (13.2)    Urine (mL/kg/hr) 300 250 (0.2) 300 (0.2) 1050 (1.6)    Total Output 300     Net -300 +110 -60 -210            Urine Unmeasured Occurrence 1 x 2 x  2 x    Stool Unmeasured Occurrence  8 x            Diet: Cardiac; Healthy Heart (2-3 Na+); Texture: Soft to Chew (NDD 3); Soft to Chew: Chopped Meat; Fluid Consistency: Thin (IDDSI 0)  ----------------------------------------------------------------------------------------------------------------------  Physical exam:  Constitutional:  Well-developed and well-nourished.  No respiratory distress.      HENT:  Head:  Normocephalic and atraumatic.  Mouth:  Moist mucous membranes.    Eyes:  Conjunctivae and EOM are normal. No scleral icterus.    Neck:  Neck supple.  No JVD present.    Cardiovascular:  Normal rate, regular rhythm and normal heart sounds with no murmur.  Pulmonary/Chest:  No respiratory distress, no wheezes, no crackles, with normal breath sounds and good air movement.  Abdominal:  Soft.  Bowel sounds are normal.  No distension and no tenderness.   Musculoskeletal:  No edema, no tenderness, and no deformity.  No red or swollen joints anywhere.    Neurological:  Alert and oriented to person, place, and time.  No cranial nerve deficit.  No tongue deviation.  No facial droop.  No slurred speech.   Skin:  Skin is warm and dry. No rash noted. No pallor.   Peripheral vascular:  Pulses in all 4 extremities with no clubbing, no cyanosis, no edema.  ----------------------------------------------------------------------------------------------------------------------  Tele:    ----------------------------------------------------------------------------------------------------------------------  Results from last 7 days   Lab Units 03/18/24  0058 03/17/24  1303   WBC 10*3/mm3 7.47 8.57  "  HEMOGLOBIN g/dL 11.9* 12.2   HEMATOCRIT % 37.3 38.1   MCV fL 94.9 94.8   MCHC g/dL 31.9 32.0   PLATELETS 10*3/mm3 324 307     Results from last 7 days   Lab Units 03/18/24  2231   PH, ARTERIAL pH units 7.455*   PO2 ART mm Hg 78.3*   PCO2, ARTERIAL mm Hg 37.4   HCO3 ART mmol/L 26.3*     Results from last 7 days   Lab Units 03/19/24  0218 03/18/24  1506 03/18/24  0058 03/17/24  1303   SODIUM mmol/L 145  --  143 145   POTASSIUM mmol/L 3.9 4.0 3.1* 4.3   MAGNESIUM mg/dL 1.9  --  1.8  --    CHLORIDE mmol/L 110*  --  105 104   CO2 mmol/L 24.4  --  23.5 29.7*   BUN mg/dL 23  --  20 20   CREATININE mg/dL 0.93  --  0.77 0.79   CALCIUM mg/dL 9.8  --  9.6 10.0   GLUCOSE mg/dL 114*  --  102* 116*   ALBUMIN g/dL  --   --  3.5 3.8   BILIRUBIN mg/dL  --   --  0.8 0.6   ALK PHOS U/L  --   --  95 97   AST (SGOT) U/L  --   --  19 18   ALT (SGPT) U/L  --   --  13 12   Estimated Creatinine Clearance: 57.1 mL/min (by C-G formula based on SCr of 0.93 mg/dL).  No results found for: \"AMMONIA\"              Glucose   Date/Time Value Ref Range Status   03/19/2024 1922 120 70 - 130 mg/dL Final   03/18/2024 2157 111 70 - 130 mg/dL Final     Lab Results   Component Value Date    TSH 2.880 03/17/2024     No results found for: \"PREGTESTUR\", \"PREGSERUM\", \"HCG\", \"HCGQUANT\"  Pain Management Panel          Latest Ref Rng & Units 12/20/2023   Pain Management Panel   Amphetamine, Urine Qual Negative Negative    Barbiturates Screen, Urine Negative Negative    Benzodiazepine Screen, Urine Negative Negative    Buprenorphine, Screen, Urine Negative Negative    Cocaine Screen, Urine Negative Negative    Fentanyl, Urine Negative Negative    Methadone Screen , Urine Negative Negative    Methamphetamine, Ur Negative Negative      Brief Urine Lab Results  (Last result in the past 365 days)        Color   Clarity   Blood   Leuk Est   Nitrite   Protein   CREAT   Urine HCG        03/18/24 0132 Dark Yellow   Clear   Negative   Trace   Negative   Trace             " "    No results found for: \"BLOODCX\"  No results found for: \"URINECX\"  No results found for: \"WOUNDCX\"  No results found for: \"STOOLCX\"  No results found for: \"RESPCX\"  No results found for: \"AFBCX\"        I have personally looked at the labs and they are summarized above.  ----------------------------------------------------------------------------------------------------------------------  Detailed radiology reports for the last 24 hours:    Imaging Results (Last 24 Hours)       Procedure Component Value Units Date/Time    MRI Brain Without Contrast [[id number]] Collected: 03/20/24 1331     Updated: 03/20/24 1337    Narrative:      PROCEDURE: MRI BRAIN WO CONTRAST-     HISTORY: Worsening left sided weakness; M25.552-Pain in left hip;  M25.512-Pain in left shoulder; W19.XXXA-Unspecified fall, initial  encounter; R62.7-Adult failure to thrive     PROCEDURE: Multiplanar multisequence imaging of the brain was performed  without the use of intravenous contrast.     COMPARISON: 1/19/2024.     FINDINGS: There is generalized cerebral volume loss. There is an area of  encephalomalacia in the right frontal lobe from prior infarct. There is  no mass, mass effect or midline shift. There is no hydrocephalus. There  are no areas of restricted diffusion.     The midbrain, michael, cerebellum and craniocervical junction are  unremarkable. The sella and pituitary gland are within normal limits.  The major intracranial vasculature demonstrates the expected flow  related signal. The paranasal sinuses are clear.       Impression:      Findings consistent with chronic small vessel ischemic  change with encephalomalacia in the right frontal lobe from prior  infarct.              This report was finalized on 3/20/2024 1:35 PM by Marianna Caicedo M.D..             Assessment & Plan    -Frequent fall  -Hypokalemia  -Folate deficency   -History of CVA right MCA with left-sided weakness  -History of right internal carotid artery disease with " occlusion status post thrombectomy and stent placement   -History of iron deficiency anemia   -History of dysphagia as a complication of CVA   -History of restless leg syndrome   -History of heart failure with preserved ejection fraction   -History of hyperlipidemia  -Severe arthritis left hip  -History of right hip arthroplasty from osteoarthritis  -History of tobacco use patient quit January of this year after the stroke.     PT/OT/SW/SLP all following. Potassium replaced and mag level ordered. Folate replaced.      ABG did not reveal hypercapnia overnight and AMS has resolved. Patient still weak and repeat CT head showed encephalomalacia of right parietal lobe. I was not sure if this was normal evolution of stroke and consulted neurology who has ordered MRI which was unrevealing.      Code status :Full      Dispo: SW consulted. IPR consulted given recent stroke.     VTE Prophylaxis:   Mechanical Order History:       None          Pharmalogical Order History:        Ordered     Dose Route Frequency Stop    03/17/24 1611  heparin (porcine) 5000 UNIT/ML injection 5,000 Units         5,000 Units SC Every 12 Hours Scheduled --                    Ok Marin MD  [facility] Hospitalist  03/20/24  17:31 EDT

## 2024-03-21 LAB
BACTERIA UR QL AUTO: ABNORMAL /HPF
BILIRUB UR QL STRIP: NEGATIVE
CLARITY UR: ABNORMAL
COD CRY URNS QL: ABNORMAL /HPF
COLOR UR: ABNORMAL
GLUCOSE UR STRIP-MCNC: NEGATIVE MG/DL
HGB UR QL STRIP.AUTO: ABNORMAL
HYALINE CASTS UR QL AUTO: ABNORMAL /LPF
KETONES UR QL STRIP: ABNORMAL
LEUKOCYTE ESTERASE UR QL STRIP.AUTO: ABNORMAL
NITRITE UR QL STRIP: POSITIVE
PH UR STRIP.AUTO: 5.5 [PH] (ref 5–8)
PROT UR QL STRIP: ABNORMAL
QT INTERVAL: 402 MS
QTC INTERVAL: 460 MS
RBC # UR STRIP: ABNORMAL /HPF
REF LAB TEST METHOD: ABNORMAL
SP GR UR STRIP: >=1.03 (ref 1–1.03)
SQUAMOUS #/AREA URNS HPF: ABNORMAL /HPF
UROBILINOGEN UR QL STRIP: ABNORMAL
WBC # UR STRIP: ABNORMAL /HPF

## 2024-03-21 PROCEDURE — 87186 SC STD MICRODIL/AGAR DIL: CPT | Performed by: INTERNAL MEDICINE

## 2024-03-21 PROCEDURE — 99232 SBSQ HOSP IP/OBS MODERATE 35: CPT | Performed by: INTERNAL MEDICINE

## 2024-03-21 PROCEDURE — 25010000002 HEPARIN (PORCINE) PER 1000 UNITS: Performed by: HOSPITALIST

## 2024-03-21 PROCEDURE — 93005 ELECTROCARDIOGRAM TRACING: CPT | Performed by: INTERNAL MEDICINE

## 2024-03-21 PROCEDURE — 81001 URINALYSIS AUTO W/SCOPE: CPT | Performed by: INTERNAL MEDICINE

## 2024-03-21 PROCEDURE — 93010 ELECTROCARDIOGRAM REPORT: CPT | Performed by: INTERNAL MEDICINE

## 2024-03-21 PROCEDURE — 87077 CULTURE AEROBIC IDENTIFY: CPT | Performed by: INTERNAL MEDICINE

## 2024-03-21 PROCEDURE — 87086 URINE CULTURE/COLONY COUNT: CPT | Performed by: INTERNAL MEDICINE

## 2024-03-21 PROCEDURE — 25010000002 AZTREONAM PER 500 MG: Performed by: INTERNAL MEDICINE

## 2024-03-21 RX ORDER — OXYCODONE HYDROCHLORIDE 5 MG/1
5 TABLET ORAL ONCE
Status: COMPLETED | OUTPATIENT
Start: 2024-03-21 | End: 2024-03-21

## 2024-03-21 RX ADMIN — Medication 10 ML: at 08:01

## 2024-03-21 RX ADMIN — OXYCODONE 5 MG: 5 TABLET ORAL at 18:34

## 2024-03-21 RX ADMIN — DOCUSATE SODIUM 50 MG AND SENNOSIDES 8.6 MG 2 TABLET: 8.6; 5 TABLET, FILM COATED ORAL at 08:00

## 2024-03-21 RX ADMIN — MIRTAZAPINE 30 MG: 15 TABLET, FILM COATED ORAL at 20:20

## 2024-03-21 RX ADMIN — Medication 10 ML: at 20:21

## 2024-03-21 RX ADMIN — HEPARIN SODIUM 5000 UNITS: 5000 INJECTION INTRAVENOUS; SUBCUTANEOUS at 08:00

## 2024-03-21 RX ADMIN — TICAGRELOR 60 MG: 60 TABLET ORAL at 08:00

## 2024-03-21 RX ADMIN — HEPARIN SODIUM 5000 UNITS: 5000 INJECTION INTRAVENOUS; SUBCUTANEOUS at 20:21

## 2024-03-21 RX ADMIN — AZTREONAM 1000 MG: 1 INJECTION, POWDER, LYOPHILIZED, FOR SOLUTION INTRAMUSCULAR; INTRAVENOUS at 19:41

## 2024-03-21 RX ADMIN — PANTOPRAZOLE SODIUM 40 MG: 40 TABLET, DELAYED RELEASE ORAL at 05:13

## 2024-03-21 RX ADMIN — OXYBUTYNIN CHLORIDE 10 MG: 5 TABLET, EXTENDED RELEASE ORAL at 08:00

## 2024-03-21 RX ADMIN — ATORVASTATIN CALCIUM 80 MG: 40 TABLET, FILM COATED ORAL at 20:20

## 2024-03-21 RX ADMIN — ROPINIROLE HYDROCHLORIDE 4 MG: 1 TABLET, FILM COATED ORAL at 20:21

## 2024-03-21 RX ADMIN — ZOLPIDEM TARTRATE 5 MG: 5 TABLET ORAL at 20:20

## 2024-03-21 RX ADMIN — Medication 1 MG: at 08:00

## 2024-03-21 RX ADMIN — LOSARTAN POTASSIUM 50 MG: 50 TABLET, FILM COATED ORAL at 08:00

## 2024-03-21 RX ADMIN — ASPIRIN 81 MG: 81 TABLET, CHEWABLE ORAL at 08:00

## 2024-03-21 RX ADMIN — ACETAMINOPHEN 650 MG: 325 TABLET ORAL at 16:28

## 2024-03-21 RX ADMIN — TICAGRELOR 60 MG: 60 TABLET ORAL at 20:21

## 2024-03-21 NOTE — PROGRESS NOTES
[facility] HOSPITALIST PROGRESS NOTE     Patient Identification:  Name:  Regine Beckhma  Age:  68 y.o.  Sex:  female  :  1955  MRN:  [id number]  Visit Number:  [id number]  ROOM: [address] [place] [address]     Primary Care Provider:  Dread Burton MD    Length of stay in inpatient status:  1    Subjective     Chief Compliant:    Chief Complaint   Patient presents with    Fall       History of Presenting Illness:    Patient reporting some side pain this PM. Earlier no complaints on my evaluation. Still reasonably wanting physical therapy before returning home.     ROS:  Otherwise 10 point ROS negative other than documented above in HPI.     Objective     Current Hospital Meds:aspirin, 81 mg, Oral, Daily  atorvastatin, 80 mg, Oral, Nightly  aztreonam, 1,000 mg, Intravenous, Once  [START ON 3/22/2024] aztreonam, 1,000 mg, Intravenous, Q8H  folic acid, 1 mg, Oral, Daily  heparin (porcine), 5,000 Units, Subcutaneous, Q12H  losartan, 50 mg, Oral, Daily  mirtazapine, 30 mg, Oral, Nightly  oxybutynin XL, 10 mg, Oral, Daily  oxyCODONE, 5 mg, Oral, Once  pantoprazole, 40 mg, Oral, Q AM  rOPINIRole, 4 mg, Oral, Nightly  senna-docusate sodium, 2 tablet, Oral, BID  sodium chloride, 10 mL, Intravenous, Q12H  ticagrelor, 60 mg, Oral, BID         Current Antimicrobial Therapy:  Anti-Infectives (From admission, onward)      Ordered     Dose/Rate Route Frequency Start Stop    24 1743  aztreonam (AZACTAM) 1,000 mg in sodium chloride 0.9 % 100 mL IVPB-VTB        Ordering Provider: Ok Marin MD    1,000 mg  over 4 Hours Intravenous Every 8 Hours 24 0300 24 0259    24 1743  aztreonam (AZACTAM) 1,000 mg in sodium chloride 0.9 % 100 mL IVPB-VTB        Ordering Provider: Ok Marin MD    1,000 mg  200 mL/hr over 30 Minutes Intravenous Once 24 1830            Current Diuretic Therapy:  Diuretics (From admission, onward)      None           ----------------------------------------------------------------------------------------------------------------------  Vital Signs:  Temp:  [97.6 °F (36.4 °C)-98.5 °F (36.9 °C)] 98.5 °F (36.9 °C)  Heart Rate:  [68-94] 68  Resp:  [18-22] 22  BP: (108-182)/(60-95) 132/89  SpO2:  [96 %-98 %] 97 %  on   ;   Device (Oxygen Therapy): room air  Body mass index is 22.03 kg/m².    Wt Readings from Last 3 Encounters:   03/21/24 61.9 kg (136 lb 8 oz)   02/13/24 72.1 kg (159 lb)   02/04/24 72.1 kg (159 lb)     Intake & Output (last 3 days)         03/18 0701 03/19 0700 03/19 0701 03/20 0700 03/20 0701  03/21 0700 03/21 0701  03/22 0700    P.O.  600    Total Intake(mL/kg) 360 (5.9) 240 (3.8) 1200 (19.4) 600 (9.7)    Urine (mL/kg/hr) 250 (0.2) 300 (0.2) 1050 (0.7) 350 (0.5)    Total Output  350    Net +110 -60 +150 +250            Urine Unmeasured Occurrence 2 x  2 x     Stool Unmeasured Occurrence 8 x             Diet: Cardiac; Healthy Heart (2-3 Na+); Texture: Soft to Chew (NDD 3); Soft to Chew: Chopped Meat; Fluid Consistency: Thin (IDDSI 0)  ----------------------------------------------------------------------------------------------------------------------  Physical exam:  Constitutional:  Well-developed and well-nourished.  No respiratory distress.      HENT:  Head:  Normocephalic and atraumatic.  Mouth:  Moist mucous membranes.    Eyes:  Conjunctivae and EOM are normal. No scleral icterus.    Neck:  Neck supple.  No JVD present.    Cardiovascular:  Normal rate, regular rhythm and normal heart sounds with no murmur.  Pulmonary/Chest:  No respiratory distress, no wheezes, no crackles, with normal breath sounds and good air movement.  Abdominal:  Soft.  Bowel sounds are normal.  No distension and no tenderness.   Musculoskeletal:  No edema, no tenderness, and no deformity.  No red or swollen joints anywhere.    Neurological:  Alert and oriented to person, place, and time.  Chronic left sided  "weakness.   Skin:  Skin is warm and dry. No rash noted. No pallor.   Peripheral vascular:  Pulses in all 4 extremities with no clubbing, no cyanosis, no edema.  ----------------------------------------------------------------------------------------------------------------------  Tele:    ----------------------------------------------------------------------------------------------------------------------  Results from last 7 days   Lab Units 03/18/24 0058 03/17/24  1303   WBC 10*3/mm3 7.47 8.57   HEMOGLOBIN g/dL 11.9* 12.2   HEMATOCRIT % 37.3 38.1   MCV fL 94.9 94.8   MCHC g/dL 31.9 32.0   PLATELETS 10*3/mm3 324 307     Results from last 7 days   Lab Units 03/18/24  2231   PH, ARTERIAL pH units 7.455*   PO2 ART mm Hg 78.3*   PCO2, ARTERIAL mm Hg 37.4   HCO3 ART mmol/L 26.3*     Results from last 7 days   Lab Units 03/19/24  0218 03/18/24  1506 03/18/24  0058 03/17/24  1303   SODIUM mmol/L 145  --  143 145   POTASSIUM mmol/L 3.9 4.0 3.1* 4.3   MAGNESIUM mg/dL 1.9  --  1.8  --    CHLORIDE mmol/L 110*  --  105 104   CO2 mmol/L 24.4  --  23.5 29.7*   BUN mg/dL 23  --  20 20   CREATININE mg/dL 0.93  --  0.77 0.79   CALCIUM mg/dL 9.8  --  9.6 10.0   GLUCOSE mg/dL 114*  --  102* 116*   ALBUMIN g/dL  --   --  3.5 3.8   BILIRUBIN mg/dL  --   --  0.8 0.6   ALK PHOS U/L  --   --  95 97   AST (SGOT) U/L  --   --  19 18   ALT (SGPT) U/L  --   --  13 12   Estimated Creatinine Clearance: 55.8 mL/min (by C-G formula based on SCr of 0.93 mg/dL).  No results found for: \"AMMONIA\"              Glucose   Date/Time Value Ref Range Status   03/19/2024 1922 120 70 - 130 mg/dL Final   03/18/2024 2157 111 70 - 130 mg/dL Final     Lab Results   Component Value Date    TSH 2.880 03/17/2024     No results found for: \"PREGTESTUR\", \"PREGSERUM\", \"HCG\", \"HCGQUANT\"  Pain Management Panel          Latest Ref Rng & Units 12/20/2023   Pain Management Panel   Amphetamine, Urine Qual Negative Negative    Barbiturates Screen, Urine Negative Negative  " "  Benzodiazepine Screen, Urine Negative Negative    Buprenorphine, Screen, Urine Negative Negative    Cocaine Screen, Urine Negative Negative    Fentanyl, Urine Negative Negative    Methadone Screen , Urine Negative Negative    Methamphetamine, Ur Negative Negative      Brief Urine Lab Results  (Last result in the past 365 days)        Color   Clarity   Blood   Leuk Est   Nitrite   Protein   CREAT   Urine HCG        03/21/24 1613 Dark Yellow   Turbid   Trace   Moderate (2+)   Positive   30 mg/dL (1+)                 No results found for: \"BLOODCX\"  No results found for: \"URINECX\"  No results found for: \"WOUNDCX\"  No results found for: \"STOOLCX\"  No results found for: \"RESPCX\"  No results found for: \"AFBCX\"        I have personally looked at the labs and they are summarized above.  ----------------------------------------------------------------------------------------------------------------------  Detailed radiology reports for the last 24 hours:    Imaging Results (Last 24 Hours)       ** No results found for the last 24 hours. **          Assessment & Plan    -Frequent falls  -Hypokalemia  -UTI  -Folate deficency   -History of CVA right MCA with left-sided weakness  -History of right internal carotid artery disease with occlusion status post thrombectomy and stent placement   -History of iron deficiency anemia   -History of dysphagia as a complication of CVA   -History of restless leg syndrome   -History of heart failure with preserved ejection fraction   -History of hyperlipidemia  -Severe arthritis left hip  -History of right hip arthroplasty from osteoarthritis  -History of tobacco use patient quit January of this year after the stroke.     PT/OT/SW/SLP all following. Potassium replaced and mag level ordered. Folate replaced.      ABG did not reveal hypercapnia overnight and AMS has resolved. Patient still weak and repeat CT head showed encephalomalacia of right parietal lobe. I was not sure if this was normal " evolution of stroke and consulted neurology who has ordered MRI which was unrevealing.     Symptoms and pyuria likely representing UTI. Culture pending. Penicillin allergy. Ordered aztreonam for now.      Code status :Full      Dispo: SW consulted. IPR consulted given recent stroke.        VTE Prophylaxis:   Mechanical Order History:       None          Pharmalogical Order History:        Ordered     Dose Route Frequency Stop    03/17/24 1611  heparin (porcine) 5000 UNIT/ML injection 5,000 Units         5,000 Units SC Every 12 Hours Scheduled --                      Ok Marin MD  [facility] Hospitalist  03/21/24  17:59 EDT

## 2024-03-21 NOTE — CASE MANAGEMENT/SOCIAL WORK
Discharge Planning Assessment   [place]     Patient Name: Regine Beckham  MRN: [id number]  Today's Date: 3/21/2024    Admit Date: 3/17/2024    Plan: SS spoke with pt at bedside.  Pt requests inpt rehab consult if a candidate.  Pt stated she would be agreeable to short term nursing home placement if not a candidate for inpt rehab or if insurance denies.  SS notified Physician via Secure Chat.  SS will follow.       Discharge Plan       Row Name 03/21/24 1144       Plan    Plan SS spoke with pt at bedside.  Pt requests inpt rehab consult if a candidate.  Pt stated she would be agreeable to short term nursing home placement if not a candidate for inpt rehab or if insurance denies.  SS notified Physician via Secure Chat.  SS will follow.      Row Name 03/21/24 1058                    PAULINO Dunne

## 2024-03-21 NOTE — SIGNIFICANT NOTE
03/21/24 1340   Post Acute Pre-Cert Documentation   Request Submitted by Facility - Type: Post Acute   Post-Acute Authorization Type Submitted: IRF   Date Post Acute Pre-Cert Inititated per Facility 03/21/24   Verification from Payer Yes   All Clinicals Submitted? Yes   Had Accepting Facility at Time of Submission Yes   Response Communicated to:    Authorization Number: [id number]   Post Acute Pre-Cert Initiated Comment Prior auth request has been submitted to [employer] via fax: [phone].

## 2024-03-21 NOTE — PLAN OF CARE
Goal Outcome Evaluation: Patient has been pleasant. Compliant with care. Patient remains on room air, saturations remaining above 90%. Patient resting in bed. Call light in reach.

## 2024-03-21 NOTE — PAYOR COMM NOTE
"[facility]  NPI:[id number]    Utilization Review  Contact: Sharmin Matthews RN  Phone: [phone]  Fax:[phone]    INITIATE INPATIENT AUTHORIZATION   Observation 3/17/24 converted to inpatient 3/20/24      Regine Lock \"NEGIN\" (68 y.o. Female)       Date of Birth   1955    Social Security Number       Address   [address] [place], [place] [address]    Home Phone   [phone]    MRN   [id number]       Mandaen   [facility]    Marital Status                               Admission Date   3/17/24    Admission Type   Emergency    Admitting Provider   Yaritza Rao MD    Attending Provider   Ok Marin MD    Department, Room/Bed   [address], 3305/2S       Discharge Date       Discharge Disposition       Discharge Destination                                 Attending Provider: Ok Marin MD    Allergies: Penicillins    Isolation: None   Infection: None   Code Status: CPR    Ht: 167.6 cm (66\")   Wt: 63.4 kg (139 lb 11.2 oz)    Admission Cmt: None   Principal Problem: Falls frequently [R29.6]                   Active Insurance as of 3/17/2024       Primary Coverage       Payor Plan Insurance Group Employer/Plan Group    AETNA MEDICARE REPLACEMENT AETNA MEDICARE REPLACEMENT [id number]       Payor Plan Address Payor Plan Phone Number Payor Plan Fax Number Effective Dates    [address] [phone]  1/1/2024 - None Entered    [facility] [id number]         Subscriber Name Subscriber Birth Date Member ID       REGINE LOCK 1955 [id number]                     Emergency Contacts        (Rel.) Home Phone Work Phone Mobile Phone    Yaa Elizondo (Friend) -- [phone] --    Karla Patel (Friend) [phone] -- --                 History & Physical        Yaritza Rao MD at 03/17/24 1420              [facility] HOSPITALIST HISTORY AND PHYSICAL    Patient Identification:  Name:  Regine Lock  Age:  " 68 y.o.  Sex:  female  :  1955  MRN:  [id number]   Visit Number:  [id number]  Admit Date: 3/17/2024   Room number:  109/09  Primary Care Physician:  Dread Burton MD     Chief complaint:    Chief Complaint   Patient presents with    Fall       History of presenting illness:  68 y.o. female with history significant for CVA January of this year with right internal carotid artery disease status post thrombectomy and stent placement, patient claims after stroke she was temporary placed at the nursing home.  she was eventually discharged.  She lives alone she has no family, she uses a walker at home.  Her neighbors help her prepare her food.  She stated that since then she has been frequently falling, at the minimum once a day fortunately she never sustained fracture from falling.  She did claim that yesterday she did not fall.  Today while attempting to walk she fell again she has EMS was contacted.  Denies loss of consciousness no incontinence no paresthesia no seizure.  Emergency room patient was complaining of pain mostly left shoulder and left hip workup was negative for fracture, for safety, patient agreed for admission and evaluation by PT OT.  ---------------------------------------------------------------------------------------------------------------------   Review of Systems   Constitutional:  Negative for activity change, appetite change, chills, diaphoresis, fatigue, fever and unexpected weight change.   HENT: Negative.     Eyes: Negative.    Respiratory: Negative.     Cardiovascular: Negative.    Gastrointestinal: Negative.    Endocrine: Negative.    Genitourinary: Negative.    Musculoskeletal: Negative.    Skin: Negative.    Allergic/Immunologic: Negative.    Neurological:  Positive for weakness (Chronic left-sided weakness).   Hematological: Negative.    Psychiatric/Behavioral: Negative.    "      ---------------------------------------------------------------------------------------------------------------------   Past Medical History:   Diagnosis Date    Anemia     Anxiety     Arthritis     Depression     Legally blind     Restless leg syndrome     Sleep apnea     \"I don't use a cpap anymore since losing 106 lbs\"    Water retention      Past Surgical History:   Procedure Laterality Date    BACK SURGERY      CARDIAC CATHETERIZATION N/A 1/19/2024    Procedure: Percutaneous Manual Thrombectomy;  Surgeon: Efrain Hurley MD;  Location: [facility] INVASIVE LOCATION;  Service: Interventional Radiology;  Laterality: N/A;    GALLBLADDER SURGERY      HIP ARTHROSCOPY      JOINT REPLACEMENT Right      Family History   Problem Relation Age of Onset    Breast cancer Neg Hx      Social History     Socioeconomic History    Marital status:     Number of children: 0    Years of education: 1 yr college   Tobacco Use    Smoking status: Every Day     Current packs/day: 1.50     Average packs/day: 1.5 packs/day for 51.0 years (76.5 ttl pk-yrs)     Types: Cigarettes    Smokeless tobacco: Never   Vaping Use    Vaping status: Never Used   Substance and Sexual Activity    Alcohol use: Yes     Alcohol/week: 1.0 standard drink of alcohol     Types: 1 Glasses of wine per week     Comment: \"occasionally - once every two months\"    Drug use: Yes     Comment: alcohol - occasionally    Sexual activity: Defer     ---------------------------------------------------------------------------------------------------------------------   Allergies:  Penicillins  ---------------------------------------------------------------------------------------------------------------------   Prior to Admission Medications       Prescriptions Last Dose Informant Patient Reported? Taking?    Albuterol (VENTOLIN IN)  Pharmacy Yes No    Inhale 2 puffs Every 6 (Six) Hours As Needed.    aspirin 81 MG chewable tablet   No No    Chew 1 tablet Daily. "    atorvastatin (LIPITOR) 80 MG tablet   No No    Take 1 tablet by mouth Every Night.    losartan (COZAAR) 50 MG tablet  Pharmacy Yes No    Take 1 tablet by mouth Daily. Indications: High Blood Pressure Disorder    Mirabegron ER (MYRBETRIQ) 50 MG tablet sustained-release 24 hour 24 hr tablet  Pharmacy Yes No    Take 50 mg by mouth Daily. Indications: Urinary Urgency    mirtazapine (REMERON) 30 MG tablet   No No    Take 1 tablet by mouth Every Night. Indications: Major Depressive Disorder    ondansetron ODT (ZOFRAN-ODT) 4 MG disintegrating tablet  Pharmacy Yes No    Place 1 tablet on the tongue Every 8 (Eight) Hours As Needed for Nausea or Vomiting.    pantoprazole (PROTONIX) 40 MG EC tablet   No No    TAKE 1 TABLET BY MOUTH EVERY MORNING FOR HEARTBURN    rOPINIRole (REQUIP) 4 MG tablet   No No    Take 1 tablet by mouth Every Night. Take 1 hour before bedtime.  Indications: Restless Leg Syndrome    ticagrelor (BRILINTA) 60 MG tablet tablet   No No    Take 1 tablet by mouth 2 (Two) Times a Day.    zolpidem (AMBIEN) 5 MG tablet   No No    Take 1 tablet by mouth At Night As Needed for Sleep. Indications: Trouble Sleeping          ---------------------------------------------------------------------------------------------------------------------   Vital Signs:  Temp:  [97.7 °F (36.5 °C)] 97.7 °F (36.5 °C)  Heart Rate:  [62-84] 74  Resp:  [18] 18  BP: (100-167)/(72-96) 118/88    Mean Arterial Pressure (Non-Invasive) for the past 24 hrs (Last 3 readings):   Noninvasive MAP (mmHg)   03/17/24 1400 100   03/17/24 1345 137   03/17/24 1330 114     SpO2:  [93 %-98 %] 98 %  on   ;   Device (Oxygen Therapy): room air  Body mass index is 25.66 kg/m².    Wt Readings from Last 3 Encounters:   03/17/24 72.1 kg (159 lb)   02/13/24 72.1 kg (159 lb)   02/04/24 72.1 kg (159 lb)               ---------------------------------------------------------------------------------------------------------------------   Physical  Exam:  Constitutional:  Well-developed, awake alert oriented, no confusion.    No respiratory distress.      HENT:  Head: Normocephalic and atraumatic.  Mouth:  Moist mucous membranes.    Eyes:  Conjunctivae and EOM are normal.  Pupils are equal, round, and reactive to light.  No scleral icterus.  Neck:  Neck supple.  No JVD present.  No bruit  Cardiovascular:  Normal rate, regular rhythm and normal heart sounds with no murmur.  Pulmonary/Chest: Fading hematoma yellowish hue left anterior chest wall along the subclavian area no respiratory distress, no wheezes, no crackles, with normal breath sounds and good air movement.  Abdominal:  Soft.  Bowel sounds are normal.  No distension and no tenderness.   Musculoskeletal: Trace edema left upper extremity/hands, good radial pulse good pedal pulse bilateral.  Neurological:  Alert and oriented to person, place, and time.  No cranial nerve deficit.  No tongue deviation.  No facial droop.  No slurred speech.  Motor strength left upper extremity is 3/5, lower extremity left is 3-4/5.  Skin:  Skin is warm and dry.  No rash noted.  No pallor.   Peripheral vascular:  No edema and strong pulses on all 4 extremities.  Genitourinary: No Arce catheter  ---------------------------------------------------------------------------------------------------------------------  EKG:        Telemetry: Rhythm 61 bpm  I have personally looked at both the EKG and the telemetry strips.  --------------------------------------------------------------------------------------------------------------------    Results from last 7 days   Lab Units 03/17/24  1303   WBC 10*3/mm3 8.57   HEMOGLOBIN g/dL 12.2   HEMATOCRIT % 38.1   MCV fL 94.8   MCHC g/dL 32.0   PLATELETS 10*3/mm3 307     Results from last 7 days   Lab Units 03/17/24  1303   SODIUM mmol/L 145   POTASSIUM mmol/L 4.3   CHLORIDE mmol/L 104   CO2 mmol/L 29.7*   BUN mg/dL 20   CREATININE mg/dL 0.79   CALCIUM mg/dL 10.0   GLUCOSE mg/dL 116*  "  ALBUMIN g/dL 3.8   BILIRUBIN mg/dL 0.6   ALK PHOS U/L 97   AST (SGOT) U/L 18   ALT (SGPT) U/L 12   Estimated Creatinine Clearance: 68.3 mL/min (by C-G formula based on SCr of 0.79 mg/dL).    No results found for: \"HGBA1C\", \"POCGLU\"  No results found for: \"AMMONIA\"        No results found for: \"BLOODCX\"No results found for: \"RESPCX\"No results found for: \"URINECX\"No results found for: \"WOUNDCX\"No results found for: \"BODYFLDCX\"No results found for: \"STOOLCX\"  pH No results found for: \"PHART\"   pO2 No results found for: \"PO2ART\"   pCO2 No results found for: \"DBJ2DLR\"   HCO3 No results found for: \"NBK4LOV\"     I have personally looked at the labs and they are summarized above.  ----------------------------------------------------------------------------------------------------------------------  Imaging Results (Last 24 Hours)       Procedure Component Value Units Date/Time    XR Chest 1 View [[id number]] Collected: 03/17/24 1347     Updated: 03/17/24 1353    Narrative:      XR CHEST 1 VW-     CLINICAL INDICATION: screening; M25.552-Pain in left hip; M25.512-Pain  in left shoulder; W19.XXXA-Unspecified fall, initial encounter        COMPARISON: None immediately available      TECHNIQUE: Single frontal view of the chest.     FINDINGS:     LUNGS: Lungs are adequately aerated.      HEART AND MEDIASTINUM: Heart and mediastinal contours are unremarkable        SKELETON: Bony and soft tissue structures are unremarkable.             Impression:      No radiographic evidence of acute cardiac or pulmonary disease.           This report was finalized on 3/17/2024 1:50 PM by Dr. Hu Obrien MD.       XR Shoulder 2+ View Left [[id number]] Collected: 03/17/24 1113     Updated: 03/17/24 1115    Narrative:      EXAM:    XR Left Shoulder Complete, 2 or More Views     EXAM DATE:    3/17/2024 11:00 AM     CLINICAL HISTORY:    pain     TECHNIQUE:    Two or more views of the left shoulder.     COMPARISON:    No relevant prior studies " available.     FINDINGS:    BONES/JOINTS:  Unremarkable.  No acute fracture.  No dislocation.    SOFT TISSUES:  Unremarkable.       Impression:        No acute findings in the left shoulder.        This report was finalized on 3/17/2024 11:13 AM by Dr. Hu Obrien MD.       XR Hip With or Without Pelvis 2 - 3 View Left [[id number]] Collected: 03/17/24 1112     Updated: 03/17/24 1115    Narrative:      EXAM:    XR Left Hip With Pelvis When Performed, 2 or 3 Views     EXAM DATE:    3/17/2024 11:00 AM     CLINICAL HISTORY:    pain     TECHNIQUE:    Two or three views of the left hip with pelvis when performed.     COMPARISON:    2/13/2024     FINDINGS:    Bones/joints:  Moderate to extensive degenerative osteoarthritis in  the left hip.  No acute fracture or dislocation.    Soft tissues:  Unremarkable.       Impression:      1.  No acute fracture or dislocation.  2.  Moderate to extensive degenerative osteoarthritis in the left hip.        This report was finalized on 3/17/2024 11:13 AM by Dr. Hu Obrien MD.       CT Head Without Contrast [[id number]] Collected: 03/17/24 1058     Updated: 03/17/24 1101    Narrative:      CT HEAD WO CONTRAST-     CLINICAL INDICATION: Head trauma, minor (Age >= 65y)        COMPARISON: 2/4/2024     TECHNIQUE: Axial images of the brain were obtained with out intravenous  contrast.  Reformatted images were created in the sagittal and coronal  planes.     DOSE:     Radiation dose reduction techniques were utilized per ALARA protocol.  Automated exposure control was initiated through either or CareDose or  DoseRigWebdyn software packages by  protocol.        FINDINGS:    BRAIN: Apparent suggestive of encephalomalacia in the right parietal  region.    VENTRICLES:  Unremarkable.  No ventriculomegaly.       BONES/JOINTS:  Unremarkable.  No acute fracture.       SOFT TISSUES:  Unremarkable.       SINUSES:  no air fluid levels       MASTOID AIR CELLS:  Unremarkable as visualized.  No  mastoid effusion.          Impression:         1. Atrophy and chronic microangiopathic changes  2. Apparent suggestive of encephalomalacia in the right parietal region  3. No focal parenchymal mass, hemorrhage, or midline shift     This report was finalized on 3/17/2024 10:59 AM by Dr. Hu Obrien MD.             I have personally reviewed the radiology images and read the final radiology report.  ----------------------------------------------------------------------------------------------------------------------  Assessment and Plan:  -Frequent fall  -History of CVA right MCA with left-sided weakness  -History of right internal carotid artery disease with occlusion status post thrombectomy and stent placement   -History of iron deficiency anemia   -History of dysphagia as a complication of CVA   -History of restless leg syndrome   -History of heart failure with preserved ejection fraction   -History of hyperlipidemia  -Severe arthritis left hip  -History of right hip arthroplasty from osteoarthritis  -History of tobacco use patient quit January of this year after the stroke.    Admit observation, pain control, PT OT and speech consult.   consult, aspiration precaution, DVT and GI prophylaxis.      Yaritza Rao MD  03/17/24  14:20 EDT    Electronically signed by Yaritza Rao MD at 03/17/24 2872          Emergency Department Notes        Fransisco Marie DO at 03/17/24 1146          Subjective   History of Present Illness  68-year-old female with a past medical history of stroke with chronic left-sided weakness presents to the ER after a fall at home.  Patient notes she was attempting to ambulate with her walker when she fell and struck her left side.  Patient had left hip pain and left shoulder pain.  No chest pain.  No syncopal symptoms.  Patient cannot confirm or deny head trauma.  No obvious signs of loss of consciousness.  Patient is awake and alert.  GCS 15.  Vital signs  "stable      Review of Systems   Musculoskeletal:         Left shoulder pain, left hip pain.   All other systems reviewed and are negative.      Past Medical History:   Diagnosis Date    Anemia     Anxiety     Arthritis     Depression     Legally blind     Restless leg syndrome     Sleep apnea     \"I don't use a cpap anymore since losing 106 lbs\"    Water retention        Allergies   Allergen Reactions    Penicillins Hives and Swelling       Past Surgical History:   Procedure Laterality Date    BACK SURGERY      CARDIAC CATHETERIZATION N/A 1/19/2024    Procedure: Percutaneous Manual Thrombectomy;  Surgeon: Efrain Hurley MD;  Location: [facility] INVASIVE LOC[facility];  Service: Interventional Radiology;  Laterality: N/A;    GALLBLADDER SURGERY      HIP ARTHROSCOPY      JOINT REPLACEMENT Right        Family History   Problem Relation Age of Onset    Breast cancer Neg Hx        Social History     Socioeconomic History    Marital status:     Number of children: 0    Years of education: 1 yr college   Tobacco Use    Smoking status: Every Day     Current packs/day: 1.50     Average packs/day: 1.5 packs/day for 51.0 years (76.5 ttl pk-yrs)     Types: Cigarettes    Smokeless tobacco: Never   Vaping Use    Vaping status: Never Used   Substance and Sexual Activity    Alcohol use: Yes     Alcohol/week: 1.0 standard drink of alcohol     Types: 1 Glasses of wine per week     Comment: \"occasionally - once every two months\"    Drug use: Yes     Comment: alcohol - occasionally    Sexual activity: Defer           Objective   Physical Exam  Constitutional:       General: She is not in acute distress.     Appearance: Normal appearance. She is not ill-appearing.   HENT:      Head: Normocephalic and atraumatic.      Right Ear: External ear normal.      Left Ear: External ear normal.      Nose: Nose normal.      Mouth/Throat:      Mouth: Mucous membranes are moist.   Eyes:      Extraocular Movements: Extraocular movements " intact.      Pupils: Pupils are equal, round, and reactive to light.   Cardiovascular:      Rate and Rhythm: Normal rate and regular rhythm.      Heart sounds: No murmur heard.  Pulmonary:      Effort: Pulmonary effort is normal. No respiratory distress.      Breath sounds: Normal breath sounds. No wheezing.   Abdominal:      General: Bowel sounds are normal.      Palpations: Abdomen is soft.      Tenderness: There is no abdominal tenderness.   Musculoskeletal:         General: No deformity or signs of injury. Normal range of motion.      Cervical back: Normal range of motion and neck supple.   Skin:     General: Skin is warm and dry.      Findings: No erythema.   Neurological:      General: No focal deficit present.      Mental Status: She is alert and oriented to person, place, and time. Mental status is at baseline.      Cranial Nerves: No cranial nerve deficit.      Comments: Chronic left-sided weakness.   Psychiatric:         Mood and Affect: Mood normal.         Behavior: Behavior normal.         Thought Content: Thought content normal.         Procedures          ED Course  ED Course as of 03/17/24 1409   Sun Mar 17, 2024   1351 EKG notes sinus rhythm.  63 bpm.  No acute ST elevation.  QTc 435.  Electronically signed by Fransisco Marie DO, 03/17/24, 1:51 PM EDT.   [SF]      ED Course User Index  [SF] Fransisco Marie DO                                 XR Chest 1 View    Result Date: 3/17/2024  No radiographic evidence of acute cardiac or pulmonary disease.    This report was finalized on 3/17/2024 1:50 PM by Dr. Hu Obrien MD.      XR Shoulder 2+ View Left    Result Date: 3/17/2024    No acute findings in the left shoulder.   This report was finalized on 3/17/2024 11:13 AM by Dr. Hu Obrien MD.      XR Hip With or Without Pelvis 2 - 3 View Left    Result Date: 3/17/2024  1.  No acute fracture or dislocation. 2.  Moderate to extensive degenerative osteoarthritis in the left hip.   This report was  finalized on 3/17/2024 11:13 AM by Dr. Hu Obrien MD.      CT Head Without Contrast    Result Date: 3/17/2024   1. Atrophy and chronic microangiopathic changes 2. Apparent suggestive of encephalomalacia in the right parietal region 3. No focal parenchymal mass, hemorrhage, or midline shift  This report was finalized on 3/17/2024 10:59 AM by Dr. Hu Obrien MD.                     Medical Decision Making  CT imaging of the head revealed no acute abnormality.  Plain film imaging of the left shoulder and left hip revealed no acute abnormality.  During preparations for possible discharge, it was revealed that this patient has no family at home available to help take care of her.  This patient has also been evaluated in the ER several times for multiple falls.  Given the patient's stroke history and difficulty with movement at home, concerns for further falls and possible fracture/injury.  Recommend admission for further work up and treatment.  Hospitalist team consulted and made aware of the patient.  Consults and orders placed per hospitalist request.  Patient was agreeable to admission plan.  Vitals stable on admission.    Problems Addressed:  Acute pain of left shoulder: complicated acute illness or injury  Failure to thrive in adult: complicated acute illness or injury  Fall from standing, initial encounter: complicated acute illness or injury  Left hip pain: complicated acute illness or injury    Amount and/or Complexity of Data Reviewed  Labs: ordered. Decision-making details documented in ED Course.  Radiology: ordered. Decision-making details documented in ED Course.  ECG/medicine tests: ordered.        Final diagnoses:   Left hip pain   Acute pain of left shoulder   Fall from standing, initial encounter   Failure to thrive in adult       ED Disposition  ED Disposition       ED Disposition   Intended Admit    Condition   --    Comment   --               Dread Burton MD  [address] [place] [address]  [id number]  [phone]    In 1 week      [facility] EMERGENCY DEPARTMENT  [address] [facility] [address] [phone]    If symptoms worsen         Medication List      No changes were made to your prescriptions during this visit.            Fransisco Marie DO  03/17/24 1148       Fransisco Marie DO  03/17/24 1409      Electronically signed by Fransisco Marie DO at 03/17/24 1409       Facility-Administered Medications as of 3/21/2024   Medication Dose Route Frequency Provider Last Rate Last Admin    acetaminophen (TYLENOL) tablet 650 mg  650 mg Oral Q6H PRN Zhao Akins DO   650 mg at 03/20/24 2126    aspirin chewable tablet 81 mg  81 mg Oral Daily Yaritza Rao MD   81 mg at 03/20/24 0810    atorvastatin (LIPITOR) tablet 80 mg  80 mg Oral Nightly Yaritza Rao MD   80 mg at 03/20/24 2027    sennosides-docusate (PERICOLACE) 8.6-50 MG per tablet 2 tablet  2 tablet Oral BID Yaritza Rao MD   2 tablet at 03/19/24 2005    And    polyethylene glycol (MIRALAX) packet 17 g  17 g Oral Daily PRN Yaritza Rao MD        And    bisacodyl (DULCOLAX) EC tablet 5 mg  5 mg Oral Daily PRN Yaritza Rao MD        And    bisacodyl (DULCOLAX) suppository 10 mg  10 mg Rectal Daily PRN Yaritza Rao MD        Calcium Replacement - Follow Nurse / BPA Driven Protocol   Does not apply PRN Yaritza Rao MD        [COMPLETED] cyanocobalamin injection 1,000 mcg  1,000 mcg Subcutaneous Once Liam Rebollar MD   1,000 mcg at 03/19/24 1745    folic acid (FOLVITE) tablet 1 mg  1 mg Oral Daily Ok Marin MD   1 mg at 03/20/24 0812    heparin (porcine) 5000 UNIT/ML injection 5,000 Units  5,000 Units Subcutaneous Q12H Yaritza Rao MD   5,000 Units at 03/20/24 2026    losartan (COZAAR) tablet 50 mg  50 mg Oral Daily Yaritza Rao MD   50 mg at 03/20/24 0811    Magnesium Standard Dose Replacement - Follow Nurse / BPA Driven Protocol   Does  not apply PRN Yaritza Rao MD        mirtazapine (REMERON) tablet 30 mg  30 mg Oral Nightly Yaritza Rao MD   30 mg at 24    oxybutynin XL (DITROPAN-XL) 24 hr tablet 10 mg  10 mg Oral Daily Ok Marin MD   10 mg at 24 0813    pantoprazole (PROTONIX) EC tablet 40 mg  40 mg Oral Q AM Yaritza Rao MD   40 mg at 24 0513    Phosphorus Replacement - Follow Nurse / BPA Driven Protocol   Does not apply PRN Yaritza Rao MD        [COMPLETED] potassium chloride (KLOR-CON M20) CR tablet 40 mEq  40 mEq Oral Q4H Sarah Mckeon PA-C   40 mEq at 24 1209    Potassium Replacement - Follow Nurse / BPA Driven Protocol   Does not apply PRN Yaritza Rao MD        rOPINIRole (REQUIP) tablet 4 mg  4 mg Oral Nightly Ok Marin MD   4 mg at 24    sodium chloride 0.9 % flush 10 mL  10 mL Intravenous Q12H Yaritza Rao MD   10 mL at 24    sodium chloride 0.9 % flush 10 mL  10 mL Intravenous PRN Yaritza Rao MD        sodium chloride 0.9 % infusion 40 mL  40 mL Intravenous PRN Yaritza Rao MD        ticagrelor (BRILINTA) tablet 60 mg  60 mg Oral BID Yaritza Rao MD   60 mg at 24    zolpidem (AMBIEN) tablet 5 mg  5 mg Oral Nightly PRN Ok Marin MD   5 mg at 246        Physician Progress Notes (all)        Ok Marin MD at 24 1731              [facility]     Patient Identification:  Name:  Regine Beckham  Age:  68 y.o.  Sex:  female  :  1955  MRN:  [id number]  Visit Number:  [id number]  ROOM: [address] [place] [address]     Primary Care Provider:  Dread Burton MD    Length of stay in inpatient status:  0    Subjective     Chief Compliant:    Chief Complaint   Patient presents with    Fall       History of Presenting Illness:    Patient denies any new complaints. She reports overall feeling slightly better. Patient's neighbors supportive  bedside. Patient agrees with them she would not be safe at home and is interested in short term rehab before trying to get back home.     ROS:  Otherwise 10 point ROS negative other than documented above in HPI.     Objective     Current Hospital Meds:aspirin, 81 mg, Oral, Daily  atorvastatin, 80 mg, Oral, Nightly  folic acid, 1 mg, Oral, Daily  heparin (porcine), 5,000 Units, Subcutaneous, Q12H  losartan, 50 mg, Oral, Daily  mirtazapine, 30 mg, Oral, Nightly  oxybutynin XL, 10 mg, Oral, Daily  pantoprazole, 40 mg, Oral, Q AM  rOPINIRole, 4 mg, Oral, Nightly  senna-docusate sodium, 2 tablet, Oral, BID  sodium chloride, 10 mL, Intravenous, Q12H  ticagrelor, 60 mg, Oral, BID         Current Antimicrobial Therapy:  Anti-Infectives (From admission, onward)      None          Current Diuretic Therapy:  Diuretics (From admission, onward)      None          ----------------------------------------------------------------------------------------------------------------------  Vital Signs:  Temp:  [97.6 °F (36.4 °C)-98.3 °F (36.8 °C)] 97.8 °F (36.6 °C)  Heart Rate:  [68-99] 68  Resp:  [16-20] 18  BP: (129-175)/(63-93) 129/63  SpO2:  [94 %-97 %] 95 %  on   ;   Device (Oxygen Therapy): room air  Body mass index is 22.55 kg/m².    Wt Readings from Last 3 Encounters:   03/20/24 63.4 kg (139 lb 11.2 oz)   02/13/24 72.1 kg (159 lb)   02/04/24 72.1 kg (159 lb)     Intake & Output (last 3 days)         03/17 0701  03/18 0700 03/18 0701 03/19 0700 03/19 0701  03/20 0700 03/20 0701  03/21 0700    P.O.  360 240 840    Total Intake(mL/kg)  360 (5.9) 240 (3.8) 840 (13.2)    Urine (mL/kg/hr) 300 250 (0.2) 300 (0.2) 1050 (1.6)    Total Output 300     Net -300 +110 -60 -210            Urine Unmeasured Occurrence 1 x 2 x  2 x    Stool Unmeasured Occurrence  8 x            Diet: Cardiac; Healthy Heart (2-3 Na+); Texture: Soft to Chew (NDD 3); Soft to Chew: Chopped Meat; Fluid Consistency: Thin (IDDSI  0)  ----------------------------------------------------------------------------------------------------------------------  Physical exam:  Constitutional:  Well-developed and well-nourished.  No respiratory distress.      HENT:  Head:  Normocephalic and atraumatic.  Mouth:  Moist mucous membranes.    Eyes:  Conjunctivae and EOM are normal. No scleral icterus.    Neck:  Neck supple.  No JVD present.    Cardiovascular:  Normal rate, regular rhythm and normal heart sounds with no murmur.  Pulmonary/Chest:  No respiratory distress, no wheezes, no crackles, with normal breath sounds and good air movement.  Abdominal:  Soft.  Bowel sounds are normal.  No distension and no tenderness.   Musculoskeletal:  No edema, no tenderness, and no deformity.  No red or swollen joints anywhere.    Neurological:  Alert and oriented to person, place, and time.  No cranial nerve deficit.  No tongue deviation.  No facial droop.  No slurred speech.   Skin:  Skin is warm and dry. No rash noted. No pallor.   Peripheral vascular:  Pulses in all 4 extremities with no clubbing, no cyanosis, no edema.  ----------------------------------------------------------------------------------------------------------------------  Tele:    ----------------------------------------------------------------------------------------------------------------------  Results from last 7 days   Lab Units 03/18/24  0058 03/17/24  1303   WBC 10*3/mm3 7.47 8.57   HEMOGLOBIN g/dL 11.9* 12.2   HEMATOCRIT % 37.3 38.1   MCV fL 94.9 94.8   MCHC g/dL 31.9 32.0   PLATELETS 10*3/mm3 324 307     Results from last 7 days   Lab Units 03/18/24  2231   PH, ARTERIAL pH units 7.455*   PO2 ART mm Hg 78.3*   PCO2, ARTERIAL mm Hg 37.4   HCO3 ART mmol/L 26.3*     Results from last 7 days   Lab Units 03/19/24  0218 03/18/24  1506 03/18/24  0058 03/17/24  1303   SODIUM mmol/L 145  --  143 145   POTASSIUM mmol/L 3.9 4.0 3.1* 4.3   MAGNESIUM mg/dL 1.9  --  1.8  --    CHLORIDE mmol/L 110*  --   "105 104   CO2 mmol/L 24.4  --  23.5 29.7*   BUN mg/dL 23  --  20 20   CREATININE mg/dL 0.93  --  0.77 0.79   CALCIUM mg/dL 9.8  --  9.6 10.0   GLUCOSE mg/dL 114*  --  102* 116*   ALBUMIN g/dL  --   --  3.5 3.8   BILIRUBIN mg/dL  --   --  0.8 0.6   ALK PHOS U/L  --   --  95 97   AST (SGOT) U/L  --   --  19 18   ALT (SGPT) U/L  --   --  13 12   Estimated Creatinine Clearance: 57.1 mL/min (by C-G formula based on SCr of 0.93 mg/dL).  No results found for: \"AMMONIA\"              Glucose   Date/Time Value Ref Range Status   03/19/2024 1922 120 70 - 130 mg/dL Final   03/18/2024 2157 111 70 - 130 mg/dL Final     Lab Results   Component Value Date    TSH 2.880 03/17/2024     No results found for: \"PREGTESTUR\", \"PREGSERUM\", \"HCG\", \"HCGQUANT\"  Pain Management Panel          Latest Ref Rng & Units 12/20/2023   Pain Management Panel   Amphetamine, Urine Qual Negative Negative    Barbiturates Screen, Urine Negative Negative    Benzodiazepine Screen, Urine Negative Negative    Buprenorphine, Screen, Urine Negative Negative    Cocaine Screen, Urine Negative Negative    Fentanyl, Urine Negative Negative    Methadone Screen , Urine Negative Negative    Methamphetamine, Ur Negative Negative      Brief Urine Lab Results  (Last result in the past 365 days)        Color   Clarity   Blood   Leuk Est   Nitrite   Protein   CREAT   Urine HCG        03/18/24 0132 Dark Yellow   Clear   Negative   Trace   Negative   Trace                 No results found for: \"BLOODCX\"  No results found for: \"URINECX\"  No results found for: \"WOUNDCX\"  No results found for: \"STOOLCX\"  No results found for: \"RESPCX\"  No results found for: \"AFBCX\"        I have personally looked at the labs and they are summarized above.  ----------------------------------------------------------------------------------------------------------------------  Detailed radiology reports for the last 24 hours:    Imaging Results (Last 24 Hours)       Procedure Component Value Units " Date/Time    MRI Brain Without Contrast [[id number]] Collected: 03/20/24 1331     Updated: 03/20/24 1337    Narrative:      PROCEDURE: MRI BRAIN WO CONTRAST-     HISTORY: Worsening left sided weakness; M25.552-Pain in left hip;  M25.512-Pain in left shoulder; W19.XXXA-Unspecified fall, initial  encounter; R62.7-Adult failure to thrive     PROCEDURE: Multiplanar multisequence imaging of the brain was performed  without the use of intravenous contrast.     COMPARISON: 1/19/2024.     FINDINGS: There is generalized cerebral volume loss. There is an area of  encephalomalacia in the right frontal lobe from prior infarct. There is  no mass, mass effect or midline shift. There is no hydrocephalus. There  are no areas of restricted diffusion.     The midbrain, michael, cerebellum and craniocervical junction are  unremarkable. The sella and pituitary gland are within normal limits.  The major intracranial vasculature demonstrates the expected flow  related signal. The paranasal sinuses are clear.       Impression:      Findings consistent with chronic small vessel ischemic  change with encephalomalacia in the right frontal lobe from prior  infarct.              This report was finalized on 3/20/2024 1:35 PM by Marianna Caicedo M.D..             Assessment & Plan    -Frequent fall  -Hypokalemia  -Folate deficency   -History of CVA right MCA with left-sided weakness  -History of right internal carotid artery disease with occlusion status post thrombectomy and stent placement   -History of iron deficiency anemia   -History of dysphagia as a complication of CVA   -History of restless leg syndrome   -History of heart failure with preserved ejection fraction   -History of hyperlipidemia  -Severe arthritis left hip  -History of right hip arthroplasty from osteoarthritis  -History of tobacco use patient quit January of this year after the stroke.     PT/OT/SW/SLP all following. Potassium replaced and mag level ordered. Folate  replaced.      ABG did not reveal hypercapnia overnight and AMS has resolved. Patient still weak and repeat CT head showed encephalomalacia of right parietal lobe. I was not sure if this was normal evolution of stroke and consulted neurology who has ordered MRI which was unrevealing.      Code status :Full      Dispo: SW consulted. IPR consulted given recent stroke.     VTE Prophylaxis:   Mechanical Order History:       None          Pharmalogical Order History:        Ordered     Dose Route Frequency Stop    24 1611  heparin (porcine) 5000 UNIT/ML injection 5,000 Units         5,000 Units SC Every 12 Hours Scheduled --                    Ok Marin MD  [facility] Hospitalist  24  17:31 EDT    Electronically signed by Ok Marin MD at 24 1744       Ok Marin MD at 24 3491              [facility] PROGRESS NOTE     Patient Identification:  Name:  Regine Beckham  Age:  68 y.o.  Sex:  female  :  1955  MRN:  [id number]  Visit Number:  [id number]  ROOM: [address] [place] [address]     Primary Care Provider:  Dread Burton MD    Length of stay in inpatient status:  0    Subjective     Chief Compliant:    Chief Complaint   Patient presents with    Fall       History of Presenting Illness:    Patient drowsy overnight but she reports she was just tired and resting. Today she is more awake and interactive. Reports continued weakness otherwise no new complaints. NO familysupportive bedside.     ROS:  Otherwise 10 point ROS negative other than documented above in HPI.     Objective     Current Hospital Meds:aspirin, 81 mg, Oral, Daily  atorvastatin, 80 mg, Oral, Nightly  folic acid, 1 mg, Oral, Daily  heparin (porcine), 5,000 Units, Subcutaneous, Q12H  losartan, 50 mg, Oral, Daily  mirtazapine, 30 mg, Oral, Nightly  oxybutynin XL, 10 mg, Oral, Daily  pantoprazole, 40 mg, Oral, Q AM  rOPINIRole, 4 mg, Oral, Nightly  senna-docusate sodium, 2 tablet, Oral,  BID  sodium chloride, 10 mL, Intravenous, Q12H  ticagrelor, 60 mg, Oral, BID         Current Antimicrobial Therapy:  Anti-Infectives (From admission, onward)      None          Current Diuretic Therapy:  Diuretics (From admission, onward)      None          ----------------------------------------------------------------------------------------------------------------------  Vital Signs:  Temp:  [97.7 °F (36.5 °C)-98.6 °F (37 °C)] 97.9 °F (36.6 °C)  Heart Rate:  [] 77  Resp:  [16-18] 16  BP: (143-178)/(69-99) 154/91  SpO2:  [94 %-98 %] 96 %  on   ;   Device (Oxygen Therapy): room air  Body mass index is 21.89 kg/m².    Wt Readings from Last 3 Encounters:   03/19/24 61.5 kg (135 lb 9.6 oz)   02/13/24 72.1 kg (159 lb)   02/04/24 72.1 kg (159 lb)     Intake & Output (last 3 days)         03/16 0701  03/17 0700 03/17 0701  03/18 0700 03/18 0701  03/19 0700 03/19 0701  03/20 0700    P.O.   360 240    Total Intake(mL/kg)   360 (5.9) 240 (3.9)    Urine (mL/kg/hr)  300 250 (0.2) 300 (0.4)    Total Output  300 250 300    Net  -300 +110 -60            Urine Unmeasured Occurrence  1 x 2 x     Stool Unmeasured Occurrence   8 x           Diet: Cardiac; Healthy Heart (2-3 Na+); Texture: Soft to Chew (NDD 3); Soft to Chew: Chopped Meat; Fluid Consistency: Thin (IDDSI 0)  ----------------------------------------------------------------------------------------------------------------------  Physical exam:  Constitutional:  Well-developed and well-nourished.  No respiratory distress.      HENT:  Head:  Normocephalic and atraumatic.  Mouth:  Moist mucous membranes.    Eyes:  Conjunctivae and EOM are normal. No scleral icterus.    Neck:  Neck supple.  No JVD present.    Cardiovascular:  Normal rate, regular rhythm and normal heart sounds with no murmur.  Pulmonary/Chest:  No respiratory distress, no wheezes, no crackles, with normal breath sounds and good air movement.  Abdominal:  Soft.  Bowel sounds are normal.  No distension  "and no tenderness.   Musculoskeletal:  No edema, no tenderness, and no deformity.  No red or swollen joints anywhere.    Neurological:  Alert and oriented to person, place, and time.  No cranial nerve deficit.  No tongue deviation.  No facial droop.  No slurred speech.   Skin:  Skin is warm and dry. No rash noted. No pallor.   Peripheral vascular:  Pulses in all 4 extremities with no clubbing, no cyanosis, no edema.  ----------------------------------------------------------------------------------------------------------------------  Tele:    ----------------------------------------------------------------------------------------------------------------------  Results from last 7 days   Lab Units 03/18/24  0058 03/17/24  1303   WBC 10*3/mm3 7.47 8.57   HEMOGLOBIN g/dL 11.9* 12.2   HEMATOCRIT % 37.3 38.1   MCV fL 94.9 94.8   MCHC g/dL 31.9 32.0   PLATELETS 10*3/mm3 324 307     Results from last 7 days   Lab Units 03/18/24  2231   PH, ARTERIAL pH units 7.455*   PO2 ART mm Hg 78.3*   PCO2, ARTERIAL mm Hg 37.4   HCO3 ART mmol/L 26.3*     Results from last 7 days   Lab Units 03/19/24  0218 03/18/24  1506 03/18/24  0058 03/17/24  1303   SODIUM mmol/L 145  --  143 145   POTASSIUM mmol/L 3.9 4.0 3.1* 4.3   MAGNESIUM mg/dL 1.9  --  1.8  --    CHLORIDE mmol/L 110*  --  105 104   CO2 mmol/L 24.4  --  23.5 29.7*   BUN mg/dL 23  --  20 20   CREATININE mg/dL 0.93  --  0.77 0.79   CALCIUM mg/dL 9.8  --  9.6 10.0   GLUCOSE mg/dL 114*  --  102* 116*   ALBUMIN g/dL  --   --  3.5 3.8   BILIRUBIN mg/dL  --   --  0.8 0.6   ALK PHOS U/L  --   --  95 97   AST (SGOT) U/L  --   --  19 18   ALT (SGPT) U/L  --   --  13 12   Estimated Creatinine Clearance: 55.4 mL/min (by C-G formula based on SCr of 0.93 mg/dL).  No results found for: \"AMMONIA\"              Glucose   Date/Time Value Ref Range Status   03/18/2024 2157 111 70 - 130 mg/dL Final     Lab Results   Component Value Date    TSH 2.880 03/17/2024     No results found for: \"PREGTESTUR\", " "\"PREGSERUM\", \"HCG\", \"HCGQUANT\"  Pain Management Panel          Latest Ref Rng & Units 12/20/2023   Pain Management Panel   Amphetamine, Urine Qual Negative Negative    Barbiturates Screen, Urine Negative Negative    Benzodiazepine Screen, Urine Negative Negative    Buprenorphine, Screen, Urine Negative Negative    Cocaine Screen, Urine Negative Negative    Fentanyl, Urine Negative Negative    Methadone Screen , Urine Negative Negative    Methamphetamine, Ur Negative Negative      Brief Urine Lab Results  (Last result in the past 365 days)        Color   Clarity   Blood   Leuk Est   Nitrite   Protein   CREAT   Urine HCG        03/18/24 0132 Dark Yellow   Clear   Negative   Trace   Negative   Trace                 No results found for: \"BLOODCX\"  No results found for: \"URINECX\"  No results found for: \"WOUNDCX\"  No results found for: \"STOOLCX\"  No results found for: \"RESPCX\"  No results found for: \"AFBCX\"        I have personally looked at the labs and they are summarized above.  ----------------------------------------------------------------------------------------------------------------------  Detailed radiology reports for the last 24 hours:    Imaging Results (Last 24 Hours)       ** No results found for the last 24 hours. **          Assessment & Plan    -Frequent fall  -Hypokalemia  -Folate deficency   -History of CVA right MCA with left-sided weakness  -History of right internal carotid artery disease with occlusion status post thrombectomy and stent placement   -History of iron deficiency anemia   -History of dysphagia as a complication of CVA   -History of restless leg syndrome   -History of heart failure with preserved ejection fraction   -History of hyperlipidemia  -Severe arthritis left hip  -History of right hip arthroplasty from osteoarthritis  -History of tobacco use patient quit January of this year after the stroke.     PT/OT/SW/SLP all following. Potassium replaced and mag level ordered. Folate " replaced.     ABG did not reveal hypercapnia overnight and AMS has resolved. Patient still weak and repeat CT head showed encephalomalacia of right parietal lobe. I was not sure if this was normal evolution of stroke and consulted neurology who has ordered MRI.      Code status :Full      Dispo: Pending PT/OT    VTE Prophylaxis:   Mechanical Order History:       None          Pharmalogical Order History:        Ordered     Dose Route Frequency Stop    24 1611  heparin (porcine) 5000 UNIT/ML injection 5,000 Units         5,000 Units SC Every 12 Hours Scheduled --                      Ok Marin MD  [facility] Hospitalist  24  18:51 EDT    Electronically signed by Ok Marin MD at 24 1854       Ok Marin MD at 24 1932              [facility]     Patient Identification:  Name:  Regine Beckham  Age:  68 y.o.  Sex:  female  :  1955  MRN:  [id number]  Visit Number:  [id number]  ROOM: [address] [place] [address]     Primary Care Provider:  Dread Burton MD    Length of stay in inpatient status:  0    Subjective     Chief Compliant:    Chief Complaint   Patient presents with    Fall       History of Presenting Illness:    Patient denies any new complaints. Plans to work with PT/OT today. No family bedside.     ROS:  Otherwise 10 point ROS negative other than documented above in HPI.     Objective     Current Hospital Meds:aspirin, 81 mg, Oral, Daily  atorvastatin, 80 mg, Oral, Nightly  heparin (porcine), 5,000 Units, Subcutaneous, Q12H  losartan, 50 mg, Oral, Daily  mirtazapine, 30 mg, Oral, Nightly  pantoprazole, 40 mg, Oral, Q AM  senna-docusate sodium, 2 tablet, Oral, BID  sodium chloride, 10 mL, Intravenous, Q12H  ticagrelor, 60 mg, Oral, BID         Current Antimicrobial Therapy:  Anti-Infectives (From admission, onward)      None          Current Diuretic Therapy:  Diuretics (From admission, onward)      None           ----------------------------------------------------------------------------------------------------------------------  Vital Signs:  Temp:  [97.8 °F (36.6 °C)-98.2 °F (36.8 °C)] 98 °F (36.7 °C)  Heart Rate:  [73-89] 87  Resp:  [18-20] 18  BP: (114-178)/(61-98) 114/61  SpO2:  [96 %-97 %] 97 %  on   ;   Device (Oxygen Therapy): room air  Body mass index is 23.58 kg/m².    Wt Readings from Last 3 Encounters:   03/18/24 66.3 kg (146 lb 1.6 oz)   02/13/24 72.1 kg (159 lb)   02/04/24 72.1 kg (159 lb)     Intake & Output (last 3 days)         03/16 0701  03/17 0700 03/17 0701  03/18 0700 03/18 0701  03/19 0700    P.O.   360    Total Intake(mL/kg)   360 (5.4)    Urine (mL/kg/hr)  300     Total Output  300     Net  -300 +360           Urine Unmeasured Occurrence  1 x 1 x    Stool Unmeasured Occurrence   8 x          Diet: Cardiac; Healthy Heart (2-3 Na+); Texture: Soft to Chew (NDD 3); Soft to Chew: Chopped Meat; Fluid Consistency: Thin (IDDSI 0)  ----------------------------------------------------------------------------------------------------------------------  Physical exam:  Constitutional:  Well-developed and well-nourished.  No respiratory distress.      HENT:  Head:  Normocephalic and atraumatic.  Mouth:  Moist mucous membranes.    Eyes:  Conjunctivae and EOM are normal. No scleral icterus.    Neck:  Neck supple.  No JVD present.    Cardiovascular:  Normal rate, regular rhythm and normal heart sounds with no murmur.  Pulmonary/Chest:  No respiratory distress, no wheezes, no crackles, with normal breath sounds and good air movement.  Abdominal:  Soft.  Bowel sounds are normal.  No distension and no tenderness.   Musculoskeletal:  No edema, no tenderness, and no deformity.  No red or swollen joints anywhere.    Neurological:  Alert and oriented to person, place, and time.  No cranial nerve deficit.  No tongue deviation.  No facial droop.  No slurred speech.   Skin:  Skin is warm and dry. No rash noted. No pallor.  "  Peripheral vascular:  Pulses in all 4 extremities with no clubbing, no cyanosis, no edema.  ----------------------------------------------------------------------------------------------------------------------  Tele:    ----------------------------------------------------------------------------------------------------------------------  Results from last 7 days   Lab Units 03/18/24  0058 03/17/24  1303   WBC 10*3/mm3 7.47 8.57   HEMOGLOBIN g/dL 11.9* 12.2   HEMATOCRIT % 37.3 38.1   MCV fL 94.9 94.8   MCHC g/dL 31.9 32.0   PLATELETS 10*3/mm3 324 307         Results from last 7 days   Lab Units 03/18/24  1506 03/18/24  0058 03/17/24  1303   SODIUM mmol/L  --  143 145   POTASSIUM mmol/L 4.0 3.1* 4.3   MAGNESIUM mg/dL  --  1.8  --    CHLORIDE mmol/L  --  105 104   CO2 mmol/L  --  23.5 29.7*   BUN mg/dL  --  20 20   CREATININE mg/dL  --  0.77 0.79   CALCIUM mg/dL  --  9.6 10.0   GLUCOSE mg/dL  --  102* 116*   ALBUMIN g/dL  --  3.5 3.8   BILIRUBIN mg/dL  --  0.8 0.6   ALK PHOS U/L  --  95 97   AST (SGOT) U/L  --  19 18   ALT (SGPT) U/L  --  13 12   Estimated Creatinine Clearance: 72.2 mL/min (by C-G formula based on SCr of 0.77 mg/dL).  No results found for: \"AMMONIA\"              No results found for: \"HGBA1C\", \"POCGLU\"  Lab Results   Component Value Date    TSH 2.880 03/17/2024     No results found for: \"PREGTESTUR\", \"PREGSERUM\", \"HCG\", \"HCGQUANT\"  Pain Management Panel          Latest Ref Rng & Units 12/20/2023   Pain Management Panel   Amphetamine, Urine Qual Negative Negative    Barbiturates Screen, Urine Negative Negative    Benzodiazepine Screen, Urine Negative Negative    Buprenorphine, Screen, Urine Negative Negative    Cocaine Screen, Urine Negative Negative    Fentanyl, Urine Negative Negative    Methadone Screen , Urine Negative Negative    Methamphetamine, Ur Negative Negative      Brief Urine Lab Results  (Last result in the past 365 days)        Color   Clarity   Blood   Leuk Est   Nitrite   Protein   " "CREAT   Urine HCG        03/18/24 0132 Dark Yellow   Clear   Negative   Trace   Negative   Trace                 No results found for: \"BLOODCX\"  No results found for: \"URINECX\"  No results found for: \"WOUNDCX\"  No results found for: \"STOOLCX\"  No results found for: \"RESPCX\"  No results found for: \"AFBCX\"        I have personally looked at the labs and they are summarized above.  ----------------------------------------------------------------------------------------------------------------------  Detailed radiology reports for the last 24 hours:    Imaging Results (Last 24 Hours)       ** No results found for the last 24 hours. **          Assessment & Plan    -Frequent fall  -Hypokalemia  -Folate deficency   -History of CVA right MCA with left-sided weakness  -History of right internal carotid artery disease with occlusion status post thrombectomy and stent placement   -History of iron deficiency anemia   -History of dysphagia as a complication of CVA   -History of restless leg syndrome   -History of heart failure with preserved ejection fraction   -History of hyperlipidemia  -Severe arthritis left hip  -History of right hip arthroplasty from osteoarthritis  -History of tobacco use patient quit January of this year after the stroke.    PT/OT/SW/SLP all following. Potassium replaced and mag level ordered. Folate replaced.     Code status :Full     Dispo: Pending PT/OT    VTE Prophylaxis:   Mechanical Order History:       None          Pharmalogical Order History:        Ordered     Dose Route Frequency Stop    03/17/24 1611  heparin (porcine) 5000 UNIT/ML injection 5,000 Units         5,000 Units SC Every 12 Hours Scheduled --                    Ok Marin MD  [facility]  03/18/24  19:32 EDT    Electronically signed by Ok Marin MD at 03/18/24 1943          Consult Notes (all)        Liam Rebollar MD at 03/19/24 1534        Consult Orders    1. Inpatient Neurology Consult " "General [[id number]] ordered by Ok Marin MD at 03/19/24 1300                 Neurology Consult Note    Consult Date: 3/19/2024    Referring MD: Provider, No Known    Reason for Consult I have been asked to see the patient in neurological consultation to render advice and opinion regarding left-sided weakness, frequent falls    Regine Beckham is a 68 y.o. handed white female with known diagnosis of anxiety, depression, restless leg syndrome, obstructive sleep apnea not compliant with CPAP machine, right MCA stroke in January 2024 secondary to right ICA occlusion status post mechanical thrombectomy tiki 3 with residual left-sided weakness presented to the hospital complaining of frequent falls.  From prior note her exam was noticeable for decree sensation on the left side, 2/5 on the left upper extremity, 3/5 on the left lower extremity, left-sided neglect and left hemianopsia, on my examination today exam was exactly the same.  I asked the patient if her exam improved after rehab and she denied and stated that it was the same when she was discharged from rehab.  She stated compliant with her medications.  Losing consciousness or seizure-like activity.    Past Medical History:   Diagnosis Date    Anemia     Anxiety     Arthritis     Depression     Legally blind     Restless leg syndrome     Sleep apnea     \"I don't use a cpap anymore since losing 106 lbs\"    Water retention        Exam  /91 (BP Location: Left arm, Patient Position: Lying)   Pulse 77   Temp 97.9 °F (36.6 °C) (Infrared)   Resp 16   Ht 167.6 cm (66\")   Wt 61.5 kg (135 lb 9.6 oz) Comment: STALIN Krishnamurthy aware  SpO2 96%   BMI 21.89 kg/m²   Gen: NAD, vitals reviewed  MS: oriented x3, recent/remote memory intact, normal attention/concentration, language intact, tactile neglect on the left.  CN: Left hemianopsia, PERRL, EOMI, left lower facial droop, mild to moderate dysarthria  Motor: 2/5 on the left upper extremity, 3/5 on the left lower " extremity extremities  Sensory exam: Decreased on the left    NIH Stroke Scale :    (0_) 1a. Level of consciousness (LOC): 0=alert;1=arousable by minor stimulation;2=obtunded or needs strong stimulation to attend;3=unresponsive or reflex responses only  (0_) 1b. LOC Questions: 0=answers both;1=answers one;2=answers neither  (0_) 1c. LOC Commands: 0=performs both tasks;1=performs one task;2=performs neither task  (0_) 2. Best Gaze: 0=normal;1=partial gaze palsy;2=forced deviation or total gaze paresis  (2_) 3. Visual: 0=normal;1=partial hemianopia;2=complete hemianopia;3=blind  (1_) 4. Facial palsy: 0=normal;1=minor paresis;2=partial paralysis;3=complete paralysis  FOR 5 AND 6 BELOW: 0=normal;1=drifts but maintains in air;2=unable to maintain in air;3=moves but unable to lift against gravity;4=no movement  (0_)0 (_)1 (_)2 (x)3 (_)4 (_)NA 5a. Motor arm-left  (0_)0 (_)1 (_)2 (_)3 (_)4 (_)NA 5b. Motor arm-right  (0_)0 (_)1 (x)2 (_)3 (_)4 (_)NA 6a. Motor leg-left  (0_)0 (_)1 (_)2 (_)3 (_)4 (_)NA 6ba. Motor leg-right  (0_) 7. Limb ataxia:0=absent;1=unilateral;2=bilateral; NA=unable to test  (1_) 8. Sensory: 0=normal;1=mild-moderate loss;2-severe or total loss  (0_) 9. Best language: 0=normal;1=mild-moderate aphasia, some deficits apparent but able to communicate;2=severe aphasia, fragmentary expression only, unable to communicate well;3=global aphasia, mute and no comprehension  (1_)10. Dysarthria: 0=normal;1=mild-moderate, slurs some words;2=severe, speech mostly unintelligible; NA=unable to test (e.g.,intubation)  (1_)11. Extinction/Inattention: 0=normal;1=visual,tactile,auditory or other extinction to bilateral simultaneous stimulation, but no severe neglect;2=answers neither      NIH Score: Complete  10    DATA:    Lab Results   Component Value Date    GLUCOSE 114 (H) 03/19/2024    CALCIUM 9.8 03/19/2024     03/19/2024    K 3.9 03/19/2024    CO2 24.4 03/19/2024     (H) 03/19/2024    BUN 23 03/19/2024     "CREATININE 0.93 03/19/2024    EGFRIFNONA 90 04/04/2020    BCR 24.7 03/19/2024    ANIONGAP 10.6 03/19/2024     Lab Results   Component Value Date    WBC 7.47 03/18/2024    HGB 11.9 (L) 03/18/2024    HCT 37.3 03/18/2024    MCV 94.9 03/18/2024     03/18/2024       Lab review: TSH normal, B12 392, urinalysis negative for UTI    Imaging review: I personally reviewed her prior imaging from January 2024, MRI showed large right MCA ischemic stroke.  CT angio head and neck showed right ICA occlusion.  CT head on this admission showed encephalomalacia in the right parietal region, no acute hypodensities..    Diagnoses:  -Frequent falls likely related to her disability from the stroke in January 2024, rule out more strokes.    Pre-stroke MRS: 4  NIHSS: 10    PLAN:   -Repeat MRI brain without contrast to rule out new strokes  -PT/OT evaluation  -Falls precautions  -Goal B12 more than 400 currently at 392 recommend replacement.  -Official swallow evaluation due to dysarthria and facial droop    Will follow-up on the MRI brain for further recommendations.    Discussed with Dr. Crabtree, patient and nursing staff.    This was an audio and video enabled telemedicine encounter.    \"Dictated utilizing Dragon dictation\".       Electronically signed by Liam Rebollar MD at 03/19/24 2749       "

## 2024-03-21 NOTE — PLAN OF CARE
Goal Outcome Evaluation:   VSS on RA, SR on the monitor. No indicators of acute distress noted. Pt turned q2hrs. Pt has not voided overnight, bladder scan showing 208 mL, pt denies feeling the urge to void. Lynne DO aware.

## 2024-03-21 NOTE — CASE MANAGEMENT/SOCIAL WORK
Discharge Planning Assessment   [facility]     Patient Name: Regine Beckham  MRN: [id number]  Today's Date: 3/21/2024    Admit Date: 3/17/2024    Plan: SS noted inpt rehab consult and notified Jeremy at ext [phone].  SS will follow.       Discharge Plan       Row Name 03/21/24 1217       Plan    Plan SS noted inpt rehab consult and notified Jeremy at ext [phone].  SS will follow.      Row Name 03/21/24 7011       Plan    Plan SS spoke with pt at bedside.  Pt requests inpt rehab consult if a candidate.  Pt stated she would be agreeable to short term nursing home placement if not a candidate for inpt rehab or if insurance denies.  SS notified Physician via Secure Chat.  SS will follow.      Row Name 03/21/24 9026                              PAULINO Dunne

## 2024-03-22 LAB
ANION GAP SERPL CALCULATED.3IONS-SCNC: 10.5 MMOL/L (ref 5–15)
BASOPHILS # BLD AUTO: 0.09 10*3/MM3 (ref 0–0.2)
BASOPHILS NFR BLD AUTO: 1.4 % (ref 0–1.5)
BUN SERPL-MCNC: 24 MG/DL (ref 8–23)
BUN/CREAT SERPL: 28.6 (ref 7–25)
CALCIUM SPEC-SCNC: 9.3 MG/DL (ref 8.6–10.5)
CHLORIDE SERPL-SCNC: 108 MMOL/L (ref 98–107)
CO2 SERPL-SCNC: 22.5 MMOL/L (ref 22–29)
CREAT SERPL-MCNC: 0.84 MG/DL (ref 0.57–1)
DEPRECATED RDW RBC AUTO: 47.1 FL (ref 37–54)
EGFRCR SERPLBLD CKD-EPI 2021: 75.3 ML/MIN/1.73
EOSINOPHIL # BLD AUTO: 0.37 10*3/MM3 (ref 0–0.4)
EOSINOPHIL NFR BLD AUTO: 5.6 % (ref 0.3–6.2)
ERYTHROCYTE [DISTWIDTH] IN BLOOD BY AUTOMATED COUNT: 13.3 % (ref 12.3–15.4)
GLUCOSE SERPL-MCNC: 112 MG/DL (ref 65–99)
HCT VFR BLD AUTO: 36 % (ref 34–46.6)
HGB BLD-MCNC: 11.6 G/DL (ref 12–15.9)
IMM GRANULOCYTES # BLD AUTO: 0.02 10*3/MM3 (ref 0–0.05)
IMM GRANULOCYTES NFR BLD AUTO: 0.3 % (ref 0–0.5)
LYMPHOCYTES # BLD AUTO: 2.26 10*3/MM3 (ref 0.7–3.1)
LYMPHOCYTES NFR BLD AUTO: 34.5 % (ref 19.6–45.3)
MCH RBC QN AUTO: 30.9 PG (ref 26.6–33)
MCHC RBC AUTO-ENTMCNC: 32.2 G/DL (ref 31.5–35.7)
MCV RBC AUTO: 96 FL (ref 79–97)
MONOCYTES # BLD AUTO: 0.57 10*3/MM3 (ref 0.1–0.9)
MONOCYTES NFR BLD AUTO: 8.7 % (ref 5–12)
NEUTROPHILS NFR BLD AUTO: 3.24 10*3/MM3 (ref 1.7–7)
NEUTROPHILS NFR BLD AUTO: 49.5 % (ref 42.7–76)
NRBC BLD AUTO-RTO: 0 /100 WBC (ref 0–0.2)
PLATELET # BLD AUTO: 219 10*3/MM3 (ref 140–450)
PMV BLD AUTO: 10 FL (ref 6–12)
POTASSIUM SERPL-SCNC: 3.2 MMOL/L (ref 3.5–5.2)
POTASSIUM SERPL-SCNC: 4.1 MMOL/L (ref 3.5–5.2)
RBC # BLD AUTO: 3.75 10*6/MM3 (ref 3.77–5.28)
SODIUM SERPL-SCNC: 141 MMOL/L (ref 136–145)
WBC NRBC COR # BLD AUTO: 6.55 10*3/MM3 (ref 3.4–10.8)

## 2024-03-22 PROCEDURE — 97530 THERAPEUTIC ACTIVITIES: CPT

## 2024-03-22 PROCEDURE — 97116 GAIT TRAINING THERAPY: CPT

## 2024-03-22 PROCEDURE — 84132 ASSAY OF SERUM POTASSIUM: CPT | Performed by: INTERNAL MEDICINE

## 2024-03-22 PROCEDURE — 85025 COMPLETE CBC W/AUTO DIFF WBC: CPT | Performed by: INTERNAL MEDICINE

## 2024-03-22 PROCEDURE — 80048 BASIC METABOLIC PNL TOTAL CA: CPT | Performed by: INTERNAL MEDICINE

## 2024-03-22 PROCEDURE — 25010000002 HEPARIN (PORCINE) PER 1000 UNITS: Performed by: HOSPITALIST

## 2024-03-22 PROCEDURE — 25010000002 AZTREONAM PER 500 MG: Performed by: INTERNAL MEDICINE

## 2024-03-22 PROCEDURE — 99232 SBSQ HOSP IP/OBS MODERATE 35: CPT | Performed by: INTERNAL MEDICINE

## 2024-03-22 PROCEDURE — 97110 THERAPEUTIC EXERCISES: CPT

## 2024-03-22 RX ORDER — POTASSIUM CHLORIDE 20 MEQ/1
40 TABLET, EXTENDED RELEASE ORAL EVERY 4 HOURS
Status: COMPLETED | OUTPATIENT
Start: 2024-03-22 | End: 2024-03-22

## 2024-03-22 RX ADMIN — Medication 10 ML: at 08:15

## 2024-03-22 RX ADMIN — HEPARIN SODIUM 5000 UNITS: 5000 INJECTION INTRAVENOUS; SUBCUTANEOUS at 08:15

## 2024-03-22 RX ADMIN — PANTOPRAZOLE SODIUM 40 MG: 40 TABLET, DELAYED RELEASE ORAL at 05:08

## 2024-03-22 RX ADMIN — AZTREONAM 1000 MG: 1 INJECTION, POWDER, LYOPHILIZED, FOR SOLUTION INTRAMUSCULAR; INTRAVENOUS at 10:39

## 2024-03-22 RX ADMIN — ATORVASTATIN CALCIUM 80 MG: 40 TABLET, FILM COATED ORAL at 20:05

## 2024-03-22 RX ADMIN — AZTREONAM 1000 MG: 1 INJECTION, POWDER, LYOPHILIZED, FOR SOLUTION INTRAMUSCULAR; INTRAVENOUS at 19:10

## 2024-03-22 RX ADMIN — Medication 10 ML: at 20:06

## 2024-03-22 RX ADMIN — TICAGRELOR 60 MG: 60 TABLET ORAL at 08:15

## 2024-03-22 RX ADMIN — OXYBUTYNIN CHLORIDE 10 MG: 5 TABLET, EXTENDED RELEASE ORAL at 08:15

## 2024-03-22 RX ADMIN — LOSARTAN POTASSIUM 50 MG: 50 TABLET, FILM COATED ORAL at 08:14

## 2024-03-22 RX ADMIN — ROPINIROLE HYDROCHLORIDE 4 MG: 1 TABLET, FILM COATED ORAL at 20:05

## 2024-03-22 RX ADMIN — MIRTAZAPINE 30 MG: 15 TABLET, FILM COATED ORAL at 20:05

## 2024-03-22 RX ADMIN — AZTREONAM 1000 MG: 1 INJECTION, POWDER, LYOPHILIZED, FOR SOLUTION INTRAMUSCULAR; INTRAVENOUS at 02:32

## 2024-03-22 RX ADMIN — HEPARIN SODIUM 5000 UNITS: 5000 INJECTION INTRAVENOUS; SUBCUTANEOUS at 20:04

## 2024-03-22 RX ADMIN — POTASSIUM CHLORIDE 40 MEQ: 1500 TABLET, EXTENDED RELEASE ORAL at 08:14

## 2024-03-22 RX ADMIN — POTASSIUM CHLORIDE 40 MEQ: 1500 TABLET, EXTENDED RELEASE ORAL at 05:08

## 2024-03-22 RX ADMIN — TICAGRELOR 60 MG: 60 TABLET ORAL at 20:05

## 2024-03-22 RX ADMIN — ASPIRIN 81 MG: 81 TABLET, CHEWABLE ORAL at 08:15

## 2024-03-22 RX ADMIN — ACETAMINOPHEN 650 MG: 325 TABLET ORAL at 20:04

## 2024-03-22 RX ADMIN — Medication 1 MG: at 08:15

## 2024-03-22 RX ADMIN — DOCUSATE SODIUM 50 MG AND SENNOSIDES 8.6 MG 2 TABLET: 8.6; 5 TABLET, FILM COATED ORAL at 08:15

## 2024-03-22 RX ADMIN — ZOLPIDEM TARTRATE 5 MG: 5 TABLET ORAL at 20:04

## 2024-03-22 NOTE — CASE MANAGEMENT/SOCIAL WORK
Discharge Planning Assessment   [facility]     Patient Name: Regine Beckham  MRN: [id number]  Today's Date: 3/22/2024    Admit Date: 3/17/2024    Plan: SS noted inpt rehab has began pre authorization with pt's insurance for possible admit.  SS will follow.       Discharge Plan       Row Name 03/22/24 0857       Plan    Plan SS noted inpt rehab has began pre authorization with pt's insurance for possible admit.  SS will follow.                  PAULINO Dunne

## 2024-03-22 NOTE — THERAPY TREATMENT NOTE
"Acute Care - Occupational Therapy Treatment Note   [facility]     Patient Name: Regine Beckham  : 1955  MRN: [id number]  Today's Date: 3/22/2024             Admit Date: 3/17/2024       ICD-10-CM ICD-9-CM   1. Left hip pain  M25.552 719.45   2. Acute pain of left shoulder  M25.512 719.41   3. Fall from standing, initial encounter  W19.XXXA E888.9   4. Failure to thrive in adult  R62.7 783.7     Patient Active Problem List   Diagnosis    Iron deficiency anemia due to chronic blood loss    Major depressive disorder    RLS (restless legs syndrome)    GERD (gastroesophageal reflux disease)    Left breast mass    Allergy to penicillin    Stroke    Grade I diastolic dysfunction    Moderate malnutrition    Falls frequently    Stroke-like symptoms     Past Medical History:   Diagnosis Date    Anemia     Anxiety     Arthritis     Depression     Legally blind     Restless leg syndrome     Sleep apnea     \"I don't use a cpap anymore since losing 106 lbs\"    Water retention      Past Surgical History:   Procedure Laterality Date    BACK SURGERY      CARDIAC CATHETERIZATION N/A 2024    Procedure: Percutaneous Manual Thrombectomy;  Surgeon: Efrain Hurley MD;  Location:  [facility];  Service: Interventional Radiology;  Laterality: N/A;    GALLBLADDER SURGERY      HIP ARTHROSCOPY      JOINT REPLACEMENT Right          OT ASSESSMENT FLOWSHEET (Last 12 Hours)       OT Evaluation and Treatment       Row Name 24 1202                   OT Time and Intention    Document Type therapy note (daily note)  -KR        Mode of Treatment occupational therapy  -KR        Patient Effort adequate  -KR        Symptoms Noted During/After Treatment fatigue  -KR           Motor Skills    Therapeutic Exercise shoulder;elbow/forearm;wrist;hand  -KR        Comments, Therapeutic Exercise LUE neuromuscular re-education consisting of PROM, AAROM from supine. Pt presents with 2/5 AROM LUE with noted pain response to " any ROM L wrist/hand. Pt encouraged to position L hand in ext/open as tolerated at rest. OT to continue as tolerated.  -KR           Shoulder (Therapeutic Exercise)    Shoulder (Therapeutic Exercise) AAROM (active assistive range of motion);PROM (passive range of motion)  -KR        Shoulder AAROM (Therapeutic Exercise) left  -KR           Elbow/Forearm (Therapeutic Exercise)    Elbow/Forearm (Therapeutic Exercise) AAROM (active assistive range of motion);PROM (passive range of motion)  -KR        Elbow/Forearm AAROM (Therapeutic Exercise) left  -KR           Wrist (Therapeutic Exercise)    Wrist (Therapeutic Exercise) PROM (passive range of motion)  -KR        Wrist PROM (Therapeutic Exercise) left  -KR           Hand (Therapeutic Exercise)    Hand (Therapeutic Exercise) PROM (passive range of motion)  -KR        Hand PROM (Therapeutic Exercise) left  -KR           Plan of Care Review    Plan of Care Reviewed With patient  -KR           Progress Summary (OT)    Progress Toward Functional Goals (OT) progress toward functional goals as expected  -KR                  User Key  (r) = Recorded By, (t) = Taken By, (c) = Cosigned By      Initials Name Effective Dates    Grayson Velez OT 06/16/21 -                            OT Recommendation and Plan     Progress Toward Functional Goals (OT): progress toward functional goals as expected  Plan of Care Review  Plan of Care Reviewed With: patient  Plan of Care Reviewed With: patient        Time Calculation:     Therapy Charges for Today       Code Description Service Date Service Provider Modifiers Qty    93470651326  OT THERAPEUTIC ACT EA 15 MIN 3/22/2024 Grayson Edwards OT GO 1                 Grayson Edwards OT  3/22/2024

## 2024-03-22 NOTE — PLAN OF CARE
Goal Outcome Evaluation: Patient has been pleasant. Compliant with care. Patient remains on room air, saturations remaining above 90%. Patient resting in bed call light in reach.

## 2024-03-22 NOTE — THERAPY TREATMENT NOTE
"Acute Care - Physical Therapy Treatment Note   [facility]     Patient Name: Regine Beckham  : 1955  MRN: [id number]  Today's Date: 3/22/2024      Visit Dx:     ICD-10-CM ICD-9-CM   1. Left hip pain  M25.552 719.45   2. Acute pain of left shoulder  M25.512 719.41   3. Fall from standing, initial encounter  W19.XXXA E888.9   4. Failure to thrive in adult  R62.7 783.7     Patient Active Problem List   Diagnosis    Iron deficiency anemia due to chronic blood loss    Major depressive disorder    RLS (restless legs syndrome)    GERD (gastroesophageal reflux disease)    Left breast mass    Allergy to penicillin    Stroke    Grade I diastolic dysfunction    Moderate malnutrition    Falls frequently    Stroke-like symptoms     Past Medical History:   Diagnosis Date    Anemia     Anxiety     Arthritis     Depression     Legally blind     Restless leg syndrome     Sleep apnea     \"I don't use a cpap anymore since losing 106 lbs\"    Water retention      Past Surgical History:   Procedure Laterality Date    BACK SURGERY      CARDIAC CATHETERIZATION N/A 2024    Procedure: Percutaneous Manual Thrombectomy;  Surgeon: Efrain Hurley MD;  Location:  [facility];  Service: Interventional Radiology;  Laterality: N/A;    GALLBLADDER SURGERY      HIP ARTHROSCOPY      JOINT REPLACEMENT Right      PT Assessment (Last 12 Hours)       PT Evaluation and Treatment       Row Name 24 1032          Physical Therapy Time and Intention    Document Type therapy note (daily note)  -KM     Mode of Treatment individual therapy;physical therapy  -KM     Patient Effort good  -KM     Symptoms Noted During/After Treatment fatigue;other (see comments)  poor standing balance  -KM       Row Name 24 1032          General Information    Patient Profile Reviewed yes  -KM     Patient Observations alert;cooperative;agree to therapy  -KM     Existing Precautions/Restrictions fall  -KM       Row Name 24 1032       "    Cognition    Affect/Mental Status (Cognition) WFL  -KM     Follows Commands (Cognition) WFL  -KM       Row Name 03/22/24 1032          Bed Mobility    Bed Mobility supine-sit  -KM     Supine-Sit [place] (Bed Mobility) minimum assist (75% patient effort);moderate assist (50% patient effort);verbal cues  -KM     Bed Mobility, Safety Issues decreased use of arms for pushing/pulling  -KM     Assistive Device (Bed Mobility) bed rails;head of bed elevated  -KM       Row Name 03/22/24 1032          Transfers    Transfers sit-stand transfer;stand-sit transfer;bed-chair transfer  -KM       Row Name 03/22/24 1032          Bed-Chair Transfer    Bed-Chair [place] (Transfers) minimum assist (75% patient effort);moderate assist (50% patient effort);verbal cues  -KM     Assistive Device (Bed-Chair Transfers) --  A  -KM       Row Name 03/22/24 1032          Sit-Stand Transfer    Sit-Stand [place] (Transfers) minimum assist (75% patient effort);verbal cues  -     Assistive Device (Sit-Stand Transfers) --  A  -KM     Comment, (Sit-Stand Transfer) x5  -KM       Row Name 03/22/24 1032          Stand-Sit Transfer    Stand-Sit [place] (Transfers) minimum assist (75% patient effort)  -KM     Assistive Device (Stand-Sit Transfers) --  A  -KM     Comment, (Stand-Sit Transfer) x5  -KM       Row Name 03/22/24 1032          Gait/Stairs (Locomotion)    Gait/Stairs Locomotion gait/ambulation independence;gait/ambulation assistive device;distance ambulated  -KM     [place] Level (Gait) minimum assist (75% patient effort);moderate assist (50% patient effort);verbal cues  -KM     Assistive Device (Gait) --  A  -KM     Patient was able to Ambulate yes  -KM     Distance in Feet (Gait) 3  -KM     Pattern (Gait) step-to  -KM     Deviations/Abnormal Patterns (Gait) ataxic;base of support, narrow;gait speed decreased  -KM     Left Sided Gait Deviations weight shift ability decreased  -KM       Row Name 03/22/24 1032           Safety Issues, Functional Mobility    Impairments Affecting Function (Mobility) balance;coordination;endurance/activity tolerance;muscle tone abnormal;strength  -KM       Row Name 03/22/24 1032          Balance    Balance Assessment sitting static balance  -KM     Static Sitting Balance standby assist  -KM     Position, Sitting Balance sitting edge of bed;unsupported;sitting in chair  -KM     Static Standing Balance minimal assist;moderate assist;verbal cues  -KM     Comment, Balance posterior lean w/ standing  -KM       Row Name 03/22/24 1032          Motor Skills    Comments, Therapeutic Exercise EOB ther-ex  -KM     Additional Documentation Comments, Therapeutic Exercise (Row)  -KM       Row Name 03/22/24 1032          Progress Summary (PT)    Daily Progress Summary (PT) Pt. was able to perform bed mobility w/ Angela-modA. She was able to perform transfers w/ Angela-modA. She demonstrated decreased balance w/ standing compared to prior sessions. Pt. would continue to benefit from skilled PT services.  -KM               User Key  (r) = Recorded By, (t) = Taken By, (c) = Cosigned By      Initials Name Provider Type    Eugene Angel, PT Physical Therapist                    Physical Therapy Education       Title: PT OT SLP Therapies (In Progress)       Topic: Physical Therapy (In Progress)       Point: Mobility training (In Progress)       Learning Progress Summary             Patient Acceptance, E,TB, NR by PIETRO at 3/19/2024 1521    Acceptance, E,TB, VU by IRENE at 3/18/2024 1547                         Point: Home exercise program (In Progress)       Learning Progress Summary             Patient Acceptance, E,TB, NR by PIETRO at 3/19/2024 1521    Acceptance, E,TB, VU by IRENE at 3/18/2024 1547                         Point: Body mechanics (In Progress)       Learning Progress Summary             Patient Acceptance, E,TB, NR by PIETRO at 3/19/2024 1521    Acceptance, E,TB, VU by IRENE at 3/18/2024 1547                          Point: Precautions (In Progress)       Learning Progress Summary             Patient Acceptance, E,TB, NR by PIETRO at 3/19/2024 1521    Acceptance, E,TB, VU by IRENE at 3/18/2024 1547                                         User Key       Initials Effective Dates Name Provider Type Discipline    PIETRO 06/06/23 -  Ivy Gordillo RN Registered Nurse Nurse    IRENE 05/24/22 -  Eugene Cuevas, PT Physical Therapist PT                  PT Recommendation and Plan  Anticipated Discharge Disposition (PT): inpatient rehabilitation facility, skilled nursing facility (further physical rehab needed prior to return home by herself)  Planned Therapy Interventions (PT): balance training, bed mobility training, gait training, home exercise program, patient/family education, postural re-education, ROM (range of motion), stair training, strengthening, stretching, transfer training  Therapy Frequency (PT): 2 times/wk (2-5x/wk)  Progress Summary (PT)  Daily Progress Summary (PT): Pt. was able to perform bed mobility w/ Angela-modA. She was able to perform transfers w/ Angela-modA. She demonstrated decreased balance w/ standing compared to prior sessions. Pt. would continue to benefit from skilled PT services.  Plan of Care Reviewed With: patient  Outcome Evaluation: Pt. evaluation completed during PT session. She was able to perform bed mobility w/ modA. She was able to perform transfers w/ Angela-modA. She was unable to ambulate d/t weakness. Pt. would benefit from skilled PT services.       Time Calculation:    PT Charges       Row Name 03/22/24 1032             Time Calculation    PT Received On 03/22/24  -         Time Calculation- PT    Total Timed Code Minutes- PT 40 minute(s)  -IRENE                User Key  (r) = Recorded By, (t) = Taken By, (c) = Cosigned By      Initials Name Provider Type    Eugene Angel, PT Physical Therapist                  Therapy Charges for Today       Code Description Service Date Service Provider Modifiers Qty     12823557101  PT THERAPEUTIC ACT EA 15 MIN 3/22/2024 Eugene Cuevas, PT GP 1    65474168890 HC PT THER PROC EA 15 MIN 3/22/2024 Eugene Cuevas, PT GP 1    05819428494 HC GAIT TRAINING EA 15 MIN 3/22/2024 Eugene Cuevas, PT GP 1            PT G-Codes  AM-PAC 6 Clicks Score (PT): 14    Eugene Cuevas, PT  3/22/2024

## 2024-03-22 NOTE — PLAN OF CARE
Goal Outcome Evaluation:   VSS on RA, SR on the monitor. No indicators of acute distress noted. Pt turned q2hrs.

## 2024-03-23 LAB
ANION GAP SERPL CALCULATED.3IONS-SCNC: 9.9 MMOL/L (ref 5–15)
BACTERIA SPEC AEROBE CULT: ABNORMAL
BUN SERPL-MCNC: 19 MG/DL (ref 8–23)
BUN/CREAT SERPL: 24.7 (ref 7–25)
CALCIUM SPEC-SCNC: 9.6 MG/DL (ref 8.6–10.5)
CHLORIDE SERPL-SCNC: 108 MMOL/L (ref 98–107)
CO2 SERPL-SCNC: 21.1 MMOL/L (ref 22–29)
CREAT SERPL-MCNC: 0.77 MG/DL (ref 0.57–1)
EGFRCR SERPLBLD CKD-EPI 2021: 83.6 ML/MIN/1.73
GLUCOSE SERPL-MCNC: 107 MG/DL (ref 65–99)
MAGNESIUM SERPL-MCNC: 1.9 MG/DL (ref 1.6–2.4)
POTASSIUM SERPL-SCNC: 3.9 MMOL/L (ref 3.5–5.2)
SODIUM SERPL-SCNC: 139 MMOL/L (ref 136–145)

## 2024-03-23 PROCEDURE — 25010000002 AZTREONAM PER 500 MG: Performed by: INTERNAL MEDICINE

## 2024-03-23 PROCEDURE — 80048 BASIC METABOLIC PNL TOTAL CA: CPT | Performed by: INTERNAL MEDICINE

## 2024-03-23 PROCEDURE — 83735 ASSAY OF MAGNESIUM: CPT | Performed by: INTERNAL MEDICINE

## 2024-03-23 PROCEDURE — 99231 SBSQ HOSP IP/OBS SF/LOW 25: CPT | Performed by: INTERNAL MEDICINE

## 2024-03-23 PROCEDURE — 25010000002 HEPARIN (PORCINE) PER 1000 UNITS: Performed by: HOSPITALIST

## 2024-03-23 RX ORDER — ACETAMINOPHEN 500 MG
1000 TABLET ORAL EVERY 6 HOURS PRN
Status: DISCONTINUED | OUTPATIENT
Start: 2024-03-23 | End: 2024-03-27 | Stop reason: HOSPADM

## 2024-03-23 RX ADMIN — Medication 10 ML: at 08:16

## 2024-03-23 RX ADMIN — HEPARIN SODIUM 5000 UNITS: 5000 INJECTION INTRAVENOUS; SUBCUTANEOUS at 20:03

## 2024-03-23 RX ADMIN — MIRTAZAPINE 30 MG: 15 TABLET, FILM COATED ORAL at 20:03

## 2024-03-23 RX ADMIN — AZTREONAM 1000 MG: 1 INJECTION, POWDER, LYOPHILIZED, FOR SOLUTION INTRAMUSCULAR; INTRAVENOUS at 19:00

## 2024-03-23 RX ADMIN — LOSARTAN POTASSIUM 50 MG: 50 TABLET, FILM COATED ORAL at 08:16

## 2024-03-23 RX ADMIN — TICAGRELOR 60 MG: 60 TABLET ORAL at 08:16

## 2024-03-23 RX ADMIN — AZTREONAM 1000 MG: 1 INJECTION, POWDER, LYOPHILIZED, FOR SOLUTION INTRAMUSCULAR; INTRAVENOUS at 10:53

## 2024-03-23 RX ADMIN — ASPIRIN 81 MG: 81 TABLET, CHEWABLE ORAL at 08:16

## 2024-03-23 RX ADMIN — AZTREONAM 1000 MG: 1 INJECTION, POWDER, LYOPHILIZED, FOR SOLUTION INTRAMUSCULAR; INTRAVENOUS at 02:32

## 2024-03-23 RX ADMIN — Medication 10 ML: at 20:05

## 2024-03-23 RX ADMIN — TICAGRELOR 60 MG: 60 TABLET ORAL at 20:04

## 2024-03-23 RX ADMIN — PANTOPRAZOLE SODIUM 40 MG: 40 TABLET, DELAYED RELEASE ORAL at 05:12

## 2024-03-23 RX ADMIN — ATORVASTATIN CALCIUM 80 MG: 40 TABLET, FILM COATED ORAL at 20:03

## 2024-03-23 RX ADMIN — OXYBUTYNIN CHLORIDE 10 MG: 5 TABLET, EXTENDED RELEASE ORAL at 08:16

## 2024-03-23 RX ADMIN — Medication 1 MG: at 08:16

## 2024-03-23 RX ADMIN — HEPARIN SODIUM 5000 UNITS: 5000 INJECTION INTRAVENOUS; SUBCUTANEOUS at 08:16

## 2024-03-23 RX ADMIN — ROPINIROLE HYDROCHLORIDE 4 MG: 1 TABLET, FILM COATED ORAL at 20:03

## 2024-03-23 NOTE — PLAN OF CARE
Goal Outcome Evaluation: Patient has been pleasant. Compliant with care. Patient remains on room air, saturations remains above 90%. Patient resting in bed. Call light in reach.

## 2024-03-23 NOTE — PROGRESS NOTES
[facility] HOSPITALIST PROGRESS NOTE     Patient Identification:  Name:  Regine Beckham  Age:  68 y.o.  Sex:  female  :  1955  MRN:  [id number]  Visit Number:  [id number]  ROOM: [address] [place] [address]     Primary Care Provider:  Dread Burton MD    Length of stay in inpatient status:  3    Subjective     Chief Compliant:    Chief Complaint   Patient presents with    Fall       History of Presenting Illness:    Patient denies any new complaints. She continues to report working with PT/OT. She was complaining of chronic leg pain that is improved with tylenol.       ROS:  Otherwise 10 point ROS negative other than documented above in HPI.     Objective     Current Hospital Meds:aspirin, 81 mg, Oral, Daily  atorvastatin, 80 mg, Oral, Nightly  aztreonam, 1,000 mg, Intravenous, Q8H  folic acid, 1 mg, Oral, Daily  heparin (porcine), 5,000 Units, Subcutaneous, Q12H  losartan, 50 mg, Oral, Daily  mirtazapine, 30 mg, Oral, Nightly  oxybutynin XL, 10 mg, Oral, Daily  pantoprazole, 40 mg, Oral, Q AM  rOPINIRole, 4 mg, Oral, Nightly  senna-docusate sodium, 2 tablet, Oral, BID  sodium chloride, 10 mL, Intravenous, Q12H  ticagrelor, 60 mg, Oral, BID         Current Antimicrobial Therapy:  Anti-Infectives (From admission, onward)      Ordered     Dose/Rate Route Frequency Start Stop    24 1743  aztreonam (AZACTAM) 1,000 mg in sodium chloride 0.9 % 100 mL IVPB-VTB        Ordering Provider: Ok Marin MD    1,000 mg  over 4 Hours Intravenous Every 8 Hours 24 0300 24 0259    24 1743  aztreonam (AZACTAM) 1,000 mg in sodium chloride 0.9 % 100 mL IVPB-VTB        Ordering Provider: Ok Marin MD    1,000 mg  200 mL/hr over 30 Minutes Intravenous Once 24 1830 24          Current Diuretic Therapy:  Diuretics (From admission, onward)      None          ----------------------------------------------------------------------------------------------------------------------  Vital  Signs:  Temp:  [97.8 °F (36.6 °C)-98.4 °F (36.9 °C)] 98 °F (36.7 °C)  Heart Rate:  [61-96] 61  Resp:  [18-22] 18  BP: (133-161)/(72-88) 138/84  SpO2:  [96 %-98 %] 96 %  on   ;   Device (Oxygen Therapy): room air  Body mass index is 22.66 kg/m².    Wt Readings from Last 3 Encounters:   03/23/24 63.7 kg (140 lb 6.4 oz)   02/13/24 72.1 kg (159 lb)   02/04/24 72.1 kg (159 lb)     Intake & Output (last 3 days)         03/21 0701 03/22 0700 03/22 0701 03/23 0700 03/23 0701 03/24 0700    P.O. 600 240 480    Total Intake(mL/kg) 600 (9.6) 240 (3.8) 480 (7.5)    Urine (mL/kg/hr) 700 (0.5) 1050 (0.7) 800 (1)    Stool  0 0    Total Output 700 1050 800    Net -100 -810 -320           Urine Unmeasured Occurrence 2 x 2 x     Stool Unmeasured Occurrence  1 x 1 x          Diet: Cardiac; Healthy Heart (2-3 Na+); Texture: Soft to Chew (NDD 3); Soft to Chew: Chopped Meat; Fluid Consistency: Thin (IDDSI 0)  ----------------------------------------------------------------------------------------------------------------------  Physical exam:  Constitutional:  Well-developed and well-nourished.  No respiratory distress.      HENT:  Head:  Normocephalic and atraumatic.  Mouth:  Moist mucous membranes.    Eyes:  Conjunctivae and EOM are normal. No scleral icterus.    Neck:  Neck supple.  No JVD present.    Cardiovascular:  Normal rate, regular rhythm and normal heart sounds with no murmur.  Pulmonary/Chest:  No respiratory distress, no wheezes, no crackles, with normal breath sounds and good air movement.  Abdominal:  Soft.  Bowel sounds are normal.  No distension and no tenderness.   Musculoskeletal:  No edema, no tenderness, and no deformity.  No red or swollen joints anywhere.    Neurological:  Alert and oriented to person, place, and time.  No cranial nerve deficit.  Chronic left sided weakness.   Skin:  Skin is warm and dry. No rash noted. No pallor.   Peripheral vascular:  Pulses in all 4 extremities with no clubbing, no cyanosis,  "no edema.  ----------------------------------------------------------------------------------------------------------------------  Tele:    ----------------------------------------------------------------------------------------------------------------------  Results from last 7 days   Lab Units 03/22/24 0249 03/18/24 0058 03/17/24  1303   WBC 10*3/mm3 6.55 7.47 8.57   HEMOGLOBIN g/dL 11.6* 11.9* 12.2   HEMATOCRIT % 36.0 37.3 38.1   MCV fL 96.0 94.9 94.8   MCHC g/dL 32.2 31.9 32.0   PLATELETS 10*3/mm3 219 324 307     Results from last 7 days   Lab Units 03/18/24  2231   PH, ARTERIAL pH units 7.455*   PO2 ART mm Hg 78.3*   PCO2, ARTERIAL mm Hg 37.4   HCO3 ART mmol/L 26.3*     Results from last 7 days   Lab Units 03/23/24  0019 03/22/24  1433 03/22/24  0249 03/19/24  0218 03/18/24  1506 03/18/24  0058 03/17/24  1303   SODIUM mmol/L 139  --  141 145  --  143 145   POTASSIUM mmol/L 3.9 4.1 3.2* 3.9   < > 3.1* 4.3   MAGNESIUM mg/dL 1.9  --   --  1.9  --  1.8  --    CHLORIDE mmol/L 108*  --  108* 110*  --  105 104   CO2 mmol/L 21.1*  --  22.5 24.4  --  23.5 29.7*   BUN mg/dL 19  --  24* 23  --  20 20   CREATININE mg/dL 0.77  --  0.84 0.93  --  0.77 0.79   CALCIUM mg/dL 9.6  --  9.3 9.8  --  9.6 10.0   GLUCOSE mg/dL 107*  --  112* 114*  --  102* 116*   ALBUMIN g/dL  --   --   --   --   --  3.5 3.8   BILIRUBIN mg/dL  --   --   --   --   --  0.8 0.6   ALK PHOS U/L  --   --   --   --   --  95 97   AST (SGOT) U/L  --   --   --   --   --  19 18   ALT (SGPT) U/L  --   --   --   --   --  13 12    < > = values in this interval not displayed.   Estimated Creatinine Clearance: 69.3 mL/min (by C-G formula based on SCr of 0.77 mg/dL).  No results found for: \"AMMONIA\"              No results found for: \"HGBA1C\", \"POCGLU\"  Lab Results   Component Value Date    TSH 2.880 03/17/2024     No results found for: \"PREGTESTUR\", \"PREGSERUM\", \"HCG\", \"HCGQUANT\"  Pain Management Panel          Latest Ref Rng & Units 12/20/2023   Pain Management " "Panel   Amphetamine, Urine Qual Negative Negative    Barbiturates Screen, Urine Negative Negative    Benzodiazepine Screen, Urine Negative Negative    Buprenorphine, Screen, Urine Negative Negative    Cocaine Screen, Urine Negative Negative    Fentanyl, Urine Negative Negative    Methadone Screen , Urine Negative Negative    Methamphetamine, Ur Negative Negative      Brief Urine Lab Results  (Last result in the past 365 days)        Color   Clarity   Blood   Leuk Est   Nitrite   Protein   CREAT   Urine HCG        03/21/24 1613 Dark Yellow   Turbid   Trace   Moderate (2+)   Positive   30 mg/dL (1+)                 No results found for: \"BLOODCX\"  Urine Culture   Date Value Ref Range Status   03/21/2024 >100,000 CFU/mL Escherichia coli (A)  Final     No results found for: \"WOUNDCX\"  No results found for: \"STOOLCX\"  No results found for: \"RESPCX\"  No results found for: \"AFBCX\"        I have personally looked at the labs and they are summarized above.  ----------------------------------------------------------------------------------------------------------------------  Detailed radiology reports for the last 24 hours:    Imaging Results (Last 24 Hours)       ** No results found for the last 24 hours. **          Assessment & Plan    -Frequent falls  -Hypokalemia  -UTI  -Folate deficency   -History of CVA right MCA with left-sided weakness  -History of right internal carotid artery disease with occlusion status post thrombectomy and stent placement   -History of iron deficiency anemia   -History of dysphagia as a complication of CVA   -History of restless leg syndrome   -History of heart failure with preserved ejection fraction   -History of hyperlipidemia  -Severe arthritis left hip  -History of right hip arthroplasty from osteoarthritis  -History of tobacco use patient quit January of this year after the stroke.     PT/OT/SW/SLP all following. Potassium replaced and mag level ordered. Folate replaced.      AMS has " resolved. Patient still weak and repeat CT head showed encephalomalacia of right parietal lobe. I was not sure if this was normal evolution of stroke and consulted neurology who ordered MRI which was unrevealing with expected findings.      Symptoms and pyuria likely representing UTI. Culture pending. Penicillin allergy. Ordered aztreonam. Day 2/3.      Code status :Full      Dispo: SW consulted. IPR consulted given recent stroke. PT/OT following.        VTE Prophylaxis:   Mechanical Order History:       None          Pharmalogical Order History:        Ordered     Dose Route Frequency Stop    03/17/24 1611  heparin (porcine) 5000 UNIT/ML injection 5,000 Units         5,000 Units SC Every 12 Hours Scheduled --                      Ok Marin MD  [facility]  03/23/24  19:48 EDT

## 2024-03-23 NOTE — PROGRESS NOTES
[facility] HOSPITALIST PROGRESS NOTE     Patient Identification:  Name:  Regine Beckham  Age:  68 y.o.  Sex:  female  :  1955  MRN:  [id number]  Visit Number:  [id number]  ROOM: [address] [place] [address]     Primary Care Provider:  Dread Burton MD    Length of stay in inpatient status:  2    Subjective     Chief Compliant:    Chief Complaint   Patient presents with    Fall       History of Presenting Illness:    Patient denies any new complaints. She continues to report working with PT/OT tyring to get stronger.     ROS:  Otherwise 10 point ROS negative other than documented above in HPI.     Objective     Current Hospital Meds:aspirin, 81 mg, Oral, Daily  atorvastatin, 80 mg, Oral, Nightly  aztreonam, 1,000 mg, Intravenous, Q8H  folic acid, 1 mg, Oral, Daily  heparin (porcine), 5,000 Units, Subcutaneous, Q12H  losartan, 50 mg, Oral, Daily  mirtazapine, 30 mg, Oral, Nightly  oxybutynin XL, 10 mg, Oral, Daily  pantoprazole, 40 mg, Oral, Q AM  rOPINIRole, 4 mg, Oral, Nightly  senna-docusate sodium, 2 tablet, Oral, BID  sodium chloride, 10 mL, Intravenous, Q12H  ticagrelor, 60 mg, Oral, BID         Current Antimicrobial Therapy:  Anti-Infectives (From admission, onward)      Ordered     Dose/Rate Route Frequency Start Stop    24  aztreonam (AZACTAM) 1,000 mg in sodium chloride 0.9 % 100 mL IVPB-VTB        Ordering Provider: Ok Marin MD    1,000 mg  over 4 Hours Intravenous Every 8 Hours 24 0300 24 0259    24 1743  aztreonam (AZACTAM) 1,000 mg in sodium chloride 0.9 % 100 mL IVPB-VTB        Ordering Provider: Ok Marin MD    1,000 mg  200 mL/hr over 30 Minutes Intravenous Once 24 1830 24          Current Diuretic Therapy:  Diuretics (From admission, onward)      None          ----------------------------------------------------------------------------------------------------------------------  Vital Signs:  Temp:  [97.8 °F (36.6 °C)-98.5 °F (36.9  °C)] 98.4 °F (36.9 °C)  Heart Rate:  [] 70  Resp:  [20-22] 20  BP: (114-159)/(70-97) 150/70  SpO2:  [96 %-97 %] 97 %  on   ;   Device (Oxygen Therapy): room air  Body mass index is 22.34 kg/m².    Wt Readings from Last 3 Encounters:   03/22/24 62.8 kg (138 lb 6.4 oz)   02/13/24 72.1 kg (159 lb)   02/04/24 72.1 kg (159 lb)     Intake & Output (last 3 days)         03/20 0701  03/21 0700 03/21 0701  03/22 0700 03/22 0701  03/23 0700    P.O. 1200 600 240    Total Intake(mL/kg) 1200 (19.4) 600 (9.6) 240 (3.8)    Urine (mL/kg/hr) 1050 (0.7) 700 (0.5) 500 (0.5)    Stool   0    Total Output 1050 700 500    Net +150 -100 -260           Urine Unmeasured Occurrence 2 x 2 x 1 x    Stool Unmeasured Occurrence   1 x          Diet: Cardiac; Healthy Heart (2-3 Na+); Texture: Soft to Chew (NDD 3); Soft to Chew: Chopped Meat; Fluid Consistency: Thin (IDDSI 0)  ----------------------------------------------------------------------------------------------------------------------  Physical exam:  Constitutional:  Well-developed and well-nourished.  No respiratory distress.      HENT:  Head:  Normocephalic and atraumatic.  Mouth:  Moist mucous membranes.    Eyes:  Conjunctivae and EOM are normal. No scleral icterus.    Neck:  Neck supple.  No JVD present.    Cardiovascular:  Normal rate, regular rhythm and normal heart sounds with no murmur.  Pulmonary/Chest:  No respiratory distress, no wheezes, no crackles, with normal breath sounds and good air movement.  Abdominal:  Soft.  Bowel sounds are normal.  No distension and no tenderness.   Musculoskeletal:  No edema, no tenderness, and no deformity.  No red or swollen joints anywhere.    Neurological:  Alert and oriented. Chronic left weakness.   Skin:  Skin is warm and dry. No rash noted. No pallor.   Peripheral vascular:  Pulses in all 4 extremities with no clubbing, no cyanosis, no  "edema.  ----------------------------------------------------------------------------------------------------------------------  Tele:    ----------------------------------------------------------------------------------------------------------------------  Results from last 7 days   Lab Units 03/22/24 0249 03/18/24 0058 03/17/24  1303   WBC 10*3/mm3 6.55 7.47 8.57   HEMOGLOBIN g/dL 11.6* 11.9* 12.2   HEMATOCRIT % 36.0 37.3 38.1   MCV fL 96.0 94.9 94.8   MCHC g/dL 32.2 31.9 32.0   PLATELETS 10*3/mm3 219 324 307     Results from last 7 days   Lab Units 03/18/24  2231   PH, ARTERIAL pH units 7.455*   PO2 ART mm Hg 78.3*   PCO2, ARTERIAL mm Hg 37.4   HCO3 ART mmol/L 26.3*     Results from last 7 days   Lab Units 03/22/24  1433 03/22/24  0249 03/19/24  0218 03/18/24  1506 03/18/24  0058 03/17/24  1303   SODIUM mmol/L  --  141 145  --  143 145   POTASSIUM mmol/L 4.1 3.2* 3.9   < > 3.1* 4.3   MAGNESIUM mg/dL  --   --  1.9  --  1.8  --    CHLORIDE mmol/L  --  108* 110*  --  105 104   CO2 mmol/L  --  22.5 24.4  --  23.5 29.7*   BUN mg/dL  --  24* 23  --  20 20   CREATININE mg/dL  --  0.84 0.93  --  0.77 0.79   CALCIUM mg/dL  --  9.3 9.8  --  9.6 10.0   GLUCOSE mg/dL  --  112* 114*  --  102* 116*   ALBUMIN g/dL  --   --   --   --  3.5 3.8   BILIRUBIN mg/dL  --   --   --   --  0.8 0.6   ALK PHOS U/L  --   --   --   --  95 97   AST (SGOT) U/L  --   --   --   --  19 18   ALT (SGPT) U/L  --   --   --   --  13 12    < > = values in this interval not displayed.   Estimated Creatinine Clearance: 62.7 mL/min (by C-G formula based on SCr of 0.84 mg/dL).  No results found for: \"AMMONIA\"              No results found for: \"HGBA1C\", \"POCGLU\"  Lab Results   Component Value Date    TSH 2.880 03/17/2024     No results found for: \"PREGTESTUR\", \"PREGSERUM\", \"HCG\", \"HCGQUANT\"  Pain Management Panel          Latest Ref Rng & Units 12/20/2023   Pain Management Panel   Amphetamine, Urine Qual Negative Negative    Barbiturates Screen, Urine " "Negative Negative    Benzodiazepine Screen, Urine Negative Negative    Buprenorphine, Screen, Urine Negative Negative    Cocaine Screen, Urine Negative Negative    Fentanyl, Urine Negative Negative    Methadone Screen , Urine Negative Negative    Methamphetamine, Ur Negative Negative      Brief Urine Lab Results  (Last result in the past 365 days)        Color   Clarity   Blood   Leuk Est   Nitrite   Protein   CREAT   Urine HCG        03/21/24 1613 Dark Yellow   Turbid   Trace   Moderate (2+)   Positive   30 mg/dL (1+)                 No results found for: \"BLOODCX\"  Urine Culture   Date Value Ref Range Status   03/21/2024 >100,000 CFU/mL Gram Negative Bacilli (A)  Preliminary     No results found for: \"WOUNDCX\"  No results found for: \"STOOLCX\"  No results found for: \"RESPCX\"  No results found for: \"AFBCX\"        I have personally looked at the labs and they are summarized above.  ----------------------------------------------------------------------------------------------------------------------  Detailed radiology reports for the last 24 hours:    Imaging Results (Last 24 Hours)       ** No results found for the last 24 hours. **          Assessment & Plan    -Frequent falls  -Hypokalemia  -UTI  -Folate deficency   -History of CVA right MCA with left-sided weakness  -History of right internal carotid artery disease with occlusion status post thrombectomy and stent placement   -History of iron deficiency anemia   -History of dysphagia as a complication of CVA   -History of restless leg syndrome   -History of heart failure with preserved ejection fraction   -History of hyperlipidemia  -Severe arthritis left hip  -History of right hip arthroplasty from osteoarthritis  -History of tobacco use patient quit January of this year after the stroke.     PT/OT/SW/SLP all following. Potassium replaced and mag level ordered. Folate replaced.      AMS has resolved. Patient still weak and repeat CT head showed encephalomalacia " of right parietal lobe. I was not sure if this was normal evolution of stroke and consulted neurology who has ordered MRI which was unrevealing.      Symptoms and pyuria likely representing UTI. Culture pending. Penicillin allergy. Ordered aztreonam for now.      Code status :Full      Dispo: SW consulted. IPR consulted given recent stroke.        VTE Prophylaxis:   Mechanical Order History:       None          Pharmalogical Order History:        Ordered     Dose Route Frequency Stop    03/17/24 1611  heparin (porcine) 5000 UNIT/ML injection 5,000 Units         5,000 Units SC Every 12 Hours Scheduled --                    Ok Marin MD  [facility] Hospitalist  03/22/24  22:04 EDT

## 2024-03-23 NOTE — PLAN OF CARE
Goal Outcome Evaluation: Patient has been pleasant confused this shift, currently resting in bed on RA, saturations maintaining above 90%. No acute s/s of distress at this time. VSS. Will continue plan of care.

## 2024-03-24 PROCEDURE — 99231 SBSQ HOSP IP/OBS SF/LOW 25: CPT | Performed by: INTERNAL MEDICINE

## 2024-03-24 PROCEDURE — 25010000002 AZTREONAM PER 500 MG: Performed by: INTERNAL MEDICINE

## 2024-03-24 PROCEDURE — 25010000002 HEPARIN (PORCINE) PER 1000 UNITS: Performed by: HOSPITALIST

## 2024-03-24 RX ADMIN — MIRTAZAPINE 30 MG: 15 TABLET, FILM COATED ORAL at 20:18

## 2024-03-24 RX ADMIN — ROPINIROLE HYDROCHLORIDE 4 MG: 1 TABLET, FILM COATED ORAL at 20:18

## 2024-03-24 RX ADMIN — HEPARIN SODIUM 5000 UNITS: 5000 INJECTION INTRAVENOUS; SUBCUTANEOUS at 09:40

## 2024-03-24 RX ADMIN — OXYBUTYNIN CHLORIDE 10 MG: 5 TABLET, EXTENDED RELEASE ORAL at 09:40

## 2024-03-24 RX ADMIN — Medication 1 MG: at 09:41

## 2024-03-24 RX ADMIN — Medication 10 ML: at 09:41

## 2024-03-24 RX ADMIN — ATORVASTATIN CALCIUM 80 MG: 40 TABLET, FILM COATED ORAL at 20:18

## 2024-03-24 RX ADMIN — LOSARTAN POTASSIUM 50 MG: 50 TABLET, FILM COATED ORAL at 09:40

## 2024-03-24 RX ADMIN — TICAGRELOR 60 MG: 60 TABLET ORAL at 09:40

## 2024-03-24 RX ADMIN — Medication 10 ML: at 20:18

## 2024-03-24 RX ADMIN — DOCUSATE SODIUM 50 MG AND SENNOSIDES 8.6 MG 2 TABLET: 8.6; 5 TABLET, FILM COATED ORAL at 20:18

## 2024-03-24 RX ADMIN — AZTREONAM 1000 MG: 1 INJECTION, POWDER, LYOPHILIZED, FOR SOLUTION INTRAMUSCULAR; INTRAVENOUS at 12:29

## 2024-03-24 RX ADMIN — PANTOPRAZOLE SODIUM 40 MG: 40 TABLET, DELAYED RELEASE ORAL at 05:48

## 2024-03-24 RX ADMIN — ACETAMINOPHEN 1000 MG: 500 TABLET ORAL at 09:40

## 2024-03-24 RX ADMIN — AZTREONAM 1000 MG: 1 INJECTION, POWDER, LYOPHILIZED, FOR SOLUTION INTRAMUSCULAR; INTRAVENOUS at 18:13

## 2024-03-24 RX ADMIN — ASPIRIN 81 MG: 81 TABLET, CHEWABLE ORAL at 09:40

## 2024-03-24 RX ADMIN — AZTREONAM 1000 MG: 1 INJECTION, POWDER, LYOPHILIZED, FOR SOLUTION INTRAMUSCULAR; INTRAVENOUS at 03:29

## 2024-03-24 RX ADMIN — DOCUSATE SODIUM 50 MG AND SENNOSIDES 8.6 MG 2 TABLET: 8.6; 5 TABLET, FILM COATED ORAL at 09:40

## 2024-03-24 RX ADMIN — TICAGRELOR 60 MG: 60 TABLET ORAL at 20:18

## 2024-03-24 RX ADMIN — ZOLPIDEM TARTRATE 5 MG: 5 TABLET ORAL at 20:17

## 2024-03-24 RX ADMIN — HEPARIN SODIUM 5000 UNITS: 5000 INJECTION INTRAVENOUS; SUBCUTANEOUS at 20:17

## 2024-03-24 NOTE — PLAN OF CARE
Goal Outcome Evaluation:      Pt resting in bed at this time. No request/complaints. VSS, no ss of distress noted. Will continue to follow plan of care

## 2024-03-24 NOTE — PLAN OF CARE
Goal Outcome Evaluation:  Pt resting in bed with no s/s of distress noted. VSS. IV access maintained. Call light and belongings in reach. No requests at this time. Plan of care ongoing.

## 2024-03-25 PROCEDURE — 92507 TX SP LANG VOICE COMM INDIV: CPT

## 2024-03-25 PROCEDURE — 25010000002 HEPARIN (PORCINE) PER 1000 UNITS: Performed by: HOSPITALIST

## 2024-03-25 PROCEDURE — 97530 THERAPEUTIC ACTIVITIES: CPT

## 2024-03-25 PROCEDURE — 97110 THERAPEUTIC EXERCISES: CPT

## 2024-03-25 PROCEDURE — 99232 SBSQ HOSP IP/OBS MODERATE 35: CPT | Performed by: HOSPITALIST

## 2024-03-25 RX ADMIN — OXYBUTYNIN CHLORIDE 10 MG: 5 TABLET, EXTENDED RELEASE ORAL at 08:12

## 2024-03-25 RX ADMIN — MIRTAZAPINE 30 MG: 15 TABLET, FILM COATED ORAL at 20:36

## 2024-03-25 RX ADMIN — TICAGRELOR 60 MG: 60 TABLET ORAL at 08:12

## 2024-03-25 RX ADMIN — ATORVASTATIN CALCIUM 80 MG: 40 TABLET, FILM COATED ORAL at 20:36

## 2024-03-25 RX ADMIN — Medication 1 MG: at 08:12

## 2024-03-25 RX ADMIN — ASPIRIN 81 MG: 81 TABLET, CHEWABLE ORAL at 08:11

## 2024-03-25 RX ADMIN — HEPARIN SODIUM 5000 UNITS: 5000 INJECTION INTRAVENOUS; SUBCUTANEOUS at 08:11

## 2024-03-25 RX ADMIN — TICAGRELOR 60 MG: 60 TABLET ORAL at 20:36

## 2024-03-25 RX ADMIN — Medication 10 ML: at 08:12

## 2024-03-25 RX ADMIN — Medication 10 ML: at 20:36

## 2024-03-25 RX ADMIN — HEPARIN SODIUM 5000 UNITS: 5000 INJECTION INTRAVENOUS; SUBCUTANEOUS at 20:36

## 2024-03-25 RX ADMIN — ROPINIROLE HYDROCHLORIDE 4 MG: 1 TABLET, FILM COATED ORAL at 20:36

## 2024-03-25 RX ADMIN — LOSARTAN POTASSIUM 50 MG: 50 TABLET, FILM COATED ORAL at 08:11

## 2024-03-25 NOTE — CASE MANAGEMENT/SOCIAL WORK
Discharge Planning Assessment   [place]     Patient Name: Regine Beckham  MRN: [id number]  Today's Date: 3/25/2024    Admit Date: 3/17/2024    Plan: SS spoke with inpt rehab on this date who stated pt's prior authorization with insurance continues to be pending.  SS will follow.       Discharge Plan       Row Name 03/25/24 1304       Plan    Plan SS spoke with inpt rehab on this date who stated pt's prior authorization with insurance continues to be pending.  SS will follow.      Row Name 03/25/24 9506                  BATOOL DunneW

## 2024-03-25 NOTE — THERAPY TREATMENT NOTE
"Acute Care - Speech Language Pathology Treatment Note  [facility]  Speech - Language - Cognitive Therapy  Treatment Note     Patient Name: Regine Beckahm  : 1955  MRN: [id number]  Today's Date: 3/25/2024     Admit Date: 3/17/2024     Speech Language Cognition Plan of Care:        [facility] Beckham [facility] She reports having slept very little last night and requires repeated verbal cues to maintain a/a status for participation this date.     Speech is moderately dysarthric negatively impacting intelligibility this date.      Long Term Goal:  Patient will demonstrate cognitive-linguistic skills representative of  premorbid baseline functional status to allow for participation in complex conversation and participation in ADLS.      Short Term Goals:  1. Patient will tolerate facial massage to L facial surface for 3-7 minutes to increase surface blood flow, sensation and ROM over 3 consecutive sessions.   Facial massage via tactile massage for 5 min to L facial surface w/ minimal increase in surface blood flow noted.   She reports decreased sensation to L facial surface     2. Patient will demonstrate orientation to time to improve safety with return to home environment over 3 consecutive sessions.  Reports it is early evening as she has \"already eaten supper\". SLP reminds pt that she has eaten lunch but not yet supper and pt corrects to mid-afternoon.      3. Patient will complete complex word finding tasks in conversation and structured tasks to improve ability to make needs known, complete ADLs  over 3 consecutive sessions.  No difficulty w/ word finding in conversational exchanges this date.      4. Patient will read simple sentences with minimal delay to improve safety with return to home environment over 3 consecutive sessions.   Deferred this date.      5. Patient will complete problem solving tasks for ADLs to improve safety with return to home environment over 3 " "consecutive sessions.  Deferred this date     6. Patient will recall recent events and ADLs with minimal delay to improve safety with return to home environment over 3 consecutive sessions.  Recalls 2 activities performed this date independently.      7. Patient will respond to OE questions with minimal delay to improve language processing  over 3 consecutive sessions.  Able to respond to oe questions in timely manner this date.      8. Patient will compare/contrast with minimal delay to improve language processing over 3 consecutive sessions.  Pt able to list one similarity and one difference of common items w/o delay this date.     9. New Goal: Pt will perform OROM/GRACE tasks x3 sets x10 reps over three consecutive sessions.     10. New Goal: Pt will over-articulate and hyperphonate to increase intelligibility in multi-syllabic words w/ 90% intelligibility independently over three consecutive sessions.     11. New Goal: Pt will increase vocal intensity for functional communication in conversational exchanges and therapy tasks in 8/10 opp over three consecutive sessions independently.     Visit Dx:    ICD-10-CM ICD-9-CM   1. Left hip pain  M25.552 719.45   2. Acute pain of left shoulder  M25.512 719.41   3. Fall from standing, initial encounter  W19.XXXA E888.9   4. Failure to thrive in adult  R62.7 783.7     Patient Active Problem List   Diagnosis    Iron deficiency anemia due to chronic blood loss    Major depressive disorder    RLS (restless legs syndrome)    GERD (gastroesophageal reflux disease)    Left breast mass    Allergy to penicillin    Stroke    Grade I diastolic dysfunction    Moderate malnutrition    Falls frequently    Stroke-like symptoms     Past Medical History:   Diagnosis Date    Anemia     Anxiety     Arthritis     Depression     Legally blind     Restless leg syndrome     Sleep apnea     \"I don't use a cpap anymore since losing 106 lbs\"    Water retention      Past Surgical History:   Procedure " Laterality Date    BACK SURGERY      CARDIAC CATHETERIZATION N/A 1/19/2024    Procedure: Percutaneous Manual Thrombectomy;  Surgeon: Efrain Hurley MD;  Location: [facility] CATH INVASIVE LOCATION;  Service: Interventional Radiology;  Laterality: N/A;    GALLBLADDER SURGERY      HIP ARTHROSCOPY      JOINT REPLACEMENT Right        SLP Recommendation and Plan      Continue formal yixzup-jufr-aeawkiizu therapy per POC.       Thank you-   Azalia Elizondo M.S., CCC-SLP       EDUCATION  The patient has been educated in the following areas:     Cognitive Impairment Communication Impairment.      Time Calculation:      Time Calculation- SLP       Row Name 03/25/24 1612             Time Calculation- SLP    SLP - Next Appointment 03/26/24  -                User Key  (r) = Recorded By, (t) = Taken By, (c) = Cosigned By      Initials Name Provider Type    Azalia Bunch MS CCC-SLP Speech and Language Pathologist                    Therapy Charges for Today       Code Description Service Date Service Provider Modifiers Qty    [id number]  ST TREATMENT SPEECH 4 3/25/2024 Azalia Elizondo MS CCC-SLP GN 1                       MS VIGNESH Worley  3/25/2024

## 2024-03-25 NOTE — PROGRESS NOTES
[facility] HOSPITALIST PROGRESS NOTE     Patient Identification:  Name:  Regine Beckham  Age:  68 y.o.  Sex:  female  :  1955  MRN:  [id number]  Visit Number:  [id number]  ROOM: [address] [place] [address]     Primary Care Provider:  Dread Burton MD    Length of stay in inpatient status:  4    Subjective     Chief Compliant:    Chief Complaint   Patient presents with    Fall       History of Presenting Illness:    Patient denies any new complaints. No family bedside.    ROS:  Otherwise 10 point ROS negative other than documented above in HPI.     Objective     Current Hospital Meds:aspirin, 81 mg, Oral, Daily  atorvastatin, 80 mg, Oral, Nightly  aztreonam, 1,000 mg, Intravenous, Q8H  folic acid, 1 mg, Oral, Daily  heparin (porcine), 5,000 Units, Subcutaneous, Q12H  losartan, 50 mg, Oral, Daily  mirtazapine, 30 mg, Oral, Nightly  oxybutynin XL, 10 mg, Oral, Daily  pantoprazole, 40 mg, Oral, Q AM  rOPINIRole, 4 mg, Oral, Nightly  senna-docusate sodium, 2 tablet, Oral, BID  sodium chloride, 10 mL, Intravenous, Q12H  ticagrelor, 60 mg, Oral, BID         Current Antimicrobial Therapy:  Anti-Infectives (From admission, onward)      Ordered     Dose/Rate Route Frequency Start Stop    24  aztreonam (AZACTAM) 1,000 mg in sodium chloride 0.9 % 100 mL IVPB-VTB        Ordering Provider: Ok Marin MD    1,000 mg  over 4 Hours Intravenous Every 8 Hours 24 0300 24 0259    24 1743  aztreonam (AZACTAM) 1,000 mg in sodium chloride 0.9 % 100 mL IVPB-VTB        Ordering Provider: Ok Marin MD    1,000 mg  200 mL/hr over 30 Minutes Intravenous Once 24 1830 24          Current Diuretic Therapy:  Diuretics (From admission, onward)      None          ----------------------------------------------------------------------------------------------------------------------  Vital Signs:  Temp:  [97.9 °F (36.6 °C)-98.6 °F (37 °C)] 98.6 °F (37 °C)  Heart Rate:  [] 93  Resp:   [18-20] 18  BP: (133-165)/(83-99) 151/85  SpO2:  [96 %-98 %] 96 %  on   ;   Device (Oxygen Therapy): room air  Body mass index is 22.55 kg/m².    Wt Readings from Last 3 Encounters:   03/24/24 63.4 kg (139 lb 11.2 oz)   02/13/24 72.1 kg (159 lb)   02/04/24 72.1 kg (159 lb)     Intake & Output (last 3 days)         03/22 0701 03/23 0700 03/23 0701 03/24 0700 03/24 0701 03/25 0700    P.O. 240 720 480    Total Intake(mL/kg) 240 (3.8) 720 (11.4) 480 (7.6)    Urine (mL/kg/hr) 1050 (0.7) 1400 (0.9) 800 (1)    Stool 0 0 0    Total Output 1050 1400 800    Net -810 -680 -320           Urine Unmeasured Occurrence 2 x      Stool Unmeasured Occurrence 1 x 1 x 1 x          Diet: Cardiac; Healthy Heart (2-3 Na+); Texture: Soft to Chew (NDD 3); Soft to Chew: Chopped Meat; Fluid Consistency: Thin (IDDSI 0)  ----------------------------------------------------------------------------------------------------------------------  Physical exam:  Constitutional:  Well-developed and well-nourished.  No respiratory distress.      HENT:  Head:  Normocephalic and atraumatic.  Mouth:  Moist mucous membranes.    Eyes:  Conjunctivae and EOM are normal. No scleral icterus.    Neck:  Neck supple.  No JVD present.    Cardiovascular:  Normal rate, regular rhythm and normal heart sounds with no murmur.  Pulmonary/Chest:  No respiratory distress, no wheezes, no crackles, with normal breath sounds and good air movement.  Abdominal:  Soft.  Bowel sounds are normal.  No distension and no tenderness.   Musculoskeletal:  No edema, no tenderness, and no deformity.  No red or swollen joints anywhere.    Neurological:  Alert and oriented to person, place, and time.  Chronic left sided weakness.   Skin:  Skin is warm and dry. No rash noted. No pallor.   Peripheral vascular:  Pulses in all 4 extremities with no clubbing, no cyanosis, no  "edema.  ----------------------------------------------------------------------------------------------------------------------  Tele:    ----------------------------------------------------------------------------------------------------------------------  Results from last 7 days   Lab Units 03/22/24 0249 03/18/24  0058   WBC 10*3/mm3 6.55 7.47   HEMOGLOBIN g/dL 11.6* 11.9*   HEMATOCRIT % 36.0 37.3   MCV fL 96.0 94.9   MCHC g/dL 32.2 31.9   PLATELETS 10*3/mm3 219 324     Results from last 7 days   Lab Units 03/18/24  2231   PH, ARTERIAL pH units 7.455*   PO2 ART mm Hg 78.3*   PCO2, ARTERIAL mm Hg 37.4   HCO3 ART mmol/L 26.3*     Results from last 7 days   Lab Units 03/23/24  0019 03/22/24  1433 03/22/24  0249 03/19/24  0218 03/18/24  1506 03/18/24  0058   SODIUM mmol/L 139  --  141 145  --  143   POTASSIUM mmol/L 3.9 4.1 3.2* 3.9   < > 3.1*   MAGNESIUM mg/dL 1.9  --   --  1.9  --  1.8   CHLORIDE mmol/L 108*  --  108* 110*  --  105   CO2 mmol/L 21.1*  --  22.5 24.4  --  23.5   BUN mg/dL 19  --  24* 23  --  20   CREATININE mg/dL 0.77  --  0.84 0.93  --  0.77   CALCIUM mg/dL 9.6  --  9.3 9.8  --  9.6   GLUCOSE mg/dL 107*  --  112* 114*  --  102*   ALBUMIN g/dL  --   --   --   --   --  3.5   BILIRUBIN mg/dL  --   --   --   --   --  0.8   ALK PHOS U/L  --   --   --   --   --  95   AST (SGOT) U/L  --   --   --   --   --  19   ALT (SGPT) U/L  --   --   --   --   --  13    < > = values in this interval not displayed.   Estimated Creatinine Clearance: 69 mL/min (by C-G formula based on SCr of 0.77 mg/dL).  No results found for: \"AMMONIA\"              No results found for: \"HGBA1C\", \"POCGLU\"  Lab Results   Component Value Date    TSH 2.880 03/17/2024     No results found for: \"PREGTESTUR\", \"PREGSERUM\", \"HCG\", \"HCGQUANT\"  Pain Management Panel          Latest Ref Rng & Units 12/20/2023   Pain Management Panel   Amphetamine, Urine Qual Negative Negative    Barbiturates Screen, Urine Negative Negative    Benzodiazepine Screen, " "Urine Negative Negative    Buprenorphine, Screen, Urine Negative Negative    Cocaine Screen, Urine Negative Negative    Fentanyl, Urine Negative Negative    Methadone Screen , Urine Negative Negative    Methamphetamine, Ur Negative Negative      Brief Urine Lab Results  (Last result in the past 365 days)        Color   Clarity   Blood   Leuk Est   Nitrite   Protein   CREAT   Urine HCG        03/21/24 1613 Dark Yellow   Turbid   Trace   Moderate (2+)   Positive   30 mg/dL (1+)                 No results found for: \"BLOODCX\"  Urine Culture   Date Value Ref Range Status   03/21/2024 >100,000 CFU/mL Escherichia coli (A)  Final     No results found for: \"WOUNDCX\"  No results found for: \"STOOLCX\"  No results found for: \"RESPCX\"  No results found for: \"AFBCX\"        I have personally looked at the labs and they are summarized above.  ----------------------------------------------------------------------------------------------------------------------  Detailed radiology reports for the last 24 hours:    Imaging Results (Last 24 Hours)       ** No results found for the last 24 hours. **          Assessment & Plan    -Frequent falls  -Hypokalemia  -UTI  -Folate deficency   -History of CVA right MCA with left-sided weakness  -History of right internal carotid artery disease with occlusion status post thrombectomy and stent placement   -History of iron deficiency anemia   -History of dysphagia as a complication of CVA   -History of restless leg syndrome   -History of heart failure with preserved ejection fraction   -History of hyperlipidemia  -Severe arthritis left hip  -History of right hip arthroplasty from osteoarthritis  -History of tobacco use patient quit January of this year after the stroke.     PT/OT/SW/SLP all following. Potassium replaced and mag level ordered. Folate replaced.      AMS has resolved. Patient still weak and repeat CT head showed encephalomalacia of right parietal lobe. I was not sure if this was " normal evolution of stroke and consulted neurology who ordered MRI which was unrevealing with expected findings.      Symptoms and pyuria likely representing UTI. Culture pending. Penicillin allergy. Ordered aztreonam. Day 3/3.      Code status :Full      Dispo: SW consulted. IPR consulted given recent stroke. PT/OT following.     VTE Prophylaxis:   Mechanical Order History:       None          Pharmalogical Order History:        Ordered     Dose Route Frequency Stop    03/17/24 1611  heparin (porcine) 5000 UNIT/ML injection 5,000 Units         5,000 Units SC Every 12 Hours Scheduled --                      Ok Marin MD  [facility] Hospitalist  03/24/24  20:09 EDT

## 2024-03-25 NOTE — PROGRESS NOTES
[facility] PROGRESS NOTE     Patient Identification:  Name:  Regine Beckham  Age:  68 y.o.  Sex:  female  :  1955  MRN:  [id number]  Visit Number:  [id number]  Primary Care Provider:  Dread Burton MD    Length of stay:  5    Subjective: Patient seen and examined assisted by STALIN Cam.  Patient is awake and alert conversant she is not confused, she is stated she is waiting for skilled therapy to assist with ambulation.  Noted patient wanted inpatient rehab.    Chief Complaint: Acute on chronic debility with frequent falls  ----------------------------------------------------------------------------------------------------------------------  Current Hospital Meds:  aspirin, 81 mg, Oral, Daily  atorvastatin, 80 mg, Oral, Nightly  folic acid, 1 mg, Oral, Daily  heparin (porcine), 5,000 Units, Subcutaneous, Q12H  losartan, 50 mg, Oral, Daily  mirtazapine, 30 mg, Oral, Nightly  oxybutynin XL, 10 mg, Oral, Daily  pantoprazole, 40 mg, Oral, Q AM  rOPINIRole, 4 mg, Oral, Nightly  senna-docusate sodium, 2 tablet, Oral, BID  sodium chloride, 10 mL, Intravenous, Q12H  ticagrelor, 60 mg, Oral, BID         ----------------------------------------------------------------------------------------------------------------------  Vital Signs:  Temp:  [98 °F (36.7 °C)-98.6 °F (37 °C)] 98 °F (36.7 °C)  Heart Rate:  [] 105  Resp:  [18] 18  BP: (133-170)/(83-99) 162/97       Tele: Sinus rhythm 103 bpm      24  0500 24  0500 24  0500   Weight: 63.7 kg (140 lb 6.4 oz) 63.4 kg (139 lb 11.2 oz) 63.1 kg (139 lb 3.2 oz)     Body mass index is 22.47 kg/m².    Intake/Output Summary (Last 24 hours) at 3/25/2024 1036  Last data filed at 3/25/2024 0800  Gross per 24 hour   Intake 720 ml   Output 1200 ml   Net -480 ml     Diet: Cardiac; Healthy Heart (2-3 Na+); Texture: Soft to Chew (NDD 3); Soft to Chew: Chopped Meat; Fluid Consistency: Thin (IDDSI  0)  ----------------------------------------------------------------------------------------------------------------------  Physical exam:  General: Comfortable,awake, alert, oriented to self, place, and time, well-developed and well-nourished.  No respiratory distress.    Skin:  Skin is warm and dry. No rash noted. No pallor.    HENT:  Head:  Normocephalic and atraumatic.  Mouth:  Moist mucous membranes.    Eyes:  Conjunctivae and EOM are normal.  Pupils are equal, round, and reactive to light.  No scleral icterus.    Neck:  Neck supple.  No JVD present.  No bruitPulmonary/Chest:  No respiratory distress, no wheezes, no crackles, with normal breath sounds and good air movement.  Cardiovascular:  Normal rate, regular rhythm and normal heart sounds with no murmur.  Abdominal:  Soft.  Bowel sounds are normal.  No distension and no tenderness.   Extremities:  No edema, no tenderness, and no deformity.  No red or swollen joints anywhere.  Strong pulses in all 4 extremities with no clubbing, no cyanosis, no edema.  Neurological: Decreased left side, worse mild slurring of speech,   Genitourinary: No Arce catheter  Back:  ----------------------------------------------------------------------------------------------------------------------  ----------------------------------------------------------------------------------------------------------------------      Results from last 7 days   Lab Units 03/22/24  0249   WBC 10*3/mm3 6.55   HEMOGLOBIN g/dL 11.6*   HEMATOCRIT % 36.0   MCV fL 96.0   MCHC g/dL 32.2   PLATELETS 10*3/mm3 219     Results from last 7 days   Lab Units 03/18/24  2231   PH, ARTERIAL pH units 7.455*   PO2 ART mm Hg 78.3*   PCO2, ARTERIAL mm Hg 37.4   HCO3 ART mmol/L 26.3*     Results from last 7 days   Lab Units 03/23/24  0019 03/22/24  1433 03/22/24  0249 03/19/24  0218   SODIUM mmol/L 139  --  141 145   POTASSIUM mmol/L 3.9 4.1 3.2* 3.9   MAGNESIUM mg/dL 1.9  --   --  1.9   CHLORIDE mmol/L 108*  --   "108* 110*   CO2 mmol/L 21.1*  --  22.5 24.4   BUN mg/dL 19  --  24* 23   CREATININE mg/dL 0.77  --  0.84 0.93   CALCIUM mg/dL 9.6  --  9.3 9.8   GLUCOSE mg/dL 107*  --  112* 114*   Estimated Creatinine Clearance: 68.7 mL/min (by C-G formula based on SCr of 0.77 mg/dL).    No results found for: \"AMMONIA\"      No results found for: \"BLOODCX\"  Urine Culture   Date Value Ref Range Status   03/21/2024 >100,000 CFU/mL Escherichia coli (A)  Final     No results found for: \"WOUNDCX\"  No results found for: \"STOOLCX\"    I have personally looked at the labs and they are summarized above.  ----------------------------------------------------------------------------------------------------------------------  Imaging Results (Last 24 Hours)       ** No results found for the last 24 hours. **          ----------------------------------------------------------------------------------------------------------------------  Assessment and Plan:  -Acute on chronic debility with frequent falls  -History of CVA right MCA with left-sided weakness  -Dysphagia as complication of CVA  -History of right internal carotid artery disease with occlusion status post thrombectomy and stent placement  -Severe left hip arthritis  -Right hip arthroplasty  -History of tobacco use stopped after CVA  -Iron deficiency anemia  -UTI with E. coli on culture    Follow-up culture sensitivity, patient encourage participation with PT OT, inpatient rehab consulted, he is agreeable to skilled nursing facility for rehab if not appropriate for inpatient rehab after evaluation.      Disposition in patient rehab      Yaritza Rao MD  03/25/24  10:36 EDT  "

## 2024-03-25 NOTE — THERAPY TREATMENT NOTE
"Acute Care - Physical Therapy Treatment Note   [facility]     Patient Name: Regine Beckham  : 1955  MRN: [id number]  Today's Date: 3/25/2024      Visit Dx:     ICD-10-CM ICD-9-CM   1. Left hip pain  M25.552 719.45   2. Acute pain of left shoulder  M25.512 719.41   3. Fall from standing, initial encounter  W19.XXXA E888.9   4. Failure to thrive in adult  R62.7 783.7     Patient Active Problem List   Diagnosis    Iron deficiency anemia due to chronic blood loss    Major depressive disorder    RLS (restless legs syndrome)    GERD (gastroesophageal reflux disease)    Left breast mass    Allergy to penicillin    Stroke    Grade I diastolic dysfunction    Moderate malnutrition    Falls frequently    Stroke-like symptoms     Past Medical History:   Diagnosis Date    Anemia     Anxiety     Arthritis     Depression     Legally blind     Restless leg syndrome     Sleep apnea     \"I don't use a cpap anymore since losing 106 lbs\"    Water retention      Past Surgical History:   Procedure Laterality Date    BACK SURGERY      CARDIAC CATHETERIZATION N/A 2024    Procedure: Percutaneous Manual Thrombectomy;  Surgeon: Efrain Hurley MD;  Location:  [facility];  Service: Interventional Radiology;  Laterality: N/A;    GALLBLADDER SURGERY      HIP ARTHROSCOPY      JOINT REPLACEMENT Right      PT Assessment (Last 12 Hours)       PT Evaluation and Treatment       Row Name 24 [id number]          Physical Therapy Time and Intention    Document Type therapy note (daily note)  -KM     Mode of Treatment individual therapy;physical therapy  -KM     Patient Effort good  -KM     Symptoms Noted During/After Treatment fatigue;other (see comments)  posterior lean w/ standing  -KM       Row Name 24 8732          General Information    Patient Profile Reviewed yes  -KM     Patient Observations alert;cooperative;agree to therapy  -KM     Existing Precautions/Restrictions fall  -KM       Row Name 24 1547 "          Cognition    Affect/Mental Status (Cognition) WFL  -KM     Follows Commands (Cognition) WFL  -KM       Row Name 03/25/24 1546          Bed Mobility    Bed Mobility supine-sit  -KM     Supine-Sit Ada (Bed Mobility) minimum assist (75% patient effort);moderate assist (50% patient effort);verbal cues  -KM     Bed Mobility, Safety Issues decreased use of arms for pushing/pulling  -KM     Assistive Device (Bed Mobility) bed rails;head of bed elevated  -KM       Row Name 03/25/24 1546          Transfers    Transfers sit-stand transfer;stand-sit transfer;bed-chair transfer  -       Row Name 03/25/24 1546          Bed-Chair Transfer    Bed-Chair Ada (Transfers) minimum assist (75% patient effort);moderate assist (50% patient effort);verbal cues  -     Assistive Device (Bed-Chair Transfers) --  A  -       Row Name 03/25/24 1546          Sit-Stand Transfer    Sit-Stand Ada (Transfers) minimum assist (75% patient effort);verbal cues  -     Assistive Device (Sit-Stand Transfers) --  A  -       Row Name 03/25/24 1546          Stand-Sit Transfer    Stand-Sit Ada (Transfers) minimum assist (75% patient effort)  -KM     Assistive Device (Stand-Sit Transfers) --  A  -       Row Name 03/25/24 1546          Gait/Stairs (Locomotion)    Gait/Stairs Locomotion gait/ambulation independence;gait/ambulation assistive device;distance ambulated  -KM     Ada Level (Gait) minimum assist (75% patient effort);moderate assist (50% patient effort);verbal cues  -     Assistive Device (Gait) --  A  -     Distance in Feet (Gait) 3  -KM     Pattern (Gait) step-to  -KM     Deviations/Abnormal Patterns (Gait) ataxic;base of support, narrow;gait speed decreased  -KM     Left Sided Gait Deviations weight shift ability decreased  -KM       Row Name 03/25/24 1546          Safety Issues, Functional Mobility    Impairments Affecting Function (Mobility)  balance;coordination;endurance/activity tolerance;muscle tone abnormal;strength  -       Row Name 03/25/24 1546          Motor Skills    Comments, Therapeutic Exercise seated ther-ex  -       Row Name 03/25/24 1546          Progress Summary (PT)    Daily Progress Summary (PT) Pt. was able to perform bed mobility w/ Angela-modA. She was able to perform transfers w/ Angela. She demonstrates posterior lean w/ standing activity. Pt. would continue to benefit from skilled PT services.  -               User Key  (r) = Recorded By, (t) = Taken By, (c) = Cosigned By      Initials Name Provider Type    Eugene Angel, PT Physical Therapist                    Physical Therapy Education       Title: PT OT SLP Therapies (In Progress)       Topic: Physical Therapy (In Progress)       Point: Mobility training (In Progress)       Learning Progress Summary             Patient Acceptance, E, NR by  at 3/22/2024 2113    Acceptance, E,TB, NR by PIETRO at 3/19/2024 1521    Acceptance, E,TB, VU by IRENE at 3/18/2024 1547                         Point: Home exercise program (In Progress)       Learning Progress Summary             Patient Acceptance, E, NR by  at 3/22/2024 2113    Acceptance, E,TB, NR by PIETRO at 3/19/2024 1521    Acceptance, E,TB, VU by IRENE at 3/18/2024 1547                         Point: Body mechanics (In Progress)       Learning Progress Summary             Patient Acceptance, E, NR by  at 3/22/2024 2113    Acceptance, E,TB, NR by PIETRO at 3/19/2024 1521    Acceptance, E,TB, VU by IRENE at 3/18/2024 1547                         Point: Precautions (In Progress)       Learning Progress Summary             Patient Acceptance, E, NR by  at 3/22/2024 2113    Acceptance, E,TB, NR by PIETRO at 3/19/2024 1521    Acceptance, E,TB, VU by IRENE at 3/18/2024 1547                                         User Key       Initials Effective Dates Name Provider Type Discipline    PIETRO 06/06/23 -  Ivy Gordillo, RN Registered Nurse Nurse    IRENE  05/24/22 -  Eugene Cuevas PT Physical Therapist PT     06/21/23 -  Bhavani St, RN Registered Nurse Nurse                  PT Recommendation and Plan  Anticipated Discharge Disposition (PT): inpatient rehabilitation facility, skilled nursing facility (further physical rehab needed prior to return home by herself)  Planned Therapy Interventions (PT): balance training, bed mobility training, gait training, home exercise program, patient/family education, postural re-education, ROM (range of motion), stair training, strengthening, stretching, transfer training  Therapy Frequency (PT): 2 times/wk (2-5x/wk)  Progress Summary (PT)  Daily Progress Summary (PT): Pt. was able to perform bed mobility w/ Angela-modA. She was able to perform transfers w/ Angela. She demonstrates posterior lean w/ standing activity. Pt. would continue to benefit from skilled PT services.  Plan of Care Reviewed With: patient  Outcome Evaluation: Pt. evaluation completed during PT session. She was able to perform bed mobility w/ modA. She was able to perform transfers w/ Angela-modA. She was unable to ambulate d/t weakness. Pt. would benefit from skilled PT services.       Time Calculation:    PT Charges       Row Name 03/25/24 1546             Time Calculation    PT Received On 03/25/24  -KM         Time Calculation- PT    Total Timed Code Minutes- PT 23 minute(s)  -KM                User Key  (r) = Recorded By, (t) = Taken By, (c) = Cosigned By      Initials Name Provider Type    Eugene Angel, PT Physical Therapist                  Therapy Charges for Today       Code Description Service Date Service Provider Modifiers Qty    [id number]  PT THERAPEUTIC ACT EA 15 MIN 3/25/2024 Eugene Cuevas, PT GP 1    [id number]  PT THER PROC EA 15 MIN 3/25/2024 Eugene Cuevas, PT GP 1            PT G-Codes  AM-PAC 6 Clicks Score (PT): 10    Eugene Cuevas PT  3/25/2024

## 2024-03-25 NOTE — PROGRESS NOTES
Adult Nutrition  Assessment    Patient Name:  Regine Beckham  YOB: 1955  MRN: [id number]  Admit Date:  3/17/2024    Assessment Date:  3/25/2024    Comments:  Patient has had 12% decrease in body mass in last 2 months and has past medical history of moderate malnutrition.  At this time she does appear to have mild muscle wasting in temple area.  She is eating about 54% of past 6 meals which meets about 76% of estimated needs.  She is refusing ONS (oral nutrition supplements).  Will modify diet to regular soft to chew, chopped meats to give more variety and help to promote intakes and improve meal satisfaction.       Reason for Assessment       Row Name 03/25/24 1501          Reason for Assessment    Reason For Assessment per organizational policy  LOS                      Labs/Tests/Procedures/Meds       Row Name 03/25/24 1509          Labs/Procedures/Meds    Lab Results Reviewed reviewed        Medications    Pertinent Medications Reviewed reviewed                    Physical Findings       Row Name 03/25/24 1506          Physical Findings    Overall Physical Appearance Patient has past history of moderate malnutrition, and has lost 19lb in past 2 months ( 12% decrease in body mass).  Patient does appear to have mild muscle wasting of temples however no where else.  MD states patient is well nourished. PMH: CVA, CAD, and has been having frequent falls.                    Estimated/Assessed Needs - Anthropometrics       Row Name 03/25/24 1518          Anthropometrics    Age for Calculations 68     Weight for Calculation 63.1 kg (139 lb 1.8 oz)     Additional Documentation Ideal Body Weight (IBW) (Group)        Ideal Body Weight (IBW)    Ideal Body Weight 59.3kg        Estimated/Assessed Needs    Additional Documentation Fluid Requirements (Group);Cabell-St. Jeor Equation (Group);Protein Requirements (Group)        Cabell-St. Jeor Equation    RMR (Cabell-St. Jeor Equation) 1172.75     Cabell-St.  Joey Activity Factors 1.4 - 1.5     Activity Factors (WilsonStJami Cook) 1641.85 - 1759.125        Protein Requirements    Weight Used For Protein Calculations 63.1 kg (139 lb 1.8 oz)     Est Protein Requirement Amount (gms/kg) 1.0 gm protein     Estimated Protein Requirements (gms/day) 63.1        Fluid Requirements    Fluid Requirements (mL/day) 1700  1 ml/kcal     RDA Method (mL) 1700                    Nutrition Prescription Ordered       Row Name 03/25/24 1513          Nutrition Prescription PO    Current PO Diet Soft Texture     Texture Chopped     Fluid Consistency Thin     Common Modifiers Cardiac                    Evaluation of Received Nutrient/Fluid Intake       Row Name 03/25/24 1514          Fluid Intake Evaluation    Total Fluid Target (mL) 1700     Oral Fluid (mL) 720     Enteral Fluid (mL) 0     Parenteral Fluid (mL) 0     Other Fluid (mL) 0     Total Fluid Intake (mL) 720     % Total Fluid Intake 42.35        PO Evaluation    Number of Meals 6     % PO Intake 54                       Electronically signed by:  Jolie Lane RD  03/25/24 15:19 EDT

## 2024-03-25 NOTE — PLAN OF CARE
Goal Outcome Evaluation:   Pt remains confused throughout shift, resting in bed. No complaints voiced att. No visible acute s/s of distress noted. Plan of care ongoing.

## 2024-03-26 PROCEDURE — 97530 THERAPEUTIC ACTIVITIES: CPT

## 2024-03-26 PROCEDURE — 99232 SBSQ HOSP IP/OBS MODERATE 35: CPT | Performed by: HOSPITALIST

## 2024-03-26 PROCEDURE — 97110 THERAPEUTIC EXERCISES: CPT

## 2024-03-26 PROCEDURE — 92507 TX SP LANG VOICE COMM INDIV: CPT | Performed by: SPEECH-LANGUAGE PATHOLOGIST

## 2024-03-26 PROCEDURE — 25010000002 HEPARIN (PORCINE) PER 1000 UNITS: Performed by: HOSPITALIST

## 2024-03-26 RX ORDER — CEFDINIR 300 MG/1
300 CAPSULE ORAL EVERY 12 HOURS SCHEDULED
Status: DISCONTINUED | OUTPATIENT
Start: 2024-03-26 | End: 2024-03-27 | Stop reason: HOSPADM

## 2024-03-26 RX ADMIN — CEFDINIR 300 MG: 300 CAPSULE ORAL at 11:14

## 2024-03-26 RX ADMIN — Medication 10 ML: at 21:38

## 2024-03-26 RX ADMIN — HEPARIN SODIUM 5000 UNITS: 5000 INJECTION INTRAVENOUS; SUBCUTANEOUS at 09:06

## 2024-03-26 RX ADMIN — LOSARTAN POTASSIUM 50 MG: 50 TABLET, FILM COATED ORAL at 09:06

## 2024-03-26 RX ADMIN — HEPARIN SODIUM 5000 UNITS: 5000 INJECTION INTRAVENOUS; SUBCUTANEOUS at 21:38

## 2024-03-26 RX ADMIN — ACETAMINOPHEN 1000 MG: 500 TABLET ORAL at 21:38

## 2024-03-26 RX ADMIN — ACETAMINOPHEN 1000 MG: 500 TABLET ORAL at 11:14

## 2024-03-26 RX ADMIN — DOCUSATE SODIUM 50 MG AND SENNOSIDES 8.6 MG 2 TABLET: 8.6; 5 TABLET, FILM COATED ORAL at 09:06

## 2024-03-26 RX ADMIN — TICAGRELOR 60 MG: 60 TABLET ORAL at 09:06

## 2024-03-26 RX ADMIN — ATORVASTATIN CALCIUM 80 MG: 40 TABLET, FILM COATED ORAL at 21:37

## 2024-03-26 RX ADMIN — MIRTAZAPINE 30 MG: 15 TABLET, FILM COATED ORAL at 21:37

## 2024-03-26 RX ADMIN — OXYBUTYNIN CHLORIDE 10 MG: 5 TABLET, EXTENDED RELEASE ORAL at 09:06

## 2024-03-26 RX ADMIN — PANTOPRAZOLE SODIUM 40 MG: 40 TABLET, DELAYED RELEASE ORAL at 05:38

## 2024-03-26 RX ADMIN — Medication 1 MG: at 09:06

## 2024-03-26 RX ADMIN — ROPINIROLE HYDROCHLORIDE 4 MG: 1 TABLET, FILM COATED ORAL at 21:37

## 2024-03-26 RX ADMIN — ACETAMINOPHEN 1000 MG: 500 TABLET ORAL at 05:38

## 2024-03-26 RX ADMIN — ASPIRIN 81 MG: 81 TABLET, CHEWABLE ORAL at 09:06

## 2024-03-26 RX ADMIN — CEFDINIR 300 MG: 300 CAPSULE ORAL at 21:37

## 2024-03-26 RX ADMIN — Medication 10 ML: at 09:06

## 2024-03-26 RX ADMIN — TICAGRELOR 60 MG: 60 TABLET ORAL at 21:37

## 2024-03-26 RX ADMIN — ZOLPIDEM TARTRATE 5 MG: 5 TABLET ORAL at 21:37

## 2024-03-26 NOTE — PROGRESS NOTES
[facility]     Patient Identification:  Name:  Regine Beckham  Age:  68 y.o.  Sex:  female  :  1955  MRN:  [id number]  Visit Number:  [id number]  Primary Care Provider:  Dread Burton MD    Length of stay:  6    Subjective: Patient seen and examined, patient is awake and alert, noted occasionally she gets confused specially at night.  She worked with physical therapy yesterday, awaiting for inpatient rehab evaluation/prior authorization.  No issues reported by staff    Chief Complaint: Frequent fall debility  ----------------------------------------------------------------------------------------------------------------------  Current Hospital Meds:  aspirin, 81 mg, Oral, Daily  atorvastatin, 80 mg, Oral, Nightly  folic acid, 1 mg, Oral, Daily  heparin (porcine), 5,000 Units, Subcutaneous, Q12H  losartan, 50 mg, Oral, Daily  mirtazapine, 30 mg, Oral, Nightly  oxybutynin XL, 10 mg, Oral, Daily  pantoprazole, 40 mg, Oral, Q AM  rOPINIRole, 4 mg, Oral, Nightly  senna-docusate sodium, 2 tablet, Oral, BID  sodium chloride, 10 mL, Intravenous, Q12H  ticagrelor, 60 mg, Oral, BID         ----------------------------------------------------------------------------------------------------------------------  Vital Signs:  Temp:  [97.5 °F (36.4 °C)-98.6 °F (37 °C)] 97.5 °F (36.4 °C)  Heart Rate:  [] 87  Resp:  [16-20] 20  BP: (115-151)/(84-95) 115/85       Tele: Sinus rhythm 73 bpm      24  0500 24  0500 24  0500   Weight: 63.4 kg (139 lb 11.2 oz) 63.1 kg (139 lb 3.2 oz) 62 kg (136 lb 9.6 oz)     Body mass index is 22.05 kg/m².    Intake/Output Summary (Last 24 hours) at 3/26/2024 0914  Last data filed at 3/26/2024 0500  Gross per 24 hour   Intake 120 ml   Output 1000 ml   Net -880 ml     Diet: Regular/House; Texture: Soft to Chew (NDD 3); Soft to Chew: Chopped Meat; Fluid Consistency: Thin (IDDSI  0)  ----------------------------------------------------------------------------------------------------------------------  Physical exam:  General: Comfortable,awake, alert, oriented to self, place, and time, well-developed and well-nourished.  No respiratory distress.    Skin:  Skin is warm and dry. No rash noted. No pallor.    HENT:  Head:  Normocephalic and atraumatic.  Mouth:  Moist mucous membranes.    Eyes:  Conjunctivae and EOM are normal.  Pupils are equal, round, and reactive to light.  No scleral icterus.    Neck:  Neck supple.  No JVD present.  No bruit  Pulmonary/Chest:  No respiratory distress, no wheezes, no crackles, with normal breath sounds and good air movement.  Cardiovascular:  Normal rate, regular rhythm and normal heart sounds with no murmur.  Abdominal:  Soft.  Bowel sounds are normal.  No distension and no tenderness.   Extremities:  No edema, no tenderness, and no deformity.  No red or swollen joints anywhere.  Strong pulses in all 4 extremities with no clubbing, no cyanosis, no edema.  Neurological: Decreased motor strength left side mild slurring of speech   genitourinary: No Arce catheter  Back:  ----------------------------------------------------------------------------------------------------------------------  ----------------------------------------------------------------------------------------------------------------------      Results from last 7 days   Lab Units 03/22/24  0249   WBC 10*3/mm3 6.55   HEMOGLOBIN g/dL 11.6*   HEMATOCRIT % 36.0   MCV fL 96.0   MCHC g/dL 32.2   PLATELETS 10*3/mm3 219         Results from last 7 days   Lab Units 03/23/24  0019 03/22/24  1433 03/22/24  0249   SODIUM mmol/L 139  --  141   POTASSIUM mmol/L 3.9 4.1 3.2*   MAGNESIUM mg/dL 1.9  --   --    CHLORIDE mmol/L 108*  --  108*   CO2 mmol/L 21.1*  --  22.5   BUN mg/dL 19  --  24*   CREATININE mg/dL 0.77  --  0.84   CALCIUM mg/dL 9.6  --  9.3   GLUCOSE mg/dL 107*  --  112*   Estimated Creatinine  "Clearance: 67.5 mL/min (by C-G formula based on SCr of 0.77 mg/dL).    No results found for: \"AMMONIA\"      No results found for: \"BLOODCX\"  Urine Culture   Date Value Ref Range Status   03/21/2024 >100,000 CFU/mL Escherichia coli (A)  Final     No results found for: \"WOUNDCX\"  No results found for: \"STOOLCX\"    I have personally looked at the labs and they are summarized above.  ----------------------------------------------------------------------------------------------------------------------  Imaging Results (Last 24 Hours)       ** No results found for the last 24 hours. **          ----------------------------------------------------------------------------------------------------------------------  Assessment and Plan:  -Acute on chronic debility with frequent falls  -History of CVA right MCA with left-sided weakness  -History of internal carotid artery disease right side with occlusion status post recommend stent placement  -Severe left hip arthritis  -History of right hip arthroplasty  -History of tobacco use stopped earlier this year  -Iron deficiency anemia  -Acute cystitis with E. coli on culture, discussed with pharmacy patient has received Rocephin years ago with no reported reaction, we will give her Omnicef 300 twice daily for total of 5 days    Continue PT OT, discussed with PT today, patient is a candidate for inpatient rehab.      Disposition inpatient rehab consult      Yaritza Rao MD  03/26/24  09:14 EDT  "

## 2024-03-26 NOTE — CASE MANAGEMENT/SOCIAL WORK
Discharge Planning Assessment   [facility]     Patient Name: Regine Beckham  MRN: [id number]  Today's Date: 3/26/2024    Admit Date: 3/17/2024    Plan: SS spoke with inpt rehab on this date who stated pt's prior authorization with insurance continues to be pending.  SS will follow.       Discharge Plan       Row Name 03/26/24 1303       Plan    Plan SS spoke with inpt rehab on this date who stated pt's prior authorization with insurance continues to be pending.  SS will follow.                  PAULINO Dunne

## 2024-03-26 NOTE — PLAN OF CARE
Goal Outcome Evaluation:     Pt resting in bed throughout shift. No requests/complaints at this time. VSS. Plan of care ongoing

## 2024-03-26 NOTE — THERAPY TREATMENT NOTE
"Acute Care - Physical Therapy Treatment Note   [facility]     Patient Name: Regine Beckham  : 1955  MRN: [id number]  Today's Date: 3/26/2024      Visit Dx:     ICD-10-CM ICD-9-CM   1. Left hip pain  M25.552 719.45   2. Acute pain of left shoulder  M25.512 719.41   3. Fall from standing, initial encounter  W19.XXXA E888.9   4. Failure to thrive in adult  R62.7 783.7     Patient Active Problem List   Diagnosis    Iron deficiency anemia due to chronic blood loss    Major depressive disorder    RLS (restless legs syndrome)    GERD (gastroesophageal reflux disease)    Left breast mass    Allergy to penicillin    Stroke    Grade I diastolic dysfunction    Moderate malnutrition    Falls frequently    Stroke-like symptoms     Past Medical History:   Diagnosis Date    Anemia     Anxiety     Arthritis     Depression     Legally blind     Restless leg syndrome     Sleep apnea     \"I don't use a cpap anymore since losing 106 lbs\"    Water retention      Past Surgical History:   Procedure Laterality Date    BACK SURGERY      CARDIAC CATHETERIZATION N/A 2024    Procedure: Percutaneous Manual Thrombectomy;  Surgeon: Efrain Hurley MD;  Location:  [facility] CATH INVASIVE LOCATION;  Service: Interventional Radiology;  Laterality: N/A;    GALLBLADDER SURGERY      HIP ARTHROSCOPY      JOINT REPLACEMENT Right      PT Assessment (Last 12 Hours)       PT Evaluation and Treatment       Row Name 24 [facility]          Physical Therapy Time and Intention    Document Type therapy note (daily note)  -KM     Mode of Treatment individual therapy;physical therapy  -KM     Patient Effort good  -KM       Row Name 24 1443          General Information    Patient Profile Reviewed yes  -KM     Patient Observations alert;cooperative;agree to therapy  -KM     Existing Precautions/Restrictions fall  -KM       Row Name 24 1443          Cognition    Affect/Mental Status (Cognition) WFL  -KM     Follows Commands (Cognition) WFL  " -KM       Row Name 03/26/24 1443          Bed Mobility    Bed Mobility supine-sit  -KM     Supine-Sit [place] (Bed Mobility) minimum assist (75% patient effort);moderate assist (50% patient effort);verbal cues  -KM     Bed Mobility, Safety Issues decreased use of arms for pushing/pulling  -KM     Assistive Device (Bed Mobility) bed rails;head of bed elevated  -KM       Row Name 03/26/24 1443          Transfers    Transfers sit-stand transfer;stand-sit transfer;bed-chair transfer  -       Row Name 03/26/24 1443          Bed-Chair Transfer    Bed-Chair [place] (Transfers) minimum assist (75% patient effort);moderate assist (50% patient effort);verbal cues  -KM     Assistive Device (Bed-Chair Transfers) --  HHA  -KM       Row Name 03/26/24 1443          Sit-Stand Transfer    Sit-Stand [place] (Transfers) minimum assist (75% patient effort);verbal cues  -KM     Assistive Device (Sit-Stand Transfers) --  A  -KM     Comment, (Sit-Stand Transfer) x3  -KM       Row Name 03/26/24 1443          Stand-Sit Transfer    Stand-Sit [place] (Transfers) minimum assist (75% patient effort)  -     Assistive Device (Stand-Sit Transfers) --  A  -KM     Comment, (Stand-Sit Transfer) x3  -KM       Row Name 03/26/24 1443          Gait/Stairs (Locomotion)    Gait/Stairs Locomotion gait/ambulation independence;gait/ambulation assistive device;distance ambulated  -KM     [place] Level (Gait) minimum assist (75% patient effort);moderate assist (50% patient effort);verbal cues  -     Assistive Device (Gait) --  A  -KM     Patient was able to Ambulate yes  -KM     Distance in Feet (Gait) 10  -KM     Pattern (Gait) step-to  -KM     Deviations/Abnormal Patterns (Gait) ataxic;base of support, narrow;gait speed decreased  -KM     Left Sided Gait Deviations weight shift ability decreased  -KM     Comment, (Gait/Stairs) pt. continues to demonstrate posterior lean w/ standing activity  -KM       Row Name 03/26/24  1443          Safety Issues, Functional Mobility    Safety Issues Affecting Function (Mobility) problem-solving  -KM     Impairments Affecting Function (Mobility) balance;coordination;endurance/activity tolerance;muscle tone abnormal;strength  -KM       Row Name 03/26/24 1443          Balance    Balance Assessment sitting static balance;standing dynamic balance  -KM     Static Sitting Balance standby assist  -KM     Dynamic Standing Balance minimal assist;verbal cues  -     Position/Device Used, Standing Balance --  HHA  -KM     Comment, Balance posterior lean w/ standing activity  -KM       Row Name 03/26/24 1443          Motor Skills    Comments, Therapeutic Exercise seated ther-ex  -KM       Row Name 03/26/24 1443          Progress Summary (PT)    Daily Progress Summary (PT) Pt. was able to demonstrate bed mobility at similar level as prior sessions. She was able to perform standing activity w/ Angela to maintain upright positioning. She was able to ambulate minimal distance w/ continual cueing d/t L sided neglect. Pt. would continue to benefit from skilled PT services.  -               User Key  (r) = Recorded By, (t) = Taken By, (c) = Cosigned By      Initials Name Provider Type    Eugene Angel, PT Physical Therapist                    Physical Therapy Education       Title: PT OT SLP Therapies (In Progress)       Topic: Physical Therapy (In Progress)       Point: Mobility training (In Progress)       Learning Progress Summary             Patient Acceptance, E, NR by VANDANA at 3/22/2024 2113    Acceptance, E,TB, NR by PIETRO at 3/19/2024 1521    Acceptance, E,TB, VU by IRENE at 3/18/2024 1547                         Point: Home exercise program (In Progress)       Learning Progress Summary             Patient Acceptance, E, NR by VANDANA at 3/22/2024 2113    Acceptance, E,TB, NR by PIETRO at 3/19/2024 1521    Acceptance, E,TB, VU by IRENE at 3/18/2024 1547                         Point: Body mechanics (In Progress)        Learning Progress Summary             Patient Acceptance, E, NR by  at 3/22/2024 2113    Acceptance, E,TB, NR by  at 3/19/2024 1521    Acceptance, E,TB, VU by  at 3/18/2024 1547                         Point: Precautions (In Progress)       Learning Progress Summary             Patient Acceptance, E, NR by  at 3/22/2024 2113    Acceptance, E,TB, NR by  at 3/19/2024 1521    Acceptance, E,TB, VU by  at 3/18/2024 1547                                         User Key       Initials Effective Dates Name Provider Type Discipline     06/06/23 -  Ivy Gordillo, RN Registered Nurse Nurse     05/24/22 -  Eugene Cuevas PT Physical Therapist PT     06/21/23 -  Bhavani St, STALIN Registered Nurse Nurse                  PT Recommendation and Plan  Anticipated Discharge Disposition (PT): inpatient rehabilitation facility, skilled nursing facility (further physical rehab needed prior to return home by herself)  Planned Therapy Interventions (PT): balance training, bed mobility training, gait training, home exercise program, patient/family education, postural re-education, ROM (range of motion), stair training, strengthening, stretching, transfer training  Therapy Frequency (PT): 2 times/wk (2-5x/wk)  Progress Summary (PT)  Daily Progress Summary (PT): Pt. was able to demonstrate bed mobility at similar level as prior sessions. She was able to perform standing activity w/ Angela to maintain upright positioning. She was able to ambulate minimal distance w/ continual cueing d/t L sided neglect. Pt. would continue to benefit from skilled PT services.  Plan of Care Reviewed With: patient  Outcome Evaluation: Pt. evaluation completed during PT session. She was able to perform bed mobility w/ modA. She was able to perform transfers w/ Angela-modA. She was unable to ambulate d/t weakness. Pt. would benefit from skilled PT services.       Time Calculation:    PT Charges       Row Name 03/26/24 1053             Time Calculation     PT Received On 03/26/24  -KM         Time Calculation- PT    Total Timed Code Minutes- PT 23 minute(s)  -KM                User Key  (r) = Recorded By, (t) = Taken By, (c) = Cosigned By      Initials Name Provider Type    Eugene Angel, PT Physical Therapist                  Therapy Charges for Today       Code Description Service Date Service Provider Modifiers Qty    43792678194 HC PT THERAPEUTIC ACT EA 15 MIN 3/25/2024 Eugene Cuevas, PT GP 1    71079774288 HC PT THER PROC EA 15 MIN 3/25/2024 Eugene Cuevas, PT GP 1    96654883907 HC PT THERAPEUTIC ACT EA 15 MIN 3/26/2024 Eugene Cuevas, PT GP 1    69611372591 HC PT THER PROC EA 15 MIN 3/26/2024 Eugene Cuevas, PT GP 1            PT G-Codes  AM-PAC 6 Clicks Score (PT): 13    Eugene Cuevas PT  3/26/2024

## 2024-03-26 NOTE — PLAN OF CARE
Goal Outcome Evaluation:  Plan of Care Reviewed With: patient        Progress: improving                        Patient has been resting this shift. No s/s of acute distress noted at this time. Plan of care ongoing.

## 2024-03-26 NOTE — THERAPY TREATMENT NOTE
"Acute Care - Speech Language Pathology Treatment Note  [facility]  Speech - Language - Cognitive Therapy  Treatment Note     Patient Name: Regine Beckham  : 1955  MRN: [id number]  Today's Date: 3/26/2024     Admit Date: 3/17/2024     Speech Language Cognition Plan of Care:        [facility] Patient is emotional during today's session with frequent crying episodes d/t wanting to go home/see friends.    Speech is mildy dysarthric negatively impacting intelligibility this date.      Long Term Goal:  Patient will demonstrate cognitive-linguistic skills representative of  premorbid baseline functional status to allow for participation in complex conversation and participation in ADLS.      Short Term Goals:  1. Patient will tolerate facial massage to L facial surface for 3-7 minutes to increase surface blood flow, sensation and ROM over 3 consecutive sessions.    *Deferred this date     2. Patient will demonstrate orientation to time to improve safety with return to home environment over 3 consecutive sessions.  Pt is oriented to time of day, ISMAEL. Pt with incorrect year. Pt with difficulty with name of hospital; however does state she is in a hospital.     3. Patient will complete complex word finding tasks in conversation and structured tasks to improve ability to make needs known, complete ADLs  over 3 consecutive sessions.  Pt with difficulty naming places, stores, and city names noted during conversation.      4. Patient will read simple sentences with minimal delay to improve safety with return to home environment over 3 consecutive sessions.   Deferred this date.      5. Patient will complete problem solving tasks for ADLs to improve safety with return to home environment over 3 consecutive sessions.  Patient able to problem solve for safe return home including \"I need rehab to be able to take care of myself\"    6. Patient will recall recent events and ADLs " "with minimal delay to improve safety with return to home environment over 3 consecutive sessions.  Deferred this date     7. Patient will respond to OE questions with minimal delay to improve language processing  over 3 consecutive sessions.  Able to respond to oe questions with minimal delay noted. 2/3     8. Patient will compare/contrast with minimal delay to improve language processing over 3 consecutive sessions.  Deferred this date.     9. New Goal: Pt will perform OROM/GRACE tasks x3 sets x10 reps over three consecutive sessions.     10. New Goal: Pt will over-articulate and hyperphonate to increase intelligibility in multi-syllabic words w/ 90% intelligibility independently over three consecutive sessions.   *Deferred this date  11. New Goal: Pt will increase vocal intensity for functional communication in conversational exchanges and therapy tasks in 8/10 opp over three consecutive sessions independently.   *Deferred this date    Visit Dx:    ICD-10-CM ICD-9-CM   1. Left hip pain  M25.552 719.45   2. Acute pain of left shoulder  M25.512 719.41   3. Fall from standing, initial encounter  W19.XXXA E888.9   4. Failure to thrive in adult  R62.7 783.7     Patient Active Problem List   Diagnosis    Iron deficiency anemia due to chronic blood loss    Major depressive disorder    RLS (restless legs syndrome)    GERD (gastroesophageal reflux disease)    Left breast mass    Allergy to penicillin    Stroke    Grade I diastolic dysfunction    Moderate malnutrition    Falls frequently    Stroke-like symptoms     Past Medical History:   Diagnosis Date    Anemia     Anxiety     Arthritis     Depression     Legally blind     Restless leg syndrome     Sleep apnea     \"I don't use a cpap anymore since losing 106 lbs\"    Water retention      Past Surgical History:   Procedure Laterality Date    BACK SURGERY      CARDIAC CATHETERIZATION N/A 1/19/2024    Procedure: Percutaneous Manual Thrombectomy;  Surgeon: Efrain Hurley MD; "  Location: [facility] INVASIVE LOCATION;  Service: Interventional Radiology;  Laterality: N/A;    GALLBLADDER SURGERY      HIP ARTHROSCOPY      JOINT REPLACEMENT Right        SLP Recommendation and Plan      Continue formal [id number] therapy per POC.       Thank you-   Marbella Padgett M.A., CCC-SLP       EDUCATION  The patient has been educated in the following areas:     Cognitive Impairment Communication Impairment.      Time Calculation:      Time Calculation- SLP       Row Name 03/26/24 1353             Time Calculation- SLP    SLP - Next Appointment 03/27/24  -                User Key  (r) = Recorded By, (t) = Taken By, (c) = Cosigned By      Initials Name Provider Type     Marbella Padgett MA,CCC-SLP Speech and Language Pathologist                    Therapy Charges for Today       Code Description Service Date Service Provider Modifiers Qty    92865718454  ST TREATMENT SPEECH 2 3/26/2024 Marbella Padgett MA,CCC-SLP GN 1                       Marbella Padgett MA,CCC-SLP  3/26/2024

## 2024-03-27 ENCOUNTER — HOSPITAL ENCOUNTER (INPATIENT)
Facility: HOSPITAL | Age: 69
DRG: 690 | End: 2024-03-27
Attending: INTERNAL MEDICINE | Admitting: INTERNAL MEDICINE
Payer: MEDICARE

## 2024-03-27 VITALS
TEMPERATURE: 98 F | WEIGHT: 137.5 LBS | SYSTOLIC BLOOD PRESSURE: 134 MMHG | RESPIRATION RATE: 16 BRPM | DIASTOLIC BLOOD PRESSURE: 80 MMHG | HEART RATE: 95 BPM | BODY MASS INDEX: 22.1 KG/M2 | HEIGHT: 66 IN | OXYGEN SATURATION: 97 %

## 2024-03-27 PROBLEM — R53.81 DEBILITY: Status: ACTIVE | Noted: 2024-03-27

## 2024-03-27 LAB — SARS-COV-2 AG RESP QL IA.RAPID: NORMAL

## 2024-03-27 PROCEDURE — 25010000002 HEPARIN (PORCINE) PER 1000 UNITS: Performed by: HOSPITALIST

## 2024-03-27 PROCEDURE — 92507 TX SP LANG VOICE COMM INDIV: CPT

## 2024-03-27 PROCEDURE — 99239 HOSP IP/OBS DSCHRG MGMT >30: CPT | Performed by: HOSPITALIST

## 2024-03-27 PROCEDURE — 97530 THERAPEUTIC ACTIVITIES: CPT

## 2024-03-27 PROCEDURE — 87426 SARSCOV CORONAVIRUS AG IA: CPT | Performed by: HOSPITALIST

## 2024-03-27 PROCEDURE — 99222 1ST HOSP IP/OBS MODERATE 55: CPT | Performed by: INTERNAL MEDICINE

## 2024-03-27 PROCEDURE — 25010000002 HEPARIN (PORCINE) PER 1000 UNITS: Performed by: INTERNAL MEDICINE

## 2024-03-27 RX ORDER — BISACODYL 10 MG
10 SUPPOSITORY, RECTAL RECTAL DAILY PRN
Status: ACTIVE | OUTPATIENT
Start: 2024-03-27

## 2024-03-27 RX ORDER — LOSARTAN POTASSIUM 50 MG/1
50 TABLET ORAL DAILY
Status: CANCELLED | OUTPATIENT
Start: 2024-03-28

## 2024-03-27 RX ORDER — POLYETHYLENE GLYCOL 3350 17 G/17G
17 POWDER, FOR SOLUTION ORAL DAILY PRN
Status: CANCELLED | OUTPATIENT
Start: 2024-03-27

## 2024-03-27 RX ORDER — CEFDINIR 300 MG/1
300 CAPSULE ORAL EVERY 12 HOURS SCHEDULED
Status: CANCELLED | OUTPATIENT
Start: 2024-03-27 | End: 2024-03-31

## 2024-03-27 RX ORDER — OXYBUTYNIN CHLORIDE 5 MG/1
10 TABLET, EXTENDED RELEASE ORAL DAILY
Status: CANCELLED | OUTPATIENT
Start: 2024-03-28

## 2024-03-27 RX ORDER — BISACODYL 10 MG
10 SUPPOSITORY, RECTAL RECTAL DAILY PRN
Status: CANCELLED | OUTPATIENT
Start: 2024-03-27

## 2024-03-27 RX ORDER — BISACODYL 5 MG/1
5 TABLET, DELAYED RELEASE ORAL DAILY PRN
Status: CANCELLED | OUTPATIENT
Start: 2024-03-27

## 2024-03-27 RX ORDER — ROPINIROLE 1 MG/1
4 TABLET, FILM COATED ORAL NIGHTLY
Status: CANCELLED | OUTPATIENT
Start: 2024-03-27

## 2024-03-27 RX ORDER — ASPIRIN 81 MG/1
81 TABLET, CHEWABLE ORAL DAILY
Status: CANCELLED | OUTPATIENT
Start: 2024-03-28

## 2024-03-27 RX ORDER — SODIUM CHLORIDE 0.9 % (FLUSH) 0.9 %
10 SYRINGE (ML) INJECTION EVERY 12 HOURS SCHEDULED
Status: CANCELLED | OUTPATIENT
Start: 2024-03-27

## 2024-03-27 RX ORDER — ATORVASTATIN CALCIUM 40 MG/1
80 TABLET, FILM COATED ORAL NIGHTLY
Status: CANCELLED | OUTPATIENT
Start: 2024-03-27

## 2024-03-27 RX ORDER — SODIUM CHLORIDE 0.9 % (FLUSH) 0.9 %
10 SYRINGE (ML) INJECTION AS NEEDED
Status: DISCONTINUED | OUTPATIENT
Start: 2024-03-27 | End: 2024-03-27

## 2024-03-27 RX ORDER — PANTOPRAZOLE SODIUM 40 MG/1
40 TABLET, DELAYED RELEASE ORAL
Status: CANCELLED | OUTPATIENT
Start: 2024-03-28

## 2024-03-27 RX ORDER — ROPINIROLE 1 MG/1
4 TABLET, FILM COATED ORAL NIGHTLY
Status: DISPENSED | OUTPATIENT
Start: 2024-03-27

## 2024-03-27 RX ORDER — ZOLPIDEM TARTRATE 5 MG/1
5 TABLET ORAL NIGHTLY PRN
Status: DISPENSED | OUTPATIENT
Start: 2024-03-27

## 2024-03-27 RX ORDER — ASPIRIN 81 MG/1
81 TABLET, CHEWABLE ORAL DAILY
Status: DISPENSED | OUTPATIENT
Start: 2024-03-28

## 2024-03-27 RX ORDER — CEFDINIR 300 MG/1
300 CAPSULE ORAL EVERY 12 HOURS SCHEDULED
Status: COMPLETED | OUTPATIENT
Start: 2024-03-27 | End: 2024-03-30

## 2024-03-27 RX ORDER — FOLIC ACID 1 MG/1
1 TABLET ORAL DAILY
Status: DISPENSED | OUTPATIENT
Start: 2024-03-28

## 2024-03-27 RX ORDER — ATORVASTATIN CALCIUM 40 MG/1
80 TABLET, FILM COATED ORAL NIGHTLY
Status: DISPENSED | OUTPATIENT
Start: 2024-03-27

## 2024-03-27 RX ORDER — POLYETHYLENE GLYCOL 3350 17 G/17G
17 POWDER, FOR SOLUTION ORAL DAILY PRN
Status: ACTIVE | OUTPATIENT
Start: 2024-03-27

## 2024-03-27 RX ORDER — ACETAMINOPHEN 500 MG
1000 TABLET ORAL EVERY 6 HOURS PRN
Status: DISPENSED | OUTPATIENT
Start: 2024-03-27

## 2024-03-27 RX ORDER — MIRTAZAPINE 15 MG/1
30 TABLET, FILM COATED ORAL NIGHTLY
Status: DISPENSED | OUTPATIENT
Start: 2024-03-27

## 2024-03-27 RX ORDER — LOSARTAN POTASSIUM 50 MG/1
50 TABLET ORAL DAILY
Status: DISPENSED | OUTPATIENT
Start: 2024-03-28

## 2024-03-27 RX ORDER — FOLIC ACID 1 MG/1
1 TABLET ORAL DAILY
Status: CANCELLED | OUTPATIENT
Start: 2024-03-28

## 2024-03-27 RX ORDER — SODIUM CHLORIDE 0.9 % (FLUSH) 0.9 %
10 SYRINGE (ML) INJECTION EVERY 12 HOURS SCHEDULED
Status: DISCONTINUED | OUTPATIENT
Start: 2024-03-27 | End: 2024-03-27

## 2024-03-27 RX ORDER — ZOLPIDEM TARTRATE 5 MG/1
5 TABLET ORAL NIGHTLY PRN
Status: CANCELLED | OUTPATIENT
Start: 2024-03-27

## 2024-03-27 RX ORDER — HEPARIN SODIUM 5000 [USP'U]/ML
5000 INJECTION, SOLUTION INTRAVENOUS; SUBCUTANEOUS EVERY 12 HOURS SCHEDULED
Status: DISPENSED | OUTPATIENT
Start: 2024-03-27

## 2024-03-27 RX ORDER — SODIUM CHLORIDE 0.9 % (FLUSH) 0.9 %
10 SYRINGE (ML) INJECTION AS NEEDED
Status: CANCELLED | OUTPATIENT
Start: 2024-03-27

## 2024-03-27 RX ORDER — SODIUM CHLORIDE 9 MG/ML
40 INJECTION, SOLUTION INTRAVENOUS AS NEEDED
Status: DISCONTINUED | OUTPATIENT
Start: 2024-03-27 | End: 2024-03-27

## 2024-03-27 RX ORDER — AMOXICILLIN 250 MG
2 CAPSULE ORAL 2 TIMES DAILY
Status: CANCELLED | OUTPATIENT
Start: 2024-03-27

## 2024-03-27 RX ORDER — HEPARIN SODIUM 5000 [USP'U]/ML
5000 INJECTION, SOLUTION INTRAVENOUS; SUBCUTANEOUS EVERY 12 HOURS SCHEDULED
Status: CANCELLED | OUTPATIENT
Start: 2024-03-27

## 2024-03-27 RX ORDER — BISACODYL 5 MG/1
5 TABLET, DELAYED RELEASE ORAL DAILY PRN
Status: ACTIVE | OUTPATIENT
Start: 2024-03-27

## 2024-03-27 RX ORDER — SODIUM CHLORIDE 9 MG/ML
40 INJECTION, SOLUTION INTRAVENOUS AS NEEDED
Status: CANCELLED | OUTPATIENT
Start: 2024-03-27

## 2024-03-27 RX ORDER — ACETAMINOPHEN 500 MG
1000 TABLET ORAL EVERY 6 HOURS PRN
Status: CANCELLED | OUTPATIENT
Start: 2024-03-27

## 2024-03-27 RX ORDER — PANTOPRAZOLE SODIUM 40 MG/1
40 TABLET, DELAYED RELEASE ORAL
Status: DISPENSED | OUTPATIENT
Start: 2024-03-28

## 2024-03-27 RX ORDER — MIRTAZAPINE 15 MG/1
30 TABLET, FILM COATED ORAL NIGHTLY
Status: CANCELLED | OUTPATIENT
Start: 2024-03-27

## 2024-03-27 RX ORDER — AMOXICILLIN 250 MG
2 CAPSULE ORAL 2 TIMES DAILY
Status: DISPENSED | OUTPATIENT
Start: 2024-03-27

## 2024-03-27 RX ORDER — OXYBUTYNIN CHLORIDE 5 MG/1
10 TABLET, EXTENDED RELEASE ORAL DAILY
Status: DISPENSED | OUTPATIENT
Start: 2024-03-28

## 2024-03-27 RX ADMIN — MIRTAZAPINE 30 MG: 15 TABLET, FILM COATED ORAL at 20:19

## 2024-03-27 RX ADMIN — OXYBUTYNIN CHLORIDE 10 MG: 5 TABLET, EXTENDED RELEASE ORAL at 09:37

## 2024-03-27 RX ADMIN — DOCUSATE SODIUM 50 MG AND SENNOSIDES 8.6 MG 2 TABLET: 8.6; 5 TABLET, FILM COATED ORAL at 09:37

## 2024-03-27 RX ADMIN — HEPARIN SODIUM 5000 UNITS: 5000 INJECTION INTRAVENOUS; SUBCUTANEOUS at 20:20

## 2024-03-27 RX ADMIN — CEFDINIR 300 MG: 300 CAPSULE ORAL at 09:37

## 2024-03-27 RX ADMIN — Medication 10 ML: at 09:37

## 2024-03-27 RX ADMIN — ZOLPIDEM TARTRATE 5 MG: 5 TABLET ORAL at 20:19

## 2024-03-27 RX ADMIN — TICAGRELOR 60 MG: 60 TABLET ORAL at 09:37

## 2024-03-27 RX ADMIN — ROPINIROLE HYDROCHLORIDE 4 MG: 1 TABLET, FILM COATED ORAL at 20:19

## 2024-03-27 RX ADMIN — Medication 1 MG: at 09:37

## 2024-03-27 RX ADMIN — TICAGRELOR 60 MG: 60 TABLET ORAL at 20:20

## 2024-03-27 RX ADMIN — ATORVASTATIN CALCIUM 80 MG: 40 TABLET, FILM COATED ORAL at 20:20

## 2024-03-27 RX ADMIN — HEPARIN SODIUM 5000 UNITS: 5000 INJECTION INTRAVENOUS; SUBCUTANEOUS at 09:37

## 2024-03-27 RX ADMIN — CEFDINIR 300 MG: 300 CAPSULE ORAL at 20:20

## 2024-03-27 RX ADMIN — ASPIRIN 81 MG: 81 TABLET, CHEWABLE ORAL at 09:37

## 2024-03-27 RX ADMIN — ACETAMINOPHEN 1000 MG: 500 TABLET ORAL at 20:19

## 2024-03-27 RX ADMIN — PANTOPRAZOLE SODIUM 40 MG: 40 TABLET, DELAYED RELEASE ORAL at 05:36

## 2024-03-27 RX ADMIN — LOSARTAN POTASSIUM 50 MG: 50 TABLET, FILM COATED ORAL at 09:37

## 2024-03-27 NOTE — SIGNIFICANT NOTE
03/27/24 0809   Post Acute Pre-Cert Documentation   Request Submitted by Facility - Type: Post Acute   Post-Acute Authorization Type Submitted: IRF   Date Post Acute Pre-Cert Completed 03/27/24   Accepting Facility [facility]   Response Received from Insurance? Approval   Response Communicated to:    Post Acute Pre-Cert Initiated Comment Prior auth request for Rehab has been approved 03/27-04/08.  We can offer a bed today if medically ready.

## 2024-03-27 NOTE — THERAPY TREATMENT NOTE
"Patient Name: Regine Beckham  : 1955    MRN: [id number]                              Today's Date: 3/27/2024       Admit Date: 3/17/2024    Visit Dx:     ICD-10-CM ICD-9-CM   1. Left hip pain  M25.552 719.45   2. Acute pain of left shoulder  M25.512 719.41   3. Fall from standing, initial encounter  W19.XXXA E888.9   4. Failure to thrive in adult  R62.7 783.7     Patient Active Problem List   Diagnosis    Iron deficiency anemia due to chronic blood loss    Major depressive disorder    RLS (restless legs syndrome)    GERD (gastroesophageal reflux disease)    Left breast mass    Allergy to penicillin    Stroke    Grade I diastolic dysfunction    Moderate malnutrition    Falls frequently    Stroke-like symptoms     Past Medical History:   Diagnosis Date    Anemia     Anxiety     Arthritis     Depression     Legally blind     Restless leg syndrome     Sleep apnea     \"I don't use a cpap anymore since losing 106 lbs\"    Water retention      Past Surgical History:   Procedure Laterality Date    BACK SURGERY      CARDIAC CATHETERIZATION N/A 2024    Procedure: Percutaneous Manual Thrombectomy;  Surgeon: Efrain Hurley MD;  Location: [facility] INVASIVE LOCATION;  Service: Interventional Radiology;  Laterality: N/A;    GALLBLADDER SURGERY      HIP ARTHROSCOPY      JOINT REPLACEMENT Right       General Information       Row Name 24 1510          OT Time and Intention    Document Type therapy note (daily note)  -     Mode of Treatment individual therapy;occupational therapy  -       Row Name 24 1510          General Information    Patient Profile Reviewed yes  -KP     Existing Precautions/Restrictions fall  -     Barriers to Rehab previous functional deficit  -       Row Name 24 1510          Cognition    Orientation Status (Cognition) oriented x 3  -       Row Name 24 1510          Safety Issues, Functional Mobility    Impairments Affecting Function (Mobility) " balance;coordination;endurance/activity tolerance;muscle tone abnormal;strength  -               User Key  (r) = Recorded By, (t) = Taken By, (c) = Cosigned By      Initials Name Provider Type    Maria Del Rosario Briceño OT Occupational Therapist                     Mobility/ADL's       Row Name 03/27/24 1510          Bed Mobility    Bed Mobility sit-supine  -KP     Sit-Supine [place] (Bed Mobility) minimum assist (75% patient effort);verbal cues  -     Assistive Device (Bed Mobility) bed rails;head of bed elevated  -               User Key  (r) = Recorded By, (t) = Taken By, (c) = Cosigned By      Initials Name Provider Type    Maria Del Rosario Briceño OT Occupational Therapist                   Obj/Interventions       Row Name 03/27/24 1511          Motor Skills    Motor Skills functional endurance  -     Functional Endurance fair plus  -               User Key  (r) = Recorded By, (t) = Taken By, (c) = Cosigned By      Initials Name Provider Type    Maria Del Rosario Briceño OT Occupational Therapist                   Goals/Plan    No documentation.                  Clinical Impression       Row Name 03/27/24 1511          Pain Assessment    Pretreatment Pain Rating 0/10 - no pain  -     Posttreatment Pain Rating 0/10 - no pain  -       Row Name 03/27/24 1511          Plan of Care Review    Plan of Care Reviewed With patient  -     Progress improving  -     Outcome Evaluation Patient sitting at edge of bed upon arrival. She was assisted back to supine with minimal assist. Pending discharge to [facility].  -       Row Name 03/27/24 1511          Therapy Plan Review/Discharge Plan (OT)    Anticipated Discharge Disposition (OT) inpatient rehabilitation facility  -       Row Name 03/27/24 1511          Positioning and Restraints    Pre-Treatment Position in bed  -     Post Treatment Position bed  -     In Bed fowlers;call light within reach;encouraged to call for assist;exit alarm on  -               User Key   (r) = Recorded By, (t) = Taken By, (c) = Cosigned By      Initials Name Provider Type    Maria Del Rosario Briceño OT Occupational Therapist                   Outcome Measures       Row Name 03/27/24 0930          How much help from another person do you currently need...    Turning from your back to your side while in flat bed without using bedrails? 3  -MM     Moving from lying on back to sitting on the side of a flat bed without bedrails? 3  -MM     Moving to and from a bed to a chair (including a wheelchair)? 2  -MM     Standing up from a chair using your arms (e.g., wheelchair, bedside chair)? 2  -MM     Climbing 3-5 steps with a railing? 1  -MM     To walk in hospital room? 2  -MM     AM-PAC 6 Clicks Score (PT) 13  -MM     Highest Level of Mobility Goal 4 --> Transfer to chair/commode  -MM               User Key  (r) = Recorded By, (t) = Taken By, (c) = Cosigned By      Initials Name Provider Type    Jaqueline Vazquez, RN Registered Nurse                      OT Recommendation and Plan  Planned Therapy Interventions (OT): activity tolerance training, BADL retraining, adaptive equipment training, functional balance retraining, transfer/mobility retraining, passive ROM/stretching, occupation/activity based interventions, ROM/therapeutic exercise, strengthening exercise, patient/caregiver education/training, neuromuscular control/coordination retraining  Therapy Frequency (OT): 3 times/wk (3-5x/week as able and available)  Plan of Care Review  Plan of Care Reviewed With: patient  Progress: improving  Outcome Evaluation: Patient sitting at edge of bed upon arrival. She was assisted back to supine with minimal assist. Pending discharge to [facility].     Time Calculation:         Time Calculation- OT       Row Name 03/27/24 1512             Time Calculation- OT    OT Received On 03/27/24  -                User Key  (r) = Recorded By, (t) = Taken By, (c) = Cosigned By      Initials Name Provider Type    Maria Del Rosario Briceño OT  Occupational Therapist                  Therapy Charges for Today       Code Description Service Date Service Provider Modifiers Qty    [id number]  OT THERAPEUTIC ACT EA 15 MIN 3/27/2024 Maria Del Rosario Padgett, BLU GO 1                 Maria Del Rosario Padgett OT  3/27/2024

## 2024-03-27 NOTE — CASE MANAGEMENT/SOCIAL WORK
Discharge Planning Assessment   [place]     Patient Name: Regine Beckham  MRN: [id number]  Today's Date: 3/27/2024    Admit Date: 3/17/2024    Plan: SS notified per inpt rehab on this date that pt has been approved by insurance for admit to [facility] inpt rehab.  Per rehab pt will need Covid test and discharge readmit orders.  SS notified Physician. SS will follow.       Discharge Plan       Row Name 03/27/24 1241       Plan    Final Discharge Disposition Code 62 - inpatient rehab facility    Final Note Pt to be discharged to [facility] inpt rehab on this date.                  Continued Care and Services - Admitted Since 3/17/2024       Destination       Service Provider Request Status Selected Services Address Phone Fax Patient Preferred     [facility] Accepted N/A [address] [id number] [phone] --                Meron Garcia, BATOOLW

## 2024-03-27 NOTE — H&P
[facility] HOSPITALIST HISTORY AND PHYSICAL    Patient Identification:  Name:  Regine Beckham  Age:  68 y.o.  Sex:  female  :  1955  MRN:  [id number]   Visit Number:  [id number]  Room number:  106/1S  Primary Care Physician:  Dread Burton MD     Subjective     3/27/2024   Chief complaint: Follow-up on debility and falls      History of presenting illness:  68 y.o. female  This pt is seen today for post admission physician evaluation to the inpatient rehabilitation facility.  Assisted by, Yaa GANT.  Patient is a 68-year-old female who was admitted to the acute hospital [facility] having had a previous CVA in January of this year who was have recurrent falls at home and getting generalized weak.  She states that she is not currently been using her walker at home.  She has 2 neighbors that help her but she has no real family support.  She states she fell 5 days in a row.  EMS was eventually contacted apparently by a neighbor and she was brought to the hospital.  She was found to have an E. coli UTI that has been treated appropriately and is completing an Omnicef course. Patient continued to progress medically.  Speech therapy, physical therapy and Occupational therapy evaluations were completed with recommended acute inpatient rehabilitation referral for continued functional mobility intervention and reeducation.  Acute rehab referral ordered for continued medical monitoring and management post prolonged hospitalization, lab monitoring, pain mgt needs, bowel/bladder care with new medication education, skin monitoring and breakdown prevention along with ongoing medical comorbidities that require ongoing care.    The preadmission mini-FIM score as assessed by the referring facility as follows: Eating 5, grooming 4, bathing 3, upper body dressing 4, lower body dressing 3, toileting hygiene 3, transfers 4, toilet transfer 4, locomotion 4, bladder management 4, bowel management 4,  "comprehension 6, expression 6, social interaction 6, problem-solving 6, memory 6.    ---------------------------------------------------------------------------------------------------------------------   Review of Systems:  General: She feels generally weak, denies fever or chills  HEENT: She states she sometimes chokes with her food, otherwise no recent vision or hearing changes. Although by history she is legally blind.  Heart: Denies chest pain or palpitation  Lungs: No shortness of breath or cough  Abdomen: She thinks her bowels been moving no abdominal pain  : No dysuria or hematuria  Musculoskeletal: No known joint effusion  Neuro: No paresthesias but she is weak on her left side from previous CVA  Skin: No known skin breakdown  ---------------------------------------------------------------------------------------------------------------------   Past Medical History:   Diagnosis Date    Anemia     Anxiety     Arthritis     Depression     Legally blind     Restless leg syndrome     Sleep apnea     \"I don't use a cpap anymore since losing 106 lbs\"    Water retention      Past Surgical History:   Procedure Laterality Date    BACK SURGERY      CARDIAC CATHETERIZATION N/A 1/19/2024    Procedure: Percutaneous Manual Thrombectomy;  Surgeon: Efrain Hurley MD;  Location: [facility] INVASIVE LOCATION;  Service: Interventional Radiology;  Laterality: N/A;    GALLBLADDER SURGERY      HIP ARTHROSCOPY      JOINT REPLACEMENT Right      Family History   Problem Relation Age of Onset    Breast cancer Neg Hx      Social History     Socioeconomic History    Marital status:     Number of children: 0    Years of education: 1 yr college   Tobacco Use    Smoking status: Every Day     Current packs/day: 1.50     Average packs/day: 1.5 packs/day for 51.0 years (76.5 ttl pk-yrs)     Types: Cigarettes    Smokeless tobacco: Never   Vaping Use    Vaping status: Never Used   Substance and Sexual Activity    Alcohol use: " "Yes     Alcohol/week: 1.0 standard drink of alcohol     Types: 1 Glasses of wine per week     Comment: \"occasionally - once every two months\"    Drug use: Not Currently     Comment: alcohol - occasionally    Sexual activity: Defer     ---------------------------------------------------------------------------------------------------------------------   Allergies:  Penicillins, she can tolerate cephalosporins as she has been on Rocephin in the acute unit  ---------------------------------------------------------------------------------------------------------------------   Medications from discharge summary reviewed    Prior to Admission Medications       Prescriptions Last Dose Informant Patient Reported? Taking?    aspirin 81 MG EC tablet Unknown Pharmacy Yes No    Take 1 tablet by mouth Daily.    atorvastatin (LIPITOR) 80 MG tablet Unknown  No No    Take 1 tablet by mouth Every Night.    gabapentin (NEURONTIN) 300 MG capsule Unknown Pharmacy Yes No    Take 1 capsule by mouth 3 (Three) Times a Day.    losartan (COZAAR) 50 MG tablet Unknown Pharmacy Yes No    Take 1 tablet by mouth Daily. Indications: High Blood Pressure Disorder    Mirabegron ER (MYRBETRIQ) 50 MG tablet sustained-release 24 hour 24 hr tablet Unknown Pharmacy Yes No    Take 50 mg by mouth Daily. Indications: Urinary Urgency    mirtazapine (REMERON) 30 MG tablet Unknown  No No    Take 1 tablet by mouth Every Night. Indications: Major Depressive Disorder    pantoprazole (PROTONIX) 40 MG EC tablet Unknown  No No    TAKE 1 TABLET BY MOUTH EVERY MORNING FOR HEARTBURN    rOPINIRole (REQUIP) 4 MG tablet Unknown  No No    Take 1 tablet by mouth Every Night. Take 1 hour before bedtime.  Indications: Restless Leg Syndrome    ticagrelor (BRILINTA) 60 MG tablet tablet Unknown  No No    Take 1 tablet by mouth 2 (Two) Times a Day.    zolpidem (AMBIEN) 5 MG tablet Unknown  No No    Take 1 tablet by mouth At Night As Needed for Sleep. Indications: Trouble Sleeping " "         Objective     Vital Signs:  Temp:  [98 °F (36.7 °C)-98.7 °F (37.1 °C)] 98.4 °F (36.9 °C)  Heart Rate:  [] 73  Resp:  [16-18] 18  BP: (102-147)/(68-91) 133/75    Mean Arterial Pressure (Non-Invasive) for the past 24 hrs (Last 3 readings):   Noninvasive MAP (mmHg)   03/27/24 1548 101     SpO2:  [96 %-98 %] 98 %  on   ;   Device (Oxygen Therapy): room air  Body mass index is 22.6 kg/m².    Wt Readings from Last 3 Encounters:   03/27/24 63.5 kg (140 lb)   03/27/24 62.4 kg (137 lb 8 oz)   02/13/24 72.1 kg (159 lb)      ---------------------------------------------------------------------------------------------------------------------     Physical exam:  Constitutional: She is pleasant no distress she became dysthymic when discussing that the stroke \"ruined  me\".  HEENT: Pupils are equal, she has a slight left mouth droop, hearing appears to be intact, neck is supple no JVD    Cardiovascular: Regular rate and rhythm without murmur rub or gallop  Pulmonary/Chest: Bilateral breath sounds are clear no rhonchi rales or wheezing  Abdominal:  .  Soft benign bowel sounds are active no organomegaly or masses appreciated  Musculoskeletal: Right side strength is about 4/5, I could not really get her to move her left hand, it appears to be paralyzed, she has a little bit of dorsiflexion better plantarflexion she has some movement at the elbow of the left arm and some shoulder movement.  The arm is significantly weaker than the right.  She can dorsi and plantarflex both ankles but weaker on the left with dorsiflexion.  She could lift both legs off the bed no joint effusions were noted  Neurological: Patient was alert and oriented to [facility] place self.  Cranial nerves II through XII appear to be intact with exception of her vision.  Skin: No skin rashes no  Peripheral vascular: Adequate distal pulses  Genitourinary: No Arce " "catheter  ---------------------------------------------------------------------------------------------------------------------  EKG: EKG reviewed from March 21, sinus rhythm with some nonspecific findings.          Last echocardiogram:  Results for orders placed during the hospital encounter of 01/19/24    Adult Transthoracic Echo Limited W/ Cont if Necessary Per Protocol    Interpretation Summary    Left ventricular systolic function is normal. Estimated left ventricular EF = 60%    Saline test results are negative for intracardiac shunting..    --------------------------------------------------------------------------------------------------------------------  Labs:  Results from last 7 days   Lab Units 03/22/24  0249   WBC 10*3/mm3 6.55   HEMOGLOBIN g/dL 11.6*   HEMATOCRIT % 36.0   MCV fL 96.0   MCHC g/dL 32.2   PLATELETS 10*3/mm3 219         Results from last 7 days   Lab Units 03/23/24  0019 03/22/24  1433 03/22/24  0249   SODIUM mmol/L 139  --  141   POTASSIUM mmol/L 3.9 4.1 3.2*   MAGNESIUM mg/dL 1.9  --   --    CHLORIDE mmol/L 108*  --  108*   CO2 mmol/L 21.1*  --  22.5   BUN mg/dL 19  --  24*   CREATININE mg/dL 0.77  --  0.84   CALCIUM mg/dL 9.6  --  9.3   GLUCOSE mg/dL 107*  --  112*   Estimated Creatinine Clearance: 69.1 mL/min (by C-G formula based on SCr of 0.77 mg/dL).  No results found for: \"AMMONIA\"          No results found for: \"HGBA1C\", \"POCGLU\"  Lab Results   Component Value Date    TSH 2.880 03/17/2024     Pain Management Panel          Latest Ref Rng & Units 12/20/2023   Pain Management Panel   Amphetamine, Urine Qual Negative Negative    Barbiturates Screen, Urine Negative Negative    Benzodiazepine Screen, Urine Negative Negative    Buprenorphine, Screen, Urine Negative Negative    Cocaine Screen, Urine Negative Negative    Fentanyl, Urine Negative Negative    Methadone Screen , Urine Negative Negative    Methamphetamine, Ur Negative Negative      Brief Urine Lab Results  (Last result in the " "past 365 days)        Color   Clarity   Blood   Leuk Est   Nitrite   Protein   CREAT   Urine HCG        03/21/24 1613 Dark Yellow   Turbid   Trace   Moderate (2+)   Positive   30 mg/dL (1+)                 No results found for: \"BLOODCX\"  Urine Culture   Date Value Ref Range Status   03/21/2024 >100,000 CFU/mL Escherichia coli (A)  Final       Last Urine Toxicity          Latest Ref Rng & Units 12/20/2023   LAST URINE TOXICITY RESULTS   Amphetamine, Urine Qual Negative Negative    Barbiturates Screen, Urine Negative Negative    Benzodiazepine Screen, Urine Negative Negative    Buprenorphine, Screen, Urine Negative Negative    Cocaine Screen, Urine Negative Negative    Fentanyl, Urine Negative Negative    Methadone Screen , Urine Negative Negative    Methamphetamine, Ur Negative Negative        ----------------------------------------------------------------------------------------------------------------------  Detailed radiology reports for the last 24 hours:    Imaging Results (Last 24 Hours)       ** No results found for the last 24 hours. **          Final impressions for the last 30 days of radiology reports:    MRI Brain Without Contrast    Result Date: 3/20/2024  Findings consistent with chronic small vessel ischemic change with encephalomalacia in the right frontal lobe from prior infarct.     This report was finalized on 3/20/2024 1:35 PM by Marianna Caicedo M.D..      XR Chest 1 View    Result Date: 3/17/2024  No radiographic evidence of acute cardiac or pulmonary disease.    This report was finalized on 3/17/2024 1:50 PM by Dr. Hu Obrien MD.      XR Shoulder 2+ View Left    Result Date: 3/17/2024    No acute findings in the left shoulder.   This report was finalized on 3/17/2024 11:13 AM by Dr. Hu Obrien MD.      XR Hip With or Without Pelvis 2 - 3 View Left    Result Date: 3/17/2024  1.  No acute fracture or dislocation. 2.  Moderate to extensive degenerative osteoarthritis in the left hip.   " This report was finalized on 3/17/2024 11:13 AM by Dr. Hu Obrien MD.      CT Head Without Contrast    Result Date: 3/17/2024   1. Atrophy and chronic microangiopathic changes 2. Apparent suggestive of encephalomalacia in the right parietal region 3. No focal parenchymal mass, hemorrhage, or midline shift  This report was finalized on 3/17/2024 10:59 AM by Dr. Hu Obrien MD.       Assessment & Plan    Debility, complicated by previous CVA.  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  Patient has been admitted to the acute inpatient rehab facility to undergo all 3 modalities of therapy.  Patient will undergo physical therapy 1 to 2 hours a day 5 to 6 days a week for therapeutic exercise, range of motion, endurance, gait training, safety, stairs, strengthening.  Patient undergo occupational therapy 1 to 2 hours a day 5 to 6 days a week for dressing, ADLs, feeding, home skills, safety, toileting techniques.  Patient undergo speech and language pathology 30 to 60 minutes a day 3 to 5 days a week for communication, expression, dysphagia, aspiration needs.  Expected functional improvement for safe discharge will be for the patient to be able to safely perform activities of daily living using adaptive equipment for improved functional mobility.  Be independent and safe with bowel and bladder function.  Be able to safely consume food and fluids without signs of aspiration.  Be able to navigate home and community territory safely without injury or falls.  Anticipated disposition is home she will most likely require ongoing care by home health.  Patient goals are for the patient to be able to return to previous lateral level of functional mobility and independence prior to these falling episodes and the stroke.  Anticipated length of stay is 14 days    UTI, patient will be completing Omnicef course    DVT prophylaxis, SQ hep    Previous CVA status post right carotid stent.  Stable,  continue aspirin, high-dose statin, and Brilinta.    Hypertension, continue Cozaar, adjust as needed, monitor    And lab review, chemistries were acceptable on the 23rd, will recheck in a.m.    Mild anemia as of the 22nd, recheck in a.m.    Tobacco use, counseled to quit    Reported episodes of confusion, will monitor while in the rehab unit however she is oriented at this time.      VTE Prophylaxis:   Mechanical Order History:       None          Pharmalogical Order History:        Ordered     Dose Route Frequency Stop    03/27/24 1549  heparin (porcine) 5000 UNIT/ML injection 5,000 Units         5,000 Units SC Every 12 Hours Scheduled --                    Falls Risk Assessment with left-sided weakness and multiple falls at home, high risk of falling          Reji Hayes MD  [facility]  03/27/24  17:36 EDT

## 2024-03-27 NOTE — PROGRESS NOTES
"Patient Assessment Instrument  Quality Indicators - Admission FY 2023    Section A. Ethnicity/ Race/Language  Ethnicity:  Not of , /a, Slov[place] Origin  Race:  White  Preferred Language:  english  Requests  to Communicate:   No    Section A. Transportation      Section B. Hearing and Vision  Expression of Ideas and Wants: Expresses complex messages without difficulty and  with speech that is clear and easy to understand.  Understanding Verbal and Non-Verbal Content: Understands: Clear comprehension  without cues or repetitions.  Ability to Hear:  Adequate - no difficulty in normal conversation, social  interaction, listening to TV  Ability to See in Adequate Light:  Adequate - sees fine detail, such as regular  print in newspapers/books    Section B. Health Literacy  Frequency of Needing Assistance Reading:  Never      Section C. Cognitive Patterns  Brief Interview for Mental Status (BIMS) was conducted.  Repetition of Three Words: Three words  Able to report correct year: Correct  Able to report correct month: Accurate within 5 days  Able to report correct day of the week: Correct  Able to recall \"sock\": Yes, no cue required  Able to recall \"blue\": Yes, no cue required  Able to recall \"bed\": Yes, no cue required    BIMS SUMMARY SCORE: 15 Cognitively intact Patient was able to complete the Brief  Interview for Mental Status    Section C. Signs and Symptoms of Delirium (from CAM)  Evidence of Acute Change in Mental Status:   No  Inattention: Behavior not present  Thinking Disorganized or Incoherent:   Behavior not present  Altered Level of Consciousness:   Behavior not present    Section D. Mood  Presence of little interest or pleasure in doing things:   No  Frequency of having little interest or pleasure in doing things:   Never or 1  day  Presence of feeling down, depressed, or hopeless:   No  Frequency of feeling down, depressed, or hopeless:   Never or 1 day   Interview Ended. Above " responses do not meet criteria to continue.  Total Severity Score:   00    Section D. Social Isolation  Frequecy of Feeling Lonely or Isolated:  Never    Section KT9521. Prior Functioning      Section HK7241. Prior Device Use      Section IF3429. Self Care Performance      Section KM5393. Self Care Discharge Goals      Section QT1337. Mobility Performance      Section BP9894. Mobility Discharge Goals      Section H. Bladder and Bowel      Section I. Active Diagnosis      Section J. Health Conditions      Section J. Health Conditions (Pain)  Pain Effect on Sleep:   Rarely or not at all  Pain Interference with Therapy Activities:   Rarely or not at all  Pain Interference with Day-to-Day Activities:   Rarely or not at all    Section K. Swallowing/Nutritional Status    Nutritional Approaches on Admission:    Section M. Skin Conditions  Unhealed Pressure Ulcer/Injuries at Stage 1 or Higher on Admission:  No.    Section N. Medication        Section O. Special Treatments, Procedures, and Programs    Signed by: Erica Menon Nurse

## 2024-03-27 NOTE — PLAN OF CARE
Goal Outcome Evaluation:  Plan of Care Reviewed With: patient        Progress: improving                  Patient has been resting this shift. Patient being transferred to inpt rehab. Patient to notify family. IV and tele removed. Patient belongings sent with patient, including dentures. STALIN Oliveros notified. No s/s of acute distress noted at this time. Plan of care ongoing.

## 2024-03-27 NOTE — PROGRESS NOTES
Rehabilitation Nursing  Inpatient Rehabilitation Plan of Care Note    Plan of Care  Copy from POCSafety    Potential for Injury (Active)  Current Status (3/27/2024 4:00:00 PM): risk for falls  Weekly Goal: no falls  Discharge Goal: no falls    Body Systems    Integumentary (Active)  Current Status (3/27/2024 4:00:00 PM): risk for skin break down  Weekly Goal: no skin breakdown  Discharge Goal: no skin breakdown    Signed by: Erica Menon, Nurse

## 2024-03-27 NOTE — PLAN OF CARE
Goal Outcome Evaluation:   Pt just admitted to unit, will begin POC

## 2024-03-27 NOTE — DISCHARGE SUMMARY
[facility] HOSPITALIST MEDICINE DISCHARGE SUMMARY    Patient Identification:  Name:  Regine Beckham  Age:  68 y.o.  Sex:  female  :  1955  MRN:  [id number]  Visit Number:  [id number]    Date of Admission: 3/17/2024  Date of Discharge: 2024  DISCHARGE DISPOSITION   Stable  PCP: Dread Burton MD    DISCHARGE DIAGNOSIS : Acute physical deconditioning, frequent fall as a consequence of previous CVA and ongoing physical deconditioning and debility, history of right internal carotid artery disease post thrombectomy and stent placement, left-sided weakness secondary to previous CVA, episodes of confusion suspect underlying vascular dementia as a consequence of CVA.  History of tobacco use disorder stopped January of this year, severe left hip arthritis, history of congestive heart failure with preserved ejection fraction, history of iron deficiency anemia, history of right hip arthroplasty, history of dysphagia as a complication of CVA.  Acute cystitis without hematuria E. coli in culture.  Moderate malnutrition.    HOSPITAL COURSE  Patient is a 68 y.o. female presented to [facility] complaining of frequent fall at home, patient reported change she had a recent stroke affecting her left side with weakness she underwent rehab and was discharged, at home she lives by herself she has slowly deteriorated and has frequent fall, patient stated at home at least every day she falls 1-3 times.  Because of this her neighbors contacted EMS and she was brought into our facility.  Skeletal survey and CT scan of the brain did not show new stroke she has encephalomalacia of the right parietal region.  Discussion at length with patient and she is interested in skilled nursing facility rehab or inpatient rehab but no desire to be a permanent resident.  During her stay she was noted to have occasional confusion specially at night, she has cystitis E. coli in culture.  PT OT was consulted  and recommended inpatient rehab,  and  was assisting to facilitate this admission.        03/25/24  0500 03/26/24  0500 03/27/24  0500   Weight: 63.1 kg (139 lb 3.2 oz) 62 kg (136 lb 9.6 oz) 62.4 kg (137 lb 8 oz)     Body mass index is 22.19 kg/m².  Vitals:    03/27/24 0655   BP: 126/91   Pulse: 111   Resp: 18   Temp: 98.6 °F (37 °C)   SpO2: 98%     PHYSICAL EXAM:  General: Comfortable,awake, alert, oriented to self, place, and time, chronically ill-appearing no respiratory distress.    Skin:  Skin is warm and dry. No rash noted. No pallor.    HENT:  Head:  Normocephalic and atraumatic.  Mouth:  Moist mucous membranes.    Eyes:  Conjunctivae and EOM are normal.  Pupils are equal, round, and reactive to light.  No scleral icterus.    Neck:  Neck supple.  No JVD present.  No bruit  Pulmonary/Chest:  No respiratory distress, no wheezes, no crackles, with normal breath sounds and good air movement.  Cardiovascular:  Normal rate, regular rhythm and normal heart sounds with no murmur.  Abdominal:  Soft.  Bowel sounds are normal.  No distension and no tenderness.   Extremities:  No edema, no tenderness, and no deformity.  No red or swollen joints anywhere.  Strong pulses in all 4 extremities with no clubbing, no cyanosis, no edema.  Neurological: 3/5 left upper, 3-4/5 lower extremity left side, patient mildly slurred speech.  Genitourinary: No Arce catheter  Back:  ----------    DISCHARGE MEDICATIONS:     Discharge Medications        Continue These Medications        Instructions Start Date   atorvastatin 80 MG tablet  Commonly known as: LIPITOR   80 mg, Oral, Nightly      losartan 50 MG tablet  Commonly known as: COZAAR   50 mg, Oral, Daily      Mirabegron ER 50 MG tablet sustained-release 24 hour 24 hr tablet  Commonly known as: MYRBETRIQ   50 mg, Oral, Daily      mirtazapine 30 MG tablet  Commonly known as: REMERON   30 mg, Oral, Nightly      pantoprazole 40 MG EC tablet  Commonly known as:  PROTONIX   TAKE 1 TABLET BY MOUTH EVERY MORNING FOR HEARTBURN      rOPINIRole 4 MG tablet  Commonly known as: REQUIP   4 mg, Oral, Nightly, Take 1 hour before bedtime.      ticagrelor 60 MG tablet tablet  Commonly known as: BRILINTA   60 mg, Oral, 2 Times Daily      zolpidem 5 MG tablet  Commonly known as: AMBIEN   5 mg, Oral, Nightly PRN             ASK your doctor about these medications        Instructions Start Date   aspirin 81 MG EC tablet  Ask about: Which instructions should I use?   81 mg, Oral, Daily      gabapentin 300 MG capsule  Commonly known as: NEURONTIN   300 mg, Oral, 3 Times Daily                      Follow-up Information       Dread Burton MD In 1 week.    Specialty: Family Medicine  Contact information:  [address] [place] [id number]  [phone]               [facility] EMERGENCY DEPARTMENT.    Specialty: Emergency Medicine  Why: If symptoms worsen  Contact information:  [address] [place] [id number]  [phone]                                Yaritza Rao MD  03/27/24  10:05 EDT    Please note that this discharge summary required more than 30 minutes to complete.

## 2024-03-27 NOTE — CASE MANAGEMENT/SOCIAL WORK
Discharge Planning Assessment  [facility]     Patient Name: Regine Beckham  MRN: [id number]  Today's Date: 3/27/2024    Admit Date: 3/17/2024    Plan: SS notified per inpt rehab on this date that pt has been approved by insurance for admit to [facility] inpt rehab.  Per rehab pt will need Covid test and discharge readmit orders.  SS notified Physician. SS will follow.       Discharge Plan       Row Name 03/27/24 0946       Plan    Plan SS notified per inpt rehab on this date that pt has been approved by insurance for admit to [facility] inpt rehab.  Per rehab pt will need Covid test and discharge readmit orders.  SS notified Physician. SS will follow.                  PAULINO Dunne

## 2024-03-27 NOTE — NURSING NOTE
Attempted to call report to inpatient rehab. Stated they would have to call back.       1454:Called report to STALIN Oliveros.

## 2024-03-27 NOTE — THERAPY TREATMENT NOTE
Acute Care - Speech Language Pathology Treatment Note  [facility]  Speech - Language - Cognitive Therapy  Treatment Note     Patient Name: Regine Beckham  : 1955  MRN: [id number]  Today's Date: 3/27/2024     Admit Date: 3/17/2024     Speech Language Cognition Plan of Care:        [facility] Patient continues emotional during today's session with intermittent crying episodes. She is eager to d/c to IPR unit.     Speech continues mildy dysarthric negatively impacting intelligibility this date.      Long Term Goal:  Patient will demonstrate cognitive-linguistic skills representative of  premorbid baseline functional status to allow for participation in complex conversation and participation in ADLS.      Short Term Goals:  1. Patient will tolerate facial massage to L facial surface for 3-7 minutes to increase surface blood flow, sensation and ROM over 3 consecutive sessions.    *Pt performs independently via tactile massage for 2 minutes.     2. Patient will demonstrate orientation to time to improve safety with return to home environment over 3 consecutive sessions.  Pt is oriented to time of day, ISMAEL. Able to state specific hospital she is currently admitted to this date.      3. Patient will complete complex word finding tasks in conversation and structured tasks to improve ability to make needs known, complete ADLs  over 3 consecutive sessions.  No difficultly noted in conversational exchanges this date.      4. Patient will read simple sentences with minimal delay to improve safety with return to home environment over 3 consecutive sessions.   Deferred this date.      5. Patient will complete problem solving tasks for ADLs to improve safety with return to home environment over 3 consecutive sessions.  Deferred this date.     6. Patient will recall recent events and ADLs with minimal delay to improve safety with return to home environment over 3 consecutive  "sessions.  Deferred this date     7. Patient will respond to OE questions with minimal delay to improve language processing  over 3 consecutive sessions.  Able to respond to oe questions with minimal delay noted in all opp     8. Patient will compare/contrast with minimal delay to improve language processing over 3 consecutive sessions.  Deferred this date.     9. Pt will perform OROM/GRACE tasks x3 sets x10 reps over three consecutive sessions.   OROM x10 reps x 1 set    10. Pt will over-articulate and hyperphonate to increase intelligibility in multi-syllabic words w/ 90% intelligibility independently over three consecutive sessions.   3+ words and phrases w/ verbal cues to over-articulate and decrease rate of speech to improve intelligibility.     11. Pt will increase vocal intensity for functional communication in conversational exchanges and therapy tasks in 8/10 opp over three consecutive sessions independently.   Does not increase vocal intensity this date despite verbal cues.     Visit Dx:    ICD-10-CM ICD-9-CM   1. Left hip pain  M25.552 719.45   2. Acute pain of left shoulder  M25.512 719.41   3. Fall from standing, initial encounter  W19.XXXA E888.9   4. Failure to thrive in adult  R62.7 783.7     Patient Active Problem List   Diagnosis    Iron deficiency anemia due to chronic blood loss    Major depressive disorder    RLS (restless legs syndrome)    GERD (gastroesophageal reflux disease)    Left breast mass    Allergy to penicillin    Stroke    Grade I diastolic dysfunction    Moderate malnutrition    Falls frequently    Stroke-like symptoms     Past Medical History:   Diagnosis Date    Anemia     Anxiety     Arthritis     Depression     Legally blind     Restless leg syndrome     Sleep apnea     \"I don't use a cpap anymore since losing 106 lbs\"    Water retention      Past Surgical History:   Procedure Laterality Date    BACK SURGERY      CARDIAC CATHETERIZATION N/A 1/19/2024    Procedure: Percutaneous " Manual Thrombectomy;  Surgeon: Efrain Hurley MD;  Location: [facility] CATH INVASIVE LOCATION;  Service: Interventional Radiology;  Laterality: N/A;    GALLBLADDER SURGERY      HIP ARTHROSCOPY      JOINT REPLACEMENT Right        SLP Recommendation and Plan      Continue formal wleyud-ordd-zatenkljv therapy per POC.       Thank you-   Azalia Elizondo M.S., CCC-SLP         EDUCATION  The patient has been educated in the following areas:     Cognitive Impairment Communication Impairment.      Time Calculation:      Time Calculation- SLP       Row Name 03/27/24 1450             Time Calculation- SLP    SLP - Next Appointment 03/28/24  -                User Key  (r) = Recorded By, (t) = Taken By, (c) = Cosigned By      Initials Name Provider Type    Azalia Bunch MS CCC-SLP Speech and Language Pathologist                    Therapy Charges for Today       Code Description Service Date Service Provider Modifiers Qty    [id number]  ST TREATMENT SPEECH 4 3/27/2024 Azalia Elizondo MS CCC-SLP GN 1                       Azalia Elizondo MS CCC-SLP  3/27/2024

## 2024-03-27 NOTE — THERAPY TREATMENT NOTE
"Acute Care - Physical Therapy Treatment Note   [facility]     Patient Name: Regine Beckham  : 1955  MRN: [id number]  Today's Date: 3/27/2024      Visit Dx:     ICD-10-CM ICD-9-CM   1. Left hip pain  M25.552 719.45   2. Acute pain of left shoulder  M25.512 719.41   3. Fall from standing, initial encounter  W19.XXXA E888.9   4. Failure to thrive in adult  R62.7 783.7     Patient Active Problem List   Diagnosis    Iron deficiency anemia due to chronic blood loss    Major depressive disorder    RLS (restless legs syndrome)    GERD (gastroesophageal reflux disease)    Left breast mass    Allergy to penicillin    Stroke    Grade I diastolic dysfunction    Moderate malnutrition    Falls frequently    Stroke-like symptoms     Past Medical History:   Diagnosis Date    Anemia     Anxiety     Arthritis     Depression     Legally blind     Restless leg syndrome     Sleep apnea     \"I don't use a cpap anymore since losing 106 lbs\"    Water retention      Past Surgical History:   Procedure Laterality Date    BACK SURGERY      CARDIAC CATHETERIZATION N/A 2024    Procedure: Percutaneous Manual Thrombectomy;  Surgeon: Efrain Hurley MD;  Location:  [facility] CATH INVASIVE LOCATION;  Service: Interventional Radiology;  Laterality: N/A;    GALLBLADDER SURGERY      HIP ARTHROSCOPY      JOINT REPLACEMENT Right      PT Assessment (Last 12 Hours)       PT Evaluation and Treatment       Row Name 24 1510          Physical Therapy Time and Intention    Patient Effort good  -KM       Row Name 24 1510          General Information    Patient Observations alert;cooperative;agree to therapy  -KM       Row Name 24 1510          Cognition    Affect/Mental Status (Cognition) WFL  -KM     Follows Commands (Cognition) WFL  -KM       Row Name 24 1510          Transfers    Transfers sit-stand transfer;stand-sit transfer;chair-bed transfer  -KM       Row Name 24 1510          Chair-Bed Transfer    Chair-Bed " [place] (Transfers) minimum assist (75% patient effort);verbal cues  -KM     Assistive Device (Chair-Bed Transfers) --  A  -KM       Row Name 03/27/24 1510          Sit-Stand Transfer    Sit-Stand [place] (Transfers) minimum assist (75% patient effort);verbal cues  -KM     Assistive Device (Sit-Stand Transfers) --  A  -KM       Row Name 03/27/24 1510          Stand-Sit Transfer    Stand-Sit [place] (Transfers) minimum assist (75% patient effort)  -KM     Assistive Device (Stand-Sit Transfers) --  A  -KM       Row Name 03/27/24 1510          Progress Summary (PT)    Daily Progress Summary (PT) Pt. was able to perform transfers w/ Angela. She tolerates mobility well. Pt. would benefit from more intense skilled PT services.  -KM               User Key  (r) = Recorded By, (t) = Taken By, (c) = Cosigned By      Initials Name Provider Type    Eugene Angel, PT Physical Therapist                    Physical Therapy Education       Title: PT OT SLP Therapies (Done)       Topic: Physical Therapy (Done)       Point: Mobility training (Done)       Learning Progress Summary             Patient Acceptance, E, VU by CHACE at 3/26/2024 2304    Acceptance, E, NR by VANDANA at 3/22/2024 2113    Acceptance, E,TB, NR by PIETRO at 3/19/2024 1521    Acceptance, E,TB, VU by IRENE at 3/18/2024 1547                         Point: Home exercise program (Done)       Learning Progress Summary             Patient Acceptance, E, VU by CHACE at 3/26/2024 2304    Acceptance, E, NR by VANDANA at 3/22/2024 2113    Acceptance, E,TB, NR by PIETRO at 3/19/2024 1521    Acceptance, E,TB, VU by IRENE at 3/18/2024 1547                         Point: Body mechanics (Done)       Learning Progress Summary             Patient Acceptance, E, VU by CHACE at 3/26/2024 2304    Acceptance, E, NR by VANDANA at 3/22/2024 2113    Acceptance, E,TB, NR by PIETRO at 3/19/2024 1521    Acceptance, E,TB, VU by IRENE at 3/18/2024 1547                         Point: Precautions (Done)        Learning Progress Summary             Patient Acceptance, E, VU by CHACE at 3/26/2024 2304    Acceptance, E, NR by  at 3/22/2024 2113    Acceptance, E,TB, NR by PIETRO at 3/19/2024 1521    Acceptance, E,TB, VU by  at 3/18/2024 1547                                         User Key       Initials Effective Dates Name Provider Type Discipline    CHACE 09/22/22 -  Fiordaliza Dallas, RN Registered Nurse Nurse    PIETRO 06/06/23 -  Ivy Gordillo RN Registered Nurse Nurse    IRENE 05/24/22 -  Eugene Cuevas, PT Physical Therapist PT     06/21/23 -  Bhavani St, STALIN Registered Nurse Nurse                  PT Recommendation and Plan  Anticipated Discharge Disposition (PT): inpatient rehabilitation facility, skilled nursing facility (further physical rehab needed prior to return home by herself)  Planned Therapy Interventions (PT): balance training, bed mobility training, gait training, home exercise program, patient/family education, postural re-education, ROM (range of motion), stair training, strengthening, stretching, transfer training  Therapy Frequency (PT): 2 times/wk (2-5x/wk)  Progress Summary (PT)  Daily Progress Summary (PT): Pt. was able to perform transfers w/ Angela. She tolerates mobility well. Pt. would benefit from more intense skilled PT services.  Plan of Care Reviewed With: patient  Outcome Evaluation: Pt. evaluation completed during PT session. She was able to perform bed mobility w/ modA. She was able to perform transfers w/ Angela-modA. She was unable to ambulate d/t weakness. Pt. would benefit from skilled PT services.       Time Calculation:    PT Charges       Row Name 03/27/24 1506             Time Calculation    PT Received On 03/27/24  -IRENE         Time Calculation- PT    Total Timed Code Minutes- PT 15 minute(s)  -IRENE                User Key  (r) = Recorded By, (t) = Taken By, (c) = Cosigned By      Initials Name Provider Type    Eugene Angel, PT Physical Therapist                  Therapy Charges for Today        Code Description Service Date Service Provider Modifiers Qty    35910074023  PT THERAPEUTIC ACT EA 15 MIN 3/26/2024 Eugene Cuevas, PT GP 1    45087175663 HC PT THER PROC EA 15 MIN 3/26/2024 Eugene Cuevas, PT GP 1    [id number]  PT THERAPEUTIC ACT EA 15 MIN 3/27/2024 Eugene Cuevas, PT GP 1            PT G-Codes  AM-PAC 6 Clicks Score (PT): 13    Eugene Cuevas, PT  3/27/2024

## 2024-03-27 NOTE — PLAN OF CARE
Goal Outcome Evaluation:     Pt resting in bed. VSS. Plan of care ongoing

## 2024-03-28 LAB
ALBUMIN SERPL-MCNC: 3.4 G/DL (ref 3.5–5.2)
ALBUMIN/GLOB SERPL: 1.2 G/DL
ALP SERPL-CCNC: 91 U/L (ref 39–117)
ALT SERPL W P-5'-P-CCNC: 22 U/L (ref 1–33)
ANION GAP SERPL CALCULATED.3IONS-SCNC: 9.5 MMOL/L (ref 5–15)
AST SERPL-CCNC: 29 U/L (ref 1–32)
BASOPHILS # BLD AUTO: 0.09 10*3/MM3 (ref 0–0.2)
BASOPHILS NFR BLD AUTO: 1.4 % (ref 0–1.5)
BILIRUB SERPL-MCNC: 0.3 MG/DL (ref 0–1.2)
BUN SERPL-MCNC: 19 MG/DL (ref 8–23)
BUN/CREAT SERPL: 27.9 (ref 7–25)
CALCIUM SPEC-SCNC: 9.4 MG/DL (ref 8.6–10.5)
CHLORIDE SERPL-SCNC: 105 MMOL/L (ref 98–107)
CO2 SERPL-SCNC: 23.5 MMOL/L (ref 22–29)
CREAT SERPL-MCNC: 0.68 MG/DL (ref 0.57–1)
DEPRECATED RDW RBC AUTO: 46.3 FL (ref 37–54)
EGFRCR SERPLBLD CKD-EPI 2021: 94.4 ML/MIN/1.73
EOSINOPHIL # BLD AUTO: 0.3 10*3/MM3 (ref 0–0.4)
EOSINOPHIL NFR BLD AUTO: 4.8 % (ref 0.3–6.2)
ERYTHROCYTE [DISTWIDTH] IN BLOOD BY AUTOMATED COUNT: 13.5 % (ref 12.3–15.4)
GLOBULIN UR ELPH-MCNC: 2.9 GM/DL
GLUCOSE SERPL-MCNC: 128 MG/DL (ref 65–99)
HCT VFR BLD AUTO: 36.7 % (ref 34–46.6)
HGB BLD-MCNC: 11.9 G/DL (ref 12–15.9)
IMM GRANULOCYTES # BLD AUTO: 0.02 10*3/MM3 (ref 0–0.05)
IMM GRANULOCYTES NFR BLD AUTO: 0.3 % (ref 0–0.5)
LYMPHOCYTES # BLD AUTO: 2.58 10*3/MM3 (ref 0.7–3.1)
LYMPHOCYTES NFR BLD AUTO: 40.9 % (ref 19.6–45.3)
MCH RBC QN AUTO: 30.7 PG (ref 26.6–33)
MCHC RBC AUTO-ENTMCNC: 32.4 G/DL (ref 31.5–35.7)
MCV RBC AUTO: 94.6 FL (ref 79–97)
MONOCYTES # BLD AUTO: 0.54 10*3/MM3 (ref 0.1–0.9)
MONOCYTES NFR BLD AUTO: 8.6 % (ref 5–12)
NEUTROPHILS NFR BLD AUTO: 2.78 10*3/MM3 (ref 1.7–7)
NEUTROPHILS NFR BLD AUTO: 44 % (ref 42.7–76)
NRBC BLD AUTO-RTO: 0 /100 WBC (ref 0–0.2)
PLATELET # BLD AUTO: 213 10*3/MM3 (ref 140–450)
PMV BLD AUTO: 10.3 FL (ref 6–12)
POTASSIUM SERPL-SCNC: 3.2 MMOL/L (ref 3.5–5.2)
POTASSIUM SERPL-SCNC: 3.9 MMOL/L (ref 3.5–5.2)
PROT SERPL-MCNC: 6.3 G/DL (ref 6–8.5)
RBC # BLD AUTO: 3.88 10*6/MM3 (ref 3.77–5.28)
SODIUM SERPL-SCNC: 138 MMOL/L (ref 136–145)
WBC NRBC COR # BLD AUTO: 6.31 10*3/MM3 (ref 3.4–10.8)

## 2024-03-28 PROCEDURE — 85025 COMPLETE CBC W/AUTO DIFF WBC: CPT | Performed by: INTERNAL MEDICINE

## 2024-03-28 PROCEDURE — 92610 EVALUATE SWALLOWING FUNCTION: CPT

## 2024-03-28 PROCEDURE — 92523 SPEECH SOUND LANG COMPREHEN: CPT

## 2024-03-28 PROCEDURE — 97163 PT EVAL HIGH COMPLEX 45 MIN: CPT

## 2024-03-28 PROCEDURE — 97116 GAIT TRAINING THERAPY: CPT

## 2024-03-28 PROCEDURE — 97535 SELF CARE MNGMENT TRAINING: CPT | Performed by: OCCUPATIONAL THERAPIST

## 2024-03-28 PROCEDURE — 97167 OT EVAL HIGH COMPLEX 60 MIN: CPT | Performed by: OCCUPATIONAL THERAPIST

## 2024-03-28 PROCEDURE — 25010000002 HEPARIN (PORCINE) PER 1000 UNITS: Performed by: INTERNAL MEDICINE

## 2024-03-28 PROCEDURE — 84132 ASSAY OF SERUM POTASSIUM: CPT | Performed by: INTERNAL MEDICINE

## 2024-03-28 PROCEDURE — 80053 COMPREHEN METABOLIC PANEL: CPT | Performed by: INTERNAL MEDICINE

## 2024-03-28 PROCEDURE — 97112 NEUROMUSCULAR REEDUCATION: CPT

## 2024-03-28 PROCEDURE — 97530 THERAPEUTIC ACTIVITIES: CPT | Performed by: OCCUPATIONAL THERAPIST

## 2024-03-28 PROCEDURE — 97112 NEUROMUSCULAR REEDUCATION: CPT | Performed by: OCCUPATIONAL THERAPIST

## 2024-03-28 RX ORDER — POTASSIUM CHLORIDE 20 MEQ/1
40 TABLET, EXTENDED RELEASE ORAL EVERY 4 HOURS
Status: COMPLETED | OUTPATIENT
Start: 2024-03-28 | End: 2024-03-28

## 2024-03-28 RX ORDER — AMLODIPINE BESYLATE 5 MG/1
2.5 TABLET ORAL
Status: DISPENSED | OUTPATIENT
Start: 2024-03-28

## 2024-03-28 RX ADMIN — DOCUSATE SODIUM 50 MG AND SENNOSIDES 8.6 MG 2 TABLET: 8.6; 5 TABLET, FILM COATED ORAL at 21:16

## 2024-03-28 RX ADMIN — OXYBUTYNIN CHLORIDE 10 MG: 5 TABLET, EXTENDED RELEASE ORAL at 09:19

## 2024-03-28 RX ADMIN — TICAGRELOR 60 MG: 60 TABLET ORAL at 21:16

## 2024-03-28 RX ADMIN — POTASSIUM CHLORIDE 40 MEQ: 1500 TABLET, EXTENDED RELEASE ORAL at 09:21

## 2024-03-28 RX ADMIN — ATORVASTATIN CALCIUM 80 MG: 40 TABLET, FILM COATED ORAL at 21:16

## 2024-03-28 RX ADMIN — DOCUSATE SODIUM 50 MG AND SENNOSIDES 8.6 MG 2 TABLET: 8.6; 5 TABLET, FILM COATED ORAL at 09:19

## 2024-03-28 RX ADMIN — ZOLPIDEM TARTRATE 5 MG: 5 TABLET ORAL at 21:15

## 2024-03-28 RX ADMIN — TICAGRELOR 60 MG: 60 TABLET ORAL at 09:19

## 2024-03-28 RX ADMIN — Medication 1 MG: at 09:19

## 2024-03-28 RX ADMIN — PANTOPRAZOLE SODIUM 40 MG: 40 TABLET, DELAYED RELEASE ORAL at 05:01

## 2024-03-28 RX ADMIN — AMLODIPINE BESYLATE 2.5 MG: 5 TABLET ORAL at 16:49

## 2024-03-28 RX ADMIN — ASPIRIN 81 MG: 81 TABLET, CHEWABLE ORAL at 09:19

## 2024-03-28 RX ADMIN — CEFDINIR 300 MG: 300 CAPSULE ORAL at 21:16

## 2024-03-28 RX ADMIN — ACETAMINOPHEN 1000 MG: 500 TABLET ORAL at 15:11

## 2024-03-28 RX ADMIN — ACETAMINOPHEN 1000 MG: 500 TABLET ORAL at 21:15

## 2024-03-28 RX ADMIN — ACETAMINOPHEN 1000 MG: 500 TABLET ORAL at 07:13

## 2024-03-28 RX ADMIN — HEPARIN SODIUM 5000 UNITS: 5000 INJECTION INTRAVENOUS; SUBCUTANEOUS at 21:16

## 2024-03-28 RX ADMIN — POTASSIUM CHLORIDE 40 MEQ: 1500 TABLET, EXTENDED RELEASE ORAL at 05:01

## 2024-03-28 RX ADMIN — MIRTAZAPINE 30 MG: 15 TABLET, FILM COATED ORAL at 21:16

## 2024-03-28 RX ADMIN — CEFDINIR 300 MG: 300 CAPSULE ORAL at 09:21

## 2024-03-28 RX ADMIN — ROPINIROLE HYDROCHLORIDE 4 MG: 1 TABLET, FILM COATED ORAL at 22:03

## 2024-03-28 RX ADMIN — LOSARTAN POTASSIUM 50 MG: 50 TABLET, FILM COATED ORAL at 09:19

## 2024-03-28 RX ADMIN — HEPARIN SODIUM 5000 UNITS: 5000 INJECTION INTRAVENOUS; SUBCUTANEOUS at 09:19

## 2024-03-28 NOTE — PLAN OF CARE
Goal Outcome Evaluation:           Progress: no change            Problem: Rehabilitation ([facility]) Plan of Care  Goal: Plan of Care Review  Outcome: Ongoing, Progressing  Flowsheets  Taken 3/28/2024 0951 by Agnieszka Moreno RN  Progress: no change  Taken 3/27/2024 2125 by Jeremy Carson RN  Plan of Care Reviewed With: patient  Goal: Patient-Specific Goal (Individualized)  Outcome: Ongoing, Progressing  Goal: Absence of New-Onset Illness or Injury  Outcome: Ongoing, Progressing  Intervention: Prevent Fall and Fall Injury  Recent Flowsheet Documentation  Taken 3/28/2024 0800 by Agnieszka Moreno RN  Safety Promotion/Fall Prevention:   nonskid shoes/slippers when out of bed   safety round/check completed  Intervention: Prevent Infection  Recent Flowsheet Documentation  Taken 3/28/2024 0800 by Agnieszka Moreno RN  Infection Prevention: hand hygiene promoted  Goal: Optimal Comfort and Wellbeing  Outcome: Ongoing, Progressing  Goal: Home and Community Transition Plan Established  Outcome: Ongoing, Progressing     Problem: Mobility Impairment  Goal: Optimal Mobility George and Safety  Outcome: Ongoing, Progressing     Problem: Skin Injury Risk Increased  Goal: Skin Health and Integrity  Outcome: Ongoing, Progressing  Intervention: Optimize Skin Protection  Recent Flowsheet Documentation  Taken 3/28/2024 0800 by Agnieszka Moreno RN  Head of Bed (HOB) Positioning: HOB elevated  Taken 3/28/2024 0713 by Agnieszka Moreno RN  Pressure Reduction Techniques: frequent weight shift encouraged  Pressure Reduction Devices: heel offloading device utilized  Skin Protection: adhesive use limited     Problem: Fall Injury Risk  Goal: Absence of Fall and Fall-Related Injury  Outcome: Ongoing, Progressing  Intervention: Identify and Manage Contributors  Recent Flowsheet Documentation  Taken 3/28/2024 0800 by Agnieszka Moreno RN  Medication Review/Management: medications reviewed  Intervention: Promote Injury-Free  Environment  Recent Flowsheet Documentation  Taken 3/28/2024 0800 by Agnieszka Moreno, RN  Safety Promotion/Fall Prevention:   nonskid shoes/slippers when out of bed   safety round/check completed

## 2024-03-28 NOTE — PROGRESS NOTES
Patient Assessment Instrument  Quality Indicators - Admission FY 2023    Section A. Ethnicity/ Race/Language  Ethnicity:  Race:  Preferred Language:    Section A. Transportation      Section B. Hearing and Vision        Section B. Health Literacy        Section C. Cognitive Patterns      Section C. Signs and Symptoms of Delirium (from CAM)      Section D. Mood      Section D. Social Isolation      Section [id number]. Prior Functioning      Section QO3813. Prior Device Use      Section GO3354. Self Care Performance      Section ZN6045. Self Care Discharge Goals      Section VU0560. Mobility Performance     Roll Left and Right: [place] does less than half the effort. [place] lifts, holds  or supports trunk or limbs but provides less than half the effort.   Sit to Lying: [place] does less than half the effort. [place] lifts, holds or  supports trunk or limbs but provides less than half the effort.   Lying to Sitting on Side of Bed: [place] does less than half the effort. [place]  lifts, holds or supports trunk or limbs but provides less than half the effort.   Sit to Stand: [place] does less than half the effort. [place] lifts, holds or  supports trunk or limbs but provides less than half the effort.   Chair/Bed to Chair Transfer: [place] does less than half the effort. [place]  lifts, holds or supports trunk or limbs but provides less than half the effort.   Toilet Transfer [place] does less than half the effort. [place] lifts, holds or  supports trunk or limbs but provides less than half the effort.   Car Transfer: Not attempted due to medical or safety concerns.   Walk 10 Feet:   [place] does less than half the effort. [place] lifts, holds or  supports trunk or limbs but provides less than half the effort.  Walk 50 Feet with 2 Turns:   Not attempted due to medical or safety concerns.  Walk 150 Feet:   Not attempted due to medical or safety concerns.  Walking 10 Feet on Uneven Surfaces:   Not attempted due to medical or  safety  concerns.  1 Step Over Curb or Up/Down Stair:   Not attempted due to medical or safety  concerns.  Picking up an Object:   Not attempted due to medical or safety concerns.  Uses Wheelchair/Scooter: No    Section IT1405. Mobility Discharge Goals     Roll Left and Right: [place] provides verbal cues or touching/steadying  assistance as patient completes activity.   Sit to Lying: [place] provides verbal cues or touching/steadying assistance as  patient completes activity.   Lying to Sitting on Side of Bed: [place] provides verbal cues or  touching/steadying assistance as patient completes activity.   Sit to Stand: [place] provides verbal cues or touching/steadying assistance as  patient completes activity.   Chair/Bed to Chair Transfer: [place] provides verbal cues or touching/steadying  assistance as patient completes activity.   Toilet Transfer: [place] provides verbal cues or touching/steadying assistance  as patient completes activity.   Car Transfer: [place] provides verbal cues or touching/steadying assistance as  patient completes activity.   Walk Discharge Goals:   Walk 150 Feet: [place] provides verbal cues or touching/steadying assistance as  patient completes activity.    Section H. Bladder and Bowel      Section I. Active Diagnosis      Section J. Health Conditions      Section J. Health Conditions (Pain)      Section K. Swallowing/Nutritional Status    Nutritional Approaches on Admission:    Section M. Skin Conditions      Section N. Medication        Section O. Special Treatments, Procedures, and Programs    Signed by: Melanie Anne, Physical Therapist

## 2024-03-28 NOTE — PAYOR COMM NOTE
"[facility]  NPI:[id number]    Utilization Review  Contact: Sharmin Matthews RN  Phone: [phone]  Fax:[phone]    DISCHARGE NOTIFICATION     [id number]     Regine Lock \"NEGIN\" (68 y.o. Female)       Date of Birth   1955    Social Security Number       Address   [address] [place] [address]    Home Phone   [phone]    MRN   [id number]       Confucianist   [facility]    Marital Status                               Admission Date   3/17/24    Admission Type   Emergency    Admitting Provider   Yaritza Rao MD    Attending Provider       Department, Room/Bed   [address], 3305/2S       Discharge Date   3/27/2024    Discharge Disposition   [facility] (DC - External)    Discharge Destination   Other                              Attending Provider: (none)   Allergies: Penicillins    Isolation: None   Infection: None   Code Status: CPR    Ht: 167.6 cm (66\")   Wt: 62.4 kg (137 lb 8 oz)    Admission Cmt: None   Principal Problem: Falls frequently [R29.6]                   Active Insurance as of 3/17/2024       Primary Coverage       Payor Plan Insurance Group Employer/Plan Group    AETNA MEDICARE REPLACEMENT AETNA MEDICARE REPLACEMENT [id number]       Payor Plan Address Payor Plan Phone Number Payor Plan Fax Number Effective Dates    [address] [phone]  1/1/2024 - None Entered    [facility] [id number]         Subscriber Name Subscriber Birth Date Member ID       REGINE LOCK 1955 [id number]                     Emergency Contacts        (Rel.) Home Phone Work Phone Mobile Phone    Yaa Elizondo (Friend) -- [phone] --    Karla Patel (Friend) [phone] -- --                 Discharge Summary        Yaritza Rao MD at 03/27/24 1005              [facility] HOSPITALIST MEDICINE DISCHARGE SUMMARY    Patient Identification:  Name:  Regine Lock  Age:  68 y.o.  Sex:  " female  :  1955  MRN:  [id number]  Visit Number:  [id number]    Date of Admission: 3/17/2024  Date of Discharge: 2024  DISCHARGE DISPOSITION   Stable  PCP: Dread Burton MD    DISCHARGE DIAGNOSIS : Acute physical deconditioning, frequent fall as a consequence of previous CVA and ongoing physical deconditioning and debility, history of right internal carotid artery disease post thrombectomy and stent placement, left-sided weakness secondary to previous CVA, episodes of confusion suspect underlying vascular dementia as a consequence of CVA.  History of tobacco use disorder stopped January of this year, severe left hip arthritis, history of congestive heart failure with preserved ejection fraction, history of iron deficiency anemia, history of right hip arthroplasty, history of dysphagia as a complication of CVA.  Acute cystitis without hematuria E. coli in culture.  Moderate malnutrition.    HOSPITAL COURSE  Patient is a 68 y.o. female presented to [facility] complaining of frequent fall at home, patient reported change she had a recent stroke affecting her left side with weakness she underwent rehab and was discharged, at home she lives by herself she has slowly deteriorated and has frequent fall, patient stated at home at least every day she falls 1-3 times.  Because of this her neighbors contacted EMS and she was brought into our facility.  Skeletal survey and CT scan of the brain did not show new stroke she has encephalomalacia of the right parietal region.  Discussion at length with patient and she is interested in skilled nursing facility rehab or inpatient rehab but no desire to be a permanent resident.  During her stay she was noted to have occasional confusion specially at night, she has cystitis E. coli in culture.  PT OT was consulted and recommended inpatient rehab,  and  was assisting to facilitate this admission.        24  0500  03/26/24  0500 03/27/24  0500   Weight: 63.1 kg (139 lb 3.2 oz) 62 kg (136 lb 9.6 oz) 62.4 kg (137 lb 8 oz)     Body mass index is 22.19 kg/m².  Vitals:    03/27/24 0655   BP: 126/91   Pulse: 111   Resp: 18   Temp: 98.6 °F (37 °C)   SpO2: 98%     PHYSICAL EXAM:  General: Comfortable,awake, alert, oriented to self, place, and time, chronically ill-appearing no respiratory distress.    Skin:  Skin is warm and dry. No rash noted. No pallor.    HENT:  Head:  Normocephalic and atraumatic.  Mouth:  Moist mucous membranes.    Eyes:  Conjunctivae and EOM are normal.  Pupils are equal, round, and reactive to light.  No scleral icterus.    Neck:  Neck supple.  No JVD present.  No bruit  Pulmonary/Chest:  No respiratory distress, no wheezes, no crackles, with normal breath sounds and good air movement.  Cardiovascular:  Normal rate, regular rhythm and normal heart sounds with no murmur.  Abdominal:  Soft.  Bowel sounds are normal.  No distension and no tenderness.   Extremities:  No edema, no tenderness, and no deformity.  No red or swollen joints anywhere.  Strong pulses in all 4 extremities with no clubbing, no cyanosis, no edema.  Neurological: 3/5 left upper, 3-4/5 lower extremity left side, patient mildly slurred speech.  Genitourinary: No Arce catheter  Back:  ----------    DISCHARGE MEDICATIONS:     Discharge Medications        Continue These Medications        Instructions Start Date   atorvastatin 80 MG tablet  Commonly known as: LIPITOR   80 mg, Oral, Nightly      losartan 50 MG tablet  Commonly known as: COZAAR   50 mg, Oral, Daily      Mirabegron ER 50 MG tablet sustained-release 24 hour 24 hr tablet  Commonly known as: MYRBETRIQ   50 mg, Oral, Daily      mirtazapine 30 MG tablet  Commonly known as: REMERON   30 mg, Oral, Nightly      pantoprazole 40 MG EC tablet  Commonly known as: PROTONIX   TAKE 1 TABLET BY MOUTH EVERY MORNING FOR HEARTBURN      rOPINIRole 4 MG tablet  Commonly known as: REQUIP   4 mg, Oral,  Nightly, Take 1 hour before bedtime.      ticagrelor 60 MG tablet tablet  Commonly known as: BRILINTA   60 mg, Oral, 2 Times Daily      zolpidem 5 MG tablet  Commonly known as: AMBIEN   5 mg, Oral, Nightly PRN             ASK your doctor about these medications        Instructions Start Date   aspirin 81 MG EC tablet  Ask about: Which instructions should I use?   81 mg, Oral, Daily      gabapentin 300 MG capsule  Commonly known as: NEURONTIN   300 mg, Oral, 3 Times Daily                      Follow-up Information       Dread Burton MD In 1 week.    Specialty: Family Medicine  Contact information:  [address] [place] [address]  [phone]               [facility] EMERGENCY DEPARTMENT.    Specialty: Emergency Medicine  Why: If symptoms worsen  Contact information:  [address] [place] [id number] [phone]                                Yaritza Rao MD  03/27/24  10:05 EDT    Please note that this discharge summary required more than 30 minutes to complete.      Electronically signed by Yaritza Rao MD at 03/27/24 7355

## 2024-03-28 NOTE — THERAPY EVALUATION
"Inpatient Rehabilitation - Occupational Therapy Initial Evaluation and Treatment Note     [place]     Patient Name: Regine Beckham  : 1955  MRN: [id number]    Today's Date: 3/28/2024                 Admit Date: 3/27/2024       No diagnosis found.    Patient Active Problem List   Diagnosis    Iron deficiency anemia due to chronic blood loss    Major depressive disorder    RLS (restless legs syndrome)    GERD (gastroesophageal reflux disease)    Left breast mass    Allergy to penicillin    Stroke    Grade I diastolic dysfunction    Moderate malnutrition    Falls frequently    Stroke-like symptoms    Debility       Past Medical History:   Diagnosis Date    Anemia     Anxiety     Arthritis     Depression     Legally blind     Restless leg syndrome     Sleep apnea     \"I don't use a cpap anymore since losing 106 lbs\"    Water retention        Past Surgical History:   Procedure Laterality Date    BACK SURGERY      CARDIAC CATHETERIZATION N/A 2024    Procedure: Percutaneous Manual Thrombectomy;  Surgeon: Efrain Hurley MD;  Location:  [facility] INVASIVE LOCATION;  Service: Interventional Radiology;  Laterality: N/A;    GALLBLADDER SURGERY      HIP ARTHROSCOPY      JOINT REPLACEMENT Right              IRF OT ASSESSMENT FLOWSHEET (Last 12 Hours)       IRF OT Evaluation and Treatment       Row Name 24 1448          OT Time and Intention    Document Type initial evaluation;daily treatment  -BF     Mode of Treatment occupational therapy  -BF     Patient Effort good  -BF     Symptoms Noted During/After Treatment increased pain  RN notified  -BF       Row Name 24 1448          General Information    Patient Profile Reviewed yes  -BF     Patient/Family/Caregiver Comments/Observations Pt sitting in w/c, agreeable to OT. Pt had a CVA in 2024 w/ LUE deficits. Pt has had multiple falls at home since. Pt reports soreness in LUE.  -BF     General Observations of Patient Pt issued palm protector " for hypertonia in L hand. RN notified.  -BF     Existing Precautions/Restrictions fall  L HP  -BF     Limitations/Impairments safety/cognitive;visual  -       Row Name 03/28/24 1448          Living Environment    Current Living Arrangements home  -BF     People in Home alone  -BF     Primary Care Provided by self  -[facility] Name 03/28/24 1448          Home Use of Assistive/Adaptive Equipment    Equipment Currently Used at Home commode;hospital bed;shower chair;grab bar  -       Row Name 03/28/24 1448          Cognition/Psychosocial    Affect/Mental Status (Cognition) --  intermittent confusion, dysarthria  -     Orientation Status (Cognition) oriented to;person;place;situation  -BF     Follows Commands (Cognition) follows one-step commands;verbal cues/prompting required;repetition of directions required;physical/tactile prompts required  -       Row Name 03/28/24 1448          Range of Motion Comprehensive    Comment, General Range of Motion L shoulder 25%, elbow 25-50%, wrist 25%, hand trace-25% AROM; L shoulder PROM tolerated to 50%, L hand PROM to 75%, L elbow WFL; RUE AROM WFL  -[facility] Name 03/28/24 1448          Strength Comprehensive (MMT)    Comment, General Manual Muscle Testing (MMT) Assessment LUE grossly 2/5, RUE 3+/5  -       Row Name 03/28/24 1448          Vision Assessment/Intervention    Visual Impairment/Limitations legally blind  -     Vision Assessment Comment macular degeneration  -[facility] Name 03/28/24 1448          Basic Activities of Daily Living (BADLs)    Basic Activities of Daily Living bathing;upper body dressing;lower body dressing;grooming;toileting;self-feeding  -       Row Name 03/28/24 1448          Bathing    Comment (Bathing) Max A  -[facility] Name 03/28/24 1448          Upper Body Dressing    [place] Level (Upper Body Dressing) dependent (less than 25% patient effort);verbal cues;nonverbal cues (demo/gesture)  -     Position (Upper Body  Dressing) supported sitting  -BF     Comment (Upper Body Dressing) Total A  -BF       Row Name 03/28/24 1448          Lower Body Dressing    [place] Level (Lower Body Dressing) maximum assist (25% patient effort);verbal cues;nonverbal cues (demo/gesture)  -BF     Position (Lower Body Dressing) supported sitting  -BF     Comment (Lower Body Dressing) Max A  -BF       Row Name 03/28/24 1448          Grooming    [place] Level (Grooming) moderate assist (50% patient effort);verbal cues;nonverbal cues (demo/gesture)  -BF     Position (Grooming) supported sitting  -BF     Comment (Grooming) Mod A  -BF       Row Name 03/28/24 1448          Toileting    Comment (Toileting) Total A  -BF       Row Name 03/28/24 1448          Self-Feeding    [place] Level (Self-Feeding) set up  -BF     Position (Self-Feeding) supported sitting  -BF     Comment (Self-Feeding) Set-up  -BF       Row Name 03/28/24 1448          Motor Skills    Motor Skills neuro-muscular function;motor control/coordination interventions  -BF     Coordination left;upper extremity;severe impairment;fine motor deficit;gross motor deficit  -BF     Neuromuscular Function left;upper extremity;severe impairment;flexion synergy pattern  -BF     Motor Control/Coordination Interventions fine motor manipulation/dexterity activities;gross motor coordination activities;neuro-muscular re-education;therapeutic exercise/ROM  LUE AA/PROM, NDT; JERRY GMC/FMC theract, light strengthening  -BF       Row Name 03/28/24 1448          Neuromuscular Re-education    Interventions (Neuromuscular Re-education) facilitation/inhibition;massage  -BF     Positioning (Neuromuscular Re-education) supported;sitting  -BF     Comment (Neuromuscular Re-education) Issued palm protector & arm tray  -BF       Row Name 03/28/24 1448          Positioning and Restraints    Post Treatment Position wheelchair  -BF     In Wheelchair sitting;exit alarm on;patient within staff view  at [facility] station  after both sessions  -       Row Name 03/28/24 1448          Therapy Assessment/Plan (OT)    Patient's Goals For Discharge return home  -       Row Name 03/28/24 1448          Therapy Assessment/Plan (OT)    OT Diagnosis Debility (recent CVA)  -BF     Rehab Potential/Prognosis (OT) adequate, monitor progress closely  -BF     Frequency of Treatment (OT) 5 times per week  -BF     Estimated Duration of Therapy (OT) 2 weeks;3 weeks  -     Problem List (OT) problems related to;balance;cognition;coordination;mobility;muscle tone;range of motion (ROM);strength;sensation;pain;postural control;vision  -BF     Activity Limitations Related to Problem List (OT) unable to transfer safely;BADLs not performed adequately or safely  -     Planned Therapy Interventions (OT) activity tolerance training;adaptive equipment training;BADL retraining;neuromuscular control/coordination retraining;passive ROM/stretching;ROM/therapeutic exercise;strengthening exercise;transfer/mobility retraining  -[facility] Name 03/28/24 1448          Therapy Plan Review/Discharge Plan (OT)    Therapy Plan Review (OT) patient  -BF     Anticipated Discharge Disposition (OT) --  TBD  -[facility] Name 03/28/24 1448          IRF OT Goals    UB Dressing Goal Selection (OT-IRF) UB dressing, OT goal 1;UB dressing, OT goal 2  -BF     Toileting Goal Selection (OT-IRF) toileting, OT goal 1;toileting, OT goal 2  -[facility] Name 03/28/24 1448          UB Dressing Goal 1 (OT-IRF)    Activity/Device (UB Dressing Goal 1, OT-IRF) upper body dressing  -BF     [place] (UB Dress Goal 1, OT-IRF) maximum assist (25-49% patient effort)  -BF     Time Frame (UB Dressing Goal 1, OT-IRF) short-term goal (STG);1 week  -[facility] Name 03/28/24 1445          UB Dressing Goal 2 (OT-IRF)    Activity/Device (UB Dressing Goal 2, OT-IRF) upper body dressing  -BF     [place] (UB Dress Goal 2, OT-IRF) moderate assist (50-74% patient effort)  -BF     Time Frame (UB  Dressing Goal 2, OT-IRF) long-term goal (LTG);by discharge  -       Row Name 03/28/24 1448          Toileting Goal 1 (OT-IRF)    Activity/Device (Toileting Goal 1, OT-IRF) toileting skills, all  -BF     [place] Level (Toileting Goal 1, OT-IRF) maximum assist (25-49% patient effort)  -BF     Time Frame (Toileting Goal 1, OT-IRF) short-term goal (STG);1 week  -       Row Name 03/28/24 1448          Toileting Goal 2 (OT-IRF)    Activity/Device (Toileting Goal 2, OT-IRF) toileting skills, all  -BF     [place] Level (Toileting Goal 2, OT-IRF) moderate assist (50-74% patient effort)  -BF     Time Frame (Toileting Goal 2, OT-IRF) long-term goal (LTG);by discharge  -               User Key  (r) = Recorded By, (t) = Taken By, (c) = Cosigned By      Initials Name Effective Dates     Desi Mackey OT 07/11/23 -                      Occupational Therapy Education       Title: PT OT SLP Therapies (In Progress)       Topic: Occupational Therapy (In Progress)       Point: ADL training (In Progress)       Description:   Instruct learner(s) on proper safety adaptation and remediation techniques during self care or transfers.   Instruct in proper use of assistive devices.                  Learning Progress Summary             Patient Acceptance, E, NR by  at 3/28/2024 1434                         Point: Precautions (In Progress)       Description:   Instruct learner(s) on prescribed precautions during self-care and functional transfers.                  Learning Progress Summary             Patient Acceptance, E, NR by  at 3/28/2024 1434                                         User Key       Initials Effective Dates Name Provider Type Discipline     07/11/23 -  Desi Mackey, OT Occupational Therapist OT                        OT Recommendation and Plan    Planned Therapy Interventions (OT): activity tolerance training, adaptive equipment training, BADL retraining, neuromuscular  control/coordination retraining, passive ROM/stretching, ROM/therapeutic exercise, strengthening exercise, transfer/mobility retraining                    Time Calculation:      Time Calculation- OT       Row Name 03/28/24 1521 03/28/24 1520          Time Calculation- OT    OT Start Time 1245  -BF 1050  -BF     OT Stop Time 1345  -BF 1135  -BF     OT Time Calculation (min) 60 min  -BF 45 min  -BF     Total Timed Code Minutes- OT 60 minute(s)  -BF 45 minute(s)  -BF     OT Non-Billable Time (min) -- 30 min  -BF               User Key  (r) = Recorded By, (t) = Taken By, (c) = Cosigned By      Initials Name Provider Type    BF Desi Mackey OT Occupational Therapist                  Therapy Charges for Today       Code Description Service Date Service Provider Modifiers Qty    83652237476 HC OT EVAL HIGH COMPLEXITY 3 3/28/2024 Desi Mackey OT GO 1    23032762548 HC OT NEUROMUSC RE EDUCATION EA 15 MIN 3/28/2024 Desi Mackey OT GO 2    29636941946 HC OT SELF CARE/MGMT/TRAIN EA 15 MIN 3/28/2024 Desi Mackey OT GO 1    37978332976 HC OT THERAPEUTIC ACT EA 15 MIN 3/28/2024 Desi Mackey OT GO 1                     Desi Mackey OT  3/28/2024

## 2024-03-28 NOTE — PLAN OF CARE
Goal Outcome Evaluation:  Plan of Care Reviewed With: patient        Progress: improving       Patient new admit to unit today, cont POC. No complaints noted.

## 2024-03-28 NOTE — PROGRESS NOTES
Inpatient Rehabilitation Functional Measures Assessment and Plan of Care    Plan of Care  Mobility    [PT] Bed/Chair/Wheelchair(Active)  Current Status(03/28/2024): mod A  Weekly Goal(03/28/2024): n/a  Discharge Goal: CGA    [PT] Bed Mobility(Active)  Current Status(03/28/2024): mod A  Weekly Goal(03/28/2024): n/a  Discharge Goal: SBA    [PT] Walk(Active)  Current Status(03/28/2024): 40 ft x 2, R HHA  Weekly Goal(03/28/2024): n/a  Discharge Goal: 150 ft, CGa, AAD or HHa    Functional Measures  ISABEL Eating:  ISABEL Grooming:  ISABEL Bathing:  ISABEL Upper Body Dressing:  ISABEL Lower Body Dressing:  ISABEL Toileting:    ISABEL Bladder Management  Level of Assistance:  Frequency/Number of Accidents this Shift:    ISABEL Bowel Management  Level of Assistance:  Frequency/Number of Accidents this Shift:    SIABEL Bed/Chair/Wheelchair Transfer:  Bed/chair/wheelchair Transfer Score = 3.  Patient performs 50-74% of effort and requires moderate assistance (some  lifting) for transferring to and from the bed/chair/wheelchair. Patient requires  the following assistive device(s): Bed rails.  ISABEL Toilet Transfer:  Toilet Transfer Score = 3.  Patient performs 50-74% of  effort and requires moderate assistance (some lifting) for transferring to and  from the toilet/commode. Patient requires the following assistive device(s):  Safety frame/over the toilet. Grab bars.  ISABEL Tub/Shower Transfer:  Activity was not observed.    Previously Documented Mode of Locomotion at Discharge:  ISABEL Expected Mode of Locomotion at Discharge: The expected mode of most  frequently used locomotion, at discharge, is expected to be walking.  ISABEL Walk/Wheelchair:  WHEELCHAIR OBSERVATION   Wheelchair did not occur.    WALK OBSERVATION   Walk Distance Scale = 1.  Distance walked is less than 50 feet. Walk Score = 1.   Patient performs 50-74% of effort and requires moderate assistance for walking.  Patient walked a distance of 40 feet. Patient requires the following  assistive  device(s): R hand held assistance .  ISABEL Stairs:  Stairs did not occur because activity was unsafe for patient.    ISABEL Comprehension:  ISABEL Expression:  ISABEL Social Interaction:  ISABEL Problem Solving:  ISABEL Memory:    Therapy Mode Minutes  Occupational Therapy:  Physical Therapy: Individual: 90 minutes.  Speech Language Pathology:    Discharge Functional Goals:  Bed, Chair, Wheelchair Transfers: 4  Mode of Locomotion: W  Walk: 4  Wheelchair: 1  Stairs: 1    Signed by: Melanie Anne, Physical Therapist

## 2024-03-28 NOTE — THERAPY EVALUATION
Inpatient Rehabilitation - Speech Language Pathology Initial Evaluation  [facility]  Speech - Language - Cognitive Evaluation     Patient Name: Regine Beckham  : 1955  MRN: [id number]  Today's Date: 3/28/2024     Admit Date: 3/27/2024  Regine Beckham was seen this am on [facility]'s IPR unit to participate in s/l and cognitive assessment for safety/function in adls. She was positioned upright in chair to cooperatively participate. All results and observations of this evaluation are felt to be representative of patient's current functional status.    She has a medical hx significant for prior CVA in January of this year with right internal carotid artery disease status post thrombectomy and stent placement, anxiety, depression, and is legally blind per macular degeneration.       Following initial CVA in January, Ms Beckham was hospitalized at [facility] and participated in multiple instrumental dysphagia evaluations per oropharyngeal dysphagia. She was able to advanced to soft to chew textures, chopped meats, and thin liquids per FEES on 24. She reports working with speech therapy at the skilled nursing facility she was discharged to.      Ms Beckham presented to [facility] from her home via EMS following frequent falls at home w/ increasing weakness. She was admitted to [facility] acute care on 3/17/24. No new infarct was noted on MRI.     She was evaluated by SLP department of [facility] via [facility] on 3/18/24 and was evidenced w/ mild speech, language, and cognitive deficits as a result of her recent CVA.     Social History     Socioeconomic History    Marital status:     Number of children: 0    Years of education: 1 yr college   Tobacco Use    Smoking status: Every Day     Current packs/day: 1.50     Average packs/day: 1.5 packs/day for 51.0 years (76.5 ttl pk-yrs)     Types: Cigarettes    Smokeless tobacco: Never   Vaping Use    Vaping status: Never Used   Substance and Sexual Activity    Alcohol use: Yes  "    Alcohol/week: 1.0 standard drink of alcohol     Types: 1 Glasses of wine per week     Comment: \"occasionally - once every two months\"    Drug use: Not Currently     Comment: alcohol - occasionally    Sexual activity: Defer        Diet Orders (active) (From admission, onward)       Start     Ordered    03/27/24 1549  Diet: Regular/House; Texture: Soft to Chew (NDD 3); Soft to Chew: Chopped Meat; Fluid Consistency: Thin (IDDSI 0)  Diet Effective Now         03/27/24 1549                    Ms Beckham was observed on room air w/o complications across this evaluation.     Receptive language skills were intact. Ms Beckham was able to id common objects and personal body parts, follow simple and multi-step directives, understand complex \"wh\" questions and participate in conversational exchange w/o difficulties other than dysarthric speech.    Expressive language skills were intact. She was able to complete automatic speech tasks, confrontation naming tasks, complete complex oe sentences, respond to complet oe \"wh\" questions, repeat at word/sentence and multiple digit levels, as well as participate in complex conversational exchange. No aphasia, anomia or verbal apraxia evidenced.    Ms Beckham demonstrated mild to moderate deficits related to orientation to time and location, thought organization in divergent organization, and attention to task and in the midst of distractions. All other cognitive realms were wfl. Clock drawing task was noted w/ appropriate [place] for face of appropriate size to allow numbers to be placed. Numbers 1-7, 11, & 12 were placed with 12 being placed in the correct placement of 12 only. She was asked to set the time to 3:00, and hands were placed to the correct location had the numbers been in the correct location.     Facial/oral structures were asymmetrical upon observation w/ mild to moderate left facial droop evidenced. She additionally endorses decreased sensation of her left facial area. Oral " "mucosa were moist, pink and clean. Secretions were clear, thin, and controlled. OROM/GRACE was generally weak w/ primarily weakness noted of left side during imitation of oral postures. Trace to mild lingual deviation upon protrusion to the left was demonstrated. Speech intensity was mildly decreased w/ mild to moderately dysarthric speech negatively impacting intelligibility in multisyllabic words and sentences. No oral apraxia noted.    Graphic and reading skills were intact, premorbid baseline status. She was able to id isolated letters, simple, and moderate words, as well as sentences and small paragraphs. Signature was legible.    Pragmatic skills were WFL w/ appropriate eye gaze patterns and visual tracking. Language was appropriate in context w/ topic initiation and maintenance independently. No neglect evidenced. Humor response was intact w/ appropriate affect.    Visit Dx:  No diagnosis found.  Patient Active Problem List   Diagnosis    Iron deficiency anemia due to chronic blood loss    Major depressive disorder    RLS (restless legs syndrome)    GERD (gastroesophageal reflux disease)    Left breast mass    Allergy to penicillin    Stroke    Grade I diastolic dysfunction    Moderate malnutrition    Falls frequently    Stroke-like symptoms    Debility     Past Medical History:   Diagnosis Date    Anemia     Anxiety     Arthritis     Depression     Legally blind     Restless leg syndrome     Sleep apnea     \"I don't use a cpap anymore since losing 106 lbs\"    Water retention      Past Surgical History:   Procedure Laterality Date    BACK SURGERY      CARDIAC CATHETERIZATION N/A 1/19/2024    Procedure: Percutaneous Manual Thrombectomy;  Surgeon: Efrain Hurley MD;  Location: [facility] INVASIVE LOCATION;  Service: Interventional Radiology;  Laterality: N/A;    GALLBLADDER SURGERY      HIP ARTHROSCOPY      JOINT REPLACEMENT Right      Impression:      Ms Beckham presents w/ a mild to moderate dysarthria and " mild cognitive deficits related to orientation to time and location, thought organization in divergent organization, and attention to task and in the midst of distractions. Per her recent CVA in January, she is felt to most benefit from formal therapy to address these noted deficits.     SLP Recommendation and Plan     Formal dysarthria and cognitive therapy.     D/w patient results and recommendations w/ verbal understanding and agreement.    D/w RN results and recommendations w/ verbal understanding and agreement.     Thank you for allowing me to participate in the care of your patient-  Azalia Elizondo M.S., CCC-SLP       SLP EVALUATION (Last 72 Hours)       SLP SLC Evaluation       Row Name 03/28/24 100                   Communication Assessment/Intervention    Document Type evaluation  -JR        Subjective Information no complaints  -JR           General Information    Precautions/Limitations, Vision vision impairment, bilaterally  Macular degeneration  -JR           Comprehension Assessment/Intervention    Comprehension Assessment/Intervention Auditory Comprehension;Reading Comprehension  -JR           Auditory Comprehension Assessment/Intervention    Auditory Comprehension (Communication) WFL  -JR        Able to Identify Objects/Pictures (Communication) WFL  -JR        Answers Questions (Communication) WFL  -JR        Able to Follow Commands (Communication) WFL  -JR        Narrative Discourse WFL  -JR        Successful Auditory Strategies (Communication) decrease environmental distractions  -JR           Reading Comprehension Assessment/Intervention    Reading Comprehension (Communication) WFL  -JR        Scanning (Reading) WFL  -JR        Visual Matching Ability (Reading) WFL  -JR        Single Word Level WFL  -JR        Phrase Level WFL  -JR        Paragraph Level WFL  -JR        Functional Reading Tasks WFL  -JR        Reading Comprehension, Comment --  Baseline per premorbid macular degeneration  -JR            Expression Assessment/Intervention    Expression Assessment/Intervention verbal expression;graphic expression  -JR           Verbal Expression Assessment/Intervention    Automatic Speech (Communication) WFL  -JR        Repetition WFL  -JR        Phrase Completion WFL  -JR        Responsive Naming WFL  -JR        Confrontational Naming WFL  -JR        Spontaneous/Functional Words WFL  -JR        Sentence Formulation WFL  -JR        Conversational Discourse/Fluency WFL  -JR           Graphic Expression Assessment/Intervention    Graphic Expression WFL  -JR        Graphic Expression to Dictation WFL  -JR        Biographical Information WFL  -JR        Functional Correspondence WFL  -JR           Oral Musculature and Cranial Nerve Assessment    Oral Motor General Assessment lingual impairment;oral labial or buccal impairment  -JR        Mandibular Impairment Detail, Cranial Nerve V (Trigeminal) reduced mandibular ROM;reduced strength on left;reduced facial sensation on left  -JR        Oral Labial or Buccal Impairment, Detail, Cranial Nerve VII (Facial): left labial droop;reduced ROM  -JR        Lingual Impairment, Detail. Cranial Nerves IX, XII (Glossopharyngeal and Hypoglossal) reduced lingual ROM;reduced strength left  -JR           Motor Speech Assessment/Intervention    Motor Speech Function mild impairment;moderate impairment  -JR        Characteristics Consistent with Dysarthria decreased articulation;decreased intensity;slurred speech  -JR        Initiation of Phonation (Communication) WFL  -JR        Automatic Speech (Communication) WFL  -JR        Verbal Repetition (Communication) WFL  -JR        Phase Completion WFL  -JR        Conversational Speech (Communication) mild impairment;other (see comments)  dysarthric/slurred speech  -JR        Speech intelligibility 80%;in quiet environment;with unfamiliar listener  -JR           Cognitive Assessment Intervention- SLP    Orientation Status (Cognition)  place;time;mild impairment  Knows she is in the hospital. States [facility] in [place].Unsure of month or year.  -JR        Memory (Cognitive) WFL  -JR        Attention (Cognitive) mild impairment;selective;sustained;distracting environment  -JR        Thought Organization (Cognitive) mild impairment;concrete divergent  -JR        Reasoning (Cognitive) WFL  -JR        Problem Solving (Cognitive) WFL  -JR        Functional Math (Cognitive) WFL  -JR        Executive Function (Cognition) WFL  -JR        Pragmatics (Communication) WFL  -JR           Communication Treatment Objective and Progress Goals (SLP)    SLC LTGs Patient will demonstrate functional speech skills for return to discharge environment;Patient will demonstrate functional cognitive-linguistic skills for return to discharge environment  -JR        Motor Speech/Dysarthria Treatment Objectives Motor Speech/Dysarthria Treatment Objectives (Group)  -JR        Cognitive Linguistic Treatment Objectives Cognitive Linguistic Treatment Objectives (Group)  -JR           Patient will demonstrate functional speech skills for return to discharge environment    [place] Independently  -JR        Time frame by discharge  -JR           Patient will demonstrate functional cognitive-linguistic skills for return to discharge environment    [place] Independently  -JR        Time frame by discharge  -JR           Motor Speech/Dysarthria Treatment Objectives    Phonation Selection phonation, SLP goal 1  -JR        Articulation Selection articulation, SLP goal 1  -JR        [place] Selection prosody, SLP goal 1  -JR           Phonation Goal 1 (SLP)    Improve Phonation By Goal 1 (SLP) using loud speech;90%;independently (over 90% accuracy)  -JR        Time Frame (Phonation Goal 1, SLP) by discharge  -JR           Articulation Goal 1 (SLP)    Improve Articulation Goal 1 (SLP) by over-articulating at word level;by over-articulating at phrase level;by  over-articulating in connected speech;90%;independently (over 90% accuracy)  -JR        Time Frame (Articulation Goal 1, SLP) by discharge  -JR           Prosody Goal 1 (SLP)    Improve Prosody by Goal 1 (SLP) decreasing rate;90%;independently (over 90% accuracy)  -JR        Time Frame ([place] Goal 1, SLP) by discharge  -JR           Cognitive Linguistic Treatment Objectives    Attention Selection attention, SLP goal 1  -JR        Orientation Selection orientation, SLP goal 1  -JR        Organizational Skills Selection organizational skills, SLP goal 1  -JR           Attention Goal 1 (SLP)    Improve Attention by Goal 1 (SLP) complete selective attention task;attending to task;90%;independently (over 90% accuracy);complete sustained attention task  -JR        Time Frame (Attention Goal 1, SLP) by discharge  -JR           Orientation Goal 1 (SLP)    Improve Orientation Through Goal 1 (SLP) demonstrating orientation to month;demonstrating orientation to year;demonstrating orientation to place;90%;independently (over 90% accuracy)  -JR        Time Frame (Orientation Goal 1, SLP) by discharge  -JR           Organizational Skills Goal 1 (SLP)    Improve Thought Organization Through Goal 1 (SLP) completing a divergent naming task;generating a list of items in a category;concrete;90%;independently (over 90% accuracy)  -JR        Time Frame (Thought Organization Skills Goal 1, SLP) by discharge  -JR                  User Key  (r) = Recorded By, (t) = Taken By, (c) = Cosigned By      Initials Name Effective Dates    Azalia Bunch, MS CCC-SLP 10/25/21 -                        EDUCATION  The patient has been educated in the following areas:     Cognitive Impairment Communication Impairment.           SLP GOALS       Row Name 03/28/24 1005 03/28/24 1000          (LTG) Patient will demonstrate functional swallow for    Diet Texture (Demonstrate functional swallow) -- regular textures  -JR     Liquid viscosity (Demonstrate  functional swallow) -- thin liquids  -JR     [place] (Demonstrate functional swallow) -- independently (over 90% accuracy)  -JR     Time Frame (Demonstrate functional swallow) -- by discharge  -JR        (STG) Labial Strengthening Goal 1 (SLP)    Activity (Labial Strengthening Goal 1, SLP) -- increase labial sensation/afferent drive  -JR     [place]/Accuracy (Labial Strengthening Goal 1, SLP) -- independently (over 90% accuracy)  -JR     Time Frame (Labial Strengthening Goal 1, SLP) -- by discharge  -JR        (STG) Lingual Strengthening Goal 1 (SLP)    Activity (Lingual Strengthening Goal 1, SLP) -- increase lingual tone/sensation/control/coordination/movement;increase tongue back strength  -JR     Increase Lingual Tone/Sensation/Control/Coordination/Movement -- lingual movement exercises;swallow trials  -JR     Increase Tongue Back Strength -- lingual movement exercises;swallow trials  -JR     [place]/Accuracy (Lingual Strengthening Goal 1, SLP) -- independently (over 90% accuracy)  -JR     Time Frame (Lingual Strengthening Goal 1, SLP) -- by discharge  -JR        (STG) Swallow Compensatory Strategies Goal 1 (SLP)    Activity (Swallow Compensatory Strategies/Techniques Goal 1, SLP) -- compensatory strategies;small bites;during meal intake;food/liquid placed on stronger right side;other (see comments)  Lingual sweep of left buccal area  -JR     [place]/Accuracy (Swallow Compensatory Strategies/Techniques Goal 1, SLP) -- independently (over 90% accuracy)  -JR     Time Frame (Swallow Compensatory Strategies/Techniques Goal 1, SLP) -- by discharge  -JR        Patient will demonstrate functional speech skills for return to discharge environment    [place] Independently  -JR --     Time frame by discharge  -JR --        Patient will demonstrate functional cognitive-linguistic skills for return to discharge environment    [place] Independently  -JR --     Time frame by discharge  -JR --         Phonation Goal 1 (SLP)    Improve Phonation By Goal 1 (SLP) using loud speech;90%;independently (over 90% accuracy)  -JR --     Time Frame (Phonation Goal 1, SLP) by discharge  -JR --        Articulation Goal 1 (SLP)    Improve Articulation Goal 1 (SLP) by over-articulating at word level;by over-articulating at phrase level;by over-articulating in connected speech;90%;independently (over 90% accuracy)  -JR --     Time Frame (Articulation Goal 1, SLP) by discharge  -JR --        [place] Goal 1 (SLP)    Improve [place] by Goal 1 (SLP) decreasing rate;90%;independently (over 90% accuracy)  -JR --     Time Frame ([place] Goal 1, SLP) by discharge  -JR --        Attention Goal 1 (SLP)    Improve Attention by Goal 1 (SLP) complete selective attention task;attending to task;90%;independently (over 90% accuracy);complete sustained attention task  -JR --     Time Frame (Attention Goal 1, SLP) by discharge  -JR --        Orientation Goal 1 (SLP)    Improve Orientation Through Goal 1 (SLP) demonstrating orientation to month;demonstrating orientation to year;demonstrating orientation to place;90%;independently (over 90% accuracy)  -JR --     Time Frame (Orientation Goal 1, SLP) by discharge  -JR --        Organizational Skills Goal 1 (SLP)    Improve Thought Organization Through Goal 1 (SLP) completing a divergent naming task;generating a list of items in a category;concrete;90%;independently (over 90% accuracy)  -JR --     Time Frame (Thought Organization Skills Goal 1, SLP) by discharge  - --               User Key  (r) = Recorded By, (t) = Taken By, (c) = Cosigned By      Initials Name Provider Type    Azalia Bunch MS CCC-SLP Speech and Language Pathologist                            Time Calculation:      Time Calculation- SLP       Row Name 03/28/24 1539             Time Calculation- [facility]    SLP Start Time 1000  -      SLP Stop Time 1050  -JR      SLP Time Calculation (min) 50 min  -[facility] Received On  03/28/24  -      SLP - Next Appointment 03/29/24  -                User Key  (r) = Recorded By, (t) = Taken By, (c) = Cosigned By      Initials Name Provider Type    Azalia Bunch MS CCC-SLP Speech and Language Pathologist                    Therapy Charges for Today       Code Description Service Date Service Provider Modifiers Qty    [id number]  ST EVAL ORAL PHARYNG SWALLOW 1 3/28/2024 Azalia Elizondo MS CCC-SLP GN 1    [id number] HC ST EVAL SPEECH AND PROD W LANG  2 3/28/2024 Azalia Elizondo MS CCC-SLP GN 1                       MS VIGNESH Worley  3/28/2024

## 2024-03-28 NOTE — PLAN OF CARE
DYSPHAGIA THERAPY PLAN OF CARE:    Regine Beckham will benefit from formal dysphagia therapy x3-5 days per week at 15-60 minute sessions, as functionally tolerated, for 7-21 days, to address:    Long Term Goal:  Patient will accept least restrictive diet tolerance w/o overt s/s aspiration.     Short Term Goals:  1. Patient will tolerate facial massage to LEFT facial surface for 3-7 minutes to increase surface blood flow, sensation and ROM over 3 consecutive sessions.     2. Patient will perform OROM/GRACE exercises x3 sets x10 reps w/ min cues.    3. Patient will demonstrate increase labial sensation/afferent drive per pt report in 90% of opp over three consecutive sessions.       4. Patient will increase lingual control, coordination, movement as evidenced by bolus manipulation in 90% opp independently over three consecutive sessions.     5. Patient will participate in a clinical re-evaluation of swallowing fnx in 7-10 days, pending progress towards this poc.    Thank you for allowing me to participate in the care of your patient-  Azalia Elizondo M.S., CCC-SLP        SLP Swallowing Diagnosis: mild-moderate, oral dysphagia (03/28/24 1000)

## 2024-03-28 NOTE — PROGRESS NOTES
Patient Assessment Instrument  Quality Indicators - Admission FY 2023    Section A. Ethnicity/ Race/Language  Ethnicity:  Race:  Preferred Language:    Section A. Transportation      Section B. Hearing and Vision        Section B. Health Literacy        Section C. Cognitive Patterns      Section C. Signs and Symptoms of Delirium (from CAM)      Section D. Mood      Section D. Social Isolation      Section [id number]. Prior Functioning      Section [id number]. Prior Device Use      Section TC5175. Self Care Performance     Eating: [place] sets up or cleans up; patient completes activity. [place] assists  only prior to or following the activity.   Oral Hygiene: [place] does less than half the effort. [place] lifts, holds or  supports trunk or limbs but provides less than half the effort.   Toileting Hygiene: [place] does all of the effort. Patient does none of the  effort to complete the activity. Or, the assistance of 2 or more helpers is  required for the patient to complete the activity.   Shower/Bathe Self: [place] does more than half the effort. [place] lifts or holds  trunk or limbs and provides more than half the effort.   Upper Body Dressing: [place] does all of the effort. Patient does none of the  effort to complete the activity. Or, the assistance of 2 or more helpers is  required for the patient to complete the activity.   Lower Body Dressing: [place] does more than half the effort. [place] lifts or  holds trunk or limbs and provides more than half the effort.   Putting On/Taking Off Footwear: [place] does more than half the effort. [place]  lifts or holds trunk or limbs and provides more than half the effort.    Section [id number]. Self Care Discharge Goals     Eating: [place] sets up or cleans up; patient completes activity. [place] assists  only prior to or following the activity.   Oral Hygiene: [place] does less than half the effort. [place] lifts, holds or  supports trunk or limbs but provides less than half the effort.    Toileting Hygiene: [place] does less than half the effort. [place] lifts, holds  or supports trunk or limbs but provides less than half the effort.   Shower/Bathe Self: [place] does less than half the effort. [place] lifts, holds  or supports trunk or limbs but provides less than half the effort.   Upper Body Dressing: [place] does less than half the effort. [place] lifts, holds  or supports trunk or limbs but provides less than half the effort.   Lower Body Dressing: [place] does less than half the effort. [place] lifts, holds  or supports trunk or limbs but provides less than half the effort.   Putting On/Taking Off Footwear: [place] does less than half the effort. [place]  lifts, holds or supports trunk or limbs but provides less than half the effort.    Section [id number]. Mobility Performance      Section EZ1161. Mobility Discharge Goals      Section H. Bladder and Bowel      Section I. Active Diagnosis      Section J. Health Conditions      Section J. Health Conditions (Pain)      Section K. Swallowing/Nutritional Status    Nutritional Approaches on Admission:    Section M. Skin Conditions      Section N. Medication        Section O. Special Treatments, Procedures, and Programs    Signed by: Desi Mackey OT

## 2024-03-28 NOTE — PLAN OF CARE
51 year old  Chief Complaint   Patient presents with     Results     Follow up on lab test.       Renetta Fish, DUONG  May 11, 2021 12:45 PM   Goal Outcome Evaluation:         Continue current plan of care. No complaints at this time.

## 2024-03-28 NOTE — PROGRESS NOTES
Inpatient Rehabilitation Functional Measures Assessment and Plan of Care    Plan of Care  Self Care    [OT] Dressing (Upper)(Active)  Current Status(03/28/2024): Total A  Weekly Goal(04/02/2024): Max A  Discharge Goal: Mod A    Performed Intervention(s)  ADL retraining, AE education, neuro re-ed, ROM, strengthening, GMC/FMC, fxal  mobility    Functional Measures  ISABEL Eating:  ISABEL Grooming:  ISABEL Bathing:  ISABEL Upper Body Dressing:  ISABEL Lower Body Dressing:  ISABEL Toileting:    ISABEL Bladder Management  Level of Assistance:  Frequency/Number of Accidents this Shift:    ISABEL Bowel Management  Level of Assistance:  Frequency/Number of Accidents this Shift:    ISABEL Bed/Chair/Wheelchair Transfer:  ISABEL Toilet Transfer:  ISABEL Tub/Shower Transfer:    Previously Documented Mode of Locomotion at Discharge:  [facility] Expected Mode of Locomotion at Discharge:  [place] Walk/Wheelchair:  ISABEL Stairs:    ISABEL Comprehension:  ISABEL Expression:  ISABEL Social Interaction:  ISABEL Problem Solving:  ISABEL Memory:    Therapy Mode Minutes  Occupational Therapy: Individual: 105 minutes.  Physical Therapy:  Speech Language Pathology:    Discharge Functional Goals:    Signed by: Desi Makcey OT

## 2024-03-28 NOTE — PROGRESS NOTES
Rehabilitation Nursing  Inpatient Rehabilitation Plan of Care Note    Plan of Care  Safety    Potential for Injury (Active)  Current Status (3/27/2024 4:00:00 PM): risk for falls  Weekly Goal: no falls  Discharge Goal: no falls    Body Systems    Integumentary (Active)  Current Status (3/27/2024 4:00:00 PM): risk for skin break down  Weekly Goal: no skin breakdown  Discharge Goal: no skin breakdown    Signed by: Yaa Moreno RN

## 2024-03-28 NOTE — PROGRESS NOTES
Patient was seen during her physical therapy session, she is participating.  She still has very poor movement of her left hand and arm.  Her left leg is weak but seems to move better than her left arm although with attempted ambulation she was slow to try to move the left leg forward and almost has some left side neglect.  154/91, 16, 93, 98.2, room air saturation 98%.  Blood pressures not to goal's, I am going to instigate low-dose amlodipine.

## 2024-03-28 NOTE — THERAPY EVALUATION
"Inpatient Rehabilitation - Physical Therapy Initial Evaluation        [place]     Patient Name: Regine Beckham  : 1955  MRN: [id number]    Today's Date: 3/28/2024                    Admit Date: 3/27/2024      Visit Dx:   No diagnosis found.    Patient Active Problem List   Diagnosis    Iron deficiency anemia due to chronic blood loss    Major depressive disorder    RLS (restless legs syndrome)    GERD (gastroesophageal reflux disease)    Left breast mass    Allergy to penicillin    Stroke    Grade I diastolic dysfunction    Moderate malnutrition    Falls frequently    Stroke-like symptoms    Debility       Past Medical History:   Diagnosis Date    Anemia     Anxiety     Arthritis     Depression     Legally blind     Restless leg syndrome     Sleep apnea     \"I don't use a cpap anymore since losing 106 lbs\"    Water retention        Past Surgical History:   Procedure Laterality Date    BACK SURGERY      CARDIAC CATHETERIZATION N/A 2024    Procedure: Percutaneous Manual Thrombectomy;  Surgeon: Efrain Hurley MD;  [facility];  Service: Interventional Radiology;  Laterality: N/A;    GALLBLADDER SURGERY      HIP ARTHROSCOPY      JOINT REPLACEMENT Right        PT ASSESSMENT (Last 12 Hours)       IRF PT Evaluation and Treatment       Row Name 24 1628          PT Time and Intention    Document Type initial evaluation;daily treatment  -AG     Mode of Treatment physical therapy  -AG     Patient/Family/Caregiver Comments/Observations pt. supine in bed prior to evaluation.  -AG       Row Name 24 [id number]          General Information    Patient Profile Reviewed yes  -AG     General Observations of Patient L UE incr tone hand, fingers; pt. with dysarthria/ L facial droop; alert, cooperative for evaluation. Pt. appears motivated to reach optimal function.  -AG     Existing Precautions/Restrictions fall  -AG     Limitations/Impairments safety/cognitive;visual  -AG     " Comment, General Information pt. suffered R CVA in January 2024 resulting in L hemiparesis, dysarthria.  -       Row Name 03/28/24 1628          Previous Level of Function/Home Environm    Previous Level of Function, Premorbid pt. attempting to care for herself alone in an apartment following d/c from SNF; however, she experienced frequent falls for which neighbors had to assist her.  -       Row Name 03/28/24 1628          Living Environment    Current Living Arrangements home  -     Home Accessibility wheelchair accessible  -     People in Home alone  -AG     Primary Care Provided by self  -       Row Name 03/28/24 1628          Home Use of Assistive/Adaptive Equipment    Equipment Currently Used at Home commode;hospital bed;grab bar;shower chair  -       Row Name 03/28/24 1628          Pain Assessment    Pretreatment Pain Rating --  painful L UE PROM  -[facility] Name 03/28/24 1628          Cognition/Psychosocial    Affect/Mental Status (Cognition) emotionally labile  -     Orientation Status (Cognition) oriented to;person;place;situation  intermittent confusion  -     Follows Commands (Cognition) verbal cues/prompting required;repetition of directions required;physical/tactile prompts required  -     Personal Safety Interventions fall prevention program maintained;gait belt;nonskid shoes/slippers when out of bed;supervised activity  -       Row Name 03/28/24 1628          Range of Motion (ROM)    Range of Motion left upper extremity;left lower extremity  L U/LE ROM deficitis identified.  -       Row Name 03/28/24 1628          Range of Motion Comprehensive    Comment, General Range of Motion refer to OT evaluation  -       Row Name 03/28/24 1628          Strength Comprehensive (MMT)    General Manual Muscle Testing (MMT) Assessment --  L quads 2+/5; L anterior tib 2/5; L psoas 2 to 2+/5; R LE grossly 4/5  -     Comment, General Manual Muscle Testing (MMT) Assessment refer to OT  evaluation  -AG       Row Name 03/28/24 1628          Vision Assessment/Intervention    Visual Impairment/Limitations legally blind;corrective lenses full-time  -     Vision Assessment Comment macular degeneration  -[facility] Name 03/28/24 1628          Sensory Assessment (Somatosensory)    Sensory Assessment (Somatosensory) --  L U/LE proprioception and sensation to light touch impaired  -AG       Row Name 03/28/24 1628          Mobility    Extremity Weight-bearing Status --  no restrictions  -AG       Row Name 03/28/24 1628          Bed Mobility    Bed Mobility rolling left;rolling right;scooting/bridging;supine-sit;sit-supine  -AG     Rolling Right [place] (Bed Mobility) verbal cues;nonverbal cues (demo/gesture);minimum assist (75% patient effort);moderate assist (50% patient effort)  -AG     Scooting/Bridging [place] (Bed Mobility) verbal cues;nonverbal cues (demo/gesture);moderate assist (50% patient effort);minimum assist (75% patient effort)  -AG     Supine-Sit [place] (Bed Mobility) verbal cues;nonverbal cues (demo/gesture);moderate assist (50% patient effort)  -AG     Bed Mobility, Safety Issues decreased use of arms for pushing/pulling;decreased use of legs for bridging/pushing  -     Assistive Device (Bed Mobility) bed rails;draw sheet  -AG       Row Name 03/28/24 1628          Transfer Assessment/Treatment    Transfers sit-stand transfer;stand-sit transfer;stand pivot/stand step transfer  -AG       Row Name 03/28/24 1628          Sit-Stand Transfer    Sit-Stand [place] (Transfers) verbal cues;nonverbal cues (demo/gesture);moderate assist (50% patient effort)  -     Assistive Device (Sit-Stand Transfers) wheelchair  -AG       Row Name 03/28/24 1628          Stand-Sit Transfer    Stand-Sit [place] (Transfers) verbal cues;nonverbal cues (demo/gesture);moderate assist (50% patient effort)  -     Assistive Device (Stand-Sit Transfers) wheelchair  -AG       Row Name 03/28/24  1628          Stand Pivot/Stand Step Transfer    Stand Pivot/Stand Step [place] (Transfers) verbal cues;nonverbal cues (demo/gesture);moderate assist (50% patient effort)  -AG     Assistive Device (Stand Pivot Stand Step Transfer) wheelchair  -AG       Row Name 03/28/24 1628          Gait/Stairs (Locomotion)    Gait/Stairs Locomotion gait/ambulation independence;gait/ambulation assistive device;distance ambulated;gait pattern;gait deviations  -AG     [place] Level (Gait) verbal cues;nonverbal cues (demo/gesture);moderate assist (50% patient effort)  -AG     Assistive Device (Gait) --  attempted ambulation with R HHA as well as with L platform RW.  Pt. had great difficult managing walker even with therapist assisting with propulsion. Pt. ambulated with R HHA/ mod A with verbal cues to widen FLORI, incr step length; postural extension  -AG     Patient was able to Ambulate yes  -AG     Distance in Feet (Gait) --  40 ft x 3 (40 ft x 2 w/ R HHA, 40 ft x 1 with L PRW)  -AG     Pattern (Gait) step-through  -AG     Deviations/Abnormal Patterns (Gait) base of support, narrow;gait speed decreased;weight shifting decreased;stride length decreased  -AG     Bilateral Gait Deviations forward flexed posture  -AG       Row Name 03/28/24 1628          Safety Issues, Functional Mobility    Impairments Affecting Function (Mobility) balance;cognition;coordination;endurance/activity tolerance;grasp;motor control;muscle tone abnormal;range of motion (ROM);postural/trunk control;strength  -AG     Cognitive Impairments, Mobility Safety/Performance sequencing abilities;attention  L side inattention  -AG       Row Name 03/28/24 1628          Balance    Balance Assessment sitting static balance;sitting dynamic balance;sit to stand dynamic balance;standing static balance;standing dynamic balance  -AG     Static Sitting Balance standby assist  -AG     Dynamic Sitting Balance contact guard  -AG     Position, Sitting Balance  unsupported;sitting edge of bed  -     Static Standing Balance non-verbal cues (demo/gesture);verbal cues;minimal assist;contact guard  -     Dynamic Standing Balance verbal cues;non-verbal cues (demo/gesture);moderate assist  -     Position/Device Used, Standing Balance --  HHA w/ dynamic balance; pt. able to perform static standing briefly with CGA/ min A, verbal and tactile cues.  -       Row Name 03/28/24 1628          Motor Skills    Motor Skills coordination;functional endurance;muscle tone;neuro-muscular function;posture;motor control/coordination interventions  -     Coordination fine motor deficit;gross motor deficit;left;upper extremity;lower extremity;severe impairment  -     Muscle Tone severe impairment;upper extremity(s);left  -     Neuromuscular Function flexion synergy pattern;left;upper extremity  -     Motor Control/Coordination Interventions gross motor coordination activities;therapeutic exercise/ROM;stepping/walking;weight-bearing activities  -     Therapeutic Exercise hip;knee;ankle  -       Row Name 03/28/24 1628          Hip (Therapeutic Exercise)    Hip (Therapeutic Exercise) AAROM (active assistive range of motion);strengthening exercise  -     Hip AAROM (Therapeutic Exercise) left;flexion;extension;sitting  -     Hip Strengthening (Therapeutic Exercise) left;aBduction;aDduction  -       Row Name 03/28/24 1628          Knee (Therapeutic Exercise)    Knee (Therapeutic Exercise) PROM (passive range of motion);strengthening exercise  -     Knee PROM (Therapeutic Exercise) left;extension;sitting  -AG     Knee Strengthening (Therapeutic Exercise) right;flexion;extension;marching while seated;LAQ (long arc quad);sitting  -       Row Name 03/28/24 1628          Ankle (Therapeutic Exercise)    Ankle (Therapeutic Exercise) PROM (passive range of motion)  -     Ankle PROM (Therapeutic Exercise) left;dorsiflexion;sitting  -       Row Name 03/28/24 1628           Positioning and Restraints    Pre-Treatment Position in bed  -AG     Post Treatment Position wheelchair  -AG     In Wheelchair sitting;with OT  -AG       Row Name 03/28/24 1628          Therapy Assessment/Plan (PT)    Patient's Goals For Discharge return home  -AG       Row Name 03/28/24 1628          Therapy Assessment/Plan (PT)    Functional Level at Time of Evaluation (PT) mod A  -AG     PT Diagnosis (PT) impaired balance and gait safety  -AG     Rehab Potential/Prognosis (PT) good, to achieve stated therapy goals  -AG     Frequency of Treatment (PT) 5 times per week  -AG     Estimated Duration of Therapy (PT) 3 weeks  -AG     Problem List (PT) problems related to;balance;mobility;coordination;hemiparesis/hemiplegia;cognition;communication;strength;postural control;vision;range of motion (ROM);muscle tone;motor control  -AG     Activity Limitations Related to Problem List (PT) unable to ambulate safely;unable to transfer safely;BADLs not performed adequately or safely  -AG       Row Name 03/28/24 1628          Therapy Plan Review/Discharge Plan (PT)    Therapy Plan Review (PT) evaluation/treatment results reviewed;care plan/treatment goals reviewed;risks/benefits reviewed;current/potential barriers reviewed;participants voiced agreement with care plan;participants included;patient  -AG     Anticipated Discharge Disposition (PT) home with assist;home with home health  -AG       Row Name 03/28/24 1628          IRF PT Goals    Bed Mobility Goal Selection (PT-IRF) bed mobility, PT goal 1  -AG     Transfer Goal Selection (PT-IRF) transfers, PT goal 1  -AG     Gait (Walking Locomotion) Goal Selection (PT-IRF) gait, PT goal 1  -AG       Row Name 03/28/24 1628          Bed Mobility Goal 1 (PT-IRF)    Activity/Assistive Device (Bed Mobility Goal 1, PT-IRF) scooting;sit to supine;supine to sit  -AG     [place] Level (Bed Mobility Goal 1, PT-IRF) standby assist  -AG     Time Frame (Bed Mobility Goal 1, PT-IRF) by  discharge  -AG       Row Name 03/28/24 1628          Transfer Goal 1 (PT-IRF)    Activity/Assistive Device (Transfer Goal 1, PT-IRF) sit-to-stand/stand-to-sit;bed-to-chair/chair-to-bed;toilet  -AG     [place] Level (Transfer Goal 1, PT-IRF) contact guard required  -AG     Time Frame (Transfer Goal 1, PT-IRF) by discharge  -AG       Row Name 03/28/24 1628          Gait/Walking Locomotion Goal 1 (PT-IRF)    Activity/Assistive Device (Gait/Walking Locomotion Goal 1, PT-IRF) gait (walking locomotion);assistive device use;decrease fall risk;diminish gait deviation;improve balance and speed;increase endurance/gait distance  appropriate a.d.  -AG     Gait/Walking Locomotion Distance Goal 1 (PT-IRF) 150  -AG     [place] Level (Gait/Walking Locomotion Goal 1, PT-IRF) contact guard required  -AG     Time Frame (Gait/Walking Locomotion Goal 1, PT-IRF) by discharge  -AG               User Key  (r) = Recorded By, (t) = Taken By, (c) = Cosigned By      Initials Name Provider Type     Melanie Anne, PT Physical Therapist                     Physical Therapy Education       Title: PT OT SLP Therapies (In Progress)       Topic: Physical Therapy (In Progress)       Point: Mobility training (In Progress)       Learning Progress Summary             Patient Acceptance, E,D, NR by  at 3/28/2024 1625                         Point: Home exercise program (In Progress)       Learning Progress Summary             Patient Acceptance, E,D, NR by  at 3/28/2024 1625                         Point: Body mechanics (In Progress)       Learning Progress Summary             Patient Acceptance, E,D, NR by  at 3/28/2024 1625                         Point: Precautions (In Progress)       Learning Progress Summary             Patient Acceptance, E,D, NR by  at 3/28/2024 1625                                         User Key       Initials Effective Dates Name Provider Type [facility] 06/16/21 -  Melanie Anne, PT Physical  Therapist PT                    PT Recommendation and Plan    Planned Therapy Interventions (PT): balance training, bed mobility training, gait training, home exercise program, motor coordination training, neuromuscular re-education, manual therapy techniques, patient/family education, postural re-education, ROM (range of motion), strengthening, stretching, transfer training  Frequency of Treatment (PT): 5 times per week                     Time Calculation:      PT Charges       Row Name 03/28/24 1625             Time Calculation    PT Received On 03/28/24  -      PT - Next Appointment 03/29/24  -      PT Goal Re-Cert Due Date 04/04/24  -                User Key  (r) = Recorded By, (t) = Taken By, (c) = Cosigned By      Initials Name Provider Type     Melanie Anne, PT Physical Therapist                    Therapy Charges for Today       Code Description Service Date Service Provider Modifiers Qty    [id number] HC GAIT TRAINING EA 15 MIN 3/28/2024 Melanie Anne, PT GP 1    [id number]  PT NEUROMUSC RE EDUCATION EA 15 MIN 3/28/2024 Melanie Anne, PT GP 2    [id number]  PT EVAL HIGH COMPLEXITY 3 3/28/2024 Melanie Anne, PT GP 1              PT G-Codes  AM-PAC 6 Clicks Score (PT): 12      Melanie Anne, PT  3/28/2024

## 2024-03-28 NOTE — THERAPY EVALUATION
Inpatient Rehabilitation - Speech Language Pathology   Swallow Initial Evaluation [facility]  Clinical Dysphagia Assessment     Patient Name: Regine Beckham  : 1955  MRN: [id number]  Today's Date: 3/28/2024     Admit Date: 3/27/2024    Regine Beckham  was seen in the SLP office of [facility]'s IPR unit to assess safety/efficacy of swallowing fnx, determine safest/least restrictive diet tolerance.     She has a medical hx significant for prior CVA in January of this year with right internal carotid artery disease status post thrombectomy and stent placement, anxiety, depression, and is legally blind per macular degeneration.      Following initial CVA in January, Ms Beckham was hospitalized at [facility] and participated in multiple instrumental dysphagia evaluations per oropharyngeal dysphagia. She was able to advanced to soft to chew textures, chopped meats, and thin liquids per FEES on 24. She reports working with speech therapy at the skilled nursing facility she was discharged to.     Ms Beckham presented to [facility] from her home via EMS following frequent falls at home w/ increasing weakness. She was admitted to [facility] acute care on 3/17/24. No new infarct was noted on MRI.     She was evaluated by SLP department of [facility] via Clinical Dysphagia Assessment on 3/18/24 and was evidenced w/ a mild oral dypshagia and wfl pharyngeal swallow. She was recommended for soft to chew consistencies and chopped meats w/ thin liquids.     Social History     Socioeconomic History    Marital status:     Number of children: 0    Years of education: 1 yr college   Tobacco Use    Smoking status: Every Day     Current packs/day: 1.50     Average packs/day: 1.5 packs/day for 51.0 years (76.5 ttl pk-yrs)     Types: Cigarettes    Smokeless tobacco: Never   Vaping Use    Vaping status: Never Used   Substance and Sexual Activity    Alcohol use: Yes     Alcohol/week: 1.0 standard drink of alcohol     Types: 1 Glasses of wine per week      "Comment: \"occasionally - once every two months\"    Drug use: Not Currently     Comment: alcohol - occasionally    Sexual activity: Defer     Diet Orders (active) (From admission, onward)       Start     Ordered    03/27/24 1549  Diet: Regular/House; Texture: Soft to Chew (NDD 3); Soft to Chew: Chopped Meat; Fluid Consistency: Thin (IDDSI 0)  Diet Effective Now         03/27/24 1549                  Ms Beckham was observed on room air w/o complications across this assessment.    She was positioned upright and centered in wheelchair to accept multiple po presentations of ice chips, solid textures, and thin liquids via spoon, cup, and straw.  She was able to self provide po trials.     Facial/oral structures were asymmetrical upon observation w/ left facial droop evidenced. She endorses decreased sensation of left side of her face as well. Lingual protrusion revealed a trace to mild left deviation. Oral mucosa were moist, pink, and clean. Secretions were clear, thin, and well controlled. OROM/GRACE was generally weak w/ primary weakness noted of left side to imitate oral postures. Gag is not assessed. Volitional cough was intact w/ adequate intensity, clear in quality, non-productive. Voice was mildly weak in intensity and mild to moderately dysarthric negatively impacting intelligibility.    Upon po presentations, adequate bolus anticipation and acceptance w/ good labial seal for bolus clearance via spoon bowl, cup rim stability and suction via straw. No oral loss noted from left labial edge. Bolus formation, manipulation and control were prolonged w/ mixed phasic-rotary mastication pattern. A-p transit was timely w/o significant oral residue appreciated. No overt s/s aspiration before the swallow.      Pharyngeal swallow was timely w/ adequate hyolaryngeal elevation per palpation. No overt s/s aspiration evidenced across this evaluation. No silent aspiration suspected. Patient denied odynophagia.      Visit Dx:   No " "diagnosis found.  Patient Active Problem List   Diagnosis    Iron deficiency anemia due to chronic blood loss    Major depressive disorder    RLS (restless legs syndrome)    GERD (gastroesophageal reflux disease)    Left breast mass    Allergy to penicillin    Stroke    Grade I diastolic dysfunction    Moderate malnutrition    Falls frequently    Stroke-like symptoms    Debility     Past Medical History:   Diagnosis Date    Anemia     Anxiety     Arthritis     Depression     Legally blind     Restless leg syndrome     Sleep apnea     \"I don't use a cpap anymore since losing 106 lbs\"    Water retention      Past Surgical History:   Procedure Laterality Date    BACK SURGERY      CARDIAC CATHETERIZATION N/A 1/19/2024    Procedure: Percutaneous Manual Thrombectomy;  Surgeon: Efrain Hurley MD;  Location: [facility] CATH INVASIVE LOCATION;  Service: Interventional Radiology;  Laterality: N/A;    GALLBLADDER SURGERY      HIP ARTHROSCOPY      JOINT REPLACEMENT Right      Impression:     Ms Beckham presented w/ a mild to moderate oral dysphagia and wfl pharyngeal swallow w/o s/s concerning for aspiration. Oral dysphagia negatively impacts overall swallow function and she is felt to most benefit from continuation of current modified po diet of soft to chew textures, chopped meats, and thin liquids. She will benefit from formal oral dysphagia tx to address noted dysphagia.     SLP Recommendation and Plan    1. Soft to chew textures, chopped meats, thin liquids.    2. Medications whole in puree/thins.   3. Upright and centered for all po intake  4. CRISTOPHER precautions.  5. Oral care protocol.  6. Formal speech therapy to address oral dysphagia.     SLP to f/u.     D/w patient results and recommendations w/ verbal agreement.    D/w RN results and recommendations w/ verbal agreement.    Thank you for allowing me to participate in the care of your patient-  Azalia Elizondo M.S., CCC-SLP    SLP Swallowing Diagnosis: mild-moderate, oral " dysphagia (03/28/24 1000)         Rehab Potential/Prognosis, Swallowing: good, to achieve stated therapy goals (03/28/24 1000)        SWALLOW EVALUATION (Last 72 Hours)       SLP Adult Swallow Evaluation       Row Name 03/28/24 1000                   Rehab Evaluation    Document Type evaluation  -JR        Subjective Information no complaints  -JR           General Information    Patient Profile Reviewed yes  -JR        Current Method of Nutrition soft to chew textures;chopped;thin liquids  -JR        Precautions/Limitations, Vision vision impairment, bilaterally  Macular degeneration  -JR        Prior Level of Function-Swallowing regular textures;thin liquids  -JR           Oral Motor Structure and Function    Secretion Management WNL/WFL  -JR        Mucosal Quality moist, healthy  -JR        Volitional Swallow WFL  -JR        Volitional Cough WFL  -JR           Oral Musculature and Cranial Nerve Assessment    Oral Motor General Assessment lingual impairment;oral labial or buccal impairment  -JR        Mandibular Impairment Detail, Cranial Nerve V (Trigeminal) reduced mandibular ROM;reduced facial sensation on left;reduced strength on left  -JR        Oral Labial or Buccal Impairment, Detail, Cranial Nerve VII (Facial): left labial droop;reduced ROM  -JR        Lingual Impairment, Detail. Cranial Nerves IX, XII (Glossopharyngeal and Hypoglossal) reduced lingual ROM;reduced strength left  -JR           General Eating/Swallowing Observations    Respiratory Support Currently in Use room air  -JR        Eating/Swallowing Skills self-fed;appropriate self-feeding skills observed  -JR        Positioning During Eating upright in chair  -JR        Utensils Used spoon;cup;straw  -JR        Consistencies Trialed soft to chew textures;ice chips;regular textures;thin liquids  -JR           Clinical Swallow Eval    Oral Prep Phase impaired  Mildy prolonged  -JR        Oral Transit impaired  delayed  -JR        Oral Residue WFL   -JR        Pharyngeal Phase suspected pharyngeal impairment  -JR        Esophageal Phase unremarkable  -JR           Oral Prep Concerns    Oral Prep Concerns prolonged mastication;inefficient mastication;increased prep time  -JR        Prolonged Mastication mechanical soft;regular consistencies  -JR        Inefficient Mastication mechanical soft;regular consistencies  -JR        Increased Prep Time mechanical soft;regular consistencies  -JR           Oral Transit Concerns    Oral Transit Concerns delayed initiation of bolus transit  -JR        Delayed Intiation of Bolus Transit regular consistencies  -JR           SLP Evaluation Clinical Impression    SLP Swallowing Diagnosis mild-moderate;oral dysphagia  -JR        Rehab Potential/Prognosis, Swallowing good, to achieve stated therapy goals  -JR           Swallow Goals (SLP)    Swallow LTGs Patient will demonstrate functional swallow for  -JR        Swallow STGs labial strengthening goal selection (SLP);lingual strengthening goal selection (SLP);swallow compensatory strategies goal selection (SLP)  -JR        Labial Strengthening Goal Selection (SLP) labial strengthening, SLP goal 1  -JR        Lingual Strengthening Goal Selection (SLP) lingual strengthening, SLP goal 1  -JR        Swallow Compensatory Strategies Goal Selection (SLP) swallow compensatory strategies, SLP goal 1  -JR           (LTG) Patient will demonstrate functional swallow for    Diet Texture (Demonstrate functional swallow) regular textures  -JR        Liquid viscosity (Demonstrate functional swallow) thin liquids  -JR        [place] (Demonstrate functional swallow) independently (over 90% accuracy)  -JR        Time Frame (Demonstrate functional swallow) by discharge  -           (STG) Labial Strengthening Goal 1 (SLP)    Activity (Labial Strengthening Goal 1, SLP) increase labial sensation/afferent drive  -JR        [place]/Accuracy (Labial Strengthening Goal 1, SLP) independently  (over 90% accuracy)  -JR        Time Frame (Labial Strengthening Goal 1, SLP) by discharge  -JR           (STG) Lingual Strengthening Goal 1 (SLP)    Activity (Lingual Strengthening Goal 1, SLP) increase lingual tone/sensation/control/coordination/movement;increase tongue back strength  -JR        Increase Lingual Tone/Sensation/Control/Coordination/Movement lingual movement exercises;swallow trials  -JR        Increase Tongue Back Strength lingual movement exercises;swallow trials  -JR        [place]/Accuracy (Lingual Strengthening Goal 1, SLP) independently (over 90% accuracy)  -JR        Time Frame (Lingual Strengthening Goal 1, SLP) by discharge  -JR           (STG) Swallow Compensatory Strategies Goal 1 (SLP)    Activity (Swallow Compensatory Strategies/Techniques Goal 1, SLP) compensatory strategies;small bites;during meal intake;food/liquid placed on stronger right side;other (see comments)  Lingual sweep of left buccal area  -JR        [place]/Accuracy (Swallow Compensatory Strategies/Techniques Goal 1, SLP) independently (over 90% accuracy)  -JR        Time Frame (Swallow Compensatory Strategies/Techniques Goal 1, SLP) by discharge  -JR                  User Key  (r) = Recorded By, (t) = Taken By, (c) = Cosigned By      Initials Name Effective Dates    Azalia Bunch, MS CCC-SLP 10/25/21 -                     EDUCATION  The patient has been educated in the following areas:   Dysphagia (Swallowing Impairment) Oral Care/Hydration.        SLP GOALS       Row Name 03/28/24 1005 03/28/24 1000          (LTG) Patient will demonstrate functional swallow for    Diet Texture (Demonstrate functional swallow) -- regular textures  -JR     Liquid viscosity (Demonstrate functional swallow) -- thin liquids  -JR     [place] (Demonstrate functional swallow) -- independently (over 90% accuracy)  -JR     Time Frame (Demonstrate functional swallow) -- by discharge  -JR        (STG) Labial Strengthening Goal 1  (SLP)    Activity (Labial Strengthening Goal 1, SLP) -- increase labial sensation/afferent drive  -JR     [place]/Accuracy (Labial Strengthening Goal 1, SLP) -- independently (over 90% accuracy)  -JR     Time Frame (Labial Strengthening Goal 1, SLP) -- by discharge  -        ([facility]) Lingual Strengthening Goal 1 (SLP)    Activity (Lingual Strengthening Goal 1, SLP) -- increase lingual tone/sensation/control/coordination/movement;increase tongue back strength  -JR     Increase Lingual Tone/Sensation/Control/Coordination/Movement -- lingual movement exercises;swallow trials  -JR     Increase Tongue Back Strength -- lingual movement exercises;swallow trials  -JR     [place]/Accuracy (Lingual Strengthening Goal 1, SLP) -- independently (over 90% accuracy)  -JR     Time Frame (Lingual Strengthening Goal 1, SLP) -- by discharge  -JR        ([facility]) Swallow Compensatory Strategies Goal 1 (SLP)    Activity (Swallow Compensatory Strategies/Techniques Goal 1, SLP) -- compensatory strategies;small bites;during meal intake;food/liquid placed on stronger right side;other (see comments)  Lingual sweep of left buccal area  -JR     [place]/Accuracy (Swallow Compensatory Strategies/Techniques Goal 1, SLP) -- independently (over 90% accuracy)  -JR     Time Frame (Swallow Compensatory Strategies/Techniques Goal 1, SLP) -- by discharge  -JR        Patient will demonstrate functional speech skills for return to discharge environment    [place] Independently  -JR --     Time frame by discharge  -JR --        Patient will demonstrate functional cognitive-linguistic skills for return to discharge environment    [place] Independently  -JR --     Time frame by discharge  -JR --        Phonation Goal 1 (SLP)    Improve Phonation By Goal 1 (SLP) using loud speech;90%;independently (over 90% accuracy)  -JR --     Time Frame (Phonation Goal 1, SLP) by discharge  -JR --        Articulation Goal 1 (SLP)    Improve Articulation  Goal 1 (SLP) by over-articulating at word level;by over-articulating at phrase level;by over-articulating in connected speech;90%;independently (over 90% accuracy)  - --     Time Frame (Articulation Goal 1, SLP) by discharge  - --        [place] Goal 1 (SLP)    Improve [place] by Goal 1 (SLP) decreasing rate;90%;independently (over 90% accuracy)  -JR --     Time Frame ([place] Goal 1, SLP) by discharge  -JR --        Attention Goal 1 (SLP)    Improve Attention by Goal 1 (SLP) complete selective attention task;attending to task;90%;independently (over 90% accuracy);complete sustained attention task  -JR --     Time Frame (Attention Goal 1, SLP) by discharge  -JR --        Orientation Goal 1 (SLP)    Improve Orientation Through Goal 1 (SLP) demonstrating orientation to month;demonstrating orientation to year;demonstrating orientation to place;90%;independently (over 90% accuracy)  -JR --     Time Frame (Orientation Goal 1, SLP) by discharge  -JR --        Organizational Skills Goal 1 (SLP)    Improve Thought Organization Through Goal 1 (SLP) completing a divergent naming task;generating a list of items in a category;concrete;90%;independently (over 90% accuracy)  - --     Time Frame (Thought Organization Skills Goal 1, SLP) by discharge  - --               User Key  (r) = Recorded By, (t) = Taken By, (c) = Cosigned By      Initials Name Provider Type    Azalia Bunch MS CCC-SLP Speech and Language Pathologist                       Time Calculation:    Time Calculation- SLP       Row Name 03/28/24 0649             Time Calculation- [facility]    SLP Start Time 1000  -JR      SLP Stop Time 1050  -JR      SLP Time Calculation (min) 50 min  -      SLP Received On 03/28/24  -[facility] - Next Appointment 03/29/24  -                User Key  (r) = Recorded By, (t) = Taken By, (c) = Cosigned By      Initials Name Provider Type    Azalia Bunch MS CCC-SLP Speech and Language Pathologist                    Therapy  [place] for Today       Code Description Service Date Service Provider Modifiers Qty    [id number]  ST EVAL ORAL PHARYNG SWALLOW 1 3/28/2024 Azalia Elizondo, MS CCC-SLP GN 1    [id number]  ST EVAL SPEECH AND PROD W LANG  2 3/28/2024 Azalia Elizondo, MS CCC-SLP GN 1                 Azalia Elizondo MS CCC-SLP  3/28/2024

## 2024-03-28 NOTE — CONSULTS
Adult Nutrition  Assessment    Patient Name:  Regine Beckham  YOB: 1955  MRN: [id number]  Admit Date:  3/27/2024    Assessment Date:  3/28/2024    Comments:  Patient currently on a house diet that is soft to chew with chopped meats and thin liquids related to poor intakes and does not like hospital food.  She has also refused all supplements.  Discussed the importance of eating enough and encouraged intakes of meals.  Will continue to monitor and make recommendations/modifications as appropriate.     Reason for Assessment       Row Name 03/28/24 1155          Reason for Assessment    Reason For Assessment per organizational policy                      Labs/Tests/Procedures/Meds       Row Name 03/28/24 1158          Labs/Procedures/Meds    Lab Results Reviewed reviewed     Lab Results Comments glu- 128;        Medications    Pertinent Medications Comments folic acid, pericolace                      Estimated/Assessed Needs - Anthropometrics       Row Name 03/28/24 1200          Anthropometrics    Age for Calculations 68     Weight for Calculation 63.5 kg (139 lb 15.9 oz)     Additional Documentation Ideal Body Weight (IBW) (Group)        Ideal Body Weight (IBW)    Ideal Body Weight 59.3kg        Estimated/Assessed Needs    Additional Documentation Fluid Requirements (Group);[place]-St. Jeor Equation (Group);Protein Requirements (Group)        [place]-St. Jeor Equation    RMR ([place]-St. Jeor Equation) 1176.75     [place]-St. Jeor Activity Factors 1.4 - 1.5     Activity Factors ([place]-St. Jeor) 1647.45 - 1765.125        Protein Requirements    Weight Used For Protein Calculations 63.5 kg (139 lb 15.9 oz)     Est Protein Requirement Amount (gms/kg) 1.0 gm protein     Estimated Protein Requirements (gms/day) 63.5        Fluid Requirements    Fluid Requirements (mL/day) 1700     Estimated Fluid Requirement Method RDA Method     RDA Method (mL) 1700                    Nutrition Prescription Ordered        Row Name 03/28/24 1201          Nutrition Prescription PO    Current PO Diet Soft Texture     Texture Chopped     Fluid Consistency Thin                    Evaluation of Received Nutrient/Fluid Intake       Row Name 03/28/24 1201          Fluid Intake Evaluation    Total Fluid Target (mL) 1700     Oral Fluid (mL) 360     Enteral Fluid (mL) 0     Parenteral Fluid (mL) 0     Other Fluid (mL) 0     Total Fluid Intake (mL) 360     % Total Fluid Intake 21.18        PO Evaluation    Number of Meals 1     % PO Intake 25                       Electronically signed by:  Jolie Lane RD  03/28/24 12:05 EDT

## 2024-03-29 PROCEDURE — 97535 SELF CARE MNGMENT TRAINING: CPT | Performed by: OCCUPATIONAL THERAPIST

## 2024-03-29 PROCEDURE — 97112 NEUROMUSCULAR REEDUCATION: CPT

## 2024-03-29 PROCEDURE — 97110 THERAPEUTIC EXERCISES: CPT

## 2024-03-29 PROCEDURE — 97112 NEUROMUSCULAR REEDUCATION: CPT | Performed by: OCCUPATIONAL THERAPIST

## 2024-03-29 PROCEDURE — 92507 TX SP LANG VOICE COMM INDIV: CPT

## 2024-03-29 PROCEDURE — 92526 ORAL FUNCTION THERAPY: CPT

## 2024-03-29 PROCEDURE — 97110 THERAPEUTIC EXERCISES: CPT | Performed by: OCCUPATIONAL THERAPIST

## 2024-03-29 PROCEDURE — 97116 GAIT TRAINING THERAPY: CPT

## 2024-03-29 PROCEDURE — 25010000002 HEPARIN (PORCINE) PER 1000 UNITS: Performed by: INTERNAL MEDICINE

## 2024-03-29 PROCEDURE — 99232 SBSQ HOSP IP/OBS MODERATE 35: CPT | Performed by: INTERNAL MEDICINE

## 2024-03-29 RX ADMIN — HEPARIN SODIUM 5000 UNITS: 5000 INJECTION INTRAVENOUS; SUBCUTANEOUS at 21:14

## 2024-03-29 RX ADMIN — LOSARTAN POTASSIUM 50 MG: 50 TABLET, FILM COATED ORAL at 08:21

## 2024-03-29 RX ADMIN — DOCUSATE SODIUM 50 MG AND SENNOSIDES 8.6 MG 2 TABLET: 8.6; 5 TABLET, FILM COATED ORAL at 08:21

## 2024-03-29 RX ADMIN — Medication 1 MG: at 08:22

## 2024-03-29 RX ADMIN — ACETAMINOPHEN 1000 MG: 500 TABLET ORAL at 19:31

## 2024-03-29 RX ADMIN — ATORVASTATIN CALCIUM 80 MG: 40 TABLET, FILM COATED ORAL at 21:13

## 2024-03-29 RX ADMIN — PANTOPRAZOLE SODIUM 40 MG: 40 TABLET, DELAYED RELEASE ORAL at 05:34

## 2024-03-29 RX ADMIN — ZOLPIDEM TARTRATE 5 MG: 5 TABLET ORAL at 21:13

## 2024-03-29 RX ADMIN — MIRTAZAPINE 30 MG: 15 TABLET, FILM COATED ORAL at 21:13

## 2024-03-29 RX ADMIN — CEFDINIR 300 MG: 300 CAPSULE ORAL at 08:22

## 2024-03-29 RX ADMIN — ACETAMINOPHEN 1000 MG: 500 TABLET ORAL at 05:38

## 2024-03-29 RX ADMIN — ASPIRIN 81 MG: 81 TABLET, CHEWABLE ORAL at 08:22

## 2024-03-29 RX ADMIN — ACETAMINOPHEN 1000 MG: 500 TABLET ORAL at 12:58

## 2024-03-29 RX ADMIN — CEFDINIR 300 MG: 300 CAPSULE ORAL at 21:14

## 2024-03-29 RX ADMIN — AMLODIPINE BESYLATE 2.5 MG: 5 TABLET ORAL at 08:22

## 2024-03-29 RX ADMIN — DOCUSATE SODIUM 50 MG AND SENNOSIDES 8.6 MG 2 TABLET: 8.6; 5 TABLET, FILM COATED ORAL at 21:13

## 2024-03-29 RX ADMIN — TICAGRELOR 60 MG: 60 TABLET ORAL at 21:13

## 2024-03-29 RX ADMIN — OXYBUTYNIN CHLORIDE 10 MG: 5 TABLET, EXTENDED RELEASE ORAL at 08:21

## 2024-03-29 RX ADMIN — TICAGRELOR 60 MG: 60 TABLET ORAL at 08:21

## 2024-03-29 RX ADMIN — HEPARIN SODIUM 5000 UNITS: 5000 INJECTION INTRAVENOUS; SUBCUTANEOUS at 08:27

## 2024-03-29 RX ADMIN — ROPINIROLE HYDROCHLORIDE 4 MG: 1 TABLET, FILM COATED ORAL at 21:14

## 2024-03-29 NOTE — PROGRESS NOTES
Occupational Therapy:    Physical Therapy: Individual: 90 minutes.    Speech Language Pathology:    Signed by: Ruthie Travis PTA

## 2024-03-29 NOTE — PROGRESS NOTES
Case Management  Inpatient Rehabilitation Plan of Care and Discharge Plan Note    Rehabilitation Diagnosis:  debility  Date of Onset:  3-17-24    Medical Summary:  multiple falls, UTI    Plan of Care      Expected Intensity:  Average of 3 hours of therapy 5 days/week.  Interdisciplinary Team:  Interdisciplinary Team: Medical Supervision and 24 Hour Rehabilitation Nursing.,  Physical Therapy:, Occupational Therapy:, Social Work, Therapeutic Recreation.  Physical Therapy Intensity/Duration: PT 1.5 hours per day/5 days per week  Occupational Therapy Intensity/Duration: OT 1.5 hours per day/5 days per week  Estimated Length of Stay/Anticipated Discharge Date: 14 days  Anticipated Discharge Destination:  Anticipated discharge destination from inpatient rehabilitation is community  discharge with assistance. Pt plans to return to her apartment at discharge.  Pt  says significant other may stay with her if needed.      Based on the patient's medical and functional status, their prognosis and  expected level of functional improvement is:  fair-good    Signed by: NARENDRA Westfall

## 2024-03-29 NOTE — PLAN OF CARE
Problem: Rehabilitation (IRF) Plan of Care  Goal: Plan of Care Review  Outcome: Ongoing, Progressing  Flowsheets (Taken 3/29/2024 0702)  Progress: improving  Plan of Care Reviewed With: patient  Goal: Patient-Specific Goal (Individualized)  Outcome: Ongoing, Progressing  Goal: Absence of New-Onset Illness or Injury  Outcome: Ongoing, Progressing  Goal: Optimal Comfort and Wellbeing  Outcome: Ongoing, Progressing  Goal: Home and Community Transition Plan Established  Outcome: Ongoing, Progressing     Problem: Mobility Impairment  Goal: Optimal Mobility [place] and Safety  Outcome: Ongoing, Progressing     Problem: Skin Injury Risk Increased  Goal: Skin Health and Integrity  Outcome: Ongoing, Progressing     Problem: Fall Injury Risk  Goal: Absence of Fall and Fall-Related Injury  Outcome: Ongoing, Progressing   Goal Outcome Evaluation:  Plan of Care Reviewed With: patient        Progress: improving

## 2024-03-29 NOTE — PROGRESS NOTES
Occupational Therapy: Individual: 90 minutes.    Physical Therapy:    Speech Language Pathology:    Signed by: Desi Mackey OT

## 2024-03-29 NOTE — THERAPY TREATMENT NOTE
"Inpatient Rehabilitation - Speech Language Pathology   Swallow Treatment Note [facility]  Dysphagia Therapy Treatment Note     Patient Name: Regine Beckham  : 1955  MRN: [id number]  Today's Date: 3/29/2024     Admit Date: 3/27/2024  DYSPHAGIA THERAPY PLAN OF CARE:     Regine Beckham was seen this am in the SLP office of [facility]'s IPR unit for formal therapy tasks. She is cooperative to participate in all tasks.     Long Term Goal:  Patient will accept least restrictive diet tolerance w/o overt s/s aspiration.      Short Term Goals:  1. Patient will tolerate facial massage to LEFT facial surface for 3-7 minutes to increase surface blood flow, sensation and ROM over 3 consecutive sessions.  Tolerated 7 min w/ minimal increase in surface blood flow.       2. Patient will perform OROM/GRACE exercises x3 sets x10 reps w/ min cues.  X1 set x5 reps.      3. Patient will demonstrate increase labial sensation/afferent drive per pt report in 90% of opp over three consecutive sessions.   Pt reports no change in decreased sensation this date.       4. Patient will increase lingual control, coordination, movement as evidenced by bolus manipulation in 90% opp independently over three consecutive sessions.   No po trials adminsitered this date.      5. Patient will participate in a clinical re-evaluation of swallowing fnx in 7-10 days, pending progress towards this poc.    Visit Dx:   No diagnosis found.  Patient Active Problem List   Diagnosis    Iron deficiency anemia due to chronic blood loss    Major depressive disorder    RLS (restless legs syndrome)    GERD (gastroesophageal reflux disease)    Left breast mass    Allergy to penicillin    Stroke    Grade I diastolic dysfunction    Moderate malnutrition    Falls frequently    Stroke-like symptoms    Debility     Past Medical History:   Diagnosis Date    Anemia     Anxiety     Arthritis     Depression     Legally blind     Restless leg syndrome     Sleep apnea     \"I " "don't use a cpap anymore since losing 106 lbs\"    Water retention      Past Surgical History:   Procedure Laterality Date    BACK SURGERY      CARDIAC CATHETERIZATION N/A 1/19/2024    Procedure: Percutaneous Manual Thrombectomy;  Surgeon: Efrain Hurley MD;  [facility];  Service: Interventional Radiology;  Laterality: N/A;    GALLBLADDER SURGERY      HIP ARTHROSCOPY      JOINT REPLACEMENT Right        SLP Recommendation and Plan      Continue per POC.       Thank you for allowing me to participate in the care of your patient-   Azalia Elizondo M.S., CCC-SLP           Daily Summary of Progress (SLP): progress towards functional goals is fair (Facial massage 7 min. Minimal increase in surface blood flow noted. OROM/GRACE x5 reps. Reports no change in current decreased sensation.) (03/29/24 1005)       SWALLOW EVALUATION (Last 72 Hours)       SLP Adult Swallow Evaluation       Row Name 03/29/24 1005 03/28/24 1000                Rehab Evaluation    Document Type therapy note (daily note)  -JR evaluation  -JR       Subjective Information no complaints  -JR no complaints  -JR          General Information    Patient Profile Reviewed -- yes  -JR       Current Method of Nutrition -- soft to chew textures;chopped;thin liquids  -JR       Precautions/Limitations, Vision -- vision impairment, bilaterally  Macular degeneration  -JR       Prior Level of Function-Swallowing -- regular textures;thin liquids  -JR          Oral Motor Structure and Function    Secretion Management -- WNL/WFL  -JR       Mucosal Quality -- moist, healthy  -JR       Volitional Swallow -- WFL  -JR       Volitional Cough -- WFL  -JR          Oral Musculature and Cranial Nerve Assessment    Oral Motor General Assessment -- lingual impairment;oral labial or buccal impairment  -JR       Mandibular Impairment Detail, Cranial Nerve V (Trigeminal) -- reduced mandibular ROM;reduced facial sensation on left;reduced strength on left  -JR "       Oral Labial or Buccal Impairment, Detail, Cranial Nerve VII (Facial): -- left labial droop;reduced ROM  -       Lingual Impairment, Detail. Cranial Nerves IX, XII (Glossopharyngeal and Hypoglossal) -- reduced lingual ROM;reduced strength left  -          General Eating/Swallowing Observations    Respiratory Support Currently in Use -- room air  -       Eating/Swallowing Skills -- self-fed;appropriate self-feeding skills observed  -       Positioning During Eating -- upright in chair  -       Utensils Used -- spoon;cup;straw  -       Consistencies Trialed -- soft to chew textures;ice chips;regular textures;thin liquids  -          Clinical Swallow Eval    Oral Prep Phase -- impaired  [place] prolonged  -       Oral Transit -- impaired  delayed  -       Oral Residue -- WFL  -       Pharyngeal Phase -- suspected pharyngeal impairment  -JR       Esophageal Phase -- unremarkable  -          Oral Prep Concerns    Oral Prep Concerns -- prolonged mastication;inefficient mastication;increased prep time  -JR       Prolonged Mastication -- mechanical soft;regular consistencies  -       Inefficient Mastication -- mechanical soft;regular consistencies  -JR       Increased Prep Time -- mechanical soft;regular consistencies  -JR          Oral Transit Concerns    Oral Transit Concerns -- delayed initiation of bolus transit  -       Delayed Intiation of Bolus Transit -- regular consistencies  -          SLP Evaluation Clinical Impression    SLP Swallowing Diagnosis -- mild-moderate;oral dysphagia  -       Rehab Potential/Prognosis, Swallowing -- good, to achieve stated therapy goals  -          SLP Treatment Clinical Impressions    Daily Summary of Progress (SLP) progress towards functional goals is fair  Facial massage 7 min. Minimal increase in surface blood flow noted. OROM/GRACE x5 reps. Reports no change in current decreased sensation.  - --          Swallow Goals (SLP)    Swallow LTGs --  Patient will demonstrate functional swallow for  -JR       Swallow STGs -- labial strengthening goal selection (SLP);lingual strengthening goal selection (SLP);swallow compensatory strategies goal selection (SLP)  -JR       Labial Strengthening Goal Selection (SLP) -- labial strengthening, SLP goal 1  -JR       Lingual Strengthening Goal Selection (SLP) -- lingual strengthening, SLP goal 1  -JR       Swallow Compensatory Strategies Goal Selection (SLP) -- swallow compensatory strategies, SLP goal 1  -JR          (LTG) Patient will demonstrate functional swallow for    Diet Texture (Demonstrate functional swallow) -- regular textures  -JR       Liquid viscosity (Demonstrate functional swallow) -- thin liquids  -JR       [place] (Demonstrate functional swallow) -- independently (over 90% accuracy)  -JR       Time Frame (Demonstrate functional swallow) -- by discharge  -JR          (STG) Labial Strengthening Goal 1 (SLP)    Activity (Labial Strengthening Goal 1, SLP) -- increase labial sensation/afferent drive  -JR       [place]/Accuracy (Labial Strengthening Goal 1, SLP) -- independently (over 90% accuracy)  -JR       Time Frame (Labial Strengthening Goal 1, SLP) -- by discharge  -JR          (STG) Lingual Strengthening Goal 1 (SLP)    Activity (Lingual Strengthening Goal 1, SLP) -- increase lingual tone/sensation/control/coordination/movement;increase tongue back strength  -JR       Increase Lingual Tone/Sensation/Control/Coordination/Movement -- lingual movement exercises;swallow trials  -JR       Increase Tongue Back Strength -- lingual movement exercises;swallow trials  -JR       [place]/Accuracy (Lingual Strengthening Goal 1, SLP) -- independently (over 90% accuracy)  -JR       Time Frame (Lingual Strengthening Goal 1, SLP) -- by discharge  -JR          (STG) Swallow Compensatory Strategies Goal 1 (SLP)    Activity (Swallow Compensatory Strategies/Techniques Goal 1, SLP) -- compensatory  strategies;small bites;during meal intake;food/liquid placed on stronger right side;other (see comments)  Lingual sweep of left buccal area  -JR       [place]/Accuracy (Swallow Compensatory Strategies/Techniques Goal 1, SLP) -- independently (over 90% accuracy)  -JR       Time Frame (Swallow Compensatory Strategies/Techniques Goal 1, SLP) -- by discharge  -JR                 User Key  (r) = Recorded By, (t) = Taken By, (c) = Cosigned By      Initials Name Effective Dates    Azalia Bunch MS CCC-SLP 10/25/21 -                     EDUCATION  The patient has been educated in the following areas:   Dysphagia (Swallowing Impairment).        SLP GOALS       Row Name 03/28/24 1005 03/28/24 1000          (LTG) Patient will demonstrate functional swallow for    Diet Texture (Demonstrate functional swallow) -- regular textures  -JR     Liquid viscosity (Demonstrate functional swallow) -- thin liquids  -JR     [place] (Demonstrate functional swallow) -- independently (over 90% accuracy)  -JR     Time Frame (Demonstrate functional swallow) -- by discharge  -JR        (STG) Labial Strengthening Goal 1 (SLP)    Activity (Labial Strengthening Goal 1, SLP) -- increase labial sensation/afferent drive  -JR     [place]/Accuracy (Labial Strengthening Goal 1, SLP) -- independently (over 90% accuracy)  -JR     Time Frame (Labial Strengthening Goal 1, SLP) -- by discharge  -JR        (STG) Lingual Strengthening Goal 1 (SLP)    Activity (Lingual Strengthening Goal 1, SLP) -- increase lingual tone/sensation/control/coordination/movement;increase tongue back strength  -JR     Increase Lingual Tone/Sensation/Control/Coordination/Movement -- lingual movement exercises;swallow trials  -JR     Increase Tongue Back Strength -- lingual movement exercises;swallow trials  -JR     [place]/Accuracy (Lingual Strengthening Goal 1, SLP) -- independently (over 90% accuracy)  -JR     Time Frame (Lingual Strengthening Goal 1, SLP) --  by discharge  -JR        (STG) Swallow Compensatory Strategies Goal 1 (SLP)    Activity (Swallow Compensatory Strategies/Techniques Goal 1, SLP) -- compensatory strategies;small bites;during meal intake;food/liquid placed on stronger right side;other (see comments)  Lingual sweep of left buccal area  -JR     [place]/Accuracy (Swallow Compensatory Strategies/Techniques Goal 1, SLP) -- independently (over 90% accuracy)  -JR     Time Frame (Swallow Compensatory Strategies/Techniques Goal 1, SLP) -- by discharge  -JR        Patient will demonstrate functional speech skills for return to discharge environment    [place] Independently  -JR --     Time frame by discharge  -JR --        Patient will demonstrate functional cognitive-linguistic skills for return to discharge environment    [place] Independently  -JR --     Time frame by discharge  -JR --        Phonation Goal 1 (SLP)    Improve Phonation By Goal 1 (SLP) using loud speech;90%;independently (over 90% accuracy)  -JR --     Time Frame (Phonation Goal 1, SLP) by discharge  -JR --        Articulation Goal 1 (SLP)    Improve Articulation Goal 1 (SLP) by over-articulating at word level;by over-articulating at phrase level;by over-articulating in connected speech;90%;independently (over 90% accuracy)  -JR --     Time Frame (Articulation Goal 1, SLP) by discharge  -JR --        [place]ody Goal 1 (SLP)    Improve [place] by Goal 1 (SLP) decreasing rate;90%;independently (over 90% accuracy)  -JR --     Time Frame ([place]ody Goal 1, SLP) by discharge  -JR --        Attention Goal 1 (SLP)    Improve Attention by Goal 1 (SLP) complete selective attention task;attending to task;90%;independently (over 90% accuracy);complete sustained attention task  -JR --     Time Frame (Attention Goal 1, SLP) by discharge  -JR --        Orientation Goal 1 (SLP)    Improve Orientation Through Goal 1 (SLP) demonstrating orientation to month;demonstrating orientation to  year;demonstrating orientation to place;90%;independently (over 90% accuracy)  -JR --     Time Frame (Orientation Goal 1, SLP) by discharge  -JR --        Organizational Skills Goal 1 (SLP)    Improve Thought Organization Through Goal 1 (SLP) completing a divergent naming task;generating a list of items in a category;concrete;90%;independently (over 90% accuracy)  -JR --     Time Frame (Thought Organization Skills Goal 1, SLP) by discharge  -JR --               User Key  (r) = Recorded By, (t) = Taken By, (c) = Cosigned By      Initials Name Provider Type    Azalia Bunch MS CCC-SLP Speech and Language Pathologist                       Time Calculation:    Time Calculation- SLP       Row Name 24 1553             Time Calculation- SLP    SLP Start Time 1000  -      SLP Stop Time 1045  -      SLP Time Calculation (min) 45 min  -      SLP - Next Appointment 24  -                User Key  (r) = Recorded By, (t) = Taken By, (c) = Cosigned By      Initials Name Provider Type    Azalia Bunch MS CCC-SLP Speech and Language Pathologist                    Therapy Charges for Today       Code Description Service Date Service Provider Modifiers Qty    13849660503 HC ST EVAL ORAL PHARYNG SWALLOW 1 3/28/2024 Azalia Elizondo MS CCC-SLP GN 1    30741769078 HC ST EVAL SPEECH AND PROD W LANG  2 3/28/2024 Azalia Elizondo MS CCC-SLP GN 1    18225002822 HC ST TREATMENT SWALLOW 1 3/29/2024 Azalia Elizondo MS CCC-SLP GN 1    30922772341 HC ST TREATMENT SPEECH 2 3/29/2024 Azalia Elizondo MS CCC-SLP GN 1            Azalia Elizondo MS CCC-CHAYO  3/29/2024         and Inpatient Rehabilitation - Speech Language Pathology Treatment Note  [facility]  Dysarthria and Cognitive Therapy Treatment Note     Patient Name: Regine Beckham  : 1955  MRN: [id number]  Today's Date: 3/29/2024     Admit Date: 3/27/2024    DYSARTHRIA AND COGNITIVE THERAPY PLAN OF CARE:     Regine Beckham was seen this am in the SLP office of [facility]'s IPR unit  for formal therapy tasks. She is cooperative to participate in all tasks.     She is noted w/ decreased breath support to support adequate vocal intensity for connected speech. Additional goals added as a result.      Long Term Goal:  Patient will demonstrate functional speech skills for return to discharge environment.   Patient will demonstrate functional cognitive skills for return to discharge environment.     Short Term Goals:  1. Patient will tolerate facial massage to left facial surface for 3-7 minutes to increase surface blood flow, sensation and ROM over 3 consecutive sessions.  Tolerated 7 min w/ minimal increase in surface blood flow.        2. Patient will perform OROM/GRACE exercises x3 sets x10 reps w/ min cues.  x1 set x5 reps     3. Patient will maintain vocal intensity at adequate speaking volume 4+ word sentences in 90% of opp over three consecutive sessions.   Less than 10% opp this date.      4. Patient will over-articulate 3+ syllable words and in connected speech in 90% opp to improve intelligibility over three consecutive sessions.   Over-articulation of 3+ syllable words demonstrated in 75% opp w/ verbal cues.      5. Patient will decrease rate of speech in connected speech in 90% of opp to improve intelligibility over three consecutive sessions.   Deferred this date.      6. Patient will perform rapid alternating speech tasks w/ min cues over three consecutive sessions.  Deferred this date.      7. Patient will demonstrate accurate orientation to time and location in 90% of opp independently over three consecutive sessions.  Oriented to time and location in 100% opp this date.      8. Patient will maintain attention to therapy tasks despite intermittently distracting environment in 90% of opp w/ min cues over three consecutive sessions.   Able to maintain attention to tasks in 50% opp this date. Verbal cues frequently required.      8. Patient will perform divergent naming tasks of 8+ items  "named in 1 min w/ min cues over three consecutive sessions.   Divergent naming of 3 items within 1 minute.     9. Patient will perform inhalations/exhalations via resistive breather x4 sets x15 reps.   Breather at 3/3 x2 sets x10 reps.     10. Patient will perform sustained vowel prolongations of 5 sec duration over 15 reps.   Sustained vowel prolongations over x10 reps for 3.5 seconds.        *Additional goals to be added/modified per pt progress toward goals.      Visit Dx:  No diagnosis found.  Patient Active Problem List   Diagnosis    Iron deficiency anemia due to chronic blood loss    Major depressive disorder    RLS (restless legs syndrome)    GERD (gastroesophageal reflux disease)    Left breast mass    Allergy to penicillin    Stroke    Grade I diastolic dysfunction    Moderate malnutrition    Falls frequently    Stroke-like symptoms    Debility     Past Medical History:   Diagnosis Date    Anemia     Anxiety     Arthritis     Depression     Legally blind     Restless leg syndrome     Sleep apnea     \"I don't use a cpap anymore since losing 106 lbs\"    Water retention      Past Surgical History:   Procedure Laterality Date    BACK SURGERY      CARDIAC CATHETERIZATION N/A 1/19/2024    Procedure: Percutaneous Manual Thrombectomy;  Surgeon: Efrain Hurley MD;  Location: [facility] CATH INVASIVE LOCATION;  Service: Interventional Radiology;  Laterality: N/A;    GALLBLADDER SURGERY      HIP ARTHROSCOPY      JOINT REPLACEMENT Right        SLP Recommendation and Plan      Continue per POC.      Thank you-   Azalia Elizondo M.S., CCC-SLP        Daily Summary of Progress (SLP): progress towards functional goals is fair (Facial massage 7 min. Minimal increase in surface blood flow noted. OROM/GRACE x5 reps. Reports no change in current decreased sensation.) (03/29/24 1005)          SLP EVALUATION (Last 72 Hours)       SLP SLC Evaluation       Row Name 03/29/24 1000 03/28/24 1005                Communication " Assessment/Intervention    Document Type therapy note (daily note)  - evaluation  -       Subjective Information no complaints  - no complaints  -          General Information    Precautions/Limitations, Vision -- vision impairment, bilaterally  Macular degeneration  -          Comprehension Assessment/Intervention    Comprehension Assessment/Intervention -- Auditory Comprehension;Reading Comprehension  -          Auditory Comprehension Assessment/Intervention    Auditory Comprehension (Communication) -- [facility]  -       Able to Identify Objects/Pictures (Communication) -- [facility]  -       Answers Questions (Communication) -- [facility]  -       Able to Follow Commands (Communication) -- [facility]  -       Narrative Discourse -- [facility]  -       Successful Auditory Strategies (Communication) -- decrease environmental distractions  -          Reading Comprehension Assessment/Intervention    Reading Comprehension (Communication) -- [facility]  -       Scanning (Reading) -- [facility]  -       Visual Matching Ability (Reading) -- [facility]  -       Single Word Level -- [facility]  -       Phrase Level -- [facility]  -       Paragraph Level -- [facility]  -       Functional Reading Tasks -- [facility]  -       Reading Comprehension, Comment -- --  Baseline per premorbid macular degeneration  -          Expression Assessment/Intervention    Expression Assessment/Intervention -- verbal expression;graphic expression  -          Verbal Expression Assessment/Intervention    Automatic Speech (Communication) -- [facility]  -       Repetition -- [facility]  -       Phrase Completion -- [facility]  -       Responsive Naming -- [facility]  -       Confrontational Naming -- [facility]  -       Spontaneous/Functional Words -- [facility]  -       Sentence Formulation -- [facility]  -       Conversational Discourse/Fluency -- [facility]  -          Graphic Expression Assessment/Intervention    Graphic Expression -- [facility]  -       Graphic Expression to Dictation -- [facility]  -       Biographical Information  -- [facility]  -JR       Functional Correspondence -- L  -JR          Oral Musculature and Cranial Nerve Assessment    Oral Motor General Assessment -- lingual impairment;oral labial or buccal impairment  -JR       Mandibular Impairment Detail, Cranial Nerve V (Trigeminal) -- reduced mandibular ROM;reduced strength on left;reduced facial sensation on left  -JR       Oral Labial or Buccal Impairment, Detail, Cranial Nerve VII (Facial): -- left labial droop;reduced ROM  -JR       Lingual Impairment, Detail. Cranial Nerves IX, XII (Glossopharyngeal and Hypoglossal) -- reduced lingual ROM;reduced strength left  -JR          Motor Speech Assessment/Intervention    Motor Speech Function -- mild impairment;moderate impairment  -JR       Characteristics Consistent with Dysarthria -- decreased articulation;decreased intensity;slurred speech  -JR       Initiation of Phonation (Communication) -- [facility]  -JR       Automatic Speech (Communication) -- [facility]  -JR       Verbal Repetition (Communication) -- [facility]  -JR       Phase Completion -- L  -JR       Conversational Speech (Communication) -- mild impairment;other (see comments)  dysarthric/slurred speech  -JR       Speech intelligibility -- 80%;in quiet environment;with unfamiliar listener  -          Cognitive Assessment Intervention- SLP    Orientation Status (Cognition) -- place;time;mild impairment  Knows she is in the hospital. [facility] in [place].Unsure of month or year.  -       Memory (Cognitive) -- [facility]  -JR       Attention (Cognitive) -- mild impairment;selective;sustained;distracting environment  -       Thought Organization (Cognitive) -- mild impairment;concrete divergent  -       Reasoning (Cognitive) -- [facility]  -JR       Problem Solving (Cognitive) -- [facility]  -JR       Functional Math (Cognitive) -- [facility]  -JR       Executive Function (Cognition) -- [facility]  -JR       Pragmatics (Communication) -- [facility]  -JR          SLP Treatment Clinical Impressions    Daily  Summary of Progress (SLP) progress towards functional goals is fair  Facial massage 7 min. Minimal increase in blood flow. OROM/GRACE x5 reps. Divergent naming: x3 items named in 1min. Attention to Tx tasks 50%. Oriented to time & location.  -JR --          Communication Treatment Objective and Progress Goals (SLP)    [facility] -- Patient will demonstrate functional speech skills for return to discharge environment;Patient will demonstrate functional cognitive-linguistic skills for return to discharge environment  -JR       Motor Speech/Dysarthria Treatment Objectives -- Motor Speech/Dysarthria Treatment Objectives (Group)  -JR       Cognitive Linguistic Treatment Objectives -- Cognitive Linguistic Treatment Objectives (Group)  -JR          Patient will demonstrate functional speech skills for return to discharge environment    [place] -- Independently  -JR       Time frame -- by discharge  -JR          Patient will demonstrate functional cognitive-linguistic skills for return to discharge environment    [place] -- Independently  -JR       Time frame -- by discharge  -JR          Motor Speech/Dysarthria Treatment Objectives    Phonation Selection -- phonation, SLP goal 1  -JR       Articulation Selection -- articulation, SLP goal 1  -JR       Prosody Selection -- prosody, SLP goal 1  -JR          Phonation Goal 1 (SLP)    Improve Phonation By Goal 1 (SLP) -- using loud speech;90%;independently (over 90% accuracy)  -JR       Time Frame (Phonation Goal 1, SLP) -- by discharge  -JR          Articulation Goal 1 (SLP)    Improve Articulation Goal 1 (SLP) -- by over-articulating at word level;by over-articulating at phrase level;by over-articulating in connected speech;90%;independently (over 90% accuracy)  -JR       Time Frame (Articulation Goal 1, SLP) -- by discharge  -JR          Prosody Goal 1 (SLP)    Improve Prosody by Goal 1 (SLP) -- decreasing rate;90%;independently (over 90% accuracy)  -JR       Time  Frame (Prosody Goal 1, SLP) -- by discharge  -JR          Cognitive Linguistic Treatment Objectives    Attention Selection -- attention, SLP goal 1  -JR       Orientation Selection -- orientation, SLP goal 1  -JR       Organizational Skills Selection -- organizational skills, SLP goal 1  -JR          Attention Goal 1 (SLP)    Improve Attention by Goal 1 (SLP) -- complete selective attention task;attending to task;90%;independently (over 90% accuracy);complete sustained attention task  -JR       Time Frame (Attention Goal 1, SLP) -- by discharge  -JR          Orientation Goal 1 (SLP)    Improve Orientation Through Goal 1 (SLP) -- demonstrating orientation to month;demonstrating orientation to year;demonstrating orientation to place;90%;independently (over 90% accuracy)  -JR       Time Frame (Orientation Goal 1, SLP) -- by discharge  -JR          Organizational Skills Goal 1 (SLP)    Improve Thought Organization Through Goal 1 (SLP) -- completing a divergent naming task;generating a list of items in a category;concrete;90%;independently (over 90% accuracy)  -JR       Time Frame (Thought Organization Skills Goal 1, SLP) -- by discharge  -JR                 User Key  (r) = Recorded By, (t) = Taken By, (c) = Cosigned By      Initials Name Effective Dates    JR Azalia Elizondo, MS CCC-SLP 10/25/21 -                        EDUCATION  The patient has been educated in the following areas:     Cognitive Impairment Communication Impairment.           SLP GOALS       Row Name 03/28/24 1005 03/28/24 1000          (LTG) Patient will demonstrate functional swallow for    Diet Texture (Demonstrate functional swallow) -- regular textures  -JR     Liquid viscosity (Demonstrate functional swallow) -- thin liquids  -JR     [place] (Demonstrate functional swallow) -- independently (over 90% accuracy)  -JR     Time Frame (Demonstrate functional swallow) -- by discharge  -JR        (STG) Labial Strengthening Goal 1 (SLP)    Activity  (Labial Strengthening Goal 1, SLP) -- increase labial sensation/afferent drive  -JR     [place]/Accuracy (Labial Strengthening Goal 1, SLP) -- independently (over 90% accuracy)  -JR     Time Frame (Labial Strengthening Goal 1, SLP) -- by discharge  -JR        ([facility]) Lingual Strengthening Goal 1 (SLP)    Activity (Lingual Strengthening Goal 1, SLP) -- increase lingual tone/sensation/control/coordination/movement;increase tongue back strength  -JR     Increase Lingual Tone/Sensation/Control/Coordination/Movement -- lingual movement exercises;swallow trials  -JR     Increase Tongue Back Strength -- lingual movement exercises;swallow trials  -JR     [place]/Accuracy (Lingual Strengthening Goal 1, SLP) -- independently (over 90% accuracy)  -JR     Time Frame (Lingual Strengthening Goal 1, SLP) -- by discharge  -JR        ([facility]) Swallow Compensatory Strategies Goal 1 (SLP)    Activity (Swallow Compensatory Strategies/Techniques Goal 1, SLP) -- compensatory strategies;small bites;during meal intake;food/liquid placed on stronger right side;other (see comments)  Lingual sweep of left buccal area  -JR     [place]/Accuracy (Swallow Compensatory Strategies/Techniques Goal 1, SLP) -- independently (over 90% accuracy)  -JR     Time Frame (Swallow Compensatory Strategies/Techniques Goal 1, SLP) -- by discharge  -JR        Patient will demonstrate functional speech skills for return to discharge environment    [place] Independently  -JR --     Time frame by discharge  -JR --        Patient will demonstrate functional cognitive-linguistic skills for return to discharge environment    [place] Independently  -JR --     Time frame by discharge  -JR --        Phonation Goal 1 (SLP)    Improve Phonation By Goal 1 (SLP) using loud speech;90%;independently (over 90% accuracy)  -JR --     Time Frame (Phonation Goal 1, SLP) by discharge  -JR --        Articulation Goal 1 (SLP)    Improve Articulation Goal 1 (SLP) by  over-articulating at word level;by over-articulating at phrase level;by over-articulating in connected speech;90%;independently (over 90% accuracy)  - --     Time Frame (Articulation Goal 1, SLP) by discharge  - --        Prosody Goal 1 (SLP)    Improve Prosody by Goal 1 (SLP) decreasing rate;90%;independently (over 90% accuracy)  -JR --     Time Frame (Prosody Goal 1, SLP) by discharge  -JR --        Attention Goal 1 (SLP)    Improve Attention by Goal 1 (SLP) complete selective attention task;attending to task;90%;independently (over 90% accuracy);complete sustained attention task  -JR --     Time Frame (Attention Goal 1, SLP) by discharge  -JR --        Orientation Goal 1 (SLP)    Improve Orientation Through Goal 1 (SLP) demonstrating orientation to month;demonstrating orientation to year;demonstrating orientation to place;90%;independently (over 90% accuracy)  -JR --     Time Frame (Orientation Goal 1, SLP) by discharge  - --        Organizational Skills Goal 1 (SLP)    Improve Thought Organization Through Goal 1 (SLP) completing a divergent naming task;generating a list of items in a category;concrete;90%;independently (over 90% accuracy)  - --     Time Frame (Thought Organization Skills Goal 1, SLP) by discharge  - --               User Key  (r) = Recorded By, (t) = Taken By, (c) = Cosigned By      Initials Name Provider Type    Azalia Bunch MS CCC-SLP Speech and Language Pathologist                            Time Calculation:      Time Calculation- SLP       Row Name 03/29/24 1553             Time Calculation- [facility]    SLP Start Time 1000  -JR      SLP Stop Time 1045  -JR      SLP Time Calculation (min) 45 min  -[facility] - Next Appointment 03/30/24  -                User Key  (r) = Recorded By, (t) = Taken By, (c) = Cosigned By      Initials Name Provider Type    Azalia Bunch MS CCC-SLP Speech and Language Pathologist                    Therapy Charges for Today       Code Description  Service Date Service Provider Modifiers Qty    56749453116 HC ST EVAL ORAL PHARYNG SWALLOW 1 3/28/2024 Azalia Elizondo, MS CCC-SLP GN 1    31846234119 HC ST EVAL SPEECH AND PROD W LANG  2 3/28/2024 Azalia Elizondo MS CCC-SLP GN 1    93943780586 HC ST TREATMENT SWALLOW 1 3/29/2024 Azalia Elizondo MS CCC-SLP GN 1    25813840738 HC ST TREATMENT SPEECH 2 3/29/2024 Azalia Elizondo MS CCC-SLP GN 1                       Azalia Elizondo MS AMINA-SLP  3/29/2024

## 2024-03-29 NOTE — THERAPY TREATMENT NOTE
"Inpatient Rehabilitation - Occupational Therapy Treatment Note     [place]     Patient Name: Regine Beckham  : 1955  MRN: [id number]    Today's Date: 3/29/2024                 Admit Date: 3/27/2024       No diagnosis found.    Patient Active Problem List   Diagnosis    Iron deficiency anemia due to chronic blood loss    Major depressive disorder    RLS (restless legs syndrome)    GERD (gastroesophageal reflux disease)    Left breast mass    Allergy to penicillin    Stroke    Grade I diastolic dysfunction    Moderate malnutrition    Falls frequently    Stroke-like symptoms    Debility       Past Medical History:   Diagnosis Date    Anemia     Anxiety     Arthritis     Depression     Legally blind     Restless leg syndrome     Sleep apnea     \"I don't use a cpap anymore since losing 106 lbs\"    Water retention        Past Surgical History:   Procedure Laterality Date    BACK SURGERY      CARDIAC CATHETERIZATION N/A 2024    Procedure: Percutaneous Manual Thrombectomy;  Surgeon: Efrain Hurley MD;  Location: [facility] INVASIVE LOCATION;  Service: Interventional Radiology;  Laterality: N/A;    GALLBLADDER SURGERY      HIP ARTHROSCOPY      JOINT REPLACEMENT Right              IRF OT ASSESSMENT FLOWSHEET (Last 12 Hours)       IRF OT Evaluation and Treatment       Row Name 24 1433          OT Time and Intention    Document Type daily treatment  -BF     Mode of Treatment occupational therapy  -BF     Patient Effort good  -BF     Symptoms Noted During/After Treatment increased pain  RN notified  -BF       Row Name 24 1433          General Information    Existing Precautions/Restrictions fall  L HP  -BF     Limitations/Impairments safety/cognitive;visual  -BF       Row Name 24 1433          Cognition/Psychosocial    Affect/Mental Status (Cognition) emotionally labile  -BF     Orientation Status (Cognition) oriented to;person;place;situation  intermittent confusion  -BF     Follows " Commands (Cognition) verbal cues/prompting required;repetition of directions required;physical/tactile prompts required  -BF       Row Name 03/29/24 1433          Grooming    [place] Level (Grooming) moderate assist (50% patient effort);verbal cues;nonverbal cues (demo/gesture)  -BF     Position (Grooming) supported sitting  -BF       Row Name 03/29/24 1433          Motor Skills    Muscle Tone severe impairment;upper extremity(s);left;hypertonia  -BF     Neuromuscular Function left;upper extremity;flexion synergy pattern  -     Motor Control/Coordination Interventions gross motor coordination activities;neuro-muscular re-education;therapeutic exercise/ROM  LUE AA/PROM, NDT; RUE GMC therex, strengthening, rickshaw 7 lbs X10X3; BUE pushbox w/ LUE supported  -BF       Row Name 03/29/24 1433          Neuromuscular Re-education    Interventions (Neuromuscular Re-education) facilitation/inhibition;massage  -BF     Positioning (Neuromuscular Re-education) supported;sitting  -BF       Row Name 03/29/24 1433          Positioning and Restraints    In Wheelchair sitting;with PT;LUE elevated;exit alarm on  In PM  -BF               User Key  (r) = Recorded By, (t) = Taken By, (c) = Cosigned By      Initials Name Effective Dates    BF Desi Mackey OT 07/11/23 -                      Occupational Therapy Education       Title: PT OT SLP Therapies (In Progress)       Topic: Occupational Therapy (In Progress)       Point: ADL training (In Progress)       Description:   Instruct learner(s) on proper safety adaptation and remediation techniques during self care or transfers.   Instruct in proper use of assistive devices.                  Learning Progress Summary             Patient Acceptance, E, NR by  at 3/29/2024 1433    Acceptance, E, NR by  at 3/28/2024 1434                         Point: Precautions (In Progress)       Description:   Instruct learner(s) on prescribed precautions during self-care and  functional transfers.                  Learning Progress Summary             Patient Acceptance, E, NR by  at 3/29/2024 1433    Acceptance, E, NR by BF at 3/28/2024 1434                                         User Key       Initials Effective Dates Name Provider Type Discipline     07/11/23 -  Desi Mackey, OT Occupational Therapist OT                        OT Recommendation and Plan    Planned Therapy Interventions (OT): activity tolerance training, adaptive equipment training, BADL retraining, neuromuscular control/coordination retraining, passive ROM/stretching, ROM/therapeutic exercise, strengthening exercise, transfer/mobility retraining                    Time Calculation:      Time Calculation- OT       Row Name 03/29/24 1443 03/29/24 1045          Time Calculation- OT    OT Start Time 1245  -BF 1045  -BF     OT Stop Time 1330  -BF 1130  -BF     OT Time Calculation (min) 45 min  -BF 45 min  -BF     Total Timed Code Minutes- OT 45 minute(s)  -BF 45 minute(s)  -BF     OT Non-Billable Time (min) -- 10 min  -BF               User Key  (r) = Recorded By, (t) = Taken By, (c) = Cosigned By      Initials Name Provider Type     Desi Mackey, OT Occupational Therapist                  Therapy Charges for Today       Code Description Service Date Service Provider Modifiers Qty    79472318978 HC OT EVAL HIGH COMPLEXITY 3 3/28/2024 Desi Mackey OT GO 1    63186638924 HC OT NEUROMUSC RE EDUCATION EA 15 MIN 3/28/2024 Desi Mackey OT GO 2    47093755045 HC OT SELF CARE/MGMT/TRAIN EA 15 MIN 3/28/2024 Desi Mackey OT GO 1    83262178050 HC OT THERAPEUTIC ACT EA 15 MIN 3/28/2024 Desi Mackey OT GO 1    06259860193 HC OT SELF CARE/MGMT/TRAIN EA 15 MIN 3/29/2024 Desi Mackey OT GO 1    99706523393 HC OT NEUROMUSC RE EDUCATION EA 15 MIN 3/29/2024 Desi Mackey OT GO 3    56073001831 HC OT THER PROC EA 15 MIN 3/29/2024 Desi Mackey  [place], OT GO 2                     Desi Mackey, OT  3/29/2024

## 2024-03-29 NOTE — PROGRESS NOTES
Assisted By: Evert GANT    CC: Follow-up on debility    Interview History/HPI: Patient states she is doing okay, no new pain complaints, discussed that she had not had a bowel movement that was recorded that I saw that she did not think she needed additional help with this at this time.  No new events overnight.  Yesterday she was asking for a cigarette but she did not think she needed a nicotine patch.          Current Hospital Meds:  amLODIPine, 2.5 mg, Oral, Q24H  aspirin, 81 mg, Oral, Daily  atorvastatin, 80 mg, Oral, Nightly  cefdinir, 300 mg, Oral, Q12H  folic acid, 1 mg, Oral, Daily  heparin (porcine), 5,000 Units, Subcutaneous, Q12H  losartan, 50 mg, Oral, Daily  mirtazapine, 30 mg, Oral, Nightly  oxybutynin XL, 10 mg, Oral, Daily  pantoprazole, 40 mg, Oral, Q AM  rOPINIRole, 4 mg, Oral, Nightly  senna-docusate sodium, 2 tablet, Oral, BID  ticagrelor, 60 mg, Oral, BID         Vitals:    03/29/24 0821   BP: 152/83   Pulse: 88   Resp:    Temp:    SpO2:          Intake/Output Summary (Last 24 hours) at 3/29/2024 1054  Last data filed at 3/29/2024 0900  Gross per 24 hour   Intake 600 ml   Output --   Net 600 ml       EXAM: Patient is lying in bed in no distress she is self-feeding with the right hand and is eaten most of her meal.  She still has a slight left facial droop, still very poor movement of her left hand and arm, she can move her legs better than her arm, lungs are clear, heart regular rate and rhythm abdomen is soft, bowel sounds are active nondistended nontender      Diet: Regular/House; Texture: Soft to Chew (NDD 3); Soft to Chew: Chopped Meat; Fluid Consistency: Thin (IDDSI 0)        LABS:     Lab Results (last 48 hours)       Procedure Component Value Units Date/Time    Potassium [[id number]]  (Normal) Collected: 03/28/24 1150    Specimen: Blood Updated: 03/28/24 1324     Potassium 3.9 mmol/L     Comprehensive Metabolic Panel [[id number]]  (Abnormal) Collected: 03/28/24 0030    Specimen: Blood Updated:  03/28/24 0135     Glucose 128 mg/dL      BUN 19 mg/dL      Creatinine 0.68 mg/dL      Sodium 138 mmol/L      Potassium 3.2 mmol/L      Chloride 105 mmol/L      CO2 23.5 mmol/L      Calcium 9.4 mg/dL      Total Protein 6.3 g/dL      Albumin 3.4 g/dL      ALT (SGPT) 22 U/L      AST (SGOT) 29 U/L      Alkaline Phosphatase 91 U/L      Total Bilirubin 0.3 mg/dL      Globulin 2.9 gm/dL      A/G Ratio 1.2 g/dL      BUN/Creatinine Ratio 27.9     Anion Gap 9.5 mmol/L      eGFR 94.4 mL/min/1.73     Narrative:      GFR Normal >60  Chronic Kidney Disease <60  Kidney Failure <15      CBC Auto Differential [[id number]]  (Abnormal) Collected: 03/28/24 0030    Specimen: Blood Updated: 03/28/24 0107     WBC 6.31 10*3/mm3      RBC 3.88 10*6/mm3      Hemoglobin 11.9 g/dL      Hematocrit 36.7 %      MCV 94.6 fL      MCH 30.7 pg      MCHC 32.4 g/dL      RDW 13.5 %      RDW-SD 46.3 fl      MPV 10.3 fL      Platelets 213 10*3/mm3      Neutrophil % 44.0 %      Lymphocyte % 40.9 %      Monocyte % 8.6 %      Eosinophil % 4.8 %      Basophil % 1.4 %      Immature Grans % 0.3 %      Neutrophils, Absolute 2.78 10*3/mm3      Lymphocytes, Absolute 2.58 10*3/mm3      Monocytes, Absolute 0.54 10*3/mm3      Eosinophils, Absolute 0.30 10*3/mm3      Basophils, Absolute 0.09 10*3/mm3      Immature Grans, Absolute 0.02 10*3/mm3      nRBC 0.0 /100 WBC                  Radiology:    Imaging Results (Last 72 Hours)       ** No results found for the last 72 hours. **            Results for orders placed during the hospital encounter of 01/19/24    Adult Transthoracic Echo Limited W/ Cont if Necessary Per Protocol    Interpretation Summary    Left ventricular systolic function is normal. Estimated left ventricular EF = 60%    Saline test results are negative for intracardiac shunting..      Assessment/Plan:   Debility, complicated by previous CVA.  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  Patient was assessed by all 3  modalities of therapy.  With speech therapy she is found to have moderate oral dysphagia with pharyngeal swallow without signs or symptoms concerning for aspiration.  Oral dysphagia negatively impacted overall swallow function and she continues on the current modified diet.  Speech therapy continues to follow.  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  With OT, it is noted she is legally blind, she was max assist with bathing, depende, dependent with upper body dressing, max assist lower body dressing, dependent with toileting, set up for self-feeding mod assist for grooming.  With PT, strength in the left quads 2/5, left psoas 2/5 left anterior tib 2/5 in the right lower extremity was rated at 4/5 overall.  Patient was min to mod with bed mobility, mod assist sit to stand stand to sit, mod assist stand pivot, with PT and gait, attempted ambulation with right handhold assist as well the left platform rolling walker.  Patient had great difficulty managing walker even with the therapist assisting with the propulsion.  Patient ambulated with right handhold assist with mod assist and verbal cues to widen her base increase step length and postural extension.  She walked 40 feet x 3 overall.    E. coli UTI, patient will be completing Omnicef course     DVT prophylaxis, SQ hep     Previous CVA status post right carotid stent.  Stable, continue aspirin, high-dose statin, and Brilinta.     Hypertension, continue Cozaar, blood pressure readings appear to be improving we will continue to monitor.  She had her first dose of low-dose amlodipine yesterday.    Hypokalemia yesterday, supplemented and improved, recheck with magnesium in the a.m.     Mild anemia has been stable     Tobacco use, counseled to quit, she declines need for patch       Reji Hayes MD

## 2024-03-29 NOTE — PROGRESS NOTES
Patient Assessment Instrument  Quality Indicators - Admission FY 2023    Section A. Ethnicity/ Race/Language  Ethnicity:  Race:  Preferred Language:    Section A. Transportation  Issues Due to Lack of Transportation:   No    Section B. Hearing and Vision        Section B. Health Literacy        Section C. Cognitive Patterns      Section C. Signs and Symptoms of Delirium (from CAM)      Section D. Mood      Section D. Social Isolation      Section [id number]. Prior Functioning      Section NB0590. Prior Device Use  Walker    Section FF2870. Self Care Performance      Section OT3558. Self Care Discharge Goals      Section FX0902. Mobility Performance      Section IA2959. Mobility Discharge Goals      Section H. Bladder and Bowel      Section I. Active Diagnosis      Section J. Health Conditions      Section J. Health Conditions (Pain)      Section K. Swallowing/Nutritional Status    Nutritional Approaches on Admission:    Section M. Skin Conditions      Section N. Medication        Section O. Special Treatments, Procedures, and Programs    Signed by: NARENDRA Westfall

## 2024-03-29 NOTE — SIGNIFICANT NOTE
24 1045   Living Environment   People in Home alone   Current Living Arrangements apartment   Duration at Residence 4 years   Potentially Unsafe Housing Conditions none   In the past 12 months has the electric, gas, oil, or water company threatened to shut off services in your home? No   Primary Care Provided by self   Provides Primary Care For no one   Caregiving Concerns Pt does not have a caregiver   Quality of Family Relationships other (see comments)  (Pt does not have any family to assist with caregiving.)   Able to Return to Prior Arrangements other (see comments)  (Pt does not have a caregiver to stay with her.)   Living Arrangement Comments Pt is a 67 Y/O female admitted to physical rehab on 3-27-24 with dx of debility from [facility].  Spoke to pt and friend Teo Sanchez [phone] who lives in [place].  Pt is a  and has no children.  Pt lives alone in an apartment at [facility] on the first floor.  Pt says there is an elevator in their building and she has no steps inside her apartment.  Pt has [employer] Medicare replacement insurance.  Friend says pt has pending Medicaid.  PCP is Dr. Dread Burton.  Pt uses [facility].  Pt does not have POA or living will.  Pt was independent with ADL's prior to CVA in 2024.  Pt was using a rollator borrowed from neighbor prior to admission (this has been returned to neighbor) and sometimes used a quad cane.  Pt says she had a rollator and loaned it to her aunt and did not get this back when she , therefore, insurance will not pay for another at this time.  Pt was discharged from [facility] earlier in the month per friend.  Pt has friends Shaneka Ortega, Yaa Elizondo and Lenore (unsure of her last name) that live in the same apartment building.  Friends in her building would bring food to pt after she came home from SNF.   Resource/Environmental Concerns   Resource/Environmental  Concerns none   Transportation Concerns none   Transition Planning   Patient/Family Anticipates Transition to long-term care facility   Patient/Family Anticipated Services at Transition skilled nursing   Transportation Anticipated family or friend will provide   Discharge Needs Assessment (IRF)   Current Outpatient/Agency/Support Group   (Pt did not receive home health or outpatient therapy prior to admission.)   Concerns to be Addressed adjustment to diagnosis/illness;discharge planning   Concerns Comments Pt lives alone and has no caregiver.   Equipment Currently Used at Home rollator;cane, quad;shower chair;bp cuff;hospital bed;cane, quad tip;nebulizer;commode   Current Discharge Risk lives alone   Discharge Coordination/Progress Pt would like to return to her apartment with friend Teo staying with her to provide assistance, however, friend says he is not able to stay with pt due to living in [place] and still working some.  Friend says pt will most likely need to go back into a SNF at [facility] at discharge.  Team conference will be held on 4-2-24.  Left message for SS at [facility] [phone].  SS will follow and assist with discharge planning.

## 2024-03-29 NOTE — THERAPY TREATMENT NOTE
"Inpatient Rehabilitation - Physical Therapy Treatment Note        [place]     Patient Name: Regine Beckham  : 1955  MRN: [id number]    Today's Date: 3/29/2024                    Admit Date: 3/27/2024      Visit Dx:   No diagnosis found.    Patient Active Problem List   Diagnosis    Iron deficiency anemia due to chronic blood loss    Major depressive disorder    RLS (restless legs syndrome)    GERD (gastroesophageal reflux disease)    Left breast mass    Allergy to penicillin    Stroke    Grade I diastolic dysfunction    Moderate malnutrition    Falls frequently    Stroke-like symptoms    Debility       Past Medical History:   Diagnosis Date    Anemia     Anxiety     Arthritis     Depression     Legally blind     Restless leg syndrome     Sleep apnea     \"I don't use a cpap anymore since losing 106 lbs\"    Water retention        Past Surgical History:   Procedure Laterality Date    BACK SURGERY      CARDIAC CATHETERIZATION N/A 2024    Procedure: Percutaneous Manual Thrombectomy;  Surgeon: Efrain Hurley MD;  Location:  [facility] INVASIVE LOCATION;  Service: Interventional Radiology;  Laterality: N/A;    GALLBLADDER SURGERY      HIP ARTHROSCOPY      JOINT REPLACEMENT Right        PT ASSESSMENT (Last 12 Hours)       IRF PT Evaluation and Treatment       Row Name 24 1442          PT Time and Intention    Document Type daily treatment  BID treatment session  -LL     Mode of Treatment physical therapy;individual therapy  -LL     Patient/Family/Caregiver Comments/Observations Patient had no new c/o this date and was agreeable to PT participation.  -LL       Row Name 24 1442          General Information    Patient Profile Reviewed yes  -LL     Existing Precautions/Restrictions fall  -LL     Limitations/Impairments safety/cognitive;visual  -LL       Row Name 24 1442          Living Environment    Primary Care Provided by self  -LL       Row Name 24 1442          Home Use of " Assistive/Adaptive Equipment    Equipment Currently Used at Home commode;hospital bed;grab bar;shower chair  -       Row Name 03/29/24 1442          Pain Assessment    Pretreatment Pain Rating --  painful L UE PROM  -       Row Name 03/29/24 1442          Cognition/Psychosocial    Orientation Status (Cognition) oriented to;person;place;situation  intermittent confusion  -     Follows Commands (Cognition) verbal cues/prompting required;repetition of directions required;physical/tactile prompts required  -     Personal Safety Interventions fall prevention program maintained;gait belt;nonskid shoes/slippers when out of bed;supervised activity  -       Row Name 03/29/24 1442          Vision Assessment/Intervention    Visual Impairment/Limitations legally blind;corrective lenses full-time  -       Row Name 03/29/24 1442          Mobility    Extremity Weight-bearing Status --  no restrictions  -[facility] Name 03/29/24 1442          Bed Mobility    Comment, (Bed Mobility) UIC  -[facility] Name 03/29/24 1442          Transfer Assessment/Treatment    Transfers sit-stand transfer;stand-sit transfer;stand pivot/stand step transfer  -       Row Name 03/29/24 1442          Sit-Stand Transfer    Sit-Stand [place] (Transfers) verbal cues;nonverbal cues (demo/gesture);moderate assist (50% patient effort)  -     Assistive Device (Sit-Stand Transfers) wheelchair  -       Row Name 03/29/24 1442          Stand-Sit Transfer    Stand-Sit [place] (Transfers) verbal cues;nonverbal cues (demo/gesture);moderate assist (50% patient effort)  -     Assistive Device (Stand-Sit Transfers) wheelchair  -       Row Name 03/29/24 1442          Stand Pivot/Stand Step Transfer    Stand Pivot/Stand Step [place] (Transfers) verbal cues;nonverbal cues (demo/gesture);moderate assist (50% patient effort)  -LL     Assistive Device (Stand Pivot Stand Step Transfer) wheelchair  -       Row Name 03/29/24 1442           Gait/Stairs (Locomotion)    Gait/Stairs Locomotion gait/ambulation independence;gait/ambulation assistive device;distance ambulated;gait pattern;gait deviations  -LL     [place] Level (Gait) verbal cues;nonverbal cues (demo/gesture);moderate assist (50% patient effort);2 person assist  -LL     Assistive Device (Gait) walker, rolling platform  -LL     Distance in Feet (Gait) --  20', 40' in AM; 10' x 2 in PM  -LL     Pattern (Gait) step-through  -LL     Deviations/Abnormal Patterns (Gait) base of support, narrow;gait speed decreased;weight shifting decreased;stride length decreased  -LL     Bilateral Gait Deviations forward flexed posture  -LL     Left Sided Gait Deviations weight shift ability decreased  -LL     Comment, (Gait/Stairs) Cues for increased step length and wider FLORI  -LL       Row Name 03/29/24 1442          Safety Issues, Functional Mobility    Impairments Affecting Function (Mobility) balance;cognition;coordination;endurance/activity tolerance;grasp;motor control;muscle tone abnormal;range of motion (ROM);postural/trunk control;strength  -LL       Row Name 03/29/24 1442          Balance    Comment, Balance Seated balloon tap with R UE  -LL       Row Name 03/29/24 1442          Motor Skills    Comments, Therapeutic Exercise --  Stretching to B LE's  -       Row Name 03/29/24 1442          Hip (Therapeutic Exercise)    Hip Strengthening (Therapeutic Exercise) bilateral;flexion;extension;aBduction;aDduction;marching while seated;sitting;resistance band;red  -LL       Row Name 03/29/24 1442          Knee (Therapeutic Exercise)    Knee Strengthening (Therapeutic Exercise) bilateral;flexion;extension;marching while seated;LAQ (long arc quad);hamstring curls;sitting;resistance band;red  -LL       Row Name 03/29/24 1442          Ankle (Therapeutic Exercise)    Ankle PROM (Therapeutic Exercise) bilateral;dorsiflexion;plantarflexion;sitting  -LL       Row Name 03/29/24 1442          Positioning and  Restraints    Pre-Treatment Position --  WC  -LL     Post Treatment Position wheelchair  -LL     In Wheelchair sitting;exit alarm on;legs elevated;notified nsg;encouraged to call for assist  In front of nurses station in PM; w/ OT in AM  -[facility] Name 03/29/24 1442          Therapy Assessment/Plan (PT)    Patient's Goals For Discharge return home  -LL       Row Name 03/29/24 1442          Therapy Assessment/Plan (PT)    Rehab Potential/Prognosis (PT) good, to achieve stated therapy goals  -     Frequency of Treatment (PT) 5 times per week  -     Estimated Duration of Therapy (PT) 3 weeks  -     Problem List (PT) problems related to;balance;mobility;coordination;hemiparesis/hemiplegia;cognition;communication;strength;postural control;vision;range of motion (ROM);muscle tone;motor control  -     Activity Limitations Related to Problem List (PT) unable to ambulate safely;unable to transfer safely;BADLs not performed adequately or safely  -LL       Row Name 03/29/24 1442          Therapy Plan Review/Discharge Plan (PT)    Anticipated Discharge Disposition (PT) home with assist;home with home health  -[facility] 03/29/24 1442          IRF PT Goals    Bed Mobility Goal Selection (PT-IRF) bed mobility, PT goal 1  -LL     Transfer Goal Selection (PT-IRF) transfers, PT goal 1  -LL     Gait (Walking Locomotion) Goal Selection (PT-IRF) gait, PT goal 1  -LL       Row Name 03/29/24 1442          Bed Mobility Goal 1 (PT-IRF)    Activity/Assistive Device (Bed Mobility Goal 1, PT-IRF) scooting;sit to supine;supine to sit  -     [place] Level (Bed Mobility Goal 1, PT-IRF) standby assist  -     Time Frame (Bed Mobility Goal 1, PT-IRF) by discharge  -LL       Row Name 03/29/24 1442          Transfer Goal 1 (PT-IRF)    Activity/Assistive Device (Transfer Goal 1, PT-IRF) sit-to-stand/stand-to-sit;bed-to-chair/chair-to-bed;toilet  -     [place] Level (Transfer Goal 1, PT-IRF) contact guard required   -LL     Time Frame (Transfer Goal 1, PT-IRF) by discharge  -LL       Row Name 03/29/24 1442          Gait/Walking Locomotion Goal 1 (PT-IRF)    Activity/Assistive Device (Gait/Walking Locomotion Goal 1, PT-IRF) gait (walking locomotion);assistive device use;decrease fall risk;diminish gait deviation;improve balance and speed;increase endurance/gait distance  appropriate a.d.  -LL     Gait/Walking Locomotion Distance Goal 1 (PT-IRF) 150  -LL     [place] Level (Gait/Walking Locomotion Goal 1, PT-IRF) contact guard required  -LL     Time Frame (Gait/Walking Locomotion Goal 1, PT-IRF) by discharge  -LL               User Key  (r) = Recorded By, (t) = Taken By, (c) = Cosigned By      Initials Name Provider Type     Ruthie Travis PTA Physical Therapist Assistant                     Physical Therapy Education       Title: PT OT SLP Therapies (In Progress)       Topic: Physical Therapy (In Progress)       Point: Mobility training (In Progress)       Learning Progress Summary             Patient Acceptance, E,D, NR by  at 3/28/2024 1625                         Point: Home exercise program (In Progress)       Learning Progress Summary             Patient Acceptance, E,D, NR by  at 3/28/2024 1625                         Point: Body mechanics (In Progress)       Learning Progress Summary             Patient Acceptance, E,D, NR by  at 3/28/2024 1625                         Point: Precautions (In Progress)       Learning Progress Summary             Patient Acceptance, E,D, NR by  at 3/28/2024 1625                                         User Key       Initials Effective Dates Name Provider Type Discipline     06/16/21 -  Melanie Anne, PT Physical Therapist PT                    PT Recommendation and Plan    Frequency of Treatment (PT): 5 times per week                     Time Calculation:      PT Charges       Row Name 03/29/24 1455 03/29/24 1452          Time Calculation    Start Time 1330  -LL 0915  -LL      Stop Time 1415  -LL 1000  -LL     Time Calculation (min) 45 min  -LL 45 min  -LL     PT Received On -- 03/29/24  -LL        Time Calculation- PT    Total Timed Code Minutes- PT 45 minute(s)  -LL 45 minute(s)  -LL               User Key  (r) = Recorded By, (t) = Taken By, (c) = Cosigned By      Initials Name Provider Type     Ruthie Travis PTA Physical Therapist Assistant                    Therapy Charges for Today       Code Description Service Date Service Provider Modifiers Qty    01116892328 HC GAIT TRAINING EA 15 MIN 3/29/2024 Ruthie Travis PTA GP, CQ 3    37714100036 HC PT NEUROMUSC RE EDUCATION EA 15 MIN 3/29/2024 Ruthie Travis PTA GP, CQ 1    26346809667 HC PT THER PROC EA 15 MIN 3/29/2024 Ruthie Travis PTA GP, CQ 2              PT G-Codes  AM-PAC 6 Clicks Score (PT): 12      Ruthie. TK Travis  3/29/2024

## 2024-03-29 NOTE — PLAN OF CARE
DYSARTHRIA AND COGNITIVE THERAPY PLAN OF CARE:    Regine Beckham will benefit from formal dysarthria and cognitive therapy x3-5 days per week at 15-60 minute sessions, as functionally tolerated, for 7-21 days, to address:    Long Term Goal:  Patient will demonstrate functional speech skills for return to discharge environment.   Patient will demonstrate functional cognitive skills for return to discharge environment.    Short Term Goals:  1. Patient will tolerate facial massage to left facial surface for 3-7 minutes to increase surface blood flow, sensation and ROM over 3 consecutive sessions.     2. Patient will perform OROM/GRACE exercises x3 sets x10 reps w/ min cues.    3. Patient will maintain vocal intensity at greater than 60dB across 4+ word sentences in 90% of opp over three consecutive sessions.     4. Patient will over-articulate 3+ syllable words and in connected speech in 90% opp to improve intelligibility over three consecutive sessions.      5. Patient will decrease rate of speech in connected speech in 90% of opp to improve intelligibility over three consecutive sessions.     6. Patient will perform rapid alternating speech tasks w/ min cues over three consecutive sessions.     7. Patient will demonstrate accurate orientation to time and location in 90% of opp independently over three consecutive sessions.    8. Patient will maintain attention to therapy tasks despite intermittently distracting environment in 90% of opp w/ min cues over three consecutive sessions.      8. Patient will perform divergent naming tasks of 8+ items named in 1 min w/ min cues over three consecutive sessions.     *Additional goals to be added/modified per pt progress toward goals.     Thank you for allowing me to participate in the care of your patient-  Azalia Elizondo M.S., CCC-SLP

## 2024-03-30 LAB
ANION GAP SERPL CALCULATED.3IONS-SCNC: 9.9 MMOL/L (ref 5–15)
BASOPHILS # BLD AUTO: 0.09 10*3/MM3 (ref 0–0.2)
BASOPHILS NFR BLD AUTO: 1.5 % (ref 0–1.5)
BUN SERPL-MCNC: 15 MG/DL (ref 8–23)
BUN/CREAT SERPL: 24.2 (ref 7–25)
CALCIUM SPEC-SCNC: 9.6 MG/DL (ref 8.6–10.5)
CHLORIDE SERPL-SCNC: 103 MMOL/L (ref 98–107)
CO2 SERPL-SCNC: 23.1 MMOL/L (ref 22–29)
CREAT SERPL-MCNC: 0.62 MG/DL (ref 0.57–1)
DEPRECATED RDW RBC AUTO: 47.2 FL (ref 37–54)
EGFRCR SERPLBLD CKD-EPI 2021: 96.5 ML/MIN/1.73
EOSINOPHIL # BLD AUTO: 0.32 10*3/MM3 (ref 0–0.4)
EOSINOPHIL NFR BLD AUTO: 5.4 % (ref 0.3–6.2)
ERYTHROCYTE [DISTWIDTH] IN BLOOD BY AUTOMATED COUNT: 13.5 % (ref 12.3–15.4)
GLUCOSE SERPL-MCNC: 126 MG/DL (ref 65–99)
HCT VFR BLD AUTO: 37 % (ref 34–46.6)
HGB BLD-MCNC: 11.8 G/DL (ref 12–15.9)
IMM GRANULOCYTES # BLD AUTO: 0.01 10*3/MM3 (ref 0–0.05)
IMM GRANULOCYTES NFR BLD AUTO: 0.2 % (ref 0–0.5)
LYMPHOCYTES # BLD AUTO: 1.99 10*3/MM3 (ref 0.7–3.1)
LYMPHOCYTES NFR BLD AUTO: 33.6 % (ref 19.6–45.3)
MAGNESIUM SERPL-MCNC: 1.8 MG/DL (ref 1.6–2.4)
MCH RBC QN AUTO: 30.3 PG (ref 26.6–33)
MCHC RBC AUTO-ENTMCNC: 31.9 G/DL (ref 31.5–35.7)
MCV RBC AUTO: 95.1 FL (ref 79–97)
MONOCYTES # BLD AUTO: 0.52 10*3/MM3 (ref 0.1–0.9)
MONOCYTES NFR BLD AUTO: 8.8 % (ref 5–12)
NEUTROPHILS NFR BLD AUTO: 2.99 10*3/MM3 (ref 1.7–7)
NEUTROPHILS NFR BLD AUTO: 50.5 % (ref 42.7–76)
NRBC BLD AUTO-RTO: 0 /100 WBC (ref 0–0.2)
PLATELET # BLD AUTO: 252 10*3/MM3 (ref 140–450)
PMV BLD AUTO: 9.8 FL (ref 6–12)
POTASSIUM SERPL-SCNC: 3.6 MMOL/L (ref 3.5–5.2)
POTASSIUM SERPL-SCNC: 4.3 MMOL/L (ref 3.5–5.2)
RBC # BLD AUTO: 3.89 10*6/MM3 (ref 3.77–5.28)
SODIUM SERPL-SCNC: 136 MMOL/L (ref 136–145)
WBC NRBC COR # BLD AUTO: 5.92 10*3/MM3 (ref 3.4–10.8)

## 2024-03-30 PROCEDURE — 92526 ORAL FUNCTION THERAPY: CPT

## 2024-03-30 PROCEDURE — 85025 COMPLETE CBC W/AUTO DIFF WBC: CPT | Performed by: INTERNAL MEDICINE

## 2024-03-30 PROCEDURE — 84132 ASSAY OF SERUM POTASSIUM: CPT | Performed by: INTERNAL MEDICINE

## 2024-03-30 PROCEDURE — 80048 BASIC METABOLIC PNL TOTAL CA: CPT | Performed by: INTERNAL MEDICINE

## 2024-03-30 PROCEDURE — 97110 THERAPEUTIC EXERCISES: CPT

## 2024-03-30 PROCEDURE — 83735 ASSAY OF MAGNESIUM: CPT | Performed by: INTERNAL MEDICINE

## 2024-03-30 PROCEDURE — 99231 SBSQ HOSP IP/OBS SF/LOW 25: CPT | Performed by: INTERNAL MEDICINE

## 2024-03-30 PROCEDURE — 97535 SELF CARE MNGMENT TRAINING: CPT

## 2024-03-30 PROCEDURE — 25010000002 HEPARIN (PORCINE) PER 1000 UNITS: Performed by: INTERNAL MEDICINE

## 2024-03-30 PROCEDURE — 97140 MANUAL THERAPY 1/> REGIONS: CPT

## 2024-03-30 PROCEDURE — 92507 TX SP LANG VOICE COMM INDIV: CPT

## 2024-03-30 PROCEDURE — 97530 THERAPEUTIC ACTIVITIES: CPT

## 2024-03-30 PROCEDURE — 97116 GAIT TRAINING THERAPY: CPT

## 2024-03-30 RX ORDER — POTASSIUM CHLORIDE 20 MEQ/1
40 TABLET, EXTENDED RELEASE ORAL EVERY 4 HOURS
Status: COMPLETED | OUTPATIENT
Start: 2024-03-30 | End: 2024-03-30

## 2024-03-30 RX ADMIN — Medication 1 MG: at 09:06

## 2024-03-30 RX ADMIN — AMLODIPINE BESYLATE 2.5 MG: 5 TABLET ORAL at 09:06

## 2024-03-30 RX ADMIN — ASPIRIN 81 MG: 81 TABLET, CHEWABLE ORAL at 09:06

## 2024-03-30 RX ADMIN — HEPARIN SODIUM 5000 UNITS: 5000 INJECTION INTRAVENOUS; SUBCUTANEOUS at 09:06

## 2024-03-30 RX ADMIN — DOCUSATE SODIUM 50 MG AND SENNOSIDES 8.6 MG 2 TABLET: 8.6; 5 TABLET, FILM COATED ORAL at 20:54

## 2024-03-30 RX ADMIN — LOSARTAN POTASSIUM 50 MG: 50 TABLET, FILM COATED ORAL at 09:05

## 2024-03-30 RX ADMIN — HEPARIN SODIUM 5000 UNITS: 5000 INJECTION INTRAVENOUS; SUBCUTANEOUS at 20:55

## 2024-03-30 RX ADMIN — OXYBUTYNIN CHLORIDE 10 MG: 5 TABLET, EXTENDED RELEASE ORAL at 09:05

## 2024-03-30 RX ADMIN — TICAGRELOR 60 MG: 60 TABLET ORAL at 09:05

## 2024-03-30 RX ADMIN — CEFDINIR 300 MG: 300 CAPSULE ORAL at 20:55

## 2024-03-30 RX ADMIN — DOCUSATE SODIUM 50 MG AND SENNOSIDES 8.6 MG 2 TABLET: 8.6; 5 TABLET, FILM COATED ORAL at 09:05

## 2024-03-30 RX ADMIN — ACETAMINOPHEN 1000 MG: 500 TABLET ORAL at 05:16

## 2024-03-30 RX ADMIN — TICAGRELOR 60 MG: 60 TABLET ORAL at 20:54

## 2024-03-30 RX ADMIN — ZOLPIDEM TARTRATE 5 MG: 5 TABLET ORAL at 20:54

## 2024-03-30 RX ADMIN — CEFDINIR 300 MG: 300 CAPSULE ORAL at 09:06

## 2024-03-30 RX ADMIN — POTASSIUM CHLORIDE 40 MEQ: 1500 TABLET, EXTENDED RELEASE ORAL at 09:15

## 2024-03-30 RX ADMIN — MIRTAZAPINE 30 MG: 15 TABLET, FILM COATED ORAL at 20:55

## 2024-03-30 RX ADMIN — POTASSIUM CHLORIDE 40 MEQ: 1500 TABLET, EXTENDED RELEASE ORAL at 05:10

## 2024-03-30 RX ADMIN — ROPINIROLE HYDROCHLORIDE 4 MG: 1 TABLET, FILM COATED ORAL at 20:54

## 2024-03-30 RX ADMIN — PANTOPRAZOLE SODIUM 40 MG: 40 TABLET, DELAYED RELEASE ORAL at 05:10

## 2024-03-30 RX ADMIN — ACETAMINOPHEN 1000 MG: 500 TABLET ORAL at 19:14

## 2024-03-30 RX ADMIN — ATORVASTATIN CALCIUM 80 MG: 40 TABLET, FILM COATED ORAL at 20:55

## 2024-03-30 NOTE — PLAN OF CARE
Goal Outcome Evaluation:            Pt is progressing medically and physically with all therapies, continue with current plan of care.

## 2024-03-30 NOTE — PLAN OF CARE
Making progress towards all GOC. Diet upgraded to soft to chew whole meats today. Dentures placed with poligrip.     Goal Outcome Evaluation:

## 2024-03-30 NOTE — PROGRESS NOTES
Occupational Therapy:    Physical Therapy: Individual: 100 minutes.    Speech Language Pathology:    Signed by: Desi Fraga PTA

## 2024-03-30 NOTE — THERAPY TREATMENT NOTE
Inpatient Rehabilitation - Speech Language Pathology Treatment Note     [place]     Patient Name: Regine Beckham  : 1955  MRN: [id number]    Today's Date: 3/30/2024                Admit Date: 3/27/2024    Ms. Beckham was seen in the speech office this am for continued therapy. She was a/a and interactive, participated in all tasks. She was notably labile in the beginning of the session, crying x3 when provided her personal history. This calmed as the session progressed.     Her dentures were cleaned and placed, with the assistance of marcela-, for solid intake. She was requesting a cheeseburger, which she cannot have on chopped meats restrictions. Dentures improved mastication of solids, upgrade to whole meats. D/w staff and patient with awareness to place dentures for all meals. Poligrip at bedside for use.     Long Term Goal:  Patient will accept least restrictive diet tolerance w/o overt s/s aspiration.      Short Term Goals:  1. Patient will tolerate facial massage to LEFT facial surface for 3-7 minutes to increase surface blood flow, sensation and ROM over 3 consecutive sessions.  Tolerated 7 min mechanical massage, 3 minutes manual massage w/ moderate increase in surface blood flow with pink tone. Improved ROM with tasks post massage.        2. Patient will perform OROM/GRACE exercises x3 sets x10 reps w/ min cues.  X5 set x5 reps with mirror for feedback.     3. Patient will demonstrate increase labial sensation/afferent drive per pt report in 90% of opp over three consecutive sessions.   Pt with intact labial sensation of left for bolus control. No anterior loss appreicated today.     4. Patient will increase lingual control, coordination, movement as evidenced by bolus manipulation in 90% opp independently over three consecutive sessions.   No po trials adminsitered this date.      5. Patient will participate in a clinical re-evaluation of swallowing fnx in 7-10 days, pending progress towards this  "poc.  Assessed with whole solids today for upgrade to whole meats. Dentures placed with poligrip. Mastication appropriate for bolus control, manipulation of solid to advanced to whole meats. No oral residue appreciated. She will continue tray set up. Rehab staff aware.      Visit Dx:    No diagnosis found.    Patient Active Problem List   Diagnosis    Iron deficiency anemia due to chronic blood loss    Major depressive disorder    RLS (restless legs syndrome)    GERD (gastroesophageal reflux disease)    Left breast mass    Allergy to penicillin    Stroke    Grade I diastolic dysfunction    Moderate malnutrition    Falls frequently    Stroke-like symptoms    Debility       Past Medical History:   Diagnosis Date    Anemia     Anxiety     Arthritis     Depression     Legally blind     Restless leg syndrome     Sleep apnea     \"I don't use a cpap anymore since losing 106 lbs\"    Water retention        Past Surgical History:   Procedure Laterality Date    BACK SURGERY      CARDIAC CATHETERIZATION N/A 1/19/2024    Procedure: Percutaneous Manual Thrombectomy;  Surgeon: Efrain Hurley MD;  Location:  [facility];  Service: Interventional Radiology;  Laterality: N/A;    GALLBLADDER SURGERY      HIP ARTHROSCOPY      JOINT REPLACEMENT Right      EDUCATION  The patient has been educated in the following areas:   Cognitive Impairment Communication Impairment Dysphagia (Swallowing Impairment) Oral Care/Hydration.     SLP Recommendation and Plan   Continue current plan of care for maximal opportunities for improvements. Upgraded po diet to soft to chew, whole meats. Dentures in place for all meals. Poligrip left at bedside for placement. Staff aware.     Thank you  Suad Drake M.S., [facility]/SLP                                                       SLP EVALUATION (Last 72 Hours)       SLP SLC Evaluation       Row Name 03/29/24 1000 03/28/24 1005                Communication Assessment/Intervention    Document " Type therapy note (daily note)  - evaluation  -       Subjective Information no complaints  - no complaints  -          General Information    Precautions/Limitations, Vision -- vision impairment, bilaterally  Macular degeneration  -          Comprehension Assessment/Intervention    Comprehension Assessment/Intervention -- Auditory Comprehension;Reading Comprehension  -          Auditory Comprehension Assessment/Intervention    Auditory Comprehension (Communication) -- [facility]  -       Able to Identify Objects/Pictures (Communication) -- [facility]  -       Answers Questions (Communication) -- [facility]  -       Able to Follow Commands (Communication) -- [facility]  -       Narrative Discourse -- [facility]  -       Successful Auditory Strategies (Communication) -- decrease environmental distractions  -          Reading Comprehension Assessment/Intervention    Reading Comprehension (Communication) -- [facility]  -       Scanning (Reading) -- [facility]  -       Visual Matching Ability (Reading) -- [facility]  -       Single Word Level -- [facility]  -       Phrase Level -- [facility]  -       Paragraph Level -- [facility]  -       Functional Reading Tasks -- [facility]  -       Reading Comprehension, Comment -- --  Baseline per premorbid macular degeneration  -          Expression Assessment/Intervention    Expression Assessment/Intervention -- verbal expression;graphic expression  -          Verbal Expression Assessment/Intervention    Automatic Speech (Communication) -- [facility]  -       Repetition -- [facility]  -       Phrase Completion -- [facility]  -       Responsive Naming -- [facility]  -       Confrontational Naming -- [facility]  -       Spontaneous/Functional Words -- [facility]  -       Sentence Formulation -- [facility]  -       Conversational Discourse/Fluency -- [facility]  -          Graphic Expression Assessment/Intervention    Graphic Expression -- [facility]  -       Graphic Expression to Dictation -- [facility]  -       Biographical Information -- [facility]  -       Functional  Correspondence -- WFL  -JR          Oral Musculature and Cranial Nerve Assessment    Oral Motor General Assessment -- lingual impairment;oral labial or buccal impairment  -JR       Mandibular Impairment Detail, Cranial Nerve V (Trigeminal) -- reduced mandibular ROM;reduced strength on left;reduced facial sensation on left  -JR       Oral Labial or Buccal Impairment, Detail, Cranial Nerve VII (Facial): -- left labial droop;reduced ROM  -JR       Lingual Impairment, Detail. Cranial Nerves IX, XII (Glossopharyngeal and Hypoglossal) -- reduced lingual ROM;reduced strength left  -JR          Motor Speech Assessment/Intervention    Motor Speech Function -- mild impairment;moderate impairment  -JR       Characteristics Consistent with Dysarthria -- decreased articulation;decreased intensity;slurred speech  -JR       Initiation of Phonation (Communication) -- [facility]  -JR       Automatic Speech (Communication) -- [facility]  -JR       Verbal Repetition (Communication) -- [facility]  -JR       Phase Completion -- L  -JR       Conversational Speech (Communication) -- mild impairment;other (see comments)  dysarthric/slurred speech  -JR       Speech intelligibility -- 80%;in quiet environment;with unfamiliar listener  -JR          Cognitive Assessment Intervention- SLP    Orientation Status (Cognition) -- place;time;mild impairment  Knows she is in the hospital. States [facility] in [place].Unsure of month or year.  -       Memory (Cognitive) -- [facility]  -JR       Attention (Cognitive) -- mild impairment;selective;sustained;distracting environment  -       Thought Organization (Cognitive) -- mild impairment;concrete divergent  -       Reasoning (Cognitive) -- [facility]  -JR       Problem Solving (Cognitive) -- [facility]  -JR       Functional Math (Cognitive) -- [facility]  -JR       Executive Function (Cognition) -- [facility]  -JR       Pragmatics (Communication) -- L  -JR          SLP Treatment Clinical Impressions    Daily Summary of Progress (SLP) progress  towards functional goals is fair  Facial massage 7 min. Minimal increase in blood flow. OROM/GRACE x5 reps. Divergent naming: x3 items named in 1min. Attention to Tx tasks 50%. Oriented to time & location.  -JR --          Communication Treatment Objective and Progress Goals (SLP)    [facility] [place] Patient will demonstrate functional speech skills for return to discharge environment;Patient will demonstrate functional cognitive-linguistic skills for return to discharge environment  -       Motor Speech/Dysarthria Treatment Objectives -- Motor Speech/Dysarthria Treatment Objectives (Group)  -JR       Cognitive Linguistic Treatment Objectives -- Cognitive Linguistic Treatment Objectives (Group)  -JR          Patient will demonstrate functional speech skills for return to discharge environment    [place] -- Independently  -JR       Time frame -- by discharge  -JR          Patient will demonstrate functional cognitive-linguistic skills for return to discharge environment    [place] -- Independently  -JR       Time frame -- by discharge  -JR          Motor Speech/Dysarthria Treatment Objectives    Phonation Selection -- phonation, SLP goal 1  -JR       Articulation Selection -- articulation, SLP goal 1  -JR       Prosody Selection -- prosody, SLP goal 1  -JR          Phonation Goal 1 (SLP)    Improve Phonation By Goal 1 (SLP) -- using loud speech;90%;independently (over 90% accuracy)  -       Time Frame (Phonation Goal 1, SLP) -- by discharge  -JR          Articulation Goal 1 (SLP)    Improve Articulation Goal 1 (SLP) -- by over-articulating at word level;by over-articulating at phrase level;by over-articulating in connected speech;90%;independently (over 90% accuracy)  -JR       Time Frame (Articulation Goal 1, SLP) -- by discharge  -JR          Prosody Goal 1 (SLP)    Improve Prosody by Goal 1 (SLP) -- decreasing rate;90%;independently (over 90% accuracy)  -JR       Time Frame (Prosody Goal 1, SLP) -- by  discharge  -JR          Cognitive Linguistic Treatment Objectives    Attention Selection -- attention, SLP goal 1  -JR       Orientation Selection -- orientation, SLP goal 1  -JR       Organizational Skills Selection -- organizational skills, SLP goal 1  -JR          Attention Goal 1 (SLP)    Improve Attention by Goal 1 (SLP) -- complete selective attention task;attending to task;90%;independently (over 90% accuracy);complete sustained attention task  -JR       Time Frame (Attention Goal 1, SLP) -- by discharge  -JR          Orientation Goal 1 (SLP)    Improve Orientation Through Goal 1 (SLP) -- demonstrating orientation to month;demonstrating orientation to year;demonstrating orientation to place;90%;independently (over 90% accuracy)  -JR       Time Frame (Orientation Goal 1, SLP) -- by discharge  -JR          Organizational Skills Goal 1 (SLP)    Improve Thought Organization Through Goal 1 (SLP) -- completing a divergent naming task;generating a list of items in a category;concrete;90%;independently (over 90% accuracy)  -JR       Time Frame (Thought Organization Skills Goal 1, SLP) -- by discharge  -JR                 User Key  (r) = Recorded By, (t) = Taken By, (c) = Cosigned By      Initials Name Effective Dates    JR Azalia Elizondo, MS CCC-SLP 10/25/21 -                                 SLP GOALS       Row Name 03/28/24 1005 03/28/24 1000          (LTG) Patient will demonstrate functional swallow for    Diet Texture (Demonstrate functional swallow) -- regular textures  -JR     Liquid viscosity (Demonstrate functional swallow) -- thin liquids  -JR     [place] (Demonstrate functional swallow) -- independently (over 90% accuracy)  -JR     Time Frame (Demonstrate functional swallow) -- by discharge  -JR        (STG) Labial Strengthening Goal 1 (SLP)    Activity (Labial Strengthening Goal 1, SLP) -- increase labial sensation/afferent drive  -JR     [place]/Accuracy (Labial Strengthening Goal 1, SLP) --  independently (over 90% accuracy)  -JR     Time Frame (Labial Strengthening Goal 1, SLP) -- by discharge  -JR        (STG) Lingual Strengthening Goal 1 (SLP)    Activity (Lingual Strengthening Goal 1, SLP) -- increase lingual tone/sensation/control/coordination/movement;increase tongue back strength  -JR     Increase Lingual Tone/Sensation/Control/Coordination/Movement -- lingual movement exercises;swallow trials  -JR     Increase Tongue Back Strength -- lingual movement exercises;swallow trials  -JR     [place]/Accuracy (Lingual Strengthening Goal 1, SLP) -- independently (over 90% accuracy)  -JR     Time Frame (Lingual Strengthening Goal 1, SLP) -- by discharge  -JR        (STG) Swallow Compensatory Strategies Goal 1 (SLP)    Activity (Swallow Compensatory Strategies/Techniques Goal 1, SLP) -- compensatory strategies;small bites;during meal intake;food/liquid placed on stronger right side;other (see comments)  Lingual sweep of left buccal area  -JR     [place]/Accuracy (Swallow Compensatory Strategies/Techniques Goal 1, SLP) -- independently (over 90% accuracy)  -JR     Time Frame (Swallow Compensatory Strategies/Techniques Goal 1, SLP) -- by discharge  -JR        Patient will demonstrate functional speech skills for return to discharge environment    [place] Independently  -JR --     Time frame by discharge  -JR --        Patient will demonstrate functional cognitive-linguistic skills for return to discharge environment    [place] Independently  -JR --     Time frame by discharge  -JR --        Phonation Goal 1 (SLP)    Improve Phonation By Goal 1 (SLP) using loud speech;90%;independently (over 90% accuracy)  -JR --     Time Frame (Phonation Goal 1, SLP) by discharge  -JR --        Articulation Goal 1 (SLP)    Improve Articulation Goal 1 (SLP) by over-articulating at word level;by over-articulating at phrase level;by over-articulating in connected speech;90%;independently (over 90% accuracy)   -JR --     Time Frame (Articulation Goal 1, SLP) by discharge  -JR --        Prosody Goal 1 (SLP)    Improve Prosody by Goal 1 (SLP) decreasing rate;90%;independently (over 90% accuracy)  -JR --     Time Frame (Prosody Goal 1, SLP) by discharge  -JR --        Attention Goal 1 (SLP)    Improve Attention by Goal 1 (SLP) complete selective attention task;attending to task;90%;independently (over 90% accuracy);complete sustained attention task  -JR --     Time Frame (Attention Goal 1, SLP) by discharge  -JR --        Orientation Goal 1 (SLP)    Improve Orientation Through Goal 1 (SLP) demonstrating orientation to month;demonstrating orientation to year;demonstrating orientation to place;90%;independently (over 90% accuracy)  -JR --     Time Frame (Orientation Goal 1, SLP) by discharge  -JR --        Organizational Skills Goal 1 (SLP)    Improve Thought Organization Through Goal 1 (SLP) completing a divergent naming task;generating a list of items in a category;concrete;90%;independently (over 90% accuracy)  -JR --     Time Frame (Thought Organization Skills Goal 1, SLP) by discharge  -JR --               User Key  (r) = Recorded By, (t) = Taken By, (c) = Cosigned By      Initials Name Provider Type    Azalia Bunch MS CCC-SLP Speech and Language Pathologist                                Time Calculation:        Time Calculation- SLP       Row Name 03/30/24 0953             Time Calculation- [facility]    SLP Start Time 0830  -      SLP Stop Time 0915  -JS      SLP Time Calculation (min) 45 min  -      SLP - Next Appointment 04/01/24  -                User Key  (r) = Recorded By, (t) = Taken By, (c) = Cosigned By      Initials Name Provider Type    Suad Velez, MS CCC-SLP Speech and Language Pathologist                      Therapy Charges for Today       Code Description Service Date Service Provider Modifiers Qty    [id number] [facility] TREATMENT SWALLOW 2 3/30/2024 Suad Drake, MS CCC-SLP GN  1    [id number] [facility] TREATMENT SPEECH 1 3/30/2024 Suad Drake, MS CCC-SLP GN 1                             Suad Drake, MS CCC-SLP  3/30/2024   37.1

## 2024-03-30 NOTE — PROGRESS NOTES
Physical Medicine and Rehabilitation  Inpatient Rehabilitation Interdisciplinary Plan of Care    Demographics            Age: 68Y            Gender: Female    Admission Date: 3/27/2024 3:48:00 PM  Rehabilitation Diagnosis:  debility    Plan of Care  Anticipated Discharge Date/Estimated Length of Stay: 14 days  Anticipated Discharge Destination: Community discharge with assistance  Discharge Plan : Pt plans to return to her apartment at discharge.  Pt says  significant other may stay with her if needed.  Medical Necessity Expected Level Rationale: fair-good  Intensity and Duration: an average of 3 hours/5 days per week  Medical Supervision and 24 Hour Rehab Nursing: x  Physical Therapy: x  PT Intensity/Duration: PT 1.5 hours per day/5 days per week  Occupational Therapy: x  OT Intensity/Duration: OT 1.5 hours per day/5 days per week  Social Work: x  Therapeutic Recreation: x  Updated (if changes indicated)  No changes to plan.    Based on the patient's medical and functional status, their prognosis and  expected level of functional improvement is: fair-good    Interdisciplinary Problem/Goals/Status  Mobility    [PT] Bed Mobility (Active)  Current Status (3/28/2024 12:00:00 AM): mod A  Weekly Goal: n/a  Discharge Goal: SBA    [PT] Bed/Chair/Wheelchair (Active)  Current Status (3/28/2024 12:00:00 AM): mod A  Weekly Goal: n/a  Discharge Goal: CGA    [PT] Walk (Active)  Current Status (3/28/2024 12:00:00 AM): 40 ft x 2, R HHA  Weekly Goal: n/a  Discharge Goal: 150 ft, CGa, AAD or HHa    Self Care    [OT] Dressing (Upper) (Active)  Current Status (3/28/2024 12:00:00 AM): Total A  Weekly Goal: Max A  Discharge Goal: Mod A    Safety    [RN] Potential for Injury (Active)  Current Status (3/27/2024 4:00:00 PM): risk for falls  Weekly Goal: no falls  Discharge Goal: no falls    Body Systems    [RN] Integumentary (Active)  Current Status (3/27/2024 4:00:00 PM): risk for skin break down  Weekly Goal: no skin breakdown  Discharge  Goal: no skin breakdown    Medical Problems  UTI, Hypokalemia, Previous CVA with left Residual    Comments:    Signed by: Reji Hayes MD

## 2024-03-30 NOTE — NURSING NOTE
Notified by speech therapy Mouna Drake that patient had money with her. I asked if she would like me to send to security and she request to keep it with her. Pt has $28 at bedside.

## 2024-03-30 NOTE — THERAPY TREATMENT NOTE
"Inpatient Rehabilitation - Physical Therapy Treatment Note        [place]     Patient Name: Regine Beckham  : 1955  MRN: [id number]    Today's Date: 3/30/2024                    Admit Date: 3/27/2024      Visit Dx:   No diagnosis found.    Patient Active Problem List   Diagnosis    Iron deficiency anemia due to chronic blood loss    Major depressive disorder    RLS (restless legs syndrome)    GERD (gastroesophageal reflux disease)    Left breast mass    Allergy to penicillin    Stroke    Grade I diastolic dysfunction    Moderate malnutrition    Falls frequently    Stroke-like symptoms    Debility       Past Medical History:   Diagnosis Date    Anemia     Anxiety     Arthritis     Depression     Legally blind     Restless leg syndrome     Sleep apnea     \"I don't use a cpap anymore since losing 106 lbs\"    Water retention        Past Surgical History:   Procedure Laterality Date    BACK SURGERY      CARDIAC CATHETERIZATION N/A 2024    Procedure: Percutaneous Manual Thrombectomy;  Surgeon: Efrain Hurley MD;  Location:  [facility] INVASIVE [facility];  Service: Interventional Radiology;  Laterality: N/A;    GALLBLADDER SURGERY      HIP ARTHROSCOPY      JOINT REPLACEMENT Right        PT ASSESSMENT (Last 12 Hours)       IRF PT Evaluation and Treatment       Row Name 24 1222 24 1155       PT Time and Intention    Document Type daily treatment  2nd session  -KM daily treatment  First session  -HC    Mode of Treatment individual therapy;physical therapy  -KM physical therapy;individual therapy  -HC    Patient/Family/Caregiver Comments/Observations -- Pt and RN in agreement for PT for first session. Pt walked 40' x 2 with PRW min/mod A x 2. Sitting exercises to tolerance with min A. BR transfer mod A x 2.  -HC      Row Name 24 1222 24 1159       General Information    Patient Profile Reviewed yes  -KM yes  -HC    Existing Precautions/Restrictions fall  -KM fall  -HC    " Limitations/Impairments safety/cognitive;visual  -KM safety/cognitive;visual  -      Row Name 03/30/24 1155          Living Environment    Primary Care Provided by self  -       Row Name 03/30/24 1155          Home Use of Assistive/Adaptive Equipment    Equipment Currently Used at Home commode;hospital bed;grab bar;shower chair  -       Row Name 03/30/24 1155          Pain Assessment    Pretreatment Pain Rating --  painful L UE PROM  -       Row Name 03/30/24 1222 03/30/24 1155       Cognition/Psychosocial    Orientation Status (Cognition) -- oriented to;person;place;situation  intermittent confusion  -    Follows Commands (Cognition) follows one-step commands;physical/tactile prompts required;repetition of directions required  - verbal cues/prompting required;repetition of directions required;physical/tactile prompts required  -    Personal Safety Interventions fall prevention program maintained;gait belt;muscle strengthening facilitated;nonskid shoes/slippers when out of bed;supervised activity  - fall prevention program maintained;gait belt;nonskid shoes/slippers when out of bed;supervised activity  -      Row Name 03/30/24 1155          Vision Assessment/Intervention    Visual Impairment/Limitations legally blind;corrective lenses full-time  -       Row Name 03/30/24 1155          Mobility    Extremity Weight-bearing Status --  no restrictions  -       Row Name 03/30/24 1222 03/30/24 1155       Bed Mobility    Supine-Sit [place] (Bed Mobility) -- verbal cues;nonverbal cues (demo/gesture);moderate assist (50% patient effort)  -    Comment, (Bed Mobility) up in chair for duration of session  -KM --      Row Name 03/30/24 1222 03/30/24 1155       Transfer Assessment/Treatment    Transfers sit-stand transfer;stand-sit transfer;car transfer  - sit-stand transfer;stand-sit transfer;stand pivot/stand step transfer  -      Row Name 03/30/24 1155          Bed-Chair Transfer    Bed-Chair  [place] (Transfers) minimum assist (75% patient effort);moderate assist (50% patient effort);verbal cues  -       Row Name 03/30/24 1222 03/30/24 1155       Sit-Stand Transfer    Sit-Stand [place] (Transfers) minimum assist (75% patient effort);moderate assist (50% patient effort);verbal cues  -KM verbal cues;nonverbal cues (demo/gesture);moderate assist (50% patient effort)  -    Assistive Device (Sit-Stand Transfers) other (see comments)  HHA  -KM wheelchair;walker, platform  -    Comment, (Sit-Stand Transfer) x5  -KM --      Row Name 03/30/24 1222 03/30/24 1155       Stand-Sit Transfer    Stand-Sit [place] (Transfers) minimum assist (75% patient effort);moderate assist (50% patient effort);verbal cues  -KM verbal cues;nonverbal cues (demo/gesture);moderate assist (50% patient effort)  -    Assistive Device (Stand-Sit Transfers) --  HHA  -KM wheelchair;walker, platform  -    Comment, (Stand-Sit Transfer) x5  -KM --      Row Name 03/30/24 1155          Stand Pivot/Stand Step Transfer    Stand Pivot/Stand Step [place] (Transfers) verbal cues;nonverbal cues (demo/gesture);moderate assist (50% patient effort)  -     Assistive Device (Stand Pivot Stand Step Transfer) wheelchair  -       Row Name 03/30/24 1222          Car Transfer    Type (Car Transfer) stand pivot/stand step  -KM     [place] Level (Car Transfer) minimum assist (75% patient effort);moderate assist (50% patient effort);verbal cues  -KM     Assistive Device (Car Transfer) --  HHA  -KM       Row Name 03/30/24 1222 03/30/24 1155       Gait/Stairs (Locomotion)    Gait/Stairs Locomotion -- gait/ambulation independence;gait/ambulation assistive device;distance ambulated;gait pattern;gait deviations  -    [place] Level (Gait) -- verbal cues;nonverbal cues (demo/gesture);moderate assist (50% patient effort);2 person assist;minimum assist (75% patient effort)  -    Assistive Device (Gait) -- walker, rolling  platform  -    Patient was able to Ambulate -- yes  -    Distance in Feet (Gait) -- 40  x 2  -HC    Pattern (Gait) -- step-through  -HC    Deviations/Abnormal Patterns (Gait) -- base of support, narrow;gait speed decreased;weight shifting decreased;stride length decreased  -HC    Bilateral Gait Deviations -- forward flexed posture  -HC    Left Sided Gait Deviations -- weight shift ability decreased  -HC    Comment, (Gait/Stairs) pt. performed side stepping L/R 3' each direction. She demonstrates increased difficulty stepping left.  -KM --      Row Name 03/30/24 1222 03/30/24 1155       Safety Issues, Functional Mobility    Impairments Affecting Function (Mobility) balance;cognition;coordination;endurance/activity tolerance;grasp;motor control;muscle tone abnormal;range of motion (ROM);postural/trunk control;strength  -KM balance;cognition;coordination;endurance/activity tolerance;grasp;motor control;muscle tone abnormal;range of motion (ROM);postural/trunk control;strength  -    Cognitive Impairments, Mobility Safety/Performance sequencing abilities  -KM --      Row Name 03/30/24 1222 03/30/24 1155       Balance    Comment, Balance standing in parallel bars w/ focus on L foot stepping over tape line  -KM Sitting: LAQ, Ball sq, March, RTB: HS curls, hip abd, march  -      Row Name 03/30/24 1222          Motor Skills    Comments, Therapeutic Exercise seated ther-ex: marches, kicks, hip abd/add, ankle pf/df  -     Additional Documentation Comments, Therapeutic Exercise (Row)  -       Row Name 03/30/24 1155          Hip (Therapeutic Exercise)    Hip Strengthening (Therapeutic Exercise) bilateral;flexion;extension;aBduction;aDduction;marching while seated;sitting;resistance band;red  -       Row Name 03/30/24 1155          Knee (Therapeutic Exercise)    Knee Strengthening (Therapeutic Exercise) bilateral;flexion;extension;marching while seated;LAQ (long arc quad);hamstring curls;sitting;2 lb free  weight;resistance band;green  -       Row Name 03/30/24 1150          Positioning and Restraints    Pre-Treatment Position in bed  -HC     Post Treatment Position wheelchair  -HC     In Wheelchair sitting;with SLP  -       Row Name 03/30/24 1155          Therapy Assessment/Plan (PT)    Patient's Goals For Discharge return home  -       Row Name 03/30/24 1155          Therapy Assessment/Plan (PT)    Rehab Potential/Prognosis (PT) good, to achieve stated therapy goals  -     Frequency of Treatment (PT) 5 times per week  -     Estimated Duration of Therapy (PT) 3 weeks  -     Problem List (PT) problems related to;balance;mobility;coordination;hemiparesis/hemiplegia;cognition;communication;strength;postural control;vision;range of motion (ROM);muscle tone;motor control  -     Activity Limitations Related to Problem List (PT) unable to ambulate safely;unable to transfer safely;BADLs not performed adequately or safely  -       Row Name 03/30/24 1222          Daily Progress Summary (PT)    Daily Progress Summary (PT) Pt. was able to demonstrate functional mobility skills w/ Angela-modA. She tolerated activity well. Pt. would continue to benefit from skilled PT services.  -       Row Name 03/30/24 1155          Therapy Plan Review/Discharge Plan (PT)    Anticipated Discharge Disposition (PT) home with assist;home with home health  -       Row Name 03/30/24 1155          IRF PT Goals    Bed Mobility Goal Selection (PT-IRF) bed mobility, PT goal 1  -HC     Transfer Goal Selection (PT-IRF) transfers, PT goal 1  -HC     Gait (Walking Locomotion) Goal Selection (PT-IRF) gait, PT goal 1  -       Row Name 03/30/24 1154          Bed Mobility Goal 1 (PT-IRF)    Activity/Assistive Device (Bed Mobility Goal 1, PT-IRF) scooting;sit to supine;supine to sit  -     [place] Level (Bed Mobility Goal 1, PT-IRF) standby assist  -     Time Frame (Bed Mobility Goal 1, PT-IRF) by discharge  -       Row Name  03/30/24 1155          Transfer Goal 1 (PT-IRF)    Activity/Assistive Device (Transfer Goal 1, PT-IRF) sit-to-stand/stand-to-sit;bed-to-chair/chair-to-bed;toilet  -HC     [place] Level (Transfer Goal 1, PT-IRF) contact guard required  -HC     Time Frame (Transfer Goal 1, PT-IRF) by discharge  -       Row Name 03/30/24 1155          Gait/Walking Locomotion Goal 1 (PT-IRF)    Activity/Assistive Device (Gait/Walking Locomotion Goal 1, PT-IRF) gait (walking locomotion);assistive device use;decrease fall risk;diminish gait deviation;improve balance and speed;increase endurance/gait distance  appropriate a.d.  -HC     Gait/Walking Locomotion Distance Goal 1 (PT-IRF) 150  -HC     [place] Level (Gait/Walking Locomotion Goal 1, PT-IRF) contact guard required  -HC     Time Frame (Gait/Walking Locomotion Goal 1, PT-IRF) by discharge  -               User Key  (r) = Recorded By, (t) = Taken By, (c) = Cosigned By      Initials Name Provider Type    Eugene Angel, PT Physical Therapist    HC Desi Fraga, PTA Physical Therapist Assistant                     Physical Therapy Education       Title: PT OT SLP Therapies (In Progress)       Topic: Physical Therapy (Done)       Point: Mobility training (Done)       Learning Progress Summary             Patient Acceptance, E,TB, VU,DU by  at 3/30/2024 1204    Acceptance, E,D, NR by  at 3/28/2024 1625                         Point: Home exercise program (Done)       Learning Progress Summary             Patient Acceptance, E,TB, VU,DU by  at 3/30/2024 1204    Acceptance, E,D, NR by AG at 3/28/2024 1625                         Point: Body mechanics (Done)       Learning Progress Summary             Patient Acceptance, E,TB, VU,DU by  at 3/30/2024 1204    Acceptance, E,D, NR by AG at 3/28/2024 1625                         Point: Precautions (Done)       Learning Progress Summary             Patient Acceptance, E,TB, VU,DU by  at 3/30/2024 1204     Acceptance, E,D, NR by  at 3/28/2024 1625                                         User Key       Initials Effective Dates Name Provider Type Discipline     06/16/21 -  Melanie Anne, PT Physical Therapist PT     01/20/23 -  Desi Fraga PTA Physical Therapist Assistant PT                    PT Recommendation and Plan          Daily Progress Summary (PT)  Daily Progress Summary (PT): Pt. was able to demonstrate functional mobility skills w/ Angela-modA. She tolerated activity well. Pt. would continue to benefit from skilled PT services.               Time Calculation:      PT Charges       Row Name 03/30/24 1220 03/30/24 1205          Time Calculation    Start Time 0915  -KM 0735  -HC     Stop Time 1000  -KM 0830  -HC     Time Calculation (min) 45 min  -KM 55 min  -HC     PT Received On 03/30/24  -KM 03/30/24  -HC        Time Calculation- PT    Total Timed Code Minutes- PT 45 minute(s)  -KM 55 minute(s)  -HC               User Key  (r) = Recorded By, (t) = Taken By, (c) = Cosigned By      Initials Name Provider Type     Eugene Cuevas, PT Physical Therapist    HC Desi Fraga, TK Physical Therapist Assistant                    Therapy Charges for Today       Code Description Service Date Service Provider Modifiers Qty    11679061660  PT THERAPEUTIC ACT EA 15 MIN 3/30/2024 Eugene Cuevas, PT GP 2    58410420211 HC PT THER PROC EA 15 MIN 3/30/2024 Eugene Cuevas, PT GP 1              PT G-Codes  AM-PAC 6 Clicks Score (PT): 12      Eugene Cuevas PT  3/30/2024

## 2024-03-30 NOTE — PROGRESS NOTES
Assisted By: PT    CC: Follow-up on debility    Interview History/HPI: Patient was seen while in the PT session, she had no new complaints, she states she slept she is having no chest pain or shortness of breath, she states she ate her breakfast.          Current Hospital Meds:  amLODIPine, 2.5 mg, Oral, Q24H  aspirin, 81 mg, Oral, Daily  atorvastatin, 80 mg, Oral, Nightly  cefdinir, 300 mg, Oral, Q12H  folic acid, 1 mg, Oral, Daily  heparin (porcine), 5,000 Units, Subcutaneous, Q12H  losartan, 50 mg, Oral, Daily  mirtazapine, 30 mg, Oral, Nightly  oxybutynin XL, 10 mg, Oral, Daily  pantoprazole, 40 mg, Oral, Q AM  rOPINIRole, 4 mg, Oral, Nightly  senna-docusate sodium, 2 tablet, Oral, BID  ticagrelor, 60 mg, Oral, BID         Vitals:    03/30/24 0804   BP: 139/79   Pulse: 88   Resp: 18   Temp: 98.1 °F (36.7 °C)   SpO2: 96%         Intake/Output Summary (Last 24 hours) at 3/30/2024 1019  Last data filed at 3/29/2024 1910  Gross per 24 hour   Intake 840 ml   Output --   Net 840 ml       EXAM: She is squeezing a ball between her knees, she can kick out her left leg and her right leg, no edema left leg is slightly weaker than the right however.  She continues to have however significant weakness in the left hand with minimal wrist extension and poor .  She has some minimal elbow flexion and minimal shoulder movement in the left arm.  Right arm moves well.  She has some facial droop on the left.  Speech however is understandable.      Diet: Regular/House; Texture: Soft to Chew (NDD 3); Soft to Chew: Whole Meat; Fluid Consistency: Thin (IDDSI 0)        LABS:     Lab Results (last 48 hours)       Procedure Component Value Units Date/Time    Basic Metabolic Panel [[id number]]  (Abnormal) Collected: 03/30/24 0239    Specimen: Blood Updated: 03/30/24 0321     Glucose 126 mg/dL      BUN 15 mg/dL      Creatinine 0.62 mg/dL      Sodium 136 mmol/L      Potassium 3.6 mmol/L      Chloride 103 mmol/L      CO2 23.1 mmol/L       Calcium 9.6 mg/dL      BUN/Creatinine Ratio 24.2     Anion Gap 9.9 mmol/L      eGFR 96.5 mL/min/1.73     Narrative:      GFR Normal >60  Chronic Kidney Disease <60  Kidney Failure <15      Magnesium [[id number]]  (Normal) Collected: 03/30/24 0239    Specimen: Blood Updated: 03/30/24 0321     Magnesium 1.8 mg/dL     CBC & Differential [[id number]]  (Abnormal) Collected: 03/30/24 0239    Specimen: Blood Updated: 03/30/24 0300    Narrative:      The following orders were created for panel order CBC & Differential.  Procedure                               Abnormality         Status                     ---------                               -----------         ------                     CBC Auto Differential[[id number]]        Abnormal            Final result                 Please view results for these tests on the individual orders.    CBC Auto Differential [[id number]]  (Abnormal) Collected: 03/30/24 0239    Specimen: Blood Updated: 03/30/24 0300     WBC 5.92 10*3/mm3      RBC 3.89 10*6/mm3      Hemoglobin 11.8 g/dL      Hematocrit 37.0 %      MCV 95.1 fL      MCH 30.3 pg      MCHC 31.9 g/dL      RDW 13.5 %      RDW-SD 47.2 fl      MPV 9.8 fL      Platelets 252 10*3/mm3      Neutrophil % 50.5 %      Lymphocyte % 33.6 %      Monocyte % 8.8 %      Eosinophil % 5.4 %      Basophil % 1.5 %      Immature Grans % 0.2 %      Neutrophils, Absolute 2.99 10*3/mm3      Lymphocytes, Absolute 1.99 10*3/mm3      Monocytes, Absolute 0.52 10*3/mm3      Eosinophils, Absolute 0.32 10*3/mm3      Basophils, Absolute 0.09 10*3/mm3      Immature Grans, Absolute 0.01 10*3/mm3      nRBC 0.0 /100 WBC     Potassium [[id number]]  (Normal) Collected: 03/28/24 1150    Specimen: Blood Updated: 03/28/24 1324     Potassium 3.9 mmol/L                  Radiology:    Imaging Results (Last 72 Hours)       ** No results found for the last 72 hours. **            Results for orders placed during the hospital encounter of 01/19/24    Adult Transthoracic Echo  Limited W/ Cont if Necessary Per Protocol    Interpretation Summary    Left ventricular systolic function is normal. Estimated left ventricular EF = 60%    Saline test results are negative for intracardiac shunting..      Assessment/Plan:   Debility, complicated by previous CVA.  This was a right frontal CVA.  She is seeing all 3 modalities of therapy, speech therapy is working with her both for dysphagia and dysarthria.  With PT yesterday she was mod assist for sit to stand stand to sit, mod assist stand pivot, patient was able to walk 20 then 40 feet in the a.m. then 10 feet times 2 in the PM with a rolling platform walker two-person mod assist.  OT continues to work on the severe impairment of her left upper extremity with gross motor coordination activities, neuromuscular reeducation and therapeutic exercise activities, range of motion.  Patient was mod assist for grooming.     E. coli UTI, patient will be completing Omnicef course, she denies dysuria     DVT prophylaxis, SQ heparin     Previous CVA status post right carotid stent.  Stable, continue aspirin, high-dose statin, and Brilinta.     Hypertension, improved control with medication adjustment.     Hypokalemia improving but still supplemented today at 3.6     Mild anemia stable     Tobacco use, counseled to quit, she declines need for patch    Reji Hayes MD

## 2024-03-30 NOTE — THERAPY TREATMENT NOTE
"Inpatient Rehabilitation - Physical Therapy Treatment Note        [place]     Patient Name: Regine Bcekham  : 1955  MRN: [id number]    Today's Date: 3/30/2024                    Admit Date: 3/27/2024      Visit Dx:   No diagnosis found.    Patient Active Problem List   Diagnosis    Iron deficiency anemia due to chronic blood loss    Major depressive disorder    RLS (restless legs syndrome)    GERD (gastroesophageal reflux disease)    Left breast mass    Allergy to penicillin    Stroke    Grade I diastolic dysfunction    Moderate malnutrition    Falls frequently    Stroke-like symptoms    Debility       Past Medical History:   Diagnosis Date    Anemia     Anxiety     Arthritis     Depression     Legally blind     Restless leg syndrome     Sleep apnea     \"I don't use a cpap anymore since losing 106 lbs\"    Water retention        Past Surgical History:   Procedure Laterality Date    BACK SURGERY      CARDIAC CATHETERIZATION N/A 2024    Procedure: Percutaneous Manual Thrombectomy;  Surgeon: Efrain Hurley MD;  Location:  [facility] INVASIVE LOCATION;  Service: Interventional Radiology;  Laterality: N/A;    GALLBLADDER SURGERY      HIP ARTHROSCOPY      JOINT REPLACEMENT Right        PT ASSESSMENT (Last 12 Hours)       IRF PT Evaluation and Treatment       Row Name 24 1154          PT Time and Intention    Document Type daily treatment  First session  -     Mode of Treatment physical therapy;individual therapy  -     Patient/Family/Caregiver Comments/Observations Pt and RN in agreement for PT for first session. Pt walked 40' x 2 with PRW min/mod A x 2. Sitting exercises to tolerance with min A. BR transfer mod A x 2.  -HC       Row Name 24 1153          General Information    Patient Profile Reviewed yes  -HC     Existing Precautions/Restrictions fall  -HC     Limitations/Impairments safety/cognitive;visual  -HC       Row Name 24 1152          Living Environment    Primary Care " Provided by self  -       Row Name 03/30/24 1155          Home Use of Assistive/Adaptive Equipment    Equipment Currently Used at Home commode;hospital bed;grab bar;shower chair  -       Row Name 03/30/24 1155          Pain Assessment    Pretreatment Pain Rating --  painful L UE PROM  -[facility] Name 03/30/24 1155          Cognition/Psychosocial    Orientation Status (Cognition) oriented to;person;place;situation  intermittent confusion  -     Follows Commands (Cognition) verbal cues/prompting required;repetition of directions required;physical/tactile prompts required  -     Personal Safety Interventions fall prevention program maintained;gait belt;nonskid shoes/slippers when out of bed;supervised activity  -       Row Name 03/30/24 1155          Vision Assessment/Intervention    Visual Impairment/Limitations legally blind;corrective lenses full-time  -[facility] Name 03/30/24 1155          Mobility    Extremity Weight-bearing Status --  no restrictions  -[facility] Name 03/30/24 1155          Bed Mobility    Supine-Sit [place] (Bed Mobility) verbal cues;nonverbal cues (demo/gesture);moderate assist (50% patient effort)  -[facility] Name 03/30/24 1155          Transfer Assessment/Treatment    Transfers sit-stand transfer;stand-sit transfer;stand pivot/stand step transfer  -       Row Name 03/30/24 1155          Bed-Chair Transfer    Bed-Chair [place] (Transfers) minimum assist (75% patient effort);moderate assist (50% patient effort);verbal cues  -[facility] Name 03/30/24 1155          Sit-Stand Transfer    Sit-Stand [place] (Transfers) verbal cues;nonverbal cues (demo/gesture);moderate assist (50% patient effort)  -     Assistive Device (Sit-Stand Transfers) wheelchair;walker, platform  -       Row Name 03/30/24 1155          Stand-Sit Transfer    Stand-Sit [place] (Transfers) verbal cues;nonverbal cues (demo/gesture);moderate assist (50% patient effort)  -      Assistive Device (Stand-Sit Transfers) wheelchair;walker, platform  -       Row Name 03/30/24 1155          Stand Pivot/Stand Step Transfer    Stand Pivot/Stand Step [place] (Transfers) verbal cues;nonverbal cues (demo/gesture);moderate assist (50% patient effort)  -     Assistive Device (Stand Pivot Stand Step Transfer) wheelchair  -       Row Name 03/30/24 1155          Gait/Stairs (Locomotion)    Gait/Stairs Locomotion gait/ambulation independence;gait/ambulation assistive device;distance ambulated;gait pattern;gait deviations  -     [place] Level (Gait) verbal cues;nonverbal cues (demo/gesture);moderate assist (50% patient effort);2 person assist;minimum assist (75% patient effort)  -     Assistive Device (Gait) walker, rolling platform  -     Patient was able to Ambulate yes  -     Distance in Feet (Gait) 40  x 2  -     Pattern (Gait) step-through  -     Deviations/Abnormal Patterns (Gait) base of support, narrow;gait speed decreased;weight shifting decreased;stride length decreased  -     Bilateral Gait Deviations forward flexed posture  -     Left Sided Gait Deviations weight shift ability decreased  -       Row Name 03/30/24 1155          Safety Issues, Functional Mobility    Impairments Affecting Function (Mobility) balance;cognition;coordination;endurance/activity tolerance;grasp;motor control;muscle tone abnormal;range of motion (ROM);postural/trunk control;strength  -[facility] Name 03/30/24 1155          Balance    Comment, Balance Sitting: LAQ, Ball sq, March, RTB: HS curls, hip abd, march  -[facility] Name 03/30/24 1155          Hip (Therapeutic Exercise)    Hip Strengthening (Therapeutic Exercise) bilateral;flexion;extension;aBduction;aDduction;marching while seated;sitting;resistance band;red  -       Row Name 03/30/24 1155          Knee (Therapeutic Exercise)    Knee Strengthening (Therapeutic Exercise) bilateral;flexion;extension;marching while seated;LAQ  (long arc quad);hamstring curls;sitting;2 lb free weight;resistance band;green  -       Row Name 03/30/24 1153          Positioning and Restraints    Pre-Treatment Position in bed  -HC     Post Treatment Position wheelchair  -HC     In Wheelchair sitting;with SLP  -       Row Name 03/30/24 1154          Therapy Assessment/Plan (PT)    Patient's Goals For Discharge return home  -       Row Name 03/30/24 1157          Therapy Assessment/Plan (PT)    Rehab Potential/Prognosis (PT) good, to achieve stated therapy goals  -     Frequency of Treatment (PT) 5 times per week  -     Estimated Duration of Therapy (PT) 3 weeks  -     Problem List (PT) problems related to;balance;mobility;coordination;hemiparesis/hemiplegia;cognition;communication;strength;postural control;vision;range of motion (ROM);muscle tone;motor control  -     Activity Limitations Related to Problem List (PT) unable to ambulate safely;unable to transfer safely;BADLs not performed adequately or safely  -       Row Name 03/30/24 1157          Therapy Plan Review/Discharge Plan (PT)    Anticipated Discharge Disposition (PT) home with assist;home with home health  -       Row Name 03/30/24 1159          IRF PT Goals    Bed Mobility Goal Selection (PT-IRF) bed mobility, PT goal 1  -HC     Transfer Goal Selection (PT-IRF) transfers, PT goal 1  -     Gait (Walking Locomotion) Goal Selection (PT-IRF) gait, PT goal 1  -       Row Name 03/30/24 1152          Bed Mobility Goal 1 (PT-IRF)    Activity/Assistive Device (Bed Mobility Goal 1, PT-IRF) scooting;sit to supine;supine to sit  -     [place] Level (Bed Mobility Goal 1, PT-IRF) standby assist  -     Time Frame (Bed Mobility Goal 1, PT-IRF) by discharge  -       Row Name 03/30/24 5284          Transfer Goal 1 (PT-IRF)    Activity/Assistive Device (Transfer Goal 1, PT-IRF) sit-to-stand/stand-to-sit;bed-to-chair/chair-to-bed;toilet  -     [place] Level (Transfer Goal 1,  PT-IRF) contact guard required  -HC     Time Frame (Transfer Goal 1, PT-IRF) by discharge  -       Row Name 03/30/24 1155          Gait/Walking Locomotion Goal 1 (PT-IRF)    Activity/Assistive Device (Gait/Walking Locomotion Goal 1, PT-IRF) gait (walking locomotion);assistive device use;decrease fall risk;diminish gait deviation;improve balance and speed;increase endurance/gait distance  appropriate a.d.  -HC     Gait/Walking Locomotion Distance Goal 1 (PT-IRF) 150  -HC     [place] Level (Gait/Walking Locomotion Goal 1, PT-IRF) contact guard required  -HC     Time Frame (Gait/Walking Locomotion Goal 1, PT-IRF) by discharge  -               User Key  (r) = Recorded By, (t) = Taken By, (c) = Cosigned By      Initials Name Provider Type     Desi Fraga PTA Physical Therapist Assistant                     Physical Therapy Education       Title: PT OT SLP Therapies (In Progress)       Topic: Physical Therapy (Done)       Point: Mobility training (Done)       Learning Progress Summary             Patient Acceptance, E,TB, VU,DU by  at 3/30/2024 1204    Acceptance, E,D, NR by  at 3/28/2024 1625                         Point: Home exercise program (Done)       Learning Progress Summary             Patient Acceptance, E,TB, VU,DU by  at 3/30/2024 1204    Acceptance, E,D, NR by  at 3/28/2024 1625                         Point: Body mechanics (Done)       Learning Progress Summary             Patient Acceptance, E,TB, VU,DU by  at 3/30/2024 1204    Acceptance, E,D, NR by  at 3/28/2024 1625                         Point: Precautions (Done)       Learning Progress Summary             Patient Acceptance, E,TB, VU,DU by  at 3/30/2024 1204    Acceptance, E,D, NR by  at 3/28/2024 1625                                         User Key       Initials Effective Dates Name Provider Type Discipline    AG 06/16/21 -  Melanie Anne, PT Physical Therapist PT     01/20/23 -  Desi Fraga PTA  Physical Therapist Assistant PT                    PT Recommendation and Plan    Frequency of Treatment (PT): 5 times per week                     Time Calculation:      PT Charges       Row Name 03/30/24 1205             Time Calculation    Start Time 0735  -HC      Stop Time 0830  -HC      Time Calculation (min) 55 min  -HC      PT Received On 03/30/24  -HC         Time Calculation- PT    Total Timed Code Minutes- PT 55 minute(s)  -HC                User Key  (r) = Recorded By, (t) = Taken By, (c) = Cosigned By      Initials Name Provider Type     Desi Fraga PTA Physical Therapist Assistant                    Therapy Charges for Today       Code Description Service Date Service Provider Modifiers Qty    87540265548  GAIT TRAINING EA 15 MIN 3/30/2024 Desi Fraga PTA GP, CQ 1    10370129016  PT THER PROC EA 15 MIN 3/30/2024 Desi Fraga PTA GP, CQ 1    53040093088  PT THERAPEUTIC ACT EA 15 MIN 3/30/2024 Desi Fraga PTA GP, CQ 2              PT G-Codes  AM-PAC 6 Clicks Score (PT): 12      Desi Fraga PTA  3/30/2024

## 2024-03-30 NOTE — PROGRESS NOTES
Occupational Therapy: Individual: 45 minutes.    Physical Therapy:    Speech Language Pathology:    Signed by: Jalyn Harvey, OT

## 2024-03-30 NOTE — PROGRESS NOTES
Rehabilitation Nursing  Inpatient Rehabilitation Plan of Care Note    Plan of Care  Safety    Potential for Injury (Active)  Current Status (3/27/2024 4:00:00 PM): risk for falls  Weekly Goal: no falls  Discharge Goal: no falls    Body Systems    Integumentary (Active)  Current Status (3/27/2024 4:00:00 PM): risk for skin break down  Weekly Goal: no skin breakdown  Discharge Goal: no skin breakdown    Signed by: Rob Valdez RN

## 2024-03-30 NOTE — PROGRESS NOTES
Inpatient Rehabilitation Functional Measures Assessment    Functional Measures  ISABEL Eating:  ISABEL Grooming:  ISABEL Bathing:  ISABEL Upper Body Dressing:  ISABEL Lower Body Dressing:  ISABEL Toileting:    ISABEL Bladder Management  Level of Assistance:  Frequency/Number of Accidents this Shift:    ISABEL Bowel Management  Level of Assistance:  Frequency/Number of Accidents this Shift:    ISABEL Bed/Chair/Wheelchair Transfer:  ISABEL Toilet Transfer:  ISABEL Tub/Shower Transfer:    Previously Documented Mode of Locomotion at Discharge:  [facility] Expected Mode of Locomotion at Discharge:  [facility] Walk/Wheelchair:  [facility] Stairs:    [facility] Comprehension:  [facility] Expression:  [facility] Social Interaction:  [facility] Problem Solving:  [facility] Memory:    Therapy Mode Minutes  Occupational Therapy:  Physical Therapy:  Speech Language Pathology: Individual: 45 minutes.    Discharge Functional Goals:    Signed by: Suad Drake SLP

## 2024-03-30 NOTE — THERAPY TREATMENT NOTE
"Inpatient Rehabilitation - Occupational Therapy Treatment Note     [facility]     Patient Name: Regine Beckham  : 1955  MRN: [id number]    Today's Date: 3/30/2024                 Admit Date: 3/27/2024       No diagnosis found.    Patient Active Problem List   Diagnosis    Iron deficiency anemia due to chronic blood loss    Major depressive disorder    RLS (restless legs syndrome)    GERD (gastroesophageal reflux disease)    Left breast mass    Allergy to penicillin    Stroke    Grade I diastolic dysfunction    Moderate malnutrition    Falls frequently    Stroke-like symptoms    Debility       Past Medical History:   Diagnosis Date    Anemia     Anxiety     Arthritis     Depression     Legally blind     Restless leg syndrome     Sleep apnea     \"I don't use a cpap anymore since losing 106 lbs\"    Water retention        Past Surgical History:   Procedure Laterality Date    BACK SURGERY      CARDIAC CATHETERIZATION N/A 2024    Procedure: Percutaneous Manual Thrombectomy;  Surgeon: Efrain Hurley MD;  Location: [facility] INVASIVE LOCATION;  Service: Interventional Radiology;  Laterality: N/A;    GALLBLADDER SURGERY      HIP ARTHROSCOPY      JOINT REPLACEMENT Right              IRF OT ASSESSMENT FLOWSHEET (Last 12 Hours)       IRF OT Evaluation and Treatment       Row Name 24 1459          OT Time and Intention    Document Type daily treatment  -BC     Mode of Treatment individual therapy;occupational therapy  -BC     Patient Effort good  -BC     Symptoms Noted During/After Treatment none  -BC       Row Name 24 1457          General Information    Patient Profile Reviewed yes  -BC     Patient/Family/Caregiver Comments/Observations Pt. and RN in agreement for tx. this afternoon, pt. supine in bed and tolerated PROM to LUE, retrograde massage for edema in dorsum and digits .  Pt. educated to how to complete herself and return demonstrated at bedside.  Pt. completed AAROM of BUE while demo " ability to grasp L hand with R and bring upper extremity to ~ 60* flexion against gravity.  Pt. max A for supine to sit at EOB, max. A for sit to stand pivot bed to w/c.  Max A for toilet tx'izabela and dependent for clothing management and hygiene.  -BC     General Observations of Patient Pt. initially crying and asking staff to just make 'it' all go away.  -BC     Existing Precautions/Restrictions fall  -BC     Limitations/Impairments insensate body part;safety/cognitive  -BC     Comment, General Information Pt. required minimal encouragement to participate, dealing with her emotions seems to be a little overwhelming for her.  Nsg. advised.  Pt. encouraged to discuss with staff.  -BC       Row Name 03/30/24 1459          Cognition/Psychosocial    Affect/Mental Status (Cognition) emotionally labile  -BC     Personal Safety Interventions fall prevention program maintained;gait belt;nonskid shoes/slippers when out of bed  -BC     Comment, Cognitive Interventions Encouraged patient to discuss concerns with staff.  Pt. advised staff that she is a  and that she lives alone and is worried about having another stroke.  -BC               User Key  (r) = Recorded By, (t) = Taken By, (c) = Cosigned By      Initials Name Effective Dates    BC Wes, Jalyn, BLU 06/16/21 -                      Occupational Therapy Education       Title: PT OT SLP Therapies (In Progress)       Topic: Occupational Therapy (In Progress)       Point: ADL training (In Progress)       Description:   Instruct learner(s) on proper safety adaptation and remediation techniques during self care or transfers.   Instruct in proper use of assistive devices.                  Learning Progress Summary             Patient Acceptance, D,E, NR by BC at 3/30/2024 1515    Acceptance, E, NR by  at 3/29/2024 1433    Acceptance, E, NR by  at 3/28/2024 1434                         Point: Precautions (In Progress)       Description:   Instruct learner(s) on  prescribed precautions during self-care and functional transfers.                  Learning Progress Summary             Patient Acceptance, E, NR by  at 3/29/2024 1433    Acceptance, E, NR by  at 3/28/2024 1434                                         User Key       Initials Effective Dates Name Provider Type Discipline     07/11/23 -  Desi Mackey OT Occupational Therapist OT    BC 06/16/21 -  Jalyn Harvey OT Occupational Therapist OT                        OT Recommendation and Plan                         Time Calculation:      Time Calculation- OT       Row Name 03/30/24 1516             Time Calculation- OT    OT Start Time 1330  -BC      OT Stop Time 1415  -BC      OT Time Calculation (min) 45 min  -BC      OT Non-Billable Time (min) 15 min  -BC                User Key  (r) = Recorded By, (t) = Taken By, (c) = Cosigned By      Initials Name Provider Type    BC Jalyn Harvey OT Occupational Therapist                  Therapy Charges for Today       Code Description Service Date Service Provider Modifiers Qty    57553857495 HC OT SELF CARE/MGMT/TRAIN EA 15 MIN 3/30/2024 Jalyn Harvey OT GO 2    [id number] HC OT MANUAL THERAPY EA 15 MIN 3/30/2024 Jalyn Harvey OT GO 1                     Jalyn Harvey OT  3/30/2024

## 2024-03-31 PROCEDURE — 25010000002 HEPARIN (PORCINE) PER 1000 UNITS: Performed by: INTERNAL MEDICINE

## 2024-03-31 RX ADMIN — HEPARIN SODIUM 5000 UNITS: 5000 INJECTION INTRAVENOUS; SUBCUTANEOUS at 08:54

## 2024-03-31 RX ADMIN — DOCUSATE SODIUM 50 MG AND SENNOSIDES 8.6 MG 2 TABLET: 8.6; 5 TABLET, FILM COATED ORAL at 08:54

## 2024-03-31 RX ADMIN — HEPARIN SODIUM 5000 UNITS: 5000 INJECTION INTRAVENOUS; SUBCUTANEOUS at 20:37

## 2024-03-31 RX ADMIN — LOSARTAN POTASSIUM 50 MG: 50 TABLET, FILM COATED ORAL at 08:54

## 2024-03-31 RX ADMIN — ACETAMINOPHEN 1000 MG: 500 TABLET ORAL at 17:24

## 2024-03-31 RX ADMIN — ZOLPIDEM TARTRATE 5 MG: 5 TABLET ORAL at 20:36

## 2024-03-31 RX ADMIN — ROPINIROLE HYDROCHLORIDE 4 MG: 1 TABLET, FILM COATED ORAL at 20:37

## 2024-03-31 RX ADMIN — ACETAMINOPHEN 1000 MG: 500 TABLET ORAL at 08:59

## 2024-03-31 RX ADMIN — ASPIRIN 81 MG: 81 TABLET, CHEWABLE ORAL at 08:54

## 2024-03-31 RX ADMIN — AMLODIPINE BESYLATE 2.5 MG: 5 TABLET ORAL at 08:54

## 2024-03-31 RX ADMIN — TICAGRELOR 60 MG: 60 TABLET ORAL at 20:36

## 2024-03-31 RX ADMIN — PANTOPRAZOLE SODIUM 40 MG: 40 TABLET, DELAYED RELEASE ORAL at 05:59

## 2024-03-31 RX ADMIN — MIRTAZAPINE 30 MG: 15 TABLET, FILM COATED ORAL at 20:37

## 2024-03-31 RX ADMIN — OXYBUTYNIN CHLORIDE 10 MG: 5 TABLET, EXTENDED RELEASE ORAL at 08:54

## 2024-03-31 RX ADMIN — ATORVASTATIN CALCIUM 80 MG: 40 TABLET, FILM COATED ORAL at 20:36

## 2024-03-31 RX ADMIN — Medication 1 MG: at 08:54

## 2024-03-31 RX ADMIN — TICAGRELOR 60 MG: 60 TABLET ORAL at 08:54

## 2024-03-31 NOTE — PROGRESS NOTES
Rehabilitation Nursing  Inpatient Rehabilitation Plan of Care Note    Plan of Care  Copy from POCSafety    Potential for Injury (Active)  Current Status (3/27/2024 4:00:00 PM): risk for falls  Weekly Goal: no falls  Discharge Goal: no falls    Body Systems    Integumentary (Active)  Current Status (3/27/2024 4:00:00 PM): risk for skin break down  Weekly Goal: no skin breakdown  Discharge Goal: no skin breakdown    Signed by: Ruth Barba, Nurse

## 2024-03-31 NOTE — PLAN OF CARE
Goal Outcome Evaluation:  Plan of Care Reviewed With: patient        Progress: no change  Outcome Evaluation: BED ALARM NOTED; CONTINUE PLAN OF CARE; MONITOR

## 2024-03-31 NOTE — PLAN OF CARE
Goal Outcome Evaluation:           Progress: no change         Problem: Rehabilitation (IRF) Plan of Care  Goal: Plan of Care Review  Outcome: Ongoing, Progressing  Flowsheets  Taken 3/31/2024 1044 by Agnieszka Moreno RN  Progress: no change  Taken 3/29/2024 0946 by Evert Kelly, RN  Plan of Care Reviewed With: patient  Goal: Patient-Specific Goal (Individualized)  Outcome: Ongoing, Progressing  Goal: Absence of New-Onset Illness or Injury  Outcome: Ongoing, Progressing  Intervention: Prevent Fall and Fall Injury  Recent Flowsheet Documentation  Taken 3/31/2024 0800 by Agnieszka Moreno RN  Safety Promotion/Fall Prevention:   nonskid shoes/slippers when out of bed   safety round/check completed  Intervention: Prevent Infection  Recent Flowsheet Documentation  Taken 3/31/2024 0800 by Agnieszka Moreno RN  Infection Prevention: hand hygiene promoted  Intervention: Prevent VTE (Venous Thromboembolism)  Recent Flowsheet Documentation  Taken 3/31/2024 0859 by Agnieszka Moreno RN  VTE Prevention/Management: (heparin) --  Goal: Optimal Comfort and Wellbeing  Outcome: Ongoing, Progressing  Goal: Home and Community Transition Plan Established  Outcome: Ongoing, Progressing     Problem: Mobility Impairment  Goal: Optimal Mobility [place] and Safety  Outcome: Ongoing, Progressing     Problem: Skin Injury Risk Increased  Goal: Skin Health and Integrity  Outcome: Ongoing, Progressing  Intervention: Optimize Skin Protection  Recent Flowsheet Documentation  Taken 3/31/2024 0859 by Agnieszka Moreno RN  Pressure Reduction Techniques: frequent weight shift encouraged  Pressure Reduction Devices: heel offloading device utilized  Skin Protection: adhesive use limited  Taken 3/31/2024 0800 by Agnieszka Moreno RN  Head of Bed (HOB) Positioning: HOB elevated     Problem: Fall Injury Risk  Goal: Absence of Fall and Fall-Related Injury  Outcome: Ongoing, Progressing  Intervention: Identify and Manage Contributors  Recent  Flowsheet Documentation  Taken 3/31/2024 0800 by Agnieszka Moreno, RN  Medication Review/Management: medications reviewed  Intervention: Promote Injury-Free Environment  Recent Flowsheet Documentation  Taken 3/31/2024 0800 by Agnieszka Moreon, RN  Safety Promotion/Fall Prevention:   nonskid shoes/slippers when out of bed   safety round/check completed     Problem: Cognitive Impairment  Goal: Optimal Cognitive Function  Outcome: Ongoing, Progressing     Problem: Swallowing Impairment  Goal: Optimal Eating/Swallowing without Aspir  Outcome: Ongoing, Progressing

## 2024-04-01 VITALS
TEMPERATURE: 97.5 F | DIASTOLIC BLOOD PRESSURE: 67 MMHG | OXYGEN SATURATION: 98 % | HEART RATE: 75 BPM | SYSTOLIC BLOOD PRESSURE: 117 MMHG | WEIGHT: 140 LBS | HEIGHT: 66 IN | RESPIRATION RATE: 18 BRPM | BODY MASS INDEX: 22.5 KG/M2

## 2024-04-01 PROCEDURE — 97110 THERAPEUTIC EXERCISES: CPT

## 2024-04-01 PROCEDURE — 25010000002 HEPARIN (PORCINE) PER 1000 UNITS: Performed by: INTERNAL MEDICINE

## 2024-04-01 PROCEDURE — 97110 THERAPEUTIC EXERCISES: CPT | Performed by: OCCUPATIONAL THERAPIST

## 2024-04-01 PROCEDURE — 92526 ORAL FUNCTION THERAPY: CPT

## 2024-04-01 PROCEDURE — 97112 NEUROMUSCULAR REEDUCATION: CPT

## 2024-04-01 PROCEDURE — 97530 THERAPEUTIC ACTIVITIES: CPT

## 2024-04-01 PROCEDURE — 97530 THERAPEUTIC ACTIVITIES: CPT | Performed by: OCCUPATIONAL THERAPIST

## 2024-04-01 PROCEDURE — 92507 TX SP LANG VOICE COMM INDIV: CPT

## 2024-04-01 PROCEDURE — 97535 SELF CARE MNGMENT TRAINING: CPT | Performed by: OCCUPATIONAL THERAPIST

## 2024-04-01 PROCEDURE — 97112 NEUROMUSCULAR REEDUCATION: CPT | Performed by: OCCUPATIONAL THERAPIST

## 2024-04-01 PROCEDURE — 97116 GAIT TRAINING THERAPY: CPT

## 2024-04-01 PROCEDURE — 99231 SBSQ HOSP IP/OBS SF/LOW 25: CPT | Performed by: FAMILY MEDICINE

## 2024-04-01 RX ORDER — GABAPENTIN 100 MG/1
100 CAPSULE ORAL NIGHTLY
Status: DISPENSED | OUTPATIENT
Start: 2024-04-01

## 2024-04-01 RX ADMIN — PANTOPRAZOLE SODIUM 40 MG: 40 TABLET, DELAYED RELEASE ORAL at 05:31

## 2024-04-01 RX ADMIN — MIRTAZAPINE 30 MG: 15 TABLET, FILM COATED ORAL at 20:58

## 2024-04-01 RX ADMIN — DOCUSATE SODIUM 50 MG AND SENNOSIDES 8.6 MG 2 TABLET: 8.6; 5 TABLET, FILM COATED ORAL at 20:56

## 2024-04-01 RX ADMIN — TICAGRELOR 60 MG: 60 TABLET ORAL at 08:54

## 2024-04-01 RX ADMIN — ACETAMINOPHEN 1000 MG: 500 TABLET ORAL at 12:25

## 2024-04-01 RX ADMIN — HEPARIN SODIUM 5000 UNITS: 5000 INJECTION INTRAVENOUS; SUBCUTANEOUS at 08:54

## 2024-04-01 RX ADMIN — Medication 1 MG: at 08:53

## 2024-04-01 RX ADMIN — AMLODIPINE BESYLATE 2.5 MG: 5 TABLET ORAL at 08:53

## 2024-04-01 RX ADMIN — ASPIRIN 81 MG: 81 TABLET, CHEWABLE ORAL at 08:53

## 2024-04-01 RX ADMIN — HEPARIN SODIUM 5000 UNITS: 5000 INJECTION INTRAVENOUS; SUBCUTANEOUS at 20:57

## 2024-04-01 RX ADMIN — ACETAMINOPHEN 1000 MG: 500 TABLET ORAL at 18:32

## 2024-04-01 RX ADMIN — ATORVASTATIN CALCIUM 80 MG: 40 TABLET, FILM COATED ORAL at 20:57

## 2024-04-01 RX ADMIN — OXYBUTYNIN CHLORIDE 10 MG: 5 TABLET, EXTENDED RELEASE ORAL at 08:54

## 2024-04-01 RX ADMIN — TICAGRELOR 60 MG: 60 TABLET ORAL at 20:56

## 2024-04-01 RX ADMIN — ACETAMINOPHEN 1000 MG: 500 TABLET ORAL at 04:25

## 2024-04-01 RX ADMIN — ROPINIROLE HYDROCHLORIDE 4 MG: 1 TABLET, FILM COATED ORAL at 21:34

## 2024-04-01 RX ADMIN — GABAPENTIN 100 MG: 100 CAPSULE ORAL at 20:57

## 2024-04-01 RX ADMIN — LOSARTAN POTASSIUM 50 MG: 50 TABLET, FILM COATED ORAL at 08:54

## 2024-04-01 RX ADMIN — DOCUSATE SODIUM 50 MG AND SENNOSIDES 8.6 MG 2 TABLET: 8.6; 5 TABLET, FILM COATED ORAL at 08:54

## 2024-04-01 NOTE — PROGRESS NOTES
Occupational Therapy:    Physical Therapy: Individual: 100 minutes.    Speech Language Pathology:    Signed by: Ruthie Travis PTA

## 2024-04-01 NOTE — THERAPY TREATMENT NOTE
"Inpatient Rehabilitation - Physical Therapy Treatment Note        [place]     Patient Name: Regine Beckham  : 1955  MRN: [id number]    Today's Date: 2024                    Admit Date: 3/27/2024      Visit Dx:   No diagnosis found.    Patient Active Problem List   Diagnosis    Iron deficiency anemia due to chronic blood loss    Major depressive disorder    RLS (restless legs syndrome)    GERD (gastroesophageal reflux disease)    Left breast mass    Allergy to penicillin    Stroke    Grade I diastolic dysfunction    Moderate malnutrition    Falls frequently    Stroke-like symptoms    Debility       Past Medical History:   Diagnosis Date    Anemia     Anxiety     Arthritis     Depression     Legally blind     Restless leg syndrome     Sleep apnea     \"I don't use a cpap anymore since losing 106 lbs\"    Water retention        Past Surgical History:   Procedure Laterality Date    BACK SURGERY      CARDIAC CATHETERIZATION N/A 2024    Procedure: Percutaneous Manual Thrombectomy;  Surgeon: Efrain Hurley MD;  Location:  [facility] INVASIVE LOCATION;  Service: Interventional Radiology;  Laterality: N/A;    GALLBLADDER SURGERY      HIP ARTHROSCOPY      JOINT REPLACEMENT Right        PT ASSESSMENT (Last 12 Hours)       IRF PT Evaluation and Treatment       Row Name 24 1547          PT Time and Intention    Document Type daily treatment  BID treatment session  -LL     Mode of Treatment physical therapy;individual therapy  -LL     Patient/Family/Caregiver Comments/Observations Patient had no new c/o and was agreeable to BID PT participation.  -LL       Row Name 24 1541          General Information    Patient Profile Reviewed yes  -LL     Existing Precautions/Restrictions fall  -LL     Limitations/Impairments safety/cognitive;visual  -LL       Row Name 24 1547          Living Environment    Primary Care Provided by self  -LL       Row Name 24 1547          Home Use of " Assistive/Adaptive Equipment    Equipment Currently Used at Home commode;hospital bed;grab bar;shower chair  -[facility] Name 04/01/24 1547          Pain Assessment    Pretreatment Pain Rating --  painful L UE PROM  -[facility] Name 04/01/24 1547          Cognition/Psychosocial    Affect/Mental Status (Cognition) emotionally labile  -     Orientation Status (Cognition) oriented to;person;place;situation  intermittent confusion  -     Follows Commands (Cognition) verbal cues/prompting required;repetition of directions required;physical/tactile prompts required  -     Personal Safety Interventions fall prevention program maintained;gait belt;nonskid shoes/slippers when out of bed;supervised activity  -[facility] Name 04/01/24 1547          Vision Assessment/Intervention    Visual Impairment/Limitations legally blind;corrective lenses full-time  -[facility] Name 04/01/24 1547          Mobility    Extremity Weight-bearing Status --  no restrictions  -LL       Row Name 04/01/24 1547          Bed Mobility    Comment, (Bed Mobility) UIC  -[facility] Name 04/01/24 1547          Transfer Assessment/Treatment    Transfers sit-stand transfer;stand-sit transfer;stand pivot/stand step transfer;toilet transfer  -[facility] Name 04/01/24 1547          Sit-Stand Transfer    Sit-Stand [place] (Transfers) verbal cues;nonverbal cues (demo/gesture);moderate assist (50% patient effort)  -     Assistive Device (Sit-Stand Transfers) wheelchair;walker, platform  -[facility] Name 04/01/24 1547          Stand-Sit Transfer    Stand-Sit [place] (Transfers) verbal cues;nonverbal cues (demo/gesture);moderate assist (50% patient effort)  -     Assistive Device (Stand-Sit Transfers) wheelchair;walker, platform  -[facility] Name 04/01/24 1547          Stand Pivot/Stand Step Transfer    Stand Pivot/Stand Step [place] (Transfers) verbal cues;nonverbal cues (demo/gesture);moderate assist (50% patient effort)   -LL     Assistive Device (Stand Pivot Stand Step Transfer) wheelchair  -       Row Name 04/01/24 1547          Toilet Transfer    Type (Toilet Transfer) stand pivot/stand step  -LL     [place] Level (Toilet Transfer) moderate assist (50% patient effort);verbal cues;nonverbal cues (demo/gesture)  -LL     Assistive Device (Toilet Transfer) wheelchair;grab bars/safety frame  -       Row Name 04/01/24 1547          Gait/Stairs (Locomotion)    Gait/Stairs Locomotion gait/ambulation independence;gait/ambulation assistive device;distance ambulated;gait pattern;gait deviations  -LL     [place] Level (Gait) verbal cues;nonverbal cues (demo/gesture);moderate assist (50% patient effort);2 person assist;1 person assist  -LL     Assistive Device (Gait) walker, rolling platform  HHA, R handrail  -LL     Distance in Feet (Gait) --  60' w/ PRW w/ mod x 2 in AM; 20' w/ HHA w/ mod x 2, 8' x 2 in // bars mod A, 15' x 2 w/ R HR w/ mod A  -LL     Pattern (Gait) step-through  -LL     Deviations/Abnormal Patterns (Gait) base of support, narrow;gait speed decreased;weight shifting decreased;stride length decreased  -LL     Bilateral Gait Deviations forward flexed posture  -LL     Left Sided Gait Deviations weight shift ability decreased  -LL     Comment, (Gait/Stairs) Cues for upright posture, increased step width/length.  Used mirror for visual feedback.  -       Row Name 04/01/24 1547          Safety Issues, Functional Mobility    Impairments Affecting Function (Mobility) balance;cognition;coordination;endurance/activity tolerance;grasp;motor control;muscle tone abnormal;range of motion (ROM);postural/trunk control;strength  -       Row Name 04/01/24 1547          Balance    Comment, Balance Standing in // bars working on erect posture with use of mirror for visual feedback.  -       Row Name 04/01/24 1547          Hip (Therapeutic Exercise)    Hip Strengthening (Therapeutic Exercise)  bilateral;flexion;extension;aBduction;aDduction;marching while seated;sitting;resistance band;red  -LL       Row Name 04/01/24 1547          Knee (Therapeutic Exercise)    Knee Strengthening (Therapeutic Exercise) bilateral;flexion;extension;marching while seated;LAQ (long arc quad);hamstring curls;sitting;resistance band;red  -LL       Row Name 04/01/24 1547          Ankle (Therapeutic Exercise)    Ankle (Therapeutic Exercise) strengthening exercise  -LL     Ankle PROM (Therapeutic Exercise) left;dorsiflexion;plantarflexion;sitting  Stretching to L ankle  -LL     Ankle Strengthening (Therapeutic Exercise) right;dorsiflexion;plantarflexion;sitting  -LL       Row Name 04/01/24 1547          Positioning and Restraints    Pre-Treatment Position --  WC in room in AM; WC w/ OT in PM  -LL     Post Treatment Position wheelchair  -LL     In Wheelchair sitting;call light within reach;encouraged to call for assist;exit alarm on  in room in AM; in hallway in front of nurses station in PM w/ tray table in front of her; L hand splint and L arm tray donned.  -       Row Name 04/01/24 1547          Therapy Assessment/Plan (PT)    Patient's Goals For Discharge return home  -       Row Name 04/01/24 1547          Therapy Assessment/Plan (PT)    Rehab Potential/Prognosis (PT) good, to achieve stated therapy goals  -LL     Frequency of Treatment (PT) 5 times per week  -LL     Estimated Duration of Therapy (PT) 3 weeks  -LL     Problem List (PT) problems related to;balance;mobility;coordination;hemiparesis/hemiplegia;cognition;communication;strength;postural control;vision;range of motion (ROM);muscle tone;motor control  -LL     Activity Limitations Related to Problem List (PT) unable to ambulate safely;unable to transfer safely;BADLs not performed adequately or safely  -       Row Name 04/01/24 1547          Therapy Plan Review/Discharge Plan (PT)    Anticipated Discharge Disposition (PT) home with assist;home with home health   -LL       Row Name 04/01/24 1547          IRF PT Goals    Bed Mobility Goal Selection (PT-IRF) bed mobility, PT goal 1  -LL     Transfer Goal Selection (PT-IRF) transfers, PT goal 1  -LL     Gait (Walking Locomotion) Goal Selection (PT-IRF) gait, PT goal 1  -LL       Row Name 04/01/24 1547          Bed Mobility Goal 1 (PT-IRF)    Activity/Assistive Device (Bed Mobility Goal 1, PT-IRF) scooting;sit to supine;supine to sit  -LL     [place] Level (Bed Mobility Goal 1, PT-IRF) standby assist  -LL     Time Frame (Bed Mobility Goal 1, PT-IRF) by discharge  -LL       Row Name 04/01/24 1547          Transfer Goal 1 (PT-IRF)    Activity/Assistive Device (Transfer Goal 1, PT-IRF) sit-to-stand/stand-to-sit;bed-to-chair/chair-to-bed;toilet  -LL     [place] Level (Transfer Goal 1, PT-IRF) contact guard required  -LL     Time Frame (Transfer Goal 1, PT-IRF) by discharge  -       Row Name 04/01/24 1547          Gait/Walking Locomotion Goal 1 (PT-IRF)    Activity/Assistive Device (Gait/Walking Locomotion Goal 1, PT-IRF) gait (walking locomotion);assistive device use;decrease fall risk;diminish gait deviation;improve balance and speed;increase endurance/gait distance  appropriate a.d.  -LL     Gait/Walking Locomotion Distance Goal 1 (PT-IRF) 150  -LL     [place] Level (Gait/Walking Locomotion Goal 1, PT-IRF) contact guard required  -LL     Time Frame (Gait/Walking Locomotion Goal 1, PT-IRF) by discharge  -               User Key  (r) = Recorded By, (t) = Taken By, (c) = Cosigned By      Initials Name Provider Type    Ruthie Stovall PTA Physical Therapist Assistant                     Physical Therapy Education       Title: PT OT SLP Therapies (In Progress)       Topic: Physical Therapy (Done)       Point: Mobility training (Done)       Learning Progress Summary             Patient Acceptance, E,D, VU,NR by LL at 4/1/2024 1614    Acceptance, E,TB, VU,DU by  at 3/30/2024 1204    Acceptance, E,D, NR by  at  3/28/2024 1625                         Point: Home exercise program (Done)       Learning Progress Summary             Patient Acceptance, E,D, VU,NR by  at 4/1/2024 1614    Acceptance, E,TB, VU,DU by  at 3/30/2024 1204    Acceptance, E,D, NR by AG at 3/28/2024 1625                         Point: Body mechanics (Done)       Learning Progress Summary             Patient Acceptance, E,D, VU,NR by  at 4/1/2024 1614    Acceptance, E,TB, VU,DU by  at 3/30/2024 1204    Acceptance, E,D, NR by AG at 3/28/2024 1625                         Point: Precautions (Done)       Learning Progress Summary             Patient Acceptance, E,D, VU,NR by  at 4/1/2024 1614    Acceptance, E,TB, VU,DU by  at 3/30/2024 1204    Acceptance, E,D, NR by AG at 3/28/2024 1625                                         User Key       Initials Effective Dates Name Provider Type Discipline     06/16/21 -  Melanie Anne, PT Physical Therapist PT     05/02/16 -  Ruthie Travis PTA Physical Therapist Assistant PT     01/20/23 -  Desi Fraga PTA Physical Therapist Assistant PT                    PT Recommendation and Plan    Frequency of Treatment (PT): 5 times per week                     Time Calculation:      PT Charges       Row Name 04/01/24 1619 04/01/24 1615          Time Calculation    Start Time 1330  -LL 0820  -LL     Stop Time 1415  -LL 0915  -LL     Time Calculation (min) 45 min  -LL 55 min  -LL     PT Received On -- 04/01/24  -        Time Calculation- PT    Total Timed Code Minutes- PT 45 minute(s)  -LL 55 minute(s)  -LL               User Key  (r) = Recorded By, (t) = Taken By, (c) = Cosigned By      Initials Name Provider Type     Ruthie Travis PTA Physical Therapist Assistant                    Therapy Charges for Today       Code Description Service Date Service Provider Modifiers Qty    55966348120  GAIT TRAINING EA 15 MIN 4/1/2024 Ruthie Travis PTA GP, CQ 3    [id number]  PT NEUROMUSC RE  EDUCATION EA 15 MIN 4/1/2024 Ruthie Travis PTA GP, CQ 1    55509672372 HC PT THERAPEUTIC ACT EA 15 MIN 4/1/2024 Ruthie Travis, TK GP, CQ 1    04173105098 HC PT THER PROC EA 15 MIN 4/1/2024 Ruthie Travis, TK GP, CQ 2              PT G-Codes  AM-PAC 6 Clicks Score (PT): 12      Cielo Travis PTA  4/1/2024

## 2024-04-01 NOTE — PROGRESS NOTES
Rehabilitation Nursing  Inpatient Rehabilitation Plan of Care Note    Plan of Care  Copy from POC    Safety    Potential for Injury (Active)  Current Status (3/27/2024 4:00:00 PM): risk for falls  Weekly Goal: no falls  Discharge Goal: no falls    Body Systems    Integumentary (Active)  Current Status (3/27/2024 4:00:00 PM): risk for skin break down  Weekly Goal: no skin breakdown  Discharge Goal: no skin breakdown    RN Interventions    Body Systems -  RN: skin assessment every shift and prn  [RN]  RN: barrier cream as needed  [RN]  RN: heels elevated when in bed  [RN]  RN: pt encouraged to turn while in bed  [RN]    Safety -  RN: non skid socks  [RN]  RN: call bell within reach  [RN]  RN: pt educated to call for assist [RN]  RN: HOURLY ROUNDS; BED ALARM [RN]    Signed by: Nurse Nicko

## 2024-04-01 NOTE — THERAPY TREATMENT NOTE
"Inpatient Rehabilitation - Occupational Therapy Treatment Note     [place]     Patient Name: Regine Beckham  : 1955  MRN: [id number]    Today's Date: 2024                 Admit Date: 3/27/2024       No diagnosis found.    Patient Active Problem List   Diagnosis    Iron deficiency anemia due to chronic blood loss    Major depressive disorder    RLS (restless legs syndrome)    GERD (gastroesophageal reflux disease)    Left breast mass    Allergy to penicillin    Stroke    Grade I diastolic dysfunction    Moderate malnutrition    Falls frequently    Stroke-like symptoms    Debility       Past Medical History:   Diagnosis Date    Anemia     Anxiety     Arthritis     Depression     Legally blind     Restless leg syndrome     Sleep apnea     \"I don't use a cpap anymore since losing 106 lbs\"    Water retention        Past Surgical History:   Procedure Laterality Date    BACK SURGERY      CARDIAC CATHETERIZATION N/A 2024    Procedure: Percutaneous Manual Thrombectomy;  Surgeon: Efrain Hurley MD;  Location: [facility] INVASIVE LOCATION;  Service: Interventional Radiology;  Laterality: N/A;    GALLBLADDER SURGERY      HIP ARTHROSCOPY      JOINT REPLACEMENT Right              IRF OT ASSESSMENT FLOWSHEET (Last 12 Hours)       IRF OT Evaluation and Treatment       Row Name 24 1458          OT Time and Intention    Document Type daily treatment  -BF     Mode of Treatment occupational therapy  -BF     Patient Effort good  -BF     Symptoms Noted During/After Treatment --  Pt continues to c/o LUE pain/soreness  -BF       Row Name 24 1458          General Information    Existing Precautions/Restrictions fall  L HP  -BF     Limitations/Impairments safety/cognitive  -BF       Row Name 24 1458          Cognition/Psychosocial    Affect/Mental Status (Cognition) emotionally labile  -BF     Orientation Status (Cognition) oriented to;person;place;situation  intermittent confusion  -BF     Follows " Commands (Cognition) follows one-step commands;physical/tactile prompts required;repetition of directions required;verbal cues/prompting required  -BF       Row Name 04/01/24 1458          Grooming    [place] Level (Grooming) moderate assist (50% patient effort);verbal cues;nonverbal cues (demo/gesture)  -BF     Position (Grooming) supported sitting  -BF       Row Name 04/01/24 1458          Self-Feeding    [place] Level (Self-Feeding) set up  -BF     Position (Self-Feeding) supported sitting  -BF       Row Name 04/01/24 1458          Motor Skills    Muscle Tone left;upper extremity(s);severe impairment;hypertonia  -BF     Neuromuscular Function left;upper extremity;flexion synergy pattern  -BF     Motor Control/Coordination Interventions gross motor coordination activities;fine motor manipulation/dexterity activities;therapeutic exercise/ROM;neuro-muscular re-education  LUE AA/PROM as tolerated, NDT; RUE FMC/GMC theract; BUE rickshaw 10 lbs [id number] w/ LUE supported  -BF       Row Name 04/01/24 1458          Neuromuscular Re-education    Interventions (Neuromuscular Re-education) facilitation/inhibition;massage  -BF     Positioning (Neuromuscular Re-education) sitting;supported  -BF       Row Name 04/01/24 1458          Edema Assessment & Management    Location (Edema) wrist/hand, left  -BF     Additional Documentation Location (Edema) (Row);Edema Symptoms/Interventions (Group)  -BF       Row Name 04/01/24 1458          Edema Symptoms/Interventions    Treatment Interventions (Edema) active range of motion;elevation;positioning;retrograde massage  -BF       Row Name 04/01/24 1458          Positioning and Restraints    In Wheelchair sitting;with PT;LUE elevated;patient within staff view  at [facility] station in AM, w/ PT in PM  -BF               User Key  (r) = Recorded By, (t) = Taken By, (c) = Cosigned By      Initials Name Effective Dates    BF Desi Mackey, OT 07/11/23 -                       Occupational Therapy Education       Title: PT OT SLP Therapies (In Progress)       Topic: Occupational Therapy (In Progress)       Point: ADL training (In Progress)       Description:   Instruct learner(s) on proper safety adaptation and remediation techniques during self care or transfers.   Instruct in proper use of assistive devices.                  Learning Progress Summary             Patient Acceptance, E, NR by BF at 4/1/2024 1458    Acceptance, D,E, NR by BC at 3/30/2024 1515    Acceptance, E, NR by BF at 3/29/2024 1433    Acceptance, E, NR by BF at 3/28/2024 1434                         Point: Precautions (In Progress)       Description:   Instruct learner(s) on prescribed precautions during self-care and functional transfers.                  Learning Progress Summary             Patient Acceptance, E, NR by BF at 4/1/2024 1458    Acceptance, E, NR by BF at 3/29/2024 1433    Acceptance, E, NR by BF at 3/28/2024 1434                                         User Key       Initials Effective Dates Name Provider Type Discipline     07/11/23 -  Desi Mackey OT Occupational Therapist OT    BC 06/16/21 -  Jalyn Harvey OT Occupational Therapist OT                        OT Recommendation and Plan    Planned Therapy Interventions (OT): activity tolerance training, adaptive equipment training, BADL retraining, neuromuscular control/coordination retraining, passive ROM/stretching, ROM/therapeutic exercise, strengthening exercise, transfer/mobility retraining                    Time Calculation:      Time Calculation- OT       Row Name 04/01/24 1504 04/01/24 1045          Time Calculation- OT    OT Start Time 1245  -BF 1045  -BF     OT Stop Time 1330  -BF 1130  -BF     OT Time Calculation (min) 45 min  -BF 45 min  -BF     Total Timed Code Minutes- OT 45 minute(s)  -BF 45 minute(s)  -BF     OT Non-Billable Time (min) -- 10 min  -BF               User Key  (r) = Recorded By, (t) = Taken By, (c) =  Cosigned By      Initials Name Provider Type    BF Desi Mackey OT Occupational Therapist                  Therapy Charges for Today       Code Description Service Date Service Provider Modifiers Qty    10805846951 HC OT SELF CARE/MGMT/TRAIN EA 15 MIN 4/1/2024 Desi Mackey OT GO 1    79003219747 HC OT NEUROMUSC RE EDUCATION EA 15 MIN 4/1/2024 Desi Mackey OT GO 3    20643744351 HC OT THER PROC EA 15 MIN 4/1/2024 Desi Mackey OT GO 1    41260163281 HC OT THERAPEUTIC ACT EA 15 MIN 4/1/2024 Desi Mackey OT GO 1                     Desi Mackey OT  4/1/2024

## 2024-04-01 NOTE — PLAN OF CARE
Goal Outcome Evaluation:           Progress: no change         Problem: Rehabilitation (IRF) Plan of Care  Goal: Plan of Care Review  Outcome: Ongoing, Progressing  Flowsheets (Taken 3/31/2024 1933 by Reina Rodriguez RN)  Progress: no change  Plan of Care Reviewed With: patient  Goal: Patient-Specific Goal (Individualized)  Outcome: Ongoing, Progressing  Goal: Absence of New-Onset Illness or Injury  Outcome: Ongoing, Progressing  Intervention: Prevent Fall and Fall Injury  Recent Flowsheet Documentation  Taken 3/31/2024 1800 by Agnieszka Moreno RN  Safety Promotion/Fall Prevention:   nonskid shoes/slippers when out of bed   safety round/check completed  Goal: Optimal Comfort and Wellbeing  Outcome: Ongoing, Progressing  Goal: Home and Community Transition Plan Established  Outcome: Ongoing, Progressing     Problem: Mobility Impairment  Goal: Optimal Mobility Travis and Safety  Outcome: Ongoing, Progressing     Problem: Skin Injury Risk Increased  Goal: Skin Health and Integrity  Outcome: Ongoing, Progressing  Intervention: Optimize Skin Protection  Recent Flowsheet Documentation  Taken 3/31/2024 1800 by Agnieszka Moreno RN  Head of Bed (HOB) Positioning: HOB elevated     Problem: Fall Injury Risk  Goal: Absence of Fall and Fall-Related Injury  Outcome: Ongoing, Progressing  Intervention: Promote Injury-Free Environment  Recent Flowsheet Documentation  Taken 3/31/2024 1800 by Agnieszka Moreno RN  Safety Promotion/Fall Prevention:   nonskid shoes/slippers when out of bed   safety round/check completed     Problem: Cognitive Impairment  Goal: Optimal Cognitive Function  Outcome: Ongoing, Progressing     Problem: Swallowing Impairment  Goal: Optimal Eating/Swallowing without Aspir  Outcome: Ongoing, Progressing

## 2024-04-01 NOTE — THERAPY TREATMENT NOTE
Inpatient Rehabilitation - Speech Language Pathology   Swallow Treatment Note [facility]  Dysphagia Therapy Treatment Note     Patient Name: Regine Beckham  : 1955  MRN: [id number]  Today's Date: 2024     Admit Date: 3/27/2024  DYSPHAGIA THERAPY PLAN OF CARE:     Regine Beckham was seen this am in the SLP office of [facility]'s IPR unit for formal therapy tasks. She is cooperative to participate in all tasks.     She is notably labile this date intermittently across all therapy tasks. This is primarily associated with her discussing boyfriend, Teo, and their situation and her current stroke deficits.     Long Term Goal:  Patient will accept least restrictive diet tolerance w/o overt s/s aspiration.      Short Term Goals:  1. Patient will tolerate facial massage to LEFT facial surface for 3-7 minutes to increase surface blood flow, sensation and ROM over 3 consecutive sessions.  Tolerated 7 min w/ mild increase in surface blood flow.       2. Patient will perform OROM/GRACE exercises x3 sets x10 reps w/ min cues.  X1 set x5 reps. Mirror is provided for biofeedback, however this is inconsistently helpful per pt legally blind status and premorbid visual deficits.      3. Patient will demonstrate increase labial sensation/afferent drive per pt report in 90% of opp over three consecutive sessions.   Pt reports increase in sensation this date and she demonstrate increased sensation in 100% opp of loss of oral secretions from L oral commissure during PATRIZIA tasks.       4. Patient will increase lingual control, coordination, movement as evidenced by bolus manipulation in 90% opp independently over three consecutive sessions.   No po trials adminsitered this date.      5. Patient will participate in a clinical re-evaluation of swallowing fnx in 7-10 days, pending progress towards this poc.  Diet advanced to MS, whole, thin 3/30/24    Visit Dx:   No diagnosis found.  Patient Active Problem List   Diagnosis    Iron  "deficiency anemia due to chronic blood loss    Major depressive disorder    RLS (restless legs syndrome)    GERD (gastroesophageal reflux disease)    Left breast mass    Allergy to penicillin    Stroke    Grade I diastolic dysfunction    Moderate malnutrition    Falls frequently    Stroke-like symptoms    Debility     Past Medical History:   Diagnosis Date    Anemia     Anxiety     Arthritis     Depression     Legally blind     Restless leg syndrome     Sleep apnea     \"I don't use a cpap anymore since losing 106 lbs\"    Water retention      Past Surgical History:   Procedure Laterality Date    BACK SURGERY      CARDIAC CATHETERIZATION N/A 1/19/2024    Procedure: Percutaneous Manual Thrombectomy;  Surgeon: Efrain Hurley MD;  Location: [facility] CATH INVASIVE LOCATION;  Service: Interventional Radiology;  Laterality: N/A;    GALLBLADDER SURGERY      HIP ARTHROSCOPY      JOINT REPLACEMENT Right        SLP Recommendation and Plan      Continue per POC.       Thank you for allowing me to participate in the care of your patient-   Azalia Elizondo M.S., CCC-SLP           Daily Summary of Progress (SLP): progress toward functional goals is good (Facial massage tolerated for 8 min w/ mild increase in surface blood flow noted. Increase in sensation noted per pt awareness of oral loss from left side during therapy tasks.) (04/01/24 1010)       SWALLOW EVALUATION (Last 72 Hours)       SLP Adult Swallow Evaluation       Row Name 04/01/24 1010                   Rehab Evaluation    Document Type therapy note (daily note)  -JR           SLP Treatment Clinical Impressions    Daily Summary of Progress (SLP) progress toward functional goals is good  Facial massage tolerated for 8 min w/ mild increase in surface blood flow noted. Increase in sensation noted per pt awareness of oral loss from left side during therapy tasks.  -JR                  User Key  (r) = Recorded By, (t) = Taken By, (c) = Cosigned By      Initials Name Effective " Dates    Azalia Bunch MS CCC-SLP 10/25/21 -                     EDUCATION  The patient has been educated in the following areas:   Dysphagia (Swallowing Impairment).                Time Calculation:    Time Calculation- SLP       Row Name 24 1128             Time Calculation- SLP    SLP Start Time 1000  -JR      SLP Stop Time 1045  -      SLP Time Calculation (min) 45 min  -      SLP - Next Appointment 24  -                User Key  (r) = Recorded By, (t) = Taken By, (c) = Cosigned By      Initials Name Provider Type    Azalia Bunch, MS CCC-SLP Speech and Language Pathologist                    Therapy Charges for Today       Code Description Service Date Service Provider Modifiers Qty    90975858311 HC ST TREATMENT SPEECH 2 2024 Azalia Elizondo MS CCC-SLP GN 1    [id number] HC ST TREATMENT SWALLOW 1 2024 Azalia Elizondo, MS CCC-SLP GN 1            Azalia Mkihail MS CCC-SLP  2024         and Inpatient Rehabilitation - Speech Language Pathology Treatment Note  [facility]  Dysarthria and Cognitive Therapy Treatment Note     Patient Name: Regine Beckham  : 1955  MRN: [id number]  Today's Date: 2024     Admit Date: 3/27/2024    DYSARTHRIA AND COGNITIVE THERAPY PLAN OF CARE:     Regine Beckham was seen this am in the SLP office of [facility]'s IPR unit for formal therapy tasks. She is cooperative to participate in all tasks.     She is notably labile this date intermittently across all therapy tasks. This is primarily associated with her discussing boyfriend, Teo, and their situation and her current stroke deficits.      Long Term Goal:  Patient will demonstrate functional speech skills for return to discharge environment.   Patient will demonstrate functional cognitive skills for return to discharge environment.     Short Term Goals:  1. Patient will tolerate facial massage to left facial surface for 3-7 minutes to increase surface blood flow, sensation and ROM over 3 consecutive  sessions.  Tolerated 7 min w/ minimal increase in surface blood flow.        2. Patient will perform OROM/GRACE exercises x3 sets x10 reps w/ min cues.  X1 set x5 reps. Mirror is provided for biofeedback, however this is inconsistently helpful per pt legally blind status and premorbid visual deficits.      3. Patient will maintain vocal intensity at adequate speaking volume per decibel meter in 4+ word sentences in 90% of opp over three consecutive sessions.   Less than 20% opp this date.      4. Patient will over-articulate 3+ syllable words and in connected speech in 90% opp to improve intelligibility over three consecutive sessions.   Over-articulation of 3+ syllable words and connected speech demonstrated in 75% opp w/ verbal cues.      5. Patient will decrease rate of speech in connected speech in 90% of opp to improve intelligibility over three consecutive sessions.   Deferred this date.      6. Patient will perform rapid alternating speech tasks w/ min cues over three consecutive sessions.  Performed w/ increase in oral secretions noted and pt aware w/ use of tissue to clear lip edge.      7. Patient will demonstrate accurate orientation to time and location in 90% of opp independently over three consecutive sessions.  Oriented to time and location in 100% opp this date. Refers to hospital staff as members of her community despite corrections.      8. Patient will maintain attention to therapy tasks despite intermittently distracting environment in 90% of opp w/ min cues over three consecutive sessions.   Able to maintain attention to tasks in 50% opp this date. Verbal cues frequently required.      8. Patient will perform divergent naming tasks of 8+ items named in 1 min w/ min cues over three consecutive sessions.   Deferred this date    9. Patient will perform inhalations/exhalations via resistive breather x4 sets x15 reps.   Breather at 3/3 x2 sets x15 reps.     10. Patient will perform sustained vowel  "prolongations of 5 sec duration over 15 reps.   Sustained vowel prolongations over x10 reps for 5.19 seconds. (5/10 opp over 5 sec). Improves with use of therapy band to pull during phonation.        *Additional goals to be added/modified per pt progress toward goals.      Visit Dx:  No diagnosis found.  Patient Active Problem List   Diagnosis    Iron deficiency anemia due to chronic blood loss    Major depressive disorder    RLS (restless legs syndrome)    GERD (gastroesophageal reflux disease)    Left breast mass    Allergy to penicillin    Stroke    Grade I diastolic dysfunction    Moderate malnutrition    Falls frequently    Stroke-like symptoms    Debility     Past Medical History:   Diagnosis Date    Anemia     Anxiety     Arthritis     Depression     Legally blind     Restless leg syndrome     Sleep apnea     \"I don't use a cpap anymore since losing 106 lbs\"    Water retention      Past Surgical History:   Procedure Laterality Date    BACK SURGERY      CARDIAC CATHETERIZATION N/A 1/19/2024    Procedure: Percutaneous Manual Thrombectomy;  Surgeon: Efrain Hurley MD;  Location: [facility] CATH INVASIVE LOCATION;  Service: Interventional Radiology;  Laterality: N/A;    GALLBLADDER SURGERY      HIP ARTHROSCOPY      JOINT REPLACEMENT Right        SLP Recommendation and Plan      Continue per POC.      Thank you-   Azalia Elizondo M.S., CCC-SLP        Daily Summary of Progress (SLP): progress toward functional goals is good (Facial massage tolerated for 8 min w/ mild increase in surface blood flow noted. Increase in sensation noted per pt awareness of oral loss from left side during therapy tasks.) (04/01/24 1010)          SLP EVALUATION (Last 72 Hours)       SLP SLC Evaluation       Row Name 04/01/24 1000                   Communication Assessment/Intervention    Document Type therapy note (daily note)  -JR           SLP Treatment Clinical Impressions    Daily Summary of Progress (SLP) progress toward functional " goals is good  Maintain adequate vocal intensity across a sentence in 25% opp ind. Over-articulate in 90% opp w/ cues prior to initiation of task. PATRIZIA tasks performed. Easily distracted during longer therapy tasks (massage) or those that require repetition.  -                  User Key  (r) = Recorded By, (t) = Taken By, (c) = Cosigned By      Initials Name Effective Dates    Azalia Bunch MS CCC-SLP 10/25/21 -                        EDUCATION  The patient has been educated in the following areas:     Cognitive Impairment Communication Impairment.      Time Calculation:      Time Calculation- SLP       Row Name 04/01/24 1128             Time Calculation- [facility]    SLP Start Time 1000  -      SLP Stop Time 1045  -      SLP Time Calculation (min) 45 min  -[facility] - Next Appointment 04/02/24  -                User Key  (r) = Recorded By, (t) = Taken By, (c) = Cosigned By      Initials Name Provider Type    Azalia Bunch MS CCC-SLP Speech and Language Pathologist                    Therapy Charges for Today       Code Description Service Date Service Provider Modifiers Qty    41184671153  ST TREATMENT SPEECH 2 4/1/2024 Azalia Elizondo MS CCC-SLP GN 1    [id number]  ST TREATMENT SWALLOW 1 4/1/2024 Azalia Elizondo MS CCC-SLP GN 1                       Azalia Elizondo MS CCC-SLP  4/1/2024

## 2024-04-01 NOTE — PROGRESS NOTES
Inpatient Rehabilitation Functional Measures Assessment and Plan of Care    Plan of Care  Self Care    [OT] Dressing (Upper)(Active)  Current Status(04/01/2024): Total A  Weekly Goal(04/09/2024): Max A  Discharge Goal: Mod A    Functional Measures  ISABEL Eating:  ISABEL Grooming:  ISABEL Bathing:  ISABEL Upper Body Dressing:  ISABEL Lower Body Dressing:  ISABEL Toileting:    ISABEL Bladder Management  Level of Assistance:  Frequency/Number of Accidents this Shift:    ISABEL Bowel Management  Level of Assistance:  Frequency/Number of Accidents this Shift:    ISABEL Bed/Chair/Wheelchair Transfer:  ISABEL Toilet Transfer:  ISABEL Tub/Shower Transfer:    Previously Documented Mode of Locomotion at Discharge:  ISABEL Expected Mode of Locomotion at Discharge:  ISABEL Walk/Wheelchair:  ISABEL Stairs:    ISABEL Comprehension:  ISABEL Expression:  ISABEL Social Interaction:  ISABEL Problem Solving:  ISABEL Memory:    Therapy Mode Minutes  Occupational Therapy: Individual: 90 minutes.  Physical Therapy:  Speech Language Pathology:    Discharge Functional Goals:    Signed by: Desi Mackey OT

## 2024-04-01 NOTE — PROGRESS NOTES
Inpatient Rehabilitation Functional Measures Assessment and Plan of Care    Plan of Care  Communication    [ST] Expression(Active)  Current Status(03/29/2024): Mild to moderate dysarthria  Weekly Goal(04/01/2024): Resistive breather  Discharge Goal: Premorbid baseline communication        Swallow Function    [ST] Swallowing(Active)  Current Status(03/29/2024): Oral dysphagia  Weekly Goal(04/01/2024): Facial massage  Discharge Goal: Least restrictive po diet    Functional Measures  ISABEL Eating:  ISABEL Grooming:  ISABEL Bathing:  ISABEL Upper Body Dressing:  ISABEL Lower Body Dressing:  ISABEL Toileting:    ISABEL Bladder Management  Level of Assistance:  Frequency/Number of Accidents this Shift:    ISABEL Bowel Management  Level of Assistance:  Frequency/Number of Accidents this Shift:    ISABEL Bed/Chair/Wheelchair Transfer:  ISABEL Toilet Transfer:  ISABEL Tub/Shower Transfer:    Previously Documented Mode of Locomotion at Discharge:  [facility] Expected Mode of Locomotion at Discharge:  ISABEL Walk/Wheelchair:  ISABEL Stairs:    ISABEL Comprehension:  ISABEL Expression:  ISABEL Social Interaction:  ISABEL Problem Solving:  ISABEL Memory:    Therapy Mode Minutes  Occupational Therapy:  Physical Therapy:  Speech Language Pathology: Individual: 45 minutes.    Discharge Functional Goals:    Signed by: Azalia Elizondo SLP

## 2024-04-02 PROCEDURE — 25010000002 HEPARIN (PORCINE) PER 1000 UNITS: Performed by: INTERNAL MEDICINE

## 2024-04-02 PROCEDURE — 97110 THERAPEUTIC EXERCISES: CPT | Performed by: OCCUPATIONAL THERAPIST

## 2024-04-02 PROCEDURE — 97530 THERAPEUTIC ACTIVITIES: CPT | Performed by: OCCUPATIONAL THERAPIST

## 2024-04-02 PROCEDURE — 97535 SELF CARE MNGMENT TRAINING: CPT | Performed by: OCCUPATIONAL THERAPIST

## 2024-04-02 PROCEDURE — 97110 THERAPEUTIC EXERCISES: CPT

## 2024-04-02 PROCEDURE — 97530 THERAPEUTIC ACTIVITIES: CPT

## 2024-04-02 PROCEDURE — 99231 SBSQ HOSP IP/OBS SF/LOW 25: CPT | Performed by: FAMILY MEDICINE

## 2024-04-02 PROCEDURE — 97112 NEUROMUSCULAR REEDUCATION: CPT | Performed by: OCCUPATIONAL THERAPIST

## 2024-04-02 PROCEDURE — 97116 GAIT TRAINING THERAPY: CPT

## 2024-04-02 RX ADMIN — TICAGRELOR 60 MG: 60 TABLET ORAL at 08:23

## 2024-04-02 RX ADMIN — GABAPENTIN 100 MG: 100 CAPSULE ORAL at 20:28

## 2024-04-02 RX ADMIN — LOSARTAN POTASSIUM 50 MG: 50 TABLET, FILM COATED ORAL at 08:23

## 2024-04-02 RX ADMIN — MIRTAZAPINE 30 MG: 15 TABLET, FILM COATED ORAL at 20:25

## 2024-04-02 RX ADMIN — ACETAMINOPHEN 1000 MG: 500 TABLET ORAL at 10:16

## 2024-04-02 RX ADMIN — DOCUSATE SODIUM 50 MG AND SENNOSIDES 8.6 MG 2 TABLET: 8.6; 5 TABLET, FILM COATED ORAL at 20:28

## 2024-04-02 RX ADMIN — TICAGRELOR 60 MG: 60 TABLET ORAL at 20:25

## 2024-04-02 RX ADMIN — HEPARIN SODIUM 5000 UNITS: 5000 INJECTION INTRAVENOUS; SUBCUTANEOUS at 20:26

## 2024-04-02 RX ADMIN — ROPINIROLE HYDROCHLORIDE 4 MG: 1 TABLET, FILM COATED ORAL at 20:26

## 2024-04-02 RX ADMIN — DOCUSATE SODIUM 50 MG AND SENNOSIDES 8.6 MG 2 TABLET: 8.6; 5 TABLET, FILM COATED ORAL at 08:23

## 2024-04-02 RX ADMIN — PANTOPRAZOLE SODIUM 40 MG: 40 TABLET, DELAYED RELEASE ORAL at 05:27

## 2024-04-02 RX ADMIN — HEPARIN SODIUM 5000 UNITS: 5000 INJECTION INTRAVENOUS; SUBCUTANEOUS at 08:23

## 2024-04-02 RX ADMIN — Medication 1 MG: at 08:23

## 2024-04-02 RX ADMIN — ASPIRIN 81 MG: 81 TABLET, CHEWABLE ORAL at 08:23

## 2024-04-02 RX ADMIN — ATORVASTATIN CALCIUM 80 MG: 40 TABLET, FILM COATED ORAL at 20:25

## 2024-04-02 RX ADMIN — OXYBUTYNIN CHLORIDE 10 MG: 5 TABLET, EXTENDED RELEASE ORAL at 08:23

## 2024-04-02 RX ADMIN — AMLODIPINE BESYLATE 2.5 MG: 5 TABLET ORAL at 08:23

## 2024-04-02 NOTE — SIGNIFICANT NOTE
04/02/24 1215   Plan   Plan Informed pt about sending referral to [facility] and explained if they can accept her they will contact insurance to request prior-authorization request which can take 1-3 days or longer for a decision.  Will follow.

## 2024-04-02 NOTE — PLAN OF CARE
Problem: Rehabilitation (IRF) Plan of Care  Goal: Plan of Care Review  Outcome: Ongoing, Progressing  Goal: Patient-Specific Goal (Individualized)  Outcome: Ongoing, Progressing  Goal: Absence of New-Onset Illness or Injury  Outcome: Ongoing, Progressing  Intervention: Prevent Fall and Fall Injury  Recent Flowsheet Documentation  Taken 4/2/2024 0606 by Anna Marie Fuentes RN  Safety Promotion/Fall Prevention: safety round/check completed  Taken 4/2/2024 0401 by Anna Marie Fuentes RN  Safety Promotion/Fall Prevention: safety round/check completed  Taken 4/2/2024 0200 by Anna Marie Fuentes RN  Safety Promotion/Fall Prevention: safety round/check completed  Taken 4/2/2024 0000 by Anna Marie uFentes RN  Safety Promotion/Fall Prevention: safety round/check completed  Taken 4/1/2024 2200 by Anna Marie Fuentes RN  Safety Promotion/Fall Prevention: safety round/check completed  Taken 4/1/2024 2000 by Anna Marie Fuentes RN  Safety Promotion/Fall Prevention: safety round/check completed  Intervention: Prevent VTE (Venous Thromboembolism)  Recent Flowsheet Documentation  Taken 4/1/2024 1950 by Anna Marie Fuentes RN  VTE Prevention/Management: (see chart) other (see comments)  Goal: Optimal Comfort and Wellbeing  Outcome: Ongoing, Progressing  Goal: Home and Community Transition Plan Established  Outcome: Ongoing, Progressing     Problem: Mobility Impairment  Goal: Optimal Mobility [place] and Safety  Outcome: Ongoing, Progressing     Problem: Skin Injury Risk Increased  Goal: Skin Health and Integrity  Outcome: Ongoing, Progressing  Intervention: Promote and Optimize Oral Intake  Recent Flowsheet Documentation  Taken 4/1/2024 1950 by Anna Marie Fuentes RN  Oral Nutrition Promotion: physical activity promoted  Intervention: Optimize Skin Protection  Recent Flowsheet Documentation  Taken 4/1/2024 1950 by Anna Marie Fuentes RN  Pressure Reduction Techniques: heels elevated off bed  Pressure Reduction Devices: heel  offloading device utilized     Problem: Fall Injury Risk  Goal: Absence of Fall and Fall-Related Injury  Outcome: Ongoing, Progressing  Intervention: Promote Injury-Free Environment  Recent Flowsheet Documentation  Taken 4/2/2024 0606 by Anna Marie Fuentes RN  Safety Promotion/Fall Prevention: safety round/check completed  Taken 4/2/2024 0401 by Anna Marie Fuentes RN  Safety Promotion/Fall Prevention: safety round/check completed  Taken 4/2/2024 0200 by Anna Marie Fuentes RN  Safety Promotion/Fall Prevention: safety round/check completed  Taken 4/2/2024 0000 by Anna Marie Fuentes RN  Safety Promotion/Fall Prevention: safety round/check completed  Taken 4/1/2024 2200 by Anna Marie Fuentes RN  Safety Promotion/Fall Prevention: safety round/check completed  Taken 4/1/2024 2000 by Anna Marie Fuentes RN  Safety Promotion/Fall Prevention: safety round/check completed   Goal Outcome Evaluation:

## 2024-04-02 NOTE — PROGRESS NOTES
[facility]  PROGRESS NOTE     Patient Identification:  Name:  Regine Beckham  Age:  68 y.o.  Sex:  female  :  1955  MRN:  [id number]  Visit Number:  [id number]  ROOM: 106[facility]     Primary Care Provider:  Dread Burton MD    Length of stay in inpatient status:  6    Subjective     Chief Compliant:  No chief complaint on file.      History of Presenting Illness: Patient 68-year-old female being treated for debility.  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  Patient has no new complaints at this time    Objective     Current Hospital Meds:amLODIPine, 2.5 mg, Oral, Q24H  aspirin, 81 mg, Oral, Daily  atorvastatin, 80 mg, Oral, Nightly  folic acid, 1 mg, Oral, Daily  gabapentin, 100 mg, Oral, Nightly  heparin (porcine), 5,000 Units, Subcutaneous, Q12H  losartan, 50 mg, Oral, Daily  mirtazapine, 30 mg, Oral, Nightly  oxybutynin XL, 10 mg, Oral, Daily  pantoprazole, 40 mg, Oral, Q AM  rOPINIRole, 4 mg, Oral, Nightly  senna-docusate sodium, 2 tablet, Oral, BID  ticagrelor, 60 mg, Oral, BID       ----------------------------------------------------------------------------------------------------------------------  Vital Signs:  Temp:  [97.5 °F (36.4 °C)-98.9 °F (37.2 °C)] 98.9 °F (37.2 °C)  Heart Rate:  [75-78] 78  Resp:  [16-18] 16  BP: (117-148)/(67-76) 148/76  SpO2:  [93 %-98 %] 93 %  on   ;   Device (Oxygen Therapy): room air  Body mass index is 22.6 kg/m².    Wt Readings from Last 3 Encounters:   24 63.5 kg (140 lb)   24 62.4 kg (137 lb 8 oz)   24 72.1 kg (159 lb)     Intake & Output (last 3 days)          07 07 07 07 07 07 0701   0700    P.O. 1320 1200 960     Total Intake(mL/kg) 1320 (20.8) 1200 (18.9) 960 (15.1)     Net +1320 +1200 +960             Urine Unmeasured Occurrence 4 x 5 x 5 x     Stool Unmeasured Occurrence 1 x 5 x 1 x           Diet: Regular/House; Texture: Soft to Chew  (NDD 3); Soft to Chew: Whole Meat; Fluid Consistency: Thin (IDDSI 0)  ----------------------------------------------------------------------------------------------------------------------  Physical exam:  Constitutional: No acute distress  HEENT: Normocephalic atraumatic  Neck: Supple  Cardiovascular: Regular rate and rhythm  Pulmonary/Chest: Clear to auscultation  Abdominal: Positive bowel sounds soft.   Musculoskeletal: No arthropathy  Neurological: Significant left-sided paresis  Skin: No rash  Peripheral vascular: No edema  Genitourinary:  ----------------------------------------------------------------------------------------------------------------------    Last echocardiogram:  Results for orders placed during the hospital encounter of 01/19/24    Adult Transthoracic Echo Limited W/ Cont if Necessary Per Protocol    Interpretation Summary  •  Left ventricular systolic function is normal. Estimated left ventricular EF = 60%  •  Saline test results are negative for intracardiac shunting..    ----------------------------------------------------------------------------------------------------------------------  Results from last 7 days   Lab Units 03/30/24  0239 03/28/24  0030   WBC 10*3/mm3 5.92 6.31   HEMOGLOBIN g/dL 11.8* 11.9*   HEMATOCRIT % 37.0 36.7   MCV fL 95.1 94.6   MCHC g/dL 31.9 32.4   PLATELETS 10*3/mm3 252 213         Results from last 7 days   Lab Units 03/30/24  1208 03/30/24  0239 03/28/24  1150 03/28/24  0030   SODIUM mmol/L  --  136  --  138   POTASSIUM mmol/L 4.3 3.6 3.9 3.2*   MAGNESIUM mg/dL  --  1.8  --   --    CHLORIDE mmol/L  --  103  --  105   CO2 mmol/L  --  23.1  --  23.5   BUN mg/dL  --  15  --  19   CREATININE mg/dL  --  0.62  --  0.68   CALCIUM mg/dL  --  9.6  --  9.4   GLUCOSE mg/dL  --  126*  --  128*   ALBUMIN g/dL  --   --   --  3.4*   BILIRUBIN mg/dL  --   --   --  0.3   ALK PHOS U/L  --   --   --  91   AST (SGOT) U/L  --   --   --  29   ALT (SGPT) U/L  --   --   --  22  "  Estimated Creatinine Clearance: 85.8 mL/min (by C-G formula based on SCr of 0.62 mg/dL).  No results found for: \"AMMONIA\"              No results found for: \"HGBA1C\", \"POCGLU\"  Lab Results   Component Value Date    TSH 2.880 03/17/2024     No results found for: \"PREGTESTUR\", \"PREGSERUM\", \"HCG\", \"HCGQUANT\"  Pain Management Panel          Latest Ref Rng & Units 12/20/2023   Pain Management Panel   Amphetamine, Urine Qual Negative Negative    Barbiturates Screen, Urine Negative Negative    Benzodiazepine Screen, Urine Negative Negative    Buprenorphine, Screen, Urine Negative Negative    Cocaine Screen, Urine Negative Negative    Fentanyl, Urine Negative Negative    Methadone Screen , Urine Negative Negative    Methamphetamine, Ur Negative Negative      Brief Urine Lab Results  (Last result in the past 365 days)        Color   Clarity   Blood   Leuk Est   Nitrite   Protein   CREAT   Urine HCG        03/21/24 1613 Dark Yellow   Turbid   Trace   Moderate (2+)   Positive   30 mg/dL (1+)                 No results found for: \"BLOODCX\"      No results found for: \"URINECX\"  No results found for: \"WOUNDCX\"  No results found for: \"STOOLCX\"        I have personally looked at the labs and they are summarized above.  ----------------------------------------------------------------------------------------------------------------------  Detailed radiology reports for the last 24 hours:    Imaging Results (Last 24 Hours)       ** No results found for the last 24 hours. **          Final impressions for the last 30 days of radiology reports:    MRI Brain Without Contrast    Result Date: 3/20/2024  Findings consistent with chronic small vessel ischemic change with encephalomalacia in the right frontal lobe from prior infarct.     This report was finalized on 3/20/2024 1:35 PM by Marianna Caicedo M.D..      XR Chest 1 View    Result Date: 3/17/2024  No radiographic evidence of acute cardiac or pulmonary disease.    This report was " finalized on 3/17/2024 1:50 PM by Dr. Hu Obrien MD.      XR Shoulder 2+ View Left    Result Date: 3/17/2024    No acute findings in the left shoulder.   This report was finalized on 3/17/2024 11:13 AM by Dr. Hu Obrien MD.      XR Hip With or Without Pelvis 2 - 3 View Left    Result Date: 3/17/2024  1.  No acute fracture or dislocation. 2.  Moderate to extensive degenerative osteoarthritis in the left hip.   This report was finalized on 3/17/2024 11:13 AM by Dr. Hu Obrien MD.      CT Head Without Contrast    Result Date: 3/17/2024   1. Atrophy and chronic microangiopathic changes 2. Apparent suggestive of encephalomalacia in the right parietal region 3. No focal parenchymal mass, hemorrhage, or midline shift  This report was finalized on 3/17/2024 10:59 AM by Dr. Hu Obrien MD.     I have personally looked at the radiology images and read the final radiology report.    Assessment & Plan    Debility--patient requiring maximum assist for lower body dressing; maximum assist to total assistance for upper body dressing.  Patient requiring moderate assistance for transfers was able to ambulate 60 feet with platform rolling walker with minimum assistance of 2    Dysphagia/dysarthria continue to work with speech therapy.    UTI secondary to E. coli has recently completed Omnicef course    Previous CVA with right carotid stent continue Brilinta, aspirin and high-dose statin therapy.  Does have significant residual left-sided weakness    Hypertension controlled, continue Norvasc and losartan    Anemia stable    Tobacco abuse recommend cessation    VTE Prophylaxis:   Mechanical Order History:       None          Pharmalogical Order History:        Ordered     Dose Route Frequency Stop    03/27/24 6689  heparin (porcine) 5000 UNIT/ML injection 5,000 Units         5,000 Units SC Every 12 Hours Scheduled --                        Isreal Ballesteros MD  [facility]  04/02/24  10:42 EDT

## 2024-04-02 NOTE — SIGNIFICANT NOTE
04/02/24 3899   Plan   Plan Ernestina [facility] will follow-up with [facility] on 4-3-24 when Administration makes decision about admission.

## 2024-04-02 NOTE — SIGNIFICANT NOTE
04/02/24 8133   Plan   Plan Team conference held today.  Pt recommended for SNF placement, therefore, discharge date is pending.  Spoke to Bossman with [facility] [place] [phone] who says pt was a resident from 2-2-24 until 3-9-24 (she used 36 of SNF benefits), they helped her apply for [place] Medicaid to cover co-pay days and Medicaid is pending.  Pt chose to be discharged home due to insurance no longer paying for skilled care.  Bossman says they have one short-term bed available and would be agreeable to review pt for admission again if needed.

## 2024-04-02 NOTE — PLAN OF CARE
Goal Outcome Evaluation:           Progress: improving            Problem: Rehabilitation (IRF) Plan of Care  Goal: Plan of Care Review  Outcome: Ongoing, Progressing  Flowsheets  Taken 4/2/2024 1102 by Agnieszka Moreno RN  Progress: improving  Taken 3/31/2024 1933 by Reina Rodriguez RN  Plan of Care Reviewed With: patient  Goal: Patient-Specific Goal (Individualized)  Outcome: Ongoing, Progressing  Goal: Absence of New-Onset Illness or Injury  Outcome: Ongoing, Progressing  Intervention: Prevent Fall and Fall Injury  Recent Flowsheet Documentation  Taken 4/2/2024 1016 by Agnieszka Moreno RN  Safety Promotion/Fall Prevention:   nonskid shoes/slippers when out of bed   safety round/check completed  Taken 4/2/2024 0800 by Agnieszka Moreno RN  Safety Promotion/Fall Prevention:   nonskid shoes/slippers when out of bed   safety round/check completed  Intervention: Prevent Infection  Recent Flowsheet Documentation  Taken 4/2/2024 0800 by Agnieszka Moreno RN  Infection Prevention: hand hygiene promoted  Intervention: Prevent VTE (Venous Thromboembolism)  Recent Flowsheet Documentation  Taken 4/2/2024 0823 by Agnieszka Moreno RN  VTE Prevention/Management: (heparin) --  Goal: Optimal Comfort and Wellbeing  Outcome: Ongoing, Progressing  Goal: Home and Community Transition Plan Established  Outcome: Ongoing, Progressing     Problem: Mobility Impairment  Goal: Optimal Mobility [place] and Safety  Outcome: Ongoing, Progressing     Problem: Skin Injury Risk Increased  Goal: Skin Health and Integrity  Outcome: Ongoing, Progressing  Intervention: Optimize Skin Protection  Recent Flowsheet Documentation  Taken 4/2/2024 1016 by Agnieszka Moreno RN  Head of Bed (HOB) Positioning: HOB elevated  Taken 4/2/2024 0823 by Agnieszka Moreno RN  Pressure Reduction Techniques: heels elevated off bed  Pressure Reduction Devices: heel offloading device utilized  Skin Protection: adhesive use limited  Taken 4/2/2024 0800  by Agnieszka Moreno RN  Head of Bed (HOB) Positioning: HOB elevated     Problem: Fall Injury Risk  Goal: Absence of Fall and Fall-Related Injury  Outcome: Ongoing, Progressing  Intervention: Identify and Manage Contributors  Recent Flowsheet Documentation  Taken 4/2/2024 0800 by Angieszka Moreno, RN  Medication Review/Management: medications reviewed  Intervention: Promote Injury-Free Environment  Recent Flowsheet Documentation  Taken 4/2/2024 1016 by Agnieszka Moreno RN  Safety Promotion/Fall Prevention:   nonskid shoes/slippers when out of bed   safety round/check completed  Taken 4/2/2024 0800 by Agnieszka Moreno, RN  Safety Promotion/Fall Prevention:   nonskid shoes/slippers when out of bed   safety round/check completed     Problem: Cognitive Impairment  Goal: Optimal Cognitive Function  Outcome: Ongoing, Progressing     Problem: Swallowing Impairment  Goal: Optimal Eating/Swallowing without Aspir  Outcome: Ongoing, Progressing

## 2024-04-02 NOTE — SIGNIFICANT NOTE
04/02/24 6533   Plan   Plan Spoke to Ernestina with [facility] who says to send referral for review; they are in-network with pt's insurance.  Informed Ernestina about SNF days used at [facility] [place], [place] Medicaid application completed to assist with co-pay days while at this facility.  Informed her pt wants short-term placement.  Referral sent to [facility] via [employer] in-basket (face sheet, H&P, PT/OT/SLP notes/evaluations, MD progress notes, nursing note, dietician consult note, medication list, labs, and diet orders).  Ernestina to follow-up with SS about admission.  Contacted friend Teo [phone] with recommendation for SNF placement, sending referral to [facility] per pt preference and how she is doing in therapy.  Friend agreeable to plans.  Friend requests to be notified about where pt goes at discharge.  Will follow.

## 2024-04-02 NOTE — PROGRESS NOTES
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 coli] as the                 cause of diseases classified elsewhere  5    F17.210   Nicotine dependence, cigarettes, uncomplicated  6    R53.81    Other malaise  7    R29.6     Repeated falls    Height on Admission:  Weight on Admission:    Are there any arthritis conditions recorded for Impairment Group, Etiologic  Diagnosis, or Comorbid Conditions that meet all of the regulatory requirements  for IRF classification (in 42 .29(b)(2)(x), (xi), and xii))?  No    ISABEL Bladder Accidents:   - Accidents.    ISABEL Bowel Accident:  -Accidents.    Signed by: Bette Matos Nurse

## 2024-04-02 NOTE — SIGNIFICANT NOTE
04/02/24 1130   Plan   Plan Spoke to pt about recommendation for SNF placement for continued rehab/nursing care.  Explained she needs assistance with care and not safe to return to her apartment alone.  Pt does not have a caregiver to stay with her.  Discussed option of going to SNF for continued rehab/nursing care.  Pt prefers to be in [facility].  Reviewed SNF's in [place] and [facility].  Pt prefers [facility] if they are in-network with her insurance, [facility], or [facility] in [place], [place].  Pt does not want to go back to [facility].  Left message for Ernestina at [facility] [phone].  Contacted [facility] [phone] per Suad who says bed is not available and they are not in-network with pt's insurance, however, they do have some residents with [employer] Medicare replacement.  Contacted [facility] [phone] per Lenore who says they are not in-network with [employer] Medicare and are currently on-hold for admissions due to staffing issues.

## 2024-04-02 NOTE — THERAPY TREATMENT NOTE
"Inpatient Rehabilitation - Occupational Therapy Treatment Note     [facility]     Patient Name: Regine Beckham  : 1955  MRN: [id number]    Today's Date: 2024                 Admit Date: 3/27/2024       No diagnosis found.    Patient Active Problem List   Diagnosis    Iron deficiency anemia due to chronic blood loss    Major depressive disorder    RLS (restless legs syndrome)    GERD (gastroesophageal reflux disease)    Left breast mass    Allergy to penicillin    Stroke    Grade I diastolic dysfunction    Moderate malnutrition    Falls frequently    Stroke-like symptoms    Debility       Past Medical History:   Diagnosis Date    Anemia     Anxiety     Arthritis     Depression     Legally blind     Restless leg syndrome     Sleep apnea     \"I don't use a cpap anymore since losing 106 lbs\"    Water retention        Past Surgical History:   Procedure Laterality Date    BACK SURGERY      CARDIAC CATHETERIZATION N/A 2024    Procedure: Percutaneous Manual Thrombectomy;  Surgeon: Efrain Hurley MD;  Location: [facility] INVASIVE LOCATION;  Service: Interventional Radiology;  Laterality: N/A;    GALLBLADDER SURGERY      HIP ARTHROSCOPY      JOINT REPLACEMENT Right              IRF OT ASSESSMENT FLOWSHEET (Last 12 Hours)       IRF OT Evaluation and Treatment       Row Name 24 1437          OT Time and Intention    Document Type daily treatment  -BF     Mode of Treatment occupational therapy  -BF     Patient Effort good  -BF     Symptoms Noted During/After Treatment --  Pt continues to c/o LUE pain/soreness  -BF       Row Name 24 1437          General Information    Existing Precautions/Restrictions fall  L HP  -BF     Limitations/Impairments safety/cognitive;visual  -BF       Row Name 24 1437          Cognition/Psychosocial    Affect/Mental Status (Cognition) emotionally labile  -BF     Orientation Status (Cognition) oriented to;person;place;situation  intermittent confusion  -BF     " Follows Commands (Cognition) verbal cues/prompting required;repetition of directions required;physical/tactile prompts required  -BF       Row Name 04/02/24 1437          Upper Body Dressing    [place] Level (Upper Body Dressing) maximal assist (25-49% patient effort);verbal cues;nonverbal cues (demo/gesture)  -BF     Position (Upper Body Dressing) supported sitting  -BF       Row Name 04/02/24 1437          Lower Body Dressing    [place] Level (Lower Body Dressing) maximum assist (25% patient effort);verbal cues;nonverbal cues (demo/gesture)  -BF     Position (Lower Body Dressing) supported sitting  -BF       Row Name 04/02/24 1437          Self-Feeding    [place] Level (Self-Feeding) set up  -BF     Position (Self-Feeding) supported sitting  -BF       Row Name 04/02/24 1437          Motor Skills    Motor Control/Coordination Interventions gross motor coordination activities;fine motor manipulation/dexterity activities;neuro-muscular re-education;therapeutic exercise/ROM  LUE AA/PROM as tolerated, NDT; RUE FMC/GMC theract; BUE rickshaw 15 lbs X10X3 w/ LUE supported  -BF       Row Name 04/02/24 1437          Neuromuscular Re-education    Interventions (Neuromuscular Re-education) facilitation/inhibition;massage  -BF     Positioning (Neuromuscular Re-education) sitting;supported  -BF       Row Name 04/02/24 1437          Positioning and Restraints    In Wheelchair sitting;exit alarm on;patient within staff view  at nsg station  -BF               User Key  (r) = Recorded By, (t) = Taken By, (c) = Cosigned By      Initials Name Effective Dates    Desi Wagner OT 07/11/23 -                      Occupational Therapy Education       Title: PT OT SLP Therapies (In Progress)       Topic: Occupational Therapy (In Progress)       Point: ADL training (In Progress)       Description:   Instruct learner(s) on proper safety adaptation and remediation techniques during self care or transfers.    Instruct in proper use of assistive devices.                  Learning Progress Summary             Patient Acceptance, E, NR by BF at 4/2/2024 1437    Acceptance, E, NR by BF at 4/1/2024 1458    Acceptance, D,E, NR by BC at 3/30/2024 1515    Acceptance, E, NR by BF at 3/29/2024 1433    Acceptance, E, NR by BF at 3/28/2024 1434                         Point: Precautions (In Progress)       Description:   Instruct learner(s) on prescribed precautions during self-care and functional transfers.                  Learning Progress Summary             Patient Acceptance, E, NR by BF at 4/2/2024 1437    Acceptance, E, NR by BF at 4/1/2024 1458    Acceptance, E, NR by BF at 3/29/2024 1433    Acceptance, E, NR by BF at 3/28/2024 1434                                         User Key       Initials Effective Dates Name Provider Type Discipline     07/11/23 -  Desi Mackey OT Occupational Therapist OT    BC 06/16/21 -  Jalyn Harvey OT Occupational Therapist OT                        OT Recommendation and Plan    Planned Therapy Interventions (OT): activity tolerance training, adaptive equipment training, BADL retraining, neuromuscular control/coordination retraining, passive ROM/stretching, ROM/therapeutic exercise, strengthening exercise, transfer/mobility retraining                    Time Calculation:      Time Calculation- OT       Row Name 04/02/24 1442 04/02/24 1045          Time Calculation- OT    OT Start Time 1245  -BF 1045  -BF     OT Stop Time 1330  -BF 1145  -BF     OT Time Calculation (min) 45 min  -BF 60 min  -BF     Total Timed Code Minutes- OT 45 minute(s)  -BF 60 minute(s)  -BF     OT Non-Billable Time (min) -- 10 min  -BF               User Key  (r) = Recorded By, (t) = Taken By, (c) = Cosigned By      Initials Name Provider Type     Desi Mackey OT Occupational Therapist                  Therapy Charges for Today       Code Description Service Date Service Provider Modifiers Qty     64445740644 HC OT SELF CARE/MGMT/TRAIN EA 15 MIN 4/1/2024 Desi Mackey, OT GO 1    74672036974 HC OT NEUROMUSC RE EDUCATION EA 15 MIN 4/1/2024 Desi Mackey, OT GO 3    25180356547 HC OT THER PROC EA 15 MIN 4/1/2024 Desi Mackey, OT GO 1    58120430074 HC OT THERAPEUTIC ACT EA 15 MIN 4/1/2024 Desi Mackey, OT GO 1    09757692926 HC OT SELF CARE/MGMT/TRAIN EA 15 MIN 4/2/2024 Desi Mackey, OT GO 1    45527166915 HC OT NEUROMUSC RE EDUCATION EA 15 MIN 4/2/2024 Kacey Mackeytney [place], OT GO 2    88964221558 HC OT THER PROC EA 15 MIN 4/2/2024 Marielle Mackeyney [place], OT GO 2    14508029680 HC OT THERAPEUTIC ACT EA 15 MIN 4/2/2024 NarendraDesi, OT GO 2                     Desi Mackey, OT  4/2/2024

## 2024-04-02 NOTE — PROGRESS NOTES
Occupational Therapy: Individual: 105 minutes.    Physical Therapy:    Speech Language Pathology:    Signed by: Desi Mackey OT

## 2024-04-02 NOTE — THERAPY TREATMENT NOTE
"Inpatient Rehabilitation - Physical Therapy Treatment Note        [facility]     Patient Name: Regine Beckham  : 1955  MRN: [id number]    Today's Date: 2024                    Admit Date: 3/27/2024      Visit Dx:   No diagnosis found.    Patient Active Problem List   Diagnosis    Iron deficiency anemia due to chronic blood loss    Major depressive disorder    RLS (restless legs syndrome)    GERD (gastroesophageal reflux disease)    Left breast mass    Allergy to penicillin    Stroke    Grade I diastolic dysfunction    Moderate malnutrition    Falls frequently    Stroke-like symptoms    Debility       Past Medical History:   Diagnosis Date    Anemia     Anxiety     Arthritis     Depression     Legally blind     Restless leg syndrome     Sleep apnea     \"I don't use a cpap anymore since losing 106 lbs\"    Water retention        Past Surgical History:   Procedure Laterality Date    BACK SURGERY      CARDIAC CATHETERIZATION N/A 2024    Procedure: Percutaneous Manual Thrombectomy;  Surgeon: Efrain Hurley MD;  Location:  [facility] INVASIVE LOCATION;  Service: Interventional Radiology;  Laterality: N/A;    GALLBLADDER SURGERY      HIP ARTHROSCOPY      JOINT REPLACEMENT Right        PT ASSESSMENT (Last 12 Hours)       IRF PT Evaluation and Treatment       Row Name 24 1457          PT Time and Intention    Document Type daily treatment  -LL     Mode of Treatment physical therapy;individual therapy  -LL     Patient/Family/Caregiver Comments/Observations Patient had no new c/o this date and was agreeable to PT participation.  -LL       Row Name 24 1455          General Information    Patient Profile Reviewed yes  -LL     Existing Precautions/Restrictions fall  -LL     Limitations/Impairments safety/cognitive;visual  -LL       Row Name 24 1457          Living Environment    Primary Care Provided by self  -LL       Row Name 24 1457          Home Use of Assistive/Adaptive Equipment "    Equipment Currently Used at Home commode;hospital bed;grab bar;shower chair  -[facility] Name 04/02/24 1457          Pain Assessment    Pretreatment Pain Rating --  painful L UE PROM  -[facility] Name 04/02/24 1457          Cognition/Psychosocial    Affect/Mental Status (Cognition) emotionally labile  -     Orientation Status (Cognition) oriented to;person;place;situation  intermittent confusion  -     Follows Commands (Cognition) verbal cues/prompting required;repetition of directions required;physical/tactile prompts required  -[facility] Name 04/02/24 1457          Vision Assessment/Intervention    Visual Impairment/Limitations legally blind;corrective lenses full-time  -[facility] Name 04/02/24 1457          Mobility    Extremity Weight-bearing Status --  no restrictions  -LL       Row Name 04/02/24 1457          Bed Mobility    Supine-Sit [place] (Bed Mobility) moderate assist (50% patient effort);verbal cues;nonverbal cues (demo/gesture)  -     Assistive Device (Bed Mobility) bed rails;draw sheet  -     Comment, (Bed Mobility) Min assist with L LE to get to EOB  -[facility] Name 04/02/24 1457          Transfer Assessment/Treatment    Transfers sit-stand transfer;stand-sit transfer;stand pivot/stand step transfer;toilet transfer  -[facility] Name 04/02/24 1457          Bed-Chair Transfer    Bed-Chair [place] (Transfers) moderate assist (50% patient effort);verbal cues;nonverbal cues (demo/gesture)  -[facility] Name 04/02/24 1457          Sit-Stand Transfer    Sit-Stand [place] (Transfers) verbal cues;nonverbal cues (demo/gesture);moderate assist (50% patient effort)  -     Assistive Device (Sit-Stand Transfers) wheelchair;walker, platform  -[facility] Name 04/02/24 1457          Stand-Sit Transfer    Stand-Sit [place] (Transfers) verbal cues;nonverbal cues (demo/gesture);moderate assist (50% patient effort)  -     Assistive Device (Stand-Sit Transfers)  wheelchair;walker, platform  -       Row Name 04/02/24 1457          Stand Pivot/Stand Step Transfer    Stand Pivot/Stand Step [place] (Transfers) verbal cues;nonverbal cues (demo/gesture);moderate assist (50% patient effort)  -     Assistive Device (Stand Pivot Stand Step Transfer) wheelchair  -       Row Name 04/02/24 1457          Toilet Transfer    Type (Toilet Transfer) stand pivot/stand step  -LL     [place] Level (Toilet Transfer) moderate assist (50% patient effort);verbal cues;nonverbal cues (demo/gesture)  -     Assistive Device (Toilet Transfer) wheelchair;grab bars/safety frame  -       Row Name 04/02/24 1457          Gait/Stairs (Locomotion)    Gait/Stairs Locomotion gait/ambulation independence;gait/ambulation assistive device;distance ambulated;gait pattern;gait deviations  -LL     [place] Level (Gait) verbal cues;nonverbal cues (demo/gesture);moderate assist (50% patient effort);2 person assist;1 person assist  -     Assistive Device (Gait) cane, quad;walker, dao  R handrail  -LL     Distance in Feet (Gait) --  15' R handrail w/ mod A; 20' HW w/ mod x 2; 15' SBQC w/ mod x 2  -LL     Pattern (Gait) step-through  -LL     Deviations/Abnormal Patterns (Gait) base of support, narrow;gait speed decreased;weight shifting decreased;stride length decreased  -LL     Bilateral Gait Deviations forward flexed posture  -LL     Left Sided Gait Deviations weight shift ability decreased  -LL     Comment, (Gait/Stairs) Cues for upright posture and increased step width and length.  -       Row Name 04/02/24 1457          Safety Issues, Functional Mobility    Impairments Affecting Function (Mobility) balance;cognition;coordination;endurance/activity tolerance;grasp;motor control;muscle tone abnormal;range of motion (ROM);postural/trunk control;strength  -       Row Name 04/02/24 1500          Balance    Comment, Balance Sitting balance working on functional tasks  -[facility] Name  04/02/24 1457          Hip (Therapeutic Exercise)    Hip Strengthening (Therapeutic Exercise) bilateral;flexion;extension;aBduction;aDduction;marching while seated;sitting;resistance band;green  [facility]       Row Name 04/02/24 1457          Knee (Therapeutic Exercise)    Knee Strengthening (Therapeutic Exercise) bilateral;flexion;extension;marching while seated;LAQ (long arc quad);hamstring curls;sitting;resistance band;green  [facility]       Row Name 04/02/24 1457          Positioning and Restraints    Pre-Treatment Position in bed  -LL     Post Treatment Position wheelchair  -     In Wheelchair sitting;with other staff  -       Row Name 04/02/24 1457          Therapy Assessment/Plan (PT)    Patient's Goals For Discharge return home  -[facility] Name 04/02/24 1457          Therapy Assessment/Plan (PT)    Rehab Potential/Prognosis (PT) good, to achieve stated therapy goals  -     Frequency of Treatment (PT) 5 times per week  -     Estimated Duration of Therapy (PT) 3 weeks  -     Problem List (PT) problems related to;balance;mobility;coordination;hemiparesis/hemiplegia;cognition;communication;strength;postural control;vision;range of motion (ROM);muscle tone;motor control  -     Activity Limitations Related to Problem List (PT) unable to ambulate safely;unable to transfer safely;BADLs not performed adequately or safely  -[facility] Name 04/02/24 1457          Therapy Plan Review/Discharge Plan (PT)    Anticipated Discharge Disposition (PT) home with assist;home with home health  -       Row Name 04/02/24 1457          IRF PT Goals    Bed Mobility Goal Selection (PT-IRF) bed mobility, PT goal 1  -LL     Transfer Goal Selection (PT-IRF) transfers, PT goal 1  -LL     Gait (Walking Locomotion) Goal Selection (PT-IRF) gait, PT goal 1  -       Row Name 04/02/24 1457          Bed Mobility Goal 1 (PT-IRF)    Activity/Assistive Device (Bed Mobility Goal 1, PT-IRF) scooting;sit to supine;supine to sit  -LL      [place] Level (Bed Mobility Goal 1, PT-IRF) standby assist  -LL     Time Frame (Bed Mobility Goal 1, PT-IRF) by discharge  -LL       Row Name 04/02/24 1457          Transfer Goal 1 (PT-IRF)    Activity/Assistive Device (Transfer Goal 1, PT-IRF) sit-to-stand/stand-to-sit;bed-to-chair/chair-to-bed;toilet  -LL     [place] Level (Transfer Goal 1, PT-IRF) contact guard required  -LL     Time Frame (Transfer Goal 1, PT-IRF) by discharge  -LL       Row Name 04/02/24 1457          Gait/Walking Locomotion Goal 1 (PT-IRF)    Activity/Assistive Device (Gait/Walking Locomotion Goal 1, PT-IRF) gait (walking locomotion);assistive device use;decrease fall risk;diminish gait deviation;improve balance and speed;increase endurance/gait distance  appropriate a.d.  -LL     Gait/Walking Locomotion Distance Goal 1 (PT-IRF) 150  -LL     [place] Level (Gait/Walking Locomotion Goal 1, PT-IRF) contact guard required  -LL     Time Frame (Gait/Walking Locomotion Goal 1, PT-IRF) by discharge  -LL               User Key  (r) = Recorded By, (t) = Taken By, (c) = Cosigned By      Initials Name Provider Type    Ruthie Stovall PTA Physical Therapist Assistant                     Physical Therapy Education       Title: PT OT SLP Therapies (In Progress)       Topic: Physical Therapy (Done)       Point: Mobility training (Done)       Learning Progress Summary             Patient Acceptance, E,D, VU,NR by LL at 4/2/2024 1508    Acceptance, E,D, VU,NR by LL at 4/1/2024 1614    Acceptance, E,TB, VU,DU by HC at 3/30/2024 1204    Acceptance, E,D, NR by AG at 3/28/2024 1625                         Point: Home exercise program (Done)       Learning Progress Summary             Patient Acceptance, E,D, VU,NR by LL at 4/2/2024 1508    Acceptance, E,D, VU,NR by LL at 4/1/2024 1614    Acceptance, E,TB, VU,DU by HC at 3/30/2024 1204    Acceptance, E,D, NR by AG at 3/28/2024 1625                         Point: Body mechanics (Done)        Learning Progress Summary             Patient Acceptance, E,D, VU,NR by LL at 4/2/2024 1508    Acceptance, E,D, VU,NR by LL at 4/1/2024 1614    Acceptance, E,TB, VU,DU by  at 3/30/2024 1204    Acceptance, E,D, NR by AG at 3/28/2024 1625                         Point: Precautions (Done)       Learning Progress Summary             Patient Acceptance, E,D, VU,NR by LL at 4/2/2024 1508    Acceptance, E,D, VU,NR by  at 4/1/2024 1614    Acceptance, E,TB, VU,DU by HC at 3/30/2024 1204    Acceptance, E,D, NR by AG at 3/28/2024 1625                                         User Key       Initials Effective Dates Name Provider Type Discipline     06/16/21 -  Melanie Anne, PT Physical Therapist PT     05/02/16 -  Ruthie Travis, TK Physical Therapist Assistant PT     01/20/23 -  Desi Fraga PTA Physical Therapist Assistant PT                    PT Recommendation and Plan    Frequency of Treatment (PT): 5 times per week                     Time Calculation:      PT Charges       Row Name 04/02/24 1508             Time Calculation    Start Time 0735  -LL      Stop Time 0915  -LL      Time Calculation (min) 100 min  -      PT Received On 04/02/24  -         Time Calculation- PT    Total Timed Code Minutes-  minute(s)  -LL                User Key  (r) = Recorded By, (t) = Taken By, (c) = Cosigned By      Initials Name Provider Type     Ruthie Travis PTA Physical Therapist Assistant                    Therapy Charges for Today       Code Description Service Date Service Provider Modifiers Qty    41963433495 HC GAIT TRAINING EA 15 MIN 4/1/2024 Ruthie Travis, PTA GP, CQ 3    74981004477 HC PT NEUROMUSC RE EDUCATION EA 15 MIN 4/1/2024 Ruthie Travis, PTA GP, CQ 1    40954148542 HC PT THERAPEUTIC ACT EA 15 MIN 4/1/2024 Ruthie Travis, PTA GP, CQ 1    59149267502 HC PT THER PROC EA 15 MIN 4/1/2024 Ruthie Travis, PTA GP, CQ 2    51323250566 HC GAIT TRAINING EA 15 MIN 4/2/2024 Ruthie Travis  PTA GP, CQ 2    05089879961 HC PT THERAPEUTIC ACT EA 15 MIN 4/2/2024 Ruthie Travis, TK GP, CQ 2    93590778688 HC PT THER PROC EA 15 MIN 4/2/2024 Ruthie Travis, TK GP, CQ 3              PT G-Codes  AM-PAC 6 Clicks Score (PT): 12      Cielo Travis PTA  4/2/2024

## 2024-04-03 PROCEDURE — 92507 TX SP LANG VOICE COMM INDIV: CPT | Performed by: SPEECH-LANGUAGE PATHOLOGIST

## 2024-04-03 PROCEDURE — 97530 THERAPEUTIC ACTIVITIES: CPT | Performed by: OCCUPATIONAL THERAPIST

## 2024-04-03 PROCEDURE — 97112 NEUROMUSCULAR REEDUCATION: CPT | Performed by: OCCUPATIONAL THERAPIST

## 2024-04-03 PROCEDURE — 97116 GAIT TRAINING THERAPY: CPT

## 2024-04-03 PROCEDURE — 97110 THERAPEUTIC EXERCISES: CPT

## 2024-04-03 PROCEDURE — 97530 THERAPEUTIC ACTIVITIES: CPT

## 2024-04-03 PROCEDURE — 97535 SELF CARE MNGMENT TRAINING: CPT | Performed by: OCCUPATIONAL THERAPIST

## 2024-04-03 PROCEDURE — 25010000002 HEPARIN (PORCINE) PER 1000 UNITS: Performed by: INTERNAL MEDICINE

## 2024-04-03 PROCEDURE — 97112 NEUROMUSCULAR REEDUCATION: CPT

## 2024-04-03 RX ADMIN — TICAGRELOR 60 MG: 60 TABLET ORAL at 08:24

## 2024-04-03 RX ADMIN — OXYBUTYNIN CHLORIDE 10 MG: 5 TABLET, EXTENDED RELEASE ORAL at 08:24

## 2024-04-03 RX ADMIN — HEPARIN SODIUM 5000 UNITS: 5000 INJECTION INTRAVENOUS; SUBCUTANEOUS at 21:04

## 2024-04-03 RX ADMIN — DOCUSATE SODIUM 50 MG AND SENNOSIDES 8.6 MG 2 TABLET: 8.6; 5 TABLET, FILM COATED ORAL at 08:24

## 2024-04-03 RX ADMIN — DICLOFENAC SODIUM 4 G: 10 GEL TOPICAL at 18:15

## 2024-04-03 RX ADMIN — DICLOFENAC SODIUM 4 G: 10 GEL TOPICAL at 21:05

## 2024-04-03 RX ADMIN — PANTOPRAZOLE SODIUM 40 MG: 40 TABLET, DELAYED RELEASE ORAL at 05:31

## 2024-04-03 RX ADMIN — ACETAMINOPHEN 1000 MG: 500 TABLET ORAL at 08:24

## 2024-04-03 RX ADMIN — DICLOFENAC SODIUM 4 G: 10 GEL TOPICAL at 13:48

## 2024-04-03 RX ADMIN — Medication 1 MG: at 08:25

## 2024-04-03 RX ADMIN — DOCUSATE SODIUM 50 MG AND SENNOSIDES 8.6 MG 2 TABLET: 8.6; 5 TABLET, FILM COATED ORAL at 21:05

## 2024-04-03 RX ADMIN — GABAPENTIN 100 MG: 100 CAPSULE ORAL at 21:05

## 2024-04-03 RX ADMIN — TICAGRELOR 60 MG: 60 TABLET ORAL at 21:05

## 2024-04-03 RX ADMIN — ROPINIROLE HYDROCHLORIDE 4 MG: 1 TABLET, FILM COATED ORAL at 21:05

## 2024-04-03 RX ADMIN — HEPARIN SODIUM 5000 UNITS: 5000 INJECTION INTRAVENOUS; SUBCUTANEOUS at 08:25

## 2024-04-03 RX ADMIN — ATORVASTATIN CALCIUM 80 MG: 40 TABLET, FILM COATED ORAL at 21:05

## 2024-04-03 RX ADMIN — ACETAMINOPHEN 1000 MG: 500 TABLET ORAL at 15:29

## 2024-04-03 RX ADMIN — MIRTAZAPINE 30 MG: 15 TABLET, FILM COATED ORAL at 21:05

## 2024-04-03 RX ADMIN — ASPIRIN 81 MG: 81 TABLET, CHEWABLE ORAL at 08:24

## 2024-04-03 NOTE — PROGRESS NOTES
Occupational Therapy:    Physical Therapy:    Speech Language Pathology: Individual: 50 minutes.    Signed by: Suad Euceda, Supervisor

## 2024-04-03 NOTE — THERAPY TREATMENT NOTE
"Inpatient Rehabilitation - Speech Language Pathology Treatment Note   [facility]      Dysarthria and Cognitive Therapy Treatment Note   Patient Name: Regine Beckham  : 1955  MRN: [id number]  Today's Date: 4/3/2024               Admit Date: 3/27/2024     Visit Dx:  No diagnosis found.  Patient Active Problem List   Diagnosis    Iron deficiency anemia due to chronic blood loss    Major depressive disorder    RLS (restless legs syndrome)    GERD (gastroesophageal reflux disease)    Left breast mass    Allergy to penicillin    Stroke    Grade I diastolic dysfunction    Moderate malnutrition    Falls frequently    Stroke-like symptoms    Debility     Past Medical History:   Diagnosis Date    Anemia     Anxiety     Arthritis     Depression     Legally blind     Restless leg syndrome     Sleep apnea     \"I don't use a cpap anymore since losing 106 lbs\"    Water retention      Past Surgical History:   Procedure Laterality Date    BACK SURGERY      CARDIAC CATHETERIZATION N/A 2024    Procedure: Percutaneous Manual Thrombectomy;  Surgeon: Efrain Hurley MD;  Location: [facility] CATH INVASIVE LOCATION;  Service: Interventional Radiology;  Laterality: N/A;    GALLBLADDER SURGERY      HIP ARTHROSCOPY      JOINT REPLACEMENT Right      DYSARTHRIA AND COGNITIVE THERAPY PLAN OF CARE:     Regine Beckham was seen this pm in the SLP office of [facility]'s IPR unit for formal therapy tasks. She is cooperative to participate in all tasks.      She is notably labile this date intermittently across all therapy tasks. This is primarily associated with her discussing friends, family, and her current stroke deficits.      Long Term Goal:  Patient will demonstrate functional speech skills for return to discharge environment.   Patient will demonstrate functional cognitive skills for return to discharge environment.     Short Term Goals:  1. Patient will tolerate facial massage to left facial surface for 3-7 minutes to increase " surface blood flow, sensation and ROM over 3 consecutive sessions.  Tolerated 7 min w/ minimal increase in surface blood flow.        2. Patient will perform OROM/GRACE exercises x3 sets x10 reps w/ min cues.  X1 set x5 reps w/ verbal cues.     3. Patient will maintain vocal intensity at adequate speaking volume per decibel meter in 4+ word sentences in 90% of opp over three consecutive sessions.   40% opp this date.      4. Patient will over-articulate 3+ syllable words and in connected speech in 90% opp to improve intelligibility over three consecutive sessions.   Not directly addressed this session.     5. Patient will decrease rate of speech in connected speech in 90% of opp to improve intelligibility over three consecutive sessions.   Deferred this date.      6. Patient will perform rapid alternating speech tasks w/ min cues over three consecutive sessions.  Not directly addressed this session.     7. Patient will demonstrate accurate orientation to time and location in 90% of opp independently over three consecutive sessions.  Oriented to person place w/o cues. Pt requires mod cues for orientation to ISMAEL/Mo/Year.      8. Patient will maintain attention to therapy tasks despite intermittently distracting environment in 90% of opp w/ min cues over three consecutive sessions.   Able to maintain attention to tasks in 80% opp this date. Verbal cues intermittently required.      8. Patient will perform divergent naming tasks of 8+ items named in 1 min w/ min cues over three consecutive sessions.   Deferred this date     9. Patient will perform inhalations/exhalations via resistive breather x4 sets x15 reps.   Not directly addressed this session.     10. Patient will perform sustained vowel prolongations of 5 sec duration over 15 reps.   Not directly addressed this session.        *Additional goals to be added/modified per pt progress toward goals.         SLP EVALUATION (Last 72 Hours)       SLP SLC Evaluation        Row Name 04/01/24 1000                   Communication Assessment/Intervention    Document Type therapy note (daily note)  -           SLP Treatment Clinical Impressions    Daily Summary of Progress (SLP) progress toward functional goals is good  Maintain adequate vocal intensity across a sentence in 25% opp ind. Over-articulate in 90% opp w/ cues prior to initiation of task. PATRIZIA tasks performed. Easily distracted during longer therapy tasks (massage) or those that require repetition.  -                  User Key  (r) = Recorded By, (t) = Taken By, (c) = Cosigned By      Initials Name Effective Dates    JR Azalia Elizondo, MS CCC-SLP 10/25/21 -                        EDUCATION  The patient has been educated in the following areas:     Cognitive Impairment Communication Impairment.    Time Calculation:      Time Calculation- SLP       Row Name 04/03/24 1533             Time Calculation- [facility]    SLP Start Time 1430  -      SLP Stop Time 1500  -      SLP Time Calculation (min) 30 min  -      SLP - Next Appointment 04/04/24  -                User Key  (r) = Recorded By, (t) = Taken By, (c) = Cosigned By      Initials Name Provider Type    SS Marbella Padgett MA,CCC-SLP Speech and Language Pathologist                    Therapy Charges for Today       Code Description Service Date Service Provider Modifiers Qty    06403176404  ST TREATMENT SPEECH 2 4/3/2024 Marbella Padgett MA,CCC-SLP GN 1                       Marbella Padgett MA,AMINA-SLP  4/3/2024

## 2024-04-03 NOTE — SIGNIFICANT NOTE
04/03/24 1355   Plan   Plan Ernestina with [facility] says Preeti in the business office will reach out to her friend Teo with financial questions.  Informed Ernestina [facility] says they assisted with applying for Medicaid to cover copay days and pt stayed at their facility for 36 days.  Ernestina to follow-up with decision is made about admission.

## 2024-04-03 NOTE — THERAPY TREATMENT NOTE
"Inpatient Rehabilitation - Occupational Therapy Treatment Note     [place]     Patient Name: Regine Beckham  : 1955  MRN: [id number]    Today's Date: 4/3/2024                 Admit Date: 3/27/2024       No diagnosis found.    Patient Active Problem List   Diagnosis    Iron deficiency anemia due to chronic blood loss    Major depressive disorder    RLS (restless legs syndrome)    GERD (gastroesophageal reflux disease)    Left breast mass    Allergy to penicillin    Stroke    Grade I diastolic dysfunction    Moderate malnutrition    Falls frequently    Stroke-like symptoms    Debility       Past Medical History:   Diagnosis Date    Anemia     Anxiety     Arthritis     Depression     Legally blind     Restless leg syndrome     Sleep apnea     \"I don't use a cpap anymore since losing 106 lbs\"    Water retention        Past Surgical History:   Procedure Laterality Date    BACK SURGERY      CARDIAC CATHETERIZATION N/A 2024    Procedure: Percutaneous Manual Thrombectomy;  Surgeon: Efrain Hurley MD;  Location: [facility] INVASIVE LOCATION;  Service: Interventional Radiology;  Laterality: N/A;    GALLBLADDER SURGERY      HIP ARTHROSCOPY      JOINT REPLACEMENT Right              IRF OT ASSESSMENT FLOWSHEET (Last 12 Hours)       IRF OT Evaluation and Treatment       Row Name 24 1425          OT Time and Intention    Document Type daily treatment  -BF     Mode of Treatment occupational therapy  -BF     Patient Effort adequate  -BF     Symptoms Noted During/After Treatment other (see comments)  LUE shoulder pain, increased L hand edema, RN notified  -BF       Row Name 24 1426          General Information    Patient/Family/Caregiver Comments/Observations Pt reports increased L hand edema starting last night; pt reports she has been keeping her hand elevated and positioned safely. Pt provided with resting hand splint.  -BF     Existing Precautions/Restrictions fall  L HP  -BF     " Limitations/Impairments safety/cognitive;visual  -BF       Row Name 04/03/24 1425          Cognition/Psychosocial    Affect/Mental Status (Cognition) emotionally labile  dysarthria  -BF     Orientation Status (Cognition) oriented to;person;place;situation  intermittent confusion  -BF     Follows Commands (Cognition) verbal cues/prompting required;repetition of directions required;physical/tactile prompts required  -BF       Row Name 04/03/24 1425          Grooming    [place] Level (Grooming) moderate assist (50% patient effort);verbal cues;nonverbal cues (demo/gesture)  -BF     Position (Grooming) supported sitting  -BF       Row Name 04/03/24 1425          Self-Feeding    [place] Level (Self-Feeding) set up  -BF     Position (Self-Feeding) supported sitting  -BF       Row Name 04/03/24 1425          Motor Skills    Motor Control/Coordination Interventions gross motor coordination activities;fine motor manipulation/dexterity activities;neuro-muscular re-education;therapeutic exercise/ROM  LUE AA/PROM to hand/elbow as tolerated, NDT; RUE FMC/GMC theract  -BF       Row Name 04/03/24 1425          Neuromuscular Re-education    Interventions (Neuromuscular Re-education) facilitation/inhibition;massage  -BF     Positioning (Neuromuscular Re-education) sitting;supported  -BF       Row Name 04/03/24 1425          Edema Assessment & Management    Location (Edema) wrist/hand, left  -BF       Row Name 04/03/24 1425          Edema Symptoms/Interventions    Treatment Interventions (Edema) active range of motion;elevation;positioning;retrograde massage  -BF       Row Name 04/03/24 1425          Positioning and Restraints    In Wheelchair sitting;patient within staff view;LUE elevated  at [facility] station in AM and PM  -BF               User Key  (r) = Recorded By, (t) = Taken By, (c) = Cosigned By      Initials Name Effective Dates    Desi Wagner OT 07/11/23 -                      Occupational Therapy  Education       Title: PT OT SLP Therapies (In Progress)       Topic: Occupational Therapy (In Progress)       Point: ADL training (In Progress)       Description:   Instruct learner(s) on proper safety adaptation and remediation techniques during self care or transfers.   Instruct in proper use of assistive devices.                  Learning Progress Summary             Patient Acceptance, E, NR by BF at 4/3/2024 1425    Acceptance, E, NR by BF at 4/2/2024 1437    Acceptance, E, NR by BF at 4/1/2024 1458    Acceptance, D,E, NR by BC at 3/30/2024 1515    Acceptance, E, NR by BF at 3/29/2024 1433    Acceptance, E, NR by BF at 3/28/2024 1434                         Point: Precautions (In Progress)       Description:   Instruct learner(s) on prescribed precautions during self-care and functional transfers.                  Learning Progress Summary             Patient Acceptance, E, NR by BF at 4/3/2024 1425    Acceptance, E, NR by BF at 4/2/2024 1437    Acceptance, E, NR by BF at 4/1/2024 1458    Acceptance, E, NR by BF at 3/29/2024 1433    Acceptance, E, NR by BF at 3/28/2024 1434                                         User Key       Initials Effective Dates Name Provider Type Discipline     07/11/23 -  Desi Mackey OT Occupational Therapist OT    BC 06/16/21 -  Jalyn Harvey OT Occupational Therapist OT                        OT Recommendation and Plan    Planned Therapy Interventions (OT): activity tolerance training, adaptive equipment training, BADL retraining, neuromuscular control/coordination retraining, passive ROM/stretching, ROM/therapeutic exercise, strengthening exercise, transfer/mobility retraining                    Time Calculation:      Time Calculation- OT       Row Name 04/03/24 1433 04/03/24 1045          Time Calculation- OT    OT Start Time 1330  -BF 1045  -BF     OT Stop Time 1415  -BF 1130  -BF     OT Time Calculation (min) 45 min  -BF 45 min  -BF     Total Timed Code Minutes-  OT 45 minute(s)  -BF 45 minute(s)  -BF     OT Non-Billable Time (min) -- 10 min  -BF               User Key  (r) = Recorded By, (t) = Taken By, (c) = Cosigned By      Initials Name Provider Type    Desi Wagner, OT Occupational Therapist                  Therapy Charges for Today       Code Description Service Date Service Provider Modifiers Qty    00293888551 HC OT SELF CARE/MGMT/TRAIN EA 15 MIN 4/2/2024 Desi Mackey, OT GO 1    73194221724 HC OT NEUROMUSC RE EDUCATION EA 15 MIN 4/2/2024 Desi Mackey OT GO 2    39971294713 HC OT THER PROC EA 15 MIN 4/2/2024 Desi Mackey, OT GO 2    04266314064 HC OT THERAPEUTIC ACT EA 15 MIN 4/2/2024 Desi Mackey, OT GO 2    93969687665 HC OT SELF CARE/MGMT/TRAIN EA 15 MIN 4/3/2024 Desi Mackey, OT GO 1    45159796965 HC OT NEUROMUSC RE EDUCATION EA 15 MIN 4/3/2024 Desi Mackey, OT GO 3    28384150806 HC OT THERAPEUTIC ACT EA 15 MIN 4/3/2024 Desi Mackey, OT GO 2                     Desi Mackey OT  4/3/2024

## 2024-04-03 NOTE — PROGRESS NOTES
Occupational Therapy:    Physical Therapy:    Speech Language Pathology: Individual: 30 minutes.    Signed by: CHAYO Mckeon

## 2024-04-03 NOTE — PLAN OF CARE
Problem: Rehabilitation (IRF) Plan of Care  Goal: Plan of Care Review  Outcome: Ongoing, Progressing  Flowsheets (Taken 4/3/2024 0438)  Progress: improving  Plan of Care Reviewed With: patient  Outcome Evaluation:   Patient calm and cooperative   no acute distress noted   resting well throughout the night.  Goal: Patient-Specific Goal (Individualized)  Outcome: Ongoing, Progressing  Goal: Absence of New-Onset Illness or Injury  Outcome: Ongoing, Progressing  Intervention: Prevent Fall and Fall Injury  Recent Flowsheet Documentation  Taken 4/3/2024 0435 by Cat Maynard, RN  Safety Promotion/Fall Prevention:   room organization consistent   safety round/check completed   nonskid shoes/slippers when out of bed   mobility aid in reach   lighting adjusted   fall prevention program maintained   clutter free environment maintained   assistive device/personal items within reach  Taken 4/3/2024 0151 by Cat Maynard, RN  Safety Promotion/Fall Prevention:   room organization consistent   safety round/check completed   nonskid shoes/slippers when out of bed   mobility aid in reach   lighting adjusted   fall prevention program maintained   clutter free environment maintained   assistive device/personal items within reach  Taken 4/3/2024 0000 by Cat Maynard, RN  Safety Promotion/Fall Prevention:   room organization consistent   safety round/check completed   nonskid shoes/slippers when out of bed   mobility aid in reach   lighting adjusted   fall prevention program maintained   clutter free environment maintained   assistive device/personal items within reach  Taken 4/2/2024 2232 by Cat Maynard, RN  Safety Promotion/Fall Prevention:   room organization consistent   safety round/check completed   nonskid shoes/slippers when out of bed   mobility aid in reach   lighting adjusted   fall prevention program maintained   clutter free environment maintained   assistive device/personal  items within reach  Taken 4/2/2024 2019 by Cat Maynard, RN  Safety Promotion/Fall Prevention:   room organization consistent   safety round/check completed   nonskid shoes/slippers when out of bed   mobility aid in reach   lighting adjusted   fall prevention program maintained   clutter free environment maintained   assistive device/personal items within reach  Intervention: Prevent Infection  Recent Flowsheet Documentation  Taken 4/3/2024 0435 by Cat Maynard, RN  Infection Prevention:   rest/sleep promoted   personal protective equipment utilized   hand hygiene promoted   equipment surfaces disinfected   environmental surveillance performed  Taken 4/3/2024 0151 by Cat Maynard, RN  Infection Prevention:   rest/sleep promoted   personal protective equipment utilized   hand hygiene promoted   equipment surfaces disinfected   environmental surveillance performed  Taken 4/3/2024 0000 by Cat Maynard RN  Infection Prevention:   rest/sleep promoted   personal protective equipment utilized   hand hygiene promoted   equipment surfaces disinfected   environmental surveillance performed  Taken 4/2/2024 2232 by Cat Maynard, RN  Infection Prevention:   rest/sleep promoted   personal protective equipment utilized   hand hygiene promoted   equipment surfaces disinfected   environmental surveillance performed  Taken 4/2/2024 2019 by Cat Maynard, RN  Infection Prevention:   rest/sleep promoted   personal protective equipment utilized   hand hygiene promoted   equipment surfaces disinfected   environmental surveillance performed  Goal: Optimal Comfort and Wellbeing  Outcome: Ongoing, Progressing  Goal: Home and Community Transition Plan Established  Outcome: Ongoing, Progressing   Goal Outcome Evaluation:  Plan of Care Reviewed With: patient        Progress: improving  Outcome Evaluation: Patient calm and cooperative; no acute distress noted; resting well  throughout the night.

## 2024-04-03 NOTE — PROGRESS NOTES
Occupational Therapy:    Physical Therapy:    Speech Language Pathology: Individual: 45 minutes.    Signed by: Suad Euceda, Supervisor

## 2024-04-03 NOTE — THERAPY TREATMENT NOTE
"Inpatient Rehabilitation - Physical Therapy Treatment Note        [place]     Patient Name: Regine Beckham  : 1955  MRN: [id number]    Today's Date: 4/3/2024                    Admit Date: 3/27/2024      Visit Dx:   No diagnosis found.    Patient Active Problem List   Diagnosis    Iron deficiency anemia due to chronic blood loss    Major depressive disorder    RLS (restless legs syndrome)    GERD (gastroesophageal reflux disease)    Left breast mass    Allergy to penicillin    Stroke    Grade I diastolic dysfunction    Moderate malnutrition    Falls frequently    Stroke-like symptoms    Debility       Past Medical History:   Diagnosis Date    Anemia     Anxiety     Arthritis     Depression     Legally blind     Restless leg syndrome     Sleep apnea     \"I don't use a cpap anymore since losing 106 lbs\"    Water retention        Past Surgical History:   Procedure Laterality Date    BACK SURGERY      CARDIAC CATHETERIZATION N/A 2024    Procedure: Percutaneous Manual Thrombectomy;  Surgeon: Efrain Hurley MD;  Location:  [facility] INVASIVE LOCATION;  Service: Interventional Radiology;  Laterality: N/A;    GALLBLADDER SURGERY      HIP ARTHROSCOPY      JOINT REPLACEMENT Right        PT ASSESSMENT (Last 12 Hours)       IRF PT Evaluation and Treatment       Row Name 24 1523          PT Time and Intention    Document Type daily treatment  -LL     Mode of Treatment physical therapy;individual therapy  -LL     Patient/Family/Caregiver Comments/Observations Patient c/o L hand swelling and L shoulder pain, but was agreeable to PT participation.  -LL       Row Name 24 1523          General Information    Patient Profile Reviewed yes  -LL     Existing Precautions/Restrictions fall  -LL     Limitations/Impairments safety/cognitive;visual  -LL       Row Name 24 1523          Living Environment    Primary Care Provided by self  -LL       Row Name 24 1523          Home Use of " Assistive/Adaptive Equipment    Equipment Currently Used at Home commode;hospital bed;grab bar;shower chair  -       Row Name 04/03/24 1523          Pain Assessment    Pretreatment Pain Rating --  painful L UE PROM  -       Row Name 04/03/24 1523          Cognition/Psychosocial    Affect/Mental Status (Cognition) emotionally labile  -     Orientation Status (Cognition) oriented to;person;place;situation  intermittent confusion  -     Follows Commands (Cognition) verbal cues/prompting required;repetition of directions required;physical/tactile prompts required  -     Personal Safety Interventions fall prevention program maintained;gait belt;nonskid shoes/slippers when out of bed;supervised activity  -       Row Name 04/03/24 1523          Vision Assessment/Intervention    Visual Impairment/Limitations legally blind;corrective lenses full-time  -       Row Name 04/03/24 1523          Mobility    Extremity Weight-bearing Status --  no restrictions  -[facility] Name 04/03/24 1523          Bed Mobility    Assistive Device (Bed Mobility) bed rails;draw sheet  -[facility] Name 04/03/24 1523          Transfer Assessment/Treatment    Transfers sit-stand transfer;stand-sit transfer;stand pivot/stand step transfer;toilet transfer  -       Row Name 04/03/24 1523          Sit-Stand Transfer    Sit-Stand [place] (Transfers) verbal cues;nonverbal cues (demo/gesture);moderate assist (50% patient effort)  -     Assistive Device (Sit-Stand Transfers) wheelchair;walker, platform  -[facility] Name 04/03/24 1523          Stand-Sit Transfer    Stand-Sit [place] (Transfers) verbal cues;nonverbal cues (demo/gesture);moderate assist (50% patient effort)  -     Assistive Device (Stand-Sit Transfers) wheelchair;walker, platform  -       Row Name 04/03/24 1523          Stand Pivot/Stand Step Transfer    Stand Pivot/Stand Step [place] (Transfers) verbal cues;nonverbal cues (demo/gesture);moderate  assist (50% patient effort)  -LL     Assistive Device (Stand Pivot Stand Step Transfer) wheelchair  -       Row Name 04/03/24 1523          Toilet Transfer    Type (Toilet Transfer) stand pivot/stand step  -LL     [place] Level (Toilet Transfer) moderate assist (50% patient effort);verbal cues;nonverbal cues (demo/gesture)  -LL     Assistive Device (Toilet Transfer) wheelchair;grab bars/safety frame  -       Row Name 04/03/24 1523          Gait/Stairs (Locomotion)    Gait/Stairs Locomotion gait/ambulation independence;gait/ambulation assistive device;distance ambulated;gait pattern;gait deviations  -LL     [place] Level (Gait) verbal cues;nonverbal cues (demo/gesture);moderate assist (50% patient effort);2 person assist;1 person assist  -LL     Assistive Device (Gait) cane, quad;walker, dao  -LL     Distance in Feet (Gait) --  15' w/ SBQC; 15' w/ HW  -LL     Pattern (Gait) step-through  -LL     Deviations/Abnormal Patterns (Gait) base of support, narrow;gait speed decreased;weight shifting decreased;stride length decreased  -LL     Bilateral Gait Deviations forward flexed posture  -LL     Left Sided Gait Deviations weight shift ability decreased  -LL     Comment, (Gait/Stairs) Cues for upright posture and increased step width and length.  -       Row Name 04/03/24 1523          Safety Issues, Functional Mobility    Impairments Affecting Function (Mobility) balance;cognition;coordination;endurance/activity tolerance;grasp;motor control;muscle tone abnormal;range of motion (ROM);postural/trunk control;strength  -       Row Name 04/03/24 1523          Balance    Comment, Balance Standing in // bars working on upright and neutral posture.  PTA assisted with B pec stretch  -       Row Name 04/03/24 1523          Hip (Therapeutic Exercise)    Hip Strengthening (Therapeutic Exercise) bilateral;flexion;extension;aBduction;aDduction;marching while seated;sitting;resistance band;green  -       Row Name  04/03/24 1523          Knee (Therapeutic Exercise)    Knee Strengthening (Therapeutic Exercise) bilateral;flexion;extension;marching while seated;LAQ (long arc quad);hamstring curls;sitting;resistance band;green  -[facility] Name 04/03/24 1523          Positioning and Restraints    Pre-Treatment Position --  WC  -LL     Post Treatment Position wheelchair  -LL     In Wheelchair sitting;encouraged to call for assist;notified nsg;exit alarm on  In hallway in front of nurses station  -[facility] Name 04/03/24 1523          Therapy Assessment/Plan (PT)    Patient's Goals For Discharge return home  -[facility] Name 04/03/24 1523          Therapy Assessment/Plan (PT)    Rehab Potential/Prognosis (PT) good, to achieve stated therapy goals  -     Frequency of Treatment (PT) 5 times per week  -     Estimated Duration of Therapy (PT) 3 weeks  -     Problem List (PT) problems related to;balance;mobility;coordination;hemiparesis/hemiplegia;cognition;communication;strength;postural control;vision;range of motion (ROM);muscle tone;motor control  -     Activity Limitations Related to Problem List (PT) unable to ambulate safely;unable to transfer safely;BADLs not performed adequately or safely  -[facility] Name 04/03/24 1523          Therapy Plan Review/Discharge Plan (PT)    Anticipated Discharge Disposition (PT) home with assist;home with home health  -[facility] Name 04/03/24 1523          IRF PT Goals    Bed Mobility Goal Selection (PT-IRF) bed mobility, PT goal 1  -LL     Transfer Goal Selection (PT-IRF) transfers, PT goal 1  -LL     Gait (Walking Locomotion) Goal Selection (PT-IRF) gait, PT goal 1  -[facility] Name 04/03/24 1523          Bed Mobility Goal 1 (PT-IRF)    Activity/Assistive Device (Bed Mobility Goal 1, PT-IRF) scooting;sit to supine;supine to sit  -     [place] Level (Bed Mobility Goal 1, PT-IRF) standby assist  -     Time Frame (Bed Mobility Goal 1, PT-IRF) by discharge  -        Row Name 04/03/24 1523          Transfer Goal 1 (PT-IRF)    Activity/Assistive Device (Transfer Goal 1, PT-IRF) sit-to-stand/stand-to-sit;bed-to-chair/chair-to-bed;toilet  -LL     [place] Level (Transfer Goal 1, PT-IRF) contact guard required  -LL     Time Frame (Transfer Goal 1, PT-IRF) by discharge  -LL       Row Name 04/03/24 1523          Gait/Walking Locomotion Goal 1 (PT-IRF)    Activity/Assistive Device (Gait/Walking Locomotion Goal 1, PT-IRF) gait (walking locomotion);assistive device use;decrease fall risk;diminish gait deviation;improve balance and speed;increase endurance/gait distance  appropriate a.d.  -LL     Gait/Walking Locomotion Distance Goal 1 (PT-IRF) 150  -LL     [place] Level (Gait/Walking Locomotion Goal 1, PT-IRF) contact guard required  -LL     Time Frame (Gait/Walking Locomotion Goal 1, PT-IRF) by discharge  -LL               User Key  (r) = Recorded By, (t) = Taken By, (c) = Cosigned By      Initials Name Provider Type    Ruthie Stovall PTA Physical Therapist Assistant                     Physical Therapy Education       Title: PT OT SLP Therapies (In Progress)       Topic: Physical Therapy (Done)       Point: Mobility training (Done)       Learning Progress Summary             Patient Acceptance, E,D, VU,NR by LL at 4/3/2024 1531    Acceptance, E,D, VU,NR by LL at 4/2/2024 1508    Acceptance, E,D, VU,NR by LL at 4/1/2024 1614    Acceptance, E,TB, VU,DU by  at 3/30/2024 1204    Acceptance, E,D, NR by AG at 3/28/2024 1625                         Point: Home exercise program (Done)       Learning Progress Summary             Patient Acceptance, E,D, VU,NR by LL at 4/3/2024 1531    Acceptance, E,D, VU,NR by LL at 4/2/2024 1508    Acceptance, E,D, VU,NR by LL at 4/1/2024 1614    Acceptance, E,TB, VU,DU by  at 3/30/2024 1204    Acceptance, E,D, NR by AG at 3/28/2024 1625                         Point: Body mechanics (Done)       Learning Progress Summary             Patient  Acceptance, E,D, VU,NR by LL at 4/3/2024 1531    Acceptance, E,D, VU,NR by LL at 4/2/2024 1508    Acceptance, E,D, VU,NR by  at 4/1/2024 1614    Acceptance, E,TB, VU,DU by  at 3/30/2024 1204    Acceptance, E,D, NR by AG at 3/28/2024 1625                         Point: Precautions (Done)       Learning Progress Summary             Patient Acceptance, E,D, VU,NR by  at 4/3/2024 1531    Acceptance, E,D, VU,NR by  at 4/2/2024 1508    Acceptance, E,D, VU,NR by  at 4/1/2024 1614    Acceptance, E,TB, VU,DU by  at 3/30/2024 1204    Acceptance, E,D, NR by  at 3/28/2024 1625                                         User Key       Initials Effective Dates Name Provider Type Discipline     06/16/21 -  Melanie Anne, PT Physical Therapist PT     05/02/16 -  Ruthie Travis PTA Physical Therapist Assistant PT     01/20/23 -  Desi Fraga PTA Physical Therapist Assistant PT                    PT Recommendation and Plan    Frequency of Treatment (PT): 5 times per week                     Time Calculation:      PT Charges       Row Name 04/03/24 1531             Time Calculation    Start Time 0735  -LL      Stop Time 0915  -      Time Calculation (min) 100 min  -      PT Received On 04/03/24  -         Time Calculation- PT    Total Timed Code Minutes-  minute(s)  -                User Key  (r) = Recorded By, (t) = Taken By, (c) = Cosigned By      Initials Name Provider Type     Ruthie Travis PTA Physical Therapist Assistant                    Therapy Charges for Today       Code Description Service Date Service Provider Modifiers Qty    09084778629 HC GAIT TRAINING EA 15 MIN 4/2/2024 Ruthie Travis PTA GP, CQ 2    41428069466 HC PT THERAPEUTIC ACT EA 15 MIN 4/2/2024 Ruthie Travis PTA GP, CQ 2    81194448937 HC PT THER PROC EA 15 MIN 4/2/2024 Ruthie Travis PTA GP, CQ 3    01834062192 HC GAIT TRAINING EA 15 MIN 4/3/2024 Ruthie Travis PTA GP, CQ 2    [id number]  PT  NEUROMUSC RE EDUCATION EA 15 MIN 4/3/2024 Ruthie Travis, TK GP, CQ 1    24979674930 HC PT THERAPEUTIC ACT EA 15 MIN 4/3/2024 Ruthie Travis, PTA GP, CQ 1    45991826953 HC PT THER PROC EA 15 MIN 4/3/2024 Ruthie Travis, TK GP, CQ 3              PT G-Codes  AM-PAC 6 Clicks Score (PT): 12      Cielo Travis PTA  4/3/2024

## 2024-04-03 NOTE — PROGRESS NOTES
Case Management  Inpatient Rehabilitation Team Conference    Conference Date/Time: 4/2/2024 7:06:27 AM    Team Conference Attendees:  MD Elise Spain, NARENDRA Euceda RN,   STALIN Woods, PT  Desi Mackey, OT  Suad Drake, CHAYO    Demographics            Age: 68Y            Gender: Female    Admission Date: 3/27/2024 3:48:00 PM  Rehabilitation Diagnosis:  debility  Comorbidities:      Plan of Care  Anticipated Discharge Date/Estimated Length of Stay: 14 days  Anticipated Discharge Destination: Community discharge with assistance  Discharge Plan : Pt plans to return to her apartment at discharge.  Pt says  significant other may stay with her if needed.  Medical Necessity Expected Level Rationale: fair-good  Intensity and Duration: an average of 3 hours/5 days per week  Medical Supervision and 24 Hour Rehab Nursing: x  Physical Therapy: x  PT Intensity/Duration: PT 1.5 hours per day/5 days per week  Occupational Therapy: x  OT Intensity/Duration: OT 1.5 hours per day/5 days per week  Social Work: x  Therapeutic Recreation: x  Updated (if changes indicated)    Anticipated Discharge Date/Estimated Length of Stay:   Pending SNF placement      Discharge Plan of Care:    Based on the patient's medical and functional status, their prognosis and  expected level of functional improvement is: fair-good      Interdisciplinary Problem/Goals/Status  Body Systems    [RN] Integumentary(Active)  Current Status(03/27/2024): risk for skin break down  Weekly Goal(04/19/2024): no skin breakdown  Discharge Goal: no skin breakdown        Safety    [RN] Potential for Injury(Active)  Current Status(03/27/2024): risk for falls  Weekly Goal(04/19/2024): no falls  Discharge Goal: no falls        Self Care    [OT] Dressing (Upper)(Active)  Current Status(04/01/2024): Total A  Weekly Goal(04/09/2024): Max A  Discharge Goal: Mod A        Mobility    [PT]  Bed/Chair/Wheelchair(Active)  Current Status(03/28/2024): mod A  Weekly Goal(03/28/2024): n/a  Discharge Goal: CGA    [PT] Bed Mobility(Active)  Current Status(03/28/2024): mod A  Weekly Goal(03/28/2024): n/a  Discharge Goal: SBA    [PT] Walk(Active)  Current Status(03/28/2024): 40 ft x 2, R HHA  Weekly Goal(03/28/2024): n/a  Discharge Goal: 150 ft, CGa, AAD or HHa        Communication    [ST] Expression(Active)  Current Status(03/29/2024): Mild to moderate dysarthria  Weekly Goal(04/01/2024): Resistive breather  Discharge Goal: Premorbid baseline communication        Swallow Function    [ST] Swallowing(Active)  Current Status(03/29/2024): Oral dysphagia  Weekly Goal(04/01/2024): Facial massage  Discharge Goal: Least restrictive po diet    Comments: Recommend SNF placement for continued rehab/nursing care.    Signed by: NARENDRA Westfall    Physician CoSigned By: Isreal Ballesteros 04/03/2024 08:27:12

## 2024-04-04 LAB
ANION GAP SERPL CALCULATED.3IONS-SCNC: 10.5 MMOL/L (ref 5–15)
BASOPHILS # BLD AUTO: 0.06 10*3/MM3 (ref 0–0.2)
BASOPHILS NFR BLD AUTO: 0.9 % (ref 0–1.5)
BUN SERPL-MCNC: 17 MG/DL (ref 8–23)
BUN/CREAT SERPL: 22.7 (ref 7–25)
CALCIUM SPEC-SCNC: 9 MG/DL (ref 8.6–10.5)
CHLORIDE SERPL-SCNC: 106 MMOL/L (ref 98–107)
CO2 SERPL-SCNC: 22.5 MMOL/L (ref 22–29)
CREAT SERPL-MCNC: 0.75 MG/DL (ref 0.57–1)
DEPRECATED RDW RBC AUTO: 48.9 FL (ref 37–54)
EGFRCR SERPLBLD CKD-EPI 2021: 86.3 ML/MIN/1.73
EOSINOPHIL # BLD AUTO: 0.24 10*3/MM3 (ref 0–0.4)
EOSINOPHIL NFR BLD AUTO: 3.6 % (ref 0.3–6.2)
ERYTHROCYTE [DISTWIDTH] IN BLOOD BY AUTOMATED COUNT: 13.7 % (ref 12.3–15.4)
GLUCOSE SERPL-MCNC: 109 MG/DL (ref 65–99)
HCT VFR BLD AUTO: 33.5 % (ref 34–46.6)
HGB BLD-MCNC: 10.3 G/DL (ref 12–15.9)
IMM GRANULOCYTES # BLD AUTO: 0.01 10*3/MM3 (ref 0–0.05)
IMM GRANULOCYTES NFR BLD AUTO: 0.1 % (ref 0–0.5)
LYMPHOCYTES # BLD AUTO: 2.4 10*3/MM3 (ref 0.7–3.1)
LYMPHOCYTES NFR BLD AUTO: 35.8 % (ref 19.6–45.3)
MCH RBC QN AUTO: 29.9 PG (ref 26.6–33)
MCHC RBC AUTO-ENTMCNC: 30.7 G/DL (ref 31.5–35.7)
MCV RBC AUTO: 97.1 FL (ref 79–97)
MONOCYTES # BLD AUTO: 0.59 10*3/MM3 (ref 0.1–0.9)
MONOCYTES NFR BLD AUTO: 8.8 % (ref 5–12)
NEUTROPHILS NFR BLD AUTO: 3.4 10*3/MM3 (ref 1.7–7)
NEUTROPHILS NFR BLD AUTO: 50.8 % (ref 42.7–76)
NRBC BLD AUTO-RTO: 0 /100 WBC (ref 0–0.2)
PLATELET # BLD AUTO: 234 10*3/MM3 (ref 140–450)
PMV BLD AUTO: 9.4 FL (ref 6–12)
POTASSIUM SERPL-SCNC: 3.4 MMOL/L (ref 3.5–5.2)
POTASSIUM SERPL-SCNC: 4 MMOL/L (ref 3.5–5.2)
RBC # BLD AUTO: 3.45 10*6/MM3 (ref 3.77–5.28)
SODIUM SERPL-SCNC: 139 MMOL/L (ref 136–145)
WBC NRBC COR # BLD AUTO: 6.7 10*3/MM3 (ref 3.4–10.8)

## 2024-04-04 PROCEDURE — 97530 THERAPEUTIC ACTIVITIES: CPT | Performed by: OCCUPATIONAL THERAPIST

## 2024-04-04 PROCEDURE — 84132 ASSAY OF SERUM POTASSIUM: CPT | Performed by: INTERNAL MEDICINE

## 2024-04-04 PROCEDURE — 97535 SELF CARE MNGMENT TRAINING: CPT | Performed by: OCCUPATIONAL THERAPIST

## 2024-04-04 PROCEDURE — 97110 THERAPEUTIC EXERCISES: CPT

## 2024-04-04 PROCEDURE — 99231 SBSQ HOSP IP/OBS SF/LOW 25: CPT | Performed by: FAMILY MEDICINE

## 2024-04-04 PROCEDURE — 97112 NEUROMUSCULAR REEDUCATION: CPT | Performed by: OCCUPATIONAL THERAPIST

## 2024-04-04 PROCEDURE — 97116 GAIT TRAINING THERAPY: CPT

## 2024-04-04 PROCEDURE — 80048 BASIC METABOLIC PNL TOTAL CA: CPT | Performed by: FAMILY MEDICINE

## 2024-04-04 PROCEDURE — 97530 THERAPEUTIC ACTIVITIES: CPT

## 2024-04-04 PROCEDURE — 85025 COMPLETE CBC W/AUTO DIFF WBC: CPT | Performed by: FAMILY MEDICINE

## 2024-04-04 PROCEDURE — 97110 THERAPEUTIC EXERCISES: CPT | Performed by: OCCUPATIONAL THERAPIST

## 2024-04-04 PROCEDURE — 25010000002 HEPARIN (PORCINE) PER 1000 UNITS: Performed by: INTERNAL MEDICINE

## 2024-04-04 PROCEDURE — 92526 ORAL FUNCTION THERAPY: CPT | Performed by: SPEECH-LANGUAGE PATHOLOGIST

## 2024-04-04 RX ORDER — POTASSIUM CHLORIDE 20 MEQ/1
40 TABLET, EXTENDED RELEASE ORAL EVERY 4 HOURS
Status: COMPLETED | OUTPATIENT
Start: 2024-04-04 | End: 2024-04-04

## 2024-04-04 RX ORDER — GABAPENTIN 100 MG/1
100 CAPSULE ORAL EVERY 12 HOURS SCHEDULED
Status: DISCONTINUED | OUTPATIENT
Start: 2024-04-04 | End: 2024-04-05

## 2024-04-04 RX ADMIN — POTASSIUM CHLORIDE 40 MEQ: 1500 TABLET, EXTENDED RELEASE ORAL at 01:31

## 2024-04-04 RX ADMIN — ASPIRIN 81 MG: 81 TABLET, CHEWABLE ORAL at 08:37

## 2024-04-04 RX ADMIN — ZOLPIDEM TARTRATE 5 MG: 5 TABLET ORAL at 21:43

## 2024-04-04 RX ADMIN — HEPARIN SODIUM 5000 UNITS: 5000 INJECTION INTRAVENOUS; SUBCUTANEOUS at 08:37

## 2024-04-04 RX ADMIN — PANTOPRAZOLE SODIUM 40 MG: 40 TABLET, DELAYED RELEASE ORAL at 05:42

## 2024-04-04 RX ADMIN — DOCUSATE SODIUM 50 MG AND SENNOSIDES 8.6 MG 2 TABLET: 8.6; 5 TABLET, FILM COATED ORAL at 08:37

## 2024-04-04 RX ADMIN — HEPARIN SODIUM 5000 UNITS: 5000 INJECTION INTRAVENOUS; SUBCUTANEOUS at 21:44

## 2024-04-04 RX ADMIN — GABAPENTIN 100 MG: 100 CAPSULE ORAL at 21:43

## 2024-04-04 RX ADMIN — ATORVASTATIN CALCIUM 80 MG: 40 TABLET, FILM COATED ORAL at 21:43

## 2024-04-04 RX ADMIN — DICLOFENAC SODIUM 4 G: 10 GEL TOPICAL at 09:25

## 2024-04-04 RX ADMIN — Medication 1 MG: at 08:37

## 2024-04-04 RX ADMIN — TICAGRELOR 60 MG: 60 TABLET ORAL at 08:37

## 2024-04-04 RX ADMIN — MIRTAZAPINE 30 MG: 15 TABLET, FILM COATED ORAL at 21:43

## 2024-04-04 RX ADMIN — TICAGRELOR 60 MG: 60 TABLET ORAL at 21:43

## 2024-04-04 RX ADMIN — GABAPENTIN 100 MG: 100 CAPSULE ORAL at 11:05

## 2024-04-04 RX ADMIN — ROPINIROLE HYDROCHLORIDE 4 MG: 1 TABLET, FILM COATED ORAL at 21:43

## 2024-04-04 RX ADMIN — DOCUSATE SODIUM 50 MG AND SENNOSIDES 8.6 MG 2 TABLET: 8.6; 5 TABLET, FILM COATED ORAL at 21:43

## 2024-04-04 RX ADMIN — POTASSIUM CHLORIDE 40 MEQ: 1500 TABLET, EXTENDED RELEASE ORAL at 05:42

## 2024-04-04 RX ADMIN — DICLOFENAC SODIUM 4 G: 10 GEL TOPICAL at 11:05

## 2024-04-04 RX ADMIN — OXYBUTYNIN CHLORIDE 10 MG: 5 TABLET, EXTENDED RELEASE ORAL at 08:37

## 2024-04-04 NOTE — PLAN OF CARE
Problem: Rehabilitation (IRF) Plan of Care  Goal: Absence of New-Onset Illness or Injury  Outcome: Ongoing, Progressing  Intervention: Prevent Fall and Fall Injury  Recent Flowsheet Documentation  Taken 4/4/2024 1000 by Arlin Flores RN  Safety Promotion/Fall Prevention:   fall prevention program maintained   nonskid shoes/slippers when out of bed   safety round/check completed  Taken 4/4/2024 0800 by Arlin Flores, RN  Safety Promotion/Fall Prevention:   fall prevention program maintained   nonskid shoes/slippers when out of bed   safety round/check completed  Intervention: Prevent VTE (Venous Thromboembolism)  Recent Flowsheet Documentation  Taken 4/4/2024 0748 by Arlin Flores, RN  VTE Prevention/Management: (See mar) --   Goal Outcome Evaluation:

## 2024-04-04 NOTE — SIGNIFICANT NOTE
04/04/24 1511   Plan   Plan Spoke to [facility] who states they are not in-network with her insurance.  Spoke to Bouchra at [facility] who states they are not in-network with her insurance.  Spoke to Nurys at [facility] and they are not in-network with her insurance.  Spoke to Suad at [facility] who states they are not in-network with her insurance.

## 2024-04-04 NOTE — PROGRESS NOTES
[facility]  PROGRESS NOTE     Patient Identification:  Name:  Regine Beckham  Age:  68 y.o.  Sex:  female  :  1955  MRN:  [id number]  Visit Number:  [id number]  ROOM: 106/     Primary Care Provider:  Dread Burton MD    Length of stay in inpatient status:  8    Subjective     Chief Compliant:  No chief complaint on file.      History of Presenting Illness: 68-year-old female being treated for debility.  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  Biggest complaint is left upper extremity pain which she states has been present since the stroke.  Describes it as an aching type sensation.  Did state that Voltaren cream was somewhat helpful    Objective     Current Hospital Meds:amLODIPine, 2.5 mg, Oral, Q24H  aspirin, 81 mg, Oral, Daily  atorvastatin, 80 mg, Oral, Nightly  Diclofenac Sodium, 4 g, Topical, 4x Daily  folic acid, 1 mg, Oral, Daily  gabapentin, 100 mg, Oral, Q12H  heparin (porcine), 5,000 Units, Subcutaneous, Q12H  losartan, 50 mg, Oral, Daily  mirtazapine, 30 mg, Oral, Nightly  oxybutynin XL, 10 mg, Oral, Daily  pantoprazole, 40 mg, Oral, Q AM  rOPINIRole, 4 mg, Oral, Nightly  senna-docusate sodium, 2 tablet, Oral, BID  ticagrelor, 60 mg, Oral, BID       ----------------------------------------------------------------------------------------------------------------------  Vital Signs:  Temp:  [98.1 °F (36.7 °C)] 98.1 °F (36.7 °C)  Heart Rate:  [73] 73  Resp:  [18] 18  BP: (123-150)/(58-81) 123/58  SpO2:  [98 %] 98 %  on   ;   Device (Oxygen Therapy): room air  Body mass index is 22.6 kg/m².    Wt Readings from Last 3 Encounters:   24 63.5 kg (140 lb)   24 62.4 kg (137 lb 8 oz)   24 72.1 kg (159 lb)     Intake & Output (last 3 days)          0701   07 07 07 07 07 0701   0700    P.O. 960 1200 1080 360    Total Intake(mL/kg) 960 (15.1) 1200 (18.9) 1080 (17) 360 (5.7)    Net  +960 +1200 +1080 +360            Urine Unmeasured Occurrence 5 x 3 x 4 x 1 x    Stool Unmeasured Occurrence 1 x  1 x           Diet: Regular/House; Texture: Soft to Chew (NDD 3); Soft to Chew: Whole Meat; Fluid Consistency: Thin (IDDSI 0)  ----------------------------------------------------------------------------------------------------------------------  Physical exam:  Constitutional: No acute distress  HEENT: Normocephalic atraumatic  Neck: Supple  Cardiovascular: Regular rate and rhythm  Pulmonary/Chest: Clear to auscultation  Abdominal: Positive bowel sounds soft.   Musculoskeletal: No obvious arthropathy, mild swelling in the left hand  Neurological: Left upper extremity paresis  Skin: No rash  Peripheral vascular: Minimal swelling in the left hand.  Genitourinary:  ----------------------------------------------------------------------------------------------------------------------    Last echocardiogram:  Results for orders placed during the hospital encounter of 01/19/24    Adult Transthoracic Echo Limited W/ Cont if Necessary Per Protocol    Interpretation Summary    Left ventricular systolic function is normal. Estimated left ventricular EF = 60%    Saline test results are negative for intracardiac shunting..    ----------------------------------------------------------------------------------------------------------------------  Results from last 7 days   Lab Units 04/04/24 0006 03/30/24  0239   WBC 10*3/mm3 6.70 5.92   HEMOGLOBIN g/dL 10.3* 11.8*   HEMATOCRIT % 33.5* 37.0   MCV fL 97.1* 95.1   MCHC g/dL 30.7* 31.9   PLATELETS 10*3/mm3 234 252         Results from last 7 days   Lab Units 04/04/24  0006 03/30/24  1208 03/30/24  0239   SODIUM mmol/L 139  --  136   POTASSIUM mmol/L 3.4* 4.3 3.6   MAGNESIUM mg/dL  --   --  1.8   CHLORIDE mmol/L 106  --  103   CO2 mmol/L 22.5  --  23.1   BUN mg/dL 17  --  15   CREATININE mg/dL 0.75  --  0.62   CALCIUM mg/dL 9.0  --  9.6   GLUCOSE mg/dL 109*  --  126*  "  Estimated Creatinine Clearance: 71 mL/min (by C-G formula based on SCr of 0.75 mg/dL).  No results found for: \"AMMONIA\"              No results found for: \"HGBA1C\", \"POCGLU\"  Lab Results   Component Value Date    TSH 2.880 03/17/2024     No results found for: \"PREGTESTUR\", \"PREGSERUM\", \"HCG\", \"HCGQUANT\"  Pain Management Panel          Latest Ref Rng & Units 12/20/2023   Pain Management Panel   Amphetamine, Urine Qual Negative Negative    Barbiturates Screen, Urine Negative Negative    Benzodiazepine Screen, Urine Negative Negative    Buprenorphine, Screen, Urine Negative Negative    Cocaine Screen, Urine Negative Negative    Fentanyl, Urine Negative Negative    Methadone Screen , Urine Negative Negative    Methamphetamine, Ur Negative Negative      Brief Urine Lab Results  (Last result in the past 365 days)        Color   Clarity   Blood   Leuk Est   Nitrite   Protein   CREAT   Urine HCG        03/21/24 1613 Dark Yellow   Turbid   Trace   Moderate (2+)   Positive   30 mg/dL (1+)                 No results found for: \"BLOODCX\"      No results found for: \"URINECX\"  No results found for: \"WOUNDCX\"  No results found for: \"STOOLCX\"        I have personally looked at the labs and they are summarized above.  ----------------------------------------------------------------------------------------------------------------------  Detailed radiology reports for the last 24 hours:    Imaging Results (Last 24 Hours)       ** No results found for the last 24 hours. **          Final impressions for the last 30 days of radiology reports:    MRI Brain Without Contrast    Result Date: 3/20/2024  Findings consistent with chronic small vessel ischemic change with encephalomalacia in the right frontal lobe from prior infarct.     This report was finalized on 3/20/2024 1:35 PM by Marianna Caicedo M.D..      XR Chest 1 View    Result Date: 3/17/2024  No radiographic evidence of acute cardiac or pulmonary disease.    This report was " finalized on 3/17/2024 1:50 PM by Dr. Hu Obrien MD.      XR Shoulder 2+ View Left    Result Date: 3/17/2024    No acute findings in the left shoulder.   This report was finalized on 3/17/2024 11:13 AM by Dr. Hu Obrien MD.      XR Hip With or Without Pelvis 2 - 3 View Left    Result Date: 3/17/2024  1.  No acute fracture or dislocation. 2.  Moderate to extensive degenerative osteoarthritis in the left hip.   This report was finalized on 3/17/2024 11:13 AM by Dr. Hu Obrien MD.      CT Head Without Contrast    Result Date: 3/17/2024   1. Atrophy and chronic microangiopathic changes 2. Apparent suggestive of encephalomalacia in the right parietal region 3. No focal parenchymal mass, hemorrhage, or midline shift  This report was finalized on 3/17/2024 10:59 AM by Dr. Hu Obrien MD.     I have personally looked at the radiology images and read the final radiology report.    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  Patient requiring moderate assistance for transfers.  Ambulated 15 feet with quad cane and 15 feet with hemiwalker requiring moderate two-person assistance.  Moderate assistance required for grooming.  Patient is on dual antiplatelet therapy and high-dose statin therapy.    Anemia--patient does have microcytic indices and slight drop in hemoglobin.  No obvious bleeding has been noted.  Will recheck iron studies and CBC in the a.m.    Hypokalemia replace per protocol    Dysphagia/dysarthria continue to work with speech therapy    Hypertension controlled continue losartan and Norvasc    Tobacco abuse recommend cessation    Left upper extremity pain which is likely neuropathic will increase the gabapentin to 100 mg twice daily        VTE Prophylaxis:   Mechanical Order History:       None          Pharmalogical Order History:        Ordered     Dose Route  Frequency Stop    03/27/24 1549  heparin (porcine) 5000 UNIT/ML injection 5,000 Units         5,000 Units SC Every 12 Hours Scheduled --                        Isreal Ballesteros MD  [facility]  04/04/24  10:29 EDT

## 2024-04-04 NOTE — PROGRESS NOTES
Occupational Therapy:    Physical Therapy:    Speech Language Pathology: Individual: 45 minutes.    Signed by: CHAYO Mckeon

## 2024-04-04 NOTE — PROGRESS NOTES
Adult Nutrition  Assessment    Patient Name:  Regine Beckham  YOB: 1955  MRN: [id number]  Admit Date:  3/27/2024    Assessment Date:  4/4/2024    Comments:  Patient is now on house diet that soft to chew, whole meats, thin liquids.  Intakes have improved and patient seems to eating on average 79% of meals.  She has refused ONS.  No new recommendations at this time.  Will continue to follow.     Reason for Assessment       Row Name 04/04/24 1141          Reason for Assessment    Reason For Assessment follow-up protocol                      Labs/Tests/Procedures/Meds       Row Name 04/04/24 1141          Labs/Procedures/Meds    Lab Results Reviewed reviewed     Lab Results Comments glu -109                        Nutrition Prescription Ordered       Row Name 04/04/24 1142          Nutrition Prescription PO    Current PO Diet Soft Texture     Texture Whole foods     Fluid Consistency Thin                    Evaluation of Received Nutrient/Fluid Intake       Row Name 04/04/24 1142          Fluid Intake Evaluation    Total Fluid Target (mL) 1700     Oral Fluid (mL) 1080     Enteral Fluid (mL) 0     Parenteral Fluid (mL) 0     Other Fluid (mL) 0     Total Fluid Intake (mL) 1080     % Total Fluid Intake 63.53        PO Evaluation    Number of Meals 6     % PO Intake 79                       Electronically signed by:  Jolie Lane RD  04/04/24 11:44 EDT

## 2024-04-04 NOTE — SIGNIFICANT NOTE
04/04/24 1506   Plan   Plan Spoke to Terese at [facility] who states they do not take her insurance.

## 2024-04-04 NOTE — THERAPY PROGRESS REPORT/RE-CERT
"Inpatient Rehabilitation - Physical Therapy Progress Note        [place]     Patient Name: Regine Beckham  : 1955  MRN: [id number]    Today's Date: 2024                    Admit Date: 3/27/2024      Visit Dx:   No diagnosis found.    Patient Active Problem List   Diagnosis    Iron deficiency anemia due to chronic blood loss    Major depressive disorder    RLS (restless legs syndrome)    GERD (gastroesophageal reflux disease)    Left breast mass    Allergy to penicillin    Stroke    Grade I diastolic dysfunction    Moderate malnutrition    Falls frequently    Stroke-like symptoms    Debility       Past Medical History:   Diagnosis Date    Anemia     Anxiety     Arthritis     Depression     Legally blind     Restless leg syndrome     Sleep apnea     \"I don't use a cpap anymore since losing 106 lbs\"    Water retention        Past Surgical History:   Procedure Laterality Date    BACK SURGERY      CARDIAC CATHETERIZATION N/A 2024    Procedure: Percutaneous Manual Thrombectomy;  Surgeon: Efrain Hurley MD;  Location:  [facility] INVASIVE LOCATION;  Service: Interventional Radiology;  Laterality: N/A;    GALLBLADDER SURGERY      HIP ARTHROSCOPY      JOINT REPLACEMENT Right        PT ASSESSMENT (Last 12 Hours)       IRF PT Evaluation and Treatment       Row Name 24 1402          PT Time and Intention    Document Type progress note  BID treatment session  -LL     Mode of Treatment physical therapy;individual therapy  -LL     Patient/Family/Caregiver Comments/Observations Patient c/o ongoing L UE/shoulder pain.  MD aware and addressing.  -LL       Row Name 24 1402          General Information    Patient Profile Reviewed yes  -LL     Existing Precautions/Restrictions fall  -LL     Limitations/Impairments safety/cognitive;visual  -LL       Row Name 24 1402          Living Environment    Primary Care Provided by self  -LL       Row Name 24 1402          Home Use of " Assistive/Adaptive Equipment    Equipment Currently Used at Home commode;hospital bed;grab bar;shower chair  -       Row Name 04/04/24 1402          Pain Assessment    Pretreatment Pain Rating --  painful L UE PROM  -[facility] Name 04/04/24 1402          Cognition/Psychosocial    Affect/Mental Status (Cognition) emotionally labile  -     Orientation Status (Cognition) oriented to;person;place;situation  intermittent confusion  -     Follows Commands (Cognition) verbal cues/prompting required;repetition of directions required;physical/tactile prompts required  -     Personal Safety Interventions fall prevention program maintained;gait belt;nonskid shoes/slippers when out of bed;supervised activity  -       Row Name 04/04/24 1402          Vision Assessment/Intervention    Visual Impairment/Limitations legally blind;corrective lenses full-time  -       Row Name 04/04/24 1402          Mobility    Extremity Weight-bearing Status --  no restrictions  -LL       Row Name 04/04/24 1402          Bed Mobility    Supine-Sit Brick (Bed Mobility) moderate assist (50% patient effort);verbal cues;nonverbal cues (demo/gesture)  -     Bed Mobility, Safety Issues decreased use of arms for pushing/pulling;decreased use of legs for bridging/pushing  -     Assistive Device (Bed Mobility) bed rails;draw sheet  -[facility] Name 04/04/24 1402          Transfer Assessment/Treatment    Transfers sit-stand transfer;stand-sit transfer;stand pivot/stand step transfer;toilet transfer  -[facility] Name 04/04/24 1402          Bed-Chair Transfer    Bed-Chair Brick (Transfers) moderate assist (50% patient effort);verbal cues;nonverbal cues (demo/gesture)  -     Assistive Device (Bed-Chair Transfers) wheelchair  -       Row Name 04/04/24 1402          Sit-Stand Transfer    Sit-Stand Brick (Transfers) verbal cues;nonverbal cues (demo/gesture);moderate assist (50% patient effort)  -     Assistive Device  (Sit-Stand Transfers) wheelchair;walker, platform  -LL       Row Name 04/04/24 1402          Stand-Sit Transfer    Stand-Sit Hanson (Transfers) verbal cues;nonverbal cues (demo/gesture);moderate assist (50% patient effort)  -LL     Assistive Device (Stand-Sit Transfers) wheelchair;walker, platform  -LL       Row Name 04/04/24 1402          Stand Pivot/Stand Step Transfer    Stand Pivot/Stand Step Hanson (Transfers) verbal cues;nonverbal cues (demo/gesture);moderate assist (50% patient effort)  -LL     Assistive Device (Stand Pivot Stand Step Transfer) wheelchair  -LL       Row Name 04/04/24 1402          Toilet Transfer    Type (Toilet Transfer) stand pivot/stand step  -LL     Hanson Level (Toilet Transfer) moderate assist (50% patient effort);verbal cues;nonverbal cues (demo/gesture)  -LL     Assistive Device (Toilet Transfer) wheelchair;grab bars/safety frame  -LL       Row Name 04/04/24 1402          Gait/Stairs (Locomotion)    Gait/Stairs Locomotion gait/ambulation independence;gait/ambulation assistive device;distance ambulated;gait pattern;gait deviations  -LL     Hanson Level (Gait) verbal cues;nonverbal cues (demo/gesture);moderate assist (50% patient effort);2 person assist  -LL     Assistive Device (Gait) walker, dao  -LL     Distance in Feet (Gait) 40  -LL     Pattern (Gait) step-through  -LL     Deviations/Abnormal Patterns (Gait) base of support, narrow;gait speed decreased;weight shifting decreased;stride length decreased  -LL     Bilateral Gait Deviations forward flexed posture  -LL     Left Sided Gait Deviations weight shift ability decreased  -LL     Comment, (Gait/Stairs) Cues for upright posture and increased step width and length.  -LL       Row Name 04/04/24 1402          Safety Issues, Functional Mobility    Impairments Affecting Function (Mobility) balance;cognition;coordination;endurance/activity tolerance;grasp;motor control;muscle tone abnormal;range of motion  (ROM);postural/trunk control;strength  -LL       Row Name 04/04/24 1402          Motor Skills    Therapeutic Exercise aerobic  -       Row Name 04/04/24 1402          Hip (Therapeutic Exercise)    Hip Strengthening (Therapeutic Exercise) bilateral;flexion;extension;aBduction;aDduction;marching while seated;sitting;1 lb free weight;resistance band;green  -       Row Name 04/04/24 1402          Knee (Therapeutic Exercise)    Knee Strengthening (Therapeutic Exercise) bilateral;flexion;extension;marching while seated;LAQ (long arc quad);hamstring curls;sitting;1 lb free weight;resistance band;green  -       Row Name 04/04/24 1402          Ankle (Therapeutic Exercise)    Ankle Strengthening (Therapeutic Exercise) bilateral;dorsiflexion;plantarflexion;sitting  -       Row Name 04/04/24 1402          Aerobic Exercise    Type (Aerobic Exercise) recumbent stationary bike  -     Time Performed (Aerobic Exercise) LVL 1.0 x 15 min  -[facility] Name 04/04/24 1402          Positioning and Restraints    Pre-Treatment Position in bed  In AM; WC in PM  -LL     Post Treatment Position wheelchair  -LL     In Wheelchair notified nsg;sitting;encouraged to call for assist;exit alarm on  In hallway in front of nurses station in AM; w/ OT in PM  -LL       [facility] Name 04/04/24 1402          Therapy Assessment/Plan (PT)    Patient's Goals For Discharge return home  -[facility] Name 04/04/24 1402          Therapy Assessment/Plan (PT)    Rehab Potential/Prognosis (PT) good, to achieve stated therapy goals  -LL     Frequency of Treatment (PT) 5 times per week  -LL     Estimated Duration of Therapy (PT) 3 weeks  -LL     Problem List (PT) problems related to;balance;mobility;coordination;hemiparesis/hemiplegia;cognition;communication;strength;postural control;vision;range of motion (ROM);muscle tone;motor control  -     Activity Limitations Related to Problem List (PT) unable to ambulate safely;unable to transfer safely;BADLs not  performed adequately or safely  -LL       Row Name 04/04/24 1402          Therapy Plan Review/Discharge Plan (PT)    Anticipated Discharge Disposition (PT) home with assist;home with home health  -LL       Row Name 04/04/24 1402          IRF PT Goals    Bed Mobility Goal Selection (PT-IRF) bed mobility, PT goal 1  -LL     Transfer Goal Selection (PT-IRF) transfers, PT goal 1  -LL     Gait (Walking Locomotion) Goal Selection (PT-IRF) gait, PT goal 1  -LL       Row Name 04/04/24 1402          Bed Mobility Goal 1 (PT-IRF)    Activity/Assistive Device (Bed Mobility Goal 1, PT-IRF) scooting;sit to supine;supine to sit  -LL     [place]ock Level (Bed Mobility Goal 1, PT-IRF) standby assist  -LL     Time Frame (Bed Mobility Goal 1, PT-IRF) by discharge  -LL     Progress/Outcomes (Bed Mobility Goal 1, PT-IRF) goal ongoing  -LL       Row Name 04/04/24 1402          Transfer Goal 1 (PT-IRF)    Activity/Assistive Device (Transfer Goal 1, PT-IRF) sit-to-stand/stand-to-sit;bed-to-chair/chair-to-bed;toilet  -LL     [place]ock Level (Transfer Goal 1, PT-IRF) contact guard required  -LL     Time Frame (Transfer Goal 1, PT-IRF) by discharge  -LL     Progress/Outcomes (Transfer Goal 1, PT-IRF) goal ongoing  -LL       Row Name 04/04/24 1402          Gait/Walking Locomotion Goal 1 (PT-IRF)    Activity/Assistive Device (Gait/Walking Locomotion Goal 1, PT-IRF) gait (walking locomotion);assistive device use;decrease fall risk;diminish gait deviation;improve balance and speed;increase endurance/gait distance  appropriate a.d.  -LL     Gait/Walking Locomotion Distance Goal 1 (PT-IRF) 150  -LL     Warnock Level (Gait/Walking Locomotion Goal 1, PT-IRF) contact guard required  -LL     Time Frame (Gait/Walking Locomotion Goal 1, PT-IRF) by discharge  -LL     Progress/Outcomes (Gait/Walking Locomotion Goal 1, PT-IRF) goal ongoing  -LL               User Key  (r) = Recorded By, (t) = Taken By, (c) = Cosigned By      Initials Name Provider  Type    LL Ruthie Travis PTA Physical Therapist Assistant                     Physical Therapy Education       Title: PT OT SLP Therapies (In Progress)       Topic: Physical Therapy (Done)       Point: Mobility training (Done)       Learning Progress Summary             Patient Acceptance, E,D, VU,NR by LL at 4/4/2024 1410    Acceptance, E,D, VU,NR by LL at 4/3/2024 1531    Acceptance, E,D, VU,NR by LL at 4/2/2024 1508    Acceptance, E,D, VU,NR by LL at 4/1/2024 1614    Acceptance, E,TB, VU,DU by HC at 3/30/2024 1204    Acceptance, E,D, NR by AG at 3/28/2024 1625                         Point: Home exercise program (Done)       Learning Progress Summary             Patient Acceptance, E,D, VU,NR by LL at 4/4/2024 1410    Acceptance, E,D, VU,NR by LL at 4/3/2024 1531    Acceptance, E,D, VU,NR by LL at 4/2/2024 1508    Acceptance, E,D, VU,NR by LL at 4/1/2024 1614    Acceptance, E,TB, VU,DU by HC at 3/30/2024 1204    Acceptance, E,D, NR by AG at 3/28/2024 1625                         Point: Body mechanics (Done)       Learning Progress Summary             Patient Acceptance, E,D, VU,NR by LL at 4/4/2024 1410    Acceptance, E,D, VU,NR by LL at 4/3/2024 1531    Acceptance, E,D, VU,NR by LL at 4/2/2024 1508    Acceptance, E,D, VU,NR by LL at 4/1/2024 1614    Acceptance, E,TB, VU,DU by HC at 3/30/2024 1204    Acceptance, E,D, NR by AG at 3/28/2024 1625                         Point: Precautions (Done)       Learning Progress Summary             Patient Acceptance, E,D, VU,NR by LL at 4/4/2024 1410    Acceptance, E,D, VU,NR by LL at 4/3/2024 1531    Acceptance, E,D, VU,NR by LL at 4/2/2024 1508    Acceptance, E,D, VU,NR by LL at 4/1/2024 1614    Acceptance, E,TB, VU,DU by  at 3/30/2024 1204    Acceptance, E,D, NR by  at 3/28/2024 1625                                         User Key       Initials Effective Dates Name Provider Type Discipline     06/16/21 -  Melanie Anne, PT Physical Therapist PT     05/02/16  -  Ruthie Travis PTA Physical Therapist Assistant PT    HC 01/20/23 -  Desi Fraga PTA Physical Therapist Assistant PT                    PT Recommendation and Plan    Frequency of Treatment (PT): 5 times per week                     Time Calculation:      PT Charges       Row Name 04/04/24 1411 04/04/24 1410          Time Calculation    Start Time 1245  -LL 0735  -LL     Stop Time 1330  -LL 0830  -LL     Time Calculation (min) 45 min  -LL 55 min  -LL     PT Received On -- 04/04/24  -LL     PT Goal Re-Cert Due Date -- 04/11/24  -LL        Time Calculation- PT    Total Timed Code Minutes- PT 45 minute(s)  -LL 55 minute(s)  -LL               User Key  (r) = Recorded By, (t) = Taken By, (c) = Cosigned By      Initials Name Provider Type    LL Ruthie Travis PTA Physical Therapist Assistant                    Therapy Charges for Today       Code Description Service Date Service Provider Modifiers Qty    35700392228 HC GAIT TRAINING EA 15 MIN 4/3/2024 Ruthie Travis, PTA GP, CQ 2    80543829309 HC PT NEUROMUSC RE EDUCATION EA 15 MIN 4/3/2024 Ruthie Travis, PTA GP, CQ 1    04056723180 HC PT THERAPEUTIC ACT EA 15 MIN 4/3/2024 Ruthie Travis, PTA GP, CQ 1    91453294300 HC PT THER PROC EA 15 MIN 4/3/2024 Ruthie Travis, PTA GP, CQ 3    77491446492 HC GAIT TRAINING EA 15 MIN 4/4/2024 Ruthie Travis, PTA GP, CQ 2    27628655346 HC PT THERAPEUTIC ACT EA 15 MIN 4/4/2024 Ruthie Travis, PTA GP, CQ 1    28486587823 HC PT THER PROC EA 15 MIN 4/4/2024 Ruthie Travis, PTA GP, CQ 4              PT G-Codes  AM-PAC 6 Clicks Score (PT): 12      Cielo Travis PTA  4/4/2024

## 2024-04-04 NOTE — PROGRESS NOTES
Rehabilitation Nursing  Inpatient Rehabilitation Plan of Care Note    Plan of Care  CSafety    Potential for Injury (Active)  Current Status (3/27/2024 4:00:00 PM): risk for falls  Weekly Goal: no falls  Discharge Goal: no falls    Body Systems    Integumentary (Active)  Current Status (3/27/2024 4:00:00 PM): risk for skin break down  Weekly Goal: no skin breakdown  Discharge Goal: no skin breakdownC    Signed by: Arlin Flores Nurse

## 2024-04-04 NOTE — SIGNIFICANT NOTE
04/04/24 1211   Plan   Plan Left message for Ernestina at [facility] to follow up on admission.

## 2024-04-04 NOTE — SIGNIFICANT NOTE
04/04/24 1520   Plan   Plan Spoke to Lotus at [facility] who states they are not in-network with her insurance.  Left a message for Itzel Blanton with [facility] to inquire bed availability.

## 2024-04-04 NOTE — SIGNIFICANT NOTE
04/04/24 1520   Plan   Plan Spoke to Lotus at [facility] who states they are not in-network with her insurance.  Left a message for Itzel Blanton with [facility] to inquire about bed availability.

## 2024-04-04 NOTE — PLAN OF CARE
Problem: Rehabilitation (IRF) Plan of Care  Goal: Absence of New-Onset Illness or Injury  Intervention: Prevent VTE (Venous Thromboembolism)  Recent Flowsheet Documentation  Taken 4/3/2024 1918 by Marjorie Escalante RN  VTE Prevention/Management: (See MAR) other (see comments)     Problem: Skin Injury Risk Increased  Goal: Skin Health and Integrity  Intervention: Promote and Optimize Oral Intake  Recent Flowsheet Documentation  Taken 4/3/2024 1918 by Marjorie Escalante RN  Oral Nutrition Promotion: physical activity promoted  Intervention: Optimize Skin Protection  Recent Flowsheet Documentation  Taken 4/3/2024 1918 by Marjorie Escalante RN  Pressure Reduction Techniques:   frequent weight shift encouraged   heels elevated off bed   rest period provided between sit times  Head of Bed (HOB) Positioning: HOB elevated  Pressure Reduction Devices:   alternating pressure pump (ADD)   positioning supports utilized   pressure-redistributing mattress utilized  Skin Protection:   adhesive use limited   incontinence pads utilized   Goal Outcome Evaluation:

## 2024-04-04 NOTE — SIGNIFICANT NOTE
04/04/24 1446   Plan   Plan Call received from Ernestina at [facility] who states they will not be accepting her for admission due to suicidal ideation in her past medical history.

## 2024-04-04 NOTE — SIGNIFICANT NOTE
04/04/24 7363   Plan   Plan Spoke to Itzel Blanton from [facility] who requests facesheet to check her financials since she had been a resident in the past.

## 2024-04-04 NOTE — PROGRESS NOTES
Rehabilitation Nursing  Inpatient Rehabilitation Plan of Care Note    Plan of Care  Copy from POCSafety    Potential for Injury (Active)  Current Status (3/27/2024 4:00:00 PM): risk for falls  Weekly Goal: no falls  Discharge Goal: no falls    Body Systems    Integumentary (Active)  Current Status (3/27/2024 4:00:00 PM): risk for skin break down  Weekly Goal: no skin breakdown  Discharge Goal: no skin breakdown    Signed by: Yamilet Vallecillo, Nurse

## 2024-04-04 NOTE — THERAPY TREATMENT NOTE
Inpatient Rehabilitation - Speech Language Pathology   Swallow Treatment Note [facility]     Patient Name: Regine Beckham  : 1955  MRN: [id number]  Today's Date: 2024               Admit Date: 3/27/2024  DYSPHAGIA THERAPY PLAN OF CARE:     Regine Beckham was seen this am in the SLP office of [facility]'s IPR unit for formal therapy tasks. She is cooperative to participate in all tasks.       Long Term Goal:  Patient will accept least restrictive diet tolerance w/o overt s/s aspiration.      Short Term Goals:  1. Patient will tolerate facial massage to LEFT facial surface for 3-7 minutes to increase surface blood flow, sensation and ROM over 3 consecutive sessions.  Tolerated 7 min w/ mild increase in surface blood flow.       2. Patient will perform OROM/GRACE exercises x3 sets x10 reps w/ min cues.  X3 set x5 reps. Pt requires mod verbal/visual cues.     3. Patient will demonstrate increase labial sensation/afferent drive per pt report in 90% of opp over three consecutive sessions.   Pt reports increase in sensation this date and she demonstrate increased sensation in 100% opp of loss of oral secretions from L oral commissure during PATRIZIA tasks.       4. Patient will increase lingual control, coordination, movement as evidenced by bolus manipulation in 90% opp independently over three consecutive sessions.   Pt trialed with regular consistency crackers. Pt able to self feed w/ min difficulty biting off appropriate size bolus. Pt with extended mastication time w/ oral residue post swallow that clears with independent use of liquid wash.       5. Patient will participate in a clinical re-evaluation of swallowing fnx in 7-10 days, pending progress towards this poc.  Diet advanced to MS, whole, thin 3/30/24      Visit Dx:   No diagnosis found.  Patient Active Problem List   Diagnosis    Iron deficiency anemia due to chronic blood loss    Major depressive disorder    RLS (restless legs syndrome)    GERD  "(gastroesophageal reflux disease)    Left breast mass    Allergy to penicillin    Stroke    Grade I diastolic dysfunction    Moderate malnutrition    Falls frequently    Stroke-like symptoms    Debility     Past Medical History:   Diagnosis Date    Anemia     Anxiety     Arthritis     Depression     Legally blind     Restless leg syndrome     Sleep apnea     \"I don't use a cpap anymore since losing 106 lbs\"    Water retention      Past Surgical History:   Procedure Laterality Date    BACK SURGERY      CARDIAC CATHETERIZATION N/A 1/19/2024    Procedure: Percutaneous Manual Thrombectomy;  Surgeon: Efrain Hurley MD;  Location: [facility] INVASIVE LOCATION;  Service: Interventional Radiology;  Laterality: N/A;    GALLBLADDER SURGERY      HIP ARTHROSCOPY      JOINT REPLACEMENT Right        SLP Recommendation and Plan                                                                                          EDUCATION  The patient has been educated in the following areas:   Cognitive Impairment Communication Impairment.              Time Calculation:    Time Calculation- SLP       Row Name 04/04/24 1157             Time Calculation- SLP    SLP Start Time 1000  -      SLP Stop Time 1045  -      SLP Time Calculation (min) 45 min  -      SLP - Next Appointment 04/05/24  -                User Key  (r) = Recorded By, (t) = Taken By, (c) = Cosigned By      Initials Name Provider Type     Marbella Padgett MA,CCC-SLP Speech and Language Pathologist                    Therapy Charges for Today       Code Description Service Date Service Provider Modifiers Qty    81310372119 HC ST TREATMENT SPEECH 2 4/3/2024 Marbella Padgett MA,CCC-SLP GN 1    94074542145 HC ST TREATMENT SWALLOW 3 4/4/2024 Marbella Padgett MA,CCC-SLP GN 1                 Marbella Padgett MA, CCC-SLP  4/4/2024  "

## 2024-04-04 NOTE — THERAPY TREATMENT NOTE
"Inpatient Rehabilitation - Occupational Therapy Progress Note and Treatment Note     [facility]     Patient Name: Regine Beckham  : 1955  MRN: [id number]    Today's Date: 2024                 Admit Date: 3/27/2024       No diagnosis found.    Patient Active Problem List   Diagnosis    Iron deficiency anemia due to chronic blood loss    Major depressive disorder    RLS (restless legs syndrome)    GERD (gastroesophageal reflux disease)    Left breast mass    Allergy to penicillin    Stroke    Grade I diastolic dysfunction    Moderate malnutrition    Falls frequently    Stroke-like symptoms    Debility       Past Medical History:   Diagnosis Date    Anemia     Anxiety     Arthritis     Depression     Legally blind     Restless leg syndrome     Sleep apnea     \"I don't use a cpap anymore since losing 106 lbs\"    Water retention        Past Surgical History:   Procedure Laterality Date    BACK SURGERY      CARDIAC CATHETERIZATION N/A 2024    Procedure: Percutaneous Manual Thrombectomy;  Surgeon: Efrain Hurley MD;  Location:  [facility];  Service: Interventional Radiology;  Laterality: N/A;    GALLBLADDER SURGERY      HIP ARTHROSCOPY      JOINT REPLACEMENT Right              IRF OT ASSESSMENT FLOWSHEET (Last 12 Hours)       IRF OT Evaluation and Treatment       Row Name 24 1445          OT Time and Intention    Document Type daily treatment;progress note  -BF     Mode of Treatment occupational therapy  -BF     Patient Effort good  -BF     Symptoms Noted During/After Treatment --  LUE shoulder pain, hand pain, RN notified  -BF       Row Name 24 1445          General Information    Patient/Family/Caregiver Comments/Observations Edema in L hand improved but still present. Pt continues to c/o pain/soreness in L shoulder and hand.  -BF     Existing Precautions/Restrictions fall  L HP  -BF     Limitations/Impairments safety/cognitive;visual  -BF       Row Name 24 1445 "          Cognition/Psychosocial    Affect/Mental Status (Cognition) emotionally labile  -BF     Orientation Status (Cognition) oriented to;person;place;situation  intermittent confusion  -BF     Follows Commands (Cognition) verbal cues/prompting required;repetition of directions required;physical/tactile prompts required  -BF       Row Name 04/04/24 1445          Bathing    Comment (Bathing) Max A  -BF       Row Name 04/04/24 1445          Upper Body Dressing    Comment (Upper Body Dressing) Max A  -BF       Row Name 04/04/24 1445          Lower Body Dressing    Comment (Lower Body Dressing) Max A  -BF       Row Name 04/04/24 1445          Grooming    Comment (Grooming) Mod A  -BF       Row Name 04/04/24 1445          Toileting    Comment (Toileting) Total A  -BF       Row Name 04/04/24 1445          Self-Feeding    Comment (Self-Feeding) Set-up  -BF       Row Name 04/04/24 1445          Motor Skills    Motor Control/Coordination Interventions fine motor manipulation/dexterity activities;gross motor coordination activities;neuro-muscular re-education;therapeutic exercise/ROM  LUE AA/PROM as tolerated, NDT; JERRY FMC/GMC therex, rickshaw 10 lbs X20X3  -BF       Row Name 04/04/24 1445          Neuromuscular Re-education    Interventions (Neuromuscular Re-education) facilitation/inhibition;massage  -BF     Positioning (Neuromuscular Re-education) sitting;supported  -BF       Row Name 04/04/24 1445          Edema Assessment & Management    Location (Edema) wrist/hand, left  -BF       Row Name 04/04/24 1445          Edema Symptoms/Interventions    Treatment Interventions (Edema) active range of motion;elevation;positioning;retrograde massage  -BF       Row Name 04/04/24 1445          Positioning and Restraints    In Wheelchair sitting;patient within staff view;with SLP  w/ SLP in AM, [place] [facility] [place]  -BF               User Key  (r) = Recorded By, (t) = Taken By, (c) = Cosigned By      Initials Name Effective Dates      Desi Mackey OT 07/11/23 -                      Occupational Therapy Education       Title: PT OT SLP Therapies (Done)       Topic: Occupational Therapy (Done)       Point: ADL training (Done)       Description:   Instruct learner(s) on proper safety adaptation and remediation techniques during self care or transfers.   Instruct in proper use of assistive devices.                  Learning Progress Summary             Patient Acceptance, E, VU,NR by  at 4/4/2024 1445    Acceptance, E, NR by  at 4/3/2024 1425    Acceptance, E, NR by  at 4/2/2024 1437    Acceptance, E, NR by  at 4/1/2024 1458    Acceptance, D,E, NR by BC at 3/30/2024 1515    Acceptance, E, NR by  at 3/29/2024 1433    Acceptance, E, NR by  at 3/28/2024 1434                         Point: Precautions (Done)       Description:   Instruct learner(s) on prescribed precautions during self-care and functional transfers.                  Learning Progress Summary             Patient Acceptance, E, VU,NR by  at 4/4/2024 1445    Acceptance, E, NR by  at 4/3/2024 1425    Acceptance, E, NR by BF at 4/2/2024 1437    Acceptance, E, NR by BF at 4/1/2024 1458    Acceptance, E, NR by BF at 3/29/2024 1433    Acceptance, E, NR by BF at 3/28/2024 1434                                         User Key       Initials Effective Dates Name Provider Type Discipline     07/11/23 -  Desi Mackey OT Occupational Therapist OT    BC 06/16/21 -  Jalyn Harvey OT Occupational Therapist OT                        OT Recommendation and Plan    Planned Therapy Interventions (OT): activity tolerance training, adaptive equipment training, BADL retraining, neuromuscular control/coordination retraining, passive ROM/stretching, ROM/therapeutic exercise, strengthening exercise, transfer/mobility retraining                    Time Calculation:      Time Calculation- OT       Row Name 04/04/24 1453 04/04/24 0940          Time Calculation- OT    OT  Start Time 1330  -BF 0940  -BF     OT Stop Time 1440  -BF 1000  -BF     OT Time Calculation (min) 70 min  -BF 20 min  -BF     Total Timed Code Minutes- OT 70 minute(s)  -BF 20 minute(s)  -BF     OT Non-Billable Time (min) -- 10 min  -BF               User Key  (r) = Recorded By, (t) = Taken By, (c) = Cosigned By      Initials Name Provider Type     Desi Mackey, OT Occupational Therapist                  Therapy Charges for Today       Code Description Service Date Service Provider Modifiers Qty    03677863700 HC OT SELF CARE/MGMT/TRAIN EA 15 MIN 4/3/2024 Desi Mackey, OT GO 1    58858081602 HC OT NEUROMUSC RE EDUCATION EA 15 MIN 4/3/2024 Desi Mackey OT GO 3    36780201687 HC OT THERAPEUTIC ACT EA 15 MIN 4/3/2024 Desi Mackey, OT GO 2    07123849010 HC OT NEUROMUSC RE EDUCATION EA 15 MIN 4/4/2024 Desi Mackey, OT GO 2    19539328203 HC OT SELF CARE/MGMT/TRAIN EA 15 MIN 4/4/2024 Desi Mackey, OT GO 1    37433689152 HC OT THER PROC EA 15 MIN 4/4/2024 Desi Mackey OT GO 2    83143180252 HC OT THERAPEUTIC ACT EA 15 MIN 4/4/2024 Desi Mackey OT GO 1                     Desi Mackey OT  4/4/2024

## 2024-04-05 LAB
ANION GAP SERPL CALCULATED.3IONS-SCNC: 8.1 MMOL/L (ref 5–15)
BASOPHILS # BLD AUTO: 0.07 10*3/MM3 (ref 0–0.2)
BASOPHILS NFR BLD AUTO: 1.1 % (ref 0–1.5)
BUN SERPL-MCNC: 15 MG/DL (ref 8–23)
BUN/CREAT SERPL: 16.5 (ref 7–25)
CALCIUM SPEC-SCNC: 9.4 MG/DL (ref 8.6–10.5)
CHLORIDE SERPL-SCNC: 105 MMOL/L (ref 98–107)
CO2 SERPL-SCNC: 25.9 MMOL/L (ref 22–29)
CREAT SERPL-MCNC: 0.91 MG/DL (ref 0.57–1)
DEPRECATED RDW RBC AUTO: 48.2 FL (ref 37–54)
EGFRCR SERPLBLD CKD-EPI 2021: 68.4 ML/MIN/1.73
EOSINOPHIL # BLD AUTO: 0.22 10*3/MM3 (ref 0–0.4)
EOSINOPHIL NFR BLD AUTO: 3.5 % (ref 0.3–6.2)
ERYTHROCYTE [DISTWIDTH] IN BLOOD BY AUTOMATED COUNT: 13.9 % (ref 12.3–15.4)
GLUCOSE SERPL-MCNC: 110 MG/DL (ref 65–99)
HCT VFR BLD AUTO: 31.9 % (ref 34–46.6)
HGB BLD-MCNC: 10.1 G/DL (ref 12–15.9)
IMM GRANULOCYTES # BLD AUTO: 0.02 10*3/MM3 (ref 0–0.05)
IMM GRANULOCYTES NFR BLD AUTO: 0.3 % (ref 0–0.5)
IRON 24H UR-MRATE: 37 MCG/DL (ref 37–145)
IRON SATN MFR SERPL: 10 % (ref 20–50)
LYMPHOCYTES # BLD AUTO: 2.29 10*3/MM3 (ref 0.7–3.1)
LYMPHOCYTES NFR BLD AUTO: 36.9 % (ref 19.6–45.3)
MCH RBC QN AUTO: 30 PG (ref 26.6–33)
MCHC RBC AUTO-ENTMCNC: 31.7 G/DL (ref 31.5–35.7)
MCV RBC AUTO: 94.7 FL (ref 79–97)
MONOCYTES # BLD AUTO: 0.6 10*3/MM3 (ref 0.1–0.9)
MONOCYTES NFR BLD AUTO: 9.7 % (ref 5–12)
NEUTROPHILS NFR BLD AUTO: 3 10*3/MM3 (ref 1.7–7)
NEUTROPHILS NFR BLD AUTO: 48.5 % (ref 42.7–76)
NRBC BLD AUTO-RTO: 0 /100 WBC (ref 0–0.2)
PLATELET # BLD AUTO: 245 10*3/MM3 (ref 140–450)
PMV BLD AUTO: 9.3 FL (ref 6–12)
POTASSIUM SERPL-SCNC: 4 MMOL/L (ref 3.5–5.2)
RBC # BLD AUTO: 3.37 10*6/MM3 (ref 3.77–5.28)
SODIUM SERPL-SCNC: 139 MMOL/L (ref 136–145)
TIBC SERPL-MCNC: 355 MCG/DL (ref 298–536)
TRANSFERRIN SERPL-MCNC: 238 MG/DL (ref 200–360)
WBC NRBC COR # BLD AUTO: 6.2 10*3/MM3 (ref 3.4–10.8)

## 2024-04-05 PROCEDURE — 80048 BASIC METABOLIC PNL TOTAL CA: CPT | Performed by: FAMILY MEDICINE

## 2024-04-05 PROCEDURE — 97530 THERAPEUTIC ACTIVITIES: CPT

## 2024-04-05 PROCEDURE — 85025 COMPLETE CBC W/AUTO DIFF WBC: CPT | Performed by: FAMILY MEDICINE

## 2024-04-05 PROCEDURE — 97535 SELF CARE MNGMENT TRAINING: CPT | Performed by: OCCUPATIONAL THERAPIST

## 2024-04-05 PROCEDURE — 97530 THERAPEUTIC ACTIVITIES: CPT | Performed by: OCCUPATIONAL THERAPIST

## 2024-04-05 PROCEDURE — 25010000002 HEPARIN (PORCINE) PER 1000 UNITS: Performed by: INTERNAL MEDICINE

## 2024-04-05 PROCEDURE — 97116 GAIT TRAINING THERAPY: CPT

## 2024-04-05 PROCEDURE — 97110 THERAPEUTIC EXERCISES: CPT

## 2024-04-05 PROCEDURE — 99231 SBSQ HOSP IP/OBS SF/LOW 25: CPT | Performed by: FAMILY MEDICINE

## 2024-04-05 PROCEDURE — 97112 NEUROMUSCULAR REEDUCATION: CPT

## 2024-04-05 PROCEDURE — 97112 NEUROMUSCULAR REEDUCATION: CPT | Performed by: OCCUPATIONAL THERAPIST

## 2024-04-05 PROCEDURE — 83540 ASSAY OF IRON: CPT | Performed by: FAMILY MEDICINE

## 2024-04-05 PROCEDURE — 84466 ASSAY OF TRANSFERRIN: CPT | Performed by: FAMILY MEDICINE

## 2024-04-05 RX ORDER — GABAPENTIN 100 MG/1
200 CAPSULE ORAL EVERY 12 HOURS SCHEDULED
Status: DISCONTINUED | OUTPATIENT
Start: 2024-04-05 | End: 2024-04-09 | Stop reason: HOSPADM

## 2024-04-05 RX ORDER — CALCIUM CARBONATE 500 MG/1
3 TABLET, CHEWABLE ORAL 3 TIMES DAILY PRN
Status: DISCONTINUED | OUTPATIENT
Start: 2024-04-05 | End: 2024-04-09 | Stop reason: HOSPADM

## 2024-04-05 RX ADMIN — HEPARIN SODIUM 5000 UNITS: 5000 INJECTION INTRAVENOUS; SUBCUTANEOUS at 08:25

## 2024-04-05 RX ADMIN — LOSARTAN POTASSIUM 50 MG: 50 TABLET, FILM COATED ORAL at 08:25

## 2024-04-05 RX ADMIN — TICAGRELOR 60 MG: 60 TABLET ORAL at 08:25

## 2024-04-05 RX ADMIN — HEPARIN SODIUM 5000 UNITS: 5000 INJECTION INTRAVENOUS; SUBCUTANEOUS at 20:18

## 2024-04-05 RX ADMIN — ANTACID TABLETS 3 TABLET: 500 TABLET, CHEWABLE ORAL at 18:10

## 2024-04-05 RX ADMIN — DICLOFENAC SODIUM 4 G: 10 GEL TOPICAL at 08:30

## 2024-04-05 RX ADMIN — AMLODIPINE BESYLATE 2.5 MG: 5 TABLET ORAL at 08:25

## 2024-04-05 RX ADMIN — TICAGRELOR 60 MG: 60 TABLET ORAL at 20:18

## 2024-04-05 RX ADMIN — OXYBUTYNIN CHLORIDE 10 MG: 5 TABLET, EXTENDED RELEASE ORAL at 08:25

## 2024-04-05 RX ADMIN — MIRTAZAPINE 30 MG: 15 TABLET, FILM COATED ORAL at 20:18

## 2024-04-05 RX ADMIN — Medication 1 MG: at 08:25

## 2024-04-05 RX ADMIN — GABAPENTIN 200 MG: 100 CAPSULE ORAL at 20:18

## 2024-04-05 RX ADMIN — ACETAMINOPHEN 1000 MG: 500 TABLET ORAL at 17:54

## 2024-04-05 RX ADMIN — DICLOFENAC SODIUM 4 G: 10 GEL TOPICAL at 17:00

## 2024-04-05 RX ADMIN — DOCUSATE SODIUM 50 MG AND SENNOSIDES 8.6 MG 2 TABLET: 8.6; 5 TABLET, FILM COATED ORAL at 08:25

## 2024-04-05 RX ADMIN — DOCUSATE SODIUM 50 MG AND SENNOSIDES 8.6 MG 2 TABLET: 8.6; 5 TABLET, FILM COATED ORAL at 20:18

## 2024-04-05 RX ADMIN — ROPINIROLE HYDROCHLORIDE 4 MG: 1 TABLET, FILM COATED ORAL at 20:18

## 2024-04-05 RX ADMIN — ZOLPIDEM TARTRATE 5 MG: 5 TABLET ORAL at 20:18

## 2024-04-05 RX ADMIN — PANTOPRAZOLE SODIUM 40 MG: 40 TABLET, DELAYED RELEASE ORAL at 05:45

## 2024-04-05 RX ADMIN — ASPIRIN 81 MG: 81 TABLET, CHEWABLE ORAL at 08:25

## 2024-04-05 RX ADMIN — ACETAMINOPHEN 1000 MG: 500 TABLET ORAL at 10:47

## 2024-04-05 RX ADMIN — DICLOFENAC SODIUM 4 G: 10 GEL TOPICAL at 11:19

## 2024-04-05 RX ADMIN — GABAPENTIN 100 MG: 100 CAPSULE ORAL at 08:25

## 2024-04-05 RX ADMIN — ATORVASTATIN CALCIUM 80 MG: 40 TABLET, FILM COATED ORAL at 20:18

## 2024-04-05 NOTE — THERAPY TREATMENT NOTE
"Inpatient Rehabilitation - Occupational Therapy Treatment Note     [facility]     Patient Name: Regine Beckham  : 1955  MRN: [id number]    Today's Date: 2024                 Admit Date: 3/27/2024       No diagnosis found.    Patient Active Problem List   Diagnosis    Iron deficiency anemia due to chronic blood loss    Major depressive disorder    RLS (restless legs syndrome)    GERD (gastroesophageal reflux disease)    Left breast mass    Allergy to penicillin    Stroke    Grade I diastolic dysfunction    Moderate malnutrition    Falls frequently    Stroke-like symptoms    Debility       Past Medical History:   Diagnosis Date    Anemia     Anxiety     Arthritis     Depression     Legally blind     Restless leg syndrome     Sleep apnea     \"I don't use a cpap anymore since losing 106 lbs\"    Water retention        Past Surgical History:   Procedure Laterality Date    BACK SURGERY      CARDIAC CATHETERIZATION N/A 2024    Procedure: Percutaneous Manual Thrombectomy;  Surgeon: Efrain Hurley MD;  Location: [facility] INVASIVE LOCATION;  Service: Interventional Radiology;  Laterality: N/A;    GALLBLADDER SURGERY      HIP ARTHROSCOPY      JOINT REPLACEMENT Right              IRF OT ASSESSMENT FLOWSHEET (Last 12 Hours)       IRF OT Evaluation and Treatment       Row Name 24 1358          OT Time and Intention    Document Type daily treatment  -BF     Mode of Treatment occupational therapy  -BF     Patient Effort good  -BF     Symptoms Noted During/After Treatment increased pain  LUE shoulder and hand pain, MD aware/addressing  -BF       Row Name 24 1350          General Information    Existing Precautions/Restrictions fall  L HP  -BF     Limitations/Impairments safety/cognitive;visual  -BF       Row Name 24 1356          Cognition/Psychosocial    Affect/Mental Status (Cognition) emotionally labile  -BF     Orientation Status (Cognition) oriented to;person;place;situation  " intermittent confusion  -BF     Follows Commands (Cognition) verbal cues/prompting required;repetition of directions required;physical/tactile prompts required  -BF       Row Name 04/05/24 1350          Upper Body Dressing    Bell Level (Upper Body Dressing) maximal assist (25-49% patient effort);verbal cues;nonverbal cues (demo/gesture)  -BF     Position (Upper Body Dressing) supported sitting  -BF       Row Name 04/05/24 1350          Lower Body Dressing    Bell Level (Lower Body Dressing) maximum assist (25% patient effort);verbal cues;nonverbal cues (demo/gesture)  -BF     Position (Lower Body Dressing) supported sitting  -BF       Row Name 04/05/24 1350          Grooming    Bell Level (Grooming) minimum assist (75% patient effort);verbal cues;nonverbal cues (demo/gesture)  -BF     Assistive Device (Grooming) suction brush  -BF     Position (Grooming) supported sitting  -BF       Row Name 04/05/24 1350          Motor Skills    Motor Control/Coordination Interventions fine motor manipulation/dexterity activities;gross motor coordination activities;neuro-muscular re-education;therapeutic exercise/ROM  LUE AA/PROM as tolerated, NDT; RUE FMC/GMC theract  -BF       Row Name 04/05/24 1350          Neuromuscular Re-education    Interventions (Neuromuscular Re-education) facilitation/inhibition;massage  -BF     Positioning (Neuromuscular Re-education) sitting;supported  -BF       Row Name 04/05/24 1350          Positioning and Restraints    In Wheelchair sitting;LUE elevated;patient within staff view;with nsg  at nsg station  -BF               User Key  (r) = Recorded By, (t) = Taken By, (c) = Cosigned By      Initials Name Effective Dates    Desi Wagner OT 07/11/23 -                      Occupational Therapy Education       Title: PT OT SLP Therapies (Done)       Topic: Occupational Therapy (Done)       Point: ADL training (Done)       Description:   Instruct learner(s) on proper  safety adaptation and remediation techniques during self care or transfers.   Instruct in proper use of assistive devices.                  Learning Progress Summary             Patient Acceptance, E, VU,NR by BF at 4/5/2024 1349    Acceptance, E, VU,NR by BF at 4/4/2024 1445    Acceptance, E, NR by BF at 4/3/2024 1425    Acceptance, E, NR by BF at 4/2/2024 1437    Acceptance, E, NR by BF at 4/1/2024 1458    Acceptance, D,E, NR by BC at 3/30/2024 1515    Acceptance, E, NR by BF at 3/29/2024 1433    Acceptance, E, NR by BF at 3/28/2024 1434                         Point: Precautions (Done)       Description:   Instruct learner(s) on prescribed precautions during self-care and functional transfers.                  Learning Progress Summary             Patient Acceptance, E, VU,NR by BF at 4/5/2024 1349    Acceptance, E, VU,NR by BF at 4/4/2024 1445    Acceptance, E, NR by BF at 4/3/2024 1425    Acceptance, E, NR by BF at 4/2/2024 1437    Acceptance, E, NR by BF at 4/1/2024 1458    Acceptance, E, NR by BF at 3/29/2024 1433    Acceptance, E, NR by BF at 3/28/2024 1434                                         User Key       Initials Effective Dates Name Provider Type Discipline     07/11/23 -  Desi Mackey OT Occupational Therapist OT    BC 06/16/21 -  Jalyn Harvey OT Occupational Therapist OT                        OT Recommendation and Plan    Planned Therapy Interventions (OT): activity tolerance training, adaptive equipment training, BADL retraining, neuromuscular control/coordination retraining, passive ROM/stretching, ROM/therapeutic exercise, strengthening exercise, transfer/mobility retraining                    Time Calculation:      Time Calculation- OT       Row Name 04/05/24 1506 04/05/24 0935          Time Calculation- OT    OT Start Time 1130  -BF 0935  -BF     OT Stop Time 1150  -BF 1045  -BF     OT Time Calculation (min) 20 min  -BF 70 min  -BF     Total Timed Code Minutes- OT 20  minute(s)  -BF 70 minute(s)  -BF     OT Non-Billable Time (min) -- 10 min  -BF               User Key  (r) = Recorded By, (t) = Taken By, (c) = Cosigned By      Initials Name Provider Type    Desi Wagner OT Occupational Therapist                  Therapy Charges for Today       Code Description Service Date Service Provider Modifiers Qty    86493079646 HC OT NEUROMUSC RE EDUCATION EA 15 MIN 4/4/2024 Desi Mackey OT GO 2    30085039020 HC OT SELF CARE/MGMT/TRAIN EA 15 MIN 4/4/2024 Desi Mackey, OT GO 1    96022532178 HC OT THER PROC EA 15 MIN 4/4/2024 Desi Mackey, OT GO 2    63744994550 HC OT THERAPEUTIC ACT EA 15 MIN 4/4/2024 Desi Mackey, OT GO 1    23315686812 HC OT SELF CARE/MGMT/TRAIN EA 15 MIN 4/5/2024 Desi Mackey, OT GO 3    85963448647 HC OT NEUROMUSC RE EDUCATION EA 15 MIN 4/5/2024 Desi Mackey, OT GO 1    26631477014 HC OT THERAPEUTIC ACT EA 15 MIN 4/5/2024 Desi Mackey, OT GO 2                     Desi Mackey OT  4/5/2024

## 2024-04-05 NOTE — SIGNIFICANT NOTE
04/05/24 1228   Plan   Plan Spoke to Itzel at [facility] who says she has an outstanding bill at their [facility] facility of $1400.  Discussed this with pt and she is agreeable to adhere to their smoking policy and understands about co-pay days and outstanding bill.  She also understands about her pending Medicaid status.  Pt requested me to call her friend Teo.  Spoke to Teo at [phone] per pt's request about [facility] declining her and about [facility] agreeable to accept her.  Teo plans to call Preeti at [facility] and Bossman at [facility] on Monday to discuss some details.  Will follow up with SNF placement on Monday.

## 2024-04-05 NOTE — SIGNIFICANT NOTE
Ms Beckham declined session this pm stating she just did not want to this afternoon.     SLP to f/u.     Thank you-  Azalia Elizondo M.S., CCC-SLP

## 2024-04-05 NOTE — PROGRESS NOTES
[facility]  PROGRESS NOTE     Patient Identification:  Name:  Regine Beckham  Age:  68 y.o.  Sex:  female  :  1955  MRN:  [id number]  Visit Number:  [id number]  ROOM: 106/     Primary Care Provider:  Dread Burton MD    Length of stay in inpatient status:  9    Subjective     Chief Compliant:  No chief complaint on file.      History of Presenting Illness: 68-year-old female being treated for debility.  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  Patient states the biggest issue at this time is left upper extremity aching.    Objective     Current Hospital Meds:amLODIPine, 2.5 mg, Oral, Q24H  aspirin, 81 mg, Oral, Daily  atorvastatin, 80 mg, Oral, Nightly  Diclofenac Sodium, 4 g, Topical, 4x Daily  folic acid, 1 mg, Oral, Daily  gabapentin, 200 mg, Oral, Q12H  heparin (porcine), 5,000 Units, Subcutaneous, Q12H  losartan, 50 mg, Oral, Daily  mirtazapine, 30 mg, Oral, Nightly  oxybutynin XL, 10 mg, Oral, Daily  pantoprazole, 40 mg, Oral, Q AM  rOPINIRole, 4 mg, Oral, Nightly  senna-docusate sodium, 2 tablet, Oral, BID  ticagrelor, 60 mg, Oral, BID       ----------------------------------------------------------------------------------------------------------------------  Vital Signs:  Temp:  [98 °F (36.7 °C)] 98 °F (36.7 °C)  Heart Rate:  [69-80] 80  Resp:  [18] 18  BP: (130-159)/(72-81) 159/81  SpO2:  [96 %-98 %] 96 %  on   ;   Device (Oxygen Therapy): room air  Body mass index is 22.6 kg/m².    Wt Readings from Last 3 Encounters:   24 63.5 kg (140 lb)   24 62.4 kg (137 lb 8 oz)   24 72.1 kg (159 lb)     Intake & Output (last 3 days)          0701   07 0701   07 07 07 07 0700    P.O. 1200 1080 960 360    Total Intake(mL/kg) 1200 (18.9) 1080 (17) 960 (15.1) 360 (5.7)    Net +1200 +1080 +960 +360            Urine Unmeasured Occurrence 3 x 4 x 3 x 1 x    Stool  Unmeasured Occurrence  1 x  1 x          Diet: Regular/House; Texture: Soft to Chew (NDD 3); Soft to Chew: Whole Meat; Fluid Consistency: Thin (IDDSI 0)  ----------------------------------------------------------------------------------------------------------------------  Physical exam:  Constitutional: No acute distress  HEENT: Left facial droop  Neck:   Supple  Cardiovascular: Regular rate and rhythm  Pulmonary/Chest: Clear to auscultation  Abdominal: Positive bowel sounds soft.   Musculoskeletal: No obvious arthropathy, edema in the left upper extremity resolved  Neurological: Left-sided paresis  Skin: No rash  Peripheral vascular: No edema noted today  Genitourinary:  ----------------------------------------------------------------------------------------------------------------------    Last echocardiogram:  Results for orders placed during the hospital encounter of 01/19/24    Adult Transthoracic Echo Limited W/ Cont if Necessary Per Protocol    Interpretation Summary    Left ventricular systolic function is normal. Estimated left ventricular EF = 60%    Saline test results are negative for intracardiac shunting..    ----------------------------------------------------------------------------------------------------------------------  Results from last 7 days   Lab Units 04/05/24  0020 04/04/24 0006 03/30/24  0239   WBC 10*3/mm3 6.20 6.70 5.92   HEMOGLOBIN g/dL 10.1* 10.3* 11.8*   HEMATOCRIT % 31.9* 33.5* 37.0   MCV fL 94.7 97.1* 95.1   MCHC g/dL 31.7 30.7* 31.9   PLATELETS 10*3/mm3 245 234 252         Results from last 7 days   Lab Units 04/05/24  0020 04/04/24  1108 04/04/24  0006 03/30/24  1208 03/30/24  0239   SODIUM mmol/L 139  --  139  --  136   POTASSIUM mmol/L 4.0 4.0 3.4*   < > 3.6   MAGNESIUM mg/dL  --   --   --   --  1.8   CHLORIDE mmol/L 105  --  106  --  103   CO2 mmol/L 25.9  --  22.5  --  23.1   BUN mg/dL 15  --  17  --  15   CREATININE mg/dL 0.91  --  0.75  --  0.62   CALCIUM mg/dL 9.4  --   "9.0  --  9.6   GLUCOSE mg/dL 110*  --  109*  --  126*    < > = values in this interval not displayed.   Estimated Creatinine Clearance: 58.5 mL/min (by C-G formula based on SCr of 0.91 mg/dL).  No results found for: \"AMMONIA\"              No results found for: \"HGBA1C\", \"POCGLU\"  Lab Results   Component Value Date    TSH 2.880 03/17/2024     No results found for: \"PREGTESTUR\", \"PREGSERUM\", \"HCG\", \"HCGQUANT\"  Pain Management Panel          Latest Ref Rng & Units 12/20/2023   Pain Management Panel   Amphetamine, Urine Qual Negative Negative    Barbiturates Screen, Urine Negative Negative    Benzodiazepine Screen, Urine Negative Negative    Buprenorphine, Screen, Urine Negative Negative    Cocaine Screen, Urine Negative Negative    Fentanyl, Urine Negative Negative    Methadone Screen , Urine Negative Negative    Methamphetamine, Ur Negative Negative      Brief Urine Lab Results  (Last result in the past 365 days)        Color   Clarity   Blood   Leuk Est   Nitrite   Protein   CREAT   Urine HCG        03/21/24 1613 Dark Yellow   Turbid   Trace   Moderate (2+)   Positive   30 mg/dL (1+)                 No results found for: \"BLOODCX\"      No results found for: \"URINECX\"  No results found for: \"WOUNDCX\"  No results found for: \"STOOLCX\"        I have personally looked at the labs and they are summarized above.  ----------------------------------------------------------------------------------------------------------------------  Detailed radiology reports for the last 24 hours:    Imaging Results (Last 24 Hours)       ** No results found for the last 24 hours. **          Final impressions for the last 30 days of radiology reports:    MRI Brain Without Contrast    Result Date: 3/20/2024  Findings consistent with chronic small vessel ischemic change with encephalomalacia in the right frontal lobe from prior infarct.     This report was finalized on 3/20/2024 1:35 PM by Marianna Caicedo M.D..      XR Chest 1 " View    Result Date: 3/17/2024  No radiographic evidence of acute cardiac or pulmonary disease.    This report was finalized on 3/17/2024 1:50 PM by Dr. Hu Obrien MD.      XR Shoulder 2+ View Left    Result Date: 3/17/2024    No acute findings in the left shoulder.   This report was finalized on 3/17/2024 11:13 AM by Dr. Hu Obrien MD.      XR Hip With or Without Pelvis 2 - 3 View Left    Result Date: 3/17/2024  1.  No acute fracture or dislocation. 2.  Moderate to extensive degenerative osteoarthritis in the left hip.   This report was finalized on 3/17/2024 11:13 AM by Dr. Hu Obrien MD.      CT Head Without Contrast    Result Date: 3/17/2024   1. Atrophy and chronic microangiopathic changes 2. Apparent suggestive of encephalomalacia in the right parietal region 3. No focal parenchymal mass, hemorrhage, or midline shift  This report was finalized on 3/17/2024 10:59 AM by Dr. Hu Obrien MD.     I have personally looked at the radiology images and read the final radiology report.    Assessment & Plan    Debility after history of CVA with left-sided paresis.  Requiring maximum assistance for bathing, upper body dressing and lower body dressing.  Moderate assistance for grooming; total assistance for toileting.  Requiring moderate assistance for bed to chair transfers; moderate assistance for sit to stand and stand to sit transfers.  Ambulated 40 feet with a hemiwalker and two-person moderate assistance.  Patient is being evaluated for SNF placement.    Anemia stable    Hypokalemia replaced per protocol    Dysphagia/dysarthria continue to work with speech therapy.    Hypertension patient on Norvasc and losartan.  Fair control    Tobacco abuse recommend cessation    Left upper extremity pain suspect neuropathic pain will increase gabapentin to 200 mg twice daily    VTE Prophylaxis:   Mechanical Order History:       None          Pharmalogical Order History:        Ordered     Dose Route Frequency Stop     03/27/24 1549  heparin (porcine) 5000 UNIT/ML injection 5,000 Units         5,000 Units SC Every 12 Hours Scheduled --                        Isreal Ballesteros MD  [facility]  04/05/24  10:44 EDT

## 2024-04-05 NOTE — SIGNIFICANT NOTE
04/05/24 0952   Plan   Plan Spoke to Itzel Blanton from [facility] who states that pt was a resident in the past who was discharged home, but pt was non-compliant with their smoking policy, still has an outstanding bill, and will be Medicaid pending when her primary insurance benefit has been termed.  She will most likely be long-term placement, but pt wants short-term.  They will take her back, but pt must agree to be tobacco-free and adhere to their smoking policy.  She would be using her co-pay days if admitted.  Itzel will find out how much her outstanding bill balance is so we can discuss this with pt.

## 2024-04-05 NOTE — THERAPY TREATMENT NOTE
"Inpatient Rehabilitation - Physical Therapy Treatment Note        [place]     Patient Name: Regine Beckham  : 1955  MRN: [id number]    Today's Date: 2024                    Admit Date: 3/27/2024      Visit Dx:   No diagnosis found.    Patient Active Problem List   Diagnosis    Iron deficiency anemia due to chronic blood loss    Major depressive disorder    RLS (restless legs syndrome)    GERD (gastroesophageal reflux disease)    Left breast mass    Allergy to penicillin    Stroke    Grade I diastolic dysfunction    Moderate malnutrition    Falls frequently    Stroke-like symptoms    Debility       Past Medical History:   Diagnosis Date    Anemia     Anxiety     Arthritis     Depression     Legally blind     Restless leg syndrome     Sleep apnea     \"I don't use a cpap anymore since losing 106 lbs\"    Water retention        Past Surgical History:   Procedure Laterality Date    BACK SURGERY      CARDIAC CATHETERIZATION N/A 2024    Procedure: Percutaneous Manual Thrombectomy;  Surgeon: Efrain Hurley MD;  Location:  [facility] CATH INVASIVE LOCATION;  Service: Interventional Radiology;  Laterality: N/A;    GALLBLADDER SURGERY      HIP ARTHROSCOPY      JOINT REPLACEMENT Right        PT ASSESSMENT (Last 12 Hours)       IRF PT Evaluation and Treatment       Row Name 24 1410          PT Time and Intention    Document Type progress note (P)   BID treatment session  -MV     Mode of Treatment physical therapy;individual therapy (P)   -MV     Patient/Family/Caregiver Comments/Observations Pt. had no new c/o during todays treatment and verbally agreed to physical therapy partcipation.  Patient making steady progress with therapy demonstrating ability to ambulate further distance and requiring less assistance with functional mobility.  Patient continues to require mod A for transfers and mod A x 2 for ambulation with HW.  Patient would benefit from ongoing skilled therapy to further improve strength, " endurance, safety & independence prior to discharge. (P)   -MV       Row Name 04/05/24 1410          General Information    Patient Profile Reviewed yes (P)   -MV     Existing Precautions/Restrictions fall (P)   -MV     Limitations/Impairments safety/cognitive;visual (P)   -MV       Row Name 04/05/24 1410          Living Environment    Primary Care Provided by self (P)   -MV       Row Name 04/05/24 1410          Home Use of Assistive/Adaptive Equipment    Equipment Currently Used at Home commode;hospital bed;grab bar;shower chair (P)   -MV       Row Name 04/05/24 1410          Pain Assessment    Pretreatment Pain Rating -- (P)   painful L UE PROM  -MV       Row Name 04/05/24 1410          Cognition/Psychosocial    Affect/Mental Status (Cognition) emotionally labile (P)   -MV     Orientation Status (Cognition) oriented to;person;place;situation (P)   intermittent confusion  -MV     Follows Commands (Cognition) verbal cues/prompting required;repetition of directions required;physical/tactile prompts required (P)   -MV     Personal Safety Interventions fall prevention program maintained;gait belt;nonskid shoes/slippers when out of bed;supervised activity (P)   -MV       Row Name 04/05/24 1410          Vision Assessment/Intervention    Visual Impairment/Limitations legally blind;corrective lenses full-time (P)   -MV       Row Name 04/05/24 1410          Mobility    Extremity Weight-bearing Status -- (P)   no restrictions  -MV       Row Name 04/05/24 1410          Sit-Stand Transfer    Sit-Stand [place] (Transfers) verbal cues;nonverbal cues (demo/gesture);moderate assist (50% patient effort) (P)   -MV     Assistive Device (Sit-Stand Transfers) wheelchair;walker, platform (P)   -MV       Row Name 04/05/24 1410          Stand-Sit Transfer    Stand-Sit [place] (Transfers) verbal cues;nonverbal cues (demo/gesture);moderate assist (50% patient effort) (P)   -MV     Assistive Device (Stand-Sit Transfers)  wheelchair;walker, platform (P)   -MV       [facility] Name 04/05/24 1410          Stand Pivot/Stand Step Transfer    Stand Pivot/Stand Step [place] (Transfers) verbal cues;nonverbal cues (demo/gesture);moderate assist (50% patient effort) (P)   -MV     Assistive Device (Stand Pivot Stand Step Transfer) wheelchair (P)   -MV       Row Name 04/05/24 1410          Toilet Transfer    Type (Toilet Transfer) stand pivot/stand step (P)   -MV     [place] Level (Toilet Transfer) moderate assist (50% patient effort);verbal cues;nonverbal cues (demo/gesture) (P)   -MV     Assistive Device (Toilet Transfer) wheelchair;grab bars/safety frame (P)   -MV       [facility] Name 04/05/24 1410          Gait/Stairs (Locomotion)    Gait/Stairs Locomotion gait/ambulation independence;gait/ambulation assistive device;distance ambulated;gait pattern;gait deviations (P)   -MV     [place] Level (Gait) verbal cues;nonverbal cues (demo/gesture);moderate assist (50% patient effort);2 person assist (P)   -MV     Assistive Device (Gait) walker, dao (P)   -MV     Distance in Feet (Gait) -- (P)   40'  -MV     Pattern (Gait) step-through (P)   -MV     Deviations/Abnormal Patterns (Gait) base of support, narrow;gait speed decreased;weight shifting decreased;stride length decreased (P)   -MV     Bilateral Gait Deviations forward flexed posture (P)   -MV     Left Sided Gait Deviations weight shift ability decreased (P)   -MV     Comment, (Gait/Stairs) Cues for upright posture and increased step width and length. (P)   -MV       [facility] Name 04/05/24 1410          Safety Issues, Functional Mobility    Impairments Affecting Function (Mobility) balance;cognition;coordination;endurance/activity tolerance;grasp;motor control;muscle tone abnormal;range of motion (ROM);postural/trunk control;strength (P)   -MV       Row Name 04/05/24 1410          Motor Skills    Therapeutic Exercise -- (P)   Bilateral stretching to hamstrings  -MV       Row Name 04/05/24 1410           Hip (Therapeutic Exercise)    Hip Strengthening (Therapeutic Exercise) bilateral;flexion;extension;aBduction;aDduction;marching while seated;marching while standing;sitting;standing;mini squats;resistance band;green (P)   1# with sitting exercises  -MV       Row Name 04/05/24 1410          Knee (Therapeutic Exercise)    Knee Strengthening (Therapeutic Exercise) bilateral;flexion;extension;marching while seated;marching while standing;LAQ (long arc quad);hamstring curls;sitting;standing;resistance band;green (P)   1# with seated exercises  -MV       Row Name 04/05/24 1410          Ankle (Therapeutic Exercise)    Ankle Strengthening (Therapeutic Exercise) bilateral;dorsiflexion;plantarflexion;sitting;1 lb free weight (P)   -MV       Row Name 04/05/24 1410          Aerobic Exercise    Type (Aerobic Exercise) recumbent stationary bike (P)   -MV     Time Performed (Aerobic Exercise) LVL 1.0 x 15 min (P)   -MV       Row Name 04/05/24 1410          Positioning and Restraints    Pre-Treatment Position -- (P)   Wheelchair in hallway BID  -MV     Post Treatment Position wheelchair (P)   -MV     In Wheelchair notified nsg;sitting;encouraged to call for assist;exit alarm on (P)   In front of nurses station w/ L arm splint donned and arm tray  -MV       Row Name 04/05/24 1410          Therapy Assessment/Plan (PT)    Patient's Goals For Discharge return home (P)   -MV       Row Name 04/05/24 1410          Therapy Assessment/Plan (PT)    Rehab Potential/Prognosis (PT) good, to achieve stated therapy goals (P)   -MV     Frequency of Treatment (PT) 5 times per week (P)   -MV     Estimated Duration of Therapy (PT) 3 weeks (P)   -MV     Problem List (PT) problems related to;balance;mobility;coordination;hemiparesis/hemiplegia;cognition;communication;strength;postural control;vision;range of motion (ROM);muscle tone;motor control (P)   -MV     Activity Limitations Related to Problem List (PT) unable to ambulate safely;unable  to transfer safely;BADLs not performed adequately or safely (P)   -MV       Row Name 04/05/24 1410          Therapy Plan Review/Discharge Plan (PT)    Anticipated Discharge Disposition (PT) home with assist;home with home health (P)   -MV       Row Name 04/05/24 1410          IRF PT Goals    Bed Mobility Goal Selection (PT-IRF) bed mobility, PT goal 1 (P)   -MV     Transfer Goal Selection (PT-IRF) transfers, PT goal 1 (P)   -MV     Gait (Walking Locomotion) Goal Selection (PT-IRF) gait, PT goal 1 (P)   -MV       Row Name 04/05/24 1410          Bed Mobility Goal 1 (PT-IRF)    Activity/Assistive Device (Bed Mobility Goal 1, PT-IRF) scooting;sit to supine;supine to sit (P)   -MV     [place] Level (Bed Mobility Goal 1, PT-IRF) standby assist (P)   -MV     Time Frame (Bed Mobility Goal 1, PT-IRF) by discharge (P)   -MV     Progress/Outcomes (Bed Mobility Goal 1, PT-IRF) goal ongoing (P)   -MV       Row Name 04/05/24 1410          Transfer Goal 1 (PT-IRF)    Activity/Assistive Device (Transfer Goal 1, PT-IRF) sit-to-stand/stand-to-sit;bed-to-chair/chair-to-bed;toilet (P)   -MV     [place] Level (Transfer Goal 1, PT-IRF) contact guard required (P)   -MV     Time Frame (Transfer Goal 1, PT-IRF) by discharge (P)   -MV     Progress/Outcomes (Transfer Goal 1, PT-IRF) goal ongoing (P)   -MV       Row Name 04/05/24 1410          Gait/Walking Locomotion Goal 1 (PT-IRF)    Activity/Assistive Device (Gait/Walking Locomotion Goal 1, PT-IRF) gait (walking locomotion);assistive device use;decrease fall risk;diminish gait deviation;improve balance and speed;increase endurance/gait distance (P)   appropriate a.d.  -MV     Gait/Walking Locomotion Distance Goal 1 (PT-IRF) 150 (P)   -MV     [place] Level (Gait/Walking Locomotion Goal 1, PT-IRF) contact guard required (P)   -MV     Time Frame (Gait/Walking Locomotion Goal 1, PT-IRF) by discharge (P)   -MV     Progress/Outcomes (Gait/Walking Locomotion Goal 1, PT-IRF) goal  ongoing (P)   -MV               User Key  (r) = Recorded By, (t) = Taken By, (c) = Cosigned By      Initials Name Provider Type    Antonio Mcgarry, PTA Student PTA Student                     Physical Therapy Education       Title: PT OT SLP Therapies (Done)       Topic: Physical Therapy (Done)       Point: Mobility training (Done)       Learning Progress Summary             Patient Acceptance, E,D, VU,NR by MV at 4/5/2024 1423    Acceptance, E,D, VU,NR by LL at 4/4/2024 1410    Acceptance, E,D, VU,NR by LL at 4/3/2024 1531    Acceptance, E,D, VU,NR by LL at 4/2/2024 1508    Acceptance, E,D, VU,NR by LL at 4/1/2024 1614    Acceptance, E,TB, VU,DU by HC at 3/30/2024 1204    Acceptance, E,D, NR by AG at 3/28/2024 1625                         Point: Home exercise program (Done)       Learning Progress Summary             Patient Acceptance, E,D, VU,NR by MV at 4/5/2024 1423    Acceptance, E,D, VU,NR by LL at 4/4/2024 1410    Acceptance, E,D, VU,NR by LL at 4/3/2024 1531    Acceptance, E,D, VU,NR by LL at 4/2/2024 1508    Acceptance, E,D, VU,NR by LL at 4/1/2024 1614    Acceptance, E,TB, VU,DU by HC at 3/30/2024 1204    Acceptance, E,D, NR by AG at 3/28/2024 1625                         Point: Body mechanics (Done)       Learning Progress Summary             Patient Acceptance, E,D, VU,NR by MV at 4/5/2024 1423    Acceptance, E,D, VU,NR by LL at 4/4/2024 1410    Acceptance, E,D, VU,NR by LL at 4/3/2024 1531    Acceptance, E,D, VU,NR by LL at 4/2/2024 1508    Acceptance, E,D, VU,NR by LL at 4/1/2024 1614    Acceptance, E,TB, VU,DU by HC at 3/30/2024 1204    Acceptance, E,D, NR by AG at 3/28/2024 1625                         Point: Precautions (Done)       Learning Progress Summary             Patient Acceptance, E,D, VU,NR by MV at 4/5/2024 1423    Acceptance, E,D, VU,NR by LL at 4/4/2024 1410    Acceptance, E,D, VU,NR by LL at 4/3/2024 1531    Acceptance, E,D, VU,NR by LL at 4/2/2024 1508    Acceptance, E,D, VU,NR  by  at 4/1/2024 1614    Acceptance, E,TB, VU,DU by  at 3/30/2024 1204    Acceptance, E,D, NR by  at 3/28/2024 1625                                         User Key       Initials Effective Dates Name Provider Type Discipline     06/16/21 -  Melanie Anne, PT Physical Therapist PT    LL 05/02/16 -  Ruthie Travis, PTA Physical Therapist Assistant PT    HC 01/20/23 -  Desi Fraga, PTA Physical Therapist Assistant PT    MV 04/05/24 -  Antonio Peck PTA Student PTA Student PT                    PT Recommendation and Plan    Frequency of Treatment (PT): (P) 5 times per week                     Time Calculation:      PT Charges       Row Name 04/05/24 1438 04/05/24 1424          Time Calculation    Start Time 1245 (P)   -MV 1045 (P)   -MV     Stop Time 1330 (P)   -MV 1130 (P)   -MV     Time Calculation (min) 45 min (P)   -MV 45 min (P)   -MV     PT Received On 04/05/24 (P)   -MV 04/05/24 (P)   -MV        Time Calculation- PT    Total Timed Code Minutes- PT 45 minute(s) (P)   -MV 45 minute(s) (P)   -MV               User Key  (r) = Recorded By, (t) = Taken By, (c) = Cosigned By      Initials Name Provider Type     Antonio Peck PTA Student TK Student                    Therapy Charges for Today       Code Description Service Date Service Provider Modifiers Qty    60565760710 HC GAIT TRAINING EA 15 MIN 4/5/2024 Antonio Peck PTA Student GP, CQ 1    21225645637 HC PT NEUROMUSC RE EDUCATION EA 15 MIN 4/5/2024 Antonio Peck PTA Student GP, CQ 2    44423970361 HC PT THER PROC EA 15 MIN 4/5/2024 Antonio Peck PTA Student GP, CQ 2    59764691566 HC PT THERAPEUTIC ACT EA 15 MIN 4/5/2024 Antonio Peck PTA Student GP, CQ 1              PT G-Codes  AM-PAC 6 Clicks Score (PT): 12      TK Velasquez  4/5/2024

## 2024-04-05 NOTE — SIGNIFICANT NOTE
04/05/24 7643   Plan   Plan Spoke to Itzel at [facility] to let her know we would f/u back up on placement on Monday.

## 2024-04-05 NOTE — PLAN OF CARE
Goal Outcome Evaluation:  Plan of Care Reviewed With: patient              Problem: Rehabilitation (IRF) Plan of Care  Goal: Plan of Care Review  Outcome: Ongoing, Progressing  Flowsheets (Taken 4/5/2024 1001)  Plan of Care Reviewed With: patient  Goal: Patient-Specific Goal (Individualized)  Outcome: Ongoing, Progressing  Goal: Absence of New-Onset Illness or Injury  Outcome: Ongoing, Progressing  Intervention: Prevent Fall and Fall Injury  Recent Flowsheet Documentation  Taken 4/5/2024 0800 by Rob Valdez RN  Safety Promotion/Fall Prevention: safety round/check completed  Intervention: Prevent Infection  Recent Flowsheet Documentation  Taken 4/5/2024 0800 by Rob Valdez RN  Infection Prevention:   hand hygiene promoted   rest/sleep promoted  Intervention: Prevent VTE (Venous Thromboembolism)  Recent Flowsheet Documentation  Taken 4/5/2024 0800 by Rob Valdez RN  VTE Prevention/Management: ([place] subQ) other (see comments)  Goal: Optimal Comfort and Wellbeing  Outcome: Ongoing, Progressing  Goal: Home and Community Transition Plan Established  Outcome: Ongoing, Progressing     Problem: Mobility Impairment  Goal: Optimal Mobility [place] and Safety  Outcome: Ongoing, Progressing     Problem: Skin Injury Risk Increased  Goal: Skin Health and Integrity  Outcome: Ongoing, Progressing  Intervention: Promote and Optimize Oral Intake  Recent Flowsheet Documentation  Taken 4/5/2024 0800 by Rob Valdez RN  Oral Nutrition Promotion: physical activity promoted  Intervention: Optimize Skin Protection  Recent Flowsheet Documentation  Taken 4/5/2024 0800 by Rob Valdez RN  Pressure Reduction Techniques:   frequent weight shift encouraged   heels elevated off bed   weight shift assistance provided  Pressure Reduction Devices:   pressure-redistributing mattress utilized   heel offloading device utilized   positioning supports utilized  Skin Protection:   adhesive use limited   incontinence pads utilized      Problem: Fall Injury Risk  Goal: Absence of Fall and Fall-Related Injury  Outcome: Ongoing, Progressing  Intervention: Identify and Manage Contributors  Recent Flowsheet Documentation  Taken 4/5/2024 0800 by Rob Valdez RN  Medication Review/Management: medications reviewed  Intervention: Promote Injury-Free Environment  Recent Flowsheet Documentation  Taken 4/5/2024 0800 by Rob Valdez RN  Safety Promotion/Fall Prevention: safety round/check completed     Problem: Cognitive Impairment  Goal: Optimal Cognitive Function  Outcome: Ongoing, Progressing     Problem: Swallowing Impairment  Goal: Optimal Eating/Swallowing without Aspir  Outcome: Ongoing, Progressing

## 2024-04-06 PROCEDURE — 25010000002 HEPARIN (PORCINE) PER 1000 UNITS: Performed by: INTERNAL MEDICINE

## 2024-04-06 PROCEDURE — 99231 SBSQ HOSP IP/OBS SF/LOW 25: CPT | Performed by: FAMILY MEDICINE

## 2024-04-06 RX ADMIN — AMLODIPINE BESYLATE 2.5 MG: 5 TABLET ORAL at 08:29

## 2024-04-06 RX ADMIN — ROPINIROLE HYDROCHLORIDE 4 MG: 1 TABLET, FILM COATED ORAL at 21:53

## 2024-04-06 RX ADMIN — TICAGRELOR 60 MG: 60 TABLET ORAL at 08:29

## 2024-04-06 RX ADMIN — OXYBUTYNIN CHLORIDE 10 MG: 5 TABLET, EXTENDED RELEASE ORAL at 08:29

## 2024-04-06 RX ADMIN — DOCUSATE SODIUM 50 MG AND SENNOSIDES 8.6 MG 2 TABLET: 8.6; 5 TABLET, FILM COATED ORAL at 08:29

## 2024-04-06 RX ADMIN — ACETAMINOPHEN 1000 MG: 500 TABLET ORAL at 15:07

## 2024-04-06 RX ADMIN — MIRTAZAPINE 30 MG: 15 TABLET, FILM COATED ORAL at 21:52

## 2024-04-06 RX ADMIN — PANTOPRAZOLE SODIUM 40 MG: 40 TABLET, DELAYED RELEASE ORAL at 06:06

## 2024-04-06 RX ADMIN — DICLOFENAC SODIUM 4 G: 10 GEL TOPICAL at 17:10

## 2024-04-06 RX ADMIN — LOSARTAN POTASSIUM 50 MG: 50 TABLET, FILM COATED ORAL at 08:29

## 2024-04-06 RX ADMIN — DICLOFENAC SODIUM 4 G: 10 GEL TOPICAL at 08:30

## 2024-04-06 RX ADMIN — ASPIRIN 81 MG: 81 TABLET, CHEWABLE ORAL at 08:29

## 2024-04-06 RX ADMIN — ATORVASTATIN CALCIUM 80 MG: 40 TABLET, FILM COATED ORAL at 21:53

## 2024-04-06 RX ADMIN — HEPARIN SODIUM 5000 UNITS: 5000 INJECTION INTRAVENOUS; SUBCUTANEOUS at 21:53

## 2024-04-06 RX ADMIN — Medication 1 MG: at 08:29

## 2024-04-06 RX ADMIN — HEPARIN SODIUM 5000 UNITS: 5000 INJECTION INTRAVENOUS; SUBCUTANEOUS at 08:30

## 2024-04-06 RX ADMIN — ACETAMINOPHEN 1000 MG: 500 TABLET ORAL at 06:07

## 2024-04-06 RX ADMIN — DICLOFENAC SODIUM 4 G: 10 GEL TOPICAL at 21:54

## 2024-04-06 RX ADMIN — GABAPENTIN 200 MG: 100 CAPSULE ORAL at 08:29

## 2024-04-06 RX ADMIN — GABAPENTIN 200 MG: 100 CAPSULE ORAL at 21:52

## 2024-04-06 RX ADMIN — TICAGRELOR 60 MG: 60 TABLET ORAL at 21:52

## 2024-04-06 NOTE — PROGRESS NOTES
[facility]  PROGRESS NOTE     Patient Identification:  Name:  Regine Beckham  Age:  68 y.o.  Sex:  female  :  1955  MRN:  [id number]  Visit Number:  [id number]  ROOM: 106/     Primary Care Provider:  rDead Burton MD    Length of stay in inpatient status:  10    Subjective     Chief Compliant:  No chief complaint on file.      History of Presenting Illness: 68-year-old female being treated for debility.  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  Patient complains of left arm aching and really with regimen she is currently on we have not made much of a difference in her pain management at this point.    Objective     Current Hospital Meds:amLODIPine, 2.5 mg, Oral, Q24H  aspirin, 81 mg, Oral, Daily  atorvastatin, 80 mg, Oral, Nightly  Diclofenac Sodium, 4 g, Topical, 4x Daily  folic acid, 1 mg, Oral, Daily  gabapentin, 200 mg, Oral, Q12H  heparin (porcine), 5,000 Units, Subcutaneous, Q12H  losartan, 50 mg, Oral, Daily  mirtazapine, 30 mg, Oral, Nightly  oxybutynin XL, 10 mg, Oral, Daily  pantoprazole, 40 mg, Oral, Q AM  rOPINIRole, 4 mg, Oral, Nightly  senna-docusate sodium, 2 tablet, Oral, BID  ticagrelor, 60 mg, Oral, BID       ----------------------------------------------------------------------------------------------------------------------  Vital Signs:  Temp:  [97.8 °F (36.6 °C)-98.1 °F (36.7 °C)] 97.8 °F (36.6 °C)  Heart Rate:  [66-82] 82  Resp:  [18] 18  BP: ()/(57-89) 157/89  SpO2:  [95 %] 95 %  on   ;   Device (Oxygen Therapy): room air  Body mass index is 22.6 kg/m².    Wt Readings from Last 3 Encounters:   24 63.5 kg (140 lb)   24 62.4 kg (137 lb 8 oz)   24 72.1 kg (159 lb)     Intake & Output (last 3 days)          07 07 07 07 07 07 0701   0700    P.O. 3257 361 9684 360    Total Intake(mL/kg) 1080 (17) 960 (15.1) 1320 (20.8) 360 (5.7)    Net +1080 +960 +1320 +360             Urine Unmeasured Occurrence 4 x 3 x 7 x 1 x    Stool Unmeasured Occurrence 1 x  1 x 1 x          Diet: Regular/House; Texture: Soft to Chew (NDD 3); Soft to Chew: Whole Meat; Fluid Consistency: Thin (IDDSI 0)  ----------------------------------------------------------------------------------------------------------------------  Physical exam:  Constitutional: No acute distress  HEENT: Normocephalic atraumatic  Neck: Supple  Cardiovascular: Regular rate and rhythm  Pulmonary/Chest: Clear to auscultation  Abdominal:  .  Positive bowel sounds soft  Musculoskeletal: No obvious arthropathy  Neurological: Left hemiparesis  Skin: No rash  Peripheral vascular: No edema  Genitourinary:  ----------------------------------------------------------------------------------------------------------------------    Last echocardiogram:  Results for orders placed during the hospital encounter of 01/19/24    Adult Transthoracic Echo Limited W/ Cont if Necessary Per Protocol    Interpretation Summary    Left ventricular systolic function is normal. Estimated left ventricular EF = 60%    Saline test results are negative for intracardiac shunting..    ----------------------------------------------------------------------------------------------------------------------  Results from last 7 days   Lab Units 04/05/24  0020 04/04/24  0006   WBC 10*3/mm3 6.20 6.70   HEMOGLOBIN g/dL 10.1* 10.3*   HEMATOCRIT % 31.9* 33.5*   MCV fL 94.7 97.1*   MCHC g/dL 31.7 30.7*   PLATELETS 10*3/mm3 245 234         Results from last 7 days   Lab Units 04/05/24  0020 04/04/24  1108 04/04/24  0006   SODIUM mmol/L 139  --  139   POTASSIUM mmol/L 4.0 4.0 3.4*   CHLORIDE mmol/L 105  --  106   CO2 mmol/L 25.9  --  22.5   BUN mg/dL 15  --  17   CREATININE mg/dL 0.91  --  0.75   CALCIUM mg/dL 9.4  --  9.0   GLUCOSE mg/dL 110*  --  109*   Estimated Creatinine Clearance: 58.5 mL/min (by C-G formula based on SCr of 0.91 mg/dL).  No results found for:  "\"AMMONIA\"              No results found for: \"HGBA1C\", \"POCGLU\"  Lab Results   Component Value Date    TSH 2.880 03/17/2024     No results found for: \"PREGTESTUR\", \"PREGSERUM\", \"HCG\", \"HCGQUANT\"  Pain Management Panel          Latest Ref Rng & Units 12/20/2023   Pain Management Panel   Amphetamine, Urine Qual Negative Negative    Barbiturates Screen, Urine Negative Negative    Benzodiazepine Screen, Urine Negative Negative    Buprenorphine, Screen, Urine Negative Negative    Cocaine Screen, Urine Negative Negative    Fentanyl, Urine Negative Negative    Methadone Screen , Urine Negative Negative    Methamphetamine, Ur Negative Negative      Brief Urine Lab Results  (Last result in the past 365 days)        Color   Clarity   Blood   Leuk Est   Nitrite   Protein   CREAT   Urine HCG        03/21/24 1613 Dark Yellow   Turbid   Trace   Moderate (2+)   Positive   30 mg/dL (1+)                 No results found for: \"BLOODCX\"      No results found for: \"URINECX\"  No results found for: \"WOUNDCX\"  No results found for: \"STOOLCX\"        I have personally looked at the labs and they are summarized above.  ----------------------------------------------------------------------------------------------------------------------  Detailed radiology reports for the last 24 hours:    Imaging Results (Last 24 Hours)       ** No results found for the last 24 hours. **          Final impressions for the last 30 days of radiology reports:    MRI Brain Without Contrast    Result Date: 3/20/2024  Findings consistent with chronic small vessel ischemic change with encephalomalacia in the right frontal lobe from prior infarct.     This report was finalized on 3/20/2024 1:35 PM by Marianna Caicedo M.D..      XR Chest 1 View    Result Date: 3/17/2024  No radiographic evidence of acute cardiac or pulmonary disease.    This report was finalized on 3/17/2024 1:50 PM by Dr. Hu Obrien MD.      XR Shoulder 2+ View Left    Result Date: 3/17/2024    " No acute findings in the left shoulder.   This report was finalized on 3/17/2024 11:13 AM by Dr. Hu Obrien MD.      XR Hip With or Without Pelvis 2 - 3 View Left    Result Date: 3/17/2024  1.  No acute fracture or dislocation. 2.  Moderate to extensive degenerative osteoarthritis in the left hip.   This report was finalized on 3/17/2024 11:13 AM by Dr. Hu Obrien MD.      CT Head Without Contrast    Result Date: 3/17/2024   1. Atrophy and chronic microangiopathic changes 2. Apparent suggestive of encephalomalacia in the right parietal region 3. No focal parenchymal mass, hemorrhage, or midline shift  This report was finalized on 3/17/2024 10:59 AM by Dr. Hu Obrien MD.     I have personally looked at the radiology images and read the final radiology report.    Assessment & Plan    Debility with history of CVA left-sided paresis.  Requiring maximum assistance for upper body dressing; maximum assist for lower body dressing; minimum assist for grooming.  Working on motor skills to improve ADLs and functional mobility.  Requiring moderate assistance for transfers.  Ambulated 40 feet with a hemiwalker and moderate two-person assistance yesterday.  Social work is working to hopefully find SNF bed for patient    Left upper extremity pain, likely neuropathic pain but cannot rule out the possibility inflammatory changes.  Currently on gabapentin 200 mg twice daily Voltaren gel.  Patient thought to be high risk for nonsteroidals secondary to other medical issues and other medications that she is currently prescribed.    Anemia has been stable    Hypokalemia replace per protocol    Dysphagia/dysarthria continue speech therapy work.    Hypertension fair control continue losartan and Norvasc    Tobacco abuse recommend cessation          VTE Prophylaxis:   Mechanical Order History:       None          Pharmalogical Order History:        Ordered     Dose Route Frequency Stop    03/27/24 6371  heparin (porcine) 5000  UNIT/ML injection 5,000 Units         5,000 Units SC Every 12 Hours Scheduled --                        Isreal Ballesteros MD  [facility]  04/06/24  09:43 EDT

## 2024-04-07 PROCEDURE — 25010000002 HEPARIN (PORCINE) PER 1000 UNITS: Performed by: INTERNAL MEDICINE

## 2024-04-07 RX ADMIN — DICLOFENAC SODIUM 4 G: 10 GEL TOPICAL at 20:59

## 2024-04-07 RX ADMIN — HEPARIN SODIUM 5000 UNITS: 5000 INJECTION INTRAVENOUS; SUBCUTANEOUS at 09:25

## 2024-04-07 RX ADMIN — TICAGRELOR 60 MG: 60 TABLET ORAL at 20:59

## 2024-04-07 RX ADMIN — DICLOFENAC SODIUM 4 G: 10 GEL TOPICAL at 12:45

## 2024-04-07 RX ADMIN — GABAPENTIN 200 MG: 100 CAPSULE ORAL at 09:30

## 2024-04-07 RX ADMIN — PANTOPRAZOLE SODIUM 40 MG: 40 TABLET, DELAYED RELEASE ORAL at 05:08

## 2024-04-07 RX ADMIN — DICLOFENAC SODIUM 4 G: 10 GEL TOPICAL at 17:24

## 2024-04-07 RX ADMIN — ATORVASTATIN CALCIUM 80 MG: 40 TABLET, FILM COATED ORAL at 20:58

## 2024-04-07 RX ADMIN — ACETAMINOPHEN 1000 MG: 500 TABLET ORAL at 18:08

## 2024-04-07 RX ADMIN — Medication 1 MG: at 09:26

## 2024-04-07 RX ADMIN — HEPARIN SODIUM 5000 UNITS: 5000 INJECTION INTRAVENOUS; SUBCUTANEOUS at 20:58

## 2024-04-07 RX ADMIN — DICLOFENAC SODIUM 4 G: 10 GEL TOPICAL at 08:59

## 2024-04-07 RX ADMIN — OXYBUTYNIN CHLORIDE 10 MG: 5 TABLET, EXTENDED RELEASE ORAL at 09:26

## 2024-04-07 RX ADMIN — DOCUSATE SODIUM 50 MG AND SENNOSIDES 8.6 MG 2 TABLET: 8.6; 5 TABLET, FILM COATED ORAL at 09:26

## 2024-04-07 RX ADMIN — MIRTAZAPINE 30 MG: 15 TABLET, FILM COATED ORAL at 20:58

## 2024-04-07 RX ADMIN — TICAGRELOR 60 MG: 60 TABLET ORAL at 09:25

## 2024-04-07 RX ADMIN — GABAPENTIN 200 MG: 100 CAPSULE ORAL at 20:58

## 2024-04-07 RX ADMIN — LOSARTAN POTASSIUM 50 MG: 50 TABLET, FILM COATED ORAL at 09:25

## 2024-04-07 RX ADMIN — AMLODIPINE BESYLATE 2.5 MG: 5 TABLET ORAL at 09:26

## 2024-04-07 RX ADMIN — ASPIRIN 81 MG: 81 TABLET, CHEWABLE ORAL at 09:25

## 2024-04-07 RX ADMIN — ROPINIROLE HYDROCHLORIDE 4 MG: 1 TABLET, FILM COATED ORAL at 20:58

## 2024-04-07 NOTE — PLAN OF CARE
Problem: Rehabilitation (IRF) Plan of Care  Goal: Plan of Care Review  Outcome: Ongoing, Progressing  Goal: Patient-Specific Goal (Individualized)  Outcome: Ongoing, Progressing  Goal: Absence of New-Onset Illness or Injury  Outcome: Ongoing, Progressing  Intervention: Prevent VTE (Venous Thromboembolism)  Recent Flowsheet Documentation  Taken 4/6/2024 1915 by Anna Marie Fuentes RN  VTE Prevention/Management: (see  mar) other (see comments)  Goal: Optimal Comfort and Wellbeing  Outcome: Ongoing, Progressing  Goal: Home and Community Transition Plan Established  Outcome: Ongoing, Progressing     Problem: Mobility Impairment  Goal: Optimal Mobility [place] and Safety  Outcome: Ongoing, Progressing     Problem: Skin Injury Risk Increased  Goal: Skin Health and Integrity  Outcome: Ongoing, Progressing  Intervention: Promote and Optimize Oral Intake  Recent Flowsheet Documentation  Taken 4/6/2024 1915 by Anna Marie Fuentes RN  Oral Nutrition Promotion: physical activity promoted  Intervention: Optimize Skin Protection  Recent Flowsheet Documentation  Taken 4/6/2024 1915 by Anna Marie Fuentes, RN  Pressure Reduction Techniques: heels elevated off bed  Pressure Reduction Devices: heel offloading device utilized     Problem: Fall Injury Risk  Goal: Absence of Fall and Fall-Related Injury  Outcome: Ongoing, Progressing   Goal Outcome Evaluation:

## 2024-04-07 NOTE — PLAN OF CARE
Problem: Rehabilitation (IRF) Plan of Care  Goal: Plan of Care Review  Outcome: Ongoing, Progressing  Flowsheets (Taken 4/7/2024 1105)  Progress: improving  Plan of Care Reviewed With: patient  Outcome Evaluation: Patient a/o x3. Call light and items within reach. No acute s/s of distress noted. Continue current plan of care.  Goal: Patient-Specific Goal (Individualized)  Outcome: Ongoing, Progressing  Goal: Absence of New-Onset Illness or Injury  Outcome: Ongoing, Progressing  Intervention: Prevent Fall and Fall Injury  Recent Flowsheet Documentation  Taken 4/7/2024 1000 by Ivy Edwards RN  Safety Promotion/Fall Prevention:   safety round/check completed   nonskid shoes/slippers when out of bed   assistive device/personal items within reach  Taken 4/7/2024 0926 by Ivy Edwards RN  Safety Promotion/Fall Prevention:   safety round/check completed   assistive device/personal items within reach   nonskid shoes/slippers when out of bed  Taken 4/7/2024 0800 by Ivy Edwards RN  Safety Promotion/Fall Prevention:   safety round/check completed   nonskid shoes/slippers when out of bed   assistive device/personal items within reach  Intervention: Prevent Infection  Recent Flowsheet Documentation  Taken 4/7/2024 1000 by Ivy Edwards RN  Infection Prevention:   hand hygiene promoted   rest/sleep promoted  Taken 4/7/2024 0926 by Ivy Edwards RN  Infection Prevention:   hand hygiene promoted   rest/sleep promoted  Taken 4/7/2024 0800 by Ivy Edwards RN  Infection Prevention:   hand hygiene promoted   rest/sleep promoted  Intervention: Prevent VTE (Venous Thromboembolism)  Recent Flowsheet Documentation  Taken 4/7/2024 0926 by Ivy dEwards RN  VTE Prevention/Management: (Heparin) other (see comments)  Goal: Optimal Comfort and Wellbeing  Outcome: Ongoing, Progressing  Goal: Home and Community Transition Plan Established  Outcome: Ongoing, Progressing   Goal Outcome Evaluation:  Plan of Care  Reviewed With: patient        Progress: improving  Outcome Evaluation: Patient a/o x3. Call light and items within reach. No acute s/s of distress noted. Continue current plan of care.

## 2024-04-07 NOTE — PROGRESS NOTES
Assisted By: Ruthie BARRERA    CC: Follow-up on debility with history of CVA affecting her left side functional.    Interview History/HPI: Patient is without new complaints, she has some chronic left arm pain since her previous CVA.  She states she did eat her breakfast.  She denies any shortness of breath.          Current Hospital Meds:  amLODIPine, 2.5 mg, Oral, Q24H  aspirin, 81 mg, Oral, Daily  atorvastatin, 80 mg, Oral, Nightly  Diclofenac Sodium, 4 g, Topical, 4x Daily  folic acid, 1 mg, Oral, Daily  gabapentin, 200 mg, Oral, Q12H  heparin (porcine), 5,000 Units, Subcutaneous, Q12H  losartan, 50 mg, Oral, Daily  mirtazapine, 30 mg, Oral, Nightly  oxybutynin XL, 10 mg, Oral, Daily  pantoprazole, 40 mg, Oral, Q AM  rOPINIRole, 4 mg, Oral, Nightly  senna-docusate sodium, 2 tablet, Oral, BID  ticagrelor, 60 mg, Oral, BID         Vitals:    04/06/24 1900   BP: 106/55   Pulse: 63   Resp: 16   Temp: 97.7 °F (36.5 °C)   SpO2: 97%         Intake/Output Summary (Last 24 hours) at 4/7/2024 0821  Last data filed at 4/7/2024 0700  Gross per 24 hour   Intake 1560 ml   Output --   Net 1560 ml       EXAM: 177/71, prior to this 115/63, 16, 74, 98.4, room air saturation 94%.  Lungs are clear, heart regular rate and rhythm, left arm was in a sling, I took this out to evaluate the arm, no edema, she was able to minimally squeeze with her left hand , she really has no biceps or triceps of any significance.  Right arm strength is good, left leg moves better than her left arm.  No edema.  Right leg strength is good.       Diet: Regular/House; Texture: Soft to Chew (NDD 3); Soft to Chew: Whole Meat; Fluid Consistency: Thin (IDDSI 0)        LABS:     Lab Results (last 48 hours)       ** No results found for the last 48 hours. **          Last labs 4/5, BMP was unremarkable.  CBC showed a white count 6 hemoglobin 10 hematocrit 32 platelet count 245      Radiology:    Imaging Results (Last 72 Hours)       ** No results found for the  last 72 hours. **            Results for orders placed during the hospital encounter of 01/19/24    Adult Transthoracic Echo Limited W/ Cont if Necessary Per Protocol    Interpretation Summary    Left ventricular systolic function is normal. Estimated left ventricular EF = 60%    Saline test results are negative for intracardiac shunting..      Assessment/Plan:   Debility, complicated by previous CVA.  This was a right frontal CVA.  She has some residual left weakness from this.  She is seeing all 3 modalities of therapy, speech therapy is working with exercises to improve dysphagia.  Also working with lingual abilities.  With OT, patient is max assist upper body dressing, max assist lower body dressing, min assist for grooming.  With PT, patient was thought to be making steady progress with therapy demonstrating ability to ambulate further distance and requiring less assistance with functional mobility.  Patient was mod assist sit to stand stand to sit, mod assist stand pivot, mod assist toilet transfer, patient was two-person mod assist with a hemiwalker, required cues for upright posture and increased step width and length.    Left arm discomfort, chronic, she was started on low-dose gabapentin by Dr. Olmstead 4/5, continue to monitor.  E. coli UTI, treated     DVT prophylaxis, SQ heparin     Previous CVA status post right carotid stent.  Stable, continue aspirin, high-dose statin, and Brilinta.     Hypertension,wide flucutations as she has been borderline low in the past, continue to monitor on current medication.  He has sustained hypertension good adjust these further.     Hypokalemia resolved     Mild anemia stable     Tobacco use, counseled to quit, she had previously declined the need for patch.    Awaiting SNF placement    Bowels are moving    Reji Hayes MD

## 2024-04-08 PROCEDURE — 97530 THERAPEUTIC ACTIVITIES: CPT

## 2024-04-08 PROCEDURE — 97535 SELF CARE MNGMENT TRAINING: CPT | Performed by: OCCUPATIONAL THERAPIST

## 2024-04-08 PROCEDURE — 25010000002 HEPARIN (PORCINE) PER 1000 UNITS: Performed by: INTERNAL MEDICINE

## 2024-04-08 PROCEDURE — 97110 THERAPEUTIC EXERCISES: CPT

## 2024-04-08 PROCEDURE — 97110 THERAPEUTIC EXERCISES: CPT | Performed by: OCCUPATIONAL THERAPIST

## 2024-04-08 PROCEDURE — 92526 ORAL FUNCTION THERAPY: CPT

## 2024-04-08 PROCEDURE — 97116 GAIT TRAINING THERAPY: CPT

## 2024-04-08 PROCEDURE — 99232 SBSQ HOSP IP/OBS MODERATE 35: CPT | Performed by: INTERNAL MEDICINE

## 2024-04-08 PROCEDURE — 97112 NEUROMUSCULAR REEDUCATION: CPT | Performed by: OCCUPATIONAL THERAPIST

## 2024-04-08 PROCEDURE — 92507 TX SP LANG VOICE COMM INDIV: CPT

## 2024-04-08 PROCEDURE — 97530 THERAPEUTIC ACTIVITIES: CPT | Performed by: OCCUPATIONAL THERAPIST

## 2024-04-08 RX ADMIN — GABAPENTIN 200 MG: 100 CAPSULE ORAL at 09:16

## 2024-04-08 RX ADMIN — ACETAMINOPHEN 1000 MG: 500 TABLET ORAL at 06:18

## 2024-04-08 RX ADMIN — OXYBUTYNIN CHLORIDE 10 MG: 5 TABLET, EXTENDED RELEASE ORAL at 09:15

## 2024-04-08 RX ADMIN — HEPARIN SODIUM 5000 UNITS: 5000 INJECTION INTRAVENOUS; SUBCUTANEOUS at 21:11

## 2024-04-08 RX ADMIN — ROPINIROLE HYDROCHLORIDE 4 MG: 1 TABLET, FILM COATED ORAL at 21:11

## 2024-04-08 RX ADMIN — DICLOFENAC SODIUM 4 G: 10 GEL TOPICAL at 17:37

## 2024-04-08 RX ADMIN — ASPIRIN 81 MG: 81 TABLET, CHEWABLE ORAL at 09:14

## 2024-04-08 RX ADMIN — ACETAMINOPHEN 1000 MG: 500 TABLET ORAL at 21:10

## 2024-04-08 RX ADMIN — LOSARTAN POTASSIUM 50 MG: 50 TABLET, FILM COATED ORAL at 09:14

## 2024-04-08 RX ADMIN — DICLOFENAC SODIUM 4 G: 10 GEL TOPICAL at 09:16

## 2024-04-08 RX ADMIN — Medication 1 MG: at 09:15

## 2024-04-08 RX ADMIN — ACETAMINOPHEN 1000 MG: 500 TABLET ORAL at 14:54

## 2024-04-08 RX ADMIN — TICAGRELOR 60 MG: 60 TABLET ORAL at 09:14

## 2024-04-08 RX ADMIN — HEPARIN SODIUM 5000 UNITS: 5000 INJECTION INTRAVENOUS; SUBCUTANEOUS at 09:16

## 2024-04-08 RX ADMIN — DICLOFENAC SODIUM 4 G: 10 GEL TOPICAL at 12:34

## 2024-04-08 RX ADMIN — TICAGRELOR 60 MG: 60 TABLET ORAL at 21:11

## 2024-04-08 RX ADMIN — GABAPENTIN 200 MG: 100 CAPSULE ORAL at 21:10

## 2024-04-08 RX ADMIN — DICLOFENAC SODIUM 4 G: 10 GEL TOPICAL at 21:15

## 2024-04-08 RX ADMIN — DOCUSATE SODIUM 50 MG AND SENNOSIDES 8.6 MG 2 TABLET: 8.6; 5 TABLET, FILM COATED ORAL at 09:14

## 2024-04-08 RX ADMIN — PANTOPRAZOLE SODIUM 40 MG: 40 TABLET, DELAYED RELEASE ORAL at 05:00

## 2024-04-08 RX ADMIN — AMLODIPINE BESYLATE 2.5 MG: 5 TABLET ORAL at 09:15

## 2024-04-08 RX ADMIN — MIRTAZAPINE 30 MG: 15 TABLET, FILM COATED ORAL at 21:11

## 2024-04-08 RX ADMIN — ATORVASTATIN CALCIUM 80 MG: 40 TABLET, FILM COATED ORAL at 21:11

## 2024-04-08 NOTE — PAYOR COMM NOTE
"Suad Euceda RN   Utilization Review Nurse for Inpatient Rehab   Phone: [phone]  Fax: [phone]  Email: [email or web address]  [facility] NPI: [id number]    CLINICAL REVIEW FOR CONTINUED REHAB STAY APPROVAL  Member:  Regine Beckham  :  1955  AUTH# [id number]  ID# [id number]  Admission Date:  24  Discharge Date is pending due to active search for SNF placement.  *Requesting additional 7 days    Regine Beckham \"NEGIN\" (68 y.o. Female)     Date of Birth  1955   Social Security Number     Address  [address] [place] [address]   Home Phone  [phone]   MRN  [id number]     [facility]   Marital Status                      Admission Date  3/27/24   Admission Type  Elective   Admitting Provider  Reji Hayes MD   Attending Provider  Reji Hayes MD   Department, Room/Bed  [facility] REHABILITATION, 106/1S     Discharge Date     Discharge Disposition     Discharge Destination                         Attending Provider: Reji Hayes MD   Allergies: Penicillins  Isolation: None  Infection: None  Code Status: CPR   Ht: 167.6 cm (66\")  Wt: 63.5 kg (140 lb)   Admission Cmt: None  Principal Problem: Debility [R53.81]               [facility] Date/Time: 3/27/2024 [facility]  Hospital Account: [id number]   MRN: [id number]  Patient:  Regine Beckham  Contact Serial #: [id number]  SSN:         ENCOUNTER            Patient Class: Rehab IP   Unit: [facility] Service: Physical Medicine and Rehabilitation     Bed: 106/1S  Admitting Provider: Reji Hayes MD   Referring Physician: Reji Hayes  Attending Provider: Reji Hayes MD   Adm Diagnosis: Debility [R53.81]             PATIENT            Name: Regine Beckham : 1955 (68 yrs)  Address: [address] [place] [address], [address] Sex: Female  City: [place] [address]   County: [place]  Marital Status:  Ethnicity: NOT            "                                                             Race: WHITE  Primary Care Provider: Dread Burton MD Patients Phone: Home Phone: [phone]     Mobile Phone: [phone]    EMERGENCY CONTACT  Contact Name Legal Guardian? Relationship to Patient Home Phone Work Phone Mobile Phone  1. Yaa Elizondo  2. Karla Patel      Friend  Friend    [phone]          GUARANTOR            Guarantor: Regine Beckham     : 1955  Address: [address] [place] [address];[place] [address] Sex: Female    [place] [id number]    Relation to Patient: Self       Home Phone: [phone]  Guarantor ID: [id number]       Work Phone:    GUARANTOR EMPLOYER  Employer:           Status: RETIRED  COVERAGE         PRIMARY INSURANCE  Payor: T MEDICARE REPLACEMENT Plan: AETNA MEDICARE REPLACEMENT  Group Number: [id number] Insurance Type: INDEMNITY  Subscriber Name: ASHVINREGINE GALDINO Subscriber : 1955  Subscriber ID: [id number] Coverage Address: [place] [address]  Pat. Rel. to Subscriber: Self Coverage Phone: [phone]      Suad Euceda, RN  Registered Nurse  Nursing  Significant Note     Signed  Date of Service:  24 1256  Creation Time:  24 1256     Signed             24 1255   Plan   Plan Spoke to Iztel at [facility] to let her know we would f/u back up on placement on Monday.                   Suad Euceda, RN  Registered Nurse  Nursing  Significant Note     Signed  Date of Service:  24 1240  Creation Time:  24 1240     Signed             24 1228   Plan   Plan Spoke to Itzel at [facility] who says she has an outstanding bill at their [facility] facility of $1400.  Discussed this with pt and she is agreeable to adhere to their smoking policy and understands about co-pay days and outstanding bill.  She also understands about her pending Medicaid status.  Pt requested me to call her friend Teo.  Spoke to Teo at [phone] per pt's request about [facility] declining her and about [facility] agreeable to accept her.  Teo plans to call Preeti at [facility] and Bossman at [facility] on Monday to discuss some details.  Will follow up with SNF placement on Monday.                    Suad Euceda RN  Registered Nurse  Nursing  Significant Note     Signed  Date of Service:  04/05/24 0955  Creation Time:  04/05/24 0955     Signed             04/05/24 0952   Plan   Plan Spoke to Itzel Blanton from [facility] who states that pt was a resident in the past who was discharged home, but pt was non-compliant with their smoking policy, still has an outstanding bill, and will be Medicaid pending when her primary insurance benefit has been termed.  She will most likely be long-term placement, but pt wants short-term.  They will take her back, but pt must agree to be tobacco-free and adhere to their smoking policy.  She would be using her co-pay days if admitted.  Itzel will find out how much her outstanding bill balance is so we can discuss this with pt.                     Suad Euceda RN  Registered Nurse  Nursing  Significant Note     Signed  Date of Service:  04/04/24 1522  Creation Time:  04/04/24 1522     Signed             04/04/24 1520   Plan   Plan Spoke to Lotus at [facility] who states they are not in-network with her insurance.  Left a message for Itzel Blanton with [facility] to inquire bed availability.                       Suad Euceda RN  Registered Nurse  Nursing  Significant Note     Signed  Date of Service:  04/04/24 1518  Creation Time:  04/04/24 1518     Signed             04/04/24 1511   Plan   Plan Spoke to [facility] who states they are not in-network with her insurance.  Spoke to Bouchra at [facility] who states they are not in-network with her insurance.  Spoke to Nurys at [facility] and they are not in-network with her insurance.  Spoke to Suad at [facility] who states they are not in-network with her insurance.                    Suad Euceda RN  Registered Nurse  Nursing  Significant Note     Signed  Date of Service:  04/04/24 1507  Creation Time:  04/04/24 1507     Signed             04/04/24 1506   Plan   Plan Spoke to Terese at [facility] who states they do not take her insurance.                       Suad Euceda RN  Registered Nurse  Nursing  Significant Note     Signed  Date of Service:  04/04/24 1449  Creation Time:  04/04/24 1449     Signed             04/04/24 1446   Plan   Plan Call received from Ernestina at [facility] who states they will not be accepting her for admission due to suicidal ideation in her past medical history.                   Suad Euceda RN  Registered Nurse  Nursing  Significant Note     Signed  Date of Service:  04/04/24 1212  Creation Time:  04/04/24 1212     Signed             04/04/24 1211   Plan   Plan Left message for Ernestina at [facility] to follow up on admission.                  Susanne Todd MSW     Case Management  Significant Note     Signed  Date of Service:  04/03/24 1611  Creation Time:  04/03/24 1611     Signed             04/03/24 1355   Plan   Plan Ernestina with [facility] says Preeti in the business office will reach out to her friend Teo with financial questions.  Informed Ernestina [facility] says they assisted with applying for Medicaid to cover copay days and pt stayed at their facility for 36 days.  Ernestina to follow-up with decision is made about admission.                    Susanne Todd MSW     Case Management  Significant Note     Signed  Date of Service:  04/02/24 1651  Creation Time:  04/02/24 1651     Signed             04/02/24 1650   Plan   Plan Ernestina with [facility] will follow-up with SS on 4-3-24 when Administration makes decision about admission.                       Susanne Todd MSW      Case Management  Significant Note     Signed  Date of Service:  04/02/24 1255  Creation Time:  04/02/24 1255     Signed             04/02/24 1215   Plan   Plan Informed pt about sending referral to [facility] and explained if they can accept her they will contact insurance to request prior-authorization request which can take 1-3 days or longer for a decision.  Will follow.                   Susanne Todd MSW     Case Management  Significant Note     Signed  Date of Service:  04/02/24 1209  Creation Time:  04/02/24 1209     Signed             04/02/24 1155   Plan   Plan Spoke to Ernestina with [facility] who says to send referral for review; they are in-network with pt's insurance.  Informed Ernestina about SNF days used at [place], [place] Medicaid application completed to assist with co-pay days while at this facility.  Informed her pt wants short-term placement.  Referral sent to [facility] via [employer] in-basket (face sheet, H&P, PT/OT/SLP notes/evaluations, MD progress notes, nursing note, dietician consult note, medication list, labs, and diet orders).  Ernestina to follow-up with SS about admission.  Contacted friend Teo [phone] with recommendation for SNF placement, sending referral to [facility] per pt preference and how she is doing in therapy.  Friend agreeable to plans.  Friend requests to be notified about where pt goes at discharge.  Will follow.                       Susanne Todd MSW     Case Management  Significant Note     Signed  Date of Service:  04/02/24 1204  Creation Time:  04/02/24 1204     Signed             04/02/24 1130   Plan   Plan Spoke to pt about recommendation for SNF placement for continued rehab/nursing care.  Explained she needs assistance with care and not safe to return to her apartment alone.  Pt does not have a caregiver to stay with her.  Discussed option of going to SNF for continued rehab/nursing  care.  Pt prefers to be in [place] area.  Reviewed SNF's in [place] and [facility].  Pt prefers [facility] if they are in-network with her insurance, [place], or [facility] in [place], [place].  Pt does not want to go back to [facility].  Left message for Ernestina at [facility] [phone].  [phone] per Suad who says bed is not available and they are not in-network with pt's insurance, however, they do have some residents with Aetna Medicare replacement.  Contacted [facility] [phone] per Lenore who says they are not in-network with Aetna Medicare and are currently on-hold for admissions due to staffing issues.                   Susanne Todd MSW     Case Management  Significant Note     Signed  Date of Service:  04/02/24 1200  Creation Time:  04/02/24 1200     Signed             04/02/24 1054   Plan   Plan Team conference held today.  Pt recommended for SNF placement, therefore, discharge date is pending.  Spoke to Bossmna with [facility]. [phone] who says pt was a resident from 2-2-24 until 3-9-24 (she used 36 of SNF benefits), they helped her apply for [place] Medicaid to cover co-pay days and Medicaid is pending.  Pt chose to be discharged home due to insurance no longer paying for skilled care.  [facility] says they have one short-term bed available and would be agreeable to review pt for admission again if needed.                       Susanne Todd MSW     Case Management  Significant Note     Signed  Date of Service:  03/29/24 1131  Creation Time:  03/29/24 1131     Signed             03/29/24 1045   Living Environment   People in Home alone   Current Living Arrangements apartment   Duration at Residence 4 years   Potentially Unsafe Housing Conditions none   In the past 12 months has the electric, gas, oil, or water company threatened to shut off services in your home? No   Primary Care  Provided by self   Provides Primary Care For no one   Caregiving Concerns Pt does not have a caregiver   Quality of Family Relationships other (see comments)  (Pt does not have any family to assist with caregiving.)   Able to Return to Prior Arrangements other (see comments)  (Pt does not have a caregiver to stay with her.)   Living Arrangement Comments Pt is a 69 Y/O female admitted to physical rehab on 3-27-24 with dx of debility from [facility].  Spoke to pt and friend Teo Sanchez [phone] who lives in [place].  Pt is a  and has no children.  Pt lives alone in an apartment at [facility] on the first floor.  Pt says there is an elevator in their building and she has no steps inside her apartment.  Pt has Aetna Medicare replacement insurance.  Friend says pt has pending Medicaid.  PCP is Dr. Dread Burton.  Pt uses [facility].  Pt does not have POA or living will.  Pt was independent with ADL's prior to CVA in 2024.  Pt was using a rollator borrowed from neighbor prior to admission (this has been returned to neighbor) and sometimes used a quad cane.  Pt says she had a rollator and loaned it to her aunt and did not get this back when she , therefore, insurance will not pay for another at this time.  Pt was discharged from [facility] earlier in the month per friend.  Pt has friends Shaneka Ortega, Yaa Elizondo and Lenore (unsure of her last name) that live in the same apartment building.  Friends in her building would bring food to pt after she came home from SNF.   Resource/Environmental Concerns   Resource/Environmental Concerns none   Transportation Concerns none   Transition Planning   Patient/Family Anticipates Transition to long-term care facility   Patient/Family Anticipated Services at Transition skilled nursing   Transportation Anticipated family or friend will provide   Discharge Needs Assessment (IRF)   Current  Outpatient/Agency/Support Group    (Pt did not receive home health or outpatient therapy prior to admission.)   Concerns to be Addressed adjustment to diagnosis/illness;discharge planning   Concerns Comments Pt lives alone and has no caregiver.   Equipment Currently Used at Home rollator;cane, quad;shower chair;bp cuff;hospital bed;cane, quad tip;nebulizer;commode   Current Discharge Risk lives alone   Discharge Coordination/Progress Pt would like to return to her apartment with friend Teo staying with her to provide assistance, however, friend says he is not able to stay with pt due to living in [place] and still working some.  Friend says pt will most likely need to go back into a SNF at [facility] at discharge.  Team conference will be held on 4-2-24.  Left message for SS at [facility] [phone].  SS will follow and assist with discharge planning.                   Reji Hayes MD [image]  Physician  Medicine     Progress Notes   [image:This note has been shared with the patient]  Signed     Date of Service: 04/08/24 1046  Creation Time: 04/07/24 0821    Signed     Expand All Collapse All  [image]  Assisted By: Ruthie PT     CC: Follow-up on debility with history of CVA affecting her left side functional.     Interview History/HPI: Patient is without new complaints, she has some chronic left arm pain since her previous CVA.  She states she did eat her breakfast.  She denies any shortness of breath.              Current Hospital Meds:    Scheduled Medication  amLODIPine, 2.5 mg, Oral, Q24H  aspirin, 81 mg, Oral, Daily  atorvastatin, 80 mg, Oral, Nightly  Diclofenac Sodium, 4 g, Topical, 4x Daily  folic acid, 1 mg, Oral, Daily  gabapentin, 200 mg, Oral, Q12H  heparin (porcine), 5,000 Units, Subcutaneous, Q12H  losartan, 50 mg, Oral, Daily  mirtazapine, 30 mg, Oral, Nightly  oxybutynin XL, 10 mg, Oral, Daily  pantoprazole, 40 mg, Oral, Q  AM  rOPINIRole, 4 mg, Oral, Nightly  senna-docusate sodium, 2 tablet, Oral, BID  ticagrelor, 60 mg, Oral, BID       Infusion Medications             Vitals:    04/06/24 1900  BP: 106/55  Pulse: 63  Resp: 16  Temp: 97.7 °F (36.5 °C)  SpO2: 97%           Intake/Output Summary (Last 24 hours) at 4/7/2024 0821  Last data filed at 4/7/2024 0700     Gross per 24 hour  Intake 1560 ml  Output --  Net 1560 ml        EXAM: 177/71, prior to this 115/63, 16, 74, 98.4, room air saturation 94%.  Lungs are clear, heart regular rate and rhythm, left arm was in a sling, I took this out to evaluate the arm, no edema, she was able to minimally squeeze with her left hand , she really has no biceps or triceps of any significance.  Right arm strength is good, left leg moves better than her left arm.  No edema.  Right leg strength is good.         Diet: Regular/House; Texture: Soft to Chew (NDD 3); Soft to Chew: Whole Meat; Fluid Consistency: Thin (IDDSI 0)           LABS:      Lab Results (last 48 hours)        ** No results found for the last 48 hours. **           Last labs 4/5, BMP was unremarkable.  CBC showed a white count 6 hemoglobin 10 hematocrit 32 platelet count 245        Radiology:     Imaging Results (Last 72 Hours)        ** No results found for the last 72 hours. **              Results for orders placed during the hospital encounter of 01/19/24     Adult Transthoracic Echo Limited W/ Cont if Necessary Per Protocol     Interpretation Summary    Left ventricular systolic function is normal. Estimated left ventricular EF = 60%    Saline test results are negative for intracardiac shunting..        Assessment/Plan:   Debility, complicated by previous CVA.  This was a right frontal CVA.  She has some residual left weakness from this.  She is seeing all 3 modalities of therapy, speech therapy is working with exercises to improve dysphagia.  Also working with lingual abilities.  With OT, patient is max assist upper body  dressing, max assist lower body dressing, min assist for grooming.  With PT, patient was thought to be making steady progress with therapy demonstrating ability to ambulate further distance and requiring less assistance with functional mobility.  Patient was mod assist sit to stand stand to sit, mod assist stand pivot, mod assist toilet transfer, patient was two-person mod assist with a hemiwalker, required cues for upright posture and increased step width and length.     Left arm discomfort, chronic, she was started on low-dose gabapentin by Dr. Olmstead , continue to monitor.  E. coli UTI, treated     DVT prophylaxis, SQ heparin     Previous CVA status post right carotid stent.  Stable, continue aspirin, high-dose statin, and Brilinta.     Hypertension,wide flucutations as she has been borderline low in the past, continue to monitor on current medication.  He has sustained hypertension good adjust these further.     Hypokalemia resolved     Mild anemia stable     Tobacco use, counseled to quit, she had previously declined the need for patch.     Awaiting SNF placement     Bowels are moving     MD Abigail Sutherland Karl F, MD [image]  Physician  Medicine     H&P   [image:This note has been shared with the patient]  Signed     Date of Service: 24  Creation Time: 24    Signed     Expand All Collapse All  [image]  [image:untitled image]     [facility]     Patient Identification:  Name:  Regine Beckham  Age:  68 y.o.  Sex:  female  :  1955  MRN:  [id number]   Visit Number:  [id number]  Room number:  106/1S  Primary Care Physician:  Dread Burton MD     Subjective     3/27/2024   Chief complaint: Follow-up on debility and falls        History of presenting illness:  68 y.o. female  This pt is seen today for post admission physician evaluation to the inpatient rehabilitation facility.  Assisted by, Yaa GANT.  Patient is a  68-year-old female who was admitted to the [facility] having had a previous CVA in January of this year who was have recurrent falls at home and getting generalized weak.  She states that she is not currently been using her walker at home.  She has 2 neighbors that help her but she has no real family support.  She states she fell 5 days in a row.  EMS was eventually contacted apparently by a neighbor and she was brought to the hospital.  She was found to have an E. coli UTI that has been treated appropriately and is completing an Omnicef course. Patient continued to progress medically.  Speech therapy, physical therapy and Occupational therapy evaluations were completed with recommended acute inpatient rehabilitation referral for continued functional mobility intervention and reeducation.  Acute rehab referral ordered for continued medical monitoring and management post prolonged hospitalization, lab monitoring, pain mgt needs, bowel/bladder care with new medication education, skin monitoring and breakdown prevention along with ongoing medical comorbidities that require ongoing care.     The preadmission mini-FIM score as assessed by the referring facility as follows: Eating 5, grooming 4, bathing 3, upper body dressing 4, lower body dressing 3, toileting hygiene 3, transfers 4, toilet transfer 4, locomotion 4, bladder management 4, bowel management 4, comprehension 6, expression 6, social interaction 6, problem-solving 6, memory 6.     ---------------------------------------------------------------------------------------------------------------------   Review of Systems:  General: She feels generally weak, denies fever or chills  HEENT: She states she sometimes chokes with her food, otherwise no recent vision or hearing changes. Although by history she is legally blind.  Heart: Denies chest pain or palpitation  Lungs: No shortness of breath or cough  Abdomen: She thinks her bowels been  "moving no abdominal pain  : No dysuria or hematuria  Musculoskeletal: No known joint effusion  Neuro: No paresthesias but she is weak on her left side from previous CVA  Skin: No known skin breakdown  ---------------------------------------------------------------------------------------------------------------------   Medical History      Past Medical History:  Diagnosis Date   Anemia     Anxiety     Arthritis     Depression     Legally blind     Restless leg syndrome     Sleep apnea      \"I don't use a cpap anymore since losing 106 lbs\"   Water retention         Surgical History       Past Surgical History:  Procedure Laterality Date   BACK SURGERY       CARDIAC CATHETERIZATION N/A 1/19/2024    Procedure: Percutaneous Manual Thrombectomy;  Surgeon: Efrain Hurley MD;  Location: [facility] INVASIVE LOCATION;  Service: Interventional Radiology;  Laterality: N/A;   GALLBLADDER SURGERY       HIP ARTHROSCOPY       JOINT REPLACEMENT Right              Family History  Problem Relation Age of Onset   Breast cancer Neg Hx       Social History  Social History          Socioeconomic History   Marital status:    Number of children: 0   Years of education: 1 yr college  Tobacco Use   Smoking status: Every Day      Current packs/day: 1.50      Average packs/day: 1.5 packs/day for 51.0 years (76.5 ttl pk-yrs)      Types: Cigarettes   Smokeless tobacco: Never  Vaping Use   Vaping status: Never Used  Substance and Sexual Activity   Alcohol use: Yes      Alcohol/week: 1.0 standard drink of alcohol      Types: 1 Glasses of wine per week      Comment: \"occasionally - once every two months\"   Drug use: Not Currently      Comment: alcohol - occasionally   Sexual activity: Defer       ---------------------------------------------------------------------------------------------------------------------   Allergies:  Penicillins, she can tolerate cephalosporins as she has been on Rocephin in the acute " unit  ---------------------------------------------------------------------------------------------------------------------   Medications from discharge summary reviewed     Prior to Admission Medications        Prescriptions Last Dose Informant Patient Reported? Taking?    aspirin 81 MG EC tablet Unknown Pharmacy Yes No    Take 1 tablet by mouth Daily.    atorvastatin (LIPITOR) 80 MG tablet Unknown   No No    Take 1 tablet by mouth Every Night.    gabapentin (NEURONTIN) 300 MG capsule Unknown Pharmacy Yes No    Take 1 capsule by mouth 3 (Three) Times a Day.    losartan (COZAAR) 50 MG tablet Unknown Pharmacy Yes No    Take 1 tablet by mouth Daily. Indications: High Blood Pressure Disorder    Mirabegron ER (MYRBETRIQ) 50 MG tablet sustained-release 24 hour 24 hr tablet Unknown Pharmacy Yes No    Take 50 mg by mouth Daily. Indications: Urinary Urgency    mirtazapine (REMERON) 30 MG tablet Unknown   No No    Take 1 tablet by mouth Every Night. Indications: Major Depressive Disorder    pantoprazole (PROTONIX) 40 MG EC tablet Unknown   No No    TAKE 1 TABLET BY MOUTH EVERY MORNING FOR HEARTBURN    rOPINIRole (REQUIP) 4 MG tablet Unknown   No No    Take 1 tablet by mouth Every Night. Take 1 hour before bedtime.  Indications: Restless Leg Syndrome    ticagrelor (BRILINTA) 60 MG tablet tablet Unknown   No No    Take 1 tablet by mouth 2 (Two) Times a Day.    zolpidem (AMBIEN) 5 MG tablet Unknown   No No    Take 1 tablet by mouth At Night As Needed for Sleep. Indications: Trouble Sleeping           Objective     Vital Signs:  Temp:  [98 °F (36.7 °C)-98.7 °F (37.1 °C)] 98.4 °F (36.9 °C)  Heart Rate:  [] 73  Resp:  [16-18] 18  BP: (102-147)/(68-91) 133/75     Mean Arterial Pressure (Non-Invasive) for the past 24 hrs (Last 3 readings):    Noninvasive MAP (mmHg)  03/27/24 1548 101     SpO2:  [96 %-98 %] 98 %  on   ;   Device (Oxygen Therapy): room air  Body mass index is 22.6 kg/m².        Wt Readings from Last 3  "Encounters:  03/27/24 63.5 kg (140 lb)  03/27/24 62.4 kg (137 lb 8 oz)  02/13/24 72.1 kg (159 lb)      ---------------------------------------------------------------------------------------------------------------------      Physical exam:  Constitutional: She is pleasant no distress she became dysthymic when discussing that the stroke \"ruined  me\".  HEENT: Pupils are equal, she has a slight left mouth droop, hearing appears to be intact, neck is supple no JVD    Cardiovascular: Regular rate and rhythm without murmur rub or gallop  Pulmonary/Chest: Bilateral breath sounds are clear no rhonchi rales or wheezing  Abdominal:  .  Soft benign bowel sounds are active no organomegaly or masses appreciated  Musculoskeletal: Right side strength is about 4/5, I could not really get her to move her left hand, it appears to be paralyzed, she has a little bit of dorsiflexion better plantarflexion she has some movement at the elbow of the left arm and some shoulder movement.  The arm is significantly weaker than the right.  She can dorsi and plantarflex both ankles but weaker on the left with dorsiflexion.  She could lift both legs off the bed no joint effusions were noted  Neurological: Patient was alert and oriented to [facility] self.  Cranial nerves II through XII appear to be intact with exception of her vision.  Skin: No skin rashes no  Peripheral vascular: Adequate distal pulses  Genitourinary: No Arce catheter  ---------------------------------------------------------------------------------------------------------------------  EKG: EKG reviewed from March 21, sinus rhythm with some nonspecific findings.              Last echocardiogram:  Results for orders placed during the hospital encounter of 01/19/24     Adult Transthoracic Echo Limited W/ Cont if Necessary Per Protocol     Interpretation Summary    Left ventricular systolic function is normal. Estimated left ventricular EF = 60%    Saline test " "results are negative for intracardiac shunting..     --------------------------------------------------------------------------------------------------------------------  Labs:      Results from last 7 days  Lab Units 03/22/24  0249  WBC 10*3/mm3 6.55  HEMOGLOBIN g/dL 11.6*  HEMATOCRIT % 36.0  MCV fL 96.0  MCHC g/dL 32.2  PLATELETS 10*3/mm3 219               Results from last 7 days  Lab Units 03/23/24  0019 03/22/24  1433 03/22/24  0249  SODIUM mmol/L 139  --  141  POTASSIUM mmol/L 3.9 4.1 3.2*  MAGNESIUM mg/dL 1.9  --   --   CHLORIDE mmol/L 108*  --  108*  CO2 mmol/L 21.1*  --  22.5  BUN mg/dL 19  --  24*  CREATININE mg/dL 0.77  --  0.84  CALCIUM mg/dL 9.6  --  9.3  GLUCOSE mg/dL 107*  --  112*  Estimated Creatinine Clearance: 69.1 mL/min (by C-G formula based on SCr of 0.77 mg/dL).  No results found for: \"AMMONIA\"          No results found for: \"HGBA1C\", \"POCGLU\"       Lab Results  Component Value Date    TSH 2.880 03/17/2024     Pain Management Panel             Latest Ref Rng & Units 12/20/2023  Pain Management Panel  Amphetamine, Urine Qual Negative Negative   Barbiturates Screen, Urine Negative Negative   Benzodiazepine Screen, Urine Negative Negative   Buprenorphine, Screen, Urine Negative Negative   Cocaine Screen, Urine Negative Negative   Fentanyl, Urine Negative Negative   Methadone Screen , Urine Negative Negative   Methamphetamine, Ur Negative Negative        Brief Urine Lab Results  (Last result in the past 365 days)         Color   Clarity   Blood   Leuk Est   Nitrite   Protein   CREAT   Urine HCG        03/21/24 1613 Dark Yellow[image]    Turbid[image]    Trace[image]    Moderate (2+)[image]    Positive[image]    30 mg/dL (1+)[image]                        No results found for: \"BLOODCX\"       Urine Culture  Date Value Ref Range Status  03/21/2024 >100,000 CFU/mL Escherichia coli (A)   Final        Last Urine Toxicity             Latest Ref Rng & Units 12/20/2023  LAST URINE TOXICITY " RESULTS  Amphetamine, Urine Qual Negative Negative   Barbiturates Screen, Urine Negative Negative   Benzodiazepine Screen, Urine Negative Negative   Buprenorphine, Screen, Urine Negative Negative   Cocaine Screen, Urine Negative Negative   Fentanyl, Urine Negative Negative   Methadone Screen , Urine Negative Negative   Methamphetamine, Ur Negative Negative           ----------------------------------------------------------------------------------------------------------------------  Detailed radiology reports for the last 24 hours:     Imaging Results (Last 24 Hours)        ** No results found for the last 24 hours. **           Final impressions for the last 30 days of radiology reports:     MRI Brain Without Contrast     Result Date: 3/20/2024  Findings consistent with chronic small vessel ischemic change with encephalomalacia in the right frontal lobe from prior infarct.     This report was finalized on 3/20/2024 1:35 PM by Marianna Caicedo M.D..       XR Chest 1 View     Result Date: 3/17/2024  No radiographic evidence of acute cardiac or pulmonary disease.    This report was finalized on 3/17/2024 1:50 PM by Dr. Hu Obrien MD.       XR Shoulder 2+ View Left     Result Date: 3/17/2024    No acute findings in the left shoulder.   This report was finalized on 3/17/2024 11:13 AM by Dr. Hu Obrien MD.       XR Hip With or Without Pelvis 2 - 3 View Left     Result Date: 3/17/2024  1.  No acute fracture or dislocation. 2.  Moderate to extensive degenerative osteoarthritis in the left hip.   This report was finalized on 3/17/2024 11:13 AM by Dr. Hu Obrien MD.       CT Head Without Contrast     Result Date: 3/17/2024   1. Atrophy and chronic microangiopathic changes 2. Apparent suggestive of encephalomalacia in the right parietal region 3. No focal parenchymal mass, hemorrhage, or midline shift  This report was finalized on 3/17/2024 10:59 AM by Dr. Hu Obrien MD.        Assessment & Plan   Debility,  complicated by previous CVA.  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  Patient has been admitted to the acute inpatient rehab facility to undergo all 3 modalities of therapy.  Patient will undergo physical therapy 1 to 2 hours a day 5 to 6 days a week for therapeutic exercise, range of motion, endurance, gait training, safety, stairs, strengthening.  Patient undergo occupational therapy 1 to 2 hours a day 5 to 6 days a week for dressing, ADLs, feeding, home skills, safety, toileting techniques.  Patient undergo speech and language pathology 30 to 60 minutes a day 3 to 5 days a week for communication, expression, dysphagia, aspiration needs.  Expected functional improvement for safe discharge will be for the patient to be able to safely perform activities of daily living using adaptive equipment for improved functional mobility.  Be independent and safe with bowel and bladder function.  Be able to safely consume food and fluids without signs of aspiration.  Be able to navigate home and community territory safely without injury or falls.  Anticipated disposition is home she will most likely require ongoing care by home health.  Patient goals are for the patient to be able to return to previous lateral level of functional mobility and independence prior to these falling episodes and the stroke.  Anticipated length of stay is 14 days     UTI, patient will be completing Omnicef course     DVT prophylaxis, SQ hep     Previous CVA status post right carotid stent.  Stable, continue aspirin, high-dose statin, and Brilinta.     Hypertension, continue Cozaar, adjust as needed, monitor     And lab review, chemistries were acceptable on the 23rd, will recheck in a.m.     Mild anemia as of the 22nd, recheck in a.m.     Tobacco use, counseled to quit     Reported episodes of confusion, will monitor while in the rehab unit however she is oriented at this time.        VTE Prophylaxis:    Mechanical Order History:        None           Pharmalogical Order History:         Ordered     Dose Route Frequency Stop    24 1549  heparin (porcine) 5000 UNIT/ML injection 5,000 Units         5,000 Units SC Every 12 Hours Scheduled --                    Falls Risk Assessment with left-sided weakness and multiple falls at home, high risk of falling            Reji Hayes MD  [facility] Hospitalist  24  17:36 Azalia Dunaway MS CCC-SLP [image]  Speech and Language Pathologist  Specialty: Speech Pathology     Therapy Treatment Note   [image:This note has been shared with the patient]  Signed     Date of Service: 24 105  Creation Time: 24    Signed     Expand All Collapse All  [image]  Inpatient Rehabilitation - Speech Language Pathology   Swallow Treatment Note [facility]  Dysphagia Therapy Treatment Note     Patient Name: Regine Beckham              : 1955                      MRN: [id number]  Today's Date: 2024     Admit Date: 3/27/2024  DYSPHAGIA THERAPY PLAN OF CARE:     Regine Beckham was seen this am in the SLP office of [facility]'s IPR unit for formal therapy tasks. She is cooperative to participate in all tasks.      She is notably labile this date intermittently across all therapy tasks.      Long Term Goal:  Patient will accept least restrictive diet tolerance w/o overt s/s aspiration.      Short Term Goals:  1. Patient will tolerate facial massage to LEFT facial surface for 3-7 minutes to increase surface blood flow, sensation and ROM over 3 consecutive sessions.  Tolerated 7 min w/ mild increase in surface blood flow.       2. Patient will perform OROM/GRACE exercises x3 sets x10 reps w/ min cues.  X2 set x5 reps.      3. Patient will demonstrate increase labial sensation/afferent drive per pt report in 90% of opp over three consecutive sessions.   Pt reports continued decrease in left facial surface sensation.       4. Patient will  "increase lingual control, coordination, movement as evidenced by bolus manipulation in 90% opp independently over three consecutive sessions.   No po trials adminsitered this date.      5. Patient will participate in a clinical re-evaluation of swallowing fnx in 7-10 days, pending progress towards this poc.  Diet advanced to MS, whole, thin 3/30/24     Visit Dx:   Visit Diagnosis  No diagnosis found.    Problem List     Patient Active Problem List  Diagnosis   Iron deficiency anemia due to chronic blood loss   Major depressive disorder   RLS (restless legs syndrome)   GERD (gastroesophageal reflux disease)   Left breast mass   Allergy to penicillin   Stroke   Grade I diastolic dysfunction   Moderate malnutrition   Falls frequently   Stroke-like symptoms   Debility       Medical History      Past Medical History:  Diagnosis Date   Anemia     Anxiety     Arthritis     Depression     Legally blind     Restless leg syndrome     Sleep apnea      \"I don't use a cpap anymore since losing 106 lbs\"   Water retention         Surgical History       Past Surgical History:  Procedure Laterality Date   BACK SURGERY       CARDIAC CATHETERIZATION N/A 1/19/2024    Procedure: Percutaneous Manual Thrombectomy;  Surgeon: Efrani Hurley MD;  Location: [facility] CATH INVASIVE LOCATION;  Service: Interventional Radiology;  Laterality: N/A;   GALLBLADDER SURGERY       HIP ARTHROSCOPY       JOINT REPLACEMENT Right            SLP Recommendation and Plan   Continue per POC.    Thank you for allowing me to participate in the care of your patient-   Azalia Elizondo M.S., CCC-SLP     Daily Summary of Progress (SLP): progress toward functional goals is good (Massage to left facial surface for 7 min. OROM x10 reps. Reports continued decreased sensation to left facial surface.) (04/08/24 1010)     SWALLOW EVALUATION (Last 72 Hours)         SLP Adult Swallow Evaluation         Row Name 04/08/24 1010                                 Rehab Evaluation   "  Document Type therapy note (daily note)  -                     SLP Treatment Clinical Impressions    Daily Summary of Progress (SLP) progress toward functional goals is good [image] Massage to left facial surface for 7 min. OROM x10 reps. Reports continued decreased sensation to left facial surface.  -                      User Key  (r) = Recorded By, (t) = Taken By, (c) = Cosigned By        Initials Name Effective Dates    Azalia Bunch MS CCC-SLP 10/25/21 -                         EDUCATION  The patient has been educated in the following areas:   Dysphagia (Swallowing Impairment).         Time Calculation:    Time Calculation- SLP         Row Name 24 1057                     Time Calculation- SLP    SLP Start Time 1000  -        SLP Stop Time 1045  -        SLP Time Calculation (min) 45 min  -        SLP - Next Appointment 24  -                  User Key  (r) = Recorded By, (t) = Taken By, (c) = Cosigned By        Initials Name Provider Type    Azalia Bunch MS CCC-SLP Speech and Language Pathologist                      Therapy Charges for Today        Code Description Service Date Service Provider Modifiers Qty    48852642646 HC ST TREATMENT SPEECH 2 2024 Azalia Elizondo MS CCC-SLP GN 1    13976230533 HC ST TREATMENT SWALLOW 1 2024 Azalia Elizondo MS CCC-SLP GN 1              Azalia Elizondo MS CCC-SLP                  2024            and Inpatient Rehabilitation - Speech Language Pathology Treatment Note  [facility]  Dysarthria and Cognitive Therapy Treatment Note     Patient Name: Regine Beckham              : 1955                      MRN: [id number]  Today's Date: 2024     Admit Date: 3/27/2024     DYSARTHRIA AND COGNITIVE THERAPY PLAN OF CARE:     Regine Beckham was seen this am in the SLP office of [facility]'s IPR unit for formal therapy tasks. She is cooperative to participate in all tasks.      She is notably labile this date intermittently across all therapy  tasks.      Long Term Goal:  Patient will demonstrate functional speech skills for return to discharge environment.   Patient will demonstrate functional cognitive skills for return to discharge environment.     Short Term Goals:  1. Patient will tolerate facial massage to left facial surface for 3-7 minutes to increase surface blood flow, sensation and ROM over 3 consecutive sessions.  Tolerated 7 min w/ minimal increase in surface blood flow.        2. Patient will perform OROM/GRACE exercises x3 sets x10 reps w/ min cues.  X2 set x5 reps.      3. Patient will maintain vocal intensity at adequate speaking volume per decibel meter in 4+ word sentences in 90% of opp over three consecutive sessions.   Less than 20% opp this date.      4. Patient will over-articulate 3+ syllable words and in connected speech in 90% opp to improve intelligibility over three consecutive sessions.   Over-articulation of 3+ syllable words and connected speech demonstrated in 75% opp w/ verbal cues.      5. Patient will decrease rate of speech in connected speech in 90% of opp to improve intelligibility over three consecutive sessions.   Deferred this date.      6. Patient will perform rapid alternating speech tasks w/ min cues over three consecutive sessions.  Performed w/ increase in oral secretions noted and pt aware w/ use of tissue to clear lip edge.      7. Patient will demonstrate accurate orientation to time and location in 90% of opp independently over three consecutive sessions.  Oriented to location in 100% opp this date. Oriented to time of day (morning, noon, afternoon, evening) in 0/2 opp this date.      8. Patient will maintain attention to therapy tasks despite intermittently distracting environment in 90% of opp w/ min cues over three consecutive sessions.   Able to maintain attention to tasks in 90% opp this date.      8. Patient will perform divergent naming tasks of 8+ items named in 1 min w/ min cues over three  "consecutive sessions.   Divergent naming tasks of 8+ items named in 1 min in 2 of 4 opp.      9. Patient will perform inhalations/exhalations via resistive breather x4 sets x15 reps.   Breather at 4/3 x4 sets x10 reps.      10. Patient will perform sustained vowel prolongations of 5 sec duration over 15 reps.   Sustained vowel prolongations over x10 reps for 5.89 seconds. (9/10 opp over 5 sec).         *Additional goals to be added/modified per pt progress toward goals.       Visit Dx:  Visit Diagnosis  No diagnosis found.    Problem List     Patient Active Problem List  Diagnosis   Iron deficiency anemia due to chronic blood loss   Major depressive disorder   RLS (restless legs syndrome)   GERD (gastroesophageal reflux disease)   Left breast mass   Allergy to penicillin   Stroke   Grade I diastolic dysfunction   Moderate malnutrition   Falls frequently   Stroke-like symptoms   Debility       Medical History      Past Medical History:  Diagnosis Date   Anemia     Anxiety     Arthritis     Depression     Legally blind     Restless leg syndrome     Sleep apnea      \"I don't use a cpap anymore since losing 106 lbs\"   Water retention         Surgical History       Past Surgical History:  Procedure Laterality Date   BACK SURGERY       CARDIAC CATHETERIZATION N/A 1/19/2024    Procedure: Percutaneous Manual Thrombectomy;  Surgeon: Efrain Hurley MD;  Location: [facility] CATH INVASIVE LOCATION;  Service: Interventional Radiology;  Laterality: N/A;   GALLBLADDER SURGERY       HIP ARTHROSCOPY       JOINT REPLACEMENT Right            SLP Recommendation and Plan   Continue per POC.   Thank you-   Azalia Elizondo M.S., CCC-SLP     Daily Summary of Progress (SLP): progress toward functional goals is good (Massage to left facial surface for 7 min. OROM x10 reps. Reports continued decreased sensation to left facial surface.) (04/08/24 1010)     SLP EVALUATION (Last 72 Hours)         SLP SLC Evaluation         Row Name 04/08/24 1000  "                                Communication Assessment/Intervention    Document Type therapy note (daily note)  -                     SLP Treatment Clinical Impressions    Daily Summary of Progress (SLP) progress toward functional goals is good [image] Oriented to location in 2/2 opp, to time in 0/2 opp. Maintains attention in 90% opp this date. Divergent naming of 8+ items in 2/4 opp. Breather at 4/4: x40 reps.  -                      User Key  (r) = Recorded By, (t) = Taken By, (c) = Cosigned By        Initials Name Effective Dates    Azalia Bunch MS CCC-SLP 10/25/21 -            EDUCATION  The patient has been educated in the following areas:      Cognitive Impairment Communication Impairment.        Time Calculation:       Time Calculation- SLP         Row Name 24 1057                     Time Calculation- SLP    SLP Start Time 1000  -        SLP Stop Time 1045  -        SLP Time Calculation (min) 45 min  -        SLP - Next Appointment 24  -Marbella Roberts MA,CCC-SLP [image]  Speech and Language Pathologist  Speech Therapy     Therapy Treatment Note   [image:This note has been shared with the patient]  Signed     Date of Service: 24 115  Creation Time: 24    Signed     Expand All Collapse All  [image]  Inpatient Rehabilitation - Speech Language Pathology   Swallow Treatment Note  [facility]     Patient Name: Regine Beckham              : 1955                      MRN: [id number]  Today's Date: 2024                                                                             Admit Date: 3/27/2024  DYSPHAGIA THERAPY PLAN OF CARE:     Regine Beckham was seen this am in the SLP office of [facility]'s IPR unit for formal therapy tasks. She is cooperative to participate in all tasks.       Long Term Goal:  Patient will accept least restrictive diet tolerance w/o overt s/s aspiration.      Short Term Goals:  1. Patient will tolerate  "facial massage to LEFT facial surface for 3-7 minutes to increase surface blood flow, sensation and ROM over 3 consecutive sessions.  Tolerated 7 min w/ mild increase in surface blood flow.       2. Patient will perform OROM/GRACE exercises x3 sets x10 reps w/ min cues.  X3 set x5 reps. Pt requires mod verbal/visual cues.     3. Patient will demonstrate increase labial sensation/afferent drive per pt report in 90% of opp over three consecutive sessions.   Pt reports increase in sensation this date and she demonstrate increased sensation in 100% opp of loss of oral secretions from L oral commissure during PATRIZIA tasks.       4. Patient will increase lingual control, coordination, movement as evidenced by bolus manipulation in 90% opp independently over three consecutive sessions.   Pt trialed with regular consistency crackers. Pt able to self feed w/ min difficulty biting off appropriate size bolus. Pt with extended mastication time w/ oral residue post swallow that clears with independent use of liquid wash.       5. Patient will participate in a clinical re-evaluation of swallowing fnx in 7-10 days, pending progress towards this poc.  Diet advanced to MS, whole, thin 3/30/24        Visit Dx:   Visit Diagnosis  No diagnosis found.    Problem List     Patient Active Problem List  Diagnosis   Iron deficiency anemia due to chronic blood loss   Major depressive disorder   RLS (restless legs syndrome)   GERD (gastroesophageal reflux disease)   Left breast mass   Allergy to penicillin   Stroke   Grade I diastolic dysfunction   Moderate malnutrition   Falls frequently   Stroke-like symptoms   Debility       Medical History      Past Medical History:  Diagnosis Date   Anemia     Anxiety     Arthritis     Depression     Legally blind     Restless leg syndrome     Sleep apnea      \"I don't use a cpap anymore since losing 106 lbs\"   Water retention         Surgical History       Past Surgical " History:  Procedure Laterality Date   BACK SURGERY       CARDIAC CATHETERIZATION N/A 2024    Procedure: Percutaneous Manual Thrombectomy;  Surgeon: Efrain Hurley MD;  Location:  [facility] CATH INVASIVE LOCATION;  Service: Interventional Radiology;  Laterality: N/A;   GALLBLADDER SURGERY       HIP ARTHROSCOPY       JOINT REPLACEMENT Right            EDUCATION  The patient has been educated in the following areas:   Cognitive Impairment Communication Impairment.            Time Calculation:    Time Calculation- SLP         Row Name 24 1157                     Time Calculation- SLP    SLP Start Time 1000  -SS        SLP Stop Time 1045  -SS        SLP Time Calculation (min) 45 min  -SS        SLP - Next Appointment 24  -                    Marbella Padgett MA,CCC-SLP [image]  Speech and Language Pathologist  Speech Therapy     Therapy Treatment Note   [image:This note has been shared with the patient]  Signed     Date of Service: 24  Creation Time: 24    Signed     Expand All Collapse All  [image]  Inpatient Rehabilitation - Speech Language Pathology Treatment Note   [facility]                                       Dysarthria and Cognitive Therapy Treatment Note   Patient Name: Regine Beckham              : 1955                      MRN: [id number]  Today's Date: 4/3/2024                                                                             Admit Date: 3/27/2024     Visit Dx:  Visit Diagnosis  No diagnosis found.    Problem List     Patient Active Problem List  Diagnosis   Iron deficiency anemia due to chronic blood loss   Major depressive disorder   RLS (restless legs syndrome)   GERD (gastroesophageal reflux disease)   Left breast mass   Allergy to penicillin   Stroke   Grade I diastolic dysfunction   Moderate malnutrition   Falls frequently   Stroke-like symptoms   Debility       Medical History      Past Medical History:  Diagnosis Date   Anemia   "   Anxiety     Arthritis     Depression     Legally blind     Restless leg syndrome     Sleep apnea      \"I don't use a cpap anymore since losing 106 lbs\"   Water retention         Surgical History       Past Surgical History:  Procedure Laterality Date   BACK SURGERY       CARDIAC CATHETERIZATION N/A 1/19/2024    Procedure: Percutaneous Manual Thrombectomy;  Surgeon: Efrain Hurley MD;  Location: [facility] CATH INVASIVE LOCATION;  Service: Interventional Radiology;  Laterality: N/A;   GALLBLADDER SURGERY       HIP ARTHROSCOPY       JOINT REPLACEMENT Right         DYSARTHRIA AND COGNITIVE THERAPY PLAN OF CARE:     Regine Beckham was seen this pm in the SLP office of [facility]'s IPR unit for formal therapy tasks. She is cooperative to participate in all tasks.      She is notably labile this date intermittently across all therapy tasks. This is primarily associated with her discussing friends, family, and her current stroke deficits.      Long Term Goal:  Patient will demonstrate functional speech skills for return to discharge environment.   Patient will demonstrate functional cognitive skills for return to discharge environment.     Short Term Goals:  1. Patient will tolerate facial massage to left facial surface for 3-7 minutes to increase surface blood flow, sensation and ROM over 3 consecutive sessions.  Tolerated 7 min w/ minimal increase in surface blood flow.        2. Patient will perform OROM/GRACE exercises x3 sets x10 reps w/ min cues.  X1 set x5 reps w/ verbal cues.     3. Patient will maintain vocal intensity at adequate speaking volume per decibel meter in 4+ word sentences in 90% of opp over three consecutive sessions.   40% opp this date.      4. Patient will over-articulate 3+ syllable words and in connected speech in 90% opp to improve intelligibility over three consecutive sessions.   Not directly addressed this session.     5. Patient will decrease rate of speech in connected speech in 90% of opp to " improve intelligibility over three consecutive sessions.   Deferred this date.      6. Patient will perform rapid alternating speech tasks w/ min cues over three consecutive sessions.  Not directly addressed this session.     7. Patient will demonstrate accurate orientation to time and location in 90% of opp independently over three consecutive sessions.  Oriented to person place w/o cues. Pt requires mod cues for orientation to [place]/Mo/Year.      8. Patient will maintain attention to therapy tasks despite intermittently distracting environment in 90% of opp w/ min cues over three consecutive sessions.   Able to maintain attention to tasks in 80% opp this date. Verbal cues intermittently required.      8. Patient will perform divergent naming tasks of 8+ items named in 1 min w/ min cues over three consecutive sessions.   Deferred this date     9. Patient will perform inhalations/exhalations via resistive breather x4 sets x15 reps.   Not directly addressed this session.     10. Patient will perform sustained vowel prolongations of 5 sec duration over 15 reps.   Not directly addressed this session.        *Additional goals to be added/modified per pt progress toward goals.          SLP EVALUATION (Last 72 Hours)         SLP SLC Evaluation         Row Name 04/01/24 1000                                 Communication Assessment/Intervention    Document Type therapy note (daily note)  -                     SLP Treatment Clinical Impressions    Daily Summary of Progress (SLP) progress toward functional goals is good [image] Maintain adequate vocal intensity across a sentence in 25% opp ind. Over-articulate in 90% opp w/ cues prior to initiation of task. PATRIZIA tasks performed. Easily distracted during longer therapy tasks (massage) or those that require repetition.  -                      User Key  (r) = Recorded By, (t) = Taken By, (c) = Cosigned By        Initials Name Effective Dates    Azalia Bunch, MS  CCC-SLP 10/25/21 -                            EDUCATION  The patient has been educated in the following areas:      Cognitive Impairment Communication Impairment.     Time Calculation:       Time Calculation- SLP         Row Name 24 1533                     Time Calculation- SLP    SLP Start Time 1430  -SS        SLP Stop Time 1500  -SS        SLP Time Calculation (min) 30 min  -SS        SLP - Next Appointment 24  -Azalia Augustin MS CCC-SLP [image]  Speech and Language Pathologist  Specialty: Speech Pathology     Therapy Evaluation   [image:This note has been shared with the patient]  Signed     Date of Service: 24 161  Creation Time: 24    Signed     Expand All Collapse All  [image]  Inpatient Rehabilitation - Speech Language Pathology Initial Evaluation  [facility]  Speech - Language - Cognitive Evaluation     Patient Name: Regine Beckham              : 1955                      MRN: [id number]  Today's Date: 3/28/2024     Admit Date: 3/27/2024  Regine Beckham was seen this am on [facility]'s IPR unit to participate in s/l and cognitive assessment for safety/function in adls. She was positioned upright in chair to cooperatively participate. All results and observations of this evaluation are felt to be representative of patient's current functional status.     She has a medical hx significant for prior CVA in January of this year with right internal carotid artery disease status post thrombectomy and stent placement, anxiety, depression, and is legally blind per macular degeneration.       Following initial CVA in January, Ms Beckham was hospitalized at [facility] and participated in multiple instrumental dysphagia evaluations per oropharyngeal dysphagia. She was able to advanced to soft to chew textures, chopped meats, and thin liquids per FEES on 24. She reports working with speech therapy at the skilled nursing facility she was discharged to.      Ms Beckham  "presented to [facility] from her home via EMS following frequent falls at home w/ increasing weakness. She was admitted to [facility] acute care on 3/17/24. No new infarct was noted on MRI.      She was evaluated by SLP department of [facility] via [id number] on 3/18/24 and was evidenced w/ mild speech, language, and cognitive deficits as a result of her recent CVA.      Social History  Social History          Socioeconomic History   Marital status:    Number of children: 0   Years of education: 1 yr college  Tobacco Use   Smoking status: Every Day      Current packs/day: 1.50      Average packs/day: 1.5 packs/day for 51.0 years (76.5 ttl pk-yrs)      Types: Cigarettes   Smokeless tobacco: Never  Vaping Use   Vaping status: Never Used  Substance and Sexual Activity   Alcohol use: Yes      Alcohol/week: 1.0 standard drink of alcohol      Types: 1 Glasses of wine per week      Comment: \"occasionally - once every two months\"   Drug use: Not Currently      Comment: alcohol - occasionally   Sexual activity: Defer          Diet Orders (active) (From admission, onward)        Start     Ordered    03/27/24 1549  Diet: Regular/House; Texture: Soft to Chew (NDD 3); Soft to Chew: Chopped Meat; Fluid Consistency: Thin (IDDSI 0)  Diet Effective Now         03/27/24 1549                       Ms Beckham was observed on room air w/o complications across this evaluation.      Receptive language skills were intact. Ms Beckham was able to id common objects and personal body parts, follow simple and multi-step directives, understand complex \"wh\" questions and participate in conversational exchange w/o difficulties other than dysarthric speech.     Expressive language skills were intact. She was able to complete automatic speech tasks, confrontation naming tasks, complete complex oe sentences, respond to complet oe \"wh\" questions, repeat at word/sentence and multiple digit levels, as well as participate in complex conversational exchange. " No aphasia, anomia or verbal apraxia evidenced.     Ms Beckham demonstrated mild to moderate deficits related to orientation to time and location, thought organization in divergent organization, and attention to task and in the midst of distractions. All other cognitive realms were wfl. Clock drawing task was noted w/ appropriate [place] for face of appropriate size to allow numbers to be placed. Numbers 1-7, 11, & 12 were placed with 12 being placed in the correct placement of 12 only. She was asked to set the time to 3:00, and hands were placed to the correct location had the numbers been in the correct location.      Facial/oral structures were asymmetrical upon observation w/ mild to moderate left facial droop evidenced. She additionally endorses decreased sensation of her left facial area. Oral mucosa were moist, pink and clean. Secretions were clear, thin, and controlled. OROM/GRACE was generally weak w/ primarily weakness noted of left side during imitation of oral postures. Trace to mild lingual deviation upon protrusion to the left was demonstrated. Speech intensity was mildly decreased w/ mild to moderately dysarthric speech negatively impacting intelligibility in multisyllabic words and sentences. No oral apraxia noted.     Graphic and reading skills were intact, premorbid baseline status. She was able to id isolated letters, simple, and moderate words, as well as sentences and small paragraphs. Signature was legible.     Pragmatic skills were WFL w/ appropriate eye gaze patterns and visual tracking. Language was appropriate in context w/ topic initiation and maintenance independently. No neglect evidenced. Humor response was intact w/ appropriate affect.     Visit Dx:  Visit Diagnosis  No diagnosis found.    Problem List     Patient Active Problem List  Diagnosis   Iron deficiency anemia due to chronic blood loss   Major depressive disorder   RLS (restless legs syndrome)   GERD (gastroesophageal reflux  "disease)   Left breast mass   Allergy to penicillin   Stroke   Grade I diastolic dysfunction   Moderate malnutrition   Falls frequently   Stroke-like symptoms   Debility       Medical History      Past Medical History:  Diagnosis Date   Anemia     Anxiety     Arthritis     Depression     Legally blind     Restless leg syndrome     Sleep apnea      \"I don't use a cpap anymore since losing 106 lbs\"   Water retention         Surgical History       Past Surgical History:  Procedure Laterality Date   BACK SURGERY       CARDIAC CATHETERIZATION N/A 1/19/2024    Procedure: Percutaneous Manual Thrombectomy;  Surgeon: Efrain Hurley MD;  Location: [facility] CATH INVASIVE LOCATION;  Service: Interventional Radiology;  Laterality: N/A;   GALLBLADDER SURGERY       HIP ARTHROSCOPY       JOINT REPLACEMENT Right         Impression:       Ms Beckham presents w/ a mild to moderate dysarthria and mild cognitive deficits related to orientation to time and location, thought organization in divergent organization, and attention to task and in the midst of distractions. Per her recent CVA in January, she is felt to most benefit from formal therapy to address these noted deficits.      SLP Recommendation and Plan  Formal dysarthria and cognitive therapy.      D/w patient results and recommendations w/ verbal understanding and agreement.     D/w RN results and recommendations w/ verbal understanding and agreement.      Thank you for allowing me to participate in the care of your patient-  Azalia Elizondo M.S., CCC-SLP     SLP EVALUATION (Last 72 Hours)         SLP SLC Evaluation         Row Name 03/28/24 1005                                 Communication Assessment/Intervention    Document Type evaluation  -JR            Subjective Information no complaints  -JR                     General Information    Precautions/Limitations, Vision vision impairment, bilaterally [image] Macular degeneration  -JR                     Comprehension " Assessment/Intervention    Comprehension Assessment/Intervention Auditory Comprehension;Reading Comprehension  -JR                     Auditory Comprehension Assessment/Intervention    Auditory Comprehension (Communication) WFL  -JR            Able to Identify Objects/Pictures (Communication) WFL  -JR            Answers Questions (Communication) WFL  -JR            Able to Follow Commands (Communication) WFL  -JR            Narrative Discourse WFL  -JR            Successful Auditory Strategies (Communication) decrease environmental distractions  -JR                     Reading Comprehension Assessment/Intervention    Reading Comprehension (Communication) WFL  -JR            Scanning (Reading) WFL  -JR            Visual Matching Ability (Reading) WFL  -JR            Single Word Level WFL  -JR            Phrase Level WFL  -JR            Paragraph Level WFL  -JR            Functional Reading Tasks WFL  -JR            Reading Comprehension, Comment -- [image] Baseline per premorbid macular degeneration  -JR                     Expression Assessment/Intervention    Expression Assessment/Intervention verbal expression;graphic expression  -JR                     Verbal Expression Assessment/Intervention    Automatic Speech (Communication) WFL  -JR            Repetition WFL  -JR            Phrase Completion WFL  -JR            Responsive Naming WFL  -JR            Confrontational Naming WFL  -JR            Spontaneous/Functional Words WFL  -JR            Sentence Formulation WFL  -JR            Conversational Discourse/Fluency WFL  -JR                     Graphic Expression Assessment/Intervention    Graphic Expression WFL  -JR            Graphic Expression to Dictation L  -JR            Biographical Information WFL  -JR            Functional Correspondence WFL  -JR                     Oral Musculature and Cranial Nerve Assessment    Oral Motor General Assessment lingual impairment;oral labial or buccal impairment  -             Mandibular Impairment Detail, Cranial Nerve V (Trigeminal) reduced mandibular ROM;reduced strength on left;reduced facial sensation on left  -JR            Oral Labial or Buccal Impairment, Detail, Cranial Nerve VII (Facial): left labial droop;reduced ROM  -JR            Lingual Impairment, Detail. Cranial Nerves IX, XII (Glossopharyngeal and Hypoglossal) reduced lingual ROM;reduced strength left  -JR                     Motor Speech Assessment/Intervention    Motor Speech Function mild impairment;moderate impairment  -JR            Characteristics Consistent with Dysarthria decreased articulation;decreased intensity;slurred speech  -JR            Initiation of Phonation (Communication) WFL  -JR            Automatic Speech (Communication) WFL  -JR            Verbal Repetition (Communication) WFL  -JR            Phase Completion WFL  -JR            Conversational Speech (Communication) mild impairment;other (see comments) [image] dysarthric/slurred speech  -JR            Speech intelligibility 80%;in quiet environment;with unfamiliar listener  -JR                     Cognitive Assessment Intervention- SLP    Orientation Status (Cognition) place;time;mild impairment [image] Knows she is in the hospital. [facility] in [place].Unsure of month or year.  -JR            Memory (Cognitive) WFL  -JR            Attention (Cognitive) mild impairment;selective;sustained;distracting environment  -JR            Thought Organization (Cognitive) mild impairment;concrete divergent  -JR            Reasoning (Cognitive) WFL  -JR            Problem Solving (Cognitive) WFL  -JR            Functional Math (Cognitive) WFL  -JR            Executive Function (Cognition) WFL  -JR            Pragmatics (Communication) WFL  -JR                     Communication Treatment Objective and Progress Goals (SLP)    SLC LTGs Patient will demonstrate functional speech skills for return to discharge environment;Patient will  demonstrate functional cognitive-linguistic skills for return to discharge environment  -JR            Motor Speech/Dysarthria Treatment Objectives Motor Speech/Dysarthria Treatment Objectives (Group)  -JR            Cognitive Linguistic Treatment Objectives Cognitive Linguistic Treatment Objectives (Group)  -JR                     Patient will demonstrate functional speech skills for return to discharge environment    [place] Independently  -JR            Time frame by discharge  -JR                     Patient will demonstrate functional cognitive-linguistic skills for return to discharge environment    [place] Independently  -JR            Time frame by discharge  -JR                     Motor Speech/Dysarthria Treatment Objectives    Phonation Selection phonation, SLP goal 1  -JR            Articulation Selection articulation, SLP goal 1  -JR            Prosody Selection prosody, SLP goal 1  -JR                     Phonation Goal 1 (SLP)    Improve Phonation By Goal 1 (SLP) using loud speech;90%;independently (over 90% accuracy)  -JR            Time Frame (Phonation Goal 1, SLP) by discharge  -JR                     Articulation Goal 1 (SLP)    Improve Articulation Goal 1 (SLP) by over-articulating at word level;by over-articulating at phrase level;by over-articulating in connected speech;90%;independently (over 90% accuracy)  -JR            Time Frame (Articulation Goal 1, SLP) by discharge  -JR                     Prosody Goal 1 (SLP)    Improve Prosody by Goal 1 (SLP) decreasing rate;90%;independently (over 90% accuracy)  -JR            Time Frame (Prosody Goal 1, SLP) by discharge  -JR                     Cognitive Linguistic Treatment Objectives    Attention Selection attention, SLP goal 1  -JR            Orientation Selection orientation, SLP goal 1  -JR            Organizational Skills Selection organizational skills, SLP goal 1  -JR                     Attention Goal 1 (SLP)    Improve  Attention by Goal 1 (SLP) complete selective attention task;attending to task;90%;independently (over 90% accuracy);complete sustained attention task  -JR            Time Frame (Attention Goal 1, SLP) by discharge  -JR                     Orientation Goal 1 (SLP)    Improve Orientation Through Goal 1 (SLP) demonstrating orientation to month;demonstrating orientation to year;demonstrating orientation to place;90%;independently (over 90% accuracy)  -JR            Time Frame (Orientation Goal 1, SLP) by discharge  -JR                     Organizational Skills Goal 1 (SLP)    Improve Thought Organization Through Goal 1 (SLP) completing a divergent naming task;generating a list of items in a category;concrete;90%;independently (over 90% accuracy)  -JR            Time Frame (Thought Organization Skills Goal 1, SLP) by discharge  -JR                      User Key  (r) = Recorded By, (t) = Taken By, (c) = Cosigned By        Initials Name Effective Dates    Azalia Bunch, MS CCC-SLP 10/25/21 -                 EDUCATION  The patient has been educated in the following areas:      Cognitive Impairment Communication Impairment.              SLP GOALS   SLP GOALS         Row Name 03/28/24 1005 03/28/24 1000                (LTG) Patient will demonstrate functional swallow for    Diet Texture (Demonstrate functional swallow) -- regular textures  -JR      Liquid viscosity (Demonstrate functional swallow) -- thin liquids  -JR      [place] (Demonstrate functional swallow) -- independently (over 90% accuracy)  -JR      Time Frame (Demonstrate functional swallow) -- by discharge  -JR             (STG) Labial Strengthening Goal 1 (SLP)    Activity (Labial Strengthening Goal 1, SLP) -- increase labial sensation/afferent drive  -JR      [place]/Accuracy (Labial Strengthening Goal 1, SLP) -- independently (over 90% accuracy)  -JR      Time Frame (Labial Strengthening Goal 1, SLP) -- by discharge  -JR             (STG) Lingual  Strengthening Goal 1 (SLP)    Activity (Lingual Strengthening Goal 1, SLP) -- increase lingual tone/sensation/control/coordination/movement;increase tongue back strength  -JR      Increase Lingual Tone/Sensation/Control/Coordination/Movement -- lingual movement exercises;swallow trials  -JR      Increase Tongue Back Strength -- lingual movement exercises;swallow trials  -JR      [place]/Accuracy (Lingual Strengthening Goal 1, SLP) -- independently (over 90% accuracy)  -JR      Time Frame (Lingual Strengthening Goal 1, SLP) -- by discharge  -JR             (STG) Swallow Compensatory Strategies Goal 1 (SLP)    Activity (Swallow Compensatory Strategies/Techniques Goal 1, SLP) -- compensatory strategies;small bites;during meal intake;food/liquid placed on stronger right side;other (see comments) [image] Lingual sweep of left buccal area  -JR      [place]/Accuracy (Swallow Compensatory Strategies/Techniques Goal 1, SLP) -- independently (over 90% accuracy)  -JR      Time Frame (Swallow Compensatory Strategies/Techniques Goal 1, SLP) -- by discharge  -JR             Patient will demonstrate functional speech skills for return to discharge environment    [place] Independently  -JR --      Time frame by discharge  -JR --             Patient will demonstrate functional cognitive-linguistic skills for return to discharge environment    [place] Independently  -JR --      Time frame by discharge  -JR --             Phonation Goal 1 (SLP)    Improve Phonation By Goal 1 (SLP) using loud speech;90%;independently (over 90% accuracy)  -JR --      Time Frame (Phonation Goal 1, SLP) by discharge  -JR --             Articulation Goal 1 (SLP)    Improve Articulation Goal 1 (SLP) by over-articulating at word level;by over-articulating at phrase level;by over-articulating in connected speech;90%;independently (over 90% accuracy)  -JR --      Time Frame (Articulation Goal 1, SLP) by discharge  -JR --             [place]  Goal 1 (SLP)    Improve [place] by Goal 1 (SLP) decreasing rate;90%;independently (over 90% accuracy)  -JR --      Time Frame (Prosody Goal 1, SLP) by discharge  -JR --             Attention Goal 1 (SLP)    Improve Attention by Goal 1 (SLP) complete selective attention task;attending to task;90%;independently (over 90% accuracy);complete sustained attention task  -JR --      Time Frame (Attention Goal 1, SLP) by discharge  -JR --             Orientation Goal 1 (SLP)    Improve Orientation Through Goal 1 (SLP) demonstrating orientation to month;demonstrating orientation to year;demonstrating orientation to place;90%;independently (over 90% accuracy)  -JR --      Time Frame (Orientation Goal 1, SLP) by discharge  -JR --             Organizational Skills Goal 1 (SLP)    Improve Thought Organization Through Goal 1 (SLP) completing a divergent naming task;generating a list of items in a category;concrete;90%;independently (over 90% accuracy)  -JR --      Time Frame (Thought Organization Skills Goal 1, SLP) by discharge  -JR --                User Key  (r) = Recorded By, (t) = Taken By, (c) = Cosigned By        Initials Name Provider Type    Azalia Bunch MS CCC-SLP Speech and Language Pathologist              Time Calculation:       Time Calculation- SLP         Row Name 03/28/24 1539                     Time Calculation- [facility]    SLP Start Time 1000  -        SLP Stop Time 1050  -        SLP Time Calculation (min) 50 min  -        SLP Received On 03/28/24  -        SLP - Next Appointment 03/29/24  -Antonio Mcclure PTA Student [image]  PTA Student  Physical Therapy     Therapy Treatment Note   [image:This note has been shared with the patient]  Attested     Date of Service: 04/05/24 1435  Creation Time: 04/05/24 1435    Attested       Attestation signed by Ruthie Travis PTA at 04/05/24 1505               Expand All Collapse All  [image]  Inpatient Rehabilitation - Physical Therapy  "Treatment Note                                                               [place]     Patient Name: Regine Beckham              : 1955                      MRN: [id number]     Today's Date: 2024                                                                                              Admit Date: 3/27/2024                          Visit Dx:   Visit Diagnosis  No diagnosis found.       Problem List     Patient Active Problem List  Diagnosis   Iron deficiency anemia due to chronic blood loss   Major depressive disorder   RLS (restless legs syndrome)   GERD (gastroesophageal reflux disease)   Left breast mass   Allergy to penicillin   Stroke   Grade I diastolic dysfunction   Moderate malnutrition   Falls frequently   Stroke-like symptoms   Debility          Medical History      Past Medical History:  Diagnosis Date   Anemia     Anxiety     Arthritis     Depression     Legally blind     Restless leg syndrome     Sleep apnea      \"I don't use a cpap anymore since losing 106 lbs\"   Water retention            Surgical History       Past Surgical History:  Procedure Laterality Date   BACK SURGERY       CARDIAC CATHETERIZATION N/A 2024    Procedure: Percutaneous Manual Thrombectomy;  Surgeon: Efrain Hurley MD;  Location:  [facility];  Service: Interventional Radiology;  Laterality: N/A;   GALLBLADDER SURGERY       HIP ARTHROSCOPY       JOINT REPLACEMENT Right            PT ASSESSMENT (Last 12 Hours)         IRF PT Evaluation and Treatment         Row Name 24 1410               PT Time and Intention    Document Type progress note (P)  [image] BID treatment session  -MV      Mode of Treatment physical therapy;individual therapy (P)   -MV      Patient/Family/Caregiver Comments/Observations Pt. had no new c/o during todays treatment and verbally agreed to physical therapy partcipation.  Patient making steady progress with therapy demonstrating ability to ambulate further " distance and requiring less assistance with functional mobility.  Patient continues to require mod A for transfers and mod A x 2 for ambulation with HW.  Patient would benefit from ongoing skilled therapy to further improve strength, endurance, safety & independence prior to discharge. (P)   -MV         Row Name 04/05/24 1410               General Information    Patient Profile Reviewed yes (P)   -MV      Existing Precautions/Restrictions fall (P)   -MV      Limitations/Impairments safety/cognitive;visual (P)   -MV         Row Name 04/05/24 1410               Living Environment    Primary Care Provided by self (P)   -MV         Row Name 04/05/24 1410               Home Use of Assistive/Adaptive Equipment    Equipment Currently Used at Home commode;hospital bed;grab bar;shower chair (P)   -MV         Row Name 04/05/24 1410               Pain Assessment    Pretreatment Pain Rating -- (P)  [image] painful L UE PROM  -MV         Row Name 04/05/24 1410               Cognition/Psychosocial    Affect/Mental Status (Cognition) emotionally labile (P)   -MV      Orientation Status (Cognition) oriented to;person;place;situation (P)  [image] intermittent confusion  -MV      Follows Commands (Cognition) verbal cues/prompting required;repetition of directions required;physical/tactile prompts required (P)   -MV      Personal Safety Interventions fall prevention program maintained;gait belt;nonskid shoes/slippers when out of bed;supervised activity (P)   -MV         Row Name 04/05/24 1410               Vision Assessment/Intervention    Visual Impairment/Limitations legally blind;corrective lenses full-time (P)   -MV         Row Name 04/05/24 1410               Mobility    Extremity Weight-bearing Status -- (P)  [image] no restrictions  -MV         Row Name 04/05/24 1410               Sit-Stand Transfer    Sit-Stand [place] (Transfers) verbal cues;nonverbal cues (demo/gesture);moderate assist (50% patient effort) (P)   -MV       Assistive Device (Sit-Stand Transfers) wheelchair;walker, platform (P)   -MV         Row Name 04/05/24 1410               Stand-Sit Transfer    Stand-Sit [place] (Transfers) verbal cues;nonverbal cues (demo/gesture);moderate assist (50% patient effort) (P)   -MV      Assistive Device (Stand-Sit Transfers) wheelchair;walker, platform (P)   -MV         Row Name 04/05/24 1410               Stand Pivot/Stand Step Transfer    Stand Pivot/Stand Step [place] (Transfers) verbal cues;nonverbal cues (demo/gesture);moderate assist (50% patient effort) (P)   -MV      Assistive Device (Stand Pivot Stand Step Transfer) wheelchair (P)   -MV         Row Name 04/05/24 1410               Toilet Transfer    Type (Toilet Transfer) stand pivot/stand step (P)   -MV      [place] Level (Toilet Transfer) moderate assist (50% patient effort);verbal cues;nonverbal cues (demo/gesture) (P)   -MV      Assistive Device (Toilet Transfer) wheelchair;grab bars/safety frame (P)   -MV         Row Name 04/05/24 1410               Gait/Stairs (Locomotion)    Gait/Stairs Locomotion gait/ambulation independence;gait/ambulation assistive device;distance ambulated;gait pattern;gait deviations (P)   -MV      [place] Level (Gait) verbal cues;nonverbal cues (demo/gesture);moderate assist (50% patient effort);2 person assist (P)   -MV      Assistive Device (Gait) walker, dao (P)   -MV      Distance in Feet (Gait) -- (P)  [image] 40'  -MV      Pattern (Gait) step-through (P)   -MV      Deviations/Abnormal Patterns (Gait) base of support, narrow;gait speed decreased;weight shifting decreased;stride length decreased (P)   -MV      Bilateral Gait Deviations forward flexed posture (P)   -MV      Left Sided Gait Deviations weight shift ability decreased (P)   -MV      Comment, (Gait/Stairs) Cues for upright posture and increased step width and length. (P)   -MV         Row Name 04/05/24 1410               Safety Issues, Functional Mobility     Impairments Affecting Function (Mobility) balance;cognition;coordination;endurance/activity tolerance;grasp;motor control;muscle tone abnormal;range of motion (ROM);postural/trunk control;strength (P)   -MV         Row Name 04/05/24 1410               Motor Skills    Therapeutic Exercise -- (P)  [image] Bilateral stretching to hamstrings  -MV         Row Name 04/05/24 1410               Hip (Therapeutic Exercise)    Hip Strengthening (Therapeutic Exercise) bilateral;flexion;extension;aBduction;aDduction;marching while seated;marching while standing;sitting;standing;mini squats;resistance band;green (P)  [image] 1# with sitting exercises  -MV         Row Name 04/05/24 1410               Knee (Therapeutic Exercise)    Knee Strengthening (Therapeutic Exercise) bilateral;flexion;extension;marching while seated;marching while standing;LAQ (long arc quad);hamstring curls;sitting;standing;resistance band;green (P)  [image] 1# with seated exercises  -MV         Row Name 04/05/24 1410               Ankle (Therapeutic Exercise)    Ankle Strengthening (Therapeutic Exercise) bilateral;dorsiflexion;plantarflexion;sitting;1 lb free weight (P)   -MV         [facility] Name 04/05/24 1410               Aerobic Exercise    Type (Aerobic Exercise) recumbent stationary bike (P)   -MV      Time Performed (Aerobic Exercise) LVL 1.0 x 15 min (P)   -MV         Row Name 04/05/24 1410               Positioning and Restraints    Pre-Treatment Position -- (P)  [image] Wheelchair in hallway BID  -MV      Post Treatment Position wheelchair (P)   -MV      In Wheelchair notified nsg;sitting;encouraged to call for assist;exit alarm on (P)  [image] In front of nurses station w/ L arm splint donned and arm tray  -MV         Row Name 04/05/24 1410               Therapy Assessment/Plan (PT)    Patient's Goals For Discharge return home (P)   -MV         Row Name 04/05/24 1410               Therapy Assessment/Plan (PT)    Rehab Potential/Prognosis (PT) good,  to achieve stated therapy goals (P)   -MV      Frequency of Treatment (PT) 5 times per week (P)   -MV      Estimated Duration of Therapy (PT) 3 weeks (P)   -MV      Problem List (PT) problems related to;balance;mobility;coordination;hemiparesis/hemiplegia;cognition;communication;strength;postural control;vision;range of motion (ROM);muscle tone;motor control (P)   -MV      Activity Limitations Related to Problem List (PT) unable to ambulate safely;unable to transfer safely;BADLs not performed adequately or safely (P)   -MV         Row Name 04/05/24 1410               Therapy Plan Review/Discharge Plan (PT)    Anticipated Discharge Disposition (PT) home with assist;home with home health (P)   -MV         Row Name 04/05/24 1410               IRF PT Goals    Bed Mobility Goal Selection (PT-IRF) bed mobility, PT goal 1 (P)   -MV      Transfer Goal Selection (PT-IRF) transfers, PT goal 1 (P)   -MV      Gait (Walking Locomotion) Goal Selection (PT-IRF) gait, PT goal 1 (P)   -MV         Row Name 04/05/24 1410               Bed Mobility Goal 1 (PT-IRF)    Activity/Assistive Device (Bed Mobility Goal 1, PT-IRF) scooting;sit to supine;supine to sit (P)   -MV      [place] Level (Bed Mobility Goal 1, PT-IRF) standby assist (P)   -MV      Time Frame (Bed Mobility Goal 1, PT-IRF) by discharge (P)   -MV      Progress/Outcomes (Bed Mobility Goal 1, PT-IRF) goal ongoing (P)   -MV         Row Name 04/05/24 1410               Transfer Goal 1 (PT-IRF)    Activity/Assistive Device (Transfer Goal 1, PT-IRF) sit-to-stand/stand-to-sit;bed-to-chair/chair-to-bed;toilet (P)   -MV      [place] Level (Transfer Goal 1, PT-IRF) contact guard required (P)   -MV      Time Frame (Transfer Goal 1, PT-IRF) by discharge (P)   -MV      Progress/Outcomes (Transfer Goal 1, PT-IRF) goal ongoing (P)   -MV         Row Name 04/05/24 1410               Gait/Walking Locomotion Goal 1 (PT-IRF)    Activity/Assistive Device (Gait/Walking Locomotion Goal  1, PT-IRF) gait (walking locomotion);assistive device use;decrease fall risk;diminish gait deviation;improve balance and speed;increase endurance/gait distance (P)  [image] appropriate a.d.  -MV      Gait/Walking Locomotion Distance Goal 1 (PT-IRF) 150 (P)   -MV      [place] Level (Gait/Walking Locomotion Goal 1, PT-IRF) contact guard required (P)   -MV      Time Frame (Gait/Walking Locomotion Goal 1, PT-IRF) by discharge (P)   -MV      Progress/Outcomes (Gait/Walking Locomotion Goal 1, PT-IRF) goal ongoing (P)   -MV                User Key  (r) = Recorded By, (t) = Taken By, (c) = Cosigned By        Initials Name Provider Type    MV Antonio Peck, TK Student PTA Student                PT Recommendation and Plan     Frequency of Treatment (PT): (P) 5 times per week            Time Calculation:       PT Charges         Row Name 24 1438 24 1424                Time Calculation    Start Time 1245 (P)   -MV 1045 (P)   -MV      Stop Time 1330 (P)   -MV 1130 (P)   -MV      Time Calculation (min) 45 min (P)   -MV 45 min (P)   -MV      PT Received On 24 (P)   -MV 24 (P)   -MV             Time Calculation- PT    Total Timed Code Minutes- PT 45 minute(s) (P)   -MV 45 minute(s) (P)   -MV           Ruthie Travis PTA [image]  Physical Therapist Assistant  Physical Therapy     Therapy Progress Report/Re-Cert   [image:This note has been shared with the patient]  Signed     Date of Service: 24  Creation Time: 24    Signed     Expand All Collapse All  [image]  Inpatient Rehabilitation - Physical Therapy Progress Note                                                              HONOR[facility] Poon     Patient Name: Regine Beckham              : 1955                      MRN: [id number]     Today's Date: 2024                                                                                              Admit Date: 3/27/2024                          Visit Dx:   Visit  "Diagnosis  No diagnosis found.       Problem List     Patient Active Problem List  Diagnosis   Iron deficiency anemia due to chronic blood loss   Major depressive disorder   RLS (restless legs syndrome)   GERD (gastroesophageal reflux disease)   Left breast mass   Allergy to penicillin   Stroke   Grade I diastolic dysfunction   Moderate malnutrition   Falls frequently   Stroke-like symptoms   Debility          Medical History      Past Medical History:  Diagnosis Date   Anemia     Anxiety     Arthritis     Depression     Legally blind     Restless leg syndrome     Sleep apnea      \"I don't use a cpap anymore since losing 106 lbs\"   Water retention            Surgical History       Past Surgical History:  Procedure Laterality Date   BACK SURGERY       CARDIAC CATHETERIZATION N/A 1/19/2024    Procedure: Percutaneous Manual Thrombectomy;  Surgeon: Efrain Hurley MD;  Location:  [facility];  Service: Interventional Radiology;  Laterality: N/A;   GALLBLADDER SURGERY       HIP ARTHROSCOPY       JOINT REPLACEMENT Right            PT ASSESSMENT (Last 12 Hours)         IRF PT Evaluation and Treatment         Row Name 04/04/24 1402               PT Time and Intention    Document Type progress note [image] BID treatment session  -LL      Mode of Treatment physical therapy;individual therapy  -LL      Patient/Family/Caregiver Comments/Observations Patient c/o ongoing L UE/shoulder pain.  MD aware and addressing.  -LL         Row Name 04/04/24 1402               General Information    Patient Profile Reviewed yes  -LL      Existing Precautions/Restrictions fall  -LL      Limitations/Impairments safety/cognitive;visual  -LL         Row Name 04/04/24 1402               Living Environment    Primary Care Provided by self  -LL         Row Name 04/04/24 1402               Home Use of Assistive/Adaptive Equipment    Equipment Currently Used at Home commode;hospital bed;grab bar;shower chair  -LL         Row " Name 04/04/24 1402               Pain Assessment    Pretreatment Pain Rating -- [image] painful L UE PROM  -         Row Name 04/04/24 1402               Cognition/Psychosocial    Affect/Mental Status (Cognition) emotionally labile  -      Orientation Status (Cognition) oriented to;person;place;situation [image] intermittent confusion  -      Follows Commands (Cognition) verbal cues/prompting required;repetition of directions required;physical/tactile prompts required  -      Personal Safety Interventions fall prevention program maintained;gait belt;nonskid shoes/slippers when out of bed;supervised activity  -         Row Name 04/04/24 1402               Vision Assessment/Intervention    Visual Impairment/Limitations legally blind;corrective lenses full-time  -         Row Name 04/04/24 1402               Mobility    Extremity Weight-bearing Status -- [image] no restrictions  -         Row Name 04/04/24 1402               Bed Mobility    Supine-Sit [place] (Bed Mobility) moderate assist (50% patient effort);verbal cues;nonverbal cues (demo/gesture)  -      Bed Mobility, Safety Issues decreased use of arms for pushing/pulling;decreased use of legs for bridging/pushing  -      Assistive Device (Bed Mobility) bed rails;draw sheet  -         Row Name 04/04/24 1402               Transfer Assessment/Treatment    Transfers sit-stand transfer;stand-sit transfer;stand pivot/stand step transfer;toilet transfer  -         Row Name 04/04/24 1402               Bed-Chair Transfer    Bed-Chair [place] (Transfers) moderate assist (50% patient effort);verbal cues;nonverbal cues (demo/gesture)  -      Assistive Device (Bed-Chair Transfers) wheelchair  -         Row Name 04/04/24 1402               Sit-Stand Transfer    Sit-Stand [place] (Transfers) verbal cues;nonverbal cues (demo/gesture);moderate assist (50% patient effort)  -      Assistive Device (Sit-Stand Transfers) wheelchair;walker,  platform  -LL         Row Name 04/04/24 1402               Stand-Sit Transfer    Stand-Sit [place] (Transfers) verbal cues;nonverbal cues (demo/gesture);moderate assist (50% patient effort)  -LL      Assistive Device (Stand-Sit Transfers) wheelchair;walker, platform  -LL         Row Name 04/04/24 1402               Stand Pivot/Stand Step Transfer    Stand Pivot/Stand Step [place] (Transfers) verbal cues;nonverbal cues (demo/gesture);moderate assist (50% patient effort)  -LL      Assistive Device (Stand Pivot Stand Step Transfer) wheelchair  -         Row Name 04/04/24 1402               Toilet Transfer    Type (Toilet Transfer) stand pivot/stand step  -LL      [place] Level (Toilet Transfer) moderate assist (50% patient effort);verbal cues;nonverbal cues (demo/gesture)  -      Assistive Device (Toilet Transfer) wheelchair;grab bars/safety frame  -         Row Name 04/04/24 1402               Gait/Stairs (Locomotion)    Gait/Stairs Locomotion gait/ambulation independence;gait/ambulation assistive device;distance ambulated;gait pattern;gait deviations  -LL      [place] Level (Gait) verbal cues;nonverbal cues (demo/gesture);moderate assist (50% patient effort);2 person assist  -LL      Assistive Device (Gait) walker, dao  -LL      Distance in Feet (Gait) 40  -LL      Pattern (Gait) step-through  -LL      Deviations/Abnormal Patterns (Gait) base of support, narrow;gait speed decreased;weight shifting decreased;stride length decreased  -LL      Bilateral Gait Deviations forward flexed posture  -LL      Left Sided Gait Deviations weight shift ability decreased  -LL      Comment, (Gait/Stairs) Cues for upright posture and increased step width and length.  -         Row Name 04/04/24 1402               Safety Issues, Functional Mobility    Impairments Affecting Function (Mobility) balance;cognition;coordination;endurance/activity tolerance;grasp;motor control;muscle tone abnormal;range of motion  (ROM);postural/trunk control;strength  -LL         Row Name 04/04/24 1402               Motor Skills    Therapeutic Exercise aerobic  -         Row Name 04/04/24 1402               Hip (Therapeutic Exercise)    Hip Strengthening (Therapeutic Exercise) bilateral;flexion;extension;aBduction;aDduction;marching while seated;sitting;1 lb free weight;resistance band;green  -         Row Name 04/04/24 1402               Knee (Therapeutic Exercise)    Knee Strengthening (Therapeutic Exercise) bilateral;flexion;extension;marching while seated;LAQ (long arc quad);hamstring curls;sitting;1 lb free weight;resistance band;green  -         Row Name 04/04/24 1402               Ankle (Therapeutic Exercise)    Ankle Strengthening (Therapeutic Exercise) bilateral;dorsiflexion;plantarflexion;sitting  -         Row Name 04/04/24 1402               Aerobic Exercise    Type (Aerobic Exercise) recumbent stationary bike  -      Time Performed (Aerobic Exercise) LVL 1.0 x 15 min  -[facility] Name 04/04/24 1402               Positioning and Restraints    Pre-Treatment Position in bed [image] In AM; WC in PM  -LL      Post Treatment Position wheelchair  -LL      In Wheelchair notified nsg;sitting;encouraged to call for assist;exit alarm on [image] In hallway in front of nurses station in AM; w/ OT in PM  -LL         Row Name 04/04/24 1402               Therapy Assessment/Plan (PT)    Patient's Goals For Discharge return home  -[facility] Name 04/04/24 1402               Therapy Assessment/Plan (PT)    Rehab Potential/Prognosis (PT) good, to achieve stated therapy goals  -      Frequency of Treatment (PT) 5 times per week  -LL      Estimated Duration of Therapy (PT) 3 weeks  -LL      Problem List (PT) problems related to;balance;mobility;coordination;hemiparesis/hemiplegia;cognition;communication;strength;postural control;vision;range of motion (ROM);muscle tone;motor control  -      Activity Limitations Related to  Problem List (PT) unable to ambulate safely;unable to transfer safely;BADLs not performed adequately or safely  -LL         Row Name 04/04/24 1402               Therapy Plan Review/Discharge Plan (PT)    Anticipated Discharge Disposition (PT) home with assist;home with home health  -LL         Row Name 04/04/24 1402               IRF PT Goals    Bed Mobility Goal Selection (PT-IRF) bed mobility, PT goal 1  -LL      Transfer Goal Selection (PT-IRF) transfers, PT goal 1  -LL      Gait (Walking Locomotion) Goal Selection (PT-IRF) gait, PT goal 1  -LL         Row Name 04/04/24 1402               Bed Mobility Goal 1 (PT-IRF)    Activity/Assistive Device (Bed Mobility Goal 1, PT-IRF) scooting;sit to supine;supine to sit  -LL      [place] Level (Bed Mobility Goal 1, PT-IRF) standby assist  -LL      Time Frame (Bed Mobility Goal 1, PT-IRF) by discharge  -LL      Progress/Outcomes (Bed Mobility Goal 1, PT-IRF) goal ongoing  -LL         Row Name 04/04/24 1402               Transfer Goal 1 (PT-IRF)    Activity/Assistive Device (Transfer Goal 1, PT-IRF) sit-to-stand/stand-to-sit;bed-to-chair/chair-to-bed;toilet  -LL      [place] Level (Transfer Goal 1, PT-IRF) contact guard required  -LL      Time Frame (Transfer Goal 1, PT-IRF) by discharge  -LL      Progress/Outcomes (Transfer Goal 1, PT-IRF) goal ongoing  -LL         Row Name 04/04/24 1402               Gait/Walking Locomotion Goal 1 (PT-IRF)    Activity/Assistive Device (Gait/Walking Locomotion Goal 1, PT-IRF) gait (walking locomotion);assistive device use;decrease fall risk;diminish gait deviation;improve balance and speed;increase endurance/gait distance [image] appropriate a.d.  -LL      Gait/Walking Locomotion Distance Goal 1 (PT-IRF) 150  -LL      [place] Level (Gait/Walking Locomotion Goal 1, PT-IRF) contact guard required  -LL      Time Frame (Gait/Walking Locomotion Goal 1, PT-IRF) by discharge  -LL      Progress/Outcomes (Gait/Walking Locomotion Goal  1, PT-IRF) goal ongoing  -LL                User Key  (r) = Recorded By, (t) = Taken By, (c) = Cosigned By        Initials Name Provider Type    LL Ruthie Travis PTA Physical Therapist Assistant                  PT Recommendation and Plan     Frequency of Treatment (PT): 5 times per week               Time Calculation:       PT Charges         Row Name 24 1411 24 1410                Time Calculation    Start Time 1245  -LL 0735  -LL      Stop Time 1330  -LL 0830  -LL      Time Calculation (min) 45 min  -LL 55 min  -LL      PT Received On -- 24  -LL      PT Goal Re-Cert Due Date -- 24  -LL             Time Calculation- PT    Total Timed Code Minutes- PT 45 minute(s)  -LL 55 minute(s)  -           Ruthie Travis PTA [image]  Physical Therapist Assistant  Physical Therapy     Therapy Treatment Note   [image:This note has been shared with the patient]  Signed     Date of Service: 24  Creation Time: 24    Signed     Expand All Collapse All  [image]  Inpatient Rehabilitation - Physical Therapy Treatment Note                                                              HONORIO Poon     Patient Name: Regine Beckham              : 1955                      MRN: [id number]     Today's Date: 4/3/2024                                                                                              Admit Date: 3/27/2024                          Visit Dx:   Visit Diagnosis  No diagnosis found.       Problem List     Patient Active Problem List  Diagnosis   Iron deficiency anemia due to chronic blood loss   Major depressive disorder   RLS (restless legs syndrome)   GERD (gastroesophageal reflux disease)   Left breast mass   Allergy to penicillin   Stroke   Grade I diastolic dysfunction   Moderate malnutrition   Falls frequently   Stroke-like symptoms   Debility          Medical History      Past Medical History:  Diagnosis Date   Anemia     Anxiety     Arthritis     Depression   "   Legally blind     Restless leg syndrome     Sleep apnea      \"I don't use a cpap anymore since losing 106 lbs\"   Water retention            Surgical History       Past Surgical History:  Procedure Laterality Date   BACK SURGERY       CARDIAC CATHETERIZATION N/A 1/19/2024    Procedure: Percutaneous Manual Thrombectomy;  Surgeon: Efrain Hurley MD;  Location: [facility] INVASIVE LOC[facility];  Service: Interventional Radiology;  Laterality: N/A;   GALLBLADDER SURGERY       HIP ARTHROSCOPY       JOINT REPLACEMENT Right            PT ASSESSMENT (Last 12 Hours)         IRF PT Evaluation and Treatment         Row Name 04/03/24 1523               PT Time and Intention    Document Type daily treatment  -LL      Mode of Treatment physical therapy;individual therapy  -      Patient/Family/Caregiver Comments/Observations Patient c/o L hand swelling and L shoulder pain, but was agreeable to PT participation.  -         Row Name 04/03/24 1523               General Information    Patient Profile Reviewed yes  -LL      Existing Precautions/Restrictions fall  -LL      Limitations/Impairments safety/cognitive;visual  -         Row Name 04/03/24 1523               Living Environment    Primary Care Provided by self  -         Row Name 04/03/24 1523               Home Use of Assistive/Adaptive Equipment    Equipment Currently Used at Home commode;hospital bed;grab bar;shower chair  -         Row Name 04/03/24 1523               Pain Assessment    Pretreatment Pain Rating -- [image] painful L UE PROM  -         Row Name 04/03/24 1523               Cognition/Psychosocial    Affect/Mental Status (Cognition) emotionally labile  -      Orientation Status (Cognition) oriented to;person;place;situation [image] intermittent confusion  -LL      Follows Commands (Cognition) verbal cues/prompting required;repetition of directions required;physical/tactile prompts required  -LL      Personal Safety Interventions fall prevention " program maintained;gait belt;nonskid shoes/slippers when out of bed;supervised activity  -[facility] Name 04/03/24 1523               Vision Assessment/Intervention    Visual Impairment/Limitations legally blind;corrective lenses full-time  -[facility] Name 04/03/24 1523               Mobility    Extremity Weight-bearing Status -- [image] no restrictions  -[facility] Name 04/03/24 1523               Bed Mobility    Assistive Device (Bed Mobility) bed rails;draw sheet  -[facility] Name 04/03/24 1523               Transfer Assessment/Treatment    Transfers sit-stand transfer;stand-sit transfer;stand pivot/stand step transfer;toilet transfer  -[facility] Name 04/03/24 1523               Sit-Stand Transfer    Sit-Stand [place] (Transfers) verbal cues;nonverbal cues (demo/gesture);moderate assist (50% patient effort)  -      Assistive Device (Sit-Stand Transfers) wheelchair;walker, platform  -[facility] Name 04/03/24 1523               Stand-Sit Transfer    Stand-Sit [place] (Transfers) verbal cues;nonverbal cues (demo/gesture);moderate assist (50% patient effort)  -      Assistive Device (Stand-Sit Transfers) wheelchair;walker, platform  -[facility] Name 04/03/24 1523               Stand Pivot/Stand Step Transfer    Stand Pivot/Stand Step [place] (Transfers) verbal cues;nonverbal cues (demo/gesture);moderate assist (50% patient effort)  -      Assistive Device (Stand Pivot Stand Step Transfer) wheelchair  -[facility] Name 04/03/24 1523               Toilet Transfer    Type (Toilet Transfer) stand pivot/stand step  -      [place] Level (Toilet Transfer) moderate assist (50% patient effort);verbal cues;nonverbal cues (demo/gesture)  -      Assistive Device (Toilet Transfer) wheelchair;grab bars/safety frame  -         Row Name 04/03/24 1523               Gait/Stairs (Locomotion)    Gait/Stairs Locomotion gait/ambulation independence;gait/ambulation assistive  device;distance ambulated;gait pattern;gait deviations  -LL      [place] Level (Gait) verbal cues;nonverbal cues (demo/gesture);moderate assist (50% patient effort);2 person assist;1 person assist  -LL      Assistive Device (Gait) cane, quad;walker, dao  -LL      Distance in Feet (Gait) -- [image] 15' w/ SBQC; 15' w/ HW  -LL      Pattern (Gait) step-through  -LL      Deviations/Abnormal Patterns (Gait) base of support, narrow;gait speed decreased;weight shifting decreased;stride length decreased  -LL      Bilateral Gait Deviations forward flexed posture  -LL      Left Sided Gait Deviations weight shift ability decreased  -LL      Comment, (Gait/Stairs) Cues for upright posture and increased step width and length.  -         Row Name 04/03/24 1523               Safety Issues, Functional Mobility    Impairments Affecting Function (Mobility) balance;cognition;coordination;endurance/activity tolerance;grasp;motor control;muscle tone abnormal;range of motion (ROM);postural/trunk control;strength  -         Row Name 04/03/24 1523               Balance    Comment, Balance Standing in // bars working on upright and neutral posture.  PTA assisted with B pec stretch  -         Row Name 04/03/24 1523               Hip (Therapeutic Exercise)    Hip Strengthening (Therapeutic Exercise) bilateral;flexion;extension;aBduction;aDduction;marching while seated;sitting;resistance band;green  -         Row Name 04/03/24 1523               Knee (Therapeutic Exercise)    Knee Strengthening (Therapeutic Exercise) bilateral;flexion;extension;marching while seated;LAQ (long arc quad);hamstring curls;sitting;resistance band;green  -LL         Row Name 04/03/24 1523               Positioning and Restraints    Pre-Treatment Position -- [image] WC  -LL      Post Treatment Position wheelchair  -LL      In Wheelchair sitting;encouraged to call for assist;notified nsg;exit alarm on [image] In hallway in front of nurses station  -          Row Name 04/03/24 1523               Therapy Assessment/Plan (PT)    Patient's Goals For Discharge return home  -         Row Name 04/03/24 1523               Therapy Assessment/Plan (PT)    Rehab Potential/Prognosis (PT) good, to achieve stated therapy goals  -LL      Frequency of Treatment (PT) 5 times per week  -LL      Estimated Duration of Therapy (PT) 3 weeks  -      Problem List (PT) problems related to;balance;mobility;coordination;hemiparesis/hemiplegia;cognition;communication;strength;postural control;vision;range of motion (ROM);muscle tone;motor control  -      Activity Limitations Related to Problem List (PT) unable to ambulate safely;unable to transfer safely;BADLs not performed adequately or safely  -         Row Name 04/03/24 1523               Therapy Plan Review/Discharge Plan (PT)    Anticipated Discharge Disposition (PT) home with assist;home with home health  -[facility] Name 04/03/24 1523               IRF PT Goals    Bed Mobility Goal Selection (PT-IRF) bed mobility, PT goal 1  -LL      Transfer Goal Selection (PT-IRF) transfers, PT goal 1  -LL      Gait (Walking Locomotion) Goal Selection (PT-IRF) gait, PT goal 1  -LL         [facility] Name 04/03/24 1523               Bed Mobility Goal 1 (PT-IRF)    Activity/Assistive Device (Bed Mobility Goal 1, PT-IRF) scooting;sit to supine;supine to sit  -LL      [place] Level (Bed Mobility Goal 1, PT-IRF) standby assist  -LL      Time Frame (Bed Mobility Goal 1, PT-IRF) by discharge  -         Row Name 04/03/24 1523               Transfer Goal 1 (PT-IRF)    Activity/Assistive Device (Transfer Goal 1, PT-IRF) sit-to-stand/stand-to-sit;bed-to-chair/chair-to-bed;toilet  -LL      [place] Level (Transfer Goal 1, PT-IRF) contact guard required  -LL      Time Frame (Transfer Goal 1, PT-IRF) by discharge  -         Row Name 04/03/24 1523               Gait/Walking Locomotion Goal 1 (PT-IRF)    Activity/Assistive Device (Gait/Walking  Locomotion Goal 1, PT-IRF) gait (walking locomotion);assistive device use;decrease fall risk;diminish gait deviation;improve balance and speed;increase endurance/gait distance [image] appropriate a.d.  -LL      Gait/Walking Locomotion Distance Goal 1 (PT-IRF) 150  -LL      [place] Level (Gait/Walking Locomotion Goal 1, PT-IRF) contact guard required  -LL      Time Frame (Gait/Walking Locomotion Goal 1, PT-IRF) by discharge  -LL                User Key  (r) = Recorded By, (t) = Taken By, (c) = Cosigned By        Initials Name Provider Type    LL Ruthie Travis PTA Physical Therapist Assistant                   PT Recommendation and Plan     Frequency of Treatment (PT): 5 times per week              Time Calculation:       PT Charges         Row Name 24 1531                     Time Calculation    Start Time 0735  -LL        Stop Time 0915  -LL        Time Calculation (min) 100 min  -LL        PT Received On 24  -LL               Time Calculation- PT    Total Timed Code Minutes-  minute(s)  -LL          Desi Mackey OT [image]  Occupational Therapist  Occupational Therapy     Therapy Treatment Note   [image:This note has been shared with the patient]  Signed     Date of Service: 24 152  Creation Time: 24    Signed     Expand All Collapse All  [image]  Inpatient Rehabilitation - Occupational Therapy Treatment Note     HONORIO Poon     Patient Name: Regine Beckham              : 1955                      MRN: [id number]     Today's Date: 2024                                                                               Admit Date: 3/27/2024        Visit Diagnosis  No diagnosis found.       Problem List     Patient Active Problem List  Diagnosis   Iron deficiency anemia due to chronic blood loss   Major depressive disorder   RLS (restless legs syndrome)   GERD (gastroesophageal reflux disease)   Left breast mass   Allergy to penicillin   Stroke   Grade I  "diastolic dysfunction   Moderate malnutrition   Falls frequently   Stroke-like symptoms   Debility          Medical History      Past Medical History:  Diagnosis Date   Anemia     Anxiety     Arthritis     Depression     Legally blind     Restless leg syndrome     Sleep apnea      \"I don't use a cpap anymore since losing 106 lbs\"   Water retention            Surgical History       Past Surgical History:  Procedure Laterality Date   BACK SURGERY       CARDIAC CATHETERIZATION N/A 1/19/2024    Procedure: Percutaneous Manual Thrombectomy;  Surgeon: Efrain Hurley MD;  Location: [facility] CATH INVASIVE LOCATION;  Service: Interventional Radiology;  Laterality: N/A;   GALLBLADDER SURGERY       HIP ARTHROSCOPY       JOINT REPLACEMENT Right               ?          IRF OT ASSESSMENT FLOWSHEET (Last 12 Hours)         IRF OT Evaluation and Treatment         Row Name 04/05/24 1350               OT Time and Intention    Document Type daily treatment  -BF      Mode of Treatment occupational therapy  -BF      Patient Effort good  -BF      Symptoms Noted During/After Treatment increased pain [image] LUE shoulder and hand pain, MD aware/addressing  -BF         Row Name 04/05/24 1350               General Information    Existing Precautions/Restrictions fall [image] L HP  -BF      Limitations/Impairments safety/cognitive;visual  -BF         Row Name 04/05/24 1350               Cognition/Psychosocial    Affect/Mental Status (Cognition) emotionally labile  -BF      Orientation Status (Cognition) oriented to;person;place;situation [image] intermittent confusion  -BF      Follows Commands (Cognition) verbal cues/prompting required;repetition of directions required;physical/tactile prompts required  -BF         Row Name 04/05/24 1350               Upper Body Dressing    [place] Level (Upper Body Dressing) maximal assist (25-49% patient effort);verbal cues;nonverbal cues (demo/gesture)  -BF      Position (Upper Body " Dressing) supported sitting  -BF         Row Name 04/05/24 1350               Lower Body Dressing    [place] Level (Lower Body Dressing) maximum assist (25% patient effort);verbal cues;nonverbal cues (demo/gesture)  -BF      Position (Lower Body Dressing) supported sitting  -BF         Row Name 04/05/24 1350               Grooming    [place] Level (Grooming) minimum assist (75% patient effort);verbal cues;nonverbal cues (demo/gesture)  -BF      Assistive Device (Grooming) suction brush  -BF      Position (Grooming) supported sitting  -BF         Row Name 04/05/24 1350               Motor Skills    Motor Control/Coordination Interventions fine motor manipulation/dexterity activities;gross motor coordination activities;neuro-muscular re-education;therapeutic exercise/ROM [image] LUE AA/PROM as tolerated, NDT; RUE FMC/GMC theract  -BF         Row Name 04/05/24 1350               Neuromuscular Re-education    Interventions (Neuromuscular Re-education) facilitation/inhibition;massage  -BF      Positioning (Neuromuscular Re-education) sitting;supported  -BF         Row Name 04/05/24 1350               Positioning and Restraints    In Wheelchair sitting;LUE elevated;patient within staff view;with nsg [image] at nsg station  -BF                User Key  (r) = Recorded By, (t) = Taken By, (c) = Cosigned By        Initials Name Effective Dates    BF Desi Mackey, OT 07/11/23 -                 OT Recommendation and Plan     Planned Therapy Interventions (OT): activity tolerance training, adaptive equipment training, BADL retraining, neuromuscular control/coordination retraining, passive ROM/stretching, ROM/therapeutic exercise, strengthening exercise, transfer/mobility retraining              Time Calculation:       Time Calculation- OT         Row Name 04/05/24 1506 04/05/24 0935                Time Calculation- OT    OT Start Time 1130  -BF 0935  -BF      OT Stop Time 1150  -BF 1045  -BF      OT Time  "Calculation (min) 20 min  -BF 70 min  -BF      Total Timed Code Minutes- OT 20 minute(s)  -BF 70 minute(s)  -BF      OT Non-Billable Time (min) -- 10 min  -BF            Desi Mackey OT [image]  Occupational Therapist  Occupational Therapy     Therapy Treatment Note   [image:This note has been shared with the patient]  Addendum     Date of Service: 24  Creation Time: 24    Expand All Collapse All  [image]  Inpatient Rehabilitation - Occupational Therapy Progress Note and Treatment Note      [place]     Patient Name: Regine Beckham              : 1955                      MRN: [id number]     Today's Date: 2024                                                                               Admit Date: 3/27/2024        Visit Diagnosis  No diagnosis found.       Problem List     Patient Active Problem List  Diagnosis   Iron deficiency anemia due to chronic blood loss   Major depressive disorder   RLS (restless legs syndrome)   GERD (gastroesophageal reflux disease)   Left breast mass   Allergy to penicillin   Stroke   Grade I diastolic dysfunction   Moderate malnutrition   Falls frequently   Stroke-like symptoms   Debility          Medical History      Past Medical History:  Diagnosis Date   Anemia     Anxiety     Arthritis     Depression     Legally blind     Restless leg syndrome     Sleep apnea      \"I don't use a cpap anymore since losing 106 lbs\"   Water retention            Surgical History       Past Surgical History:  Procedure Laterality Date   BACK SURGERY       CARDIAC CATHETERIZATION N/A 2024    Procedure: Percutaneous Manual Thrombectomy;  Surgeon: Efrain Hurley MD;  Location: [facility] INVASIVE LOCATION;  Service: Interventional Radiology;  Laterality: N/A;   GALLBLADDER SURGERY       HIP ARTHROSCOPY       JOINT REPLACEMENT Right            IRF OT ASSESSMENT FLOWSHEET (Last 12 Hours)         IRF OT Evaluation and Treatment         Row Name 24 " 1445               OT Time and Intention    Document Type daily treatment;progress note  -BF      Mode of Treatment occupational therapy  -BF      Patient Effort good  -BF      Symptoms Noted During/After Treatment -- [image] LUE shoulder pain, hand pain, RN notified  -BF         Row Name 04/04/24 1445               General Information    Patient/Family/Caregiver Comments/Observations Edema in L hand improved but still present. Pt continues to c/o pain/soreness in L shoulder and hand.  -BF      Existing Precautions/Restrictions fall [image] L HP  -BF      Limitations/Impairments safety/cognitive;visual  -BF         Row Name 04/04/24 1445               Cognition/Psychosocial    Affect/Mental Status (Cognition) emotionally labile  -BF      Orientation Status (Cognition) oriented to;person;place;situation [image] intermittent confusion  -BF      Follows Commands (Cognition) verbal cues/prompting required;repetition of directions required;physical/tactile prompts required  -BF         Row Name 04/04/24 1445               Bathing    Comment (Bathing) Max A  -BF         Row Name 04/04/24 1445               Upper Body Dressing    Comment (Upper Body Dressing) Max A  -BF         Row Name 04/04/24 1445               Lower Body Dressing    Comment (Lower Body Dressing) Max A  -BF         Row Name 04/04/24 1445               Grooming    Comment (Grooming) Mod A  -BF         Row Name 04/04/24 1445               Toileting    Comment (Toileting) Total A  -BF         Row Name 04/04/24 1445               Self-Feeding    Comment (Self-Feeding) Set-up  -BF         Row Name 04/04/24 1445               Motor Skills    Motor Control/Coordination Interventions fine motor manipulation/dexterity activities;gross motor coordination activities;neuro-muscular re-education;therapeutic exercise/ROM [image] LUE AA/PROM as tolerated, NDT; JERRY FMC/GMC zenyxhardy 10 lbs X20X3  -BF         Row Name 04/04/24 1445               Neuromuscular  Re-education    Interventions (Neuromuscular Re-education) facilitation/inhibition;massage  -BF      Positioning (Neuromuscular Re-education) sitting;supported  -BF         Row Name 24 1445               Edema Assessment & Management    Location (Edema) wrist/hand, left  -BF         Row Name 24 1445               Edema Symptoms/Interventions    Treatment Interventions (Edema) active range of motion;elevation;positioning;retrograde massage  -BF         Row Name 24 1445               Positioning and Restraints    In Wheelchair sitting;patient within staff view;with SLP [image] w/ SLP in AM, at [facility] station in PM  -BF                User Key  (r) = Recorded By, (t) = Taken By, (c) = Cosigned By        Initials Name Effective Dates    BF Desi Mackey OT 23 -               OT Recommendation and Plan     Planned Therapy Interventions (OT): activity tolerance training, adaptive equipment training, BADL retraining, neuromuscular control/coordination retraining, passive ROM/stretching, ROM/therapeutic exercise, strengthening exercise, transfer/mobility retraining           Time Calculation:       Time Calculation- OT         Row Name 24 1453 24 0940                Time Calculation- OT    OT Start Time 1330  -BF 0940  -BF      OT Stop Time 1440  -BF 1000  -BF      OT Time Calculation (min) 70 min  -BF 20 min  -BF      Total Timed Code Minutes- OT 70 minute(s)  -BF 20 minute(s)  -BF      OT Non-Billable Time (min) -- 10 min  -Desi Wagner OT [image]  Occupational Therapist  Occupational Therapy     Therapy Treatment Note   [image:This note has been shared with the patient]  Signed     Date of Service: 24  Creation Time: 24    Signed     Expand All Collapse All  [image]  Inpatient Rehabilitation - Occupational Therapy Treatment Note     HONORIO Poon     Patient Name: Regine Beckham              : 1955                      MRN:  "[id number]     Today's Date: 4/3/2024                                                                               Admit Date: 3/27/2024        Visit Diagnosis  No diagnosis found.       Problem List     Patient Active Problem List  Diagnosis   Iron deficiency anemia due to chronic blood loss   Major depressive disorder   RLS (restless legs syndrome)   GERD (gastroesophageal reflux disease)   Left breast mass   Allergy to penicillin   Stroke   Grade I diastolic dysfunction   Moderate malnutrition   Falls frequently   Stroke-like symptoms   Debility          Medical History      Past Medical History:  Diagnosis Date   Anemia     Anxiety     Arthritis     Depression     Legally blind     Restless leg syndrome     Sleep apnea      \"I don't use a cpap anymore since losing 106 lbs\"   Water retention            Surgical History       Past Surgical History:  Procedure Laterality Date   BACK SURGERY       CARDIAC CATHETERIZATION N/A 1/19/2024    Procedure: Percutaneous Manual Thrombectomy;  Surgeon: Efrain Hurely MD;  Location: [facility] INVASIVE LOCATION;  Service: Interventional Radiology;  Laterality: N/A;   GALLBLADDER SURGERY       HIP ARTHROSCOPY       JOINT REPLACEMENT Right               ?          IRF OT ASSESSMENT FLOWSHEET (Last 12 Hours)         IRF OT Evaluation and Treatment         Row Name 04/03/24 1425               OT Time and Intention    Document Type daily treatment  -BF      Mode of Treatment occupational therapy  -BF      Patient Effort adequate  -BF      Symptoms Noted During/After Treatment other (see comments) [image] LUE shoulder pain, increased L hand edema, RN notified  -BF         Row Name 04/03/24 8043               General Information    Patient/Family/Caregiver Comments/Observations Pt reports increased L hand edema starting last night; pt reports she has been keeping her hand elevated and positioned safely. Pt provided with resting hand splint.  -BF      Existing " Precautions/Restrictions fall [image] L HP  -BF      Limitations/Impairments safety/cognitive;visual  -BF         Row Name 04/03/24 1425               Cognition/Psychosocial    Affect/Mental Status (Cognition) emotionally labile [image] dysarthria  -BF      Orientation Status (Cognition) oriented to;person;place;situation [image] intermittent confusion  -BF      Follows Commands (Cognition) verbal cues/prompting required;repetition of directions required;physical/tactile prompts required  -BF         Row Name 04/03/24 1425               Grooming    [place] Level (Grooming) moderate assist (50% patient effort);verbal cues;nonverbal cues (demo/gesture)  -BF      Position (Grooming) supported sitting  -BF         Row Name 04/03/24 1425               Self-Feeding    [place] Level (Self-Feeding) set up  -BF      Position (Self-Feeding) supported sitting  -BF         Row Name 04/03/24 1425               Motor Skills    Motor Control/Coordination Interventions gross motor coordination activities;fine motor manipulation/dexterity activities;neuro-muscular re-education;therapeutic exercise/ROM [image] LUE AA/PROM to hand/elbow as tolerated, NDT; RUE FMC/GMC theract  -BF         Row Name 04/03/24 1425               Neuromuscular Re-education    Interventions (Neuromuscular Re-education) facilitation/inhibition;massage  -BF      Positioning (Neuromuscular Re-education) sitting;supported  -BF         Row Name 04/03/24 1425               Edema Assessment & Management    Location (Edema) wrist/hand, left  -BF         Row Name 04/03/24 1425               Edema Symptoms/Interventions    Treatment Interventions (Edema) active range of motion;elevation;positioning;retrograde massage  -BF         Row Name 04/03/24 1425               Positioning and Restraints    In Wheelchair sitting;patient within staff view;LUE elevated [image] at ns station in AM and PM  -BF                User Key  (r) = Recorded By, (t) = Taken By, (c)  = Cosigned By        Initials Name Effective Dates    BF Desi Mackey, OT 07/11/23 -               User Key         Initials Effective Dates Name Provider Type Discipline     07/11/23 -  NarendraDesi, OT Occupational Therapist OT    BC 06/16/21 -  Jalyn Harvey OT Occupational Therapist OT             OT Recommendation and Plan     Planned Therapy Interventions (OT): activity tolerance training, adaptive equipment training, BADL retraining, neuromuscular control/coordination retraining, passive ROM/stretching, ROM/therapeutic exercise, strengthening exercise, transfer/mobility retraining           Time Calculation:       Time Calculation- OT         Row Name 04/03/24 1433 04/03/24 1045                Time Calculation- OT    OT Start Time 1330  -BF 1045  -BF      OT Stop Time 1415  -BF 1130  -BF      OT Time Calculation (min) 45 min  -BF 45 min  -BF      Total Timed Code Minutes- OT 45 minute(s)  -BF 45 minute(s)  -BF      OT Non-Billable Time (min) -- 10 min  -BF                User Key  (r) = Recorded By, (t) = Taken By, (c) = Cosigned By        Initials Name Provider Type    BF Kacey Mackeytney [place], OT Occupational Therapist                   Therapy Charges for Today        Code Description Service Date Service Provider Modifiers Qty    78925604360 HC OT SELF CARE/MGMT/TRAIN EA 15 MIN 4/2/2024 Desi Mackey, OT GO 1    18161678500 HC OT NEUROMUSC RE EDUCATION EA 15 MIN 4/2/2024 Desi Mackey, OT GO 2    42026410807 HC OT THER PROC EA 15 MIN 4/2/2024 Desi Mackey, OT GO 2    [id number] HC OT THERAPEUTIC ACT EA 15 MIN 4/2/2024 Desi Mackey, OT GO 2    [id number] HC OT SELF CARE/MGMT/TRAIN EA 15 MIN 4/3/2024 Kacey Mackeytney [place], OT GO 1    [id number] HC OT NEUROMUSC RE EDUCATION EA 15 MIN 4/3/2024 Desi Mackey, OT GO 3    [id number] HC OT THERAPEUTIC ACT EA 15 MIN 4/3/2024 Narendra Desi [place], OT GO 2           Desi Bean  Narendra, OT                   4/3/2024        Adult IRF PCS Body System    Row Name 04/08/24 1000 04/08/24 0915 04/08/24 0900 04/08/24 0854 04/08/24 0800   Pain/Comfort/Sleep   Preferred Pain Scale -- number (Numeric Rating Pain Scale)  -ER at 04/08/24 1106 -- -- --   Pain Goal/Acceptable Level -- 0  -ER at 04/08/24 1106 -- -- --   (0-10) Pain Rating: Rest -- 0  -ER at 04/08/24 1106 -- -- --   Sleep/Rest/Relaxation -- awake;no problem identified  -ER at 04/08/24 1106 -- -- --   Coping/Psychosocial   Observed Emotional State -- calm;cooperative  -ER at 04/08/24 1106 -- -- --   Verbalized Emotional State -- acceptance  -ER at 04/08/24 1106 -- -- --   Coping Strategies   Trust Relationship/Rapport -- care explained;questions answered  -ER at 04/08/24 1106 -- -- --   Diversional Activities -- television  -ER at 04/08/24 1106 -- -- --   Therapeutic Recreational Activities (Coping Strategies) -- television  -ER at 04/08/24 1106 -- -- --   Family/Support System   Family/Support System Care -- self-care encouraged  -ER at 04/08/24 1106 -- -- --   HEENT   HEENT WDL -- X;face;eye  wears glasses  -ER at 04/08/24 1106 -- -- --   Face Symptoms -- drooping  -ER at 04/08/24 1106 -- -- --   Left Vision Change -- blurred vision  -ER at 04/08/24 1106 -- -- --   Right Vision Change -- blurred vision  -ER at 04/08/24 1106 -- -- --   Mouth/Teeth WDL   Mouth/Teeth WDL -- X;teeth  -ER at 04/08/24 1106 -- -- --   Teeth Symptoms -- tooth/teeth missing  -ER at 04/08/24 1106 -- -- --   Neck WDL   Neck WDL -- WDL  -ER at 04/08/24 1106 -- -- --   HEENT Interventions   Visual Performance Enhancement -- corrective lenses used  -ER at 04/08/24 1106 -- -- --   Cognitive   Cognitive/Neuro/Behavioral WDL -- WDL  -ER at 04/08/24 1106 -- -- --   Level of Consciousness -- Alert  -ER at 04/08/24 1106 -- -- --   Arousal Level -- opens eyes spontaneously  -ER at 04/08/24 1106 -- -- --   Orientation -- disoriented to;time  -ER at 04/08/24 1106 -- -- --    Speech -- clear;logical  -ER at 04/08/24 1106 -- -- --   Mood/Behavior -- calm;cooperative  -ER at 04/08/24 1106 -- -- --   Cognitive Interventions   Communication Enhancement Strategies -- call light answered in person  -ER at 04/08/24 1106 -- -- --   Pupils   Pupil PERRLA -- yes  -ER at 04/08/24 1106 -- -- --   [place] Coma Scale   Best Eye Response -- 4-->(E4) spontaneous  -ER at 04/08/24 1106 -- -- --   Best Motor Response -- 6-->(M6) obeys commands  -ER at 04/08/24 1106 -- -- --   Best Verbal Response -- 5-->(V5) oriented  -ER at 04/08/24 1106 -- -- --   [place] Coma Scale Score -- 15  -ER at 04/08/24 1106 -- -- --   Assessment Qualifiers -- patient not sedated/intubated  -ER at 04/08/24 1106 -- -- --   Motor Response   Motor Response -- general motor response  -ER at 04/08/24 1106 -- -- --   General Motor Response -- purposeful motor response  -ER at 04/08/24 1106 -- -- --   General Appearance WDL   General Appearance WDL -- WDL  -ER at 04/08/24 1106 -- -- --   Behavior WDL   Behavior WDL -- X;motor movement  -ER at 04/08/24 1106 -- -- --   Motor Movement -- gait unsteady  -ER at 04/08/24 1106 -- -- --   Emotion Mood WDL   Emotion/Mood/Affect WDL -- WDL  -ER at 04/08/24 1106 -- -- --   Speech WDL   Speech WDL -- WDL  -ER at 04/08/24 1106 -- -- --   Intellectual Performance WDL   Intellectual Performance WDL -- WDL  -ER at 04/08/24 1106 -- -- --   Intellectual Performance -- disoriented to time  at times  -ER at 04/08/24 1106 -- -- --   Respiratory   Respiratory WDL -- X  -ER at 04/08/24 1106 -- -- --   Rhythm/Pattern, Respiratory -- no shortness of breath reported;depth regular;pattern regular;unlabored  -ER at 04/08/24 1106 -- -- --   Expansion/Accessory Muscles/Retractions -- expansion symmetric;no retractions;no use of accessory muscles  -ER at 04/08/24 1106 -- -- --   Nailbeds -- pale  -ER at 04/08/24 1106 -- -- --   Mucous Membranes -- moist;intact  -ER at 04/08/24 1106 -- -- --   Cough Frequency -- no  cough  -ER at 04/08/24 1106 -- -- --   Cough Type -- congested  -ER at 04/08/24 1106 -- -- --   Cough And Deep Breathing -- done independently per patient  -ER at 04/08/24 1106 -- -- --   Breath Sounds   Breath Sounds -- All Fields  -ER at 04/08/24 1106 -- -- --   All Lung Fields Breath Sounds -- diminished  -ER at 04/08/24 1106 -- -- --   Oxygen Therapy   Device (Oxygen Therapy) -- room air  -ER at 04/08/24 1106 -- -- --   Cardiac   Cardiac WDL -- WDL  hc CHF  -ER at 04/08/24 1106 -- -- --   Cardiac Rhythm -- apical pulse regular  -ER at 04/08/24 1106 -- -- --   Heart Sounds -- S1, S2  -ER at 04/08/24 1106 -- -- --   Chest Pain Assessment   Chest Pain Location -- other (see comments)  Patient denies  -ER at 04/08/24 1106 -- -- --   Peripheral Neurovascular   Peripheral Neurovascular WDL -- X  -ER at 04/08/24 1106 -- -- --   VTE Prevention/Management -- --  heparin Subq  -ER at 04/08/24 1106 -- -- --   LLE Neurovascular Assessment   Temperature LLE -- warm  -ER at 04/08/24 1106 -- -- --   Color LLE -- no discoloration  -ER at 04/08/24 1106 -- -- --   RLE Neurovascular Assessment   Temperature RLE -- warm  -ER at 04/08/24 1106 -- -- --   Color RLE -- no discoloration  -ER at 04/08/24 1106 -- -- --   Edema   Edema -- generalized  -ER at 04/08/24 1106 -- -- --   Generalized Edema -- 2+ (Mild)  -ER at 04/08/24 1106 -- -- --   Arm, Left Edema -- 2+ (Mild)  -ER at 04/08/24 1106 -- -- --   Hand, Left Edema -- 2+ (Mild)  -ER at 04/08/24 1106 -- -- --   Gastrointestinal   Gastrointestinal WDL -- X  -ER at 04/08/24 1106 -- -- --   Abdominal Appearance -- rounded;nondistended  -ER at 04/08/24 1106 -- -- --   Abdominal Palpation -- All Quadrants  -ER at 04/08/24 1106 -- -- --   All Quadrants Abdominal Palpation -- soft;nontender  -ER at 04/08/24 1106 -- -- --   Bowel Sounds -- All Quadrants  -ER at 04/08/24 1106 -- -- --   All Quadrants Bowel Sounds -- normoactive;audible  -ER at 04/08/24 1106 -- -- --   GI Signs/Symptoms --  fecal incontinence  at times  -ER at 04/08/24 1106 -- -- --   Bowel Management   Device Use (Bowel Management) -- diaper/absorbent pad  -ER at 04/08/24 1106 -- -- --   Genitourinary   Genitourinary WDL -- X;voiding ability/characteristics  -ER at 04/08/24 1106 -- -- --   Voiding Characteristics -- incontinence  at times  -ER at 04/08/24 1106 -- -- --   Bladder Management   Assistive Device Use (Bladder Management) -- bedpan;bedside commode  -ER at 04/08/24 1106 -- -- --   Skin   Skin WDL -- X;all  -ER at 04/08/24 1106 -- -- --   Skin Color/Characteristics -- redness blanchable;maroon/purple  -ER at 04/08/24 1106 -- -- --   Skin Temperature -- warm  -ER at 04/08/24 1106 -- -- --   Skin Moisture -- dry  -ER at 04/08/24 1106 -- -- --   Skin Elasticity -- quick return to original state  -ER at 04/08/24 1106 -- -- --   Skin Integrity -- bruised (ecchymotic)  -ER at 04/08/24 1106 -- -- --   [place] Risk Assessment   Sensory Perception -- 3-->slightly limited  -ER at 04/08/24 1106 -- -- --   Moisture -- 3-->occasionally moist  -ER at 04/08/24 1106 -- -- --   Activity -- 3-->walks occasionally  -ER at 04/08/24 1106 -- -- --   Mobility -- 3-->slightly limited  -ER at 04/08/24 1106 -- -- --   Nutrition -- 3-->adequate  -ER at 04/08/24 1106 -- -- --   Friction and Shear -- 2-->potential problem  -ER at 04/08/24 1106 -- -- --   [place] Score -- 17  -ER at 04/08/24 1106 -- -- --   Skin Interventions   Pressure Reduction Devices -- heel offloading device utilized  -ER at 04/08/24 1106 -- -- --   Pressure Reduction Techniques -- heels elevated off bed  -ER at 04/08/24 1106 -- -- --   Skin Protection -- adhesive use limited  -ER at 04/08/24 1106 -- -- --   Musculoskeletal   Musculoskeletal WDL -- X;mobility  -ER at 04/08/24 1106 -- -- --   General Mobility -- moderately impaired  -ER at 04/08/24 1106 -- -- --   Functional Screen (every 3 days/change)   Ambulation -- 3 - assistive equipment and person  -ER at 04/08/24 1106 -- -- --    Transferring -- 3 - assistive equipment and person  -ER at 04/08/24 1106 -- -- --   Toileting -- 3 - assistive equipment and person  -ER at 04/08/24 1106 -- -- --   Bathing -- 2 - assistive person  -ER at 04/08/24 1106 -- -- --   Dressing -- 2 - assistive person  -ER at 04/08/24 1106 -- -- --   Eating -- 0 - independent  -ER at 04/08/24 1106 -- -- --   Communication -- 0 - understands/communicates without difficulty  -ER at 04/08/24 1106 -- -- --   Swallowing -- 0 - swallows foods/liquids without difficulty  -ER at 04/08/24 1106 -- -- --   Nutrition   Diet/Nutrition Received -- regular;mechanical/dental soft  -ER at 04/08/24 1106 -- -- --   Diet/Feeding Assistance -- tray set-up  -ER at 04/08/24 1106 -- -- --   Intake (%) -- -- -- Breakfast;75%  -AO at 04/08/24 0855 --   Adult Sepsis Screening Tool   1. Previous adult screen positive in last 48 hrs? -- no  -ER at 04/08/24 1106 -- -- --   2. Criteria Present -- < 2 criteria present: STOP screening and file documentation for result  -ER at 04/08/24 1106 -- -- --   Result of current screen is: -- Negative  -ER at 04/08/24 1106 -- -- --   Safety   Safety WDL WDL  -ER at 04/08/24 1019 -- WDL  -AO at 04/08/24 0901 -- WDL  -ER at 04/08/24 1019   Safety Factors -- -- wheels locked;ID band on;call light in reach;bed in low position;upper side rails raised x 2, lower side rail raised x 1  -AO at 04/08/24 0901 -- wheels locked;upper side rails raised x 2;ID band on;call light in reach;bed in low position  -ER at 04/08/24 1019   All Alarms -- -- -- -- alarm(s) activated and audible  -ER at 04/08/24 1019   Infection Prevention -- -- -- -- hand hygiene promoted  -ER at 04/08/24 1019   Safety Management   Safety Promotion/Fall Prevention nonskid shoes/slippers when out of bed;safety round/check completed  -ER at 04/08/24 1019 -- assistive device/personal items within reach;clutter free environment maintained;lighting adjusted;nonskid shoes/slippers when out of bed;room  organization consistent;safety round/check completed  -AO at 04/08/24 0901 -- nonskid shoes/slippers when out of bed;safety round/check completed  -ER at 04/08/24 1019   Enhanced Safety Measures -- -- -- -- bed alarm set  -ER at 04/08/24 1019   Medication Review/Management -- -- -- -- medications reviewed  -ER at 04/08/24 1019   Daily Care   Activity Management -- -- activity encouraged  -AO at 04/08/24 0901 -- --   Positioning   Body Position -- -- position changed independently  -AO at 04/08/24 0901 -- --   Head of Bed (HOB) Positioning HOB elevated  -ER at 04/08/24 1019 -- -- -- HOB elevated  -ER at 04/08/24 1019   Row Name 04/08/24 0700 04/08/24 0648 04/08/24 0618 04/08/24 0600 04/08/24 0505   Pain/Comfort/Sleep   (0-10) Pain Rating: Rest -- -- 8  -CS at 04/08/24 0619 -- --   Response to Pain Interventions -- interventions effective per patient  -ER at 04/08/24 0741 -- -- --   Oxygen Therapy   Device (Oxygen Therapy) room air  -ER at 04/08/24 0731 -- -- -- --   Safety   Safety WDL WDL  -AO at 04/08/24 0901 -- -- WDL  -CS at 04/08/24 0600 WDL  -CW at 04/08/24 0509   Safety Factors wheels locked;ID band on;call light in reach;bed in low position;upper side rails raised x 2, lower side rail raised x 1  -AO at 04/08/24 0901 -- -- wheels locked;ID band on;call light in reach;bed in low position;upper side rails raised x 2, lower side rail raised x 1  -CS at 04/08/24 0600 --   All Alarms -- -- -- -- alarm(s) activated and audible  -CW at 04/08/24 0509   Safety Management   Safety Promotion/Fall Prevention assistive device/personal items within reach;clutter free environment maintained;lighting adjusted;nonskid shoes/slippers when out of bed;room organization consistent;safety round/check completed  -AO at 04/08/24 0901 -- -- safety round/check completed  -CS at 04/08/24 0600 nonskid shoes/slippers when out of bed;safety round/check completed  -CW at 04/08/24 0509   Enhanced Safety Measures -- -- -- bed alarm set  -CS  at 04/08/24 0600 bed alarm set  -CW at 04/08/24 0509   Daily Care   Activity Management activity encouraged  -AO at 04/08/24 0901 -- -- -- --   Positioning   Body Position position changed independently  -AO at 04/08/24 0901 -- -- -- position changed independently  -CW at 04/08/24 0509   Head of Bed (HOB) Positioning -- -- -- -- HOB elevated  -CW at 04/08/24 0509   Hygiene Care   Bathing/Skin Care -- -- -- -- bath, chlorhexidine;bath, complete;dressed/undressed  -CW at 04/08/24 0509   Row Name 04/08/24 0407 04/08/24 0300 04/08/24 0201 04/08/24 0101 04/08/24 0000   Safety   Safety WDL WDL  -CS at 04/08/24 0407 WDL  -CW at 04/08/24 0356 WDL  -CS at 04/08/24 0201 WDL  -CW at 04/08/24 0101 WDL  -CS at 04/08/24 0017   Safety Factors wheels locked;ID band on;call light in reach;bed in low position;upper side rails raised x 2, lower side rail raised x 1  -CS at 04/08/24 0407 -- wheels locked;ID band on;call light in reach;bed in low position;upper side rails raised x 2, lower side rail raised x 1  -CS at 04/08/24 0201 -- wheels locked;ID band on;call light in reach;bed in low position;upper side rails raised x 2, lower side rail raised x 1  -CS at 04/08/24 0017   All Alarms -- alarm(s) activated and audible  -CW at 04/08/24 0356 -- alarm(s) activated and audible  -CW at 04/08/24 0101 --   Safety Management   Safety Promotion/Fall Prevention safety round/check completed  -CS at 04/08/24 0407 nonskid shoes/slippers when out of bed;safety round/check completed  -CW at 04/08/24 0356 safety round/check completed  -CS at 04/08/24 0201 nonskid shoes/slippers when out of bed;safety round/check completed  -CW at 04/08/24 0101 safety round/check completed  -CS at 04/08/24 0017   Enhanced Safety Measures bed alarm set  -CS at 04/08/24 0407 bed alarm set  -CW at 04/08/24 0356 bed alarm set  -CS at 04/08/24 0201 bed alarm set  -CW at 04/08/24 0101 --   Positioning   Body Position -- position changed independently  -CW at 04/08/24 0356  -- position changed independently  -CW at 04/08/24 0101 --   Head of Bed (HOB) Positioning -- HOB elevated  -CW at 04/08/24 0356 -- HOB elevated  -CW at 04/08/24 0101 --   Row Name 04/07/24 2300 04/07/24 2200 04/07/24 2053 04/07/24 2000 04/07/24 1923   Pain/Comfort/Sleep   Preferred Pain Scale -- -- -- -- number (Numeric Rating Pain Scale)  -CS at 04/07/24 1923   Pain Goal/Acceptable Level -- -- -- -- 0  -CS at 04/07/24 2248   (0-10) Pain Rating: Rest -- -- -- -- 5  -CS at 04/07/24 2248   (0-10) Pain Rating: Activity -- -- -- -- 5  -CS at 04/07/24 2248   Pain Location -- -- -- -- --  generalized  -CS at 04/07/24 1923   Pain Description -- -- -- -- aching  -CS at 04/07/24 1923   Pain Management Interventions -- -- -- -- pillow support provided;position adjusted  -CS at 04/07/24 2248   Sleep/Rest/Relaxation -- -- -- -- no problem identified;awake  -CS at 04/07/24 1923   Coping/Psychosocial   Observed Emotional State -- -- -- -- calm;cooperative;pleasant  -CS at 04/07/24 1923   Verbalized Emotional State -- -- -- -- acceptance  -CS at 04/07/24 1923   Coping Strategies   Trust Relationship/Rapport -- -- -- -- care explained;reassurance provided  -CS at 04/07/24 2248   Diversional Activities -- -- -- -- television  -CS at 04/07/24 2248   Family/Support System   Family/Support System Care -- -- -- -- self-care encouraged  -CS at 04/07/24 2248   HEENT   HEENT WDL -- -- -- -- X;face  -CS at 04/07/24 1923   Face Symptoms -- -- -- -- drooping  -CS at 04/07/24 2248   Mouth/Teeth WDL   Mouth/Teeth WDL -- -- -- -- X;teeth  -CS at 04/07/24 1923   Teeth Symptoms -- -- -- -- tooth/teeth missing  -CS at 04/07/24 2248   Neck WDL   Neck WDL -- -- -- -- WDL  -CS at 04/07/24 1923   Cognitive   Cognitive/Neuro/Behavioral WDL -- -- -- -- WDL  -CS at 04/07/24 1923   Level of Consciousness -- -- -- -- Alert  -CS at 04/07/24 1923   Arousal Level -- -- -- -- opens eyes spontaneously  -CS at 04/07/24 1923   Orientation -- -- -- -- disoriented  to;time  -CS at 04/07/24 1923   Speech -- -- -- -- spontaneous  -CS at 04/07/24 1923   Mood/Behavior -- -- -- -- calm;cooperative;behavior appropriate to situation  -CS at 04/07/24 1923   Cognitive Interventions   Communication Enhancement Strategies -- -- -- -- call light answered in person  -CS at 04/07/24 2248   Neuro   Additional Documentation -- -- -- -- [place] Coma Scale (Group)  -CS at 04/07/24 2248   [place] Coma Scale   Best Eye Response -- -- -- -- 4-->(E4) spontaneous  -CS at 04/07/24 1923   Best Motor Response -- -- -- -- 6-->(M6) obeys commands  -CS at 04/07/24 1923   Best Verbal Response -- -- -- -- 5-->(V5) oriented  -CS at 04/07/24 1923   [place] Coma Scale Score -- -- -- -- 15  -CS at 04/07/24 1923   Assessment Qualifiers -- -- -- -- patient not sedated/intubated;no eye obstruction present  -CS at 04/07/24 1923   Motor Response   Motor Response -- -- -- -- general motor response  -CS at 04/07/24 1923   General Motor Response -- -- -- -- purposeful motor response  -CS at 04/07/24 1923   Hand /Ankle Strength   Hand , Left -- -- -- -- weak  -CS at 04/07/24 1923   Hand , Right -- -- -- -- moderate  -CS at 04/07/24 1923   Dorsiflexion, Left -- -- -- -- moderate  -CS at 04/07/24 1923   Dorsiflexion, Right -- -- -- -- moderate  -CS at 04/07/24 1923   Plantarflexion, Left -- -- -- -- moderate  -CS at 04/07/24 1923   Plantarflexion, Right -- -- -- -- moderate  -CS at 04/07/24 1923   Behavioral   Additional Documentation -- -- -- -- Behavior WDL (Group)  -CS at 04/07/24 2248   General Appearance WDL   General Appearance WDL -- -- -- -- WDL  -CS at 04/07/24 1923   Behavior WDL   Behavior WDL -- -- -- -- X;motor movement  -CS at 04/07/24 1923   Motor Movement -- -- -- -- gait unsteady  -CS at 04/07/24 1923   Emotion Mood WDL   Emotion/Mood/Affect WDL -- -- -- -- WDL  -CS at 04/07/24 1923   Speech WDL   Speech WDL -- -- -- -- WDL  -CS at 04/07/24 1923   Speech -- -- -- -- --  garbled  -CS at  04/07/24 1923   Intellectual Performance WDL   Intellectual Performance WDL -- -- -- -- WDL  -CS at 04/07/24 1923   Intellectual Performance -- -- -- -- disoriented to time  -CS at 04/07/24 1923   Respiratory   Respiratory WDL -- -- -- -- X  -CS at 04/07/24 1923   Rhythm/Pattern, Respiratory -- -- -- -- no shortness of breath reported;depth regular;pattern regular;unlabored  -CS at 04/07/24 1923   Cough Frequency -- -- -- -- no cough  -CS at 04/07/24 1923   Cough Type -- -- -- -- congested  -CS at 04/07/24 1923   Cough And Deep Breathing -- -- -- -- done independently per patient  -CS at 04/07/24 2248   Breath Sounds   Breath Sounds -- -- -- -- All Fields  -CS at 04/07/24 1923   All Lung Fields Breath Sounds -- -- -- -- diminished  -CS at 04/07/24 1923   Oxygen Therapy   Device (Oxygen Therapy) -- -- -- -- room air  -CS at 04/07/24 2248   Cardiac   Cardiac WDL -- -- -- -- WDL  -CS at 04/07/24 1923   Peripheral Neurovascular   Peripheral Neurovascular WDL -- -- -- -- X  -CS at 04/07/24 1923   Pulse Assessment -- -- -- -- dorsalis pedis;radial  -CS at 04/07/24 2248   VTE Prevention/Management -- -- -- -- other (see comments)  -CS at 04/07/24 2248   Pulse Radial   Left Radial Pulse -- -- -- -- 2+ (normal)  -CS at 04/07/24 1923   Right Radial Pulse -- -- -- -- 2+ (normal)  -CS at 04/07/24 1923   Pulse Dorsalis Pedis   Left Dorsalis Pedis Pulse -- -- -- -- 2+ (normal)  -CS at 04/07/24 1923   Right Dorsalis Pedis Pulse -- -- -- -- 2+ (normal)  -CS at 04/07/24 1923   Edema   Edema -- -- -- -- generalized  -CS at 04/07/24 1923   Generalized Edema -- -- -- -- 2+ (Mild)  -CS at 04/07/24 1923   Arm, Left Edema -- -- -- -- 2+ (Mild)  -CS at 04/07/24 1923   Hand, Left Edema -- -- -- -- 2+ (Mild)  -CS at 04/07/24 1923   Gastrointestinal   Gastrointestinal WDL -- -- -- -- X  -CS at 04/07/24 1923   Abdominal Appearance -- -- -- -- nondistended  -CS at 04/07/24 1923   Bowel Sounds -- -- -- -- All Quadrants  -CS at 04/07/24 1923    All Quadrants Bowel Sounds -- -- -- -- audible;normoactive  -CS at 04/07/24 1923   Genitourinary   Genitourinary WDL -- -- -- -- X;voiding ability/characteristics  -CS at 04/07/24 1923   Voiding Characteristics -- -- -- -- incontinence  at times  -CS at 04/07/24 1923   Bladder Management   Assistive Device Use (Bladder Management) -- -- -- -- bedpan;bedside commode  -CS at 04/07/24 2248   Skin   Skin WDL -- -- -- -- X;all  -CS at 04/07/24 1923   Skin Color/Characteristics -- -- -- -- maroon/purple;redness blanchable  -CS at 04/07/24 1923   Skin Temperature -- -- -- -- warm  -CS at 04/07/24 1923   Skin Moisture -- -- -- -- dry  -CS at 04/07/24 1923   Skin Elasticity -- -- -- -- slow return to original state  -CS at 04/07/24 1923   Skin Integrity -- -- -- -- bruised (ecchymotic);excoriation;scab  -CS at 04/07/24 1923   [place] Risk Assessment   Sensory Perception -- -- -- -- 3-->slightly limited  -CS at 04/07/24 2248   Moisture -- -- -- -- 3-->occasionally moist  -CS at 04/07/24 2248   Activity -- -- -- -- 3-->walks occasionally  -CS at 04/07/24 2248   Mobility -- -- -- -- 3-->slightly limited  -CS at 04/07/24 2248   Nutrition -- -- -- -- 3-->adequate  -CS at 04/07/24 2248   Friction and Shear -- -- -- -- 2-->potential problem  -CS at 04/07/24 2248   [place] Score -- -- -- -- 17  -CS at 04/07/24 2248   Skin Interventions   Pressure Reduction Devices -- -- -- -- heel offloading device utilized  -CS at 04/07/24 2248   Pressure Reduction Techniques -- -- -- -- heels elevated off bed  -CS at 04/07/24 2248   Musculoskeletal   Musculoskeletal WDL -- -- -- -- X;mobility  -CS at 04/07/24 1923   General Mobility -- -- -- -- generalized weakness;moderately impaired  -CS at 04/07/24 1923   Range of Motion -- -- -- -- active ROM (range of motion) encouraged  -CS at 04/07/24 2248   Functional Screen (every 3 days/change)   Ambulation -- -- -- -- 3 - assistive equipment and person  -CS at 04/07/24 1923   Transferring -- -- -- -- 3  - assistive equipment and person  -CS at 04/07/24 1923   Toileting -- -- -- -- 3 - assistive equipment and person  -CS at 04/07/24 1923   Bathing -- -- -- -- 2 - assistive person  -CS at 04/07/24 1923   Dressing -- -- -- -- 2 - assistive person  -CS at 04/07/24 1923   Eating -- -- -- -- 0 - independent  -CS at 04/07/24 1923   Communication -- -- -- -- 0 - understands/communicates without difficulty  -CS at 04/07/24 1923   Swallowing -- -- -- -- 0 - swallows foods/liquids without difficulty  -CS at 04/07/24 1923   Nutrition   Diet/Nutrition Received -- -- -- -- regular;mechanical/dental soft  -CS at 04/07/24 1923   Intake (%) Snack;100%  -CW at 04/07/24 2303 -- -- -- --   Nutrition Risk Screen -- -- -- -- no indicators present  -CS at 04/07/24 2248   Nutrition Interventions   Glycemic Management -- -- -- -- oral hydration promoted  -CS at 04/07/24 2248   Oral Nutrition Promotion -- -- -- -- physical activity promoted  -CS at 04/07/24 2248   Adult Sepsis Screening Tool   1. Previous adult screen positive in last 48 hrs? -- -- -- -- no  -CS at 04/07/24 2248   2. Criteria Present -- -- -- -- < 2 criteria present: STOP screening and file documentation for result  -CS at 04/07/24 2248   Result of current screen is: -- -- -- -- Negative  -CS at 04/07/24 2248   Safety   Safety WDL WDL  -CW at 04/07/24 2304 WDL  -CS at 04/07/24 2331 WDL  -CW at 04/07/24 2053 WDL  -CS at 04/07/24 2331 --   Safety Factors -- wheels locked;ID band on;call light in reach;bed in low position;upper side rails raised x 2, lower side rail raised x 1  -CS at 04/07/24 2331 -- wheels locked;ID band on;call light in reach;bed in low position;upper side rails raised x 2, lower side rail raised x 1  -CS at 04/07/24 2331 --   All Alarms alarm(s) activated and audible  -CW at 04/07/24 2304 -- alarm(s) activated and audible  -CW at 04/07/24 2053 -- --   Safety Management   Safety Promotion/Fall Prevention nonskid shoes/slippers when out of bed;safety  round/check completed  -CW at 04/07/24 2304 safety round/check completed  -CS at 04/07/24 2331 nonskid shoes/slippers when out of bed;safety round/check completed  -CW at 04/07/24 2053 safety round/check completed  -CS at 04/07/24 2331 --   Enhanced Safety Measures bed alarm set  -CW at 04/07/24 2304 bed alarm set  -CS at 04/07/24 2331 bed alarm set  -CW at 04/07/24 2053 bed alarm set  -CS at 04/07/24 2331 --   Positioning   Body Position position changed independently  -CW at 04/07/24 2304 -- position changed independently  -CW at 04/07/24 2053 -- --   Head of Bed (HOB) Positioning HOB elevated  -CW at 04/07/24 2304 -- HOB elevated  -CW at 04/07/24 2053 -- --   Row Name 04/07/24 1900 04/07/24 1838 04/07/24 1808 04/07/24 1800 04/07/24 1700   Pain/Comfort/Sleep   (0-10) Pain Rating: Rest -- -- 8  -KW at 04/07/24 1809 -- --   (0-10) Pain Rating: Activity -- -- 8  -KW at 04/07/24 1809 -- --   Response to Pain Interventions -- interventions effective per patient  -KW at 04/07/24 1842 -- -- --   Skin Interventions   Pressure Reduction Devices -- -- -- -- heel offloading device utilized  -MS at 04/07/24 1755   Pressure Reduction Techniques -- -- -- -- heels elevated off bed;frequent weight shift encouraged;weight shift assistance provided  -MS at 04/07/24 1755   Nutrition   Intake (%) -- -- -- -- Dinner;100%  -MS at 04/07/24 1753   Safety   Safety WDL WDL  -CW at 04/07/24 2037 -- -- WDL  -KW at 04/07/24 1802 WDL  -MS at 04/07/24 1755   Safety Factors -- -- -- -- wheels locked;ID band on;call light in reach;bed in low position;upper side rails raised x 2, lower side rail raised x 1  -MS at 04/07/24 1755   All Alarms alarm(s) activated and audible  -CW at 04/07/24 2037 -- -- alarm(s) activated and audible  -KW at 04/07/24 1802 alarm(s) activated and audible  -MS at 04/07/24 1755   Safety Management   Safety Promotion/Fall Prevention nonskid shoes/slippers when out of bed;safety round/check completed  -CW at 04/07/24 2037 --  -- safety round/check completed  -KW at 04/07/24 1802 assistive device/personal items within reach;clutter free environment maintained;fall prevention program maintained;gait belt;lighting adjusted;nonskid shoes/slippers when out of bed;room organization consistent;safety round/check completed  -MS at 04/07/24 1755   Enhanced Safety Measures bed alarm set  -CW at 04/07/24 2037 -- -- bed alarm set;chair alarm set  -KW at 04/07/24 1802 chair alarm set  -MS at 04/07/24 1755   Medication Review/Management -- -- -- medications reviewed  -KW at 04/07/24 1802 --   Daily Care   Activity Management -- -- -- -- activity encouraged  -MS at 04/07/24 1755   Elimination Assistance -- -- -- -- up to toilet  -MS at 04/07/24 1755   Positioning   Body Position position changed independently  -CW at 04/07/24 2037 -- -- -- position changed independently  -MS at 04/07/24 1755   Head of Bed (HOB) Positioning HOB elevated  -CW at 04/07/24 2037 -- -- -- HOB elevated  -MS at 04/07/24 1755   Positioning/Transfer Devices -- -- -- -- pillows;in use  -MS at 04/07/24 1755   Hygiene Care   Perineal Care -- -- -- -- perineum cleansed;protective cream/ointment applied  -MS at 04/07/24 1755   Row Name 04/07/24 1600 04/07/24 1500 04/07/24 1400 04/07/24 1300 04/07/24 1200   Skin Interventions   Pressure Reduction Devices -- heel offloading device utilized  -MS at 04/07/24 1525 -- heel offloading device utilized  -MS at 04/07/24 1346 --   Pressure Reduction Techniques -- heels elevated off bed;frequent weight shift encouraged;weight shift assistance provided  -MS at 04/07/24 1525 -- heels elevated off bed;frequent weight shift encouraged;weight shift assistance provided  -MS at 04/07/24 1346 --   Nutrition   Intake (%) -- -- -- Lunch;100%  -MS at 04/07/24 1342 --   Safety   Safety WDL WDL  -KW at 04/07/24 1712 WDL  -MS at 04/07/24 1525 WDL  -KW at 04/07/24 1420 WDL  -MS at 04/07/24 1346 WDL  -KW at 04/07/24 1419   Safety Factors -- wheels locked;ID  band on;bed in low position;call light in reach;upper side rails raised x 2, lower side rail raised x 1  -MS at 04/07/24 1525 -- wheels locked;ID band on;call light in reach;bed in low position;upper side rails raised x 2, lower side rail raised x 1  -MS at 04/07/24 1346 --   All Alarms alarm(s) activated and audible  -KW at 04/07/24 1712 alarm(s) activated and audible  -MS at 04/07/24 1525 alarm(s) activated and audible  -KW at 04/07/24 1420 alarm(s) activated and audible  -MS at 04/07/24 1346 alarm(s) activated and audible  -KW at 04/07/24 1419   Safety Management   Safety Promotion/Fall Prevention safety round/check completed  -KW at 04/07/24 1712 assistive device/personal items within reach;clutter free environment maintained;fall prevention program maintained;gait belt;lighting adjusted;nonskid shoes/slippers when out of bed;room organization consistent;safety round/check completed  -MS at 04/07/24 1525 safety round/check completed  -KW at 04/07/24 1420 assistive device/personal items within reach;clutter free environment maintained;fall prevention program maintained;gait belt;lighting adjusted;nonskid shoes/slippers when out of bed;room organization consistent;safety round/check completed  -MS at 04/07/24 1346 safety round/check completed  -KW at 04/07/24 1419   Enhanced Safety Measures bed alarm set;chair alarm set  -KW at 04/07/24 1712 bed alarm set;chair alarm set  -MS at 04/07/24 1525 bed alarm set;chair alarm set  -KW at 04/07/24 1420 bed alarm set;chair alarm set  -MS at 04/07/24 1346 bed alarm set;chair alarm set  -KW at 04/07/24 1419   Medication Review/Management medications reviewed  -KW at 04/07/24 1712 -- medications reviewed  -KW at 04/07/24 1420 -- --   Daily Care   Activity Management -- activity encouraged  -MS at 04/07/24 1525 -- activity encouraged  -MS at 04/07/24 1346 --   Elimination Assistance -- up to toilet;incontinence pad changed;bed pad changed  -MS at 04/07/24 1525 -- up to  toilet;incontinence pad changed;bed pad changed  -MS at 04/07/24 1346 --   Positioning   Body Position -- position changed independently  -MS at 04/07/24 1525 -- position changed independently  -MS at 04/07/24 1346 --   Head of Bed (HOB) Positioning -- HOB elevated  -MS at 04/07/24 1525 -- HOB elevated  -MS at 04/07/24 1346 --   Positioning/Transfer Devices -- pillows;in use  -MS at 04/07/24 1525 -- pillows;in use  -MS at 04/07/24 1346 --   Hygiene Care   Perineal Care -- -- -- diaper changed;perineum cleansed;protective cream/ointment applied  -MS at 04/07/24 1346 --   Row Name 04/07/24 1100 04/07/24 1000 04/07/24 0926 04/07/24 0925 04/07/24 0900   Pain/Comfort/Sleep   Preferred Pain Scale -- -- number (Numeric Rating Pain Scale)  -KW at 04/07/24 1104 -- --   (0-10) Pain Rating: Rest -- -- 0  -KW at 04/07/24 1104 -- --   (0-10) Pain Rating: Activity -- -- 0  -KW at 04/07/24 1104 -- --   Pain Management Interventions -- -- see MAR  -KW at 04/07/24 1104 -- --   Sleep/Rest/Relaxation -- -- awake;no problem identified  -KW at 04/07/24 1104 -- --   Coping/Psychosocial   Observed Emotional State -- -- calm;cooperative  -KW at 04/07/24 1104 -- --   Verbalized Emotional State -- -- acceptance  -KW at 04/07/24 1104 -- --   Coping Strategies   Trust Relationship/Rapport -- -- choices provided;care explained;questions answered  -KW at 04/07/24 1104 -- --   Diversional Activities -- -- television  -KW at 04/07/24 1104 -- --   Therapeutic Recreational Activities (Coping Strategies) -- -- television  -KW at 04/07/24 1104 -- --   MARVIN LIEBERMAN -- -- X;face;eye  wears glasses  -KW at 04/07/24 1104 -- --   Face Symptoms -- -- drooping  -KW at 04/07/24 1104 -- --   Left Vision Change -- -- blurred vision  -KW at 04/07/24 1104 -- --   Right Vision Change -- -- blurred vision  -KW at 04/07/24 1104 -- --   Mouth/Teeth WDL   Mouth/Teeth WDL -- -- X;teeth  -KW at 04/07/24 1104 -- --   Teeth Symptoms -- -- tooth/teeth missing  -KW at  04/07/24 1104 -- --   HEENT Interventions   Visual Performance Enhancement -- -- corrective lenses used  -KW at 04/07/24 1104 -- --   Cognitive   Cognitive/Neuro/Behavioral WDL -- -- WDL  -KW at 04/07/24 1104 -- --   Level of Consciousness -- -- Alert  -KW at 04/07/24 1104 -- --   Arousal Level -- -- opens eyes spontaneously  -KW at 04/07/24 1104 -- --   Orientation -- -- disoriented to;time  -KW at 04/07/24 1104 -- --   Speech -- -- clear;logical  -KW at 04/07/24 1104 -- --   Mood/Behavior -- -- calm;cooperative  -KW at 04/07/24 1104 -- --   Cognitive Interventions   Communication Enhancement Strategies -- -- call light answered in person  -KW at 04/07/24 1104 -- --   Neuro   Additional Documentation -- -- [place] Coma Scale (Group)  -KW at 04/07/24 1104 -- --   Pupils   Pupil PERRLA -- -- yes  -KW at 04/07/24 1104 -- --   [place] Coma Scale   Best Eye Response -- -- 4-->(E4) spontaneous  -KW at 04/07/24 1104 -- --   Best Motor Response -- -- 6-->(M6) obeys commands  -KW at 04/07/24 1104 -- --   Best Verbal Response -- -- 5-->(V5) oriented  -KW at 04/07/24 1104 -- --   [place] Coma Scale Score -- -- 15  -KW at 04/07/24 1104 -- --   Assessment Qualifiers -- -- patient not sedated/intubated  -KW at 04/07/24 1104 -- --   Motor Response   Motor Response -- -- general motor response  -KW at 04/07/24 1104 -- --   General Motor Response -- -- purposeful motor response  -KW at 04/07/24 1104 -- --   Behavioral   Additional Documentation -- -- Behavior WDL (Group)  -KW at 04/07/24 1104 -- --   General Appearance WDL   General Appearance WDL -- -- WDL  -KW at 04/07/24 1104 -- --   Behavior WDL   Behavior WDL -- -- X;motor movement  -KW at 04/07/24 1104 -- --   Motor Movement -- -- gait unsteady  -KW at 04/07/24 1104 -- --   Emotion Mood WDL   Emotion/Mood/Affect WDL -- -- WDL  -KW at 04/07/24 1104 -- --   Speech WDL   Speech WDL -- -- WDL  -KW at 04/07/24 1104 -- --   Intellectual Performance WDL   Intellectual Performance  WDL -- -- WDL  -KW at 04/07/24 1104 -- --   Respiratory   Cough And Deep Breathing -- -- done independently per patient  -KW at 04/07/24 1104 -- --   Breath Sounds   Breath Sounds -- -- All Fields  -KW at 04/07/24 1104 -- --   All Lung Fields Breath Sounds -- -- clear  -KW at 04/07/24 1104 -- --   Oxygen Therapy   Device (Oxygen Therapy) -- -- room air  -KW at 04/07/24 1104 room air  -KW at 04/07/24 1042 --   Cardiac   Cardiac WDL -- -- X  hc CHF  -KW at 04/07/24 1104 -- --   Chest Pain Assessment   Chest Pain Location -- -- other (see comments)  Patient denies  -KW at 04/07/24 1104 -- --   Peripheral Neurovascular   Peripheral Neurovascular WDL -- -- WDL  -KW at 04/07/24 1104 -- --   VTE Prevention/Management -- -- other (see comments)  Heparin  -KW at 04/07/24 1104 -- --   Edema   Edema -- -- generalized  -KW at 04/07/24 1104 -- --   Generalized Edema -- -- 2+ (Mild)  -KW at 04/07/24 1104 -- --   Arm, Left Edema -- -- 2+ (Mild)  -KW at 04/07/24 1104 -- --   Hand, Left Edema -- -- 2+ (Mild)  -KW at 04/07/24 1104 -- --   Gastrointestinal   Gastrointestinal WDL -- -- WDL  -KW at 04/07/24 1104 -- --   Abdominal Appearance -- -- rounded;nondistended  -KW at 04/07/24 1104 -- --   Abdominal Palpation -- -- All Quadrants  -KW at 04/07/24 1104 -- --   All Quadrants Abdominal Palpation -- -- soft;nontender  -KW at 04/07/24 1104 -- --   Bowel Sounds -- -- All Quadrants  -KW at 04/07/24 1104 -- --   All Quadrants Bowel Sounds -- -- normoactive;audible  -KW at 04/07/24 1104 -- --   Stool Amount -- -- -- -- large  -MS at 04/07/24 0934   Stool Consistency -- -- -- -- formed  -MS at 04/07/24 0936   Stool Color -- -- -- -- brown  -MS at 04/07/24 0936   Last Bowel Movement -- -- -- -- 04/07/24  -MS at 04/07/24 0936   Nausea/Vomiting   Nausea/Vomiting Signs/Symptoms -- -- other (see comments)  neema  -KW at 04/07/24 1104 -- --   Genitourinary   Genitourinary WDL -- -- WDL  -KW at 04/07/24 1104 -- --   Skin   Skin WDL -- -- WDL  -KW  at 04/07/24 1104 -- --   Skin Color/Characteristics -- -- redness blanchable;maroon/purple  -KW at 04/07/24 1104 -- --   Skin Temperature -- -- warm  -KW at 04/07/24 1104 -- --   Skin Moisture -- -- dry  -KW at 04/07/24 1104 -- --   Skin Elasticity -- -- quick return to original state  -KW at 04/07/24 1104 -- --   Skin Integrity -- -- bruised (ecchymotic)  -KW at 04/07/24 1104 -- --   [place] Risk Assessment   Sensory Perception -- -- 3-->slightly limited  -KW at 04/07/24 1104 -- --   Moisture -- -- 3-->occasionally moist  -KW at 04/07/24 1104 -- --   Activity -- -- 3-->walks occasionally  -KW at 04/07/24 1104 -- --   Mobility -- -- 3-->slightly limited  -KW at 04/07/24 1104 -- --   Nutrition -- -- 3-->adequate  -KW at 04/07/24 1104 -- --   Friction and Shear -- -- 2-->potential problem  -KW at 04/07/24 1104 -- --   [place] Score -- -- 17  -KW at 04/07/24 1104 -- --   Skin Interventions   Pressure Reduction Devices heel offloading device utilized  -MS at 04/07/24 1346 -- positioning supports utilized;heel offloading device utilized;pressure-redistributing mattress utilized  -KW at 04/07/24 1104 -- heel offloading device utilized  -MS at 04/07/24 0936   Pressure Reduction Techniques heels elevated off bed;frequent weight shift encouraged;weight shift assistance provided  -MS at 04/07/24 1346 -- weight shift assistance provided;pressure points protected;positioned off wounds;heels elevated off bed;frequent weight shift encouraged  -KW at 04/07/24 1104 -- heels elevated off bed;frequent weight shift encouraged;weight shift assistance provided  -MS at 04/07/24 0936   Skin Protection -- -- adhesive use limited  -KW at 04/07/24 1104 -- --   Musculoskeletal   Musculoskeletal WDL -- -- X;mobility  -KW at 04/07/24 1104 -- --   General Mobility -- -- moderately impaired  -KW at 04/07/24 1104 -- --   Functional Screen (every 3 days/change)   Ambulation -- -- 3 - assistive equipment and person  -KW at 04/07/24 1104 -- --    Transferring -- -- 3 - assistive equipment and person  -KW at 04/07/24 1104 -- --   Toileting -- -- 3 - assistive equipment and person  -KW at 04/07/24 1104 -- --   Bathing -- -- 2 - assistive person  -KW at 04/07/24 1104 -- --   Dressing -- -- 2 - assistive person  -KW at 04/07/24 1104 -- --   Eating -- -- 0 - independent  -KW at 04/07/24 1104 -- --   Communication -- -- 0 - understands/communicates without difficulty  -KW at 04/07/24 1104 -- --   Swallowing -- -- 0 - swallows foods/liquids without difficulty  -KW at 04/07/24 1104 -- --   Nutrition   Intake (%) -- -- -- -- Breakfast;75%  -MS at 04/07/24 0934   Nutrition Risk Screen -- -- no indicators present  -KW at 04/07/24 1104 -- --   Adult Sepsis Screening Tool   1. Previous adult screen positive in last 48 hrs? -- -- no  -KW at 04/07/24 1104 -- --   2. Criteria Present -- -- < 2 criteria present: STOP screening and file documentation for result  -KW at 04/07/24 1104 -- --   3. Known or Suspected Infection Present -- -- none: STOP screening and file documentation for result  -KW at 04/07/24 1104 -- --   4. Signs of Possible Organ Dysfunction Present -- -- none: STOP screening and file documentation for result  -KW at 04/07/24 1104 -- --   Result of current screen is: -- -- Negative  -KW at 04/07/24 1104 -- --   Safety   Safety WDL WDL  -MS at 04/07/24 1346 WDL  -KW at 04/07/24 1028 WDL  -KW at 04/07/24 1104 -- WDL  -MS at 04/07/24 0936   Safety Factors wheels locked;ID band on;call light in reach;bed in low position;upper side rails raised x 2, lower side rail raised x 1  -MS at 04/07/24 1346 -- -- -- wheels locked;ID band on;call light in reach;bed in low position;upper side rails raised x 2, lower side rail raised x 1  -MS at 04/07/24 0936   All Alarms alarm(s) activated and audible  -MS at 04/07/24 1346 alarm(s) activated and audible  -KW at 04/07/24 1028 alarm(s) activated and audible  -KW at 04/07/24 1104 -- alarm(s) activated and audible  -MS at  04/07/24 0936   Infection Prevention -- hand hygiene promoted;rest/sleep promoted  -KW at 04/07/24 1028 hand hygiene promoted;rest/sleep promoted  -KW at 04/07/24 1104 -- --   Safety Management   Safety Promotion/Fall Prevention clutter free environment maintained;assistive device/personal items within reach;fall prevention program maintained;gait belt;lighting adjusted;nonskid shoes/slippers when out of bed;safety round/check completed;room organization consistent  -MS at 04/07/24 1346 safety round/check completed;nonskid shoes/slippers when out of bed;assistive device/personal items within reach  -KW at 04/07/24 1028 safety round/check completed;assistive device/personal items within reach;nonskid shoes/slippers when out of bed  -KW at 04/07/24 1104 -- assistive device/personal items within reach;clutter free environment maintained;fall prevention program maintained;gait belt;lighting adjusted;nonskid shoes/slippers when out of bed;room organization consistent;safety round/check completed  -MS at 04/07/24 0936   Enhanced Safety Measures bed alarm set;chair alarm set  -MS at 04/07/24 1346 bed alarm set;chair alarm set  -KW at 04/07/24 1028 bed alarm set;chair alarm set  -KW at 04/07/24 1104 -- bed alarm set  -MS at 04/07/24 0936   Medication Review/Management -- medications reviewed  -KW at 04/07/24 1028 medications reviewed  -KW at 04/07/24 1104 -- --   Daily Care   Activity Management activity encouraged  -MS at 04/07/24 1346 -- -- -- activity encouraged  -MS at 04/07/24 0936   Elimination Assistance up to toilet;incontinence pad changed;bed pad changed  -MS at 04/07/24 1346 -- -- -- up to toilet  -MS at 04/07/24 0936   Positioning   Body Position position changed independently  -MS at 04/07/24 1346 -- -- -- position changed independently  -MS at 04/07/24 0936   Head of Bed (HOB) Positioning HOB elevated  -MS at 04/07/24 1346 -- -- -- HOB elevated  -MS at 04/07/24 0936   Positioning/Transfer Devices pillows;in  use  -MS at 04/07/24 1346 -- -- -- pillows;in use  -MS at 04/07/24 0936   Hygiene Care   Bathing/Skin Care -- -- -- -- bath, chlorhexidine;bath, complete;back care;cervical collar care;dressed/undressed;foot care;incontinence care;linen changed  -MS at 04/07/24 0936   Oral Care -- -- -- -- dental appliance cleaned;lip/mouth moisturizer applied;mouth wash rinse  -MS at 04/07/24 0936   Perineal Care -- -- -- -- diaper changed;perineum cleansed;protective cream/ointment applied  -MS at 04/07/24 0936   Row Name 04/07/24 0800 04/07/24 0700 04/07/24 0600 04/07/24 0500 04/07/24 0400   Skin Interventions   Pressure Reduction Devices -- heel offloading device utilized  -MS at 04/07/24 0745 -- -- --   Pressure Reduction Techniques -- heels elevated off bed;frequent weight shift encouraged;weight shift assistance provided  -MS at 04/07/24 0745 -- -- --   Safety   Safety WDL WDL  -KW at 04/07/24 1028 WDL  -MS at 04/07/24 0745 WDL  -CS at 04/07/24 0621 WDL  -SL at 04/07/24 0545 WDL  -CS at 04/07/24 0400   Safety Factors wheels locked;upper side rails raised x 2;ID band on;call light in reach;bed in low position  -KW at 04/07/24 1028 wheels locked;ID band on;call light in reach;bed in low position;upper side rails raised x 2, lower side rail raised x 1  -MS at 04/07/24 0745 wheels locked;ID band on;call light in reach;bed in low position;upper side rails raised x 2, lower side rail raised x 1  -CS at 04/07/24 0621 ID band on;call light in reach;bed in low position;upper side rails raised x 2;wheels locked  -SL at 04/07/24 0545 wheels locked;ID band on;call light in reach;bed in low position;upper side rails raised x 2, lower side rail raised x 1  -CS at 04/07/24 0400   All Alarms alarm(s) activated and audible  -KW at 04/07/24 1028 alarm(s) activated and audible  -MS at 04/07/24 0745 -- alarm(s) activated and audible  -SL at 04/07/24 0545 --   Infection Prevention hand hygiene promoted;rest/sleep promoted  -KW at 04/07/24 1028 --  -- -- --   Safety Management   Safety Promotion/Fall Prevention safety round/check completed;nonskid shoes/slippers when out of bed;assistive device/personal items within reach  -KW at 04/07/24 1028 assistive device/personal items within reach;clutter free environment maintained;fall prevention program maintained;gait belt;lighting adjusted;nonskid shoes/slippers when out of bed;room organization consistent;safety round/check completed  -MS at 04/07/24 0745 safety round/check completed  -CS at 04/07/24 0621 assistive device/personal items within reach;clutter free environment maintained;room organization consistent  -SL at 04/07/24 0545 safety round/check completed  -CS at 04/07/24 0400   Enhanced Safety Measures bed alarm set;chair alarm set  -KW at 04/07/24 1028 bed alarm set  -MS at 04/07/24 0745 bed alarm set  -CS at 04/07/24 0621 bed alarm set  -SL at 04/07/24 0545 bed alarm set  -CS at 04/07/24 0400   Medication Review/Management medications reviewed  -KW at 04/07/24 1028 -- -- -- --   Daily Care   Activity Management -- activity encouraged  -MS at 04/07/24 0745 -- activity encouraged  -SL at 04/07/24 0545 --   Positioning   Body Position -- position changed independently  -MS at 04/07/24 0745 -- position changed independently  -SL at 04/07/24 0545 --   Head of Bed (HOB) Positioning -- HOB elevated  -MS at 04/07/24 0745 -- HOB elevated  -SL at 04/07/24 0545 --   Positioning/Transfer Devices -- pillows;in use  -MS at 04/07/24 0745 -- pillows;in use  -SL at 04/07/24 0545 --   Row Name 04/07/24 0300 04/07/24 0204 04/07/24 0100 04/07/24 0005 04/06/24 2300   Nutrition   Intake (%) -- -- -- -- Snack;75%  -SL at 04/07/24 0147   Safety   Safety WDL WDL  -SL at 04/07/24 0317 WDL  -CS at 04/07/24 0204 WDL  -SL at 04/07/24 0147 WDL  -CS at 04/07/24 0005 WDL  -SL at 04/06/24 2033   Safety Factors ID band on;call light in reach;bed in low position;upper side rails raised x 2;wheels locked  -SL at 04/07/24 0317 wheels  locked;ID band on;call light in reach;bed in low position;upper side rails raised x 2, lower side rail raised x 1  -CS at 04/07/24 0204 ID band on;call light in reach;bed in low position;upper side rails raised x 2;wheels locked  -SL at 04/07/24 0147 wheels locked;ID band on;call light in reach;bed in low position;upper side rails raised x 2, lower side rail raised x 1  -CS at 04/07/24 0005 ID band on;call light in reach;bed in low position;upper side rails raised x 2;wheels locked  -SL at 04/06/24 2323   All Alarms alarm(s) activated and audible  -SL at 04/07/24 0317 -- alarm(s) activated and audible  -SL at 04/07/24 0147 -- alarm(s) activated and audible  -SL at 04/06/24 2323   Safety Management   Safety Promotion/Fall Prevention assistive device/personal items within reach;clutter free environment maintained;room organization consistent  -SL at 04/07/24 0317 safety round/check completed  -CS at 04/07/24 0204 assistive device/personal items within reach;clutter free environment maintained;room organization consistent  -SL at 04/07/24 0147 safety round/check completed  -CS at 04/07/24 0005 assistive device/personal items within reach;clutter free environment maintained;room organization consistent  -SL at 04/06/24 2323   Enhanced Safety Measures bed alarm set  -SL at 04/07/24 0317 bed alarm set  -CS at 04/07/24 0204 bed alarm set  -SL at 04/07/24 0147 bed alarm set  -CS at 04/07/24 0005 bed alarm set  -SL at 04/06/24 2323   Daily Care   Activity Management activity encouraged  -SL at 04/07/24 0317 -- activity encouraged  -SL at 04/07/24 0147 -- activity encouraged  -SL at 04/06/24 2323   Positioning   Body Position position changed independently  -SL at 04/07/24 0317 -- position changed independently  -SL at 04/07/24 0147 -- position changed independently  -SL at 04/06/24 2323   Head of Bed (HOB) Positioning HOB elevated  -SL at 04/07/24 0317 --

## 2024-04-08 NOTE — THERAPY TREATMENT NOTE
"Inpatient Rehabilitation - Physical Therapy Treatment Note        [facility]     Patient Name: Regine Beckham  : 1955  MRN: [id number]    Today's Date: 2024                    Admit Date: 3/27/2024      Visit Dx:   No diagnosis found.    Patient Active Problem List   Diagnosis    Iron deficiency anemia due to chronic blood loss    Major depressive disorder    RLS (restless legs syndrome)    GERD (gastroesophageal reflux disease)    Left breast mass    Allergy to penicillin    Stroke    Grade I diastolic dysfunction    Moderate malnutrition    Falls frequently    Stroke-like symptoms    Debility       Past Medical History:   Diagnosis Date    Anemia     Anxiety     Arthritis     Depression     Legally blind     Restless leg syndrome     Sleep apnea     \"I don't use a cpap anymore since losing 106 lbs\"    Water retention        Past Surgical History:   Procedure Laterality Date    BACK SURGERY      CARDIAC CATHETERIZATION N/A 2024    Procedure: Percutaneous Manual Thrombectomy;  Surgeon: Efrain Hurley MD;  Location:  [facility] INVASIVE LOCATION;  Service: Interventional Radiology;  Laterality: N/A;    GALLBLADDER SURGERY      HIP ARTHROSCOPY      JOINT REPLACEMENT Right        PT ASSESSMENT (Last 12 Hours)       IRF PT Evaluation and Treatment       Row Name 24 1446          PT Time and Intention    Document Type daily treatment (P)   BID treatment session  -MV     Mode of Treatment physical therapy;individual therapy (P)   -MV     Patient/Family/Caregiver Comments/Observations Pt. had no new c/o during today's treatment and verbally agreed to physical therapy partcipation. (P)   -MV       Row Name 24 1446          General Information    Patient Profile Reviewed yes (P)   -MV     Existing Precautions/Restrictions fall (P)   -MV     Limitations/Impairments safety/cognitive;visual (P)   -MV       Row Name 24 1446          Living Environment    Primary Care Provided by self (P)   " -MV       Row Name 04/08/24 1446          Home Use of Assistive/Adaptive Equipment    Equipment Currently Used at Home commode;hospital bed;grab bar;shower chair (P)   -MV       Row Name 04/08/24 1446          Pain Assessment    Pretreatment Pain Rating -- (P)   painful L UE PROM  -MV       Row Name 04/08/24 1446          Cognition/Psychosocial    Affect/Mental Status (Cognition) emotionally labile (P)   -MV     Orientation Status (Cognition) oriented to;person;place;situation (P)   intermittent confusion  -MV     Follows Commands (Cognition) verbal cues/prompting required;repetition of directions required;physical/tactile prompts required (P)   -MV     Personal Safety Interventions gait belt;fall prevention program maintained;nonskid shoes/slippers when out of bed;supervised activity (P)   -MV       Row Name 04/08/24 1446          Vision Assessment/Intervention    Visual Impairment/Limitations legally blind;corrective lenses full-time (P)   -MV       Row Name 04/08/24 1446          Mobility    Extremity Weight-bearing Status -- (P)   no restrictions  -MV       Row Name 04/08/24 1446          Bed Mobility    Supine-Sit [place] (Bed Mobility) verbal cues;nonverbal cues (demo/gesture);moderate assist (50% patient effort) (P)   -MV     Assistive Device (Bed Mobility) bed rails;draw sheet (P)   -MV       Row Name 04/08/24 1446          Bed-Chair Transfer    Bed-Chair [place] (Transfers) moderate assist (50% patient effort);verbal cues;nonverbal cues (demo/gesture) (P)   -MV     Assistive Device (Bed-Chair Transfers) wheelchair (P)   -MV       Row Name 04/08/24 1446          Sit-Stand Transfer    Sit-Stand [place] (Transfers) verbal cues;nonverbal cues (demo/gesture);moderate assist (50% patient effort) (P)   -MV     Assistive Device (Sit-Stand Transfers) wheelchair;walker, dao (P)   -MV       Row Name 04/08/24 1446          Stand-Sit Transfer    Stand-Sit [place] (Transfers) verbal cues;nonverbal  cues (demo/gesture);moderate assist (50% patient effort) (P)   -MV     Assistive Device (Stand-Sit Transfers) wheelchair;walker, dao (P)   -MV       Row Name 04/08/24 1446          Stand Pivot/Stand Step Transfer    Stand Pivot/Stand Step [place] (Transfers) verbal cues;nonverbal cues (demo/gesture);moderate assist (50% patient effort) (P)   -MV     Assistive Device (Stand Pivot Stand Step Transfer) wheelchair (P)   -MV       Row Name 04/08/24 1446          Gait/Stairs (Locomotion)    Gait/Stairs Locomotion gait/ambulation independence;gait/ambulation assistive device;distance ambulated;gait pattern;gait deviations (P)   -MV     [place] Level (Gait) verbal cues;nonverbal cues (demo/gesture);moderate assist (50% patient effort);2 person assist (P)   -MV     Assistive Device (Gait) walker, dao (P)   R hand rail  -MV     Distance in Feet (Gait) -- (P)   15' x 2 w/ hemiwalker on R side, 15' w/ right handrail mod assist x2 required  -MV     Pattern (Gait) step-through (P)   -MV     Deviations/Abnormal Patterns (Gait) base of support, narrow;gait speed decreased;weight shifting decreased;stride length decreased (P)   -MV     Bilateral Gait Deviations forward flexed posture (P)   -MV     Left Sided Gait Deviations weight shift ability decreased (P)   -MV     Comment, (Gait/Stairs) Cues for upright posture and increased step width and length. (P)   -MV       Row Name 04/08/24 1446          Safety Issues, Functional Mobility    Impairments Affecting Function (Mobility) balance;cognition;coordination;endurance/activity tolerance;grasp;motor control;muscle tone abnormal;range of motion (ROM);postural/trunk control;strength (P)   -MV       Row Name 04/08/24 1446          Hip (Therapeutic Exercise)    Hip (Therapeutic Exercise) strengthening exercise (P)   -MV     Hip Strengthening (Therapeutic Exercise) left;right;bilateral;flexion;extension;aBduction;aDduction;marching while seated;sitting;1 lb free  weight;resistance band;green (P)   -MV       Row Name 04/08/24 1446          Knee (Therapeutic Exercise)    Knee (Therapeutic Exercise) strengthening exercise (P)   -MV     Knee Strengthening (Therapeutic Exercise) left;right;bilateral;flexion;extension;marching while seated;LAQ (long arc quad);hamstring curls;sitting;green;1 lb free weight;resistance band (P)   -MV       Row Name 04/08/24 1446          Ankle (Therapeutic Exercise)    Ankle (Therapeutic Exercise) strengthening exercise (P)   -MV     Ankle Strengthening (Therapeutic Exercise) left;right;bilateral;dorsiflexion;plantarflexion;sitting;1 lb free weight;resistance band;green (P)   -MV       Row Name 04/08/24 1446          Aerobic Exercise    Type (Aerobic Exercise) recumbent stationary bike (P)   -MV     Time Performed (Aerobic Exercise) -- (P)   LVL 1.3 x 25 min  -MV       Row Name 04/08/24 1446          Positioning and Restraints    Pre-Treatment Position in bed (P)   -MV     Post Treatment Position wheelchair (P)   -MV     In Wheelchair notified nsg;sitting;encouraged to call for assist (P)   Patient left infront of nurses station with L arm sling and L arm tray donned.  -MV       Row Name 04/08/24 1446          Therapy Assessment/Plan (PT)    Patient's Goals For Discharge return home (P)   -MV       Row Name 04/08/24 1446          Therapy Assessment/Plan (PT)    Rehab Potential/Prognosis (PT) good, to achieve stated therapy goals (P)   -MV     Frequency of Treatment (PT) 5 times per week (P)   -MV     Estimated Duration of Therapy (PT) 3 weeks (P)   -MV     Problem List (PT) problems related to;balance;mobility;coordination;hemiparesis/hemiplegia;cognition;communication;strength;postural control;vision;range of motion (ROM);muscle tone;motor control (P)   -MV     Activity Limitations Related to Problem List (PT) unable to ambulate safely;unable to transfer safely;BADLs not performed adequately or safely (P)   -MV       Row Name 04/08/24 1446           Therapy Plan Review/Discharge Plan (PT)    Anticipated Discharge Disposition (PT) home with assist;home with home health (P)   -MV       Row Name 04/08/24 1446          IRF PT Goals    Bed Mobility Goal Selection (PT-IRF) bed mobility, PT goal 1 (P)   -MV     Transfer Goal Selection (PT-IRF) transfers, PT goal 1 (P)   -MV     Gait (Walking Locomotion) Goal Selection (PT-IRF) gait, PT goal 1 (P)   -MV       Row Name 04/08/24 1446          Bed Mobility Goal 1 (PT-IRF)    Activity/Assistive Device (Bed Mobility Goal 1, PT-IRF) scooting;sit to supine;supine to sit (P)   -MV     [place] Level (Bed Mobility Goal 1, PT-IRF) standby assist (P)   -MV     Time Frame (Bed Mobility Goal 1, PT-IRF) by discharge (P)   -MV     Progress/Outcomes (Bed Mobility Goal 1, PT-IRF) goal ongoing (P)   -MV       Row Name 04/08/24 1446          Transfer Goal 1 (PT-IRF)    Activity/Assistive Device (Transfer Goal 1, PT-IRF) sit-to-stand/stand-to-sit;bed-to-chair/chair-to-bed;toilet (P)   -MV     [place] Level (Transfer Goal 1, PT-IRF) contact guard required (P)   -MV     Time Frame (Transfer Goal 1, PT-IRF) by discharge (P)   -MV     Progress/Outcomes (Transfer Goal 1, PT-IRF) goal ongoing (P)   -MV       Row Name 04/08/24 1446          Gait/Walking Locomotion Goal 1 (PT-IRF)    Activity/Assistive Device (Gait/Walking Locomotion Goal 1, PT-IRF) gait (walking locomotion);assistive device use;decrease fall risk;diminish gait deviation;improve balance and speed;increase endurance/gait distance (P)   appropriate a.d.  -MV     Gait/Walking Locomotion Distance Goal 1 (PT-IRF) 150 (P)   -MV     [place] Level (Gait/Walking Locomotion Goal 1, PT-IRF) contact guard required (P)   -MV     Time Frame (Gait/Walking Locomotion Goal 1, PT-IRF) by discharge (P)   -MV     Progress/Outcomes (Gait/Walking Locomotion Goal 1, PT-IRF) goal ongoing (P)   -MV               User Key  (r) = Recorded By, (t) = Taken By, (c) = Cosigned By      Initials  Name Provider Type    Antonio Mcgarry, PTA Student PTA Student                     Physical Therapy Education       Title: PT OT SLP Therapies (Done)       Topic: Physical Therapy (Done)       Point: Mobility training (Done)       Learning Progress Summary             Patient Acceptance, E,D, NR,VU by MV at 4/8/2024 1519    Acceptance, E,D, VU,NR by MV at 4/5/2024 1423    Acceptance, E,D, VU,NR by LL at 4/4/2024 1410    Acceptance, E,D, VU,NR by LL at 4/3/2024 1531    Acceptance, E,D, VU,NR by LL at 4/2/2024 1508    Acceptance, E,D, VU,NR by LL at 4/1/2024 1614    Acceptance, E,TB, VU,DU by  at 3/30/2024 1204    Acceptance, E,D, NR by AG at 3/28/2024 1625                         Point: Home exercise program (Done)       Learning Progress Summary             Patient Acceptance, E,D, NR,VU by MV at 4/8/2024 1519    Acceptance, E,D, VU,NR by MV at 4/5/2024 1423    Acceptance, E,D, VU,NR by LL at 4/4/2024 1410    Acceptance, E,D, VU,NR by LL at 4/3/2024 1531    Acceptance, E,D, VU,NR by LL at 4/2/2024 1508    Acceptance, E,D, VU,NR by LL at 4/1/2024 1614    Acceptance, E,TB, VU,DU by  at 3/30/2024 1204    Acceptance, E,D, NR by AG at 3/28/2024 1625                         Point: Body mechanics (Done)       Learning Progress Summary             Patient Acceptance, E,D, NR,VU by MV at 4/8/2024 1519    Acceptance, E,D, VU,NR by MV at 4/5/2024 1423    Acceptance, E,D, VU,NR by LL at 4/4/2024 1410    Acceptance, E,D, VU,NR by LL at 4/3/2024 1531    Acceptance, E,D, VU,NR by LL at 4/2/2024 1508    Acceptance, E,D, VU,NR by LL at 4/1/2024 1614    Acceptance, E,TB, VU,DU by HC at 3/30/2024 1204    Acceptance, E,D, NR by AG at 3/28/2024 1625                         Point: Precautions (Done)       Learning Progress Summary             Patient Acceptance, E,D, NR,VU by MV at 4/8/2024 1519    Acceptance, E,D, VU,NR by MV at 4/5/2024 1423    Acceptance, E,D, VU,NR by LL at 4/4/2024 1410    Acceptance, E,D, VU,NR by LL at  4/3/2024 1531    Acceptance, E,D, VU,NR by  at 4/2/2024 1508    Acceptance, E,D, VU,NR by  at 4/1/2024 1614    Acceptance, E,TB, VU,DU by  at 3/30/2024 1204    Acceptance, E,D, NR by  at 3/28/2024 1625                                         User Key       Initials Effective Dates Name Provider Type Discipline     06/16/21 -  Melanie Anne, PT Physical Therapist PT    LL 05/02/16 -  Ruthie Travis, PTA Physical Therapist Assistant PT    HC 01/20/23 -  Desi Fraga, PTA Physical Therapist Assistant PT    MV 04/05/24 -  Antonio Peck PTA Student PTA Student PT                    PT Recommendation and Plan    Frequency of Treatment (PT): (P) 5 times per week                     Time Calculation:      PT Charges       Row Name 04/08/24 1519             Time Calculation    Start Time 0735 (P)   -MV      Stop Time 0915 (P)   -MV      Time Calculation (min) 100 min (P)   -MV      PT Received On 04/08/24 (P)   -MV         Time Calculation- PT    Total Timed Code Minutes-  minute(s) (P)   -MV                User Key  (r) = Recorded By, (t) = Taken By, (c) = Cosigned By      Initials Name Provider Type     Antonio Peck, TK Student PTA Student                    Therapy Charges for Today       Code Description Service Date Service Provider Modifiers Qty    68011060932 HC PT THER PROC EA 15 MIN 4/8/2024 Antonio Peck PTA Student GP, CQ 4    64275365067 HC PT THERAPEUTIC ACT EA 15 MIN 4/8/2024 Antonio Peck PTA Student GP, CQ 1    41209063090 HC GAIT TRAINING EA 15 MIN 4/8/2024 Antonio Peck PTA Student GP, CQ 2              PT G-Codes  AM-PAC 6 Clicks Score (PT): 14      TK Velasquez  4/8/2024

## 2024-04-08 NOTE — PROGRESS NOTES
Inpatient Rehabilitation Functional Measures Assessment and Plan of Care    Plan of Care  Self Care    [OT] Dressing (Upper)(Active)  Current Status(04/08/2024): Max A  Weekly Goal(04/16/2024): Mod A  Discharge Goal: Mod A    Functional Measures  ISABEL Eating:  ISABEL Grooming:  ISABEL Bathing:  ISABEL Upper Body Dressing:  ISABEL Lower Body Dressing:  ISABEL Toileting:    ISABEL Bladder Management  Level of Assistance:  Frequency/Number of Accidents this Shift:    ISABEL Bowel Management  Level of Assistance:  Frequency/Number of Accidents this Shift:    ISABEL Bed/Chair/Wheelchair Transfer:  ISABEL Toilet Transfer:  ISABEL Tub/Shower Transfer:    Previously Documented Mode of Locomotion at Discharge:  ISABEL Expected Mode of Locomotion at Discharge:  ISABEL Walk/Wheelchair:  ISABEL Stairs:    ISABEL Comprehension:  ISABEL Expression:  ISABEL Social Interaction:  ISABEL Problem Solving:  ISABEL Memory:    Therapy Mode Minutes  Occupational Therapy: Individual: 90 minutes.  Physical Therapy:  Speech Language Pathology:    Discharge Functional Goals:    Signed by: Desi Mackey OT

## 2024-04-08 NOTE — PLAN OF CARE
Goal Outcome Evaluation:           Progress: improving         Problem: Rehabilitation (IRF) Plan of Care  Goal: Plan of Care Review  Outcome: Ongoing, Progressing  Flowsheets (Taken 4/7/2024 1105 by Ivy Edwards RN)  Progress: improving  Plan of Care Reviewed With: patient  Goal: Patient-Specific Goal (Individualized)  Outcome: Ongoing, Progressing  Goal: Absence of New-Onset Illness or Injury  Outcome: Ongoing, Progressing  Intervention: Prevent Fall and Fall Injury  Recent Flowsheet Documentation  Taken 4/8/2024 1000 by Agnieszka Moreno RN  Safety Promotion/Fall Prevention:   nonskid shoes/slippers when out of bed   safety round/check completed  Taken 4/8/2024 0800 by Agnieszka Moreno RN  Safety Promotion/Fall Prevention:   nonskid shoes/slippers when out of bed   safety round/check completed  Intervention: Prevent Infection  Recent Flowsheet Documentation  Taken 4/8/2024 0800 by Agnieszka Moreno RN  Infection Prevention: hand hygiene promoted  Intervention: Prevent VTE (Venous Thromboembolism)  Recent Flowsheet Documentation  Taken 4/8/2024 0915 by Agnieszka Moreno RN  VTE Prevention/Management: (heparin Subq) --  Goal: Optimal Comfort and Wellbeing  Outcome: Ongoing, Progressing  Goal: Home and Community Transition Plan Established  Outcome: Ongoing, Progressing     Problem: Mobility Impairment  Goal: Optimal Mobility [place] and Safety  Outcome: Ongoing, Progressing     Problem: Skin Injury Risk Increased  Goal: Skin Health and Integrity  Outcome: Ongoing, Progressing  Intervention: Optimize Skin Protection  Recent Flowsheet Documentation  Taken 4/8/2024 1000 by Agnieszka Moreno RN  Head of Bed (HOB) Positioning: HOB elevated  Taken 4/8/2024 0915 by Agnieszka Moreno RN  Pressure Reduction Techniques: heels elevated off bed  Pressure Reduction Devices: heel offloading device utilized  Skin Protection: adhesive use limited  Taken 4/8/2024 0800 by Agnieszka Moreno RN  Head of Bed (HOB)  Positioning: HOB elevated     Problem: Fall Injury Risk  Goal: Absence of Fall and Fall-Related Injury  Outcome: Ongoing, Progressing  Intervention: Identify and Manage Contributors  Recent Flowsheet Documentation  Taken 4/8/2024 0800 by Agnieszka Moreno RN  Medication Review/Management: medications reviewed  Intervention: Promote Injury-Free Environment  Recent Flowsheet Documentation  Taken 4/8/2024 1000 by Agnieszka Moreno, RN  Safety Promotion/Fall Prevention:   nonskid shoes/slippers when out of bed   safety round/check completed  Taken 4/8/2024 0800 by Agnieszka Moreno RN  Safety Promotion/Fall Prevention:   nonskid shoes/slippers when out of bed   safety round/check completed     Problem: Cognitive Impairment  Goal: Optimal Cognitive Function  Outcome: Ongoing, Progressing     Problem: Swallowing Impairment  Goal: Optimal Eating/Swallowing without Aspir  Outcome: Ongoing, Progressing

## 2024-04-08 NOTE — THERAPY TREATMENT NOTE
"Inpatient Rehabilitation - Occupational Therapy Treatment Note     [facility]     Patient Name: Regine Beckham  : 1955  MRN: [id number]    Today's Date: 2024                 Admit Date: 3/27/2024       No diagnosis found.    Patient Active Problem List   Diagnosis    Iron deficiency anemia due to chronic blood loss    Major depressive disorder    RLS (restless legs syndrome)    GERD (gastroesophageal reflux disease)    Left breast mass    Allergy to penicillin    Stroke    Grade I diastolic dysfunction    Moderate malnutrition    Falls frequently    Stroke-like symptoms    Debility       Past Medical History:   Diagnosis Date    Anemia     Anxiety     Arthritis     Depression     Legally blind     Restless leg syndrome     Sleep apnea     \"I don't use a cpap anymore since losing 106 lbs\"    Water retention        Past Surgical History:   Procedure Laterality Date    BACK SURGERY      CARDIAC CATHETERIZATION N/A 2024    Procedure: Percutaneous Manual Thrombectomy;  Surgeon: Efrain Hurley MD;  Location: [facility] INVASIVE LOCATION;  Service: Interventional Radiology;  Laterality: N/A;    GALLBLADDER SURGERY      HIP ARTHROSCOPY      JOINT REPLACEMENT Right              IRF OT ASSESSMENT FLOWSHEET (Last 12 Hours)       IRF OT Evaluation and Treatment       Row Name 24 1450          OT Time and Intention    Document Type daily treatment  -BF     Mode of Treatment occupational therapy  -BF     Patient Effort good  -BF     Symptoms Noted During/After Treatment increased pain  L shoulder, RN notified  -BF       Row Name 24 1450          General Information    Existing Precautions/Restrictions fall  L HP  -BF     Limitations/Impairments safety/cognitive;visual  -BF       Row Name 24 1450          Cognition/Psychosocial    Affect/Mental Status (Cognition) emotionally labile  -BF     Orientation Status (Cognition) oriented to;person;place;situation  intermittent confusion  -BF     " Follows Commands (Cognition) repetition of directions required;verbal cues/prompting required;physical/tactile prompts required  -BF       Row Name 04/08/24 1450          Upper Body Dressing    [place] Level (Upper Body Dressing) maximal assist (25-49% patient effort);verbal cues;nonverbal cues (demo/gesture)  -BF     Position (Upper Body Dressing) supported sitting  -BF       Row Name 04/08/24 1450          Self-Feeding    [place] Level (Self-Feeding) set up  -BF     Position (Self-Feeding) supported sitting  -BF       Row Name 04/08/24 1450          Motor Skills    Motor Control/Coordination Interventions gross motor coordination activities;fine motor manipulation/dexterity activities;neuro-muscular re-education;therapeutic exercise/ROM  LUE AAROM as tolerated, NDT; RUE GMC/FMC theract, rickshaw 10 lbs X10X3  -BF       Row Name 04/08/24 1450          Neuromuscular Re-education    Interventions (Neuromuscular Re-education) facilitation/inhibition;massage  LUE  -BF     Positioning (Neuromuscular Re-education) sitting;supported  -BF       Row Name 04/08/24 1450          Positioning and Restraints    In Wheelchair sitting;LUE elevated;with SLP;patient within staff view  at [facility] station after second session  -BF               User Key  (r) = Recorded By, (t) = Taken By, (c) = Cosigned By      Initials Name Effective Dates    Desi Wagner OT 07/11/23 -                      Occupational Therapy Education       Title: PT OT SLP Therapies (Done)       Topic: Occupational Therapy (Done)       Point: ADL training (Done)       Description:   Instruct learner(s) on proper safety adaptation and remediation techniques during self care or transfers.   Instruct in proper use of assistive devices.                  Learning Progress Summary             Patient Acceptance, E, VU,NR by BF at 4/8/2024 1446    Acceptance, E, VU,NR by BF at 4/5/2024 1349    Acceptance, E, VU,NR by BF at 4/4/2024 1445    Acceptance,  E, NR by BF at 4/3/2024 1425    Acceptance, E, NR by BF at 4/2/2024 1437    Acceptance, E, NR by BF at 4/1/2024 1458    Acceptance, D,E, NR by BC at 3/30/2024 1515    Acceptance, E, NR by BF at 3/29/2024 1433    Acceptance, E, NR by BF at 3/28/2024 1434                         Point: Precautions (Done)       Description:   Instruct learner(s) on prescribed precautions during self-care and functional transfers.                  Learning Progress Summary             Patient Acceptance, E, VU,NR by BF at 4/8/2024 1446    Acceptance, E, VU,NR by BF at 4/5/2024 1349    Acceptance, E, VU,NR by BF at 4/4/2024 1445    Acceptance, E, NR by BF at 4/3/2024 1425    Acceptance, E, NR by BF at 4/2/2024 1437    Acceptance, E, NR by BF at 4/1/2024 1458    Acceptance, E, NR by BF at 3/29/2024 1433    Acceptance, E, NR by BF at 3/28/2024 1434                                         User Key       Initials Effective Dates Name Provider Type Discipline     07/11/23 -  Desi Mackey OT Occupational Therapist OT    BC 06/16/21 -  Jalyn Harvey OT Occupational Therapist OT                        OT Recommendation and Plan    Planned Therapy Interventions (OT): activity tolerance training, adaptive equipment training, BADL retraining, neuromuscular control/coordination retraining, passive ROM/stretching, ROM/therapeutic exercise, strengthening exercise, transfer/mobility retraining                    Time Calculation:      Time Calculation- OT       Row Name 04/08/24 1457 04/08/24 0935          Time Calculation- OT    OT Start Time 1045  -BF 0935  -BF     OT Stop Time 1150  -BF 1000  -BF     OT Time Calculation (min) 65 min  -BF 25 min  -BF     Total Timed Code Minutes- OT 65 minute(s)  -BF 25 minute(s)  -BF     OT Non-Billable Time (min) -- 10 min  -BF               User Key  (r) = Recorded By, (t) = Taken By, (c) = Cosigned By      Initials Name Provider Type     Desi Mackey OT Occupational Therapist                   Therapy Charges for Today       Code Description Service Date Service Provider Modifiers Qty    04413416607 HC OT SELF CARE/MGMT/TRAIN EA 15 MIN 4/8/2024 Desi Mackey OT GO 2    05879981503 HC OT THERAPEUTIC ACT EA 15 MIN 4/8/2024 Desi Mackey OT GO 2    90716327200 HC OT NEUROMUSC RE EDUCATION EA 15 MIN 4/8/2024 Desi Mackey OT GO 1    [id number]  OT THER PROC EA 15 MIN 4/8/2024 Desi Mackey OT GO 1                     Desi Mackey OT  4/8/2024   No

## 2024-04-08 NOTE — PLAN OF CARE
Problem: Rehabilitation (IRF) Plan of Care  Goal: Plan of Care Review  Outcome: Ongoing, Progressing  Goal: Patient-Specific Goal (Individualized)  Outcome: Ongoing, Progressing  Goal: Absence of New-Onset Illness or Injury  Outcome: Ongoing, Progressing  Intervention: Prevent VTE (Venous Thromboembolism)  Recent Flowsheet Documentation  Taken 4/7/2024 1923 by Anna Marie Fuentes RN  VTE Prevention/Management: other (see comments)  Goal: Optimal Comfort and Wellbeing  Outcome: Ongoing, Progressing  Goal: Home and Community Transition Plan Established  Outcome: Ongoing, Progressing     Problem: Mobility Impairment  Goal: Optimal Mobility Edmunds and Safety  Outcome: Ongoing, Progressing     Problem: Skin Injury Risk Increased  Goal: Skin Health and Integrity  Outcome: Ongoing, Progressing  Intervention: Promote and Optimize Oral Intake  Recent Flowsheet Documentation  Taken 4/7/2024 1923 by Anna Marie Fuentes RN  Oral Nutrition Promotion: physical activity promoted  Intervention: Optimize Skin Protection  Recent Flowsheet Documentation  Taken 4/7/2024 1923 by Anna Marie Fuentes, RN  Pressure Reduction Techniques: heels elevated off bed  Pressure Reduction Devices: heel offloading device utilized     Problem: Fall Injury Risk  Goal: Absence of Fall and Fall-Related Injury  Outcome: Ongoing, Progressing   Goal Outcome Evaluation:

## 2024-04-08 NOTE — THERAPY TREATMENT NOTE
Inpatient Rehabilitation - Speech Language Pathology   Swallow Treatment Note [facility]  Dysphagia Therapy Treatment Note     Patient Name: Regine Beckham  : 1955  MRN: [id number]  Today's Date: 2024     Admit Date: 3/27/2024  DYSPHAGIA THERAPY PLAN OF CARE:     Regine Beckham was seen this am in the SLP office of [facility]'s IPR unit for formal therapy tasks. She is cooperative to participate in all tasks.     She is notably labile this date intermittently across all therapy tasks.     Long Term Goal:  Patient will accept least restrictive diet tolerance w/o overt s/s aspiration.      Short Term Goals:  1. Patient will tolerate facial massage to LEFT facial surface for 3-7 minutes to increase surface blood flow, sensation and ROM over 3 consecutive sessions.  Tolerated 7 min w/ mild increase in surface blood flow.       2. Patient will perform OROM/GRACE exercises x3 sets x10 reps w/ min cues.  X2 set x5 reps.      3. Patient will demonstrate increase labial sensation/afferent drive per pt report in 90% of opp over three consecutive sessions.   Pt reports continued decrease in left facial surface sensation.       4. Patient will increase lingual control, coordination, movement as evidenced by bolus manipulation in 90% opp independently over three consecutive sessions.   No po trials adminsitered this date.      5. Patient will participate in a clinical re-evaluation of swallowing fnx in 7-10 days, pending progress towards this poc.  Diet advanced to MS, whole, thin 3/30/24    Visit Dx:   No diagnosis found.  Patient Active Problem List   Diagnosis    Iron deficiency anemia due to chronic blood loss    Major depressive disorder    RLS (restless legs syndrome)    GERD (gastroesophageal reflux disease)    Left breast mass    Allergy to penicillin    Stroke    Grade I diastolic dysfunction    Moderate malnutrition    Falls frequently    Stroke-like symptoms    Debility     Past Medical History:   Diagnosis  "Date    Anemia     Anxiety     Arthritis     Depression     Legally blind     Restless leg syndrome     Sleep apnea     \"I don't use a cpap anymore since losing 106 lbs\"    Water retention      Past Surgical History:   Procedure Laterality Date    BACK SURGERY      CARDIAC CATHETERIZATION N/A 1/19/2024    Procedure: Percutaneous Manual Thrombectomy;  Surgeon: Efrain Hurley MD;  Location: [facility] CATH INVASIVE LOCATION;  Service: Interventional Radiology;  Laterality: N/A;    GALLBLADDER SURGERY      HIP ARTHROSCOPY      JOINT REPLACEMENT Right        SLP Recommendation and Plan      Continue per POC.       Thank you for allowing me to participate in the care of your patient-   Azalia Elizondo M.S., CCC-SLP           Daily Summary of Progress (SLP): progress toward functional goals is good (Massage to left facial surface for 7 min. OROM x10 reps. Reports continued decreased sensation to left facial surface.) (04/08/24 1010)       SWALLOW EVALUATION (Last 72 Hours)       SLP Adult Swallow Evaluation       Row Name 04/08/24 1010                   Rehab Evaluation    Document Type therapy note (daily note)  -           SLP Treatment Clinical Impressions    Daily Summary of Progress (SLP) progress toward functional goals is good  Massage to left facial surface for 7 min. OROM x10 reps. Reports continued decreased sensation to left facial surface.  -                  User Key  (r) = Recorded By, (t) = Taken By, (c) = Cosigned By      Initials Name Effective Dates    Azalia Bunch, MS CCC-SLP 10/25/21 -                     EDUCATION  The patient has been educated in the following areas:   Dysphagia (Swallowing Impairment).        Time Calculation:    Time Calculation- SLP       Row Name 04/08/24 1057             Time Calculation- SLP    SLP Start Time 1000  -      SLP Stop Time 1045  -      SLP Time Calculation (min) 45 min  -      SLP - Next Appointment 04/09/24  -                User Key  (r) = Recorded " By, (t) = Taken By, (c) = Cosigned By      Initials Name Provider Type    Azalia Bunch, MS CCC-SLP Speech and Language Pathologist                    Therapy Charges for Today       Code Description Service Date Service Provider Modifiers Angus    70729312880 HC ST TREATMENT SPEECH 2 2024 Azalia Elizondo MS CCC-SLP GN 1    [id number] HC ST TREATMENT SWALLOW 1 2024 Azalia Elizondo, MS CCC-SLP GN 1            Azalia Elizondo MS CCC-SLP  2024         and Inpatient Rehabilitation - Speech Language Pathology Treatment Note  [facility]  Dysarthria and Cognitive Therapy Treatment Note     Patient Name: Regine Beckham  : 1955  MRN: [id number]  Today's Date: 2024     Admit Date: 3/27/2024    DYSARTHRIA AND COGNITIVE THERAPY PLAN OF CARE:     Regine Beckham was seen this am in the SLP office of [facility]'s IPR unit for formal therapy tasks. She is cooperative to participate in all tasks.     She is notably labile this date intermittently across all therapy tasks.      Long Term Goal:  Patient will demonstrate functional speech skills for return to discharge environment.   Patient will demonstrate functional cognitive skills for return to discharge environment.     Short Term Goals:  1. Patient will tolerate facial massage to left facial surface for 3-7 minutes to increase surface blood flow, sensation and ROM over 3 consecutive sessions.  Tolerated 7 min w/ minimal increase in surface blood flow.        2. Patient will perform OROM/GRACE exercises x3 sets x10 reps w/ min cues.  X2 set x5 reps.      3. Patient will maintain vocal intensity at adequate speaking volume per decibel meter in 4+ word sentences in 90% of opp over three consecutive sessions.   Less than 20% opp this date.      4. Patient will over-articulate 3+ syllable words and in connected speech in 90% opp to improve intelligibility over three consecutive sessions.   Over-articulation of 3+ syllable words and connected speech demonstrated in 75% opp w/  verbal cues.      5. Patient will decrease rate of speech in connected speech in 90% of opp to improve intelligibility over three consecutive sessions.   Deferred this date.      6. Patient will perform rapid alternating speech tasks w/ min cues over three consecutive sessions.  Performed w/ increase in oral secretions noted and pt aware w/ use of tissue to clear lip edge.      7. Patient will demonstrate accurate orientation to time and location in 90% of opp independently over three consecutive sessions.  Oriented to location in 100% opp this date. Oriented to time of day (morning, noon, afternoon, evening) in 0/2 opp this date.      8. Patient will maintain attention to therapy tasks despite intermittently distracting environment in 90% of opp w/ min cues over three consecutive sessions.   Able to maintain attention to tasks in 90% opp this date.      8. Patient will perform divergent naming tasks of 8+ items named in 1 min w/ min cues over three consecutive sessions.   Divergent naming tasks of 8+ items named in 1 min in 2 of 4 opp.     9. Patient will perform inhalations/exhalations via resistive breather x4 sets x15 reps.   Breather at 4/3 x4 sets x10 reps.     10. Patient will perform sustained vowel prolongations of 5 sec duration over 15 reps.   Sustained vowel prolongations over x10 reps for 5.89 seconds. (9/10 opp over 5 sec).        *Additional goals to be added/modified per pt progress toward goals.      Visit Dx:  No diagnosis found.  Patient Active Problem List   Diagnosis    Iron deficiency anemia due to chronic blood loss    Major depressive disorder    RLS (restless legs syndrome)    GERD (gastroesophageal reflux disease)    Left breast mass    Allergy to penicillin    Stroke    Grade I diastolic dysfunction    Moderate malnutrition    Falls frequently    Stroke-like symptoms    Debility     Past Medical History:   Diagnosis Date    Anemia     Anxiety     Arthritis     Depression     Legally blind   "   Restless leg syndrome     Sleep apnea     \"I don't use a cpap anymore since losing 106 lbs\"    Water retention      Past Surgical History:   Procedure Laterality Date    BACK SURGERY      CARDIAC CATHETERIZATION N/A 1/19/2024    Procedure: Percutaneous Manual Thrombectomy;  Surgeon: Efrain Hurley MD;  Location: [facility] INVASIVE LOCATION;  Service: Interventional Radiology;  Laterality: N/A;    GALLBLADDER SURGERY      HIP ARTHROSCOPY      JOINT REPLACEMENT Right        SLP Recommendation and Plan      Continue per POC.      Thank you-   Azalia Elizondo M.S., CCC-SLP        Daily Summary of Progress (SLP): progress toward functional goals is good (Massage to left facial surface for 7 min. OROM x10 reps. Reports continued decreased sensation to left facial surface.) (04/08/24 1010)          SLP EVALUATION (Last 72 Hours)       SLP SLC Evaluation       Row Name 04/08/24 1000                   Communication Assessment/Intervention    Document Type therapy note (daily note)  -[facility] Treatment Clinical Impressions    Daily Summary of Progress (SLP) progress toward functional goals is good  Oriented to location in 2/2 opp, to time in 0/2 opp. Maintains attention in 90% opp this date. Divergent naming of 8+ items in 2/4 opp. Breather at 4/4: x40 reps.  -                  User Key  (r) = Recorded By, (t) = Taken By, (c) = Cosigned By      Initials Name Effective Dates    Azalia Bunch, MS CCC-SLP 10/25/21 -                        EDUCATION  The patient has been educated in the following areas:     Cognitive Impairment Communication Impairment.      Time Calculation:      Time Calculation- SLP       Row Name 04/08/24 1057             Time Calculation- [facility]    SLP Start Time 1000  -      SLP Stop Time 1045  -JR      SLP Time Calculation (min) 45 min  -[facility] - Next Appointment 04/09/24  -                User Key  (r) = Recorded By, (t) = Taken By, (c) = Cosigned By      Initials Name Provider " Type    JR Azalia Elizondo, MS CCC-SLP Speech and Language Pathologist                    Therapy Charges for Today       Code Description Service Date Service Provider Modifiers Qty    [id number]  ST TREATMENT SPEECH 2 4/8/2024 Azalia Elizondo MS CCC-SLP GN 1    [id number]  ST TREATMENT SWALLOW 1 4/8/2024 Azalia Elizondo MS CCC-SLP GN 1                       MS VIGNESH Worley  4/8/2024

## 2024-04-09 ENCOUNTER — APPOINTMENT (OUTPATIENT)
Dept: GENERAL RADIOLOGY | Facility: HOSPITAL | Age: 69
DRG: 948 | End: 2024-04-09
Payer: MEDICARE

## 2024-04-09 ENCOUNTER — HOSPITAL ENCOUNTER (INPATIENT)
Facility: HOSPITAL | Age: 69
LOS: 6 days | Discharge: HOME HEALTH | End: 2024-04-18
Attending: HOSPITALIST | Admitting: HOSPITALIST
Payer: MEDICARE

## 2024-04-09 VITALS
TEMPERATURE: 98 F | HEIGHT: 66 IN | DIASTOLIC BLOOD PRESSURE: 64 MMHG | SYSTOLIC BLOOD PRESSURE: 123 MMHG | BODY MASS INDEX: 22.5 KG/M2 | WEIGHT: 140 LBS | OXYGEN SATURATION: 99 % | RESPIRATION RATE: 18 BRPM | HEART RATE: 69 BPM

## 2024-04-09 DIAGNOSIS — R29.90 STROKE-LIKE SYMPTOMS: ICD-10-CM

## 2024-04-09 DIAGNOSIS — R07.9 CHEST PAIN, UNSPECIFIED TYPE: Primary | ICD-10-CM

## 2024-04-09 DIAGNOSIS — R29.6 FALLS FREQUENTLY: ICD-10-CM

## 2024-04-09 DIAGNOSIS — G62.9 PERIPHERAL POLYNEUROPATHY: ICD-10-CM

## 2024-04-09 LAB
ALBUMIN SERPL-MCNC: 3.4 G/DL (ref 3.5–5.2)
ALBUMIN/GLOB SERPL: 1.1 G/DL
ALP SERPL-CCNC: 90 U/L (ref 39–117)
ALT SERPL W P-5'-P-CCNC: 29 U/L (ref 1–33)
ANION GAP SERPL CALCULATED.3IONS-SCNC: 7.8 MMOL/L (ref 5–15)
APTT PPP: 33.2 SECONDS (ref 26.5–34.5)
AST SERPL-CCNC: 35 U/L (ref 1–32)
BASOPHILS # BLD AUTO: 0.06 10*3/MM3 (ref 0–0.2)
BASOPHILS NFR BLD AUTO: 1.2 % (ref 0–1.5)
BILIRUB SERPL-MCNC: 0.2 MG/DL (ref 0–1.2)
BUN SERPL-MCNC: 18 MG/DL (ref 8–23)
BUN/CREAT SERPL: 21.7 (ref 7–25)
CALCIUM SPEC-SCNC: 9 MG/DL (ref 8.6–10.5)
CHLORIDE SERPL-SCNC: 106 MMOL/L (ref 98–107)
CO2 SERPL-SCNC: 25.2 MMOL/L (ref 22–29)
CREAT SERPL-MCNC: 0.83 MG/DL (ref 0.57–1)
CRP SERPL-MCNC: <0.3 MG/DL (ref 0–0.5)
D-LACTATE SERPL-SCNC: 0.9 MMOL/L (ref 0.5–2)
DEPRECATED RDW RBC AUTO: 49.2 FL (ref 37–54)
EGFRCR SERPLBLD CKD-EPI 2021: 76.4 ML/MIN/1.73
EOSINOPHIL # BLD AUTO: 0.21 10*3/MM3 (ref 0–0.4)
EOSINOPHIL NFR BLD AUTO: 4.1 % (ref 0.3–6.2)
ERYTHROCYTE [DISTWIDTH] IN BLOOD BY AUTOMATED COUNT: 13.9 % (ref 12.3–15.4)
GLOBULIN UR ELPH-MCNC: 3 GM/DL
GLUCOSE SERPL-MCNC: 119 MG/DL (ref 65–99)
HBA1C MFR BLD: 5.9 % (ref 4.8–5.6)
HCT VFR BLD AUTO: 32.3 % (ref 34–46.6)
HGB BLD-MCNC: 10.1 G/DL (ref 12–15.9)
IMM GRANULOCYTES # BLD AUTO: 0.01 10*3/MM3 (ref 0–0.05)
IMM GRANULOCYTES NFR BLD AUTO: 0.2 % (ref 0–0.5)
INR PPP: 0.94 (ref 0.9–1.1)
LYMPHOCYTES # BLD AUTO: 1.89 10*3/MM3 (ref 0.7–3.1)
LYMPHOCYTES NFR BLD AUTO: 37.1 % (ref 19.6–45.3)
MCH RBC QN AUTO: 30.2 PG (ref 26.6–33)
MCHC RBC AUTO-ENTMCNC: 31.3 G/DL (ref 31.5–35.7)
MCV RBC AUTO: 96.7 FL (ref 79–97)
MONOCYTES # BLD AUTO: 0.44 10*3/MM3 (ref 0.1–0.9)
MONOCYTES NFR BLD AUTO: 8.6 % (ref 5–12)
NEUTROPHILS NFR BLD AUTO: 2.49 10*3/MM3 (ref 1.7–7)
NEUTROPHILS NFR BLD AUTO: 48.8 % (ref 42.7–76)
NRBC BLD AUTO-RTO: 0 /100 WBC (ref 0–0.2)
PLATELET # BLD AUTO: 236 10*3/MM3 (ref 140–450)
PMV BLD AUTO: 9.1 FL (ref 6–12)
POTASSIUM SERPL-SCNC: 4 MMOL/L (ref 3.5–5.2)
PROCALCITONIN SERPL-MCNC: 0.06 NG/ML (ref 0–0.25)
PROT SERPL-MCNC: 6.4 G/DL (ref 6–8.5)
PROTHROMBIN TIME: 13 SECONDS (ref 12.1–14.7)
RBC # BLD AUTO: 3.34 10*6/MM3 (ref 3.77–5.28)
SODIUM SERPL-SCNC: 139 MMOL/L (ref 136–145)
TROPONIN T SERPL HS-MCNC: 12 NG/L
TROPONIN T SERPL HS-MCNC: 13 NG/L
UFH PPP CHRO-ACNC: <0.1 IU/ML (ref 0.3–0.7)
WBC NRBC COR # BLD AUTO: 5.1 10*3/MM3 (ref 3.4–10.8)

## 2024-04-09 PROCEDURE — 99222 1ST HOSP IP/OBS MODERATE 55: CPT | Performed by: HOSPITALIST

## 2024-04-09 PROCEDURE — 84145 PROCALCITONIN (PCT): CPT | Performed by: HOSPITALIST

## 2024-04-09 PROCEDURE — 99239 HOSP IP/OBS DSCHRG MGMT >30: CPT | Performed by: INTERNAL MEDICINE

## 2024-04-09 PROCEDURE — 86140 C-REACTIVE PROTEIN: CPT | Performed by: HOSPITALIST

## 2024-04-09 PROCEDURE — 80053 COMPREHEN METABOLIC PANEL: CPT | Performed by: HOSPITALIST

## 2024-04-09 PROCEDURE — 71045 X-RAY EXAM CHEST 1 VIEW: CPT

## 2024-04-09 PROCEDURE — 25010000002 HEPARIN (PORCINE) PER 1000 UNITS: Performed by: INTERNAL MEDICINE

## 2024-04-09 PROCEDURE — 97530 THERAPEUTIC ACTIVITIES: CPT | Performed by: OCCUPATIONAL THERAPIST

## 2024-04-09 PROCEDURE — 71045 X-RAY EXAM CHEST 1 VIEW: CPT | Performed by: RADIOLOGY

## 2024-04-09 PROCEDURE — 83036 HEMOGLOBIN GLYCOSYLATED A1C: CPT | Performed by: HOSPITALIST

## 2024-04-09 PROCEDURE — 84484 ASSAY OF TROPONIN QUANT: CPT | Performed by: INTERNAL MEDICINE

## 2024-04-09 PROCEDURE — 25010000002 HEPARIN (PORCINE) 25000-0.45 UT/250ML-% SOLUTION: Performed by: HOSPITALIST

## 2024-04-09 PROCEDURE — G0378 HOSPITAL OBSERVATION PER HR: HCPCS

## 2024-04-09 PROCEDURE — 97116 GAIT TRAINING THERAPY: CPT

## 2024-04-09 PROCEDURE — 97110 THERAPEUTIC EXERCISES: CPT

## 2024-04-09 PROCEDURE — 97110 THERAPEUTIC EXERCISES: CPT | Performed by: OCCUPATIONAL THERAPIST

## 2024-04-09 PROCEDURE — 97112 NEUROMUSCULAR REEDUCATION: CPT

## 2024-04-09 PROCEDURE — 92526 ORAL FUNCTION THERAPY: CPT

## 2024-04-09 PROCEDURE — 85520 HEPARIN ASSAY: CPT | Performed by: HOSPITALIST

## 2024-04-09 PROCEDURE — 93005 ELECTROCARDIOGRAM TRACING: CPT | Performed by: HOSPITALIST

## 2024-04-09 PROCEDURE — 25010000002 HEPARIN (PORCINE) PER 1000 UNITS: Performed by: HOSPITALIST

## 2024-04-09 PROCEDURE — 93005 ELECTROCARDIOGRAM TRACING: CPT | Performed by: INTERNAL MEDICINE

## 2024-04-09 PROCEDURE — 97112 NEUROMUSCULAR REEDUCATION: CPT | Performed by: OCCUPATIONAL THERAPIST

## 2024-04-09 PROCEDURE — 83605 ASSAY OF LACTIC ACID: CPT | Performed by: HOSPITALIST

## 2024-04-09 PROCEDURE — 93010 ELECTROCARDIOGRAM REPORT: CPT | Performed by: INTERNAL MEDICINE

## 2024-04-09 PROCEDURE — 92507 TX SP LANG VOICE COMM INDIV: CPT

## 2024-04-09 PROCEDURE — 85025 COMPLETE CBC W/AUTO DIFF WBC: CPT | Performed by: HOSPITALIST

## 2024-04-09 PROCEDURE — 85730 THROMBOPLASTIN TIME PARTIAL: CPT | Performed by: HOSPITALIST

## 2024-04-09 PROCEDURE — 85610 PROTHROMBIN TIME: CPT | Performed by: HOSPITALIST

## 2024-04-09 PROCEDURE — 97530 THERAPEUTIC ACTIVITIES: CPT

## 2024-04-09 PROCEDURE — 84484 ASSAY OF TROPONIN QUANT: CPT | Performed by: HOSPITALIST

## 2024-04-09 PROCEDURE — 97535 SELF CARE MNGMENT TRAINING: CPT | Performed by: OCCUPATIONAL THERAPIST

## 2024-04-09 RX ORDER — ROPINIROLE 1 MG/1
4 TABLET, FILM COATED ORAL NIGHTLY
Status: DISCONTINUED | OUTPATIENT
Start: 2024-04-09 | End: 2024-04-18 | Stop reason: HOSPADM

## 2024-04-09 RX ORDER — OXYBUTYNIN CHLORIDE 5 MG/1
10 TABLET, EXTENDED RELEASE ORAL DAILY
Status: DISCONTINUED | OUTPATIENT
Start: 2024-04-10 | End: 2024-04-18 | Stop reason: HOSPADM

## 2024-04-09 RX ORDER — POLYETHYLENE GLYCOL 3350 17 G/17G
17 POWDER, FOR SOLUTION ORAL DAILY PRN
Status: DISCONTINUED | OUTPATIENT
Start: 2024-04-09 | End: 2024-04-18 | Stop reason: HOSPADM

## 2024-04-09 RX ORDER — ROPINIROLE 1 MG/1
4 TABLET, FILM COATED ORAL NIGHTLY
Status: CANCELLED | OUTPATIENT
Start: 2024-04-10

## 2024-04-09 RX ORDER — GABAPENTIN 300 MG/1
300 CAPSULE ORAL 3 TIMES DAILY
Status: CANCELLED | OUTPATIENT
Start: 2024-04-10

## 2024-04-09 RX ORDER — CALCIUM CARBONATE 500 MG/1
3 TABLET, CHEWABLE ORAL 3 TIMES DAILY PRN
Status: DISCONTINUED | OUTPATIENT
Start: 2024-04-09 | End: 2024-04-18 | Stop reason: HOSPADM

## 2024-04-09 RX ORDER — SODIUM CHLORIDE 9 MG/ML
40 INJECTION, SOLUTION INTRAVENOUS AS NEEDED
Status: DISCONTINUED | OUTPATIENT
Start: 2024-04-09 | End: 2024-04-18 | Stop reason: HOSPADM

## 2024-04-09 RX ORDER — ASPIRIN 81 MG/1
81 TABLET ORAL DAILY
Status: CANCELLED | OUTPATIENT
Start: 2024-04-10

## 2024-04-09 RX ORDER — MIRTAZAPINE 15 MG/1
30 TABLET, FILM COATED ORAL NIGHTLY
Status: CANCELLED | OUTPATIENT
Start: 2024-04-10

## 2024-04-09 RX ORDER — PANTOPRAZOLE SODIUM 40 MG/1
40 TABLET, DELAYED RELEASE ORAL
Status: CANCELLED | OUTPATIENT
Start: 2024-04-10

## 2024-04-09 RX ORDER — ZOLPIDEM TARTRATE 5 MG/1
5 TABLET ORAL NIGHTLY PRN
Status: CANCELLED | OUTPATIENT
Start: 2024-04-09

## 2024-04-09 RX ORDER — FOLIC ACID 1 MG/1
1 TABLET ORAL DAILY
Status: CANCELLED | OUTPATIENT
Start: 2024-04-10

## 2024-04-09 RX ORDER — MIRTAZAPINE 15 MG/1
30 TABLET, FILM COATED ORAL NIGHTLY
Status: DISCONTINUED | OUTPATIENT
Start: 2024-04-09 | End: 2024-04-18 | Stop reason: HOSPADM

## 2024-04-09 RX ORDER — ASPIRIN 81 MG/1
81 TABLET, CHEWABLE ORAL DAILY
Status: CANCELLED | OUTPATIENT
Start: 2024-04-10

## 2024-04-09 RX ORDER — SODIUM CHLORIDE 0.9 % (FLUSH) 0.9 %
10 SYRINGE (ML) INJECTION EVERY 12 HOURS SCHEDULED
Status: DISCONTINUED | OUTPATIENT
Start: 2024-04-09 | End: 2024-04-18 | Stop reason: HOSPADM

## 2024-04-09 RX ORDER — CALCIUM CARBONATE 500 MG/1
3 TABLET, CHEWABLE ORAL 3 TIMES DAILY PRN
Status: CANCELLED | OUTPATIENT
Start: 2024-04-09

## 2024-04-09 RX ORDER — HEPARIN SODIUM 10000 [USP'U]/100ML
12 INJECTION, SOLUTION INTRAVENOUS
Status: DISCONTINUED | OUTPATIENT
Start: 2024-04-09 | End: 2024-04-10

## 2024-04-09 RX ORDER — HEPARIN SODIUM 5000 [USP'U]/ML
25 INJECTION, SOLUTION INTRAVENOUS; SUBCUTANEOUS AS NEEDED
Status: DISCONTINUED | OUTPATIENT
Start: 2024-04-09 | End: 2024-04-12

## 2024-04-09 RX ORDER — AMOXICILLIN 250 MG
2 CAPSULE ORAL 2 TIMES DAILY
Status: DISCONTINUED | OUTPATIENT
Start: 2024-04-09 | End: 2024-04-18 | Stop reason: HOSPADM

## 2024-04-09 RX ORDER — FOLIC ACID 1 MG/1
1 TABLET ORAL DAILY
Status: DISCONTINUED | OUTPATIENT
Start: 2024-04-10 | End: 2024-04-18 | Stop reason: HOSPADM

## 2024-04-09 RX ORDER — ACETAMINOPHEN 500 MG
1000 TABLET ORAL EVERY 6 HOURS PRN
Status: CANCELLED | OUTPATIENT
Start: 2024-04-09

## 2024-04-09 RX ORDER — ACETAMINOPHEN 500 MG
1000 TABLET ORAL EVERY 6 HOURS PRN
Status: DISCONTINUED | OUTPATIENT
Start: 2024-04-09 | End: 2024-04-18 | Stop reason: HOSPADM

## 2024-04-09 RX ORDER — ASPIRIN 81 MG/1
81 TABLET, CHEWABLE ORAL DAILY
Status: DISCONTINUED | OUTPATIENT
Start: 2024-04-10 | End: 2024-04-18 | Stop reason: HOSPADM

## 2024-04-09 RX ORDER — PANTOPRAZOLE SODIUM 40 MG/1
40 TABLET, DELAYED RELEASE ORAL
Status: DISCONTINUED | OUTPATIENT
Start: 2024-04-10 | End: 2024-04-18 | Stop reason: HOSPADM

## 2024-04-09 RX ORDER — MIRTAZAPINE 15 MG/1
30 TABLET, FILM COATED ORAL NIGHTLY
Status: CANCELLED | OUTPATIENT
Start: 2024-04-09

## 2024-04-09 RX ORDER — ROPINIROLE 1 MG/1
4 TABLET, FILM COATED ORAL NIGHTLY
Status: CANCELLED | OUTPATIENT
Start: 2024-04-09

## 2024-04-09 RX ORDER — BISACODYL 5 MG/1
5 TABLET, DELAYED RELEASE ORAL DAILY PRN
Status: DISCONTINUED | OUTPATIENT
Start: 2024-04-09 | End: 2024-04-18 | Stop reason: HOSPADM

## 2024-04-09 RX ORDER — SODIUM CHLORIDE 0.9 % (FLUSH) 0.9 %
10 SYRINGE (ML) INJECTION AS NEEDED
Status: DISCONTINUED | OUTPATIENT
Start: 2024-04-09 | End: 2024-04-18 | Stop reason: HOSPADM

## 2024-04-09 RX ORDER — BISACODYL 10 MG
10 SUPPOSITORY, RECTAL RECTAL DAILY PRN
Status: DISCONTINUED | OUTPATIENT
Start: 2024-04-09 | End: 2024-04-18 | Stop reason: HOSPADM

## 2024-04-09 RX ORDER — LOSARTAN POTASSIUM 50 MG/1
50 TABLET ORAL DAILY
Status: DISCONTINUED | OUTPATIENT
Start: 2024-04-10 | End: 2024-04-18 | Stop reason: HOSPADM

## 2024-04-09 RX ORDER — BISACODYL 10 MG
10 SUPPOSITORY, RECTAL RECTAL DAILY PRN
Status: CANCELLED | OUTPATIENT
Start: 2024-04-09

## 2024-04-09 RX ORDER — NITROGLYCERIN 0.4 MG/1
0.4 TABLET SUBLINGUAL
Status: DISCONTINUED | OUTPATIENT
Start: 2024-04-09 | End: 2024-04-18 | Stop reason: HOSPADM

## 2024-04-09 RX ORDER — ATORVASTATIN CALCIUM 40 MG/1
80 TABLET, FILM COATED ORAL NIGHTLY
Status: CANCELLED | OUTPATIENT
Start: 2024-04-09

## 2024-04-09 RX ORDER — LOSARTAN POTASSIUM 50 MG/1
50 TABLET ORAL DAILY
Status: CANCELLED | OUTPATIENT
Start: 2024-04-10

## 2024-04-09 RX ORDER — ATORVASTATIN CALCIUM 40 MG/1
80 TABLET, FILM COATED ORAL NIGHTLY
Status: CANCELLED | OUTPATIENT
Start: 2024-04-10

## 2024-04-09 RX ORDER — AMLODIPINE BESYLATE 5 MG/1
2.5 TABLET ORAL
Status: DISCONTINUED | OUTPATIENT
Start: 2024-04-10 | End: 2024-04-15

## 2024-04-09 RX ORDER — OXYBUTYNIN CHLORIDE 5 MG/1
10 TABLET, EXTENDED RELEASE ORAL DAILY
Status: CANCELLED | OUTPATIENT
Start: 2024-04-10

## 2024-04-09 RX ORDER — HEPARIN SODIUM 5000 [USP'U]/ML
5000 INJECTION, SOLUTION INTRAVENOUS; SUBCUTANEOUS EVERY 12 HOURS SCHEDULED
Status: DISCONTINUED | OUTPATIENT
Start: 2024-04-09 | End: 2024-04-09

## 2024-04-09 RX ORDER — POLYETHYLENE GLYCOL 3350 17 G/17G
17 POWDER, FOR SOLUTION ORAL DAILY PRN
Status: CANCELLED | OUTPATIENT
Start: 2024-04-09

## 2024-04-09 RX ORDER — HEPARIN SODIUM 5000 [USP'U]/ML
5000 INJECTION, SOLUTION INTRAVENOUS; SUBCUTANEOUS EVERY 12 HOURS SCHEDULED
Status: CANCELLED | OUTPATIENT
Start: 2024-04-09

## 2024-04-09 RX ORDER — AMOXICILLIN 250 MG
2 CAPSULE ORAL 2 TIMES DAILY
Status: CANCELLED | OUTPATIENT
Start: 2024-04-09

## 2024-04-09 RX ORDER — GABAPENTIN 100 MG/1
200 CAPSULE ORAL EVERY 12 HOURS SCHEDULED
Status: DISCONTINUED | OUTPATIENT
Start: 2024-04-09 | End: 2024-04-18 | Stop reason: HOSPADM

## 2024-04-09 RX ORDER — HEPARIN SODIUM 5000 [USP'U]/ML
60 INJECTION, SOLUTION INTRAVENOUS; SUBCUTANEOUS ONCE
Status: COMPLETED | OUTPATIENT
Start: 2024-04-09 | End: 2024-04-09

## 2024-04-09 RX ORDER — GABAPENTIN 100 MG/1
200 CAPSULE ORAL EVERY 12 HOURS SCHEDULED
Status: CANCELLED | OUTPATIENT
Start: 2024-04-09

## 2024-04-09 RX ORDER — BISACODYL 5 MG/1
5 TABLET, DELAYED RELEASE ORAL DAILY PRN
Status: CANCELLED | OUTPATIENT
Start: 2024-04-09

## 2024-04-09 RX ORDER — AMLODIPINE BESYLATE 5 MG/1
2.5 TABLET ORAL
Status: CANCELLED | OUTPATIENT
Start: 2024-04-10

## 2024-04-09 RX ORDER — HEPARIN SODIUM 5000 [USP'U]/ML
50 INJECTION, SOLUTION INTRAVENOUS; SUBCUTANEOUS AS NEEDED
Status: DISCONTINUED | OUTPATIENT
Start: 2024-04-09 | End: 2024-04-12

## 2024-04-09 RX ORDER — ATORVASTATIN CALCIUM 40 MG/1
80 TABLET, FILM COATED ORAL NIGHTLY
Status: DISCONTINUED | OUTPATIENT
Start: 2024-04-09 | End: 2024-04-18 | Stop reason: HOSPADM

## 2024-04-09 RX ADMIN — LOSARTAN POTASSIUM 50 MG: 50 TABLET, FILM COATED ORAL at 09:26

## 2024-04-09 RX ADMIN — DICLOFENAC SODIUM 4 G: 10 GEL TOPICAL at 18:32

## 2024-04-09 RX ADMIN — DOCUSATE SODIUM 50 MG AND SENNOSIDES 8.6 MG 2 TABLET: 8.6; 5 TABLET, FILM COATED ORAL at 09:26

## 2024-04-09 RX ADMIN — ROPINIROLE HYDROCHLORIDE 4 MG: 1 TABLET, FILM COATED ORAL at 22:09

## 2024-04-09 RX ADMIN — HEPARIN SODIUM 12 UNITS/KG/HR: 10000 INJECTION, SOLUTION INTRAVENOUS at 22:06

## 2024-04-09 RX ADMIN — GABAPENTIN 200 MG: 100 CAPSULE ORAL at 09:27

## 2024-04-09 RX ADMIN — ACETAMINOPHEN 1000 MG: 500 TABLET ORAL at 14:28

## 2024-04-09 RX ADMIN — ACETAMINOPHEN 1000 MG: 500 TABLET ORAL at 05:53

## 2024-04-09 RX ADMIN — DICLOFENAC SODIUM 4 G: 10 GEL TOPICAL at 22:07

## 2024-04-09 RX ADMIN — TICAGRELOR 60 MG: 60 TABLET ORAL at 22:09

## 2024-04-09 RX ADMIN — TICAGRELOR 60 MG: 60 TABLET ORAL at 09:25

## 2024-04-09 RX ADMIN — GABAPENTIN 200 MG: 100 CAPSULE ORAL at 22:08

## 2024-04-09 RX ADMIN — NITROGLYCERIN 0.5 INCH: 20 OINTMENT TOPICAL at 19:24

## 2024-04-09 RX ADMIN — HEPARIN SODIUM 5000 UNITS: 5000 INJECTION INTRAVENOUS; SUBCUTANEOUS at 09:27

## 2024-04-09 RX ADMIN — Medication 10 ML: at 22:08

## 2024-04-09 RX ADMIN — AMLODIPINE BESYLATE 2.5 MG: 5 TABLET ORAL at 09:26

## 2024-04-09 RX ADMIN — Medication 1 MG: at 09:26

## 2024-04-09 RX ADMIN — ASPIRIN 81 MG: 81 TABLET, CHEWABLE ORAL at 09:26

## 2024-04-09 RX ADMIN — PANTOPRAZOLE SODIUM 40 MG: 40 TABLET, DELAYED RELEASE ORAL at 05:12

## 2024-04-09 RX ADMIN — HEPARIN SODIUM 3800 UNITS: 5000 INJECTION INTRAVENOUS; SUBCUTANEOUS at 22:12

## 2024-04-09 RX ADMIN — DICLOFENAC SODIUM 4 G: 10 GEL TOPICAL at 09:27

## 2024-04-09 RX ADMIN — ATORVASTATIN CALCIUM 80 MG: 40 TABLET, FILM COATED ORAL at 22:08

## 2024-04-09 RX ADMIN — NITROGLYCERIN 0.5 INCH: 20 OINTMENT TOPICAL at 23:14

## 2024-04-09 RX ADMIN — OXYBUTYNIN CHLORIDE 10 MG: 5 TABLET, EXTENDED RELEASE ORAL at 09:26

## 2024-04-09 RX ADMIN — DICLOFENAC SODIUM 4 G: 10 GEL TOPICAL at 12:35

## 2024-04-09 RX ADMIN — MIRTAZAPINE 30 MG: 15 TABLET, FILM COATED ORAL at 22:09

## 2024-04-09 NOTE — SIGNIFICANT NOTE
04/09/24 1130   Plan   Plan Spoke to pt who says she does not want to go to [facility] due to the distance it is from [place].  Explained [facility] declined her for admission and [facility] was agreeable to accept her again and SS was waiting for confirmation of bed from them.  Informed pt other NH's in [place] and [facility] in [place], [place] are not in-network with her insurance.  Explained if she does not go to [facility] she may have to go further away from home to find another SNF that is an in-network provider.  Pt will consider going to [facility] if [facility] will not reconsider their decision.  Informed her about leaving a message for [facility] this am.

## 2024-04-09 NOTE — SIGNIFICANT NOTE
04/09/24 4697   Plan   Plan Team conference held today.  Pt is pending SNF placement.  Left message for friend Teo [phone].

## 2024-04-09 NOTE — LETTER
[facility]  [address] [place] [address]  [phone]  [phone]        April 18, 2024      Patient: Regine Beckham  YOB: 1955  Date of Visit: 4/9/2024                 Pt discharging home today from room [address]A, if you could please deliver to room?  Thank you!        Madeline Dill RN

## 2024-04-09 NOTE — SIGNIFICANT NOTE
04/09/24 1410   Plan   Plan Spoke to pt about talking to [facility] who declined her for admission and bed is not available at their facility at this time.  Pt will consider going back to [facility] if accepted and bed available.

## 2024-04-09 NOTE — PROGRESS NOTES
Inpatient Rehabilitation Functional Measures Assessment and Plan of Care    Plan of Care  Communication    [ST] Expression(Active)  Current Status(04/09/2024): Mild dysarthria  Weekly Goal(04/15/2024): Resistive breather  Discharge Goal: Premorbid baseline communication        Swallow Function    [ST] Swallowing(Active)  Current Status(04/09/2024): Oral dysphagia  Weekly Goal(04/15/2024): Facial massage  Discharge Goal: Least restrictive po diet    Functional Measures  ISABEL Eating:  ISABEL Grooming:  ISABEL Bathing:  ISABEL Upper Body Dressing:  ISABEL Lower Body Dressing:  ISABEL Toileting:    ISABEL Bladder Management  Level of Assistance:  Frequency/Number of Accidents this Shift:    ISABEL Bowel Management  Level of Assistance:  Frequency/Number of Accidents this Shift:    ISABEL Bed/Chair/Wheelchair Transfer:  ISABEL Toilet Transfer:  ISABEL Tub/Shower Transfer:    Previously Documented Mode of Locomotion at Discharge:  ISABEL Expected Mode of Locomotion at Discharge:  ISABEL Walk/Wheelchair:  ISABEL Stairs:    ISABEL Comprehension:  ISABEL Expression:  ISABEL Social Interaction:  ISABEL Problem Solving:  ISABEL Memory:    Therapy Mode Minutes  Occupational Therapy:  Physical Therapy:  Speech Language Pathology: Individual: 45 minutes.    Discharge Functional Goals:    Signed by: CHAYO Worley

## 2024-04-09 NOTE — PROGRESS NOTES
Occupational Therapy: Individual: 100 minutes.    Physical Therapy:    Speech Language Pathology:    Signed by: Desi Mackey OT

## 2024-04-09 NOTE — THERAPY TREATMENT NOTE
Inpatient Rehabilitation - Speech Language Pathology   Swallow Treatment Note [facility]  Dysphagia Therapy Treatment Note     Patient Name: Regine Beckham  : 1955  MRN: [id number]  Today's Date: 2024     Admit Date: 3/27/2024  DYSPHAGIA THERAPY PLAN OF CARE:     Regine Beckham was seen this am in the SLP office of [facility]'s IPR unit for formal therapy tasks. She is cooperative to participate in all tasks.     She continues w/ emotional lability this date intermittently across all therapy tasks.     Long Term Goal:  Patient will accept least restrictive diet tolerance w/o overt s/s aspiration.      Short Term Goals:  1. Patient will tolerate facial massage to LEFT facial surface for 3-7 minutes to increase surface blood flow, sensation and ROM over 3 consecutive sessions.  Tolerated 7 min w/ mild increase in surface blood flow.       2. Patient will perform OROM/GRACE exercises x3 sets x10 reps w/ min cues.  X3 set x5 reps. Increased left ROM noted this date.      3. Patient will demonstrate increase labial sensation/afferent drive per pt report in 90% of opp over three consecutive sessions.   Pt reports continued decrease in left facial surface sensation as compared to right, however does endorse some improvement.       4. Patient will increase lingual control, coordination, movement as evidenced by bolus manipulation in 90% opp independently over three consecutive sessions.   No po trials adminsitered this date.      5. Patient will participate in a clinical re-evaluation of swallowing fnx in 7-10 days, pending progress towards this poc.  Diet advanced to MS, whole, thin 3/30/24    Visit Dx:   No diagnosis found.  Patient Active Problem List   Diagnosis    Iron deficiency anemia due to chronic blood loss    Major depressive disorder    RLS (restless legs syndrome)    GERD (gastroesophageal reflux disease)    Left breast mass    Allergy to penicillin    Stroke    Grade I diastolic dysfunction    Moderate  "malnutrition    Falls frequently    Stroke-like symptoms    Debility     Past Medical History:   Diagnosis Date    Anemia     Anxiety     Arthritis     Depression     Legally blind     Restless leg syndrome     Sleep apnea     \"I don't use a cpap anymore since losing 106 lbs\"    Water retention      Past Surgical History:   Procedure Laterality Date    BACK SURGERY      CARDIAC CATHETERIZATION N/A 1/19/2024    Procedure: Percutaneous Manual Thrombectomy;  Surgeon: Efrain Hurley MD;  Location: [facility] INVASIVE LOCATION;  Service: Interventional Radiology;  Laterality: N/A;    GALLBLADDER SURGERY      HIP ARTHROSCOPY      JOINT REPLACEMENT Right        SLP Recommendation and Plan      Continue per POC.       Thank you for allowing me to participate in the care of your patient-   Azalia Elizondo M.S., CCC-SLP                   SWALLOW EVALUATION (Last 72 Hours)       SLP Adult Swallow Evaluation       Row Name 04/08/24 1010                   Rehab Evaluation    Document Type therapy note (daily note)  -           SLP Treatment Clinical Impressions    Daily Summary of Progress (SLP) progress toward functional goals is good  Massage to left facial surface for 7 min. OROM x10 reps. Reports continued decreased sensation to left facial surface.  -                  User Key  (r) = Recorded By, (t) = Taken By, (c) = Cosigned By      Initials Name Effective Dates    Azalia Bunch MS CCC-SLP 10/25/21 -                     EDUCATION  The patient has been educated in the following areas:   Dysphagia (Swallowing Impairment).        Time Calculation:    Time Calculation- SLP       Row Name 04/09/24 1318             Time Calculation- SLP    SLP Start Time 1000  -JR      SLP Stop Time 1045  -      SLP Time Calculation (min) 45 min  -      SLP - Next Appointment 04/10/24  -                User Key  (r) = Recorded By, (t) = Taken By, (c) = Cosigned By      Initials Name Provider Type    Azalia Bunch MS CCC-SLP " Speech and Language Pathologist                    Therapy Charges for Today       Code Description Service Date Service Provider Modifiers Qty    44995728171 HC ST TREATMENT SPEECH 2 2024 Azalia Elizondo MS CCC-SLP GN 1    [id number] HC ST TREATMENT SWALLOW 1 2024 Azalia Elizondo MS CCC-SLP GN 1    [id number] HC ST TREATMENT SPEECH 1 2024 Azalia Elizondo MS CCC-SLP GN 1    [id number] HC ST TREATMENT SWALLOW 2 2024 Azalia Elizondo MS CCC-SLP GN 1            Azalia Elizondo MS CCC-SLP  2024         and Inpatient Rehabilitation - Speech Language Pathology Treatment Note  [facility]  Dysarthria and Cognitive Therapy Treatment Note     Patient Name: Regine Beckham  : 1955  MRN: [id number]  Today's Date: 2024     Admit Date: 3/27/2024    DYSARTHRIA AND COGNITIVE THERAPY PLAN OF CARE:     Regine Beckham was seen this am in the SLP office of [facility]'s IPR unit for formal therapy tasks. She is cooperative to participate in all tasks.     She continues w/ emotional lability this date intermittently across all therapy tasks.      Long Term Goal:  Patient will demonstrate functional speech skills for return to discharge environment.   Patient will demonstrate functional cognitive skills for return to discharge environment.     Short Term Goals:  1. Patient will tolerate facial massage to left facial surface for 3-7 minutes to increase surface blood flow, sensation and ROM over 3 consecutive sessions.  Tolerated 7 min w/ minimal increase in surface blood flow.        2. Patient will perform OROM/GRACE exercises x3 sets x10 reps w/ min cues.  X3 set x5 reps.      3. Patient will maintain vocal intensity at adequate speaking volume per decibel meter in 4+ word sentences in 90% of opp over three consecutive sessions.   Adequate speaking volume in conversational exchanges in 75% opp this date.       4. Patient will over-articulate 3+ syllable words and in connected speech in 90% opp to improve intelligibility over  three consecutive sessions.   Over-articulation of 3+ syllable words and connected speech demonstrated in 75% opp w/ verbal cues.      5. Patient will decrease rate of speech in connected speech in 90% of opp to improve intelligibility over three consecutive sessions.   73% opp this date.      6. Patient will perform rapid alternating speech tasks w/ min cues over three consecutive sessions.  X3 sets x5 reps each     7. Patient will demonstrate accurate orientation to time and location in 90% of opp independently over three consecutive sessions.  Oriented to location in 100% opp this date.   Oriented to time of day (morning, noon, afternoon, evening) in 2/4 opp this date.      8. Patient will maintain attention to therapy tasks despite intermittently distracting environment in 90% of opp w/ min cues over three consecutive sessions.   Able to maintain attention to tasks in 70% opp this date.      8. Patient will perform divergent naming tasks of 8+ items named in 1 min w/ min cues over three consecutive sessions.   Deferred this date.     9. Patient will perform inhalations/exhalations via resistive breather x4 sets x15 reps.   Breather at 4/3 x3 sets x10 reps.     10. Patient will perform sustained vowel prolongations of 5 sec duration over 15 reps.   Sustained vowel prolongations of 5 sec or greater in 9/18 opp. Avg duration of sustained vowel prolongations over x3 sets x6 reps was 5.76 sec.      *Additional goals to be added/modified per pt progress toward goals.      Visit Dx:  No diagnosis found.  Patient Active Problem List   Diagnosis    Iron deficiency anemia due to chronic blood loss    Major depressive disorder    RLS (restless legs syndrome)    GERD (gastroesophageal reflux disease)    Left breast mass    Allergy to penicillin    Stroke    Grade I diastolic dysfunction    Moderate malnutrition    Falls frequently    Stroke-like symptoms    Debility     Past Medical History:   Diagnosis Date    Anemia      "Anxiety     Arthritis     Depression     Legally blind     Restless leg syndrome     Sleep apnea     \"I don't use a cpap anymore since losing 106 lbs\"    Water retention      Past Surgical History:   Procedure Laterality Date    BACK SURGERY      CARDIAC CATHETERIZATION N/A 1/19/2024    Procedure: Percutaneous Manual Thrombectomy;  Surgeon: Efrain Hurley MD;  Location: [facility] CATH INVASIVE LOCATION;  Service: Interventional Radiology;  Laterality: N/A;    GALLBLADDER SURGERY      HIP ARTHROSCOPY      JOINT REPLACEMENT Right        SLP Recommendation and Plan      Continue per POC.      Thank you-   Azalia Elizondo M.S., CCC-SLP                   SLP EVALUATION (Last 72 Hours)       SLP SLC Evaluation       Row Name 04/08/24 1000                   Communication Assessment/Intervention    Document Type therapy note (daily note)  -           SLP Treatment Clinical Impressions    Daily Summary of Progress (SLP) progress toward functional goals is good  Oriented to location in 2/2 opp, to time in 0/2 opp. Maintains attention in 90% opp this date. Divergent naming of 8+ items in 2/4 opp. Breather at 4/4: x40 reps.  -                  User Key  (r) = Recorded By, (t) = Taken By, (c) = Cosigned By      Initials Name Effective Dates    Azalia Bunch MS CCC-SLP 10/25/21 -                        EDUCATION  The patient has been educated in the following areas:     Cognitive Impairment Communication Impairment.      Time Calculation:      Time Calculation- SLP       Row Name 04/09/24 1318             Time Calculation- SLP    SLP Start Time 1000  -JR      SLP Stop Time 1045  -      SLP Time Calculation (min) 45 min  -      SLP - Next Appointment 04/10/24  -                User Key  (r) = Recorded By, (t) = Taken By, (c) = Cosigned By      Initials Name Provider Type    Azalia Bunch MS CCC-SLP Speech and Language Pathologist                    Therapy Charges for Today       Code Description Service Date Service " Provider Modifiers Qty    13853934773 HC ST TREATMENT SPEECH 2 4/8/2024 Azalia Elizondo, MS CCC-SLP GN 1    80597974269 HC ST TREATMENT SWALLOW 1 4/8/2024 Azalia Elizondo, MS CCC-SLP GN 1    [id number] HC ST TREATMENT SPEECH 1 4/9/2024 Azalia Elizondo, MS AMINA-SLP GN 1    [id number] HC ST TREATMENT SWALLOW 2 4/9/2024 Azalia Elizondo, MS CCC-SLP GN 1                       Azalia Elizondo MS CCC-SLP  4/9/2024

## 2024-04-09 NOTE — DISCHARGE SUMMARY
Date of admission: 3/27/2024  Date of discharge: 4/9/2024    Principal diagnosis: Debility complicated by previous CVA with left residual  Secondary diagnosis:  -History of recent right MCA infarct and occlusion of right internal carotid artery.  She underwent thrombectomy and carotid stent placement on January 19.  She was thought to be outside the window for thrombolytics.  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  -Tobacco use, counseled to quit  -Chest pain with dynamic EKG changes suggestive of possible ischemia    Consultants:  None    Procedures:  None    Exam: Patient was seen with RN while in the rehab unit, she is lying in bed, left arm is poorly mobile which is not new, right arm moves well, lungs are clear heart regular rate and rhythm abdomen is soft and benign no edema, vital signs: 123/64, 69, room air saturation 99%    Hospital course: Patient was admitted with debility after having been in the acute hospital stay complicated by previous CVA with very poor left arm function, left leg moves better but still had some weakness there.  Patient had fallen 5 days in a row at home and finally EMS had been called.  Patient has participated with all 3 modalities of therapy while here.  Initially with PT, her quads anterior tib and psoas were thought to be 2/5, right lower extremity was 4/5.  Patient was mod to min for bed mobility, mod assist with transfers, she ambulated mod assist initially.  Attempted ambulation with right handhold assist as well as left platform rolling walker, patient great difficulty managing walker even with the therapist assisting with propulsion.  She initially walked 40 feet x 3.  With OT initially, she was max assist with bathing, dependent upper body dressing, max assist lower body dressing, mod assist grooming, total assist for  toileting hygiene, set up for self-feeding.  Speech therapy is continue to follow for both dysphagia and dysarthria.  Patient is tolerating soft to chew whole meat thin liquid diet.  Her dysarthria is improving.  Patient now with PT patient is min to contact-guard for bed mobility, mod assist for transfers.  Today she was able to walk 40 feet with a hemiwalker, then 10 feet with a hemiwalker, then 10 feet handhold assist then 5 foot handhold assist, two-person mod assist.  With OT, she is mod assist for toilet transfer, max to dependent with toileting hygiene, max assist with upper body dressing, set up for self-feeding.  It was thought that despite the patient making some progress that she would not be able to return home alone and  has been seeking nursing home placement.  Tonight patient developed chest pain.  Patient states that it was different than her left arm pain and she does not remember having this pain before.  This was left upper chest that she felt like a very deep ache.  This was not pleuritic in nature.  We did place nitroglycerin ointment on the patient and this relieved her pain.  Patient has no history of coronary disease but she does have a history of carotid disease as above.  She is already on DAPT and statin.  Troponin was normal but I checked an EKG and there is what I would consider dynamic EKG changes laterally with T wave inversion.  I reviewed multiple previous EKGs and did not see this present.  She is at high risk for coronary disease with already known vascular disease and other risk factors.  This being the case I did not think it was prudent to continue to watch the patient on the rehabilitation unit considering we cannot use telemetry.  I have contacted the hospitalist service Dr. Su and discussed the case with him.  Patient has agreed except the patient in transfer to the telemetry unit.  I did review the patient's chest x-ray.  The official report is pending  and this is probably just some confluence of shadows but cannot totally rule out until the official report any left upper lobe disease.  Dr. Su is going to check some baseline labs and inflammatory markers and we will follow-up on the chest x-ray when back.  Patient's condition on transfer is stable.  Pain is improved.  Dr. Su and the hospitalist service help appreciated.    Disposition: [facility] telemetry unit care of Dr. Su.    Medications: See transfer MAR    This was a greater than 30-minute discharge

## 2024-04-09 NOTE — PLAN OF CARE
Goal Outcome Evaluation:            Pt had chest, left arm and left shoulder blade pain. Chest pain relieved with nitro paste. Dr Hayes at bedside. Awaiting telemetry bed for pt to be transferred.

## 2024-04-09 NOTE — PROGRESS NOTES
Occupational Therapy:    Physical Therapy:    Speech Language Pathology: Individual: 45 minutes.    Signed by: CHAYO Worley

## 2024-04-09 NOTE — PROGRESS NOTES
Rehabilitation Nursing  Inpatient Rehabilitation Plan of Care Note    Plan of Care  Copy from POCSafety    Potential for Injury (Active)  Current Status (3/27/2024 4:00:00 PM): risk for falls  Weekly Goal: no falls  Discharge Goal: no falls    Body Systems    Integumentary (Active)  Current Status (3/27/2024 4:00:00 PM): risk for skin break down  Weekly Goal: no skin breakdown  Discharge Goal: no skin breakdown    Signed by: Anna Marie Alford RN

## 2024-04-09 NOTE — SIGNIFICANT NOTE
04/09/24 9959   Plan   Plan Spoke to Ernestina with [phone] who says pt was declined for admission due to hx of SI and they do not have a bed at this time.  Ernestina to follow-up with SS if they reconsider their decision.

## 2024-04-09 NOTE — THERAPY TREATMENT NOTE
"Inpatient Rehabilitation - Occupational Therapy Treatment Note     [place]     Patient Name: Regine Beckham  : 1955  MRN: [id number]    Today's Date: 2024                 Admit Date: 3/27/2024       No diagnosis found.    Patient Active Problem List   Diagnosis    Iron deficiency anemia due to chronic blood loss    Major depressive disorder    RLS (restless legs syndrome)    GERD (gastroesophageal reflux disease)    Left breast mass    Allergy to penicillin    Stroke    Grade I diastolic dysfunction    Moderate malnutrition    Falls frequently    Stroke-like symptoms    Debility       Past Medical History:   Diagnosis Date    Anemia     Anxiety     Arthritis     Depression     Legally blind     Restless leg syndrome     Sleep apnea     \"I don't use a cpap anymore since losing 106 lbs\"    Water retention        Past Surgical History:   Procedure Laterality Date    BACK SURGERY      CARDIAC CATHETERIZATION N/A 2024    Procedure: Percutaneous Manual Thrombectomy;  Surgeon: Efrain Hurley MD;  Location: [facility] INVASIVE LOCATION;  Service: Interventional Radiology;  Laterality: N/A;    GALLBLADDER SURGERY      HIP ARTHROSCOPY      JOINT REPLACEMENT Right              IRF OT ASSESSMENT FLOWSHEET (Last 12 Hours)       IRF OT Evaluation and Treatment       Row Name 24 1410          OT Time and Intention    Document Type daily treatment  -BF     Mode of Treatment occupational therapy  -BF     Patient Effort good  -BF     Symptoms Noted During/After Treatment increased pain  LUE shoulder, RN notified  -BF       Row Name 24 1410          General Information    Existing Precautions/Restrictions fall  L HP  -BF     Limitations/Impairments safety/cognitive;visual  -BF       Row Name 24 1410          Cognition/Psychosocial    Affect/Mental Status (Cognition) emotionally labile  -BF     Orientation Status (Cognition) oriented to;person;place;situation  intermittent confusion  -BF     " Follows Commands (Cognition) repetition of directions required;verbal cues/prompting required;physical/tactile prompts required  -BF       Row Name 04/09/24 1410          Grooming    [place] Level (Grooming) minimum assist (75% patient effort);verbal cues;nonverbal cues (demo/gesture)  -BF     Position (Grooming) supported sitting  -BF       Row Name 04/09/24 1410          Toileting    [place] Level (Toileting) maximum assist (25% patient effort);dependent (less than 25% patient effort);verbal cues;nonverbal cues (demo/gesture)  -BF     Assistive Device Use (Toileting) grab bar/safety frame;raised toilet seat  -BF     Position (Toileting) supported sitting  -BF       Row Name 04/09/24 1410          Toilet Transfer    [place] Level (Toilet Transfer) moderate assist (50% patient effort);2 person assist;verbal cues;nonverbal cues (demo/gesture)  -BF     Assistive Device (Toilet Transfer) wheelchair;grab bars/safety frame;raised toilet seat  -BF       Row Name 04/09/24 1410          Motor Skills    Motor Control/Coordination Interventions gross motor coordination activities;fine motor manipulation/dexterity activities;neuro-muscular re-education;therapeutic exercise/ROM  LUE AAROM as tolerated, NDT; RUE GMC/FMC theract, light strengthening, rickshaw 10 lbs  -BF       Row Name 04/09/24 1410          Neuromuscular Re-education    Interventions (Neuromuscular Re-education) facilitation/inhibition;massage  LUE  -BF     Positioning (Neuromuscular Re-education) sitting;supported  -BF       Row Name 04/09/24 1410          Positioning and Restraints    In Wheelchair sitting;with SLP;patient within staff view  at [facility] station in PM  -BF               User Key  (r) = Recorded By, (t) = Taken By, (c) = Cosigned By      Initials Name Effective Dates    BF Desi Mackey OT 07/11/23 -                      Occupational Therapy Education       Title: PT OT SLP Therapies (Done)       Topic: Occupational Therapy  (Done)       Point: ADL training (Done)       Description:   Instruct learner(s) on proper safety adaptation and remediation techniques during self care or transfers.   Instruct in proper use of assistive devices.                  Learning Progress Summary             Patient Acceptance, E, VU,NR by BF at 4/9/2024 1410    Acceptance, E, VU,NR by BF at 4/8/2024 1446    Acceptance, E, VU,NR by BF at 4/5/2024 1349    Acceptance, E, VU,NR by BF at 4/4/2024 1445    Acceptance, E, NR by BF at 4/3/2024 1425    Acceptance, E, NR by BF at 4/2/2024 1437    Acceptance, E, NR by BF at 4/1/2024 1458    Acceptance, D,E, NR by BC at 3/30/2024 1515    Acceptance, E, NR by BF at 3/29/2024 1433    Acceptance, E, NR by BF at 3/28/2024 1434                         Point: Precautions (Done)       Description:   Instruct learner(s) on prescribed precautions during self-care and functional transfers.                  Learning Progress Summary             Patient Acceptance, E, VU,NR by BF at 4/9/2024 1410    Acceptance, E, VU,NR by BF at 4/8/2024 1446    Acceptance, E, VU,NR by BF at 4/5/2024 1349    Acceptance, E, VU,NR by BF at 4/4/2024 1445    Acceptance, E, NR by BF at 4/3/2024 1425    Acceptance, E, NR by BF at 4/2/2024 1437    Acceptance, E, NR by BF at 4/1/2024 1458    Acceptance, E, NR by BF at 3/29/2024 1433    Acceptance, E, NR by BF at 3/28/2024 1434                                         User Key       Initials Effective Dates Name Provider Type Discipline     07/11/23 -  Desi Mackey OT Occupational Therapist OT    BC 06/16/21 -  Jalyn Harvey OT Occupational Therapist OT                        OT Recommendation and Plan    Planned Therapy Interventions (OT): activity tolerance training, adaptive equipment training, BADL retraining, neuromuscular control/coordination retraining, passive ROM/stretching, ROM/therapeutic exercise, strengthening exercise, transfer/mobility retraining                    Time  Calculation:      Time Calculation- OT       Row Name 04/09/24 1419 04/09/24 0915          Time Calculation- OT    OT Start Time 1245  -BF 0915  -BF     OT Stop Time 1340  -BF 1000  -BF     OT Time Calculation (min) 55 min  -BF 45 min  -BF     Total Timed Code Minutes- OT 55 minute(s)  -BF 45 minute(s)  -BF     OT Non-Billable Time (min) -- 10 min  -BF               User Key  (r) = Recorded By, (t) = Taken By, (c) = Cosigned By      Initials Name Provider Type    BF Desi Mackey OT Occupational Therapist                  Therapy Charges for Today       Code Description Service Date Service Provider Modifiers Qty    81843578694 HC OT SELF CARE/MGMT/TRAIN EA 15 MIN 4/8/2024 Desi Mackey, OT GO 2    07406753742 HC OT THERAPEUTIC ACT EA 15 MIN 4/8/2024 MackeyDesi schrader, OT GO 2    66093973620 HC OT NEUROMUSC RE EDUCATION EA 15 MIN 4/8/2024 MackeyDesi schrader, OT GO 1    67574329528 HC OT THER PROC EA 15 MIN 4/8/2024 MackeyDesi schrader, OT GO 1    78742761914 HC OT SELF CARE/MGMT/TRAIN EA 15 MIN 4/9/2024 Desi Mackey, OT GO 1    02336921759 HC OT THERAPEUTIC ACT EA 15 MIN 4/9/2024 MackeyDesi schrader, OT GO 3    57204152410 HC OT NEUROMUSC RE EDUCATION EA 15 MIN 4/9/2024 Desi Mackey, OT GO 2    02069123398 HC OT THER PROC EA 15 MIN 4/9/2024 Desi Mackey, OT GO 1                     Desi Mackey, OT  4/9/2024

## 2024-04-09 NOTE — PLAN OF CARE
Problem: Rehabilitation (IRF) Plan of Care  Goal: Plan of Care Review  Outcome: Ongoing, Progressing  Goal: Patient-Specific Goal (Individualized)  Outcome: Ongoing, Progressing  Goal: Absence of New-Onset Illness or Injury  Outcome: Ongoing, Progressing  Intervention: Prevent Fall and Fall Injury  Recent Flowsheet Documentation  Taken 4/9/2024 0200 by Anna Marie Fuentes RN  Safety Promotion/Fall Prevention: safety round/check completed  Taken 4/9/2024 0001 by Anna Marie Fuentes RN  Safety Promotion/Fall Prevention: safety round/check completed  Taken 4/8/2024 2202 by Anna Marie Fuentes RN  Safety Promotion/Fall Prevention: safety round/check completed  Taken 4/8/2024 2000 by Anna Marie Fuentes RN  Safety Promotion/Fall Prevention: safety round/check completed  Intervention: Prevent VTE (Venous Thromboembolism)  Recent Flowsheet Documentation  Taken 4/8/2024 1905 by Anna Marie Fuentes RN  VTE Prevention/Management: other (see comments)  Goal: Optimal Comfort and Wellbeing  Outcome: Ongoing, Progressing  Goal: Home and Community Transition Plan Established  Outcome: Ongoing, Progressing     Problem: Mobility Impairment  Goal: Optimal Mobility [place] and Safety  Outcome: Ongoing, Progressing     Problem: Skin Injury Risk Increased  Goal: Skin Health and Integrity  Outcome: Ongoing, Progressing  Intervention: Promote and Optimize Oral Intake  Recent Flowsheet Documentation  Taken 4/8/2024 1905 by Anna Marie Fuentes RN  Oral Nutrition Promotion: physical activity promoted  Intervention: Optimize Skin Protection  Recent Flowsheet Documentation  Taken 4/8/2024 1905 by Anna Marie Fuentes RN  Pressure Reduction Techniques: heels elevated off bed  Pressure Reduction Devices: heel offloading device utilized     Problem: Fall Injury Risk  Goal: Absence of Fall and Fall-Related Injury  Outcome: Ongoing, Progressing  Intervention: Promote Injury-Free Environment  Recent Flowsheet Documentation  Taken 4/9/2024 0200  by Anna Marie Fuentes, RN  Safety Promotion/Fall Prevention: safety round/check completed  Taken 4/9/2024 0001 by Anna Marie Fuentes RN  Safety Promotion/Fall Prevention: safety round/check completed  Taken 4/8/2024 2202 by Anna Marie Fuentes, RN  Safety Promotion/Fall Prevention: safety round/check completed  Taken 4/8/2024 2000 by Anna Marie Fuentes, RN  Safety Promotion/Fall Prevention: safety round/check completed   Goal Outcome Evaluation:

## 2024-04-09 NOTE — PLAN OF CARE
Goal Outcome Evaluation:           Progress: improving            Problem: Rehabilitation (IRF) Plan of Care  Goal: Plan of Care Review  Outcome: Ongoing, Progressing  Flowsheets (Taken 4/7/2024 1105 by Ivy Edwards RN)  Progress: improving  Plan of Care Reviewed With: patient  Goal: Patient-Specific Goal (Individualized)  Outcome: Ongoing, Progressing  Goal: Absence of New-Onset Illness or Injury  Outcome: Ongoing, Progressing  Intervention: Prevent Fall and Fall Injury  Recent Flowsheet Documentation  Taken 4/9/2024 1000 by Agnieszka Moreno RN  Safety Promotion/Fall Prevention:   nonskid shoes/slippers when out of bed   safety round/check completed  Taken 4/9/2024 0800 by Agnieszka Moreno RN  Safety Promotion/Fall Prevention:   nonskid shoes/slippers when out of bed   safety round/check completed  Intervention: Prevent Infection  Recent Flowsheet Documentation  Taken 4/9/2024 0800 by Agnieszka Moreno RN  Infection Prevention: hand hygiene promoted  Goal: Optimal Comfort and Wellbeing  Outcome: Ongoing, Progressing  Goal: Home and Community Transition Plan Established  Outcome: Ongoing, Progressing     Problem: Mobility Impairment  Goal: Optimal Mobility [place] and Safety  Outcome: Ongoing, Progressing     Problem: Skin Injury Risk Increased  Goal: Skin Health and Integrity  Outcome: Ongoing, Progressing  Intervention: Optimize Skin Protection  Recent Flowsheet Documentation  Taken 4/9/2024 1000 by Agnieszka Moreno RN  Head of Bed (HOB) Positioning: HOB elevated  Taken 4/9/2024 0926 by Agnieszka Moreno RN  Pressure Reduction Techniques: heels elevated off bed  Pressure Reduction Devices: heel offloading device utilized  Skin Protection: adhesive use limited  Taken 4/9/2024 0800 by Agnieszka Moreno RN  Head of Bed (HOB) Positioning: HOB elevated     Problem: Fall Injury Risk  Goal: Absence of Fall and Fall-Related Injury  Outcome: Ongoing, Progressing  Intervention: Identify and Manage  Contributors  Recent Flowsheet Documentation  Taken 4/9/2024 0800 by Agnieszka Moreno, RN  Medication Review/Management: medications reviewed  Intervention: Promote Injury-Free Environment  Recent Flowsheet Documentation  Taken 4/9/2024 1000 by Agnieszka Moreno, RN  Safety Promotion/Fall Prevention:   nonskid shoes/slippers when out of bed   safety round/check completed  Taken 4/9/2024 0800 by Agnieszka Moreno, RN  Safety Promotion/Fall Prevention:   nonskid shoes/slippers when out of bed   safety round/check completed     Problem: Cognitive Impairment  Goal: Optimal Cognitive Function  Outcome: Ongoing, Progressing     Problem: Swallowing Impairment  Goal: Optimal Eating/Swallowing without Aspir  Outcome: Ongoing, Progressing

## 2024-04-09 NOTE — PROGRESS NOTES
Patient was seen sitting out in the [place] in a wheelchair self-feeding.  Patient was discussed in case conference today.   is working on SNF placement.  No change overnight, widely fluctuating blood pressure, last CHEM profile 4/5 unremarkable, last CBC was stable 4/5.  Continue current plan

## 2024-04-09 NOTE — THERAPY TREATMENT NOTE
"Inpatient Rehabilitation - Physical Therapy Treatment Note        [place]     Patient Name: Regine Beckham  : 1955  MRN: [id number]    Today's Date: 2024                    Admit Date: 3/27/2024      Visit Dx:   No diagnosis found.    Patient Active Problem List   Diagnosis    Iron deficiency anemia due to chronic blood loss    Major depressive disorder    RLS (restless legs syndrome)    GERD (gastroesophageal reflux disease)    Left breast mass    Allergy to penicillin    Stroke    Grade I diastolic dysfunction    Moderate malnutrition    Falls frequently    Stroke-like symptoms    Debility       Past Medical History:   Diagnosis Date    Anemia     Anxiety     Arthritis     Depression     Legally blind     Restless leg syndrome     Sleep apnea     \"I don't use a cpap anymore since losing 106 lbs\"    Water retention        Past Surgical History:   Procedure Laterality Date    BACK SURGERY      CARDIAC CATHETERIZATION N/A 2024    Procedure: Percutaneous Manual Thrombectomy;  Surgeon: Efrain Hurley MD;  Location:  [facility];  Service: Interventional Radiology;  Laterality: N/A;    GALLBLADDER SURGERY      HIP ARTHROSCOPY      JOINT REPLACEMENT Right        PT ASSESSMENT (Last 12 Hours)       IRF PT Evaluation and Treatment       Row Name 24 1520          PT Time and Intention    Document Type daily treatment (P)   BID treatment session  -MV     Mode of Treatment physical therapy;individual therapy (P)   -MV     Patient/Family/Caregiver Comments/Observations Pt. verbally agreed to physical therapy partcipation today and had no new c/o. Verbal and tactile cueing required to maintain focus during exercises. (P)   -MV       Row Name 24 1521          General Information    Patient Profile Reviewed yes (P)   -MV     Existing Precautions/Restrictions fall (P)   -MV     Limitations/Impairments safety/cognitive;visual (P)   -MV       Row Name 24 1520          " Living Environment    Primary Care Provided by self (P)   -MV       Row Name 04/09/24 1520          Home Use of Assistive/Adaptive Equipment    Equipment Currently Used at Home commode;hospital bed;grab bar;shower chair (P)   -MV       Row Name 04/09/24 1520          Pain Assessment    Pretreatment Pain Rating -- (P)   painful L UE PROM  -MV       Row Name 04/09/24 1520          Cognition/Psychosocial    Affect/Mental Status (Cognition) emotionally labile (P)   -MV     Orientation Status (Cognition) oriented to;person;place;situation (P)   intermittent confusion  -MV     Follows Commands (Cognition) verbal cues/prompting required;repetition of directions required;physical/tactile prompts required (P)   -MV     Personal Safety Interventions gait belt;fall prevention program maintained;nonskid shoes/slippers when out of bed;supervised activity (P)   -MV       Row Name 04/09/24 1520          Vision Assessment/Intervention    Visual Impairment/Limitations legally blind;corrective lenses full-time (P)   -MV       Row Name 04/09/24 1520          Mobility    Extremity Weight-bearing Status -- (P)   no restrictions  -MV       Row Name 04/09/24 1520          Bed Mobility    Supine-Sit [place] (Bed Mobility) verbal cues;nonverbal cues (demo/gesture);minimum assist (75% patient effort);contact guard (P)   -MV     Assistive Device (Bed Mobility) bed rails;draw sheet (P)   -MV     Comment, (Bed Mobility) Min assist with L LE to get to EOB (P)   -MV       Row Name 04/09/24 1520          Bed-Chair Transfer    Bed-Chair [place] (Transfers) moderate assist (50% patient effort);verbal cues;nonverbal cues (demo/gesture) (P)   -MV     Assistive Device (Bed-Chair Transfers) wheelchair (P)   -MV       Row Name 04/09/24 1520          Sit-Stand Transfer    Sit-Stand [place] (Transfers) verbal cues;nonverbal cues (demo/gesture);moderate assist (50% patient effort) (P)   -MV     Assistive Device (Sit-Stand Transfers)  wheelchair;walker, dao (P)   -MV       Row Name 04/09/24 1520          Stand-Sit Transfer    Stand-Sit [place] (Transfers) verbal cues;nonverbal cues (demo/gesture);moderate assist (50% patient effort) (P)   -MV     Assistive Device (Stand-Sit Transfers) wheelchair;walker, dao (P)   -MV       Row Name 04/09/24 1520          Stand Pivot/Stand Step Transfer    Stand Pivot/Stand Step [place] (Transfers) verbal cues;nonverbal cues (demo/gesture);moderate assist (50% patient effort) (P)   -MV     Assistive Device (Stand Pivot Stand Step Transfer) wheelchair (P)   -MV       Row Name 04/09/24 1520          Toilet Transfer    Type (Toilet Transfer) stand pivot/stand step (P)   -MV     [place] Level (Toilet Transfer) moderate assist (50% patient effort);2 person assist;verbal cues;nonverbal cues (demo/gesture) (P)   -MV     Assistive Device (Toilet Transfer) wheelchair;grab bars/safety frame;raised toilet seat (P)   -MV       Row Name 04/09/24 1520          Gait/Stairs (Locomotion)    Gait/Stairs Locomotion gait/ambulation independence;gait/ambulation assistive device;distance ambulated;gait pattern;gait deviations (P)   -MV     [place] Level (Gait) verbal cues;nonverbal cues (demo/gesture);moderate assist (50% patient effort);2 person assist (P)   -MV     Assistive Device (Gait) walker, dao (P)   HHA  -MV     Patient was able to Ambulate yes (P)   -MV     Distance in Feet (Gait) -- (P)   40' HW, 10' HW, 10' HHA, 5' HHA  -MV     Pattern (Gait) step-through (P)   -MV     Deviations/Abnormal Patterns (Gait) base of support, narrow;gait speed decreased;weight shifting decreased;stride length decreased (P)   -MV     Bilateral Gait Deviations forward flexed posture (P)   -MV     Left Sided Gait Deviations weight shift ability decreased (P)   -MV     Comment, (Gait/Stairs) Cues for upright posture and increased step width and length. (P)   -MV       Row Name 04/09/24 1520          Safety Issues, Functional  Mobility    Impairments Affecting Function (Mobility) balance;cognition;coordination;endurance/activity tolerance;grasp;motor control;muscle tone abnormal;range of motion (ROM);postural/trunk control;strength (P)   -MV       Row Name 04/09/24 1520          Balance    Comment, Balance Sitting bean bag toss and pickup w/ reacher. (P)   -MV       Row Name 04/09/24 1520          Hip (Therapeutic Exercise)    Hip (Therapeutic Exercise) strengthening exercise (P)   -MV     Hip Strengthening (Therapeutic Exercise) bilateral;flexion;aBduction;extension;aDduction;marching while seated;sitting;1 lb free weight;resistance band;green (P)   -MV       Row Name 04/09/24 1520          Knee (Therapeutic Exercise)    Knee (Therapeutic Exercise) strengthening exercise (P)   -MV     Knee Strengthening (Therapeutic Exercise) bilateral;flexion;extension;marching while seated;LAQ (long arc quad);hamstring curls;sitting;1 lb free weight;resistance band;green (P)   -MV       [facility] Name 04/09/24 1520          Ankle (Therapeutic Exercise)    Ankle (Therapeutic Exercise) strengthening exercise (P)   -MV     Ankle Strengthening (Therapeutic Exercise) bilateral;dorsiflexion;plantarflexion;sitting;1 lb free weight;resistance band;green (P)   -MV       [facility] Name 04/09/24 1520          Positioning and Restraints    Pre-Treatment Position in bed (P)   -MV     Post Treatment Position wheelchair (P)   -MV     In Bed sitting;with OT (P)   -MV       [facility] Name 04/09/24 1520          Therapy Assessment/Plan (PT)    Patient's Goals For Discharge return home (P)   -MV       Row Name 04/09/24 1520          Therapy Assessment/Plan (PT)    Rehab Potential/Prognosis (PT) good, to achieve stated therapy goals (P)   -MV     Frequency of Treatment (PT) 5 times per week (P)   -MV     Estimated Duration of Therapy (PT) 3 weeks (P)   -MV     Problem List (PT) problems related  to;balance;mobility;coordination;hemiparesis/hemiplegia;cognition;communication;strength;postural control;vision;range of motion (ROM);muscle tone;motor control (P)   -MV     Activity Limitations Related to Problem List (PT) unable to ambulate safely;unable to transfer safely;BADLs not performed adequately or safely (P)   -MV       Row Name 04/09/24 1520          Therapy Plan Review/Discharge Plan (PT)    Anticipated Discharge Disposition (PT) home with assist;home with home health (P)   -MV       Row Name 04/09/24 1520          IRF PT Goals    Bed Mobility Goal Selection (PT-IRF) bed mobility, PT goal 1 (P)   -MV     Transfer Goal Selection (PT-IRF) transfers, PT goal 1 (P)   -MV     Gait (Walking Locomotion) Goal Selection (PT-IRF) gait, PT goal 1 (P)   -MV       Row Name 04/09/24 1520          Bed Mobility Goal 1 (PT-IRF)    Activity/Assistive Device (Bed Mobility Goal 1, PT-IRF) scooting;sit to supine;supine to sit (P)   -MV     [place] Level (Bed Mobility Goal 1, PT-IRF) standby assist (P)   -MV     Time Frame (Bed Mobility Goal 1, PT-IRF) by discharge (P)   -MV     Progress/Outcomes (Bed Mobility Goal 1, PT-IRF) goal ongoing (P)   -MV       Row Name 04/09/24 1520          Transfer Goal 1 (PT-IRF)    Activity/Assistive Device (Transfer Goal 1, PT-IRF) sit-to-stand/stand-to-sit;bed-to-chair/chair-to-bed;toilet (P)   -MV     [place] Level (Transfer Goal 1, PT-IRF) contact guard required (P)   -MV     Time Frame (Transfer Goal 1, PT-IRF) by discharge (P)   -MV     Progress/Outcomes (Transfer Goal 1, PT-IRF) goal ongoing (P)   -MV       Row Name 04/09/24 1520          Gait/Walking Locomotion Goal 1 (PT-IRF)    Activity/Assistive Device (Gait/Walking Locomotion Goal 1, PT-IRF) gait (walking locomotion);assistive device use;decrease fall risk;diminish gait deviation;improve balance and speed;increase endurance/gait distance (P)   appropriate a.d.  -MV     Gait/Walking Locomotion Distance Goal 1 (PT-IRF) 150  (P)   -MV     [place] Level (Gait/Walking Locomotion Goal 1, PT-IRF) contact guard required (P)   -MV     Time Frame (Gait/Walking Locomotion Goal 1, PT-IRF) by discharge (P)   -MV     Progress/Outcomes (Gait/Walking Locomotion Goal 1, PT-IRF) goal ongoing (P)   -MV               User Key  (r) = Recorded By, (t) = Taken By, (c) = Cosigned By      Initials Name Provider Type    MV Antonio Peck, PTA Student PTA Student                     Physical Therapy Education       Title: PT OT SLP Therapies (Done)       Topic: Physical Therapy (Done)       Point: Mobility training (Done)       Learning Progress Summary             Patient Acceptance, E,D, VU,NR by MV at 4/9/2024 1531    Acceptance, E,D, NR,VU by MV at 4/8/2024 1519    Acceptance, E,D, VU,NR by MV at 4/5/2024 1423    Acceptance, E,D, VU,NR by LL at 4/4/2024 1410    Acceptance, E,D, VU,NR by LL at 4/3/2024 1531    Acceptance, E,D, VU,NR by LL at 4/2/2024 1508    Acceptance, E,D, VU,NR by LL at 4/1/2024 1614    Acceptance, E,TB, VU,DU by HC at 3/30/2024 1204    Acceptance, E,D, NR by AG at 3/28/2024 1625                         Point: Home exercise program (Done)       Learning Progress Summary             Patient Acceptance, E,D, VU,NR by MV at 4/9/2024 1531    Acceptance, E,D, NR,VU by MV at 4/8/2024 1519    Acceptance, E,D, VU,NR by MV at 4/5/2024 1423    Acceptance, E,D, VU,NR by LL at 4/4/2024 1410    Acceptance, E,D, VU,NR by LL at 4/3/2024 1531    Acceptance, E,D, VU,NR by LL at 4/2/2024 1508    Acceptance, E,D, VU,NR by LL at 4/1/2024 1614    Acceptance, E,TB, VU,DU by HC at 3/30/2024 1204    Acceptance, E,D, NR by AG at 3/28/2024 1625                         Point: Body mechanics (Done)       Learning Progress Summary             Patient Acceptance, E,D, VU,NR by MV at 4/9/2024 1531    Acceptance, E,D, NR,VU by MV at 4/8/2024 1519    Acceptance, E,D, VU,NR by MV at 4/5/2024 1423    Acceptance, E,D, VU,NR by LL at 4/4/2024 1410    Acceptance, E,D,  VU,NR by LL at 4/3/2024 1531    Acceptance, E,D, VU,NR by LL at 4/2/2024 1508    Acceptance, E,D, VU,NR by  at 4/1/2024 1614    Acceptance, E,TB, VU,DU by  at 3/30/2024 1204    Acceptance, E,D, NR by  at 3/28/2024 1625                         Point: Precautions (Done)       Learning Progress Summary             Patient Acceptance, E,D, VU,NR by MV at 4/9/2024 1531    Acceptance, E,D, NR,VU by MV at 4/8/2024 1519    Acceptance, E,D, VU,NR by MV at 4/5/2024 1423    Acceptance, E,D, VU,NR by LL at 4/4/2024 1410    Acceptance, E,D, VU,NR by LL at 4/3/2024 1531    Acceptance, E,D, VU,NR by  at 4/2/2024 1508    Acceptance, E,D, VU,NR by  at 4/1/2024 1614    Acceptance, E,TB, VU,DU by  at 3/30/2024 1204    Acceptance, E,D, NR by  at 3/28/2024 1625                                         User Key       Initials Effective Dates Name Provider Type Discipline     06/16/21 -  Melanie Anne, PT Physical Therapist PT     05/02/16 -  Ruthie Travis, PTA Physical Therapist Assistant PT    HC 01/20/23 -  Desi Fraga PTA Physical Therapist Assistant PT     04/05/24 -  Antonio Peck PTA Student PTA Student PT                    PT Recommendation and Plan    Frequency of Treatment (PT): (P) 5 times per week                     Time Calculation:      PT Charges       Row Name 04/09/24 1532             Time Calculation    Start Time 0735 (P)   -MV      Stop Time 0915 (P)   -MV      Time Calculation (min) 100 min (P)   -MV      PT Received On 04/09/24 (P)   -MV         Time Calculation- PT    Total Timed Code Minutes-  minute(s) (P)   -MV                User Key  (r) = Recorded By, (t) = Taken By, (c) = Cosigned By      Initials Name Provider Type    MV Antonio Peck, PTA Student PTA Student                    Therapy Charges for Today       Code Description Service Date Service Provider Modifiers Qty    22483874643 HC PT THER PROC EA 15 MIN 4/8/2024 Antonio Peck PTA Student GP, CQ 4     38367063206 HC PT THERAPEUTIC ACT EA 15 MIN 4/8/2024 Antonio Peck, PTA Student GP, CQ 1    72179570126 HC GAIT TRAINING EA 15 MIN 4/8/2024 Antonio Peck, PTA Student GP, CQ 2    40778393231 HC GAIT TRAINING EA 15 MIN 4/9/2024 Antonio Peck, PTA Student GP, CQ 2    62476999261 HC PT NEUROMUSC RE EDUCATION EA 15 MIN 4/9/2024 Antonio Peck, PTA Student GP, CQ 1    52754737544 HC PT THER PROC EA 15 MIN 4/9/2024 Antonio Peck, PTA Student GP, CQ 3    95287084110 HC PT THERAPEUTIC ACT EA 15 MIN 4/9/2024 Antonio Peck, PTA Student GP, CQ 1              PT G-Codes  AM-PAC 6 Clicks Score (PT): 14      Antonio Peck PTA Student  4/9/2024

## 2024-04-09 NOTE — SIGNIFICANT NOTE
04/09/24 1050   Plan   Plan Left message for Ernestina at [facility] [phone].  Spoke to Itzel with [phone] who says [facility] was agreeable to accept pt again; she will call them to make sure bed is available and call SS back.   Informed her pt is aware of balance owed to their facility and being compliant with no smoking policy.

## 2024-04-09 NOTE — SIGNIFICANT NOTE
04/09/24 1651   Plan   Plan Attempted to contact Itzel with [facility] [phone] without success; voicemail is full.

## 2024-04-10 ENCOUNTER — APPOINTMENT (OUTPATIENT)
Dept: CARDIOLOGY | Facility: HOSPITAL | Age: 69
End: 2024-04-10
Payer: MEDICARE

## 2024-04-10 LAB
ALBUMIN SERPL-MCNC: 3.3 G/DL (ref 3.5–5.2)
ALBUMIN/GLOB SERPL: 1.3 G/DL
ALP SERPL-CCNC: 81 U/L (ref 39–117)
ALT SERPL W P-5'-P-CCNC: 27 U/L (ref 1–33)
ANION GAP SERPL CALCULATED.3IONS-SCNC: 7.9 MMOL/L (ref 5–15)
AST SERPL-CCNC: 31 U/L (ref 1–32)
BASOPHILS # BLD AUTO: 0.06 10*3/MM3 (ref 0–0.2)
BASOPHILS NFR BLD AUTO: 1.1 % (ref 0–1.5)
BH CV ECHO MEAS - ACS: 1 CM
BH CV ECHO MEAS - AO MAX PG: 10.5 MMHG
BH CV ECHO MEAS - AO MEAN PG: 8 MMHG
BH CV ECHO MEAS - AO ROOT DIAM: 2.8 CM
BH CV ECHO MEAS - AO V2 MAX: 162 CM/SEC
BH CV ECHO MEAS - AO V2 VTI: 38.8 CM
BH CV ECHO MEAS - EDV(CUBED): 59.3 ML
BH CV ECHO MEAS - EDV(MOD-SP4): 47.3 ML
BH CV ECHO MEAS - EF(MOD-BP): 79 %
BH CV ECHO MEAS - EF(MOD-SP4): 79.7 %
BH CV ECHO MEAS - ESV(CUBED): 15.6 ML
BH CV ECHO MEAS - ESV(MOD-SP4): 9.6 ML
BH CV ECHO MEAS - FS: 35.9 %
BH CV ECHO MEAS - IVS/LVPW: 0.88 CM
BH CV ECHO MEAS - IVSD: 0.7 CM
BH CV ECHO MEAS - LA DIMENSION: 3.6 CM
BH CV ECHO MEAS - LAT PEAK E' VEL: 6.1 CM/SEC
BH CV ECHO MEAS - LV MASS(C)D: 82.3 GRAMS
BH CV ECHO MEAS - LVIDD: 3.9 CM
BH CV ECHO MEAS - LVIDS: 2.5 CM
BH CV ECHO MEAS - LVOT AREA: 3.1 CM2
BH CV ECHO MEAS - LVOT DIAM: 2 CM
BH CV ECHO MEAS - LVPWD: 0.8 CM
BH CV ECHO MEAS - MED PEAK E' VEL: 5.6 CM/SEC
BH CV ECHO MEAS - MV A MAX VEL: 116 CM/SEC
BH CV ECHO MEAS - MV E MAX VEL: 93 CM/SEC
BH CV ECHO MEAS - MV E/A: 0.8
BH CV ECHO MEAS - SV(MOD-SP4): 37.7 ML
BH CV ECHO MEAS - TAPSE (>1.6): 2.36 CM
BH CV ECHO MEASUREMENTS AVERAGE E/E' RATIO: 15.9
BILIRUB SERPL-MCNC: 0.3 MG/DL (ref 0–1.2)
BUN SERPL-MCNC: 18 MG/DL (ref 8–23)
BUN/CREAT SERPL: 28.1 (ref 7–25)
CALCIUM SPEC-SCNC: 9.3 MG/DL (ref 8.6–10.5)
CHLORIDE SERPL-SCNC: 106 MMOL/L (ref 98–107)
CHOLEST SERPL-MCNC: 118 MG/DL (ref 0–200)
CO2 SERPL-SCNC: 25.1 MMOL/L (ref 22–29)
CREAT SERPL-MCNC: 0.64 MG/DL (ref 0.57–1)
DEPRECATED RDW RBC AUTO: 49.4 FL (ref 37–54)
EGFRCR SERPLBLD CKD-EPI 2021: 95.8 ML/MIN/1.73
EOSINOPHIL # BLD AUTO: 0.29 10*3/MM3 (ref 0–0.4)
EOSINOPHIL NFR BLD AUTO: 5.2 % (ref 0.3–6.2)
ERYTHROCYTE [DISTWIDTH] IN BLOOD BY AUTOMATED COUNT: 13.8 % (ref 12.3–15.4)
GEN 5 2HR TROPONIN T REFLEX: 13 NG/L
GLOBULIN UR ELPH-MCNC: 2.6 GM/DL
GLUCOSE SERPL-MCNC: 113 MG/DL (ref 65–99)
HCT VFR BLD AUTO: 32.4 % (ref 34–46.6)
HDLC SERPL-MCNC: 47 MG/DL (ref 40–60)
HGB BLD-MCNC: 10.1 G/DL (ref 12–15.9)
IMM GRANULOCYTES # BLD AUTO: 0.01 10*3/MM3 (ref 0–0.05)
IMM GRANULOCYTES NFR BLD AUTO: 0.2 % (ref 0–0.5)
LDLC SERPL CALC-MCNC: 58 MG/DL (ref 0–100)
LDLC/HDLC SERPL: 1.26 {RATIO}
LEFT ATRIUM VOLUME INDEX: 27.4 ML/M2
LYMPHOCYTES # BLD AUTO: 2.13 10*3/MM3 (ref 0.7–3.1)
LYMPHOCYTES NFR BLD AUTO: 38.2 % (ref 19.6–45.3)
MCH RBC QN AUTO: 30.3 PG (ref 26.6–33)
MCHC RBC AUTO-ENTMCNC: 31.2 G/DL (ref 31.5–35.7)
MCV RBC AUTO: 97.3 FL (ref 79–97)
MONOCYTES # BLD AUTO: 0.5 10*3/MM3 (ref 0.1–0.9)
MONOCYTES NFR BLD AUTO: 9 % (ref 5–12)
NEUTROPHILS NFR BLD AUTO: 2.59 10*3/MM3 (ref 1.7–7)
NEUTROPHILS NFR BLD AUTO: 46.3 % (ref 42.7–76)
NRBC BLD AUTO-RTO: 0 /100 WBC (ref 0–0.2)
PLATELET # BLD AUTO: 224 10*3/MM3 (ref 140–450)
PMV BLD AUTO: 9.5 FL (ref 6–12)
POTASSIUM SERPL-SCNC: 3.7 MMOL/L (ref 3.5–5.2)
PROT SERPL-MCNC: 5.9 G/DL (ref 6–8.5)
QT INTERVAL: 398 MS
QT INTERVAL: 488 MS
QTC INTERVAL: 420 MS
QTC INTERVAL: 499 MS
RBC # BLD AUTO: 3.33 10*6/MM3 (ref 3.77–5.28)
SODIUM SERPL-SCNC: 139 MMOL/L (ref 136–145)
TRIGL SERPL-MCNC: 59 MG/DL (ref 0–150)
TROPONIN T DELTA: 0 NG/L
UFH PPP CHRO-ACNC: 0.27 IU/ML (ref 0.3–0.7)
UFH PPP CHRO-ACNC: 0.35 IU/ML (ref 0.3–0.7)
UFH PPP CHRO-ACNC: 0.68 IU/ML (ref 0.3–0.7)
VLDLC SERPL-MCNC: 13 MG/DL (ref 5–40)
WBC NRBC COR # BLD AUTO: 5.58 10*3/MM3 (ref 3.4–10.8)

## 2024-04-10 PROCEDURE — 99204 OFFICE O/P NEW MOD 45 MIN: CPT | Performed by: INTERNAL MEDICINE

## 2024-04-10 PROCEDURE — 85520 HEPARIN ASSAY: CPT | Performed by: HOSPITALIST

## 2024-04-10 PROCEDURE — 80061 LIPID PANEL: CPT | Performed by: HOSPITALIST

## 2024-04-10 PROCEDURE — 94799 UNLISTED PULMONARY SVC/PX: CPT

## 2024-04-10 PROCEDURE — 92523 SPEECH SOUND LANG COMPREHEN: CPT

## 2024-04-10 PROCEDURE — 80053 COMPREHEN METABOLIC PANEL: CPT | Performed by: HOSPITALIST

## 2024-04-10 PROCEDURE — G0378 HOSPITAL OBSERVATION PER HR: HCPCS

## 2024-04-10 PROCEDURE — 97166 OT EVAL MOD COMPLEX 45 MIN: CPT

## 2024-04-10 PROCEDURE — 85025 COMPLETE CBC W/AUTO DIFF WBC: CPT | Performed by: HOSPITALIST

## 2024-04-10 PROCEDURE — 93306 TTE W/DOPPLER COMPLETE: CPT | Performed by: INTERNAL MEDICINE

## 2024-04-10 PROCEDURE — 99232 SBSQ HOSP IP/OBS MODERATE 35: CPT | Performed by: STUDENT IN AN ORGANIZED HEALTH CARE EDUCATION/TRAINING PROGRAM

## 2024-04-10 PROCEDURE — 84484 ASSAY OF TROPONIN QUANT: CPT | Performed by: HOSPITALIST

## 2024-04-10 PROCEDURE — 93306 TTE W/DOPPLER COMPLETE: CPT

## 2024-04-10 PROCEDURE — 94761 N-INVAS EAR/PLS OXIMETRY MLT: CPT

## 2024-04-10 PROCEDURE — 85520 HEPARIN ASSAY: CPT

## 2024-04-10 PROCEDURE — 97162 PT EVAL MOD COMPLEX 30 MIN: CPT

## 2024-04-10 RX ORDER — HEPARIN SODIUM 10000 [USP'U]/100ML
10 INJECTION, SOLUTION INTRAVENOUS
Status: DISCONTINUED | OUTPATIENT
Start: 2024-04-10 | End: 2024-04-10

## 2024-04-10 RX ORDER — HEPARIN SODIUM 10000 [USP'U]/100ML
11 INJECTION, SOLUTION INTRAVENOUS
Status: DISCONTINUED | OUTPATIENT
Start: 2024-04-10 | End: 2024-04-12

## 2024-04-10 RX ADMIN — TICAGRELOR 60 MG: 60 TABLET ORAL at 10:17

## 2024-04-10 RX ADMIN — DICLOFENAC SODIUM 4 G: 10 GEL TOPICAL at 18:11

## 2024-04-10 RX ADMIN — DOCUSATE SODIUM 50 MG AND SENNOSIDES 8.6 MG 2 TABLET: 8.6; 5 TABLET, FILM COATED ORAL at 10:16

## 2024-04-10 RX ADMIN — OXYBUTYNIN CHLORIDE 10 MG: 5 TABLET, EXTENDED RELEASE ORAL at 10:16

## 2024-04-10 RX ADMIN — NITROGLYCERIN 0.5 INCH: 20 OINTMENT TOPICAL at 13:30

## 2024-04-10 RX ADMIN — Medication 1 MG: at 10:16

## 2024-04-10 RX ADMIN — ACETAMINOPHEN 1000 MG: 500 TABLET ORAL at 03:15

## 2024-04-10 RX ADMIN — GABAPENTIN 200 MG: 100 CAPSULE ORAL at 21:27

## 2024-04-10 RX ADMIN — PANTOPRAZOLE SODIUM 40 MG: 40 TABLET, DELAYED RELEASE ORAL at 05:39

## 2024-04-10 RX ADMIN — GABAPENTIN 200 MG: 100 CAPSULE ORAL at 10:18

## 2024-04-10 RX ADMIN — NITROGLYCERIN 0.5 INCH: 20 OINTMENT TOPICAL at 18:11

## 2024-04-10 RX ADMIN — MIRTAZAPINE 30 MG: 15 TABLET, FILM COATED ORAL at 21:27

## 2024-04-10 RX ADMIN — DICLOFENAC SODIUM 4 G: 10 GEL TOPICAL at 21:27

## 2024-04-10 RX ADMIN — ATORVASTATIN CALCIUM 80 MG: 40 TABLET, FILM COATED ORAL at 21:27

## 2024-04-10 RX ADMIN — Medication 10 ML: at 21:28

## 2024-04-10 RX ADMIN — Medication 10 ML: at 10:17

## 2024-04-10 RX ADMIN — DICLOFENAC SODIUM 4 G: 10 GEL TOPICAL at 10:16

## 2024-04-10 RX ADMIN — AMLODIPINE BESYLATE 2.5 MG: 5 TABLET ORAL at 10:16

## 2024-04-10 RX ADMIN — ROPINIROLE HYDROCHLORIDE 4 MG: 1 TABLET, FILM COATED ORAL at 21:27

## 2024-04-10 RX ADMIN — LOSARTAN POTASSIUM 50 MG: 50 TABLET, FILM COATED ORAL at 10:16

## 2024-04-10 RX ADMIN — TICAGRELOR 60 MG: 60 TABLET ORAL at 21:27

## 2024-04-10 RX ADMIN — DICLOFENAC SODIUM 4 G: 10 GEL TOPICAL at 13:26

## 2024-04-10 RX ADMIN — ASPIRIN 81 MG: 81 TABLET, CHEWABLE ORAL at 10:16

## 2024-04-10 RX ADMIN — NITROGLYCERIN 0.5 INCH: 20 OINTMENT TOPICAL at 05:27

## 2024-04-10 NOTE — H&P
"Hospitalist History and Physical        Patient Identification  Name: Regine Beckham  Age/Sex: 68 y.o. female  :  1955        MRN: [id number]  Visit Number: [id number]  Admit Date: 2024   PCP: Dread Burton MD          Chief complaint chest pain    History of Present Illness:  Patient is a 68 y.o. female with history of anxiety/depression, anemia, arthritis, legally blind, restless leg syndrome, sleep apnea no longer on CPAP after losing 100+ lb, \"water retention\" but no documented history of CHF, and stroke with residual left sided weakness, at which time workup revealed right internal carotid artery stenosis s/p thrombectomy and stent placement on 24. She was recently admitted to our service from 3/17-3/27/24 for acute physical deconditioning and frequent falls as a consequence of her prior stroke. Afterwards she was transferred to inpatient rehab. This evening she complained of left sided chest pain which she described as heaviness/pressure in sensation, without radiation or associated dyspnea, nausea or diaphoresis. Pain was relieved by nitropaste. Troponin was negative, but EKG showed T wave inversions in anterolateral leads concerning for acute ischemia. She has been transferred back to the hospitalist service for continuous telemetry monitoring and further workup for cardiac ischemia. Patient is now on [facility]. She reports chest pain is improved with only minimal residual discomfort. She denies shortness of breath or increased lower extremity edema from baseline. She has no other complaints.     Review of Systems  Review of Systems   Constitutional:  Negative for activity change, appetite change, chills, diaphoresis, fatigue, fever and unexpected weight change.   HENT:  Negative for congestion, postnasal drip, rhinorrhea, sinus pressure, sinus pain and sore throat.    Eyes:  Negative for photophobia and visual disturbance.   Respiratory:  Negative for cough, shortness of breath and " "wheezing.    Cardiovascular:  Positive for chest pain. Negative for palpitations and leg swelling.   Gastrointestinal:  Negative for abdominal distention, abdominal pain, constipation, diarrhea, nausea and vomiting.   Genitourinary:  Negative for difficulty urinating, flank pain, frequency and hematuria.   Musculoskeletal:  Positive for arthralgias (intermittent left shoulder pain, chronic since stroke). Negative for back pain, joint swelling, myalgias, neck pain and neck stiffness.   Skin:  Negative for color change, pallor, rash and wound.   Neurological:  Positive for weakness (residual left sided weakness since stroke). Negative for dizziness, tremors, seizures, syncope, light-headedness, numbness and headaches.   Hematological:  Negative for adenopathy. Does not bruise/bleed easily.   Psychiatric/Behavioral:  Negative for agitation, behavioral problems and confusion.        History  Past Medical History:   Diagnosis Date    Anemia     Anxiety     Arthritis     Depression     Legally blind     Restless leg syndrome     Sleep apnea     \"I don't use a cpap anymore since losing 106 lbs\"    Water retention      Past Surgical History:   Procedure Laterality Date    BACK SURGERY      CARDIAC CATHETERIZATION N/A 1/19/2024    Procedure: Percutaneous Manual Thrombectomy;  Surgeon: Efrain Hurley MD;  Location: [facility] INVASIVE LOCATION;  Service: Interventional Radiology;  Laterality: N/A;    GALLBLADDER SURGERY      HIP ARTHROSCOPY      JOINT REPLACEMENT Right      Family History   Problem Relation Age of Onset    Breast cancer Neg Hx      Social History     Tobacco Use    Smoking status: Every Day     Current packs/day: 1.50     Average packs/day: 1.5 packs/day for 51.0 years (76.5 ttl pk-yrs)     Types: Cigarettes    Smokeless tobacco: Never   Vaping Use    Vaping status: Never Used   Substance Use Topics    Alcohol use: Yes     Alcohol/week: 1.0 standard drink of alcohol     Types: 1 Glasses of wine per week " "    Comment: \"occasionally - once every two months\"    Drug use: Not Currently     Comment: alcohol - occasionally     Medications Prior to Admission   Medication Sig Dispense Refill Last Dose    aspirin 81 MG EC tablet Take 1 tablet by mouth Daily.       atorvastatin (LIPITOR) 80 MG tablet Take 1 tablet by mouth Every Night. 90 tablet 0     gabapentin (NEURONTIN) 300 MG capsule Take 1 capsule by mouth 3 (Three) Times a Day.       losartan (COZAAR) 50 MG tablet Take 1 tablet by mouth Daily. Indications: High Blood Pressure Disorder       Mirabegron ER (MYRBETRIQ) 50 MG tablet sustained-release 24 hour 24 hr tablet Take 50 mg by mouth Daily. Indications: Urinary Urgency       mirtazapine (REMERON) 30 MG tablet Take 1 tablet by mouth Every Night. Indications: Major Depressive Disorder 30 tablet 0     pantoprazole (PROTONIX) 40 MG EC tablet TAKE 1 TABLET BY MOUTH EVERY MORNING FOR HEARTBURN 90 tablet 0     rOPINIRole (REQUIP) 4 MG tablet Take 1 tablet by mouth Every Night. Take 1 hour before bedtime.  Indications: Restless Leg Syndrome 30 tablet 0     ticagrelor (BRILINTA) 60 MG tablet tablet Take 1 tablet by mouth 2 (Two) Times a Day. 60 tablet 0     zolpidem (AMBIEN) 5 MG tablet Take 1 tablet by mouth At Night As Needed for Sleep. Indications: Trouble Sleeping 5 tablet 0      Allergies:  Penicillins    Objective     Vital Signs  Temp:  [98 °F (36.7 °C)-98.3 °F (36.8 °C)] 98 °F (36.7 °C)  Heart Rate:  [61-88] 69  Resp:  [16-18] 18  BP: (123-162)/(59-83) 123/64  There is no height or weight on file to calculate BMI.    Physical Exam:  Physical Exam  Constitutional:       General: She is not in acute distress.     Appearance: Normal appearance. She is not ill-appearing.   HENT:      Head: Normocephalic and atraumatic.      Right Ear: External ear normal.      Left Ear: External ear normal.      Nose: Nose normal.      Mouth/Throat:      Mouth: Mucous membranes are moist.      Pharynx: Oropharynx is clear.   Eyes:      " Extraocular Movements: Extraocular movements intact.      Conjunctiva/sclera: Conjunctivae normal.      Pupils: Pupils are equal, round, and reactive to light.   Cardiovascular:      Rate and Rhythm: Normal rate and regular rhythm.      Pulses: Normal pulses.      Heart sounds: Normal heart sounds.   Pulmonary:      Effort: Pulmonary effort is normal. No respiratory distress.      Breath sounds: Normal breath sounds. No wheezing or rales.   Chest:      Chest wall: No tenderness.   Abdominal:      General: Abdomen is flat. Bowel sounds are normal. There is no distension.      Palpations: Abdomen is soft.      Tenderness: There is no abdominal tenderness.   Musculoskeletal:         General: Normal range of motion.      Cervical back: Normal range of motion and neck supple. No tenderness.      Right lower leg: Edema (trace) present.      Left lower leg: Edema (trace) present.   Lymphadenopathy:      Cervical: No cervical adenopathy.   Skin:     General: Skin is warm and dry.      Capillary Refill: Capillary refill takes less than 2 seconds.      Coloration: Skin is not jaundiced.      Findings: No bruising or lesion.   Neurological:      General: No focal deficit present.      Mental Status: She is alert and oriented to person, place, and time.   Psychiatric:         Mood and Affect: Mood normal.         Behavior: Behavior normal.         Thought Content: Thought content normal.           Results Review:       Lab Results:  Results from last 7 days   Lab Units 04/09/24 2009 04/05/24  0020 04/04/24  0006   WBC 10*3/mm3 5.10 6.20 6.70   HEMOGLOBIN g/dL 10.1* 10.1* 10.3*   PLATELETS 10*3/mm3 236 245 234     Results from last 7 days   Lab Units 04/09/24  1902   CRP mg/dL <0.30     Results from last 7 days   Lab Units 04/09/24 2009 04/05/24  0020 04/04/24  1108 04/04/24  0006   SODIUM mmol/L 139 139  --  139   POTASSIUM mmol/L 4.0 4.0 4.0 3.4*   CHLORIDE mmol/L 106 105  --  106   CO2 mmol/L 25.2 25.9  --  22.5   BUN mg/dL  "18 15  --  17   CREATININE mg/dL 0.83 0.91  --  0.75   CALCIUM mg/dL 9.0 9.4  --  9.0   GLUCOSE mg/dL 119* 110*  --  109*         No results found for: \"HGBA1C\"  Results from last 7 days   Lab Units 04/09/24 2009   BILIRUBIN mg/dL 0.2   ALK PHOS U/L 90   AST (SGOT) U/L 35*   ALT (SGPT) U/L 29     Results from last 7 days   Lab Units 04/09/24  1902   HSTROP T ng/L 12                   I have reviewed the patient's laboratory results.    Imaging:  Imaging Results (Last 72 Hours)       ** No results found for the last 72 hours. **            I have personally reviewed the patient's radiologic imaging.        EKG:   Sinus rhythm with marked sinus arrhythmia, HR 63, QTc 499  T wave abnormality, consider lateral ischemia  Prolonged QT  Abnormal ECG  When compared with ECG of 21-MAR-2024 01:00,  premature atrial complexes are no longer present  Left posterior fascicular block is no longer present  Borderline criteria for Inferior infarct are no longer present  T wave inversion no longer evident in Inferior leads  T wave inversion now evident in Anterolateral leads    I have personally reviewed the patient's EKG. New T wave inversions in leads V4-V5 and AVL. EKG reviewed by on-call interventional cardiologist (sent by house supervisor) who reported no evidence of STEMI.         Assessment & Plan     - Chest pain with typical features (left sided chest heaviness relieved by nitroglycerin) and cardiac risk factors including vascular disease with recent CVA and right carotid artery stenosis, along with EKG changes concerning for anterolateral ischemia. Basic labs, inflammatory markers and CXR obtained to rule out other causes of chest pain such as pneumonia. CRP, procal, lactate and WBC all normal and CXR showed no acute abnormality.  Transferred from inpatient rehab to [facility] for observation with continuous telemetry monitoring and further cardiac ischemia workup. Continue home ASA, brilinta and statin. Repeat troponin and " EKG now. Keep NPO after midnight except sips with meds. Check hemoglobin A1c and fasting lipid panel to assess for additional cardiovascular risk factors. Update ECHO in the morning and consult cardiology for guidance regarding further workup, since patient already had a stress test recently on 12/23/23 that was interpreted as low risk.   - Deconditioning secondary to recent stroke: continue PT/OT/SLP that patient was receiving in inpatient rehab.     DVT/GI Prophylaxis: SQ heparin; PO protonix    Estimated Length of Stay <2 midnights    I discussed the patient's findings, assessment and plan with the patient and inpatient rehab physician Dr Hayes.    Capo Su MD  04/09/24  21:06 EDT

## 2024-04-10 NOTE — PLAN OF CARE
Goal Outcome Evaluation:  Pt resting in bed with no s/s of distress. VSS. IV access maintained. Call light and personal belongings in reach. No requests at this time. Plan of care ongoing.

## 2024-04-10 NOTE — PROGRESS NOTES
Case Management  Inpatient Rehabilitation Team Conference    Conference Date/Time: 4/9/2024 6:36:23 AM    Team Conference Attendees:  MD Elise Garcia SW Jessica Bill, RN,   STALIN Woods, PT  Desi Mackey, OT  Azalia Elizondo, CHAYO    Demographics            Age: 68Y            Gender: Female    Admission Date: 3/27/2024 3:48:00 PM  Rehabilitation Diagnosis:  debility  Comorbidities: I69.354 Hemiplegia and hemiparesis following cerebral infarction  affecting left non-dominant side  I10 Essential (primary) hypertension  D64.9 Anemia, unspecified  B96.20 Unspecified Escherichia coli [E. coli] as the cause of diseases  classified elsewhere  F17.210 Nicotine dependence, cigarettes, uncomplicated  R53.81 Other malaise  R29.6 Repeated falls      Plan of Care  Anticipated Discharge Date/Estimated Length of Stay: Pending SNF placement  Anticipated Discharge Destination: Community discharge with assistance  Discharge Plan : Pt plans to return to her apartment at discharge.  Pt says  significant other may stay with her if needed.  Medical Necessity Expected Level Rationale: fair-good  Intensity and Duration: an average of 3 hours/5 days per week  Medical Supervision and 24 Hour Rehab Nursing: x  Physical Therapy: x  PT Intensity/Duration: PT 1.5 hours per day/5 days per week  Occupational Therapy: x  OT Intensity/Duration: OT 1.5 hours per day/5 days per week  Social Work: x  Therapeutic Recreation: x  Updated (if changes indicated)    Anticipated Discharge Date/Estimated Length of Stay:   Pending SNF placement      Discharge Plan of Care:    Based on the patient's medical and functional status, their prognosis and  expected level of functional improvement is: fair      Interdisciplinary Problem/Goals/Status  Body Systems    [RN] Integumentary(Active)  Current Status(03/27/2024): risk for skin break down  Weekly Goal(04/19/2024): no skin breakdown  Discharge Goal: no skin  breakdown        Safety    [RN] Potential for Injury(Active)  Current Status(03/27/2024): risk for falls  Weekly Goal(04/19/2024): no falls  Discharge Goal: no falls        Self Care    [OT] Dressing (Upper)(Active)  Current Status(04/08/2024): Max A  Weekly Goal(04/16/2024): Mod A  Discharge Goal: Mod A        Mobility    [PT] Bed/Chair/Wheelchair(Active)  Current Status(03/28/2024): mod A  Weekly Goal(03/28/2024): n/a  Discharge Goal: CGA    [PT] Bed Mobility(Active)  Current Status(03/28/2024): mod A  Weekly Goal(03/28/2024): n/a  Discharge Goal: SBA    [PT] Walk(Active)  Current Status(03/28/2024): 40 ft x 2, R HHA  Weekly Goal(03/28/2024): n/a  Discharge Goal: 150 ft, CGa, AAD or HHa        Communication    [ST] Expression(Active)  Current Status(03/29/2024): Mild to moderate dysarthria  Weekly Goal(04/01/2024): Resistive breather  Discharge Goal: Premorbid baseline communication        Swallow Function    [ST] Swallowing(Active)  Current Status(03/29/2024): Oral dysphagia  Weekly Goal(04/01/2024): Facial massage  Discharge Goal: Least restrictive po diet    Comments: Pt is pending SNF placement for continued rehab/nursing care.    Signed by: NARENDRA Westfall    Physician CoSigned By: Reji Hayes 04/10/2024 08:32:10

## 2024-04-10 NOTE — THERAPY EVALUATION
"Acute Care - Occupational Therapy Initial Evaluation   [place]     Patient Name: Regine Beckham  : 1955  MRN: [id number]  Today's Date: 4/10/2024             Admit Date: 2024     No diagnosis found.  Patient Active Problem List   Diagnosis    Iron deficiency anemia due to chronic blood loss    Major depressive disorder    RLS (restless legs syndrome)    GERD (gastroesophageal reflux disease)    Left breast mass    Allergy to penicillin    Stroke    Grade I diastolic dysfunction    Moderate malnutrition    Falls frequently    Stroke-like symptoms    Debility    Chest pain     Past Medical History:   Diagnosis Date    Anemia     Anxiety     Arthritis     Depression     Legally blind     Restless leg syndrome     Sleep apnea     \"I don't use a cpap anymore since losing 106 lbs\"    Water retention      Past Surgical History:   Procedure Laterality Date    BACK SURGERY      CARDIAC CATHETERIZATION N/A 2024    Procedure: Percutaneous Manual Thrombectomy;  Surgeon: Efrain Hurley MD;  Location:  [facility] INVASIVE LOCATION;  Service: Interventional Radiology;  Laterality: N/A;    GALLBLADDER SURGERY      HIP ARTHROSCOPY      JOINT REPLACEMENT Right          OT ASSESSMENT FLOWSHEET (Last 12 Hours)       OT Evaluation and Treatment       Row Name 04/10/24 1220                   OT Time and Intention    Subjective Information no complaints  -HB        Document Type evaluation;therapy note (daily note)  -HB        Mode of Treatment individual therapy;occupational therapy  -HB        Patient Effort adequate  -HB           General Information    Patient Profile Reviewed yes  -HB        Patient/Family/Caregiver Comments/Observations Patient and RN okd OT this date.  -HB        General Observations of Patient Patient tolerated OT well with no adverse reactions. Patient presents chest pain. Pt was previously in IRF for therapy for a stroke .  -HB        Prior Level of Function independent:  -HB           " Previous Level of Function/Home Environm    Bathing/Grooming, Premorbid Functional Level independent  -HB        Dressing, Premorbid Functional Level independent  -HB        Eating/Feeding, Premorbid Functional Level independent  -HB        Toileting, Premorbid Functional Level independent  -HB        BADLs, Premorbid Functional Level independent  -HB        IADLs, Premorbid Functional Level independent  -HB           Pain Assessment    Pretreatment Pain Rating 0/10 - no pain  -HB        Posttreatment Pain Rating 0/10 - no pain  -HB           Cognition    Affect/Mental Status (Cognition) emotionally labile  -        Orientation Status (Cognition) oriented to;person;place;situation  -        Follows Commands (Cognition) verbal cues/prompting required;repetition of directions required;physical/tactile prompts required  -           Range of Motion (ROM)    Range of Motion --  RUE WFL; LUE spastic throughout- little PROM LUE; pain with movement  -           Strength (Manual Muscle Testing)    Strength (Manual Muscle Testing) --  RUE 3+/5 grossly; LUE NT  -HB           Sensory    Additional Documentation --  sensation intact  -           Vision Assessment/Intervention    Visual Impairment/Limitations legally blind;corrective lenses full-time  -           Activities of Daily Living    BADL Assessment/Intervention bathing;upper body dressing;lower body dressing;grooming;feeding;toileting  -           Bathing Assessment/Intervention    [place] Level (Bathing) bathing skills;moderate assist (50% patient effort)  -           Upper Body Dressing Assessment/Training    [place] Level (Upper Body Dressing) upper body dressing skills;minimum assist (75% patient effort)  -           Lower Body Dressing Assessment/Training    [place] Level (Lower Body Dressing) lower body dressing skills;moderate assist (50% patient effort)  -           Grooming Assessment/Training    [place] Level (Grooming)  grooming skills;moderate assist (50% patient effort)  -HB           Self-Feeding Assessment/Training    [place] Level (Feeding) feeding skills;minimum assist (75% patient effort)  -HB           Toileting Assessment/Training    [place] Level (Toileting) toileting skills;moderate assist (50% patient effort)  -HB           Bed Mobility    Supine-Sit [place] (Bed Mobility) minimum assist (75% patient effort);nonverbal cues (demo/gesture);verbal cues  -HB           Sit-Stand Transfer    Sit-Stand [place] (Transfers) verbal cues;nonverbal cues (demo/gesture);moderate assist (50% patient effort)  -HB        Assistive Device (Sit-Stand Transfers) --  HHA 2 people for safety  -HB           Stand-Sit Transfer    Stand-Sit [place] (Transfers) moderate assist (50% patient effort);nonverbal cues (demo/gesture);verbal cues  -HB        Assistive Device (Stand-Sit Transfers) --  HHA x2 people for safety  -HB           Plan of Care Review    Plan of Care Reviewed With patient  -HB           Positioning and Restraints    Pre-Treatment Position in bed  -HB        Post Treatment Position bed  -HB        In Bed call light within reach;encouraged to call for assist;exit alarm on  -HB           Therapy Assessment/Plan (OT)    OT Diagnosis Decreased I/ADL status; functional mobility; endurance; UE function  -        Rehab Potential (OT) good, to achieve stated therapy goals  -        Criteria for Skilled Therapeutic Interventions Met (OT) yes;meets criteria;skilled treatment is necessary  -        Activity Limitations Related to Problem List (OT) unable to ambulate safely;unable to transfer safely;BADLs not performed adequately or safely;IADLs not performed adequately or safely  -        Planned Therapy Interventions (OT) activity tolerance training;adaptive equipment training;BADL retraining;IADL retraining;functional balance retraining;neuromuscular control/coordination retraining;transfer/mobility  retraining;ROM/therapeutic exercise;passive ROM/stretching  -HB           OT Goals    Transfer Goal Selection (OT) transfer, OT goal 1  -HB        Toileting Goal Selection (OT) toileting, OT goal 1  -HB        Grooming Goal Selection (OT) grooming, OT goal 1  -HB        Activity Tolerance Goal Selection (OT) activity tolerance, OT goal 1  -HB           Transfer Goal 1 (OT)    Activity/Assistive Device (Transfer Goal 1, OT) toilet  -HB        [place] Level/Cues Needed (Transfer Goal 1, OT) contact guard required  -HB        Time Frame (Transfer Goal 1, OT) by discharge  -HB        Progress/Outcome (Transfer Goal 1, OT) new goal  -HB           Toileting Goal 1 (OT)    Activity/Device (Toileting Goal 1, OT) toileting skills, all  -HB        [place] Level/Cues Needed (Toileting Goal 1, OT) minimum assist (75% or more patient effort)  -HB        Time Frame (Toileting Goal 1, OT) by discharge  -HB        Progress/Outcome (Toileting Goal 1, OT) new goal  -HB           Grooming Goal 1 (OT)    Activity/Device (Grooming Goal 1, OT) grooming skills, all  -HB        [place] (Grooming Goal 1, OT) set-up required  -HB        Time Frame (Grooming Goal 1, OT) by discharge  -HB        Progress/Outcome (Grooming Goal 1, OT) new goal  -HB            Activity Tolerance Goal 1 (OT)    Activity Tolerance Goal 1 (OT) Patient will tolerate >/= 5 min of TA to increase ADL status/endurance prior to d/c.  -HB                  User Key  (r) = Recorded By, (t) = Taken By, (c) = Cosigned By      Initials Name Effective Dates    Shasta Kearney, BLU 05/25/21 -                      Occupational Therapy Education       Title: PT OT SLP Therapies (Resolved)       Topic: Occupational Therapy (Resolved)       Point: ADL training (Resolved)       Description:   Instruct learner(s) on proper safety adaptation and remediation techniques during self care or transfers.   Instruct in proper use of assistive devices.                  Learner  Progress:  Not documented in this visit.              Point: Precautions (Resolved)       Description:   Instruct learner(s) on prescribed precautions during self-care and functional transfers.                  Learner Progress:  Not documented in this visit.                                      OT Recommendation and Plan  Planned Therapy Interventions (OT): activity tolerance training, adaptive equipment training, BADL retraining, IADL retraining, functional balance retraining, neuromuscular control/coordination retraining, transfer/mobility retraining, ROM/therapeutic exercise, passive ROM/stretching  Plan of Care Review  Plan of Care Reviewed With: patient  Plan of Care Reviewed With: patient        Time Calculation:     Therapy Charges for Today       Code Description Service Date Service Provider Modifiers Qty    [id number]  OT EVAL MOD COMPLEXITY 4 4/10/2024 Shasta Zayas OT GO 1                 Shasta Zayas OT  4/10/2024

## 2024-04-10 NOTE — SIGNIFICANT NOTE
04/10/24 1026   OTHER   Discipline speech language pathologist   Rehab Time/Intention   Session Not Performed patient unavailable for evaluation  (Pt NPO pending cardiology at this time. Unable to accept po trials for participation in dysphagia assessment. SLP to f/u when clear to accept po intake.)   Recommendations   SLP - Next Appointment 04/11/24

## 2024-04-10 NOTE — CONSULTS
Inpatient Cardiology Consult  Consult performed by: Omayra Butts APRN  Consult ordered by: Capo Su MD        Date of Admit: 4/9/2024  Date of Consult: 04/10/24  Provider, No Known  Regine Beckham  1955    Consulting Physician: Susan Mederos MD    Cardiology consultation    Reason for consultation:  Chest pain, EKG changes with T wave inversions in the anterior lateral leads      History of Present Illness    Subjective     No chief complaint on file.      Regine Beckham is a 68 y.o. female with history of CVA (January 2024), peripheral vascular disease, status post left ICA stent placement on 1/19/2024, hypertension, dyslipidemia, obstructive sleep apnea noncompliant with CPAP, restless leg syndrome, GERD, iron deficiency anemia, anxiety/depression, legally blind, and arthritis.  Patient was admitted to the hospital 3/17 through 3/27/2024 for acute physical deconditioning and frequent falls as a consequence of prior stroke from January 2024.  After her inpatient hospitalization she was transferred to inpatient rehab.  On 4/9/2024 she reported left-sided chest pain.  She denied any associated dyspnea, diaphoresis or nausea.  She reported chronic left arm and shoulder pain since her CVA and could not describe whether this was radiation or just the chronicity of the pain.  High-sensitivity troponin was negative x 3.  However, EKG did show T wave inversions in the anterior lateral leads.  Patient was then transferred from inpatient rehabilitation to the telemetry unit for monitoring and workup.    Ms. Beckham was seen and examined.  She currently denies any chest pain.  She denies shortness of breath.  She states that the pain she was having yesterday was extending from her left chest to her left shoulder and arm.  It has since resolved.    Cardiac risk factors:hypercholesterolemia, hypertension, peripheral vascular disease, and former tobacco use    Last Echo: 12/22/2023      Interpretation  "Summary         Left ventricular systolic function is normal. Left ventricular ejection fraction appears to be 56 - 60%.    Left ventricular wall thickness is consistent with moderate concentric hypertrophy.    Left ventricular diastolic function is consistent with (grade I) impaired relaxation.    Estimated right ventricular systolic pressure from tricuspid regurgitation is normal (<35 mmHg).    Last Stress: 12/23/2023    Interpretation Summary         Left ventricular ejection fraction is normal (Calculated EF = 67%).    Myocardial perfusion imaging indicates a normal myocardial perfusion study with no evidence of ischemia.    Impressions are consistent with a low risk study.    TID 1.07.    Findings consistent with a normal ECG stress test.    Past Medical History:   Diagnosis Date    Anemia     Anxiety     Arthritis     Depression     Legally blind     Restless leg syndrome     Sleep apnea     \"I don't use a cpap anymore since losing 106 lbs\"    Water retention      Past Surgical History:   Procedure Laterality Date    BACK SURGERY      CARDIAC CATHETERIZATION N/A 1/19/2024    Procedure: Percutaneous Manual Thrombectomy;  Surgeon: Efrain Hurley MD;  Location: [facility] CATH INVASIVE LOCATION;  Service: Interventional Radiology;  Laterality: N/A;    GALLBLADDER SURGERY      HIP ARTHROSCOPY      JOINT REPLACEMENT Right      Family History   Problem Relation Age of Onset    Breast cancer Neg Hx      Social History     Tobacco Use    Smoking status: Former     Current packs/day: 1.50     Average packs/day: 1.5 packs/day for 51.0 years (76.5 ttl pk-yrs)     Types: Cigarettes     Passive exposure: Past    Smokeless tobacco: Never   Vaping Use    Vaping status: Never Used   Substance Use Topics    Alcohol use: Not Currently     Alcohol/week: 1.0 standard drink of alcohol     Types: 1 Glasses of wine per week     Comment: \"occasionally - once every two months\"    Drug use: Not Currently     Comment: alcohol - " occasionally     Medications Prior to Admission   Medication Sig Dispense Refill Last Dose    aspirin 81 MG EC tablet Take 1 tablet by mouth Daily.   Unknown    atorvastatin (LIPITOR) 80 MG tablet Take 1 tablet by mouth Every Night. 90 tablet 0 Unknown    gabapentin (NEURONTIN) 300 MG capsule Take 1 capsule by mouth 3 (Three) Times a Day.   Unknown    losartan (COZAAR) 50 MG tablet Take 1 tablet by mouth Daily. Indications: High Blood Pressure Disorder   Unknown    Mirabegron ER (MYRBETRIQ) 50 MG tablet sustained-release 24 hour 24 hr tablet Take 50 mg by mouth Daily. Indications: Urinary Urgency   Unknown    mirtazapine (REMERON) 30 MG tablet Take 1 tablet by mouth Every Night. Indications: Major Depressive Disorder 30 tablet 0 Unknown    pantoprazole (PROTONIX) 40 MG EC tablet TAKE 1 TABLET BY MOUTH EVERY MORNING FOR HEARTBURN 90 tablet 0 Unknown    rOPINIRole (REQUIP) 4 MG tablet Take 1 tablet by mouth Every Night. Take 1 hour before bedtime.  Indications: Restless Leg Syndrome 30 tablet 0 Unknown    ticagrelor (BRILINTA) 60 MG tablet tablet Take 1 tablet by mouth 2 (Two) Times a Day. 60 tablet 0 Unknown    zolpidem (AMBIEN) 5 MG tablet Take 1 tablet by mouth At Night As Needed for Sleep. Indications: Trouble Sleeping 5 tablet 0 Unknown     Allergies:  Penicillins    Review of Systems   Constitutional:  Negative for fatigue.   Respiratory:  Negative for shortness of breath.    Cardiovascular:  Positive for chest pain. Negative for palpitations and leg swelling.   Gastrointestinal:  Negative for blood in stool.   Neurological:  Negative for dizziness, syncope, weakness and light-headedness.   Hematological:  Does not bruise/bleed easily.   All other systems reviewed and are negative.        Objective      Vital Signs  Temp:  [97.6 °F (36.4 °C)-98.3 °F (36.8 °C)] 98.2 °F (36.8 °C)  Heart Rate:  [61-87] 76  Resp:  [18-20] 20  BP: (123-159)/(59-88) 145/88  Body mass index is 25.23 kg/m².    Intake/Output Summary  (Last 24 hours) at 4/10/2024 1135  Last data filed at 4/10/2024 0900  Gross per 24 hour   Intake 0 ml   Output 450 ml   Net -450 ml       Physical Exam  Constitutional:       Appearance: Normal appearance. She is well-developed.   Cardiovascular:      Rate and Rhythm: Normal rate and regular rhythm.      Heart sounds: No murmur heard.     No friction rub. No gallop.   Pulmonary:      Effort: Pulmonary effort is normal. No respiratory distress.      Breath sounds: Normal breath sounds. No wheezing or rales.   Skin:     General: Skin is warm and dry.   Neurological:      Mental Status: She is alert. Mental status is at baseline.   Psychiatric:         Mood and Affect: Mood normal.         Behavior: Behavior normal.         Telemetry: Sinus, sinus arrhythmia 60s to 70s    Results Review:   I reviewed the patient's new clinical results.  Results from last 7 days   Lab Units 04/10/24  0012 04/09/24  2117 04/09/24  1902   HSTROP T ng/L 13 13 12     Results from last 7 days   Lab Units 04/10/24  0012 04/09/24 2009 04/05/24  0020 04/04/24  0006   WBC 10*3/mm3 5.58 5.10 6.20 6.70   HEMOGLOBIN g/dL 10.1* 10.1* 10.1* 10.3*   PLATELETS 10*3/mm3 224 236 245 234     Results from last 7 days   Lab Units 04/10/24  0012 04/09/24 2009 04/05/24  0020 04/04/24  1108 04/04/24  0006   SODIUM mmol/L 139 139 139  --  139   POTASSIUM mmol/L 3.7 4.0 4.0 4.0 3.4*   CHLORIDE mmol/L 106 106 105  --  106   CO2 mmol/L 25.1 25.2 25.9  --  22.5   BUN mg/dL 18 18 15  --  17   CREATININE mg/dL 0.64 0.83 0.91  --  0.75   CALCIUM mg/dL 9.3 9.0 9.4  --  9.0   GLUCOSE mg/dL 113* 119* 110*  --  109*   ALT (SGPT) U/L 27 29  --   --   --    AST (SGOT) U/L 31 35*  --   --   --      Lab Results   Component Value Date    INR 0.94 04/09/2024     Lab Results   Component Value Date    MG 1.8 03/30/2024    MG 1.9 03/23/2024    MG 1.9 03/19/2024     Lab Results   Component Value Date    TSH 2.880 03/17/2024    TRIG 59 04/10/2024    HDL 47 04/10/2024    LDL 58  "04/10/2024      No results found for: \"BNP\"     EKG:         Imaging Results (Last 72 Hours)       ** No results found for the last 72 hours. **             Assessment:  Chest pain, likely noncardiac in the setting of negative troponins x 3.  Patient has negative stress test from December 2023.  History of CVA, status post left ICA stent placement and thrombectomy, January 2024  Hypertension  Dyslipidemia    Recommendations:  Recommend proceeding with a CT angiogram of the heart.  Continue aspirin, atorvastatin.  Metoprolol tartrate added    Thank you very much for asking us to be involved in this patient's care.  We will follow along with you.    Electronically signed by NIKKIE Melgar, 04/10/24, 6:19 PM EDT.    Patient seen and examined independently of nurse practitioner.  Chart reviewed.  Labs and EKG reviewed.  Agree with the charting, assessment and plan as described above.  Patient 68-year-old female past medical history significant for s/p stroke history of hypertension hyperlipidemia who came in with angina symptoms.  Negative cardiac markers.  Would recommend to get a CTA coronaries done.`    .Electronically signed by Susan Mederos MD, 04/11/24, 6:24 PM EDT.        "

## 2024-04-10 NOTE — THERAPY EVALUATION
"Acute Care - Physical Therapy Initial Evaluation   [place]     Patient Name: Regine Beckham  : 1955  MRN: [id number]  Today's Date: 4/10/2024   Onset of Illness/Injury or Date of Surgery: 24 (admission date)  Visit Dx:   No diagnosis found.  Patient Active Problem List   Diagnosis    Iron deficiency anemia due to chronic blood loss    Major depressive disorder    RLS (restless legs syndrome)    GERD (gastroesophageal reflux disease)    Left breast mass    Allergy to penicillin    Stroke    Grade I diastolic dysfunction    Moderate malnutrition    Falls frequently    Stroke-like symptoms    Debility    Chest pain     Past Medical History:   Diagnosis Date    Anemia     Anxiety     Arthritis     Depression     Legally blind     Restless leg syndrome     Sleep apnea     \"I don't use a cpap anymore since losing 106 lbs\"    Water retention      Past Surgical History:   Procedure Laterality Date    BACK SURGERY      CARDIAC CATHETERIZATION N/A 2024    Procedure: Percutaneous Manual Thrombectomy;  Surgeon: Efrain Hurley MD;  Location:  [facility] CATH INVASIVE LOCATION;  Service: Interventional Radiology;  Laterality: N/A;    GALLBLADDER SURGERY      HIP ARTHROSCOPY      JOINT REPLACEMENT Right      PT Assessment (Last 12 Hours)       PT Evaluation and Treatment       Row Name 04/10/24 0915          Physical Therapy Time and Intention    Document Type evaluation  -     Mode of Treatment physical therapy  -     Patient Effort adequate  -     Comment Pt seen for therapy evaluation this AM as pt was recently admitted into acute care from [facility] d/t chest pain. Pt states this is feeling better. Pt reported she would ambulate when she was home via furniture surfing. Pt had several falls at home in succession. Pt states she had a CVA in January. Pt was living at home alone (unsure if this was case prior to IPR admission or prior to CVA).  -       Row Name 04/10/24 0915          General Information    " Patient Profile Reviewed yes  -     Onset of Illness/Injury or Date of Surgery 04/09/24  admission date  -     Patient Observations alert;cooperative;agree to therapy  -     Patient/Family/Caregiver Comments/Observations Pt seen supine in hospital bed, noted L UE restriction/difficulty AROM  -     General Observations of Patient Pt seen supine in hospital bed, pleasant and cooeprative with therapy.  -     Pertinent History of Current Functional Problem CVA in January  -     Existing Precautions/Restrictions fall;other (see comments)  L hemiplegia/L side CVA affected  -       Row Name 04/10/24 0915          Cognition    Personal Safety Interventions nonskid shoes/slippers when out of bed;supervised activity  -[facility] Name 04/10/24 0915          Range of Motion Comprehensive    Comment, General Range of Motion Grossly functional, not all planes tested to end range however (hip abd not formally tested)  -[facility] Name 04/10/24 0915          Strength Comprehensive (MMT)    Comment, General Manual Muscle Testing (MMT) Assessment Gross MMT: R LE 5/5 with PF, DF, hip abd/add, hip flexion functional; R knee flex/ext 2 to 3/5; L LE 5/5 with PF, knee flex observed grossly 1/5, knee ext 4 to 5, hip abd/add 5, hip flex < 3, DF grossly 2/5 with inability to move through full range however resisted well with MMT  -[facility] Name 04/10/24 0915          Bed Mobility    Bed Mobility supine-sit;sit-supine  -     Supine-Sit [place] (Bed Mobility) minimum assist (75% patient effort);moderate assist (50% patient effort);1 person assist;2 person assist  -     Sit-Supine [place] (Bed Mobility) minimum assist (75% patient effort)  -       Row Name 04/10/24 0915          Transfers    Transfers sit-stand transfer;stand-sit transfer  -[facility] Name 04/10/24 0915          Sit-Stand Transfer    Sit-Stand [place] (Transfers) contact guard;1 person assist;2 person assist  -     Assistive Device  (Sit-Stand Transfers) other (see comments)  [facility]  -       Row Name 04/10/24 0915          Stand-Sit Transfer    Stand-Sit [place] (Transfers) contact guard;standby assist;supervision;1 person assist;2 person assist  -     Assistive Device (Stand-Sit Transfers) other (see comments)  [facility]  -       Row Name 04/10/24 0915          Gait/Stairs (Locomotion)    Gait/Stairs Locomotion gait/ambulation assistive device;distance ambulated  -     [place] Level (Gait) verbal cues;contact guard;moderate assist (50% patient effort);2 person assist;other (see comments)  Pt did not require much physical assist to stay upright nor ambulate, however d/t cognition/dependency on verbal cues to advance steps, pt grossly modA as she is easily distracted and frequently cued on what to do for ambulation.  -     Assistive Device (Gait) other (see comments)  [facility]  -     Patient was able to Ambulate yes  -KH     Distance in Feet (Gait) 10  -KH     Pattern (Gait) step-through;step-to  -KH     Deviations/Abnormal Patterns (Gait) base of support, narrow;gait speed decreased;stride length decreased  -       Row Name 04/10/24 0915          Balance    Comment, Balance dynamic standing balance not formally tested however demonstrates fall risk; static sitting balance once placed pt was able to hold herself upright.  -       Row Name 04/10/24 0915          Plan of Care Review    Outcome Evaluation Pt seen for evaluation this date, pleasant and cooperative with therapy. Pt may benefit from further therapeutic intervention to address deficits of inability to ambulate independently, strength/endurance, and safety.  -       Row Name 04/10/24 0915          Positioning and Restraints    Pre-Treatment Position in bed  -     Post Treatment Position bed  -KH     In Bed supine;call light within reach;exit alarm on  -       Row Name 04/10/24 0915          Therapy Assessment/Plan (PT)    Functional Level at Time of Evaluation (PT)  Grossly CGA to modA  -KH     PT Diagnosis (PT) impaired/decreased functional mobility  -KH     Rehab Potential (PT) other (see comments)  fair/good  -KH     Criteria for Skilled Interventions Met (PT) yes  -KH     Therapy Frequency (PT) 2 times/wk  2-5x/week, PRN  -KH     Predicted Duration of Therapy Intervention (PT) length of stay, until D/C  -       Row Name 04/10/24 0915          Physical Therapy Goals    Bed Mobility Goal Selection (PT) bed mobility, PT goal 1  -     Transfer Goal Selection (PT) transfer, PT goal 1  -KH     Gait Training Goal Selection (PT) gait training, PT goal 1  -       Row Name 04/10/24 0915          Bed Mobility Goal 1 (PT)    Activity/Assistive Device (Bed Mobility Goal 1, PT) supine to sit;sit to supine  -     [place] Level/Cues Needed (Bed Mobility Goal 1, PT) modified independence;independent  -     Time Frame (Bed Mobility Goal 1, PT) long term goal (LTG)  -       Row Name 04/10/24 0915          Transfer Goal 1 (PT)    Activity/Assistive Device (Transfer Goal 1, PT) sit-to-stand/stand-to-sit;bed-to-chair/chair-to-bed;toilet;walker, rolling  -     [place] Level/Cues Needed (Transfer Goal 1, PT) modified independence  -     Time Frame (Transfer Goal 1, PT) long term goal (LTG)  -       Row Name 04/10/24 0915          Gait Training Goal 1 (PT)    Activity/Assistive Device (Gait Training Goal 1, PT) gait (walking locomotion);assistive device use;walker, rolling  -     [place] Level (Gait Training Goal 1, PT) standby assist  -     Distance (Gait Training Goal 1, PT) 15'  with consecutive steps  -     Time Frame (Gait Training Goal 1, PT) long term goal (LTG)  -               User Key  (r) = Recorded By, (t) = Taken By, (c) = Cosigned By      Initials Name Provider Type    Mahnaz Hatch, PT Physical Therapist                    Physical Therapy Education       Title: PT OT SLP Therapies (Resolved)       Topic: Physical Therapy (Resolved)        Point: Mobility training (Resolved)       Learner Progress:  Not documented in this visit.              Point: Home exercise program (Resolved)       Learner Progress:  Not documented in this visit.              Point: Body mechanics (Resolved)       Learner Progress:  Not documented in this visit.              Point: Precautions (Resolved)       Learner Progress:  Not documented in this visit.                                  PT Recommendation and Plan  Planned Therapy Interventions (PT): balance training, bed mobility training, gait training, strengthening, transfer training, neuromuscular re-education, patient/family education (add interventions PRN, as appropriate)  Therapy Frequency (PT): 2 times/wk (2-5x/week, PRN)  Outcome Evaluation: Pt seen for evaluation this date, pleasant and cooperative with therapy. Pt may benefit from further therapeutic intervention to address deficits of inability to ambulate independently, strength/endurance, and safety.       Time Calculation:    PT Charges       Row Name 04/10/24 1525             Time Calculation    Start Time 0915 -KH      PT Received On 04/10/24  -      PT Goal Re-Cert Due Date 04/24/24 -                User Key  (r) = Recorded By, (t) = Taken By, (c) = Cosigned By      Initials Name Provider Type    Mahnaz Hatch, PT Physical Therapist                  Therapy Charges for Today       Code Description Service Date Service Provider Modifiers Qty    11221840591 HC PT EVAL MOD COMPLEXITY 4 4/10/2024 Mahnaz Shaikh, PT GP 1            PT G-Codes  AM-PAC 6 Clicks Score (PT): 14    Mahnaz Shaikh PT  4/10/2024

## 2024-04-10 NOTE — PROGRESS NOTES
[facility] INFUSION  Regine Beckham is a  68 y.o. female receiving heparin infusion.     Therapy for (VTE/Cardiac):   Cardiac  Patient Dosing Weight: 63.5kg   Initial Bolus (Y/N):   Y  Any Bolus (Y/N):   Y        Signs or Symptoms of Bleeding: No     Cardiac or Other (Not VTE)   Initial Bolus: 60 units/kg (Max 4,000 units)  Initial rate: 12 units/kg/hr (Max 1,000 units/hr)   Anti Xa Rebolus Infusion Hold time Change infusion Dose (Units/kg/hr) Next Anti Xa or aPTT Level Due   < 0.11 50 Units/kg  (4000 Units Max) None Increase by  3 Units/kg/hr 6 hours   0.11- 0.19 25 Units/kg  (2000 Units Max) None Increase by  2 Units/kg/hr 6 hours   0.2 - 0.29 0 None Increase by  1 Units/kg/hr 6 hours   0.3 - 0.5 0 None No Change 6 hours (after 2 consecutive levels in range check q24h @0700)   0.51 - 0.6 0 None Decrease by  1 Units/kg/hr 6 hours   0.61 - 0.8 0 30 Minutes Decrease by  2 Units/kg/hr 6 hours   0.81 - 1 0 60 Minutes Decrease by  3 Units/kg/hr 6 hours   >1 0 Hold  After Anti Xa less than 0.5 decrease previous rate by  4 Units/kg/hr  Every 2 hours until Anti Xa  less than 0.5 then when infusion restarts in 6 hours         Recommend anti-Xa every 6 hours.          Date   Time   Anti-Xa Current Rate (Unit/kg/hr) Bolus   (Units) Rate Change   (Unit/kg/hr) New Rate (Unit/kg/hr) Next   Anti-Xa Comments  Pump Check Daily   04/09 2210 Pending  New start  3800 - 12 0430 Initiate infusion w/ bolus, no s/s bleeding per STALIN Batista    04/10 0500 0.68 12 - -2 10 1200 Hold x 30 min then decrease infusion rate by 2u/kg/hr, no s/s bleeding per STALIN Batista    4/10 1151 0.27 11 - +1 11 1930 Spoke with STALIN Baires. No s/s bleeding. Increase rate.                                                                                                                                                                                                            Pharmacy will continue to follow anti-Xa results and monitor for signs and symptoms of bleeding or  thrombosis.    Thank you.  Jinny Marx, Pharm.D.  4/10/2024  13:04 EDT

## 2024-04-10 NOTE — PLAN OF CARE
DYSARTHRIA AND COGNITIVE THERAPY PLAN OF CARE:    Regine Beckham will benefit from formal dysarthria and cognitive therapy x3-5 days per week at 15-60 minute sessions, as functionally tolerated, for 7-21 days, to address:    Long Term Goal:  Patient will demonstrate functional speech skills for return to discharge environment.   Patient will demonstrate functional cognitive skills for return to discharge environment.    Short Term Goals:  1. Patient will tolerate facial massage to left facial surface for 3-7 minutes to increase surface blood flow, sensation and ROM over 3 consecutive sessions.     2. Patient will perform OROM/GRACE exercises x3 sets x10 reps w/ min cues.    3. Patient will maintain vocal intensity at greater than 60dB across 4+ word sentences in 90% of opp over three consecutive sessions.     4. Patient will over-articulate 3+ syllable words and in connected speech in 90% opp to improve intelligibility over three consecutive sessions.      5. Patient will decrease rate of speech in connected speech in 90% of opp to improve intelligibility over three consecutive sessions.      6. Patient will demonstrate accurate orientation to time and location in 90% of opp independently over three consecutive sessions.     7. Patient will perform divergent naming tasks of 8+ items named in 1 min w/ min cues over three consecutive sessions.     *Additional goals to be added/modified per pt progress toward goals.     Thank you for allowing me to participate in the care of your patient-  Azalia Elizondo M.S., CCC-SLP

## 2024-04-10 NOTE — CASE MANAGEMENT/SOCIAL WORK
Discharge Planning Assessment  [facility]     Patient Name: Regine Beckham  MRN: [id number]  Today's Date: 4/10/2024    Admit Date: 4/9/2024    Plan: Pt admitted to observation from [facility] inpt rehab.  SS received consult per nsg for discharge planning.  SS noted SS notes in IPR that nursing home placement had been attempted in IPR.  From SW notes [facility] has denied and [facility] evaluating pt for possible admit.  SS discussed pt with Lead SW and Exec Director.  SS spoke with [facility] with [facility] this am who stated that pt has an outstanding balance at [facility] and that [facility] Supervisors would need to discuss whether pt could be readmitted to [facility] facilities.  SS spoke with pt at bedside.  Pt aware of her current location at [facility], who current president is and the SW in Rehab was attempting placement at [facility].  Pt confirmed that she has no family or appointed decision maker if needed.  Pt stated she has friend Teo Sanchez at [phone].  Pt stated her goal is to regain her ability to ambulate to be able to return home alone.  SS will follow and assist with discharge plans.   Discharge Needs Assessment       Row Name 04/10/24 1510       Living Environment    People in Home alone    Unique Family Situation No family.    Current Living Arrangements home    Potentially Unsafe Housing Conditions none    In the past 12 months has the electric, gas, oil, or water company threatened to shut off services in your home? No    Primary Care Provided by self    Provides Primary Care For no one    Caregiving Concerns No caregiver/No family    Family Caregiver if Needed none    Quality of Family Relationships non-existent       Resource/Environmental Concerns    Resource/Environmental Concerns none       Transition Planning    Patient/Family Anticipates Transition to long-term care facility    Patient/Family Anticipated Services at Transition     Transportation  Anticipated public transportation       Discharge Needs Assessment    Equipment Currently Used at Home commode;hospital bed;shower chair    Concerns Comments Pt lives alone and has no caregiver                   Discharge Plan       Row Name 04/10/24 1516       Plan    Plan Pt admitted to observation from [facility] inpt rehab.  SS received consult per nsg for discharge planning.  SS noted SS notes in IPR that nursing home placement had been attempted in IPR.  From SW notes [facility] has denied and [facility] evaluating pt for possible admit.  SS discussed pt with Lead SW and Exec Director.  SS spoke with [facility] with [facility] this am who stated that pt has an outstanding balance at [facility] and that [facility] Supervisors would need to discuss whether pt could be readmitted to [facility] facilities.  SS spoke with pt at bedside.  Pt aware of her current location at [facility], who current president is and the SW in Rehab was attempting placement at [facility].  Pt confirmed that she has no family or appointed decision maker if needed.  Pt stated she has friend Teo Sanchez at [phone].  Pt stated her goal is to regain her ability to ambulate to be able to return home alone.  SS will follow and assist with discharge plans.                  Continued Care and Services - Admitted Since 4/9/2024    No active coordination exists for this encounter.                Expected Discharge Date and Time       Expected Discharge Date Expected Discharge Time    Apr 10, 2024            Demographic Summary       Row Name 04/10/24 1500       General Information    Admission Type observation    Arrived From other (see comments)  [facility] in rehab    Referral Source nursing    Reason for Consult discharge planning    Preferred Language English                    Meron Garcia, BSW

## 2024-04-10 NOTE — SIGNIFICANT NOTE
04/10/24 1523   OTHER   Discipline speech language pathologist   Rehab Time/Intention   Session Not Performed other (see comments)  (Pt continues NPO awaiting cardiology procedure and is unable to accept po presentations for dysphagia assessment. SLP to defer assessment pending pt clearance to accept po trials.)

## 2024-04-10 NOTE — PROGRESS NOTES
Patient Assessment Instrument  Quality Indicators - Discharge FY 2023    Section A. Transportation  Issues Due to Lack of Transportation:  No    Section A. Medication List        Section B. Health Literacy      Section C. BIMS      Section C. Signs and Symptoms of Delirium (from CAM)      Section D. Mood      Section D. Social Isolation      Section GG. Self-Care Performance      Section GG. Mobility Performance      Section J. Health Conditions Discharge  Fall(s) Since Admission:  No    Section J. Health Conditions (Pain)      Section K. Swallowing/Nutritional Status  Nutritional Approaches Past 7 Days:  Mechanically altered diet - require change  in texture of food or liquids (e.g., pureed food, thickened liquids)  Nutritional Approaches at Discharge:  Mechanically altered diet - require change  in texture of food or liquids (e.g., pureed food, thickened liquids)    Section M. Skin Conditions Discharge      . Current Number of Unhealed Pressure Ulcers      Section N. Medication    Medication Intervention: Not applicable - There were no potential clinically  significant medication issues identified since admission or patient is not  taking any medications.  Patient is taking medications in the following pharmacological classification:  E. Anticoagulant An indication is noted for all medications in the Anticoagulant  drug class. I. Antiplatelet An indication is noted for all medications in the  Antiplatelet drug class.    Section O. Special Treatments, Procedures, and Programs  None    Signed by: Suad Euceda, Supervisor

## 2024-04-10 NOTE — THERAPY DISCHARGE NOTE
"Inpatient Rehabilitation - Physical Therapy Treatment Note/Discharge   [facility]     Patient Name: Regine Beckham  : 1955  MRN: [id number]  Today's Date: 4/10/2024                Admit Date: 3/27/2024    Visit Dx:  No diagnosis found.  Patient Active Problem List   Diagnosis    Iron deficiency anemia due to chronic blood loss    Major depressive disorder    RLS (restless legs syndrome)    GERD (gastroesophageal reflux disease)    Left breast mass    Allergy to penicillin    Stroke    Grade I diastolic dysfunction    Moderate malnutrition    Falls frequently    Stroke-like symptoms    Debility    Chest pain     Past Medical History:   Diagnosis Date    Anemia     Anxiety     Arthritis     Depression     Legally blind     Restless leg syndrome     Sleep apnea     \"I don't use a cpap anymore since losing 106 lbs\"    Water retention      Past Surgical History:   Procedure Laterality Date    BACK SURGERY      CARDIAC CATHETERIZATION N/A 2024    Procedure: Percutaneous Manual Thrombectomy;  Surgeon: Efrain Hurley MD;  Location:  [facility];  Service: Interventional Radiology;  Laterality: N/A;    GALLBLADDER SURGERY      HIP ARTHROSCOPY      JOINT REPLACEMENT Right        PT ASSESSMENT (Last 12 Hours)       IRF PT Evaluation and Treatment       Row Name 04/10/24 1200          PT Time and Intention    Document Type other (see comments)  discharge summary  -RM     Mode of Treatment physical therapy  -RM     Patient/Family/Caregiver Comments/Observations Pt D/C from IRF due to medical status change.  -RM       Row Name 04/10/24 1200          Bed Mobility Goal 1 (PT-IRF)    Progress/Outcomes (Bed Mobility Goal 1, PT-IRF) goal ongoing  -RM       Row Name 04/10/24 1200          Transfer Goal 1 (PT-IRF)    Progress/Outcomes (Transfer Goal 1, PT-IRF) goal ongoing  -RM       Row Name 04/10/24 1200          Gait/Walking Locomotion Goal 1 (PT-IRF)    Progress/Outcomes (Gait/Walking Locomotion " Goal 1, PT-IRF) goal ongoing  -RM               User Key  (r) = Recorded By, (t) = Taken By, (c) = Cosigned By      Initials Name Provider Type    Lynda Myers, TK Physical Therapist Assistant                    Physical Therapy Education       Title: PT OT SLP Therapies (Resolved)       Topic: Physical Therapy (Resolved)       Point: Mobility training (Resolved)       Learning Progress Summary             Patient Acceptance, E,D, VU,NR by MV at 4/9/2024 1531    Acceptance, E,D, NR,VU by MV at 4/8/2024 1519    Acceptance, E,D, VU,NR by MV at 4/5/2024 1423    Acceptance, E,D, VU,NR by LL at 4/4/2024 1410    Acceptance, E,D, VU,NR by LL at 4/3/2024 1531    Acceptance, E,D, VU,NR by LL at 4/2/2024 1508    Acceptance, E,D, VU,NR by LL at 4/1/2024 1614    Acceptance, E,TB, VU,DU by  at 3/30/2024 1204    Acceptance, E,D, NR by AG at 3/28/2024 1625                         Point: Home exercise program (Resolved)       Learning Progress Summary             Patient Acceptance, E,D, VU,NR by MV at 4/9/2024 1531    Acceptance, E,D, NR,VU by MV at 4/8/2024 1519    Acceptance, E,D, VU,NR by MV at 4/5/2024 1423    Acceptance, E,D, VU,NR by LL at 4/4/2024 1410    Acceptance, E,D, VU,NR by LL at 4/3/2024 1531    Acceptance, E,D, VU,NR by LL at 4/2/2024 1508    Acceptance, E,D, VU,NR by LL at 4/1/2024 1614    Acceptance, E,TB, VU,DU by HC at 3/30/2024 1204    Acceptance, E,D, NR by AG at 3/28/2024 1625                         Point: Body mechanics (Resolved)       Learning Progress Summary             Patient Acceptance, E,D, VU,NR by MV at 4/9/2024 1531    Acceptance, E,D, NR,VU by MV at 4/8/2024 1519    Acceptance, E,D, VU,NR by MV at 4/5/2024 1423    Acceptance, E,D, VU,NR by LL at 4/4/2024 1410    Acceptance, E,D, VU,NR by LL at 4/3/2024 1531    Acceptance, E,D, VU,NR by LL at 4/2/2024 1508    Acceptance, E,D, VU,NR by LL at 4/1/2024 1614    Acceptance, E,TB, VU,DU by HC at 3/30/2024 1204    Acceptance, E,D, NR  by AG at 3/28/2024 1625                         Point: Precautions (Resolved)       Learning Progress Summary             Patient Acceptance, E,D, VU,NR by MV at 4/9/2024 1531    Acceptance, E,D, NR,VU by MV at 4/8/2024 1519    Acceptance, E,D, VU,NR by MV at 4/5/2024 1423    Acceptance, E,D, VU,NR by LL at 4/4/2024 1410    Acceptance, E,D, VU,NR by LL at 4/3/2024 1531    Acceptance, E,D, VU,NR by LL at 4/2/2024 1508    Acceptance, E,D, VU,NR by LL at 4/1/2024 1614    Acceptance, E,TB, VU,DU by  at 3/30/2024 1204    Acceptance, E,D, NR by AG at 3/28/2024 1625                                         User Key       Initials Effective Dates Name Provider Type Discipline    AG 06/16/21 -  Melanie Anne, PT Physical Therapist PT    LL 05/02/16 -  Ruthie Travis, PTA Physical Therapist Assistant PT    HC 01/20/23 -  Desi Fraga, TK Physical Therapist Assistant PT    MV 04/05/24 -  Antonio Peck PTA Student PTA Student PT                    PT Recommendation and Plan                  Time Calculation:           PT G-Codes  AM-PAC 6 Clicks Score (PT): 14         Lynda Rea, PTA  4/10/2024

## 2024-04-10 NOTE — THERAPY EVALUATION
Inpatient Rehabilitation - Speech Language Pathology Initial Evaluation  [facility]  Speech - Language - Cognitive Evaluation     Patient Name: Regine Beckham  : 1955  MRN: [id number]  Today's Date: 4/10/2024Onset of Illness/Injury or Date of Surgery: 24 (admission date)    Admit Date: 2024  Regine Beckham was seen this pm on 3S-315A to participate in s/l and cognitive assessment for safety/function in adls. She was positioned reclined in bed to cooperatively participate. All results and observations of this evaluation are felt to be representative of patient's current functional status.    She has a medical hx significant for prior CVA in January of this year with right internal carotid artery disease status post thrombectomy and stent placement, anxiety, depression, and is legally blind per macular degeneration.       Following initial CVA in January, Ms Beckham was hospitalized at [facility] and participated in multiple instrumental dysphagia evaluations per oropharyngeal dysphagia. She was able to advanced to soft to chew textures, chopped meats, and thin liquids per FEES on 24. She reports working with speech therapy at the skilled nursing facility she was discharged to.      Ms Beckham presented to [facility] from her home via EMS following frequent falls at home w/ increasing weakness. She was admitted to [facility] acute care on 3/17/24. No new infarct was noted on MRI.     She was evaluated by SLP department of [facility] via [id number] on 3/18/24 and was evidenced w/ mild speech, language, and cognitive deficits as a result of her recent CVA.     She has participated in SLC therapy across admission to [facility] IPR unit to address dysarthria and cognitive deficits.     She is now admitted to acute care following development of chest pain on evening of 24.    Social History     Socioeconomic History    Marital status:     Number of children: 0    Years of education: 1 yr college   Tobacco  "Use    Smoking status: Former     Current packs/day: 1.50     Average packs/day: 1.5 packs/day for 51.0 years (76.5 ttl pk-yrs)     Types: Cigarettes     Passive exposure: Past    Smokeless tobacco: Never   Vaping Use    Vaping status: Never Used   Substance and Sexual Activity    Alcohol use: Not Currently     Alcohol/week: 1.0 standard drink of alcohol     Types: 1 Glasses of wine per week     Comment: \"occasionally - once every two months\"    Drug use: Not Currently     Comment: alcohol - occasionally    Sexual activity: Defer        Diet Orders (active) (From admission, onward)       Start     Ordered    03/27/24 1549  Diet: Regular/House; Texture: Soft to Chew (NDD 3); Soft to Chew: Chopped Meat; Fluid Consistency: Thin (IDDSI 0)  Diet Effective Now         03/27/24 1549                    Ms Beckham was observed on room air w/o complications across this evaluation.     Receptive language skills were intact. Ms Beckham was able to id common objects and personal body parts, follow simple and multi-step directives, understand complex \"wh\" questions and participate in conversational exchange w/o difficulties other than dysarthric speech.    Expressive language skills were intact. She was able to complete automatic speech tasks, confrontation naming tasks, complete complex oe sentences, respond to complet oe \"wh\" questions, repeat at word/sentence and multiple digit levels, as well as participate in complex conversational exchange. No aphasia, anomia or verbal apraxia evidenced.    Ms Beckham demonstrated mild to moderate deficits related to orientation to time and location, and thought organization in divergent organization. All other cognitive realms were wfl.     Facial/oral structures were asymmetrical upon observation w/ mild to moderate left facial droop evidenced. She additionally endorses decreased sensation of her left facial area. Oral mucosa were moist, pink and clean. Secretions were clear, thin, and controlled. " "OROM/GRACE was generally weak w/ primarily weakness noted of left side during imitation of oral postures. Trace to mild lingual deviation upon protrusion to the left was demonstrated. Speech intensity was mildly decreased w/ mild to moderately dysarthric speech negatively impacting intelligibility in multisyllabic words and sentences. No oral apraxia noted.    Graphic and reading skills were intact, premorbid baseline status. She was able to id isolated letters, simple, and moderate words, as well as sentences and small paragraphs. Signature was legible.    Pragmatic skills were WFL w/ appropriate eye gaze patterns and visual tracking. Language was appropriate in context w/ topic initiation and maintenance independently. No neglect evidenced. Humor response was intact w/ appropriate affect.    Visit Dx:  No diagnosis found.  Patient Active Problem List   Diagnosis    Iron deficiency anemia due to chronic blood loss    Major depressive disorder    RLS (restless legs syndrome)    GERD (gastroesophageal reflux disease)    Left breast mass    Allergy to penicillin    Stroke    Grade I diastolic dysfunction    Moderate malnutrition    Falls frequently    Stroke-like symptoms    Debility    Chest pain     Past Medical History:   Diagnosis Date    Anemia     Anxiety     Arthritis     Depression     Legally blind     Restless leg syndrome     Sleep apnea     \"I don't use a cpap anymore since losing 106 lbs\"    Water retention      Past Surgical History:   Procedure Laterality Date    BACK SURGERY      CARDIAC CATHETERIZATION N/A 1/19/2024    Procedure: Percutaneous Manual Thrombectomy;  Surgeon: Efrain Hurley MD;  Location: [facility] INVASIVE LOCATION;  Service: Interventional Radiology;  Laterality: N/A;    GALLBLADDER SURGERY      HIP ARTHROSCOPY      JOINT REPLACEMENT Right      Impression:      Ms Beckham presents w/ a continued mild to moderate dysarthria and mild cognitive deficits related to orientation to time and " location, and thought organization in divergent organization. Per her recent CVA in January, she is felt to most benefit from formal therapy to address these noted deficits.     SLP Recommendation and Plan     Formal dysarthria and cognitive therapy.     D/w patient results and recommendations w/ verbal understanding and agreement.    D/w RN results and recommendations w/ verbal understanding and agreement.     Thank you for allowing me to participate in the care of your patient-  Azalia Elizondo M.S., CCC-SLP         EDUCATION  The patient has been educated in the following areas:     Cognitive Impairment Communication Impairment.        Time Calculation:      Time Calculation- SLP       Row Name 04/10/24 1026             Time Calculation- SLP    SLP - Next Appointment 04/11/24  -                User Key  (r) = Recorded By, (t) = Taken By, (c) = Cosigned By      Initials Name Provider Type    Azalia Bunch MS CCC-SLP Speech and Language Pathologist                      Azalia Elizondo MS CCC-SLP  4/10/2024

## 2024-04-10 NOTE — PLAN OF CARE
Goal Outcome Evaluation:   Pt admitted to [address] [place] [address] from inpatient rehab overnight. VSS on RA, A fib/flutter on the monitor. No indicators of acute distress noted. Pt denies chest pain. Heparin drip infusing.

## 2024-04-10 NOTE — PAYOR COMM NOTE
"Suad Euceda RN  Utilization Review Nurse for Inpatient Rehab  Phone: [phone]  Fax: [phone]  Email: [email or web address]  [facility]  Facility NPI: [id number]    DISCHARGE NOTIFICATION WITH DISCHARGE SUMMARY  Member: Regine Beckham  : 1955  AUTH# [id number]  ID# [id number]  Admission Date: 24  Discharge Date:  24  Disposition:  Acute Medical Floor at [facility] due to medical status change.    Regine Beckham \"NEGIN\" (68 y.o. Female)       Date of Birth   1955    Social Security Number       Address   [address] [place] [address] [address] [place] [address]    Home Phone   [phone]    MRN   [id number]       [facility]    Marital Status                               Admission Date   3/27/24    Admission Type   Elective    Admitting Provider   Reji Hayes MD    Attending Provider       Department, Room/Bed   Psychiatric REHABILITATION, 106/1S       Discharge Date   2024    Discharge Disposition   [facility] ([facility] - External)    Discharge Destination   Other                              Attending Provider: (none)   Allergies: Penicillins    Isolation: None   Infection: None   Code Status: CPR    Ht: 167.6 cm (66\")   Wt: 63.5 kg (140 lb)    Admission Cmt: None   Principal Problem: Debility [R53.81]                   [facility] Encounter Date/Time: 3/27/2024 [facility]   Hospital Account: [id number]    MRN: [id number]   Patient:  Regine Beckham   Contact Serial #: [id number]   SSN:          ENCOUNTER             Patient Class: Rehab IP   Unit: [facility] Service: Physical Medicine and Rehabilitation     Bed: 106/1S   Admitting Provider: Reji Hayes MD   Referring Physician: Reji Hayes   Attending Provider:     Adm Diagnosis: Debility [R53.81]      PATIENT                 Name: Regine Beckham : 1955 (68 yrs)   Address: [address] [place] [address], [address] Sex: Female   City: [address]"   Marital Status:          Ethnicity: NOT                Race: WHITE   Primary Care Provider: Dread Burton MD         Primary Phone: [phone]   EMERGENCY CONTACT   Contact Name Legal Guardian? Relationship to Patient Home Phone Work Phone   1. Yaa Elizondo  2. Karla Patel      Friend  Friend [phone]            GUARANTOR            Guarantor: Regine Lock     : 1955   Address: [address] [place] [address];[place] [address] Sex: Female     [place] [id number]     Relation to Patient: Self       Home Phone: [phone]   Guarantor ID: [id number]       Work Phone:     GUARANTOR EMPLOYER   Employer:           Status: RETIRED      COVERAGE          PRIMARY INSURANCE   Payor: [id number] MEDICARE REPLACEMENT Plan: AET"Mobile Location, IP" MEDICARE REPLACEMENT   Group Number: [id number] Insurance Type: INDEMNITY   Subscriber Name: REGINE LOCK Subscriber : 1955   Subscriber ID: [id number] Coverage Address: [place] [address]   Pre-Certification #: Pre-cert number: N/A Authorization #:     Pat. Rel. 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  She underwent thrombectomy and carotid stent placement on .  She was thought to be outside the window for thrombolytics.  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  -Tobacco use, counseled to quit  -Chest pain with dynamic EKG changes suggestive of possible ischemia      Consultants:  None     Procedures:  None     Exam: Patient was seen with RN while in the rehab unit, she is lying in bed, left arm is poorly mobile which is not new, right arm moves well, lungs are clear heart regular rate and rhythm abdomen is soft and benign no edema, vital signs: 123/64, 69, room air saturation 99%     Hospital course: Patient was admitted with debility after having been in the acute hospital stay complicated by previous CVA with very poor left arm function, left leg moves better but still had some weakness there.  Patient had fallen 5 days in a row at home and finally EMS had been called.  Patient has participated with all 3 modalities of therapy while here.  Initially with PT, her quads anterior tib and psoas were thought to be 2/5, right lower extremity was 4/5.  Patient was mod to min for bed mobility, mod assist with transfers, she ambulated mod assist initially.  Attempted ambulation with right handhold assist as well as left platform rolling walker, patient great difficulty managing walker even with the therapist assisting with propulsion.  She initially walked 40 feet x 3.  With OT initially, she was max assist with bathing, dependent upper body dressing, max assist lower body dressing, mod assist grooming, total assist for toileting hygiene, set up for self-feeding.  Speech therapy is continue to follow for both dysphagia and dysarthria.  Patient is tolerating soft to chew whole meat thin liquid diet.  Her dysarthria is improving.  Patient now with PT patient is min to contact-guard for bed mobility, mod assist for transfers.  Today she was able to walk 40 feet with a hemiwalker, then 10 feet with a hemiwalker, then 10 feet handhold assist then 5 foot handhold assist, two-person mod assist.  With OT, she is mod assist for toilet transfer, max to dependent with toileting hygiene, max assist with upper body dressing, set up for self-feeding.  It was thought that despite the patient  making some progress that she would not be able to return home alone and  has been seeking nursing home placement.  Tonight patient developed chest pain.  Patient states that it was different than her left arm pain and she does not remember having this pain before.  This was left upper chest that she felt like a very deep ache.  This was not pleuritic in nature.  We did place nitroglycerin ointment on the patient and this relieved her pain.  Patient has no history of coronary disease but she does have a history of carotid disease as above.  She is already on DAPT and statin.  Troponin was normal but I checked an EKG and there is what I would consider dynamic EKG changes laterally with T wave inversion.  I reviewed multiple previous EKGs and did not see this present.  She is at high risk for coronary disease with already known vascular disease and other risk factors.  This being the case I did not think it was prudent to continue to watch the patient on the rehabilitation unit considering we cannot use telemetry.  I have contacted the hospitalist service Dr. Su and discussed the case with him.  Patient has agreed except the patient in transfer to the telemetry unit.  I did review the patient's chest x-ray.  The official report is pending and this is probably just some confluence of shadows but cannot totally rule out until the official report any left upper lobe disease.  Dr. Su is going to check some baseline labs and inflammatory markers and we will follow-up on the chest x-ray when back.  Patient's condition on transfer is stable.  Pain is improved.  Dr. Su and the hospitalist service help appreciated.     Disposition: [facility] telemetry unit care of Dr. Su.     Medications: See transfer MAR     This was a greater than 30-minute discharge

## 2024-04-10 NOTE — PLAN OF CARE
Goal Outcome Evaluation:              Outcome Evaluation: Pt seen for evaluation this date, pleasant and cooperative with therapy. Pt may benefit from further therapeutic intervention to address deficits of inability to ambulate independently, strength/endurance, and safety.    Anticipated D/C dispo pendning

## 2024-04-11 ENCOUNTER — APPOINTMENT (OUTPATIENT)
Dept: CT IMAGING | Facility: HOSPITAL | Age: 69
End: 2024-04-11
Payer: MEDICARE

## 2024-04-11 LAB — UFH PPP CHRO-ACNC: 0.39 IU/ML (ref 0.3–0.7)

## 2024-04-11 PROCEDURE — 92526 ORAL FUNCTION THERAPY: CPT

## 2024-04-11 PROCEDURE — 97530 THERAPEUTIC ACTIVITIES: CPT | Performed by: OCCUPATIONAL THERAPIST

## 2024-04-11 PROCEDURE — 94799 UNLISTED PULMONARY SVC/PX: CPT

## 2024-04-11 PROCEDURE — 75574 CT ANGIO HRT W/3D IMAGE: CPT

## 2024-04-11 PROCEDURE — 97112 NEUROMUSCULAR REEDUCATION: CPT | Performed by: OCCUPATIONAL THERAPIST

## 2024-04-11 PROCEDURE — 25010000002 HEPARIN (PORCINE) 25000-0.45 UT/250ML-% SOLUTION

## 2024-04-11 PROCEDURE — 92610 EVALUATE SWALLOWING FUNCTION: CPT

## 2024-04-11 PROCEDURE — 99214 OFFICE O/P EST MOD 30 MIN: CPT | Performed by: INTERNAL MEDICINE

## 2024-04-11 PROCEDURE — 99232 SBSQ HOSP IP/OBS MODERATE 35: CPT | Performed by: STUDENT IN AN ORGANIZED HEALTH CARE EDUCATION/TRAINING PROGRAM

## 2024-04-11 PROCEDURE — 97530 THERAPEUTIC ACTIVITIES: CPT

## 2024-04-11 PROCEDURE — 75574 CT ANGIO HRT W/3D IMAGE: CPT | Performed by: RADIOLOGY

## 2024-04-11 PROCEDURE — G0378 HOSPITAL OBSERVATION PER HR: HCPCS

## 2024-04-11 PROCEDURE — 25510000001 IOPAMIDOL PER 1 ML: Performed by: STUDENT IN AN ORGANIZED HEALTH CARE EDUCATION/TRAINING PROGRAM

## 2024-04-11 PROCEDURE — 92507 TX SP LANG VOICE COMM INDIV: CPT

## 2024-04-11 PROCEDURE — 97535 SELF CARE MNGMENT TRAINING: CPT | Performed by: OCCUPATIONAL THERAPIST

## 2024-04-11 PROCEDURE — 85520 HEPARIN ASSAY: CPT | Performed by: STUDENT IN AN ORGANIZED HEALTH CARE EDUCATION/TRAINING PROGRAM

## 2024-04-11 PROCEDURE — 25810000003 SODIUM CHLORIDE 0.9 % SOLUTION: Performed by: STUDENT IN AN ORGANIZED HEALTH CARE EDUCATION/TRAINING PROGRAM

## 2024-04-11 RX ORDER — SODIUM CHLORIDE 9 MG/ML
100 INJECTION, SOLUTION INTRAVENOUS CONTINUOUS
Status: DISCONTINUED | OUTPATIENT
Start: 2024-04-11 | End: 2024-04-15

## 2024-04-11 RX ADMIN — MIRTAZAPINE 30 MG: 15 TABLET, FILM COATED ORAL at 20:34

## 2024-04-11 RX ADMIN — AMLODIPINE BESYLATE 2.5 MG: 5 TABLET ORAL at 08:51

## 2024-04-11 RX ADMIN — HEPARIN SODIUM 11 UNITS/KG/HR: 10000 INJECTION, SOLUTION INTRAVENOUS at 07:09

## 2024-04-11 RX ADMIN — DOCUSATE SODIUM 50 MG AND SENNOSIDES 8.6 MG 2 TABLET: 8.6; 5 TABLET, FILM COATED ORAL at 20:35

## 2024-04-11 RX ADMIN — GABAPENTIN 200 MG: 100 CAPSULE ORAL at 20:34

## 2024-04-11 RX ADMIN — Medication 10 ML: at 08:52

## 2024-04-11 RX ADMIN — Medication 10 ML: at 20:35

## 2024-04-11 RX ADMIN — DOCUSATE SODIUM 50 MG AND SENNOSIDES 8.6 MG 2 TABLET: 8.6; 5 TABLET, FILM COATED ORAL at 08:51

## 2024-04-11 RX ADMIN — SODIUM CHLORIDE 100 ML/HR: 9 INJECTION, SOLUTION INTRAVENOUS at 10:28

## 2024-04-11 RX ADMIN — DICLOFENAC SODIUM 4 G: 10 GEL TOPICAL at 20:35

## 2024-04-11 RX ADMIN — IOPAMIDOL 90 ML: 755 INJECTION, SOLUTION INTRAVENOUS at 10:28

## 2024-04-11 RX ADMIN — PANTOPRAZOLE SODIUM 40 MG: 40 TABLET, DELAYED RELEASE ORAL at 05:45

## 2024-04-11 RX ADMIN — ATORVASTATIN CALCIUM 80 MG: 40 TABLET, FILM COATED ORAL at 20:34

## 2024-04-11 RX ADMIN — LOSARTAN POTASSIUM 50 MG: 50 TABLET, FILM COATED ORAL at 08:51

## 2024-04-11 RX ADMIN — Medication 1 MG: at 08:51

## 2024-04-11 RX ADMIN — DICLOFENAC SODIUM 4 G: 10 GEL TOPICAL at 17:14

## 2024-04-11 RX ADMIN — GABAPENTIN 200 MG: 100 CAPSULE ORAL at 08:51

## 2024-04-11 RX ADMIN — METOPROLOL TARTRATE 12.5 MG: 25 TABLET, FILM COATED ORAL at 20:34

## 2024-04-11 RX ADMIN — ASPIRIN 81 MG: 81 TABLET, CHEWABLE ORAL at 08:51

## 2024-04-11 RX ADMIN — ROPINIROLE HYDROCHLORIDE 4 MG: 1 TABLET, FILM COATED ORAL at 20:34

## 2024-04-11 RX ADMIN — TICAGRELOR 60 MG: 60 TABLET ORAL at 08:51

## 2024-04-11 RX ADMIN — OXYBUTYNIN CHLORIDE 10 MG: 5 TABLET, EXTENDED RELEASE ORAL at 08:51

## 2024-04-11 RX ADMIN — TICAGRELOR 60 MG: 60 TABLET ORAL at 20:34

## 2024-04-11 RX ADMIN — DICLOFENAC SODIUM 4 G: 10 GEL TOPICAL at 08:52

## 2024-04-11 RX ADMIN — METOPROLOL TARTRATE 12.5 MG: 25 TABLET, FILM COATED ORAL at 08:51

## 2024-04-11 NOTE — DISCHARGE PLACEMENT REQUEST
"Ashvin Regine Alexander \"NEGIN\" (68 y.o. Female)       Date of Birth   1955    Social Security Number       Address   [address] [place], [place] [address]    Home Phone   [phone]    MRN   [id number]       [facility]    Marital Status                               Admission Date   24    Admission Type   Urgent    Admitting Provider   Capo Su MD    Attending Provider   Anh Valdez DO    Department, Room/Bed   [address], 3315/1S       Discharge Date       Discharge Disposition       Discharge Destination                                 Attending Provider: Anh Valdez DO    Allergies: Penicillins    Isolation: None   Infection: None   Code Status: CPR    Ht: 167.6 cm (66\")   Wt: 70.1 kg (154 lb 8 oz)    Admission Cmt: None   Principal Problem: Chest pain [R07.9]                   Active Insurance as of 2024       Primary Coverage       Payor Plan Insurance Group Employer/Plan Group    AETNA MEDICARE REPLACEMENT AETNA MEDICARE REPLACEMENT [id number]       Payor Plan Address Payor Plan Phone Number Payor Plan Fax Number Effective Dates    [address] [phone]  2024 - None Entered    [facility] [id number]         Subscriber Name Subscriber Birth Date Member ID       ASHVINRGEINEELENI AHMADI 1955 [id number]                     Emergency Contacts        (Rel.) Home Phone Work Phone Mobile Phone    Yaa Elizondo (Friend) [phone] --    Karla Patel (Friend) [phone] -- --              Insurance Information                  AETNA MEDICARE REPLACEMENT/AETNA MEDICARE REPLACEMENT Phone: [phone]    Subscriber: AshvinRegine Subscriber#: [id number]    Group#: [id number] Precert#: --             History & Physical        Capo Su MD at 24          Hospitalist History and Physical        Patient Identification  Name: Regine Beckham  Age/Sex: 68 y.o. female  :  1955   " "     MRN: [id number]  Visit Number: [id number]  Admit Date: 4/9/2024   PCP: Dread Burton MD          Chief complaint chest pain    History of Present Illness:  Patient is a 68 y.o. female with history of anxiety/depression, anemia, arthritis, legally blind, restless leg syndrome, sleep apnea no longer on CPAP after losing 100+ lb, \"water retention\" but no documented history of CHF, and stroke with residual left sided weakness, at which time workup revealed right internal carotid artery stenosis s/p thrombectomy and stent placement on 1/19/24. She was recently admitted to our service from 3/17-3/27/24 for acute physical deconditioning and frequent falls as a consequence of her prior stroke. Afterwards she was transferred to inpatient rehab. This evening she complained of left sided chest pain which she described as heaviness/pressure in sensation, without radiation or associated dyspnea, nausea or diaphoresis. Pain was relieved by nitropaste. Troponin was negative, but EKG showed T wave inversions in anterolateral leads concerning for acute ischemia. She has been transferred back to the hospitalist service for continuous telemetry monitoring and further workup for cardiac ischemia. Patient is now on [facility]. She reports chest pain is improved with only minimal residual discomfort. She denies shortness of breath or increased lower extremity edema from baseline. She has no other complaints.     Review of Systems  Review of Systems   Constitutional:  Negative for activity change, appetite change, chills, diaphoresis, fatigue, fever and unexpected weight change.   HENT:  Negative for congestion, postnasal drip, rhinorrhea, sinus pressure, sinus pain and sore throat.    Eyes:  Negative for photophobia and visual disturbance.   Respiratory:  Negative for cough, shortness of breath and wheezing.    Cardiovascular:  Positive for chest pain. Negative for palpitations and leg swelling.   Gastrointestinal:  Negative for " "abdominal distention, abdominal pain, constipation, diarrhea, nausea and vomiting.   Genitourinary:  Negative for difficulty urinating, flank pain, frequency and hematuria.   Musculoskeletal:  Positive for arthralgias (intermittent left shoulder pain, chronic since stroke). Negative for back pain, joint swelling, myalgias, neck pain and neck stiffness.   Skin:  Negative for color change, pallor, rash and wound.   Neurological:  Positive for weakness (residual left sided weakness since stroke). Negative for dizziness, tremors, seizures, syncope, light-headedness, numbness and headaches.   Hematological:  Negative for adenopathy. Does not bruise/bleed easily.   Psychiatric/Behavioral:  Negative for agitation, behavioral problems and confusion.        History  Past Medical History:   Diagnosis Date    Anemia     Anxiety     Arthritis     Depression     Legally blind     Restless leg syndrome     Sleep apnea     \"I don't use a cpap anymore since losing 106 lbs\"    Water retention      Past Surgical History:   Procedure Laterality Date    BACK SURGERY      CARDIAC CATHETERIZATION N/A 1/19/2024    Procedure: Percutaneous Manual Thrombectomy;  Surgeon: Efrain Hurley MD;  Location: [facility] INVASIVE LOCATION;  Service: Interventional Radiology;  Laterality: N/A;    GALLBLADDER SURGERY      HIP ARTHROSCOPY      JOINT REPLACEMENT Right      Family History   Problem Relation Age of Onset    Breast cancer Neg Hx      Social History     Tobacco Use    Smoking status: Every Day     Current packs/day: 1.50     Average packs/day: 1.5 packs/day for 51.0 years (76.5 ttl pk-yrs)     Types: Cigarettes    Smokeless tobacco: Never   Vaping Use    Vaping status: Never Used   Substance Use Topics    Alcohol use: Yes     Alcohol/week: 1.0 standard drink of alcohol     Types: 1 Glasses of wine per week     Comment: \"occasionally - once every two months\"    Drug use: Not Currently     Comment: alcohol - occasionally     Medications " Prior to Admission   Medication Sig Dispense Refill Last Dose    aspirin 81 MG EC tablet Take 1 tablet by mouth Daily.       atorvastatin (LIPITOR) 80 MG tablet Take 1 tablet by mouth Every Night. 90 tablet 0     gabapentin (NEURONTIN) 300 MG capsule Take 1 capsule by mouth 3 (Three) Times a Day.       losartan (COZAAR) 50 MG tablet Take 1 tablet by mouth Daily. Indications: High Blood Pressure Disorder       Mirabegron ER (MYRBETRIQ) 50 MG tablet sustained-release 24 hour 24 hr tablet Take 50 mg by mouth Daily. Indications: Urinary Urgency       mirtazapine (REMERON) 30 MG tablet Take 1 tablet by mouth Every Night. Indications: Major Depressive Disorder 30 tablet 0     pantoprazole (PROTONIX) 40 MG EC tablet TAKE 1 TABLET BY MOUTH EVERY MORNING FOR HEARTBURN 90 tablet 0     rOPINIRole (REQUIP) 4 MG tablet Take 1 tablet by mouth Every Night. Take 1 hour before bedtime.  Indications: Restless Leg Syndrome 30 tablet 0     ticagrelor (BRILINTA) 60 MG tablet tablet Take 1 tablet by mouth 2 (Two) Times a Day. 60 tablet 0     zolpidem (AMBIEN) 5 MG tablet Take 1 tablet by mouth At Night As Needed for Sleep. Indications: Trouble Sleeping 5 tablet 0      Allergies:  Penicillins    Objective    Vital Signs  Temp:  [98 °F (36.7 °C)-98.3 °F (36.8 °C)] 98 °F (36.7 °C)  Heart Rate:  [61-88] 69  Resp:  [16-18] 18  BP: (123-162)/(59-83) 123/64  There is no height or weight on file to calculate BMI.    Physical Exam:  Physical Exam  Constitutional:       General: She is not in acute distress.     Appearance: Normal appearance. She is not ill-appearing.   HENT:      Head: Normocephalic and atraumatic.      Right Ear: External ear normal.      Left Ear: External ear normal.      Nose: Nose normal.      Mouth/Throat:      Mouth: Mucous membranes are moist.      Pharynx: Oropharynx is clear.   Eyes:      Extraocular Movements: Extraocular movements intact.      Conjunctiva/sclera: Conjunctivae normal.      Pupils: Pupils are equal,  round, and reactive to light.   Cardiovascular:      Rate and Rhythm: Normal rate and regular rhythm.      Pulses: Normal pulses.      Heart sounds: Normal heart sounds.   Pulmonary:      Effort: Pulmonary effort is normal. No respiratory distress.      Breath sounds: Normal breath sounds. No wheezing or rales.   Chest:      Chest wall: No tenderness.   Abdominal:      General: Abdomen is flat. Bowel sounds are normal. There is no distension.      Palpations: Abdomen is soft.      Tenderness: There is no abdominal tenderness.   Musculoskeletal:         General: Normal range of motion.      Cervical back: Normal range of motion and neck supple. No tenderness.      Right lower leg: Edema (trace) present.      Left lower leg: Edema (trace) present.   Lymphadenopathy:      Cervical: No cervical adenopathy.   Skin:     General: Skin is warm and dry.      Capillary Refill: Capillary refill takes less than 2 seconds.      Coloration: Skin is not jaundiced.      Findings: No bruising or lesion.   Neurological:      General: No focal deficit present.      Mental Status: She is alert and oriented to person, place, and time.   Psychiatric:         Mood and Affect: Mood normal.         Behavior: Behavior normal.         Thought Content: Thought content normal.           Results Review:       Lab Results:  Results from last 7 days   Lab Units 04/09/24 2009 04/05/24  0020 04/04/24  0006   WBC 10*3/mm3 5.10 6.20 6.70   HEMOGLOBIN g/dL 10.1* 10.1* 10.3*   PLATELETS 10*3/mm3 236 245 234     Results from last 7 days   Lab Units 04/09/24  1902   CRP mg/dL <0.30     Results from last 7 days   Lab Units 04/09/24 2009 04/05/24  0020 04/04/24  1108 04/04/24  0006   SODIUM mmol/L 139 139  --  139   POTASSIUM mmol/L 4.0 4.0 4.0 3.4*   CHLORIDE mmol/L 106 105  --  106   CO2 mmol/L 25.2 25.9  --  22.5   BUN mg/dL 18 15  --  17   CREATININE mg/dL 0.83 0.91  --  0.75   CALCIUM mg/dL 9.0 9.4  --  9.0   GLUCOSE mg/dL 119* 110*  --  109*        "  No results found for: \"HGBA1C\"  Results from last 7 days   Lab Units 04/09/24 2009   BILIRUBIN mg/dL 0.2   ALK PHOS U/L 90   AST (SGOT) U/L 35*   ALT (SGPT) U/L 29     Results from last 7 days   Lab Units 04/09/24  1902   HSTROP T ng/L 12                   I have reviewed the patient's laboratory results.    Imaging:  Imaging Results (Last 72 Hours)       ** No results found for the last 72 hours. **            I have personally reviewed the patient's radiologic imaging.        EKG:   Sinus rhythm with marked sinus arrhythmia, HR 63, QTc 499  T wave abnormality, consider lateral ischemia  Prolonged QT  Abnormal ECG  When compared with ECG of 21-MAR-2024 01:00,  premature atrial complexes are no longer present  Left posterior fascicular block is no longer present  Borderline criteria for Inferior infarct are no longer present  T wave inversion no longer evident in Inferior leads  T wave inversion now evident in Anterolateral leads    I have personally reviewed the patient's EKG. New T wave inversions in leads V4-V5 and AVL. EKG reviewed by on-call interventional cardiologist (sent by house supervisor) who reported no evidence of STEMI.         Assessment & Plan    - Chest pain with typical features (left sided chest heaviness relieved by nitroglycerin) and cardiac risk factors including vascular disease with recent CVA and right carotid artery stenosis, along with EKG changes concerning for anterolateral ischemia. Basic labs, inflammatory markers and CXR obtained to rule out other causes of chest pain such as pneumonia. CRP, procal, lactate and WBC all normal and CXR showed no acute abnormality.  Transferred from inpatient rehab to [facility] for observation with continuous telemetry monitoring and further cardiac ischemia workup. Continue home ASA, brilinta and statin. Repeat troponin and EKG now. Keep NPO after midnight except sips with meds. Check hemoglobin A1c and fasting lipid panel to assess for additional " cardiovascular risk factors. Update ECHO in the morning and consult cardiology for guidance regarding further workup, since patient already had a stress test recently on 12/23/23 that was interpreted as low risk.   - Deconditioning secondary to recent stroke: continue PT/OT/SLP that patient was receiving in inpatient rehab.     DVT/GI Prophylaxis: SQ heparin; PO protonix    Estimated Length of Stay <2 midnights    I discussed the patient's findings, assessment and plan with the patient and inpatient rehab physician Dr Hayes.    Capo Su MD  04/09/24  21:06 EDT      Electronically signed by Capo Su MD at 04/09/24 2125       Current Facility-Administered Medications   Medication Dose Route Frequency Provider Last Rate Last Admin    acetaminophen (TYLENOL) tablet 1,000 mg  1,000 mg Oral Q6H PRN Reji Hayes MD   1,000 mg at 04/10/24 0315    amLODIPine (NORVASC) tablet 2.5 mg  2.5 mg Oral Q24H Reji Hayes MD   2.5 mg at 04/11/24 0851    aspirin chewable tablet 81 mg  81 mg Oral Daily Reji Hayes MD   81 mg at 04/11/24 0851    atorvastatin (LIPITOR) tablet 80 mg  80 mg Oral Nightly Reji Hayes MD   80 mg at 04/10/24 2127    sennosides-docusate (PERICOLACE) 8.6-50 MG per tablet 2 tablet  2 tablet Oral BID Reji Hayes MD   2 tablet at 04/11/24 0851    And    polyethylene glycol (MIRALAX) packet 17 g  17 g Oral Daily PRN Reji Hayes MD        And    bisacodyl (DULCOLAX) EC tablet 5 mg  5 mg Oral Daily PRN Reji Hayes MD        And    bisacodyl (DULCOLAX) suppository 10 mg  10 mg Rectal Daily PRN Reji Hayes MD        calcium carbonate (TUMS) chewable tablet 500 mg (200 mg elemental)  3 tablet Oral TID PRN Reji Hayes MD        Calcium Replacement - Follow Nurse / BPA Driven Protocol   Does not apply PRN Capo Su MD        Diclofenac Sodium (VOLTAREN) 1 % gel 4 g  4 g Topical 4x Daily Reji Hayes MD   4 g at 04/11/24 0885    folic acid  (FOLVITE) tablet 1 mg  1 mg Oral Daily Reji Hayes MD   1 mg at 04/11/24 0851    gabapentin (NEURONTIN) capsule 200 mg  200 mg Oral Q12H Reji Hayes MD   200 mg at 04/11/24 0851    heparin (porcine) 5000 UNIT/ML injection 1,600 Units  25 Units/kg Intravenous PRN Capo Su MD        heparin (porcine) 5000 UNIT/ML injection 3,200 Units  50 Units/kg Intravenous PRN Capo Su MD        heparin 49161 units/250 mL (100 units/mL) in 0.45 % NaCl infusion  11 Units/kg/hr (Dosing Weight) Intravenous Titrated Jinny Marx PharmD 6.98 mL/hr at 04/11/24 0709 11 Units/kg/hr at 04/11/24 0709    losartan (COZAAR) tablet 50 mg  50 mg Oral Daily Reji Hayes MD   50 mg at 04/11/24 0851    Magnesium Standard Dose Replacement - Follow Nurse / BPA Driven Protocol   Does not apply PRN Reji Hayes MD        metoprolol tartrate (LOPRESSOR) tablet 12.5 mg  12.5 mg Oral Q12H Omayra Butts APRN   12.5 mg at 04/11/24 0851    mirtazapine (REMERON) tablet 30 mg  30 mg Oral Nightly Reji Hayes MD   30 mg at 04/10/24 2127    nitroglycerin (NITROSTAT) ointment 0.5 inch  0.5 inch Topical Q6H Reji Hayes MD   0.5 inch at 04/10/24 1811    nitroglycerin (NITROSTAT) SL tablet 0.4 mg  0.4 mg Sublingual Q5 Min PRN Capo Su MD        oxybutynin XL (DITROPAN-XL) 24 hr tablet 10 mg  10 mg Oral Daily Reji Hayes MD   10 mg at 04/11/24 0851    pantoprazole (PROTONIX) EC tablet 40 mg  40 mg Oral Q AM Reji Hayes MD   40 mg at 04/11/24 0545    Pharmacy to Dose Heparin   Does not apply Continuous PRN Capo Su MD        Phosphorus Replacement - Follow Nurse / BPA Driven Protocol   Does not apply PRN Capo Su MD        Potassium Replacement - Follow Nurse / BPA Driven Protocol   Does not apply PRN Reji Hayes MD        rOPINIRole (REQUIP) tablet 4 mg  4 mg Oral Nightly Reji Hayes MD   4 mg at 04/10/24 6670    sodium chloride 0.9 % flush 10 mL  10 mL  Intravenous Q12H Capo Su MD   10 mL at 24 0852    sodium chloride 0.9 % flush 10 mL  10 mL Intravenous PRN Capo Su MD        sodium chloride 0.9 % infusion 40 mL  40 mL Intravenous PRN Capo Su MD        sodium chloride 0.9 % infusion  100 mL/hr Intravenous Continuous Anh Valdez  mL/hr at 24 1028 100 mL/hr at 24 1028    ticagrelor (BRILINTA) tablet 60 mg  60 mg Oral BID Reji Hayes MD   60 mg at 24 0851        Physician Progress Notes (most recent note)        Thalia Kang APRN at 24 1540             LOS: 1 day     Name: Regine Beckham  Age/Sex: 68 y.o. female  :  1955        PCP: Dread Burton MD    Principal Problem:    Chest pain      Admission Information: Regine Beckham is a 68 y.o. female with history of CVA (2024), peripheral vascular disease, status post left ICA stent placement on 2024, hypertension, dyslipidemia, obstructive sleep apnea noncompliant with CPAP, restless leg syndrome, GERD, iron deficiency anemia, anxiety/depression, legally blind, and arthritis.     Chief Complaint: Chest pain    Interval history: No further chest pain reported.    Subjective   Patient is awake in bed and awaiting her coronary CTA.  She denies chest pain, shortness of breath, or other concern at this time.      Vital Signs  Vital Signs (last 72 hrs)          0700   0659  0700  04/10 0659 04/10 0700   0659  0700   1540   Most Recent      Temp (°F)   97.6 -  98.3    98.2 -  98.6    97.8 -  98     98 (36.7)  1100    Heart Rate   76 -  87    69 -  83    64 -  70     64  1100    Resp     18    18 -  20    16 -  18     18  1100    BP   132/87 -  159/83    97/50 -  166/84    122/77 -  163/69     122/77  1100    SpO2 (%)   98 -  99    95 -  98    96 -  97     97  1100          Temp:  [97.8 °F (36.6 °C)-98.6 °F (37 °C)] 98 °F (36.7 °C)  Heart Rate:  [64-80]  64  Resp:  [16-20] 18  BP: ()/(50-84) 122/77  Body mass index is 24.94 kg/m².      Intake/Output Summary (Last 24 hours) at 4/11/2024 1540  Last data filed at 4/11/2024 1200  Gross per 24 hour   Intake 1055.09 ml   Output 1200 ml   Net -144.91 ml       Vitals and nursing note reviewed.   Constitutional:       Appearance: Not in distress. Chronically ill-appearing.   Eyes:      Conjunctiva/sclera: Conjunctivae normal.   Pulmonary:      Effort: Pulmonary effort is normal.      Breath sounds: Normal breath sounds.   Cardiovascular:      Normal rate. Regular rhythm.      Murmurs: There is no murmur.   Skin:     General: Skin is warm and dry.   Neurological:      Mental Status: Alert.         Telemetry: Sinus rhythm with PACs     Results Review:     Results from last 7 days   Lab Units 04/10/24  0012 04/09/24 2009 04/05/24  0020   WBC 10*3/mm3 5.58 5.10 6.20   HEMOGLOBIN g/dL 10.1* 10.1* 10.1*   PLATELETS 10*3/mm3 224 236 245     Results from last 7 days   Lab Units 04/10/24  0012 04/09/24 2009 04/05/24  0020   SODIUM mmol/L 139 139 139   POTASSIUM mmol/L 3.7 4.0 4.0   CHLORIDE mmol/L 106 106 105   CO2 mmol/L 25.1 25.2 25.9   BUN mg/dL 18 18 15   CREATININE mg/dL 0.64 0.83 0.91   CALCIUM mg/dL 9.3 9.0 9.4   GLUCOSE mg/dL 113* 119* 110*     Results from last 7 days   Lab Units 04/10/24  0012 04/09/24  2117 04/09/24  1902   HSTROP T ng/L 13 13 12       Results from last 7 days   Lab Units 04/09/24  2142   INR  0.94       I reviewed the patient's new clinical results.  I reviewed the patient's new imaging results and agree with the interpretation.  I personally viewed and interpreted the patient's EKG/Telemetry data      Medication Review:   amLODIPine, 2.5 mg, Oral, Q24H  aspirin, 81 mg, Oral, Daily  atorvastatin, 80 mg, Oral, Nightly  Diclofenac Sodium, 4 g, Topical, 4x Daily  folic acid, 1 mg, Oral, Daily  gabapentin, 200 mg, Oral, Q12H  losartan, 50 mg, Oral, Daily  metoprolol tartrate, 12.5 mg, Oral,  Q12H  mirtazapine, 30 mg, Oral, Nightly  nitroglycerin, 0.5 inch, Topical, Q6H  oxybutynin XL, 10 mg, Oral, Daily  pantoprazole, 40 mg, Oral, Q AM  rOPINIRole, 4 mg, Oral, Nightly  senna-docusate sodium, 2 tablet, Oral, BID  sodium chloride, 10 mL, Intravenous, Q12H  ticagrelor, 60 mg, Oral, BID      heparin, 11 Units/kg/hr (Dosing Weight), Last Rate: 11 Units/kg/hr (24 0709)  Pharmacy to Dose Heparin,   sodium chloride, 100 mL/hr, Last Rate: 100 mL/hr (24 1028)        Assessment:  Chest pain, likely noncardiac in the setting of negative troponins x 3.  Patient has negative stress test from 2023.  History of CVA, status post left ICA stent placement and thrombectomy, 2024  Hypertension  Dyslipidemia      Recommendations:  Awaiting coronary CT angiogram results.  Further decision making based on those  On high intensity statin, Brilinta, and aspirin therapy  At goal on current medications  At goal on current medications    I discussed the patients findings and my recommendations with patient.    Patient seen and evaluated by Dr. Mederos.  Plan of care reflects his recommendations.      Electronically signed by NIKKIE Banda, 24, 3:45 PM EDT.                Electronically signed by Thalia Kang APRN at 24 1545          Physical Therapy Notes (most recent note)        Mahnaz Shaikh, PT at 24 1138  Version 1 of 1         Acute Care - Physical Therapy Treatment Note  HONORIO Poon     Patient Name: Regine Beckham  : 1955  MRN: [id number]  Today's Date: 2024   Onset of Illness/Injury or Date of Surgery: 24 (admission date)  Visit Dx:   No diagnosis found.  Patient Active Problem List   Diagnosis    Iron deficiency anemia due to chronic blood loss    Major depressive disorder    RLS (restless legs syndrome)    GERD (gastroesophageal reflux disease)    Left breast mass    Allergy to penicillin    Stroke    Grade I diastolic dysfunction    Moderate  "malnutrition    Falls frequently    Stroke-like symptoms    Debility    Chest pain     Past Medical History:   Diagnosis Date    Anemia     Anxiety     Arthritis     Depression     Legally blind     Restless leg syndrome     Sleep apnea     \"I don't use a cpap anymore since losing 106 lbs\"    Water retention      Past Surgical History:   Procedure Laterality Date    BACK SURGERY      CARDIAC CATHETERIZATION N/A 1/19/2024    Procedure: Percutaneous Manual Thrombectomy;  Surgeon: Efrain Hurley MD;  Location: [facility] CATH INVASIVE LOC[facility];  Service: Interventional Radiology;  Laterality: N/A;    GALLBLADDER SURGERY      HIP ARTHROSCOPY      JOINT REPLACEMENT Right      PT Assessment (Last 12 Hours)       PT Evaluation and Treatment       Row Name 04/11/24 1100          Physical Therapy Time and Intention    Document Type therapy note (daily note)  -     Mode of Treatment physical therapy  -     Patient Effort good  -     Comment Pt seen for therapy treatment this date, pleasant and cooperative with therapy. Pt participated in ambulation with HHA/Angela x2 with verbal cueing to ambulate. STS performed x2, x3, x4  -       Row Name 04/11/24 1100          Bed Mobility    Bed Mobility supine-sit  -     Rolling Right [place] (Bed Mobility) moderate assist (50% patient effort)  -     Scooting/Bridging [place] (Bed Mobility) standby assist;supervision  -       Row Name 04/11/24 1100          Transfers    Transfers sit-stand transfer;stand-sit transfer  -       Row Name 04/11/24 1100          Sit-Stand Transfer    Sit-Stand [place] (Transfers) maximum assist (25% patient effort);moderate assist (50% patient effort);1 person assist;2 person assist;contact guard;minimum assist (75% patient effort)  from EOB grossly CGA/minAx2; single person assist from chair, grossly mod/maxA  -       Row Name 04/11/24 1100          Stand-Sit Transfer    Stand-Sit [place] (Transfers) minimum assist (75% " patient effort);moderate assist (50% patient effort)  -       Row Name 04/11/24 1100          Gait/Stairs (Locomotion)    Gait/Stairs Locomotion gait/ambulation assistive device  -     Gem Level (Gait) verbal cues;contact guard;minimum assist (75% patient effort)  -     Assistive Device (Gait) --  hha  -KH     Distance in Feet (Gait) 8  8-10  -KH     Comment, (Gait/Stairs) Pt was able to abulate with bigger step length when prompted  -       Row Name 04/11/24 1100          Positioning and Restraints    Pre-Treatment Position in bed  -KH     Post Treatment Position chair  -KH     In Chair sitting;call light within reach;exit alarm on;notified nsg  -               User Key  (r) = Recorded By, (t) = Taken By, (c) = Cosigned By      Initials Name Provider Type    Mahnaz Hatch, PT Physical Therapist                    Physical Therapy Education       Title: PT OT SLP Therapies (Resolved)       Topic: Physical Therapy (Resolved)       Point: Mobility training (Resolved)       Learner Progress:  Not documented in this visit.              Point: Home exercise program (Resolved)       Learner Progress:  Not documented in this visit.              Point: Body mechanics (Resolved)       Learner Progress:  Not documented in this visit.              Point: Precautions (Resolved)       Learner Progress:  Not documented in this visit.                                  PT Recommendation and Plan  Planned Therapy Interventions (PT): balance training, bed mobility training, gait training, strengthening, transfer training, neuromuscular re-education, patient/family education (add interventions PRN, as appropriate)  Therapy Frequency (PT): 2 times/wk (2-5x/week, PRN)  Outcome Evaluation: Pt seen for evaluation this date, pleasant and cooperative with therapy. Pt may benefit from further therapeutic intervention to address deficits of inability to ambulate independently, strength/endurance, and safety.       Time  "Calculation:    PT Charges       Row Name 24 1138             Time Calculation    Start Time 1100  -KH      PT Received On 24  -         Time Calculation- PT    Total Timed Code Minutes- PT 15 minute(s)  -                User Key  (r) = Recorded By, (t) = Taken By, (c) = Cosigned By      Initials Name Provider Type    Mahnaz Hatch, PT Physical Therapist                  Therapy Charges for Today       Code Description Service Date Service Provider Modifiers Qty    82734462222 HC PT EVAL MOD COMPLEXITY 4 4/10/2024 Mahnaz Shaikh, PT GP 1    70355225481 HC PT THERAPEUTIC ACT EA 15 MIN 2024 Mahnaz Shaikh, PT GP 1            PT G-Codes  AM-PAC 6 Clicks Score (PT): 14    Mahnaz Shaikh PT  2024      Electronically signed by Mahnaz Shaikh, PT at 24 1139          Occupational Therapy Notes (most recent note)        Desi Mackey, OT at 24 1250          Acute Care - Occupational Therapy Treatment Note  [facility]     Patient Name: Regine Beckham  : 1955  MRN: [id number]  Today's Date: 2024  Onset of Illness/Injury or Date of Surgery: 24 (admission date)          Admit Date: 2024     No diagnosis found.  Patient Active Problem List   Diagnosis    Iron deficiency anemia due to chronic blood loss    Major depressive disorder    RLS (restless legs syndrome)    GERD (gastroesophageal reflux disease)    Left breast mass    Allergy to penicillin    Stroke    Grade I diastolic dysfunction    Moderate malnutrition    Falls frequently    Stroke-like symptoms    Debility    Chest pain     Past Medical History:   Diagnosis Date    Anemia     Anxiety     Arthritis     Depression     Legally blind     Restless leg syndrome     Sleep apnea     \"I don't use a cpap anymore since losing 106 lbs\"    Water retention      Past Surgical History:   Procedure Laterality Date    BACK SURGERY      CARDIAC CATHETERIZATION N/A 2024    Procedure: Percutaneous " Manual Thrombectomy;  Surgeon: Efrain Hurley MD;  Location: [facility] CATH INVASIVE LOCATION;  Service: Interventional Radiology;  Laterality: N/A;    GALLBLADDER SURGERY      HIP ARTHROSCOPY      JOINT REPLACEMENT Right          OT ASSESSMENT FLOWSHEET (Last 12 Hours)       OT Evaluation and Treatment       Row Name 04/11/24 1244                   OT Time and Intention    Subjective Information no complaints  -BF        Document Type therapy note (daily note)  -BF        Mode of Treatment occupational therapy  -BF        Patient Effort good  -BF           General Information    Existing Precautions/Restrictions fall  L HP  -BF        Limitations/Impairments safety/cognitive;visual  -BF           Cognition    Orientation Status (Cognition) oriented to;person;place;situation  -BF        Follows Commands (Cognition) follows one-step commands;verbal cues/prompting required;repetition of directions required;physical/tactile prompts required  -BF           Chair-Bed Transfer    Chair-Bed [place] (Transfers) moderate assist (50% patient effort);verbal cues;nonverbal cues (demo/gesture)  -BF           Motor Skills    Neuromuscular Function left;upper extremity;severe impairment  -BF        Motor Control/Coordination Interventions gross motor coordination activities;fine motor manipulation/dexterity activities;therapeutic exercise/ROM;neuro-muscular re-education  LUE AA/PROM, NDT; RUE GMC/FMC theract  -BF           Neuromuscular Re-education    Interventions (Neuromuscular Re-education) facilitation/inhibition;massage  LUE  -BF        Positioning (Neuromuscular Re-education) sitting;supported  -BF           Positioning and Restraints    In Bed notified nsg;fowlers;call light within reach;encouraged to call for assist;exit alarm on;LUE elevated  L resting hand split; after second session  -BF        In Chair sitting;call light within reach;encouraged to call for assist;LUE elevated;exit alarm on  after first session   -BF                  User Key  (r) = Recorded By, (t) = Taken By, (c) = Cosigned By      Initials Name Effective Dates    BF Desi Mackey OT 07/11/23 -                      Occupational Therapy Education       Title: PT OT SLP Therapies (Resolved)       Topic: Occupational Therapy (Resolved)       Point: ADL training (Resolved)       Description:   Instruct learner(s) on proper safety adaptation and remediation techniques during self care or transfers.   Instruct in proper use of assistive devices.                  Learner Progress:  Not documented in this visit.              Point: Precautions (Resolved)       Description:   Instruct learner(s) on prescribed precautions during self-care and functional transfers.                  Learner Progress:  Not documented in this visit.                                      OT Recommendation and Plan              Time Calculation:    Time Calculation- OT       Row Name 04/11/24 1418 04/11/24 1250          Time Calculation- OT    OT Start Time 1350  -BF 1115  -BF     OT Stop Time 1410  -BF 1210  -BF     OT Time Calculation (min) 20 min  -BF 55 min  -BF     Total Timed Code Minutes- OT 20 minute(s)  -BF 55 minute(s)  -BF               User Key  (r) = Recorded By, (t) = Taken By, (c) = Cosigned By      Initials Name Provider Type    BF Desi Mackey OT Occupational Therapist                  Therapy Charges for Today       Code Description Service Date Service Provider Modifiers Qty    78563736669 HC OT THERAPEUTIC ACT EA 15 MIN 4/11/2024 Desi Mackey OT GO 2    47506454658 HC OT NEUROMUSC RE EDUCATION EA 15 MIN 4/11/2024 Desi Mackey OT GO 2    82124967740 HC OT SELF CARE/MGMT/TRAIN EA 15 MIN 4/11/2024 Desi Mackey OT GO 1                 Desi Mackey OT  4/11/2024    Electronically signed by Desi Mackey OT at 04/11/24 1422          Speech Language Pathology Notes (most recent note)        Azalia Elizondo,  MS CCC-SLP at 04/10/24 1528          DYSARTHRIA AND COGNITIVE THERAPY PLAN OF CARE:    Regine Beckham will benefit from formal dysarthria and cognitive therapy x3-5 days per week at 15-60 minute sessions, as functionally tolerated, for 7-21 days, to address:    Long Term Goal:  Patient will demonstrate functional speech skills for return to discharge environment.   Patient will demonstrate functional cognitive skills for return to discharge environment.    Short Term Goals:  1. Patient will tolerate facial massage to left facial surface for 3-7 minutes to increase surface blood flow, sensation and ROM over 3 consecutive sessions.     2. Patient will perform OROM/GRACE exercises x3 sets x10 reps w/ min cues.    3. Patient will maintain vocal intensity at greater than 60dB across 4+ word sentences in 90% of opp over three consecutive sessions.     4. Patient will over-articulate 3+ syllable words and in connected speech in 90% opp to improve intelligibility over three consecutive sessions.      5. Patient will decrease rate of speech in connected speech in 90% of opp to improve intelligibility over three consecutive sessions.      6. Patient will demonstrate accurate orientation to time and location in 90% of opp independently over three consecutive sessions.     7. Patient will perform divergent naming tasks of 8+ items named in 1 min w/ min cues over three consecutive sessions.     *Additional goals to be added/modified per pt progress toward goals.     Thank you for allowing me to participate in the care of your patient-  Azalia Elizondo M.S., CCC-SLP       Electronically signed by Azalia Elizondo, MS CCC-SLP at 04/10/24 1528       ADL Documentation (most recent)      Flowsheet Row Most Recent Value   Transferring 3 - assistive equipment and person   Toileting 3 - assistive equipment and person   Bathing 2 - assistive person   Dressing 2 - assistive person   Eating 0 - independent   Communication 0 -  understands/communicates without difficulty   Swallowing 2 - difficulty swallowing foods   Equipment Currently Used at Home commode, hospital bed, shower chair            Discharge Summary    No notes of this type exist for this encounter.       Discharge Order (From admission, onward)      None

## 2024-04-11 NOTE — PROGRESS NOTES
[facility] HOSPITALIST PROGRESS NOTE     Patient Identification:  Name:  Regine Beckham  Age:  68 y.o.  Sex:  female  :  1955  MRN:  [id number]  Visit Number:  [id number]  ROOM: [address] [place] [address]     Primary Care Provider:  Dread Burton MD    Length of stay in inpatient status:  1    Subjective     History of Presenting Illness:    Patient seen and examined this morning. She denied any chest pain or shortness of breath a time of my exam, and was oriented at time of my exam, but had recently been reoriented by staff. She was only oriented to person earlier in the morning per nursing staff report.     Objective     Current Hospital Meds:amLODIPine, 2.5 mg, Oral, Q24H  aspirin, 81 mg, Oral, Daily  atorvastatin, 80 mg, Oral, Nightly  Diclofenac Sodium, 4 g, Topical, 4x Daily  folic acid, 1 mg, Oral, Daily  gabapentin, 200 mg, Oral, Q12H  losartan, 50 mg, Oral, Daily  metoprolol tartrate, 12.5 mg, Oral, Q12H  mirtazapine, 30 mg, Oral, Nightly  nitroglycerin, 0.5 inch, Topical, Q6H  oxybutynin XL, 10 mg, Oral, Daily  pantoprazole, 40 mg, Oral, Q AM  rOPINIRole, 4 mg, Oral, Nightly  senna-docusate sodium, 2 tablet, Oral, BID  sodium chloride, 10 mL, Intravenous, Q12H  ticagrelor, 60 mg, Oral, BID    heparin, 11 Units/kg/hr (Dosing Weight), Last Rate: 11 Units/kg/hr (04/10/24 2025)  Pharmacy to Dose Heparin,         Current Antimicrobial Therapy:  Anti-Infectives (From admission, onward)      None          Current Diuretic Therapy:  Diuretics (From admission, onward)      None          ----------------------------------------------------------------------------------------------------------------------  Vital Signs:  Temp:  [97.7 °F (36.5 °C)-98.6 °F (37 °C)] 98.6 °F (37 °C)  Heart Rate:  [76-83] 80  Resp:  [18-20] 18  BP: ()/(50-88) 97/50  SpO2:  [95 %-99 %] 95 %  on   ;   Device (Oxygen Therapy): room air  Body mass index is 25.23 kg/m².    Wt Readings from Last 3 Encounters:   04/10/24 70.9 kg  (156 lb 4.9 oz)   03/27/24 63.5 kg (140 lb)   03/27/24 62.4 kg (137 lb 8 oz)     Intake & Output (last 3 days)         04/08 0701  04/09 0700 04/09 0701  04/10 0700 04/10 0701  04/11 0700    P.O.   360    Total Intake(mL/kg)   360 (5.7)    Urine (mL/kg/hr)  450 700 (0.8)    Stool  0     Total Output  450 700    Net  -450 -340           Stool Unmeasured Occurrence  1 x           Diet: Cardiac; Healthy Heart (2-3 Na+); Texture: Soft to Chew (NDD 3); Soft to Chew: Whole Meat; Fluid Consistency: Thin (IDDSI 0)  ----------------------------------------------------------------------------------------------------------------------  Physical exam:   Constitutional:  Well-developed and well-nourished.  No acute distress.      HENT:  Head:  Normocephalic and atraumatic.   Cardiovascular:  Normal rate, regular rhythm   Pulmonary/Chest:  No respiratory distress, breath sounds clear in anterior lung fields bilaterally   Musculoskeletal:  No deformity.     Neurological Awake, alert, oriented to person, place, and year at time of my exam but had very recently been reoriented by staff.    Skin:  Skin is warm and dry.   Peripheral vascular:  No  cyanosis  Psychiatric: Appropriate mood and affect  Edited by: Anh Valdez DO at 4/10/2024 2051  ----------------------------------------------------------------------------------------------------------------------  Results from last 7 days   Lab Units 04/10/24  0012 04/09/24  2142 04/09/24  2009 04/09/24  1902 04/05/24  0020   CRP mg/dL  --   --   --  <0.30  --    LACTATE mmol/L  --   --  0.9  --   --    WBC 10*3/mm3 5.58  --  5.10  --  6.20   HEMOGLOBIN g/dL 10.1*  --  10.1*  --  10.1*   HEMATOCRIT % 32.4*  --  32.3*  --  31.9*   MCV fL 97.3*  --  96.7  --  94.7   MCHC g/dL 31.2*  --  31.3*  --  31.7   PLATELETS 10*3/mm3 224  --  236  --  245   INR   --  0.94  --   --   --          Results from last 7 days   Lab Units 04/10/24  0012 04/09/24  2009 04/05/24  0020   SODIUM mmol/L 139  "139 139   POTASSIUM mmol/L 3.7 4.0 4.0   CHLORIDE mmol/L 106 106 105   CO2 mmol/L 25.1 25.2 25.9   BUN mg/dL 18 18 15   CREATININE mg/dL 0.64 0.83 0.91   CALCIUM mg/dL 9.3 9.0 9.4   GLUCOSE mg/dL 113* 119* 110*   ALBUMIN g/dL 3.3* 3.4*  --    BILIRUBIN mg/dL 0.3 0.2  --    ALK PHOS U/L 81 90  --    AST (SGOT) U/L 31 35*  --    ALT (SGPT) U/L 27 29  --    Estimated Creatinine Clearance: 92.9 mL/min (by C-G formula based on SCr of 0.64 mg/dL).  No results found for: \"AMMONIA\"  Results from last 7 days   Lab Units 04/10/24  0012 04/09/24  2117 04/09/24  1902   HSTROP T ng/L 13 13 12         Results from last 7 days   Lab Units 04/10/24  0012   CHOLESTEROL mg/dL 118   TRIGLYCERIDES mg/dL 59   HDL CHOL mg/dL 47   LDL CHOL mg/dL 58     Hemoglobin A1C   Date/Time Value Ref Range Status   04/09/2024 2009 5.90 (H) 4.80 - 5.60 % Final     Lab Results   Component Value Date    TSH 2.880 03/17/2024     No results found for: \"PREGTESTUR\", \"PREGSERUM\", \"HCG\", \"HCGQUANT\"  Pain Management Panel          Latest Ref Rng & Units 12/20/2023   Pain Management Panel   Amphetamine, Urine Qual Negative Negative    Barbiturates Screen, Urine Negative Negative    Benzodiazepine Screen, Urine Negative Negative    Buprenorphine, Screen, Urine Negative Negative    Cocaine Screen, Urine Negative Negative    Fentanyl, Urine Negative Negative    Methadone Screen , Urine Negative Negative    Methamphetamine, Ur Negative Negative      Brief Urine Lab Results  (Last result in the past 365 days)        Color   Clarity   Blood   Leuk Est   Nitrite   Protein   CREAT   Urine HCG        03/21/24 1613 Dark Yellow   Turbid   Trace   Moderate (2+)   Positive   30 mg/dL (1+)                 No results found for: \"BLOODCX\"  No results found for: \"URINECX\"  No results found for: \"WOUNDCX\"  No results found for: \"STOOLCX\"  No results found for: \"RESPCX\"  No results found for: \"AFBCX\"  Results from last 7 days   Lab Units 04/09/24  2009 04/09/24  1902 "   PROCALCITONIN ng/mL 0.06  --    LACTATE mmol/L 0.9  --    CRP mg/dL  --  <0.30       I have personally looked at the labs and they are summarized above.  ----------------------------------------------------------------------------------------------------------------------  Detailed radiology reports for the last 24 hours:  Imaging Results (Last 24 Hours)       ** No results found for the last 24 hours. **          Assessment & Plan      #Chest pain with typical features and cardiac risk factors  #Abnormal EKG  - Cardiology consulted, appreciate assistance  - Overnight the on call cardiologist reportedly recommended to anticoagulate patient with a heparin drip due to EKG showing possibly atrial fibrillation vs junctional rhythm. Continue same pending further recommendations from cardiologist.   - Plan for CTA coronary arteries once appropriate consent can be obtained. Patient is intermittently very disoriented and has no living next of kin or guardian. Case management consulted for assistance. She has family friends who help her.   - Monitor on telemetry   - Echo obtained, this showed LV EF >70% and grade I impaired diastolic dysfunction    #Deconditioning secondary to recent stroke  - continue PT/OT/SLP  - continue aspirin, statin    #Hypertension  - continue losartan and amlodipine      Edited by: Anh Valdez DO at 4/10/2024 2048    VTE Prophylaxis:   Mechanical Order History:       None          Pharmalogical Order History:        Ordered     Dose Route Frequency Stop    04/10/24 1302  heparin 97354 units/250 mL (100 units/mL) in 0.45 % NaCl infusion  6.98 mL/hr         11 Units/kg/hr (Dosing Weight) IV Titrated --    04/10/24 0455  heparin 21156 units/250 mL (100 units/mL) in 0.45 % NaCl infusion  6.35 mL/hr,   Status:  Discontinued         10 Units/kg/hr IV Titrated 04/10/24 1302    04/09/24 2135  heparin (porcine) 5000 UNIT/ML injection 3,800 Units         60 Units/kg IV Once 04/09/24 2212 04/09/24  2135  heparin 71063 units/250 mL (100 units/mL) in 0.45 % NaCl infusion  7.62 mL/hr,   Status:  Discontinued         12 Units/kg/hr IV Titrated 04/10/24 0455    04/09/24 2048  heparin (porcine) 5000 UNIT/ML injection 5,000 Units  Status:  Discontinued         5,000 Units SC Every 12 Hours Scheduled 04/09/24 2134 04/09/24 2135  heparin (porcine) 5000 UNIT/ML injection 3,200 Units         50 Units/kg IV As Needed --    04/09/24 2135  heparin (porcine) 5000 UNIT/ML injection 1,600 Units         25 Units/kg IV As Needed --    04/09/24 2135  Pharmacy to Dose Heparin         -- XX Continuous PRN --                    Dispo: pending clinical progress    Anh Valdez DO  [facility]  04/10/24  20:52 EDT

## 2024-04-11 NOTE — THERAPY TREATMENT NOTE
Acute Care - Speech Language Pathology   Swallow Treatment Note [facility]     Patient Name: Regine Beckham  : 1955  MRN: [id number]  Today's Date: 2024  Onset of Illness/Injury or Date of Surgery: 24 (admission date)          Admit Date: 2024      DYSARTHRIA AND COGNITIVE THERAPY PLAN OF CARE:     Regine Beckham was seen this pm for formal therapy tasks.      Long Term Goal:  Patient will demonstrate functional speech skills for return to discharge environment.   Patient will demonstrate functional cognitive skills for return to discharge environment.     Short Term Goals:  1. Patient will tolerate facial massage to left facial surface for 3-7 minutes to increase surface blood flow, sensation and ROM over 3 consecutive sessions.   *deferred this date.      2. Patient will perform OROM/GRACE exercises x3 sets x10 reps w/ min cues.  *x2 sets x5 reps     3. Patient will maintain vocal intensity at greater than 60dB across 4+ word sentences in 90% of opp over three consecutive sessions.   *decreased vocal intensity maintained across all productions despite cues to increase.      4. Patient will over-articulate 3+ syllable words and in connected speech in 90% opp to improve intelligibility over three consecutive sessions.      5. Patient will decrease rate of speech in connected speech in 90% of opp to improve intelligibility over three consecutive sessions.      6. Patient will demonstrate accurate orientation to time and location in 90% of opp independently over three consecutive sessions.  *pt oriented to time and location in 100% opp this date.      7. Patient will perform divergent naming tasks of 8+ items named in 1 min w/ min cues over three consecutive sessions.      8. Patient will perform rapid alternating speech tasks w/ min cues over three consecutive sessions.     9. Patient will perform sustained vowel prolongations of 5 sec duration over 15 reps   7 of 10 opp over 15 sec.     10.  "Patient will perform inhalations/exhalations via resistive breather x4 sets x15 reps.     *Additional goals to be added/modified per pt progress toward goals.        Visit Dx:   No diagnosis found.  Patient Active Problem List   Diagnosis    Iron deficiency anemia due to chronic blood loss    Major depressive disorder    RLS (restless legs syndrome)    GERD (gastroesophageal reflux disease)    Left breast mass    Allergy to penicillin    Stroke    Grade I diastolic dysfunction    Moderate malnutrition    Falls frequently    Stroke-like symptoms    Debility    Chest pain     Past Medical History:   Diagnosis Date    Anemia     Anxiety     Arthritis     Depression     Legally blind     Restless leg syndrome     Sleep apnea     \"I don't use a cpap anymore since losing 106 lbs\"    Water retention      Past Surgical History:   Procedure Laterality Date    BACK SURGERY      CARDIAC CATHETERIZATION N/A 2024    Procedure: Percutaneous Manual Thrombectomy;  Surgeon: Efrain Hurley MD;  Location: [facility] INVASIVE LOCATION;  Service: Interventional Radiology;  Laterality: N/A;    GALLBLADDER SURGERY      HIP ARTHROSCOPY      JOINT REPLACEMENT Right        SLP Recommendation and Plan      Continue per PAC.       Thank you-   Azalia Elizondo M.S., CCC-SLP         EDUCATION  The patient has been educated in the following areas:   Cognitive Impairment Communication Impairment.        Time Calculation:       Therapy Charges for Today       Code Description Service Date Service Provider Modifiers Qty    62721039518 HC ST EVAL SPEECH AND PROD W LANG  4 4/10/2024 Azalia Elizondo, MS CCC-SLP GN 1                 Azalia Elizondo MS CCC-SLP  2024   and Acute Care - Speech Language Pathology Treatment Note   [place]     Patient Name: Regine Beckham  : 1955  MRN: [id number]  Today's Date: 2024  Onset of Illness/Injury or Date of Surgery: 24 (admission date)            Admit Date: 2024    DYSPHAGIA THERAPY " "PLAN OF CARE:     Regine Beckham was seen this pm for formal therapy tasks.       Long Term Goal:  Patient will accept least restrictive diet tolerance w/o overt s/s aspiration.      Short Term Goals:  1. Patient will tolerate facial massage to LEFT facial surface for 3-7 minutes to increase surface blood flow, sensation and ROM over 3 consecutive sessions.        2. Patient will perform OROM/GRACE exercises x3 sets x10 reps w/ min cues.   x2 sets x5 reps this date     3. Patient will demonstrate increase labial sensation/afferent drive per pt report in 90% of opp over three consecutive sessions.          4. Patient will increase lingual control, coordination, movement as evidenced by bolus manipulation in 90% opp independently over three consecutive sessions.         5. Patient will participate in a clinical re-evaluation of swallowing fnx in 7-10 days, pending progress towards this poc.       Visit Dx:  No diagnosis found.  Patient Active Problem List   Diagnosis    Iron deficiency anemia due to chronic blood loss    Major depressive disorder    RLS (restless legs syndrome)    GERD (gastroesophageal reflux disease)    Left breast mass    Allergy to penicillin    Stroke    Grade I diastolic dysfunction    Moderate malnutrition    Falls frequently    Stroke-like symptoms    Debility    Chest pain     Past Medical History:   Diagnosis Date    Anemia     Anxiety     Arthritis     Depression     Legally blind     Restless leg syndrome     Sleep apnea     \"I don't use a cpap anymore since losing 106 lbs\"    Water retention      Past Surgical History:   Procedure Laterality Date    BACK SURGERY      CARDIAC CATHETERIZATION N/A 1/19/2024    Procedure: Percutaneous Manual Thrombectomy;  Surgeon: Efrain Hurley MD;  Location: [facility] INVASIVE LOCATION;  Service: Interventional Radiology;  Laterality: N/A;    GALLBLADDER SURGERY      HIP ARTHROSCOPY      JOINT REPLACEMENT Right        SLP Recommendation and Plan   "    1. Continue per PAC.       Thank you-  Azalia Elizondo M.S., CCC-SLP       EDUCATION  The patient has been educated in the following areas:     Dysphagia (Swallowing Impairment).        Time Calculation:         Therapy Charges for Today       Code Description Service Date Service Provider Modifiers Qty    12513247273 HC ST EVAL SPEECH AND PROD W LANG  4 4/10/2024 Azalia Elizondo, MS CCC-SLP GN 1              Azalia Elizondo MS CCC-SLP  4/11/2024

## 2024-04-11 NOTE — PLAN OF CARE
DYSPHAGIA THERAPY PLAN OF CARE:     Regine Beckham will benefit from formal dysphagia tx to address noted oral dysphagia x3-5 days per week at 15-45 minutes sessions, as functionally tolerated for 5-12 days to address:       Long Term Goal:  Patient will accept least restrictive diet tolerance w/o overt s/s aspiration.      Short Term Goals:  1. Patient will tolerate facial massage to LEFT facial surface for 3-7 minutes to increase surface blood flow, sensation and ROM over 3 consecutive sessions.       2. Patient will perform OROM/GRACE exercises x3 sets x10 reps w/ min cues.       3. Patient will demonstrate increase labial sensation/afferent drive per pt report in 90% of opp over three consecutive sessions.         4. Patient will increase lingual control, coordination, movement as evidenced by bolus manipulation in 90% opp independently over three consecutive sessions.        5. Patient will participate in a clinical re-evaluation of swallowing fnx in 7-10 days, pending progress towards this poc.          Thank you-  Azalia Elizondo M.S., CCC-SLP

## 2024-04-11 NOTE — PROGRESS NOTES
[facility] INFUSION  Regine Beckham is a  68 y.o. female receiving heparin infusion.     Therapy for (VTE/Cardiac):   Cardiac  Patient Dosing Weight: 63.5kg   Initial Bolus (Y/N):   Y  Any Bolus (Y/N):   Y        Signs or Symptoms of Bleeding: No     Cardiac or Other (Not VTE)   Initial Bolus: 60 units/kg (Max 4,000 units)  Initial rate: 12 units/kg/hr (Max 1,000 units/hr)   Anti Xa Rebolus Infusion Hold time Change infusion Dose (Units/kg/hr) Next Anti Xa or aPTT Level Due   < 0.11 50 Units/kg  (4000 Units Max) None Increase by  3 Units/kg/hr 6 hours   0.11- 0.19 25 Units/kg  (2000 Units Max) None Increase by  2 Units/kg/hr 6 hours   0.2 - 0.29 0 None Increase by  1 Units/kg/hr 6 hours   0.3 - 0.5 0 None No Change 6 hours (after 2 consecutive levels in range check q24h @0700)   0.51 - 0.6 0 None Decrease by  1 Units/kg/hr 6 hours   0.61 - 0.8 0 30 Minutes Decrease by  2 Units/kg/hr 6 hours   0.81 - 1 0 60 Minutes Decrease by  3 Units/kg/hr 6 hours   >1 0 Hold  After Anti Xa less than 0.5 decrease previous rate by  4 Units/kg/hr  Every 2 hours until Anti Xa  less than 0.5 then when infusion restarts in 6 hours         Recommend anti-Xa every 6 hours.          Date   Time   Anti-Xa Current Rate (Unit/kg/hr) Bolus   (Units) Rate Change   (Unit/kg/hr) New Rate (Unit/kg/hr) Next   Anti-Xa Comments  Pump Check Daily   04/09 2210 Pending  New start  3800 - 12 0430 Initiate infusion w/ bolus, no s/s bleeding per STALIN Batista    04/10 0500 0.68 12 - -2 10 1200 Hold x 30 min then decrease infusion rate by 2u/kg/hr, no s/s bleeding per STALIN Batista    4/10 1151 0.27 11 - +1 11 1930 Spoke with STALIN Baires. No s/s bleeding. Increase rate.    4/10 1718 - 11 - - - - Pump check completed    4/10 2016 0.35 11 - - 11 0230 Therapeutic x1. No s/s of bleeding per nurse Priyanka   04/11 0237 0.39 11 - - 11 0700 on 4/12 Therapeutic x 2. No s/s of bleeding per STALIN Mathew. Will transition to Qa monitoring.                                                                                                                                                                            Pharmacy will continue to follow anti-Xa results and monitor for signs and symptoms of bleeding or thrombosis.    Liane Cruz PharmDANIELITO  02:37 EDT.  04/11/24             No

## 2024-04-11 NOTE — PLAN OF CARE
Goal Outcome Evaluation:  Plan of Care Reviewed With: patient        Progress: no change  Outcome Evaluation: PT  is resting in bed at this time. PT remained A/Ox4 and on RA. Heparin is infusing at 11U/kg/hr. No complaints of pain or discomfort. VSS. Afebrile. Will continue plan of care.           Problem: Adult Inpatient Plan of Care  Goal: Plan of Care Review  Outcome: Ongoing, Not Progressing  Flowsheets (Taken 4/11/2024 0552)  Progress: no change  Plan of Care Reviewed With: patient  Outcome Evaluation: PT  is resting in bed at this time. PT remained A/Ox4 and on RA. Heparin is infusing at 11U/kg/hr. No complaints of pain or discomfort. VSS. Afebrile. Will continue plan of care.  Goal: Patient-Specific Goal (Individualized)  Outcome: Ongoing, Not Progressing  Goal: Absence of Hospital-Acquired Illness or Injury  Outcome: Ongoing, Not Progressing  Intervention: Identify and Manage Fall Risk  Recent Flowsheet Documentation  Taken 4/11/2024 0500 by Priyanka Santos RN  Safety Promotion/Fall Prevention:   activity supervised   assistive device/personal items within reach   clutter free environment maintained   fall prevention program maintained   nonskid shoes/slippers when out of bed   safety round/check completed   room organization consistent  Taken 4/11/2024 0300 by Priyanka Santos, RN  Safety Promotion/Fall Prevention:   activity supervised   assistive device/personal items within reach   clutter free environment maintained   fall prevention program maintained   nonskid shoes/slippers when out of bed   safety round/check completed   room organization consistent  Taken 4/11/2024 0100 by Priyanka Santos, RN  Safety Promotion/Fall Prevention:   activity supervised   assistive device/personal items within reach   clutter free environment maintained   fall prevention program maintained   nonskid shoes/slippers when out of bed   safety round/check completed   room organization consistent  Taken 4/10/2024 2300 by  Santos, Rpiyanka N, RN  Safety Promotion/Fall Prevention:   activity supervised   assistive device/personal items within reach   clutter free environment maintained   fall prevention program maintained   nonskid shoes/slippers when out of bed   safety round/check completed   room organization consistent  Taken 4/10/2024 2130 by Priyanka Santos RN  Safety Promotion/Fall Prevention:   activity supervised   assistive device/personal items within reach   clutter free environment maintained   fall prevention program maintained   nonskid shoes/slippers when out of bed   safety round/check completed   room organization consistent  Taken 4/10/2024 2100 by Priyanka Santos RN  Safety Promotion/Fall Prevention:   activity supervised   assistive device/personal items within reach   clutter free environment maintained   fall prevention program maintained   nonskid shoes/slippers when out of bed   safety round/check completed   room organization consistent  Taken 4/10/2024 1900 by Priyanka Santos RN  Safety Promotion/Fall Prevention:   activity supervised   assistive device/personal items within reach   clutter free environment maintained   fall prevention program maintained   nonskid shoes/slippers when out of bed   safety round/check completed   room organization consistent  Intervention: Prevent Skin Injury  Recent Flowsheet Documentation  Taken 4/10/2024 2130 by Priyanka Santos RN  Skin Protection:   adhesive use limited   drying agents applied   incontinence pads utilized  Intervention: Prevent and Manage VTE (Venous Thromboembolism) Risk  Recent Flowsheet Documentation  Taken 4/10/2024 2130 by Priyanka Santos RN  Activity Management: activity encouraged  VTE Prevention/Management: (See MAR) other (see comments)  Intervention: Prevent Infection  Recent Flowsheet Documentation  Taken 4/11/2024 0500 by Priyanka Santos RN  Infection Prevention: rest/sleep promoted  Taken 4/11/2024 0300 by Priyanka Santos,  RN  Infection Prevention: rest/sleep promoted  Taken 4/11/2024 0100 by Priyanka Santos RN  Infection Prevention: rest/sleep promoted  Taken 4/10/2024 2300 by Priyanka Santos RN  Infection Prevention: rest/sleep promoted  Taken 4/10/2024 2130 by Priyanka Santos RN  Infection Prevention: rest/sleep promoted  Taken 4/10/2024 2100 by Priyanka Santos RN  Infection Prevention: rest/sleep promoted  Taken 4/10/2024 1900 by Priyanka Santos RN  Infection Prevention: rest/sleep promoted  Goal: Optimal Comfort and Wellbeing  Outcome: Ongoing, Not Progressing  Intervention: Provide Person-Centered Care  Recent Flowsheet Documentation  Taken 4/10/2024 2130 by Priyanka Santos RN  Trust Relationship/Rapport:   care explained   choices provided   empathic listening provided   questions answered   reassurance provided   thoughts/feelings acknowledged  Goal: Readiness for Transition of Care  Outcome: Ongoing, Not Progressing     Problem: Skin Injury Risk Increased  Goal: Skin Health and Integrity  Outcome: Ongoing, Not Progressing  Intervention: Promote and Optimize Oral Intake  Recent Flowsheet Documentation  Taken 4/10/2024 2130 by Priyanka Santos RN  Oral Nutrition Promotion: rest periods promoted  Intervention: Optimize Skin Protection  Recent Flowsheet Documentation  Taken 4/10/2024 2130 by Priyanka Santos RN  Pressure Reduction Techniques:   frequent weight shift encouraged   heels elevated off bed  Pressure Reduction Devices: pressure-redistributing mattress utilized  Skin Protection:   adhesive use limited   drying agents applied   incontinence pads utilized     Problem: Hypertension Comorbidity  Goal: Blood Pressure in Desired Range  Outcome: Ongoing, Not Progressing  Intervention: Maintain Blood Pressure Management  Recent Flowsheet Documentation  Taken 4/11/2024 0500 by Priyanka Santos RN  Medication Review/Management: medications reviewed  Taken 4/11/2024 0300 by Priyanka Santos, RN  Medication  Review/Management: medications reviewed  Taken 4/11/2024 0100 by Priyanka Santos RN  Medication Review/Management: medications reviewed  Taken 4/10/2024 2300 by Priyanka Santos RN  Medication Review/Management: medications reviewed  Taken 4/10/2024 2130 by Priyanka Santos RN  Medication Review/Management: medications reviewed  Taken 4/10/2024 2100 by Priyanka Santos RN  Medication Review/Management: medications reviewed  Taken 4/10/2024 1900 by Priyanka Santos RN  Medication Review/Management: medications reviewed     Problem: Fall Injury Risk  Goal: Absence of Fall and Fall-Related Injury  Outcome: Ongoing, Not Progressing  Intervention: Identify and Manage Contributors  Recent Flowsheet Documentation  Taken 4/11/2024 0500 by Priyanka Santos RN  Medication Review/Management: medications reviewed  Taken 4/11/2024 0300 by Priyanka Santos RN  Medication Review/Management: medications reviewed  Taken 4/11/2024 0100 by Priyanka Santos RN  Medication Review/Management: medications reviewed  Taken 4/10/2024 2300 by Priyanka Santos RN  Medication Review/Management: medications reviewed  Taken 4/10/2024 2130 by Priyanka Santos RN  Medication Review/Management: medications reviewed  Self-Care Promotion:   independence encouraged   BADL personal objects within reach   BADL personal routines maintained  Taken 4/10/2024 2100 by Priyanka Santos, RN  Medication Review/Management: medications reviewed  Taken 4/10/2024 1900 by Priyanka Santos, RN  Medication Review/Management: medications reviewed  Intervention: Promote Injury-Free Environment  Recent Flowsheet Documentation  Taken 4/11/2024 0500 by Priyanka Santos, RN  Safety Promotion/Fall Prevention:   activity supervised   assistive device/personal items within reach   clutter free environment maintained   fall prevention program maintained   nonskid shoes/slippers when out of bed   safety round/check completed   room organization consistent  Taken  4/11/2024 0300 by Priyanka Santos RN  Safety Promotion/Fall Prevention:   activity supervised   assistive device/personal items within reach   clutter free environment maintained   fall prevention program maintained   nonskid shoes/slippers when out of bed   safety round/check completed   room organization consistent  Taken 4/11/2024 0100 by Priyanka Santos RN  Safety Promotion/Fall Prevention:   activity supervised   assistive device/personal items within reach   clutter free environment maintained   fall prevention program maintained   nonskid shoes/slippers when out of bed   safety round/check completed   room organization consistent  Taken 4/10/2024 2300 by Priyanka Santos, RN  Safety Promotion/Fall Prevention:   activity supervised   assistive device/personal items within reach   clutter free environment maintained   fall prevention program maintained   nonskid shoes/slippers when out of bed   safety round/check completed   room organization consistent  Taken 4/10/2024 2130 by Priyanka Santos RN  Safety Promotion/Fall Prevention:   activity supervised   assistive device/personal items within reach   clutter free environment maintained   fall prevention program maintained   nonskid shoes/slippers when out of bed   safety round/check completed   room organization consistent  Taken 4/10/2024 2100 by Priyanka Santos RN  Safety Promotion/Fall Prevention:   activity supervised   assistive device/personal items within reach   clutter free environment maintained   fall prevention program maintained   nonskid shoes/slippers when out of bed   safety round/check completed   room organization consistent  Taken 4/10/2024 1900 by Priyanka Santos, RN  Safety Promotion/Fall Prevention:   activity supervised   assistive device/personal items within reach   clutter free environment maintained   fall prevention program maintained   nonskid shoes/slippers when out of bed   safety round/check completed   room organization  consistent

## 2024-04-11 NOTE — THERAPY TREATMENT NOTE
"Acute Care - Physical Therapy Treatment Note   [facility]     Patient Name: Regine Beckham  : 1955  MRN: [id number]  Today's Date: 2024   Onset of Illness/Injury or Date of Surgery: 24 (admission date)  Visit Dx:   No diagnosis found.  Patient Active Problem List   Diagnosis    Iron deficiency anemia due to chronic blood loss    Major depressive disorder    RLS (restless legs syndrome)    GERD (gastroesophageal reflux disease)    Left breast mass    Allergy to penicillin    Stroke    Grade I diastolic dysfunction    Moderate malnutrition    Falls frequently    Stroke-like symptoms    Debility    Chest pain     Past Medical History:   Diagnosis Date    Anemia     Anxiety     Arthritis     Depression     Legally blind     Restless leg syndrome     Sleep apnea     \"I don't use a cpap anymore since losing 106 lbs\"    Water retention      Past Surgical History:   Procedure Laterality Date    BACK SURGERY      CARDIAC CATHETERIZATION N/A 2024    Procedure: Percutaneous Manual Thrombectomy;  Surgeon: Efrain Hurley MD;  Location:  [facility];  Service: Interventional Radiology;  Laterality: N/A;    GALLBLADDER SURGERY      HIP ARTHROSCOPY      JOINT REPLACEMENT Right      PT Assessment (Last 12 Hours)       PT Evaluation and Treatment       Row Name 24 1100          Physical Therapy Time and Intention    Document Type therapy note (daily note)  -     Mode of Treatment physical therapy  -     Patient Effort good  -     Comment Pt seen for therapy treatment this date, pleasant and cooperative with therapy. Pt participated in ambulation with HHA/Angela x2 with verbal cueing to ambulate. STS performed x2, x3, x4  -       Row Name 24 1100          Bed Mobility    Bed Mobility supine-sit  -     Rolling Right [place] (Bed Mobility) moderate assist (50% patient effort)  -     Scooting/Bridging [place] (Bed Mobility) standby assist;supervision  -       " Row Name 04/11/24 1100          Transfers    Transfers sit-stand transfer;stand-sit transfer  -       Row Name 04/11/24 1100          Sit-Stand Transfer    Sit-Stand [place] (Transfers) maximum assist (25% patient effort);moderate assist (50% patient effort);1 person assist;2 person assist;contact guard;minimum assist (75% patient effort)  from EOB grossly CGA/minAx2; single person assist from chair, grossly mod/maxA  -       Row Name 04/11/24 1100          Stand-Sit Transfer    Stand-Sit [place] (Transfers) minimum assist (75% patient effort);moderate assist (50% patient effort)  -       Row Name 04/11/24 1100          Gait/Stairs (Locomotion)    Gait/Stairs Locomotion gait/ambulation assistive device  -     [place] Level (Gait) verbal cues;contact guard;minimum assist (75% patient effort)  -     Assistive Device (Gait) --  hha  -     Distance in Feet (Gait) 8  8-10  -KH     Comment, (Gait/Stairs) Pt was able to abulate with bigger step length when prompted  -[facility] Name 04/11/24 1100          Positioning and Restraints    Pre-Treatment Position in bed  -     Post Treatment Position chair  -     In Chair sitting;call light within reach;exit alarm on;notified [facility]  -               User Key  (r) = Recorded By, (t) = Taken By, (c) = Cosigned By      Initials Name Provider Type    Mahnaz Hatch PT Physical Therapist                    Physical Therapy Education       Title: PT OT SLP Therapies (Resolved)       Topic: Physical Therapy (Resolved)       Point: Mobility training (Resolved)       Learner Progress:  Not documented in this visit.              Point: Home exercise program (Resolved)       Learner Progress:  Not documented in this visit.              Point: Body mechanics (Resolved)       Learner Progress:  Not documented in this visit.              Point: Precautions (Resolved)       Learner Progress:  Not documented in this visit.                                   PT Recommendation and Plan  Planned Therapy Interventions (PT): balance training, bed mobility training, gait training, strengthening, transfer training, neuromuscular re-education, patient/family education (add interventions PRN, as appropriate)  Therapy Frequency (PT): 2 times/wk (2-5x/week, PRN)  Outcome Evaluation: Pt seen for evaluation this date, pleasant and cooperative with therapy. Pt may benefit from further therapeutic intervention to address deficits of inability to ambulate independently, strength/endurance, and safety.       Time Calculation:    PT Charges       Row Name 04/11/24 1138             Time Calculation    Start Time 1100  -      PT Received On 04/11/24  -         Time Calculation- PT    Total Timed Code Minutes- PT 15 minute(s)  -                User Key  (r) = Recorded By, (t) = Taken By, (c) = Cosigned By      Initials Name Provider Type    Mahnaz Hatch, PT Physical Therapist                  Therapy Charges for Today       Code Description Service Date Service Provider Modifiers Qty    [id number]  PT EVAL MOD COMPLEXITY 4 4/10/2024 Mahnaz Shaikh, PT GP 1    [id number]  PT THERAPEUTIC ACT EA 15 MIN 4/11/2024 Mahnaz Shaikh, PT GP 1            PT G-Codes  AM-PAC 6 Clicks Score (PT): 14    Mahnaz Shaikh PT  4/11/2024

## 2024-04-11 NOTE — PLAN OF CARE
Goal Outcome Evaluation:  Pt resting in bed with no s/s of distress noted. VSS. IV access maintained throughout shift. Call light and belongings in reach. No requests at this time. Plan of care ongoing.

## 2024-04-11 NOTE — THERAPY EVALUATION
"Acute Care - Speech Language Pathology   Swallow Initial Evaluation [facility]     Patient Name: Regine Beckham  : 1955  MRN: [id number]  Today's Date: 2024  Onset of Illness/Injury or Date of Surgery: 24 (admission date)          Admit Date: 2024  Regine Beckham  was seen at bedside this pm on 3S-315 to assess safety/efficacy of swallowing fnx, determine safest/least restrictive diet tolerance.     She has a medical hx significant for prior CVA in January of this year with right internal carotid artery disease status post thrombectomy and stent placement, anxiety, depression, and is legally blind per macular degeneration.     She was admitted to [facility] IPR unit and was admitted to acute care following development of chest pain on evening of 24.     Social History     Socioeconomic History    Marital status:     Number of children: 0    Years of education: 1 yr college   Tobacco Use    Smoking status: Former     Current packs/day: 1.50     Average packs/day: 1.5 packs/day for 51.0 years (76.5 ttl pk-yrs)     Types: Cigarettes     Passive exposure: Past    Smokeless tobacco: Never   Vaping Use    Vaping status: Never Used   Substance and Sexual Activity    Alcohol use: Not Currently     Alcohol/week: 1.0 standard drink of alcohol     Types: 1 Glasses of wine per week     Comment: \"occasionally - once every two months\"    Drug use: Not Currently     Comment: alcohol - occasionally    Sexual activity: Defer   Imaging:  PROCEDURE: Portable chest x-ray examination performed on 2024.  Single view. Supine position. Single view.     HISTORY: Chest pain.     COMPARISON: None.     FINDINGS:     Normal heart size  Coarsened bronchovascular pattern to the lungs.  No edema, pneumonia, pleural effusion, or pneumothorax.  Mild hypoinflated lungs.  Mild osteoarthritis at the glenohumeral joints.  No free air in the upper abdomen.     IMPRESSION:     1.  Normal heart size  2.  Coarsened " bronchovascular pattern to the lungs.  3.  No lobar consolidation or edema.  4.  No pleural effusion or pneumothorax.     This report was finalized on 4/9/2024 8:40 PM by Michael Abarca MD.  Labs:  WBC  5.58  04/10 0012     Diet Orders (active) (From admission, onward)       Start     Ordered    04/10/24 1616  Diet: Cardiac; Healthy Heart (2-3 Na+); Texture: Soft to Chew (NDD 3); Soft to Chew: Whole Meat; Fluid Consistency: Thin (IDDSI 0)  Diet Effective Now         04/10/24 1615                  Ms Bhavani was observed on room air w/o complications across this assessment.     She was positioned upright and centered in wheelchair to accept multiple po presentations of ice chips, solid textures, and thin liquids via spoon, cup, and straw.  She was able to self provide po trials.      Facial/oral structures were asymmetrical upon observation w/ left facial droop evidenced. She endorses decreased sensation of left side of her face as well. Lingual protrusion revealed a trace to mild left deviation. Oral mucosa were moist, pink, and clean. Secretions were clear, thin, and well controlled. OROM/GRACE was generally weak w/ primary weakness noted of left side to imitate oral postures. Gag is not assessed. Volitional cough was intact w/ adequate intensity, clear in quality, non-productive. Voice was mildly weak in intensity and mild to moderately dysarthric negatively impacting intelligibility.     Upon po presentations, adequate bolus anticipation and acceptance w/ good labial seal for bolus clearance via spoon bowl, cup rim stability and suction via straw. No oral loss noted from left labial edge. Bolus formation, manipulation and control were slightly prolonged w/ mixed phasic-rotary mastication pattern. A-p transit was timely w/o significant oral residue appreciated. No overt s/s aspiration before the swallow.       Pharyngeal swallow was timely w/ adequate hyolaryngeal elevation per palpation. No overt s/s aspiration  "evidenced across this evaluation. No silent aspiration suspected. Patient denied odynophagia.    Visit Dx:   No diagnosis found.  Patient Active Problem List   Diagnosis    Iron deficiency anemia due to chronic blood loss    Major depressive disorder    RLS (restless legs syndrome)    GERD (gastroesophageal reflux disease)    Left breast mass    Allergy to penicillin    Stroke    Grade I diastolic dysfunction    Moderate malnutrition    Falls frequently    Stroke-like symptoms    Debility    Chest pain     Past Medical History:   Diagnosis Date    Anemia     Anxiety     Arthritis     Depression     Legally blind     Restless leg syndrome     Sleep apnea     \"I don't use a cpap anymore since losing 106 lbs\"    Water retention      Past Surgical History:   Procedure Laterality Date    BACK SURGERY      CARDIAC CATHETERIZATION N/A 1/19/2024    Procedure: Percutaneous Manual Thrombectomy;  Surgeon: Efrain Hurley MD;  Location: [facility] CATH INVASIVE LOCATION;  Service: Interventional Radiology;  Laterality: N/A;    GALLBLADDER SURGERY      HIP ARTHROSCOPY      JOINT REPLACEMENT Right      Impression:     Ms Beckham presented w/ a mild oral dysphagia and wfl pharyngeal swallow w/o s/s concerning for aspiration. Oral dysphagia negatively impacts overall swallow function and she is felt to most benefit from continuation of current modified po diet of soft to chew textures, whole meats, and thin liquids. She will benefit from formal oral dysphagia tx to address noted dysphagia.       SLP Recommendation and Plan     1. Soft to chew textures, whole meats, and thin liquids   2. Medicatoins whole in puree/thins.   3. Upright and centered for all po intake  4. CRISTOPHER precautions.  5. Oral care protocol.    SLP to f/u.     D/w patient results and recommendations w/ verbal agreement.    D/w RN results and recommendations w/ verbal agreement.    Thank you for allowing me to participate in the care of your patient-  Azalia Elizondo M.S., " CCC-SLP        EDUCATION  The patient has been educated in the following areas:   Dysphagia (Swallowing Impairment).        Time Calculation:       Therapy Charges for Today       Code Description Service Date Service Provider Modifiers Qty    [id number]  ST EVAL SPEECH AND PROD W LANG  4 4/10/2024 Azalia Elizondo, MS CCC-SLP GN 1            Azalia Elizondo MS CCC-SLP  4/11/2024

## 2024-04-11 NOTE — CASE MANAGEMENT/SOCIAL WORK
Discharge Planning Assessment   [facility]     Patient Name: Regine Beckham  MRN: [id number]  Today's Date: 4/11/2024    Admit Date: 4/9/2024    Plan: SS followed up with [facility] per [facility] on this date to follow up on NHP. Per Marda [facility] finding [facility] bed [facility] for pt. SS to follow.       Discharge Plan       Row Name 04/11/24 1048       Plan    Plan SS followed up with [facility] per Marda on this date to follow up on NHP. Per Marda they on finding a [facility] that [facility] a LTC bed available for pt. SS to follow.    1414: [facility]. SS to provide pt with an update.     1530:SS spoke with pt at bedside on this date. Pt states she does not want to return to [facility]. Pt states she would like SS to check [place], [facility], and [facility].     1600: SS spoke with [facility] who states she has bed availability. SS placed referral in Epic in basket and notified Adenike.                      Charlee Collier, BATOOLW

## 2024-04-11 NOTE — THERAPY TREATMENT NOTE
"Acute Care - Occupational Therapy Treatment Note   [facility]     Patient Name: Regine Beckham  : 1955  MRN: [id number]  Today's Date: 2024  Onset of Illness/Injury or Date of Surgery: 24 (admission date)          Admit Date: 2024     No diagnosis found.  Patient Active Problem List   Diagnosis    Iron deficiency anemia due to chronic blood loss    Major depressive disorder    RLS (restless legs syndrome)    GERD (gastroesophageal reflux disease)    Left breast mass    Allergy to penicillin    Stroke    Grade I diastolic dysfunction    Moderate malnutrition    Falls frequently    Stroke-like symptoms    Debility    Chest pain     Past Medical History:   Diagnosis Date    Anemia     Anxiety     Arthritis     Depression     Legally blind     Restless leg syndrome     Sleep apnea     \"I don't use a cpap anymore since losing 106 lbs\"    Water retention      Past Surgical History:   Procedure Laterality Date    BACK SURGERY      CARDIAC CATHETERIZATION N/A 2024    Procedure: Percutaneous Manual Thrombectomy;  Surgeon: Efrain Hurley MD;  Location:  [facility] CATH INVASIVE LOCATION;  Service: Interventional Radiology;  Laterality: N/A;    GALLBLADDER SURGERY      HIP ARTHROSCOPY      JOINT REPLACEMENT Right          OT ASSESSMENT FLOWSHEET (Last 12 Hours)       OT Evaluation and Treatment       Row Name 24 1244                   OT Time and Intention    Subjective Information no complaints  -BF        Document Type therapy note (daily note)  -BF        Mode of Treatment occupational therapy  -BF        Patient Effort good  -BF           General Information    Existing Precautions/Restrictions fall  L HP  -BF        Limitations/Impairments safety/cognitive;visual  -BF           Cognition    Orientation Status (Cognition) oriented to;person;place;situation  -BF        Follows Commands (Cognition) follows one-step commands;verbal cues/prompting required;repetition of directions " required;physical/tactile prompts required  -BF           Chair-Bed Transfer    Chair-Bed [place] (Transfers) moderate assist (50% patient effort);verbal cues;nonverbal cues (demo/gesture)  -BF           Motor Skills    Neuromuscular Function left;upper extremity;severe impairment  -BF        Motor Control/Coordination Interventions gross motor coordination activities;fine motor manipulation/dexterity activities;therapeutic exercise/ROM;neuro-muscular re-education  LUE AA/PROM, NDT; RUE GMC/FMC theract  -BF           Neuromuscular Re-education    Interventions (Neuromuscular Re-education) facilitation/inhibition;massage  LUE  -BF        Positioning (Neuromuscular Re-education) sitting;supported  -BF           Positioning and Restraints    In Bed notified nsg;fowlers;call light within reach;encouraged to call for assist;exit alarm on;LUE elevated  L resting hand split; after second session  -BF        In Chair sitting;call light within reach;encouraged to call for assist;LUE elevated;exit alarm on  after first session  -BF                  User Key  (r) = Recorded By, (t) = Taken By, (c) = Cosigned By      Initials Name Effective Dates    Desi Wagner OT 07/11/23 -                      Occupational Therapy Education       Title: PT OT SLP Therapies (Resolved)       Topic: Occupational Therapy (Resolved)       Point: ADL training (Resolved)       Description:   Instruct learner(s) on proper safety adaptation and remediation techniques during self care or transfers.   Instruct in proper use of assistive devices.                  Learner Progress:  Not documented in this visit.              Point: Precautions (Resolved)       Description:   Instruct learner(s) on prescribed precautions during self-care and functional transfers.                  Learner Progress:  Not documented in this visit.                                      OT Recommendation and Plan              Time Calculation:    Time  Calculation- OT       Row Name 04/11/24 1418 04/11/24 1250          Time Calculation- OT    OT Start Time 1350  -BF 1115  -BF     OT Stop Time 1410  -BF 1210  -BF     OT Time Calculation (min) 20 min  -BF 55 min  -BF     Total Timed Code Minutes- OT 20 minute(s)  -BF 55 minute(s)  -BF               User Key  (r) = Recorded By, (t) = Taken By, (c) = Cosigned By      Initials Name Provider Type     Desi Mackey OT Occupational Therapist                  Therapy Charges for Today       Code Description Service Date Service Provider Modifiers Qty    21189225936  OT THERAPEUTIC ACT EA 15 MIN 4/11/2024 Desi Mackey OT GO 2    [id number]  OT NEUROMUSC RE EDUCATION EA 15 MIN 4/11/2024 Desi Mackey OT GO 2    [id number]  OT SELF CARE/MGMT/TRAIN EA 15 MIN 4/11/2024 Desi Mackey OT GO 1                 Desi Mackey OT  4/11/2024

## 2024-04-11 NOTE — PROGRESS NOTES
LOS: 1 day     Name: Regine Beckham  Age/Sex: 68 y.o. female  :  1955        PCP: Dread Burton MD    Principal Problem:    Chest pain      Admission Information: Regine Beckham is a 68 y.o. female with history of CVA (2024), peripheral vascular disease, status post left ICA stent placement on 2024, hypertension, dyslipidemia, obstructive sleep apnea noncompliant with CPAP, restless leg syndrome, GERD, iron deficiency anemia, anxiety/depression, legally blind, and arthritis.     Chief Complaint: Chest pain    Interval history: No further chest pain reported.    Subjective   Patient is awake in bed and awaiting her coronary CTA.  She denies chest pain, shortness of breath, or other concern at this time.      Vital Signs  Vital Signs (last 72 hrs)          0700   0659  0700  04/10 0659 04/10 0700   0659  0700   1540   Most Recent      Temp (°F)   97.6 -  98.3    98.2 -  98.6    97.8 -  98     98 (36.7)  1100    Heart Rate   76 -  87    69 -  83    64 -  70     64  1100    Resp     18    18 -  20    16 -  18     18  1100    BP   132/87 -  159/83    97/50 -  166/84    122/77 -  163/69     122/77  1100    SpO2 (%)   98 -  99    95 -  98    96 -  97     97  1100          Temp:  [97.8 °F (36.6 °C)-98.6 °F (37 °C)] 98 °F (36.7 °C)  Heart Rate:  [64-80] 64  Resp:  [16-20] 18  BP: ()/(50-84) 122/77  Body mass index is 24.94 kg/m².      Intake/Output Summary (Last 24 hours) at 2024 1540  Last data filed at 2024 1200  Gross per 24 hour   Intake 1055.09 ml   Output 1200 ml   Net -144.91 ml       Vitals and nursing note reviewed.   Constitutional:       Appearance: Not in distress. Chronically ill-appearing.   Eyes:      Conjunctiva/sclera: Conjunctivae normal.   Pulmonary:      Effort: Pulmonary effort is normal.      Breath sounds: Normal breath sounds.   Cardiovascular:      Normal rate. Regular rhythm.      Murmurs: There  is no murmur.   Skin:     General: Skin is warm and dry.   Neurological:      Mental Status: Alert.         Telemetry: Sinus rhythm with PACs     Results Review:     Results from last 7 days   Lab Units 04/10/24  0012 04/09/24  2009 04/05/24  0020   WBC 10*3/mm3 5.58 5.10 6.20   HEMOGLOBIN g/dL 10.1* 10.1* 10.1*   PLATELETS 10*3/mm3 224 236 245     Results from last 7 days   Lab Units 04/10/24  0012 04/09/24  2009 04/05/24  0020   SODIUM mmol/L 139 139 139   POTASSIUM mmol/L 3.7 4.0 4.0   CHLORIDE mmol/L 106 106 105   CO2 mmol/L 25.1 25.2 25.9   BUN mg/dL 18 18 15   CREATININE mg/dL 0.64 0.83 0.91   CALCIUM mg/dL 9.3 9.0 9.4   GLUCOSE mg/dL 113* 119* 110*     Results from last 7 days   Lab Units 04/10/24  0012 04/09/24  2117 04/09/24  1902   HSTROP T ng/L 13 13 12       Results from last 7 days   Lab Units 04/09/24  2142   INR  0.94       I reviewed the patient's new clinical results.  I reviewed the patient's new imaging results and agree with the interpretation.  I personally viewed and interpreted the patient's EKG/Telemetry data      Medication Review:   amLODIPine, 2.5 mg, Oral, Q24H  aspirin, 81 mg, Oral, Daily  atorvastatin, 80 mg, Oral, Nightly  Diclofenac Sodium, 4 g, Topical, 4x Daily  folic acid, 1 mg, Oral, Daily  gabapentin, 200 mg, Oral, Q12H  losartan, 50 mg, Oral, Daily  metoprolol tartrate, 12.5 mg, Oral, Q12H  mirtazapine, 30 mg, Oral, Nightly  nitroglycerin, 0.5 inch, Topical, Q6H  oxybutynin XL, 10 mg, Oral, Daily  pantoprazole, 40 mg, Oral, Q AM  rOPINIRole, 4 mg, Oral, Nightly  senna-docusate sodium, 2 tablet, Oral, BID  sodium chloride, 10 mL, Intravenous, Q12H  ticagrelor, 60 mg, Oral, BID      heparin, 11 Units/kg/hr (Dosing Weight), Last Rate: 11 Units/kg/hr (04/11/24 0709)  Pharmacy to Dose Heparin,   sodium chloride, 100 mL/hr, Last Rate: 100 mL/hr (04/11/24 1028)        Assessment:  Chest pain, likely noncardiac in the setting of negative troponins x 3.  Patient has negative stress test  from December 2023.  History of CVA, status post left ICA stent placement and thrombectomy, January 2024  Hypertension  Dyslipidemia      Recommendations:  Awaiting coronary CT angiogram results.  Further decision making based on those  On high intensity statin, Brilinta, and aspirin therapy  At goal on current medications  At goal on current medications    I discussed the patients findings and my recommendations with patient.    Patient seen and evaluated by Dr. Mederos.  Plan of care reflects his recommendations.      Electronically signed by NIKKIE Banda, 04/11/24, 3:45 PM EDT.      Patient CTA coronaries were reviewed.  Patient with significant calcification noted in the LAD, circumflex, RCA.  Will schedule for cath for further delineation of coronary anatomy.  .Electronically signed by Susan Mederos MD, 04/11/24, 6:32 PM EDT.

## 2024-04-12 LAB
ANION GAP SERPL CALCULATED.3IONS-SCNC: 9.9 MMOL/L (ref 5–15)
BUN SERPL-MCNC: 13 MG/DL (ref 8–23)
BUN/CREAT SERPL: 17.3 (ref 7–25)
CALCIUM SPEC-SCNC: 9.8 MG/DL (ref 8.6–10.5)
CHLORIDE SERPL-SCNC: 106 MMOL/L (ref 98–107)
CO2 SERPL-SCNC: 23.1 MMOL/L (ref 22–29)
CREAT SERPL-MCNC: 0.75 MG/DL (ref 0.57–1)
EGFRCR SERPLBLD CKD-EPI 2021: 86.3 ML/MIN/1.73
GEN 5 2HR TROPONIN T REFLEX: 13 NG/L
GLUCOSE SERPL-MCNC: 109 MG/DL (ref 65–99)
POTASSIUM SERPL-SCNC: 3.8 MMOL/L (ref 3.5–5.2)
QT INTERVAL: 488 MS
QTC INTERVAL: 444 MS
SODIUM SERPL-SCNC: 139 MMOL/L (ref 136–145)
TROPONIN T DELTA: 0 NG/L
TROPONIN T SERPL HS-MCNC: 13 NG/L
UFH PPP CHRO-ACNC: 0.33 IU/ML (ref 0.3–0.7)

## 2024-04-12 PROCEDURE — 25810000003 SODIUM CHLORIDE 0.9 % SOLUTION: Performed by: STUDENT IN AN ORGANIZED HEALTH CARE EDUCATION/TRAINING PROGRAM

## 2024-04-12 PROCEDURE — 25510000001 IOPAMIDOL PER 1 ML: Performed by: INTERNAL MEDICINE

## 2024-04-12 PROCEDURE — 25810000003 SODIUM CHLORIDE 0.9 % SOLUTION: Performed by: INTERNAL MEDICINE

## 2024-04-12 PROCEDURE — C1769 GUIDE WIRE: HCPCS | Performed by: INTERNAL MEDICINE

## 2024-04-12 PROCEDURE — 85520 HEPARIN ASSAY: CPT | Performed by: STUDENT IN AN ORGANIZED HEALTH CARE EDUCATION/TRAINING PROGRAM

## 2024-04-12 PROCEDURE — B2111ZZ FLUOROSCOPY OF MULTIPLE CORONARY ARTERIES USING LOW OSMOLAR CONTRAST: ICD-10-PCS | Performed by: INTERNAL MEDICINE

## 2024-04-12 PROCEDURE — 92526 ORAL FUNCTION THERAPY: CPT

## 2024-04-12 PROCEDURE — 97535 SELF CARE MNGMENT TRAINING: CPT

## 2024-04-12 PROCEDURE — 93799 UNLISTED CV SVC/PROCEDURE: CPT | Performed by: INTERNAL MEDICINE

## 2024-04-12 PROCEDURE — C1894 INTRO/SHEATH, NON-LASER: HCPCS | Performed by: INTERNAL MEDICINE

## 2024-04-12 PROCEDURE — 4A023N7 MEASUREMENT OF CARDIAC SAMPLING AND PRESSURE, LEFT HEART, PERCUTANEOUS APPROACH: ICD-10-PCS | Performed by: INTERNAL MEDICINE

## 2024-04-12 PROCEDURE — 93454 CORONARY ARTERY ANGIO S&I: CPT | Performed by: INTERNAL MEDICINE

## 2024-04-12 PROCEDURE — 25010000002 HEPARIN (PORCINE) PER 1000 UNITS: Performed by: INTERNAL MEDICINE

## 2024-04-12 PROCEDURE — 93571 IV DOP VEL&/PRESS C FLO 1ST: CPT | Performed by: INTERNAL MEDICINE

## 2024-04-12 PROCEDURE — 25010000002 FENTANYL CITRATE (PF) 50 MCG/ML SOLUTION: Performed by: INTERNAL MEDICINE

## 2024-04-12 PROCEDURE — 25010000002 MIDAZOLAM PER 1 MG: Performed by: INTERNAL MEDICINE

## 2024-04-12 PROCEDURE — C1887 CATHETER, GUIDING: HCPCS | Performed by: INTERNAL MEDICINE

## 2024-04-12 PROCEDURE — 84484 ASSAY OF TROPONIN QUANT: CPT | Performed by: STUDENT IN AN ORGANIZED HEALTH CARE EDUCATION/TRAINING PROGRAM

## 2024-04-12 PROCEDURE — 93005 ELECTROCARDIOGRAM TRACING: CPT | Performed by: STUDENT IN AN ORGANIZED HEALTH CARE EDUCATION/TRAINING PROGRAM

## 2024-04-12 PROCEDURE — 99232 SBSQ HOSP IP/OBS MODERATE 35: CPT | Performed by: STUDENT IN AN ORGANIZED HEALTH CARE EDUCATION/TRAINING PROGRAM

## 2024-04-12 PROCEDURE — 92507 TX SP LANG VOICE COMM INDIV: CPT

## 2024-04-12 PROCEDURE — 93010 ELECTROCARDIOGRAM REPORT: CPT | Performed by: INTERNAL MEDICINE

## 2024-04-12 PROCEDURE — 80048 BASIC METABOLIC PNL TOTAL CA: CPT | Performed by: STUDENT IN AN ORGANIZED HEALTH CARE EDUCATION/TRAINING PROGRAM

## 2024-04-12 PROCEDURE — C1760 CLOSURE DEV, VASC: HCPCS | Performed by: INTERNAL MEDICINE

## 2024-04-12 RX ORDER — ACETAMINOPHEN 325 MG/1
650 TABLET ORAL EVERY 4 HOURS PRN
Status: DISCONTINUED | OUTPATIENT
Start: 2024-04-12 | End: 2024-04-18 | Stop reason: HOSPADM

## 2024-04-12 RX ORDER — HEPARIN SODIUM 1000 [USP'U]/ML
INJECTION, SOLUTION INTRAVENOUS; SUBCUTANEOUS
Status: DISCONTINUED | OUTPATIENT
Start: 2024-04-12 | End: 2024-04-12 | Stop reason: HOSPADM

## 2024-04-12 RX ORDER — HYDROXYZINE HYDROCHLORIDE 25 MG/1
25 TABLET, FILM COATED ORAL ONCE
Status: COMPLETED | OUTPATIENT
Start: 2024-04-12 | End: 2024-04-12

## 2024-04-12 RX ORDER — SODIUM CHLORIDE 9 MG/ML
1 INJECTION, SOLUTION INTRAVENOUS CONTINUOUS
Status: ACTIVE | OUTPATIENT
Start: 2024-04-12 | End: 2024-04-12

## 2024-04-12 RX ORDER — FENTANYL CITRATE 50 UG/ML
INJECTION, SOLUTION INTRAMUSCULAR; INTRAVENOUS
Status: DISCONTINUED | OUTPATIENT
Start: 2024-04-12 | End: 2024-04-12 | Stop reason: HOSPADM

## 2024-04-12 RX ORDER — MIDAZOLAM HYDROCHLORIDE 1 MG/ML
INJECTION INTRAMUSCULAR; INTRAVENOUS
Status: DISCONTINUED | OUTPATIENT
Start: 2024-04-12 | End: 2024-04-12 | Stop reason: HOSPADM

## 2024-04-12 RX ORDER — LIDOCAINE HYDROCHLORIDE 20 MG/ML
INJECTION, SOLUTION INFILTRATION; PERINEURAL
Status: DISCONTINUED | OUTPATIENT
Start: 2024-04-12 | End: 2024-04-12 | Stop reason: HOSPADM

## 2024-04-12 RX ORDER — SODIUM CHLORIDE 9 MG/ML
INJECTION, SOLUTION INTRAVENOUS
Status: COMPLETED | OUTPATIENT
Start: 2024-04-12 | End: 2024-04-12

## 2024-04-12 RX ADMIN — DOCUSATE SODIUM 50 MG AND SENNOSIDES 8.6 MG 2 TABLET: 8.6; 5 TABLET, FILM COATED ORAL at 21:02

## 2024-04-12 RX ADMIN — Medication 10 ML: at 08:04

## 2024-04-12 RX ADMIN — DICLOFENAC SODIUM 4 G: 10 GEL TOPICAL at 18:12

## 2024-04-12 RX ADMIN — ASPIRIN 81 MG: 81 TABLET, CHEWABLE ORAL at 08:04

## 2024-04-12 RX ADMIN — Medication 1 MG: at 08:04

## 2024-04-12 RX ADMIN — MIRTAZAPINE 30 MG: 15 TABLET, FILM COATED ORAL at 21:02

## 2024-04-12 RX ADMIN — NITROGLYCERIN 0.5 INCH: 20 OINTMENT TOPICAL at 23:15

## 2024-04-12 RX ADMIN — DOCUSATE SODIUM 50 MG AND SENNOSIDES 8.6 MG 2 TABLET: 8.6; 5 TABLET, FILM COATED ORAL at 08:04

## 2024-04-12 RX ADMIN — LOSARTAN POTASSIUM 50 MG: 50 TABLET, FILM COATED ORAL at 08:04

## 2024-04-12 RX ADMIN — METOPROLOL TARTRATE 12.5 MG: 25 TABLET, FILM COATED ORAL at 08:04

## 2024-04-12 RX ADMIN — DICLOFENAC SODIUM 4 G: 10 GEL TOPICAL at 21:03

## 2024-04-12 RX ADMIN — GABAPENTIN 200 MG: 100 CAPSULE ORAL at 21:06

## 2024-04-12 RX ADMIN — TICAGRELOR 60 MG: 60 TABLET ORAL at 21:02

## 2024-04-12 RX ADMIN — PANTOPRAZOLE SODIUM 40 MG: 40 TABLET, DELAYED RELEASE ORAL at 05:23

## 2024-04-12 RX ADMIN — ROPINIROLE HYDROCHLORIDE 4 MG: 1 TABLET, FILM COATED ORAL at 21:02

## 2024-04-12 RX ADMIN — DICLOFENAC SODIUM 4 G: 10 GEL TOPICAL at 07:58

## 2024-04-12 RX ADMIN — GABAPENTIN 200 MG: 100 CAPSULE ORAL at 08:04

## 2024-04-12 RX ADMIN — HYDROXYZINE HYDROCHLORIDE 25 MG: 25 TABLET, FILM COATED ORAL at 21:02

## 2024-04-12 RX ADMIN — AMLODIPINE BESYLATE 2.5 MG: 5 TABLET ORAL at 08:04

## 2024-04-12 RX ADMIN — SODIUM CHLORIDE 1 ML/KG/HR: 9 INJECTION, SOLUTION INTRAVENOUS at 18:11

## 2024-04-12 RX ADMIN — SODIUM CHLORIDE 100 ML/HR: 9 INJECTION, SOLUTION INTRAVENOUS at 02:13

## 2024-04-12 RX ADMIN — TICAGRELOR 60 MG: 60 TABLET ORAL at 08:04

## 2024-04-12 RX ADMIN — OXYBUTYNIN CHLORIDE 10 MG: 5 TABLET, EXTENDED RELEASE ORAL at 08:04

## 2024-04-12 RX ADMIN — Medication 10 ML: at 21:02

## 2024-04-12 RX ADMIN — METOPROLOL TARTRATE 12.5 MG: 25 TABLET, FILM COATED ORAL at 21:02

## 2024-04-12 RX ADMIN — ATORVASTATIN CALCIUM 80 MG: 40 TABLET, FILM COATED ORAL at 21:06

## 2024-04-12 NOTE — PLAN OF CARE
Goal Outcome Evaluation:  Patient has rested well in bed this shift. A&Ox4. VSS. IV access maintained and infusing with no visible complications.  No visible s/s of acute distress noted at this time. No requests or complaints at this time. Plan of care ongoing.

## 2024-04-12 NOTE — SIGNIFICANT NOTE
04/12/24 1126   OTHER   Discipline physical therapy assistant   Rehab Time/Intention   Session Not Performed patient/family declined treatment;other (see comments)  (Pt reports she is going for heart cath and wanted to rest before procedure.)

## 2024-04-12 NOTE — THERAPY TREATMENT NOTE
"Acute Care - Occupational Therapy Treatment Note   [facility]     Patient Name: Regine Beckham  : 1955  MRN: [id number]  Today's Date: 2024  Onset of Illness/Injury or Date of Surgery: 24 (admission date)          Admit Date: 2024       ICD-10-CM ICD-9-CM   1. Chest pain, unspecified type  R07.9 786.50     Patient Active Problem List   Diagnosis    Iron deficiency anemia due to chronic blood loss    Major depressive disorder    RLS (restless legs syndrome)    GERD (gastroesophageal reflux disease)    Left breast mass    Allergy to penicillin    Stroke    Grade I diastolic dysfunction    Moderate malnutrition    Falls frequently    Stroke-like symptoms    Debility    Chest pain     Past Medical History:   Diagnosis Date    Anemia     Anxiety     Arthritis     Depression     Legally blind     Restless leg syndrome     Sleep apnea     \"I don't use a cpap anymore since losing 106 lbs\"    Water retention      Past Surgical History:   Procedure Laterality Date    BACK SURGERY      CARDIAC CATHETERIZATION N/A 2024    Procedure: Percutaneous Manual Thrombectomy;  Surgeon: Efrain Hurley MD;  Location:  [facility] CATH INVASIVE LOCATION;  Service: Interventional Radiology;  Laterality: N/A;    GALLBLADDER SURGERY      HIP ARTHROSCOPY      JOINT REPLACEMENT Right          OT ASSESSMENT FLOWSHEET (Last 12 Hours)       OT Evaluation and Treatment       Row Name 24 1011                   OT Time and Intention    Subjective Information complains of;weakness;fatigue  -LM        Document Type therapy note (daily note)  -LM        Mode of Treatment occupational therapy  -LM        Patient Effort adequate  -LM        Comment Patient seen this date for adl retraining/education and neuro re-ed.  Patient currently requires max/mod assist with bathing, dressing, toileting tasks.  Patient demonstrates 1/4 arom LUE.  Utilizes resting hand splint.  -LM           General Information    Existing " Precautions/Restrictions fall  -LM        Limitations/Impairments safety/cognitive  -LM           Positioning and Restraints    Post Treatment Position bed  -LM        In Bed call light within reach;encouraged to call for assist;exit alarm on  -LM                  User Key  (r) = Recorded By, (t) = Taken By, (c) = Cosigned By      Initials Name Effective Dates    LM Kyleigh Tsang OT 06/16/21 -                      Occupational Therapy Education       Title: PT OT SLP Therapies (Resolved)       Topic: Occupational Therapy (Resolved)       Point: ADL training (Resolved)       Description:   Instruct learner(s) on proper safety adaptation and remediation techniques during self care or transfers.   Instruct in proper use of assistive devices.                  Learner Progress:  Not documented in this visit.              Point: Precautions (Resolved)       Description:   Instruct learner(s) on prescribed precautions during self-care and functional transfers.                  Learner Progress:  Not documented in this visit.                                      OT Recommendation and Plan              Time Calculation:     Therapy Charges for Today       Code Description Service Date Service Provider Modifiers Qty    61477653818 HC OT SELF CARE/MGMT/TRAIN EA 15 MIN 4/12/2024 Kyleigh Tsang OT GO 1                 Kyleigh Tsang OT  4/12/2024

## 2024-04-12 NOTE — PLAN OF CARE
Goal Outcome Evaluation: Patient has been pleasant. Compliant with care. Patient remains on 2L/NC, saturations remaining above 90%. Patient resting in bed. Call light in reach.

## 2024-04-12 NOTE — SIGNIFICANT NOTE
04/12/24 0956   OTHER   Discipline physical therapy assistant   Rehab Time/Intention   Session Not Performed unable to treat, medical status change;other (see comments)  (STALIN Cam reported Pt is to go for heart cath, will follow)

## 2024-04-12 NOTE — CASE MANAGEMENT/SOCIAL WORK
Discharge Planning Assessment   [facility]     Patient Name: Regine Beckham  MRN: [id number]  Today's Date: 4/12/2024    Admit Date: 4/9/2024    Plan: SS followed up with [facility] per [facility] on this date. [facility] pt has been accepteed clinically. Pt's information has been sent for insurance verification at this time and states she will notify when insurance is verified. SS informed Adenike pt is to get a heart cath today. SS to follow.     Discharge Plan       Row Name 04/12/24 1007       Plan    Plan SS followed up with [facility] on this date. Adenike gama pt has been accepteed clinically. Pt's information has been sent for insurance verification at this time and states she will notify when insurance is verified.  informed Adenike pt is to get a heart cath today. SS to follow.        PAULINO Lombardo

## 2024-04-12 NOTE — PROGRESS NOTES
[facility]  Regine Beckham is a  68 y.o. female receiving heparin infusion.     Therapy for (VTE/Cardiac):   Cardiac  Patient Dosing Weight: 63.5kg   Initial Bolus (Y/N):   Y  Any Bolus (Y/N):   Y        Signs or Symptoms of Bleeding: No     Cardiac or Other (Not VTE)   Initial Bolus: 60 units/kg (Max 4,000 units)  Initial rate: 12 units/kg/hr (Max 1,000 units/hr)   Anti Xa Rebolus Infusion Hold time Change infusion Dose (Units/kg/hr) Next Anti Xa or aPTT Level Due   < 0.11 50 Units/kg  (4000 Units Max) None Increase by  3 Units/kg/hr 6 hours   0.11- 0.19 25 Units/kg  (2000 Units Max) None Increase by  2 Units/kg/hr 6 hours   0.2 - 0.29 0 None Increase by  1 Units/kg/hr 6 hours   0.3 - 0.5 0 None No Change 6 hours (after 2 consecutive levels in range check q24h @0700)   0.51 - 0.6 0 None Decrease by  1 Units/kg/hr 6 hours   0.61 - 0.8 0 30 Minutes Decrease by  2 Units/kg/hr 6 hours   0.81 - 1 0 60 Minutes Decrease by  3 Units/kg/hr 6 hours   >1 0 Hold  After Anti Xa less than 0.5 decrease previous rate by  4 Units/kg/hr  Every 2 hours until Anti Xa  less than 0.5 then when infusion restarts in 6 hours         Recommend anti-Xa every 6 hours.          Date   Time   Anti-Xa Current Rate (Unit/kg/hr) Bolus   (Units) Rate Change   (Unit/kg/hr) New Rate (Unit/kg/hr) Next   Anti-Xa Comments  Pump Check Daily   04/09 2210 Pending  New start  3800 - 12 0430 Initiate infusion w/ bolus, no s/s bleeding per STALIN Batista    04/10 0500 0.68 12 - -2 10 1200 Hold x 30 min then decrease infusion rate by 2u/kg/hr, no s/s bleeding per STAILN Batista    4/10 1151 0.27 11 - +1 11 1930 Spoke with STALIN Baires. No s/s bleeding. Increase rate.    4/10 1718 - 11 - - - - Pump check completed    4/10 2016 0.35 11 - - 11 0230 Therapeutic x1. No s/s of bleeding per nurse Priyanka   04/11 0237 0.39 11 - - 11 0700 on 4/12 Therapeutic x 2. No s/s of bleeding per STALIN Mathew. Will transition to Qa monitoring.    4/11 1610 - 11 - - - - Pump check  completed.    4/11 0708 0.33 11 - 0 11 0700 4/13 Spoke with STALIN Cam. No s/s bleeding. Remains therapeutic. Continue same.                                                                                                                                                     Pharmacy will continue to follow anti-Xa results and monitor for signs and symptoms of bleeding or thrombosis.    Thank you.  Jinny Marx, Pharm.D.  4/12/2024  07:55 EDT

## 2024-04-12 NOTE — PAYOR COMM NOTE
"Regine Lock \"NEGIN\" (68 y.o. Female)       Date of Birth   1955    Social Security Number       Address   [address] [place], [place] [address]    Home Phone   [phone]    MRN   [id number]       [facility]    Marital Status                               Admission Date   24    Admission Type   Urgent    Admitting Provider   Capo Su MD    Attending Provider   Anh Valdez DO    Department, Room/Bed   [address], 3315/1S       Discharge Date       Discharge Disposition       Discharge Destination                                 Attending Provider: Anh Valdez DO    Allergies: Penicillins    Isolation: None   Infection: None   Code Status: CPR    Ht: 167.6 cm (66\")   Wt: 65.3 kg (143 lb 15.4 oz)    Admission Cmt: None   Principal Problem: Chest pain [R07.9]                   Active Insurance as of 2024       Primary Coverage       Payor Plan Insurance Group Employer/Plan Group    AETNA MEDICARE REPLACEMENT AETNA MEDICARE REPLACEMENT [id number]       Payor Plan Address Payor Plan Phone Number Payor Plan Fax Number Effective Dates    [address] [phone]  2024 - None Entered    [place] [address]         Subscriber Name Subscriber Birth Date Member ID       REGINE LOCK 1955 [id number]                     Emergency Contacts        (Rel.) Home Phone Work Phone Mobile Phone    Yaa Elizondo (Friend) [phone] --    Karla Patel (Friend) [phone] -- --                 History & Physical        Capo Su MD at 24 2106          Hospitalist History and Physical        Patient Identification  Name: Regine Lock  Age/Sex: 68 y.o. female  :  1955        MRN: [id number]  Visit Number: [id number]  Admit Date: 2024   PCP: Dread Burton MD          Chief complaint chest pain    History of Present Illness:  Patient is a 68 y.o. female with history of " "anxiety/depression, anemia, arthritis, legally blind, restless leg syndrome, sleep apnea no longer on CPAP after losing 100+ lb, \"water retention\" but no documented history of CHF, and stroke with residual left sided weakness, at which time workup revealed right internal carotid artery stenosis s/p thrombectomy and stent placement on 1/19/24. She was recently admitted to our service from 3/17-3/27/24 for acute physical deconditioning and frequent falls as a consequence of her prior stroke. Afterwards she was transferred to inpatient rehab. This evening she complained of left sided chest pain which she described as heaviness/pressure in sensation, without radiation or associated dyspnea, nausea or diaphoresis. Pain was relieved by nitropaste. Troponin was negative, but EKG showed T wave inversions in anterolateral leads concerning for acute ischemia. She has been transferred back to the hospitalist service for continuous telemetry monitoring and further workup for cardiac ischemia. Patient is now on [facility]. She reports chest pain is improved with only minimal residual discomfort. She denies shortness of breath or increased lower extremity edema from baseline. She has no other complaints.     Review of Systems  Review of Systems   Constitutional:  Negative for activity change, appetite change, chills, diaphoresis, fatigue, fever and unexpected weight change.   HENT:  Negative for congestion, postnasal drip, rhinorrhea, sinus pressure, sinus pain and sore throat.    Eyes:  Negative for photophobia and visual disturbance.   Respiratory:  Negative for cough, shortness of breath and wheezing.    Cardiovascular:  Positive for chest pain. Negative for palpitations and leg swelling.   Gastrointestinal:  Negative for abdominal distention, abdominal pain, constipation, diarrhea, nausea and vomiting.   Genitourinary:  Negative for difficulty urinating, flank pain, frequency and hematuria.   Musculoskeletal:  Positive for " "arthralgias (intermittent left shoulder pain, chronic since stroke). Negative for back pain, joint swelling, myalgias, neck pain and neck stiffness.   Skin:  Negative for color change, pallor, rash and wound.   Neurological:  Positive for weakness (residual left sided weakness since stroke). Negative for dizziness, tremors, seizures, syncope, light-headedness, numbness and headaches.   Hematological:  Negative for adenopathy. Does not bruise/bleed easily.   Psychiatric/Behavioral:  Negative for agitation, behavioral problems and confusion.        History  Past Medical History:   Diagnosis Date    Anemia     Anxiety     Arthritis     Depression     Legally blind     Restless leg syndrome     Sleep apnea     \"I don't use a cpap anymore since losing 106 lbs\"    Water retention      Past Surgical History:   Procedure Laterality Date    BACK SURGERY      CARDIAC CATHETERIZATION N/A 1/19/2024    Procedure: Percutaneous Manual Thrombectomy;  Surgeon: Efrain Hurley MD;  Location: [facility] INVASIVE LOCATION;  Service: Interventional Radiology;  Laterality: N/A;    GALLBLADDER SURGERY      HIP ARTHROSCOPY      JOINT REPLACEMENT Right      Family History   Problem Relation Age of Onset    Breast cancer Neg Hx      Social History     Tobacco Use    Smoking status: Every Day     Current packs/day: 1.50     Average packs/day: 1.5 packs/day for 51.0 years (76.5 ttl pk-yrs)     Types: Cigarettes    Smokeless tobacco: Never   Vaping Use    Vaping status: Never Used   Substance Use Topics    Alcohol use: Yes     Alcohol/week: 1.0 standard drink of alcohol     Types: 1 Glasses of wine per week     Comment: \"occasionally - once every two months\"    Drug use: Not Currently     Comment: alcohol - occasionally     Medications Prior to Admission   Medication Sig Dispense Refill Last Dose    aspirin 81 MG EC tablet Take 1 tablet by mouth Daily.       atorvastatin (LIPITOR) 80 MG tablet Take 1 tablet by mouth Every Night. 90 tablet 0 "     gabapentin (NEURONTIN) 300 MG capsule Take 1 capsule by mouth 3 (Three) Times a Day.       losartan (COZAAR) 50 MG tablet Take 1 tablet by mouth Daily. Indications: High Blood Pressure Disorder       Mirabegron ER (MYRBETRIQ) 50 MG tablet sustained-release 24 hour 24 hr tablet Take 50 mg by mouth Daily. Indications: Urinary Urgency       mirtazapine (REMERON) 30 MG tablet Take 1 tablet by mouth Every Night. Indications: Major Depressive Disorder 30 tablet 0     pantoprazole (PROTONIX) 40 MG EC tablet TAKE 1 TABLET BY MOUTH EVERY MORNING FOR HEARTBURN 90 tablet 0     rOPINIRole (REQUIP) 4 MG tablet Take 1 tablet by mouth Every Night. Take 1 hour before bedtime.  Indications: Restless Leg Syndrome 30 tablet 0     ticagrelor (BRILINTA) 60 MG tablet tablet Take 1 tablet by mouth 2 (Two) Times a Day. 60 tablet 0     zolpidem (AMBIEN) 5 MG tablet Take 1 tablet by mouth At Night As Needed for Sleep. Indications: Trouble Sleeping 5 tablet 0      Allergies:  Penicillins    Objective    Vital Signs  Temp:  [98 °F (36.7 °C)-98.3 °F (36.8 °C)] 98 °F (36.7 °C)  Heart Rate:  [61-88] 69  Resp:  [16-18] 18  BP: (123-162)/(59-83) 123/64  There is no height or weight on file to calculate BMI.    Physical Exam:  Physical Exam  Constitutional:       General: She is not in acute distress.     Appearance: Normal appearance. She is not ill-appearing.   HENT:      Head: Normocephalic and atraumatic.      Right Ear: External ear normal.      Left Ear: External ear normal.      Nose: Nose normal.      Mouth/Throat:      Mouth: Mucous membranes are moist.      Pharynx: Oropharynx is clear.   Eyes:      Extraocular Movements: Extraocular movements intact.      Conjunctiva/sclera: Conjunctivae normal.      Pupils: Pupils are equal, round, and reactive to light.   Cardiovascular:      Rate and Rhythm: Normal rate and regular rhythm.      Pulses: Normal pulses.      Heart sounds: Normal heart sounds.   Pulmonary:      Effort: Pulmonary effort  "is normal. No respiratory distress.      Breath sounds: Normal breath sounds. No wheezing or rales.   Chest:      Chest wall: No tenderness.   Abdominal:      General: Abdomen is flat. Bowel sounds are normal. There is no distension.      Palpations: Abdomen is soft.      Tenderness: There is no abdominal tenderness.   Musculoskeletal:         General: Normal range of motion.      Cervical back: Normal range of motion and neck supple. No tenderness.      Right lower leg: Edema (trace) present.      Left lower leg: Edema (trace) present.   Lymphadenopathy:      Cervical: No cervical adenopathy.   Skin:     General: Skin is warm and dry.      Capillary Refill: Capillary refill takes less than 2 seconds.      Coloration: Skin is not jaundiced.      Findings: No bruising or lesion.   Neurological:      General: No focal deficit present.      Mental Status: She is alert and oriented to person, place, and time.   Psychiatric:         Mood and Affect: Mood normal.         Behavior: Behavior normal.         Thought Content: Thought content normal.           Results Review:       Lab Results:  Results from last 7 days   Lab Units 04/09/24 2009 04/05/24  0020 04/04/24  0006   WBC 10*3/mm3 5.10 6.20 6.70   HEMOGLOBIN g/dL 10.1* 10.1* 10.3*   PLATELETS 10*3/mm3 236 245 234     Results from last 7 days   Lab Units 04/09/24  1902   CRP mg/dL <0.30     Results from last 7 days   Lab Units 04/09/24 2009 04/05/24  0020 04/04/24  1108 04/04/24  0006   SODIUM mmol/L 139 139  --  139   POTASSIUM mmol/L 4.0 4.0 4.0 3.4*   CHLORIDE mmol/L 106 105  --  106   CO2 mmol/L 25.2 25.9  --  22.5   BUN mg/dL 18 15  --  17   CREATININE mg/dL 0.83 0.91  --  0.75   CALCIUM mg/dL 9.0 9.4  --  9.0   GLUCOSE mg/dL 119* 110*  --  109*         No results found for: \"HGBA1C\"  Results from last 7 days   Lab Units 04/09/24 2009   BILIRUBIN mg/dL 0.2   ALK PHOS U/L 90   AST (SGOT) U/L 35*   ALT (SGPT) U/L 29     Results from last 7 days   Lab Units " 04/09/24  1902   HSTROP T ng/L 12                   I have reviewed the patient's laboratory results.    Imaging:  Imaging Results (Last 72 Hours)       ** No results found for the last 72 hours. **            I have personally reviewed the patient's radiologic imaging.        EKG:   Sinus rhythm with marked sinus arrhythmia, HR 63, QTc 499  T wave abnormality, consider lateral ischemia  Prolonged QT  Abnormal ECG  When compared with ECG of 21-MAR-2024 01:00,  premature atrial complexes are no longer present  Left posterior fascicular block is no longer present  Borderline criteria for Inferior infarct are no longer present  T wave inversion no longer evident in Inferior leads  T wave inversion now evident in Anterolateral leads    I have personally reviewed the patient's EKG. New T wave inversions in leads V4-V5 and AVL. EKG reviewed by on-call interventional cardiologist (sent by house supervisor) who reported no evidence of STEMI.         Assessment & Plan    - Chest pain with typical features (left sided chest heaviness relieved by nitroglycerin) and cardiac risk factors including vascular disease with recent CVA and right carotid artery stenosis, along with EKG changes concerning for anterolateral ischemia. Basic labs, inflammatory markers and CXR obtained to rule out other causes of chest pain such as pneumonia. CRP, procal, lactate and WBC all normal and CXR showed no acute abnormality.  Transferred from inpatient rehab to [facility] for observation with continuous telemetry monitoring and further cardiac ischemia workup. Continue home ASA, brilinta and statin. Repeat troponin and EKG now. Keep NPO after midnight except sips with meds. Check hemoglobin A1c and fasting lipid panel to assess for additional cardiovascular risk factors. Update ECHO in the morning and consult cardiology for guidance regarding further workup, since patient already had a stress test recently on 12/23/23 that was interpreted as low risk.    - Deconditioning secondary to recent stroke: continue PT/OT/SLP that patient was receiving in inpatient rehab.     DVT/GI Prophylaxis: SQ heparin; PO protonix    Estimated Length of Stay <2 midnights    I discussed the patient's findings, assessment and plan with the patient and inpatient rehab physician Dr Hayes.    Capo Su MD  04/09/24  21:06 EDT      Electronically signed by Capo Su MD at 04/09/24 5780       Vital Signs (last 3 days)       Date/Time Temp Temp src Pulse Resp BP Patient Position SpO2    04/12/24 0645 98 (36.7) Oral 58 18 149/86 Lying 97    04/12/24 0330 97.8 (36.6) Oral 66 18 143/68 Lying 98    04/11/24 2324 98.2 (36.8) Oral 63 18 151/51 Sitting 99    04/11/24 1904 98.4 (36.9) Oral 65 18 130/77 Lying 97    04/11/24 1500 97.6 (36.4) Axillary 63 18 114/62 Lying 97    04/11/24 1100 98 (36.7) Axillary 64 18 122/77 Lying 97    04/11/24 0958 -- -- -- -- -- -- 96    04/11/24 0700 97.8 (36.6) Axillary 70 16 163/69 Lying 96    04/11/24 0338 98.2 (36.8) Axillary 76 18 119/60 Lying --    04/11/24 0011 -- Oral 69 18 112/54 Lying 96    04/10/24 1942 98.6 (37) Oral 80 18 97/50 Lying 95    04/10/24 1643 98.5 (36.9) Oral 77 20 127/84 Lying 97    04/10/24 1250 98.4 (36.9) Oral 83 20 166/84 Lying 97    04/10/24 0844 -- -- -- -- -- -- 98    04/10/24 0723 98.2 (36.8) Oral 76 20 145/88 Lying 97    04/10/24 0305 97.7 (36.5) Oral 80 18 155/71 Lying 99    04/09/24 2304 98.3 (36.8) Oral 76 18 132/87 Lying 98    04/09/24 2030 97.6 (36.4) Oral 87 18 159/83 Lying 99          Intake & Output (last 3 days)         04/09 0701  04/10 0700 04/10 0701  04/11 0700 04/11 0701  04/12 0700 04/12 0701  04/13 0700    P.O.  360 480 240    I.V. (mL/kg)  215.1 (3.4)      Total Intake(mL/kg)  575.1 (9.1) 480 (7.6) 240 (3.8)    Urine (mL/kg/hr) 450 1200 (0.8)      Stool 0       Total Output 450 1200      Net -450 -624.9 +480 +240            Urine Unmeasured Occurrence  2 x 4 x     Stool Unmeasured Occurrence 1  "x              Medication Administration Report for Regine Beckham \"NEGIN\" as of 4/9/24 through 4/12/24     Legend:    Given Hold Not Given Due Canceled Entry Other Actions    Time Time (Time) Time Time-Action         Discontinued     Completed     Future     MAR Hold     Linked             Medications 04/09/24 04/10/24 04/11/24 04/12/24     acetaminophen (TYLENOL) tablet 1,000 mg  Dose: 1,000 mg  Freq: Every 6 Hours PRN Route: PO  PRN Reason: Mild Pain  Start: 04/09/24 2048     Admin Instructions:   If given for fever, use fever parameter: fever greater than 100.4 °F  Based on patient request - if ordered for moderate or severe pain, provider allows for administration of a medication prescribed for a lower pain scale.    Do not exceed 4 grams of acetaminophen in a 24 hr period. Max dose of 2gm for AST/ALT greater than 120 units/L.    If given for pain, use the following pain scale:   Mild Pain = Pain Score of 1-3, CPOT 1-2  Moderate Pain = Pain Score of 4-6, CPOT 3-4  Severe Pain = Pain Score of 7-10, CPOT 5-8       0315-Given             amLODIPine (NORVASC) tablet 2.5 mg  Dose: 2.5 mg  Freq: Every 24 Hours Scheduled Route: PO  Start: 04/10/24 0900     Admin Instructions:   Hold for SBP less than 100, DBP less than 60  Caution: Look alike/sound alike drug alert. Avoid grapefruit juice.       1016-Given         0851-Given         0804-Given           aspirin chewable tablet 81 mg  Dose: 81 mg  Freq: Daily Route: PO  Start: 04/10/24 0900     Admin Instructions:   Herbal/drug interaction: Avoid use with ginkgo biloba. Based on patient request - if ordered for moderate or severe pain, provider allows for administration of a medication prescribed for a lower pain scale.  Do not exceed 4 grams of aspirin in a 24 hr period.    If given for pain, use the following pain scale:   Mild Pain = Pain Score of 1-3, CPOT 1-2  Moderate Pain = Pain Score of 4-6, CPOT 3-4  Severe Pain = Pain Score of 7-10, CPOT 5-8       " 1016-Given         0851-Given         0804-Given           atorvastatin (LIPITOR) tablet 80 mg  Dose: 80 mg  Freq: Nightly Route: PO  Start: 04/09/24 2145     Admin Instructions:   Avoid grapefruit juice.      2208-Given         2127-Given         2034-Given         2100            sennosides-docusate (PERICOLACE) 8.6-50 MG per tablet 2 tablet  Dose: 2 tablet  Freq: 2 Times Daily Route: PO  Start: 04/09/24 2145     Admin Instructions:   HOLD MEDICATION IF PATIENT HAS HAD BOWEL MOVEMENT. Start bowel management regimen if patient has not had a bowel movement after 12 hours.      (2144)-Not Given         1016-Given     (2127)-Not Given        0851-Given     2035-Given        0804-Given     2100          And   polyethylene glycol (MIRALAX) packet 17 g  Dose: 17 g  Freq: Daily PRN Route: PO  PRN Reason: Constipation  PRN Comment: Use if senna-docusate is ineffective  Start: 04/09/24 2048     Admin Instructions:   Use if no bowel movement after 12 hours. Mix in 6-8 ounces of water.  Use 4-8 ounces of water, tea, or juice for each 17 gram dose.            And   bisacodyl (DULCOLAX) EC tablet 5 mg  Dose: 5 mg  Freq: Daily PRN Route: PO  PRN Reason: Constipation  PRN Comment: Use if polyethylene glycol is ineffective  Start: 04/09/24 2048     Admin Instructions:   Use if no bowel movement after 12 hours.  Swallow whole. Do not crush, split, or chew tablet.            And   bisacodyl (DULCOLAX) suppository 10 mg  Dose: 10 mg  Freq: Daily PRN Route: RE  PRN Reason: Constipation  PRN Comment: Use if bisacodyl oral is ineffective  Start: 04/09/24 2048     Admin Instructions:   Use if no bowel movement after 12 hours.  Hold for diarrhea            calcium carbonate (TUMS) chewable tablet 500 mg (200 mg elemental)  Dose: 3 tablet  Freq: 3 Times Daily PRN Route: PO  PRN Reasons: Indigestion,Heartburn  Start: 04/09/24 2048     Admin Instructions:   One tablet contains 200 mg elemental calcium.  Take with food.            Calcium  "Replacement - Follow Nurse / BPA Driven Protocol  Freq: As Needed Route: XX  PRN Reason: Other  Start: 04/09/24 2103     Admin Instructions:   Open Order & Select \"[facility] Electrolyte Replacement Protocol Algorithm\" to View Details            Diclofenac Sodium (VOLTAREN) 1 % gel 4 g  Dose: 4 g  Freq: 4 Times Daily Route: TOP  Start: 04/09/24 2145     Admin Instructions:   Apply to left upper extremity  .   Do not cover area with occlusive dressing or apply any other med to affected area. Do not wash affected area for 1 hr after applying. Use dosing card for correct dose measurement.      2207-Given         1016-Given     1326-Given       1811-Given     2127-Given        0852-Given     (1154)-Not Given       1714-Given     2035-Given        0758-Given     1200       1800     2100          folic acid (FOLVITE) tablet 1 mg  Dose: 1 mg  Freq: Daily Route: PO  Start: 04/10/24 0900       1016-Given         0851-Given         0804-Given           gabapentin (NEURONTIN) capsule 200 mg  Dose: 200 mg  Freq: Every 12 Hours Scheduled Route: PO  Start: 04/09/24 2145     Admin Instructions:         2208-Given         1018-Given     2127-Given        0851-Given     2034-Given        0804-Given     2100          heparin (porcine) 5000 UNIT/ML injection 1,600 Units  Dose: 25 Units/kg  Weight Dosing Info: 63.5 kg  Freq: As Needed Route: IV  PRN Comment: Bolus per pharmacy  Indications Comment: Cardiac or Other Not VTE  Start: 04/09/24 2135     Admin Instructions:   **Max Dose of 2000 units**            heparin (porcine) 5000 UNIT/ML injection 3,200 Units  Dose: 50 Units/kg  Weight Dosing Info: 63.5 kg  Freq: As Needed Route: IV  PRN Comment: Bolus per pharmacy  Indications Comment: Cardiac or Other Not VTE  Start: 04/09/24 2135     Admin Instructions:   **Max Dose of 4,000 units**            heparin 25811 units/250 mL (100 units/mL) in 0.45 % NaCl infusion  Rate: 6.98 mL/hr Dose: 11 Units/kg/hr  Weight Dosing Info: 63.5 kg (Dosing " "Weight)  Freq: Titrated Route: IV  Indications Comment: Cardiac or other NOT VTE  Start: 04/10/24 1400     Admin Instructions:   Pharmacy dosing - Cardiac or Other NOT VTE - Boluses (with initial bolus)         1324-Rate Change (DUAL SIGN)     2025-Currently Infusing       2311-Currently Infusing         0225-Currently Infusing     0442-Currently Infusing       0709-New Bag            losartan (COZAAR) tablet 50 mg  Dose: 50 mg  Freq: Daily Route: PO  Indications of Use: HYPERTENSION  Start: 04/10/24 0900     Admin Instructions:   Hold for SBP less than 100, DBP less than 60       1016-Given         0851-Given         0804-Given           Magnesium Standard Dose Replacement - Follow Nurse / BPA Driven Protocol  Freq: As Needed Route: XX  PRN Reason: Other  Start: 04/09/24 2048     Admin Instructions:   Open Order & Select \"[facility] Electrolyte Replacement Protocol Algorithm\" to View Details            metoprolol tartrate (LOPRESSOR) tablet 12.5 mg  Dose: 12.5 mg  Freq: Every 12 Hours Scheduled Route: PO  Start: 04/10/24 2100     Admin Instructions:   Hold for SBP <110 or HR <60.       (2127)-Not Given         0851-Given     2034-Given        0804-Given     2100          mirtazapine (REMERON) tablet 30 mg  Dose: 30 mg  Freq: Nightly Route: PO  Indications of Use: MAJOR DEPRESSIVE DISORDER  Start: 04/09/24 2145      2209-Given         2127-Given         2034-Given         2100           nitroglycerin (NITROSTAT) ointment 0.5 inch  Dose: 0.5 inch  Freq: Every 6 Hours Scheduled Route: TOP  Start: 04/10/24 0000     Admin Instructions:   Apply to chest wall. If scheduled every 6 hours, omit the midnight dose to allow for nitrate free interval.      2314-Given         0527-Given     1330-Given       1811-Given     2310-Hold [C]        (0545)-Not Given     (1154)-Not Given       (1715)-Not Given         (0009)-Not Given [C]     (0523)-Not Given       1200     1800          nitroglycerin (NITROSTAT) SL tablet 0.4 mg  Dose: 0.4 " "mg  Freq: Every 5 Minutes PRN Route: SL  PRN Reason: Chest Pain  PRN Comment: Only if SBP Greater Than 100  Start: 04/09/24 2103     Admin Instructions:   If Pain Unrelieved After 3 Doses Notify MD  May administer up to 3 doses per episode.            oxybutynin XL (DITROPAN-XL) 24 hr tablet 10 mg  Dose: 10 mg  Freq: Daily Route: PO  Start: 04/10/24 0900     Admin Instructions:   Do not crush or chew the capsules or tablets. The drug may not work as designed if the capsule or tablet is crushed or chewed. Swallow whole.       1016-Given         0851-Given         0804-Given           pantoprazole (PROTONIX) EC tablet 40 mg  Dose: 40 mg  Freq: Every Early Morning Route: PO  Start: 04/10/24 0600     Admin Instructions:   Do not crush or chew the capsules or tablets. The drug may not work as designed if the capsule or tablet is crushed or chewed. Swallow whole.  Swallow whole; do not crush, split, or chew.       0539-Given         0545-Given         0523-Given           Pharmacy to Dose Heparin  Freq: Continuous PRN Route: XX  PRN Reason: Consult  PRN Comment: Pharmacy to dose heparin  Indications Comment: Cardiac or Other (NOT VTE)  Start: 04/09/24 2135     Admin Instructions:   Boluses (with initial bolus)            Phosphorus Replacement - Follow Nurse / BPA Driven Protocol  Freq: As Needed Route: XX  PRN Reason: Other  Start: 04/09/24 2102     Admin Instructions:   Open Order & Select \"[facility] Electrolyte Replacement Protocol Algorithm\" to View Details            Potassium Replacement - Follow Nurse / BPA Driven Protocol  Freq: As Needed Route: XX  PRN Reason: Other  Start: 04/09/24 2048     Admin Instructions:   Open Order & Select \"[facility] Electrolyte Replacement Protocol Algorithm\" to View Details            rOPINIRole (REQUIP) tablet 4 mg  Dose: 4 mg  Freq: Nightly Route: PO  Indications of Use: RESTLESS LEG SYNDROME  Start: 04/09/24 2145     Admin Instructions:   Caution: Look alike/sound alike drug alert      " 2209-Given         2127-Given         2034-Given         2100           sodium chloride 0.9 % flush 10 mL  Dose: 10 mL  Freq: As Needed Route: IV  PRN Reason: Line Care  Start: 04/09/24 2103            sodium chloride 0.9 % flush 10 mL  Dose: 10 mL  Freq: Every 12 Hours Scheduled Route: IV  Start: 04/09/24 2200      2208-Given         1017-Given     2128-Given        0852-Given     2035-Given        0804-Given     2100          sodium chloride 0.9 % infusion  Rate: 100 mL/hr Dose: 100 mL/hr  Freq: Continuous Route: IV  Start: 04/11/24 1115        1028-New Bag         0213-New Bag     0415-Currently Infusing       0757-Currently Infusing           sodium chloride 0.9 % infusion 40 mL  Dose: 40 mL  Freq: As Needed Route: IV  PRN Reason: Line Care  Start: 04/09/24 2103     Admin Instructions:   Following administration of an IV intermittent medication, flush line with 40mL NS at 100mL/hr.            ticagrelor (BRILINTA) tablet 60 mg  Dose: 60 mg  Freq: 2 Times Daily Route: PO  Start: 04/09/24 2145     Admin Instructions:   Avoid use with doses of aspirin > 100 mg/day      2209-Given         1017-Given     2127-Given        0851-Given     2034-Given        0804-Given     2100          Completed Medications  Medications 04/09/24 04/10/24 04/11/24 04/12/24      heparin (porcine) 5000 UNIT/ML injection 3,800 Units  Dose: 60 Units/kg  Weight Dosing Info: 63.5 kg  Freq: Once Route: IV  Indications Comment: Cardiac or Other Not VTE  Start: 04/09/24 2230 End: 04/09/24 2212     Admin Instructions:   **Max Dose of 4,000 units**      2212-Given              iopamidol (ISOVUE-370) 76 % injection 100 mL  Dose: 100 mL  Freq: Once in Imaging Route: IV  Start: 04/11/24 1115 End: 04/11/24 1028        1028-Given            Discontinued Medications  Medications 04/09/24 04/10/24 04/11/24 04/12/24      heparin (porcine) 5000 UNIT/ML injection 5,000 Units  Dose: 5,000 Units  Freq: Every 12 Hours Scheduled Route: SC  Indications of Use:  PROPHYLAXIS OF VENOUS THROMBOEMBOLISM  Start: 04/09/24 2145 End: 04/09/24 2134            heparin 67448 units/250 mL (100 units/mL) in 0.45 % NaCl infusion  Rate: 6.35 mL/hr Dose: 10 Units/kg/hr  Weight Dosing Info: 63.5 kg  Freq: Titrated Route: IV  Indications Comment: Cardiac or other NOT VTE  Start: 04/10/24 0530 End: 04/10/24 1302     Admin Instructions:   Pharmacy dosing - Cardiac or Other NOT VTE - Boluses (with initial bolus)         0528-Rate Change (DUAL SIGN)     1326-Stopped            heparin 98119 units/250 mL (100 units/mL) in 0.45 % NaCl infusion  Rate: 7.62 mL/hr Dose: 12 Units/kg/hr  Weight Dosing Info: 63.5 kg  Freq: Titrated Route: IV  Indications Comment: Cardiac or other NOT VTE  Start: 04/09/24 2230 End: 04/10/24 0455     Admin Instructions:   Pharmacy dosing - Cardiac or Other NOT VTE - Boluses (with initial bolus)        2206-New Bag     2229-Canceled Entry        0313-Currently Infusing     0456-Stopped                        Lab Results (all)       Procedure Component Value Units Date/Time    Basic Metabolic Panel [[id number]]  (Abnormal) Collected: 04/12/24 0708    Specimen: Blood Updated: 04/12/24 0741     Glucose 109 mg/dL      BUN 13 mg/dL      Creatinine 0.75 mg/dL      Sodium 139 mmol/L      Potassium 3.8 mmol/L      Comment: Slight hemolysis detected by analyzer. Result may be falsely elevated.        Chloride 106 mmol/L      CO2 23.1 mmol/L      Calcium 9.8 mg/dL      BUN/Creatinine Ratio 17.3     Anion Gap 9.9 mmol/L      eGFR 86.3 mL/min/1.73     Narrative:      GFR Normal >60  Chronic Kidney Disease <60  Kidney Failure <15      Heparin Anti-Xa [[id number]]  (Normal) Collected: 04/12/24 0708    Specimen: Blood Updated: 04/12/24 0736     Heparin Anti-Xa (UFH) 0.33 IU/ml     Heparin Anti-Xa [[id number]]  (Normal) Collected: 04/11/24 0203    Specimen: Blood Updated: 04/11/24 0233     Heparin Anti-Xa (UFH) 0.39 IU/ml     Heparin Anti-Xa [[id number]]  (Normal) Collected: 04/10/24 1935     Specimen: Blood Updated: 04/10/24 2015     Heparin Anti-Xa (UFH) 0.35 IU/ml     Heparin Anti-Xa [[id number]]  (Abnormal) Collected: 04/10/24 1151    Specimen: Blood Updated: 04/10/24 1256     Heparin Anti-Xa (UFH) 0.27 IU/ml     Heparin Anti-Xa [[id number]]  (Normal) Collected: 04/10/24 0359    Specimen: Blood Updated: 04/10/24 0416     Heparin Anti-Xa (UFH) 0.68 IU/ml     High Sensitivity Troponin T 2Hr [[id number]]  (Normal) Collected: 04/10/24 0012    Specimen: Blood Updated: 04/10/24 0111     HS Troponin T 13 ng/L      Troponin T Delta 0 ng/L     Narrative:      High Sensitive Troponin T Reference Range:  <14.0 ng/L- Negative Female for AMI  <22.0 ng/L- Negative Male for AMI  >=14 - Abnormal Female indicating possible myocardial injury.  >=22 - Abnormal Male indicating possible myocardial injury.   Clinicians would have to utilize clinical acumen, EKG, Troponin, and serial changes to determine if it is an Acute Myocardial Infarction or myocardial injury due to an underlying chronic condition.         Comprehensive Metabolic Panel [[id number]]  (Abnormal) Collected: 04/10/24 0012    Specimen: Blood Updated: 04/10/24 0111     Glucose 113 mg/dL      BUN 18 mg/dL      Creatinine 0.64 mg/dL      Sodium 139 mmol/L      Potassium 3.7 mmol/L      Chloride 106 mmol/L      CO2 25.1 mmol/L      Calcium 9.3 mg/dL      Total Protein 5.9 g/dL      Albumin 3.3 g/dL      ALT (SGPT) 27 U/L      AST (SGOT) 31 U/L      Alkaline Phosphatase 81 U/L      Total Bilirubin 0.3 mg/dL      Globulin 2.6 gm/dL      A/G Ratio 1.3 g/dL      BUN/Creatinine Ratio 28.1     Anion Gap 7.9 mmol/L      eGFR 95.8 mL/min/1.73     Narrative:      GFR Normal >60  Chronic Kidney Disease <60  Kidney Failure <15      Lipid Panel [[id number]] Collected: 04/10/24 0012    Specimen: Blood Updated: 04/10/24 0111     Total Cholesterol 118 mg/dL      Triglycerides 59 mg/dL      HDL Cholesterol 47 mg/dL      LDL Cholesterol  58 mg/dL      VLDL Cholesterol 13 mg/dL       LDL/HDL Ratio 1.26    Narrative:      Cholesterol Reference Ranges  (U.S. Department of Health and Human Services ATP III Classifications)    Desirable          <200 mg/dL  Borderline High    200-239 mg/dL  High Risk          >240 mg/dL      Triglyceride Reference Ranges  (U.S. Department of Health and Human Services ATP III Classifications)    Normal           <150 mg/dL  Borderline High  150-199 mg/dL  High             200-499 mg/dL  Very High        >500 mg/dL    HDL Reference Ranges  (U.S. Department of Health and Human Services ATP III Classifications)    Low     <40 mg/dl (major risk factor for CHD)  High    >60 mg/dl ('negative' risk factor for CHD)        LDL Reference Ranges  (U.S. Department of Health and Human Services ATP III Classifications)    Optimal          <100 mg/dL  Near Optimal     100-129 mg/dL  Borderline High  130-159 mg/dL  High             160-189 mg/dL  Very High        >189 mg/dL    CBC & Differential [[id number]]  (Abnormal) Collected: 04/10/24 0012    Specimen: Blood Updated: 04/10/24 0049    Narrative:      The following orders were created for panel order CBC & Differential.  Procedure                               Abnormality         Status                     ---------                               -----------         ------                     CBC Auto Differential[[id number]]        Abnormal            Final result                 Please view results for these tests on the individual orders.    CBC Auto Differential [[id number]]  (Abnormal) Collected: 04/10/24 0012    Specimen: Blood Updated: 04/10/24 0049     WBC 5.58 10*3/mm3      RBC 3.33 10*6/mm3      Hemoglobin 10.1 g/dL      Hematocrit 32.4 %      MCV 97.3 fL      MCH 30.3 pg      MCHC 31.2 g/dL      RDW 13.8 %      RDW-SD 49.4 fl      MPV 9.5 fL      Platelets 224 10*3/mm3      Neutrophil % 46.3 %      Lymphocyte % 38.2 %      Monocyte % 9.0 %      Eosinophil % 5.2 %      Basophil % 1.1 %      Immature Grans % 0.2 %       Neutrophils, Absolute 2.59 10*3/mm3      Lymphocytes, Absolute 2.13 10*3/mm3      Monocytes, Absolute 0.50 10*3/mm3      Eosinophils, Absolute 0.29 10*3/mm3      Basophils, Absolute 0.06 10*3/mm3      Immature Grans, Absolute 0.01 10*3/mm3      nRBC 0.0 /100 WBC     Heparin Anti-Xa [[id number]]  (Abnormal) Collected: 04/09/24 2142    Specimen: Blood Updated: 04/09/24 2201     Heparin Anti-Xa (UFH) <0.10 IU/ml     Protime-INR [[id number]]  (Normal) Collected: 04/09/24 2142    Specimen: Blood Updated: 04/09/24 2200     Protime 13.0 Seconds      INR 0.94    Narrative:      Suggested INR therapeutic range for stable oral anticoagulant therapy:    Low Intensity therapy:   1.5-2.0  Moderate Intensity therapy:   2.0-3.0  High Intensity therapy:   2.5-4.0    aPTT [[id number]]  (Normal) Collected: 04/09/24 2142    Specimen: Blood Updated: 04/09/24 2200     PTT 33.2 seconds     Narrative:      PTT Heparin Therapeutic Range:  45 - 116 seconds      High Sensitivity Troponin T [[id number]]  (Normal) Collected: 04/09/24 2117    Specimen: Blood Updated: 04/09/24 2148     HS Troponin T 13 ng/L     Narrative:      High Sensitive Troponin T Reference Range:  <14.0 ng/L- Negative Female for AMI  <22.0 ng/L- Negative Male for AMI  >=14 - Abnormal Female indicating possible myocardial injury.  >=22 - Abnormal Male indicating possible myocardial injury.   Clinicians would have to utilize clinical acumen, EKG, Troponin, and serial changes to determine if it is an Acute Myocardial Infarction or myocardial injury due to an underlying chronic condition.         Hemoglobin A1c [[id number]]  (Abnormal) Collected: 04/09/24 2009    Specimen: Blood Updated: 04/09/24 2126     Hemoglobin A1C 5.90 %     Narrative:      Hemoglobin A1C Ranges:    Increased Risk for Diabetes  5.7% to 6.4%  Diabetes                     >= 6.5%  Diabetic Goal                < 7.0%          Imaging Results (All)       Procedure Component Value Units Date/Time    CT Angiogram  Heart With 3D Image [[id number]] Collected: 04/11/24 1707     Updated: 04/11/24 1722    Narrative:      EXAM:    CT Angiography Heart With Intravenous Contrast     EXAM DATE:    4/11/2024 10:14 AM     CLINICAL HISTORY:    Chest pain, nonspecific     TECHNIQUE:    Axial computed tomographic angiography images of the coronary arteries  with intravenous contrast.  Sublingual nitroglycerine for coronary  vasodilation and IV metoprolol to reduce heart rate were administered as  needed.  This CT exam was performed using one or more of the following  dose reduction techniques:  automated exposure control, adjustment of  the mA and/or kV according to patient size, and/or use of iterative  reconstruction technique.    3D and MIP reconstructed images were created and reviewed.     COMPARISON:    None available     FINDINGS:    LEFT MAIN CORONARY ARTERY:  Left main calcium score 40.    LEFT ANTERIOR DESCENDING ARTERY:  LAD multifocal calcific plaque with  areas of mild and moderate stenosis.  LAD calcium score 642.    LEFT CIRCUMFLEX ARTERY:  Circumflex with mild calcific plaque causing  mild stenosis.  Circumflex calcium score 220.    RIGHT CORONARY ARTERY:  Multifocal calcific plaque in the RCA with  areas of mild and moderate stenosis but obscured due to the  calcification.  Focal defect in the midportion of the RCA appears to be  artifact.  RCA calcium score 827.       PERICARDIUM:  Unremarkable.  Contour is preserved with no effusion,  thickening or calcification.        AORTA:  No acute findings.  No thoracic aortic aneurysm.  No  dissection.    PULMONARY ARTERIES:  Unremarkable.  No pulmonary embolism.    LUNGS AND PLEURAL SPACES:  Unremarkable as visualized.  No mass.  No  consolidation or edema.  No pleural effusion or thickening.  No  pneumothorax.    MEDIASTINUM:  Unremarkable.  No mass.    OTHER FINDINGS:  Total calcium score 1729.  First obtuse marginal with  calcific plaque but no focal stenosis.       Impression:       1.  Total calcium score 1729.  2.  Multifocal calcific plaque in the RCA with areas of mild and  moderate stenosis but obscured due to the calcification.  3.  Focal defect in the midportion of the RCA appears to be artifact.  4.  LAD multifocal calcific plaque with areas of mild and moderate  stenosis.  5.  First obtuse marginal with calcific plaque but no focal stenosis.  6.  Circumflex with mild calcific plaque causing mild stenosis.  7.  Tricuspid aortic valve with extensive calcific plaque.     RECOMMENDATIONS:    Extensive calcific plaque with areas of mild and moderate stenosis.  Consider catheter angiogram. Impression.     ASSESSMENT:    CAD RADS N/P4        This report was finalized on 4/11/2024 5:20 PM by Dr. Hu Obrien MD.             Orders (all)        Start     Ordered    04/13/24 0700  Heparin Anti-Xa  Timed         04/12/24 0752    04/13/24 0001  NPO Diet NPO Type: Sips with Meds  Diet Effective Midnight         04/12/24 0815    04/12/24 1141  Inpatient Admission  Once         04/12/24 1141    04/12/24 1046  ECG 12 Lead Chest Pain  STAT         04/12/24 1046    04/12/24 1046  High Sensitivity Troponin T  STAT         04/12/24 1046    04/12/24 0827  Cardiac Catheterization/Vascular Study  Once         04/12/24 0826    04/12/24 0815  Case Request Cath Lab: Left Heart Cath  Once         04/12/24 0815    04/12/24 0700  Heparin Anti-Xa  Timed         04/11/24 0239    04/12/24 0600  Basic Metabolic Panel  Morning Draw         04/11/24 2155    04/11/24 1115  sodium chloride 0.9 % infusion  Continuous         04/11/24 1016    04/11/24 1115  iopamidol (ISOVUE-370) 76 % injection 100 mL  Once in Imaging         04/11/24 1016    04/11/24 0230  Heparin Anti-Xa  Timed         04/10/24 2019    04/10/24 2100  metoprolol tartrate (LOPRESSOR) tablet 12.5 mg  Every 12 Hours Scheduled         04/10/24 1819    04/10/24 1930  Heparin Anti-Xa  Timed         04/10/24 1302    04/10/24 1616  Diet: Cardiac; Healthy  Heart (2-3 Na+); Texture: Soft to Chew (NDD 3); Soft to Chew: Whole Meat; Fluid Consistency: Thin (IDDSI 0)  Diet Effective Now         04/10/24 1615    04/10/24 1400  heparin 45966 units/250 mL (100 units/mL) in 0.45 % NaCl infusion  Titrated         04/10/24 1302    04/10/24 1313  CT Angiogram Heart With 3D Image  1 Time Imaging         04/10/24 1312    04/10/24 1200  Heparin Anti-Xa  Timed         04/10/24 0455    04/10/24 0900  amLODIPine (NORVASC) tablet 2.5 mg  Every 24 Hours Scheduled         04/09/24 2048    04/10/24 0900  aspirin chewable tablet 81 mg  Daily         04/09/24 2048    04/10/24 0900  folic acid (FOLVITE) tablet 1 mg  Daily         04/09/24 2048    04/10/24 0900  losartan (COZAAR) tablet 50 mg  Daily         04/09/24 2048    04/10/24 0900  oxybutynin XL (DITROPAN-XL) 24 hr tablet 10 mg  Daily         04/09/24 2048    04/10/24 0800  Oral Care  2 Times Daily       04/09/24 2048    04/10/24 0800  Oral Care  2 Times Daily       04/09/24 2105    04/10/24 0700  Adult Transthoracic Echo Complete w/ Color, Spectral and Contrast if necessary per protocol  Once         04/09/24 2105    04/10/24 0600  pantoprazole (PROTONIX) EC tablet 40 mg  Every Early Morning         04/09/24 2048    04/10/24 0600  CBC Auto Differential  Morning Draw,   Status:  Canceled         04/09/24 2105    04/10/24 0600  Comprehensive Metabolic Panel  Morning Draw         04/09/24 2105    04/10/24 0600  Lipid Panel  Morning Draw         04/09/24 2105    04/10/24 0600  CBC & Differential  Every Third Day      Comments: Discontinue After Heparin Stopped      04/09/24 2135    04/10/24 0600  CBC Auto Differential  PROCEDURE ONCE        Comments: Discontinue After Heparin Stopped      04/09/24 2202    04/10/24 0530  heparin 92882 units/250 mL (100 units/mL) in 0.45 % NaCl infusion  Titrated,   Status:  Discontinued         04/10/24 0455    04/10/24 0430  Heparin Anti-Xa  Timed         04/09/24 2206    04/10/24 0001  NPO Diet NPO Type:  "Sips with Meds  Diet Effective Midnight,   Status:  Canceled         04/09/24 2105    04/10/24 0000  nitroglycerin (NITROSTAT) ointment 0.5 inch  Every 6 Hours Scheduled         04/09/24 2048    04/10/24 0000  Vital Signs  Every 8 Hours        Comments: Per per hospital policy    04/09/24 2105    04/10/24 0000  Strict Intake & Output  Every 6 Hours         04/09/24 2105 04/09/24 2317  High Sensitivity Troponin T 2Hr  PROCEDURE ONCE         04/09/24 2148 04/09/24 2230  heparin (porcine) 5000 UNIT/ML injection 3,800 Units  Once         04/09/24 2135 04/09/24 2230  heparin 79998 units/250 mL (100 units/mL) in 0.45 % NaCl infusion  Titrated,   Status:  Discontinued         04/09/24 2135 04/09/24 2200  sodium chloride 0.9 % flush 10 mL  Every 12 Hours Scheduled         04/09/24 2105 04/09/24 2157  Inpatient Case Management  Consult  Once        Provider:  (Not yet assigned)    04/09/24 2156 04/09/24 2145  sennosides-docusate (PERICOLACE) 8.6-50 MG per tablet 2 tablet  2 Times Daily        Placed in \"And\" Linked Group    04/09/24 2048 04/09/24 2145  heparin (porcine) 5000 UNIT/ML injection 5,000 Units  Every 12 Hours Scheduled,   Status:  Discontinued         04/09/24 2048 04/09/24 2145  atorvastatin (LIPITOR) tablet 80 mg  Nightly         04/09/24 2048 04/09/24 2145  Diclofenac Sodium (VOLTAREN) 1 % gel 4 g  4 Times Daily         04/09/24 2048 04/09/24 2145  gabapentin (NEURONTIN) capsule 200 mg  Every 12 Hours Scheduled         04/09/24 2048 04/09/24 2145  mirtazapine (REMERON) tablet 30 mg  Nightly         04/09/24 2048 04/09/24 2145  rOPINIRole (REQUIP) tablet 4 mg  Nightly         04/09/24 2048 04/09/24 2145  ticagrelor (BRILINTA) tablet 60 mg  2 Times Daily         04/09/24 2048 04/09/24 2138  Inpatient Cardiology Consult  Once        Specialty:  Cardiology  Provider:  Susan Mederos MD    04/09/24 2138 04/09/24 2136  Initiate & Follow Heparin Protocol  " Continuous         04/09/24 2135 04/09/24 2136  Notify Provider Platelet Count Less Than 74894  Continuous        Comments: Open Order Report to View Parameters Requiring Provider Notification    04/09/24 2135 04/09/24 2136  Stop Infusion & Notify Provider if Bleeding Occurs  Continuous        Comments: Open Order Report to View Parameters Requiring Provider Notification    04/09/24 2135 04/09/24 2136  Heparin Anti-Xa  STAT        Comments: Prior to Initial Heparin Bolus      04/09/24 2135 04/09/24 2136  Protime-INR  STAT        Comments: Prior to Initial Heparin Bolus      04/09/24 2135 04/09/24 2136  aPTT  STAT        Comments: Prior to Initial Heparin Bolus      04/09/24 2135 04/09/24 2135  heparin (porcine) 5000 UNIT/ML injection 3,200 Units  As Needed         04/09/24 2135 04/09/24 2135  heparin (porcine) 5000 UNIT/ML injection 1,600 Units  As Needed         04/09/24 2135 04/09/24 2135  Pharmacy to Dose Heparin  Continuous PRN         04/09/24 2135 04/09/24 2111  Diet: Cardiac; Healthy Heart (2-3 Na+); Texture: Soft to Chew (NDD 3); Soft to Chew: Whole Meat; Fluid Consistency: Thin (IDDSI 0)  Diet Effective Now,   Status:  Canceled         04/09/24 2111 04/09/24 2107  High Sensitivity Troponin T  STAT         04/09/24 2106 04/09/24 2106  Daily Weights  Daily       04/09/24 2105 04/09/24 2106  ECG 12 Lead Chest Pain  STAT         04/09/24 2105 04/09/24 2106  Bowel Regimen Not Indicated  Once         04/09/24 2105 04/09/24 2105  Inpatient Cardiology Consult  Once,   Status:  Canceled        Specialty:  Cardiology  Provider:  (Not yet assigned)    04/09/24 2105 04/09/24 2105  Hemoglobin A1c  STAT         04/09/24 2105 04/09/24 2104  Notify Physician (with Parameters)  Until Discontinued         04/09/24 2105 04/09/24 2104  Insert Peripheral IV  Once         04/09/24 2105 04/09/24 2104  Saline Lock & Maintain IV Access  Continuous,   Status:  Canceled          04/09/24 2105 04/09/24 2104  Initiate Observation Status  Once         04/09/24 2105 04/09/24 2104  Code Status and Medical Interventions:  Continuous         04/09/24 2105 04/09/24 2104  VTE Prophylaxis Not Indicated: Other: Patient Currently Anticoagulated / Receiving Prophylaxis  Once         04/09/24 2105 04/09/24 2104  Continuous Cardiac Monitoring  Continuous        Comments: Follow Standing Orders As Outlined in Process Instructions (Open Order Report to View Full Instructions)    04/09/24 2105 04/09/24 2104  Maintain IV Access  Continuous         04/09/24 2105 04/09/24 2104  Telemetry - Place Orders & Notify Provider of Results When Patient Experiences Acute Chest Pain, Dysrhythmia or Respiratory Distress  Continuous        Comments: Open Order Report to View Parameters Requiring Provider Notification    04/09/24 2105 04/09/24 2104  May Be Off Telemetry for Tests  Continuous         04/09/24 2105 04/09/24 2104  Continuous Pulse Oximetry  Continuous         04/09/24 2105 04/09/24 2104  Diet: Cardiac; Healthy Heart (2-3 Na+); Fluid Consistency: Thin (IDDSI 0)  Diet Effective Now,   Status:  Canceled         04/09/24 2105 04/09/24 2103  nitroglycerin (NITROSTAT) SL tablet 0.4 mg  Every 5 Minutes PRN         04/09/24 2105 04/09/24 2103  sodium chloride 0.9 % flush 10 mL  As Needed         04/09/24 2105 04/09/24 2103  sodium chloride 0.9 % infusion 40 mL  As Needed         04/09/24 2105 04/09/24 2103  Calcium Replacement - Follow Nurse / BPA Driven Protocol  As Needed         04/09/24 2105 04/09/24 2102  Phosphorus Replacement - Follow Nurse / BPA Driven Protocol  As Needed         04/09/24 2105 04/09/24 2049  Notify Provider (With Parameters)  Continuous        Comments: Open Order Report to View Parameters Requiring Provider Notification    04/09/24 2048 04/09/24 2049  Code Status and Medical Interventions:  Continuous,   Status:  Canceled         04/09/24 2048     "24  Diet: Regular/House; Texture: Soft to Chew (NDD 3); Soft to Chew: Whole Meat; Fluid Consistency: Thin (IDDSI 0)  Diet Effective Now,   Status:  Canceled         24  OT Consult: Eval & Treat Post-Stroke  Once         24  Fall Precautions  Continuous         24  PT Consult: Eval & Treat Functional Mobility Below Baseline  Once         24  SLP Consult: Eval & Treat Swallow Disorder  Once        Comments: History of CVA    24  acetaminophen (TYLENOL) tablet 1,000 mg  Every 6 Hours PRN         24  calcium carbonate (TUMS) chewable tablet 500 mg (200 mg elemental)  3 Times Daily PRN         24  polyethylene glycol (MIRALAX) packet 17 g  Daily PRN        Placed in \"And\" Linked Group    24  bisacodyl (DULCOLAX) EC tablet 5 mg  Daily PRN        Placed in \"And\" Linked Group    24  bisacodyl (DULCOLAX) suppository 10 mg  Daily PRN        Placed in \"And\" Linked Group    24  Magnesium Standard Dose Replacement - Follow Nurse / BPA Driven Protocol  As Needed         24  Potassium Replacement - Follow Nurse / BPA Driven Protocol  As Needed         24    Unscheduled  Up With Assistance  As Needed       24                     Physician Progress Notes (all)        Anh Valdez DO at 24              [facility]     Patient Identification:  Name:  Regine Beckham  Age:  68 y.o.  Sex:  female  :  1955  MRN:  [id number]  Visit Number:  [id number]  ROOM: [address] [place] [address]     Primary Care Provider:  Dread Burton MD    Length of stay in inpatient status:  1    Subjective     History of Presenting Illness:    Patient seen and examined today after " CTA obtained. She complained of left arm pain present since her stroke, not improved much by tylenol today. Denied shortness of breath or chest pain.    Objective     Current Hospital Meds:amLODIPine, 2.5 mg, Oral, Q24H  aspirin, 81 mg, Oral, Daily  atorvastatin, 80 mg, Oral, Nightly  Diclofenac Sodium, 4 g, Topical, 4x Daily  folic acid, 1 mg, Oral, Daily  gabapentin, 200 mg, Oral, Q12H  losartan, 50 mg, Oral, Daily  metoprolol tartrate, 12.5 mg, Oral, Q12H  mirtazapine, 30 mg, Oral, Nightly  nitroglycerin, 0.5 inch, Topical, Q6H  oxybutynin XL, 10 mg, Oral, Daily  pantoprazole, 40 mg, Oral, Q AM  rOPINIRole, 4 mg, Oral, Nightly  senna-docusate sodium, 2 tablet, Oral, BID  sodium chloride, 10 mL, Intravenous, Q12H  ticagrelor, 60 mg, Oral, BID    heparin, 11 Units/kg/hr (Dosing Weight), Last Rate: 11 Units/kg/hr (04/11/24 0709)  Pharmacy to Dose Heparin,   sodium chloride, 100 mL/hr, Last Rate: 100 mL/hr (04/11/24 1028)        Current Antimicrobial Therapy:  Anti-Infectives (From admission, onward)      None          Current Diuretic Therapy:  Diuretics (From admission, onward)      None          ----------------------------------------------------------------------------------------------------------------------  Vital Signs:  Temp:  [97.6 °F (36.4 °C)-98.4 °F (36.9 °C)] 98.4 °F (36.9 °C)  Heart Rate:  [63-76] 65  Resp:  [16-18] 18  BP: (112-163)/(54-77) 130/77  SpO2:  [96 %-97 %] 97 %  on   ;   Device (Oxygen Therapy): room air  Body mass index is 24.94 kg/m².    Wt Readings from Last 3 Encounters:   04/11/24 70.1 kg (154 lb 8 oz)   03/27/24 63.5 kg (140 lb)   03/27/24 62.4 kg (137 lb 8 oz)     Intake & Output (last 3 days)         04/09 0701  04/10 0700 04/10 0701  04/11 0700 04/11 0701 04/12 0700    P.O.  360 480    I.V. (mL/kg)  215.1 (3.4)     Total Intake(mL/kg)  575.1 (9.1) 480 (7.6)    Urine (mL/kg/hr) 450 1200 (0.8)     Stool 0      Total Output 450 1200     Net -450 -624.9 +480           Urine  Unmeasured Occurrence  2 x 2 x    Stool Unmeasured Occurrence 1 x            Diet: Cardiac; Healthy Heart (2-3 Na+); Texture: Soft to Chew (NDD 3); Soft to Chew: Whole Meat; Fluid Consistency: Thin (IDDSI 0)  ----------------------------------------------------------------------------------------------------------------------  Physical exam:   Constitutional:  Well-developed and well-nourished.  No acute distress.      HENT:  Head:  Normocephalic and atraumatic.   Cardiovascular:  Normal rate, regular rhythm   Pulmonary/Chest:  Normal rate and effort  Musculoskeletal:  No deformity.     Neurological Awake, alert, left arm weakness and left sided facial droop similar to prior exams  Skin:  Skin is warm and dry.   Peripheral vascular:  No  cyanosis  Psychiatric: Appropriate mood and affect  Edited by: Anh Valdez DO at 4/11/2024 2108  ----------------------------------------------------------------------------------------------------------------------  Results from last 7 days   Lab Units 04/10/24  0012 04/09/24 2142 04/09/24 2009 04/09/24  1902 04/05/24  0020   CRP mg/dL  --   --   --  <0.30  --    LACTATE mmol/L  --   --  0.9  --   --    WBC 10*3/mm3 5.58  --  5.10  --  6.20   HEMOGLOBIN g/dL 10.1*  --  10.1*  --  10.1*   HEMATOCRIT % 32.4*  --  32.3*  --  31.9*   MCV fL 97.3*  --  96.7  --  94.7   MCHC g/dL 31.2*  --  31.3*  --  31.7   PLATELETS 10*3/mm3 224  --  236  --  245   INR   --  0.94  --   --   --          Results from last 7 days   Lab Units 04/10/24  0012 04/09/24 2009 04/05/24  0020   SODIUM mmol/L 139 139 139   POTASSIUM mmol/L 3.7 4.0 4.0   CHLORIDE mmol/L 106 106 105   CO2 mmol/L 25.1 25.2 25.9   BUN mg/dL 18 18 15   CREATININE mg/dL 0.64 0.83 0.91   CALCIUM mg/dL 9.3 9.0 9.4   GLUCOSE mg/dL 113* 119* 110*   ALBUMIN g/dL 3.3* 3.4*  --    BILIRUBIN mg/dL 0.3 0.2  --    ALK PHOS U/L 81 90  --    AST (SGOT) U/L 31 35*  --    ALT (SGPT) U/L 27 29  --    Estimated Creatinine Clearance: 91.8 mL/min  "(by C-G formula based on SCr of 0.64 mg/dL).  No results found for: \"AMMONIA\"  Results from last 7 days   Lab Units 04/10/24  0012 04/09/24  2117 04/09/24  1902   HSTROP T ng/L 13 13 12         Results from last 7 days   Lab Units 04/10/24  0012   CHOLESTEROL mg/dL 118   TRIGLYCERIDES mg/dL 59   HDL CHOL mg/dL 47   LDL CHOL mg/dL 58     Hemoglobin A1C   Date/Time Value Ref Range Status   04/09/2024 2009 5.90 (H) 4.80 - 5.60 % Final     Lab Results   Component Value Date    TSH 2.880 03/17/2024     No results found for: \"PREGTESTUR\", \"PREGSERUM\", \"HCG\", \"HCGQUANT\"  Pain Management Panel          Latest Ref Rng & Units 12/20/2023   Pain Management Panel   Amphetamine, Urine Qual Negative Negative    Barbiturates Screen, Urine Negative Negative    Benzodiazepine Screen, Urine Negative Negative    Buprenorphine, Screen, Urine Negative Negative    Cocaine Screen, Urine Negative Negative    Fentanyl, Urine Negative Negative    Methadone Screen , Urine Negative Negative    Methamphetamine, Ur Negative Negative      Brief Urine Lab Results  (Last result in the past 365 days)        Color   Clarity   Blood   Leuk Est   Nitrite   Protein   CREAT   Urine HCG        03/21/24 1613 Dark Yellow   Turbid   Trace   Moderate (2+)   Positive   30 mg/dL (1+)                 No results found for: \"BLOODCX\"  No results found for: \"URINECX\"  No results found for: \"WOUNDCX\"  No results found for: \"STOOLCX\"  No results found for: \"RESPCX\"  No results found for: \"AFBCX\"  Results from last 7 days   Lab Units 04/09/24 2009 04/09/24  1902   PROCALCITONIN ng/mL 0.06  --    LACTATE mmol/L 0.9  --    CRP mg/dL  --  <0.30       I have personally looked at the labs and they are summarized above.  ----------------------------------------------------------------------------------------------------------------------  Detailed radiology reports for the last 24 hours:  Imaging Results (Last 24 Hours)       Procedure Component Value Units Date/Time    CT " Angiogram Heart With 3D Image [[id number]] Collected: 04/11/24 1707     Updated: 04/11/24 1722    Narrative:      EXAM:    CT Angiography Heart With Intravenous Contrast     EXAM DATE:    4/11/2024 10:14 AM     CLINICAL HISTORY:    Chest pain, nonspecific     TECHNIQUE:    Axial computed tomographic angiography images of the coronary arteries  with intravenous contrast.  Sublingual nitroglycerine for coronary  vasodilation and IV metoprolol to reduce heart rate were administered as  needed.  This CT exam was performed using one or more of the following  dose reduction techniques:  automated exposure control, adjustment of  the mA and/or kV according to patient size, and/or use of iterative  reconstruction technique.    3D and MIP reconstructed images were created and reviewed.     COMPARISON:    None available     FINDINGS:    LEFT MAIN CORONARY ARTERY:  Left main calcium score 40.    LEFT ANTERIOR DESCENDING ARTERY:  LAD multifocal calcific plaque with  areas of mild and moderate stenosis.  LAD calcium score 642.    LEFT CIRCUMFLEX ARTERY:  Circumflex with mild calcific plaque causing  mild stenosis.  Circumflex calcium score 220.    RIGHT CORONARY ARTERY:  Multifocal calcific plaque in the RCA with  areas of mild and moderate stenosis but obscured due to the  calcification.  Focal defect in the midportion of the RCA appears to be  artifact.  RCA calcium score 827.       PERICARDIUM:  Unremarkable.  Contour is preserved with no effusion,  thickening or calcification.        AORTA:  No acute findings.  No thoracic aortic aneurysm.  No  dissection.    PULMONARY ARTERIES:  Unremarkable.  No pulmonary embolism.    LUNGS AND PLEURAL SPACES:  Unremarkable as visualized.  No mass.  No  consolidation or edema.  No pleural effusion or thickening.  No  pneumothorax.    MEDIASTINUM:  Unremarkable.  No mass.    OTHER FINDINGS:  Total calcium score 1729.  First obtuse marginal with  calcific plaque but no focal stenosis.        Impression:      1.  Total calcium score 1729.  2.  Multifocal calcific plaque in the RCA with areas of mild and  moderate stenosis but obscured due to the calcification.  3.  Focal defect in the midportion of the RCA appears to be artifact.  4.  LAD multifocal calcific plaque with areas of mild and moderate  stenosis.  5.  First obtuse marginal with calcific plaque but no focal stenosis.  6.  Circumflex with mild calcific plaque causing mild stenosis.  7.  Tricuspid aortic valve with extensive calcific plaque.     RECOMMENDATIONS:    Extensive calcific plaque with areas of mild and moderate stenosis.  Consider catheter angiogram. Impression.     ASSESSMENT:    CAD RADS N/P4        This report was finalized on 4/11/2024 5:20 PM by Dr. Hu Obrien MD.             Assessment & Plan      #Chest pain with typical features and cardiac risk factors  #Abnormal EKG  - Cardiology consulted, appreciate assistance  - At admission the on call cardiologist reportedly recommended to anticoagulate patient with a heparin drip due to EKG showing possibly atrial fibrillation vs junctional rhythm. Continue same pending further recommendations from cardiologist.   - CTA coronary arteries done today and results are pending  - Monitor on telemetry   - Echo obtained, this showed LV EF >70% and grade I impaired diastolic dysfunction    #Deconditioning secondary to recent stroke  - continue PT/OT/SLP  - continue aspirin, statin    #Hypertension  - continue losartan and amlodipine    #Confusion  - waxes and wanes, but patient was alert, oriented, and appropriate this morning. Possibly related to recent stroke.    #History of CVA with left sided hemiparesis  #Left arm pain  - continue tylenol  - continue gabapentin for likely stroke related pain  Edited by: Anh Valdez DO at 4/11/2024 5877    VTE Prophylaxis:   Mechanical Order History:       None          Pharmalogical Order History:        Ordered     Dose Route Frequency Stop     04/10/24 1302  heparin 02110 units/250 mL (100 units/mL) in 0.45 % NaCl infusion  6.98 mL/hr         11 Units/kg/hr (Dosing Weight) IV Titrated --    04/10/24 045  heparin 91422 units/250 mL (100 units/mL) in 0.45 % NaCl infusion  6.35 mL/hr,   Status:  Discontinued         10 Units/kg/hr IV Titrated 04/10/24 1302    24  heparin (porcine) 5000 UNIT/ML injection 3,800 Units         60 Units/kg IV Once 24 22124 213  heparin 04528 units/250 mL (100 units/mL) in 0.45 % NaCl infusion  7.62 mL/hr,   Status:  Discontinued         12 Units/kg/hr IV Titrated 04/10/24 0455    04/09/24 2048  heparin (porcine) 5000 UNIT/ML injection 5,000 Units  Status:  Discontinued         5,000 Units SC Every 12 Hours Scheduled 24  heparin (porcine) 5000 UNIT/ML injection 3,200 Units         50 Units/kg IV As Needed --    24  heparin (porcine) 5000 UNIT/ML injection 1,600 Units         25 Units/kg IV As Needed --    24  Pharmacy to Dose Heparin         -- XX Continuous PRN --                    Dispo: likely skilled nursing facility at discharge    Anh Valdez DO  [facility]  24  21:08 EDT     Electronically signed by Anh Valdez DO at 24 [phone]       Susan Mederos MD at 24 3199             LOS: 1 day     Name: Regine Beckham  Age/Sex: 68 y.o. female  :  1955        PCP: Dread Burton MD    Principal Problem:    Chest pain      Admission Information: Regine Beckham is a 68 y.o. female with history of CVA (2024), peripheral vascular disease, status post left ICA stent placement on 2024, hypertension, dyslipidemia, obstructive sleep apnea noncompliant with CPAP, restless leg syndrome, GERD, iron deficiency anemia, anxiety/depression, legally blind, and arthritis.     Chief Complaint: Chest pain    Interval history: No further chest pain reported.    Subjective   Patient is awake in bed and  awaiting her coronary CTA.  She denies chest pain, shortness of breath, or other concern at this time.      Vital Signs  Vital Signs (last 72 hrs)         04/08 0700  04/09 0659 04/09 0700  04/10 0659 04/10 0700 04/11 0659 04/11 0700 04/11 1540   Most Recent      Temp (°F)   97.6 -  98.3    98.2 -  98.6    97.8 -  98     98 (36.7) 04/11 1100    Heart Rate   76 -  87    69 -  83    64 -  70     64 04/11 1100    Resp     18    18 -  20    16 -  18     18 04/11 1100    BP   132/87 -  159/83    97/50 -  166/84    122/77 -  163/69     122/77 04/11 1100    SpO2 (%)   98 -  99    95 -  98    96 -  97     97 04/11 1100          Temp:  [97.8 °F (36.6 °C)-98.6 °F (37 °C)] 98 °F (36.7 °C)  Heart Rate:  [64-80] 64  Resp:  [16-20] 18  BP: ()/(50-84) 122/77  Body mass index is 24.94 kg/m².      Intake/Output Summary (Last 24 hours) at 4/11/2024 1540  Last data filed at 4/11/2024 1200  Gross per 24 hour   Intake 1055.09 ml   Output 1200 ml   Net -144.91 ml       Vitals and nursing note reviewed.   Constitutional:       Appearance: Not in distress. Chronically ill-appearing.   Eyes:      Conjunctiva/sclera: Conjunctivae normal.   Pulmonary:      Effort: Pulmonary effort is normal.      Breath sounds: Normal breath sounds.   Cardiovascular:      Normal rate. Regular rhythm.      Murmurs: There is no murmur.   Skin:     General: Skin is warm and dry.   Neurological:      Mental Status: Alert.         Telemetry: Sinus rhythm with PACs     Results Review:     Results from last 7 days   Lab Units 04/10/24  0012 04/09/24 2009 04/05/24  0020   WBC 10*3/mm3 5.58 5.10 6.20   HEMOGLOBIN g/dL 10.1* 10.1* 10.1*   PLATELETS 10*3/mm3 224 236 245     Results from last 7 days   Lab Units 04/10/24  0012 04/09/24 2009 04/05/24  0020   SODIUM mmol/L 139 139 139   POTASSIUM mmol/L 3.7 4.0 4.0   CHLORIDE mmol/L 106 106 105   CO2 mmol/L 25.1 25.2 25.9   BUN mg/dL 18 18 15   CREATININE mg/dL 0.64 0.83 0.91   CALCIUM mg/dL 9.3 9.0 9.4   GLUCOSE  mg/dL 113* 119* 110*     Results from last 7 days   Lab Units 04/10/24  0012 04/09/24  2117 04/09/24  1902   HSTROP T ng/L 13 13 12       Results from last 7 days   Lab Units 04/09/24  2142   INR  0.94       I reviewed the patient's new clinical results.  I reviewed the patient's new imaging results and agree with the interpretation.  I personally viewed and interpreted the patient's EKG/Telemetry data      Medication Review:   amLODIPine, 2.5 mg, Oral, Q24H  aspirin, 81 mg, Oral, Daily  atorvastatin, 80 mg, Oral, Nightly  Diclofenac Sodium, 4 g, Topical, 4x Daily  folic acid, 1 mg, Oral, Daily  gabapentin, 200 mg, Oral, Q12H  losartan, 50 mg, Oral, Daily  metoprolol tartrate, 12.5 mg, Oral, Q12H  mirtazapine, 30 mg, Oral, Nightly  nitroglycerin, 0.5 inch, Topical, Q6H  oxybutynin XL, 10 mg, Oral, Daily  pantoprazole, 40 mg, Oral, Q AM  rOPINIRole, 4 mg, Oral, Nightly  senna-docusate sodium, 2 tablet, Oral, BID  sodium chloride, 10 mL, Intravenous, Q12H  ticagrelor, 60 mg, Oral, BID      heparin, 11 Units/kg/hr (Dosing Weight), Last Rate: 11 Units/kg/hr (04/11/24 0709)  Pharmacy to Dose Heparin,   sodium chloride, 100 mL/hr, Last Rate: 100 mL/hr (04/11/24 1028)        Assessment:  Chest pain, likely noncardiac in the setting of negative troponins x 3.  Patient has negative stress test from December 2023.  History of CVA, status post left ICA stent placement and thrombectomy, January 2024  Hypertension  Dyslipidemia      Recommendations:  Awaiting coronary CT angiogram results.  Further decision making based on those  On high intensity statin, Brilinta, and aspirin therapy  At goal on current medications  At goal on current medications    I discussed the patients findings and my recommendations with patient.    Patient seen and evaluated by Dr. Mederos.  Plan of care reflects his recommendations.      Electronically signed by NIKKIE Banda, 04/11/24, 3:45 PM EDT.      Patient CTA coronaries were reviewed.   Patient with significant calcification noted in the LAD, circumflex, RCA.  Will schedule for cath for further delineation of coronary anatomy.  .Electronically signed by Susan Mederos MD, 24, 6:32 PM EDT.                  Electronically signed by Susan Mederos MD at 24       Anh Valdez DO at 04/10/24 2051              [facility]     Patient Identification:  Name:  Regine Beckham  Age:  68 y.o.  Sex:  female  :  1955  MRN:  [id number]  Visit Number:  [id number]  ROOM: [address] [place] [address]     Primary Care Provider:  Draed Burton MD    Length of stay in inpatient status:  1    Subjective     History of Presenting Illness:    Patient seen and examined this morning. She denied any chest pain or shortness of breath a time of my exam, and was oriented at time of my exam, but had recently been reoriented by staff. She was only oriented to person earlier in the morning per nursing staff report.     Objective     Current Hospital Meds:amLODIPine, 2.5 mg, Oral, Q24H  aspirin, 81 mg, Oral, Daily  atorvastatin, 80 mg, Oral, Nightly  Diclofenac Sodium, 4 g, Topical, 4x Daily  folic acid, 1 mg, Oral, Daily  gabapentin, 200 mg, Oral, Q12H  losartan, 50 mg, Oral, Daily  metoprolol tartrate, 12.5 mg, Oral, Q12H  mirtazapine, 30 mg, Oral, Nightly  nitroglycerin, 0.5 inch, Topical, Q6H  oxybutynin XL, 10 mg, Oral, Daily  pantoprazole, 40 mg, Oral, Q AM  rOPINIRole, 4 mg, Oral, Nightly  senna-docusate sodium, 2 tablet, Oral, BID  sodium chloride, 10 mL, Intravenous, Q12H  ticagrelor, 60 mg, Oral, BID    heparin, 11 Units/kg/hr (Dosing Weight), Last Rate: 11 Units/kg/hr (04/10/24 2025)  Pharmacy to Dose Heparin,         Current Antimicrobial Therapy:  Anti-Infectives (From admission, onward)      None          Current Diuretic Therapy:  Diuretics (From admission, onward)      None           ----------------------------------------------------------------------------------------------------------------------  Vital Signs:  Temp:  [97.7 °F (36.5 °C)-98.6 °F (37 °C)] 98.6 °F (37 °C)  Heart Rate:  [76-83] 80  Resp:  [18-20] 18  BP: ()/(50-88) 97/50  SpO2:  [95 %-99 %] 95 %  on   ;   Device (Oxygen Therapy): room air  Body mass index is 25.23 kg/m².    Wt Readings from Last 3 Encounters:   04/10/24 70.9 kg (156 lb 4.9 oz)   03/27/24 63.5 kg (140 lb)   03/27/24 62.4 kg (137 lb 8 oz)     Intake & Output (last 3 days)         04/08 0701 04/09 0700 04/09 0701  04/10 0700 04/10 0701  04/11 0700    P.O.   360    Total Intake(mL/kg)   360 (5.7)    Urine (mL/kg/hr)  450 700 (0.8)    Stool  0     Total Output  450 700    Net  -450 -340           Stool Unmeasured Occurrence  1 x           Diet: Cardiac; Healthy Heart (2-3 Na+); Texture: Soft to Chew (NDD 3); Soft to Chew: Whole Meat; Fluid Consistency: Thin (IDDSI 0)  ----------------------------------------------------------------------------------------------------------------------  Physical exam:   Constitutional:  Well-developed and well-nourished.  No acute distress.      HENT:  Head:  Normocephalic and atraumatic.   Cardiovascular:  Normal rate, regular rhythm   Pulmonary/Chest:  No respiratory distress, breath sounds clear in anterior lung fields bilaterally   Musculoskeletal:  No deformity.     Neurological Awake, alert, oriented to person, place, and year at time of my exam but had very recently been reoriented by staff.    Skin:  Skin is warm and dry.   Peripheral vascular:  No  cyanosis  Psychiatric: Appropriate mood and affect  Edited by: Anh Valdez DO at 4/10/2024 2051  ----------------------------------------------------------------------------------------------------------------------  Results from last 7 days   Lab Units 04/10/24  0012 04/09/24 2142 04/09/24 2009 04/09/24  1902 04/05/24  0020   CRP mg/dL  --   --   --  <0.30  --   "  LACTATE mmol/L  --   --  0.9  --   --    WBC 10*3/mm3 5.58  --  5.10  --  6.20   HEMOGLOBIN g/dL 10.1*  --  10.1*  --  10.1*   HEMATOCRIT % 32.4*  --  32.3*  --  31.9*   MCV fL 97.3*  --  96.7  --  94.7   MCHC g/dL 31.2*  --  31.3*  --  31.7   PLATELETS 10*3/mm3 224  --  236  --  245   INR   --  0.94  --   --   --          Results from last 7 days   Lab Units 04/10/24  0012 04/09/24 2009 04/05/24  0020   SODIUM mmol/L 139 139 139   POTASSIUM mmol/L 3.7 4.0 4.0   CHLORIDE mmol/L 106 106 105   CO2 mmol/L 25.1 25.2 25.9   BUN mg/dL 18 18 15   CREATININE mg/dL 0.64 0.83 0.91   CALCIUM mg/dL 9.3 9.0 9.4   GLUCOSE mg/dL 113* 119* 110*   ALBUMIN g/dL 3.3* 3.4*  --    BILIRUBIN mg/dL 0.3 0.2  --    ALK PHOS U/L 81 90  --    AST (SGOT) U/L 31 35*  --    ALT (SGPT) U/L 27 29  --    Estimated Creatinine Clearance: 92.9 mL/min (by C-G formula based on SCr of 0.64 mg/dL).  No results found for: \"AMMONIA\"  Results from last 7 days   Lab Units 04/10/24  0012 04/09/24 2117 04/09/24  1902   HSTROP T ng/L 13 13 12         Results from last 7 days   Lab Units 04/10/24  0012   CHOLESTEROL mg/dL 118   TRIGLYCERIDES mg/dL 59   HDL CHOL mg/dL 47   LDL CHOL mg/dL 58     Hemoglobin A1C   Date/Time Value Ref Range Status   04/09/2024 2009 5.90 (H) 4.80 - 5.60 % Final     Lab Results   Component Value Date    TSH 2.880 03/17/2024     No results found for: \"PREGTESTUR\", \"PREGSERUM\", \"HCG\", \"HCGQUANT\"  Pain Management Panel          Latest Ref Rng & Units 12/20/2023   Pain Management Panel   Amphetamine, Urine Qual Negative Negative    Barbiturates Screen, Urine Negative Negative    Benzodiazepine Screen, Urine Negative Negative    Buprenorphine, Screen, Urine Negative Negative    Cocaine Screen, Urine Negative Negative    Fentanyl, Urine Negative Negative    Methadone Screen , Urine Negative Negative    Methamphetamine, Ur Negative Negative      Brief Urine Lab Results  (Last result in the past 365 days)        Color   Clarity   Blood   " "Leuk Est   Nitrite   Protein   CREAT   Urine HCG        03/21/24 1613 Dark Yellow   Turbid   Trace   Moderate (2+)   Positive   30 mg/dL (1+)                 No results found for: \"BLOODCX\"  No results found for: \"URINECX\"  No results found for: \"WOUNDCX\"  No results found for: \"STOOLCX\"  No results found for: \"RESPCX\"  No results found for: \"AFBCX\"  Results from last 7 days   Lab Units 04/09/24 2009 04/09/24  1902   PROCALCITONIN ng/mL 0.06  --    LACTATE mmol/L 0.9  --    CRP mg/dL  --  <0.30       I have personally looked at the labs and they are summarized above.  ----------------------------------------------------------------------------------------------------------------------  Detailed radiology reports for the last 24 hours:  Imaging Results (Last 24 Hours)       ** No results found for the last 24 hours. **          Assessment & Plan      #Chest pain with typical features and cardiac risk factors  #Abnormal EKG  - Cardiology consulted, appreciate assistance  - Overnight the on call cardiologist reportedly recommended to anticoagulate patient with a heparin drip due to EKG showing possibly atrial fibrillation vs junctional rhythm. Continue same pending further recommendations from cardiologist.   - Plan for CTA coronary arteries once appropriate consent can be obtained. Patient is intermittently very disoriented and has no living next of kin or guardian. Case management consulted for assistance. She has family friends who help her.   - Monitor on telemetry   - Echo obtained, this showed LV EF >70% and grade I impaired diastolic dysfunction    #Deconditioning secondary to recent stroke  - continue PT/OT/SLP  - continue aspirin, statin    #Hypertension  - continue losartan and amlodipine      Edited by: Anh Valdez DO at 4/10/2024 2048    VTE Prophylaxis:   Mechanical Order History:       None          Pharmalogical Order History:        Ordered     Dose Route Frequency Stop    04/10/24 1302  heparin " 26357 units/250 mL (100 units/mL) in 0.45 % NaCl infusion  6.98 mL/hr         11 Units/kg/hr (Dosing Weight) IV Titrated --    04/10/24 0455  heparin 91896 units/250 mL (100 units/mL) in 0.45 % NaCl infusion  6.35 mL/hr,   Status:  Discontinued         10 Units/kg/hr IV Titrated 04/10/24 1302    04/09/24 2135  heparin (porcine) 5000 UNIT/ML injection 3,800 Units         60 Units/kg IV Once 04/09/24 2212    04/09/24 2135  heparin 73615 units/250 mL (100 units/mL) in 0.45 % NaCl infusion  7.62 mL/hr,   Status:  Discontinued         12 Units/kg/hr IV Titrated 04/10/24 0455    04/09/24 2048  heparin (porcine) 5000 UNIT/ML injection 5,000 Units  Status:  Discontinued         5,000 Units SC Every 12 Hours Scheduled 04/09/24 2134 04/09/24 2135  heparin (porcine) 5000 UNIT/ML injection 3,200 Units         50 Units/kg IV As Needed --    04/09/24 2135  heparin (porcine) 5000 UNIT/ML injection 1,600 Units         25 Units/kg IV As Needed --    04/09/24 2135  Pharmacy to Dose Heparin         -- XX Continuous PRN --                    Dispo: pending clinical progress    Anh Valdez DO  [facility]  04/10/24  20:52 EDT     Electronically signed by nAh Valdez DO at 04/10/24 2053          Consult Notes (all)        Susan Mederos MD at 04/10/24 1134        Consult Orders    1. Inpatient Cardiology Consult [[id number]] ordered by Capo Su MD                 Inpatient Cardiology Consult  Consult performed by: Omayra Butts APRN  Consult ordered by: Capo Su MD        Date of Admit: 4/9/2024  Date of Consult: 04/10/24  Provider, No Known  Regine Beckham  1955    Consulting Physician: Susan Mederos MD    Cardiology consultation    Reason for consultation:  Chest pain, EKG changes with T wave inversions in the anterior lateral leads      History of Present Illness    Subjective     No chief complaint on file.      Regine Beckham is a 68 y.o. female with history  of CVA (January 2024), peripheral vascular disease, status post left ICA stent placement on 1/19/2024, hypertension, dyslipidemia, obstructive sleep apnea noncompliant with CPAP, restless leg syndrome, GERD, iron deficiency anemia, anxiety/depression, legally blind, and arthritis.  Patient was admitted to the hospital 3/17 through 3/27/2024 for acute physical deconditioning and frequent falls as a consequence of prior stroke from January 2024.  After her inpatient hospitalization she was transferred to inpatient rehab.  On 4/9/2024 she reported left-sided chest pain.  She denied any associated dyspnea, diaphoresis or nausea.  She reported chronic left arm and shoulder pain since her CVA and could not describe whether this was radiation or just the chronicity of the pain.  High-sensitivity troponin was negative x 3.  However, EKG did show T wave inversions in the anterior lateral leads.  Patient was then transferred from inpatient rehabilitation to the telemetry unit for monitoring and workup.    Ms. Beckham was seen and examined.  She currently denies any chest pain.  She denies shortness of breath.  She states that the pain she was having yesterday was extending from her left chest to her left shoulder and arm.  It has since resolved.    Cardiac risk factors:hypercholesterolemia, hypertension, peripheral vascular disease, and former tobacco use    Last Echo: 12/22/2023      Interpretation Summary         Left ventricular systolic function is normal. Left ventricular ejection fraction appears to be 56 - 60%.    Left ventricular wall thickness is consistent with moderate concentric hypertrophy.    Left ventricular diastolic function is consistent with (grade I) impaired relaxation.    Estimated right ventricular systolic pressure from tricuspid regurgitation is normal (<35 mmHg).    Last Stress: 12/23/2023    Interpretation Summary         Left ventricular ejection fraction is normal (Calculated EF = 67%).    Myocardial  "perfusion imaging indicates a normal myocardial perfusion study with no evidence of ischemia.    Impressions are consistent with a low risk study.    TID 1.07.    Findings consistent with a normal ECG stress test.    Past Medical History:   Diagnosis Date    Anemia     Anxiety     Arthritis     Depression     Legally blind     Restless leg syndrome     Sleep apnea     \"I don't use a cpap anymore since losing 106 lbs\"    Water retention      Past Surgical History:   Procedure Laterality Date    BACK SURGERY      CARDIAC CATHETERIZATION N/A 1/19/2024    Procedure: Percutaneous Manual Thrombectomy;  Surgeon: Efrain Hurley MD;  Location: [facility] CATH INVASIVE LOCATION;  Service: Interventional Radiology;  Laterality: N/A;    GALLBLADDER SURGERY      HIP ARTHROSCOPY      JOINT REPLACEMENT Right      Family History   Problem Relation Age of Onset    Breast cancer Neg Hx      Social History     Tobacco Use    Smoking status: Former     Current packs/day: 1.50     Average packs/day: 1.5 packs/day for 51.0 years (76.5 ttl pk-yrs)     Types: Cigarettes     Passive exposure: Past    Smokeless tobacco: Never   Vaping Use    Vaping status: Never Used   Substance Use Topics    Alcohol use: Not Currently     Alcohol/week: 1.0 standard drink of alcohol     Types: 1 Glasses of wine per week     Comment: \"occasionally - once every two months\"    Drug use: Not Currently     Comment: alcohol - occasionally     Medications Prior to Admission   Medication Sig Dispense Refill Last Dose    aspirin 81 MG EC tablet Take 1 tablet by mouth Daily.   Unknown    atorvastatin (LIPITOR) 80 MG tablet Take 1 tablet by mouth Every Night. 90 tablet 0 Unknown    gabapentin (NEURONTIN) 300 MG capsule Take 1 capsule by mouth 3 (Three) Times a Day.   Unknown    losartan (COZAAR) 50 MG tablet Take 1 tablet by mouth Daily. Indications: High Blood Pressure Disorder   Unknown    Mirabegron ER (MYRBETRIQ) 50 MG tablet sustained-release 24 hour 24 hr tablet " Take 50 mg by mouth Daily. Indications: Urinary Urgency   Unknown    mirtazapine (REMERON) 30 MG tablet Take 1 tablet by mouth Every Night. Indications: Major Depressive Disorder 30 tablet 0 Unknown    pantoprazole (PROTONIX) 40 MG EC tablet TAKE 1 TABLET BY MOUTH EVERY MORNING FOR HEARTBURN 90 tablet 0 Unknown    rOPINIRole (REQUIP) 4 MG tablet Take 1 tablet by mouth Every Night. Take 1 hour before bedtime.  Indications: Restless Leg Syndrome 30 tablet 0 Unknown    ticagrelor (BRILINTA) 60 MG tablet tablet Take 1 tablet by mouth 2 (Two) Times a Day. 60 tablet 0 Unknown    zolpidem (AMBIEN) 5 MG tablet Take 1 tablet by mouth At Night As Needed for Sleep. Indications: Trouble Sleeping 5 tablet 0 Unknown     Allergies:  Penicillins    Review of Systems   Constitutional:  Negative for fatigue.   Respiratory:  Negative for shortness of breath.    Cardiovascular:  Positive for chest pain. Negative for palpitations and leg swelling.   Gastrointestinal:  Negative for blood in stool.   Neurological:  Negative for dizziness, syncope, weakness and light-headedness.   Hematological:  Does not bruise/bleed easily.   All other systems reviewed and are negative.        Objective      Vital Signs  Temp:  [97.6 °F (36.4 °C)-98.3 °F (36.8 °C)] 98.2 °F (36.8 °C)  Heart Rate:  [61-87] 76  Resp:  [18-20] 20  BP: (123-159)/(59-88) 145/88  Body mass index is 25.23 kg/m².    Intake/Output Summary (Last 24 hours) at 4/10/2024 1135  Last data filed at 4/10/2024 0900  Gross per 24 hour   Intake 0 ml   Output 450 ml   Net -450 ml       Physical Exam  Constitutional:       Appearance: Normal appearance. She is well-developed.   Cardiovascular:      Rate and Rhythm: Normal rate and regular rhythm.      Heart sounds: No murmur heard.     No friction rub. No gallop.   Pulmonary:      Effort: Pulmonary effort is normal. No respiratory distress.      Breath sounds: Normal breath sounds. No wheezing or rales.   Skin:     General: Skin is warm and  "dry.   Neurological:      Mental Status: She is alert. Mental status is at baseline.   Psychiatric:         Mood and Affect: Mood normal.         Behavior: Behavior normal.         Telemetry: Sinus, sinus arrhythmia 60s to 70s    Results Review:   I reviewed the patient's new clinical results.  Results from last 7 days   Lab Units 04/10/24  0012 04/09/24 2117 04/09/24  1902   HSTROP T ng/L 13 13 12     Results from last 7 days   Lab Units 04/10/24  0012 04/09/24  2009 04/05/24  0020 04/04/24  0006   WBC 10*3/mm3 5.58 5.10 6.20 6.70   HEMOGLOBIN g/dL 10.1* 10.1* 10.1* 10.3*   PLATELETS 10*3/mm3 224 236 245 234     Results from last 7 days   Lab Units 04/10/24  0012 04/09/24  2009 04/05/24  0020 04/04/24  1108 04/04/24  0006   SODIUM mmol/L 139 139 139  --  139   POTASSIUM mmol/L 3.7 4.0 4.0 4.0 3.4*   CHLORIDE mmol/L 106 106 105  --  106   CO2 mmol/L 25.1 25.2 25.9  --  22.5   BUN mg/dL 18 18 15  --  17   CREATININE mg/dL 0.64 0.83 0.91  --  0.75   CALCIUM mg/dL 9.3 9.0 9.4  --  9.0   GLUCOSE mg/dL 113* 119* 110*  --  109*   ALT (SGPT) U/L 27 29  --   --   --    AST (SGOT) U/L 31 35*  --   --   --      Lab Results   Component Value Date    INR 0.94 04/09/2024     Lab Results   Component Value Date    MG 1.8 03/30/2024    MG 1.9 03/23/2024    MG 1.9 03/19/2024     Lab Results   Component Value Date    TSH 2.880 03/17/2024    TRIG 59 04/10/2024    HDL 47 04/10/2024    LDL 58 04/10/2024      No results found for: \"BNP\"     EKG:         Imaging Results (Last 72 Hours)       ** No results found for the last 72 hours. **             Assessment:  Chest pain, likely noncardiac in the setting of negative troponins x 3.  Patient has negative stress test from December 2023.  History of CVA, status post left ICA stent placement and thrombectomy, January 2024  Hypertension  Dyslipidemia    Recommendations:  Recommend proceeding with a CT angiogram of the heart.  Continue aspirin, atorvastatin.  Metoprolol tartrate added    Thank " you very much for asking us to be involved in this patient's care.  We will follow along with you.    Electronically signed by NIKKIE Melgar, 04/10/24, 6:19 PM EDT.    Patient seen and examined independently of nurse practitioner.  Chart reviewed.  Labs and EKG reviewed.  Agree with the charting, assessment and plan as described above.  Patient 68-year-old female past medical history significant for s/p stroke history of hypertension hyperlipidemia who came in with angina symptoms.  Negative cardiac markers.  Would recommend to get a CTA coronaries done.`    .Electronically signed by Susan Mederos MD, 04/11/24, 6:24 PM EDT.          Electronically signed by Susan Mederos MD at 04/11/24 4748

## 2024-04-12 NOTE — NURSING NOTE
During shift change dressing checked. Red blood noted on dressing that wasn't present. MD Dr. Mederos called and made aware.    He stated to hold pressure for 10-15 minutes redress and monitor if more bleeding occurs hold pressure and contact him back.

## 2024-04-12 NOTE — PROGRESS NOTES
[facility] HOSPITALIST PROGRESS NOTE     Patient Identification:  Name:  Regine Beckham  Age:  68 y.o.  Sex:  female  :  1955  MRN:  [id number]  Visit Number:  [id number]  ROOM: [address] [place] [address]     Primary Care Provider:  Dread Burton MD    Length of stay in inpatient status:  1    Subjective     History of Presenting Illness:    Patient seen and examined today after CTA obtained. She complained of left arm pain present since her stroke, not improved much by tylenol today. Denied shortness of breath or chest pain.    Objective     Current Hospital Meds:amLODIPine, 2.5 mg, Oral, Q24H  aspirin, 81 mg, Oral, Daily  atorvastatin, 80 mg, Oral, Nightly  Diclofenac Sodium, 4 g, Topical, 4x Daily  folic acid, 1 mg, Oral, Daily  gabapentin, 200 mg, Oral, Q12H  losartan, 50 mg, Oral, Daily  metoprolol tartrate, 12.5 mg, Oral, Q12H  mirtazapine, 30 mg, Oral, Nightly  nitroglycerin, 0.5 inch, Topical, Q6H  oxybutynin XL, 10 mg, Oral, Daily  pantoprazole, 40 mg, Oral, Q AM  rOPINIRole, 4 mg, Oral, Nightly  senna-docusate sodium, 2 tablet, Oral, BID  sodium chloride, 10 mL, Intravenous, Q12H  ticagrelor, 60 mg, Oral, BID    heparin, 11 Units/kg/hr (Dosing Weight), Last Rate: 11 Units/kg/hr (24 0709)  Pharmacy to Dose Heparin,   sodium chloride, 100 mL/hr, Last Rate: 100 mL/hr (24 1028)        Current Antimicrobial Therapy:  Anti-Infectives (From admission, onward)      None          Current Diuretic Therapy:  Diuretics (From admission, onward)      None          ----------------------------------------------------------------------------------------------------------------------  Vital Signs:  Temp:  [97.6 °F (36.4 °C)-98.4 °F (36.9 °C)] 98.4 °F (36.9 °C)  Heart Rate:  [63-76] 65  Resp:  [16-18] 18  BP: (112-163)/(54-77) 130/77  SpO2:  [96 %-97 %] 97 %  on   ;   Device (Oxygen Therapy): room air  Body mass index is 24.94 kg/m².    Wt Readings from Last 3 Encounters:   24 70.1 kg (154 lb 8  oz)   03/27/24 63.5 kg (140 lb)   03/27/24 62.4 kg (137 lb 8 oz)     Intake & Output (last 3 days)         04/09 0701  04/10 0700 04/10 0701 04/11 0700 04/11 0701 04/12 0700    P.O.  360 480    I.V. (mL/kg)  215.1 (3.4)     Total Intake(mL/kg)  575.1 (9.1) 480 (7.6)    Urine (mL/kg/hr) 450 1200 (0.8)     Stool 0      Total Output 450 1200     Net -450 -624.9 +480           Urine Unmeasured Occurrence  2 x 2 x    Stool Unmeasured Occurrence 1 x            Diet: Cardiac; Healthy Heart (2-3 Na+); Texture: Soft to Chew (NDD 3); Soft to Chew: Whole Meat; Fluid Consistency: Thin (IDDSI 0)  ----------------------------------------------------------------------------------------------------------------------  Physical exam:   Constitutional:  Well-developed and well-nourished.  No acute distress.      HENT:  Head:  Normocephalic and atraumatic.   Cardiovascular:  Normal rate, regular rhythm   Pulmonary/Chest:  Normal rate and effort  Musculoskeletal:  No deformity.     Neurological Awake, alert, left arm weakness and left sided facial droop similar to prior exams  Skin:  Skin is warm and dry.   Peripheral vascular:  No  cyanosis  Psychiatric: Appropriate mood and affect  Edited by: Anh Valdez DO at 4/11/2024 2201  ----------------------------------------------------------------------------------------------------------------------  Results from last 7 days   Lab Units 04/10/24  0012 04/09/24  2142 04/09/24  2009 04/09/24  1902 04/05/24  0020   CRP mg/dL  --   --   --  <0.30  --    LACTATE mmol/L  --   --  0.9  --   --    WBC 10*3/mm3 5.58  --  5.10  --  6.20   HEMOGLOBIN g/dL 10.1*  --  10.1*  --  10.1*   HEMATOCRIT % 32.4*  --  32.3*  --  31.9*   MCV fL 97.3*  --  96.7  --  94.7   MCHC g/dL 31.2*  --  31.3*  --  31.7   PLATELETS 10*3/mm3 224  --  236  --  245   INR   --  0.94  --   --   --          Results from last 7 days   Lab Units 04/10/24  0012 04/09/24  2009 04/05/24  0020   SODIUM mmol/L 139 139 139  "  POTASSIUM mmol/L 3.7 4.0 4.0   CHLORIDE mmol/L 106 106 105   CO2 mmol/L 25.1 25.2 25.9   BUN mg/dL 18 18 15   CREATININE mg/dL 0.64 0.83 0.91   CALCIUM mg/dL 9.3 9.0 9.4   GLUCOSE mg/dL 113* 119* 110*   ALBUMIN g/dL 3.3* 3.4*  --    BILIRUBIN mg/dL 0.3 0.2  --    ALK PHOS U/L 81 90  --    AST (SGOT) U/L 31 35*  --    ALT (SGPT) U/L 27 29  --    Estimated Creatinine Clearance: 91.8 mL/min (by C-G formula based on SCr of 0.64 mg/dL).  No results found for: \"AMMONIA\"  Results from last 7 days   Lab Units 04/10/24  0012 04/09/24  2117 04/09/24  1902   HSTROP T ng/L 13 13 12         Results from last 7 days   Lab Units 04/10/24  0012   CHOLESTEROL mg/dL 118   TRIGLYCERIDES mg/dL 59   HDL CHOL mg/dL 47   LDL CHOL mg/dL 58     Hemoglobin A1C   Date/Time Value Ref Range Status   04/09/2024 2009 5.90 (H) 4.80 - 5.60 % Final     Lab Results   Component Value Date    TSH 2.880 03/17/2024     No results found for: \"PREGTESTUR\", \"PREGSERUM\", \"HCG\", \"HCGQUANT\"  Pain Management Panel          Latest Ref Rng & Units 12/20/2023   Pain Management Panel   Amphetamine, Urine Qual Negative Negative    Barbiturates Screen, Urine Negative Negative    Benzodiazepine Screen, Urine Negative Negative    Buprenorphine, Screen, Urine Negative Negative    Cocaine Screen, Urine Negative Negative    Fentanyl, Urine Negative Negative    Methadone Screen , Urine Negative Negative    Methamphetamine, Ur Negative Negative      Brief Urine Lab Results  (Last result in the past 365 days)        Color   Clarity   Blood   Leuk Est   Nitrite   Protein   CREAT   Urine HCG        03/21/24 1613 Dark Yellow   Turbid   Trace   Moderate (2+)   Positive   30 mg/dL (1+)                 No results found for: \"BLOODCX\"  No results found for: \"URINECX\"  No results found for: \"WOUNDCX\"  No results found for: \"STOOLCX\"  No results found for: \"RESPCX\"  No results found for: \"AFBCX\"  Results from last 7 days   Lab Units 04/09/24  2009 04/09/24  1902   PROCALCITONIN " ng/mL 0.06  --    LACTATE mmol/L 0.9  --    CRP mg/dL  --  <0.30       I have personally looked at the labs and they are summarized above.  ----------------------------------------------------------------------------------------------------------------------  Detailed radiology reports for the last 24 hours:  Imaging Results (Last 24 Hours)       Procedure Component Value Units Date/Time    CT Angiogram Heart With 3D Image [[id number]] Collected: 04/11/24 1707     Updated: 04/11/24 1722    Narrative:      EXAM:    CT Angiography Heart With Intravenous Contrast     EXAM DATE:    4/11/2024 10:14 AM     CLINICAL HISTORY:    Chest pain, nonspecific     TECHNIQUE:    Axial computed tomographic angiography images of the coronary arteries  with intravenous contrast.  Sublingual nitroglycerine for coronary  vasodilation and IV metoprolol to reduce heart rate were administered as  needed.  This CT exam was performed using one or more of the following  dose reduction techniques:  automated exposure control, adjustment of  the mA and/or kV according to patient size, and/or use of iterative  reconstruction technique.    3D and MIP reconstructed images were created and reviewed.     COMPARISON:    None available     FINDINGS:    LEFT MAIN CORONARY ARTERY:  Left main calcium score 40.    LEFT ANTERIOR DESCENDING ARTERY:  LAD multifocal calcific plaque with  areas of mild and moderate stenosis.  LAD calcium score 642.    LEFT CIRCUMFLEX ARTERY:  Circumflex with mild calcific plaque causing  mild stenosis.  Circumflex calcium score 220.    RIGHT CORONARY ARTERY:  Multifocal calcific plaque in the RCA with  areas of mild and moderate stenosis but obscured due to the  calcification.  Focal defect in the midportion of the RCA appears to be  artifact.  RCA calcium score 827.       PERICARDIUM:  Unremarkable.  Contour is preserved with no effusion,  thickening or calcification.        AORTA:  No acute findings.  No thoracic aortic  aneurysm.  No  dissection.    PULMONARY ARTERIES:  Unremarkable.  No pulmonary embolism.    LUNGS AND PLEURAL SPACES:  Unremarkable as visualized.  No mass.  No  consolidation or edema.  No pleural effusion or thickening.  No  pneumothorax.    MEDIASTINUM:  Unremarkable.  No mass.    OTHER FINDINGS:  Total calcium score 1729.  First obtuse marginal with  calcific plaque but no focal stenosis.       Impression:      1.  Total calcium score 1729.  2.  Multifocal calcific plaque in the RCA with areas of mild and  moderate stenosis but obscured due to the calcification.  3.  Focal defect in the midportion of the RCA appears to be artifact.  4.  LAD multifocal calcific plaque with areas of mild and moderate  stenosis.  5.  First obtuse marginal with calcific plaque but no focal stenosis.  6.  Circumflex with mild calcific plaque causing mild stenosis.  7.  Tricuspid aortic valve with extensive calcific plaque.     RECOMMENDATIONS:    Extensive calcific plaque with areas of mild and moderate stenosis.  Consider catheter angiogram. Impression.     ASSESSMENT:    CAD RADS N/P4        This report was finalized on 4/11/2024 5:20 PM by Dr. Hu Obrien MD.             Assessment & Plan      #Chest pain with typical features and cardiac risk factors  #Abnormal EKG  - Cardiology consulted, appreciate assistance  - At admission the on call cardiologist reportedly recommended to anticoagulate patient with a heparin drip due to EKG showing possibly atrial fibrillation vs junctional rhythm. Continue same pending further recommendations from cardiologist.   - CTA coronary arteries done today and results are pending  - Monitor on telemetry   - Echo obtained, this showed LV EF >70% and grade I impaired diastolic dysfunction    #Deconditioning secondary to recent stroke  - continue PT/OT/SLP  - continue aspirin, statin    #Hypertension  - continue losartan and amlodipine    #Confusion  - waxes and wanes, but patient was alert,  oriented, and appropriate this morning. Possibly related to recent stroke.    #History of CVA with left sided hemiparesis  #Left arm pain  - continue tylenol  - continue gabapentin for likely stroke related pain  Edited by: Anh Valdez DO at 4/11/2024 2108    VTE Prophylaxis:   Mechanical Order History:       None          Pharmalogical Order History:        Ordered     Dose Route Frequency Stop    04/10/24 1302  heparin 96109 units/250 mL (100 units/mL) in 0.45 % NaCl infusion  6.98 mL/hr         11 Units/kg/hr (Dosing Weight) IV Titrated --    04/10/24 0455  heparin 58732 units/250 mL (100 units/mL) in 0.45 % NaCl infusion  6.35 mL/hr,   Status:  Discontinued         10 Units/kg/hr IV Titrated 04/10/24 1302    04/09/24 2135  heparin (porcine) 5000 UNIT/ML injection 3,800 Units         60 Units/kg IV Once 04/09/24 2212 04/09/24 2135  heparin 84393 units/250 mL (100 units/mL) in 0.45 % NaCl infusion  7.62 mL/hr,   Status:  Discontinued         12 Units/kg/hr IV Titrated 04/10/24 0455    04/09/24 2048  heparin (porcine) 5000 UNIT/ML injection 5,000 Units  Status:  Discontinued         5,000 Units SC Every 12 Hours Scheduled 04/09/24 2134 04/09/24 2135  heparin (porcine) 5000 UNIT/ML injection 3,200 Units         50 Units/kg IV As Needed --    04/09/24 2135  heparin (porcine) 5000 UNIT/ML injection 1,600 Units         25 Units/kg IV As Needed --    04/09/24 2135  Pharmacy to Dose Heparin         -- XX Continuous PRN --                    Dispo: likely skilled nursing facility at discharge    Anh Valdez DO  [facility]  04/11/24  21:08 EDT

## 2024-04-12 NOTE — THERAPY TREATMENT NOTE
Acute Care - Speech Language Pathology   Swallow Treatment Note [facility]     Patient Name: Regine Beckham  : 1955  MRN: [id number]  Today's Date: 2024  Onset of Illness/Injury or Date of Surgery: 24 (admission date)          Admit Date: 2024      DYSARTHRIA AND COGNITIVE THERAPY PLAN OF CARE:     Regine Beckham was seen this am for formal therapy tasks.      Long Term Goal:  Patient will demonstrate functional speech skills for return to discharge environment.   Patient will demonstrate functional cognitive skills for return to discharge environment.     Short Term Goals:  1. Patient will tolerate facial massage to left facial surface for 3-7 minutes to increase surface blood flow, sensation and ROM over 3 consecutive sessions.   *facial massage tolerated for 5 min w/ mild increase in surface blood flow.      2. Patient will perform OROM/GRACE exercises x3 sets x10 reps w/ min cues.  *x2 sets x5 reps     3. Patient will maintain vocal intensity at greater than 60dB across 4+ word sentences in 90% of opp over three consecutive sessions.   *vocal intensity increased in conversational exchanges following SLP verbal cues.      4. Patient will over-articulate 3+ syllable words and in connected speech in 90% opp to improve intelligibility over three consecutive sessions.      5. Patient will decrease rate of speech in connected speech in 90% of opp to improve intelligibility over three consecutive sessions.      6. Patient will demonstrate accurate orientation to time and location in 90% of opp independently over three consecutive sessions.  *pt oriented to time and location in 100% opp this date.      7. Patient will perform divergent naming tasks of 8+ items named in 1 min w/ min cues over three consecutive sessions.      8. Patient will perform rapid alternating speech tasks w/ min cues over three consecutive sessions.     9. Patient will perform sustained vowel prolongations of 5 sec  "duration over 15 reps.   4 of 5 opp over 5 reps     10. Patient will perform inhalations/exhalations via resistive breather x4 sets x15 reps.   X2 sets x10 reps.     *Additional goals to be added/modified per pt progress toward goals.        Visit Dx:     ICD-10-CM ICD-9-CM   1. Chest pain, unspecified type  R07.9 786.50     Patient Active Problem List   Diagnosis    Iron deficiency anemia due to chronic blood loss    Major depressive disorder    RLS (restless legs syndrome)    GERD (gastroesophageal reflux disease)    Left breast mass    Allergy to penicillin    Stroke    Grade I diastolic dysfunction    Moderate malnutrition    Falls frequently    Stroke-like symptoms    Debility    Chest pain     Past Medical History:   Diagnosis Date    Anemia     Anxiety     Arthritis     Depression     Legally blind     Restless leg syndrome     Sleep apnea     \"I don't use a cpap anymore since losing 106 lbs\"    Water retention      Past Surgical History:   Procedure Laterality Date    BACK SURGERY      CARDIAC CATHETERIZATION N/A 1/19/2024    Procedure: Percutaneous Manual Thrombectomy;  Surgeon: Efrain Hurley MD;  Location: [facility] CATH INVASIVE LOCATION;  Service: Interventional Radiology;  Laterality: N/A;    GALLBLADDER SURGERY      HIP ARTHROSCOPY      JOINT REPLACEMENT Right        SLP Recommendation and Plan      Continue per PAC.       Thank you-   Azalia Elizondo M.S., CCC-SLP         EDUCATION  The patient has been educated in the following areas:   Cognitive Impairment Communication Impairment.        Time Calculation:       Therapy Charges for Today       Code Description Service Date Service Provider Modifiers Qty    53842221465 HC ST EVAL ORAL PHARYNG SWALLOW 4 4/11/2024 Azalia Elizondo, MS CCC-SLP GN 1    06831362937 HC ST TREATMENT SPEECH 2 4/11/2024 Azalia Elizondo MS CCC-SLP GN 1    83328810515 HC ST TREATMENT SWALLOW 2 4/11/2024 Azalia Elizondo MS CCC-SLP GN 1                 Azalia Elizondo MS CCC-SLP  4/12/2024   " "and Acute Care - Speech Language Pathology Treatment Note  [facility]     Patient Name: Regine Beckham  : 1955  MRN: [id number]  Today's Date: 2024  Onset of Illness/Injury or Date of Surgery: 24 (admission date)            Admit Date: 2024    DYSPHAGIA THERAPY PLAN OF CARE:     Regine Beckham was seen this pm for formal therapy tasks.       Long Term Goal:  Patient will accept least restrictive diet tolerance w/o overt s/s aspiration.      Short Term Goals:  1. Patient will tolerate facial massage to LEFT facial surface for 3-7 minutes to increase surface blood flow, sensation and ROM over 3 consecutive sessions.   *facial massage tolerated for 5 min w/ mild increase in surface blood flow.      2. Patient will perform OROM/GRACE exercises x3 sets x10 reps w/ min cues.   x2 sets x5 reps this date     3. Patient will demonstrate increase labial sensation/afferent drive per pt report in 90% of opp over three consecutive sessions.          4. Patient will increase lingual control, coordination, movement as evidenced by bolus manipulation in 90% opp independently over three consecutive sessions.         5. Patient will participate in a clinical re-evaluation of swallowing fnx in 7-10 days, pending progress towards this poc.       Visit Dx:    ICD-10-CM ICD-9-CM   1. Chest pain, unspecified type  R07.9 786.50     Patient Active Problem List   Diagnosis    Iron deficiency anemia due to chronic blood loss    Major depressive disorder    RLS (restless legs syndrome)    GERD (gastroesophageal reflux disease)    Left breast mass    Allergy to penicillin    Stroke    Grade I diastolic dysfunction    Moderate malnutrition    Falls frequently    Stroke-like symptoms    Debility    Chest pain     Past Medical History:   Diagnosis Date    Anemia     Anxiety     Arthritis     Depression     Legally blind     Restless leg syndrome     Sleep apnea     \"I don't use a cpap anymore since losing 106 lbs\"    " Water retention      Past Surgical History:   Procedure Laterality Date    BACK SURGERY      CARDIAC CATHETERIZATION N/A 1/19/2024    Procedure: Percutaneous Manual Thrombectomy;  Surgeon: Efrain Hurley MD;  Location: [facility] CATH INVASIVE LOCATION;  Service: Interventional Radiology;  Laterality: N/A;    GALLBLADDER SURGERY      HIP ARTHROSCOPY      JOINT REPLACEMENT Right        SLP Recommendation and Plan      1. Continue per PAC.       Thank you-  Azalia Elizondo M.S., CCC-SLP       EDUCATION  The patient has been educated in the following areas:     Dysphagia (Swallowing Impairment).        Time Calculation:         Therapy Charges for Today       Code Description Service Date Service Provider Modifiers Qty    15128628331 HC ST EVAL ORAL PHARYNG SWALLOW 4 4/11/2024 Azalia Elizondo MS CCC-SLP GN 1    96067303947 HC ST TREATMENT SPEECH 2 4/11/2024 Azalia Elizondo MS CCC-SLP GN 1    15402776024 HC ST TREATMENT SWALLOW 2 4/11/2024 Azalia Elizondo MS CCC-SLP GN 1              Azalia Elizondo MS CCC-CHAYO  4/12/2024

## 2024-04-13 LAB
ANION GAP SERPL CALCULATED.3IONS-SCNC: 11.7 MMOL/L (ref 5–15)
BASOPHILS # BLD AUTO: 0.05 10*3/MM3 (ref 0–0.2)
BASOPHILS NFR BLD AUTO: 0.8 % (ref 0–1.5)
BUN SERPL-MCNC: 14 MG/DL (ref 8–23)
BUN/CREAT SERPL: 18.2 (ref 7–25)
CALCIUM SPEC-SCNC: 9.1 MG/DL (ref 8.6–10.5)
CHLORIDE SERPL-SCNC: 106 MMOL/L (ref 98–107)
CHOLEST SERPL-MCNC: 120 MG/DL (ref 0–200)
CO2 SERPL-SCNC: 21.3 MMOL/L (ref 22–29)
CREAT SERPL-MCNC: 0.77 MG/DL (ref 0.57–1)
DEPRECATED RDW RBC AUTO: 49.1 FL (ref 37–54)
EGFRCR SERPLBLD CKD-EPI 2021: 83.6 ML/MIN/1.73
EOSINOPHIL # BLD AUTO: 0.19 10*3/MM3 (ref 0–0.4)
EOSINOPHIL NFR BLD AUTO: 3 % (ref 0.3–6.2)
ERYTHROCYTE [DISTWIDTH] IN BLOOD BY AUTOMATED COUNT: 13.8 % (ref 12.3–15.4)
GLUCOSE SERPL-MCNC: 113 MG/DL (ref 65–99)
HCT VFR BLD AUTO: 33.7 % (ref 34–46.6)
HDLC SERPL-MCNC: 43 MG/DL (ref 40–60)
HGB BLD-MCNC: 10.6 G/DL (ref 12–15.9)
IMM GRANULOCYTES # BLD AUTO: 0.02 10*3/MM3 (ref 0–0.05)
IMM GRANULOCYTES NFR BLD AUTO: 0.3 % (ref 0–0.5)
LDLC SERPL CALC-MCNC: 52 MG/DL (ref 0–100)
LDLC/HDLC SERPL: 1.11 {RATIO}
LYMPHOCYTES # BLD AUTO: 1.45 10*3/MM3 (ref 0.7–3.1)
LYMPHOCYTES NFR BLD AUTO: 23.2 % (ref 19.6–45.3)
MCH RBC QN AUTO: 30.5 PG (ref 26.6–33)
MCHC RBC AUTO-ENTMCNC: 31.5 G/DL (ref 31.5–35.7)
MCV RBC AUTO: 97.1 FL (ref 79–97)
MONOCYTES # BLD AUTO: 0.54 10*3/MM3 (ref 0.1–0.9)
MONOCYTES NFR BLD AUTO: 8.7 % (ref 5–12)
NEUTROPHILS NFR BLD AUTO: 3.99 10*3/MM3 (ref 1.7–7)
NEUTROPHILS NFR BLD AUTO: 64 % (ref 42.7–76)
NRBC BLD AUTO-RTO: 0 /100 WBC (ref 0–0.2)
PLATELET # BLD AUTO: 224 10*3/MM3 (ref 140–450)
PMV BLD AUTO: 9.6 FL (ref 6–12)
POTASSIUM SERPL-SCNC: 3.6 MMOL/L (ref 3.5–5.2)
POTASSIUM SERPL-SCNC: 3.8 MMOL/L (ref 3.5–5.2)
QT INTERVAL: 432 MS
QTC INTERVAL: 469 MS
RBC # BLD AUTO: 3.47 10*6/MM3 (ref 3.77–5.28)
SODIUM SERPL-SCNC: 139 MMOL/L (ref 136–145)
TRIGL SERPL-MCNC: 146 MG/DL (ref 0–150)
VLDLC SERPL-MCNC: 25 MG/DL (ref 5–40)
WBC NRBC COR # BLD AUTO: 6.24 10*3/MM3 (ref 3.4–10.8)

## 2024-04-13 PROCEDURE — 93005 ELECTROCARDIOGRAM TRACING: CPT | Performed by: STUDENT IN AN ORGANIZED HEALTH CARE EDUCATION/TRAINING PROGRAM

## 2024-04-13 PROCEDURE — 80061 LIPID PANEL: CPT | Performed by: INTERNAL MEDICINE

## 2024-04-13 PROCEDURE — 84132 ASSAY OF SERUM POTASSIUM: CPT | Performed by: STUDENT IN AN ORGANIZED HEALTH CARE EDUCATION/TRAINING PROGRAM

## 2024-04-13 PROCEDURE — 25810000003 SODIUM CHLORIDE 0.9 % SOLUTION: Performed by: INTERNAL MEDICINE

## 2024-04-13 PROCEDURE — 99232 SBSQ HOSP IP/OBS MODERATE 35: CPT | Performed by: STUDENT IN AN ORGANIZED HEALTH CARE EDUCATION/TRAINING PROGRAM

## 2024-04-13 PROCEDURE — 93010 ELECTROCARDIOGRAM REPORT: CPT | Performed by: SPECIALIST

## 2024-04-13 PROCEDURE — 85025 COMPLETE CBC W/AUTO DIFF WBC: CPT | Performed by: INTERNAL MEDICINE

## 2024-04-13 PROCEDURE — 80048 BASIC METABOLIC PNL TOTAL CA: CPT | Performed by: INTERNAL MEDICINE

## 2024-04-13 RX ORDER — POTASSIUM CHLORIDE 20 MEQ/1
40 TABLET, EXTENDED RELEASE ORAL EVERY 4 HOURS
Status: COMPLETED | OUTPATIENT
Start: 2024-04-13 | End: 2024-04-13

## 2024-04-13 RX ADMIN — SODIUM CHLORIDE 100 ML/HR: 9 INJECTION, SOLUTION INTRAVENOUS at 09:04

## 2024-04-13 RX ADMIN — ACETAMINOPHEN 1000 MG: 500 TABLET ORAL at 20:05

## 2024-04-13 RX ADMIN — ROPINIROLE HYDROCHLORIDE 4 MG: 1 TABLET, FILM COATED ORAL at 20:05

## 2024-04-13 RX ADMIN — POTASSIUM CHLORIDE 40 MEQ: 1500 TABLET, EXTENDED RELEASE ORAL at 05:35

## 2024-04-13 RX ADMIN — NITROGLYCERIN 0.5 INCH: 20 OINTMENT TOPICAL at 12:03

## 2024-04-13 RX ADMIN — DICLOFENAC SODIUM 4 G: 10 GEL TOPICAL at 09:05

## 2024-04-13 RX ADMIN — NITROGLYCERIN 0.5 INCH: 20 OINTMENT TOPICAL at 17:35

## 2024-04-13 RX ADMIN — MIRTAZAPINE 30 MG: 15 TABLET, FILM COATED ORAL at 20:05

## 2024-04-13 RX ADMIN — DICLOFENAC SODIUM 4 G: 10 GEL TOPICAL at 17:35

## 2024-04-13 RX ADMIN — AMLODIPINE BESYLATE 2.5 MG: 5 TABLET ORAL at 09:05

## 2024-04-13 RX ADMIN — LOSARTAN POTASSIUM 50 MG: 50 TABLET, FILM COATED ORAL at 09:04

## 2024-04-13 RX ADMIN — DICLOFENAC SODIUM 4 G: 10 GEL TOPICAL at 12:03

## 2024-04-13 RX ADMIN — DICLOFENAC SODIUM 4 G: 10 GEL TOPICAL at 20:05

## 2024-04-13 RX ADMIN — NITROGLYCERIN 0.5 INCH: 20 OINTMENT TOPICAL at 05:35

## 2024-04-13 RX ADMIN — Medication 10 ML: at 09:05

## 2024-04-13 RX ADMIN — TICAGRELOR 60 MG: 60 TABLET ORAL at 09:05

## 2024-04-13 RX ADMIN — DOCUSATE SODIUM 50 MG AND SENNOSIDES 8.6 MG 2 TABLET: 8.6; 5 TABLET, FILM COATED ORAL at 20:05

## 2024-04-13 RX ADMIN — GABAPENTIN 200 MG: 100 CAPSULE ORAL at 09:05

## 2024-04-13 RX ADMIN — SODIUM CHLORIDE 100 ML/HR: 9 INJECTION, SOLUTION INTRAVENOUS at 17:35

## 2024-04-13 RX ADMIN — METOPROLOL TARTRATE 12.5 MG: 25 TABLET, FILM COATED ORAL at 09:05

## 2024-04-13 RX ADMIN — POTASSIUM CHLORIDE 40 MEQ: 1500 TABLET, EXTENDED RELEASE ORAL at 09:04

## 2024-04-13 RX ADMIN — ASPIRIN 81 MG: 81 TABLET, CHEWABLE ORAL at 09:05

## 2024-04-13 RX ADMIN — PANTOPRAZOLE SODIUM 40 MG: 40 TABLET, DELAYED RELEASE ORAL at 05:35

## 2024-04-13 RX ADMIN — OXYBUTYNIN CHLORIDE 10 MG: 5 TABLET, EXTENDED RELEASE ORAL at 09:05

## 2024-04-13 RX ADMIN — METOPROLOL TARTRATE 12.5 MG: 25 TABLET, FILM COATED ORAL at 20:05

## 2024-04-13 RX ADMIN — GABAPENTIN 200 MG: 100 CAPSULE ORAL at 20:04

## 2024-04-13 RX ADMIN — DOCUSATE SODIUM 50 MG AND SENNOSIDES 8.6 MG 2 TABLET: 8.6; 5 TABLET, FILM COATED ORAL at 09:04

## 2024-04-13 RX ADMIN — ATORVASTATIN CALCIUM 80 MG: 40 TABLET, FILM COATED ORAL at 20:05

## 2024-04-13 RX ADMIN — TICAGRELOR 60 MG: 60 TABLET ORAL at 20:05

## 2024-04-13 RX ADMIN — Medication 1 MG: at 09:05

## 2024-04-13 NOTE — NURSING NOTE
Per Dr. Mederos's instructions, pressure held on left groin from 1915 to 1930. New dressing applied. No signs and symptoms of any new bleeding.  Patient educated on staying in bed and call for help when she wants to get up.  Linda DUARTE notified. Will continue to monitor.

## 2024-04-13 NOTE — NURSING NOTE
Check patient's right groin site. New dressing clean dry and intact.  No signs or symptoms of bleeding. Patient denies any pain or tenderness at this time.

## 2024-04-13 NOTE — PLAN OF CARE
Goal Outcome Evaluation:      Patient has been resting in bed this shift. No s/s of acute distress noted at this time. Plan of care ongoing.

## 2024-04-13 NOTE — PLAN OF CARE
Goal Outcome Evaluation:            Patient complained of chest pain this morning. EKG showed afib, nitro paste applied to chest. Patient said chest pain resolved. No further evidence of bleeding at cath site

## 2024-04-13 NOTE — PROGRESS NOTES
[facility] HOSPITALIST PROGRESS NOTE     Patient Identification:  Name:  Regine eBckham  Age:  68 y.o.  Sex:  female  :  1955  MRN:  [id number]  Visit Number:  [id number]  ROOM: [address] [place] [address]     Primary Care Provider:  Dread Burton MD    Length of stay in inpatient status:  2    Subjective     History of Presenting Illness:    Patient reportedly had chest pain this morning which has now resolved. No recurrence. She denied any shortness of breath. We discussed her previous EKG results and questionable atrial fibrillation, as well as cardiologist recommendations.    Objective     Current Hospital Meds:amLODIPine, 2.5 mg, Oral, Q24H  aspirin, 81 mg, Oral, Daily  atorvastatin, 80 mg, Oral, Nightly  Diclofenac Sodium, 4 g, Topical, 4x Daily  folic acid, 1 mg, Oral, Daily  gabapentin, 200 mg, Oral, Q12H  losartan, 50 mg, Oral, Daily  metoprolol tartrate, 12.5 mg, Oral, Q12H  mirtazapine, 30 mg, Oral, Nightly  nitroglycerin, 0.5 inch, Topical, Q6H  oxybutynin XL, 10 mg, Oral, Daily  pantoprazole, 40 mg, Oral, Q AM  rOPINIRole, 4 mg, Oral, Nightly  senna-docusate sodium, 2 tablet, Oral, BID  sodium chloride, 10 mL, Intravenous, Q12H  ticagrelor, 60 mg, Oral, BID    sodium chloride, 100 mL/hr, Last Rate: 100 mL/hr (24 2668)        Current Antimicrobial Therapy:  Anti-Infectives (From admission, onward)      None          Current Diuretic Therapy:  Diuretics (From admission, onward)      None          ----------------------------------------------------------------------------------------------------------------------  Vital Signs:  Temp:  [97.9 °F (36.6 °C)-98.5 °F (36.9 °C)] 98.1 °F (36.7 °C)  Heart Rate:  [50-75] 75  Resp:  [17-18] 18  BP: (121-178)/(54-92) 121/60  SpO2:  [90 %-97 %] 92 %  on   ;   Device (Oxygen Therapy): room air  Body mass index is 23.56 kg/m².    Wt Readings from Last 3 Encounters:   24 66.2 kg (145 lb 15.1 oz)   24 63.5 kg (140 lb)   24 62.4 kg (137 lb  8 oz)     Intake & Output (last 3 days)         04/10 0701  04/11 0700 04/11 0701  04/12 0700 04/12 0701 04/13 0700 04/13 0701 04/14 0700    P.O. 360 480 240 400    I.V. (mL/kg) 215.1 (3.4)  58.1 (0.9)     Total Intake(mL/kg) 575.1 (9.1) 480 (7.6) 298.1 (4.7) 400 (6.3)    Urine (mL/kg/hr) 1200 (0.8)  1450 (1) 1500 (2.1)    Stool   0 0    Total Output 1200  1450 1500    Net -624.9 +480 -1151.9 -1100            Urine Unmeasured Occurrence 2 x 4 x 4 x     Stool Unmeasured Occurrence   1 x 1 x          Diet: Cardiac; Healthy Heart (2-3 Na+); Texture: Soft to Chew (NDD 3); Soft to Chew: Whole Meat; Fluid Consistency: Thin (IDDSI 0)  ----------------------------------------------------------------------------------------------------------------------  Physical exam:   Constitutional:  Well-developed and well-nourished. Lying in bed. No acute distress.      HENT:  Head:  Normocephalic and atraumatic.   Cardiovascular:  Normal rate, regular rhythm   Pulmonary/Chest:  Normal rate and effort, breath sounds clear in anterior lung fields  Musculoskeletal:  No deformity.     Neurological Awake, alert, left sided weakness and left sided facial droop similar to prior exams  Skin:  Skin is warm and dry.   Peripheral vascular:  No  cyanosis  Psychiatric: Appropriate mood and affect  Edited by: Anh Valdez DO at 4/13/2024 1823  ----------------------------------------------------------------------------------------------------------------------  Results from last 7 days   Lab Units 04/13/24  0113 04/10/24  0012 04/09/24  2142 04/09/24 2009 04/09/24  1902   CRP mg/dL  --   --   --   --  <0.30   LACTATE mmol/L  --   --   --  0.9  --    WBC 10*3/mm3 6.24 5.58  --  5.10  --    HEMOGLOBIN g/dL 10.6* 10.1*  --  10.1*  --    HEMATOCRIT % 33.7* 32.4*  --  32.3*  --    MCV fL 97.1* 97.3*  --  96.7  --    MCHC g/dL 31.5 31.2*  --  31.3*  --    PLATELETS 10*3/mm3 224 224  --  236  --    INR   --   --  0.94  --   --          Results from  "last 7 days   Lab Units 04/13/24  1151 04/13/24  0113 04/12/24  0708 04/10/24  0012 04/09/24 2009   SODIUM mmol/L  --  139 139 139 139   POTASSIUM mmol/L 3.8 3.6 3.8 3.7 4.0   CHLORIDE mmol/L  --  106 106 106 106   CO2 mmol/L  --  21.3* 23.1 25.1 25.2   BUN mg/dL  --  14 13 18 18   CREATININE mg/dL  --  0.77 0.75 0.64 0.83   CALCIUM mg/dL  --  9.1 9.8 9.3 9.0   GLUCOSE mg/dL  --  113* 109* 113* 119*   ALBUMIN g/dL  --   --   --  3.3* 3.4*   BILIRUBIN mg/dL  --   --   --  0.3 0.2   ALK PHOS U/L  --   --   --  81 90   AST (SGOT) U/L  --   --   --  31 35*   ALT (SGPT) U/L  --   --   --  27 29   Estimated Creatinine Clearance: 72.1 mL/min (by C-G formula based on SCr of 0.77 mg/dL).  No results found for: \"AMMONIA\"  Results from last 7 days   Lab Units 04/12/24  1339 04/12/24  1109 04/10/24  0012   HSTROP T ng/L 13 13 13         Results from last 7 days   Lab Units 04/13/24  0113   CHOLESTEROL mg/dL 120   TRIGLYCERIDES mg/dL 146   HDL CHOL mg/dL 43   LDL CHOL mg/dL 52     No results found for: \"HGBA1C\", \"POCGLU\"  Lab Results   Component Value Date    TSH 2.880 03/17/2024     No results found for: \"PREGTESTUR\", \"PREGSERUM\", \"HCG\", \"HCGQUANT\"  Pain Management Panel          Latest Ref Rng & Units 12/20/2023   Pain Management Panel   Amphetamine, Urine Qual Negative Negative    Barbiturates Screen, Urine Negative Negative    Benzodiazepine Screen, Urine Negative Negative    Buprenorphine, Screen, Urine Negative Negative    Cocaine Screen, Urine Negative Negative    Fentanyl, Urine Negative Negative    Methadone Screen , Urine Negative Negative    Methamphetamine, Ur Negative Negative      Brief Urine Lab Results  (Last result in the past 365 days)        Color   Clarity   Blood   Leuk Est   Nitrite   Protein   CREAT   Urine HCG        03/21/24 1613 Dark Yellow   Turbid   Trace   Moderate (2+)   Positive   30 mg/dL (1+)                 No results found for: \"BLOODCX\"  No results found for: \"URINECX\"  No results found for: " "\"WOUNDCX\"  No results found for: \"STOOLCX\"  No results found for: \"RESPCX\"  No results found for: \"AFBCX\"  Results from last 7 days   Lab Units 04/09/24 2009 04/09/24  1902   PROCALCITONIN ng/mL 0.06  --    LACTATE mmol/L 0.9  --    CRP mg/dL  --  <0.30       I have personally looked at the labs and they are summarized above.  ----------------------------------------------------------------------------------------------------------------------  Detailed radiology reports for the last 24 hours:  Imaging Results (Last 24 Hours)       ** No results found for the last 24 hours. **          Assessment & Plan      #Chest pain with typical features and cardiac risk factors  #Abnormal EKG  - Cardiology consulted, appreciate assistance  - At admission the on call cardiologist reportedly recommended to anticoagulate patient with a heparin drip due to EKG showing possibly atrial fibrillation vs junctional rhythm.   - I discussed possible atrial fibrillation with Dr. Mederos today. He feels that it is most likely sinus rhythms with PACs, but one EKG was read as atrial fibrillation by a different cardiologist earlier in admission. He feels that the safest thing would be to start anticoagulation due to elevated [id number] score while monitoring for episodes of atrial fibrillation with holter monitor after discharge. If no atrial fibrillation is definitively seen on extended cardiac monitoring then could discontinue anticoagulation. I will request pharmacy consult for DOAC pricing assistance. I discussed risks/benefits of this including risks of bleeding and benefit of stroke prophylaxis with patient and she is agreeable to the plan.  - CTA coronary arteries was abnormal. Heart cath noted several areas of mild stenosis but iFR checked and stenosis not felt to be significant enough for stenting. Continue medical management.  - Monitor on telemetry   - Echo obtained, this showed LV EF >70% and grade I impaired diastolic " dysfunction    #Deconditioning secondary to recent stroke  - continue PT/OT/SLP  - continue aspirin, statin    #Hypertension  - continue losartan and amlodipine    #Confusion  - waxes and wanes. Possibly related to recent stroke.    #History of CVA with left sided hemiparesis  #Left arm pain  - continue tylenol  - continue gabapentin for likely stroke related pain  Edited by: Anh Valdez DO at 4/13/2024 182    VTE Prophylaxis:   Mechanical Order History:       None          Pharmalogical Order History:        Ordered     Dose Route Frequency Stop    04/12/24 1442  heparin (porcine) injection  Status:  Discontinued         -- -- Code / Trauma / Sedation Medication 04/12/24 1458    04/10/24 1302  heparin 38616 units/250 mL (100 units/mL) in 0.45 % NaCl infusion  6.98 mL/hr,   Status:  Discontinued         11 Units/kg/hr (Dosing Weight) IV Titrated 04/12/24 1614    04/10/24 0455  heparin 76829 units/250 mL (100 units/mL) in 0.45 % NaCl infusion  6.35 mL/hr,   Status:  Discontinued         10 Units/kg/hr IV Titrated 04/10/24 1302    04/09/24 2135  heparin (porcine) 5000 UNIT/ML injection 3,800 Units         60 Units/kg IV Once 04/09/24 2212    04/09/24 2135  heparin 57420 units/250 mL (100 units/mL) in 0.45 % NaCl infusion  7.62 mL/hr,   Status:  Discontinued         12 Units/kg/hr IV Titrated 04/10/24 0455    04/09/24 2048  heparin (porcine) 5000 UNIT/ML injection 5,000 Units  Status:  Discontinued         5,000 Units SC Every 12 Hours Scheduled 04/09/24 2134    04/09/24 2135  heparin (porcine) 5000 UNIT/ML injection 3,200 Units  Status:  Discontinued         50 Units/kg IV As Needed 04/12/24 1614    04/09/24 2135  heparin (porcine) 5000 UNIT/ML injection 1,600 Units  Status:  Discontinued         25 Units/kg IV As Needed 04/12/24 1614 04/09/24 2135  Pharmacy to Dose Heparin  Status:  Discontinued         -- XX Continuous PRN 04/12/24 1614                    Dispo: pending skilled nursing facility  placement    Anh Valdez DO  [facility]  04/13/24  18:23 EDT

## 2024-04-13 NOTE — PROGRESS NOTES
[facility] HOSPITALIST PROGRESS NOTE     Patient Identification:  Name:  Regine Beckham  Age:  68 y.o.  Sex:  female  :  1955  MRN:  [id number]  Visit Number:  [id number]  ROOM: [address] [place] [address]     Primary Care Provider:  Dread Burton MD    Length of stay in inpatient status:  1    Subjective     History of Presenting Illness:    Patient was seen and examined right after returning from heart cath today. She tolerated procedure well and denied any chest pain or shortness of breath.    Objective     Current Hospital Meds:amLODIPine, 2.5 mg, Oral, Q24H  aspirin, 81 mg, Oral, Daily  atorvastatin, 80 mg, Oral, Nightly  Diclofenac Sodium, 4 g, Topical, 4x Daily  folic acid, 1 mg, Oral, Daily  gabapentin, 200 mg, Oral, Q12H  losartan, 50 mg, Oral, Daily  metoprolol tartrate, 12.5 mg, Oral, Q12H  mirtazapine, 30 mg, Oral, Nightly  nitroglycerin, 0.5 inch, Topical, Q6H  oxybutynin XL, 10 mg, Oral, Daily  pantoprazole, 40 mg, Oral, Q AM  rOPINIRole, 4 mg, Oral, Nightly  senna-docusate sodium, 2 tablet, Oral, BID  sodium chloride, 10 mL, Intravenous, Q12H  ticagrelor, 60 mg, Oral, BID    sodium chloride, 100 mL/hr, Last Rate: 100 mL/hr (24 0757)  sodium chloride, 1 mL/kg/hr, Last Rate: 1 mL/kg/hr (24 1811)        Current Antimicrobial Therapy:  Anti-Infectives (From admission, onward)      None          Current Diuretic Therapy:  Diuretics (From admission, onward)      None          ----------------------------------------------------------------------------------------------------------------------  Vital Signs:  Temp:  [97.8 °F (36.6 °C)-98.2 °F (36.8 °C)] 98.1 °F (36.7 °C)  Heart Rate:  [50-75] 50  Resp:  [16-18] 17  BP: (129-177)/(51-92) 157/92  SpO2:  [94 %-99 %] 94 %  on  Flow (L/min):  [2] 2;   Device (Oxygen Therapy): room air  Body mass index is 23.24 kg/m².    Wt Readings from Last 3 Encounters:   24 65.3 kg (143 lb 15.4 oz)   24 63.5 kg (140 lb)   24 62.4 kg (137  lb 8 oz)     Intake & Output (last 3 days)         04/10 0701  04/11 0700 04/11 0701 04/12 0700 04/12 0701 04/13 0700    P.O. 360 480 240    I.V. (mL/kg) 215.1 (3.4)  58.1 (0.9)    Total Intake(mL/kg) 575.1 (9.1) 480 (7.6) 298.1 (4.7)    Urine (mL/kg/hr) 1200 (0.8)  700 (0.7)    Stool   0    Total Output 1200  700    Net -624.9 +480 -401.9           Urine Unmeasured Occurrence 2 x 4 x 2 x    Stool Unmeasured Occurrence   0 x          NPO Diet NPO Type: Sips with Meds  Diet: Cardiac; Healthy Heart (2-3 Na+); Fluid Consistency: Thin (IDDSI 0)  ----------------------------------------------------------------------------------------------------------------------  Physical exam:   Constitutional:  Well-developed and well-nourished. Lying in bed. No acute distress.      HENT:  Head:  Normocephalic and atraumatic.   Cardiovascular:  Normal rate, regular rhythm   Pulmonary/Chest:  Normal rate and effort  Musculoskeletal:  No deformity.     Neurological Awake, alert, left sided weakness and left sided facial droop similar to prior exams  Skin:  Skin is warm and dry.   Peripheral vascular:  No  cyanosis  Psychiatric: Appropriate mood and affect  Edited by: Anh Valdez DO at 4/12/2024 6395  ----------------------------------------------------------------------------------------------------------------------  Results from last 7 days   Lab Units 04/10/24  0012 04/09/24  2142 04/09/24  2009 04/09/24  1902   CRP mg/dL  --   --   --  <0.30   LACTATE mmol/L  --   --  0.9  --    WBC 10*3/mm3 5.58  --  5.10  --    HEMOGLOBIN g/dL 10.1*  --  10.1*  --    HEMATOCRIT % 32.4*  --  32.3*  --    MCV fL 97.3*  --  96.7  --    MCHC g/dL 31.2*  --  31.3*  --    PLATELETS 10*3/mm3 224  --  236  --    INR   --  0.94  --   --          Results from last 7 days   Lab Units 04/12/24  0708 04/10/24  0012 04/09/24  2009   SODIUM mmol/L 139 139 139   POTASSIUM mmol/L 3.8 3.7 4.0   CHLORIDE mmol/L 106 106 106   CO2 mmol/L 23.1 25.1 25.2   BUN  "mg/dL 13 18 18   CREATININE mg/dL 0.75 0.64 0.83   CALCIUM mg/dL 9.8 9.3 9.0   GLUCOSE mg/dL 109* 113* 119*   ALBUMIN g/dL  --  3.3* 3.4*   BILIRUBIN mg/dL  --  0.3 0.2   ALK PHOS U/L  --  81 90   AST (SGOT) U/L  --  31 35*   ALT (SGPT) U/L  --  27 29   Estimated Creatinine Clearance: 73 mL/min (by C-G formula based on SCr of 0.75 mg/dL).  No results found for: \"AMMONIA\"  Results from last 7 days   Lab Units 04/12/24  1339 04/12/24  1109 04/10/24  0012   HSTROP T ng/L 13 13 13         Results from last 7 days   Lab Units 04/10/24  0012   CHOLESTEROL mg/dL 118   TRIGLYCERIDES mg/dL 59   HDL CHOL mg/dL 47   LDL CHOL mg/dL 58     No results found for: \"HGBA1C\", \"POCGLU\"  Lab Results   Component Value Date    TSH 2.880 03/17/2024     No results found for: \"PREGTESTUR\", \"PREGSERUM\", \"HCG\", \"HCGQUANT\"  Pain Management Panel          Latest Ref Rng & Units 12/20/2023   Pain Management Panel   Amphetamine, Urine Qual Negative Negative    Barbiturates Screen, Urine Negative Negative    Benzodiazepine Screen, Urine Negative Negative    Buprenorphine, Screen, Urine Negative Negative    Cocaine Screen, Urine Negative Negative    Fentanyl, Urine Negative Negative    Methadone Screen , Urine Negative Negative    Methamphetamine, Ur Negative Negative      Brief Urine Lab Results  (Last result in the past 365 days)        Color   Clarity   Blood   Leuk Est   Nitrite   Protein   CREAT   Urine HCG        03/21/24 1613 Dark Yellow   Turbid   Trace   Moderate (2+)   Positive   30 mg/dL (1+)                 No results found for: \"BLOODCX\"  No results found for: \"URINECX\"  No results found for: \"WOUNDCX\"  No results found for: \"STOOLCX\"  No results found for: \"RESPCX\"  No results found for: \"AFBCX\"  Results from last 7 days   Lab Units 04/09/24 2009 04/09/24  1902   PROCALCITONIN ng/mL 0.06  --    LACTATE mmol/L 0.9  --    CRP mg/dL  --  <0.30       I have personally looked at the labs and they are summarized " above.  ----------------------------------------------------------------------------------------------------------------------  Detailed radiology reports for the last 24 hours:  Imaging Results (Last 24 Hours)       ** No results found for the last 24 hours. **          Assessment & Plan      #Chest pain with typical features and cardiac risk factors  #Abnormal EKG  - Cardiology consulted, appreciate assistance  - At admission the on call cardiologist reportedly recommended to anticoagulate patient with a heparin drip due to EKG showing possibly atrial fibrillation vs junctional rhythm. Continue same pending further recommendations from cardiologist.   - CTA coronary arteries was abnormal. Heart cath done today and several areas of mild stenosis were noted but iFR checked and stenosis not felt to be significant enough for stenting. Continue medical management.  - Monitor on telemetry   - Echo obtained, this showed LV EF >70% and grade I impaired diastolic dysfunction    #Deconditioning secondary to recent stroke  - continue PT/OT/SLP  - continue aspirin, statin    #Hypertension  - continue losartan and amlodipine    #Confusion  - waxes and wanes, but patient was alert, oriented, and appropriate when I spoke to her today. Possibly related to recent stroke.    #History of CVA with left sided hemiparesis  #Left arm pain  - continue tylenol  - continue gabapentin for likely stroke related pain  Edited by: Anh Valdez DO at 4/12/2024 2208    VTE Prophylaxis:   Mechanical Order History:       None          Pharmalogical Order History:        Ordered     Dose Route Frequency Stop    04/12/24 1442  heparin (porcine) injection  Status:  Discontinued         -- -- Code / Trauma / Sedation Medication 04/12/24 1458    04/10/24 1302  heparin 24756 units/250 mL (100 units/mL) in 0.45 % NaCl infusion  6.98 mL/hr,   Status:  Discontinued         11 Units/kg/hr (Dosing Weight) IV Titrated 04/12/24 1614    04/10/24 0455  heparin  07361 units/250 mL (100 units/mL) in 0.45 % NaCl infusion  6.35 mL/hr,   Status:  Discontinued         10 Units/kg/hr IV Titrated 04/10/24 1302    04/09/24 2135  heparin (porcine) 5000 UNIT/ML injection 3,800 Units         60 Units/kg IV Once 04/09/24 2212    04/09/24 2135  heparin 65567 units/250 mL (100 units/mL) in 0.45 % NaCl infusion  7.62 mL/hr,   Status:  Discontinued         12 Units/kg/hr IV Titrated 04/10/24 0455    04/09/24 2048  heparin (porcine) 5000 UNIT/ML injection 5,000 Units  Status:  Discontinued         5,000 Units SC Every 12 Hours Scheduled 04/09/24 2134 04/09/24 2135  heparin (porcine) 5000 UNIT/ML injection 3,200 Units  Status:  Discontinued         50 Units/kg IV As Needed 04/12/24 1614 04/09/24 2135  heparin (porcine) 5000 UNIT/ML injection 1,600 Units  Status:  Discontinued         25 Units/kg IV As Needed 04/12/24 1614    04/09/24 2135  Pharmacy to Dose Heparin  Status:  Discontinued         -- XX Continuous PRN 04/12/24 1614                    Dispo: pending skilled nursing facility placement     Anh Valdez DO  [facility]  04/12/24  22:04 EDT

## 2024-04-14 PROCEDURE — 99232 SBSQ HOSP IP/OBS MODERATE 35: CPT | Performed by: HOSPITALIST

## 2024-04-14 PROCEDURE — 25810000003 SODIUM CHLORIDE 0.9 % SOLUTION: Performed by: INTERNAL MEDICINE

## 2024-04-14 PROCEDURE — 99232 SBSQ HOSP IP/OBS MODERATE 35: CPT | Performed by: INTERNAL MEDICINE

## 2024-04-14 PROCEDURE — 25010000002 HEPARIN (PORCINE) PER 1000 UNITS: Performed by: HOSPITALIST

## 2024-04-14 RX ORDER — HEPARIN SODIUM 5000 [USP'U]/ML
5000 INJECTION, SOLUTION INTRAVENOUS; SUBCUTANEOUS 2 TIMES DAILY
Status: DISCONTINUED | OUTPATIENT
Start: 2024-04-14 | End: 2024-04-18 | Stop reason: HOSPADM

## 2024-04-14 RX ADMIN — HEPARIN SODIUM 5000 UNITS: 5000 INJECTION INTRAVENOUS; SUBCUTANEOUS at 21:00

## 2024-04-14 RX ADMIN — NITROGLYCERIN 0.5 INCH: 20 OINTMENT TOPICAL at 05:18

## 2024-04-14 RX ADMIN — AMLODIPINE BESYLATE 2.5 MG: 5 TABLET ORAL at 08:50

## 2024-04-14 RX ADMIN — SODIUM CHLORIDE 100 ML/HR: 9 INJECTION, SOLUTION INTRAVENOUS at 17:30

## 2024-04-14 RX ADMIN — NITROGLYCERIN 0.5 INCH: 20 OINTMENT TOPICAL at 17:16

## 2024-04-14 RX ADMIN — MIRTAZAPINE 30 MG: 15 TABLET, FILM COATED ORAL at 21:00

## 2024-04-14 RX ADMIN — Medication 1 MG: at 08:49

## 2024-04-14 RX ADMIN — DICLOFENAC SODIUM 4 G: 10 GEL TOPICAL at 21:00

## 2024-04-14 RX ADMIN — TICAGRELOR 60 MG: 60 TABLET ORAL at 21:00

## 2024-04-14 RX ADMIN — OXYBUTYNIN CHLORIDE 10 MG: 5 TABLET, EXTENDED RELEASE ORAL at 08:49

## 2024-04-14 RX ADMIN — METOPROLOL TARTRATE 12.5 MG: 25 TABLET, FILM COATED ORAL at 08:49

## 2024-04-14 RX ADMIN — PANTOPRAZOLE SODIUM 40 MG: 40 TABLET, DELAYED RELEASE ORAL at 05:16

## 2024-04-14 RX ADMIN — ASPIRIN 81 MG: 81 TABLET, CHEWABLE ORAL at 08:50

## 2024-04-14 RX ADMIN — GABAPENTIN 200 MG: 100 CAPSULE ORAL at 08:49

## 2024-04-14 RX ADMIN — DOCUSATE SODIUM 50 MG AND SENNOSIDES 8.6 MG 2 TABLET: 8.6; 5 TABLET, FILM COATED ORAL at 21:00

## 2024-04-14 RX ADMIN — ACETAMINOPHEN 1000 MG: 500 TABLET ORAL at 05:16

## 2024-04-14 RX ADMIN — ROPINIROLE HYDROCHLORIDE 4 MG: 1 TABLET, FILM COATED ORAL at 21:00

## 2024-04-14 RX ADMIN — NITROGLYCERIN 0.5 INCH: 20 OINTMENT TOPICAL at 11:30

## 2024-04-14 RX ADMIN — TICAGRELOR 60 MG: 60 TABLET ORAL at 08:49

## 2024-04-14 RX ADMIN — DICLOFENAC SODIUM 4 G: 10 GEL TOPICAL at 08:50

## 2024-04-14 RX ADMIN — GABAPENTIN 200 MG: 100 CAPSULE ORAL at 21:00

## 2024-04-14 RX ADMIN — DICLOFENAC SODIUM 4 G: 10 GEL TOPICAL at 17:16

## 2024-04-14 RX ADMIN — ATORVASTATIN CALCIUM 80 MG: 40 TABLET, FILM COATED ORAL at 21:00

## 2024-04-14 RX ADMIN — LOSARTAN POTASSIUM 50 MG: 50 TABLET, FILM COATED ORAL at 08:50

## 2024-04-14 RX ADMIN — SODIUM CHLORIDE 100 ML/HR: 9 INJECTION, SOLUTION INTRAVENOUS at 17:18

## 2024-04-14 RX ADMIN — DICLOFENAC SODIUM 4 G: 10 GEL TOPICAL at 11:30

## 2024-04-14 RX ADMIN — METOPROLOL TARTRATE 12.5 MG: 25 TABLET, FILM COATED ORAL at 21:00

## 2024-04-14 RX ADMIN — Medication 10 ML: at 08:50

## 2024-04-14 NOTE — PROGRESS NOTES
.Patient Identification:  Name:  Regine Beckham  Age:  68 y.o.  Sex:  female  :  1955  MRN:  [id number]  Visit Number:  [id number]    Chief Complaint:   Follow-up    Subjective:    Patient is laying in bed.  Denies any chest pain.  Denies any breathing difficulties.  Denies any nausea vomiting.  Telemetry reviewed.  Patient is in sinus rhythm.    Admission information: Patient is a 68-year-old female past medical history of stroke in 2024.  She is underwent left ICA stent on 2024.  Patient has history of hypertension and hyperlipidemia COPD unable to use CPAP also history of restless leg syndrome, gastroesophageal reflux disease, iron deficiency anemia, anxiety, arthritis, legally blind  ----------------------------------------------------------------------------------------------------------------------  [facility] Meds:  amLODIPine, 2.5 mg, Oral, Q24H  aspirin, 81 mg, Oral, Daily  atorvastatin, 80 mg, Oral, Nightly  Diclofenac Sodium, 4 g, Topical, 4x Daily  folic acid, 1 mg, Oral, Daily  gabapentin, 200 mg, Oral, Q12H  heparin (porcine), 5,000 Units, Subcutaneous, BID  losartan, 50 mg, Oral, Daily  metoprolol tartrate, 12.5 mg, Oral, Q12H  mirtazapine, 30 mg, Oral, Nightly  nitroglycerin, 0.5 inch, Topical, Q6H  oxybutynin XL, 10 mg, Oral, Daily  pantoprazole, 40 mg, Oral, Q AM  rOPINIRole, 4 mg, Oral, Nightly  senna-docusate sodium, 2 tablet, Oral, BID  sodium chloride, 10 mL, Intravenous, Q12H  ticagrelor, 60 mg, Oral, BID      Pharmacy Consult,   sodium chloride, 100 mL/hr, Last Rate: 100 mL/hr (24 0750)      ----------------------------------------------------------------------------------------------------------------------  Vital Signs:  Temp:  [97.8 °F (36.6 °C)-98.3 °F (36.8 °C)] 98.3 °F (36.8 °C)  Heart Rate:  [60-69] 60  Resp:  [16-18] 18  BP: (127-153)/(63-76) 153/76      24  0552 24  0500 24  0500   Weight: 65.3 kg (143 lb 15.4 oz) 66.2 kg  (145 lb 15.1 oz) 65.8 kg (145 lb 1.6 oz)     Body mass index is 23.42 kg/m².    Intake/Output Summary (Last 24 hours) at 4/14/2024 1509  Last data filed at 4/14/2024 0400  Gross per 24 hour   Intake 360 ml   Output 1050 ml   Net -690 ml     Diet: Cardiac; Healthy Heart (2-3 Na+); Texture: Soft to Chew (NDD 3); Soft to Chew: Whole Meat; Fluid Consistency: Thin (IDDSI 0)  ----------------------------------------------------------------------------------------------------------------------  Physical exam:  Constitutional:    HENT:  Head:  Normocephalic and atraumatic.    Eyes:  Conjunctivae and EOM are normal.    No scleral icterus.    Neck:  Neck supple.  No JVD present.    Cardiovascular: Normal rate, regular rhythm, S1 S2+, NO S3 / S4  Pulmonary/Chest:  Vesicular breath sounds B/L  Abdominal:  Soft.  Bowel sounds are normal.  No distension and no tenderness.      Neurological:  Alert and oriented to person, place, and time. No focal deficits.  Skin:  Skin is warm and dry. No rash noted. No pallor.   Musculoskeletal:  No edema, no tenderness, and no deformity.  No red or swollen joints anywhere.   Peripheral vascular: Palpable pulses  ----------------------------------------------------------------------------------------------------------------------    ----------------------------------------------------------------------------------------------------------------------  Results from last 7 days   Lab Units 04/12/24  1339 04/12/24  1109 04/10/24  0012   HSTROP T ng/L 13 13 13     Results from last 7 days   Lab Units 04/13/24  0113 04/10/24  0012 04/09/24  2142 04/09/24 2009 04/09/24  1902   CRP mg/dL  --   --   --   --  <0.30   LACTATE mmol/L  --   --   --  0.9  --    WBC 10*3/mm3 6.24 5.58  --  5.10  --    HEMOGLOBIN g/dL 10.6* 10.1*  --  10.1*  --    HEMATOCRIT % 33.7* 32.4*  --  32.3*  --    MCV fL 97.1* 97.3*  --  96.7  --    MCHC g/dL 31.5 31.2*  --  31.3*  --    PLATELETS 10*3/mm3 224 224  --  236  --    INR   " --   --  0.94  --   --          Results from last 7 days   Lab Units 04/13/24  1151 04/13/24  0113 04/12/24  0708 04/10/24  0012 04/09/24 2009   SODIUM mmol/L  --  139 139 139 139   POTASSIUM mmol/L 3.8 3.6 3.8 3.7 4.0   CHLORIDE mmol/L  --  106 106 106 106   CO2 mmol/L  --  21.3* 23.1 25.1 25.2   BUN mg/dL  --  14 13 18 18   CREATININE mg/dL  --  0.77 0.75 0.64 0.83   CALCIUM mg/dL  --  9.1 9.8 9.3 9.0   GLUCOSE mg/dL  --  113* 109* 113* 119*   ALBUMIN g/dL  --   --   --  3.3* 3.4*   BILIRUBIN mg/dL  --   --   --  0.3 0.2   ALK PHOS U/L  --   --   --  81 90   AST (SGOT) U/L  --   --   --  31 35*   ALT (SGPT) U/L  --   --   --  27 29   Estimated Creatinine Clearance: 71.6 mL/min (by C-G formula based on SCr of 0.77 mg/dL).    No results found for: \"AMMONIA\"  Results from last 7 days   Lab Units 04/13/24  0113   CHOLESTEROL mg/dL 120   TRIGLYCERIDES mg/dL 146   HDL CHOL mg/dL 43   LDL CHOL mg/dL 52     No results found for: \"BLOODCX\"  No results found for: \"URINECX\"  No results found for: \"WOUNDCX\"  No results found for: \"STOOLCX\"  Echo:  Results for orders placed during the hospital encounter of 04/09/24    Adult Transthoracic Echo Complete w/ Color, Spectral and Contrast if necessary per protocol    Interpretation Summary    Left ventricular systolic function is hyperdynamic (EF > 70%). Calculated left ventricular EF = 79% Left ventricular ejection fraction appears to be greater than 70%.    Left ventricular diastolic function is consistent with (grade I) impaired relaxation.    @EKG: From April 13, 2024 sinus rhythm with PACs, old inferior infarction        I have personally looked at the labs and they are summarized above.  ----------------------------------------------------------------------------------------------------------------------  Imaging Results (Last 24 Hours)       ** No results found for the last 24 hours. **      "     ----------------------------------------------------------------------------------------------------------------------    Assessment:  Coronary artery disease s/p cath medical management  History of stroke s/p left ICA stent January 2024 on dual antiplatelet therapy  Hypertension  Hyperlipidemia  Sinus rhythm with PACs    Plan:  #1 Cardiac patient underwent left heart cath due to abnormal CTA coronaries.  Nonobstructive disease with normal IFR noted.  Medical management to continue  #2 stroke.  S/p stroke with left ICA stent.  On low-dose Brilinta and aspirin due to same.  Will continue  #3 arrhythmias.  Question of A-fib was raised.  Reviewed all EKGs on patient.  Patient has been in sinus rhythm with PACs only.  No A-fib noted.  No indication for long-term anticoagulation.  DVT prophylaxis to continue.        This document has been electronically signed by Susan Mederos MD [facility], Interventional Cardiology  April 14, 2024 15:09 EDT     .Electronically signed by Susan Mederos MD, 04/14/24, 3:13 PM EDT.

## 2024-04-14 NOTE — PROGRESS NOTES
[facility] HOSPITALIST PROGRESS NOTE     Patient Identification:  Name:  Regine Beckham  Age:  68 y.o.  Sex:  female  :  1955  MRN:  [id number]  Visit Number:  [id number]  Primary Care Provider:  Dread Burton MD    Length of stay:  3    Subjective: Patient seen and examined, eating breakfast, very conversant and pleasant, she has weakness on the left side from previous CVA.  Chart reviewed, cardiology considered adding anticoagulation for possible A-fib??  And placed on Holter, if no A-fib will discontinue.  Patient stated she needs skilled nursing for now until she improved with her weakness and deconditioning following stroke.  Stated she had fallen 5 times at home prior to admission.    Chief Complaint: Chest pain  ----------------------------------------------------------------------------------------------------------------------  Current Hospital Meds:  amLODIPine, 2.5 mg, Oral, Q24H  aspirin, 81 mg, Oral, Daily  atorvastatin, 80 mg, Oral, Nightly  Diclofenac Sodium, 4 g, Topical, 4x Daily  folic acid, 1 mg, Oral, Daily  gabapentin, 200 mg, Oral, Q12H  losartan, 50 mg, Oral, Daily  metoprolol tartrate, 12.5 mg, Oral, Q12H  mirtazapine, 30 mg, Oral, Nightly  nitroglycerin, 0.5 inch, Topical, Q6H  oxybutynin XL, 10 mg, Oral, Daily  pantoprazole, 40 mg, Oral, Q AM  rOPINIRole, 4 mg, Oral, Nightly  senna-docusate sodium, 2 tablet, Oral, BID  sodium chloride, 10 mL, Intravenous, Q12H  ticagrelor, 60 mg, Oral, BID      Pharmacy Consult,   sodium chloride, 100 mL/hr, Last Rate: 100 mL/hr (24 2832)      ----------------------------------------------------------------------------------------------------------------------  Vital Signs:  Temp:  [97.8 °F (36.6 °C)-98.3 °F (36.8 °C)] 98 °F (36.7 °C)  Heart Rate:  [62-75] 64  Resp:  [16-18] 16  BP: (121-143)/(58-74) 142/66       Tele: Sinus rhythm 64 bpm      24  0552 24  0500 24  0500   Weight: 65.3 kg (143 lb 15.4 oz)  66.2 kg (145 lb 15.1 oz) 65.8 kg (145 lb 1.6 oz)     Body mass index is 23.42 kg/m².    Intake/Output Summary (Last 24 hours) at 4/14/2024 1216  Last data filed at 4/14/2024 0400  Gross per 24 hour   Intake 760 ml   Output 1050 ml   Net -290 ml     Diet: Cardiac; Healthy Heart (2-3 Na+); Texture: Soft to Chew (NDD 3); Soft to Chew: Whole Meat; Fluid Consistency: Thin (IDDSI 0)  ----------------------------------------------------------------------------------------------------------------------  Physical exam:  General: Eating breakfast very pleasant, comfortable,awake, alert, oriented to self, place, and time, well-developed and well-nourished.  No respiratory distress.    Skin:  Skin is warm and dry. No rash noted. No pallor.    HENT:  Head:  Normocephalic and atraumatic.  Mouth:  Moist mucous membranes.    Eyes:  Conjunctivae and EOM are normal.  Pupils are equal, round, and reactive to light.  No scleral icterus.    Neck:  Neck supple.  No JVD present.  No bruit  Pulmonary/Chest:  No respiratory distress, no wheezes, no crackles, with normal breath sounds and good air movement.  Cardiovascular:  Normal rate, regular rhythm and normal heart sounds with no murmur.  Abdominal:  Soft.  Bowel sounds are normal.  No distension and no tenderness.   Extremities:  No edema, no tenderness, and no deformity.  No red or swollen joints anywhere.  Strong pulses in all 4 extremities with no clubbing, no cyanosis, no edema.  Neurological: 2-3/5 upper and lower extremity left side.      Genitourinary: External urinary catheter  Back:  ----------------------------------------------------------------------------------------------------------------------  ----------------------------------------------------------------------------------------------------------------------  Results from last 7 days   Lab Units 04/12/24  1339 04/12/24  1109 04/10/24  0012   HSTROP T ng/L 13 13 13     Results from last 7 days   Lab Units  "04/13/24  0113 04/10/24  0012 04/09/24  2142 04/09/24 2009 04/09/24  1902   CRP mg/dL  --   --   --   --  <0.30   LACTATE mmol/L  --   --   --  0.9  --    WBC 10*3/mm3 6.24 5.58  --  5.10  --    HEMOGLOBIN g/dL 10.6* 10.1*  --  10.1*  --    HEMATOCRIT % 33.7* 32.4*  --  32.3*  --    MCV fL 97.1* 97.3*  --  96.7  --    MCHC g/dL 31.5 31.2*  --  31.3*  --    PLATELETS 10*3/mm3 224 224  --  236  --    INR   --   --  0.94  --   --          Results from last 7 days   Lab Units 04/13/24  1151 04/13/24 0113 04/12/24  0708 04/10/24  0012 04/09/24  2009   SODIUM mmol/L  --  139 139 139 139   POTASSIUM mmol/L 3.8 3.6 3.8 3.7 4.0   CHLORIDE mmol/L  --  106 106 106 106   CO2 mmol/L  --  21.3* 23.1 25.1 25.2   BUN mg/dL  --  14 13 18 18   CREATININE mg/dL  --  0.77 0.75 0.64 0.83   CALCIUM mg/dL  --  9.1 9.8 9.3 9.0   GLUCOSE mg/dL  --  113* 109* 113* 119*   ALBUMIN g/dL  --   --   --  3.3* 3.4*   BILIRUBIN mg/dL  --   --   --  0.3 0.2   ALK PHOS U/L  --   --   --  81 90   AST (SGOT) U/L  --   --   --  31 35*   ALT (SGPT) U/L  --   --   --  27 29   Estimated Creatinine Clearance: 71.6 mL/min (by C-G formula based on SCr of 0.77 mg/dL).    No results found for: \"AMMONIA\"  Results from last 7 days   Lab Units 04/13/24  0113   CHOLESTEROL mg/dL 120   TRIGLYCERIDES mg/dL 146   HDL CHOL mg/dL 43   LDL CHOL mg/dL 52     No results found for: \"BLOODCX\"  No results found for: \"URINECX\"  No results found for: \"WOUNDCX\"  No results found for: \"STOOLCX\"    I have personally looked at the labs and they are summarized above.  ----------------------------------------------------------------------------------------------------------------------  Imaging Results (Last 24 Hours)       ** No results found for the last 24 hours. **          ----------------------------------------------------------------------------------------------------------------------  Assessment and Plan:  -Chest pain troponin negative  -Episode of probable A-fib, " unclear, cardiology recommended anticoagulation and Holter monitor, if positive for A-fib will continue anticoagulation otherwise discontinue.  -History of CVA with left-sided weakness  -Frequent falls secondary to CVA  -Tobacco use disorder patient claims she stopped smoking  -Physical deconditioning  -Essential hypertension    Will check with pharmacy regarding anticoagulation, cardiology follow-up, for placement/PT OT.    Disposition nursing for PT    Addendum:  Discussed with Dr. Mederos, all EKG reviewed, did not find obvious A-fib, recommend no anticoagulation and continue with dual antiplatelet .      Yaritza Rao MD  04/14/24  12:16 EDT

## 2024-04-14 NOTE — PLAN OF CARE
Patient resting in the bed throughout the night. No s/s of distress noted. Complaints of pain on L side effected by stroke. PRN meds given. Will continue to follow care plan.

## 2024-04-14 NOTE — PHARMACY PATIENT ASSISTANCE
Pharmacy was asked to look into coverage/cost of eliquis and xarelto.  According to her insurance, she will have a copay of $11.20 for either medications.    Thank You;  Marion Cruz, PharmD  04/14/24  14:21 EDT

## 2024-04-15 LAB
QT INTERVAL: 416 MS
QTC INTERVAL: 452 MS

## 2024-04-15 PROCEDURE — 93010 ELECTROCARDIOGRAM REPORT: CPT | Performed by: INTERNAL MEDICINE

## 2024-04-15 PROCEDURE — 97110 THERAPEUTIC EXERCISES: CPT

## 2024-04-15 PROCEDURE — 25010000002 HEPARIN (PORCINE) PER 1000 UNITS: Performed by: HOSPITALIST

## 2024-04-15 PROCEDURE — 92526 ORAL FUNCTION THERAPY: CPT

## 2024-04-15 PROCEDURE — 97116 GAIT TRAINING THERAPY: CPT

## 2024-04-15 PROCEDURE — 93005 ELECTROCARDIOGRAM TRACING: CPT | Performed by: NURSE PRACTITIONER

## 2024-04-15 PROCEDURE — 99232 SBSQ HOSP IP/OBS MODERATE 35: CPT | Performed by: STUDENT IN AN ORGANIZED HEALTH CARE EDUCATION/TRAINING PROGRAM

## 2024-04-15 PROCEDURE — 97530 THERAPEUTIC ACTIVITIES: CPT

## 2024-04-15 PROCEDURE — 92507 TX SP LANG VOICE COMM INDIV: CPT

## 2024-04-15 RX ORDER — ISOSORBIDE MONONITRATE 30 MG/1
30 TABLET, EXTENDED RELEASE ORAL
Status: DISCONTINUED | OUTPATIENT
Start: 2024-04-15 | End: 2024-04-18 | Stop reason: HOSPADM

## 2024-04-15 RX ORDER — AMLODIPINE BESYLATE 5 MG/1
5 TABLET ORAL
Status: DISCONTINUED | OUTPATIENT
Start: 2024-04-15 | End: 2024-04-18 | Stop reason: HOSPADM

## 2024-04-15 RX ADMIN — ISOSORBIDE MONONITRATE 30 MG: 30 TABLET, EXTENDED RELEASE ORAL at 11:56

## 2024-04-15 RX ADMIN — ATORVASTATIN CALCIUM 80 MG: 40 TABLET, FILM COATED ORAL at 21:09

## 2024-04-15 RX ADMIN — GABAPENTIN 200 MG: 100 CAPSULE ORAL at 09:51

## 2024-04-15 RX ADMIN — TICAGRELOR 60 MG: 60 TABLET ORAL at 21:08

## 2024-04-15 RX ADMIN — Medication 10 ML: at 21:10

## 2024-04-15 RX ADMIN — DICLOFENAC SODIUM 4 G: 10 GEL TOPICAL at 09:51

## 2024-04-15 RX ADMIN — TICAGRELOR 60 MG: 60 TABLET ORAL at 09:51

## 2024-04-15 RX ADMIN — OXYBUTYNIN CHLORIDE 10 MG: 5 TABLET, EXTENDED RELEASE ORAL at 09:51

## 2024-04-15 RX ADMIN — DICLOFENAC SODIUM 4 G: 10 GEL TOPICAL at 21:10

## 2024-04-15 RX ADMIN — GABAPENTIN 200 MG: 100 CAPSULE ORAL at 21:09

## 2024-04-15 RX ADMIN — HEPARIN SODIUM 5000 UNITS: 5000 INJECTION INTRAVENOUS; SUBCUTANEOUS at 09:51

## 2024-04-15 RX ADMIN — ASPIRIN 81 MG: 81 TABLET, CHEWABLE ORAL at 09:51

## 2024-04-15 RX ADMIN — ROPINIROLE HYDROCHLORIDE 4 MG: 1 TABLET, FILM COATED ORAL at 21:09

## 2024-04-15 RX ADMIN — Medication 10 ML: at 09:51

## 2024-04-15 RX ADMIN — NITROGLYCERIN 0.5 INCH: 20 OINTMENT TOPICAL at 06:10

## 2024-04-15 RX ADMIN — LOSARTAN POTASSIUM 50 MG: 50 TABLET, FILM COATED ORAL at 09:51

## 2024-04-15 RX ADMIN — HEPARIN SODIUM 5000 UNITS: 5000 INJECTION INTRAVENOUS; SUBCUTANEOUS at 21:10

## 2024-04-15 RX ADMIN — PANTOPRAZOLE SODIUM 40 MG: 40 TABLET, DELAYED RELEASE ORAL at 06:10

## 2024-04-15 RX ADMIN — Medication 1 MG: at 09:51

## 2024-04-15 RX ADMIN — METOPROLOL TARTRATE 12.5 MG: 25 TABLET, FILM COATED ORAL at 09:51

## 2024-04-15 RX ADMIN — MIRTAZAPINE 30 MG: 15 TABLET, FILM COATED ORAL at 21:09

## 2024-04-15 RX ADMIN — AMLODIPINE BESYLATE 5 MG: 5 TABLET ORAL at 09:51

## 2024-04-15 NOTE — THERAPY TREATMENT NOTE
Acute Care - Speech Language Pathology   Swallow Treatment Note [facility]     Patient Name: Regine Beckham  : 1955  MRN: [id number]  Today's Date: 4/15/2024  Onset of Illness/Injury or Date of Surgery: 24 (admission date)          Admit Date: 2024      DYSARTHRIA AND COGNITIVE THERAPY PLAN OF CARE:     Regine Beckham was seen this am for formal therapy tasks.      Long Term Goal:  Patient will demonstrate functional speech skills for return to discharge environment.   Patient will demonstrate functional cognitive skills for return to discharge environment.     Short Term Goals:  1. Patient will tolerate facial massage to left facial surface for 3-7 minutes to increase surface blood flow, sensation and ROM over 3 consecutive sessions.   *facial massage tolerated for 5 min w/ mild increase in surface blood flow.      2. Patient will perform OROM/GRACE exercises x3 sets x10 reps w/ min cues.  *x3 sets x5 reps     3. Patient will maintain vocal intensity at greater than 60dB across 4+ word sentences in 90% of opp over three consecutive sessions.   *vocal intensity does not increase in conversational exchanges following SLP verbal cues.      4. Patient will over-articulate 3+ syllable words and in connected speech in 90% opp to improve intelligibility over three consecutive sessions.      5. Patient will decrease rate of speech in connected speech in 90% of opp to improve intelligibility over three consecutive sessions.      6. Patient will demonstrate accurate orientation to time and location in 90% of opp independently over three consecutive sessions.  *pt oriented to time and location in 100% opp this date.      7. Patient will perform divergent naming tasks of 8+ items named in 1 min w/ min cues over three consecutive sessions.   *   8 items named in 1 minute this date.   8. Patient will perform rapid alternating speech tasks w/ min cues over three consecutive sessions.     9. Patient will  "perform sustained vowel prolongations of 5 sec duration over 15 reps.     10. Patient will perform inhalations/exhalations via resistive breather x4 sets x15 reps.   X2 sets x10 reps.     *Additional goals to be added/modified per pt progress toward goals.        Visit Dx:     ICD-10-CM ICD-9-CM   1. Chest pain, unspecified type  R07.9 786.50     Patient Active Problem List   Diagnosis    Iron deficiency anemia due to chronic blood loss    Major depressive disorder    RLS (restless legs syndrome)    GERD (gastroesophageal reflux disease)    Left breast mass    Allergy to penicillin    Stroke    Grade I diastolic dysfunction    Moderate malnutrition    Falls frequently    Stroke-like symptoms    Debility    Chest pain     Past Medical History:   Diagnosis Date    Anemia     Anxiety     Arthritis     Depression     Legally blind     Restless leg syndrome     Sleep apnea     \"I don't use a cpap anymore since losing 106 lbs\"    Water retention      Past Surgical History:   Procedure Laterality Date    BACK SURGERY      CARDIAC CATHETERIZATION N/A 1/19/2024    Procedure: Percutaneous Manual Thrombectomy;  Surgeon: Efrain Hurley MD;  Location:  [facility];  Service: Interventional Radiology;  Laterality: N/A;    CARDIAC CATHETERIZATION N/A 4/12/2024    Procedure: Left Heart Cath;  Surgeon: Susan Mederos MD;  Location:  [facility];  Service: Cardiology;  Laterality: N/A;    GALLBLADDER SURGERY      HIP ARTHROSCOPY      JOINT REPLACEMENT Right        SLP Recommendation and Plan      Continue per PAC.       Thank you-   Azalia Elizondo M.S., CCC-SLP         EDUCATION  The patient has been educated in the following areas:   Cognitive Impairment Communication Impairment.        Time Calculation:    Time Calculation- SLP       Row Name 04/15/24 1616             Time Calculation- SLP    SLP - Next Appointment 04/16/24  -                User Key  (r) = Recorded By, (t) = Taken By, (c) = " Cosigned By      Initials Name Provider Type    Azalia Bunch, MS CCC-SLP Speech and Language Pathologist                    Therapy Charges for Today       Code Description Service Date Service Provider Modifiers Qty    19389338049 HC ST TREATMENT SPEECH 2 4/15/2024 Azalia Elizondo MS CCC-SLP GN 1    47046290040 HC ST TREATMENT SWALLOW 2 4/15/2024 Azalia Elizondo, MS CCC-SLP GN 1                 Azalia MS Mikhail CCC-SLP  4/15/2024   and Acute Care - Speech Language Pathology Treatment Note  [facility]     Patient Name: Regine Beckham  : 1955  MRN: [id number]  Today's Date: 4/15/2024  Onset of Illness/Injury or Date of Surgery: 24 (admission date)            Admit Date: 2024    DYSPHAGIA THERAPY PLAN OF CARE:     Regine Beckham was seen this pm for formal therapy tasks.       Long Term Goal:  Patient will accept least restrictive diet tolerance w/o overt s/s aspiration.      Short Term Goals:  1. Patient will tolerate facial massage to LEFT facial surface for 3-7 minutes to increase surface blood flow, sensation and ROM over 3 consecutive sessions.   *facial massage tolerated for 5 min w/ mild increase in surface blood flow.      2. Patient will perform OROM/GRACE exercises x3 sets x10 reps w/ min cues.   x3 sets x5 reps this date     3. Patient will demonstrate increase labial sensation/afferent drive per pt report in 90% of opp over three consecutive sessions.          4. Patient will increase lingual control, coordination, movement as evidenced by bolus manipulation in 90% opp independently over three consecutive sessions.         5. Patient will participate in a clinical re-evaluation of swallowing fnx in 7-10 days, pending progress towards this poc.       Visit Dx:    ICD-10-CM ICD-9-CM   1. Chest pain, unspecified type  R07.9 786.50     Patient Active Problem List   Diagnosis    Iron deficiency anemia due to chronic blood loss    Major depressive disorder    RLS (restless legs syndrome)    GERD  "(gastroesophageal reflux disease)    Left breast mass    Allergy to penicillin    Stroke    Grade I diastolic dysfunction    Moderate malnutrition    Falls frequently    Stroke-like symptoms    Debility    Chest pain     Past Medical History:   Diagnosis Date    Anemia     Anxiety     Arthritis     Depression     Legally blind     Restless leg syndrome     Sleep apnea     \"I don't use a cpap anymore since losing 106 lbs\"    Water retention      Past Surgical History:   Procedure Laterality Date    BACK SURGERY      CARDIAC CATHETERIZATION N/A 1/19/2024    Procedure: Percutaneous Manual Thrombectomy;  Surgeon: Efrain Hurley MD;  Location:  [facility];  Service: Interventional Radiology;  Laterality: N/A;    CARDIAC CATHETERIZATION N/A 4/12/2024    Procedure: Left Heart Cath;  Surgeon: Susan Mederos MD;  Location:  [facility];  Service: Cardiology;  Laterality: N/A;    GALLBLADDER SURGERY      HIP ARTHROSCOPY      JOINT REPLACEMENT Right        SLP Recommendation and Plan      1. Continue per PAC.       Thank you-  Azalia Elizondo M.S., CCC-SLP       EDUCATION  The patient has been educated in the following areas:     Dysphagia (Swallowing Impairment).        Time Calculation:      Time Calculation- SLP       Row Name 04/15/24 1616             Time Calculation- SLP    SLP - Next Appointment 04/16/24  -                User Key  (r) = Recorded By, (t) = Taken By, (c) = Cosigned By      Initials Name Provider Type    Azalia Bucnh MS CCC-SLP Speech and Language Pathologist                    Therapy Charges for Today       Code Description Service Date Service Provider Modifiers Qty    68337726288 HC ST TREATMENT SPEECH 2 4/15/2024 Azalia Elizondo MS CCC-SLP GN 1    21140018229 HC ST TREATMENT SWALLOW 2 4/15/2024 Azalia Elizondo MS CCC-SLP GN 1              MS VIGNESH Worley  4/15/2024  "

## 2024-04-15 NOTE — CASE MANAGEMENT/SOCIAL WORK
Discharge Planning Assessment   [facility]     Patient Name: Regine Beckham  MRN: [id number]  Today's Date: 4/15/2024    Admit Date: 4/9/2024     Discharge Plan       Row Name 04/15/24 1542       Plan    Plan SS followed up [place] on this date who states pt's insurance is out of network and pt will have a bill. SS spoke with pt at bedside on this date who states she does not want to pay a bill. Pt is agreeable for referral to be sent to [facility]. SS contacted [facility] per [place] on this date who states referral was never received per [facility] referral was not received. SS scanned information in and emailed to admissions and notified [facility]. SS to follow.      PAULINO Lombardo

## 2024-04-15 NOTE — CASE MANAGEMENT/SOCIAL WORK
Discharge Planning Assessment  [facility]     Patient Name: Regine Beckham  MRN: [id number]  Today's Date: 4/15/2024    Admit Date: 4/9/2024     Discharge Plan       Row Name 04/15/24 1033       Plan    Plan Pt is agreeable to SNF placement at [facility] if she is not [facility]. SS faxed referral to [facility] and notified [facility].  to follow.                  Continued Care and Services - Admitted Since 4/9/2024       Destination       Service Provider Request Status Selected Services Address Phone Fax Patient Preferred    [facility] - Request Sent N/A [address] [address] [phone] --                  Selected Continued Care - Prior Encounters Includes continued care and service providers with selected services from prior encounters from 1/10/2024 to 4/15/2024      Discharged on 2/2/2024 Admission date: 1/19/2024 - Discharge disposition: Skilled Nursing Facility ([facility] - External)      Destination       Service Provider Selected Services Address Phone Fax Patient Preferred    [address] [address] [phone] --                          Expected Discharge Date and Time       Expected Discharge Date Expected Discharge Time    Apr 10, 2024         SAMARA Burnett

## 2024-04-15 NOTE — DISCHARGE PLACEMENT REQUEST
"Regine Lock \"NEGIN\" (68 y.o. Female)       Date of Birth   1955    Social Security Number       Address   [address] [place], [place] [address]    Home Phone   [phone]    MRN   [id number]       [facility]    Marital Status                               Admission Date   24    Admission Type   Urgent    Admitting Provider   Capo Su MD    Attending Provider   Narinder Olivier DO    Department, Room/Bed   [address], 3315/1S       Discharge Date       Discharge Disposition       Discharge Destination                                 Attending Provider: Narinder Olivier DO    Allergies: Penicillins    Isolation: None   Infection: None   Code Status: CPR    Ht: 167.6 cm (66\")   Wt: 66.4 kg (146 lb 6.4 oz)    Admission Cmt: None   Principal Problem: Chest pain [R07.9]                   Active Insurance as of 2024       Primary Coverage       Payor Plan Insurance Group Employer/Plan Group    AETNA MEDICARE REPLACEMENT AETNA MEDICARE REPLACEMENT [id number]       Payor Plan Address Payor Plan Phone Number Payor Plan Fax Number Effective Dates    [address] [phone]  2024 - None Entered    [place] [address]         Subscriber Name Subscriber Birth Date Member ID       REGINE LOCK 1955 [id number]                     Emergency Contacts        (Rel.) Home Phone Work Phone Mobile Phone    Yaa Elizondo (Friend) [phone] --    Karla Patel (Friend) [phone] -- --                 History & Physical        Capo Su MD at 24 2106          Hospitalist History and Physical        Patient Identification  Name: Regine Lock  Age/Sex: 68 y.o. female  :  1955        MRN: [id number]  Visit Number: [id number]  Admit Date: 2024   PCP: Dread Burton MD          Chief complaint chest pain    History of Present Illness:  Patient is a 68 y.o. female with history of " "anxiety/depression, anemia, arthritis, legally blind, restless leg syndrome, sleep apnea no longer on CPAP after losing 100+ lb, \"water retention\" but no documented history of CHF, and stroke with residual left sided weakness, at which time workup revealed right internal carotid artery stenosis s/p thrombectomy and stent placement on 1/19/24. She was recently admitted to our service from 3/17-3/27/24 for acute physical deconditioning and frequent falls as a consequence of her prior stroke. Afterwards she was transferred to inpatient rehab. This evening she complained of left sided chest pain which she described as heaviness/pressure in sensation, without radiation or associated dyspnea, nausea or diaphoresis. Pain was relieved by nitropaste. Troponin was negative, but EKG showed T wave inversions in anterolateral leads concerning for acute ischemia. She has been transferred back to the hospitalist service for continuous telemetry monitoring and further workup for cardiac ischemia. Patient is now on [facility]. She reports chest pain is improved with only minimal residual discomfort. She denies shortness of breath or increased lower extremity edema from baseline. She has no other complaints.     Review of Systems  Review of Systems   Constitutional:  Negative for activity change, appetite change, chills, diaphoresis, fatigue, fever and unexpected weight change.   HENT:  Negative for congestion, postnasal drip, rhinorrhea, sinus pressure, sinus pain and sore throat.    Eyes:  Negative for photophobia and visual disturbance.   Respiratory:  Negative for cough, shortness of breath and wheezing.    Cardiovascular:  Positive for chest pain. Negative for palpitations and leg swelling.   Gastrointestinal:  Negative for abdominal distention, abdominal pain, constipation, diarrhea, nausea and vomiting.   Genitourinary:  Negative for difficulty urinating, flank pain, frequency and hematuria.   Musculoskeletal:  Positive for " "arthralgias (intermittent left shoulder pain, chronic since stroke). Negative for back pain, joint swelling, myalgias, neck pain and neck stiffness.   Skin:  Negative for color change, pallor, rash and wound.   Neurological:  Positive for weakness (residual left sided weakness since stroke). Negative for dizziness, tremors, seizures, syncope, light-headedness, numbness and headaches.   Hematological:  Negative for adenopathy. Does not bruise/bleed easily.   Psychiatric/Behavioral:  Negative for agitation, behavioral problems and confusion.        History  Past Medical History:   Diagnosis Date    Anemia     Anxiety     Arthritis     Depression     Legally blind     Restless leg syndrome     Sleep apnea     \"I don't use a cpap anymore since losing 106 lbs\"    Water retention      Past Surgical History:   Procedure Laterality Date    BACK SURGERY      CARDIAC CATHETERIZATION N/A 1/19/2024    Procedure: Percutaneous Manual Thrombectomy;  Surgeon: Efrain Hurley MD;  Location: [facility] INVASIVE LOCATION;  Service: Interventional Radiology;  Laterality: N/A;    GALLBLADDER SURGERY      HIP ARTHROSCOPY      JOINT REPLACEMENT Right      Family History   Problem Relation Age of Onset    Breast cancer Neg Hx      Social History     Tobacco Use    Smoking status: Every Day     Current packs/day: 1.50     Average packs/day: 1.5 packs/day for 51.0 years (76.5 ttl pk-yrs)     Types: Cigarettes    Smokeless tobacco: Never   Vaping Use    Vaping status: Never Used   Substance Use Topics    Alcohol use: Yes     Alcohol/week: 1.0 standard drink of alcohol     Types: 1 Glasses of wine per week     Comment: \"occasionally - once every two months\"    Drug use: Not Currently     Comment: alcohol - occasionally     Medications Prior to Admission   Medication Sig Dispense Refill Last Dose    aspirin 81 MG EC tablet Take 1 tablet by mouth Daily.       atorvastatin (LIPITOR) 80 MG tablet Take 1 tablet by mouth Every Night. 90 tablet 0 "     gabapentin (NEURONTIN) 300 MG capsule Take 1 capsule by mouth 3 (Three) Times a Day.       losartan (COZAAR) 50 MG tablet Take 1 tablet by mouth Daily. Indications: High Blood Pressure Disorder       Mirabegron ER (MYRBETRIQ) 50 MG tablet sustained-release 24 hour 24 hr tablet Take 50 mg by mouth Daily. Indications: Urinary Urgency       mirtazapine (REMERON) 30 MG tablet Take 1 tablet by mouth Every Night. Indications: Major Depressive Disorder 30 tablet 0     pantoprazole (PROTONIX) 40 MG EC tablet TAKE 1 TABLET BY MOUTH EVERY MORNING FOR HEARTBURN 90 tablet 0     rOPINIRole (REQUIP) 4 MG tablet Take 1 tablet by mouth Every Night. Take 1 hour before bedtime.  Indications: Restless Leg Syndrome 30 tablet 0     ticagrelor (BRILINTA) 60 MG tablet tablet Take 1 tablet by mouth 2 (Two) Times a Day. 60 tablet 0     zolpidem (AMBIEN) 5 MG tablet Take 1 tablet by mouth At Night As Needed for Sleep. Indications: Trouble Sleeping 5 tablet 0      Allergies:  Penicillins    Objective    Vital Signs  Temp:  [98 °F (36.7 °C)-98.3 °F (36.8 °C)] 98 °F (36.7 °C)  Heart Rate:  [61-88] 69  Resp:  [16-18] 18  BP: (123-162)/(59-83) 123/64  There is no height or weight on file to calculate BMI.    Physical Exam:  Physical Exam  Constitutional:       General: She is not in acute distress.     Appearance: Normal appearance. She is not ill-appearing.   HENT:      Head: Normocephalic and atraumatic.      Right Ear: External ear normal.      Left Ear: External ear normal.      Nose: Nose normal.      Mouth/Throat:      Mouth: Mucous membranes are moist.      Pharynx: Oropharynx is clear.   Eyes:      Extraocular Movements: Extraocular movements intact.      Conjunctiva/sclera: Conjunctivae normal.      Pupils: Pupils are equal, round, and reactive to light.   Cardiovascular:      Rate and Rhythm: Normal rate and regular rhythm.      Pulses: Normal pulses.      Heart sounds: Normal heart sounds.   Pulmonary:      Effort: Pulmonary effort  "is normal. No respiratory distress.      Breath sounds: Normal breath sounds. No wheezing or rales.   Chest:      Chest wall: No tenderness.   Abdominal:      General: Abdomen is flat. Bowel sounds are normal. There is no distension.      Palpations: Abdomen is soft.      Tenderness: There is no abdominal tenderness.   Musculoskeletal:         General: Normal range of motion.      Cervical back: Normal range of motion and neck supple. No tenderness.      Right lower leg: Edema (trace) present.      Left lower leg: Edema (trace) present.   Lymphadenopathy:      Cervical: No cervical adenopathy.   Skin:     General: Skin is warm and dry.      Capillary Refill: Capillary refill takes less than 2 seconds.      Coloration: Skin is not jaundiced.      Findings: No bruising or lesion.   Neurological:      General: No focal deficit present.      Mental Status: She is alert and oriented to person, place, and time.   Psychiatric:         Mood and Affect: Mood normal.         Behavior: Behavior normal.         Thought Content: Thought content normal.           Results Review:       Lab Results:  Results from last 7 days   Lab Units 04/09/24 2009 04/05/24  0020 04/04/24  0006   WBC 10*3/mm3 5.10 6.20 6.70   HEMOGLOBIN g/dL 10.1* 10.1* 10.3*   PLATELETS 10*3/mm3 236 245 234     Results from last 7 days   Lab Units 04/09/24  1902   CRP mg/dL <0.30     Results from last 7 days   Lab Units 04/09/24 2009 04/05/24  0020 04/04/24  1108 04/04/24  0006   SODIUM mmol/L 139 139  --  139   POTASSIUM mmol/L 4.0 4.0 4.0 3.4*   CHLORIDE mmol/L 106 105  --  106   CO2 mmol/L 25.2 25.9  --  22.5   BUN mg/dL 18 15  --  17   CREATININE mg/dL 0.83 0.91  --  0.75   CALCIUM mg/dL 9.0 9.4  --  9.0   GLUCOSE mg/dL 119* 110*  --  109*         No results found for: \"HGBA1C\"  Results from last 7 days   Lab Units 04/09/24 2009   BILIRUBIN mg/dL 0.2   ALK PHOS U/L 90   AST (SGOT) U/L 35*   ALT (SGPT) U/L 29     Results from last 7 days   Lab Units " 04/09/24  1902   HSTROP T ng/L 12                   I have reviewed the patient's laboratory results.    Imaging:  Imaging Results (Last 72 Hours)       ** No results found for the last 72 hours. **            I have personally reviewed the patient's radiologic imaging.        EKG:   Sinus rhythm with marked sinus arrhythmia, HR 63, QTc 499  T wave abnormality, consider lateral ischemia  Prolonged QT  Abnormal ECG  When compared with ECG of 21-MAR-2024 01:00,  premature atrial complexes are no longer present  Left posterior fascicular block is no longer present  Borderline criteria for Inferior infarct are no longer present  T wave inversion no longer evident in Inferior leads  T wave inversion now evident in Anterolateral leads    I have personally reviewed the patient's EKG. New T wave inversions in leads V4-V5 and AVL. EKG reviewed by on-call interventional cardiologist (sent by house supervisor) who reported no evidence of STEMI.         Assessment & Plan    - Chest pain with typical features (left sided chest heaviness relieved by nitroglycerin) and cardiac risk factors including vascular disease with recent CVA and right carotid artery stenosis, along with EKG changes concerning for anterolateral ischemia. Basic labs, inflammatory markers and CXR obtained to rule out other causes of chest pain such as pneumonia. CRP, procal, lactate and WBC all normal and CXR showed no acute abnormality.  Transferred from inpatient rehab to [facility] for observation with continuous telemetry monitoring and further cardiac ischemia workup. Continue home ASA, brilinta and statin. Repeat troponin and EKG now. Keep NPO after midnight except sips with meds. Check hemoglobin A1c and fasting lipid panel to assess for additional cardiovascular risk factors. Update ECHO in the morning and consult cardiology for guidance regarding further workup, since patient already had a stress test recently on 12/23/23 that was interpreted as low risk.    - Deconditioning secondary to recent stroke: continue PT/OT/SLP that patient was receiving in inpatient rehab.     DVT/GI Prophylaxis: SQ heparin; PO protonix    Estimated Length of Stay <2 midnights    I discussed the patient's findings, assessment and plan with the patient and inpatient rehab physician Dr Hayes.    Capo Su MD  04/09/24  21:06 EDT      Electronically signed by Capo Su MD at 04/09/24 2125       Vital Signs (last day)       Date/Time Temp Temp src Pulse Resp BP Patient Position SpO2    04/15/24 0659 98 (36.7) Oral 73 18 144/84 Lying 97    04/15/24 0451 98.3 (36.8) Oral 64 18 153/80 Lying 97    04/14/24 2258 98.2 (36.8) Oral 62 18 151/62 Lying 97    04/14/24 1907 98.1 (36.7) Oral 71 18 121/64 Lying 97    04/14/24 1600 98.4 (36.9) Oral 63 18 165/80 Lying 97    04/14/24 1221 98.3 (36.8) Oral 60 18 153/76 Lying 97    04/14/24 0648 98 (36.7) Oral 64 16 142/66 Lying 99    04/14/24 0351 97.8 (36.6) Oral 63 18 135/74 Lying 92          Intake & Output (last day)         04/14 0701  04/15 0700 04/15 0701  04/16 0700    P.O. 480 0    Total Intake(mL/kg) 480 (7.6) 0 (0)    Urine (mL/kg/hr) 2350 (1.5) 600 (2.7)    Stool 0 0    Total Output 2350 600    Net -1870 -600          Urine Unmeasured Occurrence 2 x     Stool Unmeasured Occurrence 1 x 1 x          Lines, Drains & Airways       Active LDAs       Name Placement date Placement time Site Days    Peripheral IV 04/12/24 0200 Left;Posterior Forearm 04/12/24  0200  Forearm  3    External Urinary Catheter 04/09/24  --  --  6                  Current Facility-Administered Medications   Medication Dose Route Frequency Provider Last Rate Last Admin    acetaminophen (TYLENOL) tablet 1,000 mg  1,000 mg Oral Q6H PRN Susan Mederos MD   1,000 mg at 04/14/24 0516    acetaminophen (TYLENOL) tablet 650 mg  650 mg Oral Q4H PRN Susan Mederos MD        amLODIPine (NORVASC) tablet 5 mg  5 mg Oral Q24H Thalia Kang APRN   5 mg at 04/15/24 0951     aspirin chewable tablet 81 mg  81 mg Oral Daily Susan Mederos MD   81 mg at 04/15/24 0951    atorvastatin (LIPITOR) tablet 80 mg  80 mg Oral Nightly Susan Mederos MD   80 mg at 04/14/24 2100    sennosides-docusate (PERICOLACE) 8.6-50 MG per tablet 2 tablet  2 tablet Oral BID Susan Mederos MD   2 tablet at 04/14/24 2100    And    polyethylene glycol (MIRALAX) packet 17 g  17 g Oral Daily PRN Susan Mederos MD        And    bisacodyl (DULCOLAX) EC tablet 5 mg  5 mg Oral Daily PRN Susan Mederos MD        And    bisacodyl (DULCOLAX) suppository 10 mg  10 mg Rectal Daily PRN Susan Mederos MD        calcium carbonate (TUMS) chewable tablet 500 mg (200 mg elemental)  3 tablet Oral TID PRN Susan Mederos MD        Calcium Replacement - Follow Nurse / BPA Driven Protocol   Does not apply PRN Susan Mederos MD        Diclofenac Sodium (VOLTAREN) 1 % gel 4 g  4 g Topical 4x Daily Susan Mederos MD   4 g at 04/15/24 0951    folic acid (FOLVITE) tablet 1 mg  1 mg Oral Daily Susan Mederos MD   1 mg at 04/15/24 0951    gabapentin (NEURONTIN) capsule 200 mg  200 mg Oral Q12H Susan Mederos MD   200 mg at 04/15/24 0951    heparin (porcine) 5000 UNIT/ML injection 5,000 Units  5,000 Units Subcutaneous BID Yaritza Rao MD   5,000 Units at 04/15/24 0951    isosorbide mononitrate (IMDUR) 24 hr tablet 30 mg  30 mg Oral Q24H Thalia Kang APRN        losartan (COZAAR) tablet 50 mg  50 mg Oral Daily Susan Mederos MD   50 mg at 04/15/24 0951    Magnesium Standard Dose Replacement - Follow Nurse / BPA Driven Protocol   Does not apply PRN Susan Mederos MD        metoprolol tartrate (LOPRESSOR) tablet 12.5 mg  12.5 mg Oral Q12H Susan Mederos MD   12.5 mg at 04/15/24 0951    mirtazapine (REMERON) tablet 30 mg  30 mg Oral Nightly Susan Mederos MD   30 mg at 04/14/24 2100    nitroglycerin (NITROSTAT) SL tablet 0.4 mg  0.4 mg Sublingual Q5 Min PRN Susan Mederos MD        oxybutynin XL (DITROPAN-XL) 24 hr tablet 10 mg  10 mg  Oral Daily Susan Mederos MD   10 mg at 04/15/24 0951    pantoprazole (PROTONIX) EC tablet 40 mg  40 mg Oral Q AM Susan Mederos MD   40 mg at 04/15/24 0610    Pharmacy Consult   Does not apply Continuous PRN Anh Valdez DO        Phosphorus Replacement - Follow Nurse / BPA Driven Protocol   Does not apply PRN Susan Mederos MD        Potassium Replacement - Follow Nurse / BPA Driven Protocol   Does not apply PRN Susan Mederos MD        rOPINIRole (REQUIP) tablet 4 mg  4 mg Oral Nightly Susan Mederos MD   4 mg at 24 2100    sodium chloride 0.9 % flush 10 mL  10 mL Intravenous Q12H Susan Mederos MD   10 mL at 04/15/24 0951    sodium chloride 0.9 % flush 10 mL  10 mL Intravenous PRN Susan Mederos MD        sodium chloride 0.9 % infusion 40 mL  40 mL Intravenous PRN Susan Mederos MD        sodium chloride 0.9 % infusion  100 mL/hr Intravenous Continuous Susan Mederos  mL/hr at 24 1730 100 mL/hr at 24 1730    ticagrelor (BRILINTA) tablet 60 mg  60 mg Oral BID Susan Mederos MD   60 mg at 04/15/24 0951     Operative/Procedure Notes (most recent note)    No notes of this type exist for this encounter.          Physician Progress Notes (most recent note)        Thalia Kang APRN at 04/15/24 0844       Attestation signed by Fozia Garcia MD at 04/15/24 1008    I have reviewed this documentation and agree.                     LOS: 4 days     Name: Regine Beckham  Age/Sex: 68 y.o. female  :  1955        PCP: Dread Burton MD    Principal Problem:    Chest pain      Admission Information: Regine Beckham is a 68 y.o. female with CVA (2024), peripheral vascular disease, status post left ICA stent placement on 2024, hypertension, dyslipidemia, obstructive sleep apnea noncompliant with CPAP, restless leg syndrome, GERD, iron deficiency anemia, anxiety/depression, legally blind, and arthritis.     Chief Complaint: Chest pain    Interval history: No interval  events    Subjective     Ms. Beckham is awake in bed at the time of my exam today.  She denies chest pain, shortness of breath, palpitations, or other worrisome symptom.    Vital Signs  Vital Signs (last 72 hrs)         04/12 0700  04/13 0659 04/13 0700  04/14 0659 04/14 0700  04/15 0659 04/15 0700  04/15 0845   Most Recent      Temp (°F) 98 -  98.5    97.8 -  98.3    98 -  98.4       98 (36.7) 04/15 0659    Heart Rate 50 -  75    62 -  75    60 -  73       73 04/15 0659    Resp 16 -  18    16 -  18      18       18 04/15 0659    /54 -  178/82    121/60 -  158/71    121/64 -  165/80       144/84 04/15 0659    SpO2 (%) 94 -  98    90 -  99      97       97 04/15 0659    Flow (L/min)   2           2 04/12 1410          Temp:  [98 °F (36.7 °C)-98.4 °F (36.9 °C)] 98 °F (36.7 °C)  Heart Rate:  [60-73] 73  Resp:  [18] 18  BP: (121-165)/(62-84) 144/84  Body mass index is 23.63 kg/m².      Intake/Output Summary (Last 24 hours) at 4/15/2024 0845  Last data filed at 4/15/2024 0700  Gross per 24 hour   Intake 480 ml   Output 2350 ml   Net -1870 ml       Vitals and nursing note reviewed.   Constitutional:       Appearance: Not in distress. Chronically ill-appearing.   Eyes:      Conjunctiva/sclera: Conjunctivae normal.   Pulmonary:      Effort: Pulmonary effort is normal.      Breath sounds: Normal breath sounds.   Cardiovascular:      Normal rate. Regular rhythm.      Murmurs: There is no murmur.   Skin:     General: Skin is warm and dry.   Neurological:      Mental Status: Alert.         Telemetry: Sinus rhythm with PACs     Results Review:     Results from last 7 days   Lab Units 04/13/24  0113 04/10/24  0012 04/09/24 2009   WBC 10*3/mm3 6.24 5.58 5.10   HEMOGLOBIN g/dL 10.6* 10.1* 10.1*   PLATELETS 10*3/mm3 224 224 236     Results from last 7 days   Lab Units 04/13/24  1151 04/13/24  0113 04/12/24  0708 04/10/24  0012 04/09/24 2009   SODIUM mmol/L  --  139 139 139 139   POTASSIUM mmol/L 3.8 3.6 3.8 3.7 4.0   CHLORIDE  mmol/L  --  106 106 106 106   CO2 mmol/L  --  21.3* 23.1 25.1 25.2   BUN mg/dL  --  14 13 18 18   CREATININE mg/dL  --  0.77 0.75 0.64 0.83   CALCIUM mg/dL  --  9.1 9.8 9.3 9.0   GLUCOSE mg/dL  --  113* 109* 113* 119*     Results from last 7 days   Lab Units 04/12/24  1339 04/12/24  1109 04/10/24  0012 04/09/24  2117 04/09/24  1902   HSTROP T ng/L 13 13 13 13 12       Results from last 7 days   Lab Units 04/09/24  2142   INR  0.94       I reviewed the patient's new clinical results.  I reviewed the patient's new imaging results and agree with the interpretation.  I personally viewed and interpreted the patient's EKG/Telemetry data      Medication Review:   amLODIPine, 2.5 mg, Oral, Q24H  aspirin, 81 mg, Oral, Daily  atorvastatin, 80 mg, Oral, Nightly  Diclofenac Sodium, 4 g, Topical, 4x Daily  folic acid, 1 mg, Oral, Daily  gabapentin, 200 mg, Oral, Q12H  heparin (porcine), 5,000 Units, Subcutaneous, BID  isosorbide mononitrate, 30 mg, Oral, Q24H  losartan, 50 mg, Oral, Daily  metoprolol tartrate, 12.5 mg, Oral, Q12H  mirtazapine, 30 mg, Oral, Nightly  oxybutynin XL, 10 mg, Oral, Daily  pantoprazole, 40 mg, Oral, Q AM  rOPINIRole, 4 mg, Oral, Nightly  senna-docusate sodium, 2 tablet, Oral, BID  sodium chloride, 10 mL, Intravenous, Q12H  ticagrelor, 60 mg, Oral, BID      Pharmacy Consult,   sodium chloride, 100 mL/hr, Last Rate: 100 mL/hr (04/14/24 1730)        Assessment:  Coronary artery disease s/p cath medical management  History of stroke s/p left ICA stent January 2024 on dual antiplatelet therapy  Hypertension  Hyperlipidemia  Sinus rhythm with PACs      Recommendations:  Continue with aspirin, high intensity atorvastatin, Brilinta, metoprolol, and losartan.  Transition from scheduled Nitropaste to long-acting nitrate.  Managed by hospitalist/neurology  Not at goal.  Increased amlodipine dose today  LDL at goal on current dose of atorvastatin  Does not sense palpitations.  Continue current dose of  beta-blocker.    I discussed the patients findings and my recommendations with patient.  She is stable from a cardiac standpoint.      Further decision making based on Dr. Garcia's assessment and recommendations.        Electronically signed by NIKKIE Banda, 04/15/24, 8:53 AM EDT.                Electronically signed by Fozia Garcia MD at 04/15/24 1008          Consult Notes (most recent note)        Susan Mederos MD at 04/10/24 1134        Consult Orders    1. Inpatient Cardiology Consult [[id number]] ordered by Capo Su MD                 Inpatient Cardiology Consult  Consult performed by: Omayra Butts APRN  Consult ordered by: Capo Su MD        Date of Admit: 4/9/2024  Date of Consult: 04/10/24  Provider, No Known  Regine Beckham  1955    Consulting Physician: Susan Mederos MD    Cardiology consultation    Reason for consultation:  Chest pain, EKG changes with T wave inversions in the anterior lateral leads      History of Present Illness    Subjective     No chief complaint on file.      Regine Beckham is a 68 y.o. female with history of CVA (January 2024), peripheral vascular disease, status post left ICA stent placement on 1/19/2024, hypertension, dyslipidemia, obstructive sleep apnea noncompliant with CPAP, restless leg syndrome, GERD, iron deficiency anemia, anxiety/depression, legally blind, and arthritis.  Patient was admitted to the hospital 3/17 through 3/27/2024 for acute physical deconditioning and frequent falls as a consequence of prior stroke from January 2024.  After her inpatient hospitalization she was transferred to inpatient rehab.  On 4/9/2024 she reported left-sided chest pain.  She denied any associated dyspnea, diaphoresis or nausea.  She reported chronic left arm and shoulder pain since her CVA and could not describe whether this was radiation or just the chronicity of the pain.  High-sensitivity troponin was negative x 3.  However,  "EKG did show T wave inversions in the anterior lateral leads.  Patient was then transferred from inpatient rehabilitation to the telemetry unit for monitoring and workup.    Ms. Beckham was seen and examined.  She currently denies any chest pain.  She denies shortness of breath.  She states that the pain she was having yesterday was extending from her left chest to her left shoulder and arm.  It has since resolved.    Cardiac risk factors:hypercholesterolemia, hypertension, peripheral vascular disease, and former tobacco use    Last Echo: 12/22/2023      Interpretation Summary         Left ventricular systolic function is normal. Left ventricular ejection fraction appears to be 56 - 60%.    Left ventricular wall thickness is consistent with moderate concentric hypertrophy.    Left ventricular diastolic function is consistent with (grade I) impaired relaxation.    Estimated right ventricular systolic pressure from tricuspid regurgitation is normal (<35 mmHg).    Last Stress: 12/23/2023    Interpretation Summary         Left ventricular ejection fraction is normal (Calculated EF = 67%).    Myocardial perfusion imaging indicates a normal myocardial perfusion study with no evidence of ischemia.    Impressions are consistent with a low risk study.    TID 1.07.    Findings consistent with a normal ECG stress test.    Past Medical History:   Diagnosis Date    Anemia     Anxiety     Arthritis     Depression     Legally blind     Restless leg syndrome     Sleep apnea     \"I don't use a cpap anymore since losing 106 lbs\"    Water retention      Past Surgical History:   Procedure Laterality Date    BACK SURGERY      CARDIAC CATHETERIZATION N/A 1/19/2024    Procedure: Percutaneous Manual Thrombectomy;  Surgeon: Efrain Hurley MD;  Location: [facility] CATH INVASIVE LOCATION;  Service: Interventional Radiology;  Laterality: N/A;    GALLBLADDER SURGERY      HIP ARTHROSCOPY      JOINT REPLACEMENT Right      Family History   Problem " "Relation Age of Onset    Breast cancer Neg Hx      Social History     Tobacco Use    Smoking status: Former     Current packs/day: 1.50     Average packs/day: 1.5 packs/day for 51.0 years (76.5 ttl pk-yrs)     Types: Cigarettes     Passive exposure: Past    Smokeless tobacco: Never   Vaping Use    Vaping status: Never Used   Substance Use Topics    Alcohol use: Not Currently     Alcohol/week: 1.0 standard drink of alcohol     Types: 1 Glasses of wine per week     Comment: \"occasionally - once every two months\"    Drug use: Not Currently     Comment: alcohol - occasionally     Medications Prior to Admission   Medication Sig Dispense Refill Last Dose    aspirin 81 MG EC tablet Take 1 tablet by mouth Daily.   Unknown    atorvastatin (LIPITOR) 80 MG tablet Take 1 tablet by mouth Every Night. 90 tablet 0 Unknown    gabapentin (NEURONTIN) 300 MG capsule Take 1 capsule by mouth 3 (Three) Times a Day.   Unknown    losartan (COZAAR) 50 MG tablet Take 1 tablet by mouth Daily. Indications: High Blood Pressure Disorder   Unknown    Mirabegron ER (MYRBETRIQ) 50 MG tablet sustained-release 24 hour 24 hr tablet Take 50 mg by mouth Daily. Indications: Urinary Urgency   Unknown    mirtazapine (REMERON) 30 MG tablet Take 1 tablet by mouth Every Night. Indications: Major Depressive Disorder 30 tablet 0 Unknown    pantoprazole (PROTONIX) 40 MG EC tablet TAKE 1 TABLET BY MOUTH EVERY MORNING FOR HEARTBURN 90 tablet 0 Unknown    rOPINIRole (REQUIP) 4 MG tablet Take 1 tablet by mouth Every Night. Take 1 hour before bedtime.  Indications: Restless Leg Syndrome 30 tablet 0 Unknown    ticagrelor (BRILINTA) 60 MG tablet tablet Take 1 tablet by mouth 2 (Two) Times a Day. 60 tablet 0 Unknown    zolpidem (AMBIEN) 5 MG tablet Take 1 tablet by mouth At Night As Needed for Sleep. Indications: Trouble Sleeping 5 tablet 0 Unknown     Allergies:  Penicillins    Review of Systems   Constitutional:  Negative for fatigue.   Respiratory:  Negative for " shortness of breath.    Cardiovascular:  Positive for chest pain. Negative for palpitations and leg swelling.   Gastrointestinal:  Negative for blood in stool.   Neurological:  Negative for dizziness, syncope, weakness and light-headedness.   Hematological:  Does not bruise/bleed easily.   All other systems reviewed and are negative.        Objective      Vital Signs  Temp:  [97.6 °F (36.4 °C)-98.3 °F (36.8 °C)] 98.2 °F (36.8 °C)  Heart Rate:  [61-87] 76  Resp:  [18-20] 20  BP: (123-159)/(59-88) 145/88  Body mass index is 25.23 kg/m².    Intake/Output Summary (Last 24 hours) at 4/10/2024 1135  Last data filed at 4/10/2024 0900  Gross per 24 hour   Intake 0 ml   Output 450 ml   Net -450 ml       Physical Exam  Constitutional:       Appearance: Normal appearance. She is well-developed.   Cardiovascular:      Rate and Rhythm: Normal rate and regular rhythm.      Heart sounds: No murmur heard.     No friction rub. No gallop.   Pulmonary:      Effort: Pulmonary effort is normal. No respiratory distress.      Breath sounds: Normal breath sounds. No wheezing or rales.   Skin:     General: Skin is warm and dry.   Neurological:      Mental Status: She is alert. Mental status is at baseline.   Psychiatric:         Mood and Affect: Mood normal.         Behavior: Behavior normal.         Telemetry: Sinus, sinus arrhythmia 60s to 70s    Results Review:   I reviewed the patient's new clinical results.  Results from last 7 days   Lab Units 04/10/24  0012 04/09/24 2117 04/09/24  1902   HSTROP T ng/L 13 13 12     Results from last 7 days   Lab Units 04/10/24  0012 04/09/24 2009 04/05/24  0020 04/04/24  0006   WBC 10*3/mm3 5.58 5.10 6.20 6.70   HEMOGLOBIN g/dL 10.1* 10.1* 10.1* 10.3*   PLATELETS 10*3/mm3 224 236 245 234     Results from last 7 days   Lab Units 04/10/24  0012 04/09/24 2009 04/05/24  0020 04/04/24  1108 04/04/24  0006   SODIUM mmol/L 139 139 139  --  139   POTASSIUM mmol/L 3.7 4.0 4.0 4.0 3.4*   CHLORIDE mmol/L 106  "106 105  --  106   CO2 mmol/L 25.1 25.2 25.9  --  22.5   BUN mg/dL 18 18 15  --  17   CREATININE mg/dL 0.64 0.83 0.91  --  0.75   CALCIUM mg/dL 9.3 9.0 9.4  --  9.0   GLUCOSE mg/dL 113* 119* 110*  --  109*   ALT (SGPT) U/L 27 29  --   --   --    AST (SGOT) U/L 31 35*  --   --   --      Lab Results   Component Value Date    INR 0.94 04/09/2024     Lab Results   Component Value Date    MG 1.8 03/30/2024    MG 1.9 03/23/2024    MG 1.9 03/19/2024     Lab Results   Component Value Date    TSH 2.880 03/17/2024    TRIG 59 04/10/2024    HDL 47 04/10/2024    LDL 58 04/10/2024      No results found for: \"BNP\"     EKG:         Imaging Results (Last 72 Hours)       ** No results found for the last 72 hours. **             Assessment:  Chest pain, likely noncardiac in the setting of negative troponins x 3.  Patient has negative stress test from December 2023.  History of CVA, status post left ICA stent placement and thrombectomy, January 2024  Hypertension  Dyslipidemia    Recommendations:  Recommend proceeding with a CT angiogram of the heart.  Continue aspirin, atorvastatin.  Metoprolol tartrate added    Thank you very much for asking us to be involved in this patient's care.  We will follow along with you.    Electronically signed by NIKKIE Melgar, 04/10/24, 6:19 PM EDT.    Patient seen and examined independently of nurse practitioner.  Chart reviewed.  Labs and EKG reviewed.  Agree with the charting, assessment and plan as described above.  Patient 68-year-old female past medical history significant for s/p stroke history of hypertension hyperlipidemia who came in with angina symptoms.  Negative cardiac markers.  Would recommend to get a CTA coronaries done.`    .Electronically signed by Susan Mederos MD, 04/11/24, 6:24 PM EDT.          Electronically signed by Susan Mederos MD at 04/11/24 1824       Nutrition Notes (most recent note)    No notes exist for this encounter.          Physical Therapy Notes (most recent " "note)        Desi Fraga PTA at 24 1127  Version 1 of 1            24 1126   OTHER   Discipline physical therapy assistant   Rehab Time/Intention   Session Not Performed patient/family declined treatment;other (see comments)  (Pt reports she is going for heart cath and wanted to rest before procedure.)         Electronically signed by Desi Fraga PTA at 24 1127          Occupational Therapy Notes (most recent note)        Kyleigh Tsang, OT at 24 1016          Acute Care - Occupational Therapy Treatment Note   [facility]     Patient Name: Regine Beckham  : 1955  MRN: [id number]  Today's Date: 2024  Onset of Illness/Injury or Date of Surgery: 24 (admission date)          Admit Date: 2024       ICD-10-CM ICD-9-CM   1. Chest pain, unspecified type  R07.9 786.50     Patient Active Problem List   Diagnosis    Iron deficiency anemia due to chronic blood loss    Major depressive disorder    RLS (restless legs syndrome)    GERD (gastroesophageal reflux disease)    Left breast mass    Allergy to penicillin    Stroke    Grade I diastolic dysfunction    Moderate malnutrition    Falls frequently    Stroke-like symptoms    Debility    Chest pain     Past Medical History:   Diagnosis Date    Anemia     Anxiety     Arthritis     Depression     Legally blind     Restless leg syndrome     Sleep apnea     \"I don't use a cpap anymore since losing 106 lbs\"    Water retention      Past Surgical History:   Procedure Laterality Date    BACK SURGERY      CARDIAC CATHETERIZATION N/A 2024    Procedure: Percutaneous Manual Thrombectomy;  Surgeon: Efrain Hurley MD;  Location:  [facility] INVASIVE LOCATION;  Service: Interventional Radiology;  Laterality: N/A;    GALLBLADDER SURGERY      HIP ARTHROSCOPY      JOINT REPLACEMENT Right          OT ASSESSMENT FLOWSHEET (Last 12 Hours)       OT Evaluation and Treatment       Row Name 24 1011                   OT " Time and Intention    Subjective Information complains of;weakness;fatigue  -LM        Document Type therapy note (daily note)  -LM        Mode of Treatment occupational therapy  -LM        Patient Effort adequate  -LM        Comment Patient seen this date for adl retraining/education and neuro re-ed.  Patient currently requires max/mod assist with bathing, dressing, toileting tasks.  Patient demonstrates 1/4 arom LUE.  Utilizes resting hand splint.  -LM           General Information    Existing Precautions/Restrictions fall  -LM        Limitations/Impairments safety/cognitive  -LM           Positioning and Restraints    Post Treatment Position bed  -LM        In Bed call light within reach;encouraged to call for assist;exit alarm on  -LM                  User Key  (r) = Recorded By, (t) = Taken By, (c) = Cosigned By      Initials Name Effective Dates    LM Kyleigh Tsang OT 06/16/21 -                      Occupational Therapy Education       Title: PT OT SLP Therapies (Resolved)       Topic: Occupational Therapy (Resolved)       Point: ADL training (Resolved)       Description:   Instruct learner(s) on proper safety adaptation and remediation techniques during self care or transfers.   Instruct in proper use of assistive devices.                  Learner Progress:  Not documented in this visit.              Point: Precautions (Resolved)       Description:   Instruct learner(s) on prescribed precautions during self-care and functional transfers.                  Learner Progress:  Not documented in this visit.                                      OT Recommendation and Plan              Time Calculation:     Therapy Charges for Today       Code Description Service Date Service Provider Modifiers Qty    95369110707 HC OT SELF CARE/MGMT/TRAIN EA 15 MIN 4/12/2024 Kyleigh Tsang OT GO 1                 Kyleigh Tsang OT  4/12/2024    Electronically signed by Kyleigh Tsang OT at 04/12/24 1023          Speech Language  Pathology Notes (most recent note)        Azalia Elizondo MS CCC-SLP at 24 1225          Acute Care - Speech Language Pathology   Swallow Treatment Note  [facility]     Patient Name: Regine Beckham  : 1955  MRN: [id number]  Today's Date: 2024  Onset of Illness/Injury or Date of Surgery: 24 (admission date)          Admit Date: 2024      DYSARTHRIA AND COGNITIVE THERAPY PLAN OF CARE:     Regine Beckham was seen this am for formal therapy tasks.      Long Term Goal:  Patient will demonstrate functional speech skills for return to discharge environment.   Patient will demonstrate functional cognitive skills for return to discharge environment.     Short Term Goals:  1. Patient will tolerate facial massage to left facial surface for 3-7 minutes to increase surface blood flow, sensation and ROM over 3 consecutive sessions.   *facial massage tolerated for 5 min w/ mild increase in surface blood flow.      2. Patient will perform OROM/GRACE exercises x3 sets x10 reps w/ min cues.  *x2 sets x5 reps     3. Patient will maintain vocal intensity at greater than 60dB across 4+ word sentences in 90% of opp over three consecutive sessions.   *vocal intensity increased in conversational exchanges following SLP verbal cues.      4. Patient will over-articulate 3+ syllable words and in connected speech in 90% opp to improve intelligibility over three consecutive sessions.      5. Patient will decrease rate of speech in connected speech in 90% of opp to improve intelligibility over three consecutive sessions.      6. Patient will demonstrate accurate orientation to time and location in 90% of opp independently over three consecutive sessions.  *pt oriented to time and location in 100% opp this date.      7. Patient will perform divergent naming tasks of 8+ items named in 1 min w/ min cues over three consecutive sessions.      8. Patient will perform rapid alternating speech tasks w/ min cues over three  "consecutive sessions.     9. Patient will perform sustained vowel prolongations of 5 sec duration over 15 reps.   4 of 5 opp over 5 reps     10. Patient will perform inhalations/exhalations via resistive breather x4 sets x15 reps.   X2 sets x10 reps.     *Additional goals to be added/modified per pt progress toward goals.        Visit Dx:     ICD-10-CM ICD-9-CM   1. Chest pain, unspecified type  R07.9 786.50     Patient Active Problem List   Diagnosis    Iron deficiency anemia due to chronic blood loss    Major depressive disorder    RLS (restless legs syndrome)    GERD (gastroesophageal reflux disease)    Left breast mass    Allergy to penicillin    Stroke    Grade I diastolic dysfunction    Moderate malnutrition    Falls frequently    Stroke-like symptoms    Debility    Chest pain     Past Medical History:   Diagnosis Date    Anemia     Anxiety     Arthritis     Depression     Legally blind     Restless leg syndrome     Sleep apnea     \"I don't use a cpap anymore since losing 106 lbs\"    Water retention      Past Surgical History:   Procedure Laterality Date    BACK SURGERY      CARDIAC CATHETERIZATION N/A 1/19/2024    Procedure: Percutaneous Manual Thrombectomy;  Surgeon: Efrain Hurley MD;  Location: [facility] INVASIVE LOCATION;  Service: Interventional Radiology;  Laterality: N/A;    GALLBLADDER SURGERY      HIP ARTHROSCOPY      JOINT REPLACEMENT Right        SLP Recommendation and Plan      Continue per PAC.       Thank you-   Azalia Elizondo M.S., CCC-SLP         EDUCATION  The patient has been educated in the following areas:   Cognitive Impairment Communication Impairment.        Time Calculation:       Therapy Charges for Today       Code Description Service Date Service Provider Modifiers Qty    19879062612 HC ST EVAL ORAL PHARYNG SWALLOW 4 4/11/2024 Azalia Elizondo, MS CCC-SLP GN 1    87665752017 HC ST TREATMENT SPEECH 2 4/11/2024 Azalia Elizondo MS CCC-SLP GN 1    42814499752 HC ST TREATMENT SWALLOW 2 " 2024 Azalia Elizondo, MS CCC-SLP GN 1                 Azalia Elizondo MS CCC-SLP  2024   and Acute Care - Speech Language Pathology Treatment Note  HONORIO Poon     Patient Name: Regine Beckham  : 1955  MRN: [id number]  Today's Date: 2024  Onset of Illness/Injury or Date of Surgery: 24 (admission date)            Admit Date: 2024    DYSPHAGIA THERAPY PLAN OF CARE:     Regine Beckham was seen this pm for formal therapy tasks.       Long Term Goal:  Patient will accept least restrictive diet tolerance w/o overt s/s aspiration.      Short Term Goals:  1. Patient will tolerate facial massage to LEFT facial surface for 3-7 minutes to increase surface blood flow, sensation and ROM over 3 consecutive sessions.   *facial massage tolerated for 5 min w/ mild increase in surface blood flow.      2. Patient will perform OROM/GRACE exercises x3 sets x10 reps w/ min cues.   x2 sets x5 reps this date     3. Patient will demonstrate increase labial sensation/afferent drive per pt report in 90% of opp over three consecutive sessions.          4. Patient will increase lingual control, coordination, movement as evidenced by bolus manipulation in 90% opp independently over three consecutive sessions.         5. Patient will participate in a clinical re-evaluation of swallowing fnx in 7-10 days, pending progress towards this poc.       Visit Dx:    ICD-10-CM ICD-9-CM   1. Chest pain, unspecified type  R07.9 786.50     Patient Active Problem List   Diagnosis    Iron deficiency anemia due to chronic blood loss    Major depressive disorder    RLS (restless legs syndrome)    GERD (gastroesophageal reflux disease)    Left breast mass    Allergy to penicillin    Stroke    Grade I diastolic dysfunction    Moderate malnutrition    Falls frequently    Stroke-like symptoms    Debility    Chest pain     Past Medical History:   Diagnosis Date    Anemia     Anxiety     Arthritis     Depression     Legally blind     Restless  "leg syndrome     Sleep apnea     \"I don't use a cpap anymore since losing 106 lbs\"    Water retention      Past Surgical History:   Procedure Laterality Date    BACK SURGERY      CARDIAC CATHETERIZATION N/A 1/19/2024    Procedure: Percutaneous Manual Thrombectomy;  Surgeon: Efrain Hurley MD;  Location: [facility] INVASIVE LOCATION;  Service: Interventional Radiology;  Laterality: N/A;    GALLBLADDER SURGERY      HIP ARTHROSCOPY      JOINT REPLACEMENT Right        SLP Recommendation and Plan      1. Continue per PAC.       Thank you-  Azalia Elizondo M.S., CCC-SLP       EDUCATION  The patient has been educated in the following areas:     Dysphagia (Swallowing Impairment).        Time Calculation:         Therapy Charges for Today       Code Description Service Date Service Provider Modifiers Qty    39082708748 HC ST EVAL ORAL PHARYNG SWALLOW 4 4/11/2024 Azalia Elizondo MS CCC-SLP GN 1    25367007949 HC ST TREATMENT SPEECH 2 4/11/2024 Azalia Elizondo MS CCC-SLP GN 1    15317236979 HC ST TREATMENT SWALLOW 2 4/11/2024 Azalia Elizondo MS CCC-SLP GN 1              Azalia Elizondo MS CCC-SLP  4/12/2024    Electronically signed by Azalia Elizondo MS CCC-SLP at 04/12/24 1405       Respiratory Therapy Notes (most recent note)    No notes exist for this encounter.       ADL Documentation (most recent)      Flowsheet Row Most Recent Value   Transferring 3 - assistive equipment and person   Toileting 3 - assistive equipment and person   Bathing 2 - assistive person   Dressing 2 - assistive person   Eating 0 - independent   Communication 0 - understands/communicates without difficulty   Swallowing 2 - difficulty swallowing foods   Equipment Currently Used at Home commode, hospital bed, shower chair          "

## 2024-04-15 NOTE — NURSING NOTE
"      Order received for Cardiac Rehab Consultation.     Patient stated she is being sent to Physical Rehab today. I explained we had a referral on her but  she does not qualify at this time . I explained our Phase III self pay program to her and she stated she will think about it after she gets out of Physical Rehab    she may want to try this so she is watched by medical personal.     Information discussed with: Patient        Educated on: Benefits of Exercise,  Educated on Cardiac Rehab and Program Protocol, Brochure and/or educational material provided, Contact information given, and Teach Back Verified        Patient does not qualify for Cardiac Rehab at this time due to:    Requires higher level of care (i.e. Skilled Nursing, Rehabilitation Facility, etc)\", Does not meet independent ambulatory requirements for outpatient CardiacRehab          Thank you for the referral. Please contact the Cardiac Rehab Dept. (ext. [phone]) with any further questions or concerns.  "

## 2024-04-15 NOTE — THERAPY TREATMENT NOTE
"Acute Care - Physical Therapy Treatment Note   [place]     Patient Name: Regine Bcekham  : 1955  MRN: [id number]  Today's Date: 4/15/2024   Onset of Illness/Injury or Date of Surgery: 24 (admission date)  Visit Dx:     ICD-10-CM ICD-9-CM   1. Chest pain, unspecified type  R07.9 786.50     Patient Active Problem List   Diagnosis    Iron deficiency anemia due to chronic blood loss    Major depressive disorder    RLS (restless legs syndrome)    GERD (gastroesophageal reflux disease)    Left breast mass    Allergy to penicillin    Stroke    Grade I diastolic dysfunction    Moderate malnutrition    Falls frequently    Stroke-like symptoms    Debility    Chest pain     Past Medical History:   Diagnosis Date    Anemia     Anxiety     Arthritis     Depression     Legally blind     Restless leg syndrome     Sleep apnea     \"I don't use a cpap anymore since losing 106 lbs\"    Water retention      Past Surgical History:   Procedure Laterality Date    BACK SURGERY      CARDIAC CATHETERIZATION N/A 2024    Procedure: Percutaneous Manual Thrombectomy;  Surgeon: Efrain Hurley MD;  Location:  [facility];  Service: Interventional Radiology;  Laterality: N/A;    CARDIAC CATHETERIZATION N/A 2024    Procedure: Left Heart Cath;  Surgeon: Susan Mederos MD;  Location:  [facility];  Service: Cardiology;  Laterality: N/A;    GALLBLADDER SURGERY      HIP ARTHROSCOPY      JOINT REPLACEMENT Right      PT Assessment (Last 12 Hours)       PT Evaluation and Treatment       Row Name 04/15/24 1110          Physical Therapy Time and Intention    Subjective Information complains of;weakness  -HC     Document Type therapy note (daily note)  -HC     Mode of Treatment physical therapy  -HC     Patient Effort good  -HC     Comment Pt and STALIN Padron in agreement for PT. Pt walked 15' x 2 with HHA/hand rail in maldonado min/modA.EOB exercises to tolerance with min A for sitting balance as " needed.  -       Row Name 04/15/24 1110          Cognition    Personal Safety Interventions fall prevention program maintained;gait belt;nonskid shoes/slippers when out of bed;supervised activity  -       Row Name 04/15/24 1110          Bed Mobility    Bed Mobility supine-sit  -     Supine-Sit Tucker (Bed Mobility) minimum assist (75% patient effort);nonverbal cues (demo/gesture);verbal cues  -     Sit-Supine Tucker (Bed Mobility) minimum assist (75% patient effort)  LEs  -       Row Name 04/15/24 1110          Transfers    Transfers sit-stand transfer;stand-sit transfer  -[facility] Name 04/15/24 1110          Sit-Stand Transfer    Sit-Stand Tucker (Transfers) moderate assist (50% patient effort);2 person assist;contact guard;minimum assist (75% patient effort)  -       Row Name 04/15/24 1110          Stand-Sit Transfer    Stand-Sit Tucker (Transfers) minimum assist (75% patient effort);moderate assist (50% patient effort);2 person assist  -[facility] Name 04/15/24 1110          Gait/Stairs (Locomotion)    Gait/Stairs Locomotion gait/ambulation assistive device  -     Tucker Level (Gait) verbal cues;contact guard;minimum assist (75% patient effort)  -     Assistive Device (Gait) other (see comments)  HHA/ Handrail  -     Patient was able to Ambulate yes  -     Distance in Feet (Gait) 15  x2  -HC     Pattern (Gait) step-through;step-to  -HC     Deviations/Abnormal Patterns (Gait) base of support, narrow;gait speed decreased;stride length decreased  -     Bilateral Gait Deviations forward flexed posture  -     Left Sided Gait Deviations weight shift ability decreased  -       Row Name 04/15/24 1110          Motor Skills    Therapeutic Exercise other (see comments)  Sitting: LAQ, March, knees in/out  -       Row Name 04/15/24 1110          Positioning and Restraints    Pre-Treatment Position in bed  -HC     Post Treatment Position bed  -HC     In Bed  fowlers;call light within reach;encouraged to call for assist;exit alarm on;side rails up x3  -               User Key  (r) = Recorded By, (t) = Taken By, (c) = Cosigned By      Initials Name Provider Type    Desi Hopson PTA Physical Therapist Assistant                    Physical Therapy Education       Title: PT OT SLP Therapies (Resolved)       Topic: Physical Therapy (Resolved)       Point: Mobility training (Resolved)       Learner Progress:  Not documented in this visit.              Point: Home exercise program (Resolved)       Learner Progress:  Not documented in this visit.              Point: Body mechanics (Resolved)       Learner Progress:  Not documented in this visit.              Point: Precautions (Resolved)       Learner Progress:  Not documented in this visit.                                  PT Recommendation and Plan             Time Calculation:    PT Charges       Row Name 04/15/24 1118             Time Calculation    PT Received On 04/15/24  -HC         Time Calculation- PT    Total Timed Code Minutes- PT 23 minute(s)  -                User Key  (r) = Recorded By, (t) = Taken By, (c) = Cosigned By      Initials Name Provider Type    Desi Hopson PTA Physical Therapist Assistant                  Therapy Charges for Today       Code Description Service Date Service Provider Modifiers Qty    60232088028 HC PT THER PROC EA 15 MIN 4/15/2024 Desi Fraga PTA GP, CQ 1    64838870347 HC GAIT TRAINING EA 15 MIN 4/15/2024 Desi Fraga PTA GP, CQ 1            PT G-Codes  AM-PAC 6 Clicks Score (PT): 14    Desi Fraga PTA  4/15/2024

## 2024-04-15 NOTE — PROGRESS NOTES
LOS: 4 days     Name: Regine Beckham  Age/Sex: 68 y.o. female  :  1955        PCP: Dread Burton MD    Principal Problem:    Chest pain      Admission Information: Regine Beckham is a 68 y.o. female with CVA (2024), peripheral vascular disease, status post left ICA stent placement on 2024, hypertension, dyslipidemia, obstructive sleep apnea noncompliant with CPAP, restless leg syndrome, GERD, iron deficiency anemia, anxiety/depression, legally blind, and arthritis.     Chief Complaint: Chest pain    Interval history: No interval events    Subjective     Ms. Beckham is awake in bed at the time of my exam today.  She denies chest pain, shortness of breath, palpitations, or other worrisome symptom.    Vital Signs  Vital Signs (last 72 hrs)          0700   0659  0700   0659  0700  04/15 0659 04/15 0700  04/15 0845   Most Recent      Temp (°F) 98 -  98.5    97.8 -  98.3    98 -  98.4       98 (36.7) 04/15 0659    Heart Rate 50 -  75    62 -  75    60 -  73       73 04/15 0659    Resp 16 -  18    16 -  18      18       18 04/15 0659    /54 -  178/82    121/60 -  158/71    121/64 -  165/80       144/84 04/15 0659    SpO2 (%) 94 -  98    90 -  99      97       97 04/15 0659    Flow (L/min)   2           2  1410          Temp:  [98 °F (36.7 °C)-98.4 °F (36.9 °C)] 98 °F (36.7 °C)  Heart Rate:  [60-73] 73  Resp:  [18] 18  BP: (121-165)/(62-84) 144/84  Body mass index is 23.63 kg/m².      Intake/Output Summary (Last 24 hours) at 4/15/2024 0845  Last data filed at 4/15/2024 0700  Gross per 24 hour   Intake 480 ml   Output 2350 ml   Net -1870 ml       Vitals and nursing note reviewed.   Constitutional:       Appearance: Not in distress. Chronically ill-appearing.   Eyes:      Conjunctiva/sclera: Conjunctivae normal.   Pulmonary:      Effort: Pulmonary effort is normal.      Breath sounds: Normal breath sounds.   Cardiovascular:      Normal rate. Regular rhythm.       Murmurs: There is no murmur.   Skin:     General: Skin is warm and dry.   Neurological:      Mental Status: Alert.         Telemetry: Sinus rhythm with PACs     Results Review:     Results from last 7 days   Lab Units 04/13/24  0113 04/10/24  0012 04/09/24 2009   WBC 10*3/mm3 6.24 5.58 5.10   HEMOGLOBIN g/dL 10.6* 10.1* 10.1*   PLATELETS 10*3/mm3 224 224 236     Results from last 7 days   Lab Units 04/13/24  1151 04/13/24  0113 04/12/24  0708 04/10/24  0012 04/09/24 2009   SODIUM mmol/L  --  139 139 139 139   POTASSIUM mmol/L 3.8 3.6 3.8 3.7 4.0   CHLORIDE mmol/L  --  106 106 106 106   CO2 mmol/L  --  21.3* 23.1 25.1 25.2   BUN mg/dL  --  14 13 18 18   CREATININE mg/dL  --  0.77 0.75 0.64 0.83   CALCIUM mg/dL  --  9.1 9.8 9.3 9.0   GLUCOSE mg/dL  --  113* 109* 113* 119*     Results from last 7 days   Lab Units 04/12/24  1339 04/12/24  1109 04/10/24  0012 04/09/24  2117 04/09/24  1902   HSTROP T ng/L 13 13 13 13 12       Results from last 7 days   Lab Units 04/09/24  2142   INR  0.94       I reviewed the patient's new clinical results.  I reviewed the patient's new imaging results and agree with the interpretation.  I personally viewed and interpreted the patient's EKG/Telemetry data      Medication Review:   amLODIPine, 2.5 mg, Oral, Q24H  aspirin, 81 mg, Oral, Daily  atorvastatin, 80 mg, Oral, Nightly  Diclofenac Sodium, 4 g, Topical, 4x Daily  folic acid, 1 mg, Oral, Daily  gabapentin, 200 mg, Oral, Q12H  heparin (porcine), 5,000 Units, Subcutaneous, BID  isosorbide mononitrate, 30 mg, Oral, Q24H  losartan, 50 mg, Oral, Daily  metoprolol tartrate, 12.5 mg, Oral, Q12H  mirtazapine, 30 mg, Oral, Nightly  oxybutynin XL, 10 mg, Oral, Daily  pantoprazole, 40 mg, Oral, Q AM  rOPINIRole, 4 mg, Oral, Nightly  senna-docusate sodium, 2 tablet, Oral, BID  sodium chloride, 10 mL, Intravenous, Q12H  ticagrelor, 60 mg, Oral, BID      Pharmacy Consult,   sodium chloride, 100 mL/hr, Last Rate: 100 mL/hr (04/14/24  6657)        Assessment:  Coronary artery disease s/p cath medical management  History of stroke s/p left ICA stent January 2024 on dual antiplatelet therapy  Hypertension  Hyperlipidemia  Sinus rhythm with PACs      Recommendations:  Continue with aspirin, high intensity atorvastatin, Brilinta, metoprolol, and losartan.  Transition from scheduled Nitropaste to long-acting nitrate.  Managed by hospitalist/neurology  Not at goal.  Increased amlodipine dose today  LDL at goal on current dose of atorvastatin  Does not sense palpitations.  Continue current dose of beta-blocker.    I discussed the patients findings and my recommendations with patient.  She is stable from a cardiac standpoint.      Further decision making based on Dr. Garcia's assessment and recommendations.        Electronically signed by NIKKIE Banda, 04/15/24, 8:53 AM EDT.

## 2024-04-15 NOTE — PLAN OF CARE
Patient resting in the bed throughout the night. No s/s of distress. No complaints. Will continue to follow care plan.

## 2024-04-15 NOTE — DISCHARGE PLACEMENT REQUEST
"Bhavani Regine Alexander \"NEGIN\" (68 y.o. Female)       Date of Birth   1955    Social Security Number       Address   [address] [place], [place] [address]    Home Phone   [phone]    MRN   [id number]       [employer]    Marital Status                               Admission Date   24    Admission Type   Urgent    Admitting Provider   Capo Su MD    Attending Provider   Narinder Olivier DO    Department, Room/Bed   [address], 3315/1S       Discharge Date       Discharge Disposition       Discharge Destination                                 Attending Provider: Narinder Olivier DO    Allergies: Penicillins    Isolation: None   Infection: None   Code Status: CPR    Ht: 167.6 cm (66\")   Wt: 66.4 kg (146 lb 6.4 oz)    Admission Cmt: None   Principal Problem: Chest pain [R07.9]                   Active Insurance as of 2024       Primary Coverage       Payor Plan Insurance Group Employer/Plan Group    AETNA MEDICARE REPLACEMENT AETNA MEDICARE REPLACEMENT [id number]       Payor Plan Address Payor Plan Phone Number Payor Plan Fax Number Effective Dates    [address] [phone]  2024 - None Entered    [facility] [id number]         Subscriber Name Subscriber Birth Date Member ID       REGINE LOCK 1955 [id number]                     Emergency Contacts        (Rel.) Home Phone Work Phone Mobile Phone    Yaa Elizondo (Friend) [phone] --    Karla Patel (Friend) [phone] -- --              Insurance Information                  AETNA MEDICARE REPLACEMENT/AETNA MEDICARE REPLACEMENT Phone: [phone]    Subscriber: Bhavani Regine Munoz Subscriber#: [id number]    Group#: [id number] Precert#: [id number]             History & Physical        Capo Su MD at 24          Hospitalist History and Physical        Patient Identification  Name: Regine Lock  Age/Sex: 68 y.o. female  :  " "1955        MRN: [id number]  Visit Number: [id number]  Admit Date: 4/9/2024   PCP: Dread Burton MD          Chief complaint chest pain    History of Present Illness:  Patient is a 68 y.o. female with history of anxiety/depression, anemia, arthritis, legally blind, restless leg syndrome, sleep apnea no longer on CPAP after losing 100+ lb, \"water retention\" but no documented history of CHF, and stroke with residual left sided weakness, at which time workup revealed right internal carotid artery stenosis s/p thrombectomy and stent placement on 1/19/24. She was recently admitted to our service from 3/17-3/27/24 for acute physical deconditioning and frequent falls as a consequence of her prior stroke. Afterwards she was transferred to inpatient rehab. This evening she complained of left sided chest pain which she described as heaviness/pressure in sensation, without radiation or associated dyspnea, nausea or diaphoresis. Pain was relieved by nitropaste. Troponin was negative, but EKG showed T wave inversions in anterolateral leads concerning for acute ischemia. She has been transferred back to the hospitalist service for continuous telemetry monitoring and further workup for cardiac ischemia. Patient is now on [facility]. She reports chest pain is improved with only minimal residual discomfort. She denies shortness of breath or increased lower extremity edema from baseline. She has no other complaints.     Review of Systems  Review of Systems   Constitutional:  Negative for activity change, appetite change, chills, diaphoresis, fatigue, fever and unexpected weight change.   HENT:  Negative for congestion, postnasal drip, rhinorrhea, sinus pressure, sinus pain and sore throat.    Eyes:  Negative for photophobia and visual disturbance.   Respiratory:  Negative for cough, shortness of breath and wheezing.    Cardiovascular:  Positive for chest pain. Negative for palpitations and leg swelling.   Gastrointestinal:  " "Negative for abdominal distention, abdominal pain, constipation, diarrhea, nausea and vomiting.   Genitourinary:  Negative for difficulty urinating, flank pain, frequency and hematuria.   Musculoskeletal:  Positive for arthralgias (intermittent left shoulder pain, chronic since stroke). Negative for back pain, joint swelling, myalgias, neck pain and neck stiffness.   Skin:  Negative for color change, pallor, rash and wound.   Neurological:  Positive for weakness (residual left sided weakness since stroke). Negative for dizziness, tremors, seizures, syncope, light-headedness, numbness and headaches.   Hematological:  Negative for adenopathy. Does not bruise/bleed easily.   Psychiatric/Behavioral:  Negative for agitation, behavioral problems and confusion.        History  Past Medical History:   Diagnosis Date    Anemia     Anxiety     Arthritis     Depression     Legally blind     Restless leg syndrome     Sleep apnea     \"I don't use a cpap anymore since losing 106 lbs\"    Water retention      Past Surgical History:   Procedure Laterality Date    BACK SURGERY      CARDIAC CATHETERIZATION N/A 1/19/2024    Procedure: Percutaneous Manual Thrombectomy;  Surgeon: Efrain Hurley MD;  Location: [facility] INVASIVE LOCATION;  Service: Interventional Radiology;  Laterality: N/A;    GALLBLADDER SURGERY      HIP ARTHROSCOPY      JOINT REPLACEMENT Right      Family History   Problem Relation Age of Onset    Breast cancer Neg Hx      Social History     Tobacco Use    Smoking status: Every Day     Current packs/day: 1.50     Average packs/day: 1.5 packs/day for 51.0 years (76.5 ttl pk-yrs)     Types: Cigarettes    Smokeless tobacco: Never   Vaping Use    Vaping status: Never Used   Substance Use Topics    Alcohol use: Yes     Alcohol/week: 1.0 standard drink of alcohol     Types: 1 Glasses of wine per week     Comment: \"occasionally - once every two months\"    Drug use: Not Currently     Comment: alcohol - occasionally "     Medications Prior to Admission   Medication Sig Dispense Refill Last Dose    aspirin 81 MG EC tablet Take 1 tablet by mouth Daily.       atorvastatin (LIPITOR) 80 MG tablet Take 1 tablet by mouth Every Night. 90 tablet 0     gabapentin (NEURONTIN) 300 MG capsule Take 1 capsule by mouth 3 (Three) Times a Day.       losartan (COZAAR) 50 MG tablet Take 1 tablet by mouth Daily. Indications: High Blood Pressure Disorder       Mirabegron ER (MYRBETRIQ) 50 MG tablet sustained-release 24 hour 24 hr tablet Take 50 mg by mouth Daily. Indications: Urinary Urgency       mirtazapine (REMERON) 30 MG tablet Take 1 tablet by mouth Every Night. Indications: Major Depressive Disorder 30 tablet 0     pantoprazole (PROTONIX) 40 MG EC tablet TAKE 1 TABLET BY MOUTH EVERY MORNING FOR HEARTBURN 90 tablet 0     rOPINIRole (REQUIP) 4 MG tablet Take 1 tablet by mouth Every Night. Take 1 hour before bedtime.  Indications: Restless Leg Syndrome 30 tablet 0     ticagrelor (BRILINTA) 60 MG tablet tablet Take 1 tablet by mouth 2 (Two) Times a Day. 60 tablet 0     zolpidem (AMBIEN) 5 MG tablet Take 1 tablet by mouth At Night As Needed for Sleep. Indications: Trouble Sleeping 5 tablet 0      Allergies:  Penicillins    Objective    Vital Signs  Temp:  [98 °F (36.7 °C)-98.3 °F (36.8 °C)] 98 °F (36.7 °C)  Heart Rate:  [61-88] 69  Resp:  [16-18] 18  BP: (123-162)/(59-83) 123/64  There is no height or weight on file to calculate BMI.    Physical Exam:  Physical Exam  Constitutional:       General: She is not in acute distress.     Appearance: Normal appearance. She is not ill-appearing.   HENT:      Head: Normocephalic and atraumatic.      Right Ear: External ear normal.      Left Ear: External ear normal.      Nose: Nose normal.      Mouth/Throat:      Mouth: Mucous membranes are moist.      Pharynx: Oropharynx is clear.   Eyes:      Extraocular Movements: Extraocular movements intact.      Conjunctiva/sclera: Conjunctivae normal.      Pupils:  Pupils are equal, round, and reactive to light.   Cardiovascular:      Rate and Rhythm: Normal rate and regular rhythm.      Pulses: Normal pulses.      Heart sounds: Normal heart sounds.   Pulmonary:      Effort: Pulmonary effort is normal. No respiratory distress.      Breath sounds: Normal breath sounds. No wheezing or rales.   Chest:      Chest wall: No tenderness.   Abdominal:      General: Abdomen is flat. Bowel sounds are normal. There is no distension.      Palpations: Abdomen is soft.      Tenderness: There is no abdominal tenderness.   Musculoskeletal:         General: Normal range of motion.      Cervical back: Normal range of motion and neck supple. No tenderness.      Right lower leg: Edema (trace) present.      Left lower leg: Edema (trace) present.   Lymphadenopathy:      Cervical: No cervical adenopathy.   Skin:     General: Skin is warm and dry.      Capillary Refill: Capillary refill takes less than 2 seconds.      Coloration: Skin is not jaundiced.      Findings: No bruising or lesion.   Neurological:      General: No focal deficit present.      Mental Status: She is alert and oriented to person, place, and time.   Psychiatric:         Mood and Affect: Mood normal.         Behavior: Behavior normal.         Thought Content: Thought content normal.           Results Review:       Lab Results:  Results from last 7 days   Lab Units 04/09/24 2009 04/05/24  0020 04/04/24  0006   WBC 10*3/mm3 5.10 6.20 6.70   HEMOGLOBIN g/dL 10.1* 10.1* 10.3*   PLATELETS 10*3/mm3 236 245 234     Results from last 7 days   Lab Units 04/09/24  1902   CRP mg/dL <0.30     Results from last 7 days   Lab Units 04/09/24 2009 04/05/24  0020 04/04/24  1108 04/04/24  0006   SODIUM mmol/L 139 139  --  139   POTASSIUM mmol/L 4.0 4.0 4.0 3.4*   CHLORIDE mmol/L 106 105  --  106   CO2 mmol/L 25.2 25.9  --  22.5   BUN mg/dL 18 15  --  17   CREATININE mg/dL 0.83 0.91  --  0.75   CALCIUM mg/dL 9.0 9.4  --  9.0   GLUCOSE mg/dL 119* 110*  " --  109*         No results found for: \"HGBA1C\"  Results from last 7 days   Lab Units 04/09/24 2009   BILIRUBIN mg/dL 0.2   ALK PHOS U/L 90   AST (SGOT) U/L 35*   ALT (SGPT) U/L 29     Results from last 7 days   Lab Units 04/09/24  1902   HSTROP T ng/L 12                   I have reviewed the patient's laboratory results.    Imaging:  Imaging Results (Last 72 Hours)       ** No results found for the last 72 hours. **            I have personally reviewed the patient's radiologic imaging.        EKG:   Sinus rhythm with marked sinus arrhythmia, HR 63, QTc 499  T wave abnormality, consider lateral ischemia  Prolonged QT  Abnormal ECG  When compared with ECG of 21-MAR-2024 01:00,  premature atrial complexes are no longer present  Left posterior fascicular block is no longer present  Borderline criteria for Inferior infarct are no longer present  T wave inversion no longer evident in Inferior leads  T wave inversion now evident in Anterolateral leads    I have personally reviewed the patient's EKG. New T wave inversions in leads V4-V5 and AVL. EKG reviewed by on-call interventional cardiologist (sent by house supervisor) who reported no evidence of STEMI.         Assessment & Plan    - Chest pain with typical features (left sided chest heaviness relieved by nitroglycerin) and cardiac risk factors including vascular disease with recent CVA and right carotid artery stenosis, along with EKG changes concerning for anterolateral ischemia. Basic labs, inflammatory markers and CXR obtained to rule out other causes of chest pain such as pneumonia. CRP, procal, lactate and WBC all normal and CXR showed no acute abnormality.  Transferred from inpatient rehab to [facility] for observation with continuous telemetry monitoring and further cardiac ischemia workup. Continue home ASA, brilinta and statin. Repeat troponin and EKG now. Keep NPO after midnight except sips with meds. Check hemoglobin A1c and fasting lipid panel to assess for " additional cardiovascular risk factors. Update ECHO in the morning and consult cardiology for guidance regarding further workup, since patient already had a stress test recently on 12/23/23 that was interpreted as low risk.   - Deconditioning secondary to recent stroke: continue PT/OT/SLP that patient was receiving in inpatient rehab.     DVT/GI Prophylaxis: SQ heparin; PO protonix    Estimated Length of Stay <2 midnights    I discussed the patient's findings, assessment and plan with the patient and inpatient rehab physician Dr Hayes.    Capo Su MD  04/09/24  21:06 EDT      Electronically signed by Capo Su MD at 04/09/24 2126       Current Facility-Administered Medications   Medication Dose Route Frequency Provider Last Rate Last Admin    acetaminophen (TYLENOL) tablet 1,000 mg  1,000 mg Oral Q6H PRN Susan Mederos MD   1,000 mg at 04/14/24 0516    acetaminophen (TYLENOL) tablet 650 mg  650 mg Oral Q4H PRN Susan Mederos MD        amLODIPine (NORVASC) tablet 5 mg  5 mg Oral Q24H Thalia Kang APRN   5 mg at 04/15/24 0951    aspirin chewable tablet 81 mg  81 mg Oral Daily Susan Mederos MD   81 mg at 04/15/24 0951    atorvastatin (LIPITOR) tablet 80 mg  80 mg Oral Nightly Susan Mederos MD   80 mg at 04/14/24 2100    sennosides-docusate (PERICOLACE) 8.6-50 MG per tablet 2 tablet  2 tablet Oral BID Susan Mederos MD   2 tablet at 04/14/24 2100    And    polyethylene glycol (MIRALAX) packet 17 g  17 g Oral Daily PRN Susan Mederos MD        And    bisacodyl (DULCOLAX) EC tablet 5 mg  5 mg Oral Daily PRN Susan Mederos MD        And    bisacodyl (DULCOLAX) suppository 10 mg  10 mg Rectal Daily PRN Susan Mederos MD        calcium carbonate (TUMS) chewable tablet 500 mg (200 mg elemental)  3 tablet Oral TID PRN Susan Mederos MD        Calcium Replacement - Follow Nurse / BPA Driven Protocol   Does not apply PRN Susan Mederos MD        Diclofenac Sodium (VOLTAREN) 1 % gel 4 g  4 g  Topical 4x Daily Susan Mederos MD   4 g at 04/15/24 0951    folic acid (FOLVITE) tablet 1 mg  1 mg Oral Daily Susan Mederos MD   1 mg at 04/15/24 0951    gabapentin (NEURONTIN) capsule 200 mg  200 mg Oral Q12H Susan Mederos MD   200 mg at 04/15/24 0951    heparin (porcine) 5000 UNIT/ML injection 5,000 Units  5,000 Units Subcutaneous BID Yaritza Rao MD   5,000 Units at 04/15/24 0951    isosorbide mononitrate (IMDUR) 24 hr tablet 30 mg  30 mg Oral Q24H Thalia Kang APRN   30 mg at 04/15/24 1156    losartan (COZAAR) tablet 50 mg  50 mg Oral Daily Susan Mederos MD   50 mg at 04/15/24 0951    Magnesium Standard Dose Replacement - Follow Nurse / BPA Driven Protocol   Does not apply PRN Susan Mederos MD        metoprolol tartrate (LOPRESSOR) tablet 12.5 mg  12.5 mg Oral Q12H Susan Mederos MD   12.5 mg at 04/15/24 0951    mirtazapine (REMERON) tablet 30 mg  30 mg Oral Nightly Susan Mederos MD   30 mg at 04/14/24 2100    nitroglycerin (NITROSTAT) SL tablet 0.4 mg  0.4 mg Sublingual Q5 Min PRN Susan Mederos MD        oxybutynin XL (DITROPAN-XL) 24 hr tablet 10 mg  10 mg Oral Daily Susan Mederos MD   10 mg at 04/15/24 0951    pantoprazole (PROTONIX) EC tablet 40 mg  40 mg Oral Q AM Susan Mederos MD   40 mg at 04/15/24 0610    Pharmacy Consult   Does not apply Continuous PRN Anh Valdez DO        Phosphorus Replacement - Follow Nurse / BPA Driven Protocol   Does not apply PRN Susan Mederos MD        Potassium Replacement - Follow Nurse / BPA Driven Protocol   Does not apply PRN Susan Mederos MD        rOPINIRole (REQUIP) tablet 4 mg  4 mg Oral Nightly Susan Mederos MD   4 mg at 04/14/24 2100    sodium chloride 0.9 % flush 10 mL  10 mL Intravenous Q12H Susan Mederos MD   10 mL at 04/15/24 0951    sodium chloride 0.9 % flush 10 mL  10 mL Intravenous PRN Susan Mederos MD        sodium chloride 0.9 % infusion 40 mL  40 mL Intravenous PRN Susan Mederos MD        ticagrelor (BRILINTA) tablet 60 mg  60  mg Oral BID Susan Mederos MD   60 mg at 04/15/24 0951        Physician Progress Notes (most recent note)        Thalia Kang APRN at 04/15/24 0844       Attestation signed by Fozia Garcia MD at 04/15/24 1008    I have reviewed this documentation and agree.                     LOS: 4 days     Name: Regine Beckham  Age/Sex: 68 y.o. female  :  1955        PCP: Dread Burton MD    Principal Problem:    Chest pain      Admission Information: Regine eBckham is a 68 y.o. female with CVA (2024), peripheral vascular disease, status post left ICA stent placement on 2024, hypertension, dyslipidemia, obstructive sleep apnea noncompliant with CPAP, restless leg syndrome, GERD, iron deficiency anemia, anxiety/depression, legally blind, and arthritis.     Chief Complaint: Chest pain    Interval history: No interval events    Subjective     Ms. Beckham is awake in bed at the time of my exam today.  She denies chest pain, shortness of breath, palpitations, or other worrisome symptom.    Vital Signs  Vital Signs (last 72 hrs)          0700   0659  0700   0659  0700  04/15 0659 04/15 0700  04/15 0845   Most Recent      Temp (°F) 98 -  98.5    97.8 -  98.3    98 -  98.4       98 (36.7) 04/15 0659    Heart Rate 50 -  75    62 -  75    60 -  73       73 04/15 0659    Resp 16 -  18    16 -  18      18       18 04/15 0659    /54 -  178/82    121/60 -  158/71    121/64 -  165/80       144/84 04/15 0659    SpO2 (%) 94 -  98    90 -  99      97       97 04/15 0659    Flow (L/min)   2           2  1410          Temp:  [98 °F (36.7 °C)-98.4 °F (36.9 °C)] 98 °F (36.7 °C)  Heart Rate:  [60-73] 73  Resp:  [18] 18  BP: (121-165)/(62-84) 144/84  Body mass index is 23.63 kg/m².      Intake/Output Summary (Last 24 hours) at 4/15/2024 0845  Last data filed at 4/15/2024 0700  Gross per 24 hour   Intake 480 ml   Output 2350 ml   Net -1870 ml       Vitals and nursing note reviewed.    Constitutional:       Appearance: Not in distress. Chronically ill-appearing.   Eyes:      Conjunctiva/sclera: Conjunctivae normal.   Pulmonary:      Effort: Pulmonary effort is normal.      Breath sounds: Normal breath sounds.   Cardiovascular:      Normal rate. Regular rhythm.      Murmurs: There is no murmur.   Skin:     General: Skin is warm and dry.   Neurological:      Mental Status: Alert.         Telemetry: Sinus rhythm with PACs     Results Review:     Results from last 7 days   Lab Units 04/13/24  0113 04/10/24  0012 04/09/24 2009   WBC 10*3/mm3 6.24 5.58 5.10   HEMOGLOBIN g/dL 10.6* 10.1* 10.1*   PLATELETS 10*3/mm3 224 224 236     Results from last 7 days   Lab Units 04/13/24  1151 04/13/24  0113 04/12/24  0708 04/10/24  0012 04/09/24 2009   SODIUM mmol/L  --  139 139 139 139   POTASSIUM mmol/L 3.8 3.6 3.8 3.7 4.0   CHLORIDE mmol/L  --  106 106 106 106   CO2 mmol/L  --  21.3* 23.1 25.1 25.2   BUN mg/dL  --  14 13 18 18   CREATININE mg/dL  --  0.77 0.75 0.64 0.83   CALCIUM mg/dL  --  9.1 9.8 9.3 9.0   GLUCOSE mg/dL  --  113* 109* 113* 119*     Results from last 7 days   Lab Units 04/12/24  1339 04/12/24  1109 04/10/24  0012 04/09/24  2117 04/09/24  1902   HSTROP T ng/L 13 13 13 13 12       Results from last 7 days   Lab Units 04/09/24  2142   INR  0.94       I reviewed the patient's new clinical results.  I reviewed the patient's new imaging results and agree with the interpretation.  I personally viewed and interpreted the patient's EKG/Telemetry data      Medication Review:   amLODIPine, 2.5 mg, Oral, Q24H  aspirin, 81 mg, Oral, Daily  atorvastatin, 80 mg, Oral, Nightly  Diclofenac Sodium, 4 g, Topical, 4x Daily  folic acid, 1 mg, Oral, Daily  gabapentin, 200 mg, Oral, Q12H  heparin (porcine), 5,000 Units, Subcutaneous, BID  isosorbide mononitrate, 30 mg, Oral, Q24H  losartan, 50 mg, Oral, Daily  metoprolol tartrate, 12.5 mg, Oral, Q12H  mirtazapine, 30 mg, Oral, Nightly  oxybutynin XL, 10 mg, Oral,  Daily  pantoprazole, 40 mg, Oral, Q AM  rOPINIRole, 4 mg, Oral, Nightly  senna-docusate sodium, 2 tablet, Oral, BID  sodium chloride, 10 mL, Intravenous, Q12H  ticagrelor, 60 mg, Oral, BID      Pharmacy Consult,   sodium chloride, 100 mL/hr, Last Rate: 100 mL/hr (24 1730)        Assessment:  Coronary artery disease s/p cath medical management  History of stroke s/p left ICA stent 2024 on dual antiplatelet therapy  Hypertension  Hyperlipidemia  Sinus rhythm with PACs      Recommendations:  Continue with aspirin, high intensity atorvastatin, Brilinta, metoprolol, and losartan.  Transition from scheduled Nitropaste to long-acting nitrate.  Managed by hospitalist/neurology  Not at goal.  Increased amlodipine dose today  LDL at goal on current dose of atorvastatin  Does not sense palpitations.  Continue current dose of beta-blocker.    I discussed the patients findings and my recommendations with patient.  She is stable from a cardiac standpoint.      Further decision making based on Dr. Garcia's assessment and recommendations.        Electronically signed by NIKKIE Banda, 04/15/24, 8:53 AM EDT.                Electronically signed by Fozia Garcia MD at 04/15/24 1008          Physical Therapy Notes (most recent note)        Desi Fraga PTA at 04/15/24 1118  Version 1 of 1         Acute Care - Physical Therapy Treatment Note  HONORIO Poon     Patient Name: Regine Beckham  : 1955  MRN: [id number]  Today's Date: 4/15/2024   Onset of Illness/Injury or Date of Surgery: 24 (admission date)  Visit Dx:     ICD-10-CM ICD-9-CM   1. Chest pain, unspecified type  R07.9 786.50     Patient Active Problem List   Diagnosis    Iron deficiency anemia due to chronic blood loss    Major depressive disorder    RLS (restless legs syndrome)    GERD (gastroesophageal reflux disease)    Left breast mass    Allergy to penicillin    Stroke    Grade I diastolic dysfunction    Moderate malnutrition  "   Falls frequently    Stroke-like symptoms    Debility    Chest pain     Past Medical History:   Diagnosis Date    Anemia     Anxiety     Arthritis     Depression     Legally blind     Restless leg syndrome     Sleep apnea     \"I don't use a cpap anymore since losing 106 lbs\"    Water retention      Past Surgical History:   Procedure Laterality Date    BACK SURGERY      CARDIAC CATHETERIZATION N/A 1/19/2024    Procedure: Percutaneous Manual Thrombectomy;  Surgeon: Efrain Hurley MD;  Location:  [facility];  Service: Interventional Radiology;  Laterality: N/A;    CARDIAC CATHETERIZATION N/A 4/12/2024    Procedure: Left Heart Cath;  Surgeon: Susan Mederos MD;  Location:  [facility] INVASIVE LOCATION;  Service: Cardiology;  Laterality: N/A;    GALLBLADDER SURGERY      HIP ARTHROSCOPY      JOINT REPLACEMENT Right      PT Assessment (Last 12 Hours)       PT Evaluation and Treatment       Row Name 04/15/24 1110          Physical Therapy Time and Intention    Subjective Information complains of;weakness  -     Document Type therapy note (daily note)  -     Mode of Treatment physical therapy  -     Patient Effort good  -     Comment Pt and STALIN Padron in agreement for PT. Pt walked 15' x 2 with HHA/hand rail in [place] min/modA.EOB exercises to tolerance with min A for sitting balance as needed.  -       Row Name 04/15/24 1110          Cognition    Personal Safety Interventions fall prevention program maintained;gait belt;nonskid shoes/slippers when out of bed;supervised activity  -       Row Name 04/15/24 1110          Bed Mobility    Bed Mobility supine-sit  -     Supine-Sit [place] (Bed Mobility) minimum assist (75% patient effort);nonverbal cues (demo/gesture);verbal cues  -     Sit-Supine [place] (Bed Mobility) minimum assist (75% patient effort)  LEs  -       Row Name 04/15/24 1110          Transfers    Transfers sit-stand transfer;stand-sit transfer  -       Row Name " 04/15/24 1110          Sit-Stand Transfer    Sit-Stand [place] (Transfers) moderate assist (50% patient effort);2 person assist;contact guard;minimum assist (75% patient effort)  -HC       Row Name 04/15/24 1110          Stand-Sit Transfer    Stand-Sit [place] (Transfers) minimum assist (75% patient effort);moderate assist (50% patient effort);2 person assist  -HC       Row Name 04/15/24 1110          Gait/Stairs (Locomotion)    Gait/Stairs Locomotion gait/ambulation assistive device  -     [place] Level (Gait) verbal cues;contact guard;minimum assist (75% patient effort)  -     Assistive Device (Gait) other (see comments)  HHA/ Handrail  -HC     Patient was able to Ambulate yes  -HC     Distance in Feet (Gait) 15  x2  -HC     Pattern (Gait) step-through;step-to  -HC     Deviations/Abnormal Patterns (Gait) base of support, narrow;gait speed decreased;stride length decreased  -     Bilateral Gait Deviations forward flexed posture  -     Left Sided Gait Deviations weight shift ability decreased  -       Row Name 04/15/24 1110          Motor Skills    Therapeutic Exercise other (see comments)  Sitting: LAQ, March, knees in/out  -HC       Row Name 04/15/24 1110          Positioning and Restraints    Pre-Treatment Position in bed  -HC     Post Treatment Position bed  -HC     In Bed fowlers;call light within reach;encouraged to call for assist;exit alarm on;side rails up x3  -HC               User Key  (r) = Recorded By, (t) = Taken By, (c) = Cosigned By      Initials Name Provider Type    Desi Hopson, TK Physical Therapist Assistant                    Physical Therapy Education       Title: PT OT SLP Therapies (Resolved)       Topic: Physical Therapy (Resolved)       Point: Mobility training (Resolved)       Learner Progress:  Not documented in this visit.              Point: Home exercise program (Resolved)       Learner Progress:  Not documented in this visit.              Point:  Body mechanics (Resolved)       Learner Progress:  Not documented in this visit.              Point: Precautions (Resolved)       Learner Progress:  Not documented in this visit.                                  PT Recommendation and Plan             Time Calculation:    PT Charges       Row Name 04/15/24 1118             Time Calculation    PT Received On 04/15/24  -HC         Time Calculation- PT    Total Timed Code Minutes- PT 23 minute(s)  -HC                User Key  (r) = Recorded By, (t) = Taken By, (c) = Cosigned By      Initials Name Provider Type     Desi Fraga PTA Physical Therapist Assistant                  Therapy Charges for Today       Code Description Service Date Service Provider Modifiers Qty    15604737764  PT THER PROC EA 15 MIN 4/15/2024 Desi Fraga PTA GP, CQ 1    05996013164 HC GAIT TRAINING EA 15 MIN 4/15/2024 Desi Fraga PTA GP, CQ 1            PT G-Codes  AM-PAC 6 Clicks Score (PT): 14    Desi Fraga PTA  4/15/2024      Electronically signed by Desi Fraga PTA at 04/15/24 1118          Occupational Therapy Notes (most recent note)        Maria Del Rosario Padgett, OT at 04/15/24 1114          Patient Name: Regine Beckham  : 1955    MRN: [id number]                              Today's Date: 4/15/2024       Admit Date: 2024    Visit Dx:     ICD-10-CM ICD-9-CM   1. Chest pain, unspecified type  R07.9 786.50     Patient Active Problem List   Diagnosis    Iron deficiency anemia due to chronic blood loss    Major depressive disorder    RLS (restless legs syndrome)    GERD (gastroesophageal reflux disease)    Left breast mass    Allergy to penicillin    Stroke    Grade I diastolic dysfunction    Moderate malnutrition    Falls frequently    Stroke-like symptoms    Debility    Chest pain     Past Medical History:   Diagnosis Date    Anemia     Anxiety     Arthritis     Depression     Legally blind     Restless leg syndrome     Sleep apnea   "   \"I don't use a cpap anymore since losing 106 lbs\"    Water retention      Past Surgical History:   Procedure Laterality Date    BACK SURGERY      CARDIAC CATHETERIZATION N/A 1/19/2024    Procedure: Percutaneous Manual Thrombectomy;  Surgeon: Efrain Hurley MD;  Location:  [facility];  Service: Interventional Radiology;  Laterality: N/A;    CARDIAC CATHETERIZATION N/A 4/12/2024    Procedure: Left Heart Cath;  Surgeon: Susan Mederos MD;  Location:  [facility];  Service: Cardiology;  Laterality: N/A;    GALLBLADDER SURGERY      HIP ARTHROSCOPY      JOINT REPLACEMENT Right       General Information       Row Name 04/15/24 1108          OT Time and Intention    Document Type therapy note (daily note)  -     Mode of Treatment individual therapy;occupational therapy  -       Row Name 04/15/24 1108          General Information    Patient Profile Reviewed yes  -     Existing Precautions/Restrictions fall  -     Barriers to Rehab previous functional deficit  -       Row Name 04/15/24 1108          Cognition    Orientation Status (Cognition) oriented to;person;place;situation  -       Row Name 04/15/24 1108          Safety Issues, Functional Mobility    Impairments Affecting Function (Mobility) balance;cognition;coordination;endurance/activity tolerance;grasp;motor control;muscle tone abnormal;range of motion (ROM);postural/trunk control;strength  -               User Key  (r) = Recorded By, (t) = Taken By, (c) = Cosigned By      Initials Name Provider Type     Maria Del Rosario Padgett OT Occupational Therapist                     Mobility/ADL's       Row Name 04/15/24 1109          Bed Mobility    Bed Mobility sit-supine  -     Sit-Supine [place] (Bed Mobility) minimum assist (75% patient effort)  -     Bed Mobility, Safety Issues decreased use of arms for pushing/pulling;decreased use of legs for bridging/pushing  -     Assistive Device (Bed Mobility) bed " rails;draw sheet  -[facility] Name 04/15/24 1109          Transfers    Transfers sit-stand transfer;stand-sit transfer  -[facility] Name 04/15/24 1109          Sit-Stand Transfer    Sit-Stand [place] (Transfers) minimum assist (75% patient effort);moderate assist (50% patient effort);2 person assist  -[facility] Name 04/15/24 1109          Stand-Sit Transfer    Stand-Sit [place] (Transfers) minimum assist (75% patient effort);2 person assist  -[facility] Name 04/15/24 1109          Toilet Transfer    Type (Toilet Transfer) stand pivot/stand step  -     [place] Level (Toilet Transfer) moderate assist (50% patient effort);2 person assist;verbal cues;nonverbal cues (demo/gesture);minimum assist (75% patient effort)  -     Assistive Device (Toilet Transfer) wheelchair;grab bars/safety frame;raised toilet seat  -               User Key  (r) = Recorded By, (t) = Taken By, (c) = Cosigned By      Initials Name Provider Type    Maria Del Rosario Briceño OT Occupational Therapist                   Obj/Interventions       [facility] Name 04/15/24 1112          Motor Skills    Motor Skills functional endurance  -     Functional Endurance fair plus  -               User Key  (r) = Recorded By, (t) = Taken By, (c) = Cosigned By      Initials Name Provider Type    Maria Del Rosario Briceño OT Occupational Therapist                   Goals/Plan    No documentation.                  Clinical Impression       [facility] Name 04/15/24 1112          Pain Assessment    Pretreatment Pain Rating 0/10 - no pain  -     Posttreatment Pain Rating 0/10 - no pain  -KP       Row Name 04/15/24 1112          Plan of Care Review    Plan of Care Reviewed With patient  -     Progress improving  -     Outcome Evaluation Patient seen for OT treatment. She tolerated sitting at edge of bed and transfers with min/mod assist of two handheld due to increased muscle tone on left side and balance. Continue plan of care.  -[facility] Name 04/15/24  1112          Positioning and Restraints    Pre-Treatment Position in bed  -KP     Post Treatment Position bed  -KP     In Bed fowlers;call light within reach;encouraged to call for assist;exit alarm on  -KP               User Key  (r) = Recorded By, (t) = Taken By, (c) = Cosigned By      Initials Name Provider Type     Maria Del Rosario Padgett OT Occupational Therapist                   Outcome Measures       Row Name 04/15/24 0956          How much help from another person do you currently need...    Turning from your back to your side while in flat bed without using bedrails? 3  -LH     Moving from lying on back to sitting on the side of a flat bed without bedrails? 3  -LH     Moving to and from a bed to a chair (including a wheelchair)? 2  -LH     Standing up from a chair using your arms (e.g., wheelchair, bedside chair)? 3  -LH     Climbing 3-5 steps with a railing? 1  -LH     To walk in hospital room? 2  -LH     AM-PAC 6 Clicks Score (PT) 14  -     Highest Level of Mobility Goal 4 --> Transfer to chair/commode  -               User Key  (r) = Recorded By, (t) = Taken By, (c) = Cosigned By      Initials Name Provider Type     Nereida Cruz, RN Registered Nurse                    Occupational Therapy Education       Title: PT OT SLP Therapies (Resolved)       Topic: Occupational Therapy (Resolved)       Point: ADL training (Resolved)       Description:   Instruct learner(s) on proper safety adaptation and remediation techniques during self care or transfers.   Instruct in proper use of assistive devices.                  Learner Progress:  Not documented in this visit.              Point: Precautions (Resolved)       Description:   Instruct learner(s) on prescribed precautions during self-care and functional transfers.                  Learner Progress:  Not documented in this visit.                                  OT Recommendation and Plan     Plan of Care Review  Plan of Care Reviewed With:  patient  Progress: improving  Outcome Evaluation: Patient seen for OT treatment. She tolerated sitting at edge of bed and transfers with min/mod assist of two handheld due to increased muscle tone on left side and balance. Continue plan of care.     Time Calculation:         Time Calculation- OT       Row Name 04/15/24 1114             Time Calculation- OT    OT Received On 04/15/24  -                User Key  (r) = Recorded By, (t) = Taken By, (c) = Cosigned By      Initials Name Provider Type     Maria Del Rosario Padgett OT Occupational Therapist                  Therapy Charges for Today       Code Description Service Date Service Provider Modifiers Qty    53994783418  OT THERAPEUTIC ACT EA 15 MIN 4/15/2024 Maria Del Rosario Padgett OT GO 1                 Maria Del Rosario Padgett OT  4/15/2024    Electronically signed by Maria Del Rosario Padgett OT at 04/15/24 1114          Speech Language Pathology Notes (most recent note)        Azalia Elizondo MS CCC-SLP at 24 1225          Acute Care - Speech Language Pathology   Swallow Treatment Note HONORIO Poon     Patient Name: Regine Beckham  : 1955  MRN: [id number]  Today's Date: 2024  Onset of Illness/Injury or Date of Surgery: 24 (admission date)          Admit Date: 2024      DYSARTHRIA AND COGNITIVE THERAPY PLAN OF CARE:     Regine Beckham was seen this am for formal therapy tasks.      Long Term Goal:  Patient will demonstrate functional speech skills for return to discharge environment.   Patient will demonstrate functional cognitive skills for return to discharge environment.     Short Term Goals:  1. Patient will tolerate facial massage to left facial surface for 3-7 minutes to increase surface blood flow, sensation and ROM over 3 consecutive sessions.   *facial massage tolerated for 5 min w/ mild increase in surface blood flow.      2. Patient will perform OROM/GRACE exercises x3 sets x10 reps w/ min cues.  *x2 sets x5 reps     3. Patient will maintain vocal  "intensity at greater than 60dB across 4+ word sentences in 90% of opp over three consecutive sessions.   *vocal intensity increased in conversational exchanges following SLP verbal cues.      4. Patient will over-articulate 3+ syllable words and in connected speech in 90% opp to improve intelligibility over three consecutive sessions.      5. Patient will decrease rate of speech in connected speech in 90% of opp to improve intelligibility over three consecutive sessions.      6. Patient will demonstrate accurate orientation to time and location in 90% of opp independently over three consecutive sessions.  *pt oriented to time and location in 100% opp this date.      7. Patient will perform divergent naming tasks of 8+ items named in 1 min w/ min cues over three consecutive sessions.      8. Patient will perform rapid alternating speech tasks w/ min cues over three consecutive sessions.     9. Patient will perform sustained vowel prolongations of 5 sec duration over 15 reps.   4 of 5 opp over 5 reps     10. Patient will perform inhalations/exhalations via resistive breather x4 sets x15 reps.   X2 sets x10 reps.     *Additional goals to be added/modified per pt progress toward goals.        Visit Dx:     ICD-10-CM ICD-9-CM   1. Chest pain, unspecified type  R07.9 786.50     Patient Active Problem List   Diagnosis    Iron deficiency anemia due to chronic blood loss    Major depressive disorder    RLS (restless legs syndrome)    GERD (gastroesophageal reflux disease)    Left breast mass    Allergy to penicillin    Stroke    Grade I diastolic dysfunction    Moderate malnutrition    Falls frequently    Stroke-like symptoms    Debility    Chest pain     Past Medical History:   Diagnosis Date    Anemia     Anxiety     Arthritis     Depression     Legally blind     Restless leg syndrome     Sleep apnea     \"I don't use a cpap anymore since losing 106 lbs\"    Water retention      Past Surgical History:   Procedure Laterality " Date    BACK SURGERY      CARDIAC CATHETERIZATION N/A 2024    Procedure: Percutaneous Manual Thrombectomy;  Surgeon: Efrain Hurley MD;  Location: [facility] CATH INVASIVE LOCATION;  Service: Interventional Radiology;  Laterality: N/A;    GALLBLADDER SURGERY      HIP ARTHROSCOPY      JOINT REPLACEMENT Right        SLP Recommendation and Plan      Continue per PAC.       Thank you-   Azalia Elizondo M.S., CCC-SLP         EDUCATION  The patient has been educated in the following areas:   Cognitive Impairment Communication Impairment.        Time Calculation:       Therapy Charges for Today       Code Description Service Date Service Provider Modifiers Qty    52920579700 HC ST EVAL ORAL PHARYNG SWALLOW 4 2024 Azalia Elizondo, MS CCC-SLP GN 1    [id number] HC ST TREATMENT SPEECH 2 2024 Azalia Elizondo, MS CCC-SLP GN 1    48820554753 HC ST TREATMENT SWALLOW 2 2024 Azalia Elizondo, MS CCC-SLP GN 1                 Azalia Elizondo MS CCC-SLP  2024   and Acute Care - Speech Language Pathology Treatment Note   [place]     Patient Name: Regine Beckham  : 1955  MRN: [id number]  Today's Date: 2024  Onset of Illness/Injury or Date of Surgery: 24 (admission date)            Admit Date: 2024    DYSPHAGIA THERAPY PLAN OF CARE:     Regine Beckham was seen this pm for formal therapy tasks.       Long Term Goal:  Patient will accept least restrictive diet tolerance w/o overt s/s aspiration.      Short Term Goals:  1. Patient will tolerate facial massage to LEFT facial surface for 3-7 minutes to increase surface blood flow, sensation and ROM over 3 consecutive sessions.   *facial massage tolerated for 5 min w/ mild increase in surface blood flow.      2. Patient will perform OROM/GRACE exercises x3 sets x10 reps w/ min cues.   x2 sets x5 reps this date     3. Patient will demonstrate increase labial sensation/afferent drive per pt report in 90% of opp over three consecutive sessions.          4.  "Patient will increase lingual control, coordination, movement as evidenced by bolus manipulation in 90% opp independently over three consecutive sessions.         5. Patient will participate in a clinical re-evaluation of swallowing fnx in 7-10 days, pending progress towards this poc.       Visit Dx:    ICD-10-CM ICD-9-CM   1. Chest pain, unspecified type  R07.9 786.50     Patient Active Problem List   Diagnosis    Iron deficiency anemia due to chronic blood loss    Major depressive disorder    RLS (restless legs syndrome)    GERD (gastroesophageal reflux disease)    Left breast mass    Allergy to penicillin    Stroke    Grade I diastolic dysfunction    Moderate malnutrition    Falls frequently    Stroke-like symptoms    Debility    Chest pain     Past Medical History:   Diagnosis Date    Anemia     Anxiety     Arthritis     Depression     Legally blind     Restless leg syndrome     Sleep apnea     \"I don't use a cpap anymore since losing 106 lbs\"    Water retention      Past Surgical History:   Procedure Laterality Date    BACK SURGERY      CARDIAC CATHETERIZATION N/A 1/19/2024    Procedure: Percutaneous Manual Thrombectomy;  Surgeon: Efrain Hurley MD;  Location: [facility] CATH INVASIVE LOCATION;  Service: Interventional Radiology;  Laterality: N/A;    GALLBLADDER SURGERY      HIP ARTHROSCOPY      JOINT REPLACEMENT Right        SLP Recommendation and Plan      1. Continue per PAC.       Thank you-  Azalia Elizondo M.S., CCC-SLP       EDUCATION  The patient has been educated in the following areas:     Dysphagia (Swallowing Impairment).        Time Calculation:         Therapy Charges for Today       Code Description Service Date Service Provider Modifiers Qty    64031835032 HC ST EVAL ORAL PHARYNG SWALLOW 4 4/11/2024 Azalia Elizondo, MS CCC-SLP GN 1    [id number] HC ST TREATMENT SPEECH 2 4/11/2024 Azalia Elizondo, MS CCC-SLP GN 1    [id number] HC ST TREATMENT SWALLOW 2 4/11/2024 Azalia Elizondo, MS CCC-SLP GN 1      "         Azalia Elizondo MS CCC-SLP  4/12/2024    Electronically signed by Azalia Elizondo MS CCC-SLP at 04/12/24 1405       ADL Documentation (most recent)      Flowsheet Row Most Recent Value   Transferring 3 - assistive equipment and person   Toileting 3 - assistive equipment and person   Bathing 2 - assistive person   Dressing 2 - assistive person   Eating 0 - independent   Communication 0 - understands/communicates without difficulty   Swallowing 2 - difficulty swallowing foods   Equipment Currently Used at Home commode, hospital bed, shower chair            Discharge Summary    No notes of this type exist for this encounter.       Discharge Order (From admission, onward)      None

## 2024-04-15 NOTE — THERAPY TREATMENT NOTE
"Patient Name: Regine Beckham  : 1955    MRN: [id number]                              Today's Date: 4/15/2024       Admit Date: 2024    Visit Dx:     ICD-10-CM ICD-9-CM   1. Chest pain, unspecified type  R07.9 786.50     Patient Active Problem List   Diagnosis    Iron deficiency anemia due to chronic blood loss    Major depressive disorder    RLS (restless legs syndrome)    GERD (gastroesophageal reflux disease)    Left breast mass    Allergy to penicillin    Stroke    Grade I diastolic dysfunction    Moderate malnutrition    Falls frequently    Stroke-like symptoms    Debility    Chest pain     Past Medical History:   Diagnosis Date    Anemia     Anxiety     Arthritis     Depression     Legally blind     Restless leg syndrome     Sleep apnea     \"I don't use a cpap anymore since losing 106 lbs\"    Water retention      Past Surgical History:   Procedure Laterality Date    BACK SURGERY      CARDIAC CATHETERIZATION N/A 2024    Procedure: Percutaneous Manual Thrombectomy;  Surgeon: Efrain Hurley MD;  Location:  [facility];  Service: Interventional Radiology;  Laterality: N/A;    CARDIAC CATHETERIZATION N/A 2024    Procedure: Left Heart Cath;  Surgeon: Susan Mederos MD;  Location:  [facility];  Service: Cardiology;  Laterality: N/A;    GALLBLADDER SURGERY      HIP ARTHROSCOPY      JOINT REPLACEMENT Right       General Information       Row Name 04/15/24 1108          OT Time and Intention    Document Type therapy note (daily note)  -     Mode of Treatment individual therapy;occupational therapy  -       Row Name 04/15/24 1108          General Information    Patient Profile Reviewed yes  -     Existing Precautions/Restrictions fall  -     Barriers to Rehab previous functional deficit  -       Row Name 04/15/24 1108          Cognition    Orientation Status (Cognition) oriented to;person;place;situation  -       Row Name 04/15/24 1108          " Safety Issues, Functional Mobility    Impairments Affecting Function (Mobility) balance;cognition;coordination;endurance/activity tolerance;grasp;motor control;muscle tone abnormal;range of motion (ROM);postural/trunk control;strength  -               User Key  (r) = Recorded By, (t) = Taken By, (c) = Cosigned By      Initials Name Provider Type    Maria Del Rosario Briceño OT Occupational Therapist                     Mobility/ADL's       Row Name 04/15/24 1109          Bed Mobility    Bed Mobility sit-supine  -     Sit-Supine [place] (Bed Mobility) minimum assist (75% patient effort)  -     Bed Mobility, Safety Issues decreased use of arms for pushing/pulling;decreased use of legs for bridging/pushing  -     Assistive Device (Bed Mobility) bed rails;draw sheet  -       Row Name 04/15/24 1109          Transfers    Transfers sit-stand transfer;stand-sit transfer  -       Row Name 04/15/24 1109          Sit-Stand Transfer    Sit-Stand [place] (Transfers) minimum assist (75% patient effort);moderate assist (50% patient effort);2 person assist  -       Row Name 04/15/24 1109          Stand-Sit Transfer    Stand-Sit [place] (Transfers) minimum assist (75% patient effort);2 person assist  -       Row Name 04/15/24 1109          Toilet Transfer    Type (Toilet Transfer) stand pivot/stand step  -     [place] Level (Toilet Transfer) moderate assist (50% patient effort);2 person assist;verbal cues;nonverbal cues (demo/gesture);minimum assist (75% patient effort)  -     Assistive Device (Toilet Transfer) wheelchair;grab bars/safety frame;raised toilet seat  -               User Key  (r) = Recorded By, (t) = Taken By, (c) = Cosigned By      Initials Name Provider Type    Maria Del Rosario Briceño OT Occupational Therapist                   Obj/Interventions       Row Name 04/15/24 1112          Motor Skills    Motor Skills functional endurance  -     Functional Endurance fair plus  -                User Key  (r) = Recorded By, (t) = Taken By, (c) = Cosigned By      Initials Name Provider Type    Maria Del Rosario Briceño OT Occupational Therapist                   Goals/Plan    No documentation.                  Clinical Impression       Row Name 04/15/24 1112          Pain Assessment    Pretreatment Pain Rating 0/10 - no pain  -     Posttreatment Pain Rating 0/10 - no pain  -KP       Row Name 04/15/24 1112          Plan of Care Review    Plan of Care Reviewed With patient  -     Progress improving  -     Outcome Evaluation Patient seen for OT treatment. She tolerated sitting at edge of bed and transfers with min/mod assist of two handheld due to increased muscle tone on left side and balance. Continue plan of care.  -       Row Name 04/15/24 1112          Positioning and Restraints    Pre-Treatment Position in bed  -     Post Treatment Position bed  -     In Bed fowlers;call light within reach;encouraged to call for assist;exit alarm on  -               User Key  (r) = Recorded By, (t) = Taken By, (c) = Cosigned By      Initials Name Provider Type    Maria Del Rosario Briceño OT Occupational Therapist                   Outcome Measures       Row Name 04/15/24 0956          How much help from another person do you currently need...    Turning from your back to your side while in flat bed without using bedrails? 3  -LH     Moving from lying on back to sitting on the side of a flat bed without bedrails? 3  -LH     Moving to and from a bed to a chair (including a wheelchair)? 2  -LH     Standing up from a chair using your arms (e.g., wheelchair, bedside chair)? 3  -LH     Climbing 3-5 steps with a railing? 1  -LH     To walk in hospital room? 2  -LH     AM-PAC 6 Clicks Score (PT) 14  -     Highest Level of Mobility Goal 4 --> Transfer to chair/commode  -               User Key  (r) = Recorded By, (t) = Taken By, (c) = Cosigned By      Initials Name Provider Type    Nereida Gilman RN Registered  Nurse                    Occupational Therapy Education       Title: PT OT SLP Therapies (Resolved)       Topic: Occupational Therapy (Resolved)       Point: ADL training (Resolved)       Description:   Instruct learner(s) on proper safety adaptation and remediation techniques during self care or transfers.   Instruct in proper use of assistive devices.                  Learner Progress:  Not documented in this visit.              Point: Precautions (Resolved)       Description:   Instruct learner(s) on prescribed precautions during self-care and functional transfers.                  Learner Progress:  Not documented in this visit.                                  OT Recommendation and Plan     Plan of Care Review  Plan of Care Reviewed With: patient  Progress: improving  Outcome Evaluation: Patient seen for OT treatment. She tolerated sitting at edge of bed and transfers with min/mod assist of two handheld due to increased muscle tone on left side and balance. Continue plan of care.     Time Calculation:         Time Calculation- OT       Row Name 04/15/24 1114             Time Calculation- OT    OT Received On 04/15/24  -                User Key  (r) = Recorded By, (t) = Taken By, (c) = Cosigned By      Initials Name Provider Type     Maria Del Rosario Padgett OT Occupational Therapist                  Therapy Charges for Today       Code Description Service Date Service Provider Modifiers Qty    [id number]  OT THERAPEUTIC ACT EA 15 MIN 4/15/2024 Maria Del Rosario Padgett OT GO 1                 Maria Del Rosario Padgett OT  4/15/2024

## 2024-04-16 LAB
BASOPHILS # BLD AUTO: 0.07 10*3/MM3 (ref 0–0.2)
BASOPHILS NFR BLD AUTO: 1.1 % (ref 0–1.5)
DEPRECATED RDW RBC AUTO: 48.4 FL (ref 37–54)
EOSINOPHIL # BLD AUTO: 0.23 10*3/MM3 (ref 0–0.4)
EOSINOPHIL NFR BLD AUTO: 3.6 % (ref 0.3–6.2)
ERYTHROCYTE [DISTWIDTH] IN BLOOD BY AUTOMATED COUNT: 13.8 % (ref 12.3–15.4)
HCT VFR BLD AUTO: 29.2 % (ref 34–46.6)
HGB BLD-MCNC: 9.2 G/DL (ref 12–15.9)
IMM GRANULOCYTES # BLD AUTO: 0.01 10*3/MM3 (ref 0–0.05)
IMM GRANULOCYTES NFR BLD AUTO: 0.2 % (ref 0–0.5)
LYMPHOCYTES # BLD AUTO: 2.07 10*3/MM3 (ref 0.7–3.1)
LYMPHOCYTES NFR BLD AUTO: 32.8 % (ref 19.6–45.3)
MCH RBC QN AUTO: 30.1 PG (ref 26.6–33)
MCHC RBC AUTO-ENTMCNC: 31.5 G/DL (ref 31.5–35.7)
MCV RBC AUTO: 95.4 FL (ref 79–97)
MONOCYTES # BLD AUTO: 0.63 10*3/MM3 (ref 0.1–0.9)
MONOCYTES NFR BLD AUTO: 10 % (ref 5–12)
NEUTROPHILS NFR BLD AUTO: 3.3 10*3/MM3 (ref 1.7–7)
NEUTROPHILS NFR BLD AUTO: 52.3 % (ref 42.7–76)
NRBC BLD AUTO-RTO: 0 /100 WBC (ref 0–0.2)
PLATELET # BLD AUTO: 216 10*3/MM3 (ref 140–450)
PMV BLD AUTO: 9.7 FL (ref 6–12)
RBC # BLD AUTO: 3.06 10*6/MM3 (ref 3.77–5.28)
WBC NRBC COR # BLD AUTO: 6.31 10*3/MM3 (ref 3.4–10.8)

## 2024-04-16 PROCEDURE — 85025 COMPLETE CBC W/AUTO DIFF WBC: CPT | Performed by: INTERNAL MEDICINE

## 2024-04-16 PROCEDURE — 92526 ORAL FUNCTION THERAPY: CPT

## 2024-04-16 PROCEDURE — 25010000002 HEPARIN (PORCINE) PER 1000 UNITS: Performed by: HOSPITALIST

## 2024-04-16 PROCEDURE — 97530 THERAPEUTIC ACTIVITIES: CPT

## 2024-04-16 PROCEDURE — 99231 SBSQ HOSP IP/OBS SF/LOW 25: CPT | Performed by: STUDENT IN AN ORGANIZED HEALTH CARE EDUCATION/TRAINING PROGRAM

## 2024-04-16 PROCEDURE — 92507 TX SP LANG VOICE COMM INDIV: CPT

## 2024-04-16 PROCEDURE — 97116 GAIT TRAINING THERAPY: CPT

## 2024-04-16 RX ADMIN — MIRTAZAPINE 30 MG: 15 TABLET, FILM COATED ORAL at 20:27

## 2024-04-16 RX ADMIN — ATORVASTATIN CALCIUM 80 MG: 40 TABLET, FILM COATED ORAL at 20:27

## 2024-04-16 RX ADMIN — Medication 10 ML: at 20:26

## 2024-04-16 RX ADMIN — OXYBUTYNIN CHLORIDE 10 MG: 5 TABLET, EXTENDED RELEASE ORAL at 09:58

## 2024-04-16 RX ADMIN — DICLOFENAC SODIUM 4 G: 10 GEL TOPICAL at 12:28

## 2024-04-16 RX ADMIN — LOSARTAN POTASSIUM 50 MG: 50 TABLET, FILM COATED ORAL at 09:58

## 2024-04-16 RX ADMIN — Medication 10 ML: at 09:59

## 2024-04-16 RX ADMIN — ROPINIROLE HYDROCHLORIDE 4 MG: 1 TABLET, FILM COATED ORAL at 20:26

## 2024-04-16 RX ADMIN — Medication 1 MG: at 09:58

## 2024-04-16 RX ADMIN — PANTOPRAZOLE SODIUM 40 MG: 40 TABLET, DELAYED RELEASE ORAL at 06:34

## 2024-04-16 RX ADMIN — METOPROLOL TARTRATE 12.5 MG: 25 TABLET, FILM COATED ORAL at 09:58

## 2024-04-16 RX ADMIN — HEPARIN SODIUM 5000 UNITS: 5000 INJECTION INTRAVENOUS; SUBCUTANEOUS at 20:26

## 2024-04-16 RX ADMIN — DICLOFENAC SODIUM 4 G: 10 GEL TOPICAL at 20:26

## 2024-04-16 RX ADMIN — ISOSORBIDE MONONITRATE 30 MG: 30 TABLET, EXTENDED RELEASE ORAL at 09:58

## 2024-04-16 RX ADMIN — DOCUSATE SODIUM 50 MG AND SENNOSIDES 8.6 MG 2 TABLET: 8.6; 5 TABLET, FILM COATED ORAL at 09:58

## 2024-04-16 RX ADMIN — DICLOFENAC SODIUM 4 G: 10 GEL TOPICAL at 09:59

## 2024-04-16 RX ADMIN — GABAPENTIN 200 MG: 100 CAPSULE ORAL at 09:58

## 2024-04-16 RX ADMIN — AMLODIPINE BESYLATE 5 MG: 5 TABLET ORAL at 09:58

## 2024-04-16 RX ADMIN — ACETAMINOPHEN 1000 MG: 500 TABLET ORAL at 16:58

## 2024-04-16 RX ADMIN — DICLOFENAC SODIUM 4 G: 10 GEL TOPICAL at 17:00

## 2024-04-16 RX ADMIN — TICAGRELOR 60 MG: 60 TABLET ORAL at 20:27

## 2024-04-16 RX ADMIN — TICAGRELOR 60 MG: 60 TABLET ORAL at 09:58

## 2024-04-16 RX ADMIN — HEPARIN SODIUM 5000 UNITS: 5000 INJECTION INTRAVENOUS; SUBCUTANEOUS at 09:58

## 2024-04-16 RX ADMIN — ASPIRIN 81 MG: 81 TABLET, CHEWABLE ORAL at 09:58

## 2024-04-16 NOTE — THERAPY TREATMENT NOTE
"Patient Name: Regine Beckham  : 1955    MRN: [id number]                              Today's Date: 2024       Admit Date: 2024    Visit Dx:     ICD-10-CM ICD-9-CM   1. Chest pain, unspecified type  R07.9 786.50     Patient Active Problem List   Diagnosis    Iron deficiency anemia due to chronic blood loss    Major depressive disorder    RLS (restless legs syndrome)    GERD (gastroesophageal reflux disease)    Left breast mass    Allergy to penicillin    Stroke    Grade I diastolic dysfunction    Moderate malnutrition    Falls frequently    Stroke-like symptoms    Debility    Chest pain     Past Medical History:   Diagnosis Date    Anemia     Anxiety     Arthritis     Depression     Legally blind     Restless leg syndrome     Sleep apnea     \"I don't use a cpap anymore since losing 106 lbs\"    Water retention      Past Surgical History:   Procedure Laterality Date    BACK SURGERY      CARDIAC CATHETERIZATION N/A 2024    Procedure: Percutaneous Manual Thrombectomy;  Surgeon: Efrain Hurley MD;  Location:  [facility];  Service: Interventional Radiology;  Laterality: N/A;    CARDIAC CATHETERIZATION N/A 2024    Procedure: Left Heart Cath;  Surgeon: Susan Mederos MD;  Location:  [facility];  Service: Cardiology;  Laterality: N/A;    GALLBLADDER SURGERY      HIP ARTHROSCOPY      JOINT REPLACEMENT Right       General Information       Row Name 24 1128          OT Time and Intention    Document Type therapy note (daily note)  -     Mode of Treatment individual therapy;occupational therapy  -       Row Name 24 1128          General Information    Patient Profile Reviewed yes  -     Existing Precautions/Restrictions fall  -     Barriers to Rehab previous functional deficit  -       Row Name 24 1128          Cognition    Orientation Status (Cognition) oriented to;person;place;situation  -       Row Name 24 1128          " Safety Issues, Functional Mobility    Impairments Affecting Function (Mobility) balance;cognition;coordination;endurance/activity tolerance;grasp;motor control;muscle tone abnormal;range of motion (ROM);postural/trunk control;strength  -KP               User Key  (r) = Recorded By, (t) = Taken By, (c) = Cosigned By      Initials Name Provider Type    Maria Del Rosario Briceño OT Occupational Therapist                     Mobility/ADL's       Row Name 04/16/24 1129          Bed Mobility    Supine-Sit [place] (Bed Mobility) moderate assist (50% patient effort)  -     Assistive Device (Bed Mobility) bed rails;draw sheet  -       Row Name 04/16/24 1129          Transfers    Transfers sit-stand transfer;stand-sit transfer  -       Row Name 04/16/24 1129          Sit-Stand Transfer    Sit-Stand [place] (Transfers) minimum assist (75% patient effort)  -     Comment, (Sit-Stand Transfer) pull to stand, increased assist  -       Row Name 04/16/24 1129          Stand-Sit Transfer    Stand-Sit [place] (Transfers) minimum assist (75% patient effort)  -               User Key  (r) = Recorded By, (t) = Taken By, (c) = Cosigned By      Initials Name Provider Type    Maria Del Rosario Briceño OT Occupational Therapist                   Obj/Interventions       Row Name 04/16/24 1135          Motor Skills    Motor Skills functional endurance  -     Functional Endurance fair  -               User Key  (r) = Recorded By, (t) = Taken By, (c) = Cosigned By      Initials Name Provider Type    Maria Del Rosario Briceño OT Occupational Therapist                   Goals/Plan    No documentation.                  Clinical Impression       Row Name 04/16/24 1136          Pain Assessment    Pretreatment Pain Rating 0/10 - no pain  -KP     Posttreatment Pain Rating 0/10 - no pain  -KP       Row Name 04/16/24 1136          Plan of Care Review    Plan of Care Reviewed With patient  -KP     Progress improving  -     Outcome Evaluation  Patient seen for OT treatment. She was assisted to side of bed and performed sit to stand. Continue plan of care.  -       Row Name 04/16/24 1136          Therapy Plan Review/Discharge Plan (OT)    Anticipated Discharge Disposition (OT) inpatient rehabilitation facility;skilled nursing facility  -       Row Name 04/16/24 1136          Positioning and Restraints    Pre-Treatment Position in bed  -     Post Treatment Position chair  -     In Chair notified nsg;sitting;call light within reach;encouraged to call for assist;exit alarm on  -               User Key  (r) = Recorded By, (t) = Taken By, (c) = Cosigned By      Initials Name Provider Type    Maria Del Rosario Briceño OT Occupational Therapist                   Outcome Measures       Row Name 04/16/24 0718          How much help from another person do you currently need...    Turning from your back to your side while in flat bed without using bedrails? 3  -RD     Moving from lying on back to sitting on the side of a flat bed without bedrails? 3  -RD     Moving to and from a bed to a chair (including a wheelchair)? 2  -RD     Standing up from a chair using your arms (e.g., wheelchair, bedside chair)? 2  -RD     Climbing 3-5 steps with a railing? 2  -RD     To walk in hospital room? 2  -RD     AM-PAC 6 Clicks Score (PT) 14  -RD     Highest Level of Mobility Goal 4 --> Transfer to chair/commode  -RD               User Key  (r) = Recorded By, (t) = Taken By, (c) = Cosigned By      Initials Name Provider Type    Fabienne Antonio, RN Registered Nurse                    Occupational Therapy Education       Title: PT OT SLP Therapies (Resolved)       Topic: Occupational Therapy (Resolved)       Point: ADL training (Resolved)       Description:   Instruct learner(s) on proper safety adaptation and remediation techniques during self care or transfers.   Instruct in proper use of assistive devices.                  Learner Progress:  Not documented in this visit.               Point: Precautions (Resolved)       Description:   Instruct learner(s) on prescribed precautions during self-care and functional transfers.                  Learner Progress:  Not documented in this visit.                                  OT Recommendation and Plan     Plan of Care Review  Plan of Care Reviewed With: patient  Progress: improving  Outcome Evaluation: Patient seen for OT treatment. She was assisted to side of bed and performed sit to stand. Continue plan of care.     Time Calculation:         Time Calculation- OT       Row Name 04/16/24 1137             Time Calculation- OT    OT Received On 04/16/24  -                User Key  (r) = Recorded By, (t) = Taken By, (c) = Cosigned By      Initials Name Provider Type     Maria Del Rosario Padgett OT Occupational Therapist                  Therapy Charges for Today       Code Description Service Date Service Provider Modifiers Qty    [id number]  OT THERAPEUTIC ACT EA 15 MIN 4/15/2024 Maria Del Rosario Padgett OT GO 1    07067871152 HC OT THERAPEUTIC ACT EA 15 MIN 4/16/2024 Maria Del Rosario Padgett OT GO 1                 Maria Del Rosario Padgett OT  4/16/2024

## 2024-04-16 NOTE — PLAN OF CARE
Goal Outcome Evaluation:           Progress: improving       Pt resting in bed at this time, pt was A/O x3 except time at beginning of shift, then gradually started getting confused to place. Pt is on RA. No complaints or distress noted. VSS afebrile. Plan of care ongoing.      Problem: Adult Inpatient Plan of Care  Goal: Plan of Care Review  Outcome: Ongoing, Progressing  Flowsheets  Taken 4/16/2024 0640 by Ivy Moreno RN  Progress: improving  Taken 4/15/2024 1112 by Maria Del Rosario Padgett OT  Plan of Care Reviewed With: patient  Goal: Patient-Specific Goal (Individualized)  Outcome: Ongoing, Progressing  Goal: Absence of Hospital-Acquired Illness or Injury  Outcome: Ongoing, Progressing  Intervention: Identify and Manage Fall Risk  Recent Flowsheet Documentation  Taken 4/16/2024 0100 by Ivy Moreno RN  Safety Promotion/Fall Prevention:   activity supervised   assistive device/personal items within reach   clutter free environment maintained   fall prevention program maintained   nonskid shoes/slippers when out of bed   safety round/check completed   room organization consistent  Taken 4/15/2024 2300 by Ivy Moreno RN  Safety Promotion/Fall Prevention:   activity supervised   assistive device/personal items within reach   clutter free environment maintained   fall prevention program maintained   nonskid shoes/slippers when out of bed   safety round/check completed   room organization consistent  Taken 4/15/2024 2109 by Ivy Moreno RN  Safety Promotion/Fall Prevention:   activity supervised   assistive device/personal items within reach   clutter free environment maintained   fall prevention program maintained   nonskid shoes/slippers when out of bed   safety round/check completed   room organization consistent  Taken 4/15/2024 1900 by Ivy Moreno RN  Safety Promotion/Fall Prevention:   activity supervised   assistive device/personal items within reach   clutter free environment maintained   fall  prevention program maintained   nonskid shoes/slippers when out of bed   safety round/check completed   room organization consistent  Intervention: Prevent Skin Injury  Recent Flowsheet Documentation  Taken 4/15/2024 2109 by Ivy Moreno RN  Body Position:   side-lying   right  Skin Protection: adhesive use limited  Intervention: Prevent and Manage VTE (Venous Thromboembolism) Risk  Recent Flowsheet Documentation  Taken 4/15/2024 2109 by Ivy Moreno RN  Activity Management: bedrest  VTE Prevention/Management: (see MAR) other (see comments)  Range of Motion: ROM (range of motion) performed  Goal: Optimal Comfort and Wellbeing  Outcome: Ongoing, Progressing  Intervention: Provide Person-Centered Care  Recent Flowsheet Documentation  Taken 4/15/2024 2109 by Ivy Moreno RN  Trust Relationship/Rapport:   care explained   choices provided   thoughts/feelings acknowledged  Goal: Readiness for Transition of Care  Outcome: Ongoing, Progressing     Problem: Skin Injury Risk Increased  Goal: Skin Health and Integrity  Outcome: Ongoing, Progressing  Intervention: Promote and Optimize Oral Intake  Recent Flowsheet Documentation  Taken 4/15/2024 2109 by Ivy Moreno RN  Oral Nutrition Promotion: rest periods promoted  Intervention: Optimize Skin Protection  Recent Flowsheet Documentation  Taken 4/15/2024 2109 by Ivy Moreno RN  Pressure Reduction Techniques: frequent weight shift encouraged  Head of Bed (HOB) Positioning: HOB elevated  Pressure Reduction Devices: pressure-redistributing mattress utilized  Skin Protection: adhesive use limited     Problem: Hypertension Comorbidity  Goal: Blood Pressure in Desired Range  Outcome: Ongoing, Progressing  Intervention: Maintain Blood Pressure Management  Recent Flowsheet Documentation  Taken 4/16/2024 0100 by Ivy Moreno RN  Medication Review/Management: medications reviewed  Taken 4/15/2024 2300 by Ivy Moreno RN  Medication Review/Management:  medications reviewed  Taken 4/15/2024 2109 by Ivy Moreno RN  Syncope Management: position changed slowly  Medication Review/Management: medications reviewed  Taken 4/15/2024 1900 by Ivy Moreno RN  Medication Review/Management: medications reviewed     Problem: Fall Injury Risk  Goal: Absence of Fall and Fall-Related Injury  Outcome: Ongoing, Progressing  Intervention: Identify and Manage Contributors  Recent Flowsheet Documentation  Taken 4/16/2024 0100 by Ivy Moreno RN  Medication Review/Management: medications reviewed  Taken 4/15/2024 2300 by Ivy Moreno RN  Medication Review/Management: medications reviewed  Taken 4/15/2024 2109 by Ivy Moreno RN  Medication Review/Management: medications reviewed  Self-Care Promotion: independence encouraged  Taken 4/15/2024 1900 by Ivy Moreno RN  Medication Review/Management: medications reviewed  Intervention: Promote Injury-Free Environment  Recent Flowsheet Documentation  Taken 4/16/2024 0100 by Ivy Moreno RN  Safety Promotion/Fall Prevention:   activity supervised   assistive device/personal items within reach   clutter free environment maintained   fall prevention program maintained   nonskid shoes/slippers when out of bed   safety round/check completed   room organization consistent  Taken 4/15/2024 2300 by Ivy Moreno RN  Safety Promotion/Fall Prevention:   activity supervised   assistive device/personal items within reach   clutter free environment maintained   fall prevention program maintained   nonskid shoes/slippers when out of bed   safety round/check completed   room organization consistent  Taken 4/15/2024 2109 by Ivy Moreno RN  Safety Promotion/Fall Prevention:   activity supervised   assistive device/personal items within reach   clutter free environment maintained   fall prevention program maintained   nonskid shoes/slippers when out of bed   safety round/check completed   room organization consistent  Taken  4/15/2024 1900 by Ivy Moreno, RN  Safety Promotion/Fall Prevention:   activity supervised   assistive device/personal items within reach   clutter free environment maintained   fall prevention program maintained   nonskid shoes/slippers when out of bed   safety round/check completed   room organization consistent

## 2024-04-16 NOTE — PROGRESS NOTES
[facility] HOSPITALIST PROGRESS NOTE     Patient Identification:  Name:  Regine Beckham  Age:  68 y.o.  Sex:  female  :  1955  MRN:  [id number]  Visit Number:  [id number]  ROOM: [address] [place] [address]     Primary Care Provider:  Dread Burton MD    Length of stay in inpatient status:  4    Subjective     Chief Compliant:  No chief complaint on file.      History of Presenting Illness: Patient seen and evaluated in follow-up for chest pain with progressive debility at home with frequent falls and history of stroke with residual left-sided weakness.  Patient time evaluation sitting comfortably in chair and denying any acute complaints other than desired for rehab.  Patient is status post cardiac workup in hospital currently is hospital due to attempting to find placement for physical therapy.    Objective     Current Hospital Meds:  amLODIPine, 5 mg, Oral, Q24H  aspirin, 81 mg, Oral, Daily  atorvastatin, 80 mg, Oral, Nightly  Diclofenac Sodium, 4 g, Topical, 4x Daily  folic acid, 1 mg, Oral, Daily  gabapentin, 200 mg, Oral, Q12H  heparin (porcine), 5,000 Units, Subcutaneous, BID  isosorbide mononitrate, 30 mg, Oral, Q24H  losartan, 50 mg, Oral, Daily  metoprolol tartrate, 12.5 mg, Oral, Q12H  mirtazapine, 30 mg, Oral, Nightly  oxybutynin XL, 10 mg, Oral, Daily  pantoprazole, 40 mg, Oral, Q AM  rOPINIRole, 4 mg, Oral, Nightly  senna-docusate sodium, 2 tablet, Oral, BID  sodium chloride, 10 mL, Intravenous, Q12H  ticagrelor, 60 mg, Oral, BID      Pharmacy Consult,       ----------------------------------------------------------------------------------------------------------------------  Vital Signs:  Temp:  [97.5 °F (36.4 °C)-98.3 °F (36.8 °C)] 98 °F (36.7 °C)  Heart Rate:  [62-73] 72  Resp:  [18] 18  BP: ()/(44-94) 99/52  SpO2:  [97 %] 97 %  on   ;   Device (Oxygen Therapy): room air  Body mass index is 23.63 kg/m².      Intake/Output Summary (Last 24 hours) at 4/15/2024 2009  Last data filed at  "4/15/2024 1700  Gross per 24 hour   Intake 480 ml   Output 2200 ml   Net -1720 ml      ----------------------------------------------------------------------------------------------------------------------  Physical exam:  Constitutional: Elderly adult female sitting up in chair, NAD  HENT:  Head:  Normocephalic and atraumatic.  Mouth:  Moist mucous membranes.    Eyes:  Conjunctivae and EOM are normal. No scleral icterus.    Cardiovascular:  Normal rate, regular rhythm and normal heart sounds with no murmur.  Pulmonary/Chest:  No respiratory distress, no wheezes, no crackles, with normal breath sounds and good air movement.  Abdominal:  Soft.  Bowel sounds are normal.  No distension and no tenderness.   Musculoskeletal:  No tenderness, and no deformity.  No red or swollen joints anywhere.  Functional ROM intact.  Decreased strength in the upper and lower extremities on the left 2-3 out of 5  Neurological:  Alert and oriented to person, place, and time.  No cranial nerve deficit.  No tongue deviation.  No facial droop.  No slurred speech. Intact Sensation throughout  Skin:  Skin is warm and dry. No rash or lesion noted. No pallor.   Peripheral vascular:  Pulses in all 4 extremities with no clubbing, no cyanosis, no edema.  Psychiatric: Appropriate mood and affect, pleasant.   ----------------------------------------------------------------------------------------------------------------------                 No results found for: \"URINECX\"  No results found for: \"BLOODCX\"    I have personally looked at the labs and they are summarized above.  ----------------------------------------------------------------------------------------------------------------------  Detailed radiology reports for the last 24 hours:  No radiology results for the last day  Assessment & Plan      Chest pain  Nonobstructive coronary artery disease    -Patient status post cardiac evaluation including left heart cath with no obstructive disease " found and cardiology recommending medical management.    -Continue aspirin, Brilinta, statin, beta-blocker and ARB    -Initiated on long-acting nitro by cardiology    History of ischemic stroke  Status post left ICA stent  Hypertension  Hyperlipidemia    -Amlodipine increased due to elevated blood pressure    -Continue home statin    Recurrent falls  Progressive debility  Physical deconditioning    - currently working on finding short-term SNF rehab placement.    Copied text in portions of the note has been reviewed and is accurate as of 04/15/24    VTE Prophylaxis:   Mechanical Order History:       None          Pharmalogical Order History:        Ordered     Dose Route Frequency Stop    04/14/24 1442  heparin (porcine) 5000 UNIT/ML injection 5,000 Units         5,000 Units SC 2 Times Daily --    04/12/24 1442  heparin (porcine) injection  Status:  Discontinued         -- -- Code / Trauma / Sedation Medication 04/12/24 1458    04/10/24 1302  heparin 44664 units/250 mL (100 units/mL) in 0.45 % NaCl infusion  6.98 mL/hr,   Status:  Discontinued         11 Units/kg/hr (Dosing Weight) IV Titrated 04/12/24 1614    04/10/24 0455  heparin 47203 units/250 mL (100 units/mL) in 0.45 % NaCl infusion  6.35 mL/hr,   Status:  Discontinued         10 Units/kg/hr IV Titrated 04/10/24 1302    04/09/24 2135  heparin (porcine) 5000 UNIT/ML injection 3,800 Units         60 Units/kg IV Once 04/09/24 2212    04/09/24 2135  heparin 85895 units/250 mL (100 units/mL) in 0.45 % NaCl infusion  7.62 mL/hr,   Status:  Discontinued         12 Units/kg/hr IV Titrated 04/10/24 0455    04/09/24 2048  heparin (porcine) 5000 UNIT/ML injection 5,000 Units  Status:  Discontinued         5,000 Units SC Every 12 Hours Scheduled 04/09/24 2134    04/09/24 2135  heparin (porcine) 5000 UNIT/ML injection 3,200 Units  Status:  Discontinued         50 Units/kg IV As Needed 04/12/24 1614    04/09/24 2135  heparin (porcine) 5000 UNIT/ML injection  1,600 Units  Status:  Discontinued         25 Units/kg IV As Needed 04/12/24 1614    04/09/24 2135  Pharmacy to Dose Heparin  Status:  Discontinued         -- XX Continuous PRN 04/12/24 1614                    Disposition SNF rehab    Narinder Olivier DO  [facility] Hospitalist  04/15/24  20:09 EDT

## 2024-04-16 NOTE — SIGNIFICANT NOTE
As Ms. Beckham is stable from a cardiac standpoint, we will sign off.  Thank you for allowing us to participate in her care.  Please let us know if we can be of further assistance.    Electronically signed by NIKKIE Banda, 04/16/24, 8:07 AM EDT.

## 2024-04-16 NOTE — THERAPY TREATMENT NOTE
"Acute Care - Physical Therapy Treatment Note   [facility]     Patient Name: Regine Beckham  : 1955  MRN: [id number]  Today's Date: 2024   Onset of Illness/Injury or Date of Surgery: 24 (admission date)  Visit Dx:     ICD-10-CM ICD-9-CM   1. Chest pain, unspecified type  R07.9 786.50     Patient Active Problem List   Diagnosis    Iron deficiency anemia due to chronic blood loss    Major depressive disorder    RLS (restless legs syndrome)    GERD (gastroesophageal reflux disease)    Left breast mass    Allergy to penicillin    Stroke    Grade I diastolic dysfunction    Moderate malnutrition    Falls frequently    Stroke-like symptoms    Debility    Chest pain     Past Medical History:   Diagnosis Date    Anemia     Anxiety     Arthritis     Depression     Legally blind     Restless leg syndrome     Sleep apnea     \"I don't use a cpap anymore since losing 106 lbs\"    Water retention      Past Surgical History:   Procedure Laterality Date    BACK SURGERY      CARDIAC CATHETERIZATION N/A 2024    Procedure: Percutaneous Manual Thrombectomy;  Surgeon: Efrain Hurley MD;  Location:  [facility];  Service: Interventional Radiology;  Laterality: N/A;    CARDIAC CATHETERIZATION N/A 2024    Procedure: Left Heart Cath;  Surgeon: Susan Mederos MD;  Location:  [facility];  Service: Cardiology;  Laterality: N/A;    GALLBLADDER SURGERY      HIP ARTHROSCOPY      JOINT REPLACEMENT Right      PT Assessment (Last 12 Hours)       PT Evaluation and Treatment       Row Name 24 1130          Physical Therapy Time and Intention    Document Type therapy note (daily note)  -KM     Mode of Treatment physical therapy  -KM     Patient Effort good  -KM       Row Name 24 1130          General Information    Patient Profile Reviewed yes  -KM     Patient Observations alert;cooperative;agree to therapy  -KM     Existing Precautions/Restrictions fall  -KM       Row Name " 04/16/24 1130          Cognition    Affect/Mental Status (Cognition) WFL  -KM     Follows Commands (Cognition) WFL  -KM       Row Name 04/16/24 1130          Bed Mobility    Bed Mobility supine-sit  -KM     Supine-Sit [place] (Bed Mobility) minimum assist (75% patient effort);nonverbal cues (demo/gesture);verbal cues  -KM     Assistive Device (Bed Mobility) bed rails;draw sheet  -KM       Row Name 04/16/24 1130          Transfers    Transfers sit-stand transfer;stand-sit transfer;bed-chair transfer  -KM       Row Name 04/16/24 1130          Bed-Chair Transfer    Bed-Chair [place] (Transfers) minimum assist (75% patient effort);verbal cues  -KM     Assistive Device (Bed-Chair Transfers) --  HHA  -KM       Row Name 04/16/24 1130          Sit-Stand Transfer    Sit-Stand [place] (Transfers) minimum assist (75% patient effort);verbal cues  -KM     Assistive Device (Sit-Stand Transfers) --  HHA  -KM       Row Name 04/16/24 1130          Stand-Sit Transfer    Stand-Sit [place] (Transfers) minimum assist (75% patient effort);verbal cues  -KM     Assistive Device (Stand-Sit Transfers) --  HHA  -KM       Row Name 04/16/24 1130          Gait/Stairs (Locomotion)    Gait/Stairs Locomotion gait/ambulation independence;distance ambulated  -KM     [place] Level (Gait) verbal cues;contact guard;minimum assist (75% patient effort)  -KM     Assistive Device (Gait) --  HHA/handrail  -KM     Patient was able to Ambulate yes  -KM     Distance in Feet (Gait) 80  x2  -KM     Pattern (Gait) step-through;step-to  -KM     Deviations/Abnormal Patterns (Gait) base of support, narrow;gait speed decreased;stride length decreased  -KM     Bilateral Gait Deviations forward flexed posture  -KM     Left Sided Gait Deviations weight shift ability decreased  -KM       Row Name 04/16/24 1130          Safety Issues, Functional Mobility    Impairments Affecting Function (Mobility) balance;cognition;coordination;endurance/activity  tolerance;grasp;motor control;muscle tone abnormal;range of motion (ROM);postural/trunk control;strength  -KM       Row Name 04/16/24 1130          Progress Summary (PT)    Daily Progress Summary (PT) Pt. was able to perform functional mobility skills w/ Angela. She was able to ambulate multiple moderate distances w/ HHA and handrail. She tolerated session well w/ no complaints. Pt. would continue to benefit from skilled PT services.  -KM               User Key  (r) = Recorded By, (t) = Taken By, (c) = Cosigned By      Initials Name Provider Type    Eugene Angel PT Physical Therapist                    Physical Therapy Education       Title: PT OT SLP Therapies (Resolved)       Topic: Physical Therapy (Resolved)       Point: Mobility training (Resolved)       Learner Progress:  Not documented in this visit.              Point: Home exercise program (Resolved)       Learner Progress:  Not documented in this visit.              Point: Body mechanics (Resolved)       Learner Progress:  Not documented in this visit.              Point: Precautions (Resolved)       Learner Progress:  Not documented in this visit.                                  PT Recommendation and Plan     Progress Summary (PT)  Daily Progress Summary (PT): Pt. was able to perform functional mobility skills w/ Angela. She was able to ambulate multiple moderate distances w/ HHA and handrail. She tolerated session well w/ no complaints. Pt. would continue to benefit from skilled PT services.       Time Calculation:    PT Charges       Row Name 04/16/24 1129             Time Calculation    PT Received On 04/16/24  -KM         Time Calculation- PT    Total Timed Code Minutes- PT 23 minute(s)  -KM                User Key  (r) = Recorded By, (t) = Taken By, (c) = Cosigned By      Initials Name Provider Type    Eugene Angel PT Physical Therapist                  Therapy Charges for Today       Code Description Service Date Service Provider Modifiers  Qty    [id number]  PT THERAPEUTIC ACT EA 15 MIN 4/16/2024 Eugene Cuevas, PT GP 1    [id number] HC GAIT TRAINING EA 15 MIN 4/16/2024 Eugene Cuevas, PT GP 1            PT G-Jaqui  AM-PAC 6 Clicks Score (PT): 14    Eugnee Cuevas, PT  4/16/2024

## 2024-04-16 NOTE — PROGRESS NOTES
[facility] HOSPITALIST PROGRESS NOTE     Patient Identification:  Name:  Regine Beckham  Age:  68 y.o.  Sex:  female  :  1955  MRN:  [id number]  Visit Number:  [id number]  ROOM: [address] [place] [address]     Primary Care Provider:  Dread Burton MD    Length of stay in inpatient status:  5    Subjective     Chief Compliant:  No chief complaint on file.      History of Presenting Illness: Patient seen and evaluated in follow-up for chest pain with progressive debility at home with frequent falls and history of stroke with residual left-sided weakness.  Patient at time of evaluation resting comfortably bed in no distress.  Patient remains awaiting short-term SNF placement for physical therapy.  Objective     Current Hospital Meds:  amLODIPine, 5 mg, Oral, Q24H  aspirin, 81 mg, Oral, Daily  atorvastatin, 80 mg, Oral, Nightly  Diclofenac Sodium, 4 g, Topical, 4x Daily  folic acid, 1 mg, Oral, Daily  gabapentin, 200 mg, Oral, Q12H  heparin (porcine), 5,000 Units, Subcutaneous, BID  isosorbide mononitrate, 30 mg, Oral, Q24H  losartan, 50 mg, Oral, Daily  metoprolol tartrate, 12.5 mg, Oral, Q12H  mirtazapine, 30 mg, Oral, Nightly  oxybutynin XL, 10 mg, Oral, Daily  pantoprazole, 40 mg, Oral, Q AM  rOPINIRole, 4 mg, Oral, Nightly  senna-docusate sodium, 2 tablet, Oral, BID  sodium chloride, 10 mL, Intravenous, Q12H  ticagrelor, 60 mg, Oral, BID      Pharmacy Consult,       ----------------------------------------------------------------------------------------------------------------------  Vital Signs:  Temp:  [97 °F (36.1 °C)-98.4 °F (36.9 °C)] 97.3 °F (36.3 °C)  Heart Rate:  [66-80] 67  Resp:  [16-18] 18  BP: ()/(55-60) 92/60  SpO2:  [96 %-99 %] 96 %  on  Flow (L/min):  [0] 0;   Device (Oxygen Therapy): room air  Body mass index is 23.73 kg/m².      Intake/Output Summary (Last 24 hours) at 2024 1935  Last data filed at 2024 1600  Gross per 24 hour   Intake 1200 ml   Output 300 ml   Net 900 ml  "     ----------------------------------------------------------------------------------------------------------------------  Physical exam:  Constitutional: Elderly adult female sitting up in chair, NAD  HENT:  Head:  Normocephalic and atraumatic.  Mouth:  Moist mucous membranes.    Eyes:  Conjunctivae and EOM are normal. No scleral icterus.    Cardiovascular:  Normal rate, regular rhythm and normal heart sounds with no murmur.  Pulmonary/Chest:  No respiratory distress, no wheezes, no crackles, with normal breath sounds and good air movement.  Abdominal:  Soft.  Bowel sounds are normal.  No distension and no tenderness.   Musculoskeletal:  No tenderness, and no deformity.  No red or swollen joints anywhere.  Functional ROM intact.  Decreased strength in the upper and lower extremities on the left 2-3 out of 5  Neurological:  Alert and oriented to person, place, and time.  No cranial nerve deficit.  No tongue deviation.  No facial droop.  No slurred speech. Intact Sensation throughout  Skin:  Skin is warm and dry. No rash or lesion noted. No pallor.   Peripheral vascular:  Pulses in all 4 extremities with no clubbing, no cyanosis, no edema.  Psychiatric: Appropriate mood and affect, pleasant.   ----------------------------------------------------------------------------------------------------------------------  WBC/HGB/HCT/PLT   6.31/9.2/29.2/216 (04/16 0057)              No results found for: \"URINECX\"  No results found for: \"BLOODCX\"    I have personally looked at the labs and they are summarized above.  ----------------------------------------------------------------------------------------------------------------------  Detailed radiology reports for the last 24 hours:  No radiology results for the last day  Assessment & Plan      Chest pain  Nonobstructive coronary artery disease    -Patient status post cardiac evaluation including left heart cath with no obstructive disease found and cardiology recommending " medical management.    -Continue aspirin, Brilinta, statin, beta-blocker and ARB    -Initiated on long-acting nitro by cardiology    History of ischemic stroke  Status post left ICA stent  Hypertension  Hyperlipidemia    -Amlodipine increased due to elevated blood pressure    -Continue home statin    Recurrent falls  Progressive debility  Physical deconditioning    - currently working on finding short-term SNF rehab placement.    Copied text in portions of the note has been reviewed and is accurate as of 04/16/24    VTE Prophylaxis:   Mechanical Order History:       None          Pharmalogical Order History:        Ordered     Dose Route Frequency Stop    04/14/24 1442  heparin (porcine) 5000 UNIT/ML injection 5,000 Units         5,000 Units SC 2 Times Daily --    04/12/24 1442  heparin (porcine) injection  Status:  Discontinued         -- -- Code / Trauma / Sedation Medication 04/12/24 1458    04/10/24 1302  heparin 45304 units/250 mL (100 units/mL) in 0.45 % NaCl infusion  6.98 mL/hr,   Status:  Discontinued         11 Units/kg/hr (Dosing Weight) IV Titrated 04/12/24 1614    04/10/24 0455  heparin 30238 units/250 mL (100 units/mL) in 0.45 % NaCl infusion  6.35 mL/hr,   Status:  Discontinued         10 Units/kg/hr IV Titrated 04/10/24 1302    04/09/24 2135  heparin (porcine) 5000 UNIT/ML injection 3,800 Units         60 Units/kg IV Once 04/09/24 2212    04/09/24 2135  heparin 16205 units/250 mL (100 units/mL) in 0.45 % NaCl infusion  7.62 mL/hr,   Status:  Discontinued         12 Units/kg/hr IV Titrated 04/10/24 0455    04/09/24 2048  heparin (porcine) 5000 UNIT/ML injection 5,000 Units  Status:  Discontinued         5,000 Units SC Every 12 Hours Scheduled 04/09/24 2134    04/09/24 2135  heparin (porcine) 5000 UNIT/ML injection 3,200 Units  Status:  Discontinued         50 Units/kg IV As Needed 04/12/24 1614    04/09/24 2135  heparin (porcine) 5000 UNIT/ML injection 1,600 Units  Status:  Discontinued          25 Units/kg IV As Needed 04/12/24 1614    04/09/24 2133  Pharmacy to Dose Heparin  Status:  Discontinued         -- XX Continuous PRN 04/12/24 1614                    Disposition SNF rehab    Narinder Oliiver DO  [facility] Hospitalist  04/16/24  19:35 EDT

## 2024-04-16 NOTE — CASE MANAGEMENT/SOCIAL WORK
Discharge Planning Assessment   [place]     Patient Name: Regine Beckham  MRN: [id number]  Today's Date: 4/16/2024    Admit Date: 4/9/2024    Plan: SS followed up with [facility] per [facility] on this date who states she did not received referral yesterday afternoon. SS sent referral via email again and notified Patricia.  to follow.       Discharge Plan       Row Name 04/16/24 1105       Plan    Plan SS followed up with [facility] per [facility] on this date who states she did not received referral yesterday afternoon. SS sent referral via email again and notified Patricia. SS to follow.      1330: SS followed up with [facility] per [facility] who states referral has been sent for review by [facility] at this time.         PAULINO Lombardo

## 2024-04-16 NOTE — THERAPY TREATMENT NOTE
Acute Care - Speech Language Pathology   Swallow Treatment Note [facility]     Patient Name: Regine Beckham  : 1955  MRN: [id number]  Today's Date: 2024  Onset of Illness/Injury or Date of Surgery: 24 (admission date)          Admit Date: 2024      DYSARTHRIA AND COGNITIVE THERAPY PLAN OF CARE:     Regine Beckham was seen this pm for formal therapy tasks.      Long Term Goal:  Patient will demonstrate functional speech skills for return to discharge environment.   Patient will demonstrate functional cognitive skills for return to discharge environment.     Short Term Goals:  1. Patient will tolerate facial massage to left facial surface for 3-7 minutes to increase surface blood flow, sensation and ROM over 3 consecutive sessions.   *facial massage tolerated for 5 min w/ mild increase in surface blood flow.      2. Patient will perform OROM/GRACE exercises x3 sets x10 reps w/ min cues.  *x2 sets x5 reps     3. Patient will maintain vocal intensity at greater than 60dB across 4+ word sentences in 90% of opp over three consecutive sessions.   *vocal intensity does not increase in conversational exchanges following SLP verbal cues.      4. Patient will over-articulate 3+ syllable words and in connected speech in 90% opp to improve intelligibility over three consecutive sessions.   *demonstrates over-articulation of 3+ syllable words following SLP verbal cues. She does not demonstrate carryover to conversational exchanges.      5. Patient will decrease rate of speech in connected speech in 90% of opp to improve intelligibility over three consecutive sessions.      6. Patient will demonstrate accurate orientation to time and location in 90% of opp independently over three consecutive sessions.  *pt oriented to time and location in 100% opp this date. She is noted w/ confusion related to members of staff.      7. Patient will perform divergent naming tasks of 8+ items named in 1 min w/ min cues  "over three consecutive sessions.   *   5 items named in 1 minute this date.     8. Patient will perform rapid alternating speech tasks w/ min cues over three consecutive sessions.     9. Patient will perform sustained vowel prolongations of 5 sec duration over 15 reps.   She declines this task     10. Patient will perform inhalations/exhalations via resistive breather x4 sets x15 reps.   X2 sets x10 reps.     *Additional goals to be added/modified per pt progress toward goals.        Visit Dx:     ICD-10-CM ICD-9-CM   1. Chest pain, unspecified type  R07.9 786.50     Patient Active Problem List   Diagnosis    Iron deficiency anemia due to chronic blood loss    Major depressive disorder    RLS (restless legs syndrome)    GERD (gastroesophageal reflux disease)    Left breast mass    Allergy to penicillin    Stroke    Grade I diastolic dysfunction    Moderate malnutrition    Falls frequently    Stroke-like symptoms    Debility    Chest pain     Past Medical History:   Diagnosis Date    Anemia     Anxiety     Arthritis     Depression     Legally blind     Restless leg syndrome     Sleep apnea     \"I don't use a cpap anymore since losing 106 lbs\"    Water retention      Past Surgical History:   Procedure Laterality Date    BACK SURGERY      CARDIAC CATHETERIZATION N/A 1/19/2024    Procedure: Percutaneous Manual Thrombectomy;  Surgeon: Efrain Hurley MD;  Location:  [facility];  Service: Interventional Radiology;  Laterality: N/A;    CARDIAC CATHETERIZATION N/A 4/12/2024    Procedure: Left Heart Cath;  Surgeon: Susan Mederos MD;  Location:  [facility];  Service: Cardiology;  Laterality: N/A;    GALLBLADDER SURGERY      HIP ARTHROSCOPY      JOINT REPLACEMENT Right        SLP Recommendation and Plan      Continue per PAC.       Thank you-   Azalia Elizondo M.S., CCC-SLP         EDUCATION  The patient has been educated in the following areas:   Cognitive Impairment Communication " Impairment.        Time Calculation:         Therapy Charges for Today       Code Description Service Date Service Provider Modifiers Qty    81614920242 HC ST TREATMENT SPEECH 2 4/15/2024 Azalia Elizondo, MS CCC-SLP GN 1    91814510806 HC ST TREATMENT SWALLOW 2 4/15/2024 Azalia Elizondo, MS CCC-SLP GN 1                 Azalia Elizondo, MS CCC-SLP  2024   and Acute Care - Speech Language Pathology Treatment Note  [facility]     Patient Name: Regine Beckham  : 1955  MRN: [id number]  Today's Date: 2024  Onset of Illness/Injury or Date of Surgery: 24 (admission date)            Admit Date: 2024    DYSPHAGIA THERAPY PLAN OF CARE:     Regine Beckham was seen this pm for formal therapy tasks.       Long Term Goal:  Patient will accept least restrictive diet tolerance w/o overt s/s aspiration.      Short Term Goals:  1. Patient will tolerate facial massage to LEFT facial surface for 3-7 minutes to increase surface blood flow, sensation and ROM over 3 consecutive sessions.   *facial massage tolerated for 5 min w/ mild increase in surface blood flow.      2. Patient will perform OROM/GRACE exercises x3 sets x10 reps w/ min cues.   x2 sets x5 reps this date     3. Patient will demonstrate increase labial sensation/afferent drive per pt report in 90% of opp over three consecutive sessions.   She reports increase in sensation       4. Patient will increase lingual control, coordination, movement as evidenced by bolus manipulation in 90% opp independently over three consecutive sessions.         5. Patient will participate in a clinical re-evaluation of swallowing fnx in 7-10 days, pending progress towards this poc.       Visit Dx:    ICD-10-CM ICD-9-CM   1. Chest pain, unspecified type  R07.9 786.50     Patient Active Problem List   Diagnosis    Iron deficiency anemia due to chronic blood loss    Major depressive disorder    RLS (restless legs syndrome)    GERD (gastroesophageal reflux disease)    Left breast  "mass    Allergy to penicillin    Stroke    Grade I diastolic dysfunction    Moderate malnutrition    Falls frequently    Stroke-like symptoms    Debility    Chest pain     Past Medical History:   Diagnosis Date    Anemia     Anxiety     Arthritis     Depression     Legally blind     Restless leg syndrome     Sleep apnea     \"I don't use a cpap anymore since losing 106 lbs\"    Water retention      Past Surgical History:   Procedure Laterality Date    BACK SURGERY      CARDIAC CATHETERIZATION N/A 1/19/2024    Procedure: Percutaneous Manual Thrombectomy;  Surgeon: Efrain Hurley MD;  Location:  [facility];  Service: Interventional Radiology;  Laterality: N/A;    CARDIAC CATHETERIZATION N/A 4/12/2024    Procedure: Left Heart Cath;  Surgeon: Susan Mederos MD;  Location:  [facility];  Service: Cardiology;  Laterality: N/A;    GALLBLADDER SURGERY      HIP ARTHROSCOPY      JOINT REPLACEMENT Right        SLP Recommendation and Plan      1. Continue per PAC.       Thank you-  Azalia Elizondo M.S., CCC-SLP       EDUCATION  The patient has been educated in the following areas:     Dysphagia (Swallowing Impairment).        Time Calculation:           Therapy Charges for Today       Code Description Service Date Service Provider Modifiers Qty    34222899682 HC ST TREATMENT SPEECH 2 4/15/2024 Azalia Elizondo MS CCC-SLP GN 1    51774237553 HC ST TREATMENT SWALLOW 2 4/15/2024 Azalia Elizondo MS CCC-SLP GN 1              Azalia Elizondo MS CCC-SLP  4/16/2024  "

## 2024-04-17 PROCEDURE — 97112 NEUROMUSCULAR REEDUCATION: CPT

## 2024-04-17 PROCEDURE — 97116 GAIT TRAINING THERAPY: CPT

## 2024-04-17 PROCEDURE — 97110 THERAPEUTIC EXERCISES: CPT

## 2024-04-17 PROCEDURE — 97530 THERAPEUTIC ACTIVITIES: CPT

## 2024-04-17 PROCEDURE — 25010000002 HEPARIN (PORCINE) PER 1000 UNITS: Performed by: HOSPITALIST

## 2024-04-17 PROCEDURE — 99231 SBSQ HOSP IP/OBS SF/LOW 25: CPT | Performed by: STUDENT IN AN ORGANIZED HEALTH CARE EDUCATION/TRAINING PROGRAM

## 2024-04-17 RX ADMIN — Medication 10 ML: at 22:08

## 2024-04-17 RX ADMIN — ACETAMINOPHEN 650 MG: 325 TABLET ORAL at 14:40

## 2024-04-17 RX ADMIN — ACETAMINOPHEN 1000 MG: 500 TABLET ORAL at 05:55

## 2024-04-17 RX ADMIN — TICAGRELOR 60 MG: 60 TABLET ORAL at 08:27

## 2024-04-17 RX ADMIN — GABAPENTIN 200 MG: 100 CAPSULE ORAL at 22:08

## 2024-04-17 RX ADMIN — OXYBUTYNIN CHLORIDE 10 MG: 5 TABLET, EXTENDED RELEASE ORAL at 08:28

## 2024-04-17 RX ADMIN — GABAPENTIN 200 MG: 100 CAPSULE ORAL at 08:41

## 2024-04-17 RX ADMIN — DICLOFENAC SODIUM 4 G: 10 GEL TOPICAL at 17:42

## 2024-04-17 RX ADMIN — METOPROLOL TARTRATE 12.5 MG: 25 TABLET, FILM COATED ORAL at 08:27

## 2024-04-17 RX ADMIN — AMLODIPINE BESYLATE 5 MG: 5 TABLET ORAL at 08:27

## 2024-04-17 RX ADMIN — Medication 1 MG: at 08:28

## 2024-04-17 RX ADMIN — ASPIRIN 81 MG: 81 TABLET, CHEWABLE ORAL at 08:28

## 2024-04-17 RX ADMIN — ROPINIROLE HYDROCHLORIDE 4 MG: 1 TABLET, FILM COATED ORAL at 22:09

## 2024-04-17 RX ADMIN — HEPARIN SODIUM 5000 UNITS: 5000 INJECTION INTRAVENOUS; SUBCUTANEOUS at 08:27

## 2024-04-17 RX ADMIN — MIRTAZAPINE 30 MG: 15 TABLET, FILM COATED ORAL at 23:21

## 2024-04-17 RX ADMIN — ATORVASTATIN CALCIUM 80 MG: 40 TABLET, FILM COATED ORAL at 22:09

## 2024-04-17 RX ADMIN — TICAGRELOR 60 MG: 60 TABLET ORAL at 22:09

## 2024-04-17 RX ADMIN — METOPROLOL TARTRATE 12.5 MG: 25 TABLET, FILM COATED ORAL at 22:09

## 2024-04-17 RX ADMIN — Medication 10 ML: at 09:44

## 2024-04-17 RX ADMIN — ISOSORBIDE MONONITRATE 30 MG: 30 TABLET, EXTENDED RELEASE ORAL at 08:27

## 2024-04-17 RX ADMIN — HEPARIN SODIUM 5000 UNITS: 5000 INJECTION INTRAVENOUS; SUBCUTANEOUS at 22:08

## 2024-04-17 RX ADMIN — PANTOPRAZOLE SODIUM 40 MG: 40 TABLET, DELAYED RELEASE ORAL at 05:55

## 2024-04-17 RX ADMIN — LOSARTAN POTASSIUM 50 MG: 50 TABLET, FILM COATED ORAL at 08:28

## 2024-04-17 RX ADMIN — DICLOFENAC SODIUM 4 G: 10 GEL TOPICAL at 08:30

## 2024-04-17 RX ADMIN — DICLOFENAC SODIUM 4 G: 10 GEL TOPICAL at 22:10

## 2024-04-17 NOTE — PLAN OF CARE
Goal Outcome Evaluation:      Patient resting in bed. Patient complaints of left shoulder pain, medicated per MAR. VSS. Will continue to follow plan of care.

## 2024-04-17 NOTE — SIGNIFICANT NOTE
Upon SLP presentation to room, Ms Beckham has recently met w/  and reports she is planning to go home tomorrow. She further reports that she feels her speech and oral dysphagia is back to her premorbid baseline at this time and requests to not have any further speech therapy services at discharge.     Per this, SLP to sign off at this time.     Thank you for allowing me to participate in the care of your patient-  Azalia Elizondo M.S., CCC-SLP

## 2024-04-17 NOTE — CASE MANAGEMENT/SOCIAL WORK
Discharge Planning Assessment   [facility]     Patient Name: Regine Beckham  MRN: [id number]  Today's Date: 4/17/2024    Admit Date: 4/9/2024    Plan: SS followed up with [facility] per [facility] on this date who states pt has used all of her skilled days and pt would be in her co pay days and would have to pay $203.00 at day at the NH. SS to follow up with pt at bedside to provide and update. SS to follow.     Discharge Plan       Row Name 04/17/24 1433       Plan    Plan SS followed up with [facility] per [facility] on this date who states pt has used all of her skilled days and pt would be in her co pay days and would have to pay $203.00 at day at the NH. SS to follow up with pt at bedside to provide and update. SS to follow.    1520: SS met with pt at bedside on this date who states who states she is not agreeable to pay a bill at the NH and requests to return home at discharge. Pt states she has bedside commode and shower chair from [facility]. Pt states she would like a wheelchair or rolling walker to be ordered and also requests for Home Health to be ordered on date of discharge. Pt states she lives in [facility] and has no preference of DME provider or home health agency at this time. Pt states she will have a friend that will be able to provide transportation for discharge. SS notified physician.         PAULINO Lombardo

## 2024-04-17 NOTE — THERAPY TREATMENT NOTE
"Acute Care - Physical Therapy Treatment Note   [facility]     Patient Name: Regine Beckham  : 1955  MRN: [id number]  Today's Date: 2024   Onset of Illness/Injury or Date of Surgery: 24 (admission date)  Visit Dx:     ICD-10-CM ICD-9-CM   1. Chest pain, unspecified type  R07.9 786.50     Patient Active Problem List   Diagnosis    Iron deficiency anemia due to chronic blood loss    Major depressive disorder    RLS (restless legs syndrome)    GERD (gastroesophageal reflux disease)    Left breast mass    Allergy to penicillin    Stroke    Grade I diastolic dysfunction    Moderate malnutrition    Falls frequently    Stroke-like symptoms    Debility    Chest pain     Past Medical History:   Diagnosis Date    Anemia     Anxiety     Arthritis     Depression     Legally blind     Restless leg syndrome     Sleep apnea     \"I don't use a cpap anymore since losing 106 lbs\"    Water retention      Past Surgical History:   Procedure Laterality Date    BACK SURGERY      CARDIAC CATHETERIZATION N/A 2024    Procedure: Percutaneous Manual Thrombectomy;  Surgeon: Efrain Hurley MD;  Location:  [facility];  Service: Interventional Radiology;  Laterality: N/A;    CARDIAC CATHETERIZATION N/A 2024    Procedure: Left Heart Cath;  Surgeon: Susan Mederos MD;  [facility];  Service: Cardiology;  Laterality: N/A;    GALLBLADDER SURGERY      HIP ARTHROSCOPY      JOINT REPLACEMENT Right      PT Assessment (Last 12 Hours)       PT Evaluation and Treatment       Row Name 24 1134          Physical Therapy Time and Intention    Document Type therapy note (daily note)  -KM     Mode of Treatment individual therapy;physical therapy  -KM     Patient Effort good  -KM       Row Name 24 1368          General Information    Patient Profile Reviewed yes  -KM     Patient Observations alert;cooperative;agree to therapy  -KM     Existing Precautions/Restrictions fall  " -KM       Row Name 04/17/24 1134          Cognition    Affect/Mental Status (Cognition) WFL  -KM     Follows Commands (Cognition) WFL  -KM       Row Name 04/17/24 1134          Bed Mobility    Bed Mobility supine-sit  -KM     Supine-Sit Cotton (Bed Mobility) minimum assist (75% patient effort);nonverbal cues (demo/gesture);verbal cues  -KM     Assistive Device (Bed Mobility) bed rails;draw sheet  -       Row Name 04/17/24 1134          Transfers    Transfers sit-stand transfer;stand-sit transfer;bed-chair transfer  -       Row Name 04/17/24 1134          Bed-Chair Transfer    Bed-Chair Cotton (Transfers) minimum assist (75% patient effort);verbal cues  -KM     Assistive Device (Bed-Chair Transfers) --  HHA  -KM       Row Name 04/17/24 1134          Sit-Stand Transfer    Sit-Stand Cotton (Transfers) minimum assist (75% patient effort);verbal cues  -KM     Assistive Device (Sit-Stand Transfers) --  A  -KM     Comment, (Sit-Stand Transfer) x3  -KM       Row Name 04/17/24 1134          Stand-Sit Transfer    Stand-Sit Cotton (Transfers) minimum assist (75% patient effort);verbal cues  -KM     Assistive Device (Stand-Sit Transfers) --  A  -KM     Comment, (Stand-Sit Transfer) x3  -KM       Row Name 04/17/24 1134          Gait/Stairs (Locomotion)    Gait/Stairs Locomotion gait/ambulation independence;distance ambulated  -KM     Cotton Level (Gait) verbal cues;contact guard;minimum assist (75% patient effort)  -KM     Assistive Device (Gait) --  HHA/handrail  -KM     Patient was able to Ambulate yes  -KM     Distance in Feet (Gait) 80  x2  -KM     Pattern (Gait) step-through;step-to  -KM     Deviations/Abnormal Patterns (Gait) base of support, narrow;gait speed decreased;stride length decreased  -KM     Bilateral Gait Deviations forward flexed posture  -KM     Left Sided Gait Deviations weight shift ability decreased  -KM       Row Name 04/17/24 1134          Safety Issues, Functional  Mobility    Impairments Affecting Function (Mobility) balance;cognition;coordination;endurance/activity tolerance;grasp;motor control;muscle tone abnormal;range of motion (ROM);postural/trunk control;strength  -       Row Name 04/17/24 1134          Motor Skills    Comments, Therapeutic Exercise seated ther-ex  -       Row Name 04/17/24 1134          Progress Summary (PT)    Daily Progress Summary (PT) Pt. continues to demonstrate functional mobility skills w/ Angela. She was able to ambulate moderate distances w/ HHA. She tolerated session well. Pt. sitting in chair w/ chair alarm activated prior to session ending. Pt. would continue to benefit from skilled PT services.  -               User Key  (r) = Recorded By, (t) = Taken By, (c) = Cosigned By      Initials Name Provider Type    Eugene Angel, BRUCE Physical Therapist                    Physical Therapy Education       Title: PT OT SLP Therapies (Resolved)       Topic: Physical Therapy (Resolved)       Point: Mobility training (Resolved)       Learner Progress:  Not documented in this visit.              Point: Home exercise program (Resolved)       Learner Progress:  Not documented in this visit.              Point: Body mechanics (Resolved)       Learner Progress:  Not documented in this visit.              Point: Precautions (Resolved)       Learner Progress:  Not documented in this visit.                                  PT Recommendation and Plan     Progress Summary (PT)  Daily Progress Summary (PT): Pt. continues to demonstrate functional mobility skills w/ Angela. She was able to ambulate moderate distances w/ HHA. She tolerated session well. Pt. sitting in chair w/ chair alarm activated prior to session ending. Pt. would continue to benefit from skilled PT services.       Time Calculation:    PT Charges       Row Name 04/17/24 1133             Time Calculation    PT Received On 04/17/24  -         Time Calculation- PT    Total Timed Code Minutes-  PT 38 minute(s)  -IRENE                User Key  (r) = Recorded By, (t) = Taken By, (c) = Cosigned By      Initials Name Provider Type    Eugene Angel, PT Physical Therapist                  Therapy Charges for Today       Code Description Service Date Service Provider Modifiers Qty    11510917597 HC PT THERAPEUTIC ACT EA 15 MIN 4/16/2024 Eugene Cuevas, PT GP 1    24591131342 HC GAIT TRAINING EA 15 MIN 4/16/2024 Eugene Cuevas, PT GP 1    60105232595 HC GAIT TRAINING EA 15 MIN 4/17/2024 Eugene Cuevas, PT GP 1    98403911721 HC PT THERAPEUTIC ACT EA 15 MIN 4/17/2024 Eugene Cuevas, PT GP 1    19683052613 HC PT THER PROC EA 15 MIN 4/17/2024 Eugene Cuevas, PT GP 1            PT G-Codes  AM-PAC 6 Clicks Score (PT): 16    Eugene Cuevas, PT  4/17/2024

## 2024-04-17 NOTE — PLAN OF CARE
Goal Outcome Evaluation:   Pt is currently resting in bed. VSS with no complaints or S/S of distress. Will continue to plan of care.

## 2024-04-17 NOTE — THERAPY DISCHARGE NOTE
Acute Care - Speech Language Pathology Discharge Summary  [facility]       Patient Name: Regine Beckham  : 1955  MRN: [id number]    Today's Date: 2024  Onset of Illness/Injury or Date of Surgery: 24 (admission date)      Admit Date: 2024    Regine Beckham was admitted from [facility] I[facility] to [facility] acute care on 24 for chest pain. Pt has a medical hx significant for CVA in January of this year. She participated in Comm/Cog Eval and Dysphagia Assessment during admission to IPR unit and was noted w/ a mild dysarthria and oral dysphagia and was recommended for formal therapy tasks.     Pt reports she is planning to discharge home tomorrow and states that she feels her speech and swallow skills are back to their premorbid baseline status. She requests no further speech therapy services at discharge.     SLP Recommendation and Plan    Recommendations:   NO further speech therapy services at this time as pt declines further services.     SLP to sign off at this time.   Thank you for allowing me to participate in the care of your patient-  Azalia Elizondo M.S., CCC-SLP  Visit Dx:    ICD-10-CM ICD-9-CM   1. Chest pain, unspecified type  R07.9 786.50       Therapy Charges for Today       Code Description Service Date Service Provider Modifiers Qty    59889854131 HC ST TREATMENT SPEECH 2 2024 Azalia Elizondo MS CCC-SLP GN 1    16693892868 HC ST TREATMENT SWALLOW 2 2024 Azalia Elizondo MS CCC-SLP GN 1            Azalia Elizondo MS CCC-SLP  2024

## 2024-04-17 NOTE — THERAPY TREATMENT NOTE
"Acute Care - Occupational Therapy Treatment Note   [facility]     Patient Name: Regine Beckham  : 1955  MRN: [id number]  Today's Date: 2024  Onset of Illness/Injury or Date of Surgery: 24 (admission date)          Admit Date: 2024       ICD-10-CM ICD-9-CM   1. Chest pain, unspecified type  R07.9 786.50     Patient Active Problem List   Diagnosis    Iron deficiency anemia due to chronic blood loss    Major depressive disorder    RLS (restless legs syndrome)    GERD (gastroesophageal reflux disease)    Left breast mass    Allergy to penicillin    Stroke    Grade I diastolic dysfunction    Moderate malnutrition    Falls frequently    Stroke-like symptoms    Debility    Chest pain     Past Medical History:   Diagnosis Date    Anemia     Anxiety     Arthritis     Depression     Legally blind     Restless leg syndrome     Sleep apnea     \"I don't use a cpap anymore since losing 106 lbs\"    Water retention      Past Surgical History:   Procedure Laterality Date    BACK SURGERY      CARDIAC CATHETERIZATION N/A 2024    Procedure: Percutaneous Manual Thrombectomy;  Surgeon: Efrain Hurley MD;  Location:  [facility];  Service: Interventional Radiology;  Laterality: N/A;    CARDIAC CATHETERIZATION N/A 2024    Procedure: Left Heart Cath;  Surgeon: Susan Mederos MD;  [facility]:  [facility];  Service: Cardiology;  Laterality: N/A;    GALLBLADDER SURGERY      HIP ARTHROSCOPY      JOINT REPLACEMENT Right          OT ASSESSMENT FLOWSHEET (Last 12 Hours)       OT Evaluation and Treatment       Row Name 24 0928                   OT Time and Intention    Subjective Information complains of;weakness;fatigue  -LM        Document Type therapy note (daily note)  -LM        Mode of Treatment occupational therapy  -LM        Patient Effort adequate  -LM        Comment Patient seen this date for neuro re ed, adl retraining/education.  Patient requires max/total " assist with bathing, dressing, toileting tasks.  LUE pain noted with prom/inhibition tech.  1/4 arom LUE.  Tolerates 90 degree LUE shoulder prom.  Otherwise tolerates LUE slow prom/stretch.  Does have resting hand splint for LUE as tolerated for positioning.  -LM           General Information    Existing Precautions/Restrictions fall  -LM        Limitations/Impairments safety/cognitive  -LM           Cognition    Affect/Mental Status (Cognition) WFL  -LM        Orientation Status (Cognition) oriented to;person;place  -LM        Follows Commands (Cognition) WFL  -LM           Positioning and Restraints    Post Treatment Position bed  -LM        In Bed call light within reach;encouraged to call for assist;exit alarm on  -LM                  User Key  (r) = Recorded By, (t) = Taken By, (c) = Cosigned By      Initials Name Effective Dates    LM Kyleigh Tsang OT 06/16/21 -                      Occupational Therapy Education       Title: PT OT SLP Therapies (Resolved)       Topic: Occupational Therapy (Resolved)       Point: ADL training (Resolved)       Description:   Instruct learner(s) on proper safety adaptation and remediation techniques during self care or transfers.   Instruct in proper use of assistive devices.                  Learner Progress:  Not documented in this visit.              Point: Precautions (Resolved)       Description:   Instruct learner(s) on prescribed precautions during self-care and functional transfers.                  Learner Progress:  Not documented in this visit.                                      OT Recommendation and Plan              Time Calculation:     Therapy Charges for Today       Code Description Service Date Service Provider Modifiers Qty    23003404731  OT NEUROMUSC RE EDUCATION EA 15 MIN 4/17/2024 Kyleigh Tsang OT GO 1                 Kyleigh Tsang OT  4/17/2024

## 2024-04-18 ENCOUNTER — READMISSION MANAGEMENT (OUTPATIENT)
Dept: CALL CENTER | Facility: HOSPITAL | Age: 69
End: 2024-04-18
Payer: MEDICARE

## 2024-04-18 VITALS
BODY MASS INDEX: 24.13 KG/M2 | DIASTOLIC BLOOD PRESSURE: 56 MMHG | HEIGHT: 66 IN | TEMPERATURE: 98.5 F | OXYGEN SATURATION: 95 % | SYSTOLIC BLOOD PRESSURE: 102 MMHG | RESPIRATION RATE: 20 BRPM | WEIGHT: 150.13 LBS | HEART RATE: 74 BPM

## 2024-04-18 PROCEDURE — 25010000002 HEPARIN (PORCINE) PER 1000 UNITS: Performed by: HOSPITALIST

## 2024-04-18 PROCEDURE — 97530 THERAPEUTIC ACTIVITIES: CPT

## 2024-04-18 PROCEDURE — 99239 HOSP IP/OBS DSCHRG MGMT >30: CPT | Performed by: STUDENT IN AN ORGANIZED HEALTH CARE EDUCATION/TRAINING PROGRAM

## 2024-04-18 RX ORDER — ISOSORBIDE MONONITRATE 30 MG/1
30 TABLET, EXTENDED RELEASE ORAL
Qty: 30 TABLET | Refills: 0 | Status: ON HOLD | OUTPATIENT
Start: 2024-04-19 | End: 2024-05-19

## 2024-04-18 RX ORDER — AMLODIPINE BESYLATE 5 MG/1
5 TABLET ORAL
Qty: 30 TABLET | Refills: 0 | Status: ON HOLD | OUTPATIENT
Start: 2024-04-19 | End: 2024-05-19

## 2024-04-18 RX ORDER — GABAPENTIN 300 MG/1
300 CAPSULE ORAL 2 TIMES DAILY
Status: ON HOLD
Start: 2024-04-18

## 2024-04-18 RX ADMIN — METOPROLOL TARTRATE 12.5 MG: 25 TABLET, FILM COATED ORAL at 09:18

## 2024-04-18 RX ADMIN — ASPIRIN 81 MG: 81 TABLET, CHEWABLE ORAL at 09:18

## 2024-04-18 RX ADMIN — HEPARIN SODIUM 5000 UNITS: 5000 INJECTION INTRAVENOUS; SUBCUTANEOUS at 09:18

## 2024-04-18 RX ADMIN — TICAGRELOR 60 MG: 60 TABLET ORAL at 09:18

## 2024-04-18 RX ADMIN — AMLODIPINE BESYLATE 5 MG: 5 TABLET ORAL at 09:18

## 2024-04-18 RX ADMIN — DICLOFENAC SODIUM 4 G: 10 GEL TOPICAL at 09:21

## 2024-04-18 RX ADMIN — Medication 10 ML: at 09:20

## 2024-04-18 RX ADMIN — LOSARTAN POTASSIUM 50 MG: 50 TABLET, FILM COATED ORAL at 09:18

## 2024-04-18 RX ADMIN — PANTOPRAZOLE SODIUM 40 MG: 40 TABLET, DELAYED RELEASE ORAL at 06:13

## 2024-04-18 RX ADMIN — Medication 1 MG: at 09:18

## 2024-04-18 RX ADMIN — ISOSORBIDE MONONITRATE 30 MG: 30 TABLET, EXTENDED RELEASE ORAL at 09:18

## 2024-04-18 RX ADMIN — GABAPENTIN 200 MG: 100 CAPSULE ORAL at 09:20

## 2024-04-18 RX ADMIN — OXYBUTYNIN CHLORIDE 10 MG: 5 TABLET, EXTENDED RELEASE ORAL at 09:18

## 2024-04-18 RX ADMIN — DOCUSATE SODIUM 50 MG AND SENNOSIDES 8.6 MG 2 TABLET: 8.6; 5 TABLET, FILM COATED ORAL at 09:18

## 2024-04-18 NOTE — DISCHARGE SUMMARY
[facility]PITAL[facility] DISCHARGE SUMMARY    Patient Identification:  Name:  Regine Beckham  Age:  68 y.o.  Sex:  female  :  1955  MRN:  [id number]  Visit Number:  [id number]    Date of Admission: 2024  Date of Discharge:  2024     PCP: Dread Burton MD    DISCHARGE DIAGNOSIS  Chest pain  Nonobstructive coronary artery disease  History of ischemic stroke  Status post left ICA stent  Hypertension  Hyperlipidemia  Recurrent falls  Progressive debility  Physical deconditioning    CONSULTS   Cardiology    PROCEDURES PERFORMED      HOSPITAL COURSE  Patient is a 68 y.o. female presented to [facility] complaining of chest pain.  Please see the admitting history and physical for further details.      After initial evaluation and management emergency department patient admitted to the hospital service for further evaluation and rule out of chest pain for possible cardiac origin.  Patient with EKG without any significant ischemic changes.  Cardiology was consulted as there was one EKG was some questionable evidence of A-fib however cardiology did not think this is the case and no anticoagulation was indicated.  Patient did undergo a left heart cath with no obstructive disease found and medical management was recommended by cardiology.  Patient was initiated and maintained on aspirin, Brilinta, statin, beta-blocker and ARB which she tolerated without difficulty as well as being initiated on long-acting nitro by cardiology.  Patient with history of ischemic stroke status post left ICA stent with some residual left-sided weakness and complaining of falling at home and desiring to pursue any kind of physical therapy rehab she might qualify for.  Unfortunately patient not a candidate for inpatient rehab or SNF for short-term rehab as she did not have any insurance coverage days left for the year she is already used them in addition to the fact that patient being unable to  afford/unwilling to pay out-of-pocket for this.  This was discussed with her about hospital service as well as  and case management and instead discussed returning home with home health and medical equipment which patient was agreeable to as she was unwilling to pursue long-term placement even though there was discussion regarding the importance of considering this given her continued difficulties at home however patient was adamant that she wished to return home.  As patient did not desire long-term placement and was alert and oriented and capable of making her own decisions patient was provided a prescription for a walker and subsequently discharged home in stable medical condition per her wishes.  Patient remains at risk though for recurrent admissions due to her progressing debility over time with age and living independently at home without strong support or family nearby to provide support.      VITAL SIGNS:  Temp:  [97 °F (36.1 °C)-98.6 °F (37 °C)] 98.5 °F (36.9 °C)  Heart Rate:  [59-72] 72  Resp:  [18-20] 20  BP: (106-155)/(48-76) 142/64  SpO2:  [96 %-99 %] 96 %  on   ;   Device (Oxygen Therapy): room air    Body mass index is 24.23 kg/m².  Wt Readings from Last 3 Encounters:   04/18/24 68.1 kg (150 lb 2.1 oz)   03/27/24 63.5 kg (140 lb)   03/27/24 62.4 kg (137 lb 8 oz)       PHYSICAL EXAM:  Constitutional: Elderly adult female sitting up in chair, NAD  HENT:  Head:  Normocephalic and atraumatic.  Mouth:  Moist mucous membranes.    Eyes:  Conjunctivae and EOM are normal. No scleral icterus.    Cardiovascular:  Normal rate, regular rhythm and normal heart sounds with no murmur.  Pulmonary/Chest:  No respiratory distress, no wheezes, no crackles, with normal breath sounds and good air movement.  Abdominal:  Soft.  Bowel sounds are normal.  No distension and no tenderness.   Musculoskeletal:  No tenderness, and no deformity.  No red or swollen joints anywhere.  Functional ROM intact.  Decreased  strength in the upper and lower extremities on the left 2-3 out of 5  Neurological:  Alert and oriented to person, place, and time.  No cranial nerve deficit.  No tongue deviation.  No facial droop.  No slurred speech. Intact Sensation throughout  Skin:  Skin is warm and dry. No rash or lesion noted. No pallor.   Peripheral vascular:  Pulses in all 4 extremities with no clubbing, no cyanosis, no edema.  Psychiatric: Appropriate mood and affect, pleasant.     DISCHARGE DISPOSITION   Stable    DISCHARGE MEDICATIONS:     Discharge Medications        New Medications        Instructions Start Date   amLODIPine 5 MG tablet  Commonly known as: NORVASC   5 mg, Oral, Every 24 Hours Scheduled   Start Date: April 19, 2024     isosorbide mononitrate 30 MG 24 hr tablet  Commonly known as: IMDUR   30 mg, Oral, Every 24 Hours Scheduled   Start Date: April 19, 2024     metoprolol tartrate 25 MG tablet  Commonly known as: LOPRESSOR   12.5 mg, Oral, Every 12 Hours Scheduled             Changes to Medications        Instructions Start Date   gabapentin 300 MG capsule  Commonly known as: NEURONTIN  What changed: when to take this   300 mg, Oral, 2 Times Daily             Continue These Medications        Instructions Start Date   aspirin 81 MG EC tablet   81 mg, Oral, Daily      atorvastatin 80 MG tablet  Commonly known as: LIPITOR   80 mg, Oral, Nightly      losartan 50 MG tablet  Commonly known as: COZAAR   50 mg, Oral, Daily      Mirabegron ER 50 MG tablet sustained-release 24 hour 24 hr tablet  Commonly known as: MYRBETRIQ   50 mg, Oral, Daily      mirtazapine 30 MG tablet  Commonly known as: REMERON   30 mg, Oral, Nightly      pantoprazole 40 MG EC tablet  Commonly known as: PROTONIX   TAKE 1 TABLET BY MOUTH EVERY MORNING FOR HEARTBURN      rOPINIRole 4 MG tablet  Commonly known as: REQUIP   4 mg, Oral, Nightly, Take 1 hour before bedtime.      ticagrelor 60 MG tablet tablet  Commonly known as: BRILINTA   60 mg, Oral, 2 Times  Daily             Stop These Medications      zolpidem 5 MG tablet  Commonly known as: AMBIEN                Additional Instructions for the Follow-ups that You Need to Schedule       Ambulatory Referral to Home Health (Hospital)   As directed      Face to Face Visit Date: 4/18/2024   Follow-up provider for Plan of Care?: I treated the patient in an acute care facility and will not continue treatment after discharge.   Follow-up provider: RYAN BOWEN [[id number]]   Reason/Clinical Findings: recurrent frequent fails, age related gait instability and decline   Describe mobility limitations that make leaving home difficult: same as above   Nursing/Therapeutic Services Requested: Physical Therapy Occupational Therapy   Frequency: 1 Week 1        Discharge Follow-up with PCP   As directed       Currently Documented PCP:    Ryan Bowen MD    PCP Phone Number:    [phone]     Follow Up Details: 1 week post hospital follow up        Discharge Follow-up with Specialty: Cardiology   As directed      Specialty: Cardiology   Follow Up Details: 2-3 week follow up               Follow-up Information       Ryan Bowen MD .    Specialty: Family Medicine  Why: 1 week post hospital follow up  Contact information:  [address] [place], [place] [id number]  [phone]                              TEST  RESULTS PENDING AT DISCHARGE       CODE STATUS  Code Status and Medical Interventions:   Ordered at: 04/12/24 1617     Level Of Support Discussed With:    Patient     Code Status (Patient has no pulse and is not breathing):    CPR (Attempt to Resuscitate)     Medical Interventions (Patient has pulse or is breathing):    Full Support       Narinder Olivier DO  [facility] Hospital[facility]  04/18/24  09:25 EDT    Please note that this discharge summary required more than 30 minutes to complete.

## 2024-04-18 NOTE — PLAN OF CARE
Goal Outcome Evaluation:   Pt is currently resting in bed. VSS with no complaints or S/S of distress. Will continue to follow plan of care.

## 2024-04-18 NOTE — CASE MANAGEMENT/SOCIAL WORK
Discharge Planning Assessment  [facility]     Patient Name: Regine Beckham  MRN: [id number]  Today's Date: 4/18/2024    Admit Date: 4/9/2024    Plan: SS spoke with pt's friend Teo Sanchez with permission from pt.  SS provided Teo with contact number for Adult Protective Services if needed.  SS explained pt financial situation in regards to nursing home with Teo who stated understanding.  SS provided Teo with [employer] pamplets.       Discharge Plan       Row Name 04/18/24 1621       Plan    Plan SS spoke with pt's friend Teo aSnchez with permission from pt.  SS provided Teo with contact number for Adult Protective Services if needed.  SS explained pt financial situation in regards to nursing home with Teo who stated understanding.  SS provided Teo with [employer] pamplets.                  Meron Garcia, BSW

## 2024-04-18 NOTE — DISCHARGE PLACEMENT REQUEST
"Regine Lock \"NEGIN\" (68 y.o. Female)       Date of Birth   1955    Social Security Number       Address   [address] [place], [place] [address]    Home Phone   [phone]    MRN   [id number]       [facility]    Marital Status                               Admission Date   4/9/24    Admission Type   Urgent    Admitting Provider   Capo Su MD    Attending Provider   Narinder Olivier DO    Department, Room/Bed   [address], 3315/1S       Discharge Date       [place] [facility] [place]                                 Attending Provider: Narinder Olivier DO    Allergies: Penicillins    Isolation: None   Infection: None   Code Status: CPR    Ht: 167.6 cm (66\")   Wt: 68.1 kg (150 lb 2.1 oz)    Admission Cmt: None   Principal Problem: Chest pain [R07.9]                   Active Insurance as of 4/9/2024       Primary Coverage       Payor Plan Insurance Group Employer/Plan Group    AETNA MEDICARE REPLACEMENT AETNA MEDICARE REPLACEMENT [id number]       Payor Plan Address Payor Plan Phone Number Payor Plan Fax Number Effective Dates    [address] [phone]  1/1/2024 - None Entered    [place] [address]         Subscriber Name Subscriber Birth Date Member ID       REGINE LOCK 1955 [id number]                     Emergency Contacts        (Rel.) Home Phone Work Phone Mobile Phone    Yaa Elizondo (Friend) [phone] --    Karla Patel (Friend) [phone] -- --              Emergency Contact Information       Name Relation Home Work Mobile    Yaa Elizondo Friend [phone]     Karla Patel Friend [phone]            Insurance Information                  AETNA MEDICARE REPLACEMENT/AETNA MEDICARE REPLACEMENT Phone: [phone]    Subscriber: Regine Lock Subscriber#: [id number]    Group#: [id number] Precert#: [id number]          Problem List       " "      Codes Noted - Resolved       Hospital    * (Principal) Chest pain ICD-10-CM: R07.9  ICD-9-CM: 786.50 2024 - Present       Non-Hospital    Debility ICD-10-CM: R53.81  ICD-9-CM: 799.3 3/27/2024 - Present    Stroke-like symptoms ICD-10-CM: R29.90  ICD-9-CM: 781.99 3/20/2024 - Present    Falls frequently ICD-10-CM: R29.6  ICD-9-CM: V15.88 3/17/2024 - Present    Moderate malnutrition ICD-10-CM: E44.0  ICD-9-CM: 263.0 2024 - Present    Stroke ICD-10-CM: I63.511  ICD-9-CM: 434.91 2024 - Present    Grade I diastolic dysfunction (Chronic) ICD-10-CM: I51.89  ICD-9-CM: 429.9 2024 - Present    Major depressive disorder (Chronic) ICD-10-CM: F32.9  ICD-9-CM: 296.20 2023 - Present    RLS (restless legs syndrome) (Chronic) ICD-10-CM: G25.81  ICD-9-CM: 333.94 2023 - Present    GERD (gastroesophageal reflux disease) (Chronic) ICD-10-CM: K21.9  ICD-9-CM: 530.81 2023 - Present    Left breast mass ICD-10-CM: N63.20  ICD-9-CM: 611.72 2023 - Present    Allergy to penicillin ICD-10-CM: Z88.0  ICD-9-CM: V14.0 2023 - Present    Iron deficiency anemia due to chronic blood loss (Chronic) ICD-10-CM: D50.0  ICD-9-CM: 280.0 2020 - Present        History & Physical        Capo Su MD at 24          Hospitalist History and Physical        Patient Identification  Name: Regine Beckham  Age/Sex: 68 y.o. female  :  1955        MRN: [id number]  Visit Number: [id number]  Admit Date: 2024   PCP: Dread Burton MD          Chief complaint chest pain    History of Present Illness:  Patient is a 68 y.o. female with history of anxiety/depression, anemia, arthritis, legally blind, restless leg syndrome, sleep apnea no longer on CPAP after losing 100+ lb, \"water retention\" but no documented history of CHF, and stroke with residual left sided weakness, at which time workup revealed right internal carotid artery stenosis s/p thrombectomy and stent placement on " 1/19/24. She was recently admitted to our service from 3/17-3/27/24 for acute physical deconditioning and frequent falls as a consequence of her prior stroke. Afterwards she was transferred to inpatient rehab. This evening she complained of left sided chest pain which she described as heaviness/pressure in sensation, without radiation or associated dyspnea, nausea or diaphoresis. Pain was relieved by nitropaste. Troponin was negative, but EKG showed T wave inversions in anterolateral leads concerning for acute ischemia. She has been transferred back to the hospitalist service for continuous telemetry monitoring and further workup for cardiac ischemia. Patient is now on [facility]. She reports chest pain is improved with only minimal residual discomfort. She denies shortness of breath or increased lower extremity edema from baseline. She has no other complaints.     Review of Systems  Review of Systems   Constitutional:  Negative for activity change, appetite change, chills, diaphoresis, fatigue, fever and unexpected weight change.   HENT:  Negative for congestion, postnasal drip, rhinorrhea, sinus pressure, sinus pain and sore throat.    Eyes:  Negative for photophobia and visual disturbance.   Respiratory:  Negative for cough, shortness of breath and wheezing.    Cardiovascular:  Positive for chest pain. Negative for palpitations and leg swelling.   Gastrointestinal:  Negative for abdominal distention, abdominal pain, constipation, diarrhea, nausea and vomiting.   Genitourinary:  Negative for difficulty urinating, flank pain, frequency and hematuria.   Musculoskeletal:  Positive for arthralgias (intermittent left shoulder pain, chronic since stroke). Negative for back pain, joint swelling, myalgias, neck pain and neck stiffness.   Skin:  Negative for color change, pallor, rash and wound.   Neurological:  Positive for weakness (residual left sided weakness since stroke). Negative for dizziness, tremors, seizures,  "syncope, light-headedness, numbness and headaches.   Hematological:  Negative for adenopathy. Does not bruise/bleed easily.   Psychiatric/Behavioral:  Negative for agitation, behavioral problems and confusion.        History  Past Medical History:   Diagnosis Date    Anemia     Anxiety     Arthritis     Depression     Legally blind     Restless leg syndrome     Sleep apnea     \"I don't use a cpap anymore since losing 106 lbs\"    Water retention      Past Surgical History:   Procedure Laterality Date    BACK SURGERY      CARDIAC CATHETERIZATION N/A 1/19/2024    Procedure: Percutaneous Manual Thrombectomy;  Surgeon: Efrain Hurley MD;  Location: [facility] INVASIVE LOC[facility];  Service: Interventional Radiology;  Laterality: N/A;    GALLBLADDER SURGERY      HIP ARTHROSCOPY      JOINT REPLACEMENT Right      Family History   Problem Relation Age of Onset    Breast cancer Neg Hx      Social History     Tobacco Use    Smoking status: Every Day     Current packs/day: 1.50     Average packs/day: 1.5 packs/day for 51.0 years (76.5 ttl pk-yrs)     Types: Cigarettes    Smokeless tobacco: Never   Vaping Use    Vaping status: Never Used   Substance Use Topics    Alcohol use: Yes     Alcohol/week: 1.0 standard drink of alcohol     Types: 1 Glasses of wine per week     Comment: \"occasionally - once every two months\"    Drug use: Not Currently     Comment: alcohol - occasionally     Medications Prior to Admission   Medication Sig Dispense Refill Last Dose    aspirin 81 MG EC tablet Take 1 tablet by mouth Daily.       atorvastatin (LIPITOR) 80 MG tablet Take 1 tablet by mouth Every Night. 90 tablet 0     gabapentin (NEURONTIN) 300 MG capsule Take 1 capsule by mouth 3 (Three) Times a Day.       losartan (COZAAR) 50 MG tablet Take 1 tablet by mouth Daily. Indications: High Blood Pressure Disorder       Mirabegron ER (MYRBETRIQ) 50 MG tablet sustained-release 24 hour 24 hr tablet Take 50 mg by mouth Daily. Indications: Urinary " Urgency       mirtazapine (REMERON) 30 MG tablet Take 1 tablet by mouth Every Night. Indications: Major Depressive Disorder 30 tablet 0     pantoprazole (PROTONIX) 40 MG EC tablet TAKE 1 TABLET BY MOUTH EVERY MORNING FOR HEARTBURN 90 tablet 0     rOPINIRole (REQUIP) 4 MG tablet Take 1 tablet by mouth Every Night. Take 1 hour before bedtime.  Indications: Restless Leg Syndrome 30 tablet 0     ticagrelor (BRILINTA) 60 MG tablet tablet Take 1 tablet by mouth 2 (Two) Times a Day. 60 tablet 0     zolpidem (AMBIEN) 5 MG tablet Take 1 tablet by mouth At Night As Needed for Sleep. Indications: Trouble Sleeping 5 tablet 0      Allergies:  Penicillins    Objective    Vital Signs  Temp:  [98 °F (36.7 °C)-98.3 °F (36.8 °C)] 98 °F (36.7 °C)  Heart Rate:  [61-88] 69  Resp:  [16-18] 18  BP: (123-162)/(59-83) 123/64  There is no height or weight on file to calculate BMI.    Physical Exam:  Physical Exam  Constitutional:       General: She is not in acute distress.     Appearance: Normal appearance. She is not ill-appearing.   HENT:      Head: Normocephalic and atraumatic.      Right Ear: External ear normal.      Left Ear: External ear normal.      Nose: Nose normal.      Mouth/Throat:      Mouth: Mucous membranes are moist.      Pharynx: Oropharynx is clear.   Eyes:      Extraocular Movements: Extraocular movements intact.      Conjunctiva/sclera: Conjunctivae normal.      Pupils: Pupils are equal, round, and reactive to light.   Cardiovascular:      Rate and Rhythm: Normal rate and regular rhythm.      Pulses: Normal pulses.      Heart sounds: Normal heart sounds.   Pulmonary:      Effort: Pulmonary effort is normal. No respiratory distress.      Breath sounds: Normal breath sounds. No wheezing or rales.   Chest:      Chest wall: No tenderness.   Abdominal:      General: Abdomen is flat. Bowel sounds are normal. There is no distension.      Palpations: Abdomen is soft.      Tenderness: There is no abdominal tenderness.  "  Musculoskeletal:         General: Normal range of motion.      Cervical back: Normal range of motion and neck supple. No tenderness.      Right lower leg: Edema (trace) present.      Left lower leg: Edema (trace) present.   Lymphadenopathy:      Cervical: No cervical adenopathy.   Skin:     General: Skin is warm and dry.      Capillary Refill: Capillary refill takes less than 2 seconds.      Coloration: Skin is not jaundiced.      Findings: No bruising or lesion.   Neurological:      General: No focal deficit present.      Mental Status: She is alert and oriented to person, place, and time.   Psychiatric:         Mood and Affect: Mood normal.         Behavior: Behavior normal.         Thought Content: Thought content normal.           Results Review:       Lab Results:  Results from last 7 days   Lab Units 04/09/24 2009 04/05/24  0020 04/04/24  0006   WBC 10*3/mm3 5.10 6.20 6.70   HEMOGLOBIN g/dL 10.1* 10.1* 10.3*   PLATELETS 10*3/mm3 236 245 234     Results from last 7 days   Lab Units 04/09/24  1902   CRP mg/dL <0.30     Results from last 7 days   Lab Units 04/09/24 2009 04/05/24  0020 04/04/24  1108 04/04/24  0006   SODIUM mmol/L 139 139  --  139   POTASSIUM mmol/L 4.0 4.0 4.0 3.4*   CHLORIDE mmol/L 106 105  --  106   CO2 mmol/L 25.2 25.9  --  22.5   BUN mg/dL 18 15  --  17   CREATININE mg/dL 0.83 0.91  --  0.75   CALCIUM mg/dL 9.0 9.4  --  9.0   GLUCOSE mg/dL 119* 110*  --  109*         No results found for: \"HGBA1C\"  Results from last 7 days   Lab Units 04/09/24 2009   BILIRUBIN mg/dL 0.2   ALK PHOS U/L 90   AST (SGOT) U/L 35*   ALT (SGPT) U/L 29     Results from last 7 days   Lab Units 04/09/24  1902   HSTROP T ng/L 12                   I have reviewed the patient's laboratory results.    Imaging:  Imaging Results (Last 72 Hours)       ** No results found for the last 72 hours. **            I have personally reviewed the patient's radiologic imaging.        EKG:   Sinus rhythm with marked sinus " arrhythmia, HR 63, QTc 499  T wave abnormality, consider lateral ischemia  Prolonged QT  Abnormal ECG  When compared with ECG of 21-MAR-2024 01:00,  premature atrial complexes are no longer present  Left posterior fascicular block is no longer present  Borderline criteria for Inferior infarct are no longer present  T wave inversion no longer evident in Inferior leads  T wave inversion now evident in Anterolateral leads    I have personally reviewed the patient's EKG. New T wave inversions in leads V4-V5 and AVL. EKG reviewed by on-call interventional cardiologist (sent by house supervisor) who reported no evidence of STEMI.         Assessment & Plan    - Chest pain with typical features (left sided chest heaviness relieved by nitroglycerin) and cardiac risk factors including vascular disease with recent CVA and right carotid artery stenosis, along with EKG changes concerning for anterolateral ischemia. Basic labs, inflammatory markers and CXR obtained to rule out other causes of chest pain such as pneumonia. CRP, procal, lactate and WBC all normal and CXR showed no acute abnormality.  Transferred from inpatient rehab to [facility] for observation with continuous telemetry monitoring and further cardiac ischemia workup. Continue home ASA, brilinta and statin. Repeat troponin and EKG now. Keep NPO after midnight except sips with meds. Check hemoglobin A1c and fasting lipid panel to assess for additional cardiovascular risk factors. Update ECHO in the morning and consult cardiology for guidance regarding further workup, since patient already had a stress test recently on 12/23/23 that was interpreted as low risk.   - Deconditioning secondary to recent stroke: continue PT/OT/SLP that patient was receiving in inpatient rehab.     DVT/GI Prophylaxis: SQ heparin; PO protonix    Estimated Length of Stay <2 midnights    I discussed the patient's findings, assessment and plan with the patient and inpatient rehab physician   Abigail.    Capo Su MD  04/09/24  21:06 EDT      Electronically signed by Capo Su MD at 04/09/24 2120       Lines, Drains & Airways       Active LDAs       Name Placement date Placement time Site Days    Peripheral IV 04/15/24 1808 Anterior;Right Forearm 04/15/24  1808  Forearm  2    External Urinary Catheter 04/09/24  --  --  9                  Lab Results (most recent)       Procedure Component Value Units Date/Time    CBC & Differential [[id number]]  (Abnormal) Collected: 04/16/24 0057    Specimen: Blood Updated: 04/16/24 0113    Narrative:      The following orders were created for panel order CBC & Differential.  Procedure                               Abnormality         Status                     ---------                               -----------         ------                     CBC Auto Differential[[id number]]        Abnormal            Final result                 Please view results for these tests on the individual orders.    CBC Auto Differential [[id number]]  (Abnormal) Collected: 04/16/24 0057    Specimen: Blood Updated: 04/16/24 0113     WBC 6.31 10*3/mm3      RBC 3.06 10*6/mm3      Hemoglobin 9.2 g/dL      Hematocrit 29.2 %      MCV 95.4 fL      MCH 30.1 pg      MCHC 31.5 g/dL      RDW 13.8 %      RDW-SD 48.4 fl      MPV 9.7 fL      Platelets 216 10*3/mm3      Neutrophil % 52.3 %      Lymphocyte % 32.8 %      Monocyte % 10.0 %      Eosinophil % 3.6 %      Basophil % 1.1 %      Immature Grans % 0.2 %      Neutrophils, Absolute 3.30 10*3/mm3      Lymphocytes, Absolute 2.07 10*3/mm3      Monocytes, Absolute 0.63 10*3/mm3      Eosinophils, Absolute 0.23 10*3/mm3      Basophils, Absolute 0.07 10*3/mm3      Immature Grans, Absolute 0.01 10*3/mm3      nRBC 0.0 /100 WBC     Potassium [[id number]]  (Normal) Collected: 04/13/24 1151    Specimen: Blood Updated: 04/13/24 1257     Potassium 3.8 mmol/L      Comment: Slight hemolysis detected by analyzer. Result may be falsely elevated.        Basic Metabolic Panel [[id number]]  (Abnormal) Collected: 04/13/24 0113    Specimen: Blood Updated: 04/13/24 0202     Glucose 113 mg/dL      BUN 14 mg/dL      Creatinine 0.77 mg/dL      Sodium 139 mmol/L      Potassium 3.6 mmol/L      Comment: Slight hemolysis detected by analyzer. Result may be falsely elevated.        Chloride 106 mmol/L      CO2 21.3 mmol/L      Calcium 9.1 mg/dL      BUN/Creatinine Ratio 18.2     Anion Gap 11.7 mmol/L      eGFR 83.6 mL/min/1.73     Narrative:      GFR Normal >60  Chronic Kidney Disease <60  Kidney Failure <15      Lipid Panel [[id number]] Collected: 04/13/24 0113    Specimen: Blood Updated: 04/13/24 0202     Total Cholesterol 120 mg/dL      Triglycerides 146 mg/dL      HDL Cholesterol 43 mg/dL      LDL Cholesterol  52 mg/dL      VLDL Cholesterol 25 mg/dL      LDL/HDL Ratio 1.11    Narrative:      Cholesterol Reference Ranges  (U.S. Department of Health and Human Services ATP III Classifications)    Desirable          <200 mg/dL  Borderline High    200-239 mg/dL  High Risk          >240 mg/dL      Triglyceride Reference Ranges  (U.S. Department of Health and Human Services ATP III Classifications)    Normal           <150 mg/dL  Borderline High  150-199 mg/dL  High             200-499 mg/dL  Very High        >500 mg/dL    HDL Reference Ranges  (U.S. Department of Health and Human Services ATP III Classifications)    Low     <40 mg/dl (major risk factor for CHD)  High    >60 mg/dl ('negative' risk factor for CHD)        LDL Reference Ranges  (U.S. Department of Health and Human Services ATP III Classifications)    Optimal          <100 mg/dL  Near Optimal     100-129 mg/dL  Borderline High  130-159 mg/dL  High             160-189 mg/dL  Very High        >189 mg/dL    CBC & Differential [[id number]]  (Abnormal) Collected: 04/13/24 0113    Specimen: Blood Updated: 04/13/24 0141    Narrative:      The following orders were created for panel order CBC & Differential.  Procedure                                Abnormality         Status                     ---------                               -----------         ------                     CBC Auto Differential[[id number]]        Abnormal            Final result                 Please view results for these tests on the individual orders.    CBC Auto Differential [[id number]]  (Abnormal) Collected: 04/13/24 0113    Specimen: Blood Updated: 04/13/24 0141     WBC 6.24 10*3/mm3      RBC 3.47 10*6/mm3      Hemoglobin 10.6 g/dL      Hematocrit 33.7 %      MCV 97.1 fL      MCH 30.5 pg      MCHC 31.5 g/dL      RDW 13.8 %      RDW-SD 49.1 fl      MPV 9.6 fL      Platelets 224 10*3/mm3      Neutrophil % 64.0 %      Lymphocyte % 23.2 %      Monocyte % 8.7 %      Eosinophil % 3.0 %      Basophil % 0.8 %      Immature Grans % 0.3 %      Neutrophils, Absolute 3.99 10*3/mm3      Lymphocytes, Absolute 1.45 10*3/mm3      Monocytes, Absolute 0.54 10*3/mm3      Eosinophils, Absolute 0.19 10*3/mm3      Basophils, Absolute 0.05 10*3/mm3      Immature Grans, Absolute 0.02 10*3/mm3      nRBC 0.0 /100 WBC     High Sensitivity Troponin T 2Hr [[id number]]  (Normal) Collected: 04/12/24 1339    Specimen: Blood Updated: 04/12/24 1439     HS Troponin T 13 ng/L      Troponin T Delta 0 ng/L     Narrative:      High Sensitive Troponin T Reference Range:  <14.0 ng/L- Negative Female for AMI  <22.0 ng/L- Negative Male for AMI  >=14 - Abnormal Female indicating possible myocardial injury.  >=22 - Abnormal Male indicating possible myocardial injury.   Clinicians would have to utilize clinical acumen, EKG, Troponin, and serial changes to determine if it is an Acute Myocardial Infarction or myocardial injury due to an underlying chronic condition.         High Sensitivity Troponin T [[id number]]  (Normal) Collected: 04/12/24 1109    Specimen: Blood Updated: 04/12/24 1221     HS Troponin T 13 ng/L     Narrative:      High Sensitive Troponin T Reference Range:  <14.0 ng/L- Negative  Female for AMI  <22.0 ng/L- Negative Male for AMI  >=14 - Abnormal Female indicating possible myocardial injury.  >=22 - Abnormal Male indicating possible myocardial injury.   Clinicians would have to utilize clinical acumen, EKG, Troponin, and serial changes to determine if it is an Acute Myocardial Infarction or myocardial injury due to an underlying chronic condition.         Basic Metabolic Panel [[id number]]  (Abnormal) Collected: 04/12/24 0708    Specimen: Blood Updated: 04/12/24 0741     Glucose 109 mg/dL      BUN 13 mg/dL      Creatinine 0.75 mg/dL      Sodium 139 mmol/L      Potassium 3.8 mmol/L      Comment: Slight hemolysis detected by analyzer. Result may be falsely elevated.        Chloride 106 mmol/L      CO2 23.1 mmol/L      Calcium 9.8 mg/dL      BUN/Creatinine Ratio 17.3     Anion Gap 9.9 mmol/L      eGFR 86.3 mL/min/1.73     Narrative:      GFR Normal >60  Chronic Kidney Disease <60  Kidney Failure <15      Heparin Anti-Xa [[id number]]  (Normal) Collected: 04/12/24 0708    Specimen: Blood Updated: 04/12/24 0736     Heparin Anti-Xa (UFH) 0.33 IU/ml     Heparin Anti-Xa [[id number]]  (Normal) Collected: 04/11/24 0203    Specimen: Blood Updated: 04/11/24 0233     Heparin Anti-Xa (UFH) 0.39 IU/ml     High Sensitivity Troponin T 2Hr [[id number]]  (Normal) Collected: 04/10/24 0012    Specimen: Blood Updated: 04/10/24 0111     HS Troponin T 13 ng/L      Troponin T Delta 0 ng/L     Narrative:      High Sensitive Troponin T Reference Range:  <14.0 ng/L- Negative Female for AMI  <22.0 ng/L- Negative Male for AMI  >=14 - Abnormal Female indicating possible myocardial injury.  >=22 - Abnormal Male indicating possible myocardial injury.   Clinicians would have to utilize clinical acumen, EKG, Troponin, and serial changes to determine if it is an Acute Myocardial Infarction or myocardial injury due to an underlying chronic condition.         Comprehensive Metabolic Panel [[id number]]  (Abnormal) Collected: 04/10/24  0012    Specimen: Blood Updated: 04/10/24 0111     Glucose 113 mg/dL      BUN 18 mg/dL      Creatinine 0.64 mg/dL      Sodium 139 mmol/L      Potassium 3.7 mmol/L      Chloride 106 mmol/L      CO2 25.1 mmol/L      Calcium 9.3 mg/dL      Total Protein 5.9 g/dL      Albumin 3.3 g/dL      ALT (SGPT) 27 U/L      AST (SGOT) 31 U/L      Alkaline Phosphatase 81 U/L      Total Bilirubin 0.3 mg/dL      Globulin 2.6 gm/dL      A/G Ratio 1.3 g/dL      BUN/Creatinine Ratio 28.1     Anion Gap 7.9 mmol/L      eGFR 95.8 mL/min/1.73     Narrative:      GFR Normal >60  Chronic Kidney Disease <60  Kidney Failure <15      Lipid Panel [[id number]] Collected: 04/10/24 0012    Specimen: Blood Updated: 04/10/24 0111     Total Cholesterol 118 mg/dL      Triglycerides 59 mg/dL      HDL Cholesterol 47 mg/dL      LDL Cholesterol  58 mg/dL      VLDL Cholesterol 13 mg/dL      LDL/HDL Ratio 1.26    Narrative:      Cholesterol Reference Ranges  (U.S. Department of Health and Human Services ATP III Classifications)    Desirable          <200 mg/dL  Borderline High    200-239 mg/dL  High Risk          >240 mg/dL      Triglyceride Reference Ranges  (U.S. Department of Health and Human Services ATP III Classifications)    Normal           <150 mg/dL  Borderline High  150-199 mg/dL  High             200-499 mg/dL  Very High        >500 mg/dL    HDL Reference Ranges  (U.S. Department of Health and Human Services ATP III Classifications)    Low     <40 mg/dl (major risk factor for CHD)  High    >60 mg/dl ('negative' risk factor for CHD)        LDL Reference Ranges  (U.S. Department of Health and Human Services ATP III Classifications)    Optimal          <100 mg/dL  Near Optimal     100-129 mg/dL  Borderline High  130-159 mg/dL  High             160-189 mg/dL  Very High        >189 mg/dL    Protime-INR [[id number]]  (Normal) Collected: 04/09/24 2142    Specimen: Blood Updated: 04/09/24 2200     Protime 13.0 Seconds      INR 0.94    Narrative:       Suggested INR therapeutic range for stable oral anticoagulant therapy:    Low Intensity therapy:   1.5-2.0  Moderate Intensity therapy:   2.0-3.0  High Intensity therapy:   2.5-4.0    aPTT [[id number]]  (Normal) Collected: 24    Specimen: Blood Updated: 24     PTT 33.2 seconds     Narrative:      PTT Heparin Therapeutic Range:  45 - 116 seconds      High Sensitivity Troponin T [[id number]]  (Normal) Collected: 24    Specimen: Blood Updated: 24     HS Troponin T 13 ng/L     Narrative:      High Sensitive Troponin T Reference Range:  <14.0 ng/L- Negative Female for AMI  <22.0 ng/L- Negative Male for AMI  >=14 - Abnormal Female indicating possible myocardial injury.  >=22 - Abnormal Male indicating possible myocardial injury.   Clinicians would have to utilize clinical acumen, EKG, Troponin, and serial changes to determine if it is an Acute Myocardial Infarction or myocardial injury due to an underlying chronic condition.         Hemoglobin A1c [[id number]]  (Abnormal) Collected: 24    Specimen: Blood Updated: 24     Hemoglobin A1C 5.90 %     Narrative:      Hemoglobin A1C Ranges:    Increased Risk for Diabetes  5.7% to 6.4%  Diabetes                     >= 6.5%  Diabetic Goal                < 7.0%             Physician Progress Notes (most recent note)        Narinder Olivier DO at 24              [facility]     Patient Identification:  Name:  Regine Beckham  Age:  68 y.o.  Sex:  female  :  1955  MRN:  [id number]  Visit Number:  [id number]  ROOM: [address] [place] [address]     Primary Care Provider:  Dread Burton MD    Length of stay in inpatient status:  6    Subjective     Chief Compliant:  No chief complaint on file.      History of Presenting Illness: Patient seen and evaluated in follow-up for chest pain with progressive debility at home with frequent falls and history of stroke with residual left-sided  weakness.  Patient at time of evaluation resting comfortably bed in no distress.  Patient out of skilled days with her insurance and after discussion with  unable to pay for SNF rehab out-of-pocket and agreeable to returning home.  Patient plan for discharge in a.m. due to no ride and living alone.    Objective     Current Hospital Meds:  amLODIPine, 5 mg, Oral, Q24H  aspirin, 81 mg, Oral, Daily  atorvastatin, 80 mg, Oral, Nightly  Diclofenac Sodium, 4 g, Topical, 4x Daily  folic acid, 1 mg, Oral, Daily  gabapentin, 200 mg, Oral, Q12H  heparin (porcine), 5,000 Units, Subcutaneous, BID  isosorbide mononitrate, 30 mg, Oral, Q24H  losartan, 50 mg, Oral, Daily  metoprolol tartrate, 12.5 mg, Oral, Q12H  mirtazapine, 30 mg, Oral, Nightly  oxybutynin XL, 10 mg, Oral, Daily  pantoprazole, 40 mg, Oral, Q AM  rOPINIRole, 4 mg, Oral, Nightly  senna-docusate sodium, 2 tablet, Oral, BID  sodium chloride, 10 mL, Intravenous, Q12H  ticagrelor, 60 mg, Oral, BID      Pharmacy Consult,       ----------------------------------------------------------------------------------------------------------------------  Vital Signs:  Temp:  [97 °F (36.1 °C)-98.6 °F (37 °C)] 98.6 °F (37 °C)  Heart Rate:  [47-67] 59  Resp:  [16-18] 18  BP: ()/(48-67) 106/48  SpO2:  [95 %-99 %] 98 %  on   ;   Device (Oxygen Therapy): room air  Body mass index is 23.74 kg/m².      Intake/Output Summary (Last 24 hours) at 4/17/2024 2021  Last data filed at 4/17/2024 1600  Gross per 24 hour   Intake 1120 ml   Output 800 ml   Net 320 ml      ----------------------------------------------------------------------------------------------------------------------  Physical exam:  Constitutional: Elderly adult female sitting up in chair, NAD  HENT:  Head:  Normocephalic and atraumatic.  Mouth:  Moist mucous membranes.    Eyes:  Conjunctivae and EOM are normal. No scleral icterus.    Cardiovascular:  Normal rate, regular rhythm and normal heart sounds  "with no murmur.  Pulmonary/Chest:  No respiratory distress, no wheezes, no crackles, with normal breath sounds and good air movement.  Abdominal:  Soft.  Bowel sounds are normal.  No distension and no tenderness.   Musculoskeletal:  No tenderness, and no deformity.  No red or swollen joints anywhere.  Functional ROM intact.  Decreased strength in the upper and lower extremities on the left 2-3 out of 5  Neurological:  Alert and oriented to person, place, and time.  No cranial nerve deficit.  No tongue deviation.  No facial droop.  No slurred speech. Intact Sensation throughout  Skin:  Skin is warm and dry. No rash or lesion noted. No pallor.   Peripheral vascular:  Pulses in all 4 extremities with no clubbing, no cyanosis, no edema.  Psychiatric: Appropriate mood and affect, pleasant.   ----------------------------------------------------------------------------------------------------------------------                 No results found for: \"URINECX\"  No results found for: \"BLOODCX\"    I have personally looked at the labs and they are summarized above.  ----------------------------------------------------------------------------------------------------------------------  Detailed radiology reports for the last 24 hours:  No radiology results for the last day  Assessment & Plan      Chest pain  Nonobstructive coronary artery disease    -Patient status post cardiac evaluation including left heart cath with no obstructive disease found and cardiology recommending medical management.    -Continue aspirin, Brilinta, statin, beta-blocker and ARB    -Initiated on long-acting nitro by cardiology    History of ischemic stroke  Status post left ICA stent  Hypertension  Hyperlipidemia    -Amlodipine increased due to elevated blood pressure    -Continue home statin    Recurrent falls  Progressive debility  Physical deconditioning    - currently working on finding short-term SNF rehab placement.    Copied text in " portions of the note has been reviewed and is accurate as of 04/17/24    VTE Prophylaxis:   Mechanical Order History:       None          Pharmalogical Order History:        Ordered     Dose Route Frequency Stop    04/14/24 1442  heparin (porcine) 5000 UNIT/ML injection 5,000 Units         5,000 Units SC 2 Times Daily --    04/12/24 1442  heparin (porcine) injection  Status:  Discontinued         -- -- Code / Trauma / Sedation Medication 04/12/24 1458    04/10/24 1302  heparin 34768 units/250 mL (100 units/mL) in 0.45 % NaCl infusion  6.98 mL/hr,   Status:  Discontinued         11 Units/kg/hr (Dosing Weight) IV Titrated 04/12/24 1614    04/10/24 0455  heparin 23821 units/250 mL (100 units/mL) in 0.45 % NaCl infusion  6.35 mL/hr,   Status:  Discontinued         10 Units/kg/hr IV Titrated 04/10/24 1302    04/09/24 2135  heparin (porcine) 5000 UNIT/ML injection 3,800 Units         60 Units/kg IV Once 04/09/24 2212    04/09/24 2135  heparin 82066 units/250 mL (100 units/mL) in 0.45 % NaCl infusion  7.62 mL/hr,   Status:  Discontinued         12 Units/kg/hr IV Titrated 04/10/24 0455    04/09/24 2048  heparin (porcine) 5000 UNIT/ML injection 5,000 Units  Status:  Discontinued         5,000 Units SC Every 12 Hours Scheduled 04/09/24 2134    04/09/24 2135  heparin (porcine) 5000 UNIT/ML injection 3,200 Units  Status:  Discontinued         50 Units/kg IV As Needed 04/12/24 1614    04/09/24 2135  heparin (porcine) 5000 UNIT/ML injection 1,600 Units  Status:  Discontinued         25 Units/kg IV As Needed 04/12/24 1614    04/09/24 2135  Pharmacy to Dose Heparin  Status:  Discontinued         -- XX Continuous PRN 04/12/24 1614                    Disposition Home in the a.jacob Olivier DO  [facility]  04/17/24  20:21 EDT      Electronically signed by Narinder Olivier DO at 04/17/24 2022          Consult Notes (most recent note)        Susan Mederos MD at 04/10/24 1134        Consult Orders    1.  Inpatient Cardiology Consult [[id number]] ordered by Capo Su MD                 Inpatient Cardiology Consult  Consult performed by: Omayra Butts APRN  Consult ordered by: Capo Su MD        Date of Admit: 4/9/2024  Date of Consult: 04/10/24  Provider, No Known  Regine Beckham  1955    Consulting Physician: Susan Mederos MD    Cardiology consultation    Reason for consultation:  Chest pain, EKG changes with T wave inversions in the anterior lateral leads      History of Present Illness    Subjective     No chief complaint on file.      eRgine Beckham is a 68 y.o. female with history of CVA (January 2024), peripheral vascular disease, status post left ICA stent placement on 1/19/2024, hypertension, dyslipidemia, obstructive sleep apnea noncompliant with CPAP, restless leg syndrome, GERD, iron deficiency anemia, anxiety/depression, legally blind, and arthritis.  Patient was admitted to the hospital 3/17 through 3/27/2024 for acute physical deconditioning and frequent falls as a consequence of prior stroke from January 2024.  After her inpatient hospitalization she was transferred to inpatient rehab.  On 4/9/2024 she reported left-sided chest pain.  She denied any associated dyspnea, diaphoresis or nausea.  She reported chronic left arm and shoulder pain since her CVA and could not describe whether this was radiation or just the chronicity of the pain.  High-sensitivity troponin was negative x 3.  However, EKG did show T wave inversions in the anterior lateral leads.  Patient was then transferred from inpatient rehabilitation to the telemetry unit for monitoring and workup.    Ms. Beckham was seen and examined.  She currently denies any chest pain.  She denies shortness of breath.  She states that the pain she was having yesterday was extending from her left chest to her left shoulder and arm.  It has since resolved.    Cardiac risk factors:hypercholesterolemia, hypertension,  "peripheral vascular disease, and former tobacco use    Last Echo: 12/22/2023      Interpretation Summary         Left ventricular systolic function is normal. Left ventricular ejection fraction appears to be 56 - 60%.    Left ventricular wall thickness is consistent with moderate concentric hypertrophy.    Left ventricular diastolic function is consistent with (grade I) impaired relaxation.    Estimated right ventricular systolic pressure from tricuspid regurgitation is normal (<35 mmHg).    Last Stress: 12/23/2023    Interpretation Summary         Left ventricular ejection fraction is normal (Calculated EF = 67%).    Myocardial perfusion imaging indicates a normal myocardial perfusion study with no evidence of ischemia.    Impressions are consistent with a low risk study.    TID 1.07.    Findings consistent with a normal ECG stress test.    Past Medical History:   Diagnosis Date    Anemia     Anxiety     Arthritis     Depression     Legally blind     Restless leg syndrome     Sleep apnea     \"I don't use a cpap anymore since losing 106 lbs\"    Water retention      Past Surgical History:   Procedure Laterality Date    BACK SURGERY      CARDIAC CATHETERIZATION N/A 1/19/2024    Procedure: Percutaneous Manual Thrombectomy;  Surgeon: Efrain Hurley MD;  Location: On license of [facility] CATH INVASIVE LOCATION;  Service: Interventional Radiology;  Laterality: N/A;    GALLBLADDER SURGERY      HIP ARTHROSCOPY      JOINT REPLACEMENT Right      Family History   Problem Relation Age of Onset    Breast cancer Neg Hx      Social History     Tobacco Use    Smoking status: Former     Current packs/day: 1.50     Average packs/day: 1.5 packs/day for 51.0 years (76.5 ttl pk-yrs)     Types: Cigarettes     Passive exposure: Past    Smokeless tobacco: Never   Vaping Use    Vaping status: Never Used   Substance Use Topics    Alcohol use: Not Currently     Alcohol/week: 1.0 standard drink of alcohol     Types: 1 Glasses of wine per week     Comment: " "\"occasionally - once every two months\"    Drug use: Not Currently     Comment: alcohol - occasionally     Medications Prior to Admission   Medication Sig Dispense Refill Last Dose    aspirin 81 MG EC tablet Take 1 tablet by mouth Daily.   Unknown    atorvastatin (LIPITOR) 80 MG tablet Take 1 tablet by mouth Every Night. 90 tablet 0 Unknown    gabapentin (NEURONTIN) 300 MG capsule Take 1 capsule by mouth 3 (Three) Times a Day.   Unknown    losartan (COZAAR) 50 MG tablet Take 1 tablet by mouth Daily. Indications: High Blood Pressure Disorder   Unknown    Mirabegron ER (MYRBETRIQ) 50 MG tablet sustained-release 24 hour 24 hr tablet Take 50 mg by mouth Daily. Indications: Urinary Urgency   Unknown    mirtazapine (REMERON) 30 MG tablet Take 1 tablet by mouth Every Night. Indications: Major Depressive Disorder 30 tablet 0 Unknown    pantoprazole (PROTONIX) 40 MG EC tablet TAKE 1 TABLET BY MOUTH EVERY MORNING FOR HEARTBURN 90 tablet 0 Unknown    rOPINIRole (REQUIP) 4 MG tablet Take 1 tablet by mouth Every Night. Take 1 hour before bedtime.  Indications: Restless Leg Syndrome 30 tablet 0 Unknown    ticagrelor (BRILINTA) 60 MG tablet tablet Take 1 tablet by mouth 2 (Two) Times a Day. 60 tablet 0 Unknown    zolpidem (AMBIEN) 5 MG tablet Take 1 tablet by mouth At Night As Needed for Sleep. Indications: Trouble Sleeping 5 tablet 0 Unknown     Allergies:  Penicillins    Review of Systems   Constitutional:  Negative for fatigue.   Respiratory:  Negative for shortness of breath.    Cardiovascular:  Positive for chest pain. Negative for palpitations and leg swelling.   Gastrointestinal:  Negative for blood in stool.   Neurological:  Negative for dizziness, syncope, weakness and light-headedness.   Hematological:  Does not bruise/bleed easily.   All other systems reviewed and are negative.        Objective      Vital Signs  Temp:  [97.6 °F (36.4 °C)-98.3 °F (36.8 °C)] 98.2 °F (36.8 °C)  Heart Rate:  [61-87] 76  Resp:  [18-20] " 20  BP: (123-159)/(59-88) 145/88  Body mass index is 25.23 kg/m².    Intake/Output Summary (Last 24 hours) at 4/10/2024 1135  Last data filed at 4/10/2024 0900  Gross per 24 hour   Intake 0 ml   Output 450 ml   Net -450 ml       Physical Exam  Constitutional:       Appearance: Normal appearance. She is well-developed.   Cardiovascular:      Rate and Rhythm: Normal rate and regular rhythm.      Heart sounds: No murmur heard.     No friction rub. No gallop.   Pulmonary:      Effort: Pulmonary effort is normal. No respiratory distress.      Breath sounds: Normal breath sounds. No wheezing or rales.   Skin:     General: Skin is warm and dry.   Neurological:      Mental Status: She is alert. Mental status is at baseline.   Psychiatric:         Mood and Affect: Mood normal.         Behavior: Behavior normal.         Telemetry: Sinus, sinus arrhythmia 60s to 70s    Results Review:   I reviewed the patient's new clinical results.  Results from last 7 days   Lab Units 04/10/24  0012 04/09/24 2117 04/09/24  1902   HSTROP T ng/L 13 13 12     Results from last 7 days   Lab Units 04/10/24  0012 04/09/24 2009 04/05/24  0020 04/04/24  0006   WBC 10*3/mm3 5.58 5.10 6.20 6.70   HEMOGLOBIN g/dL 10.1* 10.1* 10.1* 10.3*   PLATELETS 10*3/mm3 224 236 245 234     Results from last 7 days   Lab Units 04/10/24  0012 04/09/24 2009 04/05/24  0020 04/04/24  1108 04/04/24  0006   SODIUM mmol/L 139 139 139  --  139   POTASSIUM mmol/L 3.7 4.0 4.0 4.0 3.4*   CHLORIDE mmol/L 106 106 105  --  106   CO2 mmol/L 25.1 25.2 25.9  --  22.5   BUN mg/dL 18 18 15  --  17   CREATININE mg/dL 0.64 0.83 0.91  --  0.75   CALCIUM mg/dL 9.3 9.0 9.4  --  9.0   GLUCOSE mg/dL 113* 119* 110*  --  109*   ALT (SGPT) U/L 27 29  --   --   --    AST (SGOT) U/L 31 35*  --   --   --      Lab Results   Component Value Date    INR 0.94 04/09/2024     Lab Results   Component Value Date    MG 1.8 03/30/2024    MG 1.9 03/23/2024    MG 1.9 03/19/2024     Lab Results   Component  "Value Date    TSH 2.880 2024    TRIG 59 04/10/2024    HDL 47 04/10/2024    LDL 58 04/10/2024      No results found for: \"BNP\"     EKG:         Imaging Results (Last 72 Hours)       ** No results found for the last 72 hours. **             Assessment:  Chest pain, likely noncardiac in the setting of negative troponins x 3.  Patient has negative stress test from 2023.  History of CVA, status post left ICA stent placement and thrombectomy, 2024  Hypertension  Dyslipidemia    Recommendations:  Recommend proceeding with a CT angiogram of the heart.  Continue aspirin, atorvastatin.  Metoprolol tartrate added    Thank you very much for asking us to be involved in this patient's care.  We will follow along with you.    Electronically signed by NIKKIE Melgar, 04/10/24, 6:19 PM EDT.    Patient seen and examined independently of nurse practitioner.  Chart reviewed.  Labs and EKG reviewed.  Agree with the charting, assessment and plan as described above.  Patient 68-year-old female past medical history significant for s/p stroke history of hypertension hyperlipidemia who came in with angina symptoms.  Negative cardiac markers.  Would recommend to get a CTA coronaries done.`    .Electronically signed by Susan Mederos MD, 24, 6:24 PM EDT.          Electronically signed by Susan Mederos MD at 24 1824          Physical Therapy Notes (most recent note)        Eugene Cuevas, PT at 24 1136  Version 1 of 1         Acute Care - Physical Therapy Treatment Note  HONORIO Poon     Patient Name: Regine Beckham  : 1955  MRN: [id number]  Today's Date: 2024   Onset of Illness/Injury or Date of Surgery: 24 (admission date)  Visit Dx:     ICD-10-CM ICD-9-CM   1. Chest pain, unspecified type  R07.9 786.50     Patient Active Problem List   Diagnosis    Iron deficiency anemia due to chronic blood loss    Major depressive disorder    RLS (restless legs syndrome)    GERD " "(gastroesophageal reflux disease)    Left breast mass    Allergy to penicillin    Stroke    Grade I diastolic dysfunction    Moderate malnutrition    Falls frequently    Stroke-like symptoms    Debility    Chest pain     Past Medical History:   Diagnosis Date    Anemia     Anxiety     Arthritis     Depression     Legally blind     Restless leg syndrome     Sleep apnea     \"I don't use a cpap anymore since losing 106 lbs\"    Water retention      Past Surgical History:   Procedure Laterality Date    BACK SURGERY      CARDIAC CATHETERIZATION N/A 1/19/2024    Procedure: Percutaneous Manual Thrombectomy;  Surgeon: Efrain Hurley MD;  Location:  [facility] C[facility] INVASIVE LOCATION;  Service: Interventional Radiology;  Laterality: N/A;    CARDIAC CATHETERIZATION N/A 4/12/2024    Procedure: Left Heart Cath;  Surgeon: Susan Mederos MD;  Location:  [facility] INVASIVE LOCATION;  Service: Cardiology;  Laterality: N/A;    GALLBLADDER SURGERY      HIP ARTHROSCOPY      JOINT REPLACEMENT Right      PT Assessment (Last 12 Hours)       PT Evaluation and Treatment       Row Name 04/17/24 1134          Physical Therapy Time and Intention    Document Type therapy note (daily note)  -KM     Mode of Treatment individual therapy;physical therapy  -KM     Patient Effort good  -KM       Row Name 04/17/24 1134          General Information    Patient Profile Reviewed yes  -KM     Patient Observations alert;cooperative;agree to therapy  -KM     Existing Precautions/Restrictions fall  -KM       Row Name 04/17/24 1134          Cognition    Affect/Mental Status (Cognition) WFL  -KM     Follows Commands (Cognition) WFL  -KM       Row Name 04/17/24 1134          Bed Mobility    Bed Mobility supine-sit  -KM     Supine-Sit [place] (Bed Mobility) minimum assist (75% patient effort);nonverbal cues (demo/gesture);verbal cues  -KM     Assistive Device (Bed Mobility) bed rails;draw sheet  -KM       Row Name 04/17/24 1134          Transfers    Transfers " sit-stand transfer;stand-sit transfer;bed-chair transfer  -KM       Row Name 04/17/24 1134          Bed-Chair Transfer    Bed-Chair Cord (Transfers) minimum assist (75% patient effort);verbal cues  -KM     Assistive Device (Bed-Chair Transfers) --  HHA  -KM       Row Name 04/17/24 1134          Sit-Stand Transfer    Sit-Stand Cord (Transfers) minimum assist (75% patient effort);verbal cues  -KM     Assistive Device (Sit-Stand Transfers) --  HHA  -KM     Comment, (Sit-Stand Transfer) x3  -KM       Row Name 04/17/24 1134          Stand-Sit Transfer    Stand-Sit Cord (Transfers) minimum assist (75% patient effort);verbal cues  -KM     Assistive Device (Stand-Sit Transfers) --  HHA  -KM     Comment, (Stand-Sit Transfer) x3  -KM       Row Name 04/17/24 1134          Gait/Stairs (Locomotion)    Gait/Stairs Locomotion gait/ambulation independence;distance ambulated  -KM     Cord Level (Gait) verbal cues;contact guard;minimum assist (75% patient effort)  -KM     Assistive Device (Gait) --  HHA/handrail  -KM     Patient was able to Ambulate yes  -KM     Distance in Feet (Gait) 80  x2  -KM     Pattern (Gait) step-through;step-to  -KM     Deviations/Abnormal Patterns (Gait) base of support, narrow;gait speed decreased;stride length decreased  -KM     Bilateral Gait Deviations forward flexed posture  -KM     Left Sided Gait Deviations weight shift ability decreased  -KM       Row Name 04/17/24 1134          Safety Issues, Functional Mobility    Impairments Affecting Function (Mobility) balance;cognition;coordination;endurance/activity tolerance;grasp;motor control;muscle tone abnormal;range of motion (ROM);postural/trunk control;strength  -       Row Name 04/17/24 1134          Motor Skills    Comments, Therapeutic Exercise seated ther-ex  -       Row Name 04/17/24 1134          Progress Summary (PT)    Daily Progress Summary (PT) Pt. continues to demonstrate functional mobility skills w/  Angela. She was able to ambulate moderate distances w/ HHA. She tolerated session well. Pt. sitting in chair w/ chair alarm activated prior to session ending. Pt. would continue to benefit from skilled PT services.  -IRENE               User Key  (r) = Recorded By, (t) = Taken By, (c) = Cosigned By      Initials Name Provider Type    Eugene Angle PT Physical Therapist                    Physical Therapy Education       Title: PT OT SLP Therapies (Resolved)       Topic: Physical Therapy (Resolved)       Point: Mobility training (Resolved)       Learner Progress:  Not documented in this visit.              Point: Home exercise program (Resolved)       Learner Progress:  Not documented in this visit.              Point: Body mechanics (Resolved)       Learner Progress:  Not documented in this visit.              Point: Precautions (Resolved)       Learner Progress:  Not documented in this visit.                                  PT Recommendation and Plan     Progress Summary (PT)  Daily Progress Summary (PT): Pt. continues to demonstrate functional mobility skills w/ Angela. She was able to ambulate moderate distances w/ HHA. She tolerated session well. Pt. sitting in chair w/ chair alarm activated prior to session ending. Pt. would continue to benefit from skilled PT services.       Time Calculation:    PT Charges       Row Name 04/17/24 1133             Time Calculation    PT Received On 04/17/24  -KM         Time Calculation- PT    Total Timed Code Minutes- PT 38 minute(s)  -KM                User Key  (r) = Recorded By, (t) = Taken By, (c) = Cosigned By      Initials Name Provider Type    Eugene Angel PT Physical Therapist                  Therapy Charges for Today       Code Description Service Date Service Provider Modifiers Qty    67979165317 HC PT THERAPEUTIC ACT EA 15 MIN 4/16/2024 Eugene Cuevas, PT GP 1    39682776540 HC GAIT TRAINING EA 15 MIN 4/16/2024 Eugene Cuevas, PT GP 1    41795169409 HC GAIT  "TRAINING EA 15 MIN 2024 Eugene Cuevas, PT GP 1    11621271849 HC PT THERAPEUTIC ACT EA 15 MIN 2024 Eugene Cuevas, PT GP 1    00844415726 HC PT THER PROC EA 15 MIN 2024 Eugene Cuevas, PT GP 1            PT G-Codes  AM-PAC 6 Clicks Score (PT): 16    Eugene Cuevas PT  2024      Electronically signed by Eugene Cuevas, PT at 24 1136          Occupational Therapy Notes (most recent note)        Kyleigh Tsang, OT at 24 0937          Acute Care - Occupational Therapy Treatment Note   [place]     Patient Name: Regine Beckham  : 1955  MRN: [id number]  Today's Date: 2024  Onset of Illness/Injury or Date of Surgery: 24 (admission date)          Admit Date: 2024       ICD-10-CM ICD-9-CM   1. Chest pain, unspecified type  R07.9 786.50     Patient Active Problem List   Diagnosis    Iron deficiency anemia due to chronic blood loss    Major depressive disorder    RLS (restless legs syndrome)    GERD (gastroesophageal reflux disease)    Left breast mass    Allergy to penicillin    Stroke    Grade I diastolic dysfunction    Moderate malnutrition    Falls frequently    Stroke-like symptoms    Debility    Chest pain     Past Medical History:   Diagnosis Date    Anemia     Anxiety     Arthritis     Depression     Legally blind     Restless leg syndrome     Sleep apnea     \"I don't use a cpap anymore since losing 106 lbs\"    Water retention      Past Surgical History:   Procedure Laterality Date    BACK SURGERY      CARDIAC CATHETERIZATION N/A 2024    Procedure: Percutaneous Manual Thrombectomy;  Surgeon: Efrain Hurley MD;  Location:  [facility] CATH INVASIVE LOCATION;  Service: Interventional Radiology;  Laterality: N/A;    CARDIAC CATHETERIZATION N/A 2024    Procedure: Left Heart Cath;  Surgeon: Susan Mederos MD;  [facility]:  [facility];  Service: Cardiology;  Laterality: N/A;    GALLBLADDER SURGERY      HIP ARTHROSCOPY      JOINT REPLACEMENT Right  "         OT ASSESSMENT FLOWSHEET (Last 12 Hours)       OT Evaluation and Treatment       Row Name 04/17/24 0928                   OT Time and Intention    Subjective Information complains of;weakness;fatigue  -LM        Document Type therapy note (daily note)  -LM        Mode of Treatment occupational therapy  -LM        Patient Effort adequate  -LM        Comment Patient seen this date for neuro re ed, adl retraining/education.  Patient requires max/total assist with bathing, dressing, toileting tasks.  LUE pain noted with prom/inhibition tech.  1/4 arom LUE.  Tolerates 90 degree LUE shoulder prom.  Otherwise tolerates LUE slow prom/stretch.  Does have resting hand splint for LUE as tolerated for positioning.  -LM           General Information    Existing Precautions/Restrictions fall  -LM        Limitations/Impairments safety/cognitive  -LM           Cognition    Affect/Mental Status (Cognition) WFL  -LM        Orientation Status (Cognition) oriented to;person;place  -LM        Follows Commands (Cognition) WFL  -LM           Positioning and Restraints    Post Treatment Position bed  -LM        In Bed call light within reach;encouraged to call for assist;exit alarm on  -LM                  User Key  (r) = Recorded By, (t) = Taken By, (c) = Cosigned By      Initials Name Effective Dates    LM Kyleigh Tsang OT 06/16/21 -                      Occupational Therapy Education       Title: PT OT SLP Therapies (Resolved)       Topic: Occupational Therapy (Resolved)       Point: ADL training (Resolved)       Description:   Instruct learner(s) on proper safety adaptation and remediation techniques during self care or transfers.   Instruct in proper use of assistive devices.                  Learner Progress:  Not documented in this visit.              Point: Precautions (Resolved)       Description:   Instruct learner(s) on prescribed precautions during self-care and functional transfers.                  Learner Progress:   Not documented in this visit.                                      OT Recommendation and Plan              Time Calculation:     Therapy Charges for Today       Code Description Service Date Service Provider Modifiers Qty    21426971661 HC OT NEUROMUSC RE EDUCATION EA 15 MIN 2024 Kyleigh Tsang OT GO 1                 Kyleigh Tsang OT  2024    Electronically signed by Kyleigh Tsang OT at 24 0937       ADL Documentation (most recent)      Flowsheet Row Most Recent Value   Transferring 3 - assistive equipment and person   Toileting 3 - assistive equipment and person   Bathing 2 - assistive person   Dressing 2 - assistive person   Eating 0 - independent   Communication 0 - understands/communicates without difficulty   Swallowing 2 - difficulty swallowing foods   Equipment Currently Used at Home commode, hospital bed, shower chair          [address] [address]  Phone:  [phone]  Fax:  [phone] Date: 2024      Ambulatory Referral to Home Health (Hospital)     Patient:  Regine Beckham MRN:  [id number]   [address] [facility] [address]  [facility] [address] :  1955  SSN:    Phone: [phone] Sex:  F      INSURANCE PAYOR PLAN GROUP # SUBSCRIBER ID   Primary:    [employer] MEDICARE REPLACEMENT [id number]      Referring Provider Information:  LELE MONTEZ Phone: [phone] Fax: [phone]       Referral Information:   # Visits:  999 Referral Type: Home Health [42]   Urgency:  Routine Referral Reason: Specialty Services Required   Start Date: 2024 End Date:  To be determined by Insurer   Diagnosis: Falls frequently (R29.6 [ICD-10-CM] V15.88 [ICD-9-CM])  Stroke-like symptoms (R29.90 [ICD-10-CM] 781.99 [ICD-9-CM])      Refer to Dept:   Refer to Provider:   Refer to Provider Phone:   Refer to Facility:       Face to Face Visit Date: 2024  Follow-up provider for Plan of Care? I treated the patient in an acute care facility  and will not continue treatment after discharge.  Follow-up provider: RYAN BOWEN [[id number]]  Reason/Clinical Findings: recurrent frequent fails, age related gait instability and decline  Describe mobility limitations that make leaving home difficult: same as above  Nursing/Therapeutic Services Requested: Physical Therapy  Nursing/Therapeutic Services Requested: Occupational Therapy  Frequency: 1 Week 1     This document serves as a request of services and does not constitute Insurance authorization or approval of services.  To determine eligibility, please contact the members Insurance carrier to verify and review coverage.     If you have medical questions regarding this request for services. Please contact [address] at [phone] during normal business hours.        Authorizing Provider:Narinder Olivier DO  Authorizing Provider's NPI: [id number]  Order Entered By: Narinder Olivier DO 4/18/2024  9:24 AM     Electronically signed by: Narinder Olivier DO 4/18/2024  9:24 AM       Discharge Summary    No notes of this type exist for this encounter.       Discharge Order (From admission, onward)       Start     Ordered    04/18/24 0918  Discharge patient  Once        Expected Discharge Date: 04/18/24   [place] [facility]   Physician of Record for Attribution - Please select from Treatment Team: LUIS MIGUEL ORDOÑEZ [[id number]]   Review needed by CMO to determine Physician of Record: No      Question Answer Comment   Physician of Record for Attribution - Please select from Treatment Team LUIS MIGUEL ORDOÑEZ    Review needed by CMO to determine Physician of Record No        04/18/24 0924

## 2024-04-18 NOTE — CASE MANAGEMENT/SOCIAL WORK
Continued Stay Note  HONORIO Poon     Patient Name: Regine Beckham  MRN: [id number]  Today's Date: 4/18/2024    Admit Date: 4/9/2024    Plan: DME order noted for walker; all required information faxed to [facility] with notification of discharge and room number for delivery.  Pt reports no preference of DME supplier.  Madeline Dill RN

## 2024-04-18 NOTE — PROGRESS NOTES
[facility] HOSPITALIST PROGRESS NOTE     Patient Identification:  Name:  Regine Beckham  Age:  68 y.o.  Sex:  female  :  1955  MRN:  [id number]  Visit Number:  [id number]  ROOM: [facility]/     Primary Care Provider:  Dread Burton MD    Length of stay in inpatient status:  6    Subjective     Chief Compliant:  No chief complaint on file.      History of Presenting Illness: Patient seen and evaluated in follow-up for chest pain with progressive debility at home with frequent falls and history of stroke with residual left-sided weakness.  Patient at time of evaluation resting comfortably bed in no distress.  Patient out of skilled days with her insurance and after discussion with  unable to pay for SNF rehab out-of-pocket and agreeable to returning home.  Patient plan for discharge in a.m. due to no ride and living alone.    Objective     Current Hospital Meds:  amLODIPine, 5 mg, Oral, Q24H  aspirin, 81 mg, Oral, Daily  atorvastatin, 80 mg, Oral, Nightly  Diclofenac Sodium, 4 g, Topical, 4x Daily  folic acid, 1 mg, Oral, Daily  gabapentin, 200 mg, Oral, Q12H  heparin (porcine), 5,000 Units, Subcutaneous, BID  isosorbide mononitrate, 30 mg, Oral, Q24H  losartan, 50 mg, Oral, Daily  metoprolol tartrate, 12.5 mg, Oral, Q12H  mirtazapine, 30 mg, Oral, Nightly  oxybutynin XL, 10 mg, Oral, Daily  pantoprazole, 40 mg, Oral, Q AM  rOPINIRole, 4 mg, Oral, Nightly  senna-docusate sodium, 2 tablet, Oral, BID  sodium chloride, 10 mL, Intravenous, Q12H  ticagrelor, 60 mg, Oral, BID      Pharmacy Consult,       ----------------------------------------------------------------------------------------------------------------------  Vital Signs:  Temp:  [97 °F (36.1 °C)-98.6 °F (37 °C)] 98.6 °F (37 °C)  Heart Rate:  [47-67] 59  Resp:  [16-18] 18  BP: ()/(48-67) 106/48  SpO2:  [95 %-99 %] 98 %  on   ;   Device (Oxygen Therapy): room air  Body mass index is 23.74 kg/m².      Intake/Output  "Summary (Last 24 hours) at 4/17/2024 2021  Last data filed at 4/17/2024 1600  Gross per 24 hour   Intake 1120 ml   Output 800 ml   Net 320 ml      ----------------------------------------------------------------------------------------------------------------------  Physical exam:  Constitutional: Elderly adult female sitting up in chair, NAD  HENT:  Head:  Normocephalic and atraumatic.  Mouth:  Moist mucous membranes.    Eyes:  Conjunctivae and EOM are normal. No scleral icterus.    Cardiovascular:  Normal rate, regular rhythm and normal heart sounds with no murmur.  Pulmonary/Chest:  No respiratory distress, no wheezes, no crackles, with normal breath sounds and good air movement.  Abdominal:  Soft.  Bowel sounds are normal.  No distension and no tenderness.   Musculoskeletal:  No tenderness, and no deformity.  No red or swollen joints anywhere.  Functional ROM intact.  Decreased strength in the upper and lower extremities on the left 2-3 out of 5  Neurological:  Alert and oriented to person, place, and time.  No cranial nerve deficit.  No tongue deviation.  No facial droop.  No slurred speech. Intact Sensation throughout  Skin:  Skin is warm and dry. No rash or lesion noted. No pallor.   Peripheral vascular:  Pulses in all 4 extremities with no clubbing, no cyanosis, no edema.  Psychiatric: Appropriate mood and affect, pleasant.   ----------------------------------------------------------------------------------------------------------------------                 No results found for: \"URINECX\"  No results found for: \"BLOODCX\"    I have personally looked at the labs and they are summarized above.  ----------------------------------------------------------------------------------------------------------------------  Detailed radiology reports for the last 24 hours:  No radiology results for the last day  Assessment & Plan      Chest pain  Nonobstructive coronary artery disease    -Patient status post cardiac " evaluation including left heart cath with no obstructive disease found and cardiology recommending medical management.    -Continue aspirin, Brilinta, statin, beta-blocker and ARB    -Initiated on long-acting nitro by cardiology    History of ischemic stroke  Status post left ICA stent  Hypertension  Hyperlipidemia    -Amlodipine increased due to elevated blood pressure    -Continue home statin    Recurrent falls  Progressive debility  Physical deconditioning    - currently working on finding short-term SNF rehab placement.    Copied text in portions of the note has been reviewed and is accurate as of 04/17/24    VTE Prophylaxis:   Mechanical Order History:       None          Pharmalogical Order History:        Ordered     Dose Route Frequency Stop    04/14/24 1442  heparin (porcine) 5000 UNIT/ML injection 5,000 Units         5,000 Units SC 2 Times Daily --    04/12/24 1442  heparin (porcine) injection  Status:  Discontinued         -- -- Code / Trauma / Sedation Medication 04/12/24 1458    04/10/24 1302  heparin 06025 units/250 mL (100 units/mL) in 0.45 % NaCl infusion  6.98 mL/hr,   Status:  Discontinued         11 Units/kg/hr (Dosing Weight) IV Titrated 04/12/24 1614    04/10/24 0455  heparin 38353 units/250 mL (100 units/mL) in 0.45 % NaCl infusion  6.35 mL/hr,   Status:  Discontinued         10 Units/kg/hr IV Titrated 04/10/24 1302    04/09/24 2135  heparin (porcine) 5000 UNIT/ML injection 3,800 Units         60 Units/kg IV Once 04/09/24 2212    04/09/24 2135  heparin 10730 units/250 mL (100 units/mL) in 0.45 % NaCl infusion  7.62 mL/hr,   Status:  Discontinued         12 Units/kg/hr IV Titrated 04/10/24 0455    04/09/24 2048  heparin (porcine) 5000 UNIT/ML injection 5,000 Units  Status:  Discontinued         5,000 Units SC Every 12 Hours Scheduled 04/09/24 2134    04/09/24 2135  heparin (porcine) 5000 UNIT/ML injection 3,200 Units  Status:  Discontinued         50 Units/kg IV As Needed 04/12/24  1614 04/09/24 2135  heparin (porcine) 5000 UNIT/ML injection 1,600 Units  Status:  Discontinued         25 Units/kg IV As Needed 04/12/24 1614 04/09/24 2135  Pharmacy to Dose Heparin  Status:  Discontinued         -- XX Continuous PRN 04/12/24 1614                    Disposition Home in the [facility]    Narinder Olivier DO  [facility] Hospitalist  04/17/24  20:21 EDT

## 2024-04-18 NOTE — CASE MANAGEMENT/SOCIAL WORK
Discharge Planning Assessment   [place]     Patient Name: Regine Beckham  MRN: [id number]  Today's Date: 4/18/2024    Admit Date: 4/9/2024    Plan: SS received call from pt's friend stating concern regarding pt returning home alone and mentation.  SS advised friend that SS could not discuss pt situation with friend and stated that friend would need to discuss issues with pt.  SS advised friend of referral process for Adult Protective Services if needed.  SS discussed with pt in room with pt sitting in a chair.  Pt not agreeable to nursing home placement due to financial situation and is not agreeable to long term placement.  SS notified Physician of concerns brought up.  Physician stated pt has ability to make own decisions.. SS advised pt of process if she returns home and changes her mind regarding placement.  Pt stated understanding.     Discharge Plan       Row Name 04/18/24 1702       Plan    Plan SS received call from pt's friend stating concern regarding pt returning home alone and mentation.  SS advised friend that SS could not discuss pt situation with friend and stated that friend would need to discuss issues with pt.  SS advised friend of referral process for Adult Protective Services if needed.  SS discussed with pt in room with pt sitting in a chair.  Pt not agreeable to nursing home placement due to financial situation and is not agreeable to long term placement.  SS notified Physician of concerns brought up.  Physician stated pt has ability to make own decisions.. SS advised pt of process if she returns home and changes her mind regarding placement.  Pt stated understanding.    Final Discharge Disposition Code 06 - home with home health care    Final Note Pt to be discharged home on this date with Physician ordered home health.  Pt stated no preference for home health provider.  SS made referral to [facility] and spoke with Jannie at [phone].      Row Name 04/18/24 1838       Plan                       Meron Garcia, BSW

## 2024-04-18 NOTE — DISCHARGE PLACEMENT REQUEST
"Regine Lock \"NEGIN\" (68 y.o. Female)       Date of Birth   1955    Social Security Number       Address   [address] [address]    Home Phone   [phone]    MRN   [id number]       [facility]    Marital Status                               Admission Date   24    Admission Type   Urgent    Admitting Provider   Capo Su MD    Attending Provider   Narinder Olivier DO    Department, Room/Bed   [address], [address]       Discharge Date       [place] Dis[place] [facility] [place] Destination                                 Attending Provider: Narinder Olivier DO    Allergies: Penicillins    Isolation: None   Infection: None   Code Status: CPR    Ht: 167.6 cm (66\")   Wt: 68.1 kg (150 lb 2.1 oz)    Admission Cmt: None   Principal Problem: Chest pain [R07.9]                   Active Insurance as of 2024       Primary Coverage       Payor Plan Insurance Group Employer/Plan Group    AETNA MEDICARE REPLACEMENT AETNA MEDICARE REPLACEMENT [id number]       Payor Plan Address Payor Plan Phone Number Payor Plan Fax Number Effective Dates    [address] [phone]  2024 - None Entered    [place] [address]         Subscriber Name Subscriber Birth Date Member ID       REGINE LOCK 1955 [id number]                     Emergency Contacts        (Rel.) Home Phone Work Phone Mobile Phone    Yaa Elizondo (Friend) [phone] --    Karla Patel (Friend) [phone] -- --              [address] [address]  Dept. Phone:  [phone]  Dept. Fax:  [phone] Date Ordered: 2024         Patient:  Regine Lock MRN:  [id number]   [address]  [facility] [address] :  1955  SSN:    Phone: [phone] Sex:  F     Weight: 68.1 kg (150 lb 2.1 oz)         Ht Readings from Last 1 Encounters:   24 167.6 " "cm (66\")         Walker  Standard Walker Folding     (Order ID: [id number])    Diagnosis:  Falls frequently (R29.6 [ICD-10-CM] V15.88 [ICD-9-CM])  Stroke-like symptoms (R29.90 [ICD-10-CM] 781.99 [ICD-9-CM])   Quantity:  1     Mobility Limitation: Heightened Risk of Morbidity / Mortality Secondary to Attempts to Perform MRADLs  Safety: Able to Safely Use Equipment  Mobility Deficit: Mobility Deficit Can Be Sufficiently Resolved By Use of Equipment  Equipment:  Standard Walker Folding  Length of Need: 99 Months = Lifetime        Authorizing Provider's Phone: [phone]  Authorizing Provider:Narinder Olivier DO  Authorizing Provider's NPI: [id number]  Order Entered By: Narinder Olivier DO 4/18/2024  9:24 AM     Electronically signed by: Narinder Olivier DO 4/18/2024  9:24 AM     "

## 2024-04-18 NOTE — THERAPY TREATMENT NOTE
"Acute Care - Physical Therapy Treatment Note  [facility]     Patient Name: Regine Beckham  : 1955  MRN: [id number]  Today's Date: 2024   Onset of Illness/Injury or Date of Surgery: 24 (admission date)  Visit Dx:     ICD-10-CM ICD-9-CM   1. Chest pain, unspecified type  R07.9 786.50   2. Falls frequently  R29.6 V15.88   3. Stroke-like symptoms  R29.90 781.99   4. Peripheral polyneuropathy  G62.9 356.9     Patient Active Problem List   Diagnosis    Iron deficiency anemia due to chronic blood loss    Major depressive disorder    RLS (restless legs syndrome)    GERD (gastroesophageal reflux disease)    Left breast mass    Allergy to penicillin    Stroke    Grade I diastolic dysfunction    Moderate malnutrition    Falls frequently    Stroke-like symptoms    Debility    Chest pain     Past Medical History:   Diagnosis Date    Anemia     Anxiety     Arthritis     Depression     Legally blind     Restless leg syndrome     Sleep apnea     \"I don't use a cpap anymore since losing 106 lbs\"    Water retention      Past Surgical History:   Procedure Laterality Date    BACK SURGERY      CARDIAC CATHETERIZATION N/A 2024    Procedure: Percutaneous Manual Thrombectomy;  Surgeon: Efrain Hurley MD;  Location:  [facility];  Service: Interventional Radiology;  Laterality: N/A;    CARDIAC CATHETERIZATION N/A 2024    Procedure: Left Heart Cath;  Surgeon: Susan Mederos MD;  [facility]:  [facility];  Service: Cardiology;  Laterality: N/A;    GALLBLADDER SURGERY      HIP ARTHROSCOPY      JOINT REPLACEMENT Right      PT Assessment (Last 12 Hours)       PT Evaluation and Treatment       Row Name 24 1059          Physical Therapy Time and Intention    Document Type therapy note (daily note)  -KM     Mode of Treatment individual therapy;physical therapy  -KM     Patient Effort good  -KM       Row Name 24 1059          General Information    Patient Profile " Reviewed yes  -KM     Patient Observations alert;cooperative;agree to therapy  -KM     Existing Precautions/Restrictions fall  -KM       Row Name 04/18/24 1059          Cognition    Affect/Mental Status (Cognition) WFL  -KM     Follows Commands (Cognition) WFL  -       Row Name 04/18/24 1059          Bed Mobility    Bed Mobility supine-sit  -KM     Supine-Sit [place] (Bed Mobility) minimum assist (75% patient effort);nonverbal cues (demo/gesture);verbal cues  -KM     Assistive Device (Bed Mobility) bed rails;draw sheet  -       Row Name 04/18/24 1059          Transfers    Transfers sit-stand transfer;stand-sit transfer;bed-chair transfer  -       Row Name 04/18/24 1059          Bed-Chair Transfer    Bed-Chair [place] (Transfers) minimum assist (75% patient effort);verbal cues  -KM     Assistive Device (Bed-Chair Transfers) --  HHA  -[facility] Name 04/18/24 1059          Sit-Stand Transfer    Sit-Stand [place] (Transfers) minimum assist (75% patient effort);verbal cues  -     Assistive Device (Sit-Stand Transfers) --  A  -[facility] Name 04/18/24 1059          Stand-Sit Transfer    Stand-Sit [place] (Transfers) minimum assist (75% patient effort);verbal cues  -     Assistive Device (Stand-Sit Transfers) --  A  -[facility] Name 04/18/24 1059          Safety Issues, Functional Mobility    Impairments Affecting Function (Mobility) balance;endurance/activity tolerance;strength;grasp  -       Row Name 04/18/24 1059          Progress Summary (PT)    Daily Progress Summary (PT) Pt. continues to perform functional mobility skills w/ similar levels as prior sessions. She was able to transfer from bed-chair w/ Angela. Pt. remained in chair w/ chair alarm activated. Pt. would continue to benefit from skilled PT services.  -KM               User Key  (r) = Recorded By, (t) = Taken By, (c) = Cosigned By      Initials Name Provider Type    Eugene Angel, PT Physical Therapist                     Physical Therapy Education       Title: PT OT SLP Therapies (Resolved)       Topic: Physical Therapy (Resolved)       Point: Mobility training (Resolved)       Learner Progress:  Not documented in this visit.              Point: Home exercise program (Resolved)       Learner Progress:  Not documented in this visit.              Point: Body mechanics (Resolved)       Learner Progress:  Not documented in this visit.              Point: Precautions (Resolved)       Learner Progress:  Not documented in this visit.                                  PT Recommendation and Plan     Progress Summary (PT)  Daily Progress Summary (PT): Pt. continues to perform functional mobility skills w/ similar levels as prior sessions. She was able to transfer from bed-chair w/ Angela. Pt. remained in chair w/ chair alarm activated. Pt. would continue to benefit from skilled PT services.       Time Calculation:    PT Charges       Row Name 04/18/24 1057             Time Calculation    PT Received On 04/18/24  -KM         Time Calculation- PT    Total Timed Code Minutes- PT 25 minute(s)  -KM                User Key  (r) = Recorded By, (t) = Taken By, (c) = Cosigned By      Initials Name Provider Type    Eugene Angel, PT Physical Therapist                  Therapy Charges for Today       Code Description Service Date Service Provider Modifiers Qty    22254060026 HC GAIT TRAINING EA 15 MIN 4/17/2024 Eugene Cuevas, PT GP 1    89964979868 HC PT THERAPEUTIC ACT EA 15 MIN 4/17/2024 Eugene Cuevas, PT GP 1    57964042815 HC PT THER PROC EA 15 MIN 4/17/2024 Eugene Cuevas, PT GP 1    61721893239 HC PT THERAPEUTIC ACT EA 15 MIN 4/18/2024 Eugene Cuevas, PT GP 2            PT G-Codes  AM-PAC 6 Clicks Score (PT): 16    Eugene Cuevas PT  4/18/2024

## 2024-04-19 NOTE — PAYOR COMM NOTE
"CONTACT:  FLORINA KNOX MSN, RN  UTILIZATION MANAGEMENT DEPT.  [facility]  [address] [facility], [address]  PHONE:  [phone]  FAX: [phone]    PATIENT DISCHARGED TO HOME ON 4/18/24    REF # [id number]     Regine Lock \"NEGIN\" (68 y.o. Female)       Date of Birth   1955    Social Security Number       Address   [address] [place] [address]    Home Phone   [phone]    MRN   [id number]       [facility]    Marital Status                               Admission Date   4/9/24    Admission Type   Urgent    Admitting Provider   Capo Su MD    Attending Provider       Department, Room/Bed   [address], 3315/1S       Discharge Date   4/18/2024    Discharge Disposition   Home-Health Care Svc    Discharge Destination   Home                              Attending Provider: (none)   Allergies: Penicillins    Isolation: None   Infection: None   Code Status: Prior    Ht: 167.6 cm (66\")   Wt: 68.1 kg (150 lb 2.1 oz)    Admission Cmt: None   Principal Problem: Chest pain [R07.9]                   Active Insurance as of 4/9/2024       Primary Coverage       Payor Plan Insurance Group Employer/Plan Group    AETNA MEDICARE REPLACEMENT AETNA MEDICARE REPLACEMENT [id number]       Payor Plan Address Payor Plan Phone Number Payor Plan Fax Number Effective Dates    [address] [phone]  1/1/2024 - None Entered    [facility] [id number]         Subscriber Name Subscriber Birth Date Member ID       REGINE LOCK 1955 [id number]                     Emergency Contacts        (Rel.) Home Phone Work Phone Mobile Phone    Yaa Elizondo (Friend) [phone] --    Karla Patel (Friend) [phone] -- --              Discharge Summary    No notes of this type exist for this encounter.       "

## 2024-04-19 NOTE — OUTREACH NOTE
Prep Survey      Flowsheet Row Responses   [facility] facility patient discharged from? [facility]   Is LACE score < 7 ? No   Eligibility Readm Mgmt   Discharge diagnosis Chest pain   Does the patient have one of the following disease processes/diagnoses(primary or secondary)? Other   Does the patient have Home health ordered? Yes   What is the Home health agency?  [facility]   Is there a DME ordered? No   Prep survey completed? Yes            CARLI BRITTON - Registered Nurse

## 2024-04-20 ENCOUNTER — READMISSION MANAGEMENT (OUTPATIENT)
Dept: CALL CENTER | Facility: HOSPITAL | Age: 69
End: 2024-04-20
Payer: MEDICARE

## 2024-04-20 ENCOUNTER — HOSPITAL ENCOUNTER (OUTPATIENT)
Facility: HOSPITAL | Age: 69
Setting detail: OBSERVATION
Discharge: HOME HEALTH | End: 2024-04-25
Attending: STUDENT IN AN ORGANIZED HEALTH CARE EDUCATION/TRAINING PROGRAM | Admitting: STUDENT IN AN ORGANIZED HEALTH CARE EDUCATION/TRAINING PROGRAM
Payer: MEDICARE

## 2024-04-20 ENCOUNTER — APPOINTMENT (OUTPATIENT)
Dept: GENERAL RADIOLOGY | Facility: HOSPITAL | Age: 69
End: 2024-04-20
Payer: MEDICARE

## 2024-04-20 DIAGNOSIS — R53.1 LEFT-SIDED WEAKNESS: ICD-10-CM

## 2024-04-20 DIAGNOSIS — R62.7 FAILURE TO THRIVE IN ADULT: Primary | ICD-10-CM

## 2024-04-20 DIAGNOSIS — R53.1 WEAKNESS: ICD-10-CM

## 2024-04-20 DIAGNOSIS — Z86.73 HISTORY OF CVA (CEREBROVASCULAR ACCIDENT): ICD-10-CM

## 2024-04-20 LAB
ALBUMIN SERPL-MCNC: 3.6 G/DL (ref 3.5–5.2)
ALBUMIN/GLOB SERPL: 1 G/DL
ALP SERPL-CCNC: 121 U/L (ref 39–117)
ALT SERPL W P-5'-P-CCNC: 29 U/L (ref 1–33)
ANION GAP SERPL CALCULATED.3IONS-SCNC: 10.4 MMOL/L (ref 5–15)
AST SERPL-CCNC: 37 U/L (ref 1–32)
BACTERIA UR QL AUTO: ABNORMAL /HPF
BASOPHILS # BLD AUTO: 0.06 10*3/MM3 (ref 0–0.2)
BASOPHILS NFR BLD AUTO: 0.6 % (ref 0–1.5)
BILIRUB SERPL-MCNC: 0.5 MG/DL (ref 0–1.2)
BILIRUB UR QL STRIP: NEGATIVE
BUN SERPL-MCNC: 12 MG/DL (ref 8–23)
BUN/CREAT SERPL: 15.8 (ref 7–25)
CALCIUM SPEC-SCNC: 9.8 MG/DL (ref 8.6–10.5)
CHLORIDE SERPL-SCNC: 107 MMOL/L (ref 98–107)
CLARITY UR: ABNORMAL
CO2 SERPL-SCNC: 24.6 MMOL/L (ref 22–29)
COLOR UR: YELLOW
CREAT SERPL-MCNC: 0.76 MG/DL (ref 0.57–1)
DEPRECATED RDW RBC AUTO: 48.1 FL (ref 37–54)
EGFRCR SERPLBLD CKD-EPI 2021: 84.9 ML/MIN/1.73
EOSINOPHIL # BLD AUTO: 0.2 10*3/MM3 (ref 0–0.4)
EOSINOPHIL NFR BLD AUTO: 2.1 % (ref 0.3–6.2)
ERYTHROCYTE [DISTWIDTH] IN BLOOD BY AUTOMATED COUNT: 13.8 % (ref 12.3–15.4)
GLOBULIN UR ELPH-MCNC: 3.5 GM/DL
GLUCOSE SERPL-MCNC: 132 MG/DL (ref 65–99)
GLUCOSE UR STRIP-MCNC: NEGATIVE MG/DL
HCT VFR BLD AUTO: 35 % (ref 34–46.6)
HGB BLD-MCNC: 11.3 G/DL (ref 12–15.9)
HGB UR QL STRIP.AUTO: NEGATIVE
HOLD SPECIMEN: NORMAL
HOLD SPECIMEN: NORMAL
HYALINE CASTS UR QL AUTO: ABNORMAL /LPF
IMM GRANULOCYTES # BLD AUTO: 0.03 10*3/MM3 (ref 0–0.05)
IMM GRANULOCYTES NFR BLD AUTO: 0.3 % (ref 0–0.5)
KETONES UR QL STRIP: NEGATIVE
LEUKOCYTE ESTERASE UR QL STRIP.AUTO: ABNORMAL
LYMPHOCYTES # BLD AUTO: 1.68 10*3/MM3 (ref 0.7–3.1)
LYMPHOCYTES NFR BLD AUTO: 17.7 % (ref 19.6–45.3)
MCH RBC QN AUTO: 30.6 PG (ref 26.6–33)
MCHC RBC AUTO-ENTMCNC: 32.3 G/DL (ref 31.5–35.7)
MCV RBC AUTO: 94.9 FL (ref 79–97)
MONOCYTES # BLD AUTO: 0.77 10*3/MM3 (ref 0.1–0.9)
MONOCYTES NFR BLD AUTO: 8.1 % (ref 5–12)
NEUTROPHILS NFR BLD AUTO: 6.73 10*3/MM3 (ref 1.7–7)
NEUTROPHILS NFR BLD AUTO: 71.2 % (ref 42.7–76)
NITRITE UR QL STRIP: NEGATIVE
NRBC BLD AUTO-RTO: 0 /100 WBC (ref 0–0.2)
PH UR STRIP.AUTO: 7.5 [PH] (ref 5–8)
PLATELET # BLD AUTO: 284 10*3/MM3 (ref 140–450)
PMV BLD AUTO: 9.8 FL (ref 6–12)
POTASSIUM SERPL-SCNC: 3.7 MMOL/L (ref 3.5–5.2)
PROT SERPL-MCNC: 7.1 G/DL (ref 6–8.5)
PROT UR QL STRIP: NEGATIVE
RBC # BLD AUTO: 3.69 10*6/MM3 (ref 3.77–5.28)
RBC # UR STRIP: ABNORMAL /HPF
REF LAB TEST METHOD: ABNORMAL
SODIUM SERPL-SCNC: 142 MMOL/L (ref 136–145)
SP GR UR STRIP: 1.02 (ref 1–1.03)
SQUAMOUS #/AREA URNS HPF: ABNORMAL /HPF
UROBILINOGEN UR QL STRIP: ABNORMAL
WBC # UR STRIP: ABNORMAL /HPF
WBC NRBC COR # BLD AUTO: 9.47 10*3/MM3 (ref 3.4–10.8)
WHOLE BLOOD HOLD COAG: NORMAL
WHOLE BLOOD HOLD SPECIMEN: NORMAL

## 2024-04-20 PROCEDURE — 80053 COMPREHEN METABOLIC PANEL: CPT | Performed by: PHYSICIAN ASSISTANT

## 2024-04-20 PROCEDURE — 85025 COMPLETE CBC W/AUTO DIFF WBC: CPT | Performed by: PHYSICIAN ASSISTANT

## 2024-04-20 PROCEDURE — 99222 1ST HOSP IP/OBS MODERATE 55: CPT | Performed by: STUDENT IN AN ORGANIZED HEALTH CARE EDUCATION/TRAINING PROGRAM

## 2024-04-20 PROCEDURE — 73502 X-RAY EXAM HIP UNI 2-3 VIEWS: CPT | Performed by: RADIOLOGY

## 2024-04-20 PROCEDURE — 99285 EMERGENCY DEPT VISIT HI MDM: CPT

## 2024-04-20 PROCEDURE — G0378 HOSPITAL OBSERVATION PER HR: HCPCS

## 2024-04-20 PROCEDURE — 25010000002 ENOXAPARIN PER 10 MG: Performed by: STUDENT IN AN ORGANIZED HEALTH CARE EDUCATION/TRAINING PROGRAM

## 2024-04-20 PROCEDURE — 96372 THER/PROPH/DIAG INJ SC/IM: CPT

## 2024-04-20 PROCEDURE — 87086 URINE CULTURE/COLONY COUNT: CPT | Performed by: PHYSICIAN ASSISTANT

## 2024-04-20 PROCEDURE — 73030 X-RAY EXAM OF SHOULDER: CPT | Performed by: RADIOLOGY

## 2024-04-20 PROCEDURE — 73030 X-RAY EXAM OF SHOULDER: CPT

## 2024-04-20 PROCEDURE — 81001 URINALYSIS AUTO W/SCOPE: CPT | Performed by: PHYSICIAN ASSISTANT

## 2024-04-20 PROCEDURE — 73502 X-RAY EXAM HIP UNI 2-3 VIEWS: CPT

## 2024-04-20 RX ORDER — ASPIRIN 81 MG/1
81 TABLET ORAL DAILY
Status: DISCONTINUED | OUTPATIENT
Start: 2024-04-21 | End: 2024-04-25 | Stop reason: HOSPADM

## 2024-04-20 RX ORDER — PANTOPRAZOLE SODIUM 40 MG/1
40 TABLET, DELAYED RELEASE ORAL
Status: DISCONTINUED | OUTPATIENT
Start: 2024-04-21 | End: 2024-04-25 | Stop reason: HOSPADM

## 2024-04-20 RX ORDER — ENOXAPARIN SODIUM 100 MG/ML
40 INJECTION SUBCUTANEOUS EVERY 24 HOURS
Status: DISCONTINUED | OUTPATIENT
Start: 2024-04-20 | End: 2024-04-25 | Stop reason: HOSPADM

## 2024-04-20 RX ORDER — GABAPENTIN 300 MG/1
300 CAPSULE ORAL 2 TIMES DAILY
Status: DISCONTINUED | OUTPATIENT
Start: 2024-04-20 | End: 2024-04-25 | Stop reason: HOSPADM

## 2024-04-20 RX ORDER — SODIUM CHLORIDE 0.9 % (FLUSH) 0.9 %
10 SYRINGE (ML) INJECTION EVERY 12 HOURS SCHEDULED
Status: DISCONTINUED | OUTPATIENT
Start: 2024-04-20 | End: 2024-04-25 | Stop reason: HOSPADM

## 2024-04-20 RX ORDER — ATORVASTATIN CALCIUM 40 MG/1
80 TABLET, FILM COATED ORAL NIGHTLY
Status: DISCONTINUED | OUTPATIENT
Start: 2024-04-20 | End: 2024-04-25 | Stop reason: HOSPADM

## 2024-04-20 RX ORDER — OXYBUTYNIN CHLORIDE 5 MG/1
5 TABLET ORAL 2 TIMES DAILY
Status: DISCONTINUED | OUTPATIENT
Start: 2024-04-20 | End: 2024-04-25 | Stop reason: HOSPADM

## 2024-04-20 RX ORDER — LOSARTAN POTASSIUM 50 MG/1
50 TABLET ORAL DAILY
Status: DISCONTINUED | OUTPATIENT
Start: 2024-04-21 | End: 2024-04-25 | Stop reason: HOSPADM

## 2024-04-20 RX ORDER — ISOSORBIDE MONONITRATE 30 MG/1
30 TABLET, EXTENDED RELEASE ORAL
Status: DISCONTINUED | OUTPATIENT
Start: 2024-04-21 | End: 2024-04-25 | Stop reason: HOSPADM

## 2024-04-20 RX ORDER — ROPINIROLE 1 MG/1
4 TABLET, FILM COATED ORAL NIGHTLY
Status: DISCONTINUED | OUTPATIENT
Start: 2024-04-20 | End: 2024-04-25 | Stop reason: HOSPADM

## 2024-04-20 RX ORDER — MIRTAZAPINE 15 MG/1
30 TABLET, FILM COATED ORAL NIGHTLY
Status: DISCONTINUED | OUTPATIENT
Start: 2024-04-20 | End: 2024-04-25 | Stop reason: HOSPADM

## 2024-04-20 RX ORDER — SODIUM CHLORIDE 9 MG/ML
40 INJECTION, SOLUTION INTRAVENOUS AS NEEDED
Status: DISCONTINUED | OUTPATIENT
Start: 2024-04-20 | End: 2024-04-25 | Stop reason: HOSPADM

## 2024-04-20 RX ORDER — SODIUM CHLORIDE 0.9 % (FLUSH) 0.9 %
10 SYRINGE (ML) INJECTION AS NEEDED
Status: DISCONTINUED | OUTPATIENT
Start: 2024-04-20 | End: 2024-04-25 | Stop reason: HOSPADM

## 2024-04-20 RX ORDER — AMLODIPINE BESYLATE 5 MG/1
5 TABLET ORAL
Status: DISCONTINUED | OUTPATIENT
Start: 2024-04-21 | End: 2024-04-25 | Stop reason: HOSPADM

## 2024-04-20 RX ADMIN — MIRTAZAPINE 30 MG: 15 TABLET, FILM COATED ORAL at 23:19

## 2024-04-20 RX ADMIN — OXYBUTYNIN CHLORIDE 5 MG: 5 TABLET ORAL at 23:19

## 2024-04-20 RX ADMIN — TICAGRELOR 60 MG: 60 TABLET ORAL at 23:19

## 2024-04-20 RX ADMIN — GABAPENTIN 300 MG: 300 CAPSULE ORAL at 23:18

## 2024-04-20 RX ADMIN — ATORVASTATIN CALCIUM 80 MG: 40 TABLET, FILM COATED ORAL at 23:19

## 2024-04-20 RX ADMIN — ROPINIROLE HYDROCHLORIDE 4 MG: 1 TABLET, FILM COATED ORAL at 23:19

## 2024-04-20 RX ADMIN — Medication 10 ML: at 20:17

## 2024-04-20 RX ADMIN — METOPROLOL TARTRATE 12.5 MG: 25 TABLET, FILM COATED ORAL at 23:19

## 2024-04-20 RX ADMIN — ENOXAPARIN SODIUM 40 MG: 40 INJECTION SUBCUTANEOUS at 20:16

## 2024-04-20 NOTE — H&P
[facility]    Patient Identification:  Name:  Regine Beckham  Age:  68 y.o.  Sex:  female  :  1955  MRN:  [id number]   Visit Number:  [id number]  Admit Date: 2024   Room number:  116/16  Primary Care Physician:  Dread Burton MD     Subjective     Chief complaint:    Chief Complaint   Patient presents with    Fall       History of presenting illness:  68 y.o. female presents from home after fall while using walker hitting right shoulder and getting her leg tangled in her walker.  Patient recently admitted to this hospital and discharged 2 days prior after being admitted for chest pain with nonobstructive coronary artery disease found on left heart cath.  Patient with history of ischemic stroke status post left ICA stent.  At that time hospitalization patient medically optimized after heart cath revealed no intervenable disease present.  Patient was recommended for likely long-term placement however patient was only interested in short-term at the time but unfortunately due to already using all of her days the insurance would require out-of-pocket pain and patient stated that she preferred to return home.  Since that time patient reports that she has fallen twice at home and after the events of today has come to the realization that she does not and cannot be at home by herself and is willing to seek long-term skilled nursing facility placement.  Due to living alone and having no family or strong support she was not deemed safe for discharge back home to work on outpatient long-term placement and is being admitted for observation.  Patient without any acute complaints other than soreness in her shoulder and hip.  ---------------------------------------------------------------------------------------------------------------------   Review of Systems 14 system review of systems performed pertinent positives and negatives as above in  "HPI.  ---------------------------------------------------------------------------------------------------------------------   Past Medical History:   Diagnosis Date    Anemia     Anxiety     Arthritis     Depression     Legally blind     Restless leg syndrome     Sleep apnea     \"I don't use a cpap anymore since losing 106 lbs\"    Water retention      Past Surgical History:   Procedure Laterality Date    BACK SURGERY      CARDIAC CATHETERIZATION N/A 1/19/2024    Procedure: Percutaneous Manual Thrombectomy;  Surgeon: Efrain Hurley MD;  Location:  [facility];  Service: Interventional Radiology;  Laterality: N/A;    CARDIAC CATHETERIZATION N/A 4/12/2024    Procedure: Left Heart Cath;  Surgeon: Susan Mederos MD;  Location:  [facility];  Service: Cardiology;  Laterality: N/A;    GALLBLADDER SURGERY      HIP ARTHROSCOPY      JOINT REPLACEMENT Right      Family History   Problem Relation Age of Onset    Breast cancer Neg Hx      Social History     Socioeconomic History    Marital status:     Number of children: 0    Years of education: 1 yr college   Tobacco Use    Smoking status: Former     Current packs/day: 1.50     Average packs/day: 1.5 packs/day for 51.0 years (76.5 ttl pk-yrs)     Types: Cigarettes     Passive exposure: Past    Smokeless tobacco: Never   Vaping Use    Vaping status: Never Used   Substance and Sexual Activity    Alcohol use: Not Currently     Alcohol/week: 1.0 standard drink of alcohol     Types: 1 Glasses of wine per week     Comment: \"occasionally - once every two months\"    Drug use: Not Currently     Comment: alcohol - occasionally    Sexual activity: Defer     ---------------------------------------------------------------------------------------------------------------------   Allergies:  Penicillins  ---------------------------------------------------------------------------------------------------------------------   Medications below are " reported home medications pulling from within the system; at this time, these medications have not been reconciled unless otherwise specified and are in the verification process for further verifcation as current home medications.    Prior to Admission Medications       Prescriptions Last Dose Informant Patient Reported? Taking?    amLODIPine (NORVASC) 5 MG tablet   No No    Take 1 tablet by mouth Daily for 30 days.    aspirin 81 MG EC tablet  Pharmacy Yes No    Take 1 tablet by mouth Daily.    atorvastatin (LIPITOR) 80 MG tablet  Pharmacy No No    Take 1 tablet by mouth Every Night.    gabapentin (NEURONTIN) 300 MG capsule   No No    Take 1 capsule by mouth 2 (Two) Times a Day.    isosorbide mononitrate (IMDUR) 30 MG 24 hr tablet   No No    Take 1 tablet by mouth Daily for 30 days.    losartan (COZAAR) 50 MG tablet  Pharmacy Yes No    Take 1 tablet by mouth Daily. Indications: High Blood Pressure Disorder    metoprolol tartrate (LOPRESSOR) 25 MG tablet   No No    Take 1/2 tablet by mouth Every 12 (Twelve) Hours for 30 days.    Mirabegron ER (MYRBETRIQ) 50 MG tablet sustained-release 24 hour 24 hr tablet  Pharmacy Yes No    Take 50 mg by mouth Daily. Indications: Urinary Urgency    mirtazapine (REMERON) 30 MG tablet  Pharmacy No No    Take 1 tablet by mouth Every Night. Indications: Major Depressive Disorder    pantoprazole (PROTONIX) 40 MG EC tablet  Pharmacy No No    TAKE 1 TABLET BY MOUTH EVERY MORNING FOR HEARTBURN    rOPINIRole (REQUIP) 4 MG tablet  Pharmacy No No    Take 1 tablet by mouth Every Night. Take 1 hour before bedtime.  Indications: Restless Leg Syndrome    ticagrelor (BRILINTA) 60 MG tablet tablet  Pharmacy No No    Take 1 tablet by mouth 2 (Two) Times a Day.          Objective     Vital Signs:  Temp:  [98.4 °F (36.9 °C)] 98.4 °F (36.9 °C)  Heart Rate:  [53-86] 68  Resp:  [18] 18  BP: (100-143)/(39-80) 143/67    Mean Arterial Pressure (Non-Invasive) for the past 24 hrs (Last 3 readings):    Noninvasive MAP (mmHg)   04/20/24 1745 89   04/20/24 1730 99   04/20/24 1715 102     SpO2:  [94 %-98 %] 96 %  on   ;   Device (Oxygen Therapy): room air  Body mass index is 24.21 kg/m².    Wt Readings from Last 3 Encounters:   04/20/24 68 kg (150 lb)   04/18/24 68.1 kg (150 lb 2.1 oz)   03/27/24 63.5 kg (140 lb)      ---------------------------------------------------------------------------------------------------------------------   Physical Exam:  Constitutional: Elderly adult female sitting up in chair, NAD  HENT:  Head:  Normocephalic and atraumatic.  Mouth:  Moist mucous membranes.    Eyes:  Conjunctivae and EOM are normal. No scleral icterus.    Cardiovascular:  Normal rate, regular rhythm and normal heart sounds with no murmur.  Pulmonary/Chest:  No respiratory distress, no wheezes, no crackles, with normal breath sounds and good air movement.  Abdominal:  Soft.  Bowel sounds are normal.  No distension and no tenderness.   Musculoskeletal:  No tenderness, and no deformity.  No red or swollen joints anywhere.  Functional ROM intact.  Decreased strength in the upper and lower extremities on the left 2-3 out of 5  Neurological:  Alert and oriented to person, place, and time.  No cranial nerve deficit.  No tongue deviation.  No facial droop.  No slurred speech. Intact Sensation throughout  Skin:  Skin is warm and dry. No rash or lesion noted. No pallor.   Peripheral vascular:  Pulses in all 4 extremities with no clubbing, no cyanosis, no edema.  Psychiatric: Appropriate mood and affect, pleasant.   ---------------------------------------------------------------------------------------------------------------------    Last echocardiogram:  Results for orders placed during the hospital encounter of 04/09/24    Adult Transthoracic Echo Complete w/ Color, Spectral and Contrast if necessary per protocol    Interpretation Summary    Left ventricular systolic function is hyperdynamic (EF > 70%). Calculated left  "ventricular EF = 79% Left ventricular ejection fraction appears to be greater than 70%.    Left ventricular diastolic function is consistent with (grade I) impaired relaxation.    --------------------------------------------------------------------------------------------------------------------  Labs:  Results from last 7 days   Lab Units 04/20/24  1407 04/16/24  0057   WBC 10*3/mm3 9.47 6.31   HEMOGLOBIN g/dL 11.3* 9.2*   HEMATOCRIT % 35.0 29.2*   MCV fL 94.9 95.4   MCHC g/dL 32.3 31.5   PLATELETS 10*3/mm3 284 216         Results from last 7 days   Lab Units 04/20/24  1407   SODIUM mmol/L 142   POTASSIUM mmol/L 3.7   CHLORIDE mmol/L 107   CO2 mmol/L 24.6   BUN mg/dL 12   CREATININE mg/dL 0.76   CALCIUM mg/dL 9.8   GLUCOSE mg/dL 132*   ALBUMIN g/dL 3.6   BILIRUBIN mg/dL 0.5   ALK PHOS U/L 121*   AST (SGOT) U/L 37*   ALT (SGPT) U/L 29   Estimated Creatinine Clearance: 75 mL/min (by C-G formula based on SCr of 0.76 mg/dL).    No results found for: \"AMMONIA\"          No results found for: \"HGBA1C\", \"POCGLU\"  Lab Results   Component Value Date    TSH 2.880 03/17/2024     No results found for: \"PREGTESTUR\", \"PREGSERUM\", \"HCG\", \"HCGQUANT\"  Pain Management Panel          Latest Ref Rng & Units 12/20/2023   Pain Management Panel   Amphetamine, Urine Qual Negative Negative    Barbiturates Screen, Urine Negative Negative    Benzodiazepine Screen, Urine Negative Negative    Buprenorphine, Screen, Urine Negative Negative    Cocaine Screen, Urine Negative Negative    Fentanyl, Urine Negative Negative    Methadone Screen , Urine Negative Negative    Methamphetamine, Ur Negative Negative      Brief Urine Lab Results  (Last result in the past 365 days)        Color   Clarity   Blood   Leuk Est   Nitrite   Protein   CREAT   Urine HCG        04/20/24 1704 Yellow   Turbid   Negative   Small (1+)   Negative   Negative                 No results found for: \"BLOODCX\"  No results found for: \"URINECX\"  No results found for: \"WOUNDCX\"  No " "results found for: \"STOOLCX\"    I have personally looked at the labs and they are summarized above.  ----------------------------------------------------------------------------------------------------------------------  Detailed radiology reports for the last 24 hours:    Imaging Results (Last 24 Hours)       Procedure Component Value Units Date/Time    XR Shoulder 2+ View Left [[id number]] Collected: 04/20/24 1615     Updated: 04/20/24 1618    Narrative:      PROCEDURE: X-ray examination of the left shoulder performed on April 20, 2024. 3 views.     HISTORY: Left shoulder pain. Fall.     COMPARISON: None.     FINDINGS:     Mild osteoarthritis at the left glenohumeral joint  Mild hypertrophic changes at the left AC joint.  Preserved subacromial space.  No lytic or blastic lesion.   No foreign body.       Impression:         1.  No acute fracture or dislocation.  2.  Mild osteoarthritis.     This report was finalized on 4/20/2024 4:16 PM by Michael Abarca MD.             Final impressions for the last 30 days of radiology reports:    XR Shoulder 2+ View Left    Result Date: 4/20/2024   1.  No acute fracture or dislocation. 2.  Mild osteoarthritis.  This report was finalized on 4/20/2024 4:16 PM by Michael Abarca MD.      CT Angiogram Heart With 3D Image    Result Date: 4/11/2024  1.  Total calcium score 1729. 2.  Multifocal calcific plaque in the RCA with areas of mild and moderate stenosis but obscured due to the calcification. 3.  Focal defect in the midportion of the RCA appears to be artifact. 4.  LAD multifocal calcific plaque with areas of mild and moderate stenosis. 5.  First obtuse marginal with calcific plaque but no focal stenosis. 6.  Circumflex with mild calcific plaque causing mild stenosis. 7.  Tricuspid aortic valve with extensive calcific plaque.  RECOMMENDATIONS:   Extensive calcific plaque with areas of mild and moderate stenosis. Consider catheter angiogram. Impression.  ASSESSMENT:   CAD RADS " N/P4   This report was finalized on 4/11/2024 5:20 PM by Dr. Hu Obrien MD.      XR Chest 1 View    Result Date: 4/9/2024   1.  Normal heart size 2.  Coarsened bronchovascular pattern to the lungs. 3.  No lobar consolidation or edema. 4.  No pleural effusion or pneumothorax.  This report was finalized on 4/9/2024 8:40 PM by Michael Abarca MD.     I have personally looked at the radiology images and read the final radiology report.    Assessment & Plan       Recurrent falls  Progressive debility  Physical deconditioning    -Patient previously declining long-term SNF placement and unable to afford private out-of-pocket expense of short-term rehab due to already using up all of her insurance approved days already this year.  Unfortunately patient with falls at home after refusing long-term placement and now agreeable.\      -PT and OT consulted for up-to-date for documentation to help with seeking placement.    -Case management  to be consulted on Monday once back in hospital.    Nonobstructive coronary artery disease    -Patient status post cardiac evaluation including left heart cath with no obstructive disease found and cardiology recommending medical management.    -Continue aspirin, Brilinta, statin, beta-blocker and ARB    -Initiated on long-acting nitro by cardiology     History of ischemic stroke  Status post left ICA stent  Hypertension  Hyperlipidemia    -Amlodipine increased due to elevated blood pressure    -Continue home statin       VTE Prophylaxis:   Mechanical Order History:       None          Pharmalogical Order History:        Ordered     Dose Route Frequency Stop    Signed and Held  Pharmacy to Dose enoxaparin (LOVENOX)        Question:  Indication of use  Answer:  Prophylaxis    -- XX Continuous PRN --                    The patient is high risk due to the following diagnoses/reasons: Recurrent falls at home with no family or strong friends support to keep her safe or help with  transfers at home and day-to-day activities of daily living and patient previously against long-term skilled nursing facility placement but now agreeable.        Narinder Olivier DO  [facility]  04/20/24  18:18 EDT

## 2024-04-20 NOTE — ED NOTES
Pt placed on purewick at this time for urine sample- pt noted to have had a wet brief and was changed.

## 2024-04-20 NOTE — CASE MANAGEMENT/SOCIAL WORK
Discharge Planning Assessment   [facility]     Patient Name: Regine Beckham  MRN: [id number]  Today's Date: 4/20/2024    Admit Date: 4/20/2024    Plan: Pt lives at home alone and states she is a  and does not have any family to help her. She has neighbors and a friend Teo Sanchez who help her occasionally. Pt has [employer] Medicare Replacment and uses [facility] pharmacy. Pt does not use home o2. Pt has [facility] home health who has come to see her once since discharge on 4//18/24. Pt also has shower chair, bsc, walker and wheelchair from [employer]. Pt states she wants nursing home placement but does not have money to pay for it. Pt states she is afraid to go home due to fear of falling and being alone.   Discharge Needs Assessment       Row Name 04/20/24 1702       Living Environment    People in Home alone    Current Living Arrangements home    Primary Care Provided by self    Provides Primary Care For no one, unable/limited ability to care for self    Family Caregiver if Needed none    Quality of Family Relationships unable to assess    Able to Return to Prior Arrangements no       Transition Planning    Patient/Family Anticipates Transition to long-term care facility    Patient/Family Anticipated Services at Transition        Discharge Needs Assessment    Current Outpatient/Agency/Support Group homecare agency    Equipment Currently Used at Home shower chair;commode;wheelchair;walker, standard    Concerns to be Addressed discharge planning    Anticipated Changes Related to Illness inability to care for self                   Discharge Plan       Row Name 04/20/24 0286       Plan    Plan Pt lives at home alone and states she is a  and does not have any family to help her. She has neighbors and a friend Teo Sanchez who help her occasionally. Pt has [employer] Medicare Replacment and uses [facility]. Pt does not use home o2. Pt has [facility] home health who has come to see her once since  discharge on 4//18/24. Pt also has shower chair, bsc, walker and wheelchair from [facility]. Pt states she wants nursing home placement but does not have money to pay for it. Pt states she is afraid to go home due to fear of falling and being alone.                  Continued Care and Services - Admitted Since 4/20/2024    No active coordination exists for this encounter.       Selected Continued Care - Prior Encounters Includes continued care and service providers with selected services from prior encounters from 1/21/2024 to 4/20/2024      Discharged on 4/18/2024 Admission date: 4/9/2024 - Discharge disposition: Home-Health Care [facility]      Home Medical Care       Service Provider Selected Services Address Phone Fax Patient Preferred    [facility] [address] [place] [address] [facility] [address] [phone] --                      Discharged on 2/2/2024 Admission date: 1/19/2024 - Discharge disposition: Skilled Nursing Facility (DC - External)      Destination       Service Provider Selected Services Address Phone Fax Patient Preferred    [facility] [address] [address] [phone] --                             Demographic Summary       Row Name 04/20/24 1700       General Information    Arrived From home    Referral Source emergency department    Reason for Consult discharge planning                    Evelin Stephenson RN

## 2024-04-21 LAB
ANION GAP SERPL CALCULATED.3IONS-SCNC: 8.6 MMOL/L (ref 5–15)
BACTERIA SPEC AEROBE CULT: NORMAL
BUN SERPL-MCNC: 13 MG/DL (ref 8–23)
BUN/CREAT SERPL: 15.7 (ref 7–25)
CALCIUM SPEC-SCNC: 9 MG/DL (ref 8.6–10.5)
CHLORIDE SERPL-SCNC: 108 MMOL/L (ref 98–107)
CO2 SERPL-SCNC: 26.4 MMOL/L (ref 22–29)
CREAT SERPL-MCNC: 0.83 MG/DL (ref 0.57–1)
DEPRECATED RDW RBC AUTO: 47.8 FL (ref 37–54)
EGFRCR SERPLBLD CKD-EPI 2021: 76.4 ML/MIN/1.73
ERYTHROCYTE [DISTWIDTH] IN BLOOD BY AUTOMATED COUNT: 13.9 % (ref 12.3–15.4)
GLUCOSE SERPL-MCNC: 149 MG/DL (ref 65–99)
HCT VFR BLD AUTO: 30.2 % (ref 34–46.6)
HGB BLD-MCNC: 9.6 G/DL (ref 12–15.9)
MCH RBC QN AUTO: 29.8 PG (ref 26.6–33)
MCHC RBC AUTO-ENTMCNC: 31.8 G/DL (ref 31.5–35.7)
MCV RBC AUTO: 93.8 FL (ref 79–97)
PLATELET # BLD AUTO: 249 10*3/MM3 (ref 140–450)
PMV BLD AUTO: 9.8 FL (ref 6–12)
POTASSIUM SERPL-SCNC: 3.3 MMOL/L (ref 3.5–5.2)
RBC # BLD AUTO: 3.22 10*6/MM3 (ref 3.77–5.28)
SODIUM SERPL-SCNC: 143 MMOL/L (ref 136–145)
WBC NRBC COR # BLD AUTO: 6.67 10*3/MM3 (ref 3.4–10.8)

## 2024-04-21 PROCEDURE — 96372 THER/PROPH/DIAG INJ SC/IM: CPT

## 2024-04-21 PROCEDURE — G0378 HOSPITAL OBSERVATION PER HR: HCPCS

## 2024-04-21 PROCEDURE — 99231 SBSQ HOSP IP/OBS SF/LOW 25: CPT | Performed by: STUDENT IN AN ORGANIZED HEALTH CARE EDUCATION/TRAINING PROGRAM

## 2024-04-21 PROCEDURE — 36415 COLL VENOUS BLD VENIPUNCTURE: CPT | Performed by: STUDENT IN AN ORGANIZED HEALTH CARE EDUCATION/TRAINING PROGRAM

## 2024-04-21 PROCEDURE — 25010000002 ENOXAPARIN PER 10 MG: Performed by: STUDENT IN AN ORGANIZED HEALTH CARE EDUCATION/TRAINING PROGRAM

## 2024-04-21 PROCEDURE — 80048 BASIC METABOLIC PNL TOTAL CA: CPT | Performed by: STUDENT IN AN ORGANIZED HEALTH CARE EDUCATION/TRAINING PROGRAM

## 2024-04-21 PROCEDURE — 85027 COMPLETE CBC AUTOMATED: CPT | Performed by: STUDENT IN AN ORGANIZED HEALTH CARE EDUCATION/TRAINING PROGRAM

## 2024-04-21 RX ORDER — POTASSIUM CHLORIDE 20 MEQ/1
40 TABLET, EXTENDED RELEASE ORAL 2 TIMES DAILY WITH MEALS
Status: COMPLETED | OUTPATIENT
Start: 2024-04-21 | End: 2024-04-21

## 2024-04-21 RX ADMIN — POTASSIUM CHLORIDE 40 MEQ: 1500 TABLET, EXTENDED RELEASE ORAL at 09:31

## 2024-04-21 RX ADMIN — Medication 10 ML: at 09:27

## 2024-04-21 RX ADMIN — MIRTAZAPINE 30 MG: 15 TABLET, FILM COATED ORAL at 20:22

## 2024-04-21 RX ADMIN — Medication 10 ML: at 20:24

## 2024-04-21 RX ADMIN — ATORVASTATIN CALCIUM 80 MG: 40 TABLET, FILM COATED ORAL at 20:23

## 2024-04-21 RX ADMIN — OXYBUTYNIN CHLORIDE 5 MG: 5 TABLET ORAL at 20:22

## 2024-04-21 RX ADMIN — ENOXAPARIN SODIUM 40 MG: 40 INJECTION SUBCUTANEOUS at 20:23

## 2024-04-21 RX ADMIN — OXYBUTYNIN CHLORIDE 5 MG: 5 TABLET ORAL at 09:26

## 2024-04-21 RX ADMIN — POTASSIUM CHLORIDE 40 MEQ: 1500 TABLET, EXTENDED RELEASE ORAL at 17:39

## 2024-04-21 RX ADMIN — ASPIRIN 81 MG: 81 TABLET, COATED ORAL at 09:26

## 2024-04-21 RX ADMIN — GABAPENTIN 300 MG: 300 CAPSULE ORAL at 09:30

## 2024-04-21 RX ADMIN — METOPROLOL TARTRATE 12.5 MG: 25 TABLET, FILM COATED ORAL at 20:22

## 2024-04-21 RX ADMIN — PANTOPRAZOLE SODIUM 40 MG: 40 TABLET, DELAYED RELEASE ORAL at 06:02

## 2024-04-21 RX ADMIN — GABAPENTIN 300 MG: 300 CAPSULE ORAL at 20:23

## 2024-04-21 RX ADMIN — TICAGRELOR 60 MG: 60 TABLET ORAL at 09:26

## 2024-04-21 RX ADMIN — ISOSORBIDE MONONITRATE 30 MG: 30 TABLET, EXTENDED RELEASE ORAL at 09:26

## 2024-04-21 RX ADMIN — TICAGRELOR 60 MG: 60 TABLET ORAL at 20:23

## 2024-04-21 RX ADMIN — ROPINIROLE HYDROCHLORIDE 4 MG: 1 TABLET, FILM COATED ORAL at 20:25

## 2024-04-21 NOTE — PLAN OF CARE
Goal Outcome Evaluation:  Plan of Care Reviewed With: patient        Progress: no change  Outcome Evaluation: Pt resting in bed. VSS on RA. Pt sat on EOB this shift. Pt refused to ambulate, educated on importance of daily ambulation. Pt had c/o pain but denied pain medication. Pt voices no further complaints or concerns. Will continue with poc.

## 2024-04-21 NOTE — PROGRESS NOTES
[facility] HOSPITALIST PROGRESS NOTE     Patient Identification:  Name:  Regine Beckham  Age:  68 y.o.  Sex:  female  :  1955  MRN:  [id number]  Visit Number:  [id number]  ROOM: [address] [place] [address]     Primary Care Provider:  Dread Burton MD    Length of stay in inpatient status:  0    Subjective     Chief Compliant:    Chief Complaint   Patient presents with    Fall       History of Presenting Illness: Patient seen and evaluated in follow-up for progressive debility with recurrent falls and physical conditioning with failure to thrive and inability to take care of herself at home.  Patient without any acute complaints today and resting comfortably in bed sleeping on arrival.    Objective     Current Hospital Meds:  amLODIPine, 5 mg, Oral, Q24H  aspirin, 81 mg, Oral, Daily  atorvastatin, 80 mg, Oral, Nightly  enoxaparin, 40 mg, Subcutaneous, Q24H  gabapentin, 300 mg, Oral, BID  isosorbide mononitrate, 30 mg, Oral, Q24H  losartan, 50 mg, Oral, Daily  metoprolol tartrate, 12.5 mg, Oral, Q12H  mirtazapine, 30 mg, Oral, Nightly  oxybutynin, 5 mg, Oral, BID  pantoprazole, 40 mg, Oral, Q AM  rOPINIRole, 4 mg, Oral, Nightly  sodium chloride, 10 mL, Intravenous, Q12H  ticagrelor, 60 mg, Oral, BID      Pharmacy to Dose enoxaparin (LOVENOX),       ----------------------------------------------------------------------------------------------------------------------  Vital Signs:  Temp:  [98.2 °F (36.8 °C)-98.6 °F (37 °C)] 98.6 °F (37 °C)  Heart Rate:  [56-77] 74  Resp:  [18-20] 20  BP: ()/(49-66) 97/62     on   ;   Device (Oxygen Therapy): room air  Body mass index is 24.35 kg/m².      Intake/Output Summary (Last 24 hours) at 2024  Last data filed at 2024 1839  Gross per 24 hour   Intake 1200 ml   Output 700 ml   Net 500 ml      ----------------------------------------------------------------------------------------------------------------------  Physical exam:  Constitutional: Elderly  "adult female resting in bed, NAD  HENT:  Head:  Normocephalic and atraumatic.  Mouth:  Moist mucous membranes.    Eyes:  Conjunctivae and EOM are normal. No scleral icterus.    Cardiovascular:  Normal rate, regular rhythm and normal heart sounds with no murmur.  Pulmonary/Chest:  No respiratory distress, no wheezes, no crackles, with normal breath sounds and good air movement.  Abdominal:  Soft.  Bowel sounds are normal.  No distension and no tenderness.   Musculoskeletal:  No tenderness, and no deformity.  No red or swollen joints anywhere.  Functional ROM intact.  Decreased strength in the upper and lower extremities on the left 2-3 out of 5  Neurological:  Alert and oriented to person, place, and time.  No cranial nerve deficit.  No tongue deviation.  No facial droop.  No slurred speech. Intact Sensation throughout  Skin:  Skin is warm and dry. No rash or lesion noted. No pallor.   Peripheral vascular:  Pulses in all 4 extremities with no clubbing, no cyanosis, no edema.  Psychiatric: Appropriate mood and affect, pleasant.   ----------------------------------------------------------------------------------------------------------------------  WBC/HGB/HCT/PLT   6.67/9.6/30.2/249 (04/21 0214)  BUN/CREAT/GLUC/ALT/AST/MOLLY/LIP    13/0.83/149/--/--/--/-- (04/21 0214)  LYTES - Na/K/Cl/CO2: 143/3.3*/108*/26.4 (04/21 0214)        Urine Culture   Date Value Ref Range Status   04/20/2024 >100,000 CFU/mL Normal Urogenital Lorraine  Final     No results found for: \"BLOODCX\"    I have personally looked at the labs and they are summarized above.  ----------------------------------------------------------------------------------------------------------------------  Detailed radiology reports for the last 24 hours:  XR Hip With or Without Pelvis 2 - 3 View Left    Result Date: 4/20/2024  1. Left hip osteoarthrosis with a prominent rim of osteophytes at the femoral head neck junction. 2. No definite acute fracture. Given the degree " of osteopenia, if there is persistent strong clinical concern for fracture, MRI should be considered for further evaluation.  This report was finalized on 4/20/2024 7:44 PM by Kiko Barney MD.      XR Shoulder 2+ View Left    Result Date: 4/20/2024   1.  No acute fracture or dislocation. 2.  Mild osteoarthritis.  This report was finalized on 4/20/2024 4:16 PM by Michael Abarca MD.     Assessment & Plan      Recurrent falls  Progressive debility  Physical deconditioning  Failure to thrive    -Patient previously declining long-term SNF placement and unable to afford private out-of-pocket expense of short-term rehab due to already using up all of her insurance approved days already this year.  Unfortunately patient with falls at home after refusing long-term placement and now agreeable.    -PT and OT consulted for up-to-date for documentation to help with seeking placement.    -Case management  to be consulted on Monday once back in hospital.     Nonobstructive coronary artery disease    -Patient status post cardiac evaluation including left heart cath with no obstructive disease found and cardiology recommending medical management.    -Continue aspirin, Brilinta, statin, beta-blocker and ARB    -Continue long-acting nitro     History of ischemic stroke  Status post left ICA stent  Hypertension  Hyperlipidemia    -Continue amlodipine and statin    Copied text in portions of the note has been reviewed and is accurate as of .TODAYDATE    VTE Prophylaxis:   Mechanical Order History:       None          Pharmalogical Order History:        Ordered     Dose Route Frequency Stop    04/20/24 1857  Enoxaparin Sodium (LOVENOX) syringe 40 mg         40 mg SC Every 24 Hours --    04/20/24 1848  Pharmacy to Dose enoxaparin (LOVENOX)        Question:  Indication of use  Answer:  Prophylaxis    -- XX Continuous PRN --                    Disposition long-term SNF placement    Narinder Olivier DO  [facility]  Hospitalist  04/21/24  19:58 EDT

## 2024-04-21 NOTE — PLAN OF CARE
Goal Outcome Evaluation:              Outcome Evaluation: Pt is resting in bed at this time. Pt arrived from ER this shift. VSS. RA. No concerns or complaints at this time. Will continue POC.

## 2024-04-21 NOTE — OUTREACH NOTE
Medical Week 1 Survey      Flowsheet Row Responses   [facility] facility patient discharged from? [facility]   Does the patient have one of the following disease processes/diagnoses(primary or secondary)? Other   Week 1 attempt successful? No   Unsuccessful attempts Attempt 1   Revoke Readmitted            DANIKA TOLBERT - Registered Nurse

## 2024-04-22 LAB
ANION GAP SERPL CALCULATED.3IONS-SCNC: 8.4 MMOL/L (ref 5–15)
BUN SERPL-MCNC: 15 MG/DL (ref 8–23)
BUN/CREAT SERPL: 17.6 (ref 7–25)
CALCIUM SPEC-SCNC: 9.4 MG/DL (ref 8.6–10.5)
CHLORIDE SERPL-SCNC: 109 MMOL/L (ref 98–107)
CO2 SERPL-SCNC: 26.6 MMOL/L (ref 22–29)
CREAT SERPL-MCNC: 0.85 MG/DL (ref 0.57–1)
EGFRCR SERPLBLD CKD-EPI 2021: 74.3 ML/MIN/1.73
GLUCOSE SERPL-MCNC: 131 MG/DL (ref 65–99)
POTASSIUM SERPL-SCNC: 4.1 MMOL/L (ref 3.5–5.2)
SODIUM SERPL-SCNC: 144 MMOL/L (ref 136–145)

## 2024-04-22 PROCEDURE — 99231 SBSQ HOSP IP/OBS SF/LOW 25: CPT | Performed by: STUDENT IN AN ORGANIZED HEALTH CARE EDUCATION/TRAINING PROGRAM

## 2024-04-22 PROCEDURE — G0378 HOSPITAL OBSERVATION PER HR: HCPCS

## 2024-04-22 PROCEDURE — 97166 OT EVAL MOD COMPLEX 45 MIN: CPT

## 2024-04-22 PROCEDURE — 80048 BASIC METABOLIC PNL TOTAL CA: CPT | Performed by: STUDENT IN AN ORGANIZED HEALTH CARE EDUCATION/TRAINING PROGRAM

## 2024-04-22 PROCEDURE — 96372 THER/PROPH/DIAG INJ SC/IM: CPT

## 2024-04-22 PROCEDURE — 25010000002 ENOXAPARIN PER 10 MG: Performed by: STUDENT IN AN ORGANIZED HEALTH CARE EDUCATION/TRAINING PROGRAM

## 2024-04-22 PROCEDURE — 97162 PT EVAL MOD COMPLEX 30 MIN: CPT

## 2024-04-22 RX ORDER — ACETAMINOPHEN 325 MG/1
650 TABLET ORAL EVERY 6 HOURS PRN
Status: DISCONTINUED | OUTPATIENT
Start: 2024-04-22 | End: 2024-04-25 | Stop reason: HOSPADM

## 2024-04-22 RX ADMIN — LOSARTAN POTASSIUM 50 MG: 50 TABLET, FILM COATED ORAL at 08:21

## 2024-04-22 RX ADMIN — GABAPENTIN 300 MG: 300 CAPSULE ORAL at 21:14

## 2024-04-22 RX ADMIN — OXYBUTYNIN CHLORIDE 5 MG: 5 TABLET ORAL at 21:12

## 2024-04-22 RX ADMIN — ISOSORBIDE MONONITRATE 30 MG: 30 TABLET, EXTENDED RELEASE ORAL at 08:21

## 2024-04-22 RX ADMIN — MIRTAZAPINE 30 MG: 15 TABLET, FILM COATED ORAL at 21:11

## 2024-04-22 RX ADMIN — Medication 10 ML: at 21:17

## 2024-04-22 RX ADMIN — GABAPENTIN 300 MG: 300 CAPSULE ORAL at 08:21

## 2024-04-22 RX ADMIN — ENOXAPARIN SODIUM 40 MG: 40 INJECTION SUBCUTANEOUS at 21:15

## 2024-04-22 RX ADMIN — METOPROLOL TARTRATE 12.5 MG: 25 TABLET, FILM COATED ORAL at 21:07

## 2024-04-22 RX ADMIN — PANTOPRAZOLE SODIUM 40 MG: 40 TABLET, DELAYED RELEASE ORAL at 05:16

## 2024-04-22 RX ADMIN — TICAGRELOR 60 MG: 60 TABLET ORAL at 21:13

## 2024-04-22 RX ADMIN — OXYBUTYNIN CHLORIDE 5 MG: 5 TABLET ORAL at 08:21

## 2024-04-22 RX ADMIN — TICAGRELOR 60 MG: 60 TABLET ORAL at 08:21

## 2024-04-22 RX ADMIN — Medication 10 ML: at 08:21

## 2024-04-22 RX ADMIN — ROPINIROLE HYDROCHLORIDE 4 MG: 1 TABLET, FILM COATED ORAL at 21:08

## 2024-04-22 RX ADMIN — ASPIRIN 81 MG: 81 TABLET, COATED ORAL at 08:21

## 2024-04-22 RX ADMIN — METOPROLOL TARTRATE 12.5 MG: 25 TABLET, FILM COATED ORAL at 08:21

## 2024-04-22 RX ADMIN — ACETAMINOPHEN 650 MG: 325 TABLET ORAL at 21:15

## 2024-04-22 RX ADMIN — ATORVASTATIN CALCIUM 80 MG: 40 TABLET, FILM COATED ORAL at 21:13

## 2024-04-22 RX ADMIN — AMLODIPINE BESYLATE 5 MG: 5 TABLET ORAL at 08:21

## 2024-04-22 NOTE — THERAPY EVALUATION
"Acute Care - Physical Therapy Initial Evaluation   [facility]     Patient Name: Regine Beckham  : 1955  MRN: [id number]  Today's Date: 2024      Visit Dx:   No diagnosis found.  Patient Active Problem List   Diagnosis    Iron deficiency anemia due to chronic blood loss    Major depressive disorder    RLS (restless legs syndrome)    GERD (gastroesophageal reflux disease)    Left breast mass    Allergy to penicillin    Stroke    Grade I diastolic dysfunction    Moderate malnutrition    Falls frequently    Stroke-like symptoms    Debility    Chest pain    Failure to thrive in adult     Past Medical History:   Diagnosis Date    Anemia     Anxiety     Arthritis     Depression     Legally blind     Restless leg syndrome     Sleep apnea     \"I don't use a cpap anymore since losing 106 lbs\"    Water retention      Past Surgical History:   Procedure Laterality Date    BACK SURGERY      CARDIAC CATHETERIZATION N/A 2024    Procedure: Percutaneous Manual Thrombectomy;  Surgeon: Efrain Hurley MD;  Location:  [facility];  Service: Interventional Radiology;  Laterality: N/A;    CARDIAC CATHETERIZATION N/A 2024    Procedure: Left Heart Cath;  Surgeon: Susan Mederos MD;  Location:  [facility];  Service: Cardiology;  Laterality: N/A;    GALLBLADDER SURGERY      HIP ARTHROSCOPY      JOINT REPLACEMENT Right      PT Assessment (Last 12 Hours)       PT Evaluation and Treatment       Row Name 24 1504          Physical Therapy Time and Intention    Document Type evaluation  -KM     Mode of Treatment physical therapy  -KM     Patient Effort good  -KM     Symptoms Noted During/After Treatment fatigue  -KM       Row Name 24 1508          General Information    Patient Profile Reviewed yes  -KM     Patient Observations alert;cooperative;agree to therapy  -KM     Prior Level of Function --  Pt. was independent prior to stroke in January. She was able to perform ADLs " and ambulate w/ Agnela prior to recent discharge home from hospital.  -KM     Existing Precautions/Restrictions fall  -KM     Risks Reviewed patient:;LOB;nausea/vomiting;dizziness;increased discomfort  -KM     Benefits Reviewed patient:;improve function;increase independence;increase strength;increase balance  -KM     Barriers to Rehab medically complex;previous functional deficit  -[facility] Name 04/22/24 1505          Living Environment    Current Living Arrangements home  -KM     People in Home alone  -KM     Primary Care Provided by self  -KM       Row Name 04/22/24 1505          Home Use of Assistive/Adaptive Equipment    Equipment Currently Used at Home walker, rolling  -KM       Row Name 04/22/24 1505          Cognition    Affect/Mental Status (Cognition) WFL  -     Orientation Status (Cognition) oriented x 3  -KM     Follows Commands (Cognition) [facility] 04/22/24 1505          Range of Motion (ROM)    Range of Motion bilateral lower extremities;ROM is [facility]  -KM       Row Name 04/22/24 1505          Strength (Manual Muscle Testing)    Strength (Manual Muscle Testing) --  BLE strength >3/5  -KM       Row Name 04/22/24 1505          Bed Mobility    Comment, (Bed Mobility) pt. sitting in chair upon arrival  -KM       Row Name 04/22/24 1505          Transfers    Transfers sit-stand transfer;stand-sit transfer  -KM       Row Name 04/22/24 1505          Sit-Stand Transfer    Sit-Stand [place] (Transfers) moderate assist (50% patient effort)  -     Assistive Device (Sit-Stand Transfers) --  [facility] 04/22/24 1505          Stand-Sit Transfer    Stand-Sit [place] (Transfers) moderate assist (50% patient effort)  -     Assistive Device (Stand-Sit Transfers) --  [facility] 04/22/24 1505          Gait/Stairs (Locomotion)    Gait/Stairs Locomotion gait/ambulation independence;gait/ambulation assistive device;distance ambulated  -     [place] Level (Gait)  minimum assist (75% patient effort)  -KM     Assistive Device (Gait) --  BUE HHA  -KM     Patient was able to Ambulate yes  -KM     Distance in Feet (Gait) 75  -KM     Pattern (Gait) step-to;step-through  -KM     Deviations/Abnormal Patterns (Gait) ataxic;base of support, narrow;gait speed decreased;festinating/shuffling  -KM       Row Name 04/22/24 1505          Safety Issues, Functional Mobility    Safety Issues Affecting Function (Mobility) problem-solving;safety precaution awareness  -KM     Impairments Affecting Function (Mobility) balance;endurance/activity tolerance;strength  -KM       Row Name 04/22/24 1505          Balance    Balance Assessment sitting static balance;standing dynamic balance  -KM     Static Sitting Balance supervision  -KM     Position, Sitting Balance sitting in chair;supported  -KM     Dynamic Standing Balance minimal assist;moderate assist  -KM     Position/Device Used, Standing Balance --  HHA  -KM       Row Name 04/22/24 1509          Plan of Care Review    Plan of Care Reviewed With patient  -KM     Outcome Evaluation Pt. evaluation completed during PT session. She was able to perform functional mobility skills w/ modA. She was able to ambulate moderate distance w/ BUE HHA. She tolerated session well. Pt. would benefit from skilled PT services.  -KM       Row Name 04/22/24 1509          Therapy Assessment/Plan (PT)    Patient/Family Therapy Goals Statement (PT) improve mobility  -KM     Functional Level at Time of Evaluation (PT) modA  -KM     PT Diagnosis (PT) decreased mobility secondary to prior CVA  -KM     Rehab Potential (PT) good, to achieve stated therapy goals  -KM     Criteria for Skilled Interventions Met (PT) yes;skilled treatment is necessary  -KM     Therapy Frequency (PT) 2 times/wk  2-5x/wk  -KM     Predicted Duration of Therapy Intervention (PT) until discharge  -KM     Problem List (PT) problems related to;balance;mobility;strength  -KM     Activity Limitations  Related to Problem List (PT) unable to ambulate safely;unable to transfer safely  -       Row Name 04/22/24 1505          Therapy Plan Review/Discharge Plan (PT)    Therapy Plan Review (PT) evaluation/treatment results reviewed;care plan/treatment goals reviewed;risks/benefits reviewed;patient  -[facility] Name 04/22/24 1505          Physical Therapy Goals    Bed Mobility Goal Selection (PT) bed mobility, PT goal 1  -KM     Transfer Goal Selection (PT) transfer, PT goal 1  -KM     Gait Training Goal Selection (PT) gait training, PT goal 1  -KM       [facility] Name 04/22/24 1505          Bed Mobility Goal 1 (PT)    Activity/Assistive Device (Bed Mobility Goal 1, PT) bed mobility activities, all  -KM     [place] Level/Cues Needed (Bed Mobility Goal 1, PT) minimum assist (75% or more patient effort)  -KM     Time Frame (Bed Mobility Goal 1, PT) by discharge  -[facility] Name 04/22/24 1505          Transfer Goal 1 (PT)    Activity/Assistive Device (Transfer Goal 1, PT) transfers, all;walker, dao  -KM     [place] Level/Cues Needed (Transfer Goal 1, PT) contact guard required  -KM     Time Frame (Transfer Goal 1, PT) by discharge  -[facility] Name 04/22/24 1505          Gait Training Goal 1 (PT)    Activity/Assistive Device (Gait Training Goal 1, PT) gait (walking locomotion);assistive device use;walker, dao  -KM     [place] Level (Gait Training Goal 1, PT) contact guard required  -KM     Distance (Gait Training Goal 1, PT) 100'  -KM     Time Frame (Gait Training Goal 1, PT) by [facility]               User Key  (r) = Recorded By, (t) = Taken By, (c) = Cosigned By      Initials Name Provider Type    Eugene Angel, PT Physical Therapist                    Physical Therapy Education       Title: PT OT SLP Therapies (Done)       Topic: Physical Therapy (Done)       Point: Mobility training (Done)       Learning Progress Summary             Patient Acceptance, E,TB, VU by IRENE at 4/22/2024 3515                          Point: Home exercise program (Done)       Learning Progress Summary             Patient Acceptance, E,TB, VU by  at 4/22/2024 1516                         Point: Body mechanics (Done)       Learning Progress Summary             Patient Acceptance, E,TB, VU by  at 4/22/2024 1516                         Point: Precautions (Done)       Learning Progress Summary             Patient Acceptance, E,TB, VU by  at 4/22/2024 1516                                         User Key       Initials Effective Dates Name Provider Type Discipline     05/24/22 -  Eugene Cuevas, PT Physical Therapist PT                  PT Recommendation and Plan  Planned Therapy Interventions (PT): balance training, bed mobility training, gait training, home exercise program, patient/family education, postural re-education, ROM (range of motion), stair training, strengthening, stretching, transfer training  Therapy Frequency (PT): 2 times/wk (2-5x/wk)  Plan of Care Reviewed With: patient  Outcome Evaluation: Pt. evaluation completed during PT session. She was able to perform functional mobility skills w/ modA. She was able to ambulate moderate distance w/ BUE HHA. She tolerated session well. Pt. would benefit from skilled PT services.       Time Calculation:    PT Charges       Row Name 04/22/24 1459             Time Calculation    PT Received On 04/22/24  -KM      PT Goal Re-Cert Due Date 05/06/24  -KM                User Key  (r) = Recorded By, (t) = Taken By, (c) = Cosigned By      Initials Name Provider Type    Eugene Angel, BRUCE Physical Therapist                  Therapy Charges for Today       Code Description Service Date Service Provider Modifiers Qty    53466162131 HC PT EVAL MOD COMPLEXITY 4 4/22/2024 Eugene Cuevas, PT GP 1            PT G-Codes  AM-PAC 6 Clicks Score (PT): 16    Eugene Cuevas PT  4/22/2024

## 2024-04-22 NOTE — PLAN OF CARE
Goal Outcome Evaluation:              Outcome Evaluation: Pt is resting in bed at this time. Pt sat up in chair this shift. No concerns or complaints at this time. Will continue POC.

## 2024-04-22 NOTE — DISCHARGE PLACEMENT REQUEST
"Regine Lock \"NEGIN\" (68 y.o. Female)       Date of Birth   1955    Social Security Number       Address   [address] [place], [place] [address]    Home Phone   [phone]    MRN   [id number]       [facility]    Marital Status                               Admission Date   24    Admission Type   Emergency    Admitting Provider   Narinder Olivier DO    Attending Provider   Anh Valdez DO    Department, Room/Bed   [address], 3327/       Discharge Date       Discharge Disposition       Discharge Destination                                 Attending Provider: Anh Valdez DO    Allergies: Penicillins    Isolation: None   Infection: None   Code Status: CPR    Ht: 167.6 cm (65.98\")   Wt: 68.4 kg (150 lb 12.8 oz)    Admission Cmt: None   Principal Problem: Failure to thrive in adult [R62.7]                   Active Insurance as of 2024       Primary Coverage       Payor Plan Insurance Group Employer/Plan Group    AETNA MEDICARE REPLACEMENT AETNA MEDICARE REPLACEMENT [id number]       Payor Plan Address Payor Plan Phone Number Payor Plan Fax Number Effective Dates    [address] [phone]  2024 - None Entered    [facility] [id number]         Subscriber Name Subscriber Birth Date Member ID       REGINE LOCK 1955 [id number]                     Emergency Contacts        (Rel.) Home Phone Work Phone Mobile Phone    MikhailMaria AlejandraYaa (Friend) [phone] --    Karla Patel (Friend) [phone] -- --                 History & Physical        Narinder Olivier DO at 24 1818              Psychiatric HOSPITALIST HISTORY AND PHYSICAL    Patient Identification:  Name:  Regine Lock  Age:  68 y.o.  Sex:  female  :  1955  MRN:  [id number]   Visit Number:  [id number]  Admit Date: 2024   Room number:  116/16  Primary Care Physician:  Dread Burton MD     Subjective     Chief complaint:  " "  Chief Complaint   Patient presents with    Fall       History of presenting illness:  68 y.o. female presents from home after fall while using walker hitting right shoulder and getting her leg tangled in her walker.  Patient recently admitted to this hospital and discharged 2 days prior after being admitted for chest pain with nonobstructive coronary artery disease found on left heart cath.  Patient with history of ischemic stroke status post left ICA stent.  At that time hospitalization patient medically optimized after heart cath revealed no intervenable disease present.  Patient was recommended for likely long-term placement however patient was only interested in short-term at the time but unfortunately due to already using all of her days the insurance would require out-of-pocket pain and patient stated that she preferred to return home.  Since that time patient reports that she has fallen twice at home and after the events of today has come to the realization that she does not and cannot be at home by herself and is willing to seek long-term skilled nursing facility placement.  Due to living alone and having no family or strong support she was not deemed safe for discharge back home to work on outpatient long-term placement and is being admitted for observation.  Patient without any acute complaints other than soreness in her shoulder and hip.  ---------------------------------------------------------------------------------------------------------------------   Review of Systems 14 system review of systems performed pertinent positives and negatives as above in HPI.  ---------------------------------------------------------------------------------------------------------------------   Past Medical History:   Diagnosis Date    Anemia     Anxiety     Arthritis     Depression     Legally blind     Restless leg syndrome     Sleep apnea     \"I don't use a cpap anymore since losing 106 lbs\"    Water retention  " "    Past Surgical History:   Procedure Laterality Date    BACK SURGERY      CARDIAC CATHETERIZATION N/A 1/19/2024    Procedure: Percutaneous Manual Thrombectomy;  Surgeon: Efrain Hurley MD;  Location:  [facility];  Service: Interventional Radiology;  Laterality: N/A;    CARDIAC CATHETERIZATION N/A 4/12/2024    Procedure: Left Heart Cath;  Surgeon: Susan Mederos MD;  Location:  [facility];  Service: Cardiology;  Laterality: N/A;    GALLBLADDER SURGERY      HIP ARTHROSCOPY      JOINT REPLACEMENT Right      Family History   Problem Relation Age of Onset    Breast cancer Neg Hx      Social History     Socioeconomic History    Marital status:     Number of children: 0    Years of education: 1 yr college   Tobacco Use    Smoking status: Former     Current packs/day: 1.50     Average packs/day: 1.5 packs/day for 51.0 years (76.5 ttl pk-yrs)     Types: Cigarettes     Passive exposure: Past    Smokeless tobacco: Never   Vaping Use    Vaping status: Never Used   Substance and Sexual Activity    Alcohol use: Not Currently     Alcohol/week: 1.0 standard drink of alcohol     Types: 1 Glasses of wine per week     Comment: \"occasionally - once every two months\"    Drug use: Not Currently     Comment: alcohol - occasionally    Sexual activity: Defer     ---------------------------------------------------------------------------------------------------------------------   Allergies:  Penicillins  ---------------------------------------------------------------------------------------------------------------------   Medications below are reported home medications pulling from within the system; at this time, these medications have not been reconciled unless otherwise specified and are in the verification process for further verifcation as current home medications.    Prior to Admission Medications       Prescriptions Last Dose Informant Patient Reported? Taking?    amLODIPine (NORVASC) " 5 MG tablet   No No    Take 1 tablet by mouth Daily for 30 days.    aspirin 81 MG EC tablet  Pharmacy Yes No    Take 1 tablet by mouth Daily.    atorvastatin (LIPITOR) 80 MG tablet  Pharmacy No No    Take 1 tablet by mouth Every Night.    gabapentin (NEURONTIN) 300 MG capsule   No No    Take 1 capsule by mouth 2 (Two) Times a Day.    isosorbide mononitrate (IMDUR) 30 MG 24 hr tablet   No No    Take 1 tablet by mouth Daily for 30 days.    losartan (COZAAR) 50 MG tablet  Pharmacy Yes No    Take 1 tablet by mouth Daily. Indications: High Blood Pressure Disorder    metoprolol tartrate (LOPRESSOR) 25 MG tablet   No No    Take 1/2 tablet by mouth Every 12 (Twelve) Hours for 30 days.    Mirabegron ER (MYRBETRIQ) 50 MG tablet sustained-release 24 hour 24 hr tablet  Pharmacy Yes No    Take 50 mg by mouth Daily. Indications: Urinary Urgency    mirtazapine (REMERON) 30 MG tablet  Pharmacy No No    Take 1 tablet by mouth Every Night. Indications: Major Depressive Disorder    pantoprazole (PROTONIX) 40 MG EC tablet  Pharmacy No No    TAKE 1 TABLET BY MOUTH EVERY MORNING FOR HEARTBURN    rOPINIRole (REQUIP) 4 MG tablet  Pharmacy No No    Take 1 tablet by mouth Every Night. Take 1 hour before bedtime.  Indications: Restless Leg Syndrome    ticagrelor (BRILINTA) 60 MG tablet tablet  Pharmacy No No    Take 1 tablet by mouth 2 (Two) Times a Day.          Objective     Vital Signs:  Temp:  [98.4 °F (36.9 °C)] 98.4 °F (36.9 °C)  Heart Rate:  [53-86] 68  Resp:  [18] 18  BP: (100-143)/(39-80) 143/67    Mean Arterial Pressure (Non-Invasive) for the past 24 hrs (Last 3 readings):   Noninvasive MAP (mmHg)   04/20/24 1745 89   04/20/24 1730 99   04/20/24 1715 102     SpO2:  [94 %-98 %] 96 %  on   ;   Device (Oxygen Therapy): room air  Body mass index is 24.21 kg/m².    Wt Readings from Last 3 Encounters:   04/20/24 68 kg (150 lb)   04/18/24 68.1 kg (150 lb 2.1 oz)   03/27/24 63.5 kg (140 lb)       ---------------------------------------------------------------------------------------------------------------------   Physical Exam:  Constitutional: Elderly adult female sitting up in chair, NAD  HENT:  Head:  Normocephalic and atraumatic.  Mouth:  Moist mucous membranes.    Eyes:  Conjunctivae and EOM are normal. No scleral icterus.    Cardiovascular:  Normal rate, regular rhythm and normal heart sounds with no murmur.  Pulmonary/Chest:  No respiratory distress, no wheezes, no crackles, with normal breath sounds and good air movement.  Abdominal:  Soft.  Bowel sounds are normal.  No distension and no tenderness.   Musculoskeletal:  No tenderness, and no deformity.  No red or swollen joints anywhere.  Functional ROM intact.  Decreased strength in the upper and lower extremities on the left 2-3 out of 5  Neurological:  Alert and oriented to person, place, and time.  No cranial nerve deficit.  No tongue deviation.  No facial droop.  No slurred speech. Intact Sensation throughout  Skin:  Skin is warm and dry. No rash or lesion noted. No pallor.   Peripheral vascular:  Pulses in all 4 extremities with no clubbing, no cyanosis, no edema.  Psychiatric: Appropriate mood and affect, pleasant.   ---------------------------------------------------------------------------------------------------------------------    Last echocardiogram:  Results for orders placed during the hospital encounter of 04/09/24    Adult Transthoracic Echo Complete w/ Color, Spectral and Contrast if necessary per protocol    Interpretation Summary    Left ventricular systolic function is hyperdynamic (EF > 70%). Calculated left ventricular EF = 79% Left ventricular ejection fraction appears to be greater than 70%.    Left ventricular diastolic function is consistent with (grade I) impaired relaxation.    --------------------------------------------------------------------------------------------------------------------  Labs:  Results from last 7  "days   Lab Units 04/20/24  1407 04/16/24  0057   WBC 10*3/mm3 9.47 6.31   HEMOGLOBIN g/dL 11.3* 9.2*   HEMATOCRIT % 35.0 29.2*   MCV fL 94.9 95.4   MCHC g/dL 32.3 31.5   PLATELETS 10*3/mm3 284 216         Results from last 7 days   Lab Units 04/20/24  1407   SODIUM mmol/L 142   POTASSIUM mmol/L 3.7   CHLORIDE mmol/L 107   CO2 mmol/L 24.6   BUN mg/dL 12   CREATININE mg/dL 0.76   CALCIUM mg/dL 9.8   GLUCOSE mg/dL 132*   ALBUMIN g/dL 3.6   BILIRUBIN mg/dL 0.5   ALK PHOS U/L 121*   AST (SGOT) U/L 37*   ALT (SGPT) U/L 29   Estimated Creatinine Clearance: 75 mL/min (by C-G formula based on SCr of 0.76 mg/dL).    No results found for: \"AMMONIA\"          No results found for: \"HGBA1C\", \"POCGLU\"  Lab Results   Component Value Date    TSH 2.880 03/17/2024     No results found for: \"PREGTESTUR\", \"PREGSERUM\", \"HCG\", \"HCGQUANT\"  Pain Management Panel          Latest Ref Rng & Units 12/20/2023   Pain Management Panel   Amphetamine, Urine Qual Negative Negative    Barbiturates Screen, Urine Negative Negative    Benzodiazepine Screen, Urine Negative Negative    Buprenorphine, Screen, Urine Negative Negative    Cocaine Screen, Urine Negative Negative    Fentanyl, Urine Negative Negative    Methadone Screen , Urine Negative Negative    Methamphetamine, Ur Negative Negative      Brief Urine Lab Results  (Last result in the past 365 days)        Color   Clarity   Blood   Leuk Est   Nitrite   Protein   CREAT   Urine HCG        04/20/24 1704 Yellow   Turbid   Negative   Small (1+)   Negative   Negative                 No results found for: \"BLOODCX\"  No results found for: \"URINECX\"  No results found for: \"WOUNDCX\"  No results found for: \"STOOLCX\"    I have personally looked at the labs and they are summarized above.  ----------------------------------------------------------------------------------------------------------------------  Detailed radiology reports for the last 24 hours:    Imaging Results (Last 24 Hours)       Procedure " Component Value Units Date/Time    XR Shoulder 2+ View Left [[id number]] Collected: 04/20/24 1615     Updated: 04/20/24 1618    Narrative:      PROCEDURE: X-ray examination of the left shoulder performed on April 20, 2024. 3 views.     HISTORY: Left shoulder pain. Fall.     COMPARISON: None.     FINDINGS:     Mild osteoarthritis at the left glenohumeral joint  Mild hypertrophic changes at the left AC joint.  Preserved subacromial space.  No lytic or blastic lesion.   No foreign body.       Impression:         1.  No acute fracture or dislocation.  2.  Mild osteoarthritis.     This report was finalized on 4/20/2024 4:16 PM by Michael Abarca MD.             Final impressions for the last 30 days of radiology reports:    XR Shoulder 2+ View Left    Result Date: 4/20/2024   1.  No acute fracture or dislocation. 2.  Mild osteoarthritis.  This report was finalized on 4/20/2024 4:16 PM by Michael Abarca MD.      CT Angiogram Heart With 3D Image    Result Date: 4/11/2024  1.  Total calcium score 1729. 2.  Multifocal calcific plaque in the RCA with areas of mild and moderate stenosis but obscured due to the calcification. 3.  Focal defect in the midportion of the RCA appears to be artifact. 4.  LAD multifocal calcific plaque with areas of mild and moderate stenosis. 5.  First obtuse marginal with calcific plaque but no focal stenosis. 6.  Circumflex with mild calcific plaque causing mild stenosis. 7.  Tricuspid aortic valve with extensive calcific plaque.  RECOMMENDATIONS:   Extensive calcific plaque with areas of mild and moderate stenosis. Consider catheter angiogram. Impression.  ASSESSMENT:   CAD RADS N/P4   This report was finalized on 4/11/2024 5:20 PM by Dr. Hu Obrien MD.      XR Chest 1 View    Result Date: 4/9/2024   1.  Normal heart size 2.  Coarsened bronchovascular pattern to the lungs. 3.  No lobar consolidation or edema. 4.  No pleural effusion or pneumothorax.  This report was finalized on 4/9/2024 8:40 PM  by Michael Abarca MD.     I have personally looked at the radiology images and read the final radiology report.    Assessment & Plan       Recurrent falls  Progressive debility  Physical deconditioning    -Patient previously declining long-term SNF placement and unable to afford private out-of-pocket expense of short-term rehab due to already using up all of her insurance approved days already this year.  Unfortunately patient with falls at home after refusing long-term placement and now agreeable.\      -PT and OT consulted for up-to-date for documentation to help with seeking placement.    -Case management  to be consulted on Monday once back in hospital.    Nonobstructive coronary artery disease    -Patient status post cardiac evaluation including left heart cath with no obstructive disease found and cardiology recommending medical management.    -Continue aspirin, Brilinta, statin, beta-blocker and ARB    -Initiated on long-acting nitro by cardiology     History of ischemic stroke  Status post left ICA stent  Hypertension  Hyperlipidemia    -Amlodipine increased due to elevated blood pressure    -Continue home statin       VTE Prophylaxis:   Mechanical Order History:       None          Pharmalogical Order History:        Ordered     Dose Route Frequency Stop    Signed and Held  Pharmacy to Dose enoxaparin (LOVENOX)        Question:  Indication of use  Answer:  Prophylaxis    -- XX Continuous PRN --                    The patient is high risk due to the following diagnoses/reasons: Recurrent falls at home with no family or strong friends support to keep her safe or help with transfers at home and day-to-day activities of daily living and patient previously against long-term skilled nursing facility placement but now agreeable.        Narinder Olivier DO  [facility]  04/20/24  18:18 EDT    Electronically signed by Narinder Olivier DO at 04/20/24 2646       Vital Signs (last day)        Date/Time Temp Temp src Pulse Resp BP Patient Position SpO2    04/22/24 0956 97.2 (36.2) Oral 56 18 146/60 Lying --    04/22/24 0600 98.4 (36.9) Oral 67 20 116/72 Lying --    04/22/24 0342 97.8 (36.6) Oral 79 18 109/63 Lying --    04/21/24 2311 97.8 (36.6) Oral 81 18 112/57 Lying 95    04/21/24 1839 98.6 (37) Oral 74 20 97/62 Sitting --    04/21/24 1418 98.6 (37) Oral 66 20 106/60 Lying --    04/21/24 1000 -- -- 77 18 104/50 Sitting --    04/21/24 0600 98.6 (37) Oral 63 20 151/57 Lying --    04/21/24 0251 98.2 (36.8) Oral 72 18 104/49 Lying --          Lines, Drains & Airways       Active LDAs       Name Placement date Placement time Site Days    Peripheral IV 04/20/24 1407 Anterior;Left Forearm 04/20/24  1407  Forearm  2    External Urinary Catheter 04/20/24  1407  --  2                  Current Facility-Administered Medications   Medication Dose Route Frequency Provider Last Rate Last Admin    amLODIPine (NORVASC) tablet 5 mg  5 mg Oral Q24H Narinder Olivier DO   5 mg at 04/22/24 0821    aspirin EC tablet 81 mg  81 mg Oral Daily Narinder Olivier DO   81 mg at 04/22/24 0821    atorvastatin (LIPITOR) tablet 80 mg  80 mg Oral Nightly Narinder Olivier DO   80 mg at 04/21/24 2023    Enoxaparin Sodium (LOVENOX) syringe 40 mg  40 mg Subcutaneous Q24H Narinder Olivier DO   40 mg at 04/21/24 2023    gabapentin (NEURONTIN) capsule 300 mg  300 mg Oral BID Narinder Olivier DO   300 mg at 04/22/24 0821    isosorbide mononitrate (IMDUR) 24 hr tablet 30 mg  30 mg Oral Q24H Narinder Olivier DO   30 mg at 04/22/24 0821    losartan (COZAAR) tablet 50 mg  50 mg Oral Daily Narinder Olivier DO   50 mg at 04/22/24 0821    metoprolol tartrate (LOPRESSOR) tablet 12.5 mg  12.5 mg Oral Q12H Narinder Olivier DO   12.5 mg at 04/22/24 0821    mirtazapine (REMERON) tablet 30 mg  30 mg Oral Nightly Narinder Olivier DO   30 mg at 04/21/24 2022    oxybutynin (DITROPAN) tablet 5 mg  5 mg Oral BID Narinder Olivier DO   5 mg at 04/22/24 0821     pantoprazole (PROTONIX) EC tablet 40 mg  40 mg Oral Q AM Narinder Olivier DO   40 mg at 24 0516    Pharmacy to Dose enoxaparin (LOVENOX)   Does not apply Continuous PRN Narinder Olivier DO        rOPINIRole (REQUIP) tablet 4 mg  4 mg Oral Nightly Narinder Olivier DO   4 mg at 24    sodium chloride 0.9 % flush 10 mL  10 mL Intravenous Q12H Narinder Olivier DO   10 mL at 24 08    sodium chloride 0.9 % flush 10 mL  10 mL Intravenous PRN Narinder Olivier DO        sodium chloride 0.9 % infusion 40 mL  40 mL Intravenous PRN Narinder Olivier DO        ticagrelor (BRILINTA) tablet 60 mg  60 mg Oral BID Narinder Olivier DO   60 mg at 24     Operative/Procedure Notes (most recent note)    No notes of this type exist for this encounter.          Physician Progress Notes (most recent note)        Narinder Olivier DO at 24              [facility]     Patient Identification:  Name:  Regine Beckham  Age:  68 y.o.  Sex:  female  :  1955  MRN:  [id number]  Visit Number:  [id number]  ROOM: [address] [place] [address]     Primary Care Provider:  Dread Burton MD    Length of stay in inpatient status:  0    Subjective     Chief Compliant:    Chief Complaint   Patient presents with    Fall       History of Presenting Illness: Patient seen and evaluated in follow-up for progressive debility with recurrent falls and physical conditioning with failure to thrive and inability to take care of herself at home.  Patient without any acute complaints today and resting comfortably in bed sleeping on arrival.    Objective     Current Hospital Meds:  amLODIPine, 5 mg, Oral, Q24H  aspirin, 81 mg, Oral, Daily  atorvastatin, 80 mg, Oral, Nightly  enoxaparin, 40 mg, Subcutaneous, Q24H  gabapentin, 300 mg, Oral, BID  isosorbide mononitrate, 30 mg, Oral, Q24H  losartan, 50 mg, Oral, Daily  metoprolol tartrate, 12.5 mg, Oral, Q12H  mirtazapine, 30 mg, Oral, Nightly  oxybutynin, 5 mg,  Oral, BID  pantoprazole, 40 mg, Oral, Q AM  rOPINIRole, 4 mg, Oral, Nightly  sodium chloride, 10 mL, Intravenous, Q12H  ticagrelor, 60 mg, Oral, BID      Pharmacy to Dose enoxaparin (LOVENOX),       ----------------------------------------------------------------------------------------------------------------------  Vital Signs:  Temp:  [98.2 °F (36.8 °C)-98.6 °F (37 °C)] 98.6 °F (37 °C)  Heart Rate:  [56-77] 74  Resp:  [18-20] 20  BP: ()/(49-66) 97/62     on   ;   Device (Oxygen Therapy): room air  Body mass index is 24.35 kg/m².      Intake/Output Summary (Last 24 hours) at 4/21/2024 1958  Last data filed at 4/21/2024 1839  Gross per 24 hour   Intake 1200 ml   Output 700 ml   Net 500 ml      ----------------------------------------------------------------------------------------------------------------------  Physical exam:  Constitutional: Elderly adult female resting in bed, NAD  HENT:  Head:  Normocephalic and atraumatic.  Mouth:  Moist mucous membranes.    Eyes:  Conjunctivae and EOM are normal. No scleral icterus.    Cardiovascular:  Normal rate, regular rhythm and normal heart sounds with no murmur.  Pulmonary/Chest:  No respiratory distress, no wheezes, no crackles, with normal breath sounds and good air movement.  Abdominal:  Soft.  Bowel sounds are normal.  No distension and no tenderness.   Musculoskeletal:  No tenderness, and no deformity.  No red or swollen joints anywhere.  Functional ROM intact.  Decreased strength in the upper and lower extremities on the left 2-3 out of 5  Neurological:  Alert and oriented to person, place, and time.  No cranial nerve deficit.  No tongue deviation.  No facial droop.  No slurred speech. Intact Sensation throughout  Skin:  Skin is warm and dry. No rash or lesion noted. No pallor.   Peripheral vascular:  Pulses in all 4 extremities with no clubbing, no cyanosis, no edema.  Psychiatric: Appropriate mood and affect, pleasant.  "  ----------------------------------------------------------------------------------------------------------------------  WBC/HGB/HCT/PLT   6.67/9.6/30.2/249 (04/21 0214)  BUN/CREAT/GLUC/ALT/AST/MOLLY/LIP    13/0.83/149/--/--/--/-- (04/21 0214)  LYTES - Na/K/Cl/CO2: 143/3.3*/108*/26.4 (04/21 0214)        Urine Culture   Date Value Ref Range Status   04/20/2024 >100,000 CFU/mL Normal Urogenital Lorraine  Final     No results found for: \"BLOODCX\"    I have personally looked at the labs and they are summarized above.  ----------------------------------------------------------------------------------------------------------------------  Detailed radiology reports for the last 24 hours:  XR Hip With or Without Pelvis 2 - 3 View Left    Result Date: 4/20/2024  1. Left hip osteoarthrosis with a prominent rim of osteophytes at the femoral head neck junction. 2. No definite acute fracture. Given the degree of osteopenia, if there is persistent strong clinical concern for fracture, MRI should be considered for further evaluation.  This report was finalized on 4/20/2024 7:44 PM by Kiko Barney MD.      XR Shoulder 2+ View Left    Result Date: 4/20/2024   1.  No acute fracture or dislocation. 2.  Mild osteoarthritis.  This report was finalized on 4/20/2024 4:16 PM by Michael Abarca MD.     Assessment & Plan      Recurrent falls  Progressive debility  Physical deconditioning  Failure to thrive    -Patient previously declining long-term SNF placement and unable to afford private out-of-pocket expense of short-term rehab due to already using up all of her insurance approved days already this year.  Unfortunately patient with falls at home after refusing long-term placement and now agreeable.    -PT and OT consulted for up-to-date for documentation to help with seeking placement.    -Case management  to be consulted on Monday once back in hospital.     Nonobstructive coronary artery disease    -Patient status post " cardiac evaluation including left heart cath with no obstructive disease found and cardiology recommending medical management.    -Continue aspirin, Brilinta, statin, beta-blocker and ARB    -Continue long-acting nitro     History of ischemic stroke  Status post left ICA stent  Hypertension  Hyperlipidemia    -Continue amlodipine and statin    Copied text in portions of the note has been reviewed and is accurate as of .TODAYDATE    VTE Prophylaxis:   Mechanical Order History:       None          Pharmalogical Order History:        Ordered     Dose Route Frequency Stop    04/20/24 1857  Enoxaparin Sodium (LOVENOX) syringe 40 mg         40 mg SC Every 24 Hours --    04/20/24 1848  Pharmacy to Dose enoxaparin (LOVENOX)        Question:  Indication of use  Answer:  Prophylaxis    -- XX Continuous PRN --                    Disposition long-term SNF placement    Narinder Olivier DO  [facility]  04/21/24  19:58 EDT      Electronically signed by Narinder Olivier DO at 04/21/24 2000       Consult Notes (most recent note)    No notes of this type exist for this encounter.       Physical Therapy Notes (most recent note)    No notes exist for this encounter.       Occupational Therapy Notes (most recent note)    No notes exist for this encounter.

## 2024-04-22 NOTE — CASE MANAGEMENT/SOCIAL WORK
Discharge Planning Assessment   [place]     Patient Name: Regine Beckham  MRN: [id number]  Today's Date: 4/22/2024    Admit Date: 4/20/2024         Discharge Plan       Row Name 04/22/24 1417       Plan    Plan Pt was admitted observation status on 04/20/24. SS received CM consult for dc planning, Pt has HH. SS spoke with Pt at bedside on this date. Pt lives alone. Pt was utilizing [facility]. Per Jannie at [facility] Pt has been discharged as a patient due to not having a caregiver at home and Pt not being able to ambulate when HH nurse arrived. Pt utilizes a shower chair,bedside commode, walker and wheelchair via [employer]. Pt's PCP is Dread Burton. Pt states she is legally blind. Pt is agreeable to long term care placement. Pt's preferred SNF is [facility]. SS contacted [facility] per Ernestina and faxed a referral in [employer] for review. Pt requested SS call her friend Teo and provide him an update. Pt states she does not have a POA/living will or any family for a  next of kin contact. .SS to follow.                  PAULINO Allen

## 2024-04-22 NOTE — PLAN OF CARE
Goal Outcome Evaluation:  Plan of Care Reviewed With: patient        Progress: no change  Outcome Evaluation: Pt resting in bed. VSS on RA. No s/s of distress. Pt ambulated to chair with x1 assist multiple times this shift. Pt voices no complaints or concerns. Will continue with poc.

## 2024-04-22 NOTE — THERAPY EVALUATION
"Patient Name: Regine Beckham  : 1955    MRN: [id number]                              Today's Date: 2024       Admit Date: 2024    Visit Dx: No diagnosis found.  Patient Active Problem List   Diagnosis    Iron deficiency anemia due to chronic blood loss    Major depressive disorder    RLS (restless legs syndrome)    GERD (gastroesophageal reflux disease)    Left breast mass    Allergy to penicillin    Stroke    Grade I diastolic dysfunction    Moderate malnutrition    Falls frequently    Stroke-like symptoms    Debility    Chest pain    Failure to thrive in adult     Past Medical History:   Diagnosis Date    Anemia     Anxiety     Arthritis     Depression     Legally blind     Restless leg syndrome     Sleep apnea     \"I don't use a cpap anymore since losing 106 lbs\"    Water retention      Past Surgical History:   Procedure Laterality Date    BACK SURGERY      CARDIAC CATHETERIZATION N/A 2024    Procedure: Percutaneous Manual Thrombectomy;  Surgeon: Efrain Hurley MD;  Location:  [facility];  Service: Interventional Radiology;  Laterality: N/A;    CARDIAC CATHETERIZATION N/A 2024    Procedure: Left Heart Cath;  Surgeon: Susan Mederos MD;  Location:  [facility];  Service: Cardiology;  Laterality: N/A;    GALLBLADDER SURGERY      HIP ARTHROSCOPY      JOINT REPLACEMENT Right       General Information       Row Name 24 1517          OT Time and Intention    Document Type evaluation  -     Mode of Treatment individual therapy;occupational therapy  -       Row Name 24 1517          General Information    Patient Profile Reviewed yes  -     Prior Level of Function --  independent prior to January when CVA occurred, patient has required significant assist since this time with recent hospitalization and recommended SNF placement with patient returning home  -     Existing Precautions/Restrictions fall  -     Barriers to Rehab " medically complex;previous functional deficit  -[facility] Name 04/22/24 1517          Occupational Profile    Reason for Services/Referral (Occupational Profile) Patient admitted to [facility] on 4/20/2024. She was referred for OT evaluation due to impaired performance with ADLs, functional mobility, and/or transfers.  -[facility] Name 04/22/24 1517          Living Environment    People in Home alone  -KP       Row Name 04/22/24 1517          Cognition    Orientation Status (Cognition) oriented x 3  -[facility] Name 04/22/24 1517          Safety Issues, Functional Mobility    Safety Issues Affecting Function (Mobility) awareness of need for assistance;insight into deficits/self-awareness;judgment;problem-solving  -     Impairments Affecting Function (Mobility) balance;endurance/activity tolerance;strength;muscle tone abnormal  -               User Key  (r) = Recorded By, (t) = Taken By, (c) = Cosigned By      Initials Name Provider Type     Maria Del Rosario Padgett, OT Occupational Therapist                     Mobility/ADL's       Row Name 04/22/24 1519          Bed Mobility    Comment, (Bed Mobility) up in chair upon arrival  -KP       Row Name 04/22/24 1519          Transfers    Transfers sit-stand transfer;stand-sit transfer  -KP       Row Name 04/22/24 1519          Sit-Stand Transfer    Sit-Stand [place] (Transfers) moderate assist (50% patient effort)  -KP       Row Name 04/22/24 1519          Stand-Sit Transfer    Stand-Sit [place] (Transfers) moderate assist (50% patient effort)  -KP       Row Name 04/22/24 1519          Activities of Daily Living    BADL Assessment/Intervention bathing;upper body dressing;lower body dressing;grooming;toileting  -KP       Row Name 04/22/24 1519          Bathing Assessment/Intervention    [place] Level (Bathing) bathing skills;maximum assist (25% patient effort);moderate assist (50% patient effort)  -KP       Row Name 04/22/24 1519          Upper  Body Dressing Assessment/Training    [place] Level (Upper Body Dressing) upper body dressing skills;moderate assist (50% patient effort);maximum assist (25% patient effort)  -KP       Row Name 04/22/24 1519          Lower Body Dressing Assessment/Training    [place] Level (Lower Body Dressing) lower body dressing skills;maximum assist (25% patient effort)  -KP       Row Name 04/22/24 1519          Grooming Assessment/Training    [place] Level (Grooming) grooming skills;moderate assist (50% patient effort);maximum assist (25% patient effort)  -KP       Row Name 04/22/24 1519          Toileting Assessment/Training    [place] Level (Toileting) toileting skills;maximum assist (25% patient effort)  -               User Key  (r) = Recorded By, (t) = Taken By, (c) = Cosigned By      Initials Name Provider Type    Maria Del Rosario Briceoñ OT Occupational Therapist                   Obj/Interventions       Row Name 04/22/24 1520          Sensory Assessment (Somatosensory)    Sensory Assessment decreased in LUE  -KP       Row Name 04/22/24 1520          Vision Assessment/Intervention    Vision Assessment Comment per patient chart legally blind, however demonstrates functional vision during therapy activities  -KP       Row Name 04/22/24 1520          Range of Motion Comprehensive    Comment, General Range of Motion LUE impaired at PLOF due to CVA (increased tone noted with PIP at 90 degrees flexion);RUE WFLs  -KP       Row Name 04/22/24 1520          Strength Comprehensive (MMT)    Comment, General Manual Muscle Testing (MMT) Assessment 2/5 LUE, 4+/5 RUE  -KP       Row Name 04/22/24 1520          Motor Skills    Motor Skills coordination;functional endurance;muscle tone  -     Coordination fine motor deficit;gross motor deficit;left;upper extremity;severe impairment  -     Functional Endurance fair  -     Muscle Tone left;upper extremity(s);moderate impairment;hypertonia  -KP       Row Name 04/22/24 1520           Balance    Balance Assessment sitting static balance;standing static balance  -KP     Static Sitting Balance standby assist  -KP     Static Standing Balance minimal assist;moderate assist  -               User Key  (r) = Recorded By, (t) = Taken By, (c) = Cosigned By      Initials Name Provider Type    Maria Del Rosario Briceño OT Occupational Therapist                   Goals/Plan       Row Name 04/22/24 1526          Transfer Goal 1 (OT)    Activity/Assistive Device (Transfer Goal 1, OT) toilet  -     [place] Level/Cues Needed (Transfer Goal 1, OT) contact guard required  -     Time Frame (Transfer Goal 1, OT) by discharge  -       Row Name 04/22/24 1526          Dressing Goal 1 (OT)    Activity/Device (Dressing Goal 1, OT) dressing skills, all  -     [place]/Cues Needed (Dressing Goal 1, OT) minimum assist (75% or more patient effort)  -     Time Frame (Dressing Goal 1, OT) by discharge  -       Row Name 04/22/24 1526          Problem Specific Goal 1 (OT)    Problem Specific Goal 1 (OT) Patient will participate in sustained activity X10 minutes to promote highest level of independence and safety with daily routine.  -     Time Frame (Problem Specific Goal 1, OT) by discharge  -       Row Name 04/22/24 1526          Therapy Assessment/Plan (OT)    Planned Therapy Interventions (OT) activity tolerance training;adaptive equipment training;BADL retraining;functional balance retraining;passive ROM/stretching;transfer/mobility retraining;occupation/activity based interventions;orthotic fabrication/fitting/training;strengthening exercise;neuromuscular control/coordination retraining;patient/caregiver education/training;ROM/therapeutic exercise  -               User Key  (r) = Recorded By, (t) = Taken By, (c) = Cosigned By      Initials Name Provider Type    Maria Del Rosario Briceño OT Occupational Therapist                   Clinical Impression       Row Name 04/22/24 1522          Pain  Assessment    Pretreatment Pain Rating 0/10 - no pain  -     Posttreatment Pain Rating 1/10  -     Pain Location - Side/Orientation Left  -     Pain Location - hip  -       Row Name 04/22/24 1522          Plan of Care Review    Plan of Care Reviewed With patient  -     Progress no change  -     Outcome Evaluation Patient seen for OT evaluation. She presents with functional limitations including generalized weakness, impaired strength with left greater than right, impaired balance, pain, and impaired functional endurance/activity tolerance. Patient is a high fall risk. She would benefit from ongoing OT services to promote highest level of independence and safety prior to discharge.  -       Row Name 04/22/24 1522          Therapy Assessment/Plan (OT)    Patient/Family Therapy Goal Statement (OT) be as independent as possible  -     Rehab Potential (OT) good, to achieve stated therapy goals  -     Criteria for Skilled Therapeutic Interventions Met (OT) yes;meets criteria;skilled treatment is necessary  -     Therapy Frequency (OT) 3 times/wk  3-5x/week as able and available  -     Predicted Duration of Therapy Intervention (OT) discharge  -[facility] Name 04/22/24 1522          Therapy Plan Review/Discharge Plan (OT)    Anticipated Discharge Disposition (OT) skilled nursing facility  -[facility] Name 04/22/24 1522          Positioning and Restraints    Pre-Treatment Position sitting in chair/recliner  -     Post Treatment Position chair  -     In Chair sitting;call light within reach;encouraged to call for assist;exit alarm on  -               User Key  (r) = Recorded By, (t) = Taken By, (c) = Cosigned By      Initials Name Provider Type    Maria Del Rosario Briceño, BLU Occupational Therapist                   Outcome Measures    No documentation.                     OT Recommendation and Plan  Planned Therapy Interventions (OT): activity tolerance training, adaptive equipment training, BADL  retraining, functional balance retraining, passive ROM/stretching, transfer/mobility retraining, occupation/activity based interventions, orthotic fabrication/fitting/training, strengthening exercise, neuromuscular control/coordination retraining, patient/caregiver education/training, ROM/therapeutic exercise  Therapy Frequency (OT): 3 times/wk (3-5x/week as able and available)  Plan of Care Review  Plan of Care Reviewed With: patient  Progress: no change  Outcome Evaluation: Patient seen for OT evaluation. She presents with functional limitations including generalized weakness, impaired strength with left greater than right, impaired balance, pain, and impaired functional endurance/activity tolerance. Patient is a high fall risk. She would benefit from ongoing OT services to promote highest level of independence and safety prior to discharge.     Time Calculation:         Time Calculation- OT       Row Name 04/22/24 1527             Time Calculation- OT    OT Received On 04/22/24  -                User Key  (r) = Recorded By, (t) = Taken By, (c) = Cosigned By      Initials Name Provider Type     Maria Del Rosario Padgett OT Occupational Therapist                  Therapy Charges for Today       Code Description Service Date Service Provider Modifiers Qty    [id number]  OT EVAL MOD COMPLEXITY 4 4/22/2024 Maria Del Rosario Padgett OT GO 1                 Maria Del Rosario Padgett OT  4/22/2024

## 2024-04-23 PROCEDURE — G0378 HOSPITAL OBSERVATION PER HR: HCPCS

## 2024-04-23 PROCEDURE — 97530 THERAPEUTIC ACTIVITIES: CPT

## 2024-04-23 PROCEDURE — 97535 SELF CARE MNGMENT TRAINING: CPT

## 2024-04-23 PROCEDURE — 97116 GAIT TRAINING THERAPY: CPT

## 2024-04-23 PROCEDURE — 97110 THERAPEUTIC EXERCISES: CPT

## 2024-04-23 PROCEDURE — 25010000002 ENOXAPARIN PER 10 MG: Performed by: STUDENT IN AN ORGANIZED HEALTH CARE EDUCATION/TRAINING PROGRAM

## 2024-04-23 PROCEDURE — 97112 NEUROMUSCULAR REEDUCATION: CPT

## 2024-04-23 PROCEDURE — 99231 SBSQ HOSP IP/OBS SF/LOW 25: CPT | Performed by: STUDENT IN AN ORGANIZED HEALTH CARE EDUCATION/TRAINING PROGRAM

## 2024-04-23 PROCEDURE — 96372 THER/PROPH/DIAG INJ SC/IM: CPT

## 2024-04-23 RX ADMIN — ASPIRIN 81 MG: 81 TABLET, COATED ORAL at 08:18

## 2024-04-23 RX ADMIN — ACETAMINOPHEN 650 MG: 325 TABLET ORAL at 20:45

## 2024-04-23 RX ADMIN — OXYBUTYNIN CHLORIDE 5 MG: 5 TABLET ORAL at 08:18

## 2024-04-23 RX ADMIN — Medication 10 ML: at 20:45

## 2024-04-23 RX ADMIN — ENOXAPARIN SODIUM 40 MG: 40 INJECTION SUBCUTANEOUS at 20:45

## 2024-04-23 RX ADMIN — ISOSORBIDE MONONITRATE 30 MG: 30 TABLET, EXTENDED RELEASE ORAL at 08:18

## 2024-04-23 RX ADMIN — TICAGRELOR 60 MG: 60 TABLET ORAL at 08:18

## 2024-04-23 RX ADMIN — ATORVASTATIN CALCIUM 80 MG: 40 TABLET, FILM COATED ORAL at 20:47

## 2024-04-23 RX ADMIN — GABAPENTIN 300 MG: 300 CAPSULE ORAL at 20:47

## 2024-04-23 RX ADMIN — OXYBUTYNIN CHLORIDE 5 MG: 5 TABLET ORAL at 20:47

## 2024-04-23 RX ADMIN — ROPINIROLE HYDROCHLORIDE 4 MG: 1 TABLET, FILM COATED ORAL at 20:46

## 2024-04-23 RX ADMIN — Medication 10 ML: at 08:26

## 2024-04-23 RX ADMIN — GABAPENTIN 300 MG: 300 CAPSULE ORAL at 08:25

## 2024-04-23 RX ADMIN — PANTOPRAZOLE SODIUM 40 MG: 40 TABLET, DELAYED RELEASE ORAL at 05:46

## 2024-04-23 RX ADMIN — METOPROLOL TARTRATE 12.5 MG: 25 TABLET, FILM COATED ORAL at 20:46

## 2024-04-23 RX ADMIN — MIRTAZAPINE 30 MG: 15 TABLET, FILM COATED ORAL at 20:46

## 2024-04-23 RX ADMIN — TICAGRELOR 60 MG: 60 TABLET ORAL at 20:46

## 2024-04-23 NOTE — CASE MANAGEMENT/SOCIAL WORK
Discharge Planning Assessment   [place]     Patient Name: Regine Beckham  MRN: [id number]  Today's Date: 4/23/2024    Admit Date: 4/20/2024       Discharge Plan       Row Name 04/23/24 1658       Plan    Plan SS followed up with [facility] on this date who states they do not have any beds available. SS spoke with pt at bedside on this date. Pt states she would like to do short term rehab before returning home. SS discussed with that referral can be sent to [facility] and [facility]. However, pt has used skilled days and will most likely be in her copay days. Pt states she is agreeable for referral to be sent. SS faxed referral to [facility] and [facility]. SS notified [facility] per [place] and [facility] per [place]. SS to follow.          PAULINO Lombardo

## 2024-04-23 NOTE — PROGRESS NOTES
[facility] HOSPITALIST PROGRESS NOTE     Patient Identification:  Name:  Regine Beckham  Age:  68 y.o.  Sex:  female  :  1955  MRN:  [id number]  Visit Number:  [id number]  ROOM: [address] [place] [address]     Primary Care Provider:  Dread Burton MD    Length of stay in inpatient status:  0    Subjective     Chief Compliant:    Chief Complaint   Patient presents with    Fall       History of Presenting Illness: Patient seen and examined this morning, no family present at bedside.  She reports left arm pain present since her fall at home prior to admission.  No significant aggravating or alleviating factors.  No other complaints today.    Objective     Current Hospital Meds:amLODIPine, 5 mg, Oral, Q24H  aspirin, 81 mg, Oral, Daily  atorvastatin, 80 mg, Oral, Nightly  enoxaparin, 40 mg, Subcutaneous, Q24H  gabapentin, 300 mg, Oral, BID  isosorbide mononitrate, 30 mg, Oral, Q24H  losartan, 50 mg, Oral, Daily  metoprolol tartrate, 12.5 mg, Oral, Q12H  mirtazapine, 30 mg, Oral, Nightly  oxybutynin, 5 mg, Oral, BID  pantoprazole, 40 mg, Oral, Q AM  rOPINIRole, 4 mg, Oral, Nightly  sodium chloride, 10 mL, Intravenous, Q12H  ticagrelor, 60 mg, Oral, BID    Pharmacy to Dose enoxaparin (LOVENOX),         Current Antimicrobial Therapy:  Anti-Infectives (From admission, onward)      None          Current Diuretic Therapy:  Diuretics (From admission, onward)      None          ----------------------------------------------------------------------------------------------------------------------  Vital Signs:  Temp:  [97.2 °F (36.2 °C)-98.5 °F (36.9 °C)] 98.5 °F (36.9 °C)  Heart Rate:  [54-81] 80  Resp:  [18-20] 20  BP: (109-150)/(57-72) 121/66  SpO2:  [95 %] 95 %  on   ;   Device (Oxygen Therapy): room air  Body mass index is 24.35 kg/m².    Wt Readings from Last 3 Encounters:   24 68.4 kg (150 lb 12.8 oz)   24 68.1 kg (150 lb 2.1 oz)   24 63.5 kg (140 lb)     Intake & Output (last 3 days)          04/20 0701 04/21 0700 04/21 0701 04/22 0700 04/22 0701 04/23 0700    P.O. 600 960 600    Total Intake(mL/kg) 600 (8.8) 960 (14) 600 (8.8)    Urine (mL/kg/hr) 800 600 (0.4) 600 (0.6)    Total Output 800 600 600    Net -200 +360 0           Urine Unmeasured Occurrence  4 x 3 x          Diet: Cardiac; Healthy Heart (2-3 Na+); Fluid Consistency: Thin (IDDSI 0)  ----------------------------------------------------------------------------------------------------------------------  Physical exam:   Constitutional:  Well-developed and well-nourished.  No acute distress.      HENT:  Head:  Normocephalic and atraumatic.    Pulmonary/Chest:  Normal rate and effort   Musculoskeletal:  No deformity.      Neurological: Awake, alert, left sided hemiparesis and facial droop noted which is chronic. Mild dysarthria noted which is also chronic.  Skin:  Skin is warm and dry.   Peripheral vascular:  No cyanosis  Psychiatric: Appropriate mood and affect  Edited by: Anh Valdez DO at 4/22/2024 2039  ----------------------------------------------------------------------------------------------------------------------  Results from last 7 days   Lab Units 04/21/24  0214 04/20/24  1407 04/16/24  0057   WBC 10*3/mm3 6.67 9.47 6.31   HEMOGLOBIN g/dL 9.6* 11.3* 9.2*   HEMATOCRIT % 30.2* 35.0 29.2*   MCV fL 93.8 94.9 95.4   MCHC g/dL 31.8 32.3 31.5   PLATELETS 10*3/mm3 249 284 216         Results from last 7 days   Lab Units 04/22/24  0239 04/21/24  0214 04/20/24  1407   SODIUM mmol/L 144 143 142   POTASSIUM mmol/L 4.1 3.3* 3.7   CHLORIDE mmol/L 109* 108* 107   CO2 mmol/L 26.6 26.4 24.6   BUN mg/dL 15 13 12   CREATININE mg/dL 0.85 0.83 0.76   CALCIUM mg/dL 9.4 9.0 9.8   GLUCOSE mg/dL 131* 149* 132*   ALBUMIN g/dL  --   --  3.6   BILIRUBIN mg/dL  --   --  0.5   ALK PHOS U/L  --   --  121*   AST (SGOT) U/L  --   --  37*   ALT (SGPT) U/L  --   --  29   Estimated Creatinine Clearance: 67.5 mL/min (by C-G formula based on SCr of 0.85  "mg/dL).  No results found for: \"AMMONIA\"              No results found for: \"HGBA1C\", \"POCGLU\"  Lab Results   Component Value Date    TSH 2.880 03/17/2024     No results found for: \"PREGTESTUR\", \"PREGSERUM\", \"HCG\", \"HCGQUANT\"  Pain Management Panel          Latest Ref Rng & Units 12/20/2023   Pain Management Panel   Amphetamine, Urine Qual Negative Negative    Barbiturates Screen, Urine Negative Negative    Benzodiazepine Screen, Urine Negative Negative    Buprenorphine, Screen, Urine Negative Negative    Cocaine Screen, Urine Negative Negative    Fentanyl, Urine Negative Negative    Methadone Screen , Urine Negative Negative    Methamphetamine, Ur Negative Negative      Brief Urine Lab Results  (Last result in the past 365 days)        Color   Clarity   Blood   Leuk Est   Nitrite   Protein   CREAT   Urine HCG        04/20/24 1704 Yellow   Turbid   Negative   Small (1+)   Negative   Negative                 No results found for: \"BLOODCX\"  Urine Culture   Date Value Ref Range Status   04/20/2024 >100,000 CFU/mL Normal Urogenital [place]  Final     No results found for: \"WOUNDCX\"  No results found for: \"STOOLCX\"  No results found for: \"RESPCX\"  No results found for: \"AFBCX\"        I have personally looked at the labs and they are summarized above.  ----------------------------------------------------------------------------------------------------------------------  Detailed radiology reports for the last 24 hours:  Imaging Results (Last 24 Hours)       ** No results found for the last 24 hours. **          Assessment & Plan      # Recurrent falls  # Progressive debility  # Physical deconditioning  # Failure to thrive  #Left arm pain s/p fall  Patient previously declined long-term SNF placement and is unable to afford private out-of-pocket expense of short-term rehab, as all of her insurance approved days have already been used this year.  Unfortunately she continues to have recurrent falls at home and is now agreeable " to long-term placement.  PT and OT consulted, appreciate assistance.  Case management consulted and are assisting with placement, appreciate assistance.  - I reviewed her x-rays from admission, which showed no acute fractures.  Continue home scheduled gabapentin.  Add as needed Tylenol.    # Nonobstructive coronary artery disease  -During previous admission she had a left heart cath which revealed nonobstructive disease.  Cardiology recommended medical management.  -Continue aspirin, Brilinta, statin, beta-blocker, and ARB.  Continue long-acting nitrate.    # History of ischemic stroke with residual left-sided deficit  # Status post left ICA stent  # Hypertension  # Hyperlipidemia  -Continue amlodipine and statin.  Blood pressure decently well-controlled.  Continue PT and OT.  Edited by: Anh Valdez DO at 4/22/2024 2040    VTE Prophylaxis:   Mechanical Order History:       None          Pharmalogical Order History:        Ordered     Dose Route Frequency Stop    04/20/24 1857  Enoxaparin Sodium (LOVENOX) syringe 40 mg         40 mg SC Every 24 Hours --    04/20/24 1848  Pharmacy to Dose enoxaparin (LOVENOX)        Question:  Indication of use  Answer:  Prophylaxis    -- XX Continuous PRN --                    Dispo: Pending skilled nursing facility placement    Anh Valdez DO  [facility]  04/22/24  20:40 EDT

## 2024-04-23 NOTE — THERAPY TREATMENT NOTE
"Acute Care - Physical Therapy Treatment Note   [place]     Patient Name: Regine Beckham  : 1955  MRN: [id number]  Today's Date: 2024      Visit Dx:   No diagnosis found.  Patient Active Problem List   Diagnosis    Iron deficiency anemia due to chronic blood loss    Major depressive disorder    RLS (restless legs syndrome)    GERD (gastroesophageal reflux disease)    Left breast mass    Allergy to penicillin    Stroke    Grade I diastolic dysfunction    Moderate malnutrition    Falls frequently    Stroke-like symptoms    Debility    Chest pain    Failure to thrive in adult     Past Medical History:   Diagnosis Date    Anemia     Anxiety     Arthritis     Depression     Legally blind     Restless leg syndrome     Sleep apnea     \"I don't use a cpap anymore since losing 106 lbs\"    Water retention      Past Surgical History:   Procedure Laterality Date    BACK SURGERY      CARDIAC CATHETERIZATION N/A 2024    Procedure: Percutaneous Manual Thrombectomy;  Surgeon: Efrain Hurley MD;  Location:  [facility];  Service: Interventional Radiology;  Laterality: N/A;    CARDIAC CATHETERIZATION N/A 2024    Procedure: Left Heart Cath;  Surgeon: Susan Mederos MD;  Location:  [facility];  Service: Cardiology;  Laterality: N/A;    GALLBLADDER SURGERY      HIP ARTHROSCOPY      JOINT REPLACEMENT Right      PT Assessment (Last 12 Hours)       PT Evaluation and Treatment       Row Name 24 1052          Physical Therapy Time and Intention    Subjective Information no complaints  -HC     Document Type therapy note (daily note)  -HC     Mode of Treatment physical therapy  -HC     Patient Effort good  -HC     Comment Pt and RN [place] in agreement for PT. Pt walked 45' x 2 with RW and min A x 2. Transfers required mod A. Sitting exercises completed up in chair to tolerance.  -HC       Row Name 24 1052          General Information    Patient Profile Reviewed yes  " -     Existing Precautions/Restrictions fall  -       Row Name 04/23/24 1052          Living Environment    Primary Care Provided by self  -       Row Name 04/23/24 1052          Home Use of Assistive/Adaptive Equipment    Equipment Currently Used at Home walker, rolling  -       Row Name 04/23/24 1052          Cognition    Affect/Mental Status (Cognition) WF  -     Orientation Status (Cognition) oriented x 3  -     Follows Commands (Cognition) WFL  -     Personal Safety Interventions gait belt;fall prevention program maintained;supervised activity;nonskid shoes/slippers when out of bed  -       Row Name 04/23/24 1052          Bed Mobility    Comment, (Bed Mobility) up in chair  -       Row Name 04/23/24 1052          Transfers    Transfers sit-stand transfer;stand-sit transfer  -       Row Name 04/23/24 1052          Sit-Stand Transfer    Sit-Stand [place] (Transfers) moderate assist (50% patient effort)  -     Assistive Device (Sit-Stand Transfers) walker, front-wheeled  -       Row Name 04/23/24 1052          Stand-Sit Transfer    Stand-Sit [place] (Transfers) moderate assist (50% patient effort)  -     Assistive Device (Stand-Sit Transfers) walker, front-wheeled  -       Row Name 04/23/24 1052          Gait/Stairs (Locomotion)    Gait/Stairs Locomotion gait/ambulation independence;gait/ambulation assistive device;distance ambulated  -     [place] Level (Gait) minimum assist (75% patient effort);1 person assist;1 person to manage equipment  -     Assistive Device (Gait) walker, front-wheeled  -     Distance in Feet (Gait) 45  x 2  -     Pattern (Gait) step-to;step-through  -     Deviations/Abnormal Patterns (Gait) ataxic;base of support, narrow;gait speed decreased;festinating/shuffling;left sided deviations  -       Row Name 04/23/24 1052          Safety Issues, Functional Mobility    Impairments Affecting Function (Mobility) balance;endurance/activity  tolerance;strength  -[facility] Name 04/23/24 1052          Motor Skills    Therapeutic Exercise other (see comments)  Sitting: AP, LAQ, March, Knees in/out  -[facility] Name 04/23/24 1052          Positioning and Restraints    Pre-Treatment Position sitting in chair/recliner  -     Post Treatment Position chair  -HC     In Chair sitting;call light within reach;encouraged to call for assist;with OT  -[facility] Name 04/23/24 1052          Therapy Assessment/Plan (PT)    Rehab Potential (PT) good, to achieve stated therapy goals  -     Criteria for Skilled Interventions Met (PT) yes;skilled treatment is necessary  -     Therapy Frequency (PT) 2 times/wk  2-5x/wk  -     Problem List (PT) problems related to;balance;mobility;strength  -     Activity Limitations Related to Problem List (PT) unable to ambulate safely;unable to transfer safely  -[facility] Name 04/23/24 1052          Physical Therapy Goals    Bed Mobility Goal Selection (PT) bed mobility, PT goal 1  -     Transfer Goal Selection (PT) transfer, PT goal 1  -     Gait Training Goal Selection (PT) gait training, PT goal 1  -[facility] Name 04/23/24 1052          Bed Mobility Goal 1 (PT)    Activity/Assistive Device (Bed Mobility Goal 1, PT) bed mobility activities, all  -     [place] Level/Cues Needed (Bed Mobility Goal 1, PT) minimum assist (75% or more patient effort)  -     Time Frame (Bed Mobility Goal 1, PT) by discharge  -[facility] Name 04/23/24 1052          Transfer Goal 1 (PT)    Activity/Assistive Device (Transfer Goal 1, PT) transfers, all;walker, dao  -HC     [place] Level/Cues Needed (Transfer Goal 1, PT) contact guard required  -     Time Frame (Transfer Goal 1, PT) by discharge  -[facility] Name 04/23/24 1052          Gait Training Goal 1 (PT)    Activity/Assistive Device (Gait Training Goal 1, PT) gait (walking locomotion);assistive device use;walker, dao  -HC     [place] Level (Gait Training  Goal 1, PT) contact guard required  -HC     Distance (Gait Training Goal 1, PT) 100'  -HC     Time Frame (Gait Training Goal 1, PT) by discharge  -HC               User Key  (r) = Recorded By, (t) = Taken By, (c) = Cosigned By      Initials Name Provider Type    HC Desi Fraga PTA Physical Therapist Assistant                    Physical Therapy Education       Title: PT OT SLP Therapies (Done)       Topic: Physical Therapy (Done)       Point: Mobility training (Done)       Learning Progress Summary             Patient Acceptance, E,TB,D, VU,DU,NR by  at 4/23/2024 1055    Acceptance, E,TB, VU by  at 4/22/2024 1516                         Point: Home exercise program (Done)       Learning Progress Summary             Patient Acceptance, E,TB,D, VU,DU,NR by  at 4/23/2024 1055    Acceptance, E,TB, VU by KM at 4/22/2024 1516                         Point: Body mechanics (Done)       Learning Progress Summary             Patient Acceptance, E,TB,D, VU,DU,NR by  at 4/23/2024 1055    Acceptance, E,TB, VU by  at 4/22/2024 1516                         Point: Precautions (Done)       Learning Progress Summary             Patient Acceptance, E,TB,D, VU,DU,NR by  at 4/23/2024 1055    Acceptance, E,TB, VU by  at 4/22/2024 1516                                         User Key       Initials Effective Dates Name Provider Type Discipline     05/24/22 -  Eugene Cuevas, PT Physical Therapist PT     01/20/23 -  Desi Fraga PTA Physical Therapist Assistant PT                  PT Recommendation and Plan  Therapy Frequency (PT): 2 times/wk (2-5x/wk)          Time Calculation:    PT Charges       Row Name 04/23/24 1055             Time Calculation    PT Received On 04/23/24  -         Time Calculation- PT    Total Timed Code Minutes- PT 25 minute(s)  -HC                User Key  (r) = Recorded By, (t) = Taken By, (c) = Cosigned By      Initials Name Provider Type    HC Desi Fraga PTA  Physical Therapist Assistant                  Therapy Charges for Today       Code Description Service Date Service Provider Modifiers Qty    26729901226 HC GAIT TRAINING EA 15 MIN 4/23/2024 Desi Fraga, TK GP, CQ 1    14334329053 HC PT THER PROC EA 15 MIN 4/23/2024 Desi Fraga, TK GP, CQ 1            PT G-Codes  AM-PAC 6 Clicks Score (PT): 14    Desi Fraga PTA  4/23/2024

## 2024-04-23 NOTE — PLAN OF CARE
Goal Outcome Evaluation:      Patient resting in bed during shift. VSS on room air. PRN pain meds given, see MAR. No other complaints or concerns at this time. Will continue with plan of care.

## 2024-04-23 NOTE — PLAN OF CARE
Goal Outcome Evaluation:  Plan of Care Reviewed With: patient        Progress: no change  Outcome Evaluation: Pt resting in bed. VSS on RA. Pt ambulated in hallway with x2 assist. Pt voices no complaints or concerns. Will continue with poc.

## 2024-04-23 NOTE — PROGRESS NOTES
[facility] HOSPITALIST PROGRESS NOTE     Patient Identification:  Name:  Regine Beckham  Age:  68 y.o.  Sex:  female  :  1955  MRN:  [id number]  Visit Number:  [id number]  ROOM: [address] [place] [address]     Primary Care Provider:  Dread Burton MD    Length of stay in inpatient status:  0    Subjective     Chief Compliant:    Chief Complaint   Patient presents with    Fall       History of Presenting Illness:    Patient seen and examined today, no family present at bedside but she was speaking to someone on the phone. She denied any new pain or other complaints, but was tearful, and asked to speak to case management again about her placement options. I passed this message along during case management rounds.     Objective     Current Hospital Meds:amLODIPine, 5 mg, Oral, Q24H  aspirin, 81 mg, Oral, Daily  atorvastatin, 80 mg, Oral, Nightly  enoxaparin, 40 mg, Subcutaneous, Q24H  gabapentin, 300 mg, Oral, BID  isosorbide mononitrate, 30 mg, Oral, Q24H  losartan, 50 mg, Oral, Daily  metoprolol tartrate, 12.5 mg, Oral, Q12H  mirtazapine, 30 mg, Oral, Nightly  oxybutynin, 5 mg, Oral, BID  pantoprazole, 40 mg, Oral, Q AM  rOPINIRole, 4 mg, Oral, Nightly  sodium chloride, 10 mL, Intravenous, Q12H  ticagrelor, 60 mg, Oral, BID    Pharmacy to Dose enoxaparin (LOVENOX),         Current Antimicrobial Therapy:  Anti-Infectives (From admission, onward)      None          Current Diuretic Therapy:  Diuretics (From admission, onward)      None          ----------------------------------------------------------------------------------------------------------------------  Vital Signs:  Temp:  [97.6 °F (36.4 °C)-98.8 °F (37.1 °C)] 97.6 °F (36.4 °C)  Heart Rate:  [57-84] 84  Resp:  [18-20] 20  BP: (111-136)/(55-82) 117/55  SpO2:  [98 %-99 %] 98 %  on   ;   Device (Oxygen Therapy): room air  Body mass index is 24.35 kg/m².    Wt Readings from Last 3 Encounters:   24 68.4 kg (150 lb 12.8 oz)   24 68.1 kg (150  Patient called today.    Patient needs Diabetes letter for driving.      States that it needs to be re-submitted as it was never filed;     Also, patient needs a copy to pickup on Monday, March 2, 2020 in the AM.  He will pickup at [facility].    Please contact patient.    Thank you.    Central Scheduling  Devora BARRIENTOS   lb 2.1 oz)   03/27/24 63.5 kg (140 lb)     Intake & Output (last 3 days)         04/21 0701 04/22 0700 04/22 0701 04/23 0700 04/23 0701 04/24 0700    P.O. 960 1080 360    Total Intake(mL/kg) 960 (14) 1080 (15.8) 360 (5.3)    Urine (mL/kg/hr) 600 (0.4) 600 (0.4)     Total Output 600 600     Net +360 +480 +360           Urine Unmeasured Occurrence 4 x 6 x 4 x          Diet: Cardiac; Healthy Heart (2-3 Na+); Fluid Consistency: Thin (IDDSI 0)  ----------------------------------------------------------------------------------------------------------------------  Physical exam:   Constitutional:  Well-developed and well-nourished.  No acute distress.      HENT:  Head:  Normocephalic and atraumatic.    Pulmonary/Chest:  Normal rate and effort   Musculoskeletal:  No deformity.      Neurological: Awake, alert, left sided hemiparesis and facial droop noted which is chronic. Mild dysarthria noted which is also chronic.  Skin:  Skin is warm and dry.   Peripheral vascular:  No cyanosis  Psychiatric: tearful  Edited by: Anh Valdez DO at 4/23/2024 1902  ----------------------------------------------------------------------------------------------------------------------  Results from last 7 days   Lab Units 04/21/24  0214 04/20/24  1407   WBC 10*3/mm3 6.67 9.47   HEMOGLOBIN g/dL 9.6* 11.3*   HEMATOCRIT % 30.2* 35.0   MCV fL 93.8 94.9   MCHC g/dL 31.8 32.3   PLATELETS 10*3/mm3 249 284         Results from last 7 days   Lab Units 04/22/24  0239 04/21/24  0214 04/20/24  1407   SODIUM mmol/L 144 143 142   POTASSIUM mmol/L 4.1 3.3* 3.7   CHLORIDE mmol/L 109* 108* 107   CO2 mmol/L 26.6 26.4 24.6   BUN mg/dL 15 13 12   CREATININE mg/dL 0.85 0.83 0.76   CALCIUM mg/dL 9.4 9.0 9.8   GLUCOSE mg/dL 131* 149* 132*   ALBUMIN g/dL  --   --  3.6   BILIRUBIN mg/dL  --   --  0.5   ALK PHOS U/L  --   --  121*   AST (SGOT) U/L  --   --  37*   ALT (SGPT) U/L  --   --  29   Estimated Creatinine Clearance: 67.5 mL/min (by C-G formula based on  "SCr of 0.85 mg/dL).  No results found for: \"AMMONIA\"              No results found for: \"HGBA1C\", \"POCGLU\"  Lab Results   Component Value Date    TSH 2.880 03/17/2024     No results found for: \"PREGTESTUR\", \"PREGSERUM\", \"HCG\", \"HCGQUANT\"  Pain Management Panel          Latest Ref Rng & Units 12/20/2023   Pain Management Panel   Amphetamine, Urine Qual Negative Negative    Barbiturates Screen, Urine Negative Negative    Benzodiazepine Screen, Urine Negative Negative    Buprenorphine, Screen, Urine Negative Negative    Cocaine Screen, Urine Negative Negative    Fentanyl, Urine Negative Negative    Methadone Screen , Urine Negative Negative    Methamphetamine, Ur Negative Negative      Brief Urine Lab Results  (Last result in the past 365 days)        Color   Clarity   Blood   Leuk Est   Nitrite   Protein   CREAT   Urine HCG        04/20/24 1704 Yellow   Turbid   Negative   Small (1+)   Negative   Negative                 No results found for: \"BLOODCX\"  Urine Culture   Date Value Ref Range Status   04/20/2024 >100,000 CFU/mL Normal Urogenital [place]  Final     No results found for: \"WOUNDCX\"  No results found for: \"STOOLCX\"  No results found for: \"RESPCX\"  No results found for: \"AFBCX\"        I have personally looked at the labs and they are summarized above.  ----------------------------------------------------------------------------------------------------------------------  Detailed radiology reports for the last 24 hours:  Imaging Results (Last 24 Hours)       ** No results found for the last 24 hours. **          Assessment & Plan      # Recurrent falls  # Progressive debility  # Physical deconditioning  # Failure to thrive  #Left arm pain s/p fall  Patient previously declined long-term SNF placement and was reportedly unable to afford private out-of-pocket expense of short-term rehab, as all of her insurance approved days have already been used this year.  Unfortunately she continues to have recurrent falls at " home and at admission was agreeable to long-term placement.  Per review of case management note today patient is reportedly considering private pay for short term rehab. PT and OT consulted, appreciate assistance.  Case management consulted and are assisting with placement, appreciate assistance.  - I reviewed her x-rays from admission, which showed no acute fractures.  Continue home scheduled gabapentin.  Continue as needed Tylenol.    # Nonobstructive coronary artery disease  -During previous admission she had a left heart cath which revealed nonobstructive disease.  Cardiology recommended medical management.  -Continue aspirin, Brilinta, statin, beta-blocker, and ARB.  Continue long-acting nitrate.    # History of ischemic stroke with residual left-sided deficit  # Status post left ICA stent  # Hypertension  # Hyperlipidemia  -Continue amlodipine and statin.  Blood pressure well-controlled.  Continue PT and OT.    Copied portions of the note have been reviewed and are correct as of 04/23/24.   Edited by: Anh Valdez DO at 4/23/2024 1902    VTE Prophylaxis:   Mechanical Order History:       None          Pharmalogical Order History:        Ordered     Dose Route Frequency Stop    04/20/24 1857  Enoxaparin Sodium (LOVENOX) syringe 40 mg         40 mg SC Every 24 Hours --    04/20/24 1848  Pharmacy to Dose enoxaparin (LOVENOX)        Question:  Indication of use  Answer:  Prophylaxis    -- XX Continuous PRN --                    Dispo: pending placement     Anh Valdez DO  [facility]  04/23/24  19:02 EDT

## 2024-04-23 NOTE — DISCHARGE PLACEMENT REQUEST
"Bhavani Regine Alexander \"NEGIN\" (68 y.o. Female)       Date of Birth   1955    Social Security Number       Address   [address] [place], [place] [address]    Home Phone   [phone]    MRN   [id number]       [facility]    Marital Status                               Admission Date   4/20/24    Admission Type   Emergency    Admitting Provider   Narinder Olivier DO    Attending Provider   Anh Valdez DO    Department, Room/Bed   [address], 3327/       Discharge Date       Discharge Disposition       Discharge Destination                                 Attending Provider: Anh Valdez DO    Allergies: Penicillins    Isolation: None   Infection: None   Code Status: CPR    Ht: 167.6 cm (65.98\")   Wt: 68.4 kg (150 lb 12.8 oz)    Admission Cmt: None   Principal Problem: Failure to thrive in adult [R62.7]                   Active Insurance as of 4/20/2024       Primary Coverage       Payor Plan Insurance Group Employer/Plan Group    AETNA MEDICARE REPLACEMENT AETNA MEDICARE REPLACEMENT [id number]       Payor Plan Address Payor Plan Phone Number Payor Plan Fax Number Effective Dates    [address] [phone]  1/1/2024 - None Entered    [facility] [id number]         Subscriber Name Subscriber Birth Date Member ID       REGINE LOCK 1955 [id number]                     Emergency Contacts        (Rel.) Home Phone Work Phone Mobile Phone    MikhailYaa (Friend) [phone] --    Karla Patel (Friend) [phone] -- --              Insurance Information                  AETNA MEDICARE REPLACEMENT/AETNA MEDICARE REPLACEMENT Phone: [phone]    Subscriber: Bhavani Regine Munoz Subscriber#: [id number]    Group#: [id number] Precert#: --             History & Physical        Narinder Olivier DO at 04/20/24 1818              [facility] HOSPITALIST HISTORY AND PHYSICAL    Patient Identification:  Name:  Regine Lock  Age:  " 68 y.o.  Sex:  female  :  1955  MRN:  [id number]   Visit Number:  [id number]  Admit Date: 2024   Room number:  116/16  Primary Care Physician:  Dread Burton MD     Subjective     Chief complaint:    Chief Complaint   Patient presents with    Fall       History of presenting illness:  68 y.o. female presents from home after fall while using walker hitting right shoulder and getting her leg tangled in her walker.  Patient recently admitted to this hospital and discharged 2 days prior after being admitted for chest pain with nonobstructive coronary artery disease found on left heart cath.  Patient with history of ischemic stroke status post left ICA stent.  At that time hospitalization patient medically optimized after heart cath revealed no intervenable disease present.  Patient was recommended for likely long-term placement however patient was only interested in short-term at the time but unfortunately due to already using all of her days the insurance would require out-of-pocket pain and patient stated that she preferred to return home.  Since that time patient reports that she has fallen twice at home and after the events of today has come to the realization that she does not and cannot be at home by herself and is willing to seek long-term skilled nursing facility placement.  Due to living alone and having no family or strong support she was not deemed safe for discharge back home to work on outpatient long-term placement and is being admitted for observation.  Patient without any acute complaints other than soreness in her shoulder and hip.  ---------------------------------------------------------------------------------------------------------------------   Review of Systems 14 system review of systems performed pertinent positives and negatives as above in HPI.  ---------------------------------------------------------------------------------------------------------------------   Past Medical  "History:   Diagnosis Date    Anemia     Anxiety     Arthritis     Depression     Legally blind     Restless leg syndrome     Sleep apnea     \"I don't use a cpap anymore since losing 106 lbs\"    Water retention      Past Surgical History:   Procedure Laterality Date    BACK SURGERY      CARDIAC CATHETERIZATION N/A 1/19/2024    Procedure: Percutaneous Manual Thrombectomy;  Surgeon: Efrain Hurley MD;  Location:  [facility];  Service: Interventional Radiology;  Laterality: N/A;    CARDIAC CATHETERIZATION N/A 4/12/2024    Procedure: Left Heart Cath;  Surgeon: Susan Mederos MD;  Location:  [facility];  Service: Cardiology;  Laterality: N/A;    GALLBLADDER SURGERY      HIP ARTHROSCOPY      JOINT REPLACEMENT Right      Family History   Problem Relation Age of Onset    Breast cancer Neg Hx      Social History     Socioeconomic History    Marital status:     Number of children: 0    Years of education: 1 yr college   Tobacco Use    Smoking status: Former     Current packs/day: 1.50     Average packs/day: 1.5 packs/day for 51.0 years (76.5 ttl pk-yrs)     Types: Cigarettes     Passive exposure: Past    Smokeless tobacco: Never   Vaping Use    Vaping status: Never Used   Substance and Sexual Activity    Alcohol use: Not Currently     Alcohol/week: 1.0 standard drink of alcohol     Types: 1 Glasses of wine per week     Comment: \"occasionally - once every two months\"    Drug use: Not Currently     Comment: alcohol - occasionally    Sexual activity: Defer     ---------------------------------------------------------------------------------------------------------------------   Allergies:  Penicillins  ---------------------------------------------------------------------------------------------------------------------   Medications below are reported home medications pulling from within the system; at this time, these medications have not been reconciled unless otherwise specified " and are in the verification process for further verifcation as current home medications.    Prior to Admission Medications       Prescriptions Last Dose Informant Patient Reported? Taking?    amLODIPine (NORVASC) 5 MG tablet   No No    Take 1 tablet by mouth Daily for 30 days.    aspirin 81 MG EC tablet  Pharmacy Yes No    Take 1 tablet by mouth Daily.    atorvastatin (LIPITOR) 80 MG tablet  Pharmacy No No    Take 1 tablet by mouth Every Night.    gabapentin (NEURONTIN) 300 MG capsule   No No    Take 1 capsule by mouth 2 (Two) Times a Day.    isosorbide mononitrate (IMDUR) 30 MG 24 hr tablet   No No    Take 1 tablet by mouth Daily for 30 days.    losartan (COZAAR) 50 MG tablet  Pharmacy Yes No    Take 1 tablet by mouth Daily. Indications: High Blood Pressure Disorder    metoprolol tartrate (LOPRESSOR) 25 MG tablet   No No    Take 1/2 tablet by mouth Every 12 (Twelve) Hours for 30 days.    Mirabegron ER (MYRBETRIQ) 50 MG tablet sustained-release 24 hour 24 hr tablet  Pharmacy Yes No    Take 50 mg by mouth Daily. Indications: Urinary Urgency    mirtazapine (REMERON) 30 MG tablet  Pharmacy No No    Take 1 tablet by mouth Every Night. Indications: Major Depressive Disorder    pantoprazole (PROTONIX) 40 MG EC tablet  Pharmacy No No    TAKE 1 TABLET BY MOUTH EVERY MORNING FOR HEARTBURN    rOPINIRole (REQUIP) 4 MG tablet  Pharmacy No No    Take 1 tablet by mouth Every Night. Take 1 hour before bedtime.  Indications: Restless Leg Syndrome    ticagrelor (BRILINTA) 60 MG tablet tablet  Pharmacy No No    Take 1 tablet by mouth 2 (Two) Times a Day.          Objective     Vital Signs:  Temp:  [98.4 °F (36.9 °C)] 98.4 °F (36.9 °C)  Heart Rate:  [53-86] 68  Resp:  [18] 18  BP: (100-143)/(39-80) 143/67    Mean Arterial Pressure (Non-Invasive) for the past 24 hrs (Last 3 readings):   Noninvasive MAP (mmHg)   04/20/24 1745 89   04/20/24 1730 99   04/20/24 1715 102     SpO2:  [94 %-98 %] 96 %  on   ;   Device (Oxygen Therapy): room  air  Body mass index is 24.21 kg/m².    Wt Readings from Last 3 Encounters:   04/20/24 68 kg (150 lb)   04/18/24 68.1 kg (150 lb 2.1 oz)   03/27/24 63.5 kg (140 lb)      ---------------------------------------------------------------------------------------------------------------------   Physical Exam:  Constitutional: Elderly adult female sitting up in chair, NAD  HENT:  Head:  Normocephalic and atraumatic.  Mouth:  Moist mucous membranes.    Eyes:  Conjunctivae and EOM are normal. No scleral icterus.    Cardiovascular:  Normal rate, regular rhythm and normal heart sounds with no murmur.  Pulmonary/Chest:  No respiratory distress, no wheezes, no crackles, with normal breath sounds and good air movement.  Abdominal:  Soft.  Bowel sounds are normal.  No distension and no tenderness.   Musculoskeletal:  No tenderness, and no deformity.  No red or swollen joints anywhere.  Functional ROM intact.  Decreased strength in the upper and lower extremities on the left 2-3 out of 5  Neurological:  Alert and oriented to person, place, and time.  No cranial nerve deficit.  No tongue deviation.  No facial droop.  No slurred speech. Intact Sensation throughout  Skin:  Skin is warm and dry. No rash or lesion noted. No pallor.   Peripheral vascular:  Pulses in all 4 extremities with no clubbing, no cyanosis, no edema.  Psychiatric: Appropriate mood and affect, pleasant.   ---------------------------------------------------------------------------------------------------------------------    Last echocardiogram:  Results for orders placed during the hospital encounter of 04/09/24    Adult Transthoracic Echo Complete w/ Color, Spectral and Contrast if necessary per protocol    Interpretation Summary    Left ventricular systolic function is hyperdynamic (EF > 70%). Calculated left ventricular EF = 79% Left ventricular ejection fraction appears to be greater than 70%.    Left ventricular diastolic function is consistent with (grade I)  "impaired relaxation.    --------------------------------------------------------------------------------------------------------------------  Labs:  Results from last 7 days   Lab Units 04/20/24  1407 04/16/24  0057   WBC 10*3/mm3 9.47 6.31   HEMOGLOBIN g/dL 11.3* 9.2*   HEMATOCRIT % 35.0 29.2*   MCV fL 94.9 95.4   MCHC g/dL 32.3 31.5   PLATELETS 10*3/mm3 284 216         Results from last 7 days   Lab Units 04/20/24  1407   SODIUM mmol/L 142   POTASSIUM mmol/L 3.7   CHLORIDE mmol/L 107   CO2 mmol/L 24.6   BUN mg/dL 12   CREATININE mg/dL 0.76   CALCIUM mg/dL 9.8   GLUCOSE mg/dL 132*   ALBUMIN g/dL 3.6   BILIRUBIN mg/dL 0.5   ALK PHOS U/L 121*   AST (SGOT) U/L 37*   ALT (SGPT) U/L 29   Estimated Creatinine Clearance: 75 mL/min (by C-G formula based on SCr of 0.76 mg/dL).    No results found for: \"AMMONIA\"          No results found for: \"HGBA1C\", \"POCGLU\"  Lab Results   Component Value Date    TSH 2.880 03/17/2024     No results found for: \"PREGTESTUR\", \"PREGSERUM\", \"HCG\", \"HCGQUANT\"  Pain Management Panel          Latest Ref Rng & Units 12/20/2023   Pain Management Panel   Amphetamine, Urine Qual Negative Negative    Barbiturates Screen, Urine Negative Negative    Benzodiazepine Screen, Urine Negative Negative    Buprenorphine, Screen, Urine Negative Negative    Cocaine Screen, Urine Negative Negative    Fentanyl, Urine Negative Negative    Methadone Screen , Urine Negative Negative    Methamphetamine, Ur Negative Negative      Brief Urine Lab Results  (Last result in the past 365 days)        Color   Clarity   Blood   Leuk Est   Nitrite   Protein   CREAT   Urine HCG        04/20/24 1704 Yellow   Turbid   Negative   Small (1+)   Negative   Negative                 No results found for: \"BLOODCX\"  No results found for: \"URINECX\"  No results found for: \"WOUNDCX\"  No results found for: \"STOOLCX\"    I have personally looked at the labs and they are summarized " above.  ----------------------------------------------------------------------------------------------------------------------  Detailed radiology reports for the last 24 hours:    Imaging Results (Last 24 Hours)       Procedure Component Value Units Date/Time    XR Shoulder 2+ View Left [[id number]] Collected: 04/20/24 1615     Updated: 04/20/24 1618    Narrative:      PROCEDURE: X-ray examination of the left shoulder performed on April 20, 2024. 3 views.     HISTORY: Left shoulder pain. Fall.     COMPARISON: None.     FINDINGS:     Mild osteoarthritis at the left glenohumeral joint  Mild hypertrophic changes at the left AC joint.  Preserved subacromial space.  No lytic or blastic lesion.   No foreign body.       Impression:         1.  No acute fracture or dislocation.  2.  Mild osteoarthritis.     This report was finalized on 4/20/2024 4:16 PM by Michael Abarca MD.             Final impressions for the last 30 days of radiology reports:    XR Shoulder 2+ View Left    Result Date: 4/20/2024   1.  No acute fracture or dislocation. 2.  Mild osteoarthritis.  This report was finalized on 4/20/2024 4:16 PM by Michael Abarca MD.      CT Angiogram Heart With 3D Image    Result Date: 4/11/2024  1.  Total calcium score 1729. 2.  Multifocal calcific plaque in the RCA with areas of mild and moderate stenosis but obscured due to the calcification. 3.  Focal defect in the midportion of the RCA appears to be artifact. 4.  LAD multifocal calcific plaque with areas of mild and moderate stenosis. 5.  First obtuse marginal with calcific plaque but no focal stenosis. 6.  Circumflex with mild calcific plaque causing mild stenosis. 7.  Tricuspid aortic valve with extensive calcific plaque.  RECOMMENDATIONS:   Extensive calcific plaque with areas of mild and moderate stenosis. Consider catheter angiogram. Impression.  ASSESSMENT:   CAD RADS N/P4   This report was finalized on 4/11/2024 5:20 PM by Dr. Hu Obrien MD.      XR Chest 1  View    Result Date: 4/9/2024   1.  Normal heart size 2.  Coarsened bronchovascular pattern to the lungs. 3.  No lobar consolidation or edema. 4.  No pleural effusion or pneumothorax.  This report was finalized on 4/9/2024 8:40 PM by Michael Abarca MD.     I have personally looked at the radiology images and read the final radiology report.    Assessment & Plan       Recurrent falls  Progressive debility  Physical deconditioning    -Patient previously declining long-term SNF placement and unable to afford private out-of-pocket expense of short-term rehab due to already using up all of her insurance approved days already this year.  Unfortunately patient with falls at home after refusing long-term placement and now agreeable.\      -PT and OT consulted for up-to-date for documentation to help with seeking placement.    -Case management  to be consulted on Monday once back in hospital.    Nonobstructive coronary artery disease    -Patient status post cardiac evaluation including left heart cath with no obstructive disease found and cardiology recommending medical management.    -Continue aspirin, Brilinta, statin, beta-blocker and ARB    -Initiated on long-acting nitro by cardiology     History of ischemic stroke  Status post left ICA stent  Hypertension  Hyperlipidemia    -Amlodipine increased due to elevated blood pressure    -Continue home statin       VTE Prophylaxis:   Mechanical Order History:       None          Pharmalogical Order History:        Ordered     Dose Route Frequency Stop    Signed and Held  Pharmacy to Dose enoxaparin (LOVENOX)        Question:  Indication of use  Answer:  Prophylaxis    -- XX Continuous PRN --                    The patient is high risk due to the following diagnoses/reasons: Recurrent falls at home with no family or strong friends support to keep her safe or help with transfers at home and day-to-day activities of daily living and patient previously against  long-term skilled nursing facility placement but now agreeable.        Narinder Olivier DO  [facility]  04/20/24  18:18 EDT    Electronically signed by Narinder Olivier DO at 04/20/24 2121       Current Facility-Administered Medications   Medication Dose Route Frequency Provider Last Rate Last Admin    acetaminophen (TYLENOL) tablet 650 mg  650 mg Oral Q6H PRN Anh Valdez DO   650 mg at 04/22/24 2115    amLODIPine (NORVASC) tablet 5 mg  5 mg Oral Q24H Narinder Olivier DO   5 mg at 04/22/24 0821    aspirin EC tablet 81 mg  81 mg Oral Daily Narinder Olivier DO   81 mg at 04/23/24 0818    atorvastatin (LIPITOR) tablet 80 mg  80 mg Oral Nightly Narinder Olivier DO   80 mg at 04/22/24 2113    Enoxaparin Sodium (LOVENOX) syringe 40 mg  40 mg Subcutaneous Q24H Narinder Olivier DO   40 mg at 04/22/24 2115    gabapentin (NEURONTIN) capsule 300 mg  300 mg Oral BID Narinder Olivier DO   300 mg at 04/23/24 0825    isosorbide mononitrate (IMDUR) 24 hr tablet 30 mg  30 mg Oral Q24H Narinder Olivier DO   30 mg at 04/23/24 0818    losartan (COZAAR) tablet 50 mg  50 mg Oral Daily Narinder Olivier DO   50 mg at 04/22/24 0821    metoprolol tartrate (LOPRESSOR) tablet 12.5 mg  12.5 mg Oral Q12H Narinder Olivier DO   12.5 mg at 04/22/24 2107    mirtazapine (REMERON) tablet 30 mg  30 mg Oral Nightly Narinder Olivier DO   30 mg at 04/22/24 2111    oxybutynin (DITROPAN) tablet 5 mg  5 mg Oral BID Narinder Olivier DO   5 mg at 04/23/24 0818    pantoprazole (PROTONIX) EC tablet 40 mg  40 mg Oral Q AM Narinder Olivier DO   40 mg at 04/23/24 0546    Pharmacy to Dose enoxaparin (LOVENOX)   Does not apply Continuous PRN Narinder Olivier DO        rOPINIRole (REQUIP) tablet 4 mg  4 mg Oral Nightly Narinder Olivier DO   4 mg at 04/22/24 2108    sodium chloride 0.9 % flush 10 mL  10 mL Intravenous Q12H Narinder Olivier DO   10 mL at 04/23/24 0826    sodium chloride 0.9 % flush 10 mL  10 mL Intravenous PRN Narinder Olivier,         sodium  chloride 0.9 % infusion 40 mL  40 mL Intravenous PRN Narinder Olivier DO        ticagrelor (BRILINTA) tablet 60 mg  60 mg Oral BID Narinder Olivier DO   60 mg at 24 0818        Physician Progress Notes (most recent note)        Anh Valdez DO at 24              [facility]     Patient Identification:  Name:  Regine Beckham  Age:  68 y.o.  Sex:  female  :  1955  MRN:  [id number]  Visit Number:  [id number]  ROOM: [address] [place] [address]     Primary Care Provider:  Dread Burton MD    Length of stay in inpatient status:  0    Subjective     Chief Compliant:    Chief Complaint   Patient presents with    Fall       History of Presenting Illness: Patient seen and examined this morning, no family present at bedside.  She reports left arm pain present since her fall at home prior to admission.  No significant aggravating or alleviating factors.  No other complaints today.    Objective     Current Hospital Meds:amLODIPine, 5 mg, Oral, Q24H  aspirin, 81 mg, Oral, Daily  atorvastatin, 80 mg, Oral, Nightly  enoxaparin, 40 mg, Subcutaneous, Q24H  gabapentin, 300 mg, Oral, BID  isosorbide mononitrate, 30 mg, Oral, Q24H  losartan, 50 mg, Oral, Daily  metoprolol tartrate, 12.5 mg, Oral, Q12H  mirtazapine, 30 mg, Oral, Nightly  oxybutynin, 5 mg, Oral, BID  pantoprazole, 40 mg, Oral, Q AM  rOPINIRole, 4 mg, Oral, Nightly  sodium chloride, 10 mL, Intravenous, Q12H  ticagrelor, 60 mg, Oral, BID    Pharmacy to Dose enoxaparin (LOVENOX),         Current Antimicrobial Therapy:  Anti-Infectives (From admission, onward)      None          Current Diuretic Therapy:  Diuretics (From admission, onward)      None          ----------------------------------------------------------------------------------------------------------------------  Vital Signs:  Temp:  [97.2 °F (36.2 °C)-98.5 °F (36.9 °C)] 98.5 °F (36.9 °C)  Heart Rate:  [54-81] 80  Resp:  [18-20] 20  BP: (109-150)/(57-72)  121/66  SpO2:  [95 %] 95 %  on   ;   Device (Oxygen Therapy): room air  Body mass index is 24.35 kg/m².    Wt Readings from Last 3 Encounters:   04/20/24 68.4 kg (150 lb 12.8 oz)   04/18/24 68.1 kg (150 lb 2.1 oz)   03/27/24 63.5 kg (140 lb)     Intake & Output (last 3 days)         04/20 0701 04/21 0700 04/21 0701 04/22 0700 04/22 0701 04/23 0700    P.O. 600 960 600    Total Intake(mL/kg) 600 (8.8) 960 (14) 600 (8.8)    Urine (mL/kg/hr) 800 600 (0.4) 600 (0.6)    Total Output 800 600 600    Net -200 +360 0           Urine Unmeasured Occurrence  4 x 3 x          Diet: Cardiac; Healthy Heart (2-3 Na+); Fluid Consistency: Thin (IDDSI 0)  ----------------------------------------------------------------------------------------------------------------------  Physical exam:   Constitutional:  Well-developed and well-nourished.  No acute distress.      HENT:  Head:  Normocephalic and atraumatic.    Pulmonary/Chest:  Normal rate and effort   Musculoskeletal:  No deformity.      Neurological: Awake, alert, left sided hemiparesis and facial droop noted which is chronic. Mild dysarthria noted which is also chronic.  Skin:  Skin is warm and dry.   Peripheral vascular:  No cyanosis  Psychiatric: Appropriate mood and affect  Edited by: Anh Valdez DO at 4/22/2024 2039  ----------------------------------------------------------------------------------------------------------------------  Results from last 7 days   Lab Units 04/21/24  0214 04/20/24  1407 04/16/24  0057   WBC 10*3/mm3 6.67 9.47 6.31   HEMOGLOBIN g/dL 9.6* 11.3* 9.2*   HEMATOCRIT % 30.2* 35.0 29.2*   MCV fL 93.8 94.9 95.4   MCHC g/dL 31.8 32.3 31.5   PLATELETS 10*3/mm3 249 284 216         Results from last 7 days   Lab Units 04/22/24  0239 04/21/24 0214 04/20/24  1407   SODIUM mmol/L 144 143 142   POTASSIUM mmol/L 4.1 3.3* 3.7   CHLORIDE mmol/L 109* 108* 107   CO2 mmol/L 26.6 26.4 24.6   BUN mg/dL 15 13 12   CREATININE mg/dL 0.85 0.83 0.76   CALCIUM mg/dL 9.4  "9.0 9.8   GLUCOSE mg/dL 131* 149* 132*   ALBUMIN g/dL  --   --  3.6   BILIRUBIN mg/dL  --   --  0.5   ALK PHOS U/L  --   --  121*   AST (SGOT) U/L  --   --  37*   ALT (SGPT) U/L  --   --  29   Estimated Creatinine Clearance: 67.5 mL/min (by C-G formula based on SCr of 0.85 mg/dL).  No results found for: \"AMMONIA\"              No results found for: \"HGBA1C\", \"POCGLU\"  Lab Results   Component Value Date    TSH 2.880 03/17/2024     No results found for: \"PREGTESTUR\", \"PREGSERUM\", \"HCG\", \"HCGQUANT\"  Pain Management Panel          Latest Ref Rng & Units 12/20/2023   Pain Management Panel   Amphetamine, Urine Qual Negative Negative    Barbiturates Screen, Urine Negative Negative    Benzodiazepine Screen, Urine Negative Negative    Buprenorphine, Screen, Urine Negative Negative    Cocaine Screen, Urine Negative Negative    Fentanyl, Urine Negative Negative    Methadone Screen , Urine Negative Negative    Methamphetamine, Ur Negative Negative      Brief Urine Lab Results  (Last result in the past 365 days)        Color   Clarity   Blood   Leuk Est   Nitrite   Protein   CREAT   Urine HCG        04/20/24 1704 Yellow   Turbid   Negative   Small (1+)   Negative   Negative                 No results found for: \"BLOODCX\"  Urine Culture   Date Value Ref Range Status   04/20/2024 >100,000 CFU/mL Normal Urogenital Lorraine  Final     No results found for: \"WOUNDCX\"  No results found for: \"STOOLCX\"  No results found for: \"RESPCX\"  No results found for: \"AFBCX\"        I have personally looked at the labs and they are summarized above.  ----------------------------------------------------------------------------------------------------------------------  Detailed radiology reports for the last 24 hours:  Imaging Results (Last 24 Hours)       ** No results found for the last 24 hours. **          Assessment & Plan      # Recurrent falls  # Progressive debility  # Physical deconditioning  # Failure to thrive  #Left arm pain s/p " fall  Patient previously declined long-term SNF placement and is unable to afford private out-of-pocket expense of short-term rehab, as all of her insurance approved days have already been used this year.  Unfortunately she continues to have recurrent falls at home and is now agreeable to long-term placement.  PT and OT consulted, appreciate assistance.  Case management consulted and are assisting with placement, appreciate assistance.  - I reviewed her x-rays from admission, which showed no acute fractures.  Continue home scheduled gabapentin.  Add as needed Tylenol.    # Nonobstructive coronary artery disease  -During previous admission she had a left heart cath which revealed nonobstructive disease.  Cardiology recommended medical management.  -Continue aspirin, Brilinta, statin, beta-blocker, and ARB.  Continue long-acting nitrate.    # History of ischemic stroke with residual left-sided deficit  # Status post left ICA stent  # Hypertension  # Hyperlipidemia  -Continue amlodipine and statin.  Blood pressure decently well-controlled.  Continue PT and OT.  Edited by: Anh Valdez DO at 2024    VTE Prophylaxis:   Mechanical Order History:       None          Pharmalogical Order History:        Ordered     Dose Route Frequency Stop    24 1857  Enoxaparin Sodium (LOVENOX) syringe 40 mg         40 mg SC Every 24 Hours --    24 1848  Pharmacy to Dose enoxaparin (LOVENOX)        Question:  Indication of use  Answer:  Prophylaxis    -- XX Continuous PRN --                    Dispo: Pending skilled nursing facility placement    Anh Valdez DO  [facility] Hospitalist  24  20:40 EDT      Electronically signed by Anh Valdez DO at 24 204          Physical Therapy Notes (most recent note)        Desi Fraga PTA at 24 1055  Version 1 of 1         Acute Care - Physical Therapy Treatment Note  [facility]     Patient Name: Regine Beckham  :  "1955  MRN: [id number]  Today's Date: 4/23/2024      Visit Dx:   No diagnosis found.  Patient Active Problem List   Diagnosis    Iron deficiency anemia due to chronic blood loss    Major depressive disorder    RLS (restless legs syndrome)    GERD (gastroesophageal reflux disease)    Left breast mass    Allergy to penicillin    Stroke    Grade I diastolic dysfunction    Moderate malnutrition    Falls frequently    Stroke-like symptoms    Debility    Chest pain    Failure to thrive in adult     Past Medical History:   Diagnosis Date    Anemia     Anxiety     Arthritis     Depression     Legally blind     Restless leg syndrome     Sleep apnea     \"I don't use a cpap anymore since losing 106 lbs\"    Water retention      Past Surgical History:   Procedure Laterality Date    BACK SURGERY      CARDIAC CATHETERIZATION N/A 1/19/2024    Procedure: Percutaneous Manual Thrombectomy;  Surgeon: Efrain Hurley MD;  Location:  [facility];  Service: Interventional Radiology;  Laterality: N/A;    CARDIAC CATHETERIZATION N/A 4/12/2024    Procedure: Left Heart Cath;  Surgeon: Susan Mederos MD;  Location:  [facility];  Service: Cardiology;  Laterality: N/A;    GALLBLADDER SURGERY      HIP ARTHROSCOPY      JOINT REPLACEMENT Right      PT Assessment (Last 12 Hours)       PT Evaluation and Treatment       Row Name 04/23/24 1052          Physical Therapy Time and Intention    Subjective Information no complaints  -HC     Document Type therapy note (daily note)  -HC     Mode of Treatment physical therapy  -HC     Patient Effort good  -HC     Comment Pt and RN Keyona in agreement for PT. Pt walked 45' x 2 with RW and min A x 2. Transfers required mod A. Sitting exercises completed up in chair to tolerance.  -       Row Name 04/23/24 1052          General Information    Patient Profile Reviewed yes  -HC     Existing Precautions/Restrictions fall  -       Row Name 04/23/24 1052          Living " Environment    Primary Care Provided by self  -       Row Name 04/23/24 1052          Home Use of Assistive/Adaptive Equipment    Equipment Currently Used at Home walker, rolling  -       Row Name 04/23/24 1052          Cognition    Affect/Mental Status (Cognition) WF  -     Orientation Status (Cognition) oriented x 3  -     Follows Commands (Cognition) WFL  -     Personal Safety Interventions gait belt;fall prevention program maintained;supervised activity;nonskid shoes/slippers when out of bed  -       Row Name 04/23/24 1052          Bed Mobility    Comment, (Bed Mobility) up in chair  -       Row Name 04/23/24 1052          Transfers    Transfers sit-stand transfer;stand-sit transfer  -       Row Name 04/23/24 1052          Sit-Stand Transfer    Sit-Stand [place] (Transfers) moderate assist (50% patient effort)  -     Assistive Device (Sit-Stand Transfers) walker, front-wheeled  -       Row Name 04/23/24 1052          Stand-Sit Transfer    Stand-Sit [place] (Transfers) moderate assist (50% patient effort)  -     Assistive Device (Stand-Sit Transfers) walker, front-wheeled  -       Row Name 04/23/24 1052          Gait/Stairs (Locomotion)    Gait/Stairs Locomotion gait/ambulation independence;gait/ambulation assistive device;distance ambulated  -     [place] Level (Gait) minimum assist (75% patient effort);1 person assist;1 person to manage equipment  -     Assistive Device (Gait) walker, front-wheeled  -     Distance in Feet (Gait) 45  x 2  -     Pattern (Gait) step-to;step-through  -     Deviations/Abnormal Patterns (Gait) ataxic;base of support, narrow;gait speed decreased;festinating/shuffling;left sided deviations  -       Row Name 04/23/24 1052          Safety Issues, Functional Mobility    Impairments Affecting Function (Mobility) balance;endurance/activity tolerance;strength  -       Row Name 04/23/24 1052          Motor Skills    Therapeutic Exercise  other (see comments)  Sitting: AP, LAQ, March, Knees in/out  -       Row Name 04/23/24 1052          Positioning and Restraints    Pre-Treatment Position sitting in chair/recliner  -     Post Treatment Position chair  -HC     In Chair sitting;call light within reach;encouraged to call for assist;with OT  -       Row Name 04/23/24 1052          Therapy Assessment/Plan (PT)    Rehab Potential (PT) good, to achieve stated therapy goals  -     Criteria for Skilled Interventions Met (PT) yes;skilled treatment is necessary  -     Therapy Frequency (PT) 2 times/wk  2-5x/wk  -     Problem List (PT) problems related to;balance;mobility;strength  -     Activity Limitations Related to Problem List (PT) unable to ambulate safely;unable to transfer safely  -       Row Name 04/23/24 1052          Physical Therapy Goals    Bed Mobility Goal Selection (PT) bed mobility, PT goal 1  -     Transfer Goal Selection (PT) transfer, PT goal 1  -     Gait Training Goal Selection (PT) gait training, PT goal 1  -       Row Name 04/23/24 1052          Bed Mobility Goal 1 (PT)    Activity/Assistive Device (Bed Mobility Goal 1, PT) bed mobility activities, all  -HC     [place] Level/Cues Needed (Bed Mobility Goal 1, PT) minimum assist (75% or more patient effort)  -HC     Time Frame (Bed Mobility Goal 1, PT) by discharge  -       Row Name 04/23/24 1052          Transfer Goal 1 (PT)    Activity/Assistive Device (Transfer Goal 1, PT) transfers, all;walker, dao  -HC     [place] Level/Cues Needed (Transfer Goal 1, PT) contact guard required  -HC     Time Frame (Transfer Goal 1, PT) by discharge  -       Row Name 04/23/24 1052          Gait Training Goal 1 (PT)    Activity/Assistive Device (Gait Training Goal 1, PT) gait (walking locomotion);assistive device use;walker, dao  -HC     [place] Level (Gait Training Goal 1, PT) contact guard required  -HC     Distance (Gait Training Goal 1, PT) 100'  -HC     Time  Frame (Gait Training Goal 1, PT) by discharge  -               User Key  (r) = Recorded By, (t) = Taken By, (c) = Cosigned By      Initials Name Provider Type     Desi Fraga PTA Physical Therapist Assistant                    Physical Therapy Education       Title: PT OT SLP Therapies (Done)       Topic: Physical Therapy (Done)       Point: Mobility training (Done)       Learning Progress Summary             Patient Acceptance, E,TB,D, VU,DU,NR by  at 4/23/2024 1055    Acceptance, E,TB, VU by  at 4/22/2024 1516                         Point: Home exercise program (Done)       Learning Progress Summary             Patient Acceptance, E,TB,D, VU,DU,NR by  at 4/23/2024 1055    Acceptance, E,TB, VU by  at 4/22/2024 1516                         Point: Body mechanics (Done)       Learning Progress Summary             Patient Acceptance, E,TB,D, VU,DU,NR by  at 4/23/2024 1055    Acceptance, E,TB, VU by  at 4/22/2024 1516                         Point: Precautions (Done)       Learning Progress Summary             Patient Acceptance, E,TB,D, VU,DU,NR by  at 4/23/2024 1055    Acceptance, E,TB, VU by  at 4/22/2024 1516                                         User Key       Initials Effective Dates Name Provider Type Discipline     05/24/22 -  Eugene Cuevas, PT Physical Therapist PT     01/20/23 -  Desi Fraga PTA Physical Therapist Assistant PT                  PT Recommendation and Plan  Therapy Frequency (PT): 2 times/wk (2-5x/wk)          Time Calculation:    PT Charges       Row Name 04/23/24 1055             Time Calculation    PT Received On 04/23/24  -         Time Calculation- PT    Total Timed Code Minutes- PT 25 minute(s)  -                User Key  (r) = Recorded By, (t) = Taken By, (c) = Cosigned By      Initials Name Provider Type     Desi Fraga PTA Physical Therapist Assistant                  Therapy Charges for Today       Code Description  "Service Date Service Provider Modifiers Qty    71191054196 HC GAIT TRAINING EA 15 MIN 2024 Desi Fraga PTA GP, CQ 1    00555173984 HC PT THER PROC EA 15 MIN 2024 Desi Fraga PTA GP, CQ 1            PT G-Codes  AM-PAC 6 Clicks Score (PT): 14    Desi Fraga PTA  2024      Electronically signed by Desi Fraga PTA at 24 1056          Occupational Therapy Notes (most recent note)        Maria Del Rosario Padgett, OT at 24 1137          Patient Name: Regine Beckham  : 1955    MRN: [id number]                              Today's Date: 2024       Admit Date: 2024    Visit Dx: No diagnosis found.  Patient Active Problem List   Diagnosis    Iron deficiency anemia due to chronic blood loss    Major depressive disorder    RLS (restless legs syndrome)    GERD (gastroesophageal reflux disease)    Left breast mass    Allergy to penicillin    Stroke    Grade I diastolic dysfunction    Moderate malnutrition    Falls frequently    Stroke-like symptoms    Debility    Chest pain    Failure to thrive in adult     Past Medical History:   Diagnosis Date    Anemia     Anxiety     Arthritis     Depression     Legally blind     Restless leg syndrome     Sleep apnea     \"I don't use a cpap anymore since losing 106 lbs\"    Water retention      Past Surgical History:   Procedure Laterality Date    BACK SURGERY      CARDIAC CATHETERIZATION N/A 2024    Procedure: Percutaneous Manual Thrombectomy;  Surgeon: Efrain Hurley MD;  Location:  [facility];  Service: Interventional Radiology;  Laterality: N/A;    CARDIAC CATHETERIZATION N/A 2024    Procedure: Left Heart Cath;  Surgeon: Susan Mederos MD;  Location:  [facility];  Service: Cardiology;  Laterality: N/A;    GALLBLADDER SURGERY      HIP ARTHROSCOPY      JOINT REPLACEMENT Right       General Information       Row Name 24 1133          OT Time and Intention    " Document Type therapy note (daily note)  -     Mode of Treatment individual therapy;occupational therapy  -       Row Name 04/23/24 1133          General Information    Patient Profile Reviewed yes  -     Existing Precautions/Restrictions fall  -     Barriers to Rehab medically complex;previous functional deficit  -       Row Name 04/23/24 1133          Cognition    Orientation Status (Cognition) oriented x 3  -       Row Name 04/23/24 1133          Safety Issues, Functional Mobility    Safety Issues Affecting Function (Mobility) awareness of need for assistance;impulsivity;insight into deficits/self-awareness;judgment  -     Impairments Affecting Function (Mobility) balance;endurance/activity tolerance;strength  -               User Key  (r) = Recorded By, (t) = Taken By, (c) = Cosigned By      Initials Name Provider Type    Maria Del Rosario Briceño OT Occupational Therapist                     Mobility/ADL's       Row Name 04/23/24 1134          Transfers    Transfers sit-stand transfer;stand-sit transfer  -       Row Name 04/23/24 1134          Sit-Stand Transfer    Sit-Stand [place] (Transfers) moderate assist (50% patient effort)  -     Assistive Device (Sit-Stand Transfers) walker, front-wheeled  -       Row Name 04/23/24 1134          Stand-Sit Transfer    Stand-Sit [place] (Transfers) moderate assist (50% patient effort)  -     Assistive Device (Stand-Sit Transfers) walker, front-wheeled  -       Row Name 04/23/24 1134          Toileting Assessment/Training    [place] Level (Toileting) toileting skills;maximum assist (25% patient effort)  -               User Key  (r) = Recorded By, (t) = Taken By, (c) = Cosigned By      Initials Name Provider Type    Maria Del Rosario Briceño OT Occupational Therapist                   Obj/Interventions       Row Name 04/23/24 1134          Range of Motion Comprehensive    Comment, General Range of Motion tolerated PROM to distal RUE to reduce  edema, prevent contracture, and promote ROM  -       Row Name 04/23/24 1134          Motor Skills    Motor Skills functional endurance  -     Functional Endurance fair  -               User Key  (r) = Recorded By, (t) = Taken By, (c) = Cosigned By      Initials Name Provider Type    Maria Del Rosario Briceño OT Occupational Therapist                   Goals/Plan    No documentation.                  Clinical Impression       Row Name 04/23/24 1135          Pain Assessment    Pretreatment Pain Rating 0/10 - no pain  -KP     Posttreatment Pain Rating 0/10 - no pain  -KP       Row Name 04/23/24 1135          Plan of Care Review    Plan of Care Reviewed With patient  -     Progress improving  -     Outcome Evaluation Patient seen for OT treatment. She demonstrated improved tolerance for activity. Education on positioning for LUE. Continue plan of care.  -       Row Name 04/23/24 1135          Therapy Plan Review/Discharge Plan (OT)    Anticipated Discharge Disposition (OT) skilled nursing facility  -       Row Name 04/23/24 1135          Positioning and Restraints    Pre-Treatment Position in bed  -     Post Treatment Position chair  -     In Chair sitting;call light within reach;encouraged to call for assist;exit alarm on  -KP               User Key  (r) = Recorded By, (t) = Taken By, (c) = Cosigned By      Initials Name Provider Type    Maria Del Rosario Briceño OT Occupational Therapist                   Outcome Measures       Row Name 04/23/24 0820          How much help from another person do you currently need...    Turning from your back to your side while in flat bed without using bedrails? 3  -DA     Moving from lying on back to sitting on the side of a flat bed without bedrails? 3  -DA     Moving to and from a bed to a chair (including a wheelchair)? 2  -DA     Standing up from a chair using your arms (e.g., wheelchair, bedside chair)? 2  -DA     Climbing 3-5 steps with a railing? 2  -DA     To walk in  hospital room? 2  -DA     AM-PAC 6 Clicks Score (PT) 14  -DA     Highest Level of Mobility Goal 4 --> Transfer to chair/commode  -DA               User Key  (r) = Recorded By, (t) = Taken By, (c) = Cosigned By      Initials Name Provider Type    Keyona Kaba, RN Registered Nurse                      OT Recommendation and Plan  Planned Therapy Interventions (OT): activity tolerance training, adaptive equipment training, BADL retraining, functional balance retraining, passive ROM/stretching, transfer/mobility retraining, occupation/activity based interventions, orthotic fabrication/fitting/training, strengthening exercise, neuromuscular control/coordination retraining, patient/caregiver education/training, ROM/therapeutic exercise  Therapy Frequency (OT): 3 times/wk (3-5x/week as able and available)  Plan of Care Review  Plan of Care Reviewed With: patient  Progress: improving  Outcome Evaluation: Patient seen for OT treatment. She demonstrated improved tolerance for activity. Education on positioning for LUE. Continue plan of care.     Time Calculation:         Time Calculation- OT       Row Name 04/23/24 1137             Time Calculation- OT    OT Received On 04/23/24  -                User Key  (r) = Recorded By, (t) = Taken By, (c) = Cosigned By      Initials Name Provider Type    Maria Del Rosario Briceño OT Occupational Therapist                  Therapy Charges for Today       Code Description Service Date Service Provider Modifiers Qty    37903907882  OT EVAL MOD COMPLEXITY 4 4/22/2024 Maria Del Rosario Padgett OT GO 1    96012188369 HC OT SELF CARE/MGMT/TRAIN EA 15 MIN 4/23/2024 Maria Del Rosario Padgett OT GO 1    [id number]  OT NEUROMUSC RE EDUCATION EA 15 MIN 4/23/2024 Maria Del Rosario Padgett OT GO 1    26644677782 HC OT THERAPEUTIC ACT EA 15 MIN 4/23/2024 Maria Del Rosario Padgett OT GO 1                 Maria Del Rosario Padgett OT  4/23/2024    Electronically signed by Maria Del Rosario Padgett OT at 04/23/24 1138       Speech Language Pathology Notes  (last 7 days)  Notes from 04/16/24 through 04/23/24   No notes exist for this encounter.       ADL Documentation (most recent)      Flowsheet Row Most Recent Value   Transferring 2 - assistive person   Toileting 2 - assistive person   Bathing 2 - assistive person   Dressing 2 - assistive person   Eating 0 - independent   Communication 0 - understands/communicates without difficulty   Swallowing 0 - swallows foods/liquids without difficulty   Equipment Currently Used at Home walker, rolling            Discharge Summary    No notes of this type exist for this encounter.       Discharge Order (From admission, onward)      None

## 2024-04-23 NOTE — THERAPY TREATMENT NOTE
"Patient Name: Regine Beckham  : 1955    MRN: [id number]                              Today's Date: 2024       Admit Date: 2024    Visit Dx: No diagnosis found.  Patient Active Problem List   Diagnosis    Iron deficiency anemia due to chronic blood loss    Major depressive disorder    RLS (restless legs syndrome)    GERD (gastroesophageal reflux disease)    Left breast mass    Allergy to penicillin    Stroke    Grade I diastolic dysfunction    Moderate malnutrition    Falls frequently    Stroke-like symptoms    Debility    Chest pain    Failure to thrive in adult     Past Medical History:   Diagnosis Date    Anemia     Anxiety     Arthritis     Depression     Legally blind     Restless leg syndrome     Sleep apnea     \"I don't use a cpap anymore since losing 106 lbs\"    Water retention      Past Surgical History:   Procedure Laterality Date    BACK SURGERY      CARDIAC CATHETERIZATION N/A 2024    Procedure: Percutaneous Manual Thrombectomy;  Surgeon: Efrain Hurley MD;  Location:  [facility];  Service: Interventional Radiology;  Laterality: N/A;    CARDIAC CATHETERIZATION N/A 2024    Procedure: Left Heart Cath;  Surgeon: Susan Mederos MD;  Location:  [facility];  Service: Cardiology;  Laterality: N/A;    GALLBLADDER SURGERY      HIP ARTHROSCOPY      JOINT REPLACEMENT Right       General Information       Row Name 24 1133          OT Time and Intention    Document Type therapy note (daily note)  -     Mode of Treatment individual therapy;occupational therapy  -       Row Name 24 1133          General Information    Patient Profile Reviewed yes  -KP     Existing Precautions/Restrictions fall  -     Barriers to Rehab medically complex;previous functional deficit  -       Row Name 24 1133          Cognition    Orientation Status (Cognition) oriented x 3  -       Row Name 24 1133          Safety Issues, Functional " Mobility    Safety Issues Affecting Function (Mobility) awareness of need for assistance;impulsivity;insight into deficits/self-awareness;judgment  -KP     Impairments Affecting Function (Mobility) balance;endurance/activity tolerance;strength  -KP               User Key  (r) = Recorded By, (t) = Taken By, (c) = Cosigned By      Initials Name Provider Type    Maria Del Rosario Briceño OT Occupational Therapist                     Mobility/ADL's       Row Name 04/23/24 1134          Transfers    Transfers sit-stand transfer;stand-sit transfer  -KP       Row Name 04/23/24 1134          Sit-Stand Transfer    Sit-Stand [place] (Transfers) moderate assist (50% patient effort)  -     Assistive Device (Sit-Stand Transfers) walker, front-wheeled  -KP       Row Name 04/23/24 1134          Stand-Sit Transfer    Stand-Sit [place] (Transfers) moderate assist (50% patient effort)  -     Assistive Device (Stand-Sit Transfers) walker, front-wheeled  -KP       Row Name 04/23/24 1134          Toileting Assessment/Training    [place] Level (Toileting) toileting skills;maximum assist (25% patient effort)  -               User Key  (r) = Recorded By, (t) = Taken By, (c) = Cosigned By      Initials Name Provider Type    Maria Del Rosario Briceño OT Occupational Therapist                   Obj/Interventions       Row Name 04/23/24 1134          Range of Motion Comprehensive    Comment, General Range of Motion tolerated PROM to distal RUE to reduce edema, prevent contracture, and promote ROM  -       Row Name 04/23/24 1134          Motor Skills    Motor Skills functional endurance  -KP     Functional Endurance fair  -               User Key  (r) = Recorded By, (t) = Taken By, (c) = Cosigned By      Initials Name Provider Type    Maria Del Rosario Briceño OT Occupational Therapist                   Goals/Plan    No documentation.                  Clinical Impression       Row Name 04/23/24 1135          Pain Assessment    Pretreatment  Pain Rating 0/10 - no pain  -     Posttreatment Pain Rating 0/10 - no pain  -KP       Row Name 04/23/24 1135          Plan of Care Review    Plan of Care Reviewed With patient  -     Progress improving  -     Outcome Evaluation Patient seen for OT treatment. She demonstrated improved tolerance for activity. Education on positioning for LUE. Continue plan of care.  -       Row Name 04/23/24 1135          Therapy Plan Review/Discharge Plan (OT)    Anticipated Discharge Disposition (OT) skilled nursing facility  -       Row Name 04/23/24 1135          Positioning and Restraints    Pre-Treatment Position in bed  -     Post Treatment Position chair  -     In Chair sitting;call light within reach;encouraged to call for assist;exit alarm on  -               User Key  (r) = Recorded By, (t) = Taken By, (c) = Cosigned By      Initials Name Provider Type    Maria Del Rosario Briceño, OT Occupational Therapist                   Outcome Measures       Row Name 04/23/24 0820          How much help from another person do you currently need...    Turning from your back to your side while in flat bed without using bedrails? 3  -DA     Moving from lying on back to sitting on the side of a flat bed without bedrails? 3  -DA     Moving to and from a bed to a chair (including a wheelchair)? 2  -DA     Standing up from a chair using your arms (e.g., wheelchair, bedside chair)? 2  -DA     Climbing 3-5 steps with a railing? 2  -DA     To walk in hospital room? 2  -DA     AM-PAC 6 Clicks Score (PT) 14  -DA     Highest Level of Mobility Goal 4 --> Transfer to chair/commode  -DA               User Key  (r) = Recorded By, (t) = Taken By, (c) = Cosigned By      Initials Name Provider Type    Keyona Kaba, RN Registered Nurse                      OT Recommendation and Plan  Planned Therapy Interventions (OT): activity tolerance training, adaptive equipment training, BADL retraining, functional balance retraining, passive  ROM/stretching, transfer/mobility retraining, occupation/activity based interventions, orthotic fabrication/fitting/training, strengthening exercise, neuromuscular control/coordination retraining, patient/caregiver education/training, ROM/therapeutic exercise  Therapy Frequency (OT): 3 times/wk (3-5x/week as able and available)  Plan of Care Review  Plan of Care Reviewed With: patient  Progress: improving  Outcome Evaluation: Patient seen for OT treatment. She demonstrated improved tolerance for activity. Education on positioning for LUE. Continue plan of care.     Time Calculation:         Time Calculation- OT       Row Name 04/23/24 1137             Time Calculation- OT    OT Received On 04/23/24  -                User Key  (r) = Recorded By, (t) = Taken By, (c) = Cosigned By      Initials Name Provider Type     Maria Del Rosario Padgett OT Occupational Therapist                  Therapy Charges for Today       Code Description Service Date Service Provider Modifiers Qty    65630736174  OT EVAL MOD COMPLEXITY 4 4/22/2024 Maria Del Rosario Padgett OT GO 1    [id number]  OT SELF CARE/MGMT/TRAIN EA 15 MIN 4/23/2024 Maria Del Rosario Padgett OT GO 1    [id number]  OT NEUROMUSC RE EDUCATION EA 15 MIN 4/23/2024 Maria Del Rosario Padgett OT GO 1    [id number]  OT THERAPEUTIC ACT EA 15 MIN 4/23/2024 Maria Del Rosario Padgett OT GO 1                 Maria Del Rosario Padgett OT  4/23/2024

## 2024-04-24 PROCEDURE — 96372 THER/PROPH/DIAG INJ SC/IM: CPT

## 2024-04-24 PROCEDURE — G0378 HOSPITAL OBSERVATION PER HR: HCPCS

## 2024-04-24 PROCEDURE — 97530 THERAPEUTIC ACTIVITIES: CPT

## 2024-04-24 PROCEDURE — 97116 GAIT TRAINING THERAPY: CPT

## 2024-04-24 PROCEDURE — 99231 SBSQ HOSP IP/OBS SF/LOW 25: CPT | Performed by: STUDENT IN AN ORGANIZED HEALTH CARE EDUCATION/TRAINING PROGRAM

## 2024-04-24 PROCEDURE — 97110 THERAPEUTIC EXERCISES: CPT

## 2024-04-24 PROCEDURE — 97112 NEUROMUSCULAR REEDUCATION: CPT

## 2024-04-24 PROCEDURE — 25010000002 ENOXAPARIN PER 10 MG: Performed by: STUDENT IN AN ORGANIZED HEALTH CARE EDUCATION/TRAINING PROGRAM

## 2024-04-24 RX ADMIN — ASPIRIN 81 MG: 81 TABLET, COATED ORAL at 09:07

## 2024-04-24 RX ADMIN — Medication 10 ML: at 09:10

## 2024-04-24 RX ADMIN — MIRTAZAPINE 30 MG: 15 TABLET, FILM COATED ORAL at 20:24

## 2024-04-24 RX ADMIN — GABAPENTIN 300 MG: 300 CAPSULE ORAL at 09:07

## 2024-04-24 RX ADMIN — ATORVASTATIN CALCIUM 80 MG: 40 TABLET, FILM COATED ORAL at 20:24

## 2024-04-24 RX ADMIN — TICAGRELOR 60 MG: 60 TABLET ORAL at 09:07

## 2024-04-24 RX ADMIN — OXYBUTYNIN CHLORIDE 5 MG: 5 TABLET ORAL at 20:24

## 2024-04-24 RX ADMIN — TICAGRELOR 60 MG: 60 TABLET ORAL at 20:24

## 2024-04-24 RX ADMIN — ROPINIROLE HYDROCHLORIDE 4 MG: 1 TABLET, FILM COATED ORAL at 21:30

## 2024-04-24 RX ADMIN — ACETAMINOPHEN 650 MG: 325 TABLET ORAL at 22:40

## 2024-04-24 RX ADMIN — ACETAMINOPHEN 650 MG: 325 TABLET ORAL at 15:28

## 2024-04-24 RX ADMIN — PANTOPRAZOLE SODIUM 40 MG: 40 TABLET, DELAYED RELEASE ORAL at 06:07

## 2024-04-24 RX ADMIN — ENOXAPARIN SODIUM 40 MG: 40 INJECTION SUBCUTANEOUS at 20:23

## 2024-04-24 RX ADMIN — GABAPENTIN 300 MG: 300 CAPSULE ORAL at 20:24

## 2024-04-24 RX ADMIN — METOPROLOL TARTRATE 12.5 MG: 25 TABLET, FILM COATED ORAL at 20:24

## 2024-04-24 RX ADMIN — ISOSORBIDE MONONITRATE 30 MG: 30 TABLET, EXTENDED RELEASE ORAL at 09:07

## 2024-04-24 RX ADMIN — Medication 10 ML: at 20:25

## 2024-04-24 RX ADMIN — OXYBUTYNIN CHLORIDE 5 MG: 5 TABLET ORAL at 09:07

## 2024-04-24 NOTE — THERAPY TREATMENT NOTE
"Acute Care - Physical Therapy Treatment Note   [facility]     Patient Name: Regine Beckham  : 1955  MRN: [id number]  Today's Date: 2024      Visit Dx:   No diagnosis found.  Patient Active Problem List   Diagnosis    Iron deficiency anemia due to chronic blood loss    Major depressive disorder    RLS (restless legs syndrome)    GERD (gastroesophageal reflux disease)    Left breast mass    Allergy to penicillin    Stroke    Grade I diastolic dysfunction    Moderate malnutrition    Falls frequently    Stroke-like symptoms    Debility    Chest pain    Failure to thrive in adult     Past Medical History:   Diagnosis Date    Anemia     Anxiety     Arthritis     Depression     Legally blind     Restless leg syndrome     Sleep apnea     \"I don't use a cpap anymore since losing 106 lbs\"    Water retention      Past Surgical History:   Procedure Laterality Date    BACK SURGERY      CARDIAC CATHETERIZATION N/A 2024    Procedure: Percutaneous Manual Thrombectomy;  Surgeon: Efrain Hurley MD;  Location:  [facility];  Service: Interventional Radiology;  Laterality: N/A;    CARDIAC CATHETERIZATION N/A 2024    Procedure: Left Heart Cath;  Surgeon: Susan Mederos MD;  Location:  [facility];  Service: Cardiology;  Laterality: N/A;    GALLBLADDER SURGERY      HIP ARTHROSCOPY      JOINT REPLACEMENT Right      PT Assessment (Last 12 Hours)       PT Evaluation and Treatment       Row Name 24 1328          Physical Therapy Time and Intention    Subjective Information no complaints  -HC     Document Type therapy note (daily note)  -HC     Mode of Treatment physical therapy  -HC     Patient Effort good  -HC     Comment Pt and STALIN Mix in agreement for PT. Pt walked 80' with RW min/mod A. Bed mobility mod A, hygiene required with gown change mod A. Sitting exercises completed up in chair at end of RX to tolerance.  -HC       Row Name 24 1328          General " Information    Patient Profile Reviewed yes  -     Existing Precautions/Restrictions fall  -       Row Name 04/24/24 1328          Living Environment    Primary Care Provided by self  -       Row Name 04/24/24 1328          Home Use of Assistive/Adaptive Equipment    Equipment Currently Used at Home walker, rolling  -       Row Name 04/24/24 1328          Cognition    Affect/Mental Status (Cognition) WFL  -     Orientation Status (Cognition) oriented x 3  -     Follows Commands (Cognition) WFL  -     Personal Safety Interventions fall prevention program maintained;gait belt;nonskid shoes/slippers when out of bed;supervised activity  -       Row Name 04/24/24 1328          Bed Mobility    All Activities, [place] (Bed Mobility) moderate assist (50% patient effort)  -       Row Name 04/24/24 1328          Transfers    Transfers sit-stand transfer;stand-sit transfer  -       Row Name 04/24/24 1328          Sit-Stand Transfer    Sit-Stand [place] (Transfers) moderate assist (50% patient effort)  -     Assistive Device (Sit-Stand Transfers) walker, front-wheeled  -       Row Name 04/24/24 1328          Stand-Sit Transfer    Stand-Sit [place] (Transfers) moderate assist (50% patient effort)  -     Assistive Device (Stand-Sit Transfers) walker, front-wheeled  -       Row Name 04/24/24 1328          Gait/Stairs (Locomotion)    Gait/Stairs Locomotion gait/ambulation independence;gait/ambulation assistive device;distance ambulated  -     [place] Level (Gait) minimum assist (75% patient effort);1 person assist;moderate assist (50% patient effort)  -     Assistive Device (Gait) walker, front-wheeled  -     Distance in Feet (Gait) 80  -     Pattern (Gait) step-to;step-through  -     Deviations/Abnormal Patterns (Gait) ataxic;base of support, narrow;gait speed decreased;festinating/shuffling;left sided deviations  -       Row Name 04/24/24 1328          Safety Issues,  Functional Mobility    Impairments Affecting Function (Mobility) balance;endurance/activity tolerance;strength  -[facility] Name 04/24/24 1328          Motor Skills    Therapeutic Exercise other (see comments)  Sitting: Ap, LAQ, March, Knees in/out  -[facility] Name 04/24/24 1328          Positioning and Restraints    Pre-Treatment Position in bed  -     Post Treatment Position chair  -     In Chair sitting;call light within reach;encouraged to call for assist;exit alarm on;notified nsg  -[facility] Name 04/24/24 1328          Therapy Assessment/Plan (PT)    Rehab Potential (PT) good, to achieve stated therapy goals  -     Criteria for Skilled Interventions Met (PT) yes;skilled treatment is necessary  -     Therapy Frequency (PT) 2 times/wk  2-5x/wk  -     Problem List (PT) problems related to;balance;mobility;strength  -     Activity Limitations Related to Problem List (PT) unable to ambulate safely;unable to transfer safely  -[facility] Name 04/24/24 1328          Physical Therapy Goals    Bed Mobility Goal Selection (PT) bed mobility, PT goal 1  -     Transfer Goal Selection (PT) transfer, PT goal 1  -     Gait Training Goal Selection (PT) gait training, PT goal 1  -[facility] Name 04/24/24 1328          Bed Mobility Goal 1 (PT)    Activity/Assistive Device (Bed Mobility Goal 1, PT) bed mobility activities, all  -HC     [place] Level/Cues Needed (Bed Mobility Goal 1, PT) minimum assist (75% or more patient effort)  -     Time Frame (Bed Mobility Goal 1, PT) by discharge  -[facility] Name 04/24/24 1328          Transfer Goal 1 (PT)    Activity/Assistive Device (Transfer Goal 1, PT) transfers, all;walker, dao  -HC     [place] Level/Cues Needed (Transfer Goal 1, PT) contact guard required  -     Time Frame (Transfer Goal 1, PT) by discharge  -       Row Name 04/24/24 1328          Gait Training Goal 1 (PT)    Activity/Assistive Device (Gait Training Goal 1, PT) gait  (walking locomotion);assistive device use;walker, doa  -HC     [place] Level (Gait Training Goal 1, PT) contact guard required  -HC     Distance (Gait Training Goal 1, PT) 100'  -HC     Time Frame (Gait Training Goal 1, PT) by discharge  -HC               User Key  (r) = Recorded By, (t) = Taken By, (c) = Cosigned By      Initials Name Provider Type     Desi Fraga PTA Physical Therapist Assistant                    Physical Therapy Education       Title: PT OT SLP Therapies (Done)       Topic: Physical Therapy (Done)       Point: Mobility training (Done)       Learning Progress Summary             Patient Acceptance, E,TB,D, VU,DU,NR by  at 4/23/2024 1055    Acceptance, E,TB, VU by  at 4/22/2024 1516                         Point: Home exercise program (Done)       Learning Progress Summary             Patient Acceptance, E,TB,D, VU,DU,NR by  at 4/23/2024 1055    Acceptance, E,TB, VU by  at 4/22/2024 1516                         Point: Body mechanics (Done)       Learning Progress Summary             Patient Acceptance, E,TB,D, VU,DU,NR by  at 4/23/2024 1055    Acceptance, E,TB, VU by  at 4/22/2024 1516                         Point: Precautions (Done)       Learning Progress Summary             Patient Acceptance, E,TB,D, VU,DU,NR by  at 4/23/2024 1055    Acceptance, E,TB, VU by  at 4/22/2024 1516                                         User Key       Initials Effective Dates Name Provider Type Discipline     05/24/22 -  Eugene Cuevas, PT Physical Therapist PT     01/20/23 -  Desi Fraga PTA Physical Therapist Assistant PT                  PT Recommendation and Plan  Therapy Frequency (PT): 2 times/wk (2-5x/wk)          Time Calculation:    PT Charges       Row Name 04/24/24 1330             Time Calculation    PT Received On 04/24/24  -HC         Time Calculation- PT    Total Timed Code Minutes- PT 38 minute(s)  -HC                User Key  (r) = Recorded By, (t)  = Taken By, (c) = Cosigned By      Initials Name Provider Type    HC Desi Fraga, PTA Physical Therapist Assistant                  Therapy Charges for Today       Code Description Service Date Service Provider Modifiers Qty    40008904941 HC GAIT TRAINING EA 15 MIN 4/23/2024 Desi Fraga, TK GP, CQ 1    58302159585 HC PT THER PROC EA 15 MIN 4/23/2024 Desi Fraga, TK GP, CQ 1    95718526845 HC GAIT TRAINING EA 15 MIN 4/24/2024 Desi Fraga, TK GP, CQ 1    07642558398 HC PT THER PROC EA 15 MIN 4/24/2024 Desi Fraga, TK GP, CQ 1    70411563513 HC PT THERAPEUTIC ACT EA 15 MIN 4/24/2024 Desi Fraga, TK GP, CQ 1            PT G-Codes  AM-PAC 6 Clicks Score (PT): 14    Desi Fraga PTA  4/24/2024

## 2024-04-24 NOTE — CASE MANAGEMENT/SOCIAL WORK
Discharge Planning Assessment  [facility]     Patient Name: Regine Beckham  MRN: [id number]  Today's Date: 4/24/2024    Admit Date: 4/20/2024    Plan: SS followed up with [facility] per [facility] on this date who states they are out of network with pt's insurance. SS spoke with [employer] [facility] per [place] on this date who states pt has used all of her Skilled days. Pt will be required to pay a $204.00 a day if she would like to do STC. Pt states she is not agreeable to pay a copay. SS discussed LTC placement with pt at length. Pt states she is not agreeable to give up her check. Pt states she received $1757.00 a month from Social Security. Pt states she is not agreeable for STC or LTC at a NH. Pt states she is going to call her friend Breonna about staying with her at home. SS to follow.       Discharge Plan       Row Name 04/24/24 1232       Plan    Plan SS followed up with [facility] on this date who states they are out of network with pt's insurance. SS spoke with [employer] [place] per [place] on this date who states pt has used all of her Skilled days. Pt will be required to pay a $204.00 a day if she would like to do STC. Pt states she is not agreeable to pay a copay. SS discussed LTC placement with pt at length. Pt states she is not agreeable to give up her check. Pt states she received $1757.00 a month from Social Security. Pt states she is not agreeable for STC or LTC at a NH. Pt states she is going to call her friend Breonna about staying with her at home. SS to follow.      1645: SS spoke with pt at bedside on this date who states she was able to speak with friends about staying with her at discharge and they are agreeable to take turns with pt. Pt states she has to reach out to the housing authority and get it approved for friends to stay in the home.             Charlee Collier, BATOOLW

## 2024-04-24 NOTE — PLAN OF CARE
Goal Outcome Evaluation:  Plan of Care Reviewed With: patient           Outcome Evaluation: Pt resting in bed, VSS on room air. Pt got up to bedside chair during shift. Pt complained of pain, PRN meds given per MAR. Pt voices no futher complaints at this time, will continue POC.

## 2024-04-24 NOTE — PROGRESS NOTES
[facility] HOSPITALIST PROGRESS NOTE     Patient Identification:  Name:  Regine Beckham  Age:  68 y.o.  Sex:  female  :  1955  MRN:  [id number]  Visit Number:  [id number]  ROOM: [address] [place] [address]     Primary Care Provider:  Dread Burton MD    Length of stay in inpatient status:  0    Subjective     Chief Compliant:    Chief Complaint   Patient presents with    Fall       History of Presenting Illness:    Patient seen and examined today, no family or friends present at bedside. She denied any specific complaints. She is not agreeable to pay a daily copay for short term rehab or to give up her Social Security check as would be needed for long-term placement.  She is checking with a friend to see if she can stay with her after discharge.    Objective     Current Hospital Meds:amLODIPine, 5 mg, Oral, Q24H  aspirin, 81 mg, Oral, Daily  atorvastatin, 80 mg, Oral, Nightly  enoxaparin, 40 mg, Subcutaneous, Q24H  gabapentin, 300 mg, Oral, BID  isosorbide mononitrate, 30 mg, Oral, Q24H  losartan, 50 mg, Oral, Daily  metoprolol tartrate, 12.5 mg, Oral, Q12H  mirtazapine, 30 mg, Oral, Nightly  oxybutynin, 5 mg, Oral, BID  pantoprazole, 40 mg, Oral, Q AM  rOPINIRole, 4 mg, Oral, Nightly  sodium chloride, 10 mL, Intravenous, Q12H  ticagrelor, 60 mg, Oral, BID    Pharmacy to Dose enoxaparin (LOVENOX),         Current Antimicrobial Therapy:  Anti-Infectives (From admission, onward)      None          Current Diuretic Therapy:  Diuretics (From admission, onward)      None          ----------------------------------------------------------------------------------------------------------------------  Vital Signs:  Temp:  [97.5 °F (36.4 °C)-98.6 °F (37 °C)] 97.5 °F (36.4 °C)  Heart Rate:  [66-85] 78  Resp:  [16-20] 18  BP: (121-140)/(51-75) 140/60  SpO2:  [95 %-98 %] 95 %  on   ;   Device (Oxygen Therapy): room air  Body mass index is 24.35 kg/m².    Wt Readings from Last 3 Encounters:   24 68.4 kg (150 lb  12.8 oz)   04/18/24 68.1 kg (150 lb 2.1 oz)   03/27/24 63.5 kg (140 lb)     Intake & Output (last 3 days)         04/21 0701 04/22 0700 04/22 0701 04/23 0700 04/23 0701 04/24 0700 04/24 0701 04/25 0700    P.O. 960 1080 360 600    I.V. (mL/kg)   0 (0) 0 (0)    Total Intake(mL/kg) 960 (14) 1080 (15.8) 360 (5.3) 600 (8.8)    Urine (mL/kg/hr) 600 (0.4) 600 (0.4)      Total Output 600 600      Net +360 +480 +360 +600            Urine Unmeasured Occurrence 4 x 6 x 8 x 3 x          Diet: Cardiac; Healthy Heart (2-3 Na+); Fluid Consistency: Thin (IDDSI 0)  ----------------------------------------------------------------------------------------------------------------------  Physical exam:   Constitutional:  Well-developed and well-nourished.  No acute distress.      HENT:  Head:  Normocephalic and atraumatic.    Pulmonary/Chest:  Normal rate and effort   Musculoskeletal:  No deformity.      Neurological: Awake, alert, left sided hemiparesis and facial droop noted which is chronic. Mild to moderate dysarthria noted which is also chronic.  Skin:  Skin is warm and dry.   Peripheral vascular:  No cyanosis  Psychiatric: appropriate mood and affect  Edited by: Anh Valdez DO at 4/24/2024 1821  ----------------------------------------------------------------------------------------------------------------------  Results from last 7 days   Lab Units 04/21/24  0214 04/20/24  1407   WBC 10*3/mm3 6.67 9.47   HEMOGLOBIN g/dL 9.6* 11.3*   HEMATOCRIT % 30.2* 35.0   MCV fL 93.8 94.9   MCHC g/dL 31.8 32.3   PLATELETS 10*3/mm3 249 284         Results from last 7 days   Lab Units 04/22/24  0239 04/21/24 0214 04/20/24  1407   SODIUM mmol/L 144 143 142   POTASSIUM mmol/L 4.1 3.3* 3.7   CHLORIDE mmol/L 109* 108* 107   CO2 mmol/L 26.6 26.4 24.6   BUN mg/dL 15 13 12   CREATININE mg/dL 0.85 0.83 0.76   CALCIUM mg/dL 9.4 9.0 9.8   GLUCOSE mg/dL 131* 149* 132*   ALBUMIN g/dL  --   --  3.6   BILIRUBIN mg/dL  --   --  0.5   ALK PHOS U/L  --    "--  121*   AST (SGOT) U/L  --   --  37*   ALT (SGPT) U/L  --   --  29   Estimated Creatinine Clearance: 67.5 mL/min (by C-G formula based on SCr of 0.85 mg/dL).  No results found for: \"AMMONIA\"              No results found for: \"HGBA1C\", \"POCGLU\"  Lab Results   Component Value Date    TSH 2.880 03/17/2024     No results found for: \"PREGTESTUR\", \"PREGSERUM\", \"HCG\", \"HCGQUANT\"  Pain Management Panel          Latest Ref Rng & Units 12/20/2023   Pain Management Panel   Amphetamine, Urine Qual Negative Negative    Barbiturates Screen, Urine Negative Negative    Benzodiazepine Screen, Urine Negative Negative    Buprenorphine, Screen, Urine Negative Negative    Cocaine Screen, Urine Negative Negative    Fentanyl, Urine Negative Negative    Methadone Screen , Urine Negative Negative    Methamphetamine, Ur Negative Negative      Brief Urine Lab Results  (Last result in the past 365 days)        Color   Clarity   Blood   Leuk Est   Nitrite   Protein   CREAT   Urine HCG        04/20/24 1704 Yellow   Turbid   Negative   Small (1+)   Negative   Negative                 No results found for: \"BLOODCX\"  Urine Culture   Date Value Ref Range Status   04/20/2024 >100,000 CFU/mL Normal Urogenital Lorraine  Final     No results found for: \"WOUNDCX\"  No results found for: \"STOOLCX\"  No results found for: \"RESPCX\"  No results found for: \"AFBCX\"        I have personally looked at the labs and they are summarized above.  ----------------------------------------------------------------------------------------------------------------------  Detailed radiology reports for the last 24 hours:  Imaging Results (Last 24 Hours)       ** No results found for the last 24 hours. **          Assessment & Plan      # Recurrent falls  # Progressive debility  # Physical deconditioning  # Failure to thrive  #Left arm pain s/p fall  Patient previously declined long-term SNF placement and was reportedly unable to afford private out-of-pocket expense of " short-term rehab, as all of her insurance approved days have already been used this year.  Unfortunately she continues to have recurrent falls at home and at admission was agreeable to long-term placement. Patient is not agreeable to pay a daily copay for short term rehab and is not agreeable to long term placement now because she would have to give up her monthly social security check. She is checking with a friend to see if she can stay with her. Continue PT/OT.  Case management consulted and are assisting, appreciate assistance.  - I reviewed her x-rays from admission, which showed no acute fractures.  Continue home scheduled gabapentin.  Continue as needed Tylenol.    # Nonobstructive coronary artery disease  -During previous admission she had a left heart cath which revealed nonobstructive disease.  Cardiology recommended medical management.  -Continue aspirin, Brilinta, statin, beta-blocker, and ARB.  Continue long-acting nitrate.    # History of ischemic stroke with residual left-sided deficit  # Status post left ICA stent  # Hypertension  # Hyperlipidemia  -Continue amlodipine and statin.  Blood pressure well-controlled.  Continue PT and OT.    Copied portions of the note have been reviewed and are correct as of 04/24/24.  Edited by: Anh Valdez DO at 4/24/2024 1821    VTE Prophylaxis:   Mechanical Order History:       None          Pharmalogical Order History:        Ordered     Dose Route Frequency Stop    04/20/24 1857  Enoxaparin Sodium (LOVENOX) syringe 40 mg         40 mg SC Every 24 Hours --    04/20/24 1848  Pharmacy to Dose enoxaparin (LOVENOX)        Question:  Indication of use  Answer:  Prophylaxis    -- XX Continuous PRN --                    Dispo: Pending further discussion with patient    Anh Valdez DO  [facility]  04/24/24  18:22 EDT

## 2024-04-24 NOTE — THERAPY TREATMENT NOTE
"Acute Care - Occupational Therapy Treatment Note   [facility]     Patient Name: Regine Beckham  : 1955  MRN: [id number]  Today's Date: 2024             Admit Date: 2024     No diagnosis found.  Patient Active Problem List   Diagnosis    Iron deficiency anemia due to chronic blood loss    Major depressive disorder    RLS (restless legs syndrome)    GERD (gastroesophageal reflux disease)    Left breast mass    Allergy to penicillin    Stroke    Grade I diastolic dysfunction    Moderate malnutrition    Falls frequently    Stroke-like symptoms    Debility    Chest pain    Failure to thrive in adult     Past Medical History:   Diagnosis Date    Anemia     Anxiety     Arthritis     Depression     Legally blind     Restless leg syndrome     Sleep apnea     \"I don't use a cpap anymore since losing 106 lbs\"    Water retention      Past Surgical History:   Procedure Laterality Date    BACK SURGERY      CARDIAC CATHETERIZATION N/A 2024    Procedure: Percutaneous Manual Thrombectomy;  Surgeon: Efrain Hurley MD;  Location:  [facility];  Service: Interventional Radiology;  Laterality: N/A;    CARDIAC CATHETERIZATION N/A 2024    Procedure: Left Heart Cath;  Surgeon: Susan Mederos MD;  Location:  [facility];  Service: Cardiology;  Laterality: N/A;    GALLBLADDER SURGERY      HIP ARTHROSCOPY      JOINT REPLACEMENT Right          OT ASSESSMENT FLOWSHEET (Last 12 Hours)       OT Evaluation and Treatment       Row Name 24 1231                   OT Time and Intention    Subjective Information complains of  -LM        Document Type therapy note (daily note)  -LM        Mode of Treatment occupational therapy  -LM        Patient Effort good  -LM        Comment Patient seen this date for LUE neuro re-ed.  Patient / arom with LUE.  Pain noted with prom at all joints LUE, requires slow stretching.  Patient requires mod/max assist with LE dressing, toileting, " bathing.  Patient alert to person and place, not time.  -LM           General Information    Existing Precautions/Restrictions fall  -LM        Limitations/Impairments safety/cognitive  -LM           Cognition    Affect/Mental Status (Cognition) WFL  -LM        Orientation Status (Cognition) oriented to;person;place  -LM           Positioning and Restraints    Post Treatment Position bed  -LM        In Bed call light within reach;encouraged to call for assist;exit alarm on  -LM                  User Key  (r) = Recorded By, (t) = Taken By, (c) = Cosigned By      Initials Name Effective Dates    LM Kyleigh Tsang OT 06/16/21 -                            OT Recommendation and Plan              Time Calculation:     Therapy Charges for Today       Code Description Service Date Service Provider Modifiers Qty    [id number]  OT NEUROMUSC RE EDUCATION EA 15 MIN 4/24/2024 Kyleigh Tsang OT GO 1                 Kyleigh Tsang OT  4/24/2024

## 2024-04-24 NOTE — PLAN OF CARE
Goal Outcome Evaluation:         Patient resting in bed during shift. PRN pain med given, see MAR. VSS on room air. No other complaints or concerns at this time. Will continue with plan of care.

## 2024-04-25 ENCOUNTER — READMISSION MANAGEMENT (OUTPATIENT)
Dept: CALL CENTER | Facility: HOSPITAL | Age: 69
End: 2024-04-25
Payer: MEDICARE

## 2024-04-25 VITALS
HEIGHT: 66 IN | BODY MASS INDEX: 24.23 KG/M2 | HEART RATE: 65 BPM | DIASTOLIC BLOOD PRESSURE: 80 MMHG | TEMPERATURE: 98.2 F | WEIGHT: 150.8 LBS | OXYGEN SATURATION: 95 % | SYSTOLIC BLOOD PRESSURE: 114 MMHG | RESPIRATION RATE: 18 BRPM

## 2024-04-25 PROCEDURE — G0378 HOSPITAL OBSERVATION PER HR: HCPCS

## 2024-04-25 RX ADMIN — ASPIRIN 81 MG: 81 TABLET, COATED ORAL at 09:30

## 2024-04-25 RX ADMIN — ISOSORBIDE MONONITRATE 30 MG: 30 TABLET, EXTENDED RELEASE ORAL at 09:30

## 2024-04-25 RX ADMIN — TICAGRELOR 60 MG: 60 TABLET ORAL at 09:29

## 2024-04-25 RX ADMIN — Medication 10 ML: at 09:31

## 2024-04-25 RX ADMIN — GABAPENTIN 300 MG: 300 CAPSULE ORAL at 09:29

## 2024-04-25 RX ADMIN — PANTOPRAZOLE SODIUM 40 MG: 40 TABLET, DELAYED RELEASE ORAL at 05:28

## 2024-04-25 RX ADMIN — OXYBUTYNIN CHLORIDE 5 MG: 5 TABLET ORAL at 09:30

## 2024-04-25 RX ADMIN — METOPROLOL TARTRATE 12.5 MG: 25 TABLET, FILM COATED ORAL at 09:30

## 2024-04-25 RX ADMIN — LOSARTAN POTASSIUM 50 MG: 50 TABLET, FILM COATED ORAL at 09:30

## 2024-04-25 RX ADMIN — AMLODIPINE BESYLATE 5 MG: 5 TABLET ORAL at 09:30

## 2024-04-25 NOTE — OUTREACH NOTE
Prep Survey      Flowsheet Row Responses   [facility] facility patient discharged from? [facility]   Is LACE score < 7 ? No   Eligibility Readm Mgmt   Discharge diagnosis Failure to thrive in adult   Does the patient have one of the following disease processes/diagnoses(primary or secondary)? Other   Does the patient have Home health ordered? Yes   What is the Home health agency?  [facility]   Is there a DME ordered? No   Prep survey completed? Yes            CARLI BRITTON - Registered Nurse

## 2024-04-25 NOTE — PLAN OF CARE
Goal Outcome Evaluation:            Patient resting in bed during shift. VSS on room air. Patient ambulated from chair back to bed during shift with assistance. Patient complained of pain, PRN meds given. No other complaints or concerns noted at this time. Will continue with plan of care.

## 2024-04-25 NOTE — DISCHARGE INSTR - DIET
Cardiac diet   Breathing spontaneous and unlabored. Breath sounds clear and equal bilaterally with regular rhythm.

## 2024-04-25 NOTE — DISCHARGE PLACEMENT REQUEST
"Regine Lock \"NEGIN\" (68 y.o. Female)       Date of Birth   1955    Social Security Number       Address   [address] [place], [place] [address]    Home Phone   [phone]    MRN   [id number]       [facility]    Marital Status                               Admission Date   4/20/24    Admission Type   Emergency    Admitting Provider   Narinder Olivier DO    Attending Provider   Anh Valdez DO    Department, Room/Bed   [address], [address]/       Discharge Date       Discharge Disposition   [facility]    Discharge Destination                                 Attending Provider: Anh Valdez DO    Allergies: Penicillins    Isolation: None   Infection: None   Code Status: CPR    Ht: 167.6 cm (65.98\")   Wt: 68.4 kg (150 lb 12.8 oz)    Admission Cmt: None   Principal Problem: Failure to thrive in adult [R62.7]                   Active Insurance as of 4/20/2024       Primary Coverage       Payor Plan Insurance Group Employer/Plan Group    AETNA MEDICARE REPLACEMENT AETNA MEDICARE REPLACEMENT [id number]       Payor Plan Address Payor Plan Phone Number Payor Plan Fax Number Effective Dates    [address] [phone]  1/1/2024 - None Entered    [facility] [id number]         Subscriber Name Subscriber Birth Date Member ID       REGINE LOCK 1955 [id number]                     Emergency Contacts        (Rel.) Home Phone Work Phone Mobile Phone    MikhailYaa (Friend) [phone] --    Karla Patel (Friend) [phone] -- --              Insurance Information                  AETNA MEDICARE REPLACEMENT/AETNA MEDICARE REPLACEMENT Phone: [phone]    Subscriber: Regine Lock Subscriber#: [id number]    Group#: [id number] Precert#: --          Discharge Summary    No notes of this type exist for this encounter.       "

## 2024-04-25 NOTE — CASE MANAGEMENT/SOCIAL WORK
Discharge Planning Assessment  HONORIO Poon     Patient Name: Regine Beckham  MRN: [id number]  Today's Date: 4/25/2024    Admit Date: 4/20/2024       Discharge Plan       Row Name 04/25/24 1311       Plan    Final Discharge Disposition Code 06 - home with home health care    Final Note Pt is being discharged home on this date with physician order for home health. SS contacted [facility] per Jannie on this date who states they will see pt again. SS faxed referral to [facility] [phone]. Pt will require [employer] transportation on this date. SS to arranged [employer]. No other needs identified at this time.    1430: SS contacted [facility] on this date to arrange transportation. No other needs identified at this time.             PAULINO Lombardo

## 2024-04-25 NOTE — DISCHARGE PLACEMENT REQUEST
[address] [place] [address]  Phone:  [phone]  Fax:  [phone] Date: 2024      Ambulatory Referral to Home Health     Patient:  Regine Beckham MRN:  [id number]   [address]  [facility] [address] :  1955  SSN:    Phone: [phone] Sex:  F      INSURANCE PAYOR PLAN GROUP # SUBSCRIBER ID   Primary:    [employer] MEDICARE REPLACEMENT [id number]      Referring Provider Information:  JOVON HEAD Phone: [phone] Fax: [phone]       Referral Information:   # Visits:  999 Referral Type: Home Health [42]   Urgency:  Routine Referral Reason: Specialty Services Required   Start Date: 2024 End Date:  To be determined by Insurer   Diagnosis: Failure to thrive in adult (R62.7 [ICD-10-CM] 783.7 [ICD-9-CM])  Left-sided weakness (R53.1 [ICD-10-CM] 728.87 [ICD-9-CM])  History of CVA (cerebrovascular accident) (Z86.73 [ICD-10-CM] V12.54 [ICD-9-CM])      Refer to Dept:   Refer to Provider:   Refer to Provider Phone:   Refer to Facility:       Face to Face Visit Date: 2024  Follow-up provider for Plan of Care? I treated the patient in an acute care facility and will not continue treatment after discharge.  Follow-up provider: RYAN BOWEN [[id number]]  Reason/Clinical Findings: left sided hemiparesis secondary to stroke, frequent falls  Describe mobility limitations that make leaving home difficult: hemiparesis from stroke, generalized weakness, difficulty with balance causing frequent falls  Nursing/Therapeutic Services Requested: Physical Therapy  PT orders: Gait Training  PT orders: Transfer training  PT orders: Home safety assessment  PT orders: Strengthening  Weight Bearing Status: As Tolerated  Frequency: 1 Week 1     This document serves as a request of services and does not constitute Insurance authorization or approval of services.  To determine eligibility, please contact the members Insurance carrier to  "verify and review coverage.     If you have medical questions regarding this request for services. Please contact [address] at [phone] during normal business hours.        Authorizing Provider:Anh Valdez DO  Authorizing Provider's NPI: [id number]  Order Entered By: Anh Valdez DO 4/25/2024 11:47 AM     Electronically signed by: Anh Valdez DO 4/25/2024 11:47 AM       Regine Beckham \"NEGIN\" (68 y.o. Female)       Date of Birth   1955    Social Security Number       Address   [address] [place] [address]    Home Phone   [phone]    MRN   [id number]       [employer]   [facility]    Marital Status                               Admission Date   4/20/24    Admission Type   Emergency    Admitting Provider   Narinder Olivier DO    Attending Provider   Anh Valdez DO    Department, Room/Bed   [address], 3327/       Discharge Date       [place] Dis[place]   [place] [facility]    [place]ination                                 Attending Provider: Anh Valdez DO    Allergies: Penicillins    Isolation: None   Infection: None   Code Status: CPR    Ht: 167.6 cm (65.98\")   Wt: 68.4 kg (150 lb 12.8 oz)    Admission Cmt: None   Principal Problem: Failure to thrive in adult [R62.7]                   Active Insurance as of 4/20/2024       Primary Coverage       Payor Plan Insurance Group Employer/Plan Group    AETNA MEDICARE REPLACEMENT AETNA MEDICARE REPLACEMENT [id number]       Payor Plan Address Payor Plan Phone Number Payor Plan Fax Number Effective Dates    [address] [phone]  1/1/2024 - None Entered    [place] [id number]         Subscriber Name Subscriber Birth Date Member ID       ASHVINREGINE AHMADI 1955 [id number]                     Emergency Contacts        (Rel.) Home Phone Work Phone Mobile Phone    Yaa Elizondo (Friend) [phone] --    Karla Patel (Friend) [phone] " -- --              Insurance Information                  AETNA MEDICARE REPLACEMENT/AETNA MEDICARE REPLACEMENT Phone: [phone]    Subscriber: Regine Beckham Subscriber#: [id number]    Group#: [id number] Precert#: --             History & Physical        Narinder Olivier DO at 24 1818              [facility] HOSPITALIST HISTORY AND PHYSICAL    Patient Identification:  Name:  Regine Beckham  Age:  68 y.o.  Sex:  female  :  1955  MRN:  [id number]   Visit Number:  [id number]  Admit Date: 2024   Room number:  116/16  Primary Care Physician:  Dread Burton MD     Subjective     Chief complaint:    Chief Complaint   Patient presents with    Fall       History of presenting illness:  68 y.o. female presents from home after fall while using walker hitting right shoulder and getting her leg tangled in her walker.  Patient recently admitted to this hospital and discharged 2 days prior after being admitted for chest pain with nonobstructive coronary artery disease found on left heart cath.  Patient with history of ischemic stroke status post left ICA stent.  At that time hospitalization patient medically optimized after heart cath revealed no intervenable disease present.  Patient was recommended for likely long-term placement however patient was only interested in short-term at the time but unfortunately due to already using all of her days the insurance would require out-of-pocket pain and patient stated that she preferred to return home.  Since that time patient reports that she has fallen twice at home and after the events of today has come to the realization that she does not and cannot be at home by herself and is willing to seek long-term skilled nursing facility placement.  Due to living alone and having no family or strong support she was not deemed safe for discharge back home to work on outpatient long-term placement and is being admitted for observation.  Patient without  "any acute complaints other than soreness in her shoulder and hip.  ---------------------------------------------------------------------------------------------------------------------   Review of Systems 14 system review of systems performed pertinent positives and negatives as above in HPI.  ---------------------------------------------------------------------------------------------------------------------   Past Medical History:   Diagnosis Date    Anemia     Anxiety     Arthritis     Depression     Legally blind     Restless leg syndrome     Sleep apnea     \"I don't use a cpap anymore since losing 106 lbs\"    Water retention      Past Surgical History:   Procedure Laterality Date    BACK SURGERY      CARDIAC CATHETERIZATION N/A 1/19/2024    Procedure: Percutaneous Manual Thrombectomy;  Surgeon: Efrain Hurley MD;  Location:  [facility];  Service: Interventional Radiology;  Laterality: N/A;    CARDIAC CATHETERIZATION N/A 4/12/2024    Procedure: Left Heart Cath;  Surgeon: Susan Mederos MD;  Location:  [facility];  Service: Cardiology;  Laterality: N/A;    GALLBLADDER SURGERY      HIP ARTHROSCOPY      JOINT REPLACEMENT Right      Family History   Problem Relation Age of Onset    Breast cancer Neg Hx      Social History     Socioeconomic History    Marital status:     Number of children: 0    Years of education: 1 yr college   Tobacco Use    Smoking status: Former     Current packs/day: 1.50     Average packs/day: 1.5 packs/day for 51.0 years (76.5 ttl pk-yrs)     Types: Cigarettes     Passive exposure: Past    Smokeless tobacco: Never   Vaping Use    Vaping status: Never Used   Substance and Sexual Activity    Alcohol use: Not Currently     Alcohol/week: 1.0 standard drink of alcohol     Types: 1 Glasses of wine per week     Comment: \"occasionally - once every two months\"    Drug use: Not Currently     Comment: alcohol - occasionally    Sexual activity: Defer "     ---------------------------------------------------------------------------------------------------------------------   Allergies:  Penicillins  ---------------------------------------------------------------------------------------------------------------------   Medications below are reported home medications pulling from within the system; at this time, these medications have not been reconciled unless otherwise specified and are in the verification process for further verifcation as current home medications.    Prior to Admission Medications       Prescriptions Last Dose Informant Patient Reported? Taking?    amLODIPine (NORVASC) 5 MG tablet   No No    Take 1 tablet by mouth Daily for 30 days.    aspirin 81 MG EC tablet  Pharmacy Yes No    Take 1 tablet by mouth Daily.    atorvastatin (LIPITOR) 80 MG tablet  Pharmacy No No    Take 1 tablet by mouth Every Night.    gabapentin (NEURONTIN) 300 MG capsule   No No    Take 1 capsule by mouth 2 (Two) Times a Day.    isosorbide mononitrate (IMDUR) 30 MG 24 hr tablet   No No    Take 1 tablet by mouth Daily for 30 days.    losartan (COZAAR) 50 MG tablet  Pharmacy Yes No    Take 1 tablet by mouth Daily. Indications: High Blood Pressure Disorder    metoprolol tartrate (LOPRESSOR) 25 MG tablet   No No    Take 1/2 tablet by mouth Every 12 (Twelve) Hours for 30 days.    Mirabegron ER (MYRBETRIQ) 50 MG tablet sustained-release 24 hour 24 hr tablet  Pharmacy Yes No    Take 50 mg by mouth Daily. Indications: Urinary Urgency    mirtazapine (REMERON) 30 MG tablet  Pharmacy No No    Take 1 tablet by mouth Every Night. Indications: Major Depressive Disorder    pantoprazole (PROTONIX) 40 MG EC tablet  Pharmacy No No    TAKE 1 TABLET BY MOUTH EVERY MORNING FOR HEARTBURN    rOPINIRole (REQUIP) 4 MG tablet  Pharmacy No No    Take 1 tablet by mouth Every Night. Take 1 hour before bedtime.  Indications: Restless Leg Syndrome    ticagrelor (BRILINTA) 60 MG tablet tablet  Pharmacy No No     Take 1 tablet by mouth 2 (Two) Times a Day.          Objective     Vital Signs:  Temp:  [98.4 °F (36.9 °C)] 98.4 °F (36.9 °C)  Heart Rate:  [53-86] 68  Resp:  [18] 18  BP: (100-143)/(39-80) 143/67    Mean Arterial Pressure (Non-Invasive) for the past 24 hrs (Last 3 readings):   Noninvasive MAP (mmHg)   04/20/24 1745 89   04/20/24 1730 99   04/20/24 1715 102     SpO2:  [94 %-98 %] 96 %  on   ;   Device (Oxygen Therapy): room air  Body mass index is 24.21 kg/m².    Wt Readings from Last 3 Encounters:   04/20/24 68 kg (150 lb)   04/18/24 68.1 kg (150 lb 2.1 oz)   03/27/24 63.5 kg (140 lb)      ---------------------------------------------------------------------------------------------------------------------   Physical Exam:  Constitutional: Elderly adult female sitting up in chair, NAD  HENT:  Head:  Normocephalic and atraumatic.  Mouth:  Moist mucous membranes.    Eyes:  Conjunctivae and EOM are normal. No scleral icterus.    Cardiovascular:  Normal rate, regular rhythm and normal heart sounds with no murmur.  Pulmonary/Chest:  No respiratory distress, no wheezes, no crackles, with normal breath sounds and good air movement.  Abdominal:  Soft.  Bowel sounds are normal.  No distension and no tenderness.   Musculoskeletal:  No tenderness, and no deformity.  No red or swollen joints anywhere.  Functional ROM intact.  Decreased strength in the upper and lower extremities on the left 2-3 out of 5  Neurological:  Alert and oriented to person, place, and time.  No cranial nerve deficit.  No tongue deviation.  No facial droop.  No slurred speech. Intact Sensation throughout  Skin:  Skin is warm and dry. No rash or lesion noted. No pallor.   Peripheral vascular:  Pulses in all 4 extremities with no clubbing, no cyanosis, no edema.  Psychiatric: Appropriate mood and affect, pleasant.   ---------------------------------------------------------------------------------------------------------------------    Last  "echocardiogram:  Results for orders placed during the hospital encounter of 04/09/24    Adult Transthoracic Echo Complete w/ Color, Spectral and Contrast if necessary per protocol    Interpretation Summary    Left ventricular systolic function is hyperdynamic (EF > 70%). Calculated left ventricular EF = 79% Left ventricular ejection fraction appears to be greater than 70%.    Left ventricular diastolic function is consistent with (grade I) impaired relaxation.    --------------------------------------------------------------------------------------------------------------------  Labs:  Results from last 7 days   Lab Units 04/20/24  1407 04/16/24  0057   WBC 10*3/mm3 9.47 6.31   HEMOGLOBIN g/dL 11.3* 9.2*   HEMATOCRIT % 35.0 29.2*   MCV fL 94.9 95.4   MCHC g/dL 32.3 31.5   PLATELETS 10*3/mm3 284 216         Results from last 7 days   Lab Units 04/20/24  1407   SODIUM mmol/L 142   POTASSIUM mmol/L 3.7   CHLORIDE mmol/L 107   CO2 mmol/L 24.6   BUN mg/dL 12   CREATININE mg/dL 0.76   CALCIUM mg/dL 9.8   GLUCOSE mg/dL 132*   ALBUMIN g/dL 3.6   BILIRUBIN mg/dL 0.5   ALK PHOS U/L 121*   AST (SGOT) U/L 37*   ALT (SGPT) U/L 29   Estimated Creatinine Clearance: 75 mL/min (by C-G formula based on SCr of 0.76 mg/dL).    No results found for: \"AMMONIA\"          No results found for: \"HGBA1C\", \"POCGLU\"  Lab Results   Component Value Date    TSH 2.880 03/17/2024     No results found for: \"PREGTESTUR\", \"PREGSERUM\", \"HCG\", \"HCGQUANT\"  Pain Management Panel          Latest Ref Rng & Units 12/20/2023   Pain Management Panel   Amphetamine, Urine Qual Negative Negative    Barbiturates Screen, Urine Negative Negative    Benzodiazepine Screen, Urine Negative Negative    Buprenorphine, Screen, Urine Negative Negative    Cocaine Screen, Urine Negative Negative    Fentanyl, Urine Negative Negative    Methadone Screen , Urine Negative Negative    Methamphetamine, Ur Negative Negative      Brief Urine Lab Results  (Last result in the past 365 " "days)        Color   Clarity   Blood   Leuk Est   Nitrite   Protein   CREAT   Urine HCG        04/20/24 1704 Yellow   Turbid   Negative   Small (1+)   Negative   Negative                 No results found for: \"BLOODCX\"  No results found for: \"URINECX\"  No results found for: \"WOUNDCX\"  No results found for: \"STOOLCX\"    I have personally looked at the labs and they are summarized above.  ----------------------------------------------------------------------------------------------------------------------  Detailed radiology reports for the last 24 hours:    Imaging Results (Last 24 Hours)       Procedure Component Value Units Date/Time    XR Shoulder 2+ View Left [[id number]] Collected: 04/20/24 1615     Updated: 04/20/24 1618    Narrative:      PROCEDURE: X-ray examination of the left shoulder performed on April 20, 2024. 3 views.     HISTORY: Left shoulder pain. Fall.     COMPARISON: None.     FINDINGS:     Mild osteoarthritis at the left glenohumeral joint  Mild hypertrophic changes at the left AC joint.  Preserved subacromial space.  No lytic or blastic lesion.   No foreign body.       Impression:         1.  No acute fracture or dislocation.  2.  Mild osteoarthritis.     This report was finalized on 4/20/2024 4:16 PM by Michael Abarca MD.             Final impressions for the last 30 days of radiology reports:    XR Shoulder 2+ View Left    Result Date: 4/20/2024   1.  No acute fracture or dislocation. 2.  Mild osteoarthritis.  This report was finalized on 4/20/2024 4:16 PM by Michael Abarca MD.      CT Angiogram Heart With 3D Image    Result Date: 4/11/2024  1.  Total calcium score 1729. 2.  Multifocal calcific plaque in the RCA with areas of mild and moderate stenosis but obscured due to the calcification. 3.  Focal defect in the midportion of the RCA appears to be artifact. 4.  LAD multifocal calcific plaque with areas of mild and moderate stenosis. 5.  First obtuse marginal with calcific plaque but no focal " stenosis. 6.  Circumflex with mild calcific plaque causing mild stenosis. 7.  Tricuspid aortic valve with extensive calcific plaque.  RECOMMENDATIONS:   Extensive calcific plaque with areas of mild and moderate stenosis. Consider catheter angiogram. Impression.  ASSESSMENT:   CAD RADS N/P4   This report was finalized on 4/11/2024 5:20 PM by Dr. Hu Obrien MD.      XR Chest 1 View    Result Date: 4/9/2024   1.  Normal heart size 2.  Coarsened bronchovascular pattern to the lungs. 3.  No lobar consolidation or edema. 4.  No pleural effusion or pneumothorax.  This report was finalized on 4/9/2024 8:40 PM by Michael Abarca MD.     I have personally looked at the radiology images and read the final radiology report.    Assessment & Plan       Recurrent falls  Progressive debility  Physical deconditioning    -Patient previously declining long-term SNF placement and unable to afford private out-of-pocket expense of short-term rehab due to already using up all of her insurance approved days already this year.  Unfortunately patient with falls at home after refusing long-term placement and now agreeable.\      -PT and OT consulted for up-to-date for documentation to help with seeking placement.    -Case management  to be consulted on Monday once back in hospital.    Nonobstructive coronary artery disease    -Patient status post cardiac evaluation including left heart cath with no obstructive disease found and cardiology recommending medical management.    -Continue aspirin, Brilinta, statin, beta-blocker and ARB    -Initiated on long-acting nitro by cardiology     History of ischemic stroke  Status post left ICA stent  Hypertension  Hyperlipidemia    -Amlodipine increased due to elevated blood pressure    -Continue home statin       VTE Prophylaxis:   Mechanical Order History:       None          Pharmalogical Order History:        Ordered     Dose Route Frequency Stop    Signed and Held  Pharmacy to Dose  enoxaparin (LOVENOX)        Question:  Indication of use  Answer:  Prophylaxis    -- XX Continuous PRN --                    The patient is high risk due to the following diagnoses/reasons: Recurrent falls at home with no family or strong friends support to keep her safe or help with transfers at home and day-to-day activities of daily living and patient previously against long-term skilled nursing facility placement but now agreeable.        Narinder Olivier DO  [facility]  04/20/24  18:18 EDT    Electronically signed by Narinder Olivier DO at 04/20/24 2121       Current Facility-Administered Medications   Medication Dose Route Frequency Provider Last Rate Last Admin    acetaminophen (TYLENOL) tablet 650 mg  650 mg Oral Q6H PRN Anh Valdez DO   650 mg at 04/24/24 2240    amLODIPine (NORVASC) tablet 5 mg  5 mg Oral Q24H Narinder Olivier DO   5 mg at 04/25/24 0930    aspirin EC tablet 81 mg  81 mg Oral Daily Narinder Olivier DO   81 mg at 04/25/24 0930    atorvastatin (LIPITOR) tablet 80 mg  80 mg Oral Nightly Narinder Olivier DO   80 mg at 04/24/24 2024    Enoxaparin Sodium (LOVENOX) syringe 40 mg  40 mg Subcutaneous Q24H Narinder Olivier DO   40 mg at 04/24/24 2023    gabapentin (NEURONTIN) capsule 300 mg  300 mg Oral BID Narinder Olivier DO   300 mg at 04/25/24 0929    isosorbide mononitrate (IMDUR) 24 hr tablet 30 mg  30 mg Oral Q24H Narinder Olivier DO   30 mg at 04/25/24 0930    losartan (COZAAR) tablet 50 mg  50 mg Oral Daily Narinder Olivier DO   50 mg at 04/25/24 0930    metoprolol tartrate (LOPRESSOR) tablet 12.5 mg  12.5 mg Oral Q12H Narinder Olivier DO   12.5 mg at 04/25/24 0930    mirtazapine (REMERON) tablet 30 mg  30 mg Oral Nightly Narinder Olivier DO   30 mg at 04/24/24 2024    oxybutynin (DITROPAN) tablet 5 mg  5 mg Oral BID Narinder Olivier DO   5 mg at 04/25/24 0930    pantoprazole (PROTONIX) EC tablet 40 mg  40 mg Oral Q AM Narinder Olivier DO   40 mg at 04/25/24 0528    Pharmacy to  Dose enoxaparin (LOVENOX)   Does not apply Continuous PRN Narinder Olivier DO        rOPINIRole (REQUIP) tablet 4 mg  4 mg Oral Nightly Narinder Olivier DO   4 mg at 24 2130    sodium chloride 0.9 % flush 10 mL  10 mL Intravenous Q12H Narinder Olivier, DO   10 mL at 24 0931    sodium chloride 0.9 % flush 10 mL  10 mL Intravenous PRN Narinder Olivier, DO        sodium chloride 0.9 % infusion 40 mL  40 mL Intravenous PRN Narinder Olivier DO        ticagrelor (BRILINTA) tablet 60 mg  60 mg Oral BID Narinder Olivier, DO   60 mg at 24 0929        Physician Progress Notes (most recent note)        Anh Valdez DO at 24 1821              [facility] HOSPITALIST PROGRESS NOTE     Patient Identification:  Name:  Regine Beckham  Age:  68 y.o.  Sex:  female  :  1955  MRN:  [id number]  Visit Number:  [id number]  ROOM: [address] [place] [address]     Primary Care Provider:  Dread Burton MD    Length of stay in inpatient status:  0    Subjective     Chief Compliant:    Chief Complaint   Patient presents with    Fall       History of Presenting Illness:    Patient seen and examined today, no family or friends present at bedside. She denied any specific complaints. She is not agreeable to pay a daily copay for short term rehab or to give up her Social Security check as would be needed for long-term placement.  She is checking with a friend to see if she can stay with her after discharge.    Objective     Current Hospital Meds:amLODIPine, 5 mg, Oral, Q24H  aspirin, 81 mg, Oral, Daily  atorvastatin, 80 mg, Oral, Nightly  enoxaparin, 40 mg, Subcutaneous, Q24H  gabapentin, 300 mg, Oral, BID  isosorbide mononitrate, 30 mg, Oral, Q24H  losartan, 50 mg, Oral, Daily  metoprolol tartrate, 12.5 mg, Oral, Q12H  mirtazapine, 30 mg, Oral, Nightly  oxybutynin, 5 mg, Oral, BID  pantoprazole, 40 mg, Oral, Q AM  rOPINIRole, 4 mg, Oral, Nightly  sodium chloride, 10 mL, Intravenous, Q12H  ticagrelor, 60 mg, Oral,  BID    Pharmacy to Dose enoxaparin (LOVENOX),         Current Antimicrobial Therapy:  Anti-Infectives (From admission, onward)      None          Current Diuretic Therapy:  Diuretics (From admission, onward)      None          ----------------------------------------------------------------------------------------------------------------------  Vital Signs:  Temp:  [97.5 °F (36.4 °C)-98.6 °F (37 °C)] 97.5 °F (36.4 °C)  Heart Rate:  [66-85] 78  Resp:  [16-20] 18  BP: (121-140)/(51-75) 140/60  SpO2:  [95 %-98 %] 95 %  on   ;   Device (Oxygen Therapy): room air  Body mass index is 24.35 kg/m².    Wt Readings from Last 3 Encounters:   04/20/24 68.4 kg (150 lb 12.8 oz)   04/18/24 68.1 kg (150 lb 2.1 oz)   03/27/24 63.5 kg (140 lb)     Intake & Output (last 3 days)         04/21 0701 04/22 0700 04/22 0701 04/23 0700 04/23 0701  04/24 0700 04/24 0701  04/25 0700    P.O. 960 1080 360 600    I.V. (mL/kg)   0 (0) 0 (0)    Total Intake(mL/kg) 960 (14) 1080 (15.8) 360 (5.3) 600 (8.8)    Urine (mL/kg/hr) 600 (0.4) 600 (0.4)      Total Output 600 600      Net +360 +480 +360 +600            Urine Unmeasured Occurrence 4 x 6 x 8 x 3 x          Diet: Cardiac; Healthy Heart (2-3 Na+); Fluid Consistency: Thin (IDDSI 0)  ----------------------------------------------------------------------------------------------------------------------  Physical exam:   Constitutional:  Well-developed and well-nourished.  No acute distress.      HENT:  Head:  Normocephalic and atraumatic.    Pulmonary/Chest:  Normal rate and effort   Musculoskeletal:  No deformity.      Neurological: Awake, alert, left sided hemiparesis and facial droop noted which is chronic. Mild to moderate dysarthria noted which is also chronic.  Skin:  Skin is warm and dry.   Peripheral vascular:  No cyanosis  Psychiatric: appropriate mood and affect  Edited by: Anh Valdez DO at 4/24/2024  "1821  ----------------------------------------------------------------------------------------------------------------------  Results from last 7 days   Lab Units 04/21/24  0214 04/20/24  1407   WBC 10*3/mm3 6.67 9.47   HEMOGLOBIN g/dL 9.6* 11.3*   HEMATOCRIT % 30.2* 35.0   MCV fL 93.8 94.9   MCHC g/dL 31.8 32.3   PLATELETS 10*3/mm3 249 284         Results from last 7 days   Lab Units 04/22/24  0239 04/21/24 0214 04/20/24  1407   SODIUM mmol/L 144 143 142   POTASSIUM mmol/L 4.1 3.3* 3.7   CHLORIDE mmol/L 109* 108* 107   CO2 mmol/L 26.6 26.4 24.6   BUN mg/dL 15 13 12   CREATININE mg/dL 0.85 0.83 0.76   CALCIUM mg/dL 9.4 9.0 9.8   GLUCOSE mg/dL 131* 149* 132*   ALBUMIN g/dL  --   --  3.6   BILIRUBIN mg/dL  --   --  0.5   ALK PHOS U/L  --   --  121*   AST (SGOT) U/L  --   --  37*   ALT (SGPT) U/L  --   --  29   Estimated Creatinine Clearance: 67.5 mL/min (by C-G formula based on SCr of 0.85 mg/dL).  No results found for: \"AMMONIA\"              No results found for: \"HGBA1C\", \"POCGLU\"  Lab Results   Component Value Date    TSH 2.880 03/17/2024     No results found for: \"PREGTESTUR\", \"PREGSERUM\", \"HCG\", \"HCGQUANT\"  Pain Management Panel          Latest Ref Rng & Units 12/20/2023   Pain Management Panel   Amphetamine, Urine Qual Negative Negative    Barbiturates Screen, Urine Negative Negative    Benzodiazepine Screen, Urine Negative Negative    Buprenorphine, Screen, Urine Negative Negative    Cocaine Screen, Urine Negative Negative    Fentanyl, Urine Negative Negative    Methadone Screen , Urine Negative Negative    Methamphetamine, Ur Negative Negative      Brief Urine Lab Results  (Last result in the past 365 days)        Color   Clarity   Blood   Leuk Est   Nitrite   Protein   CREAT   Urine HCG        04/20/24 1704 Yellow   Turbid   Negative   Small (1+)   Negative   Negative                 No results found for: \"BLOODCX\"  Urine Culture   Date Value Ref Range Status   04/20/2024 >100,000 CFU/mL Normal Urogenital " "Lorraine  Final     No results found for: \"WOUNDCX\"  No results found for: \"STOOLCX\"  No results found for: \"RESPCX\"  No results found for: \"AFBCX\"        I have personally looked at the labs and they are summarized above.  ----------------------------------------------------------------------------------------------------------------------  Detailed radiology reports for the last 24 hours:  Imaging Results (Last 24 Hours)       ** No results found for the last 24 hours. **          Assessment & Plan      # Recurrent falls  # Progressive debility  # Physical deconditioning  # Failure to thrive  #Left arm pain s/p fall  Patient previously declined long-term SNF placement and was reportedly unable to afford private out-of-pocket expense of short-term rehab, as all of her insurance approved days have already been used this year.  Unfortunately she continues to have recurrent falls at home and at admission was agreeable to long-term placement. Patient is not agreeable to pay a daily copay for short term rehab and is not agreeable to long term placement now because she would have to give up her monthly social security check. She is checking with a friend to see if she can stay with her. Continue PT/OT.  Case management consulted and are assisting, appreciate assistance.  - I reviewed her x-rays from admission, which showed no acute fractures.  Continue home scheduled gabapentin.  Continue as needed Tylenol.    # Nonobstructive coronary artery disease  -During previous admission she had a left heart cath which revealed nonobstructive disease.  Cardiology recommended medical management.  -Continue aspirin, Brilinta, statin, beta-blocker, and ARB.  Continue long-acting nitrate.    # History of ischemic stroke with residual left-sided deficit  # Status post left ICA stent  # Hypertension  # Hyperlipidemia  -Continue amlodipine and statin.  Blood pressure well-controlled.  Continue PT and OT.    Copied portions of the note have " "been reviewed and are correct as of 24.  Edited by: Anh Valdez DO at 2024 1821    VTE Prophylaxis:   Mechanical Order History:       None          Pharmalogical Order History:        Ordered     Dose Route Frequency Stop    24 1857  Enoxaparin Sodium (LOVENOX) syringe 40 mg         40 mg SC Every 24 Hours --    24 1848  Pharmacy to Dose enoxaparin (LOVENOX)        Question:  Indication of use  Answer:  Prophylaxis    -- XX Continuous PRN --                    Dispo: Pending further discussion with patient    Anh Valdez DO  [facility]  24  18:22 EDT     Electronically signed by Anh aVldez DO at 24 1822          Physical Therapy Notes (most recent note)        Dsei Fraga PTA at 24 1330  Version 1 of 1         Acute Care - Physical Therapy Treatment Note  [facility]     Patient Name: Regine Beckham  : 1955  MRN: [id number]  Today's Date: 2024      Visit Dx:   No diagnosis found.  Patient Active Problem List   Diagnosis    Iron deficiency anemia due to chronic blood loss    Major depressive disorder    RLS (restless legs syndrome)    GERD (gastroesophageal reflux disease)    Left breast mass    Allergy to penicillin    Stroke    Grade I diastolic dysfunction    Moderate malnutrition    Falls frequently    Stroke-like symptoms    Debility    Chest pain    Failure to thrive in adult     Past Medical History:   Diagnosis Date    Anemia     Anxiety     Arthritis     Depression     Legally blind     Restless leg syndrome     Sleep apnea     \"I don't use a cpap anymore since losing 106 lbs\"    Water retention      Past Surgical History:   Procedure Laterality Date    BACK SURGERY      CARDIAC CATHETERIZATION N/A 2024    Procedure: Percutaneous Manual Thrombectomy;  Surgeon: Efrain Hurley MD;  Location: [facility] CATH INVASIVE LOCATION;  Service: Interventional Radiology;  Laterality: N/A;    CARDIAC CATHETERIZATION " N/A 4/12/2024    Procedure: Left Heart Cath;  Surgeon: Susan Mederos MD;  [facility] CATH INVASIVE LOCATION;  Service: Cardiology;  Laterality: N/A;    GALLBLADDER SURGERY      HIP ARTHROSCOPY      JOINT REPLACEMENT Right      PT Assessment (Last 12 Hours)       PT Evaluation and Treatment       Row Name 04/24/24 1328          Physical Therapy Time and Intention    Subjective Information no complaints  -     Document Type therapy note (daily note)  -     Mode of Treatment physical therapy  -     Patient Effort good  -     Comment Pt and STALIN Mix in agreement for PT. Pt walked 80' with RW min/mod A. Bed mobility mod A, hygiene required with gown change mod A. Sitting exercises completed up in chair at end of RX to tolerance.  -       Row Name 04/24/24 1328          General Information    Patient Profile Reviewed yes  -     Existing Precautions/Restrictions fall  -       Row Name 04/24/24 1328          Living Environment    Primary Care Provided by self  -       Row Name 04/24/24 1328          Home Use of Assistive/Adaptive Equipment    Equipment Currently Used at Home walker, rolling  -       Row Name 04/24/24 1328          Cognition    Affect/Mental Status (Cognition) WFL  -     Orientation Status (Cognition) oriented x 3  -     Follows Commands (Cognition) WFL  -     Personal Safety Interventions fall prevention program maintained;gait belt;nonskid shoes/slippers when out of bed;supervised activity  -       Row Name 04/24/24 1328          Bed Mobility    All Activities, [place] (Bed Mobility) moderate assist (50% patient effort)  -       Row Name 04/24/24 1328          Transfers    Transfers sit-stand transfer;stand-sit transfer  -       Row Name 04/24/24 1328          Sit-Stand Transfer    Sit-Stand [place] (Transfers) moderate assist (50% patient effort)  -     Assistive Device (Sit-Stand Transfers) walker, front-wheeled  -       Row Name 04/24/24 1328           Stand-Sit Transfer    Stand-Sit [place] (Transfers) moderate assist (50% patient effort)  -     Assistive Device (Stand-Sit Transfers) walker, front-wheeled  -       Row Name 04/24/24 1328          Gait/Stairs (Locomotion)    Gait/Stairs Locomotion gait/ambulation independence;gait/ambulation assistive device;distance ambulated  -     [place] Level (Gait) minimum assist (75% patient effort);1 person assist;moderate assist (50% patient effort)  -     Assistive Device (Gait) walker, front-wheeled  -     Distance in Feet (Gait) 80  -HC     Pattern (Gait) step-to;step-through  -     Deviations/Abnormal Patterns (Gait) ataxic;base of support, narrow;gait speed decreased;festinating/shuffling;left sided deviations  -       Row Name 04/24/24 1328          Safety Issues, Functional Mobility    Impairments Affecting Function (Mobility) balance;endurance/activity tolerance;strength  -[facility] Name 04/24/24 1328          Motor Skills    Therapeutic Exercise other (see comments)  Sitting: Ap, LAQ, March, Knees in/out  -[facility] Name 04/24/24 1328          Positioning and Restraints    Pre-Treatment Position in bed  -     Post Treatment Position chair  -     In Chair sitting;call light within reach;encouraged to call for assist;exit alarm on;notified nsg  -[facility] Name 04/24/24 1320          Therapy Assessment/Plan (PT)    Rehab Potential (PT) good, to achieve stated therapy goals  -     Criteria for Skilled Interventions Met (PT) yes;skilled treatment is necessary  -     Therapy Frequency (PT) 2 times/wk  2-5x/wk  -     Problem List (PT) problems related to;balance;mobility;strength  -     Activity Limitations Related to Problem List (PT) unable to ambulate safely;unable to transfer safely  -       Row Name 04/24/24 1328          Physical Therapy Goals    Bed Mobility Goal Selection (PT) bed mobility, PT goal 1  -     Transfer Goal Selection (PT) transfer, PT goal 1  -      Gait Training Goal Selection (PT) gait training, PT goal 1  -       Row Name 04/24/24 1328          Bed Mobility Goal 1 (PT)    Activity/Assistive Device (Bed Mobility Goal 1, PT) bed mobility activities, all  -HC     [place] Level/Cues Needed (Bed Mobility Goal 1, PT) minimum assist (75% or more patient effort)  -HC     Time Frame (Bed Mobility Goal 1, PT) by discharge  -       Row Name 04/24/24 1328          Transfer Goal 1 (PT)    Activity/Assistive Device (Transfer Goal 1, PT) transfers, all;walker, dao  -HC     [place] Level/Cues Needed (Transfer Goal 1, PT) contact guard required  -HC     Time Frame (Transfer Goal 1, PT) by discharge  -       Row Name 04/24/24 1328          Gait Training Goal 1 (PT)    Activity/Assistive Device (Gait Training Goal 1, PT) gait (walking locomotion);assistive device use;walker, dao  -HC     [place] Level (Gait Training Goal 1, PT) contact guard required  -HC     Distance (Gait Training Goal 1, PT) 100'  -HC     Time Frame (Gait Training Goal 1, PT) by discharge  -               User Key  (r) = Recorded By, (t) = Taken By, (c) = Cosigned By      Initials Name Provider Type    Desi Hopson, PTA Physical Therapist Assistant                    Physical Therapy Education       Title: PT OT SLP Therapies (Done)       Topic: Physical Therapy (Done)       Point: Mobility training (Done)       Learning Progress Summary             Patient Acceptance, E,TB,D, VU,DU,NR by  at 4/23/2024 1055    Acceptance, E,TB, VU by KM at 4/22/2024 1516                         Point: Home exercise program (Done)       Learning Progress Summary             Patient Acceptance, E,TB,D, VU,DU,NR by  at 4/23/2024 1055    Acceptance, E,TB, VU by KM at 4/22/2024 1516                         Point: Body mechanics (Done)       Learning Progress Summary             Patient Acceptance, E,TB,D, VU,DU,NR by  at 4/23/2024 1055    Acceptance, E,TB, VU by KM at 4/22/2024 1516                          Point: Precautions (Done)       Learning Progress Summary             Patient Acceptance, E,TB,D, VU,DU,NR by  at 2024 1055    Acceptance, E,TB, VU by  at 2024 1516                                         User Key       Initials Effective Dates Name Provider Type Discipline     22 -  Eugene Cuevas, PT Physical Therapist PT    HC 23 -  Desi Fraga PTA Physical Therapist Assistant PT                  PT Recommendation and Plan  Therapy Frequency (PT): 2 times/wk (2-5x/wk)          Time Calculation:    PT Charges       Row Name 24 1330             Time Calculation    PT Received On 24  -         Time Calculation- PT    Total Timed Code Minutes- PT 38 minute(s)  -                User Key  (r) = Recorded By, (t) = Taken By, (c) = Cosigned By      Initials Name Provider Type     Desi Fraga PTA Physical Therapist Assistant                  Therapy Charges for Today       Code Description Service Date Service Provider Modifiers Qty    90170858440  GAIT TRAINING EA 15 MIN 2024 Desi Fraga PTA GP, CQ 1    10785220908 HC PT THER PROC EA 15 MIN 2024 Desi Fraga PTA GP, CQ 1    48122484997 HC GAIT TRAINING EA 15 MIN 2024 Desi Fraga PTA GP, CQ 1    86219667486 HC PT THER PROC EA 15 MIN 2024 Desi Fraga PTA GP, CQ 1    [id number]  PT THERAPEUTIC ACT EA 15 MIN 2024 Desi Fraga PTA GP, CQ 1            PT G-Codes  AM-PAC 6 Clicks Score (PT): 14    Desi Fraga PTA  2024      Electronically signed by Desi Fraga PTA at 24 1330          Occupational Therapy Notes (most recent note)        Kyleigh Tsang, OT at 24 1237          Acute Care - Occupational Therapy Treatment Note  [facility]     Patient Name: Regine Beckham  : 1955  MRN: [id number]  Today's Date: 2024             Admit Date: 2024     No diagnosis  "found.  Patient Active Problem List   Diagnosis    Iron deficiency anemia due to chronic blood loss    Major depressive disorder    RLS (restless legs syndrome)    GERD (gastroesophageal reflux disease)    Left breast mass    Allergy to penicillin    Stroke    Grade I diastolic dysfunction    Moderate malnutrition    Falls frequently    Stroke-like symptoms    Debility    Chest pain    Failure to thrive in adult     Past Medical History:   Diagnosis Date    Anemia     Anxiety     Arthritis     Depression     Legally blind     Restless leg syndrome     Sleep apnea     \"I don't use a cpap anymore since losing 106 lbs\"    Water retention      Past Surgical History:   Procedure Laterality Date    BACK SURGERY      CARDIAC CATHETERIZATION N/A 1/19/2024    Procedure: Percutaneous Manual Thrombectomy;  Surgeon: Efrain Hurley MD;  Location:  [facility] CATH INVASIVE LOCATION;  Service: Interventional Radiology;  Laterality: N/A;    CARDIAC CATHETERIZATION N/A 4/12/2024    Procedure: Left Heart Cath;  Surgeon: Susan Mederos MD;  Location:  [facility] CATH INVASIVE LOCATION;  Service: Cardiology;  Laterality: N/A;    GALLBLADDER SURGERY      HIP ARTHROSCOPY      JOINT REPLACEMENT Right          OT ASSESSMENT FLOWSHEET (Last 12 Hours)       OT Evaluation and Treatment       Row Name 04/24/24 1231                   OT Time and Intention    Subjective Information complains of  -LM        Document Type therapy note (daily note)  -LM        Mode of Treatment occupational therapy  -LM        Patient Effort good  -LM        Comment Patient seen this date for LUE neuro re-ed.  Patient 1/4 arom with LUE.  Pain noted with prom at all joints LUE, requires slow stretching.  Patient requires mod/max assist with LE dressing, toileting, bathing.  Patient alert to person and place, not time.  -LM           General Information    Existing Precautions/Restrictions fall  -LM        Limitations/Impairments safety/cognitive  -LM           Cognition "    Affect/Mental Status (Cognition) WFL  -LM        Orientation Status (Cognition) oriented to;person;place  -LM           Positioning and Restraints    Post Treatment Position bed  -LM        In Bed call light within reach;encouraged to call for assist;exit alarm on  -LM                  User Key  (r) = Recorded By, (t) = Taken By, (c) = Cosigned By      Initials Name Effective Dates    LM Kyleigh Tsang OT 06/16/21 -                            OT Recommendation and Plan              Time Calculation:     Therapy Charges for Today       Code Description Service Date Service Provider Modifiers Qty    52922096855  OT NEUROMUSC RE EDUCATION EA 15 MIN 4/24/2024 Kyleigh Tsang OT GO 1                 Kyleigh Tsang OT  4/24/2024    Electronically signed by Kyleigh Tsang OT at 04/24/24 1237       Speech Language Pathology Notes (most recent note)    No notes exist for this encounter.       ADL Documentation (most recent)      Flowsheet Row Most Recent Value   Transferring 2 - assistive person   Toileting 2 - assistive person   Bathing 2 - assistive person   Dressing 2 - assistive person   Eating 0 - independent   Communication 0 - understands/communicates without difficulty   Swallowing 0 - swallows foods/liquids without difficulty   Equipment Currently Used at Home walker, rolling            Discharge Summary    No notes of this type exist for this encounter.       Discharge Order (From admission, onward)       Start     Ordered    04/25/24 1145  Discharge patient  Once        Expected Discharge Date: 04/25/24   [place] [facility]   Physician of Record for Attribution - Please select from Treatment Team: JOVON HEAD [[id number]]   Review needed by CMO to determine Physician of Record: No      Question Answer Comment   Physician of Record for Attribution - Please select from Treatment Team JOVON HEAD    Review needed by CMO to determine Physician of Record No        04/25/24 1142

## 2024-04-26 ENCOUNTER — APPOINTMENT (OUTPATIENT)
Dept: GENERAL RADIOLOGY | Facility: HOSPITAL | Age: 69
End: 2024-04-26
Payer: MEDICARE

## 2024-04-26 ENCOUNTER — HOSPITAL ENCOUNTER (INPATIENT)
Facility: HOSPITAL | Age: 69
LOS: 12 days | Discharge: NURSING, REHAB OR LONG-TERM CARE | End: 2024-05-08
Attending: STUDENT IN AN ORGANIZED HEALTH CARE EDUCATION/TRAINING PROGRAM | Admitting: HOSPITALIST
Payer: MEDICARE

## 2024-04-26 DIAGNOSIS — R53.81 DEBILITY: ICD-10-CM

## 2024-04-26 DIAGNOSIS — G62.9 PERIPHERAL POLYNEUROPATHY: ICD-10-CM

## 2024-04-26 DIAGNOSIS — R62.7 ADULT FAILURE TO THRIVE: Primary | ICD-10-CM

## 2024-04-26 LAB
ALBUMIN SERPL-MCNC: 3.7 G/DL (ref 3.5–5.2)
ALBUMIN/GLOB SERPL: 1 G/DL
ALP SERPL-CCNC: 150 U/L (ref 39–117)
ALT SERPL W P-5'-P-CCNC: 38 U/L (ref 1–33)
ANION GAP SERPL CALCULATED.3IONS-SCNC: 10.7 MMOL/L (ref 5–15)
AST SERPL-CCNC: 36 U/L (ref 1–32)
BACTERIA UR QL AUTO: ABNORMAL /HPF
BASOPHILS # BLD AUTO: 0.08 10*3/MM3 (ref 0–0.2)
BASOPHILS NFR BLD AUTO: 0.9 % (ref 0–1.5)
BILIRUB SERPL-MCNC: 0.3 MG/DL (ref 0–1.2)
BILIRUB UR QL STRIP: NEGATIVE
BUN SERPL-MCNC: 14 MG/DL (ref 8–23)
BUN/CREAT SERPL: 19.4 (ref 7–25)
CALCIUM SPEC-SCNC: 9.9 MG/DL (ref 8.6–10.5)
CHLORIDE SERPL-SCNC: 104 MMOL/L (ref 98–107)
CLARITY UR: ABNORMAL
CO2 SERPL-SCNC: 25.3 MMOL/L (ref 22–29)
COLOR UR: YELLOW
CREAT SERPL-MCNC: 0.72 MG/DL (ref 0.57–1)
DEPRECATED RDW RBC AUTO: 46.5 FL (ref 37–54)
EGFRCR SERPLBLD CKD-EPI 2021: 90.6 ML/MIN/1.73
EOSINOPHIL # BLD AUTO: 0.18 10*3/MM3 (ref 0–0.4)
EOSINOPHIL NFR BLD AUTO: 1.9 % (ref 0.3–6.2)
ERYTHROCYTE [DISTWIDTH] IN BLOOD BY AUTOMATED COUNT: 13.6 % (ref 12.3–15.4)
GLOBULIN UR ELPH-MCNC: 3.6 GM/DL
GLUCOSE SERPL-MCNC: 123 MG/DL (ref 65–99)
GLUCOSE UR STRIP-MCNC: NEGATIVE MG/DL
HCT VFR BLD AUTO: 32.7 % (ref 34–46.6)
HGB BLD-MCNC: 10.7 G/DL (ref 12–15.9)
HGB UR QL STRIP.AUTO: NEGATIVE
HYALINE CASTS UR QL AUTO: ABNORMAL /LPF
IMM GRANULOCYTES # BLD AUTO: 0.02 10*3/MM3 (ref 0–0.05)
IMM GRANULOCYTES NFR BLD AUTO: 0.2 % (ref 0–0.5)
KETONES UR QL STRIP: NEGATIVE
LEUKOCYTE ESTERASE UR QL STRIP.AUTO: ABNORMAL
LYMPHOCYTES # BLD AUTO: 2.19 10*3/MM3 (ref 0.7–3.1)
LYMPHOCYTES NFR BLD AUTO: 23.3 % (ref 19.6–45.3)
MCH RBC QN AUTO: 30.2 PG (ref 26.6–33)
MCHC RBC AUTO-ENTMCNC: 32.7 G/DL (ref 31.5–35.7)
MCV RBC AUTO: 92.4 FL (ref 79–97)
MONOCYTES # BLD AUTO: 0.71 10*3/MM3 (ref 0.1–0.9)
MONOCYTES NFR BLD AUTO: 7.6 % (ref 5–12)
NEUTROPHILS NFR BLD AUTO: 6.2 10*3/MM3 (ref 1.7–7)
NEUTROPHILS NFR BLD AUTO: 66.1 % (ref 42.7–76)
NITRITE UR QL STRIP: NEGATIVE
NRBC BLD AUTO-RTO: 0 /100 WBC (ref 0–0.2)
PH UR STRIP.AUTO: 7.5 [PH] (ref 5–8)
PLATELET # BLD AUTO: 318 10*3/MM3 (ref 140–450)
PMV BLD AUTO: 9.4 FL (ref 6–12)
POTASSIUM SERPL-SCNC: 3.8 MMOL/L (ref 3.5–5.2)
PROT SERPL-MCNC: 7.3 G/DL (ref 6–8.5)
PROT UR QL STRIP: NEGATIVE
RBC # BLD AUTO: 3.54 10*6/MM3 (ref 3.77–5.28)
RBC # UR STRIP: ABNORMAL /HPF
REF LAB TEST METHOD: ABNORMAL
SODIUM SERPL-SCNC: 140 MMOL/L (ref 136–145)
SP GR UR STRIP: 1.01 (ref 1–1.03)
SQUAMOUS #/AREA URNS HPF: ABNORMAL /HPF
UROBILINOGEN UR QL STRIP: ABNORMAL
WBC # UR STRIP: ABNORMAL /HPF
WBC NRBC COR # BLD AUTO: 9.38 10*3/MM3 (ref 3.4–10.8)

## 2024-04-26 PROCEDURE — 93010 ELECTROCARDIOGRAM REPORT: CPT | Performed by: INTERNAL MEDICINE

## 2024-04-26 PROCEDURE — 73502 X-RAY EXAM HIP UNI 2-3 VIEWS: CPT | Performed by: RADIOLOGY

## 2024-04-26 PROCEDURE — 99285 EMERGENCY DEPT VISIT HI MDM: CPT

## 2024-04-26 PROCEDURE — 80053 COMPREHEN METABOLIC PANEL: CPT | Performed by: STUDENT IN AN ORGANIZED HEALTH CARE EDUCATION/TRAINING PROGRAM

## 2024-04-26 PROCEDURE — 36415 COLL VENOUS BLD VENIPUNCTURE: CPT

## 2024-04-26 PROCEDURE — 73562 X-RAY EXAM OF KNEE 3: CPT

## 2024-04-26 PROCEDURE — 73562 X-RAY EXAM OF KNEE 3: CPT | Performed by: RADIOLOGY

## 2024-04-26 PROCEDURE — 71045 X-RAY EXAM CHEST 1 VIEW: CPT | Performed by: RADIOLOGY

## 2024-04-26 PROCEDURE — 87086 URINE CULTURE/COLONY COUNT: CPT | Performed by: HOSPITALIST

## 2024-04-26 PROCEDURE — 73030 X-RAY EXAM OF SHOULDER: CPT | Performed by: RADIOLOGY

## 2024-04-26 PROCEDURE — 81001 URINALYSIS AUTO W/SCOPE: CPT | Performed by: STUDENT IN AN ORGANIZED HEALTH CARE EDUCATION/TRAINING PROGRAM

## 2024-04-26 PROCEDURE — 73502 X-RAY EXAM HIP UNI 2-3 VIEWS: CPT

## 2024-04-26 PROCEDURE — 73030 X-RAY EXAM OF SHOULDER: CPT

## 2024-04-26 PROCEDURE — 85025 COMPLETE CBC W/AUTO DIFF WBC: CPT | Performed by: STUDENT IN AN ORGANIZED HEALTH CARE EDUCATION/TRAINING PROGRAM

## 2024-04-26 PROCEDURE — 71045 X-RAY EXAM CHEST 1 VIEW: CPT

## 2024-04-26 PROCEDURE — 99221 1ST HOSP IP/OBS SF/LOW 40: CPT | Performed by: HOSPITALIST

## 2024-04-26 PROCEDURE — 93005 ELECTROCARDIOGRAM TRACING: CPT | Performed by: STUDENT IN AN ORGANIZED HEALTH CARE EDUCATION/TRAINING PROGRAM

## 2024-04-26 NOTE — CASE MANAGEMENT/SOCIAL WORK
Discharge Planning Assessment   [facility]     Patient Name: Regine Beckham  MRN: [id number]  Today's Date: 4/26/2024    Admit Date: (Not on file)    Plan: Araceli Pham from [facility] called to say pt was coming to ER and was wanting nursing home placement pt was discharged yesterday from hospital. She said that pt's medicaid has been approved. She states pt has hx of falls and has no family to help her and can't stay by herself.   Discharge Needs Assessment    No documentation.                  Discharge Plan       Row Name 04/26/24 1825       Plan    Plan Araceli Pham from [facility] called to say pt was coming to ER and was wanting nursing home placement pt was discharged yesterday from hospital. She said that pt's medicaid has been approved. She states pt has hx of falls and has no family to help her and can't stay by herself.                  Continued Care and Services - Pending Admission on 4/26/2024    No active coordination exists for this encounter.       Selected Continued Care - Prior Encounters Includes continued care and service providers with selected services from prior encounters from 1/27/2024 to 4/26/2024      Discharged on 4/18/2024 Admission date: 4/9/2024 - Discharge disposition: Home-Health Care [facility] [place]      Home Medical Care       Service Provider Selected Services Address Phone Fax Patient Preferred    [facility] [address] [place] [address] [facility] [address] [phone] --                      Discharged on 2/2/2024 Admission date: 1/19/2024 - Discharge disposition: Skilled Nursing Facility (DC - External)      Destination       Service Provider Selected Services Address Phone Fax Patient Preferred    [facility] [address] [place] [address] [id number] [phone] --                                Evelin Stephenson RN

## 2024-04-27 ENCOUNTER — READMISSION MANAGEMENT (OUTPATIENT)
Dept: CALL CENTER | Facility: HOSPITAL | Age: 69
End: 2024-04-27
Payer: MEDICARE

## 2024-04-27 LAB
ALBUMIN SERPL-MCNC: 3.3 G/DL (ref 3.5–5.2)
ALBUMIN/GLOB SERPL: 1 G/DL
ALP SERPL-CCNC: 138 U/L (ref 39–117)
ALT SERPL W P-5'-P-CCNC: 32 U/L (ref 1–33)
ANION GAP SERPL CALCULATED.3IONS-SCNC: 10.3 MMOL/L (ref 5–15)
AST SERPL-CCNC: 29 U/L (ref 1–32)
BASOPHILS # BLD AUTO: 0.1 10*3/MM3 (ref 0–0.2)
BASOPHILS NFR BLD AUTO: 1.3 % (ref 0–1.5)
BILIRUB SERPL-MCNC: 0.3 MG/DL (ref 0–1.2)
BUN SERPL-MCNC: 14 MG/DL (ref 8–23)
BUN/CREAT SERPL: 17.3 (ref 7–25)
CALCIUM SPEC-SCNC: 9.5 MG/DL (ref 8.6–10.5)
CHLORIDE SERPL-SCNC: 107 MMOL/L (ref 98–107)
CO2 SERPL-SCNC: 24.7 MMOL/L (ref 22–29)
CREAT SERPL-MCNC: 0.81 MG/DL (ref 0.57–1)
DEPRECATED RDW RBC AUTO: 48.9 FL (ref 37–54)
EGFRCR SERPLBLD CKD-EPI 2021: 78.7 ML/MIN/1.73
EOSINOPHIL # BLD AUTO: 0.23 10*3/MM3 (ref 0–0.4)
EOSINOPHIL NFR BLD AUTO: 2.9 % (ref 0.3–6.2)
ERYTHROCYTE [DISTWIDTH] IN BLOOD BY AUTOMATED COUNT: 13.8 % (ref 12.3–15.4)
GLOBULIN UR ELPH-MCNC: 3.4 GM/DL
GLUCOSE SERPL-MCNC: 168 MG/DL (ref 65–99)
HCT VFR BLD AUTO: 30.9 % (ref 34–46.6)
HGB BLD-MCNC: 9.8 G/DL (ref 12–15.9)
IMM GRANULOCYTES # BLD AUTO: 0.02 10*3/MM3 (ref 0–0.05)
IMM GRANULOCYTES NFR BLD AUTO: 0.3 % (ref 0–0.5)
LYMPHOCYTES # BLD AUTO: 2.55 10*3/MM3 (ref 0.7–3.1)
LYMPHOCYTES NFR BLD AUTO: 32.2 % (ref 19.6–45.3)
MCH RBC QN AUTO: 30.5 PG (ref 26.6–33)
MCHC RBC AUTO-ENTMCNC: 31.7 G/DL (ref 31.5–35.7)
MCV RBC AUTO: 96.3 FL (ref 79–97)
MONOCYTES # BLD AUTO: 0.65 10*3/MM3 (ref 0.1–0.9)
MONOCYTES NFR BLD AUTO: 8.2 % (ref 5–12)
NEUTROPHILS NFR BLD AUTO: 4.36 10*3/MM3 (ref 1.7–7)
NEUTROPHILS NFR BLD AUTO: 55.1 % (ref 42.7–76)
NRBC BLD AUTO-RTO: 0 /100 WBC (ref 0–0.2)
PLATELET # BLD AUTO: 274 10*3/MM3 (ref 140–450)
PMV BLD AUTO: 9.6 FL (ref 6–12)
POTASSIUM SERPL-SCNC: 3.4 MMOL/L (ref 3.5–5.2)
PROT SERPL-MCNC: 6.7 G/DL (ref 6–8.5)
QT INTERVAL: 428 MS
QTC INTERVAL: 465 MS
RBC # BLD AUTO: 3.21 10*6/MM3 (ref 3.77–5.28)
SODIUM SERPL-SCNC: 142 MMOL/L (ref 136–145)
WBC NRBC COR # BLD AUTO: 7.91 10*3/MM3 (ref 3.4–10.8)

## 2024-04-27 PROCEDURE — 99232 SBSQ HOSP IP/OBS MODERATE 35: CPT | Performed by: STUDENT IN AN ORGANIZED HEALTH CARE EDUCATION/TRAINING PROGRAM

## 2024-04-27 PROCEDURE — 25010000002 HEPARIN (PORCINE) PER 1000 UNITS: Performed by: HOSPITALIST

## 2024-04-27 PROCEDURE — 80053 COMPREHEN METABOLIC PANEL: CPT | Performed by: HOSPITALIST

## 2024-04-27 PROCEDURE — 85025 COMPLETE CBC W/AUTO DIFF WBC: CPT | Performed by: HOSPITALIST

## 2024-04-27 RX ORDER — SODIUM CHLORIDE 0.9 % (FLUSH) 0.9 %
10 SYRINGE (ML) INJECTION EVERY 12 HOURS SCHEDULED
Status: DISCONTINUED | OUTPATIENT
Start: 2024-04-27 | End: 2024-05-08 | Stop reason: HOSPADM

## 2024-04-27 RX ORDER — AMOXICILLIN 250 MG
2 CAPSULE ORAL NIGHTLY
Status: DISCONTINUED | OUTPATIENT
Start: 2024-04-27 | End: 2024-05-08 | Stop reason: HOSPADM

## 2024-04-27 RX ORDER — LOSARTAN POTASSIUM 50 MG/1
50 TABLET ORAL DAILY
Status: DISCONTINUED | OUTPATIENT
Start: 2024-04-27 | End: 2024-05-08 | Stop reason: HOSPADM

## 2024-04-27 RX ORDER — GABAPENTIN 300 MG/1
300 CAPSULE ORAL 2 TIMES DAILY
Status: DISCONTINUED | OUTPATIENT
Start: 2024-04-27 | End: 2024-05-08 | Stop reason: HOSPADM

## 2024-04-27 RX ORDER — CHOLECALCIFEROL (VITAMIN D3) 125 MCG
5 CAPSULE ORAL NIGHTLY PRN
Status: DISCONTINUED | OUTPATIENT
Start: 2024-04-27 | End: 2024-05-08 | Stop reason: HOSPADM

## 2024-04-27 RX ORDER — HEPARIN SODIUM 5000 [USP'U]/ML
5000 INJECTION, SOLUTION INTRAVENOUS; SUBCUTANEOUS EVERY 12 HOURS SCHEDULED
Status: DISCONTINUED | OUTPATIENT
Start: 2024-04-27 | End: 2024-05-08 | Stop reason: HOSPADM

## 2024-04-27 RX ORDER — ASPIRIN 81 MG/1
81 TABLET ORAL DAILY
Status: DISCONTINUED | OUTPATIENT
Start: 2024-04-27 | End: 2024-05-08 | Stop reason: HOSPADM

## 2024-04-27 RX ORDER — ROPINIROLE 1 MG/1
4 TABLET, FILM COATED ORAL NIGHTLY
Status: DISCONTINUED | OUTPATIENT
Start: 2024-04-27 | End: 2024-05-08 | Stop reason: HOSPADM

## 2024-04-27 RX ORDER — BISACODYL 10 MG
10 SUPPOSITORY, RECTAL RECTAL DAILY PRN
Status: DISCONTINUED | OUTPATIENT
Start: 2024-04-27 | End: 2024-04-27

## 2024-04-27 RX ORDER — BISACODYL 5 MG/1
5 TABLET, DELAYED RELEASE ORAL DAILY PRN
Status: DISCONTINUED | OUTPATIENT
Start: 2024-04-27 | End: 2024-04-27

## 2024-04-27 RX ORDER — NITROFURANTOIN 25; 75 MG/1; MG/1
100 CAPSULE ORAL EVERY 12 HOURS SCHEDULED
Status: COMPLETED | OUTPATIENT
Start: 2024-04-27 | End: 2024-05-01

## 2024-04-27 RX ORDER — AMOXICILLIN 250 MG
2 CAPSULE ORAL 2 TIMES DAILY PRN
Status: DISCONTINUED | OUTPATIENT
Start: 2024-04-27 | End: 2024-04-27

## 2024-04-27 RX ORDER — MIRTAZAPINE 15 MG/1
30 TABLET, FILM COATED ORAL NIGHTLY
Status: DISCONTINUED | OUTPATIENT
Start: 2024-04-27 | End: 2024-05-08 | Stop reason: HOSPADM

## 2024-04-27 RX ORDER — POLYETHYLENE GLYCOL 3350 17 G/17G
17 POWDER, FOR SOLUTION ORAL DAILY PRN
Status: DISCONTINUED | OUTPATIENT
Start: 2024-04-27 | End: 2024-05-08 | Stop reason: HOSPADM

## 2024-04-27 RX ORDER — ATORVASTATIN CALCIUM 40 MG/1
80 TABLET, FILM COATED ORAL NIGHTLY
Status: DISCONTINUED | OUTPATIENT
Start: 2024-04-27 | End: 2024-05-08 | Stop reason: HOSPADM

## 2024-04-27 RX ORDER — SODIUM CHLORIDE 0.9 % (FLUSH) 0.9 %
10 SYRINGE (ML) INJECTION AS NEEDED
Status: DISCONTINUED | OUTPATIENT
Start: 2024-04-27 | End: 2024-05-08 | Stop reason: HOSPADM

## 2024-04-27 RX ORDER — ONDANSETRON 4 MG/1
4 TABLET, ORALLY DISINTEGRATING ORAL EVERY 12 HOURS PRN
Status: DISCONTINUED | OUTPATIENT
Start: 2024-04-27 | End: 2024-05-08 | Stop reason: HOSPADM

## 2024-04-27 RX ORDER — TRAMADOL HYDROCHLORIDE 50 MG/1
50 TABLET ORAL EVERY 12 HOURS PRN
Status: DISPENSED | OUTPATIENT
Start: 2024-04-27 | End: 2024-05-02

## 2024-04-27 RX ORDER — AMLODIPINE BESYLATE 5 MG/1
5 TABLET ORAL
Status: DISCONTINUED | OUTPATIENT
Start: 2024-04-27 | End: 2024-05-08 | Stop reason: HOSPADM

## 2024-04-27 RX ORDER — PANTOPRAZOLE SODIUM 40 MG/1
40 TABLET, DELAYED RELEASE ORAL
Status: DISCONTINUED | OUTPATIENT
Start: 2024-04-27 | End: 2024-05-08 | Stop reason: HOSPADM

## 2024-04-27 RX ORDER — SODIUM CHLORIDE 9 MG/ML
40 INJECTION, SOLUTION INTRAVENOUS AS NEEDED
Status: DISCONTINUED | OUTPATIENT
Start: 2024-04-27 | End: 2024-05-08 | Stop reason: HOSPADM

## 2024-04-27 RX ORDER — ISOSORBIDE MONONITRATE 30 MG/1
30 TABLET, EXTENDED RELEASE ORAL
Status: DISCONTINUED | OUTPATIENT
Start: 2024-04-27 | End: 2024-05-07

## 2024-04-27 RX ORDER — POLYETHYLENE GLYCOL 3350 17 G/17G
17 POWDER, FOR SOLUTION ORAL DAILY PRN
Status: DISCONTINUED | OUTPATIENT
Start: 2024-04-27 | End: 2024-04-27

## 2024-04-27 RX ORDER — ACETAMINOPHEN 500 MG
500 TABLET ORAL EVERY 6 HOURS PRN
Status: DISCONTINUED | OUTPATIENT
Start: 2024-04-27 | End: 2024-05-08 | Stop reason: HOSPADM

## 2024-04-27 RX ADMIN — ROPINIROLE HYDROCHLORIDE 4 MG: 1 TABLET, FILM COATED ORAL at 20:23

## 2024-04-27 RX ADMIN — GABAPENTIN 300 MG: 300 CAPSULE ORAL at 08:05

## 2024-04-27 RX ADMIN — ATORVASTATIN CALCIUM 80 MG: 40 TABLET, FILM COATED ORAL at 03:22

## 2024-04-27 RX ADMIN — ACETAMINOPHEN 500 MG: 500 TABLET ORAL at 19:40

## 2024-04-27 RX ADMIN — METOPROLOL TARTRATE 12.5 MG: 25 TABLET, FILM COATED ORAL at 20:23

## 2024-04-27 RX ADMIN — TICAGRELOR 60 MG: 60 TABLET ORAL at 08:05

## 2024-04-27 RX ADMIN — NITROFURANTOIN MONOHYDRATE/MACROCRYSTALLINE 100 MG: 25; 75 CAPSULE ORAL at 20:23

## 2024-04-27 RX ADMIN — ISOSORBIDE MONONITRATE 30 MG: 30 TABLET, EXTENDED RELEASE ORAL at 08:05

## 2024-04-27 RX ADMIN — LOSARTAN POTASSIUM 50 MG: 50 TABLET, FILM COATED ORAL at 08:05

## 2024-04-27 RX ADMIN — ASPIRIN 81 MG: 81 TABLET, COATED ORAL at 08:05

## 2024-04-27 RX ADMIN — AMLODIPINE BESYLATE 5 MG: 5 TABLET ORAL at 08:05

## 2024-04-27 RX ADMIN — HEPARIN SODIUM 5000 UNITS: 5000 INJECTION INTRAVENOUS; SUBCUTANEOUS at 08:05

## 2024-04-27 RX ADMIN — TRAMADOL HYDROCHLORIDE 50 MG: 50 TABLET ORAL at 14:50

## 2024-04-27 RX ADMIN — HEPARIN SODIUM 5000 UNITS: 5000 INJECTION INTRAVENOUS; SUBCUTANEOUS at 00:35

## 2024-04-27 RX ADMIN — MIRTAZAPINE 30 MG: 15 TABLET, FILM COATED ORAL at 03:21

## 2024-04-27 RX ADMIN — HEPARIN SODIUM 5000 UNITS: 5000 INJECTION INTRAVENOUS; SUBCUTANEOUS at 20:23

## 2024-04-27 RX ADMIN — ROPINIROLE HYDROCHLORIDE 4 MG: 1 TABLET, FILM COATED ORAL at 03:21

## 2024-04-27 RX ADMIN — NITROFURANTOIN MONOHYDRATE/MACROCRYSTALLINE 100 MG: 25; 75 CAPSULE ORAL at 10:39

## 2024-04-27 RX ADMIN — Medication 10 ML: at 00:35

## 2024-04-27 RX ADMIN — MIRTAZAPINE 30 MG: 15 TABLET, FILM COATED ORAL at 20:23

## 2024-04-27 RX ADMIN — ATORVASTATIN CALCIUM 80 MG: 40 TABLET, FILM COATED ORAL at 20:23

## 2024-04-27 RX ADMIN — Medication 10 ML: at 20:24

## 2024-04-27 RX ADMIN — TICAGRELOR 60 MG: 60 TABLET ORAL at 20:23

## 2024-04-27 RX ADMIN — Medication 10 ML: at 08:05

## 2024-04-27 RX ADMIN — GABAPENTIN 300 MG: 300 CAPSULE ORAL at 20:23

## 2024-04-27 RX ADMIN — MIRABEGRON 50 MG: 25 TABLET, FILM COATED, EXTENDED RELEASE ORAL at 08:05

## 2024-04-27 NOTE — PLAN OF CARE
Goal Outcome Evaluation:              Outcome Evaluation: Pt resting in bed at this time. No s/s of distress noted. Stable on RA. No complaints of pain or discomfort. Will continue POC.

## 2024-04-27 NOTE — ED PROVIDER NOTES
"Subjective   History of Present Illness  68-year-old female with a past medical history of restless leg syndrome presents to the ER with local authorities due to concerns for APS noting deplorable living conditions.  It was determined that this patient cannot return home.  Patient has been evaluated several times for similar concerns.  However, nursing home placement has been difficult due to payment.  Patient returns today agreeable to be placed in a nursing home.  No suicidal homicidal ideations.  Patient does have right knee pain and left shoulder pain after a fall at home within the last 1 to 2 days.  No head trauma or loss of consciousness.  No chest pain.  Vital signs stable      Review of Systems   Musculoskeletal:         Right knee pain.  Left shoulder pain.   All other systems reviewed and are negative.      Past Medical History:   Diagnosis Date    Anemia     Anxiety     Arthritis     Depression     Legally blind     Restless leg syndrome     Sleep apnea     \"I don't use a cpap anymore since losing 106 lbs\"    Water retention        Allergies   Allergen Reactions    Penicillins Hives and Swelling       Past Surgical History:   Procedure Laterality Date    BACK SURGERY      CARDIAC CATHETERIZATION N/A 1/19/2024    Procedure: Percutaneous Manual Thrombectomy;  Surgeon: Efrain Hurley MD;  Location:  [facility];  Service: Interventional Radiology;  Laterality: N/A;    CARDIAC CATHETERIZATION N/A 4/12/2024    Procedure: Left Heart Cath;  Surgeon: Susan Mederos MD;  Location:  [facility];  Service: Cardiology;  Laterality: N/A;    GALLBLADDER SURGERY      HIP ARTHROSCOPY      JOINT REPLACEMENT Right        Family History   Problem Relation Age of Onset    Breast cancer Neg Hx        Social History     Socioeconomic History    Marital status:     Number of children: 0    Years of education: 1 yr college   Tobacco Use    Smoking status: Former     Current " "packs/day: 1.50     Average packs/day: 1.5 packs/day for 51.0 years (76.5 ttl pk-yrs)     Types: Cigarettes     Passive exposure: Past    Smokeless tobacco: Never   Vaping Use    Vaping status: Never Used   Substance and Sexual Activity    Alcohol use: Not Currently     Alcohol/week: 1.0 standard drink of alcohol     Types: 1 Glasses of wine per week     Comment: \"occasionally - once every two months\"    Drug use: Not Currently     Comment: alcohol - occasionally    Sexual activity: Defer           Objective   Physical Exam  Constitutional:       General: She is not in acute distress.     Appearance: Normal appearance. She is not ill-appearing.   HENT:      Head: Normocephalic and atraumatic.      Right Ear: External ear normal.      Left Ear: External ear normal.      Nose: Nose normal.      Mouth/Throat:      Mouth: Mucous membranes are moist.   Eyes:      Extraocular Movements: Extraocular movements intact.      Pupils: Pupils are equal, round, and reactive to light.   Cardiovascular:      Rate and Rhythm: Normal rate and regular rhythm.      Heart sounds: No murmur heard.  Pulmonary:      Effort: Pulmonary effort is normal. No respiratory distress.      Breath sounds: Normal breath sounds. No wheezing.   Abdominal:      General: Bowel sounds are normal.      Palpations: Abdomen is soft.      Tenderness: There is no abdominal tenderness.   Musculoskeletal:         General: No deformity or signs of injury. Normal range of motion.      Cervical back: Normal range of motion and neck supple.   Skin:     General: Skin is warm and dry.      Findings: No erythema.   Neurological:      General: No focal deficit present.      Mental Status: She is alert and oriented to person, place, and time. Mental status is at baseline.      Cranial Nerves: No cranial nerve deficit.   Psychiatric:         Mood and Affect: Mood normal.         Behavior: Behavior normal.         Thought Content: Thought content normal. "         Procedures           ED Course  ED Course as of 04/26/24 2234 Fri Apr 26, 2024 2234 EKG notes sinus rhythm.  71 bpm.  No acute ST elevation.  QTc 465.  Electronically signed by Fransisco Marie DO, 04/26/24, 10:34 PM EDT.   [SF]      ED Course User Index  [SF] Fransisco Marie DO      XR Shoulder 2+ View Left    Result Date: 4/26/2024  No acute osseous abnormality evident.   This report was finalized on 4/26/2024 9:06 PM by Alex Pallas, DO.      XR Knee 3 View Right    Result Date: 4/26/2024  No acute osseous abnormality evident.   This report was finalized on 4/26/2024 9:06 PM by Alex Pallas, DO.      XR Chest 1 View    Result Date: 4/26/2024  No acute cardiopulmonary process.   This report was finalized on 4/26/2024 8:49 PM by Alex Pallas, DO.       Results for orders placed or performed during the hospital encounter of 04/26/24   Comprehensive Metabolic Panel    Specimen: Blood   Result Value Ref Range    Glucose 123 (H) 65 - 99 mg/dL    BUN 14 8 - 23 mg/dL    Creatinine 0.72 0.57 - 1.00 mg/dL    Sodium 140 136 - 145 mmol/L    Potassium 3.8 3.5 - 5.2 mmol/L    Chloride 104 98 - 107 mmol/L    CO2 25.3 22.0 - 29.0 mmol/L    Calcium 9.9 8.6 - 10.5 mg/dL    Total Protein 7.3 6.0 - 8.5 g/dL    Albumin 3.7 3.5 - 5.2 g/dL    ALT (SGPT) 38 (H) 1 - 33 U/L    AST (SGOT) 36 (H) 1 - 32 U/L    Alkaline Phosphatase 150 (H) 39 - 117 U/L    Total Bilirubin 0.3 0.0 - 1.2 mg/dL    Globulin 3.6 gm/dL    A/G Ratio 1.0 g/dL    BUN/Creatinine Ratio 19.4 7.0 - 25.0    Anion Gap 10.7 5.0 - 15.0 mmol/L    eGFR 90.6 >60.0 mL/min/1.73   Urinalysis With Culture If Indicated - Urine, Clean Catch    Specimen: Urine, Clean Catch   Result Value Ref Range    Color, UA Yellow Yellow, Straw    Appearance, UA Cloudy (A) Clear    pH, UA 7.5 5.0 - 8.0    Specific Gravity, UA 1.010 1.005 - 1.030    Glucose, UA Negative Negative    Ketones, UA Negative Negative    Bilirubin, UA Negative Negative    Blood, UA Negative Negative    Protein, UA  Negative Negative    Leuk Esterase, UA Small (1+) (A) Negative    Nitrite, UA Negative Negative    Urobilinogen, UA 0.2 E.U./dL 0.2 - 1.0 E.U./dL   CBC Auto Differential    Specimen: Blood   Result Value Ref Range    WBC 9.38 3.40 - 10.80 10*3/mm3    RBC 3.54 (L) 3.77 - 5.28 10*6/mm3    Hemoglobin 10.7 (L) 12.0 - 15.9 g/dL    Hematocrit 32.7 (L) 34.0 - 46.6 %    MCV 92.4 79.0 - 97.0 fL    MCH 30.2 26.6 - 33.0 pg    MCHC 32.7 31.5 - 35.7 g/dL    RDW 13.6 12.3 - 15.4 %    RDW-SD 46.5 37.0 - 54.0 fl    MPV 9.4 6.0 - 12.0 fL    Platelets 318 140 - 450 10*3/mm3    Neutrophil % 66.1 42.7 - 76.0 %    Lymphocyte % 23.3 19.6 - 45.3 %    Monocyte % 7.6 5.0 - 12.0 %    Eosinophil % 1.9 0.3 - 6.2 %    Basophil % 0.9 0.0 - 1.5 %    Immature Grans % 0.2 0.0 - 0.5 %    Neutrophils, Absolute 6.20 1.70 - 7.00 10*3/mm3    Lymphocytes, Absolute 2.19 0.70 - 3.10 10*3/mm3    Monocytes, Absolute 0.71 0.10 - 0.90 10*3/mm3    Eosinophils, Absolute 0.18 0.00 - 0.40 10*3/mm3    Basophils, Absolute 0.08 0.00 - 0.20 10*3/mm3    Immature Grans, Absolute 0.02 0.00 - 0.05 10*3/mm3    nRBC 0.0 0.0 - 0.2 /100 WBC   Urinalysis, Microscopic Only - Urine, Clean Catch    Specimen: Urine, Clean Catch   Result Value Ref Range    RBC, UA 0-2 None Seen, 0-2 /HPF    WBC, UA 3-5 (A) None Seen, 0-2 /HPF    Bacteria, UA 4+ (A) None Seen /HPF    Squamous Epithelial Cells, UA 3-6 (A) None Seen, 0-2 /HPF    Hyaline Casts, UA None Seen None Seen /LPF    Methodology Manual Light Microscopy    ECG 12 Lead QT Measurement   Result Value Ref Range    QT Interval 428 ms    QTC Interval 465 ms                                            Medical Decision Making  Case management notified our team that the patient will be admitted for nursing home placement.  Laboratory workup and imaging listed.  Recommend admission for further work up and treatment.  Hospitalist team consulted and made aware of the patient.  Consults and orders placed per hospitalist request.  Patient was  agreeable to admission plan.  Vitals stable on admission.    Problems Addressed:  Adult failure to thrive: complicated acute illness or injury  Debility: complicated acute illness or injury    Amount and/or Complexity of Data Reviewed  Labs: ordered.  Radiology: ordered.  ECG/medicine tests: ordered.    Risk  Decision regarding hospitalization.        Final diagnoses:   Adult failure to thrive   Debility       ED Disposition  ED Disposition       ED Disposition   Decision to Admit    Condition   --    Comment   Level of Care: Med/Surg [1]   Diagnosis: Debility [875924]   Admitting Physician: GALE HU [[id number]]   Attending Physician: GALE HU [[id number]]   Certification: I Certify That Inpatient Hospital Services Are Medically Necessary For Greater Than 2 Midnights                 No follow-up provider specified.       Medication List      No changes were made to your prescriptions during this visit.            Fransisco Marie,   04/26/24 2228       Fransisco Marie,   04/26/24 2234

## 2024-04-27 NOTE — PROGRESS NOTES
Pharmacy checked on price of Brilinta initiated inpatient. Per patient's plan, copay will be $0 for 1 month supply. No other issues identified at this time.    Thank you,    Conchis Mackenzie RPH  04/27/24  08:31 EDT

## 2024-04-27 NOTE — H&P
"Hospitalist History and Physical        Patient Identification  Name: Regine Beckham  Age/Sex: 68 y.o. female  :  1955        MRN: [id number]  Visit Number: [id number]  Admit Date: 2024   PCP: Dread Burton MD          Chief complaint fall at home    History of Present Illness:  Patient is a 68 y.o. female with history of anemia, anxiety/depression, arthritis, legally blind, restless leg syndrome, ZORAIDA, \"water retention\" with recent ECHO on 4/10/24 showing EF >70% and grade 1 diastolic dysfunction, and stroke in January of this year with residual left sided weakness and facial droop. She has been in and out of our hospital since then, both acute inpatient and inpatient rehab, since that time. Most recently she was admitted from -24 for debility, physical deconditioning, frequent falls, and failure to thrive. Rehab was strongly recommended, but per the last progress note from this stay, \"Patient is not agreeable to pay a daily copay for short term rehab and is not agreeable to long term placement now because she would have to give up her monthly social security check. She is checking with a friend to see if she can stay with her. She presents back to the ED today after falling at home. She reports she tried to get up from seated position with her walker, but her strength gave out and she fell on her left side. ED received report that patient's home was in very poor condition and she had no one to help care for her there. Therefore she was not safe for discharge home. Patient states that she just got her medicaid card which will pay for short term rehab, so she is now agreeable to go. Labs were fairly stable in the ED. UA did show 4+ bacteria but only 3-5 WBC and 3-6 squamous epithelial cells suggesting possible contamination. Upon further questioning, she did admit her urine was smelling somewhat foul but denied dysuria or cloudy urine. Urine culture is now pending. XR of left shoulder, " "left hip and both knees were obtained due to complaints of pain after her fall, but no acute fractures were appreciated. She is being admitted for further management of debility and ultimately for rehab placement.     Review of Systems  Review of Systems   Constitutional:  Positive for activity change and fatigue. Negative for appetite change, chills, diaphoresis, fever and unexpected weight change.   HENT:  Negative for congestion, postnasal drip, rhinorrhea, sinus pressure, sinus pain and sore throat.    Eyes:  Negative for photophobia and visual disturbance.   Respiratory:  Negative for cough, shortness of breath and wheezing.    Cardiovascular:  Negative for chest pain, palpitations and leg swelling.   Gastrointestinal:  Negative for abdominal distention, abdominal pain, constipation, diarrhea, nausea and vomiting.   Genitourinary:  Negative for difficulty urinating, dysuria, flank pain, frequency and hematuria.        Somewhat foul smelling urine   Musculoskeletal:  Positive for arthralgias (left shoulder, left hip and knee from fall) and back pain. Negative for joint swelling and myalgias.   Skin:  Negative for color change, pallor, rash and wound.   Neurological:  Negative for dizziness, seizures, light-headedness, numbness and headaches.   Hematological:  Negative for adenopathy. Does not bruise/bleed easily.   Psychiatric/Behavioral:  Negative for agitation, behavioral problems and confusion.        History  Past Medical History:   Diagnosis Date    Anemia     Anxiety     Arthritis     Depression     Legally blind     Restless leg syndrome     Sleep apnea     \"I don't use a cpap anymore since losing 106 lbs\"    Water retention      Past Surgical History:   Procedure Laterality Date    BACK SURGERY      CARDIAC CATHETERIZATION N/A 1/19/2024    Procedure: Percutaneous Manual Thrombectomy;  Surgeon: Efrain Hurley MD;  Location: [facility] INVASIVE LOCATION;  Service: Interventional Radiology;  " "Laterality: N/A;    CARDIAC CATHETERIZATION N/A 4/12/2024    Procedure: Left Heart Cath;  Surgeon: Susan Mederos MD;  Location: [facility] CATH INVASIVE LOCATION;  Service: Cardiology;  Laterality: N/A;    GALLBLADDER SURGERY      HIP ARTHROSCOPY      JOINT REPLACEMENT Right      Family History   Problem Relation Age of Onset    Breast cancer Neg Hx      Social History     Tobacco Use    Smoking status: Former     Current packs/day: 1.50     Average packs/day: 1.5 packs/day for 51.0 years (76.5 ttl pk-yrs)     Types: Cigarettes     Passive exposure: Past    Smokeless tobacco: Never   Vaping Use    Vaping status: Never Used   Substance Use Topics    Alcohol use: Not Currently     Alcohol/week: 1.0 standard drink of alcohol     Types: 1 Glasses of wine per week     Comment: \"occasionally - once every two months\"    Drug use: Not Currently     Comment: alcohol - occasionally     Medications Prior to Admission   Medication Sig Dispense Refill Last Dose    amLODIPine (NORVASC) 5 MG tablet Take 1 tablet by mouth Daily for 30 days. 30 tablet 0 Unknown    aspirin 81 MG EC tablet Take 1 tablet by mouth Daily.   Unknown    atorvastatin (LIPITOR) 80 MG tablet Take 1 tablet by mouth Every Night. 90 tablet 0 Unknown    gabapentin (NEURONTIN) 300 MG capsule Take 1 capsule by mouth 2 (Two) Times a Day.   Unknown    isosorbide mononitrate (IMDUR) 30 MG 24 hr tablet Take 1 tablet by mouth Daily for 30 days. 30 tablet 0 Unknown    losartan (COZAAR) 50 MG tablet Take 1 tablet by mouth Daily. Indications: High Blood Pressure Disorder   Unknown    metoprolol tartrate (LOPRESSOR) 25 MG tablet Take 1/2 tablet by mouth Every 12 (Twelve) Hours for 30 days. 30 tablet 0 Unknown    Mirabegron ER (MYRBETRIQ) 50 MG tablet sustained-release 24 hour 24 hr tablet Take 50 mg by mouth Daily. Indications: Urinary Urgency   Unknown    mirtazapine (REMERON) 30 MG tablet Take 1 tablet by mouth Every Night. Indications: Major Depressive Disorder 30 tablet 0 " Unknown    pantoprazole (PROTONIX) 40 MG EC tablet TAKE 1 TABLET BY MOUTH EVERY MORNING FOR HEARTBURN 90 tablet 0 Unknown    rOPINIRole (REQUIP) 4 MG tablet Take 1 tablet by mouth Every Night. Take 1 hour before bedtime.  Indications: Restless Leg Syndrome 30 tablet 0 Unknown    ticagrelor (BRILINTA) 60 MG tablet tablet Take 1 tablet by mouth 2 (Two) Times a Day. 60 tablet 0 Unknown     Allergies:  Penicillins    Objective     Vital Signs  Temp:  [98.2 °F (36.8 °C)] 98.2 °F (36.8 °C)  Heart Rate:  [76-83] 83  Resp:  [12-16] 12  BP: (116-121)/(60-66) 116/60  Body mass index is 24.37 kg/m².    Physical Exam:  Physical Exam  Constitutional:       General: She is not in acute distress.     Appearance: Normal appearance. She is ill-appearing (chronically so).   HENT:      Head: Normocephalic and atraumatic.      Right Ear: External ear normal.      Left Ear: External ear normal.      Nose: Nose normal.      Mouth/Throat:      Mouth: Mucous membranes are moist.      Pharynx: Oropharynx is clear.   Eyes:      Extraocular Movements: Extraocular movements intact.      Conjunctiva/sclera: Conjunctivae normal.      Pupils: Pupils are equal, round, and reactive to light.   Cardiovascular:      Rate and Rhythm: Normal rate and regular rhythm.      Pulses: Normal pulses.      Heart sounds: Normal heart sounds. No murmur heard.  Pulmonary:      Effort: Pulmonary effort is normal. No respiratory distress.      Breath sounds: Normal breath sounds. No wheezing or rales.   Abdominal:      General: Abdomen is flat. Bowel sounds are normal.      Palpations: Abdomen is soft.   Musculoskeletal:         General: Normal range of motion.      Cervical back: Normal range of motion and neck supple. No tenderness.      Right lower leg: No edema.      Left lower leg: No edema.   Lymphadenopathy:      Cervical: No cervical adenopathy.   Skin:     General: Skin is warm and dry.      Coloration: Skin is not jaundiced or pale.   Neurological:       "Mental Status: She is alert.      Comments: Alert, oriented to self and place but not year. Left facial droop noted which is from recent CVA. Also significant weakness (3/5 hand , 3/5 leg raise) on left side compared to 5/5 strength on right side.     Psychiatric:         Mood and Affect: Mood normal.         Behavior: Behavior normal.           Results Review:       Lab Results:  Results from last 7 days   Lab Units 04/26/24 2117 04/21/24 0214 04/20/24  1407   WBC 10*3/mm3 9.38 6.67 9.47   HEMOGLOBIN g/dL 10.7* 9.6* 11.3*   PLATELETS 10*3/mm3 318 249 284         Results from last 7 days   Lab Units 04/26/24 2117 04/22/24 0239 04/21/24 0214 04/20/24  1407   SODIUM mmol/L 140 144 143 142   POTASSIUM mmol/L 3.8 4.1 3.3* 3.7   CHLORIDE mmol/L 104 109* 108* 107   CO2 mmol/L 25.3 26.6 26.4 24.6   BUN mg/dL 14 15 13 12   CREATININE mg/dL 0.72 0.85 0.83 0.76   CALCIUM mg/dL 9.9 9.4 9.0 9.8   GLUCOSE mg/dL 123* 131* 149* 132*         No results found for: \"HGBA1C\"  Results from last 7 days   Lab Units 04/26/24 2117 04/20/24  1407   BILIRUBIN mg/dL 0.3 0.5   ALK PHOS U/L 150* 121*   AST (SGOT) U/L 36* 37*   ALT (SGPT) U/L 38* 29                       I have reviewed the patient's laboratory results.    Imaging:  Imaging Results (Last 72 Hours)       Procedure Component Value Units Date/Time    XR Hip With or Without Pelvis 2 - 3 View Left [[id number]] Collected: 04/26/24 2330     Updated: 04/26/24 2332    Narrative:      INDICATION: Pain and injury.     TECHNIQUE: One view of the pelvis. 2 views of the left hip. One view the  right hip.     COMPARISON: No relevant priors.     FINDINGS:   No acute fracture or dislocation. No lytic or blastic bony lesions seen.  Right hip hardware appears intact. Moderate left hip joint space loss.     Soft tissues appear unremarkable.       Impression:      No acute osseous abnormality evident.           This report was finalized on 4/26/2024 11:30 PM by Alex Pallas, DO.       XR " Knee 3 View Left [[id number]] Collected: 04/26/24 2329     Updated: 04/26/24 2332    Narrative:      INDICATION: Pain and injury.     TECHNIQUE: 3 views of the left knee.     COMPARISON: No relevant priors.     FINDINGS:   No acute fracture or dislocation. No lytic or blastic bony lesions seen.  Joint spaces are relatively preserved.     Soft tissues appear unremarkable.       Impression:      No acute osseous abnormality evident.        This report was finalized on 4/26/2024 11:30 PM by Alex Pallas, DO.       XR Shoulder 2+ View Left [[id number]] Collected: 04/26/24 2106     Updated: 04/26/24 2108    Narrative:      INDICATION: Pain and injury.     TECHNIQUE: 3 views of the left shoulder.     COMPARISON: No relevant priors.     FINDINGS:   No acute fracture or dislocation. No lytic or blastic bony lesions seen.  Mild glenohumeral joint space loss.     Soft tissues appear unremarkable.       Impression:      No acute osseous abnormality evident.        This report was finalized on 4/26/2024 9:06 PM by Alex Pallas, DO.       XR Knee 3 View Right [[id number]] Collected: 04/26/24 2105     Updated: 04/26/24 2108    Narrative:      INDICATION: Pain and injury.     TECHNIQUE: 3 views of the right knee.     COMPARISON: No relevant priors.     FINDINGS:   No acute fracture or dislocation. No lytic or blastic bony lesions seen.  Mild tricompartmental joint space loss. Chondrocalcinosis in the medial  and lateral compartments.     Soft tissues appear unremarkable.       Impression:      No acute osseous abnormality evident.        This report was finalized on 4/26/2024 9:06 PM by Alex Pallas, DO.       XR Chest 1 View [[id number]] Collected: 04/26/24 2049     Updated: 04/26/24 2051    Narrative:      INDICATION: Cough.     TECHNIQUE: Frontal radiograph of the chest.     COMPARISON: 4/9/2024.     FINDINGS:   The cardiomediastinal silhouette and pulmonary vasculature appear within  normal limits. Chronic appearing interstitial lung  markings. No  infiltrate, pleural effusion or pneumothorax. No acute osseous  abnormality evident.       Impression:      No acute cardiopulmonary process.        This report was finalized on 4/26/2024 8:49 PM by Alex Pallas, DO.               I have personally reviewed the patient's radiologic imaging.        EKG:   Undetermined rhythm, HR 71, QTc 465  Nonspecific T wave abnormality  Abnormal ECG  When compared with ECG of 15-APR-2024 09:32,  Current undetermined rhythm precludes rhythm comparison, needs review  Borderline criteria for Inferior infarct are no longer present  T wave inversion no longer evident in Lateral leads    I have personally reviewed the patient's EKG. No overt ST changes appreciated.         Assessment & Plan     - Debility, physical deconditioning, failure to thrive, frequent falls, recent CVA with residual left sided weakness: now agreeable to go to short term rehab since receiving medicaid care which will pay for rehab completely without co-pay. Consult PT and OT, along with case management to assist with arranging rehab.   - Mildly abnormal UA, reports of foul smelling urine: urine sent for culture, will follow up results. Monitor off antibiotics for now.     DVT Prophylaxis: SQ heparin    Estimated Length of Stay >2 midnights    I discussed the patient's findings, assessment and plan with the patient and ED provider Dr Marie.        Capo Su MD  04/26/24  23:54 EDT

## 2024-04-27 NOTE — PLAN OF CARE
Goal Outcome Evaluation:  Plan of Care Reviewed With: patient        Progress: no change  Outcome Evaluation: Patient has been resting in bed throughout the shift, PRN medication has been given for pain in legs, patient has had no other complaints or concerns at this time.

## 2024-04-27 NOTE — ED PROVIDER NOTES
"Subjective   History of Present Illness  Pt states that she has a hx of strokes and was seen in Jan. after stroke her deficits are left sided including left arm/ left leg/ and left sided facial droop/ Pt states that she fell today at home because she got caught between her bed and walker. Pt states that her neighbor call ems- pt lives alone and wants to be placed in a nursing home for help.  Most recently she was admitted from 4/20-4/25/24 for debility, physical deconditioning, frequent falls, and failure to thrive. Pt refused nursing home at that time.   Pt hs hx of anmeia, depression, anxiety, sleep apnea     History provided by:  Patient   used: No    Weakness - Generalized  Severity:  Moderate  Onset quality:  Sudden  Duration:  1 day  Timing:  Constant  Progression:  Worsening  Chronicity:  New  Relieved by:  Nothing  Worsened by:  Nothing  Ineffective treatments:  None tried      Review of Systems    Past Medical History:   Diagnosis Date    Anemia     Anxiety     Arthritis     Depression     Legally blind     Restless leg syndrome     Sleep apnea     \"I don't use a cpap anymore since losing 106 lbs\"    Water retention        Allergies   Allergen Reactions    Penicillins Hives and Swelling       Past Surgical History:   Procedure Laterality Date    BACK SURGERY      CARDIAC CATHETERIZATION N/A 1/19/2024    Procedure: Percutaneous Manual Thrombectomy;  Surgeon: Efrain Hurley MD;  Location:  [facility];  Service: Interventional Radiology;  Laterality: N/A;    CARDIAC CATHETERIZATION N/A 4/12/2024    Procedure: Left Heart Cath;  Surgeon: Susan Mederos MD;  Location:  [facility];  Service: Cardiology;  Laterality: N/A;    GALLBLADDER SURGERY      HIP ARTHROSCOPY      JOINT REPLACEMENT Right        Family History   Problem Relation Age of Onset    Breast cancer Neg Hx        Social History     Socioeconomic History    Marital status:     Number of " "children: 0    Years of education: 1 yr college   Tobacco Use    Smoking status: Former     Current packs/day: 1.50     Average packs/day: 1.5 packs/day for 51.0 years (76.5 ttl pk-yrs)     Types: Cigarettes     Passive exposure: Past    Smokeless tobacco: Never   Vaping Use    Vaping status: Never Used   Substance and Sexual Activity    Alcohol use: Not Currently     Alcohol/week: 1.0 standard drink of alcohol     Types: 1 Glasses of wine per week     Comment: \"occasionally - once every two months\"    Drug use: Not Currently     Comment: alcohol - occasionally    Sexual activity: Defer           Objective   Physical Exam  Vitals and nursing note reviewed.   Constitutional:       Appearance: She is well-developed.   HENT:      Head: Normocephalic.   Cardiovascular:      Rate and Rhythm: Normal rate and regular rhythm.   Pulmonary:      Effort: Pulmonary effort is normal.      Breath sounds: Normal breath sounds.   Abdominal:      General: Bowel sounds are normal.      Palpations: Abdomen is soft.      Tenderness: There is no abdominal tenderness.   Musculoskeletal:         General: Normal range of motion.      Cervical back: Neck supple.   Skin:     General: Skin is warm and dry.   Neurological:      Mental Status: She is alert and oriented to person, place, and time.   Psychiatric:         Behavior: Behavior normal.         Thought Content: Thought content normal.         Judgment: Judgment normal.         Procedures           ED Course      Results for orders placed or performed during the hospital encounter of 04/20/24   Urine Culture - Urine, Urine, Clean Catch    Specimen: Urine, Clean Catch   Result Value Ref Range    Urine Culture >100,000 CFU/mL Normal Urogenital Lorraine    Comprehensive Metabolic Panel    Specimen: Blood   Result Value Ref Range    Glucose 132 (H) 65 - 99 mg/dL    BUN 12 8 - 23 mg/dL    Creatinine 0.76 0.57 - 1.00 mg/dL    Sodium 142 136 - 145 mmol/L    Potassium 3.7 3.5 - 5.2 mmol/L    " Chloride 107 98 - 107 mmol/L    CO2 24.6 22.0 - 29.0 mmol/L    Calcium 9.8 8.6 - 10.5 mg/dL    Total Protein 7.1 6.0 - 8.5 g/dL    Albumin 3.6 3.5 - 5.2 g/dL    ALT (SGPT) 29 1 - 33 U/L    AST (SGOT) 37 (H) 1 - 32 U/L    Alkaline Phosphatase 121 (H) 39 - 117 U/L    Total Bilirubin 0.5 0.0 - 1.2 mg/dL    Globulin 3.5 gm/dL    A/G Ratio 1.0 g/dL    BUN/Creatinine Ratio 15.8 7.0 - 25.0    Anion Gap 10.4 5.0 - 15.0 mmol/L    eGFR 84.9 >60.0 mL/min/1.73   Urinalysis With Culture If Indicated - Urine, Clean Catch    Specimen: Urine, Clean Catch   Result Value Ref Range    Color, UA Yellow Yellow, Straw    Appearance, UA Turbid (A) Clear    pH, UA 7.5 5.0 - 8.0    Specific Gravity, UA 1.016 1.005 - 1.030    Glucose, UA Negative Negative    Ketones, UA Negative Negative    Bilirubin, UA Negative Negative    Blood, UA Negative Negative    Protein, UA Negative Negative    Leuk Esterase, UA Small (1+) (A) Negative    Nitrite, UA Negative Negative    Urobilinogen, UA 1.0 E.U./dL 0.2 - 1.0 E.U./dL   CBC Auto Differential    Specimen: Blood   Result Value Ref Range    WBC 9.47 3.40 - 10.80 10*3/mm3    RBC 3.69 (L) 3.77 - 5.28 10*6/mm3    Hemoglobin 11.3 (L) 12.0 - 15.9 g/dL    Hematocrit 35.0 34.0 - 46.6 %    MCV 94.9 79.0 - 97.0 fL    MCH 30.6 26.6 - 33.0 pg    MCHC 32.3 31.5 - 35.7 g/dL    RDW 13.8 12.3 - 15.4 %    RDW-SD 48.1 37.0 - 54.0 fl    MPV 9.8 6.0 - 12.0 fL    Platelets 284 140 - 450 10*3/mm3    Neutrophil % 71.2 42.7 - 76.0 %    Lymphocyte % 17.7 (L) 19.6 - 45.3 %    Monocyte % 8.1 5.0 - 12.0 %    Eosinophil % 2.1 0.3 - 6.2 %    Basophil % 0.6 0.0 - 1.5 %    Immature Grans % 0.3 0.0 - 0.5 %    Neutrophils, Absolute 6.73 1.70 - 7.00 10*3/mm3    Lymphocytes, Absolute 1.68 0.70 - 3.10 10*3/mm3    Monocytes, Absolute 0.77 0.10 - 0.90 10*3/mm3    Eosinophils, Absolute 0.20 0.00 - 0.40 10*3/mm3    Basophils, Absolute 0.06 0.00 - 0.20 10*3/mm3    Immature Grans, Absolute 0.03 0.00 - 0.05 10*3/mm3    nRBC 0.0 0.0 - 0.2 /100  WBC   Urinalysis, Microscopic Only - Urine, Clean Catch    Specimen: Urine, Clean Catch   Result Value Ref Range    RBC, UA None Seen None Seen, 0-2 /HPF    WBC, UA 6-10 (A) None Seen, 0-2 /HPF    Bacteria, UA 1+ (A) None Seen /HPF    Squamous Epithelial Cells, UA 0-2 None Seen, 0-2 /HPF    Hyaline Casts, UA None Seen None Seen /LPF    Methodology Manual Light Microscopy    CBC (No Diff)    Specimen: Blood   Result Value Ref Range    WBC 6.67 3.40 - 10.80 10*3/mm3    RBC 3.22 (L) 3.77 - 5.28 10*6/mm3    Hemoglobin 9.6 (L) 12.0 - 15.9 g/dL    Hematocrit 30.2 (L) 34.0 - 46.6 %    MCV 93.8 79.0 - 97.0 fL    MCH 29.8 26.6 - 33.0 pg    MCHC 31.8 31.5 - 35.7 g/dL    RDW 13.9 12.3 - 15.4 %    RDW-SD 47.8 37.0 - 54.0 fl    MPV 9.8 6.0 - 12.0 fL    Platelets 249 140 - 450 10*3/mm3   Basic Metabolic Panel    Specimen: Blood   Result Value Ref Range    Glucose 149 (H) 65 - 99 mg/dL    BUN 13 8 - 23 mg/dL    Creatinine 0.83 0.57 - 1.00 mg/dL    Sodium 143 136 - 145 mmol/L    Potassium 3.3 (L) 3.5 - 5.2 mmol/L    Chloride 108 (H) 98 - 107 mmol/L    CO2 26.4 22.0 - 29.0 mmol/L    Calcium 9.0 8.6 - 10.5 mg/dL    BUN/Creatinine Ratio 15.7 7.0 - 25.0    Anion Gap 8.6 5.0 - 15.0 mmol/L    eGFR 76.4 >60.0 mL/min/1.73   Basic Metabolic Panel    Specimen: Blood   Result Value Ref Range    Glucose 131 (H) 65 - 99 mg/dL    BUN 15 8 - 23 mg/dL    Creatinine 0.85 0.57 - 1.00 mg/dL    Sodium 144 136 - 145 mmol/L    Potassium 4.1 3.5 - 5.2 mmol/L    Chloride 109 (H) 98 - 107 mmol/L    CO2 26.6 22.0 - 29.0 mmol/L    Calcium 9.4 8.6 - 10.5 mg/dL    BUN/Creatinine Ratio 17.6 7.0 - 25.0    Anion Gap 8.4 5.0 - 15.0 mmol/L    eGFR 74.3 >60.0 mL/min/1.73   Green Top (Gel)   Result Value Ref Range    Extra Tube Hold for add-ons.    Lavender Top   Result Value Ref Range    Extra Tube hold for add-on    Gold Top - SST   Result Value Ref Range    Extra Tube Hold for add-ons.    Light Blue Top   Result Value Ref Range    Extra Tube Hold for add-ons.                                   XR Hip With or Without Pelvis 2 - 3 View Left   Final Result   1. Left hip osteoarthrosis with a prominent rim of osteophytes at the   femoral head neck junction.   2. No definite acute fracture. Given the degree of osteopenia, if there   is persistent strong clinical concern for fracture, MRI should be   considered for further evaluation.       This report was finalized on 4/20/2024 7:44 PM by Kiko Barney MD.          XR Shoulder 2+ View Left   Final Result       1.  No acute fracture or dislocation.   2.  Mild osteoarthritis.       This report was finalized on 4/20/2024 4:16 PM by Michael Abarca MD.                     Medical Decision Making  Pt states that she has a hx of strokes and was seen in Jan. after stroke her deficits are left sided including left arm/ left leg/ and left sided facial droop/ Pt states that she fell today at home because she got caught between her bed and walker. Pt states that her neighbor call ems- pt lives alone and wants to be placed in a nursing home for help.  Most recently she was admitted from 4/20-4/25/24 for debility, physical deconditioning, frequent falls, and failure to thrive. Pt refused nursing home at that time.   Pt hs hx of anmeia, depression, anxiety, sleep apnea       Amount and/or Complexity of Data Reviewed  Labs: ordered.  Radiology: ordered.  Discussion of management or test interpretation with external provider(s): Discussed with [facility] will admit     Risk  Decision regarding hospitalization.        Final diagnoses:   Failure to thrive in adult   Weakness       ED Disposition  ED Disposition       ED Disposition   Decision to Admit    Condition   --    Comment   Level of Care: Med/Surg [1]   Diagnosis: Failure to thrive in adult [169909]                 Dread Burton MD  [address] [place], [place] [id number]  [phone]      within 1 week         Medication List      No changes were made to your prescriptions during this visit.             Nikole Bolivar PA  04/27/24 1302       Nikole Bolivar PA  04/27/24 2048

## 2024-04-28 LAB — BACTERIA SPEC AEROBE CULT: NORMAL

## 2024-04-28 PROCEDURE — 99231 SBSQ HOSP IP/OBS SF/LOW 25: CPT | Performed by: STUDENT IN AN ORGANIZED HEALTH CARE EDUCATION/TRAINING PROGRAM

## 2024-04-28 PROCEDURE — 25010000002 HEPARIN (PORCINE) PER 1000 UNITS: Performed by: HOSPITALIST

## 2024-04-28 RX ADMIN — DOCUSATE SODIUM 50 MG AND SENNOSIDES 8.6 MG 2 TABLET: 8.6; 5 TABLET, FILM COATED ORAL at 22:11

## 2024-04-28 RX ADMIN — GABAPENTIN 300 MG: 300 CAPSULE ORAL at 08:32

## 2024-04-28 RX ADMIN — TRAMADOL HYDROCHLORIDE 50 MG: 50 TABLET ORAL at 12:35

## 2024-04-28 RX ADMIN — TICAGRELOR 60 MG: 60 TABLET ORAL at 08:33

## 2024-04-28 RX ADMIN — PANTOPRAZOLE SODIUM 40 MG: 40 TABLET, DELAYED RELEASE ORAL at 05:26

## 2024-04-28 RX ADMIN — TICAGRELOR 60 MG: 60 TABLET ORAL at 22:12

## 2024-04-28 RX ADMIN — METOPROLOL TARTRATE 12.5 MG: 25 TABLET, FILM COATED ORAL at 22:12

## 2024-04-28 RX ADMIN — MIRABEGRON 50 MG: 25 TABLET, FILM COATED, EXTENDED RELEASE ORAL at 08:33

## 2024-04-28 RX ADMIN — ASPIRIN 81 MG: 81 TABLET, COATED ORAL at 08:33

## 2024-04-28 RX ADMIN — AMLODIPINE BESYLATE 5 MG: 5 TABLET ORAL at 08:33

## 2024-04-28 RX ADMIN — NITROFURANTOIN MONOHYDRATE/MACROCRYSTALLINE 100 MG: 25; 75 CAPSULE ORAL at 22:12

## 2024-04-28 RX ADMIN — HEPARIN SODIUM 5000 UNITS: 5000 INJECTION INTRAVENOUS; SUBCUTANEOUS at 22:13

## 2024-04-28 RX ADMIN — MIRTAZAPINE 30 MG: 15 TABLET, FILM COATED ORAL at 22:11

## 2024-04-28 RX ADMIN — ISOSORBIDE MONONITRATE 30 MG: 30 TABLET, EXTENDED RELEASE ORAL at 08:33

## 2024-04-28 RX ADMIN — Medication 10 ML: at 22:13

## 2024-04-28 RX ADMIN — ACETAMINOPHEN 500 MG: 500 TABLET ORAL at 17:20

## 2024-04-28 RX ADMIN — METOPROLOL TARTRATE 12.5 MG: 25 TABLET, FILM COATED ORAL at 08:33

## 2024-04-28 RX ADMIN — ATORVASTATIN CALCIUM 80 MG: 40 TABLET, FILM COATED ORAL at 22:11

## 2024-04-28 RX ADMIN — GABAPENTIN 300 MG: 300 CAPSULE ORAL at 22:12

## 2024-04-28 RX ADMIN — Medication 10 ML: at 08:34

## 2024-04-28 RX ADMIN — HEPARIN SODIUM 5000 UNITS: 5000 INJECTION INTRAVENOUS; SUBCUTANEOUS at 08:32

## 2024-04-28 RX ADMIN — ROPINIROLE HYDROCHLORIDE 4 MG: 1 TABLET, FILM COATED ORAL at 22:20

## 2024-04-28 RX ADMIN — LOSARTAN POTASSIUM 50 MG: 50 TABLET, FILM COATED ORAL at 08:33

## 2024-04-28 RX ADMIN — NITROFURANTOIN MONOHYDRATE/MACROCRYSTALLINE 100 MG: 25; 75 CAPSULE ORAL at 08:33

## 2024-04-28 NOTE — PLAN OF CARE
Goal Outcome Evaluation:              Outcome Evaluation: Patient has rested in bed this shift,  PRN medication given for complaints of pain,  no other complaints or request at this time, VSS, Will continue plan of care.

## 2024-04-28 NOTE — PROGRESS NOTES
[facility]     Patient Identification:  Name:  Regine Beckham  Age:  68 y.o.  Sex:  female  :  1955  MRN:  [id number]  Visit Number:  [id number]  ROOM: [address] [place] [address]     Primary Care Provider:  Dread Burton MD     Date of Admission: 2024    Length of stay in inpatient status:  2    Subjective     Chief Compliant:    Chief Complaint   Patient presents with    Fall       No acute events overnight.         Objective       Vital Signs:  Temp:  [97.6 °F (36.4 °C)-98.7 °F (37.1 °C)] 98.4 °F (36.9 °C)  Heart Rate:  [64-89] 72  Resp:  [18] 18  BP: (111-131)/(57-76) 131/57  SpO2:  [93 %-95 %] 93 %  on   ;   Device (Oxygen Therapy): room air  Body mass index is 23.66 kg/m².      ----------------------------------------------------------------------------------------------------------------------  Physical Exam  Vitals and nursing note reviewed.   Constitutional:       General: She is not in acute distress.  HENT:      Head: Normocephalic and atraumatic.   Eyes:      General: No scleral icterus.     Extraocular Movements: Extraocular movements intact.   Pulmonary:      Effort: Pulmonary effort is normal. No respiratory distress.   Abdominal:      Tenderness: There is no abdominal tenderness.   Musculoskeletal:      Right lower leg: No edema.      Left lower leg: No edema.   Neurological:      Mental Status: She is alert.      Motor: Weakness present.   Psychiatric:         Mood and Affect: Mood normal.         Behavior: Behavior normal.       ----------------------------------------------------------------------------------------------------------------------          Assessment & Plan      #Acute on chronic debility secondary to previous CVA  -Residual left sided weakness, initially resistant to IPR due to co-pay but with insurance changes is now agreeable  -IPR consult placed vs snf    #Bacteruria, simple cystitis  -On UA bacteria concentration increasing across prior  samples. Mild urgency reported, unsure reliability to report and more detailed hx  -Started Macrobid x5 days to cover  -urine cx normal UGF        Code Status and Medical Interventions:   Ordered at: 04/26/24 7419     Code Status (Patient has no pulse and is not breathing):    CPR (Attempt to Resuscitate)     Medical Interventions (Patient has pulse or is breathing):    Full Support         Disposition: pend ipr    I have reviewed any copied/forwarded text or data, verified its accuracy, and updated as necessary above.    Matheus Lundberg MD  [facility] Hospitalist  04/28/24  17:08 EDT

## 2024-04-28 NOTE — PLAN OF CARE
Goal Outcome Evaluation:  Plan of Care Reviewed With: patient           Outcome Evaluation: patient has been restless all day has rung out frequently vss no acute changes will continue plan of care

## 2024-04-28 NOTE — OUTREACH NOTE
Medical Week 1 Survey      Flowsheet Row Responses   [facility] facility patient discharged from? [facility]   Does the patient have one of the following disease processes/diagnoses(primary or secondary)? Other   Week 1 attempt successful? No   Unsuccessful attempts Attempt 2   Revoke Readmitted            LYSSA CROWELL - Registered Nurse

## 2024-04-29 PROCEDURE — 97162 PT EVAL MOD COMPLEX 30 MIN: CPT

## 2024-04-29 PROCEDURE — 25010000002 HEPARIN (PORCINE) PER 1000 UNITS: Performed by: HOSPITALIST

## 2024-04-29 PROCEDURE — 99231 SBSQ HOSP IP/OBS SF/LOW 25: CPT | Performed by: INTERNAL MEDICINE

## 2024-04-29 PROCEDURE — 97166 OT EVAL MOD COMPLEX 45 MIN: CPT

## 2024-04-29 RX ADMIN — NITROFURANTOIN MONOHYDRATE/MACROCRYSTALLINE 100 MG: 25; 75 CAPSULE ORAL at 20:28

## 2024-04-29 RX ADMIN — HEPARIN SODIUM 5000 UNITS: 5000 INJECTION INTRAVENOUS; SUBCUTANEOUS at 09:52

## 2024-04-29 RX ADMIN — Medication 10 ML: at 20:32

## 2024-04-29 RX ADMIN — Medication 10 ML: at 09:52

## 2024-04-29 RX ADMIN — TICAGRELOR 60 MG: 60 TABLET ORAL at 20:28

## 2024-04-29 RX ADMIN — TICAGRELOR 60 MG: 60 TABLET ORAL at 09:51

## 2024-04-29 RX ADMIN — AMLODIPINE BESYLATE 5 MG: 5 TABLET ORAL at 09:51

## 2024-04-29 RX ADMIN — PANTOPRAZOLE SODIUM 40 MG: 40 TABLET, DELAYED RELEASE ORAL at 05:56

## 2024-04-29 RX ADMIN — GABAPENTIN 300 MG: 300 CAPSULE ORAL at 10:02

## 2024-04-29 RX ADMIN — GABAPENTIN 300 MG: 300 CAPSULE ORAL at 20:28

## 2024-04-29 RX ADMIN — ISOSORBIDE MONONITRATE 30 MG: 30 TABLET, EXTENDED RELEASE ORAL at 09:50

## 2024-04-29 RX ADMIN — METOPROLOL TARTRATE 12.5 MG: 25 TABLET, FILM COATED ORAL at 20:28

## 2024-04-29 RX ADMIN — ATORVASTATIN CALCIUM 80 MG: 40 TABLET, FILM COATED ORAL at 20:29

## 2024-04-29 RX ADMIN — HEPARIN SODIUM 5000 UNITS: 5000 INJECTION INTRAVENOUS; SUBCUTANEOUS at 20:32

## 2024-04-29 RX ADMIN — NITROFURANTOIN MONOHYDRATE/MACROCRYSTALLINE 100 MG: 25; 75 CAPSULE ORAL at 09:50

## 2024-04-29 RX ADMIN — MIRTAZAPINE 30 MG: 15 TABLET, FILM COATED ORAL at 20:28

## 2024-04-29 RX ADMIN — MIRABEGRON 50 MG: 25 TABLET, FILM COATED, EXTENDED RELEASE ORAL at 09:51

## 2024-04-29 RX ADMIN — LOSARTAN POTASSIUM 50 MG: 50 TABLET, FILM COATED ORAL at 09:50

## 2024-04-29 RX ADMIN — ASPIRIN 81 MG: 81 TABLET, COATED ORAL at 09:51

## 2024-04-29 RX ADMIN — ROPINIROLE HYDROCHLORIDE 4 MG: 1 TABLET, FILM COATED ORAL at 20:34

## 2024-04-29 RX ADMIN — METOPROLOL TARTRATE 12.5 MG: 25 TABLET, FILM COATED ORAL at 09:52

## 2024-04-29 RX ADMIN — ACETAMINOPHEN 500 MG: 500 TABLET ORAL at 16:14

## 2024-04-29 RX ADMIN — TRAMADOL HYDROCHLORIDE 50 MG: 50 TABLET ORAL at 05:56

## 2024-04-29 NOTE — THERAPY EVALUATION
"Patient Name: Regine Beckham  : 1955    MRN: [id number]                              Today's Date: 2024       Admit Date: 2024    Visit Dx:     ICD-10-CM ICD-9-CM   1. Adult failure to thrive  R62.7 783.7   2. Debility  R53.81 799.3     Patient Active Problem List   Diagnosis    Iron deficiency anemia due to chronic blood loss    Major depressive disorder    RLS (restless legs syndrome)    GERD (gastroesophageal reflux disease)    Left breast mass    Allergy to penicillin    Stroke    Grade I diastolic dysfunction    Moderate malnutrition    Falls frequently    Stroke-like symptoms    Debility    Chest pain    Failure to thrive in adult     Past Medical History:   Diagnosis Date    Anemia     Anxiety     Arthritis     Depression     Legally blind     Restless leg syndrome     Sleep apnea     \"I don't use a cpap anymore since losing 106 lbs\"    Water retention      Past Surgical History:   Procedure Laterality Date    BACK SURGERY      CARDIAC CATHETERIZATION N/A 2024    Procedure: Percutaneous Manual Thrombectomy;  Surgeon: Efrain Hurley MD;  Location:  [facility];  Service: Interventional Radiology;  Laterality: N/A;    CARDIAC CATHETERIZATION N/A 2024    Procedure: Left Heart Cath;  Surgeon: Susan Mederos MD;  Location:  [facility];  Service: Cardiology;  Laterality: N/A;    GALLBLADDER SURGERY      HIP ARTHROSCOPY      JOINT REPLACEMENT Right       General Information       Row Name 24 1453          OT Time and Intention    Document Type evaluation  -     Mode of Treatment individual therapy;occupational therapy  -       Row Name 24 1453          General Information    Patient Profile Reviewed yes  -     Prior Level of Function --  patient was independent prior to 6 months but experienced CVA which has impacted functional performance; multiple recent hospitalizations with recommendation of skilled services/therapy but " patient discharged home alone with subsequent falls  -     Existing Precautions/Restrictions fall  -     Barriers to Rehab previous functional deficit  -       Row Name 04/29/24 1453          Occupational Profile    Reason for Services/Referral (Occupational Profile) Patient was admitted to [facility] on 4/26/2024. She was referred for OT evaluation due to change in functional performance with ADLs, functional mobility, and/or transfers.  -       Row Name 04/29/24 1453          Living Environment    People in Home alone  -[facility] Name 04/29/24 1453          Cognition    Orientation Status (Cognition) oriented to;person;place;situation  -[facility] Name 04/29/24 1453          Safety Issues, Functional Mobility    Safety Issues Affecting Function (Mobility) insight into deficits/self-awareness  -     Impairments Affecting Function (Mobility) balance;endurance/activity tolerance;muscle tone abnormal;range of motion (ROM);strength;coordination  -               User Key  (r) = Recorded By, (t) = Taken By, (c) = Cosigned By      Initials Name Provider Type     Maria Del Rosario Padgett, OT Occupational Therapist                     Mobility/ADL's       [facility] Name 04/29/24 1503          Bed Mobility    Bed Mobility supine-sit;sit-supine  -     Supine-Sit [place] (Bed Mobility) moderate assist (50% patient effort);2 person assist;verbal cues  -     Sit-Supine [place] (Bed Mobility) moderate assist (50% patient effort);2 person assist;verbal cues  -     Bed Mobility, Safety Issues decreased use of arms for pushing/pulling;decreased use of legs for bridging/pushing  -     Assistive Device (Bed Mobility) bed rails;draw sheet;head of bed elevated  -[facility] Name 04/29/24 1503          Transfers    Transfers sit-stand transfer;stand-sit transfer  -[facility] Name 04/29/24 1503          Sit-Stand Transfer    Sit-Stand [place] (Transfers) moderate assist (50% patient effort);2 person  assist;verbal cues  -     Comment, (Sit-Stand Transfer) handheld assist  -KP       Row Name 04/29/24 1503          Stand-Sit Transfer    Stand-Sit [place] (Transfers) moderate assist (50% patient effort);2 person assist;verbal cues  -     Comment, (Stand-Sit Transfer) handheld assist  -KP       Row Name 04/29/24 1503          Activities of Daily Living    BADL Assessment/Intervention bathing;upper body dressing;lower body dressing;grooming;toileting  -KP       Row Name 04/29/24 1503          Bathing Assessment/Intervention    [place] Level (Bathing) bathing skills;maximum assist (25% patient effort)  -KP       Row Name 04/29/24 1503          Upper Body Dressing Assessment/Training    [place] Level (Upper Body Dressing) upper body dressing skills;maximum assist (25% patient effort)  -KP       Row Name 04/29/24 1503          Lower Body Dressing Assessment/Training    [place] Level (Lower Body Dressing) lower body dressing skills;maximum assist (25% patient effort)  -KP       Row Name 04/29/24 1503          Grooming Assessment/Training    [place] Level (Grooming) grooming skills;maximum assist (25% patient effort)  -KP       Row Name 04/29/24 1503          Toileting Assessment/Training    [place] Level (Toileting) toileting skills;maximum assist (25% patient effort)  -               User Key  (r) = Recorded By, (t) = Taken By, (c) = Cosigned By      Initials Name Provider Type     Maria Del Rosario Padgett OT Occupational Therapist                   Obj/Interventions       Row Name 04/29/24 1508          Vision Assessment/Intervention    Visual Impairment/Limitations WFL;corrective lenses for reading  -KP       Row Name 04/29/24 1508          Range of Motion Comprehensive    Comment, General Range of Motion LUE limited due to prior CVA; RUE WFLs  -KP       Row Name 04/29/24 1508          Strength Comprehensive (MMT)    Comment, General Manual Muscle Testing (MMT) Assessment LUE 2/5, RUE 4/5   -       Row Name 04/29/24 1508          Motor Skills    Motor Skills coordination;functional endurance;muscle tone  -     Coordination fine motor deficit;gross motor deficit;left;upper extremity;severe impairment  -     Functional Endurance fair  -     Muscle Tone left;upper extremity(s);lower extremity(s);moderate impairment;hypertonia  -[facility] Name 04/29/24 1508          Balance    Balance Assessment sitting static balance;standing static balance  -     Static Sitting Balance standby assist  -     Static Standing Balance minimal assist;moderate assist  -               User Key  (r) = Recorded By, (t) = Taken By, (c) = Cosigned By      Initials Name Provider Type     Maria Del Rosario Padgett, OT Occupational Therapist                   Goals/Plan       [facility] Name 04/29/24 1512          Transfer Goal 1 (OT)    Activity/Assistive Device (Transfer Goal 1, OT) toilet  -     [place] Level/Cues Needed (Transfer Goal 1, OT) minimum assist (75% or more patient effort)  -     Time Frame (Transfer Goal 1, OT) by discharge  -[facility] Name 04/29/24 1512          Dressing Goal 1 (OT)    Activity/Device (Dressing Goal 1, OT) dressing skills, all  -     Omega/Cues Needed (Dressing Goal 1, OT) moderate assist (50-74% patient effort)  -     Time Frame (Dressing Goal 1, OT) by discharge  -[facility] Name 04/29/24 1512          ROM Goal 1 (OT)    ROM Goal 1 (OT) Patient will tolerate ROM to LUE X1 a day to prevent contracture formation.  -     Time Frame (ROM Goal 1, OT) by discharge  -       Row Name 04/29/24 1512          Therapy Assessment/Plan (OT)    Planned Therapy Interventions (OT) activity tolerance training;BADL retraining;passive ROM/stretching;functional balance retraining;transfer/mobility retraining;occupation/activity based interventions;strengthening exercise;ROM/therapeutic exercise;patient/caregiver education/training;neuromuscular control/coordination retraining;manual  therapy/joint mobilization  -               User Key  (r) = Recorded By, (t) = Taken By, (c) = Cosigned By      Initials Name Provider Type    Maria Del Rosario Briceño, BLU Occupational Therapist                   Clinical Impression       Row Name 04/29/24 1510          Pain Assessment    Pretreatment Pain Rating 1/10  -     Posttreatment Pain Rating 1/10  -     Pain Location generalized  -       Row Name 04/29/24 1510          Plan of Care Review    Plan of Care Reviewed With patient  -     Progress no change  -     Outcome Evaluation Patient seen for OT evaluation. She presents with functional limitations including generalized weakness, impaired balance, limited ROM in LUE, abnormal tone in LUE, and impaired activity tolerance/functional endurance (left affected greater than right due to prior CVA). Patient would benefit from ongoing OT services to promote highest level of independence and safety with daily occupations prior to discharge.  -       Row Name 04/29/24 1510          Therapy Assessment/Plan (OT)    Patient/Family Therapy Goal Statement (OT) be as independent as possible  -     Rehab Potential (OT) good, to achieve stated therapy goals  -     Criteria for Skilled Therapeutic Interventions Met (OT) yes;meets criteria;skilled treatment is necessary  -     Therapy Frequency (OT) 3 times/wk  3-5x/week as able and available  -     Predicted Duration of Therapy Intervention (OT) discharge  -       Row Name 04/29/24 1510          Therapy Plan Review/Discharge Plan (OT)    Anticipated Discharge Disposition (OT) skilled nursing facility  -       Row Name 04/29/24 1510          Positioning and Restraints    Pre-Treatment Position in bed  -     Post Treatment Position bed  -     In Bed fowlers;call light within reach;encouraged to call for assist;exit alarm on  -               User Key  (r) = Recorded By, (t) = Taken By, (c) = Cosigned By      Initials Name Provider Type    TATO Padgett  BLU Mix Occupational Therapist                   Outcome Measures       Row Name 04/29/24 0952          How much help from another person do you currently need...    Turning from your back to your side while in flat bed without using bedrails? 3  -OW     Moving from lying on back to sitting on the side of a flat bed without bedrails? 3  -OW     Moving to and from a bed to a chair (including a wheelchair)? 3  -OW     Standing up from a chair using your arms (e.g., wheelchair, bedside chair)? 3  -OW     Climbing 3-5 steps with a railing? 2  -OW     To walk in hospital room? 2  -OW     AM-PAC 6 Clicks Score (PT) 16  -OW     Highest Level of Mobility Goal 5 --> Static standing  -OW               User Key  (r) = Recorded By, (t) = Taken By, (c) = Cosigned By      Initials Name Provider Type    Lucita Bourgeois, RN Registered Nurse                      OT Recommendation and Plan  Planned Therapy Interventions (OT): activity tolerance training, BADL retraining, passive ROM/stretching, functional balance retraining, transfer/mobility retraining, occupation/activity based interventions, strengthening exercise, ROM/therapeutic exercise, patient/caregiver education/training, neuromuscular control/coordination retraining, manual therapy/joint mobilization  Therapy Frequency (OT): 3 times/wk (3-5x/week as able and available)  Plan of Care Review  Plan of Care Reviewed With: patient  Progress: no change  Outcome Evaluation: Patient seen for OT evaluation. She presents with functional limitations including generalized weakness, impaired balance, limited ROM in LUE, abnormal tone in LUE, and impaired activity tolerance/functional endurance (left affected greater than right due to prior CVA). Patient would benefit from ongoing OT services to promote highest level of independence and safety with daily occupations prior to discharge.     Time Calculation:         Time Calculation- OT       Row Name 04/29/24 2478             Time  Calculation- OT    OT Received On 04/29/24  -                User Key  (r) = Recorded By, (t) = Taken By, (c) = Cosigned By      Initials Name Provider Type    Maria Del Rosario Briceño OT Occupational Therapist                  Therapy Charges for Today       Code Description Service Date Service Provider Modifiers Qty    34744798287 HC OT EVAL MOD COMPLEXITY 4 4/29/2024 Maria Del Rosario Padgett OT GO 1                 Maria Del Rosario Padgett OT  4/29/2024

## 2024-04-29 NOTE — PLAN OF CARE
Goal Outcome Evaluation:           Progress: no change  Outcome Evaluation: Patient is in bed at this time. Pt has ambulated to bathroom this shift. No acute changes or complaints per pt at this time. VSS on RA. Will continue POC.

## 2024-04-29 NOTE — PLAN OF CARE
Goal Outcome Evaluation:  Plan of Care Reviewed With: patient           Outcome Evaluation: Pt resting in bed. No acute changes noted at this time

## 2024-04-29 NOTE — THERAPY EVALUATION
"Acute Care - Physical Therapy Initial Evaluation   [place]     Patient Name: Regine Beckham  : 1955  MRN: [id number]  Today's Date: 2024   Onset of Illness/Injury or Date of Surgery: 24  Visit Dx:     ICD-10-CM ICD-9-CM   1. Adult failure to thrive  R62.7 783.7   2. Debility  R53.81 799.3     Patient Active Problem List   Diagnosis    Iron deficiency anemia due to chronic blood loss    Major depressive disorder    RLS (restless legs syndrome)    GERD (gastroesophageal reflux disease)    Left breast mass    Allergy to penicillin    Stroke    Grade I diastolic dysfunction    Moderate malnutrition    Falls frequently    Stroke-like symptoms    Debility    Chest pain    Failure to thrive in adult     Past Medical History:   Diagnosis Date    Anemia     Anxiety     Arthritis     Depression     Legally blind     Restless leg syndrome     Sleep apnea     \"I don't use a cpap anymore since losing 106 lbs\"    Water retention      Past Surgical History:   Procedure Laterality Date    BACK SURGERY      CARDIAC CATHETERIZATION N/A 2024    Procedure: Percutaneous Manual Thrombectomy;  Surgeon: Efrain Hurley MD;  Location:  [facility];  Service: Interventional Radiology;  Laterality: N/A;    CARDIAC CATHETERIZATION N/A 2024    Procedure: Left Heart Cath;  Surgeon: Susan Mederos MD;  [facility]:  [facility];  Service: Cardiology;  Laterality: N/A;    GALLBLADDER SURGERY      HIP ARTHROSCOPY      JOINT REPLACEMENT Right      PT Assessment (Last 12 Hours)       PT Evaluation and Treatment       Row Name 24 1305          Physical Therapy Time and Intention    Document Type evaluation  -     Mode of Treatment physical therapy  -     Patient Effort good  -       Row Name 24 7922          General Information    Patient Profile Reviewed yes  -     Onset of Illness/Injury or Date of Surgery 24  -     Referring Physician Georges Rudolph     " Patient Observations cooperative;alert;agree to therapy  -     General Observations of Patient Pt has residual tone, decreased strength and ROM in left extremities.  -     Prior Level of Function min assist:;mod assist:;all household mobility;gait;transfer;bed mobility  min/modA for safe mobility previously  -     Equipment Currently Used at Home walker, rolling;shower chair;wheelchair  -     Existing Precautions/Restrictions fall  -     Limitations/Impairments safety/cognitive  -       Row Name 04/29/24 1305          Previous Level of Function/Home Environm    Bed Mobility, Premorbid Functional Level partial assistance  -     Transfers, Premorbid Functional Level partial assistance;uses device or equipment  -     Household Ambulation, Premorbid Functional Level partial assistance;uses device or equipment  -     Previous Level of Function, Premorbid Pt has needed assistance for safe mobility since her stroke a few months ago.  -       Row Name 04/29/24 1305          Living Environment    Current Living Arrangements home  -[facility] Name 04/29/24 1305          Home Use of Assistive/Adaptive Equipment    Equipment Currently Used at Home walker, rolling;shower chair;wheelchair  -[facility] Name 04/29/24 1305          Pain    Pretreatment Pain Rating 0/10 - no pain  -     Posttreatment Pain Rating 0/10 - no pain  -[facility] Name 04/29/24 1305          Cognition    Follows Commands (Cognition) WFL  -     Personal Safety Interventions fall prevention program maintained;gait belt;muscle strengthening facilitated;nonskid shoes/slippers when out of bed;supervised activity  -       Row Name 04/29/24 1305          Range of Motion Comprehensive    Comment, General Range of Motion Unable to achieve neutral left DF and knee extension due to tone  -       Row Name 04/29/24 1305          Strength Comprehensive (MMT)    Comment, General Manual Muscle Testing (MMT) Assessment Strength in left LE  3-/5  -KP       Row Name 04/29/24 1305          Bed Mobility    Bed Mobility supine-sit;sit-supine  -     Supine-Sit [place] (Bed Mobility) moderate assist (50% patient effort);1 person assist;2 person assist;verbal cues  -KP     Sit-Supine [place] (Bed Mobility) moderate assist (50% patient effort);1 person assist;2 person assist;verbal cues  -KP     Bed Mobility, Safety Issues decreased use of arms for pushing/pulling;decreased use of legs for bridging/pushing  -     Assistive Device (Bed Mobility) bed rails;draw sheet;head of bed elevated  -       Row Name 04/29/24 1305          Transfers    Transfers sit-stand transfer;stand-sit transfer  -       Row Name 04/29/24 1305          Sit-Stand Transfer    Sit-Stand [place] (Transfers) moderate assist (50% patient effort);1 person assist;2 person assist;verbal cues  -     Assistive Device (Sit-Stand Transfers) other (see comments)  [facility]       Row Name 04/29/24 1305          Stand-Sit Transfer    Stand-Sit [place] (Transfers) moderate assist (50% patient effort);1 person assist;2 person assist;verbal cues  -     Assistive Device (Stand-Sit Transfers) other (see comments)  [facility]       Row Name 04/29/24 1305          Gait/Stairs (Locomotion)    [place] Level (Gait) moderate assist (50% patient effort);verbal cues;1 person assist  -     Assistive Device (Gait) other (see comments)  [facility]     Patient was able to Ambulate yes  -KP     Distance in Feet (Gait) 25  2x  -KP     Pattern (Gait) step-to  -KP     Deviations/Abnormal Patterns (Gait) memo decreased;festinating/shuffling;gait speed decreased;stride length decreased;weight shifting decreased  -KP     Bilateral Gait Deviations heel strike decreased;hip circumduction  -       Row Name 04/29/24 1305          Plan of Care Review    Plan of Care Reviewed With patient  -KP     Outcome Evaluation PT evaluation completed.  Patient requires moderate assist for mobility and  will benefit from continued skilled PT services this admission to improve transfers, gait, strength and endurance in order to decrease level of assist needed from caregivers.  -       Row Name 04/29/24 4061          Positioning and Restraints    Pre-Treatment Position in bed  -     Post Treatment Position bed  -     In Bed fowlers;call light within reach;encouraged to call for assist;exit alarm on;side rails up x2  Lines in tact and needs in reach  -       Row Name 04/29/24 130          Therapy Assessment/Plan (PT)    Functional Level at Time of Evaluation (PT) moderate assistx2  -     PT Diagnosis (PT) debility  -     Rehab Potential (PT) good, to achieve stated therapy goals  -     Criteria for Skilled Interventions Met (PT) yes;meets criteria;skilled treatment is necessary  -     Therapy Frequency (PT) 2 times/wk  -     Predicted Duration of Therapy Intervention (PT) 2 wks  -     Problem List (PT) problems related to;balance;hemiparesis/hemiplegia;mobility;muscle tone;range of motion (ROM);strength;postural control  -     Activity Limitations Related to Problem List (PT) unable to ambulate safely;unable to transfer safely  -       Row Name 04/29/24 1304          PT Evaluation Complexity    History, PT Evaluation Complexity 1-2 personal factors and/or comorbidities  -     Examination of Body Systems (PT Eval Complexity) 1-2 elements  -     Clinical Presentation (PT Evaluation Complexity) evolving  -     Clinical Decision Making (PT Evaluation Complexity) moderate complexity  -     Overall Complexity (PT Evaluation Complexity) low complexity  -       Row Name 04/29/24 130          Physical Therapy Goals    Bed Mobility Goal Selection (PT) bed mobility, PT goal 1  -     Transfer Goal Selection (PT) transfer, PT goal 1  -     Gait Training Goal Selection (PT) gait training, PT goal 1  -       Row Name 04/29/24 1304          Bed Mobility Goal 1 (PT)    Activity/Assistive  Device (Bed Mobility Goal 1, PT) sit to supine/supine to sit  -     [place] Level/Cues Needed (Bed Mobility Goal 1, PT) minimum assist (75% or more patient effort)  -     Time Frame (Bed Mobility Goal 1, PT) long term goal (LTG);by discharge  -       Row Name 04/29/24 1309          Transfer Goal 1 (PT)    Activity/Assistive Device (Transfer Goal 1, PT) transfers, all;sit-to-stand/stand-to-sit  -KP     [place] Level/Cues Needed (Transfer Goal 1, PT) minimum assist (75% or more patient effort)  -KP     Time Frame (Transfer Goal 1, PT) long term goal (LTG);by discharge  -       Row Name 04/29/24 1304          Gait Training Goal 1 (PT)    Activity/Assistive Device (Gait Training Goal 1, PT) gait (walking locomotion)  HHA  -     [place] Level (Gait Training Goal 1, PT) minimum assist (75% or more patient effort)  -     Distance (Gait Training Goal 1, PT) 75ft  -KP     Time Frame (Gait Training Goal 1, PT) long term goal (LTG);by discharge  -               User Key  (r) = Recorded By, (t) = Taken By, (c) = Cosigned By      Initials Name Provider Type    Collette Grider, PT Physical Therapist                      PT Recommendation and Plan  Planned Therapy Interventions (PT): balance training, bed mobility training, gait training, home exercise program, manual therapy techniques, motor coordination training, neuromuscular re-education, patient/family education, postural re-education, ROM (range of motion), strengthening, stretching, transfer training  Therapy Frequency (PT): 2 times/wk  Plan of Care Reviewed With: patient  Outcome Evaluation: PT evaluation completed.  Patient requires moderate assist for mobility and will benefit from continued skilled PT services this admission to improve transfers, gait, strength and endurance in order to decrease level of assist needed from caregivers.       Time Calculation:    PT Charges       Row Name 04/29/24 1057             Time Calculation    PT  Received On 04/29/24  -      PT Goal Re-Cert Due Date 05/13/24  -                User Key  (r) = Recorded By, (t) = Taken By, (c) = Cosigned By      Initials Name Provider Type    Collette Grider, PT Physical Therapist                  Therapy Charges for Today       Code Description Service Date Service Provider Modifiers Qty    76002774363 HC PT EVAL MOD COMPLEXITY 4 4/29/2024 Collette Gandhi, PT GP 1            PT G-Codes  AM-PAC 6 Clicks Score (PT): 16    Collette Gandhi, PT  4/29/2024

## 2024-04-29 NOTE — CASE MANAGEMENT/SOCIAL WORK
Discharge Planning Assessment   [place]     Patient Name: Regine Beckham  MRN: [id number]  Today's Date: 4/29/2024    Admit Date: 4/26/2024    Plan: SS received a consult for debility, SNF placement. SS spoke with pt at bedside on this date. Pt lives alone. PCP is Dread Burton. Pt utilizes [facility]. Pt ahs shower chair, bedside commode, rolling walker,  and wheelchair via [employer]. Pt does not have a POA/Living will. Pt states she would like to go to the nursing home. SS discussed with pt that she will have a copay at the NH due to using all of her skilled days. Pt voices agreement. Pt states she would like [facility] to be [facility]. Pt states she now has medicaid. SS contacted Sarina/ Yudi ont his date who states pt has been approved [place] Medicaid- QMB ID #[id number]. SS has attempted to contact Araceli DE LA GARZA on this date. SS left message and contact number. SS to follow and assist with discharge planning.   Discharge Needs Assessment       Row Name 04/29/24 1935       Living Environment    People in Home alone    Current Living Arrangements home    Potentially Unsafe Housing Conditions none    Primary Care Provided by self    Provides Primary Care For no one    Family Caregiver if Needed none    Quality of Family Relationships helpful;involved;supportive    Able to Return to Prior Arrangements yes       Resource/Environmental Concerns    Resource/Environmental Concerns none    Transportation Concerns none       Transition Planning    Patient/Family Anticipates Transition to inpatient rehabilitation facility    Patient/Family Anticipated Services at Transition skilled nursing    Transportation Anticipated public transportation       Discharge Needs Assessment    Equipment Currently Used at Home shower chair;commode;walker, rolling;wheelchair  via [employer]    Anticipated Changes Related to Illness none    Equipment Needed After Discharge none         Discharge Plan       Row Name  04/29/24 1725       Plan    Plan SS received a consult for debility, SNF placement. SS spoke with pt at bedside on this date. Pt lives alone. PCP is Dread Burton. Pt utilizes [facility]. Pt ahs shower chair, bedside commode, rolling walker,  and wheelchair via [employer]. Pt does not have a POA/Living will. Pt states she would like to go to the nursing home. SS discussed with pt that she will have a copay at the NH due to using all of her skilled days. Pt voices agreement. Pt states she would like [facility] to be [facility]. Pt states she now has medicaid. SS contacted Sarina/ Yudi ont his date who states pt has been approved [place] Medicaid- QMB ID #[id number]. SS has attempted to contact Araceli DE LA GARZA on this date. SS left message and contact number. SS to follow and assist with discharge planning.    Patient/Family in Agreement with Plan yes          Expected Discharge Date and Time       Expected Discharge Date Expected Discharge Time    Apr 30, 2024         Demographic Summary       Row Name 04/29/24 1714       General Information    Admission Type inpatient    Referral Source physician    Reason for Consult discharge planning  SS received a consult for debility, SNF placement.            PAULINO Lombardo

## 2024-04-29 NOTE — DISCHARGE PLACEMENT REQUEST
"Terry Lockores Alexander \"NEGIN\" (68 y.o. Female)       Date of Birth   1955    Social Security Number       Address   [address] [place], [place] [address]    Home Phone   [phone]    MRN   [id number]       [facility]    Marital Status                               Admission Date   4/26/24    Admission Type   Emergency    Admitting Provider   Capo Su MD    Attending Provider   Ok Marin MD    Department, Room/Bed   [address], 3348/2S       Discharge Date       Discharge Disposition       Discharge Destination                                 Attending Provider: Ok Marin MD    Allergies: Penicillins    Isolation: None   Infection: None   Code Status: CPR    Ht: 167.6 cm (66\")   Wt: 65.4 kg (144 lb 2.9 oz)    Admission Cmt: None   Principal Problem: Debility [R53.81]                   Active Insurance as of 4/26/2024       Primary Coverage       Payor Plan Insurance Group Employer/Plan Group    AETNA MEDICARE REPLACEMENT AETNA MEDICARE REPLACEMENT [id number]       Payor Plan Address Payor Plan Phone Number Payor Plan Fax Number Effective Dates    [address] [phone]  1/1/2024 - None Entered    [facility] [id number]         Subscriber Name Subscriber Birth Date Member ID       REGINE LOCK 1955 [id number]                     Emergency Contacts        (Rel.) Home Phone Work Phone Mobile Phone    Yaa Elizondo (Friend) [phone] --    Karla Patel (Friend) [phone] -- --              Insurance Information                  AETNA MEDICARE REPLACEMENT/AETNA MEDICARE REPLACEMENT Phone: [phone]    Subscriber: Bhavani Regine Munoz Subscriber#: [id number]    Group#: [id number] Precert#: [id number]             History & Physical        Capo Su MD at 04/26/24 2354          Hospitalist History and Physical        Patient Identification  Name: Regine Lock  Age/Sex: 68 y.o. " "female  :  1955        MRN: [id number]  Visit Number: [id number]  Admit Date: 2024   PCP: Dread Burton MD          Chief complaint fall at home    History of Present Illness:  Patient is a 68 y.o. female with history of anemia, anxiety/depression, arthritis, legally blind, restless leg syndrome, ZORAIDA, \"water retention\" with recent ECHO on 4/10/24 showing EF >70% and grade 1 diastolic dysfunction, and stroke in January of this year with residual left sided weakness and facial droop. She has been in and out of our hospital since then, both acute inpatient and inpatient rehab, since that time. Most recently she was admitted from -24 for debility, physical deconditioning, frequent falls, and failure to thrive. Rehab was strongly recommended, but per the last progress note from this stay, \"Patient is not agreeable to pay a daily copay for short term rehab and is not agreeable to long term placement now because she would have to give up her monthly social security check. She is checking with a friend to see if she can stay with her. She presents back to the ED today after falling at home. She reports she tried to get up from seated position with her walker, but her strength gave out and she fell on her left side. ED received report that patient's home was in very poor condition and she had no one to help care for her there. Therefore she was not safe for discharge home. Patient states that she just got her medicaid card which will pay for short term rehab, so she is now agreeable to go. Labs were fairly stable in the ED. UA did show 4+ bacteria but only 3-5 WBC and 3-6 squamous epithelial cells suggesting possible contamination. Upon further questioning, she did admit her urine was smelling somewhat foul but denied dysuria or cloudy urine. Urine culture is now pending. XR of left shoulder, left hip and both knees were obtained due to complaints of pain after her fall, but no acute fractures were " "appreciated. She is being admitted for further management of debility and ultimately for rehab placement.     Review of Systems  Review of Systems   Constitutional:  Positive for activity change and fatigue. Negative for appetite change, chills, diaphoresis, fever and unexpected weight change.   HENT:  Negative for congestion, postnasal drip, rhinorrhea, sinus pressure, sinus pain and sore throat.    Eyes:  Negative for photophobia and visual disturbance.   Respiratory:  Negative for cough, shortness of breath and wheezing.    Cardiovascular:  Negative for chest pain, palpitations and leg swelling.   Gastrointestinal:  Negative for abdominal distention, abdominal pain, constipation, diarrhea, nausea and vomiting.   Genitourinary:  Negative for difficulty urinating, dysuria, flank pain, frequency and hematuria.        Somewhat foul smelling urine   Musculoskeletal:  Positive for arthralgias (left shoulder, left hip and knee from fall) and back pain. Negative for joint swelling and myalgias.   Skin:  Negative for color change, pallor, rash and wound.   Neurological:  Negative for dizziness, seizures, light-headedness, numbness and headaches.   Hematological:  Negative for adenopathy. Does not bruise/bleed easily.   Psychiatric/Behavioral:  Negative for agitation, behavioral problems and confusion.        History  Past Medical History:   Diagnosis Date    Anemia     Anxiety     Arthritis     Depression     Legally blind     Restless leg syndrome     Sleep apnea     \"I don't use a cpap anymore since losing 106 lbs\"    Water retention      Past Surgical History:   Procedure Laterality Date    BACK SURGERY      CARDIAC CATHETERIZATION N/A 1/19/2024    Procedure: Percutaneous Manual Thrombectomy;  Surgeon: Efrain Hurley MD;  Location: [facility] CATH INVASIVE LOCATION;  Service: Interventional Radiology;  Laterality: N/A;    CARDIAC CATHETERIZATION N/A 4/12/2024    Procedure: Left Heart Cath;  Surgeon: Susan Mederos MD;  " "Location: [facility] INVASIVE LOCATION;  Service: Cardiology;  Laterality: N/A;    GALLBLADDER SURGERY      HIP ARTHROSCOPY      JOINT REPLACEMENT Right      Family History   Problem Relation Age of Onset    Breast cancer Neg Hx      Social History     Tobacco Use    Smoking status: Former     Current packs/day: 1.50     Average packs/day: 1.5 packs/day for 51.0 years (76.5 ttl pk-yrs)     Types: Cigarettes     Passive exposure: Past    Smokeless tobacco: Never   Vaping Use    Vaping status: Never Used   Substance Use Topics    Alcohol use: Not Currently     Alcohol/week: 1.0 standard drink of alcohol     Types: 1 Glasses of wine per week     Comment: \"occasionally - once every two months\"    Drug use: Not Currently     Comment: alcohol - occasionally     Medications Prior to Admission   Medication Sig Dispense Refill Last Dose    amLODIPine (NORVASC) 5 MG tablet Take 1 tablet by mouth Daily for 30 days. 30 tablet 0 Unknown    aspirin 81 MG EC tablet Take 1 tablet by mouth Daily.   Unknown    atorvastatin (LIPITOR) 80 MG tablet Take 1 tablet by mouth Every Night. 90 tablet 0 Unknown    gabapentin (NEURONTIN) 300 MG capsule Take 1 capsule by mouth 2 (Two) Times a Day.   Unknown    isosorbide mononitrate (IMDUR) 30 MG 24 hr tablet Take 1 tablet by mouth Daily for 30 days. 30 tablet 0 Unknown    losartan (COZAAR) 50 MG tablet Take 1 tablet by mouth Daily. Indications: High Blood Pressure Disorder   Unknown    metoprolol tartrate (LOPRESSOR) 25 MG tablet Take 1/2 tablet by mouth Every 12 (Twelve) Hours for 30 days. 30 tablet 0 Unknown    Mirabegron ER (MYRBETRIQ) 50 MG tablet sustained-release 24 hour 24 hr tablet Take 50 mg by mouth Daily. Indications: Urinary Urgency   Unknown    mirtazapine (REMERON) 30 MG tablet Take 1 tablet by mouth Every Night. Indications: Major Depressive Disorder 30 tablet 0 Unknown    pantoprazole (PROTONIX) 40 MG EC tablet TAKE 1 TABLET BY MOUTH EVERY MORNING FOR HEARTBURN 90 tablet 0 " Unknown    rOPINIRole (REQUIP) 4 MG tablet Take 1 tablet by mouth Every Night. Take 1 hour before bedtime.  Indications: Restless Leg Syndrome 30 tablet 0 Unknown    ticagrelor (BRILINTA) 60 MG tablet tablet Take 1 tablet by mouth 2 (Two) Times a Day. 60 tablet 0 Unknown     Allergies:  Penicillins    Objective    Vital Signs  Temp:  [98.2 °F (36.8 °C)] 98.2 °F (36.8 °C)  Heart Rate:  [76-83] 83  Resp:  [12-16] 12  BP: (116-121)/(60-66) 116/60  Body mass index is 24.37 kg/m².    Physical Exam:  Physical Exam  Constitutional:       General: She is not in acute distress.     Appearance: Normal appearance. She is ill-appearing (chronically so).   HENT:      Head: Normocephalic and atraumatic.      Right Ear: External ear normal.      Left Ear: External ear normal.      Nose: Nose normal.      Mouth/Throat:      Mouth: Mucous membranes are moist.      Pharynx: Oropharynx is clear.   Eyes:      Extraocular Movements: Extraocular movements intact.      Conjunctiva/sclera: Conjunctivae normal.      Pupils: Pupils are equal, round, and reactive to light.   Cardiovascular:      Rate and Rhythm: Normal rate and regular rhythm.      Pulses: Normal pulses.      Heart sounds: Normal heart sounds. No murmur heard.  Pulmonary:      Effort: Pulmonary effort is normal. No respiratory distress.      Breath sounds: Normal breath sounds. No wheezing or rales.   Abdominal:      General: Abdomen is flat. Bowel sounds are normal.      Palpations: Abdomen is soft.   Musculoskeletal:         General: Normal range of motion.      Cervical back: Normal range of motion and neck supple. No tenderness.      Right lower leg: No edema.      Left lower leg: No edema.   Lymphadenopathy:      Cervical: No cervical adenopathy.   Skin:     General: Skin is warm and dry.      Coloration: Skin is not jaundiced or pale.   Neurological:      Mental Status: She is alert.      Comments: Alert, oriented to self and place but not year. Left facial droop noted  "which is from recent CVA. Also significant weakness (3/5 hand , 3/5 leg raise) on left side compared to 5/5 strength on right side.     Psychiatric:         Mood and Affect: Mood normal.         Behavior: Behavior normal.           Results Review:       Lab Results:  Results from last 7 days   Lab Units 04/26/24 2117 04/21/24 0214 04/20/24  1407   WBC 10*3/mm3 9.38 6.67 9.47   HEMOGLOBIN g/dL 10.7* 9.6* 11.3*   PLATELETS 10*3/mm3 318 249 284         Results from last 7 days   Lab Units 04/26/24 2117 04/22/24  0239 04/21/24 0214 04/20/24  1407   SODIUM mmol/L 140 144 143 142   POTASSIUM mmol/L 3.8 4.1 3.3* 3.7   CHLORIDE mmol/L 104 109* 108* 107   CO2 mmol/L 25.3 26.6 26.4 24.6   BUN mg/dL 14 15 13 12   CREATININE mg/dL 0.72 0.85 0.83 0.76   CALCIUM mg/dL 9.9 9.4 9.0 9.8   GLUCOSE mg/dL 123* 131* 149* 132*         No results found for: \"HGBA1C\"  Results from last 7 days   Lab Units 04/26/24 2117 04/20/24  1407   BILIRUBIN mg/dL 0.3 0.5   ALK PHOS U/L 150* 121*   AST (SGOT) U/L 36* 37*   ALT (SGPT) U/L 38* 29                       I have reviewed the patient's laboratory results.    Imaging:  Imaging Results (Last 72 Hours)       Procedure Component Value Units Date/Time    XR Hip With or Without Pelvis 2 - 3 View Left [[id number]] Collected: 04/26/24 2330     Updated: 04/26/24 2332    Narrative:      INDICATION: Pain and injury.     TECHNIQUE: One view of the pelvis. 2 views of the left hip. One view the  right hip.     COMPARISON: No relevant priors.     FINDINGS:   No acute fracture or dislocation. No lytic or blastic bony lesions seen.  Right hip hardware appears intact. Moderate left hip joint space loss.     Soft tissues appear unremarkable.       Impression:      No acute osseous abnormality evident.           This report was finalized on 4/26/2024 11:30 PM by Alex Pallas, DO.       XR Knee 3 View Left [[id number]] Collected: 04/26/24 2329     Updated: 04/26/24 2332    Narrative:      INDICATION: Pain " and injury.     TECHNIQUE: 3 views of the left knee.     COMPARISON: No relevant priors.     FINDINGS:   No acute fracture or dislocation. No lytic or blastic bony lesions seen.  Joint spaces are relatively preserved.     Soft tissues appear unremarkable.       Impression:      No acute osseous abnormality evident.        This report was finalized on 4/26/2024 11:30 PM by Alex Pallas, DO.       XR Shoulder 2+ View Left [[id number]] Collected: 04/26/24 2106     Updated: 04/26/24 2108    Narrative:      INDICATION: Pain and injury.     TECHNIQUE: 3 views of the left shoulder.     COMPARISON: No relevant priors.     FINDINGS:   No acute fracture or dislocation. No lytic or blastic bony lesions seen.  Mild glenohumeral joint space loss.     Soft tissues appear unremarkable.       Impression:      No acute osseous abnormality evident.        This report was finalized on 4/26/2024 9:06 PM by Alex Pallas, DO.       XR Knee 3 View Right [[id number]] Collected: 04/26/24 2105     Updated: 04/26/24 2108    Narrative:      INDICATION: Pain and injury.     TECHNIQUE: 3 views of the right knee.     COMPARISON: No relevant priors.     FINDINGS:   No acute fracture or dislocation. No lytic or blastic bony lesions seen.  Mild tricompartmental joint space loss. Chondrocalcinosis in the medial  and lateral compartments.     Soft tissues appear unremarkable.       Impression:      No acute osseous abnormality evident.        This report was finalized on 4/26/2024 9:06 PM by Alex Pallas, DO.       XR Chest 1 View [[id number]] Collected: 04/26/24 2049     Updated: 04/26/24 2051    Narrative:      INDICATION: Cough.     TECHNIQUE: Frontal radiograph of the chest.     COMPARISON: 4/9/2024.     FINDINGS:   The cardiomediastinal silhouette and pulmonary vasculature appear within  normal limits. Chronic appearing interstitial lung markings. No  infiltrate, pleural effusion or pneumothorax. No acute osseous  abnormality evident.       Impression:       No acute cardiopulmonary process.        This report was finalized on 4/26/2024 8:49 PM by Alex Pallas, DO.               I have personally reviewed the patient's radiologic imaging.        EKG:   Undetermined rhythm, HR 71, QTc 465  Nonspecific T wave abnormality  Abnormal ECG  When compared with ECG of 15-APR-2024 09:32,  Current undetermined rhythm precludes rhythm comparison, needs review  Borderline criteria for Inferior infarct are no longer present  T wave inversion no longer evident in Lateral leads    I have personally reviewed the patient's EKG. No overt ST changes appreciated.         Assessment & Plan    - Debility, physical deconditioning, failure to thrive, frequent falls, recent CVA with residual left sided weakness: now agreeable to go to short term rehab since receiving medicaid care which will pay for rehab completely without co-pay. Consult PT and OT, along with case management to assist with arranging rehab.   - Mildly abnormal UA, reports of foul smelling urine: urine sent for culture, will follow up results. Monitor off antibiotics for now.     DVT Prophylaxis: SQ heparin    Estimated Length of Stay >2 midnights    I discussed the patient's findings, assessment and plan with the patient and ED provider Dr Marie.        Capo Su MD  04/26/24  23:54 EDT      Electronically signed by Capo Su MD at 04/27/24 0010       Current Facility-Administered Medications   Medication Dose Route Frequency Provider Last Rate Last Admin    acetaminophen (TYLENOL) tablet 500 mg  500 mg Oral Q6H PRN Matheus Lundberg MD   500 mg at 04/29/24 1614    amLODIPine (NORVASC) tablet 5 mg  5 mg Oral Q24H Capo Su MD   5 mg at 04/29/24 0951    aspirin EC tablet 81 mg  81 mg Oral Daily Capo Su MD   81 mg at 04/29/24 0951    atorvastatin (LIPITOR) tablet 80 mg  80 mg Oral Nightly Capo Su MD   80 mg at 04/28/24 2211    gabapentin (NEURONTIN) capsule  300 mg  300 mg Oral BID Capo Su MD   300 mg at 04/29/24 1002    heparin (porcine) 5000 UNIT/ML injection 5,000 Units  5,000 Units Subcutaneous Q12H Capo Su MD   5,000 Units at 04/29/24 0952    isosorbide mononitrate (IMDUR) 24 hr tablet 30 mg  30 mg Oral Q24H Capo Su MD   30 mg at 04/29/24 0950    losartan (COZAAR) tablet 50 mg  50 mg Oral Daily Capo Su MD   50 mg at 04/29/24 0950    melatonin tablet 5 mg  5 mg Oral Nightly PRN Matheus Lundberg MD        metoprolol tartrate (LOPRESSOR) tablet 12.5 mg  12.5 mg Oral Q12H Capo Su MD   12.5 mg at 04/29/24 0952    Mirabegron ER (MYRBETRIQ) 24 hr tablet 50 mg  50 mg Oral Daily Capo Su MD   50 mg at 04/29/24 0951    mirtazapine (REMERON) tablet 30 mg  30 mg Oral Nightly Capo Su MD   30 mg at 04/28/24 2211    nitrofurantoin (macrocrystal-monohydrate) (MACROBID) capsule 100 mg  100 mg Oral Q12H Matheus Lundberg MD   100 mg at 04/29/24 0950    ondansetron ODT (ZOFRAN-ODT) disintegrating tablet 4 mg  4 mg Oral Q12H PRN Matheus Lundberg MD        pantoprazole (PROTONIX) EC tablet 40 mg  40 mg Oral Q AM Capo Su MD   40 mg at 04/29/24 0556    polyethylene glycol (MIRALAX) packet 17 g  17 g Oral Daily PRN Matheus Lundberg MD        rOPINIRole (REQUIP) tablet 4 mg  4 mg Oral Nightly Capo Su MD   4 mg at 04/28/24 2220    sennosides-docusate (PERICOLACE) 8.6-50 MG per tablet 2 tablet  2 tablet Oral Nightly Matheus Lundberg MD   2 tablet at 04/28/24 2211    sodium chloride 0.9 % flush 10 mL  10 mL Intravenous Q12H Capo Su MD   10 mL at 04/29/24 0952    sodium chloride 0.9 % flush 10 mL  10 mL Intravenous PRN Capo Su MD        sodium chloride 0.9 % infusion 40 mL  40 mL Intravenous PRN Capo Su MD        ticagrelor (BRILINTA) tablet 60 mg  60 mg Oral BID Capo Su MD   60 mg at 04/29/24 0951     traMADol (ULTRAM) tablet 50 mg  50 mg Oral Q12H PRN Matheus Lundberg MD   50 mg at 24 0556        Physician Progress Notes (most recent note)        Ok Marin MD at 24 1546              [facility] HOSPITALIST PROGRESS NOTE     Patient Identification:  Name:  Regine Beckham  Age:  68 y.o.  Sex:  female  :  1955  MRN:  [id number]  Visit Number:  [id number]  ROOM: [address] [place] [address]     Primary Care Provider:  Dread Burton MD    Length of stay in inpatient status:  3    Subjective     Chief Compliant:    Chief Complaint   Patient presents with    Fall       History of Presenting Illness:    Patient denied any new complaints. Main complaint is weakness and recurrent falls. She noted she would like to go to the [facility] if denied IPR. No family bedside.     ROS:  Otherwise 10 point ROS negative other than documented above in HPI.     Objective     Current Hospital Meds:amLODIPine, 5 mg, Oral, Q24H  aspirin, 81 mg, Oral, Daily  atorvastatin, 80 mg, Oral, Nightly  gabapentin, 300 mg, Oral, BID  heparin (porcine), 5,000 Units, Subcutaneous, Q12H  isosorbide mononitrate, 30 mg, Oral, Q24H  losartan, 50 mg, Oral, Daily  metoprolol tartrate, 12.5 mg, Oral, Q12H  Mirabegron ER, 50 mg, Oral, Daily  mirtazapine, 30 mg, Oral, Nightly  nitrofurantoin (macrocrystal-monohydrate), 100 mg, Oral, Q12H  pantoprazole, 40 mg, Oral, Q AM  rOPINIRole, 4 mg, Oral, Nightly  senna-docusate sodium, 2 tablet, Oral, Nightly  sodium chloride, 10 mL, Intravenous, Q12H  ticagrelor, 60 mg, Oral, BID         Current Antimicrobial Therapy:  Anti-Infectives (From admission, onward)      Ordered     Dose/Rate Route Frequency Start Stop    24 0904  nitrofurantoin (macrocrystal-monohydrate) (MACROBID) capsule 100 mg        Ordering Provider: Matheus Lundberg MD    100 mg Oral Every 12 Hours Scheduled 24 1000 24 0859          Current Diuretic Therapy:  Diuretics (From admission, onward)      None           ----------------------------------------------------------------------------------------------------------------------  Vital Signs:  Temp:  [97.8 °F (36.6 °C)-98.5 °F (36.9 °C)] 97.8 °F (36.6 °C)  Heart Rate:  [68-69] 69  Resp:  [18] 18  BP: (105-147)/(60-77) 105/60     on   ;   Device (Oxygen Therapy): room air  Body mass index is 23.27 kg/m².    Wt Readings from Last 3 Encounters:   04/29/24 65.4 kg (144 lb 2.9 oz)   04/20/24 68.4 kg (150 lb 12.8 oz)   04/18/24 68.1 kg (150 lb 2.1 oz)     Intake & Output (last 3 days)         04/26 0701 04/27 0700 04/27 0701 04/28 0700 04/28 0701 04/29 0700 04/29 0701 04/30 0700    P.O. 240 480 480 480    Total Intake(mL/kg) 240 (3.6) 480 (7.2) 480 (7.3) 480 (7.3)    Urine (mL/kg/hr) 300 950 (0.6) 1150 (0.7) 650 (1.1)    Stool   0     Total Output  650    Net -60 -667 -670 -170            Urine Unmeasured Occurrence 1 x 1 x 1 x 4 x    Stool Unmeasured Occurrence   1 x           Diet: Regular/House; Fluid Consistency: Thin (IDDSI 0)  ----------------------------------------------------------------------------------------------------------------------  Physical exam:  Constitutional:  Chronically ill appearing.   HENT:  Head:  Normocephalic and atraumatic.  Mouth:  Moist mucous membranes.    Eyes:  Conjunctivae and EOM are normal. No scleral icterus.    Neck:  Neck supple.  No JVD present.    Cardiovascular:  Normal rate, regular rhythm and normal heart sounds with no murmur.  Pulmonary/Chest:  No respiratory distress, no wheezes, no crackles, with normal breath sounds and good air movement.    ----------------------------------------------------------------------------------------------------------------------  Tele:    ----------------------------------------------------------------------------------------------------------------------  Results from last 7 days   Lab Units 04/27/24  0152 04/26/24  1497   WBC 10*3/mm3 7.91 9.38   HEMOGLOBIN g/dL 9.8* 10.7*  "  HEMATOCRIT % 30.9* 32.7*   MCV fL 96.3 92.4   MCHC g/dL 31.7 32.7   PLATELETS 10*3/mm3 274 318         Results from last 7 days   Lab Units 04/27/24  0152 04/26/24  2117   SODIUM mmol/L 142 140   POTASSIUM mmol/L 3.4* 3.8   CHLORIDE mmol/L 107 104   CO2 mmol/L 24.7 25.3   BUN mg/dL 14 14   CREATININE mg/dL 0.81 0.72   CALCIUM mg/dL 9.5 9.9   GLUCOSE mg/dL 168* 123*   ALBUMIN g/dL 3.3* 3.7   BILIRUBIN mg/dL 0.3 0.3   ALK PHOS U/L 138* 150*   AST (SGOT) U/L 29 36*   ALT (SGPT) U/L 32 38*   Estimated Creatinine Clearance: 67.7 mL/min (by C-G formula based on SCr of 0.81 mg/dL).  No results found for: \"AMMONIA\"              No results found for: \"HGBA1C\", \"POCGLU\"  Lab Results   Component Value Date    TSH 2.880 03/17/2024     No results found for: \"PREGTESTUR\", \"PREGSERUM\", \"HCG\", \"HCGQUANT\"  Pain Management Panel          Latest Ref Rng & Units 12/20/2023   Pain Management Panel   Amphetamine, Urine Qual Negative Negative    Barbiturates Screen, Urine Negative Negative    Benzodiazepine Screen, Urine Negative Negative    Buprenorphine, Screen, Urine Negative Negative    Cocaine Screen, Urine Negative Negative    Fentanyl, Urine Negative Negative    Methadone Screen , Urine Negative Negative    Methamphetamine, Ur Negative Negative      Brief Urine Lab Results  (Last result in the past 365 days)        Color   Clarity   Blood   Leuk Est   Nitrite   Protein   CREAT   Urine HCG        04/26/24 2102 Yellow   Cloudy   Negative   Small (1+)   Negative   Negative                 No results found for: \"BLOODCX\"  Urine Culture   Date Value Ref Range Status   04/26/2024 >100,000 CFU/mL Normal Urogenital Lorraine  Final     No results found for: \"WOUNDCX\"  No results found for: \"STOOLCX\"  No results found for: \"RESPCX\"  No results found for: \"AFBCX\"        I have personally looked at the labs and they are summarized " above.  ----------------------------------------------------------------------------------------------------------------------  Detailed radiology reports for the last 24 hours:    Imaging Results (Last 24 Hours)       ** No results found for the last 24 hours. **          Assessment & Plan      #Acute on chronic debility secondary to previous CVA  -Residual left sided weakness, initially resistant to IPR due to co-pay but with insurance changes is now agreeable  -Denied IPR. Awaiting SNF.      #Bacteruria, simple cystitis  -On UA bacteria concentration increasing across prior samples. Mild urgency reported, unsure reliability to report and more detailed hx  -Started Macrobid x5 days to cover  -urine cx normal UGF      Code status: Full     Dispo: Pending placement       VTE Prophylaxis:   Mechanical Order History:       None          Pharmalogical Order History:        Ordered     Dose Route Frequency Stop    24 0000  heparin (porcine) 5000 UNIT/ML injection 5,000 Units         5,000 Units SC Every 12 Hours Scheduled --                    Ok Marin MD  Psychiatric Hospitalist  24  15:46 EDT    Electronically signed by Ok Marin MD at 24 1550          Physical Therapy Notes (most recent note)        Collette Gandhi, PT at 24 1320  Version 1 of 1         Acute Care - Physical Therapy Initial Evaluation  [facility]     Patient Name: Regine Beckham  : 1955  MRN: [id number]  Today's Date: 2024   Onset of Illness/Injury or Date of Surgery: 24  Visit Dx:     ICD-10-CM ICD-9-CM   1. Adult failure to thrive  R62.7 783.7   2. Debility  R53.81 799.3     Patient Active Problem List   Diagnosis    Iron deficiency anemia due to chronic blood loss    Major depressive disorder    RLS (restless legs syndrome)    GERD (gastroesophageal reflux disease)    Left breast mass    Allergy to penicillin    Stroke    Grade I diastolic dysfunction    Moderate malnutrition     "Falls frequently    Stroke-like symptoms    Debility    Chest pain    Failure to thrive in adult     Past Medical History:   Diagnosis Date    Anemia     Anxiety     Arthritis     Depression     Legally blind     Restless leg syndrome     Sleep apnea     \"I don't use a cpap anymore since losing 106 lbs\"    Water retention      Past Surgical History:   Procedure Laterality Date    BACK SURGERY      CARDIAC CATHETERIZATION N/A 1/19/2024    Procedure: Percutaneous Manual Thrombectomy;  Surgeon: Efrain Hurley MD;  Location:  [facility];  Service: Interventional Radiology;  Laterality: N/A;    CARDIAC CATHETERIZATION N/A 4/12/2024    Procedure: Left Heart Cath;  Surgeon: Susan Mederos MD;  [facility]:  [facility];  Service: Cardiology;  Laterality: N/A;    GALLBLADDER SURGERY      HIP ARTHROSCOPY      JOINT REPLACEMENT Right      PT Assessment (Last 12 Hours)       PT Evaluation and Treatment       Row Name 04/29/24 6537          Physical Therapy Time and Intention    Document Type evaluation  -     Mode of Treatment physical therapy  -     Patient Effort good  -       Row Name 04/29/24 1308          General Information    Patient Profile Reviewed yes  -     Onset of Illness/Injury or Date of Surgery 04/26/24  -     Referring Physician Georges  -     Patient Observations cooperative;alert;agree to therapy  -     General Observations of Patient Pt has residual tone, decreased strength and ROM in left extremities.  -     Prior Level of Function min assist:;mod assist:;all household mobility;gait;transfer;bed mobility  min/modA for safe mobility previously  -     Equipment Currently Used at Home walker, rolling;shower chair;wheelchair  -     Existing Precautions/Restrictions fall  -     Limitations/Impairments safety/cognitive  -       Row Name 04/29/24 1304          Previous Level of Function/Home Environm    Bed Mobility, Premorbid Functional Level partial " assistance  -     Transfers, Premorbid Functional Level partial assistance;uses device or equipment  -     Household Ambulation, Premorbid Functional Level partial assistance;uses device or equipment  -     Previous Level of Function, Premorbid Pt has needed assistance for safe mobility since her stroke a few months ago.  -[facility] Name 04/29/24 1305          Living Environment    Current Living Arrangements home  -KP       Row Name 04/29/24 1305          Home Use of Assistive/Adaptive Equipment    Equipment Currently Used at Home walker, rolling;shower chair;wheelchair  -KP       Row Name 04/29/24 1305          Pain    Pretreatment Pain Rating 0/10 - no pain  -     Posttreatment Pain Rating 0/10 - no pain  -[facility] Name 04/29/24 1305          Cognition    Follows Commands (Cognition) WFL  -     Personal Safety Interventions fall prevention program maintained;gait belt;muscle strengthening facilitated;nonskid shoes/slippers when out of bed;supervised activity  -[facility] Name 04/29/24 1305          Range of Motion Comprehensive    Comment, General Range of Motion Unable to achieve neutral left DF and knee extension due to tone  -[facility] Name 04/29/24 1305          Strength Comprehensive (MMT)    Comment, General Manual Muscle Testing (MMT) Assessment Strength in left LE 3-/5  -[facility] Name 04/29/24 1305          Bed Mobility    Bed Mobility supine-sit;sit-supine  -     Supine-Sit [place] (Bed Mobility) moderate assist (50% patient effort);1 person assist;2 person assist;verbal cues  -     Sit-Supine [place] (Bed Mobility) moderate assist (50% patient effort);1 person assist;2 person assist;verbal cues  -     Bed Mobility, Safety Issues decreased use of arms for pushing/pulling;decreased use of legs for bridging/pushing  -     Assistive Device (Bed Mobility) bed rails;draw sheet;head of bed elevated  -[facility] Name 04/29/24 1305          Transfers    Transfers  sit-stand transfer;stand-sit transfer  -       Row Name 04/29/24 1305          Sit-Stand Transfer    Sit-Stand [place] (Transfers) moderate assist (50% patient effort);1 person assist;2 person assist;verbal cues  -     Assistive Device (Sit-Stand Transfers) other (see comments)  [facility]  -       Row Name 04/29/24 1305          Stand-Sit Transfer    Stand-Sit [place] (Transfers) moderate assist (50% patient effort);1 person assist;2 person assist;verbal cues  -KP     Assistive Device (Stand-Sit Transfers) other (see comments)  [facility]       Row Name 04/29/24 1305          Gait/Stairs (Locomotion)    [place] Level (Gait) moderate assist (50% patient effort);verbal cues;1 person assist  -     Assistive Device (Gait) other (see comments)  [facility]  -     Patient was able to Ambulate yes  -KP     Distance in Feet (Gait) 25  2x  -KP     Pattern (Gait) step-to  -KP     Deviations/Abnormal Patterns (Gait) memo decreased;festinating/shuffling;gait speed decreased;stride length decreased;weight shifting decreased  -     Bilateral Gait Deviations heel strike decreased;hip circumduction  -       Row Name 04/29/24 1305          Plan of Care Review    Plan of Care Reviewed With patient  -     Outcome Evaluation PT evaluation completed.  Patient requires moderate assist for mobility and will benefit from continued skilled PT services this admission to improve transfers, gait, strength and endurance in order to decrease level of assist needed from caregivers.  -       Row Name 04/29/24 1305          Positioning and Restraints    Pre-Treatment Position in bed  -     Post Treatment Position bed  -     In Bed fowlers;call light within reach;encouraged to call for assist;exit alarm on;side rails up x2  Lines in tact and needs in reach  -       Row Name 04/29/24 1305          Therapy Assessment/Plan (PT)    Functional Level at Time of Evaluation (PT) moderate assistx2  -KP     PT Diagnosis (PT) debility   -     Rehab Potential (PT) good, to achieve stated therapy goals  -     Criteria for Skilled Interventions Met (PT) yes;meets criteria;skilled treatment is necessary  -     Therapy Frequency (PT) 2 times/wk  -     Predicted Duration of Therapy Intervention (PT) 2 wks  -     Problem List (PT) problems related to;balance;hemiparesis/hemiplegia;mobility;muscle tone;range of motion (ROM);strength;postural control  -     Activity Limitations Related to Problem List (PT) unable to ambulate safely;unable to transfer safely  -       Row Name 04/29/24 1305          PT Evaluation Complexity    History, PT Evaluation Complexity 1-2 personal factors and/or comorbidities  -     Examination of Body Systems (PT Eval Complexity) 1-2 elements  -     Clinical Presentation (PT Evaluation Complexity) evolving  -     Clinical Decision Making (PT Evaluation Complexity) moderate complexity  -     Overall Complexity (PT Evaluation Complexity) low complexity  -[facility] Name 04/29/24 1302          Physical Therapy Goals    Bed Mobility Goal Selection (PT) bed mobility, PT goal 1  -     Transfer Goal Selection (PT) transfer, PT goal 1  -     Gait Training Goal Selection (PT) gait training, PT goal 1  -[facility] Name 04/29/24 130          Bed Mobility Goal 1 (PT)    Activity/Assistive Device (Bed Mobility Goal 1, PT) sit to supine/supine to sit  -     [place] Level/Cues Needed (Bed Mobility Goal 1, PT) minimum assist (75% or more patient effort)  -     Time Frame (Bed Mobility Goal 1, PT) long term goal (LTG);by discharge  -[facility] Name 04/29/24 1300          Transfer Goal 1 (PT)    Activity/Assistive Device (Transfer Goal 1, PT) transfers, all;sit-to-stand/stand-to-sit  -     [place] Level/Cues Needed (Transfer Goal 1, PT) minimum assist (75% or more patient effort)  -     Time Frame (Transfer Goal 1, PT) long term goal (LTG);by discharge  -[facility] Name 04/29/24 1301          Gait  Training Goal 1 (PT)    Activity/Assistive Device (Gait Training Goal 1, PT) gait (walking locomotion)  [facility]  -     [place] Level (Gait Training Goal 1, PT) minimum assist (75% or more patient effort)  -KP     Distance (Gait Training Goal 1, PT) 75ft  -KP     Time Frame (Gait Training Goal 1, PT) long term goal (LTG);by discharge  -               User Key  (r) = Recorded By, (t) = Taken By, (c) = Cosigned By      Initials Name Provider Type    Collette Grider PT Physical Therapist                      PT Recommendation and Plan  Planned Therapy Interventions (PT): balance training, bed mobility training, gait training, home exercise program, manual therapy techniques, motor coordination training, neuromuscular re-education, patient/family education, postural re-education, ROM (range of motion), strengthening, stretching, transfer training  Therapy Frequency (PT): 2 times/wk  Plan of Care Reviewed With: patient  Outcome Evaluation: PT evaluation completed.  Patient requires moderate assist for mobility and will benefit from continued skilled PT services this admission to improve transfers, gait, strength and endurance in order to decrease level of assist needed from caregivers.       Time Calculation:    PT Charges       Row Name 04/29/24 1319             Time Calculation    PT Received On 04/29/24  -      PT Goal Re-Cert Due Date 05/13/24  -                User Key  (r) = Recorded By, (t) = Taken By, (c) = Cosigned By      Initials Name Provider Type    Collette Grider PT Physical Therapist                  Therapy Charges for Today       Code Description Service Date Service Provider Modifiers Qty    42488547603 HC PT EVAL MOD COMPLEXITY 4 4/29/2024 Collette Gandhi, PT GP 1            PT G-Codes  AM-PAC 6 Clicks Score (PT): 16    Collette Gandhi PT  4/29/2024      Electronically signed by Collette Gandhi PT at 04/29/24 1321          Occupational Therapy Notes (most recent note)        Lorin  "Maria Del Rosario, OT at 24 1513          Patient Name: Regine Beckham  : 1955    MRN: [id number]                              Today's Date: 2024       Admit Date: 2024    Visit Dx:     ICD-10-CM ICD-9-CM   1. Adult failure to thrive  R62.7 783.7   2. Debility  R53.81 799.3     Patient Active Problem List   Diagnosis    Iron deficiency anemia due to chronic blood loss    Major depressive disorder    RLS (restless legs syndrome)    GERD (gastroesophageal reflux disease)    Left breast mass    Allergy to penicillin    Stroke    Grade I diastolic dysfunction    Moderate malnutrition    Falls frequently    Stroke-like symptoms    Debility    Chest pain    Failure to thrive in adult     Past Medical History:   Diagnosis Date    Anemia     Anxiety     Arthritis     Depression     Legally blind     Restless leg syndrome     Sleep apnea     \"I don't use a cpap anymore since losing 106 lbs\"    Water retention      Past Surgical History:   Procedure Laterality Date    BACK SURGERY      CARDIAC CATHETERIZATION N/A 2024    Procedure: Percutaneous Manual Thrombectomy;  Surgeon: Efrain Hurley MD;  Location:  [facility];  Service: Interventional Radiology;  Laterality: N/A;    CARDIAC CATHETERIZATION N/A 2024    Procedure: Left Heart Cath;  Surgeon: Susan Mederos MD;  [facility]:  [facility];  Service: Cardiology;  Laterality: N/A;    GALLBLADDER SURGERY      HIP ARTHROSCOPY      JOINT REPLACEMENT Right       General Information       Row Name 24 1453          OT Time and Intention    Document Type evaluation  -     Mode of Treatment individual therapy;occupational therapy  -       Row Name 24 1453          General Information    Patient Profile Reviewed yes  -     Prior Level of Function --  patient was independent prior to 6 months but experienced CVA which has impacted functional performance; multiple recent hospitalizations with recommendation " of skilled services/therapy but patient discharged home alone with subsequent falls  -     Existing Precautions/Restrictions fall  -     Barriers to Rehab previous functional deficit  -       Row Name 04/29/24 1453          Occupational Profile    Reason for Services/Referral (Occupational Profile) Patient was admitted to [facility] on 4/26/2024. She was referred for OT evaluation due to change in functional performance with ADLs, functional mobility, and/or transfers.  -       Row Name 04/29/24 1453          Living Environment    People in Home alone  -[facility] Name 04/29/24 1453          Cognition    Orientation Status (Cognition) oriented to;person;place;situation  -[facility] Name 04/29/24 1453          Safety Issues, Functional Mobility    Safety Issues Affecting Function (Mobility) insight into deficits/self-awareness  -     Impairments Affecting Function (Mobility) balance;endurance/activity tolerance;muscle tone abnormal;range of motion (ROM);strength;coordination  -               User Key  (r) = Recorded By, (t) = Taken By, (c) = Cosigned By      Initials Name Provider Type     Maria Del Rosario Padgett, OT Occupational Therapist                     Mobility/ADL's       Row Name 04/29/24 1503          Bed Mobility    Bed Mobility supine-sit;sit-supine  -     Supine-Sit [place] (Bed Mobility) moderate assist (50% patient effort);2 person assist;verbal cues  -     Sit-Supine [place] (Bed Mobility) moderate assist (50% patient effort);2 person assist;verbal cues  -     Bed Mobility, Safety Issues decreased use of arms for pushing/pulling;decreased use of legs for bridging/pushing  -     Assistive Device (Bed Mobility) bed rails;draw sheet;head of bed elevated  -[facility] Name 04/29/24 1503          Transfers    Transfers sit-stand transfer;stand-sit transfer  -[facility] Name 04/29/24 1503          Sit-Stand Transfer    Sit-Stand [place] (Transfers) moderate  assist (50% patient effort);2 person assist;verbal cues  -     Comment, (Sit-Stand Transfer) handheld assist  -KP       Row Name 04/29/24 1503          Stand-Sit Transfer    Stand-Sit [place] (Transfers) moderate assist (50% patient effort);2 person assist;verbal cues  -     Comment, (Stand-Sit Transfer) handheld assist  -KP       Row Name 04/29/24 1503          Activities of Daily Living    BADL Assessment/Intervention bathing;upper body dressing;lower body dressing;grooming;toileting  -KP       Row Name 04/29/24 1503          Bathing Assessment/Intervention    [place] Level (Bathing) bathing skills;maximum assist (25% patient effort)  -KP       Row Name 04/29/24 1503          Upper Body Dressing Assessment/Training    [place] Level (Upper Body Dressing) upper body dressing skills;maximum assist (25% patient effort)  -KP       Row Name 04/29/24 1503          Lower Body Dressing Assessment/Training    [place] Level (Lower Body Dressing) lower body dressing skills;maximum assist (25% patient effort)  -KP       Row Name 04/29/24 1503          Grooming Assessment/Training    [place] Level (Grooming) grooming skills;maximum assist (25% patient effort)  -KP       Row Name 04/29/24 1503          Toileting Assessment/Training    [place] Level (Toileting) toileting skills;maximum assist (25% patient effort)  -               User Key  (r) = Recorded By, (t) = Taken By, (c) = Cosigned By      Initials Name Provider Type     Maria Del Rosario Padgett OT Occupational Therapist                   Obj/Interventions       [facility] Name 04/29/24 1508          Vision Assessment/Intervention    Visual Impairment/Limitations WFL;corrective lenses for reading  -KP       Row Name 04/29/24 1508          Range of Motion Comprehensive    Comment, General Range of Motion LUE limited due to prior CVA; RUE WFLs  -KP       Row Name 04/29/24 1508          Strength Comprehensive (MMT)    Comment, General Manual Muscle  Testing (MMT) Assessment LUE 2/5, RUE 4/5  -[facility] Name 04/29/24 1508          Motor Skills    Motor Skills coordination;functional endurance;muscle tone  -     Coordination fine motor deficit;gross motor deficit;left;upper extremity;severe impairment  -     Functional Endurance fair  -     Muscle Tone left;upper extremity(s);lower extremity(s);moderate impairment;hypertonia  -[facility] Name 04/29/24 1508          Balance    Balance Assessment sitting static balance;standing static balance  -     Static Sitting Balance standby assist  -     Static Standing Balance minimal assist;moderate assist  -               User Key  (r) = Recorded By, (t) = Taken By, (c) = Cosigned By      Initials Name Provider Type     Maria Del Rosario Padgett, OT Occupational Therapist                   Goals/Plan       Row Name 04/29/24 1512          Transfer Goal 1 (OT)    Activity/Assistive Device (Transfer Goal 1, OT) toilet  -     [place] Level/Cues Needed (Transfer Goal 1, OT) minimum assist (75% or more patient effort)  -     Time Frame (Transfer Goal 1, OT) by discharge  -KP       Row Name 04/29/24 1512          Dressing Goal 1 (OT)    Activity/Device (Dressing Goal 1, OT) dressing skills, all  -     [place]/Cues Needed (Dressing Goal 1, OT) moderate assist (50-74% patient effort)  -     Time Frame (Dressing Goal 1, OT) by discharge  -[facility] Name 04/29/24 1512          ROM Goal 1 (OT)    ROM Goal 1 (OT) Patient will tolerate ROM to LUE X1 a day to prevent contracture formation.  -     Time Frame (ROM Goal 1, OT) by discharge  -[facility] Name 04/29/24 1512          Therapy Assessment/Plan (OT)    Planned Therapy Interventions (OT) activity tolerance training;BADL retraining;passive ROM/stretching;functional balance retraining;transfer/mobility retraining;occupation/activity based interventions;strengthening exercise;ROM/therapeutic exercise;patient/caregiver education/training;neuromuscular  control/coordination retraining;manual therapy/joint mobilization  -               User Key  (r) = Recorded By, (t) = Taken By, (c) = Cosigned By      Initials Name Provider Type     Maria Del Rosario Padgett, OT Occupational Therapist                   Clinical Impression       Row Name 04/29/24 6970          Pain Assessment    Pretreatment Pain Rating 1/10  -     Posttreatment Pain Rating 1/10  -     Pain Location generalized  -       Row Name 04/29/24 1510          Plan of Care Review    Plan of Care Reviewed With patient  -     Progress no change  -     Outcome Evaluation Patient seen for OT evaluation. She presents with functional limitations including generalized weakness, impaired balance, limited ROM in LUE, abnormal tone in LUE, and impaired activity tolerance/functional endurance (left affected greater than right due to prior CVA). Patient would benefit from ongoing OT services to promote highest level of independence and safety with daily occupations prior to discharge.  -       Row Name 04/29/24 1510          Therapy Assessment/Plan (OT)    Patient/Family Therapy Goal Statement (OT) be as independent as possible  -     Rehab Potential (OT) good, to achieve stated therapy goals  -     Criteria for Skilled Therapeutic Interventions Met (OT) yes;meets criteria;skilled treatment is necessary  -     Therapy Frequency (OT) 3 times/wk  3-5x/week as able and available  -     Predicted Duration of Therapy Intervention (OT) discharge  -       Row Name 04/29/24 1510          Therapy Plan Review/Discharge Plan (OT)    Anticipated Discharge Disposition (OT) skilled nursing facility  -       Row Name 04/29/24 1510          Positioning and Restraints    Pre-Treatment Position in bed  -     Post Treatment Position bed  -     In Bed fowlers;call light within reach;encouraged to call for assist;exit alarm on  -               User Key  (r) = Recorded By, (t) = Taken By, (c) = Cosigned By       Initials Name Provider Type     Maria Del Rosario Padgett OT Occupational Therapist                   Outcome Measures       Row Name 04/29/24 0952          How much help from another person do you currently need...    Turning from your back to your side while in flat bed without using bedrails? 3  -OW     Moving from lying on back to sitting on the side of a flat bed without bedrails? 3  -OW     Moving to and from a bed to a chair (including a wheelchair)? 3  -OW     Standing up from a chair using your arms (e.g., wheelchair, bedside chair)? 3  -OW     Climbing 3-5 steps with a railing? 2  -OW     To walk in hospital room? 2  -OW     AM-PAC 6 Clicks Score (PT) 16  -OW     Highest Level of Mobility Goal 5 --> Static standing  -OW               User Key  (r) = Recorded By, (t) = Taken By, (c) = Cosigned By      Initials Name Provider Type    Lucita Bourgeois, RN Registered Nurse                      OT Recommendation and Plan  Planned Therapy Interventions (OT): activity tolerance training, BADL retraining, passive ROM/stretching, functional balance retraining, transfer/mobility retraining, occupation/activity based interventions, strengthening exercise, ROM/therapeutic exercise, patient/caregiver education/training, neuromuscular control/coordination retraining, manual therapy/joint mobilization  Therapy Frequency (OT): 3 times/wk (3-5x/week as able and available)  Plan of Care Review  Plan of Care Reviewed With: patient  Progress: no change  Outcome Evaluation: Patient seen for OT evaluation. She presents with functional limitations including generalized weakness, impaired balance, limited ROM in LUE, abnormal tone in LUE, and impaired activity tolerance/functional endurance (left affected greater than right due to prior CVA). Patient would benefit from ongoing OT services to promote highest level of independence and safety with daily occupations prior to discharge.     Time Calculation:         Time Calculation- OT        Row Name 04/29/24 1513             Time Calculation- OT    OT Received On 04/29/24  -                User Key  (r) = Recorded By, (t) = Taken By, (c) = Cosigned By      Initials Name Provider Type    Maria Del Rosario Briceño OT Occupational Therapist                  Therapy Charges for Today       Code Description Service Date Service Provider Modifiers Qty    33288859375 HC OT EVAL MOD COMPLEXITY 4 4/29/2024 Maria Del Rosario Padgett OT GO 1                 Maria Del Rosario Padgett OT  4/29/2024    Electronically signed by Maria Del Rosario Padgett OT at 04/29/24 1514       Speech Language Pathology Notes (most recent note)    No notes exist for this encounter.       ADL Documentation (most recent)      Flowsheet Row Most Recent Value   Transferring 3 - assistive equipment and person   Toileting 3 - assistive equipment and person   Bathing 2 - assistive person   Dressing 2 - assistive person   Eating 0 - independent   Communication 0 - understands/communicates without difficulty   Swallowing 0 - swallows foods/liquids without difficulty   Equipment Currently Used at Home shower chair, commode, walker, rolling, wheelchair  [via [employer]]            Discharge Summary    No notes of this type exist for this encounter.       Discharge Order (From admission, onward)      None

## 2024-04-29 NOTE — PROGRESS NOTES
[facility] HOSPITALIST PROGRESS NOTE     Patient Identification:  Name:  Regine Beckham  Age:  68 y.o.  Sex:  female  :  1955  MRN:  [id number]  Visit Number:  [id number]  ROOM: [address] [place] [address]     Primary Care Provider:  Dread Burton MD    Length of stay in inpatient status:  3    Subjective     Chief Compliant:    Chief Complaint   Patient presents with    Fall       History of Presenting Illness:    Patient denied any new complaints. Main complaint is weakness and recurrent falls. She noted she would like to go to the [facility] if denied IPR. No family bedside.     ROS:  Otherwise 10 point ROS negative other than documented above in HPI.     Objective     Current Hospital Meds:amLODIPine, 5 mg, Oral, Q24H  aspirin, 81 mg, Oral, Daily  atorvastatin, 80 mg, Oral, Nightly  gabapentin, 300 mg, Oral, BID  heparin (porcine), 5,000 Units, Subcutaneous, Q12H  isosorbide mononitrate, 30 mg, Oral, Q24H  losartan, 50 mg, Oral, Daily  metoprolol tartrate, 12.5 mg, Oral, Q12H  Mirabegron ER, 50 mg, Oral, Daily  mirtazapine, 30 mg, Oral, Nightly  nitrofurantoin (macrocrystal-monohydrate), 100 mg, Oral, Q12H  pantoprazole, 40 mg, Oral, Q AM  rOPINIRole, 4 mg, Oral, Nightly  senna-docusate sodium, 2 tablet, Oral, Nightly  sodium chloride, 10 mL, Intravenous, Q12H  ticagrelor, 60 mg, Oral, BID         Current Antimicrobial Therapy:  Anti-Infectives (From admission, onward)      Ordered     Dose/Rate Route Frequency Start Stop    24 0904  nitrofurantoin (macrocrystal-monohydrate) (MACROBID) capsule 100 mg        Ordering Provider: Matheus Lundberg MD    100 mg Oral Every 12 Hours Scheduled 24 1000 24 0859          Current Diuretic Therapy:  Diuretics (From admission, onward)      None          ----------------------------------------------------------------------------------------------------------------------  Vital Signs:  Temp:  [97.8 °F (36.6 °C)-98.5 °F (36.9 °C)] 97.8 °F (36.6  °C)  Heart Rate:  [68-69] 69  Resp:  [18] 18  BP: (105-147)/(60-77) 105/60     on   ;   Device (Oxygen Therapy): room air  Body mass index is 23.27 kg/m².    Wt Readings from Last 3 Encounters:   04/29/24 65.4 kg (144 lb 2.9 oz)   04/20/24 68.4 kg (150 lb 12.8 oz)   04/18/24 68.1 kg (150 lb 2.1 oz)     Intake & Output (last 3 days)         04/26 0701 04/27 0700 04/27 0701 04/28 0700 04/28 0701 04/29 0700 04/29 0701 04/30 0700    P.O. 240 480 480 480    Total Intake(mL/kg) 240 (3.6) 480 (7.2) 480 (7.3) 480 (7.3)    Urine (mL/kg/hr) 300 950 (0.6) 1150 (0.7) 650 (1.1)    Stool   0     Total Output  650    Net -60 -470 -670 -170            Urine Unmeasured Occurrence 1 x 1 x 1 x 4 x    Stool Unmeasured Occurrence   1 x           Diet: Regular/House; Fluid Consistency: Thin (IDDSI 0)  ----------------------------------------------------------------------------------------------------------------------  Physical exam:  Constitutional:  Chronically ill appearing.   HENT:  Head:  Normocephalic and atraumatic.  Mouth:  Moist mucous membranes.    Eyes:  Conjunctivae and EOM are normal. No scleral icterus.    Neck:  Neck supple.  No JVD present.    Cardiovascular:  Normal rate, regular rhythm and normal heart sounds with no murmur.  Pulmonary/Chest:  No respiratory distress, no wheezes, no crackles, with normal breath sounds and good air movement.    ----------------------------------------------------------------------------------------------------------------------  Tele:    ----------------------------------------------------------------------------------------------------------------------  Results from last 7 days   Lab Units 04/27/24  0152 04/26/24 2117   WBC 10*3/mm3 7.91 9.38   HEMOGLOBIN g/dL 9.8* 10.7*   HEMATOCRIT % 30.9* 32.7*   MCV fL 96.3 92.4   MCHC g/dL 31.7 32.7   PLATELETS 10*3/mm3 274 318         Results from last 7 days   Lab Units 04/27/24  0152 04/26/24 2117   SODIUM mmol/L 142 140  "  POTASSIUM mmol/L 3.4* 3.8   CHLORIDE mmol/L 107 104   CO2 mmol/L 24.7 25.3   BUN mg/dL 14 14   CREATININE mg/dL 0.81 0.72   CALCIUM mg/dL 9.5 9.9   GLUCOSE mg/dL 168* 123*   ALBUMIN g/dL 3.3* 3.7   BILIRUBIN mg/dL 0.3 0.3   ALK PHOS U/L 138* 150*   AST (SGOT) U/L 29 36*   ALT (SGPT) U/L 32 38*   Estimated Creatinine Clearance: 67.7 mL/min (by C-G formula based on SCr of 0.81 mg/dL).  No results found for: \"AMMONIA\"              No results found for: \"HGBA1C\", \"POCGLU\"  Lab Results   Component Value Date    TSH 2.880 03/17/2024     No results found for: \"PREGTESTUR\", \"PREGSERUM\", \"HCG\", \"HCGQUANT\"  Pain Management Panel          Latest Ref Rng & Units 12/20/2023   Pain Management Panel   Amphetamine, Urine Qual Negative Negative    Barbiturates Screen, Urine Negative Negative    Benzodiazepine Screen, Urine Negative Negative    Buprenorphine, Screen, Urine Negative Negative    Cocaine Screen, Urine Negative Negative    Fentanyl, Urine Negative Negative    Methadone Screen , Urine Negative Negative    Methamphetamine, Ur Negative Negative      Brief Urine Lab Results  (Last result in the past 365 days)        Color   Clarity   Blood   Leuk Est   Nitrite   Protein   CREAT   Urine HCG        04/26/24 2102 Yellow   Cloudy   Negative   Small (1+)   Negative   Negative                 No results found for: \"BLOODCX\"  Urine Culture   Date Value Ref Range Status   04/26/2024 >100,000 CFU/mL Normal Urogenital Lorraine  Final     No results found for: \"WOUNDCX\"  No results found for: \"STOOLCX\"  No results found for: \"RESPCX\"  No results found for: \"AFBCX\"        I have personally looked at the labs and they are summarized above.  ----------------------------------------------------------------------------------------------------------------------  Detailed radiology reports for the last 24 hours:    Imaging Results (Last 24 Hours)       ** No results found for the last 24 hours. **          Assessment & Plan      #Acute on " chronic debility secondary to previous CVA  -Residual left sided weakness, initially resistant to IPR due to co-pay but with insurance changes is now agreeable  -Denied IPR. Awaiting SNF.      #Bacteruria, simple cystitis  -On UA bacteria concentration increasing across prior samples. Mild urgency reported, unsure reliability to report and more detailed hx  -Started Macrobid x5 days to cover  -urine cx normal UGF      Code status: Full     Dispo: Pending placement       VTE Prophylaxis:   Mechanical Order History:       None          Pharmalogical Order History:        Ordered     Dose Route Frequency Stop    04/27/24 0000  heparin (porcine) 5000 UNIT/ML injection 5,000 Units         5,000 Units SC Every 12 Hours Scheduled --                    Ok Marin MD  [facility] Hospitalist  04/29/24  15:46 EDT

## 2024-04-30 PROCEDURE — 97530 THERAPEUTIC ACTIVITIES: CPT

## 2024-04-30 PROCEDURE — 25010000002 HEPARIN (PORCINE) PER 1000 UNITS: Performed by: HOSPITALIST

## 2024-04-30 PROCEDURE — 97110 THERAPEUTIC EXERCISES: CPT

## 2024-04-30 PROCEDURE — 97535 SELF CARE MNGMENT TRAINING: CPT

## 2024-04-30 PROCEDURE — 99231 SBSQ HOSP IP/OBS SF/LOW 25: CPT | Performed by: INTERNAL MEDICINE

## 2024-04-30 RX ADMIN — METOPROLOL TARTRATE 12.5 MG: 25 TABLET, FILM COATED ORAL at 08:30

## 2024-04-30 RX ADMIN — HEPARIN SODIUM 5000 UNITS: 5000 INJECTION INTRAVENOUS; SUBCUTANEOUS at 08:30

## 2024-04-30 RX ADMIN — NITROFURANTOIN MONOHYDRATE/MACROCRYSTALLINE 100 MG: 25; 75 CAPSULE ORAL at 08:31

## 2024-04-30 RX ADMIN — TICAGRELOR 60 MG: 60 TABLET ORAL at 20:38

## 2024-04-30 RX ADMIN — AMLODIPINE BESYLATE 5 MG: 5 TABLET ORAL at 08:30

## 2024-04-30 RX ADMIN — MIRABEGRON 50 MG: 25 TABLET, FILM COATED, EXTENDED RELEASE ORAL at 08:31

## 2024-04-30 RX ADMIN — GABAPENTIN 300 MG: 300 CAPSULE ORAL at 08:31

## 2024-04-30 RX ADMIN — TICAGRELOR 60 MG: 60 TABLET ORAL at 08:30

## 2024-04-30 RX ADMIN — GABAPENTIN 300 MG: 300 CAPSULE ORAL at 20:36

## 2024-04-30 RX ADMIN — MIRTAZAPINE 30 MG: 15 TABLET, FILM COATED ORAL at 20:38

## 2024-04-30 RX ADMIN — LOSARTAN POTASSIUM 50 MG: 50 TABLET, FILM COATED ORAL at 08:30

## 2024-04-30 RX ADMIN — Medication 10 ML: at 20:39

## 2024-04-30 RX ADMIN — ISOSORBIDE MONONITRATE 30 MG: 30 TABLET, EXTENDED RELEASE ORAL at 08:30

## 2024-04-30 RX ADMIN — HEPARIN SODIUM 5000 UNITS: 5000 INJECTION INTRAVENOUS; SUBCUTANEOUS at 20:38

## 2024-04-30 RX ADMIN — NITROFURANTOIN MONOHYDRATE/MACROCRYSTALLINE 100 MG: 25; 75 CAPSULE ORAL at 20:38

## 2024-04-30 RX ADMIN — ROPINIROLE HYDROCHLORIDE 4 MG: 1 TABLET, FILM COATED ORAL at 20:37

## 2024-04-30 RX ADMIN — ATORVASTATIN CALCIUM 80 MG: 40 TABLET, FILM COATED ORAL at 20:38

## 2024-04-30 RX ADMIN — METOPROLOL TARTRATE 12.5 MG: 25 TABLET, FILM COATED ORAL at 20:37

## 2024-04-30 RX ADMIN — ASPIRIN 81 MG: 81 TABLET, COATED ORAL at 08:31

## 2024-04-30 RX ADMIN — PANTOPRAZOLE SODIUM 40 MG: 40 TABLET, DELAYED RELEASE ORAL at 05:52

## 2024-04-30 RX ADMIN — DOCUSATE SODIUM 50 MG AND SENNOSIDES 8.6 MG 2 TABLET: 8.6; 5 TABLET, FILM COATED ORAL at 20:37

## 2024-04-30 RX ADMIN — TRAMADOL HYDROCHLORIDE 50 MG: 50 TABLET ORAL at 11:38

## 2024-04-30 RX ADMIN — Medication 10 ML: at 08:30

## 2024-04-30 NOTE — DISCHARGE PLACEMENT REQUEST
"Regine Lock \"NEGIN\" (68 y.o. Female)       Date of Birth   1955    Social Security Number       Address   [address] [address]    Home Phone   [phone]    MRN   [id number]       [facility]    Marital Status                               Admission Date   4/26/24    Admission Type   Emergency    Admitting Provider   Capo Su MD    Attending Provider   Ok Marin MD    Department, Room/Bed   [address], 3348/2S       Discharge Date       Discharge Disposition       Discharge Destination                                 Attending Provider: Ok Marin MD    Allergies: Penicillins    Isolation: None   Infection: None   Code Status: CPR    Ht: 167.6 cm (66\")   Wt: 65.2 kg (143 lb 12.8 oz)    Admission Cmt: None   Principal Problem: Debility [R53.81]                   Active Insurance as of 4/26/2024       Primary Coverage       Payor Plan Insurance Group Employer/Plan Group    AETNA MEDICARE REPLACEMENT AETNA MEDICARE REPLACEMENT [id number]       Payor Plan Address Payor Plan Phone Number Payor Plan Fax Number Effective Dates    [address] [phone]  1/1/2024 - None Entered    [facility] [place] [address]         Subscriber Name Subscriber Birth Date Member ID       REGINE LOCK 1955 [id number]               Secondary Coverage       Payor Plan Insurance Group Employer/Plan Group    [place]        Payor Plan Address Payor Plan Phone Number Payor Plan Fax Number Effective Dates    [address] [phone]  4/26/2024 - None Entered    [place] [address]         Subscriber Name Subscriber Birth Date Member ID       REGINE LOCK 1955 [id number]                     Emergency Contacts        (Rel.) Home Phone Work Phone Mobile Phone    Yaa Elizondo (Friend) [phone] --    Karla Patel (Friend) [phone] -- --              Insurance Information           " "       AETNA MEDICARE REPLACEMENT/AETNA MEDICARE REPLACEMENT Phone: [phone]    Subscriber: Regine Beckham Subscriber#: [id number]    Group#: [id number] Precert#: [id number]        [place] Phone: [phone]    Subscriber: Regine Beckham Subscriber#: [id number]    Group#: -- Precert#: [id number]             History & Physical        Capo Su MD at 24 2354          Hospitalist History and Physical        Patient Identification  Name: Regine Beckham  Age/Sex: 68 y.o. female  :  1955        MRN: [id number]  Visit Number: [id number]  Admit Date: 2024   PCP: Dread Burton MD          Chief complaint fall at home    History of Present Illness:  Patient is a 68 y.o. female with history of anemia, anxiety/depression, arthritis, legally blind, restless leg syndrome, ZORAIDA, \"water retention\" with recent ECHO on 4/10/24 showing EF >70% and grade 1 diastolic dysfunction, and stroke in January of this year with residual left sided weakness and facial droop. She has been in and out of our hospital since then, both acute inpatient and inpatient rehab, since that time. Most recently she was admitted from -24 for debility, physical deconditioning, frequent falls, and failure to thrive. Rehab was strongly recommended, but per the last progress note from this stay, \"Patient is not agreeable to pay a daily copay for short term rehab and is not agreeable to long term placement now because she would have to give up her monthly social security check. She is checking with a friend to see if she can stay with her. She presents back to the ED today after falling at home. She reports she tried to get up from seated position with her walker, but her strength gave out and she fell on her left side. ED received report that patient's home was in very poor condition and she had no one to help care for her there. Therefore she was not safe for discharge home. " Patient states that she just got her medicaid card which will pay for short term rehab, so she is now agreeable to go. Labs were fairly stable in the ED. UA did show 4+ bacteria but only 3-5 WBC and 3-6 squamous epithelial cells suggesting possible contamination. Upon further questioning, she did admit her urine was smelling somewhat foul but denied dysuria or cloudy urine. Urine culture is now pending. XR of left shoulder, left hip and both knees were obtained due to complaints of pain after her fall, but no acute fractures were appreciated. She is being admitted for further management of debility and ultimately for rehab placement.     Review of Systems  Review of Systems   Constitutional:  Positive for activity change and fatigue. Negative for appetite change, chills, diaphoresis, fever and unexpected weight change.   HENT:  Negative for congestion, postnasal drip, rhinorrhea, sinus pressure, sinus pain and sore throat.    Eyes:  Negative for photophobia and visual disturbance.   Respiratory:  Negative for cough, shortness of breath and wheezing.    Cardiovascular:  Negative for chest pain, palpitations and leg swelling.   Gastrointestinal:  Negative for abdominal distention, abdominal pain, constipation, diarrhea, nausea and vomiting.   Genitourinary:  Negative for difficulty urinating, dysuria, flank pain, frequency and hematuria.        Somewhat foul smelling urine   Musculoskeletal:  Positive for arthralgias (left shoulder, left hip and knee from fall) and back pain. Negative for joint swelling and myalgias.   Skin:  Negative for color change, pallor, rash and wound.   Neurological:  Negative for dizziness, seizures, light-headedness, numbness and headaches.   Hematological:  Negative for adenopathy. Does not bruise/bleed easily.   Psychiatric/Behavioral:  Negative for agitation, behavioral problems and confusion.        History  Past Medical History:   Diagnosis Date    Anemia     Anxiety     Arthritis      "Depression     Legally blind     Restless leg syndrome     Sleep apnea     \"I don't use a cpap anymore since losing 106 lbs\"    Water retention      Past Surgical History:   Procedure Laterality Date    BACK SURGERY      CARDIAC CATHETERIZATION N/A 1/19/2024    Procedure: Percutaneous Manual Thrombectomy;  Surgeon: Efrain Hurley MD;  Location:  [facility];  Service: Interventional Radiology;  Laterality: N/A;    CARDIAC CATHETERIZATION N/A 4/12/2024    Procedure: Left Heart Cath;  Surgeon: Susan Mederos MD;  Location:  [facility];  Service: Cardiology;  Laterality: N/A;    GALLBLADDER SURGERY      HIP ARTHROSCOPY      JOINT REPLACEMENT Right      Family History   Problem Relation Age of Onset    Breast cancer Neg Hx      Social History     Tobacco Use    Smoking status: Former     Current packs/day: 1.50     Average packs/day: 1.5 packs/day for 51.0 years (76.5 ttl pk-yrs)     Types: Cigarettes     Passive exposure: Past    Smokeless tobacco: Never   Vaping Use    Vaping status: Never Used   Substance Use Topics    Alcohol use: Not Currently     Alcohol/week: 1.0 standard drink of alcohol     Types: 1 Glasses of wine per week     Comment: \"occasionally - once every two months\"    Drug use: Not Currently     Comment: alcohol - occasionally     Medications Prior to Admission   Medication Sig Dispense Refill Last Dose    amLODIPine (NORVASC) 5 MG tablet Take 1 tablet by mouth Daily for 30 days. 30 tablet 0 Unknown    aspirin 81 MG EC tablet Take 1 tablet by mouth Daily.   Unknown    atorvastatin (LIPITOR) 80 MG tablet Take 1 tablet by mouth Every Night. 90 tablet 0 Unknown    gabapentin (NEURONTIN) 300 MG capsule Take 1 capsule by mouth 2 (Two) Times a Day.   Unknown    isosorbide mononitrate (IMDUR) 30 MG 24 hr tablet Take 1 tablet by mouth Daily for 30 days. 30 tablet 0 Unknown    losartan (COZAAR) 50 MG tablet Take 1 tablet by mouth Daily. Indications: High Blood Pressure " Disorder   Unknown    metoprolol tartrate (LOPRESSOR) 25 MG tablet Take 1/2 tablet by mouth Every 12 (Twelve) Hours for 30 days. 30 tablet 0 Unknown    Mirabegron ER (MYRBETRIQ) 50 MG tablet sustained-release 24 hour 24 hr tablet Take 50 mg by mouth Daily. Indications: Urinary Urgency   Unknown    mirtazapine (REMERON) 30 MG tablet Take 1 tablet by mouth Every Night. Indications: Major Depressive Disorder 30 tablet 0 Unknown    pantoprazole (PROTONIX) 40 MG EC tablet TAKE 1 TABLET BY MOUTH EVERY MORNING FOR HEARTBURN 90 tablet 0 Unknown    rOPINIRole (REQUIP) 4 MG tablet Take 1 tablet by mouth Every Night. Take 1 hour before bedtime.  Indications: Restless Leg Syndrome 30 tablet 0 Unknown    ticagrelor (BRILINTA) 60 MG tablet tablet Take 1 tablet by mouth 2 (Two) Times a Day. 60 tablet 0 Unknown     Allergies:  Penicillins    Objective    Vital Signs  Temp:  [98.2 °F (36.8 °C)] 98.2 °F (36.8 °C)  Heart Rate:  [76-83] 83  Resp:  [12-16] 12  BP: (116-121)/(60-66) 116/60  Body mass index is 24.37 kg/m².    Physical Exam:  Physical Exam  Constitutional:       General: She is not in acute distress.     Appearance: Normal appearance. She is ill-appearing (chronically so).   HENT:      Head: Normocephalic and atraumatic.      Right Ear: External ear normal.      Left Ear: External ear normal.      Nose: Nose normal.      Mouth/Throat:      Mouth: Mucous membranes are moist.      Pharynx: Oropharynx is clear.   Eyes:      Extraocular Movements: Extraocular movements intact.      Conjunctiva/sclera: Conjunctivae normal.      Pupils: Pupils are equal, round, and reactive to light.   Cardiovascular:      Rate and Rhythm: Normal rate and regular rhythm.      Pulses: Normal pulses.      Heart sounds: Normal heart sounds. No murmur heard.  Pulmonary:      Effort: Pulmonary effort is normal. No respiratory distress.      Breath sounds: Normal breath sounds. No wheezing or rales.   Abdominal:      General: Abdomen is flat. Bowel  "sounds are normal.      Palpations: Abdomen is soft.   Musculoskeletal:         General: Normal range of motion.      Cervical back: Normal range of motion and neck supple. No tenderness.      Right lower leg: No edema.      Left lower leg: No edema.   Lymphadenopathy:      Cervical: No cervical adenopathy.   Skin:     General: Skin is warm and dry.      Coloration: Skin is not jaundiced or pale.   Neurological:      Mental Status: She is alert.      Comments: Alert, oriented to self and place but not year. Left facial droop noted which is from recent CVA. Also significant weakness (3/5 hand , 3/5 leg raise) on left side compared to 5/5 strength on right side.     Psychiatric:         Mood and Affect: Mood normal.         Behavior: Behavior normal.           Results Review:       Lab Results:  Results from last 7 days   Lab Units 04/26/24 2117 04/21/24 0214 04/20/24  1407   WBC 10*3/mm3 9.38 6.67 9.47   HEMOGLOBIN g/dL 10.7* 9.6* 11.3*   PLATELETS 10*3/mm3 318 249 284         Results from last 7 days   Lab Units 04/26/24 2117 04/22/24  0239 04/21/24 0214 04/20/24  1407   SODIUM mmol/L 140 144 143 142   POTASSIUM mmol/L 3.8 4.1 3.3* 3.7   CHLORIDE mmol/L 104 109* 108* 107   CO2 mmol/L 25.3 26.6 26.4 24.6   BUN mg/dL 14 15 13 12   CREATININE mg/dL 0.72 0.85 0.83 0.76   CALCIUM mg/dL 9.9 9.4 9.0 9.8   GLUCOSE mg/dL 123* 131* 149* 132*         No results found for: \"HGBA1C\"  Results from last 7 days   Lab Units 04/26/24 2117 04/20/24  1407   BILIRUBIN mg/dL 0.3 0.5   ALK PHOS U/L 150* 121*   AST (SGOT) U/L 36* 37*   ALT (SGPT) U/L 38* 29                       I have reviewed the patient's laboratory results.    Imaging:  Imaging Results (Last 72 Hours)       Procedure Component Value Units Date/Time    XR Hip With or Without Pelvis 2 - 3 View Left [[id number]] Collected: 04/26/24 2330     Updated: 04/26/24 2332    Narrative:      INDICATION: Pain and injury.     TECHNIQUE: One view of the pelvis. 2 views of the " left hip. One view the  right hip.     COMPARISON: No relevant priors.     FINDINGS:   No acute fracture or dislocation. No lytic or blastic bony lesions seen.  Right hip hardware appears intact. Moderate left hip joint space loss.     Soft tissues appear unremarkable.       Impression:      No acute osseous abnormality evident.           This report was finalized on 4/26/2024 11:30 PM by Alex Pallas, DO.       XR Knee 3 View Left [[id number]] Collected: 04/26/24 2329     Updated: 04/26/24 2332    Narrative:      INDICATION: Pain and injury.     TECHNIQUE: 3 views of the left knee.     COMPARISON: No relevant priors.     FINDINGS:   No acute fracture or dislocation. No lytic or blastic bony lesions seen.  Joint spaces are relatively preserved.     Soft tissues appear unremarkable.       Impression:      No acute osseous abnormality evident.        This report was finalized on 4/26/2024 11:30 PM by Alex Pallas, DO.       XR Shoulder 2+ View Left [[id number]] Collected: 04/26/24 2106     Updated: 04/26/24 2108    Narrative:      INDICATION: Pain and injury.     TECHNIQUE: 3 views of the left shoulder.     COMPARISON: No relevant priors.     FINDINGS:   No acute fracture or dislocation. No lytic or blastic bony lesions seen.  Mild glenohumeral joint space loss.     Soft tissues appear unremarkable.       Impression:      No acute osseous abnormality evident.        This report was finalized on 4/26/2024 9:06 PM by Alex Pallas, DO.       XR Knee 3 View Right [[id number]] Collected: 04/26/24 2105     Updated: 04/26/24 2108    Narrative:      INDICATION: Pain and injury.     TECHNIQUE: 3 views of the right knee.     COMPARISON: No relevant priors.     FINDINGS:   No acute fracture or dislocation. No lytic or blastic bony lesions seen.  Mild tricompartmental joint space loss. Chondrocalcinosis in the medial  and lateral compartments.     Soft tissues appear unremarkable.       Impression:      No acute osseous abnormality  evident.        This report was finalized on 4/26/2024 9:06 PM by Alex Pallas, DO.       XR Chest 1 View [[id number]] Collected: 04/26/24 2049     Updated: 04/26/24 2051    Narrative:      INDICATION: Cough.     TECHNIQUE: Frontal radiograph of the chest.     COMPARISON: 4/9/2024.     FINDINGS:   The cardiomediastinal silhouette and pulmonary vasculature appear within  normal limits. Chronic appearing interstitial lung markings. No  infiltrate, pleural effusion or pneumothorax. No acute osseous  abnormality evident.       Impression:      No acute cardiopulmonary process.        This report was finalized on 4/26/2024 8:49 PM by Alex Pallas, DO.               I have personally reviewed the patient's radiologic imaging.        EKG:   Undetermined rhythm, HR 71, QTc 465  Nonspecific T wave abnormality  Abnormal ECG  When compared with ECG of 15-APR-2024 09:32,  Current undetermined rhythm precludes rhythm comparison, needs review  Borderline criteria for Inferior infarct are no longer present  T wave inversion no longer evident in Lateral leads    I have personally reviewed the patient's EKG. No overt ST changes appreciated.         Assessment & Plan    - Debility, physical deconditioning, failure to thrive, frequent falls, recent CVA with residual left sided weakness: now agreeable to go to short term rehab since receiving medicaid care which will pay for rehab completely without co-pay. Consult PT and OT, along with case management to assist with arranging rehab.   - Mildly abnormal UA, reports of foul smelling urine: urine sent for culture, will follow up results. Monitor off antibiotics for now.     DVT Prophylaxis: SQ heparin    Estimated Length of Stay >2 midnights    I discussed the patient's findings, assessment and plan with the patient and ED provider Dr Marie.        Capo Su MD  04/26/24  23:54 EDT      Electronically signed by Capo Su MD at 04/27/24 0010       Current  Facility-Administered Medications   Medication Dose Route Frequency Provider Last Rate Last Admin    acetaminophen (TYLENOL) tablet 500 mg  500 mg Oral Q6H PRN Matheus Lundberg MD   500 mg at 04/29/24 1614    amLODIPine (NORVASC) tablet 5 mg  5 mg Oral Q24H Capo Su MD   5 mg at 04/30/24 0830    aspirin EC tablet 81 mg  81 mg Oral Daily Capo Su MD   81 mg at 04/30/24 0831    atorvastatin (LIPITOR) tablet 80 mg  80 mg Oral Nightly Capo Su MD   80 mg at 04/29/24 2029    Calcium Replacement - Follow Nurse / BPA Driven Protocol   Does not apply PRN Ok Marin MD        gabapentin (NEURONTIN) capsule 300 mg  300 mg Oral BID Capo Su MD   300 mg at 04/30/24 0831    heparin (porcine) 5000 UNIT/ML injection 5,000 Units  5,000 Units Subcutaneous Q12H Capo Su MD   5,000 Units at 04/30/24 0830    isosorbide mononitrate (IMDUR) 24 hr tablet 30 mg  30 mg Oral Q24H Capo Su MD   30 mg at 04/30/24 0830    losartan (COZAAR) tablet 50 mg  50 mg Oral Daily Capo Su MD   50 mg at 04/30/24 0830    Magnesium Standard Dose Replacement - Follow Nurse / BPA Driven Protocol   Does not apply Ok Shah MD        melatonin tablet 5 mg  5 mg Oral Nightly PRN Matheus Lundberg MD        metoprolol tartrate (LOPRESSOR) tablet 12.5 mg  12.5 mg Oral Q12H Capo Su MD   12.5 mg at 04/30/24 0830    Mirabegron ER (MYRBETRIQ) 24 hr tablet 50 mg  50 mg Oral Daily Capo Su MD   50 mg at 04/30/24 0831    mirtazapine (REMERON) tablet 30 mg  30 mg Oral Nightly Capo Su MD   30 mg at 04/29/24 2028    nitrofurantoin (macrocrystal-monohydrate) (MACROBID) capsule 100 mg  100 mg Oral Q12H Matheus Lundberg MD   100 mg at 04/30/24 0831    ondansetron ODT (ZOFRAN-ODT) disintegrating tablet 4 mg  4 mg Oral Q12H PRN Matheus Lundberg MD        pantoprazole (PROTONIX) EC tablet 40 mg  40 mg Oral Q AM Georges  Capo Maza MD   40 mg at 24 0552    Phosphorus Replacement - Follow Nurse / BPA Driven Protocol   Does not apply PRN Ok Marin MD        polyethylene glycol (MIRALAX) packet 17 g  17 g Oral Daily PRN Matheus Lundberg MD        Potassium Replacement - Follow Nurse / BPA Driven Protocol   Does not apply PRN Ok Marin MD        rOPINIRole (REQUIP) tablet 4 mg  4 mg Oral Nightly Capo Su MD   4 mg at 24 2034    sennosides-docusate (PERICOLACE) 8.6-50 MG per tablet 2 tablet  2 tablet Oral Nightly Matheus Lundberg MD   2 tablet at 24 2211    sodium chloride 0.9 % flush 10 mL  10 mL Intravenous Q12H Capo Su MD   10 mL at 24 0830    sodium chloride 0.9 % flush 10 mL  10 mL Intravenous PRN Capo Su MD        sodium chloride 0.9 % infusion 40 mL  40 mL Intravenous PRN Capo Su MD        ticagrelor (BRILINTA) tablet 60 mg  60 mg Oral BID Capo Su MD   60 mg at 24 0830    traMADol (ULTRAM) tablet 50 mg  50 mg Oral Q12H PRN Matheus Lundberg MD   50 mg at 24 1138        Physician Progress Notes (most recent note)        Ok Marin MD at 24 1319              [facility] HOSPITALIST PROGRESS NOTE     Patient Identification:  Name:  Regine Beckham  Age:  68 y.o.  Sex:  female  :  1955  MRN:  [id number]  Visit Number:  [id number]  ROOM: [address] [place] [address]     Primary Care Provider:  Dread Burton MD    Length of stay in inpatient status:  4    Subjective     Chief Compliant:    Chief Complaint   Patient presents with    Fall       History of Presenting Illness:    Patient denies any new complaints. Cough improved. She was getting ready to eat lunch. Continues to wait for placement.     ROS:  Otherwise 10 point ROS negative other than documented above in HPI.     Objective     Current Hospital Meds:amLODIPine, 5 mg, Oral, Q24H  aspirin, 81 mg, Oral, Daily  atorvastatin, 80 mg, Oral,  Nightly  gabapentin, 300 mg, Oral, BID  heparin (porcine), 5,000 Units, Subcutaneous, Q12H  isosorbide mononitrate, 30 mg, Oral, Q24H  losartan, 50 mg, Oral, Daily  metoprolol tartrate, 12.5 mg, Oral, Q12H  Mirabegron ER, 50 mg, Oral, Daily  mirtazapine, 30 mg, Oral, Nightly  nitrofurantoin (macrocrystal-monohydrate), 100 mg, Oral, Q12H  pantoprazole, 40 mg, Oral, Q AM  rOPINIRole, 4 mg, Oral, Nightly  senna-docusate sodium, 2 tablet, Oral, Nightly  sodium chloride, 10 mL, Intravenous, Q12H  ticagrelor, 60 mg, Oral, BID         Current Antimicrobial Therapy:  Anti-Infectives (From admission, onward)      Ordered     Dose/Rate Route Frequency Start Stop    04/27/24 0904  nitrofurantoin (macrocrystal-monohydrate) (MACROBID) capsule 100 mg        Ordering Provider: Matheus Lundberg MD    100 mg Oral Every 12 Hours Scheduled 04/27/24 1000 05/02/24 0859          Current Diuretic Therapy:  Diuretics (From admission, onward)      None          ----------------------------------------------------------------------------------------------------------------------  Vital Signs:  Temp:  [97.8 °F (36.6 °C)-98.2 °F (36.8 °C)] 98.1 °F (36.7 °C)  Heart Rate:  [58-69] 63  Resp:  [16-18] 16  BP: (102-144)/(55-66) 144/62  SpO2:  [94 %-96 %] 94 %  on   ;   Device (Oxygen Therapy): room air  Body mass index is 23.21 kg/m².    Wt Readings from Last 3 Encounters:   04/30/24 65.2 kg (143 lb 12.8 oz)   04/20/24 68.4 kg (150 lb 12.8 oz)   04/18/24 68.1 kg (150 lb 2.1 oz)     Intake & Output (last 3 days)         04/27 0701 04/28 0700 04/28 0701 04/29 0700 04/29 0701 04/30 0700 04/30 0701 05/01 0700    P.O. 480 480 600 360    Total Intake(mL/kg) 480 (7.2) 480 (7.3) 600 (9.2) 360 (5.5)    Urine (mL/kg/hr) 950 (0.6) 1150 (0.7) 650 (0.4) 100 (0.2)    Stool  0      Total Output 950 1150 650 100    Net -470 -670 -50 +260            Urine Unmeasured Occurrence 1 x 1 x 8 x 1 x    Stool Unmeasured Occurrence  1 x            Diet: Regular/House;  "Fluid Consistency: Thin (IDDSI 0)  ----------------------------------------------------------------------------------------------------------------------  Physical exam:  Constitutional:  Chronically ill appearing.   HENT:  Head:  Normocephalic and atraumatic.  Mouth:  Moist mucous membranes.    Eyes:  Conjunctivae and EOM are normal. No scleral icterus.    Neck:  Neck supple.  No JVD present.    Cardiovascular:  Normal rate, regular rhythm and normal heart sounds with no murmur.  Pulmonary/Chest:  No respiratory distress, no wheezes, no crackles, with normal breath sounds and good air movement.  ----------------------------------------------------------------------------------------------------------------------  Tele:    ----------------------------------------------------------------------------------------------------------------------  Results from last 7 days   Lab Units 04/27/24  0152 04/26/24  2117   WBC 10*3/mm3 7.91 9.38   HEMOGLOBIN g/dL 9.8* 10.7*   HEMATOCRIT % 30.9* 32.7*   MCV fL 96.3 92.4   MCHC g/dL 31.7 32.7   PLATELETS 10*3/mm3 274 318         Results from last 7 days   Lab Units 04/27/24  0152 04/26/24  2117   SODIUM mmol/L 142 140   POTASSIUM mmol/L 3.4* 3.8   CHLORIDE mmol/L 107 104   CO2 mmol/L 24.7 25.3   BUN mg/dL 14 14   CREATININE mg/dL 0.81 0.72   CALCIUM mg/dL 9.5 9.9   GLUCOSE mg/dL 168* 123*   ALBUMIN g/dL 3.3* 3.7   BILIRUBIN mg/dL 0.3 0.3   ALK PHOS U/L 138* 150*   AST (SGOT) U/L 29 36*   ALT (SGPT) U/L 32 38*   Estimated Creatinine Clearance: 67.5 mL/min (by C-G formula based on SCr of 0.81 mg/dL).  No results found for: \"AMMONIA\"              No results found for: \"HGBA1C\", \"POCGLU\"  Lab Results   Component Value Date    TSH 2.880 03/17/2024     No results found for: \"PREGTESTUR\", \"PREGSERUM\", \"HCG\", \"HCGQUANT\"  Pain Management Panel          Latest Ref Rng & Units 12/20/2023   Pain Management Panel   Amphetamine, Urine Qual Negative Negative    Barbiturates Screen, Urine Negative " "Negative    Benzodiazepine Screen, Urine Negative Negative    Buprenorphine, Screen, Urine Negative Negative    Cocaine Screen, Urine Negative Negative    Fentanyl, Urine Negative Negative    Methadone Screen , Urine Negative Negative    Methamphetamine, Ur Negative Negative      Brief Urine Lab Results  (Last result in the past 365 days)        Color   Clarity   Blood   Leuk Est   Nitrite   Protein   CREAT   Urine HCG        04/26/24 2102 Yellow   Cloudy   Negative   Small (1+)   Negative   Negative                 No results found for: \"BLOODCX\"  Urine Culture   Date Value Ref Range Status   04/26/2024 >100,000 CFU/mL Normal Urogenital Lorraine  Final     No results found for: \"WOUNDCX\"  No results found for: \"STOOLCX\"  No results found for: \"RESPCX\"  No results found for: \"AFBCX\"        I have personally looked at the labs and they are summarized above.  ----------------------------------------------------------------------------------------------------------------------  Detailed radiology reports for the last 24 hours:    Imaging Results (Last 24 Hours)       ** No results found for the last 24 hours. **          Assessment & Plan    #Acute on chronic debility secondary to previous CVA  -Residual left sided weakness, initially resistant to IPR due to co-pay but with insurance changes is now agreeable  -Denied IPR. Awaiting SNF.      #Bacteruria, simple cystitis  -On UA bacteria concentration increasing across prior samples. Mild urgency reported, unsure reliability to report and more detailed hx  -Started Macrobid x5 days to cover  -urine cx normal UGF        Code status: Full      Dispo: Pending placement     VTE Prophylaxis:   Mechanical Order History:       None          Pharmalogical Order History:        Ordered     Dose Route Frequency Stop    04/27/24 0000  heparin (porcine) 5000 UNIT/ML injection 5,000 Units         5,000 Units SC Every 12 Hours Scheduled --                    Ok Marin MD  [facility] " "[facility] Hospitalist  24  13:19 EDT    Electronically signed by Ok Marin MD at 24 1325          Physical Therapy Notes (most recent note)        Mahnaz Shaikh, PT at 24 1115  Version 1 of 1         Acute Care - Physical Therapy Treatment Note   [place]     Patient Name: Regine Beckham  : 1955  MRN: [id number]  Today's Date: 2024   Onset of Illness/Injury or Date of Surgery: 24  Visit Dx:     ICD-10-CM ICD-9-CM   1. Adult failure to thrive  R62.7 783.7   2. Debility  R53.81 799.3     Patient Active Problem List   Diagnosis    Iron deficiency anemia due to chronic blood loss    Major depressive disorder    RLS (restless legs syndrome)    GERD (gastroesophageal reflux disease)    Left breast mass    Allergy to penicillin    Stroke    Grade I diastolic dysfunction    Moderate malnutrition    Falls frequently    Stroke-like symptoms    Debility    Chest pain    Failure to thrive in adult     Past Medical History:   Diagnosis Date    Anemia     Anxiety     Arthritis     Depression     Legally blind     Restless leg syndrome     Sleep apnea     \"I don't use a cpap anymore since losing 106 lbs\"    Water retention      Past Surgical History:   Procedure Laterality Date    BACK SURGERY      CARDIAC CATHETERIZATION N/A 2024    Procedure: Percutaneous Manual Thrombectomy;  Surgeon: Efrain Hurley MD;  Location:  [facility];  Service: Interventional Radiology;  Laterality: N/A;    CARDIAC CATHETERIZATION N/A 2024    Procedure: Left Heart Cath;  Surgeon: Susan Mederos MD;  [facility];  Service: Cardiology;  Laterality: N/A;    GALLBLADDER SURGERY      HIP ARTHROSCOPY      JOINT REPLACEMENT Right      PT Assessment (Last 12 Hours)       PT Evaluation and Treatment       Row Name 24 1035          Physical Therapy Time and Intention    Document Type therapy note (daily note)  -KH     Mode of Treatment physical " therapy  -     Patient Effort good  -     Comment Pt seen for treatment this date, pleasant and cooperative with therapy. Pt worked on LE TE and some bed mobility this date, grossly modA  -[facility] Name 04/30/24 1035          Bed Mobility    Supine-Sit [place] (Bed Mobility) moderate assist (50% patient effort)  -[facility] Name 04/30/24 1035          Transfers    Transfers sit-stand transfer;stand-sit transfer  -[facility] Name 04/30/24 1035          Sit-Stand Transfer    Sit-Stand [place] (Transfers) moderate assist (50% patient effort)  -     Comment, (Sit-Stand Transfer) this therapist  -[facility] Name 04/30/24 1035          Stand-Sit Transfer    Stand-Sit [place] (Transfers) contact guard;minimum assist (75% patient effort);moderate assist (50% patient effort)  -     Comment, (Stand-Sit Transfer) this therapist  -[facility] Name 04/30/24 1035          Gait/Stairs (Locomotion)    Comment, (Gait/Stairs) Pt took some side steps this date, grossly 5' near EOB.  -[facility] Name 04/30/24 1035          Motor Skills    Therapeutic Exercise hip;knee  hip flex, hip add x10, hip abd, LAQ/SAQ grossly 2-3x10  -[facility] Name 04/30/24 1035          Positioning and Restraints    Pre-Treatment Position in bed  -     Post Treatment Position bed  -     In Bed sitting EOB;call light within reach;exit alarm on  -               User Key  (r) = Recorded By, (t) = Taken By, (c) = Cosigned By      Initials Name Provider Type    Mahnaz Hatch PT Physical Therapist                      PT Recommendation and Plan             Time Calculation:    PT Charges       Row Name 04/30/24 1115             Time Calculation    Start Time 1035  -KH      PT Received On 04/30/24  -         Time Calculation- PT    Total Timed Code Minutes- PT 15 minute(s)  -                User Key  (r) = Recorded By, (t) = Taken By, (c) = Cosigned By      Initials Name Provider Type    Mahnaz Hatch PT  "Physical Therapist                  Therapy Charges for Today       Code Description Service Date Service Provider Modifiers Qty    23069578294 HC PT THER PROC EA 15 MIN 2024 Mahnaz Shaikh, PT GP 1            PT G-Codes  AM-PAC 6 Clicks Score (PT): 16    Mahnaz Shaikh, PT  2024      Electronically signed by Mahnaz Shaikh, PT at 24 1116          Occupational Therapy Notes (most recent note)        Maria Del Rosario Padgett, OT at 24 1127          Patient Name: Regine Beckham  : 1955    MRN: [id number]                              Today's Date: 2024       Admit Date: 2024    Visit Dx:     ICD-10-CM ICD-9-CM   1. Adult failure to thrive  R62.7 783.7   2. Debility  R53.81 799.3     Patient Active Problem List   Diagnosis    Iron deficiency anemia due to chronic blood loss    Major depressive disorder    RLS (restless legs syndrome)    GERD (gastroesophageal reflux disease)    Left breast mass    Allergy to penicillin    Stroke    Grade I diastolic dysfunction    Moderate malnutrition    Falls frequently    Stroke-like symptoms    Debility    Chest pain    Failure to thrive in adult     Past Medical History:   Diagnosis Date    Anemia     Anxiety     Arthritis     Depression     Legally blind     Restless leg syndrome     Sleep apnea     \"I don't use a cpap anymore since losing 106 lbs\"    Water retention      Past Surgical History:   Procedure Laterality Date    BACK SURGERY      CARDIAC CATHETERIZATION N/A 2024    Procedure: Percutaneous Manual Thrombectomy;  Surgeon: Efrain Hurley MD;  Location:  [facility];  Service: Interventional Radiology;  Laterality: N/A;    CARDIAC CATHETERIZATION N/A 2024    Procedure: Left Heart Cath;  Surgeon: Susan Mederos MD;  [facility]:  [facility];  Service: Cardiology;  Laterality: N/A;    GALLBLADDER SURGERY      HIP ARTHROSCOPY      JOINT REPLACEMENT Right       General Information       Row " Name 04/30/24 1123          OT Time and Intention    Document Type therapy note (daily note)  -     Mode of Treatment individual therapy;occupational therapy  -       Row Name 04/30/24 1123          General Information    Patient Profile Reviewed yes  -     Existing Precautions/Restrictions fall  -     Barriers to Rehab previous functional deficit  -       Row Name 04/30/24 1123          Cognition    Orientation Status (Cognition) oriented to;person;place;situation  -       Row Name 04/30/24 1123          Safety Issues, Functional Mobility    Safety Issues Affecting Function (Mobility) insight into deficits/self-awareness  -     Impairments Affecting Function (Mobility) balance;endurance/activity tolerance;muscle tone abnormal;range of motion (ROM);strength;coordination  -               User Key  (r) = Recorded By, (t) = Taken By, (c) = Cosigned By      Initials Name Provider Type     Maria Del Rosario Padgett, OT Occupational Therapist                     Mobility/ADL's       Row Name 04/30/24 1124          Bed Mobility    Bed Mobility supine-sit;sit-supine  -     Supine-Sit [place] (Bed Mobility) moderate assist (50% patient effort)  -     Sit-Supine [place] (Bed Mobility) moderate assist (50% patient effort)  -     Bed Mobility, Safety Issues decreased use of arms for pushing/pulling;decreased use of legs for bridging/pushing  -     Assistive Device (Bed Mobility) bed rails;draw sheet;head of bed elevated  -       Row Name 04/30/24 1124          Transfers    Transfers sit-stand transfer;stand-sit transfer  -[facility] Name 04/30/24 1124          Sit-Stand Transfer    Sit-Stand [place] (Transfers) moderate assist (50% patient effort)  -       Row Name 04/30/24 1124          Stand-Sit Transfer    Stand-Sit [place] (Transfers) moderate assist (50% patient effort)  -[facility] Name 04/30/24 1124          Activities of Daily Living    BADL Assessment/Intervention  bathing;upper body dressing;lower body dressing;grooming;toileting  -KP       Row Name 04/30/24 1124          Bathing Assessment/Intervention    [place] Level (Bathing) bathing skills;maximum assist (25% patient effort)  -KP       Row Name 04/30/24 1124          Upper Body Dressing Assessment/Training    [place] Level (Upper Body Dressing) upper body dressing skills;moderate assist (50% patient effort)  -     Comment, (Upper Body Dressing) sitting at edge of bed  -KP       Row Name 04/30/24 1124          Lower Body Dressing Assessment/Training    [place] Level (Lower Body Dressing) lower body dressing skills;maximum assist (25% patient effort)  -KP       Row Name 04/30/24 1124          Grooming Assessment/Training    [place] Level (Grooming) grooming skills;moderate assist (50% patient effort)  -KP       Row Name 04/30/24 1124          Toileting Assessment/Training    [place] Level (Toileting) toileting skills;moderate assist (50% patient effort)  -               User Key  (r) = Recorded By, (t) = Taken By, (c) = Cosigned By      Initials Name Provider Type    Maria Del Rosario Briceño OT Occupational Therapist                   Obj/Interventions       Row Name 04/30/24 1126          Motor Skills    Motor Skills functional endurance  -     Functional Endurance fair  -KP       Row Name 04/30/24 1126          Balance    Balance Assessment sitting static balance;standing static balance  -     Static Sitting Balance standby assist  -     Static Standing Balance moderate assist  -               User Key  (r) = Recorded By, (t) = Taken By, (c) = Cosigned By      Initials Name Provider Type    Maria Del Rosario Briceño OT Occupational Therapist                   Goals/Plan    No documentation.                  Clinical Impression       Row Name 04/30/24 1126          Pain Assessment    Pretreatment Pain Rating 0/10 - no pain  -     Posttreatment Pain Rating 0/10 - no pain  -KP       Row Name  04/30/24 1126          Plan of Care Review    Plan of Care Reviewed With patient  -     Progress improving  -     Outcome Evaluation Patient seen for OT treatment. She participated in ADLs from edge of bed as documented. Moderate cues for safe technique and to promote independence with tasks. Continue plan of care.  -       Row Name 04/30/24 1126          Therapy Plan Review/Discharge Plan (OT)    Anticipated Discharge Disposition (OT) skilled nursing facility  -       Row Name 04/30/24 1126          Positioning and Restraints    Pre-Treatment Position in bed  -     Post Treatment Position bed  -     In Bed fowlers;call light within reach;encouraged to call for assist;exit alarm on  -               User Key  (r) = Recorded By, (t) = Taken By, (c) = Cosigned By      Initials Name Provider Type    Maria Del Rosario Briceño OT Occupational Therapist                   Outcome Measures    No documentation.                     OT Recommendation and Plan  Planned Therapy Interventions (OT): activity tolerance training, BADL retraining, passive ROM/stretching, functional balance retraining, transfer/mobility retraining, occupation/activity based interventions, strengthening exercise, ROM/therapeutic exercise, patient/caregiver education/training, neuromuscular control/coordination retraining, manual therapy/joint mobilization  Therapy Frequency (OT): 3 times/wk (3-5x/week as able and available)  Plan of Care Review  Plan of Care Reviewed With: patient  Progress: improving  Outcome Evaluation: Patient seen for OT treatment. She participated in ADLs from edge of bed as documented. Moderate cues for safe technique and to promote independence with tasks. Continue plan of care.     Time Calculation:         Time Calculation- OT       Row Name 04/30/24 1127             Time Calculation- OT    Total Timed Code Minutes- OT 39 minute(s)  -      OT Received On 04/30/24  -                User Key  (r) = Recorded By, (t) =  Taken By, (c) = Cosigned By      Initials Name Provider Type     Maria Del Rosario Padgett OT Occupational Therapist                  Therapy Charges for Today       Code Description Service Date Service Provider Modifiers Qty    82290447877  OT EVAL MOD COMPLEXITY 4 4/29/2024 Maria Del Rosario Padgett OT GO 1    [id number]  OT SELF CARE/MGMT/TRAIN EA 15 MIN 4/30/2024 Maria Del Rosario Padgett OT GO 2    [id number]  OT THERAPEUTIC ACT EA 15 MIN 4/30/2024 Maria Del Rosario Padgett OT GO 1                 Maria Del Rosario Padgett OT  4/30/2024    Electronically signed by Maria Del Rosario Padgett OT at 04/30/24 1128       Speech Language Pathology Notes (most recent note)    No notes exist for this encounter.       ADL Documentation (most recent)      Flowsheet Row Most Recent Value   Transferring 3 - assistive equipment and person   Toileting 3 - assistive equipment and person   Bathing 2 - assistive person   Dressing 2 - assistive person   Eating 0 - independent   Communication 0 - understands/communicates without difficulty   Swallowing 0 - swallows foods/liquids without difficulty   Equipment Currently Used at Home shower chair, commode, walker, rolling, wheelchair  [via [employer]]            Discharge Summary    No notes of this type exist for this encounter.       Discharge Order (From admission, onward)      None

## 2024-04-30 NOTE — PLAN OF CARE
Goal Outcome Evaluation:  Plan of Care Reviewed With: patient           Outcome Evaluation: patient has done well today has been ringing out frequently for staff vss no acute changes has refused to ambulate to bathroom still using external cath will continue plan of care

## 2024-04-30 NOTE — THERAPY TREATMENT NOTE
"Patient Name: Regine Beckham  : 1955    MRN: [id number]                              Today's Date: 2024       Admit Date: 2024    Visit Dx:     ICD-10-CM ICD-9-CM   1. Adult failure to thrive  R62.7 783.7   2. Debility  R53.81 799.3     Patient Active Problem List   Diagnosis    Iron deficiency anemia due to chronic blood loss    Major depressive disorder    RLS (restless legs syndrome)    GERD (gastroesophageal reflux disease)    Left breast mass    Allergy to penicillin    Stroke    Grade I diastolic dysfunction    Moderate malnutrition    Falls frequently    Stroke-like symptoms    Debility    Chest pain    Failure to thrive in adult     Past Medical History:   Diagnosis Date    Anemia     Anxiety     Arthritis     Depression     Legally blind     Restless leg syndrome     Sleep apnea     \"I don't use a cpap anymore since losing 106 lbs\"    Water retention      Past Surgical History:   Procedure Laterality Date    BACK SURGERY      CARDIAC CATHETERIZATION N/A 2024    Procedure: Percutaneous Manual Thrombectomy;  Surgeon: Efrain Hurley MD;  Location:  [facility];  Service: Interventional Radiology;  Laterality: N/A;    CARDIAC CATHETERIZATION N/A 2024    Procedure: Left Heart Cath;  Surgeon: Susan Mederos MD;  Location:  [facility];  Service: Cardiology;  Laterality: N/A;    GALLBLADDER SURGERY      HIP ARTHROSCOPY      JOINT REPLACEMENT Right       General Information       Row Name 24 1123          OT Time and Intention    Document Type therapy note (daily note)  -     Mode of Treatment individual therapy;occupational therapy  -       Row Name 24 1123          General Information    Patient Profile Reviewed yes  -KP     Existing Precautions/Restrictions fall  -     Barriers to Rehab previous functional deficit  -       Row Name 24 1123          Cognition    Orientation Status (Cognition) oriented " to;person;place;situation  -KP       Row Name 04/30/24 1123          Safety Issues, Functional Mobility    Safety Issues Affecting Function (Mobility) insight into deficits/self-awareness  -     Impairments Affecting Function (Mobility) balance;endurance/activity tolerance;muscle tone abnormal;range of motion (ROM);strength;coordination  -               User Key  (r) = Recorded By, (t) = Taken By, (c) = Cosigned By      Initials Name Provider Type     Maria Del Rosario Padgett OT Occupational Therapist                     Mobility/ADL's       [facility] 04/30/24 1124          Bed Mobility    Bed Mobility supine-sit;sit-supine  -     Supine-Sit [place] (Bed Mobility) moderate assist (50% patient effort)  -     Sit-Supine [place] (Bed Mobility) moderate assist (50% patient effort)  -     Bed Mobility, Safety Issues decreased use of arms for pushing/pulling;decreased use of legs for bridging/pushing  -     Assistive Device (Bed Mobility) bed rails;draw sheet;head of bed elevated  -KP       Row Name 04/30/24 1124          Transfers    Transfers sit-stand transfer;stand-sit transfer  -KP       Row Name 04/30/24 1124          Sit-Stand Transfer    Sit-Stand [place] (Transfers) moderate assist (50% patient effort)  -KP       Row Name 04/30/24 1124          Stand-Sit Transfer    Stand-Sit [place] (Transfers) moderate assist (50% patient effort)  -KP       Row Name 04/30/24 1124          Activities of Daily Living    BADL Assessment/Intervention bathing;upper body dressing;lower body dressing;grooming;toileting  -KP       Row Name 04/30/24 1124          Bathing Assessment/Intervention    [place] Level (Bathing) bathing skills;maximum assist (25% patient effort)  -KP       Row Name 04/30/24 1124          Upper Body Dressing Assessment/Training    [place] Level (Upper Body Dressing) upper body dressing skills;moderate assist (50% patient effort)  -     Comment, (Upper Body Dressing) sitting at  edge of bed  -       Row Name 04/30/24 1124          Lower Body Dressing Assessment/Training    [place] Level (Lower Body Dressing) lower body dressing skills;maximum assist (25% patient effort)  -[facility] Name 04/30/24 1124          Grooming Assessment/Training    [place] Level (Grooming) grooming skills;moderate assist (50% patient effort)  -KP       Row Name 04/30/24 1124          Toileting Assessment/Training    [place] Level (Toileting) toileting skills;moderate assist (50% patient effort)  -               User Key  (r) = Recorded By, (t) = Taken By, (c) = Cosigned By      Initials Name Provider Type    Maria Del Rosario Briceño OT Occupational Therapist                   Obj/Interventions       [facility] Name 04/30/24 1126          Motor Skills    Motor Skills functional endurance  -     Functional Endurance fair  -KP       Row Name 04/30/24 1126          Balance    Balance Assessment sitting static balance;standing static balance  -     Static Sitting Balance standby assist  -     Static Standing Balance moderate assist  -               User Key  (r) = Recorded By, (t) = Taken By, (c) = Cosigned By      Initials Name Provider Type    Maria Del Rosario Briceño OT Occupational Therapist                   Goals/Plan    No documentation.                  Clinical Impression       [facility] Name 04/30/24 1126          Pain Assessment    Pretreatment Pain Rating 0/10 - no pain  -     Posttreatment Pain Rating 0/10 - no pain  -KP       Row Name 04/30/24 1126          Plan of Care Review    Plan of Care Reviewed With patient  -     Progress improving  -     Outcome Evaluation Patient seen for OT treatment. She participated in ADLs from edge of bed as documented. Moderate cues for safe technique and to promote independence with tasks. Continue plan of care.  -       Row Name 04/30/24 1126          Therapy Plan Review/Discharge Plan (OT)    Anticipated Discharge Disposition (OT) skilled nursing facility   -       Row Name 04/30/24 1126          Positioning and Restraints    Pre-Treatment Position in bed  -KP     Post Treatment Position bed  -KP     In Bed fowlers;call light within reach;encouraged to call for assist;exit alarm on  -               User Key  (r) = Recorded By, (t) = Taken By, (c) = Cosigned By      Initials Name Provider Type    Maria Del Rosario Briceño OT Occupational Therapist                   Outcome Measures    No documentation.                     OT Recommendation and Plan  Planned Therapy Interventions (OT): activity tolerance training, BADL retraining, passive ROM/stretching, functional balance retraining, transfer/mobility retraining, occupation/activity based interventions, strengthening exercise, ROM/therapeutic exercise, patient/caregiver education/training, neuromuscular control/coordination retraining, manual therapy/joint mobilization  Therapy Frequency (OT): 3 times/wk (3-5x/week as able and available)  Plan of Care Review  Plan of Care Reviewed With: patient  Progress: improving  Outcome Evaluation: Patient seen for OT treatment. She participated in ADLs from edge of bed as documented. Moderate cues for safe technique and to promote independence with tasks. Continue plan of care.     Time Calculation:         Time Calculation- OT       Row Name 04/30/24 1127             Time Calculation- OT    Total Timed Code Minutes- OT 39 minute(s)  -KP      OT Received On 04/30/24  -                User Key  (r) = Recorded By, (t) = Taken By, (c) = Cosigned By      Initials Name Provider Type    Maria Del Rosario Briceño OT Occupational Therapist                  Therapy Charges for Today       Code Description Service Date Service Provider Modifiers Qty    44205650349  OT EVAL MOD COMPLEXITY 4 4/29/2024 Maria Del Rosario Padgett OT GO 1    [id number]  OT SELF CARE/MGMT/TRAIN EA 15 MIN 4/30/2024 Maria Del Rosario Padgett OT GO 2    05307928886 HC OT THERAPEUTIC ACT EA 15 MIN 4/30/2024 Maria Del Rosario Padgett OT GO 1                  Maria Del Rosario Padgett, OT  4/30/2024

## 2024-04-30 NOTE — CASE MANAGEMENT/SOCIAL WORK
Discharge Planning Assessment  [facility]     Patient Name: Regine Beckham  MRN: [id number]  Today's Date: 4/30/2024    Admit Date: 4/26/2024    Plan: SS followed up with [facility] natali Do on this date who states pt has been denied. SS spoke with pt at bedside on this date who states she would like referral to be sent to [facility]. SS contacted [facility] on this date who states they have a female bed available. SS placed referral in [place] in basket. [place] states they will have to verify pt's insurance and will notify SS with an update. SS to follow.   Discharge Plan       Row Name 04/30/24 1632       Plan    Plan SS followed up with [facility] per Ernestina on this date who states pt has been denied. SS spoke with pt at bedside on this date who states she would like referral to be sent to [facility]. SS contacted [facility] on this date who states they have a female bed available. SS placed referral in [place] in basket. [place] states they will have to verify pt's insurance and will notify SS with an update. SS to follow.        PAULINO Lombardo

## 2024-04-30 NOTE — THERAPY TREATMENT NOTE
"Acute Care - Physical Therapy Treatment Note   [facility]     Patient Name: Regine Beckham  : 1955  MRN: [id number]  Today's Date: 2024   Onset of Illness/Injury or Date of Surgery: 24  Visit Dx:     ICD-10-CM ICD-9-CM   1. Adult failure to thrive  R62.7 783.7   2. Debility  R53.81 799.3     Patient Active Problem List   Diagnosis    Iron deficiency anemia due to chronic blood loss    Major depressive disorder    RLS (restless legs syndrome)    GERD (gastroesophageal reflux disease)    Left breast mass    Allergy to penicillin    Stroke    Grade I diastolic dysfunction    Moderate malnutrition    Falls frequently    Stroke-like symptoms    Debility    Chest pain    Failure to thrive in adult     Past Medical History:   Diagnosis Date    Anemia     Anxiety     Arthritis     Depression     Legally blind     Restless leg syndrome     Sleep apnea     \"I don't use a cpap anymore since losing 106 lbs\"    Water retention      Past Surgical History:   Procedure Laterality Date    BACK SURGERY      CARDIAC CATHETERIZATION N/A 2024    Procedure: Percutaneous Manual Thrombectomy;  Surgeon: Efrain Hurley MD;  Location:  [facility];  Service: Interventional Radiology;  Laterality: N/A;    CARDIAC CATHETERIZATION N/A 2024    Procedure: Left Heart Cath;  Surgeon: Susan Mederos MD;  Location:  [facility];  Service: Cardiology;  Laterality: N/A;    GALLBLADDER SURGERY      HIP ARTHROSCOPY      JOINT REPLACEMENT Right      PT Assessment (Last 12 Hours)       PT Evaluation and Treatment       Row Name 24 1035          Physical Therapy Time and Intention    Document Type therapy note (daily note)  -KH     Mode of Treatment physical therapy  -KH     Patient Effort good  -     Comment Pt seen for treatment this date, pleasant and cooperative with therapy. Pt worked on LE TE and some bed mobility this date, grossly modA  -KH       Row Name 24 1035       "    Bed Mobility    Supine-Sit [place] (Bed Mobility) moderate assist (50% patient effort)  -       Row Name 04/30/24 1035          Transfers    Transfers sit-stand transfer;stand-sit transfer  -       Row Name 04/30/24 1035          Sit-Stand Transfer    Sit-Stand [place] (Transfers) moderate assist (50% patient effort)  -     Comment, (Sit-Stand Transfer) this therapist  -       Row Name 04/30/24 1035          Stand-Sit Transfer    Stand-Sit [place] (Transfers) contact guard;minimum assist (75% patient effort);moderate assist (50% patient effort)  -     Comment, (Stand-Sit Transfer) this therapist  -       Row Name 04/30/24 1035          Gait/Stairs (Locomotion)    Comment, (Gait/Stairs) Pt took some side steps this date, grossly 5' near EOB.  -       Row Name 04/30/24 1035          Motor Skills    Therapeutic Exercise hip;knee  hip flex, hip add x10, hip abd, LAQ/SAQ grossly 2-3x10  -[facility] Name 04/30/24 1035          Positioning and Restraints    Pre-Treatment Position in bed  -     Post Treatment Position bed  -     In Bed sitting EOB;call light within reach;exit alarm on  -               User Key  (r) = Recorded By, (t) = Taken By, (c) = Cosigned By      Initials Name Provider Type    Mahnaz Hatch PT Physical Therapist                      PT Recommendation and Plan             Time Calculation:    PT Charges       Row Name 04/30/24 1115             Time Calculation    Start Time 1035  -      PT Received On 04/30/24  -         Time Calculation- PT    Total Timed Code Minutes- PT 15 minute(s)  -                User Key  (r) = Recorded By, (t) = Taken By, (c) = Cosigned By      Initials Name Provider Type    Mahnaz Hatch PT Physical Therapist                  Therapy Charges for Today       Code Description Service Date Service Provider Modifiers Qty    19103037012 HC PT THER PROC EA 15 MIN 4/30/2024 Mahnaz Shaikh PT GP 1            PT  G-Codes  AM-PAC 6 Clicks Score (PT): 16    Mahnaz Shaikh, PT  4/30/2024

## 2024-04-30 NOTE — PROGRESS NOTES
[facility] HOSPITALIST PROGRESS NOTE     Patient Identification:  Name:  Regine Beckham  Age:  68 y.o.  Sex:  female  :  1955  MRN:  [id number]  Visit Number:  [id number]  ROOM: [address] [place] [address]     Primary Care Provider:  Dread Burton MD    Length of stay in inpatient status:  4    Subjective     Chief Compliant:    Chief Complaint   Patient presents with    Fall       History of Presenting Illness:    Patient denies any new complaints. Cough improved. She was getting ready to eat lunch. Continues to wait for placement.     ROS:  Otherwise 10 point ROS negative other than documented above in HPI.     Objective     Current Hospital Meds:amLODIPine, 5 mg, Oral, Q24H  aspirin, 81 mg, Oral, Daily  atorvastatin, 80 mg, Oral, Nightly  gabapentin, 300 mg, Oral, BID  heparin (porcine), 5,000 Units, Subcutaneous, Q12H  isosorbide mononitrate, 30 mg, Oral, Q24H  losartan, 50 mg, Oral, Daily  metoprolol tartrate, 12.5 mg, Oral, Q12H  Mirabegron ER, 50 mg, Oral, Daily  mirtazapine, 30 mg, Oral, Nightly  nitrofurantoin (macrocrystal-monohydrate), 100 mg, Oral, Q12H  pantoprazole, 40 mg, Oral, Q AM  rOPINIRole, 4 mg, Oral, Nightly  senna-docusate sodium, 2 tablet, Oral, Nightly  sodium chloride, 10 mL, Intravenous, Q12H  ticagrelor, 60 mg, Oral, BID         Current Antimicrobial Therapy:  Anti-Infectives (From admission, onward)      Ordered     Dose/Rate Route Frequency Start Stop    24 0904  nitrofurantoin (macrocrystal-monohydrate) (MACROBID) capsule 100 mg        Ordering Provider: Matheus Lundberg MD    100 mg Oral Every 12 Hours Scheduled 24 1000 24 0859          Current Diuretic Therapy:  Diuretics (From admission, onward)      None          ----------------------------------------------------------------------------------------------------------------------  Vital Signs:  Temp:  [97.8 °F (36.6 °C)-98.2 °F (36.8 °C)] 98.1 °F (36.7 °C)  Heart Rate:  [58-69] 63  Resp:  [16-18]  16  BP: (102-144)/(55-66) 144/62  SpO2:  [94 %-96 %] 94 %  on   ;   Device (Oxygen Therapy): room air  Body mass index is 23.21 kg/m².    Wt Readings from Last 3 Encounters:   04/30/24 65.2 kg (143 lb 12.8 oz)   04/20/24 68.4 kg (150 lb 12.8 oz)   04/18/24 68.1 kg (150 lb 2.1 oz)     Intake & Output (last 3 days)         04/27 0701 04/28 0700 04/28 0701 04/29 0700 04/29 0701 04/30 0700 04/30 0701 05/01 0700    P.O. 480 480 600 360    Total Intake(mL/kg) 480 (7.2) 480 (7.3) 600 (9.2) 360 (5.5)    Urine (mL/kg/hr) 950 (0.6) 1150 (0.7) 650 (0.4) 100 (0.2)    Stool  0      Total Output 950 1150 650 100    Net -470 -670 -50 +260            Urine Unmeasured Occurrence 1 x 1 x 8 x 1 x    Stool Unmeasured Occurrence  1 x            Diet: Regular/House; Fluid Consistency: Thin (IDDSI 0)  ----------------------------------------------------------------------------------------------------------------------  Physical exam:  Constitutional:  Chronically ill appearing.   HENT:  Head:  Normocephalic and atraumatic.  Mouth:  Moist mucous membranes.    Eyes:  Conjunctivae and EOM are normal. No scleral icterus.    Neck:  Neck supple.  No JVD present.    Cardiovascular:  Normal rate, regular rhythm and normal heart sounds with no murmur.  Pulmonary/Chest:  No respiratory distress, no wheezes, no crackles, with normal breath sounds and good air movement.  ----------------------------------------------------------------------------------------------------------------------  Tele:    ----------------------------------------------------------------------------------------------------------------------  Results from last 7 days   Lab Units 04/27/24  0152 04/26/24 2117   WBC 10*3/mm3 7.91 9.38   HEMOGLOBIN g/dL 9.8* 10.7*   HEMATOCRIT % 30.9* 32.7*   MCV fL 96.3 92.4   MCHC g/dL 31.7 32.7   PLATELETS 10*3/mm3 274 318         Results from last 7 days   Lab Units 04/27/24  0152 04/26/24 2117   SODIUM mmol/L 142 140   POTASSIUM mmol/L  "3.4* 3.8   CHLORIDE mmol/L 107 104   CO2 mmol/L 24.7 25.3   BUN mg/dL 14 14   CREATININE mg/dL 0.81 0.72   CALCIUM mg/dL 9.5 9.9   GLUCOSE mg/dL 168* 123*   ALBUMIN g/dL 3.3* 3.7   BILIRUBIN mg/dL 0.3 0.3   ALK PHOS U/L 138* 150*   AST (SGOT) U/L 29 36*   ALT (SGPT) U/L 32 38*   Estimated Creatinine Clearance: 67.5 mL/min (by C-G formula based on SCr of 0.81 mg/dL).  No results found for: \"AMMONIA\"              No results found for: \"HGBA1C\", \"POCGLU\"  Lab Results   Component Value Date    TSH 2.880 03/17/2024     No results found for: \"PREGTESTUR\", \"PREGSERUM\", \"HCG\", \"HCGQUANT\"  Pain Management Panel          Latest Ref Rng & Units 12/20/2023   Pain Management Panel   Amphetamine, Urine Qual Negative Negative    Barbiturates Screen, Urine Negative Negative    Benzodiazepine Screen, Urine Negative Negative    Buprenorphine, Screen, Urine Negative Negative    Cocaine Screen, Urine Negative Negative    Fentanyl, Urine Negative Negative    Methadone Screen , Urine Negative Negative    Methamphetamine, Ur Negative Negative      Brief Urine Lab Results  (Last result in the past 365 days)        Color   Clarity   Blood   Leuk Est   Nitrite   Protein   CREAT   Urine HCG        04/26/24 2102 Yellow   Cloudy   Negative   Small (1+)   Negative   Negative                 No results found for: \"BLOODCX\"  Urine Culture   Date Value Ref Range Status   04/26/2024 >100,000 CFU/mL Normal Urogenital Lorraine  Final     No results found for: \"WOUNDCX\"  No results found for: \"STOOLCX\"  No results found for: \"RESPCX\"  No results found for: \"AFBCX\"        I have personally looked at the labs and they are summarized above.  ----------------------------------------------------------------------------------------------------------------------  Detailed radiology reports for the last 24 hours:    Imaging Results (Last 24 Hours)       ** No results found for the last 24 hours. **          Assessment & Plan    #Acute on chronic debility " secondary to previous CVA  -Residual left sided weakness, initially resistant to IPR due to co-pay but with insurance changes is now agreeable  -Denied IPR. Awaiting SNF.      #Bacteruria, simple cystitis  -On UA bacteria concentration increasing across prior samples. Mild urgency reported, unsure reliability to report and more detailed hx  -Started Macrobid x5 days to cover  -urine cx normal UGF        Code status: Full      Dispo: Pending placement     VTE Prophylaxis:   Mechanical Order History:       None          Pharmalogical Order History:        Ordered     Dose Route Frequency Stop    04/27/24 0000  heparin (porcine) 5000 UNIT/ML injection 5,000 Units         5,000 Units SC Every 12 Hours Scheduled --                    Ok Marin MD  [facility] Hospitalist  04/30/24  13:19 EDT

## 2024-05-01 ENCOUNTER — CALL CENTER PROGRAMS (OUTPATIENT)
Dept: CALL CENTER | Facility: HOSPITAL | Age: 69
End: 2024-05-01
Payer: MEDICARE

## 2024-05-01 LAB
BACTERIA UR QL AUTO: ABNORMAL /HPF
BILIRUB UR QL STRIP: ABNORMAL
CLARITY UR: ABNORMAL
COLOR UR: ABNORMAL
GLUCOSE UR STRIP-MCNC: NEGATIVE MG/DL
HGB UR QL STRIP.AUTO: NEGATIVE
HYALINE CASTS UR QL AUTO: ABNORMAL /LPF
KETONES UR QL STRIP: ABNORMAL
LEUKOCYTE ESTERASE UR QL STRIP.AUTO: ABNORMAL
NITRITE UR QL STRIP: NEGATIVE
PH UR STRIP.AUTO: 7 [PH] (ref 5–8)
PROT UR QL STRIP: NEGATIVE
RBC # UR STRIP: ABNORMAL /HPF
REF LAB TEST METHOD: ABNORMAL
SP GR UR STRIP: 1.02 (ref 1–1.03)
SQUAMOUS #/AREA URNS HPF: ABNORMAL /HPF
UROBILINOGEN UR QL STRIP: ABNORMAL
WBC # UR STRIP: ABNORMAL /HPF

## 2024-05-01 PROCEDURE — 81001 URINALYSIS AUTO W/SCOPE: CPT | Performed by: INTERNAL MEDICINE

## 2024-05-01 PROCEDURE — 25010000002 HEPARIN (PORCINE) PER 1000 UNITS: Performed by: HOSPITALIST

## 2024-05-01 PROCEDURE — 97530 THERAPEUTIC ACTIVITIES: CPT

## 2024-05-01 PROCEDURE — 97110 THERAPEUTIC EXERCISES: CPT

## 2024-05-01 PROCEDURE — 97112 NEUROMUSCULAR REEDUCATION: CPT

## 2024-05-01 PROCEDURE — 97535 SELF CARE MNGMENT TRAINING: CPT

## 2024-05-01 RX ADMIN — LOSARTAN POTASSIUM 50 MG: 50 TABLET, FILM COATED ORAL at 08:27

## 2024-05-01 RX ADMIN — MIRTAZAPINE 30 MG: 15 TABLET, FILM COATED ORAL at 20:35

## 2024-05-01 RX ADMIN — METOPROLOL TARTRATE 12.5 MG: 25 TABLET, FILM COATED ORAL at 20:36

## 2024-05-01 RX ADMIN — ATORVASTATIN CALCIUM 80 MG: 40 TABLET, FILM COATED ORAL at 20:35

## 2024-05-01 RX ADMIN — NITROFURANTOIN MONOHYDRATE/MACROCRYSTALLINE 100 MG: 25; 75 CAPSULE ORAL at 20:35

## 2024-05-01 RX ADMIN — METOPROLOL TARTRATE 12.5 MG: 25 TABLET, FILM COATED ORAL at 08:24

## 2024-05-01 RX ADMIN — ASPIRIN 81 MG: 81 TABLET, COATED ORAL at 08:28

## 2024-05-01 RX ADMIN — NITROFURANTOIN MONOHYDRATE/MACROCRYSTALLINE 100 MG: 25; 75 CAPSULE ORAL at 08:24

## 2024-05-01 RX ADMIN — GABAPENTIN 300 MG: 300 CAPSULE ORAL at 08:24

## 2024-05-01 RX ADMIN — TRAMADOL HYDROCHLORIDE 50 MG: 50 TABLET ORAL at 14:13

## 2024-05-01 RX ADMIN — Medication 10 ML: at 08:33

## 2024-05-01 RX ADMIN — ISOSORBIDE MONONITRATE 30 MG: 30 TABLET, EXTENDED RELEASE ORAL at 08:27

## 2024-05-01 RX ADMIN — GABAPENTIN 300 MG: 300 CAPSULE ORAL at 20:35

## 2024-05-01 RX ADMIN — MIRABEGRON 50 MG: 25 TABLET, FILM COATED, EXTENDED RELEASE ORAL at 08:27

## 2024-05-01 RX ADMIN — HEPARIN SODIUM 5000 UNITS: 5000 INJECTION INTRAVENOUS; SUBCUTANEOUS at 08:28

## 2024-05-01 RX ADMIN — TICAGRELOR 60 MG: 60 TABLET ORAL at 20:35

## 2024-05-01 RX ADMIN — ROPINIROLE HYDROCHLORIDE 4 MG: 1 TABLET, FILM COATED ORAL at 20:35

## 2024-05-01 RX ADMIN — AMLODIPINE BESYLATE 5 MG: 5 TABLET ORAL at 08:27

## 2024-05-01 RX ADMIN — Medication 10 ML: at 21:57

## 2024-05-01 RX ADMIN — PANTOPRAZOLE SODIUM 40 MG: 40 TABLET, DELAYED RELEASE ORAL at 05:41

## 2024-05-01 RX ADMIN — TICAGRELOR 60 MG: 60 TABLET ORAL at 08:27

## 2024-05-01 NOTE — PLAN OF CARE
Goal Outcome Evaluation:            Pt took all nightly meds in apple sauce with no issues. Pt has been pleasant throughout the night and is resting in bed at this time. VSS, will continue with plan of care.

## 2024-05-01 NOTE — THERAPY TREATMENT NOTE
"Acute Care - Physical Therapy Treatment Note   [facility]     Patient Name: Regine Beckham  : 1955  MRN: [id number]  Today's Date: 2024   Onset of Illness/Injury or Date of Surgery: 24  Visit Dx:     ICD-10-CM ICD-9-CM   1. Adult failure to thrive  R62.7 783.7   2. Debility  R53.81 799.3     Patient Active Problem List   Diagnosis    Iron deficiency anemia due to chronic blood loss    Major depressive disorder    RLS (restless legs syndrome)    GERD (gastroesophageal reflux disease)    Left breast mass    Allergy to penicillin    Stroke    Grade I diastolic dysfunction    Moderate malnutrition    Falls frequently    Stroke-like symptoms    Debility    Chest pain    Failure to thrive in adult     Past Medical History:   Diagnosis Date    Anemia     Anxiety     Arthritis     Depression     Legally blind     Restless leg syndrome     Sleep apnea     \"I don't use a cpap anymore since losing 106 lbs\"    Water retention      Past Surgical History:   Procedure Laterality Date    BACK SURGERY      CARDIAC CATHETERIZATION N/A 2024    Procedure: Percutaneous Manual Thrombectomy;  Surgeon: Efrain Hurley MD;  Location:  [facility];  Service: Interventional Radiology;  Laterality: N/A;    CARDIAC CATHETERIZATION N/A 2024    Procedure: Left Heart Cath;  Surgeon: Susan Mederos MD;  Location:  [facility];  Service: Cardiology;  Laterality: N/A;    GALLBLADDER SURGERY      HIP ARTHROSCOPY      JOINT REPLACEMENT Right      PT Assessment (Last 12 Hours)       PT Evaluation and Treatment       Row Name 24 1050          Physical Therapy Time and Intention    Document Type therapy note (daily note)  -KH     Mode of Treatment physical therapy  -KH     Patient Effort good  -     Comment Pt seen for therapy treatment this date, pleasant and cooperative with therapy. Pt assisted to ambulate in bathroom and back to bed, participated in LE TE sitting EOB and supine. " Some bed mobility performed  -[facility] Name 05/01/24 1050          Bed Mobility    Bed Mobility scooting/bridging  -     Scooting/Bridging [place] (Bed Mobility) moderate assist (50% patient effort);maximum assist (25% patient effort)  -     Supine-Sit [place] (Bed Mobility) minimum assist (75% patient effort);set up;modified independence  HOB elevated by other staff, Angela given for quicker transfer with pt needing to use restroom  -     Sit-Supine [place] (Bed Mobility) modified independence;standby assist  -[facility] Name 05/01/24 1050          Transfers    Transfers sit-stand transfer;stand-sit transfer  -[facility] Name 05/01/24 1050          Sit-Stand Transfer    Sit-Stand [place] (Transfers) minimum assist (75% patient effort);moderate assist (50% patient effort)  -[facility] Name 05/01/24 1050          Stand-Sit Transfer    Stand-Sit [place] (Transfers) contact guard;minimum assist (75% patient effort);moderate assist (50% patient effort)  -[facility] Name 05/01/24 1050          Gait/Stairs (Locomotion)    Gait/Stairs Locomotion gait/ambulation assistive device  -     [place] Level (Gait) minimum assist (75% patient effort);contact guard  -     Assistive Device (Gait) other (see comments)  HHA with therapist  -     Patient was able to Ambulate yes  -     Distance in Feet (Gait) 40  2x20 grossly  -[facility] Name 05/01/24 1050          Motor Skills    Therapeutic Exercise hip;knee  hip add, knee flex, L/SAQ, hip flex, various reps/sets for conditioning/strengthening/cognition tasks, some neuro ankit  -[facility] Name 05/01/24 1050          Positioning and Restraints    Pre-Treatment Position in bed  -     Post Treatment Position bed  -     In Bed supine;call light within reach;exit alarm on  -[facility] Name 05/01/24 1050          Progress Summary (PT)    Daily Progress Summary (PT) Pt participated well, difficulty getting pt to stay on  task/focus. May benefit from continued theraputic intervention.  -CALE               User Key  (r) = Recorded By, (t) = Taken By, (c) = Cosigned By      Initials Name Provider Type    Mahnaz Hatch PT Physical Therapist                      PT Recommendation and Plan     Progress Summary (PT)  Daily Progress Summary (PT): Pt participated well, difficulty getting pt to stay on task/focus. May benefit from continued theraputic intervention.       Time Calculation:    PT Charges       Row Name 05/01/24 1131             Time Calculation    Start Time 1050  -      PT Received On 05/01/24  -CALE         Time Calculation- PT    Total Timed Code Minutes- PT 23 minute(s)  -                User Key  (r) = Recorded By, (t) = Taken By, (c) = Cosigned By      Initials Name Provider Type    Mahnaz Hatch PT Physical Therapist                  Therapy Charges for Today       Code Description Service Date Service Provider Modifiers Qty    11754500695  PT THER PROC EA 15 MIN 4/30/2024 Mahnaz Shaikh, PT GP 1    74289723516 HC PT THER PROC EA 15 MIN 5/1/2024 Mahnaz Shaikh, PT GP 1    64984204301  PT THERAPEUTIC ACT EA 15 MIN 5/1/2024 Mahnaz Shaikh, PT GP 1            PT G-Codes  AM-PAC 6 Clicks Score (PT): 16    Mahnaz Shaikh PT  5/1/2024

## 2024-05-01 NOTE — CASE MANAGEMENT/SOCIAL WORK
Discharge Planning Assessment  HONORIO Poon     Patient Name: Regine Beckham  MRN: [id number]  Today's Date: 5/1/2024    Admit Date: 4/26/2024          Discharge Plan       Row Name 05/01/24 1514       Plan    Plan SS spoke with [employer] [facility] natali Do who states Pt has been approved clinically pending a pre-auth. [employer] [facility] has submitted referral to Pt's insurance. SS to follow.                  PAULINO Allen

## 2024-05-01 NOTE — PLAN OF CARE
Goal Outcome Evaluation:           Progress: no change  Outcome Evaluation: pt resting in bed, no complaints from pt, prn pain meds given per MAR, pt has ambulated to restroom this shift. Will continue plan of care.

## 2024-05-01 NOTE — THERAPY TREATMENT NOTE
"Acute Care - Occupational Therapy Treatment Note   [facility]     Patient Name: Regine Beckham  : 1955  MRN: [id number]  Today's Date: 2024  Onset of Illness/Injury or Date of Surgery: 24     Referring Physician: Georges    Admit Date: 2024       ICD-10-CM ICD-9-CM   1. Adult failure to thrive  R62.7 783.7   2. Debility  R53.81 799.3     Patient Active Problem List   Diagnosis    Iron deficiency anemia due to chronic blood loss    Major depressive disorder    RLS (restless legs syndrome)    GERD (gastroesophageal reflux disease)    Left breast mass    Allergy to penicillin    Stroke    Grade I diastolic dysfunction    Moderate malnutrition    Falls frequently    Stroke-like symptoms    Debility    Chest pain    Failure to thrive in adult     Past Medical History:   Diagnosis Date    Anemia     Anxiety     Arthritis     Depression     Legally blind     Restless leg syndrome     Sleep apnea     \"I don't use a cpap anymore since losing 106 lbs\"    Water retention      Past Surgical History:   Procedure Laterality Date    BACK SURGERY      CARDIAC CATHETERIZATION N/A 2024    Procedure: Percutaneous Manual Thrombectomy;  Surgeon: Efrain Hurley MD;  Location:  [facility];  Service: Interventional Radiology;  Laterality: N/A;    CARDIAC CATHETERIZATION N/A 2024    Procedure: Left Heart Cath;  Surgeon: Susan Mederos MD;  Location:  [facility];  Service: Cardiology;  Laterality: N/A;    GALLBLADDER SURGERY      HIP ARTHROSCOPY      JOINT REPLACEMENT Right          OT ASSESSMENT FLOWSHEET (Last 12 Hours)       OT Evaluation and Treatment       Row Name 24 1044                   OT Time and Intention    Subjective Information complains of;weakness;fatigue  -LM        Document Type therapy note (daily note)  -LM        Mode of Treatment occupational therapy  -LM        Patient Effort adequate  -LM        Comment Patient seen this date for neuro " re-education with focus on LUE prom, aarom, arom.  Patient demonstrates 1/4 arom LUE.  Patient able to tolerated slow stretching and inhibition tech with LUE to approx 90 degrees shoulder flex and throughout elbow, wrist, hand.  Setup with feeding, min assist with grooming.  Mod/max assist with bathing, dressing, toileting tasks.  Fall risk, requires min/mod assist with fxl mobility.  -LM           Cognition    Affect/Mental Status (Cognition) WFL  -LM        Orientation Status (Cognition) oriented to;person;place;situation  -LM           Positioning and Restraints    Pre-Treatment Position in bed  -LM        Post Treatment Position bed  -LM                  User Key  (r) = Recorded By, (t) = Taken By, (c) = Cosigned By      Initials Name Effective Dates    LM Kyleigh Tsang OT 06/16/21 -                            OT Recommendation and Plan              Time Calculation:     Therapy Charges for Today       Code Description Service Date Service Provider Modifiers Qty    29587030470  OT NEUROMUSC RE EDUCATION EA 15 MIN 5/1/2024 Kyleigh Tsang OT GO 1    [id number]  OT SELF CARE/MGMT/TRAIN EA 15 MIN 5/1/2024 Kyleigh Tsang OT GO 1                 Kyleigh Tsang OT  5/1/2024

## 2024-05-01 NOTE — OUTREACH NOTE
Stroke Suzette Survey      Flowsheet Row Responses   Facility patient discharged from? [facility]   Attempt successful No   Unsuccessful attempts Attempt 1  [Per Epic pt is currently admitted. All numbers were attempted.]            Lin THOMAS - Registered Nurse

## 2024-05-02 PROCEDURE — 97116 GAIT TRAINING THERAPY: CPT

## 2024-05-02 PROCEDURE — 97530 THERAPEUTIC ACTIVITIES: CPT

## 2024-05-02 PROCEDURE — 25010000002 HEPARIN (PORCINE) PER 1000 UNITS: Performed by: HOSPITALIST

## 2024-05-02 PROCEDURE — 97110 THERAPEUTIC EXERCISES: CPT

## 2024-05-02 PROCEDURE — 99231 SBSQ HOSP IP/OBS SF/LOW 25: CPT | Performed by: INTERNAL MEDICINE

## 2024-05-02 RX ORDER — TRAMADOL HYDROCHLORIDE 50 MG/1
50 TABLET ORAL EVERY 12 HOURS PRN
Status: DISPENSED | OUTPATIENT
Start: 2024-05-02 | End: 2024-05-07

## 2024-05-02 RX ADMIN — AMLODIPINE BESYLATE 5 MG: 5 TABLET ORAL at 08:57

## 2024-05-02 RX ADMIN — HEPARIN SODIUM 5000 UNITS: 5000 INJECTION INTRAVENOUS; SUBCUTANEOUS at 08:57

## 2024-05-02 RX ADMIN — TRAMADOL HYDROCHLORIDE 50 MG: 50 TABLET ORAL at 21:05

## 2024-05-02 RX ADMIN — ASPIRIN 81 MG: 81 TABLET, COATED ORAL at 08:58

## 2024-05-02 RX ADMIN — TICAGRELOR 60 MG: 60 TABLET ORAL at 20:31

## 2024-05-02 RX ADMIN — DOCUSATE SODIUM 50 MG AND SENNOSIDES 8.6 MG 2 TABLET: 8.6; 5 TABLET, FILM COATED ORAL at 20:31

## 2024-05-02 RX ADMIN — GABAPENTIN 300 MG: 300 CAPSULE ORAL at 20:35

## 2024-05-02 RX ADMIN — TICAGRELOR 60 MG: 60 TABLET ORAL at 08:58

## 2024-05-02 RX ADMIN — Medication 10 ML: at 20:33

## 2024-05-02 RX ADMIN — LOSARTAN POTASSIUM 50 MG: 50 TABLET, FILM COATED ORAL at 08:58

## 2024-05-02 RX ADMIN — ISOSORBIDE MONONITRATE 30 MG: 30 TABLET, EXTENDED RELEASE ORAL at 08:58

## 2024-05-02 RX ADMIN — ATORVASTATIN CALCIUM 80 MG: 40 TABLET, FILM COATED ORAL at 20:31

## 2024-05-02 RX ADMIN — Medication 5 MG: at 21:05

## 2024-05-02 RX ADMIN — METOPROLOL TARTRATE 12.5 MG: 25 TABLET, FILM COATED ORAL at 08:58

## 2024-05-02 RX ADMIN — PANTOPRAZOLE SODIUM 40 MG: 40 TABLET, DELAYED RELEASE ORAL at 05:46

## 2024-05-02 RX ADMIN — MIRABEGRON 50 MG: 25 TABLET, FILM COATED, EXTENDED RELEASE ORAL at 08:58

## 2024-05-02 RX ADMIN — GABAPENTIN 300 MG: 300 CAPSULE ORAL at 08:58

## 2024-05-02 RX ADMIN — ROPINIROLE HYDROCHLORIDE 4 MG: 1 TABLET, FILM COATED ORAL at 20:31

## 2024-05-02 RX ADMIN — MIRTAZAPINE 30 MG: 15 TABLET, FILM COATED ORAL at 20:31

## 2024-05-02 RX ADMIN — TRAMADOL HYDROCHLORIDE 50 MG: 50 TABLET ORAL at 08:58

## 2024-05-02 RX ADMIN — Medication 10 ML: at 08:59

## 2024-05-02 RX ADMIN — HEPARIN SODIUM 5000 UNITS: 5000 INJECTION INTRAVENOUS; SUBCUTANEOUS at 20:33

## 2024-05-02 NOTE — PLAN OF CARE
Goal Outcome Evaluation:  Plan of Care Reviewed With: patient        Progress: no change  Outcome Evaluation: Patient resting in bed at this time. C/O pain this shift; PRN meds given per MAR. VSS. No acute changes noted at this time.

## 2024-05-02 NOTE — CASE MANAGEMENT/SOCIAL WORK
Discharge Planning Assessment  HONORIO Poon     Patient Name: Regine Beckham  MRN: [id number]  Today's Date: 5/2/2024    Admit Date: 4/26/2024            Discharge Plan       Row Name 05/02/24 0947       Plan    Plan SS spoke with [employer] [facility] per Ernestina who states pre-auth remains pending. SS to follow.                  PAULINO Allen

## 2024-05-02 NOTE — THERAPY TREATMENT NOTE
"Acute Care - Occupational Therapy Treatment Note   [facility]     Patient Name: Regine Beckham  : 1955  MRN: [id number]  Today's Date: 2024  Onset of Illness/Injury or Date of Surgery: 24     Referring Physician: Georges    Admit Date: 2024       ICD-10-CM ICD-9-CM   1. Adult failure to thrive  R62.7 783.7   2. Debility  R53.81 799.3     Patient Active Problem List   Diagnosis    Iron deficiency anemia due to chronic blood loss    Major depressive disorder    RLS (restless legs syndrome)    GERD (gastroesophageal reflux disease)    Left breast mass    Allergy to penicillin    Stroke    Grade I diastolic dysfunction    Moderate malnutrition    Falls frequently    Stroke-like symptoms    Debility    Chest pain    Failure to thrive in adult     Past Medical History:   Diagnosis Date    Anemia     Anxiety     Arthritis     Depression     Legally blind     Restless leg syndrome     Sleep apnea     \"I don't use a cpap anymore since losing 106 lbs\"    Water retention      Past Surgical History:   Procedure Laterality Date    BACK SURGERY      CARDIAC CATHETERIZATION N/A 2024    Procedure: Percutaneous Manual Thrombectomy;  Surgeon: Efrain Hurley MD;  Location:  [facility];  Service: Interventional Radiology;  Laterality: N/A;    CARDIAC CATHETERIZATION N/A 2024    Procedure: Left Heart Cath;  Surgeon: Susan Mederos MD;  Location:  [facility];  Service: Cardiology;  Laterality: N/A;    GALLBLADDER SURGERY      HIP ARTHROSCOPY      JOINT REPLACEMENT Right          OT ASSESSMENT FLOWSHEET (Last 12 Hours)       OT Evaluation and Treatment       Row Name 24 1202                   OT Time and Intention    Document Type therapy note (daily note)  -KR        Mode of Treatment occupational therapy  -KR        Patient Effort adequate  -KR           General Information    General Observations of Patient alert/cooperative  -KR           Cognition    " Follows Commands (Cognition) WFL  -KR           Motor Skills    Motor Skills neuro-muscular function  -KR        Neuromuscular Function left;upper extremity;severe impairment  Bilateral integration/SROM to enhance fxl performance LUE.  -KR        Therapeutic Exercise shoulder;elbow/forearm;wrist;hand  -KR           Shoulder (Therapeutic Exercise)    Shoulder (Therapeutic Exercise) AAROM (active assistive range of motion);PROM (passive range of motion)  -KR        Shoulder AAROM (Therapeutic Exercise) left;flexion  -KR        Shoulder PROM (Therapeutic Exercise) left;flexion;extension  -KR           Elbow/Forearm (Therapeutic Exercise)    Elbow/Forearm (Therapeutic Exercise) AAROM (active assistive range of motion);PROM (passive range of motion)  -KR        Elbow/Forearm AAROM (Therapeutic Exercise) left;flexion  -KR        Elbow/Forearm PROM (Therapeutic Exercise) left;flexion;extension  -KR           Wrist (Therapeutic Exercise)    Wrist (Therapeutic Exercise) AAROM (active assistive range of motion);PROM (passive range of motion)  -KR        Wrist AAROM (Therapeutic Exercise) left;flexion;extension  -KR        Wrist PROM (Therapeutic Exercise) left;flexion;extension  -KR           Hand (Therapeutic Exercise)    Hand (Therapeutic Exercise) AROM (active range of motion);PROM (passive range of motion)  -KR        Hand AROM/AAROM (Therapeutic Exercise) left;finger flexion;finger extension  -KR        Hand PROM (Therapeutic Exercise) left;finger flexion;finger extension  -KR           Plan of Care Review    Plan of Care Reviewed With patient  -KR           Therapy Assessment/Plan (OT)    Planned Therapy Interventions (OT) neuromuscular control/coordination retraining;ROM/therapeutic exercise  -KR           Progress Summary (OT)    Progress Toward Functional Goals (OT) progress toward functional goals as expected  -KR           Therapy Plan Review/Discharge Plan (OT)    Anticipated Discharge Disposition (OT) extended  care facility;inpatient rehabilitation facility  -                  User Key  (r) = Recorded By, (t) = Taken By, (c) = Cosigned By      Initials Name Effective Dates    Grayson Velez OT 06/16/21 -                            OT Recommendation and Plan  Planned Therapy Interventions (OT): neuromuscular control/coordination retraining, ROM/therapeutic exercise  Progress Toward Functional Goals (OT): progress toward functional goals as expected  Plan of Care Review  Plan of Care Reviewed With: patient  Plan of Care Reviewed With: patient        Time Calculation:     Therapy Charges for Today       Code Description Service Date Service Provider Modifiers Qty    63549417932  OT THERAPEUTIC ACT EA 15 MIN 5/2/2024 Grayson Edwards OT GO 1                 Grayson Edwards OT  5/2/2024

## 2024-05-02 NOTE — PROGRESS NOTES
[facility] HOSPITALIST PROGRESS NOTE     Patient Identification:  Name:  Regine Beckham  Age:  68 y.o.  Sex:  female  :  1955  MRN:  [id number]  Visit Number:  [id number]  ROOM: [address] [place] [id number]     Primary Care Provider:  Dread Burton MD    Length of stay in inpatient status:  6    Subjective     Chief Compliant:    Chief Complaint   Patient presents with    Fall       History of Presenting Illness:    Patient still with dark urine. Patient reports drinking more fluids. She denies any urinary complaints. She reports to continue to feel well and is waiting for placement.     ROS:  Otherwise 10 point ROS negative other than documented above in HPI.     Objective     Current Hospital Meds:amLODIPine, 5 mg, Oral, Q24H  aspirin, 81 mg, Oral, Daily  atorvastatin, 80 mg, Oral, Nightly  gabapentin, 300 mg, Oral, BID  heparin (porcine), 5,000 Units, Subcutaneous, Q12H  isosorbide mononitrate, 30 mg, Oral, Q24H  losartan, 50 mg, Oral, Daily  metoprolol tartrate, 12.5 mg, Oral, Q12H  Mirabegron ER, 50 mg, Oral, Daily  mirtazapine, 30 mg, Oral, Nightly  pantoprazole, 40 mg, Oral, Q AM  rOPINIRole, 4 mg, Oral, Nightly  senna-docusate sodium, 2 tablet, Oral, Nightly  sodium chloride, 10 mL, Intravenous, Q12H  ticagrelor, 60 mg, Oral, BID         Current Antimicrobial Therapy:  Anti-Infectives (From admission, onward)      Ordered     Dose/Rate Route Frequency Start Stop    24 0904  nitrofurantoin (macrocrystal-monohydrate) (MACROBID) capsule 100 mg        Ordering Provider: Matheus Lundberg MD    100 mg Oral Every 12 Hours Scheduled 24 1000 24          Current Diuretic Therapy:  Diuretics (From admission, onward)      None          ----------------------------------------------------------------------------------------------------------------------  Vital Signs:  Temp:  [96.1 °F (35.6 °C)-98.5 °F (36.9 °C)] 98.2 °F (36.8 °C)  Heart Rate:  [58-66] 59  Resp:  [16-18] 18  BP:  (100-153)/(43-72) 102/43  SpO2:  [96 %] 96 %  on   ;   Device (Oxygen Therapy): room air  Body mass index is 23.31 kg/m².    Wt Readings from Last 3 Encounters:   05/02/24 65.5 kg (144 lb 6.4 oz)   04/20/24 68.4 kg (150 lb 12.8 oz)   04/18/24 68.1 kg (150 lb 2.1 oz)     Intake & Output (last 3 days)         04/29 0701 04/30 0700 04/30 0701 05/01 0700 05/01 0701 05/02 0700 05/02 0701 05/03 0700    P.O. 600 480 600 240    I.V. (mL/kg)  0 (0) 0 (0)     Total Intake(mL/kg) 600 (9.2) 480 (7.4) 600 (9.2) 240 (3.7)    Urine (mL/kg/hr) 650 (0.4) 100 (0.1) 900 (0.6)     Stool   0     Total Output 650 100 900     Net -50 +380 -300 +240            Urine Unmeasured Occurrence 8 x 4 x 1 x 1 x    Stool Unmeasured Occurrence   1 x           Diet: Regular/House; Fluid Consistency: Thin (IDDSI 0)  ----------------------------------------------------------------------------------------------------------------------  Physical exam:  Constitutional:  Chronically ill appearing.   HENT:  Head:  Normocephalic and atraumatic.  Mouth:  Moist mucous membranes.    Eyes:  Conjunctivae and EOM are normal. No scleral icterus.    Neck:  Neck supple.  No JVD present.    Cardiovascular:  Normal rate, regular rhythm and normal heart sounds with no murmur.  Pulmonary/Chest:  No respiratory distress, no wheezes, no crackles, with normal breath sounds and good air movement.  ----------------------------------------------------------------------------------------------------------------------  Tele:    ----------------------------------------------------------------------------------------------------------------------  Results from last 7 days   Lab Units 04/27/24  0152 04/26/24 2117   WBC 10*3/mm3 7.91 9.38   HEMOGLOBIN g/dL 9.8* 10.7*   HEMATOCRIT % 30.9* 32.7*   MCV fL 96.3 92.4   MCHC g/dL 31.7 32.7   PLATELETS 10*3/mm3 274 318         Results from last 7 days   Lab Units 04/27/24  0152 04/26/24 2117   SODIUM mmol/L 142 140   POTASSIUM mmol/L  "3.4* 3.8   CHLORIDE mmol/L 107 104   CO2 mmol/L 24.7 25.3   BUN mg/dL 14 14   CREATININE mg/dL 0.81 0.72   CALCIUM mg/dL 9.5 9.9   GLUCOSE mg/dL 168* 123*   ALBUMIN g/dL 3.3* 3.7   BILIRUBIN mg/dL 0.3 0.3   ALK PHOS U/L 138* 150*   AST (SGOT) U/L 29 36*   ALT (SGPT) U/L 32 38*   Estimated Creatinine Clearance: 67.8 mL/min (by C-G formula based on SCr of 0.81 mg/dL).  No results found for: \"AMMONIA\"              No results found for: \"HGBA1C\", \"POCGLU\"  Lab Results   Component Value Date    TSH 2.880 03/17/2024     No results found for: \"PREGTESTUR\", \"PREGSERUM\", \"HCG\", \"HCGQUANT\"  Pain Management Panel          Latest Ref Rng & Units 12/20/2023   Pain Management Panel   Amphetamine, Urine Qual Negative Negative    Barbiturates Screen, Urine Negative Negative    Benzodiazepine Screen, Urine Negative Negative    Buprenorphine, Screen, Urine Negative Negative    Cocaine Screen, Urine Negative Negative    Fentanyl, Urine Negative Negative    Methadone Screen , Urine Negative Negative    Methamphetamine, Ur Negative Negative      Brief Urine Lab Results  (Last result in the past 365 days)        Color   Clarity   Blood   Leuk Est   Nitrite   Protein   CREAT   Urine HCG        05/01/24 1636 Dark Yellow   Cloudy   Negative   Trace   Negative   Negative                 No results found for: \"BLOODCX\"  Urine Culture   Date Value Ref Range Status   04/26/2024 >100,000 CFU/mL Normal Urogenital Lorraine  Final     No results found for: \"WOUNDCX\"  No results found for: \"STOOLCX\"  No results found for: \"RESPCX\"  No results found for: \"AFBCX\"        I have personally looked at the labs and they are summarized above.  ----------------------------------------------------------------------------------------------------------------------  Detailed radiology reports for the last 24 hours:    Imaging Results (Last 24 Hours)       ** No results found for the last 24 hours. **          Assessment & Plan      #Acute on chronic debility " secondary to previous CVA  -Residual left sided weakness, initially resistant to IPR due to co-pay but with insurance changes is now agreeable  -Denied IPR. Awaiting SNF.      #Bacteruria, simple cystitis  -On UA bacteria concentration increasing across prior samples. Mild urgency reported, unsure reliability to report and more detailed hx  -Started Macrobid x5 days to cover  -urine cx on admission normal UGF  -UA repeated on 5/1, significantly improved since admission.   -Encourage PO intake as dark urine likely from dehydration . Repeat labs in AM.         Code status: Full      Dispo: Pending placement     VTE Prophylaxis:   Mechanical Order History:       None          Pharmalogical Order History:        Ordered     Dose Route Frequency Stop    04/27/24 0000  heparin (porcine) 5000 UNIT/ML injection 5,000 Units         5,000 Units SC Every 12 Hours Scheduled --                    Ok Marin MD  [facility] Hospitalist  05/02/24  16:36 EDT

## 2024-05-02 NOTE — THERAPY TREATMENT NOTE
"Acute Care - Physical Therapy Treatment Note   [facility]     Patient Name: Regine Beckham  : 1955  MRN: [id number]  Today's Date: 2024   Onset of Illness/Injury or Date of Surgery: 24  Visit Dx:     ICD-10-CM ICD-9-CM   1. Adult failure to thrive  R62.7 783.7   2. Debility  R53.81 799.3     Patient Active Problem List   Diagnosis    Iron deficiency anemia due to chronic blood loss    Major depressive disorder    RLS (restless legs syndrome)    GERD (gastroesophageal reflux disease)    Left breast mass    Allergy to penicillin    Stroke    Grade I diastolic dysfunction    Moderate malnutrition    Falls frequently    Stroke-like symptoms    Debility    Chest pain    Failure to thrive in adult     Past Medical History:   Diagnosis Date    Anemia     Anxiety     Arthritis     Depression     Legally blind     Restless leg syndrome     Sleep apnea     \"I don't use a cpap anymore since losing 106 lbs\"    Water retention      Past Surgical History:   Procedure Laterality Date    BACK SURGERY      CARDIAC CATHETERIZATION N/A 2024    Procedure: Percutaneous Manual Thrombectomy;  Surgeon: Efrain Hurley MD;  Location:  [facility];  Service: Interventional Radiology;  Laterality: N/A;    CARDIAC CATHETERIZATION N/A 2024    Procedure: Left Heart Cath;  Surgeon: Susan Mederos MD;  Location:  [facility];  Service: Cardiology;  Laterality: N/A;    GALLBLADDER SURGERY      HIP ARTHROSCOPY      JOINT REPLACEMENT Right      PT Assessment (Last 12 Hours)       PT Evaluation and Treatment       Row Name 24 1005          Physical Therapy Time and Intention    Document Type therapy note (daily note)  -KH     Mode of Treatment physical therapy  -KH     Patient Effort good  -CALE     Comment Pt seen for treatment in which ambulation with HW practiced, as well as some LE TE of hip flexion and DF on L LE.  -       Row Name 24 1005          Bed Mobility    Bed " Mobility supine-sit  -     Supine-Sit Charlton (Bed Mobility) contact guard;minimum assist (75% patient effort);modified independence  -       Row Name 05/02/24 1005          Transfers    Transfers sit-stand transfer;stand-sit transfer  -       Row Name 05/02/24 1005          Sit-Stand Transfer    Sit-Stand Charlton (Transfers) moderate assist (50% patient effort);maximum assist (25% patient effort);dependent (less than 25% patient effort)  DepA getting up from toilet  -     Assistive Device (Sit-Stand Transfers) walker, dao  -KH       Row Name 05/02/24 1005          Stand-Sit Transfer    Stand-Sit Charlton (Transfers) minimum assist (75% patient effort);moderate assist (50% patient effort)  -       Row Name 05/02/24 1005          Gait/Stairs (Locomotion)    Gait/Stairs Locomotion gait/ambulation assistive device  -     Charlton Level (Gait) minimum assist (75% patient effort);contact guard  -     Assistive Device (Gait) walker, dao  -     Patient was able to Ambulate yes  -     Distance in Feet (Gait) 40  20x2  -     Deviations/Abnormal Patterns (Gait) gait speed decreased;base of support, narrow;weight shifting decreased  fatigue wtih L LE after some distance and assist with foot clearance  -       Row Name 05/02/24 1005          Motor Skills    Therapeutic Exercise hip;ankle  flexion of hip 3x10 or so with cues for slow motion for better muscle control, DF ankle grossly 2x10  -       Row Name 05/02/24 1005          Positioning and Restraints    Pre-Treatment Position in bed  -     Post Treatment Position bed  -     In Bed sitting EOB;call light within reach;exit alarm on;encouraged to call for assist  -               User Key  (r) = Recorded By, (t) = Taken By, (c) = Cosigned By      Initials Name Provider Type    Mahnaz Hatch, PT Physical Therapist                      PT Recommendation and Plan     Progress Summary (PT)  Daily Progress Summary (PT): Pt  participated well, difficulty getting pt to stay on task/focus. May benefit from continued theraputic intervention.       Time Calculation:    PT Charges       Row Name 05/02/24 1038             Time Calculation    Start Time 1005  -      PT Received On 05/02/24  -         Time Calculation- PT    Total Timed Code Minutes- PT 25 minute(s)  -                User Key  (r) = Recorded By, (t) = Taken By, (c) = Cosigned By      Initials Name Provider Type     Mahnaz Shaikh, PT Physical Therapist                  Therapy Charges for Today       Code Description Service Date Service Provider Modifiers Qty    75627728802 HC PT THER PROC EA 15 MIN 5/1/2024 Mahnaz Shaikh, PT GP 1    12372244795 HC PT THERAPEUTIC ACT EA 15 MIN 5/1/2024 Mahnaz Shaikh, PT GP 1    41764204066 HC PT THER PROC EA 15 MIN 5/2/2024 Mahnaz Shaikh, PT GP 1    23116107474 HC PT THERAPEUTIC ACT EA 15 MIN 5/2/2024 Mahnaz Shaikh, PT GP 1            PT G-Codes  AM-PAC 6 Clicks Score (PT): 16    Mahnaz Shaikh, PT  5/2/2024

## 2024-05-02 NOTE — PLAN OF CARE
Goal Outcome Evaluation:  Plan of Care Reviewed With: patient        Progress: improving  Outcome Evaluation: Patient resting in bed at this time. VSS. Patient refused to ambulate this shift. Patient educated on the importance of daily ambulation. Patient voices no concerns at this time. Will continue with plan of care.

## 2024-05-02 NOTE — PROGRESS NOTES
[facility] HOSPITALIST PROGRESS NOTE     Patient Identification:  Name:  Regine Beckham  Age:  68 y.o.  Sex:  female  :  1955  MRN:  [id number]  Visit Number:  [id number]  ROOM: [address] [place] [address]     Primary Care Provider:  Dread Burton MD    Length of stay in inpatient status:  6    Subjective     Chief Compliant:    Chief Complaint   Patient presents with    Fall       History of Presenting Illness:    Patient denies any new complaints. Patient had urine that appeared darker in the PM per nursing staff that was sent for UA.       ROS:  Otherwise 10 point ROS negative other than documented above in HPI.     Objective     Current Hospital Meds:amLODIPine, 5 mg, Oral, Q24H  aspirin, 81 mg, Oral, Daily  atorvastatin, 80 mg, Oral, Nightly  gabapentin, 300 mg, Oral, BID  heparin (porcine), 5,000 Units, Subcutaneous, Q12H  isosorbide mononitrate, 30 mg, Oral, Q24H  losartan, 50 mg, Oral, Daily  metoprolol tartrate, 12.5 mg, Oral, Q12H  Mirabegron ER, 50 mg, Oral, Daily  mirtazapine, 30 mg, Oral, Nightly  pantoprazole, 40 mg, Oral, Q AM  rOPINIRole, 4 mg, Oral, Nightly  senna-docusate sodium, 2 tablet, Oral, Nightly  sodium chloride, 10 mL, Intravenous, Q12H  ticagrelor, 60 mg, Oral, BID         Current Antimicrobial Therapy:  Anti-Infectives (From admission, onward)      Ordered     Dose/Rate Route Frequency Start Stop    24 0904  nitrofurantoin (macrocrystal-monohydrate) (MACROBID) capsule 100 mg        Ordering Provider: Matheus Lundberg MD    100 mg Oral Every 12 Hours Scheduled 24 1000 24          Current Diuretic Therapy:  Diuretics (From admission, onward)      None          ----------------------------------------------------------------------------------------------------------------------  Vital Signs:  Temp:  [96.1 °F (35.6 °C)-98.5 °F (36.9 °C)] 98.2 °F (36.8 °C)  Heart Rate:  [58-66] 59  Resp:  [16-18] 18  BP: (100-153)/(43-72) 102/43  SpO2:  [96 %] 96 %  on   ;    Device (Oxygen Therapy): room air  Body mass index is 23.31 kg/m².    Wt Readings from Last 3 Encounters:   05/02/24 65.5 kg (144 lb 6.4 oz)   04/20/24 68.4 kg (150 lb 12.8 oz)   04/18/24 68.1 kg (150 lb 2.1 oz)     Intake & Output (last 3 days)         04/29 0701 04/30 0700 04/30 0701 05/01 0700 05/01 0701 05/02 0700 05/02 0701 05/03 0700    P.O. 600 480 600 240    I.V. (mL/kg)  0 (0) 0 (0)     Total Intake(mL/kg) 600 (9.2) 480 (7.4) 600 (9.2) 240 (3.7)    Urine (mL/kg/hr) 650 (0.4) 100 (0.1) 900 (0.6)     Stool   0     Total Output 650 100 900     Net -50 +380 -300 +240            Urine Unmeasured Occurrence 8 x 4 x 1 x 1 x    Stool Unmeasured Occurrence   1 x           Diet: Regular/House; Fluid Consistency: Thin (IDDSI 0)  ----------------------------------------------------------------------------------------------------------------------  Physical exam:  Constitutional:  Chronically ill appearing.   HENT:  Head:  Normocephalic and atraumatic.  Mouth:  Moist mucous membranes.    Eyes:  Conjunctivae and EOM are normal. No scleral icterus.    Neck:  Neck supple.  No JVD present.    Cardiovascular:  Normal rate, regular rhythm and normal heart sounds with no murmur.  Pulmonary/Chest:  No respiratory distress, no wheezes, no crackles, with normal breath sounds and good air movement.  ----------------------------------------------------------------------------------------------------------------------  Tele:    ----------------------------------------------------------------------------------------------------------------------  Results from last 7 days   Lab Units 04/27/24 0152 04/26/24 2117   WBC 10*3/mm3 7.91 9.38   HEMOGLOBIN g/dL 9.8* 10.7*   HEMATOCRIT % 30.9* 32.7*   MCV fL 96.3 92.4   MCHC g/dL 31.7 32.7   PLATELETS 10*3/mm3 274 318         Results from last 7 days   Lab Units 04/27/24 0152 04/26/24 2117   SODIUM mmol/L 142 140   POTASSIUM mmol/L 3.4* 3.8   CHLORIDE mmol/L 107 104   CO2 mmol/L 24.7  "25.3   BUN mg/dL 14 14   CREATININE mg/dL 0.81 0.72   CALCIUM mg/dL 9.5 9.9   GLUCOSE mg/dL 168* 123*   ALBUMIN g/dL 3.3* 3.7   BILIRUBIN mg/dL 0.3 0.3   ALK PHOS U/L 138* 150*   AST (SGOT) U/L 29 36*   ALT (SGPT) U/L 32 38*   Estimated Creatinine Clearance: 67.8 mL/min (by C-G formula based on SCr of 0.81 mg/dL).  No results found for: \"AMMONIA\"              No results found for: \"HGBA1C\", \"POCGLU\"  Lab Results   Component Value Date    TSH 2.880 03/17/2024     No results found for: \"PREGTESTUR\", \"PREGSERUM\", \"HCG\", \"HCGQUANT\"  Pain Management Panel          Latest Ref Rng & Units 12/20/2023   Pain Management Panel   Amphetamine, Urine Qual Negative Negative    Barbiturates Screen, Urine Negative Negative    Benzodiazepine Screen, Urine Negative Negative    Buprenorphine, Screen, Urine Negative Negative    Cocaine Screen, Urine Negative Negative    Fentanyl, Urine Negative Negative    Methadone Screen , Urine Negative Negative    Methamphetamine, Ur Negative Negative      Brief Urine Lab Results  (Last result in the past 365 days)        Color   Clarity   Blood   Leuk Est   Nitrite   Protein   CREAT   Urine HCG        05/01/24 1636 Dark Yellow   Cloudy   Negative   Trace   Negative   Negative                 No results found for: \"BLOODCX\"  Urine Culture   Date Value Ref Range Status   04/26/2024 >100,000 CFU/mL Normal Urogenital Lorraine  Final     No results found for: \"WOUNDCX\"  No results found for: \"STOOLCX\"  No results found for: \"RESPCX\"  No results found for: \"AFBCX\"        I have personally looked at the labs and they are summarized above.  ----------------------------------------------------------------------------------------------------------------------  Detailed radiology reports for the last 24 hours:    Imaging Results (Last 24 Hours)       ** No results found for the last 24 hours. **          Assessment & Plan    #Acute on chronic debility secondary to previous CVA  -Residual left sided weakness, " initially resistant to IPR due to co-pay but with insurance changes is now agreeable  -Denied IPR. Awaiting SNF.      #Bacteruria, simple cystitis  -On UA bacteria concentration increasing across prior samples. Mild urgency reported, unsure reliability to report and more detailed hx  -Started Macrobid x5 days to cover  -urine cx normal UGF        Code status: Full      Dispo: Pending placement     VTE Prophylaxis:   Mechanical Order History:       None          Pharmalogical Order History:        Ordered     Dose Route Frequency Stop    04/27/24 0000  heparin (porcine) 5000 UNIT/ML injection 5,000 Units         5,000 Units SC Every 12 Hours Scheduled --                 Ok Marin MD  [facility] Hospitalist  05/02/24  16:34 EDT

## 2024-05-03 LAB
ANION GAP SERPL CALCULATED.3IONS-SCNC: 9.7 MMOL/L (ref 5–15)
BASOPHILS # BLD AUTO: 0.05 10*3/MM3 (ref 0–0.2)
BASOPHILS NFR BLD AUTO: 0.7 % (ref 0–1.5)
BUN SERPL-MCNC: 25 MG/DL (ref 8–23)
BUN/CREAT SERPL: 24.8 (ref 7–25)
CALCIUM SPEC-SCNC: 9.3 MG/DL (ref 8.6–10.5)
CHLORIDE SERPL-SCNC: 103 MMOL/L (ref 98–107)
CO2 SERPL-SCNC: 24.3 MMOL/L (ref 22–29)
CREAT SERPL-MCNC: 1.01 MG/DL (ref 0.57–1)
DEPRECATED RDW RBC AUTO: 48.8 FL (ref 37–54)
EGFRCR SERPLBLD CKD-EPI 2021: 60.4 ML/MIN/1.73
EOSINOPHIL # BLD AUTO: 0.22 10*3/MM3 (ref 0–0.4)
EOSINOPHIL NFR BLD AUTO: 3.1 % (ref 0.3–6.2)
ERYTHROCYTE [DISTWIDTH] IN BLOOD BY AUTOMATED COUNT: 13.8 % (ref 12.3–15.4)
GLUCOSE SERPL-MCNC: 163 MG/DL (ref 65–99)
HCT VFR BLD AUTO: 32.7 % (ref 34–46.6)
HGB BLD-MCNC: 10.1 G/DL (ref 12–15.9)
IMM GRANULOCYTES # BLD AUTO: 0.02 10*3/MM3 (ref 0–0.05)
IMM GRANULOCYTES NFR BLD AUTO: 0.3 % (ref 0–0.5)
LYMPHOCYTES # BLD AUTO: 1.59 10*3/MM3 (ref 0.7–3.1)
LYMPHOCYTES NFR BLD AUTO: 22.5 % (ref 19.6–45.3)
MCH RBC QN AUTO: 29.6 PG (ref 26.6–33)
MCHC RBC AUTO-ENTMCNC: 30.9 G/DL (ref 31.5–35.7)
MCV RBC AUTO: 95.9 FL (ref 79–97)
MONOCYTES # BLD AUTO: 0.61 10*3/MM3 (ref 0.1–0.9)
MONOCYTES NFR BLD AUTO: 8.6 % (ref 5–12)
NEUTROPHILS NFR BLD AUTO: 4.58 10*3/MM3 (ref 1.7–7)
NEUTROPHILS NFR BLD AUTO: 64.8 % (ref 42.7–76)
NRBC BLD AUTO-RTO: 0 /100 WBC (ref 0–0.2)
PLATELET # BLD AUTO: 240 10*3/MM3 (ref 140–450)
PMV BLD AUTO: 9.8 FL (ref 6–12)
POTASSIUM SERPL-SCNC: 3.8 MMOL/L (ref 3.5–5.2)
RBC # BLD AUTO: 3.41 10*6/MM3 (ref 3.77–5.28)
SODIUM SERPL-SCNC: 137 MMOL/L (ref 136–145)
WBC NRBC COR # BLD AUTO: 7.07 10*3/MM3 (ref 3.4–10.8)

## 2024-05-03 PROCEDURE — 97116 GAIT TRAINING THERAPY: CPT

## 2024-05-03 PROCEDURE — 80048 BASIC METABOLIC PNL TOTAL CA: CPT | Performed by: INTERNAL MEDICINE

## 2024-05-03 PROCEDURE — 97110 THERAPEUTIC EXERCISES: CPT

## 2024-05-03 PROCEDURE — 85025 COMPLETE CBC W/AUTO DIFF WBC: CPT | Performed by: INTERNAL MEDICINE

## 2024-05-03 PROCEDURE — 99231 SBSQ HOSP IP/OBS SF/LOW 25: CPT | Performed by: INTERNAL MEDICINE

## 2024-05-03 PROCEDURE — 25010000002 HEPARIN (PORCINE) PER 1000 UNITS: Performed by: HOSPITALIST

## 2024-05-03 RX ADMIN — GABAPENTIN 300 MG: 300 CAPSULE ORAL at 20:37

## 2024-05-03 RX ADMIN — ASPIRIN 81 MG: 81 TABLET, COATED ORAL at 08:35

## 2024-05-03 RX ADMIN — TICAGRELOR 60 MG: 60 TABLET ORAL at 20:36

## 2024-05-03 RX ADMIN — AMLODIPINE BESYLATE 5 MG: 5 TABLET ORAL at 08:35

## 2024-05-03 RX ADMIN — GABAPENTIN 300 MG: 300 CAPSULE ORAL at 08:35

## 2024-05-03 RX ADMIN — HEPARIN SODIUM 5000 UNITS: 5000 INJECTION INTRAVENOUS; SUBCUTANEOUS at 20:37

## 2024-05-03 RX ADMIN — METOPROLOL TARTRATE 12.5 MG: 25 TABLET, FILM COATED ORAL at 08:34

## 2024-05-03 RX ADMIN — ROPINIROLE HYDROCHLORIDE 4 MG: 1 TABLET, FILM COATED ORAL at 20:36

## 2024-05-03 RX ADMIN — DOCUSATE SODIUM 50 MG AND SENNOSIDES 8.6 MG 2 TABLET: 8.6; 5 TABLET, FILM COATED ORAL at 20:36

## 2024-05-03 RX ADMIN — LOSARTAN POTASSIUM 50 MG: 50 TABLET, FILM COATED ORAL at 08:35

## 2024-05-03 RX ADMIN — Medication 10 ML: at 08:35

## 2024-05-03 RX ADMIN — METOPROLOL TARTRATE 12.5 MG: 25 TABLET, FILM COATED ORAL at 20:36

## 2024-05-03 RX ADMIN — ISOSORBIDE MONONITRATE 30 MG: 30 TABLET, EXTENDED RELEASE ORAL at 08:35

## 2024-05-03 RX ADMIN — TRAMADOL HYDROCHLORIDE 50 MG: 50 TABLET ORAL at 10:31

## 2024-05-03 RX ADMIN — MIRTAZAPINE 30 MG: 15 TABLET, FILM COATED ORAL at 20:37

## 2024-05-03 RX ADMIN — TICAGRELOR 60 MG: 60 TABLET ORAL at 08:34

## 2024-05-03 RX ADMIN — ATORVASTATIN CALCIUM 80 MG: 40 TABLET, FILM COATED ORAL at 20:37

## 2024-05-03 RX ADMIN — MIRABEGRON 50 MG: 25 TABLET, FILM COATED, EXTENDED RELEASE ORAL at 08:35

## 2024-05-03 RX ADMIN — HEPARIN SODIUM 5000 UNITS: 5000 INJECTION INTRAVENOUS; SUBCUTANEOUS at 08:35

## 2024-05-03 RX ADMIN — Medication 5 MG: at 20:37

## 2024-05-03 RX ADMIN — ACETAMINOPHEN 500 MG: 500 TABLET ORAL at 20:37

## 2024-05-03 RX ADMIN — PANTOPRAZOLE SODIUM 40 MG: 40 TABLET, DELAYED RELEASE ORAL at 05:28

## 2024-05-03 RX ADMIN — Medication 10 ML: at 20:37

## 2024-05-03 NOTE — PROGRESS NOTES
[facility] HOSPITALIST PROGRESS NOTE     Patient Identification:  Name:  Regine Beckham  Age:  68 y.o.  Sex:  female  :  1955  MRN:  [id number]  Visit Number:  [id number]  ROOM: [address] [place] [address]     Primary Care Provider:  Dread Burton MD    Length of stay in inpatient status:  7    Subjective     Chief Compliant:    Chief Complaint   Patient presents with    Fall       History of Presenting Illness:    Patient denies any new complaints. Patient reports she is eating/drinking most of her meals. We continue to wait for placement.     ROS:  Otherwise 10 point ROS negative other than documented above in HPI.     Objective     Current Hospital Meds:amLODIPine, 5 mg, Oral, Q24H  aspirin, 81 mg, Oral, Daily  atorvastatin, 80 mg, Oral, Nightly  gabapentin, 300 mg, Oral, BID  heparin (porcine), 5,000 Units, Subcutaneous, Q12H  isosorbide mononitrate, 30 mg, Oral, Q24H  losartan, 50 mg, Oral, Daily  metoprolol tartrate, 12.5 mg, Oral, Q12H  Mirabegron ER, 50 mg, Oral, Daily  mirtazapine, 30 mg, Oral, Nightly  pantoprazole, 40 mg, Oral, Q AM  rOPINIRole, 4 mg, Oral, Nightly  senna-docusate sodium, 2 tablet, Oral, Nightly  sodium chloride, 10 mL, Intravenous, Q12H  ticagrelor, 60 mg, Oral, BID         Current Antimicrobial Therapy:  Anti-Infectives (From admission, onward)      Ordered     Dose/Rate Route Frequency Start Stop    24 0904  nitrofurantoin (macrocrystal-monohydrate) (MACROBID) capsule 100 mg        Ordering Provider: Matheus Lundberg MD    100 mg Oral Every 12 Hours Scheduled 24 1000 24          Current Diuretic Therapy:  Diuretics (From admission, onward)      None          ----------------------------------------------------------------------------------------------------------------------  Vital Signs:  Temp:  [98.3 °F (36.8 °C)-98.5 °F (36.9 °C)] 98.3 °F (36.8 °C)  Heart Rate:  [59-66] 62  Resp:  [16-18] 18  BP: (101-120)/(52-70) 120/52     on   ;   Device (Oxygen  Therapy): room air  Body mass index is 22.99 kg/m².    Wt Readings from Last 3 Encounters:   05/03/24 64.6 kg (142 lb 6.7 oz)   04/20/24 68.4 kg (150 lb 12.8 oz)   04/18/24 68.1 kg (150 lb 2.1 oz)     Intake & Output (last 3 days)         04/30 0701 05/01 0700 05/01 0701 05/02 0700 05/02 0701 05/03 0700 05/03 0701 05/04 0700    P.O. 480 600 720 220    I.V. (mL/kg) 0 (0) 0 (0) 0 (0)     Total Intake(mL/kg) 480 (7.4) 600 (9.2) 720 (11.1) 220 (3.4)    Urine (mL/kg/hr) 100 (0.1) 900 (0.6) 800 (0.5)     Stool  0 0     Total Output 100 900 800     Net +380 -300 -80 +220            Urine Unmeasured Occurrence 4 x 1 x 2 x     Stool Unmeasured Occurrence  1 x 0 x           Diet: Regular/House; Fluid Consistency: Thin (IDDSI 0)  ----------------------------------------------------------------------------------------------------------------------  Physical exam:  Constitutional:  Chronically ill appearing.   HENT:  Head:  Normocephalic and atraumatic.  Mouth:  Moist mucous membranes.    Eyes:  Conjunctivae and EOM are normal. No scleral icterus.    Neck:  Neck supple.  No JVD present.    Cardiovascular:  Normal rate, regular rhythm and normal heart sounds with no murmur.  Pulmonary/Chest:  No respiratory distress, no wheezes, no crackles, with normal breath sounds and good air movement.  ----------------------------------------------------------------------------------------------------------------------  Tele:    ----------------------------------------------------------------------------------------------------------------------  Results from last 7 days   Lab Units 05/03/24  0016 04/27/24  0152 04/26/24 2117   WBC 10*3/mm3 7.07 7.91 9.38   HEMOGLOBIN g/dL 10.1* 9.8* 10.7*   HEMATOCRIT % 32.7* 30.9* 32.7*   MCV fL 95.9 96.3 92.4   MCHC g/dL 30.9* 31.7 32.7   PLATELETS 10*3/mm3 240 645 318         Results from last 7 days   Lab Units 05/03/24  0016 04/27/24  0152 04/26/24 2117   SODIUM mmol/L 137 142 140   POTASSIUM  "mmol/L 3.8 3.4* 3.8   CHLORIDE mmol/L 103 107 104   CO2 mmol/L 24.3 24.7 25.3   BUN mg/dL 25* 14 14   CREATININE mg/dL 1.01* 0.81 0.72   CALCIUM mg/dL 9.3 9.5 9.9   GLUCOSE mg/dL 163* 168* 123*   ALBUMIN g/dL  --  3.3* 3.7   BILIRUBIN mg/dL  --  0.3 0.3   ALK PHOS U/L  --  138* 150*   AST (SGOT) U/L  --  29 36*   ALT (SGPT) U/L  --  32 38*   Estimated Creatinine Clearance: 53.6 mL/min (A) (by C-G formula based on SCr of 1.01 mg/dL (H)).  No results found for: \"AMMONIA\"              No results found for: \"HGBA1C\", \"POCGLU\"  Lab Results   Component Value Date    TSH 2.880 03/17/2024     No results found for: \"PREGTESTUR\", \"PREGSERUM\", \"HCG\", \"HCGQUANT\"  Pain Management Panel          Latest Ref Rng & Units 12/20/2023   Pain Management Panel   Amphetamine, Urine Qual Negative Negative    Barbiturates Screen, Urine Negative Negative    Benzodiazepine Screen, Urine Negative Negative    Buprenorphine, Screen, Urine Negative Negative    Cocaine Screen, Urine Negative Negative    Fentanyl, Urine Negative Negative    Methadone Screen , Urine Negative Negative    Methamphetamine, Ur Negative Negative      Brief Urine Lab Results  (Last result in the past 365 days)        Color   Clarity   Blood   Leuk Est   Nitrite   Protein   CREAT   Urine HCG        05/01/24 1636 Dark Yellow   Cloudy   Negative   Trace   Negative   Negative                 No results found for: \"BLOODCX\"  Urine Culture   Date Value Ref Range Status   04/26/2024 >100,000 CFU/mL Normal Urogenital Lorraine  Final     No results found for: \"WOUNDCX\"  No results found for: \"STOOLCX\"  No results found for: \"RESPCX\"  No results found for: \"AFBCX\"        I have personally looked at the labs and they are summarized above.  ----------------------------------------------------------------------------------------------------------------------  Detailed radiology reports for the last 24 hours:    Imaging Results (Last 24 Hours)       ** No results found for the last 24 " hours. **          Assessment & Plan    #Acute on chronic debility secondary to previous CVA  -Residual left sided weakness, initially resistant to IPR due to co-pay but with insurance changes is now agreeable  -Denied IPR. Awaiting SNF.      #Bacteruria, simple cystitis  -On UA bacteria concentration increasing across prior samples. Mild urgency reported, unsure reliability to report and more detailed hx  -Started Macrobid x5 days to cover  -urine cx on admission normal UGF  -UA repeated on 5/1, significantly improved since admission.   -Encourage PO intake as dark urine likely from dehydration, improving.         Code status: Full      Dispo: Pending placement     VTE Prophylaxis:   Mechanical Order History:       None          Pharmalogical Order History:        Ordered     Dose Route Frequency Stop    04/27/24 0000  heparin (porcine) 5000 UNIT/ML injection 5,000 Units         5,000 Units SC Every 12 Hours Scheduled --                    Ok Marin MD  [facility]  05/03/24  17:31 EDT

## 2024-05-03 NOTE — PLAN OF CARE
Goal Outcome Evaluation:  Plan of Care Reviewed With: patient        Progress: improving  Patient resting in bed at this time. VSS. Patient had c/o pain this shift. PRN medication given per MAR. Patient refused to ambulate this shift. Patient educated on the importance of daily ambulation. No signs of acute distress at this time. Will continue with plan of care.

## 2024-05-03 NOTE — PLAN OF CARE
Goal Outcome Evaluation:  Plan of Care Reviewed With: patient        Progress: no change  Outcome Evaluation: Patient is resting in bed at this time. C/O pain this shift; PRN meds given per MAR. Patient worked with therapy today. Patient ambulated in room and sat in the chair today. VSS. No acute changes noted at this time. Will continue with POC.

## 2024-05-03 NOTE — THERAPY TREATMENT NOTE
"Acute Care - Physical Therapy Treatment Note   [facility]     Patient Name: Regine Beckham  : 1955  MRN: [id number]  Today's Date: 5/3/2024   Onset of Illness/Injury or Date of Surgery: 24  Visit Dx:     ICD-10-CM ICD-9-CM   1. Adult failure to thrive  R62.7 783.7   2. Debility  R53.81 799.3     Patient Active Problem List   Diagnosis    Iron deficiency anemia due to chronic blood loss    Major depressive disorder    RLS (restless legs syndrome)    GERD (gastroesophageal reflux disease)    Left breast mass    Allergy to penicillin    Stroke    Grade I diastolic dysfunction    Moderate malnutrition    Falls frequently    Stroke-like symptoms    Debility    Chest pain    Failure to thrive in adult     Past Medical History:   Diagnosis Date    Anemia     Anxiety     Arthritis     Depression     Legally blind     Restless leg syndrome     Sleep apnea     \"I don't use a cpap anymore since losing 106 lbs\"    Water retention      Past Surgical History:   Procedure Laterality Date    BACK SURGERY      CARDIAC CATHETERIZATION N/A 2024    Procedure: Percutaneous Manual Thrombectomy;  Surgeon: Efrain Hurley MD;  Location:  [facility];  Service: Interventional Radiology;  Laterality: N/A;    CARDIAC CATHETERIZATION N/A 2024    Procedure: Left Heart Cath;  Surgeon: Susan Mederos MD;  Location:  [facility];  Service: Cardiology;  Laterality: N/A;    GALLBLADDER SURGERY      HIP ARTHROSCOPY      JOINT REPLACEMENT Right      PT Assessment (Last 12 Hours)       PT Evaluation and Treatment       Row Name 24 0425          Physical Therapy Time and Intention    Subjective Information complains of;pain  -HC     Document Type therapy note (daily note)  -HC     Mode of Treatment physical therapy  -HC     Patient Effort good  -HC     Comment Pt and STALIN Haynes in agreement for PT. Pt walked 15' first attempted with RW but Pt was unsteady, Used HHA and Pt required min/mod " COURTNEY Pt completed sitting exercises up in chair to tolerace. Requested to remain up in chair at end of session, notified RN.  -       Row Name 05/03/24 1515          Cognition    Personal Safety Interventions fall prevention program maintained;gait belt;supervised activity;nonskid shoes/slippers when out of bed  -       Row Name 05/03/24 1515          Bed Mobility    Bed Mobility supine-sit  -     Supine-Sit [place] (Bed Mobility) contact guard;minimum assist (75% patient effort);modified independence  -       Row Name 05/03/24 1515          Transfers    Transfers sit-stand transfer;stand-sit transfer  -       Row Name 05/03/24 1515          Sit-Stand Transfer    Sit-Stand [place] (Transfers) moderate assist (50% patient effort);maximum assist (25% patient effort)  -     Assistive Device (Sit-Stand Transfers) walker, dao  -       Row Name 05/03/24 1515          Stand-Sit Transfer    Stand-Sit [place] (Transfers) minimum assist (75% patient effort);moderate assist (50% patient effort)  -       Row Name 05/03/24 1515          Gait/Stairs (Locomotion)    Gait/Stairs Locomotion gait/ambulation assistive device  -     [place] Level (Gait) moderate assist (50% patient effort);minimum assist (75% patient effort)  -     Assistive Device (Gait) walker, front-wheeled;other (see comments)  and HHA  -     Distance in Feet (Gait) 15  -     Deviations/Abnormal Patterns (Gait) gait speed decreased;base of support, narrow;weight shifting decreased  fatigue wtih L LE after some distance and assist with foot clearance  -       Row Name 05/03/24 1515          Motor Skills    Therapeutic Exercise other (see comments)  Sitting: AP, LAQ, march, knees in/out  -       Row Name 05/03/24 1515          Positioning and Restraints    Pre-Treatment Position in bed  -     Post Treatment Position chair  -     In Chair sitting;call light within reach;encouraged to call for assist;notified [facility]                User Key  (r) = Recorded By, (t) = Taken By, (c) = Cosigned By      Initials Name Provider Type     Desi Fraga, TK Physical Therapist Assistant                      PT Recommendation and Plan             Time Calculation:    PT Charges       Row Name 05/03/24 1518             Time Calculation    PT Received On 05/03/24  -         Time Calculation- PT    Total Timed Code Minutes- PT 23 minute(s)  -                User Key  (r) = Recorded By, (t) = Taken By, (c) = Cosigned By      Initials Name Provider Type     Desi Fraga PTA Physical Therapist Assistant                  Therapy Charges for Today       Code Description Service Date Service Provider Modifiers Qty    34846124139 HC GAIT TRAINING EA 15 MIN 5/3/2024 Desi Fraga PTA GP, CQ 1    [id number]  PT THER PROC EA 15 MIN 5/3/2024 Desi Fraga PTA GP, CQ 1            PT G-Codes  AM-PAC 6 Clicks Score (PT): 16    Desi Fraga PTA  5/3/2024

## 2024-05-03 NOTE — CASE MANAGEMENT/SOCIAL WORK
Discharge Planning Assessment   [facility]     Patient Name: Regine Beckham  MRN: [id number]  Today's Date: 5/3/2024    Admit Date: 4/26/2024            Discharge Plan       Row Name 05/03/24 0915       Plan    Plan SS spoke with [facility] natali chavez states pre-auth remains pending. SS to follow.    15:10pm: [facility] per Ernestina states pre-auth remains pending. SS to follow.                     PAULINO Allen

## 2024-05-04 LAB
ANION GAP SERPL CALCULATED.3IONS-SCNC: 9.1 MMOL/L (ref 5–15)
BUN SERPL-MCNC: 23 MG/DL (ref 8–23)
BUN/CREAT SERPL: 25.8 (ref 7–25)
CALCIUM SPEC-SCNC: 9.4 MG/DL (ref 8.6–10.5)
CHLORIDE SERPL-SCNC: 106 MMOL/L (ref 98–107)
CO2 SERPL-SCNC: 23.9 MMOL/L (ref 22–29)
CREAT SERPL-MCNC: 0.89 MG/DL (ref 0.57–1)
EGFRCR SERPLBLD CKD-EPI 2021: 70.3 ML/MIN/1.73
GLUCOSE SERPL-MCNC: 119 MG/DL (ref 65–99)
POTASSIUM SERPL-SCNC: 3.8 MMOL/L (ref 3.5–5.2)
SODIUM SERPL-SCNC: 139 MMOL/L (ref 136–145)

## 2024-05-04 PROCEDURE — 25010000002 HEPARIN (PORCINE) PER 1000 UNITS: Performed by: HOSPITALIST

## 2024-05-04 PROCEDURE — 99231 SBSQ HOSP IP/OBS SF/LOW 25: CPT | Performed by: INTERNAL MEDICINE

## 2024-05-04 PROCEDURE — 80048 BASIC METABOLIC PNL TOTAL CA: CPT | Performed by: INTERNAL MEDICINE

## 2024-05-04 RX ADMIN — HEPARIN SODIUM 5000 UNITS: 5000 INJECTION INTRAVENOUS; SUBCUTANEOUS at 20:47

## 2024-05-04 RX ADMIN — GABAPENTIN 300 MG: 300 CAPSULE ORAL at 08:37

## 2024-05-04 RX ADMIN — MIRABEGRON 50 MG: 25 TABLET, FILM COATED, EXTENDED RELEASE ORAL at 08:35

## 2024-05-04 RX ADMIN — HEPARIN SODIUM 5000 UNITS: 5000 INJECTION INTRAVENOUS; SUBCUTANEOUS at 08:35

## 2024-05-04 RX ADMIN — ATORVASTATIN CALCIUM 80 MG: 40 TABLET, FILM COATED ORAL at 20:46

## 2024-05-04 RX ADMIN — GABAPENTIN 300 MG: 300 CAPSULE ORAL at 21:17

## 2024-05-04 RX ADMIN — LOSARTAN POTASSIUM 50 MG: 50 TABLET, FILM COATED ORAL at 08:35

## 2024-05-04 RX ADMIN — DOCUSATE SODIUM 50 MG AND SENNOSIDES 8.6 MG 2 TABLET: 8.6; 5 TABLET, FILM COATED ORAL at 20:46

## 2024-05-04 RX ADMIN — Medication 10 ML: at 20:50

## 2024-05-04 RX ADMIN — ROPINIROLE HYDROCHLORIDE 4 MG: 1 TABLET, FILM COATED ORAL at 20:46

## 2024-05-04 RX ADMIN — ASPIRIN 81 MG: 81 TABLET, COATED ORAL at 08:35

## 2024-05-04 RX ADMIN — Medication 10 ML: at 08:40

## 2024-05-04 RX ADMIN — TICAGRELOR 60 MG: 60 TABLET ORAL at 08:35

## 2024-05-04 RX ADMIN — TICAGRELOR 60 MG: 60 TABLET ORAL at 22:29

## 2024-05-04 RX ADMIN — METOPROLOL TARTRATE 12.5 MG: 25 TABLET, FILM COATED ORAL at 08:35

## 2024-05-04 RX ADMIN — MIRTAZAPINE 30 MG: 15 TABLET, FILM COATED ORAL at 20:46

## 2024-05-04 RX ADMIN — ISOSORBIDE MONONITRATE 30 MG: 30 TABLET, EXTENDED RELEASE ORAL at 08:35

## 2024-05-04 RX ADMIN — AMLODIPINE BESYLATE 5 MG: 5 TABLET ORAL at 08:35

## 2024-05-04 NOTE — PLAN OF CARE
Goal Outcome Evaluation:  Plan of Care Reviewed With: patient        Progress: improving  Outcome Evaluation: Patient resting in bed at this time. VSS. Patient had c/o pain this shift. PRN medication given per MAR. Patient refused to ambulate this shift. She stated she was too tired. Educated patient on the importance of daily ambulation. Patient voices no concerns at this time. Will continue with plan of care.

## 2024-05-04 NOTE — NURSING NOTE
Patient refused a daily bath this shift. Patient educated on the importance of daily hygiene to prevent infection.

## 2024-05-04 NOTE — PROGRESS NOTES
[facility] HOSPITALIST PROGRESS NOTE     Patient Identification:  Name:  Regine Beckham  Age:  68 y.o.  Sex:  female  :  1955  MRN:  [id number]  Visit Number:  [id number]  ROOM: [address] [place] [address]     Primary Care Provider:  Dread Burton MD    Length of stay in inpatient status:  8    Subjective     Chief Compliant:    Chief Complaint   Patient presents with    Fall       History of Presenting Illness:    Patient denies any new complaints. Was trying to call her friend cherelle. I was able to help her dial cherelle number.     ROS:  Otherwise 10 point ROS negative other than documented above in HPI.     Objective     Current Hospital Meds:amLODIPine, 5 mg, Oral, Q24H  aspirin, 81 mg, Oral, Daily  atorvastatin, 80 mg, Oral, Nightly  gabapentin, 300 mg, Oral, BID  heparin (porcine), 5,000 Units, Subcutaneous, Q12H  isosorbide mononitrate, 30 mg, Oral, Q24H  losartan, 50 mg, Oral, Daily  metoprolol tartrate, 12.5 mg, Oral, Q12H  Mirabegron ER, 50 mg, Oral, Daily  mirtazapine, 30 mg, Oral, Nightly  pantoprazole, 40 mg, Oral, Q AM  rOPINIRole, 4 mg, Oral, Nightly  senna-docusate sodium, 2 tablet, Oral, Nightly  sodium chloride, 10 mL, Intravenous, Q12H  ticagrelor, 60 mg, Oral, BID         Current Antimicrobial Therapy:  Anti-Infectives (From admission, onward)      Ordered     Dose/Rate Route Frequency Start Stop    24 0904  nitrofurantoin (macrocrystal-monohydrate) (MACROBID) capsule 100 mg        Ordering Provider: Matheus Lundberg MD    100 mg Oral Every 12 Hours Scheduled 24 1000 24          Current Diuretic Therapy:  Diuretics (From admission, onward)      None          ----------------------------------------------------------------------------------------------------------------------  Vital Signs:  Temp:  [97.7 °F (36.5 °C)-98.1 °F (36.7 °C)] 98.1 °F (36.7 °C)  Heart Rate:  [60-64] 64  Resp:  [18] 18  BP: (101-146)/(59-78) 101/63  SpO2:  [98 %] 98 %  on   ;   Device  (Oxygen Therapy): room air  Body mass index is 22.92 kg/m².    Wt Readings from Last 3 Encounters:   05/04/24 64.4 kg (142 lb)   04/20/24 68.4 kg (150 lb 12.8 oz)   04/18/24 68.1 kg (150 lb 2.1 oz)     Intake & Output (last 3 days)         05/01 0701 05/02 0700 05/02 0701 05/03 0700 05/03 0701 05/04 0700 05/04 0701 05/05 0700    P.O. 600 720 680 480    I.V. (mL/kg) 0 (0) 0 (0) 0 (0)     Total Intake(mL/kg) 600 (9.2) 720 (11.1) 680 (10.6) 480 (7.5)    Urine (mL/kg/hr) 900 (0.6) 800 (0.5)  600 (1)    Stool 0 0      Total Output 900 800  600    Net -300 -80 +680 -120            Urine Unmeasured Occurrence 1 x 2 x 2 x     Stool Unmeasured Occurrence 1 x 0 x 0 x           Diet: Regular/House; Fluid Consistency: Thin (IDDSI 0)  ----------------------------------------------------------------------------------------------------------------------  Physical exam:  Constitutional:  Elderly appearing female.    HENT:  Head:  Normocephalic and atraumatic.  Mouth:  Moist mucous membranes.    Eyes:  Conjunctivae and EOM are normal. No scleral icterus.    Neck:  Neck supple.  No JVD present.    Cardiovascular:  Normal rate, regular rhythm and normal heart sounds with no murmur.  Pulmonary/Chest:  No respiratory distress, no wheezes, no crackles, with normal breath sounds and good air movement.  Abdominal:  Soft.  Bowel sounds are normal.  No distension and no tenderness.     ----------------------------------------------------------------------------------------------------------------------  Tele:    ----------------------------------------------------------------------------------------------------------------------  Results from last 7 days   Lab Units 05/03/24  0016   WBC 10*3/mm3 7.07   HEMOGLOBIN g/dL 10.1*   HEMATOCRIT % 32.7*   MCV fL 95.9   MCHC g/dL 30.9*   PLATELETS 10*3/mm3 240         Results from last 7 days   Lab Units 05/04/24  1003 05/03/24  0016   SODIUM mmol/L 139 137   POTASSIUM mmol/L 3.8 3.8   CHLORIDE  "mmol/L 106 103   CO2 mmol/L 23.9 24.3   BUN mg/dL 23 25*   CREATININE mg/dL 0.89 1.01*   CALCIUM mg/dL 9.4 9.3   GLUCOSE mg/dL 119* 163*   Estimated Creatinine Clearance: 60.7 mL/min (by C-G formula based on SCr of 0.89 mg/dL).  No results found for: \"AMMONIA\"              No results found for: \"HGBA1C\", \"POCGLU\"  Lab Results   Component Value Date    TSH 2.880 03/17/2024     No results found for: \"PREGTESTUR\", \"PREGSERUM\", \"HCG\", \"HCGQUANT\"  Pain Management Panel          Latest Ref Rng & Units 12/20/2023   Pain Management Panel   Amphetamine, Urine Qual Negative Negative    Barbiturates Screen, Urine Negative Negative    Benzodiazepine Screen, Urine Negative Negative    Buprenorphine, Screen, Urine Negative Negative    Cocaine Screen, Urine Negative Negative    Fentanyl, Urine Negative Negative    Methadone Screen , Urine Negative Negative    Methamphetamine, Ur Negative Negative      Brief Urine Lab Results  (Last result in the past 365 days)        Color   Clarity   Blood   Leuk Est   Nitrite   Protein   CREAT   Urine HCG        05/01/24 1636 Dark Yellow   Cloudy   Negative   Trace   Negative   Negative                 No results found for: \"BLOODCX\"  No results found for: \"URINECX\"  No results found for: \"WOUNDCX\"  No results found for: \"STOOLCX\"  No results found for: \"RESPCX\"  No results found for: \"AFBCX\"        I have personally looked at the labs and they are summarized above.  ----------------------------------------------------------------------------------------------------------------------  Detailed radiology reports for the last 24 hours:    Imaging Results (Last 24 Hours)       ** No results found for the last 24 hours. **          Assessment & Plan    #Acute on chronic debility secondary to previous CVA  -Residual left sided weakness, initially resistant to IPR due to co-pay but with insurance changes is now agreeable  -Denied IPR. Awaiting SNF.      #Bacteruria, simple cystitis  -On UA bacteria " concentration increasing across prior samples. Mild urgency reported, unsure reliability to report and more detailed hx  -Started Macrobid x5 days to cover  -urine cx on admission normal UGF  -UA repeated on 5/1, significantly improved since admission.   -Encourage PO intake as dark urine likely from dehydration, improving.         Code status: Full      Dispo: Pending placement        VTE Prophylaxis:   Mechanical Order History:       None          Pharmalogical Order History:        Ordered     Dose Route Frequency Stop    04/27/24 0000  heparin (porcine) 5000 UNIT/ML injection 5,000 Units         5,000 Units SC Every 12 Hours Scheduled --                    Ok Marin MD  [facility]  05/04/24  16:00 EDT

## 2024-05-04 NOTE — PLAN OF CARE
Goal Outcome Evaluation:  Plan of Care Reviewed With: patient        Progress: no change  Outcome Evaluation: Patient resting in bed at this time. VSS. Patient refused to ambulate this shift; educated on the importance of getting up and ambulating daily. No acute changes noted at this time. Will continue with POC.

## 2024-05-05 PROCEDURE — 25010000002 HEPARIN (PORCINE) PER 1000 UNITS: Performed by: HOSPITALIST

## 2024-05-05 PROCEDURE — 99231 SBSQ HOSP IP/OBS SF/LOW 25: CPT | Performed by: INTERNAL MEDICINE

## 2024-05-05 RX ADMIN — Medication 10 ML: at 21:08

## 2024-05-05 RX ADMIN — ASPIRIN 81 MG: 81 TABLET, COATED ORAL at 09:04

## 2024-05-05 RX ADMIN — PANTOPRAZOLE SODIUM 40 MG: 40 TABLET, DELAYED RELEASE ORAL at 06:13

## 2024-05-05 RX ADMIN — ACETAMINOPHEN 500 MG: 500 TABLET ORAL at 14:06

## 2024-05-05 RX ADMIN — METOPROLOL TARTRATE 12.5 MG: 25 TABLET, FILM COATED ORAL at 21:08

## 2024-05-05 RX ADMIN — GABAPENTIN 300 MG: 300 CAPSULE ORAL at 09:06

## 2024-05-05 RX ADMIN — GABAPENTIN 300 MG: 300 CAPSULE ORAL at 21:10

## 2024-05-05 RX ADMIN — HEPARIN SODIUM 5000 UNITS: 5000 INJECTION INTRAVENOUS; SUBCUTANEOUS at 09:07

## 2024-05-05 RX ADMIN — ATORVASTATIN CALCIUM 80 MG: 40 TABLET, FILM COATED ORAL at 21:06

## 2024-05-05 RX ADMIN — DOCUSATE SODIUM 50 MG AND SENNOSIDES 8.6 MG 2 TABLET: 8.6; 5 TABLET, FILM COATED ORAL at 21:08

## 2024-05-05 RX ADMIN — METOPROLOL TARTRATE 12.5 MG: 25 TABLET, FILM COATED ORAL at 09:04

## 2024-05-05 RX ADMIN — AMLODIPINE BESYLATE 5 MG: 5 TABLET ORAL at 09:04

## 2024-05-05 RX ADMIN — TICAGRELOR 60 MG: 60 TABLET ORAL at 21:08

## 2024-05-05 RX ADMIN — ACETAMINOPHEN 500 MG: 500 TABLET ORAL at 21:05

## 2024-05-05 RX ADMIN — TICAGRELOR 60 MG: 60 TABLET ORAL at 09:04

## 2024-05-05 RX ADMIN — MIRABEGRON 50 MG: 25 TABLET, FILM COATED, EXTENDED RELEASE ORAL at 09:04

## 2024-05-05 RX ADMIN — Medication 10 ML: at 09:07

## 2024-05-05 RX ADMIN — MIRTAZAPINE 30 MG: 15 TABLET, FILM COATED ORAL at 21:06

## 2024-05-05 RX ADMIN — ROPINIROLE HYDROCHLORIDE 4 MG: 1 TABLET, FILM COATED ORAL at 21:07

## 2024-05-05 RX ADMIN — ISOSORBIDE MONONITRATE 30 MG: 30 TABLET, EXTENDED RELEASE ORAL at 09:04

## 2024-05-05 RX ADMIN — HEPARIN SODIUM 5000 UNITS: 5000 INJECTION INTRAVENOUS; SUBCUTANEOUS at 21:08

## 2024-05-05 RX ADMIN — LOSARTAN POTASSIUM 50 MG: 50 TABLET, FILM COATED ORAL at 09:04

## 2024-05-05 NOTE — PROGRESS NOTES
[facility] HOSPITALIST PROGRESS NOTE     Patient Identification:  Name:  Regine Beckham  Age:  68 y.o.  Sex:  female  :  1955  MRN:  [id number]  Visit Number:  [id number]  ROOM: [address] [place] [address]     Primary Care Provider:  Dread Burton MD    Length of stay in inpatient status:  9    Subjective     Chief Compliant:    Chief Complaint   Patient presents with    Fall       History of Presenting Illness:    Patient noted she has been depressed but still motivated to work with PT/OT and hopefully get stronger. Denies any new complaints.     ROS:  Otherwise 10 point ROS negative other than documented above in HPI.     Objective     Current Hospital Meds:amLODIPine, 5 mg, Oral, Q24H  aspirin, 81 mg, Oral, Daily  atorvastatin, 80 mg, Oral, Nightly  gabapentin, 300 mg, Oral, BID  heparin (porcine), 5,000 Units, Subcutaneous, Q12H  isosorbide mononitrate, 30 mg, Oral, Q24H  losartan, 50 mg, Oral, Daily  metoprolol tartrate, 12.5 mg, Oral, Q12H  Mirabegron ER, 50 mg, Oral, Daily  mirtazapine, 30 mg, Oral, Nightly  pantoprazole, 40 mg, Oral, Q AM  rOPINIRole, 4 mg, Oral, Nightly  senna-docusate sodium, 2 tablet, Oral, Nightly  sodium chloride, 10 mL, Intravenous, Q12H  ticagrelor, 60 mg, Oral, BID         Current Antimicrobial Therapy:  Anti-Infectives (From admission, onward)      Ordered     Dose/Rate Route Frequency Start Stop    24 0904  nitrofurantoin (macrocrystal-monohydrate) (MACROBID) capsule 100 mg        Ordering Provider: Matheus Lundberg MD    100 mg Oral Every 12 Hours Scheduled 24 1000 24          Current Diuretic Therapy:  Diuretics (From admission, onward)      None          ----------------------------------------------------------------------------------------------------------------------  Vital Signs:  Temp:  [97.7 °F (36.5 °C)-98.4 °F (36.9 °C)] 98.4 °F (36.9 °C)  Heart Rate:  [71-97] 71  Resp:  [18-20] 18  BP: (111-153)/(54-90) 111/62  SpO2:  [95 %] 95 %  on    ;   Device (Oxygen Therapy): room air  Body mass index is 22.92 kg/m².    Wt Readings from Last 3 Encounters:   05/05/24 64.4 kg (142 lb)   04/20/24 68.4 kg (150 lb 12.8 oz)   04/18/24 68.1 kg (150 lb 2.1 oz)     Intake & Output (last 3 days)         05/02 0701 05/03 0700 05/03 0701 05/04 0700 05/04 0701 05/05 0700 05/05 0701 05/06 0700    P.O. 720 680 480 600    I.V. (mL/kg) 0 (0) 0 (0)      Total Intake(mL/kg) 720 (11.1) 680 (10.6) 480 (7.5) 600 (9.3)    Urine (mL/kg/hr) 800 (0.5)  1500 (1) 800 (1.4)    Stool 0       Total Output 800  1500 800    Net -80 +680 -1020 -200            Urine Unmeasured Occurrence 2 x 2 x 1 x     Stool Unmeasured Occurrence 0 x 0 x            Diet: Regular/House; Fluid Consistency: Thin (IDDSI 0)  ----------------------------------------------------------------------------------------------------------------------  Physical exam:  Constitutional:  Elderly appearing female.    HENT:  Head:  Normocephalic and atraumatic.  Mouth:  Moist mucous membranes.    Eyes:  Conjunctivae and EOM are normal. No scleral icterus.    Neck:  Neck supple.  No JVD present.    Cardiovascular:  Normal rate, regular rhythm and normal heart sounds with no murmur.  Pulmonary/Chest:  No respiratory distress, no wheezes, no crackles, with normal breath sounds and good air movement.  Abdominal:  Soft.  Bowel sounds are normal.  No distension and no tenderness.   ----------------------------------------------------------------------------------------------------------------------  Tele:    ----------------------------------------------------------------------------------------------------------------------  Results from last 7 days   Lab Units 05/03/24  0016   WBC 10*3/mm3 7.07   HEMOGLOBIN g/dL 10.1*   HEMATOCRIT % 32.7*   MCV fL 95.9   MCHC g/dL 30.9*   PLATELETS 10*3/mm3 240         Results from last 7 days   Lab Units 05/04/24  1003 05/03/24  0016   SODIUM mmol/L 139 137   POTASSIUM mmol/L 3.8 3.8   CHLORIDE  "mmol/L 106 103   CO2 mmol/L 23.9 24.3   BUN mg/dL 23 25*   CREATININE mg/dL 0.89 1.01*   CALCIUM mg/dL 9.4 9.3   GLUCOSE mg/dL 119* 163*   Estimated Creatinine Clearance: 60.7 mL/min (by C-G formula based on SCr of 0.89 mg/dL).  No results found for: \"AMMONIA\"              No results found for: \"HGBA1C\", \"POCGLU\"  Lab Results   Component Value Date    TSH 2.880 03/17/2024     No results found for: \"PREGTESTUR\", \"PREGSERUM\", \"HCG\", \"HCGQUANT\"  Pain Management Panel          Latest Ref Rng & Units 12/20/2023   Pain Management Panel   Amphetamine, Urine Qual Negative Negative    Barbiturates Screen, Urine Negative Negative    Benzodiazepine Screen, Urine Negative Negative    Buprenorphine, Screen, Urine Negative Negative    Cocaine Screen, Urine Negative Negative    Fentanyl, Urine Negative Negative    Methadone Screen , Urine Negative Negative    Methamphetamine, Ur Negative Negative      Brief Urine Lab Results  (Last result in the past 365 days)        Color   Clarity   Blood   Leuk Est   Nitrite   Protein   CREAT   Urine HCG        05/01/24 1636 Dark Yellow   Cloudy   Negative   Trace   Negative   Negative                 No results found for: \"BLOODCX\"  No results found for: \"URINECX\"  No results found for: \"WOUNDCX\"  No results found for: \"STOOLCX\"  No results found for: \"RESPCX\"  No results found for: \"AFBCX\"        I have personally looked at the labs and they are summarized above.  ----------------------------------------------------------------------------------------------------------------------  Detailed radiology reports for the last 24 hours:    Imaging Results (Last 24 Hours)       ** No results found for the last 24 hours. **          Assessment & Plan    #Acute on chronic debility secondary to previous CVA  -Residual left sided weakness, initially resistant to IPR due to co-pay but with insurance changes is now agreeable  -Denied IPR. Awaiting SNF.      #Bacteruria, simple cystitis  -On UA bacteria " concentration increasing across prior samples. Mild urgency reported, unsure reliability to report and more detailed hx  -Started Macrobid x5 days to cover  -urine cx on admission normal UGF  -UA repeated on 5/1, significantly improved since admission.   -Encourage PO intake as dark urine likely from dehydration, improving.         Code status: Full      Dispo: Pending placement        VTE Prophylaxis:   Mechanical Order History:       None          Pharmalogical Order History:        Ordered     Dose Route Frequency Stop    04/27/24 0000  heparin (porcine) 5000 UNIT/ML injection 5,000 Units         5,000 Units SC Every 12 Hours Scheduled --                      Ok Marin MD  [facility] Hospitalist  05/05/24  16:11 EDT

## 2024-05-05 NOTE — PLAN OF CARE
Goal Outcome Evaluation:           Progress: no change  Outcome Evaluation: Pt resting in bed. VSS on RA. Pt walked to [facility] with x2 assist. Pt had c/o pain, PRN medication given. No further complaints. Will continue with poc.

## 2024-05-05 NOTE — PLAN OF CARE
Goal Outcome Evaluation:  Plan of Care Reviewed With: patient        Progress: no change  Outcome Evaluation: Pt resting in bed during assessment. VSS. Pt has no complaint or concerns at this time. No acute changes to notes. Will cont POC

## 2024-05-06 LAB
GEN 5 2HR TROPONIN T REFLEX: 15 NG/L
TROPONIN T DELTA: 1 NG/L
TROPONIN T SERPL HS-MCNC: 14 NG/L

## 2024-05-06 PROCEDURE — 97110 THERAPEUTIC EXERCISES: CPT

## 2024-05-06 PROCEDURE — 84484 ASSAY OF TROPONIN QUANT: CPT

## 2024-05-06 PROCEDURE — 97535 SELF CARE MNGMENT TRAINING: CPT | Performed by: OCCUPATIONAL THERAPIST

## 2024-05-06 PROCEDURE — 97112 NEUROMUSCULAR REEDUCATION: CPT | Performed by: OCCUPATIONAL THERAPIST

## 2024-05-06 PROCEDURE — 99231 SBSQ HOSP IP/OBS SF/LOW 25: CPT | Performed by: STUDENT IN AN ORGANIZED HEALTH CARE EDUCATION/TRAINING PROGRAM

## 2024-05-06 PROCEDURE — 93005 ELECTROCARDIOGRAM TRACING: CPT

## 2024-05-06 PROCEDURE — 25010000002 HEPARIN (PORCINE) PER 1000 UNITS: Performed by: HOSPITALIST

## 2024-05-06 PROCEDURE — 97116 GAIT TRAINING THERAPY: CPT

## 2024-05-06 PROCEDURE — 97530 THERAPEUTIC ACTIVITIES: CPT | Performed by: OCCUPATIONAL THERAPIST

## 2024-05-06 RX ADMIN — AMLODIPINE BESYLATE 5 MG: 5 TABLET ORAL at 08:15

## 2024-05-06 RX ADMIN — LOSARTAN POTASSIUM 50 MG: 50 TABLET, FILM COATED ORAL at 08:15

## 2024-05-06 RX ADMIN — HEPARIN SODIUM 5000 UNITS: 5000 INJECTION INTRAVENOUS; SUBCUTANEOUS at 08:15

## 2024-05-06 RX ADMIN — ATORVASTATIN CALCIUM 80 MG: 40 TABLET, FILM COATED ORAL at 20:54

## 2024-05-06 RX ADMIN — TRAMADOL HYDROCHLORIDE 50 MG: 50 TABLET ORAL at 10:14

## 2024-05-06 RX ADMIN — TICAGRELOR 60 MG: 60 TABLET ORAL at 08:15

## 2024-05-06 RX ADMIN — HEPARIN SODIUM 5000 UNITS: 5000 INJECTION INTRAVENOUS; SUBCUTANEOUS at 20:56

## 2024-05-06 RX ADMIN — ROPINIROLE HYDROCHLORIDE 4 MG: 1 TABLET, FILM COATED ORAL at 20:55

## 2024-05-06 RX ADMIN — GABAPENTIN 300 MG: 300 CAPSULE ORAL at 08:15

## 2024-05-06 RX ADMIN — ACETAMINOPHEN 500 MG: 500 TABLET ORAL at 20:54

## 2024-05-06 RX ADMIN — ISOSORBIDE MONONITRATE 30 MG: 30 TABLET, EXTENDED RELEASE ORAL at 08:14

## 2024-05-06 RX ADMIN — PANTOPRAZOLE SODIUM 40 MG: 40 TABLET, DELAYED RELEASE ORAL at 05:27

## 2024-05-06 RX ADMIN — GABAPENTIN 300 MG: 300 CAPSULE ORAL at 20:54

## 2024-05-06 RX ADMIN — TICAGRELOR 60 MG: 60 TABLET ORAL at 20:55

## 2024-05-06 RX ADMIN — MIRABEGRON 50 MG: 25 TABLET, FILM COATED, EXTENDED RELEASE ORAL at 08:15

## 2024-05-06 RX ADMIN — Medication 10 ML: at 20:56

## 2024-05-06 RX ADMIN — METOPROLOL TARTRATE 12.5 MG: 25 TABLET, FILM COATED ORAL at 20:54

## 2024-05-06 RX ADMIN — ASPIRIN 81 MG: 81 TABLET, COATED ORAL at 08:14

## 2024-05-06 RX ADMIN — METOPROLOL TARTRATE 12.5 MG: 25 TABLET, FILM COATED ORAL at 08:14

## 2024-05-06 RX ADMIN — DOCUSATE SODIUM 50 MG AND SENNOSIDES 8.6 MG 2 TABLET: 8.6; 5 TABLET, FILM COATED ORAL at 20:54

## 2024-05-06 RX ADMIN — MIRTAZAPINE 30 MG: 15 TABLET, FILM COATED ORAL at 20:55

## 2024-05-06 RX ADMIN — Medication 10 ML: at 08:15

## 2024-05-06 NOTE — PROGRESS NOTES
[facility] HospitalIST PROGRESS NOTE     Patient Identification:  Name:  Regine Beckham  Age:  68 y.o.  Sex:  female  :  1955  MRN:  [id number]  Visit Number:  [id number]  ROOM: [address] [place] [address]     Primary Care Provider:  Dread Burton MD     Date of Admission: 2024    Length of stay in inpatient status:  10    Subjective     Chief Compliant:    Chief Complaint   Patient presents with    Fall       No acute events overnight. Resting in bed with no new complaints this am. Overnight VS reviewed         Objective       Vital Signs:  Temp:  [97.6 °F (36.4 °C)-98.3 °F (36.8 °C)] 98.3 °F (36.8 °C)  Heart Rate:  [67-74] 67  Resp:  [16-18] 18  BP: (106-160)/(53-73) 106/53  SpO2:  [93 %-95 %] 93 %  on   ;   Device (Oxygen Therapy): room air  Body mass index is 23.66 kg/m².      ----------------------------------------------------------------------------------------------------------------------  Physical Exam  Vitals and nursing note reviewed.   Constitutional:       General: She is not in acute distress.  HENT:      Head: Normocephalic and atraumatic.   Eyes:      General: No scleral icterus.     Extraocular Movements: Extraocular movements intact.   Pulmonary:      Effort: Pulmonary effort is normal. No respiratory distress.   Abdominal:      Tenderness: There is no abdominal tenderness.   Musculoskeletal:      Right lower leg: No edema.      Left lower leg: No edema.   Neurological:      Mental Status: She is alert.      Motor: Weakness present.   Psychiatric:         Mood and Affect: Mood normal.         Behavior: Behavior normal.       ----------------------------------------------------------------------------------------------------------------------          Assessment & Plan      #Acute on chronic debility secondary to previous CVA  -Residual left sided weakness  -Denied IPR. Awaiting SNF with multiple pre-auths pend     #Bacteruria, simple cystitis  -On UA bacteria concentration  increasing across prior samples. Mild urgency reported, unsure reliability to report and more detailed hx  -Started Macrobid x5 days to cover  -urine cx on admission normal UGF  -UA repeated on 5/1, significantly improved since admission  -Encourage PO intake as dark urine likely from dehydration, improving         Code Status and Medical Interventions:   Ordered at: 04/26/24 2049     Code Status (Patient has no pulse and is not breathing):    CPR (Attempt to Resuscitate)     Medical Interventions (Patient has pulse or is breathing):    Full Support         Disposition: pend snf    I have reviewed any copied/forwarded text or data, verified its accuracy, and updated as necessary above.    Matheus Lundberg MD  [facility] Hospitalist  05/06/24  16:47 EDT

## 2024-05-06 NOTE — PAYOR COMM NOTE
"CONTACT: FRIEDA BURKS RN  UTILIZATION MANAGEMENT DEPT.  [facility]  [address] [place] [address]  PHONE: [phone]  FAX: [phone]        CLINICAL UPDATE    PT REMAINS IN-HOUSE        REF# [id number]      Regine Lock \"NEGIN\" (68 y.o. Female)       Date of Birth   1955    Social Security Number       Address   [address] [place] [address]    Home Phone   [phone]    MRN   [id number]       [facility]    Marital Status                               Admission Date   4/26/24    Admission Type   Emergency    Admitting Provider   Capo Su MD    Attending Provider   Matheus Lundberg MD    Department, Room/Bed   [address], 3348/2S       Discharge Date       Discharge Disposition       Discharge Destination                                 Attending Provider: Matheus Lundberg MD    Allergies: Penicillins    Isolation: None   Infection: None   Code Status: CPR    Ht: 167.6 cm (66\")   Wt: 66.5 kg (146 lb 9.7 oz)    Admission Cmt: None   Principal Problem: Debility [R53.81]                   Active Insurance as of 4/26/2024       Primary Coverage       Payor Plan Insurance Group Employer/Plan Group    AETNA MEDICARE REPLACEMENT AETNA MEDICARE REPLACEMENT [id number]       Payor Plan Address Payor Plan Phone Number Payor Plan Fax Number Effective Dates    [address] [phone]  1/1/2024 - None Entered    [facility] [place] [place] [place] [address]         Subscriber Name Subscriber Birth Date Member ID       REGINE LOCK 1955 [id number]               Secondary Coverage       Payor Plan Insurance Group Employer/Plan Group    [place]        Payor Plan Address Payor Plan Phone Number Payor Plan Fax Number Effective Dates    [address] [phone]  4/26/2024 - None Entered    [place] [address]         Subscriber Name Subscriber Birth Date Member ID       REGINE LOCK 1955 [id number]       "               Emergency Contacts        (Rel.) Home Phone Work Phone Mobile Phone    Yaa Elizondo (Friend) [phone] --    Karla Patel (Friend) [phone] -- --          Ok Marin MD   Physician  Hospitalist  Progress Notes     Signed  Date of Service:  24  Creation Time:  24     Signed        Expand All Collapse [facility] HOSPITALIST PROGRESS NOTE     Patient Identification:  Name:  Regine Beckham  Age:  68 y.o.  Sex:  female  :  1955  MRN:  [id number]  Visit Number:  [id number]  ROOM: [address] [place] [address]      Primary Care Provider:  Dread Burton MD     Length of stay in inpatient status:  9     Subjective      Chief Compliant:        Chief Complaint   Patient presents with    Fall         History of Presenting Illness:    Patient noted she has been depressed but still motivated to work with PT/OT and hopefully get stronger. Denies any new complaints.      ROS:  Otherwise 10 point ROS negative other than documented above in HPI.      Objective      Current Hospital Meds:  Scheduled Medication   amLODIPine, 5 mg, Oral, Q24H  aspirin, 81 mg, Oral, Daily  atorvastatin, 80 mg, Oral, Nightly  gabapentin, 300 mg, Oral, BID  heparin (porcine), 5,000 Units, Subcutaneous, Q12H  isosorbide mononitrate, 30 mg, Oral, Q24H  losartan, 50 mg, Oral, Daily  metoprolol tartrate, 12.5 mg, Oral, Q12H  Mirabegron ER, 50 mg, Oral, Daily  mirtazapine, 30 mg, Oral, Nightly  pantoprazole, 40 mg, Oral, Q AM  rOPINIRole, 4 mg, Oral, Nightly  senna-docusate sodium, 2 tablet, Oral, Nightly  sodium chloride, 10 mL, Intravenous, Q12H  ticagrelor, 60 mg, Oral, BID        Infusion Medications            Current Antimicrobial Therapy:  Anti-Infectives (From admission, onward)        Ordered     Dose/Rate Route Frequency Start Stop     24 0904   nitrofurantoin (macrocrystal-monohydrate) (MACROBID) capsule 100 mg        Ordering Provider: Matheus Lundberg MD     100 mg Oral Every 12 Hours Scheduled 04/27/24 1000 05/01/24 2035             Current Diuretic Therapy:  Diuretics (From admission, onward)        None             ----------------------------------------------------------------------------------------------------------------------  Vital Signs:  Temp:  [97.7 °F (36.5 °C)-98.4 °F (36.9 °C)] 98.4 °F (36.9 °C)  Heart Rate:  [71-97] 71  Resp:  [18-20] 18  BP: (111-153)/(54-90) 111/62  SpO2:  [95 %] 95 %  on   ;   Device (Oxygen Therapy): room air  Body mass index is 22.92 kg/m².         Wt Readings from Last 3 Encounters:   05/05/24 64.4 kg (142 lb)   04/20/24 68.4 kg (150 lb 12.8 oz)   04/18/24 68.1 kg (150 lb 2.1 oz)      Intake & Output (last 3 days)           05/02 0701  05/03 0700 05/03 0701  05/04 0700 05/04 0701  05/05 0700 05/05 0701  05/06 0700     P.O. 720 680 480 600     I.V. (mL/kg) 0 (0) 0 (0)         Total Intake(mL/kg) 720 (11.1) 680 (10.6) 480 (7.5) 600 (9.3)     Urine (mL/kg/hr) 800 (0.5)   1500 (1) 800 (1.4)     Stool 0           Total Output 800   1500 800     Net -80 +680 -1020 -200                   Urine Unmeasured Occurrence 2 x 2 x 1 x       Stool Unmeasured Occurrence 0 x 0 x                 Diet: Regular/House; Fluid Consistency: Thin (IDDSI 0)  ----------------------------------------------------------------------------------------------------------------------  Physical exam:  Constitutional:  Elderly appearing female.    HENT:  Head:  Normocephalic and atraumatic.  Mouth:  Moist mucous membranes.    Eyes:  Conjunctivae and EOM are normal. No scleral icterus.    Neck:  Neck supple.  No JVD present.    Cardiovascular:  Normal rate, regular rhythm and normal heart sounds with no murmur.  Pulmonary/Chest:  No respiratory distress, no wheezes, no crackles, with normal breath sounds and good air movement.  Abdominal:  Soft.  Bowel sounds are normal.  No distension and no tenderness.  "  ----------------------------------------------------------------------------------------------------------------------  Tele:    ----------------------------------------------------------------------------------------------------------------------       Results from last 7 days   Lab Units 05/03/24  0016   WBC 10*3/mm3 7.07   HEMOGLOBIN g/dL 10.1*   HEMATOCRIT % 32.7*   MCV fL 95.9   MCHC g/dL 30.9*   PLATELETS 10*3/mm3 240                Results from last 7 days   Lab Units 05/04/24  1003 05/03/24  0016   SODIUM mmol/L 139 137   POTASSIUM mmol/L 3.8 3.8   CHLORIDE mmol/L 106 103   CO2 mmol/L 23.9 24.3   BUN mg/dL 23 25*   CREATININE mg/dL 0.89 1.01*   CALCIUM mg/dL 9.4 9.3   GLUCOSE mg/dL 119* 163*   Estimated Creatinine Clearance: 60.7 mL/min (by C-G formula based on SCr of 0.89 mg/dL).  No results found for: \"AMMONIA\"              No results found for: \"HGBA1C\", \"POCGLU\"        Lab Results   Component Value Date     TSH 2.880 03/17/2024      No results found for: \"PREGTESTUR\", \"PREGSERUM\", \"HCG\", \"HCGQUANT\"  Pain Management Panel               Latest Ref Rng & Units 12/20/2023   Pain Management Panel   Amphetamine, Urine Qual Negative Negative    Barbiturates Screen, Urine Negative Negative    Benzodiazepine Screen, Urine Negative Negative    Buprenorphine, Screen, Urine Negative Negative    Cocaine Screen, Urine Negative Negative    Fentanyl, Urine Negative Negative    Methadone Screen , Urine Negative Negative    Methamphetamine, Ur Negative Negative          Brief Urine Lab Results  (Last result in the past 365 days)          Color   Clarity   Blood   Leuk Est   Nitrite   Protein   CREAT   Urine HCG         05/01/24 1636 Dark Yellow    Cloudy    Negative    Trace    Negative    Negative                          No results found for: \"BLOODCX\"  No results found for: \"URINECX\"  No results found for: \"WOUNDCX\"  No results found for: \"STOOLCX\"  No results found for: \"RESPCX\"  No results found for: \"AFBCX\"       "   I have personally looked at the labs and they are summarized above.  ----------------------------------------------------------------------------------------------------------------------  Detailed radiology reports for the last 24 hours:     Imaging Results (Last 24 Hours)         ** No results found for the last 24 hours. **             Assessment & Plan    #Acute on chronic debility secondary to previous CVA  -Residual left sided weakness, initially resistant to IPR due to co-pay but with insurance changes is now agreeable  -Denied IPR. Awaiting SNF.      #Bacteruria, simple cystitis  -On UA bacteria concentration increasing across prior samples. Mild urgency reported, unsure reliability to report and more detailed hx  -Started Macrobid x5 days to cover  -urine cx on admission normal UGF  -UA repeated on 5/1, significantly improved since admission.   -Encourage PO intake as dark urine likely from dehydration, improving.         Code status: Full      Dispo: Pending placement         VTE Prophylaxis:   Mechanical Order History:         None             Pharmalogical Order History:           Ordered     Dose Route Frequency Stop     04/27/24 0000   heparin (porcine) 5000 UNIT/ML injection 5,000 Units         5,000 Units SC Every 12 Hours Scheduled --                             Ok Marin MD  [facility] Hospitalist  05/05/24  16:11 EDT                  Melanie Woo BSW     Case Management  Case Management/Social Work     Addendum  Date of Service:  05/03/24 0915  Creation Time:  05/03/24 0915     Discharge Planning Assessment   [place]     Patient Name: Regine Beckham                   MRN: [id number]  Today's Date: 5/3/2024                  Admit Date: 4/26/2024                 Discharge Plan         Row Name 05/03/24 0915           Plan     Plan SS spoke with [facility] states pre-auth remains pending. SS to follow.     15:10pm: [facility] natali Do states  pre-auth remains pending. SS to follow.                           PAULINO Allen

## 2024-05-06 NOTE — PLAN OF CARE
Goal Outcome Evaluation:  Plan of Care Reviewed With: patient        Progress: no change  Outcome Evaluation: pt has been resting in bed. prn meds given per orders for c/o pain; no other changes to note at this time; poc ongoing

## 2024-05-06 NOTE — CASE MANAGEMENT/SOCIAL WORK
Discharge Planning Assessment  HONORIO Poon     Patient Name: Regine Beckham  MRN: [id number]  Today's Date: 5/6/2024    Admit Date: 4/26/2024            Discharge Plan       Row Name 05/06/24 1426       Plan    Plan SS spoke with Pt at bedside on this date. Pt remains agreeable to SNF placement for short term rehab. SS spoke with [facility] per Ernestina who states pre-auth remains pending. SS to follow.                  PAULINO Allen

## 2024-05-06 NOTE — PLAN OF CARE
Goal Outcome Evaluation:  Plan of Care Reviewed With: patient        Progress: no change  Outcome Evaluation: Patient resting in bed, VSS on RA. Patient c/o pain, PRN pain medication given per MAR. No concerns or complaints at this time. Will continue with plan of care.

## 2024-05-06 NOTE — THERAPY TREATMENT NOTE
"Acute Care - Occupational Therapy Treatment Note   [facility]     Patient Name: Regine Beckham  : 1955  MRN: [id number]  Today's Date: 2024  Onset of Illness/Injury or Date of Surgery: 24     Referring Physician: Georges    Admit Date: 2024       ICD-10-CM ICD-9-CM   1. Adult failure to thrive  R62.7 783.7   2. Debility  R53.81 799.3     Patient Active Problem List   Diagnosis    Iron deficiency anemia due to chronic blood loss    Major depressive disorder    RLS (restless legs syndrome)    GERD (gastroesophageal reflux disease)    Left breast mass    Allergy to penicillin    Stroke    Grade I diastolic dysfunction    Moderate malnutrition    Falls frequently    Stroke-like symptoms    Debility    Chest pain    Failure to thrive in adult     Past Medical History:   Diagnosis Date    Anemia     Anxiety     Arthritis     Depression     Legally blind     Restless leg syndrome     Sleep apnea     \"I don't use a cpap anymore since losing 106 lbs\"    Water retention      Past Surgical History:   Procedure Laterality Date    BACK SURGERY      CARDIAC CATHETERIZATION N/A 2024    Procedure: Percutaneous Manual Thrombectomy;  Surgeon: Efrain Hurley MD;  Location:  [facility];  Service: Interventional Radiology;  Laterality: N/A;    CARDIAC CATHETERIZATION N/A 2024    Procedure: Left Heart Cath;  Surgeon: Susan Mederos MD;  Location:  [facility];  Service: Cardiology;  Laterality: N/A;    GALLBLADDER SURGERY      HIP ARTHROSCOPY      JOINT REPLACEMENT Right          OT ASSESSMENT FLOWSHEET (Last 12 Hours)       OT Evaluation and Treatment       Row Name 24 1548                   OT Time and Intention    Subjective Information no complaints  -BF        Document Type therapy note (daily note)  -BF        Mode of Treatment occupational therapy  -BF        Patient Effort good  -BF        Symptoms Noted During/After Treatment none  -BF           " General Information    Existing Precautions/Restrictions fall  L HP  -BF        Limitations/Impairments safety/cognitive  -BF           Cognition    Orientation Status (Cognition) oriented to;person;place;situation  -BF        Follows Commands (Cognition) follows one-step commands;verbal cues/prompting required;repetition of directions required  -BF           Lower Body Dressing Assessment/Training    [place] Level (Lower Body Dressing) maximum assist (25% patient effort);verbal cues;nonverbal cues (demo/gesture)  -BF           Grooming Assessment/Training    [place] Level (Grooming) minimum assist (75% patient effort);verbal cues;nonverbal cues (demo/gesture)  -BF           Transfer Assessment/Treatment    Transfers chair-bed transfer  -BF           Chair-Bed Transfer    Chair-Bed [place] (Transfers) maximum assist (25% patient effort);verbal cues;nonverbal cues (demo/gesture)  -BF           Motor Skills    Motor Skills motor control/coordination interventions  -BF        Neuromuscular Function left;upper extremity;severe impairment  -BF        Motor Control/Coordination Interventions gross motor coordination activities;therapeutic exercise/ROM  LUE AAROM as tolerated; R handgripper therex  -BF           Positioning and Restraints    In Bed fowlers;call light within reach;encouraged to call for assist;exit alarm on;LUE elevated  -BF                  User Key  (r) = Recorded By, (t) = Taken By, (c) = Cosigned By      Initials Name Effective Dates    Desi Wagner OT 07/11/23 -                            OT Recommendation and Plan              Time Calculation:    Time Calculation- OT       Row Name 05/06/24 1552             Time Calculation- OT    Total Timed Code Minutes- OT 40 minute(s)  -BF                User Key  (r) = Recorded By, (t) = Taken By, (c) = Cosigned By      Initials Name Provider Type    Desi Wagner OT Occupational Therapist                  Therapy  Charges for Today       Code Description Service Date Service Provider Modifiers Qty    27163609965 HC OT SELF CARE/MGMT/TRAIN EA 15 MIN 5/6/2024 Desi Mackey OT GO 1    71088360572 HC OT NEUROMUSC RE EDUCATION EA 15 MIN 5/6/2024 Desi Mackey OT GO 1    05099091663 HC OT THERAPEUTIC ACT EA 15 MIN 5/6/2024 Desi Mackey OT GO 1                 Desi Mackey OT  5/6/2024

## 2024-05-06 NOTE — THERAPY TREATMENT NOTE
"Acute Care - Physical Therapy Treatment Note   [facility]     Patient Name: Regine Beckham  : 1955  MRN: [id number]  Today's Date: 2024   Onset of Illness/Injury or Date of Surgery: 24  Visit Dx:     ICD-10-CM ICD-9-CM   1. Adult failure to thrive  R62.7 783.7   2. Debility  R53.81 799.3     Patient Active Problem List   Diagnosis    Iron deficiency anemia due to chronic blood loss    Major depressive disorder    RLS (restless legs syndrome)    GERD (gastroesophageal reflux disease)    Left breast mass    Allergy to penicillin    Stroke    Grade I diastolic dysfunction    Moderate malnutrition    Falls frequently    Stroke-like symptoms    Debility    Chest pain    Failure to thrive in adult     Past Medical History:   Diagnosis Date    Anemia     Anxiety     Arthritis     Depression     Legally blind     Restless leg syndrome     Sleep apnea     \"I don't use a cpap anymore since losing 106 lbs\"    Water retention      Past Surgical History:   Procedure Laterality Date    BACK SURGERY      CARDIAC CATHETERIZATION N/A 2024    Procedure: Percutaneous Manual Thrombectomy;  Surgeon: Efrain Hurley MD;  Location:  [facility];  Service: Interventional Radiology;  Laterality: N/A;    CARDIAC CATHETERIZATION N/A 2024    Procedure: Left Heart Cath;  Surgeon: Susan Mederos MD;  Location:  [facility];  Service: Cardiology;  Laterality: N/A;    GALLBLADDER SURGERY      HIP ARTHROSCOPY      JOINT REPLACEMENT Right      PT Assessment (Last 12 Hours)       PT Evaluation and Treatment       Row Name 24 1539          Physical Therapy Time and Intention    Subjective Information no complaints  -HC     Document Type therapy note (daily note)  -HC     Mode of Treatment physical therapy  -HC     Patient Effort good  -HC     Comment Pt in agreement for PT. Pt walked 12' with dao walker, required min/mod A. Cueing required for correct AD use. Sitting exercises up " in chair.  -       Row Name 05/06/24 1539          Cognition    Personal Safety Interventions fall prevention program maintained;gait belt;nonskid shoes/slippers when out of bed;supervised activity  -       Row Name 05/06/24 1539          Bed Mobility    Bed Mobility supine-sit  -     Supine-Sit [place] (Bed Mobility) contact guard;minimum assist (75% patient effort);modified independence  -       Row Name 05/06/24 1539          Transfers    Transfers sit-stand transfer;stand-sit transfer  -       Row Name 05/06/24 1539          Sit-Stand Transfer    Sit-Stand [place] (Transfers) moderate assist (50% patient effort);maximum assist (25% patient effort)  -     Assistive Device (Sit-Stand Transfers) walker, dao  -       Row Name 05/06/24 1539          Stand-Sit Transfer    Stand-Sit [place] (Transfers) minimum assist (75% patient effort);moderate assist (50% patient effort)  -     Assistive Device (Stand-Sit Transfers) walker, dao  -       Row Name 05/06/24 1539          Gait/Stairs (Locomotion)    Gait/Stairs Locomotion gait/ambulation assistive device  -     [place] Level (Gait) moderate assist (50% patient effort);minimum assist (75% patient effort)  -     Assistive Device (Gait) other (see comments);walker, dao  -     Distance in Feet (Gait) 12  -HC     Deviations/Abnormal Patterns (Gait) gait speed decreased;base of support, narrow;weight shifting decreased  fatigue wtih L LE after some distance and assist with foot clearance  -       Row Name 05/06/24 1539          Motor Skills    Therapeutic Exercise other (see comments)  Sitting: AP, LAQ, March, knees in/out  -       Row Name 05/06/24 1539          Positioning and Restraints    Pre-Treatment Position in bed  -     Post Treatment Position chair  -     In Chair notified nsg;sitting;call light within reach;encouraged to call for assist  -               User Key  (r) = Recorded By, (t) = Taken By, (c) =  Cosigned By      Initials Name Provider Type     Desi Fraga PTA Physical Therapist Assistant                      PT Recommendation and Plan             Time Calculation:    PT Charges       Row Name 05/06/24 1542             Time Calculation    PT Received On 05/06/24  -HC         Time Calculation- PT    Total Timed Code Minutes- PT 23 minute(s)  -HC                User Key  (r) = Recorded By, (t) = Taken By, (c) = Cosigned By      Initials Name Provider Type     Desi Fraga, TK Physical Therapist Assistant                  Therapy Charges for Today       Code Description Service Date Service Provider Modifiers Qty    80432770165 HC GAIT TRAINING EA 15 MIN 5/6/2024 Desi Fraga PTA GP, CQ 1    34553615377  PT THER PROC EA 15 MIN 5/6/2024 Desi Fraga PTA GP, CQ 1            PT G-Codes  AM-PAC 6 Clicks Score (PT): 12    Desi Fraga PTA  5/6/2024

## 2024-05-07 LAB
QT INTERVAL: 394 MS
QT INTERVAL: 456 MS
QTC INTERVAL: 427 MS
QTC INTERVAL: 457 MS
TROPONIN T SERPL HS-MCNC: 15 NG/L

## 2024-05-07 PROCEDURE — 84484 ASSAY OF TROPONIN QUANT: CPT | Performed by: STUDENT IN AN ORGANIZED HEALTH CARE EDUCATION/TRAINING PROGRAM

## 2024-05-07 PROCEDURE — 99233 SBSQ HOSP IP/OBS HIGH 50: CPT | Performed by: STUDENT IN AN ORGANIZED HEALTH CARE EDUCATION/TRAINING PROGRAM

## 2024-05-07 PROCEDURE — 25010000002 HEPARIN (PORCINE) PER 1000 UNITS: Performed by: HOSPITALIST

## 2024-05-07 PROCEDURE — 93005 ELECTROCARDIOGRAM TRACING: CPT | Performed by: STUDENT IN AN ORGANIZED HEALTH CARE EDUCATION/TRAINING PROGRAM

## 2024-05-07 RX ORDER — ISOSORBIDE MONONITRATE 30 MG/1
60 TABLET, EXTENDED RELEASE ORAL
Status: DISCONTINUED | OUTPATIENT
Start: 2024-05-08 | End: 2024-05-08 | Stop reason: HOSPADM

## 2024-05-07 RX ORDER — NITROGLYCERIN 0.4 MG/1
0.4 TABLET SUBLINGUAL
Status: DISCONTINUED | OUTPATIENT
Start: 2024-05-07 | End: 2024-05-08 | Stop reason: HOSPADM

## 2024-05-07 RX ADMIN — GABAPENTIN 300 MG: 300 CAPSULE ORAL at 08:00

## 2024-05-07 RX ADMIN — METOPROLOL TARTRATE 12.5 MG: 25 TABLET, FILM COATED ORAL at 20:22

## 2024-05-07 RX ADMIN — MIRTAZAPINE 30 MG: 15 TABLET, FILM COATED ORAL at 20:22

## 2024-05-07 RX ADMIN — TICAGRELOR 60 MG: 60 TABLET ORAL at 20:22

## 2024-05-07 RX ADMIN — ISOSORBIDE MONONITRATE 30 MG: 30 TABLET, EXTENDED RELEASE ORAL at 08:00

## 2024-05-07 RX ADMIN — AMLODIPINE BESYLATE 5 MG: 5 TABLET ORAL at 08:00

## 2024-05-07 RX ADMIN — METOPROLOL TARTRATE 12.5 MG: 25 TABLET, FILM COATED ORAL at 08:00

## 2024-05-07 RX ADMIN — ASPIRIN 81 MG: 81 TABLET, COATED ORAL at 08:00

## 2024-05-07 RX ADMIN — ROPINIROLE HYDROCHLORIDE 4 MG: 1 TABLET, FILM COATED ORAL at 20:22

## 2024-05-07 RX ADMIN — TICAGRELOR 60 MG: 60 TABLET ORAL at 08:00

## 2024-05-07 RX ADMIN — MIRABEGRON 50 MG: 25 TABLET, FILM COATED, EXTENDED RELEASE ORAL at 08:00

## 2024-05-07 RX ADMIN — HEPARIN SODIUM 5000 UNITS: 5000 INJECTION INTRAVENOUS; SUBCUTANEOUS at 20:22

## 2024-05-07 RX ADMIN — PANTOPRAZOLE SODIUM 40 MG: 40 TABLET, DELAYED RELEASE ORAL at 05:23

## 2024-05-07 RX ADMIN — ATORVASTATIN CALCIUM 80 MG: 40 TABLET, FILM COATED ORAL at 20:22

## 2024-05-07 RX ADMIN — ACETAMINOPHEN 500 MG: 500 TABLET ORAL at 20:22

## 2024-05-07 RX ADMIN — LOSARTAN POTASSIUM 50 MG: 50 TABLET, FILM COATED ORAL at 08:00

## 2024-05-07 RX ADMIN — HEPARIN SODIUM 5000 UNITS: 5000 INJECTION INTRAVENOUS; SUBCUTANEOUS at 08:00

## 2024-05-07 RX ADMIN — Medication 10 ML: at 08:01

## 2024-05-07 RX ADMIN — GABAPENTIN 300 MG: 300 CAPSULE ORAL at 20:22

## 2024-05-07 RX ADMIN — NITROGLYCERIN 0.4 MG: 0.4 TABLET, ORALLY DISINTEGRATING SUBLINGUAL at 11:26

## 2024-05-07 RX ADMIN — TRAMADOL HYDROCHLORIDE 50 MG: 50 TABLET ORAL at 11:04

## 2024-05-07 NOTE — PROGRESS NOTES
[facility]     Patient Identification:  Name:  Regine Beckham  Age:  68 y.o.  Sex:  female  :  1955  MRN:  [id number]  Visit Number:  [id number]  ROOM: [address] [place] [address]     Primary Care Provider:  Dread Burton MD     Date of Admission: 2024    Length of stay in inpatient status:  11    Subjective     Chief Compliant:    Chief Complaint   Patient presents with    Fall       Patient having mild CP radiating into left shoulder this am unchanged with movement or respirations. Improved by the time I arrived to bedside. Troponin repeated and flat with mild elevation overall. Ecg without st-segment elevations        Objective       Vital Signs:  Temp:  [98 °F (36.7 °C)-98.9 °F (37.2 °C)] 98.9 °F (37.2 °C)  Heart Rate:  [57-77] 64  Resp:  [16-18] 18  BP: (107-156)/(54-77) 107/59  SpO2:  [94 %] 94 %  on   ;   Device (Oxygen Therapy): room air  Body mass index is 23.41 kg/m².      ----------------------------------------------------------------------------------------------------------------------  Physical Exam  Vitals and nursing note reviewed.   Constitutional:       General: She is not in acute distress.  HENT:      Head: Normocephalic and atraumatic.   Eyes:      General: No scleral icterus.     Extraocular Movements: Extraocular movements intact.   Pulmonary:      Effort: Pulmonary effort is normal. No respiratory distress.   Abdominal:      Tenderness: There is no abdominal tenderness.   Musculoskeletal:      Right lower leg: No edema.      Left lower leg: No edema.   Neurological:      Mental Status: She is alert.      Motor: Weakness present.   Psychiatric:         Mood and Affect: Mood normal.         Behavior: Behavior normal.       ----------------------------------------------------------------------------------------------------------------------          Assessment & Plan      #Acute on chronic debility secondary to previous CVA  -Residual left sided  weakness  -Denied IPR. Awaiting SNF with multiple pre-auths pend     #Bacteruria, simple cystitis  -On UA bacteria concentration increasing across prior samples. Mild urgency reported, unsure reliability to report and more detailed hx  -Started Macrobid x5 days to cover  -urine cx on admission normal UGF  -UA repeated on 5/1, significantly improved since admission  -Encourage PO intake as dark urine likely from dehydration, improving    #Multivessel CAD hx with recent C  #Unstable angina at rest  -s/p C 04/2024 with multiple areas of stenosis but no obstruction >50%. On Imdur 30mg and amlodipine. Repeat troponin with flat trend, no acute ischemia on ecg. Increased imdur to 60mg, change amlodipine to ranexa if CP continues. Repeat ecg prn for any recurrence         Code Status and Medical Interventions:   Ordered at: 04/26/24 8981     Code Status (Patient has no pulse and is not breathing):    CPR (Attempt to Resuscitate)     Medical Interventions (Patient has pulse or is breathing):    Full Support         Disposition: pend snf    I have reviewed any copied/forwarded text or data, verified its accuracy, and updated as necessary above.    Matheus Lundberg MD  [facility]  05/07/24  16:37 EDT

## 2024-05-07 NOTE — CASE MANAGEMENT/SOCIAL WORK
Discharge Planning Assessment  [facility]ONOR[facility]oon     Patient Name: Regine Beckham  MRN: [id number]  Today's Date: 5/7/2024    Admit Date: 4/26/2024            Discharge Plan       Row Name 05/07/24 0941       Plan    Plan SS spoke with [facility] natali Do who states pre-auth remains pending. SS to follow.    13:17pm: Natali Do, insurance has requested updated clinicals and therapy notes. SS to follow.                     PAULINO Allen

## 2024-05-07 NOTE — CASE MANAGEMENT/SOCIAL WORK
Discharge Planning Assessment  [facility]     Patient Name: Regine Beckham  MRN: [id number]  Today's Date: 5/7/2024    Admit Date: 4/26/2024     Discharge Plan       Row Name 05/07/24 0944       Plan    Plan SNF pre-authorization is still pending. SS contacted [employer] and SNF pre-auth is still pending clinical review. No additional clinical needed. Clinical will be reviewed by [employer] within four calendar days and a determination will be made. SS to follow.                 Continued Care and Services - Admitted Since 4/26/2024       Destination Coordination complete.      Service Provider Request Status Selected Services Address Phone Fax Patient Preferred    [facility]  Selected Skilled Nursing [facility] [address] [facility] [address] [phone] --    [facility] Declined  Bed not available N/A [address] [facility] [address] [phone] --                  Selected Continued Care - Prior Encounters Includes continued care and service providers with selected services from prior encounters from 1/27/2024 to 5/7/2024      Discharged on 4/18/2024 Admission date: 4/9/2024 - Discharge disposition: [place] [facility]      [place]       Service Provider Selected Services Address Phone Fax Patient Preferred    [facility] [address] [place], [place] [address] [address] [phone] --                      Discharged on 2/2/2024 Admission date: 1/19/2024 - Discharge disposition: Skilled Nursing Facility (DC - External)      Destination       Service Provider Selected Services Address Phone Fax Patient Preferred    [facility] Skilled Nursing [address] [facility] [address] [phone] --                          Expected Discharge Date and Time       Expected Discharge Date Expected Discharge Time    May 7, 2024         SAMARA Burnett

## 2024-05-07 NOTE — SIGNIFICANT NOTE
05/07/24 1523   OTHER   Discipline physical therapy assistant   Rehab Time/Intention   Session Not Performed patient/family declined, not feeling well

## 2024-05-07 NOTE — PLAN OF CARE
Goal Outcome Evaluation:  Plan of Care Reviewed With: patient        Progress: no change  Outcome Evaluation: Patient resting in bed. Pt c/o chest pain, EKG and troponin ordered. PRN pain medication given per MAR. VSS. No acute changes or concerns at this time. Will continue with plan of care.

## 2024-05-08 ENCOUNTER — CALL CENTER PROGRAMS (OUTPATIENT)
Dept: CALL CENTER | Facility: HOSPITAL | Age: 69
End: 2024-05-08
Payer: MEDICARE

## 2024-05-08 VITALS
DIASTOLIC BLOOD PRESSURE: 64 MMHG | SYSTOLIC BLOOD PRESSURE: 142 MMHG | HEART RATE: 61 BPM | WEIGHT: 139.5 LBS | OXYGEN SATURATION: 94 % | RESPIRATION RATE: 18 BRPM | TEMPERATURE: 98 F | BODY MASS INDEX: 22.42 KG/M2 | HEIGHT: 66 IN

## 2024-05-08 PROCEDURE — 25010000002 HEPARIN (PORCINE) PER 1000 UNITS: Performed by: HOSPITALIST

## 2024-05-08 PROCEDURE — 99239 HOSP IP/OBS DSCHRG MGMT >30: CPT | Performed by: STUDENT IN AN ORGANIZED HEALTH CARE EDUCATION/TRAINING PROGRAM

## 2024-05-08 RX ORDER — ISOSORBIDE MONONITRATE 60 MG/1
60 TABLET, EXTENDED RELEASE ORAL
Qty: 30 TABLET | Refills: 0 | Status: SHIPPED | OUTPATIENT
Start: 2024-05-09

## 2024-05-08 RX ORDER — AMLODIPINE BESYLATE 5 MG/1
5 TABLET ORAL
Qty: 30 TABLET | Refills: 0 | Status: SHIPPED | OUTPATIENT
Start: 2024-05-09

## 2024-05-08 RX ORDER — GABAPENTIN 300 MG/1
300 CAPSULE ORAL 2 TIMES DAILY
Qty: 60 CAPSULE | Refills: 0 | Status: SHIPPED | OUTPATIENT
Start: 2024-05-08 | End: 2024-06-07

## 2024-05-08 RX ADMIN — ISOSORBIDE MONONITRATE 60 MG: 30 TABLET, EXTENDED RELEASE ORAL at 08:29

## 2024-05-08 RX ADMIN — LOSARTAN POTASSIUM 50 MG: 50 TABLET, FILM COATED ORAL at 08:29

## 2024-05-08 RX ADMIN — ACETAMINOPHEN 500 MG: 500 TABLET ORAL at 14:05

## 2024-05-08 RX ADMIN — TICAGRELOR 60 MG: 60 TABLET ORAL at 08:29

## 2024-05-08 RX ADMIN — METOPROLOL TARTRATE 12.5 MG: 25 TABLET, FILM COATED ORAL at 08:29

## 2024-05-08 RX ADMIN — ASPIRIN 81 MG: 81 TABLET, COATED ORAL at 08:29

## 2024-05-08 RX ADMIN — MIRABEGRON 50 MG: 25 TABLET, FILM COATED, EXTENDED RELEASE ORAL at 08:29

## 2024-05-08 RX ADMIN — AMLODIPINE BESYLATE 5 MG: 5 TABLET ORAL at 08:29

## 2024-05-08 RX ADMIN — HEPARIN SODIUM 5000 UNITS: 5000 INJECTION INTRAVENOUS; SUBCUTANEOUS at 08:30

## 2024-05-08 RX ADMIN — PANTOPRAZOLE SODIUM 40 MG: 40 TABLET, DELAYED RELEASE ORAL at 05:09

## 2024-05-08 RX ADMIN — GABAPENTIN 300 MG: 300 CAPSULE ORAL at 08:30

## 2024-05-08 NOTE — CASE MANAGEMENT/SOCIAL WORK
Discharge Planning Assessment   [facility]     Patient Name: Regine Beckham  MRN: [id number]  Today's Date: 5/8/2024    Admit Date: 4/26/2024            Discharge Plan       Row Name 05/08/24 1003       Plan    Plan [facility] per [facility] states Pt's pre-auth has been approved if medically stable.  notified Dr. Lundberg. SS to follow.                    PAULINO Allen

## 2024-05-08 NOTE — DISCHARGE SUMMARY
[facility]    Patient Identification:  Name:  Regine Beckham  Age:  68 y.o.  Sex:  female  :  1955  MRN:  [id number]  Visit Number:  [id number]    Date of Admission: 2024  Date of Discharge: 24     PCP: Dread Burton MD    DISCHARGE DIAGNOSES  #Acute on chronic debility secondary to previous CVA   #Simple cystitis  #Multivessel nonocclusive CAD w/recent LHC   #Unstable Angina at rest    CONSULTS   Consults       Date and Time Order Name Status Description    2024  9:38 PM Inpatient Cardiology Consult Completed             PROCEDURES PERFORMED  -    HOSPITAL COURSE  In brief, Regine Beckham is a 68 y.o. female with above noted PMH that presented initially with progressive debility requesting SNF placement after unsuccessful prior trial of discharge home w/assist.     She was found to have bacteruria and LUTS, treated with macrobid x5 days with resolution. Had 1 episode of CP without new ischemic ecg changes or troponin delta thus imdur increased 30 -> 60mg with resolution and can cont outpatient. F/u pcp and cardiology      VITAL SIGNS:  Temp:  [97.5 °F (36.4 °C)-98.9 °F (37.2 °C)] 98 °F (36.7 °C)  Heart Rate:  [57-73] 61  Resp:  [16-18] 18  BP: (107-150)/(59-85) 142/64     on   ;   Device (Oxygen Therapy): room air    Body mass index is 22.52 kg/m².  Wt Readings from Last 3 Encounters:   24 63.3 kg (139 lb 8 oz)   24 68.4 kg (150 lb 12.8 oz)   24 68.1 kg (150 lb 2.1 oz)       PHYSICAL EXAM:  Physical Exam  Vitals and nursing note reviewed.   Constitutional:       General: She is not in acute distress.  HENT:      Head: Normocephalic and atraumatic.   Eyes:      General: No scleral icterus.     Extraocular Movements: Extraocular movements intact.   Pulmonary:      Effort: Pulmonary effort is normal. No respiratory distress.   Abdominal:      Tenderness: There is no abdominal tenderness.   Musculoskeletal:      Right lower  leg: No edema.      Left lower leg: No edema.   Neurological:      Mental Status: She is alert.      Motor: Weakness present.   Psychiatric:         Mood and Affect: Mood normal.         Behavior: Behavior normal.          DISCHARGE DISPOSITION   Stable       Discharge Medications        Changes to Medications        Instructions Start Date   isosorbide mononitrate 60 MG 24 hr tablet  Commonly known as: IMDUR  What changed:   medication strength  how much to take   60 mg, Oral, Every 24 Hours Scheduled   Start Date: May 9, 2024            Continue These Medications        Instructions Start Date   amLODIPine 5 MG tablet  Commonly known as: NORVASC   5 mg, Oral, Every 24 Hours Scheduled   Start Date: May 9, 2024     aspirin 81 MG EC tablet   81 mg, Oral, Daily      atorvastatin 80 MG tablet  Commonly known as: LIPITOR   80 mg, Oral, Nightly      gabapentin 300 MG capsule  Commonly known as: NEURONTIN   300 mg, Oral, 2 Times Daily      losartan 50 MG tablet  Commonly known as: COZAAR   50 mg, Oral, Daily      metoprolol tartrate 25 MG tablet  Commonly known as: LOPRESSOR   Take 1/2 tablet by mouth Every 12 (Twelve) Hours for 30 days.      Mirabegron ER 50 MG tablet sustained-release 24 hour 24 hr tablet  Commonly known as: MYRBETRIQ   50 mg, Oral, Daily      mirtazapine 30 MG tablet  Commonly known as: REMERON   30 mg, Oral, Nightly      pantoprazole 40 MG EC tablet  Commonly known as: PROTONIX   TAKE 1 TABLET BY MOUTH EVERY MORNING FOR HEARTBURN      rOPINIRole 4 MG tablet  Commonly known as: REQUIP   4 mg, Oral, Nightly, Take 1 hour before bedtime.      ticagrelor 60 MG tablet tablet  Commonly known as: BRILINTA   60 mg, Oral, 2 Times Daily                 Additional Instructions for the Follow-ups that You Need to Schedule       Discharge Follow-up with PCP   As directed       Currently Documented PCP:    Dread Burton MD    PCP Phone Number:    [phone]     Follow Up Details: within 1 week                Contact information for follow-up providers       Dread Burton MD .    Specialty: Family Medicine  Why: within 1 week  Contact information:  [address]  [facility] [address]  [phone]                       Contact information for after-discharge care       Destination       [facility] .    Service: Skilled Nursing  Contact information:  [address] [facility] [address]  [phone]                                    TEST  RESULTS PENDING AT DISCHARGE      I will notify patient via phone-call should above lab work result with any significant abnormal findings     CODE STATUS  Code Status and Medical Interventions:   Ordered at: 04/26/24 2234     Code Status (Patient has no pulse and is not breathing):    CPR (Attempt to Resuscitate)     Medical Interventions (Patient has pulse or is breathing):    Full Support             Matheus Lundberg MD  [facility]  05/08/24  11:20 EDT    Please note that this discharge summary required more than 30 minutes to complete.

## 2024-05-08 NOTE — CASE MANAGEMENT/SOCIAL WORK
Discharge Planning Assessment  [facility]     Patient Name: Regine Beckham  MRN: [id number]  Today's Date: 5/8/2024    Admit Date: 4/26/2024            Discharge Plan       Row Name 05/08/24 1217       Plan    Final Discharge Disposition Code 03 - skilled nursing facility (SNF)    Final Note Pt is being discharged to [facility] on this date. Pt and Pt's friend Teo is aware and agreeable to discharge. SS faxed AVS summary, dc summary and clinical update to fax [phone]. SS provided Lead RN report number [phone]. SS to arrange EMS for transport.    13:37pm: Per [facility] is out of out of town run and is not available. SS scheduled [facility] for transport.                 Continued Care and Services - Admitted Since 4/26/2024       Destination Coordination complete.      Service Provider Request Status Selected Services Address Phone Fax Patient Preferred    [facility]  Selected Skilled Nursing [address] [facility] [address] [facility] [address] [phone] --    [facility]  Bed not available N/A [address] [facility] [address] [phone] --                    PAULINO Allen

## 2024-05-08 NOTE — PAYOR COMM NOTE
"CONTACT: FRIEDA BURKS RN  UTILIZATION MANAGEMENT DEPT.  [facility]  [address] [place] [address]  PHONE: [phone]  FAX: [phone]        DISCHARGE NOTIFICATION  DC DATE: 5/8/24 TO SNF    REF#[id number]     Regine Lock \"NEGIN\" (68 y.o. Female)       Date of Birth   1955    Social Security Number       Address   [address] [address]    Home Phone   [phone]    MRN   [id number]       [place]   [facility]    Marital Status                               Admission Date   4/26/24    Admission Type   Emergency    Admitting Provider   Capo Su MD    Attending Provider       Department, Room/Bed   [address], 3348/2S       Discharge Date   5/8/2024    Discharge Disposition   Skilled Nursing Facility (DC - External)    Discharge Destination                                 Attending Provider: (none)   Allergies: Penicillins    Isolation: None   Infection: None   Code Status: CPR    Ht: 167.6 cm (66\")   Wt: 63.3 kg (139 lb 8 oz)    Admission Cmt: None   Principal Problem: Debility [R53.81]                   Active Insurance as of 4/26/2024       Primary Coverage       Payor Plan Insurance Group Employer/Plan Group    AETNA MEDICARE REPLACEMENT AETNA MEDICARE REPLACEMENT [id number]       Payor Plan Address Payor Plan Phone Number Payor Plan Fax Number Effective Dates    [address] [phone]  1/1/2024 - None Entered    [facility] [place] [address]         Subscriber Name Subscriber Birth Date Member ID       REGINE LOCK 1955 [id number]               Secondary Coverage       Payor Plan Insurance Group Employer/Plan Group    [place]        Payor Plan Address Payor Plan Phone Number Payor Plan Fax Number Effective Dates    [address] [phone]  4/26/2024 - None Entered    [place] [address]         Subscriber Name Subscriber Birth Date Member ID       REGINE LOCK 1955 " [id number]                     Emergency Contacts        (Rel.) Home Phone Work Phone Mobile Phone    Yaa Elizondo (Friend) [phone] --    Karla Patel (Friend) [phone] -- --    Teo Sanchez (Friend) [phone] -- --                 Discharge Summary        Matheus Lundberg MD at 24 1120              [facility] DISCHARGE SUMMARY    Patient Identification:  Name:  Regine Beckham  Age:  68 y.o.  Sex:  female  :  1955  MRN:  [id number]  Visit Number:  [id number]    Date of Admission: 2024  Date of Discharge: 24     PCP: Dread Burton MD    DISCHARGE DIAGNOSES  #Acute on chronic debility secondary to previous CVA   #Simple cystitis  #Multivessel nonocclusive CAD w/recent LHC   #Unstable Angina at rest    CONSULTS   Consults       Date and Time Order Name Status Description    2024  9:38 PM Inpatient Cardiology Consult Completed             PROCEDURES PERFORMED  -    HOSPITAL COURSE  In brief, Regine Beckham is a 68 y.o. female with above noted PMH that presented initially with progressive debility requesting SNF placement after unsuccessful prior trial of discharge home w/assist.     She was found to have bacteruria and LUTS, treated with macrobid x5 days with resolution. Had 1 episode of CP without new ischemic ecg changes or troponin delta thus imdur increased 30 -> 60mg with resolution and can cont outpatient. F/u pcp and cardiology      VITAL SIGNS:  Temp:  [97.5 °F (36.4 °C)-98.9 °F (37.2 °C)] 98 °F (36.7 °C)  Heart Rate:  [57-73] 61  Resp:  [16-18] 18  BP: (107-150)/(59-85) 142/64     on   ;   Device (Oxygen Therapy): room air    Body mass index is 22.52 kg/m².  Wt Readings from Last 3 Encounters:   24 63.3 kg (139 lb 8 oz)   24 68.4 kg (150 lb 12.8 oz)   24 68.1 kg (150 lb 2.1 oz)       PHYSICAL EXAM:  Physical Exam  Vitals and nursing note reviewed.   Constitutional:       General: She is not in  acute distress.  HENT:      Head: Normocephalic and atraumatic.   Eyes:      General: No scleral icterus.     Extraocular Movements: Extraocular movements intact.   Pulmonary:      Effort: Pulmonary effort is normal. No respiratory distress.   Abdominal:      Tenderness: There is no abdominal tenderness.   Musculoskeletal:      Right lower leg: No edema.      Left lower leg: No edema.   Neurological:      Mental Status: She is alert.      Motor: Weakness present.   Psychiatric:         Mood and Affect: Mood normal.         Behavior: Behavior normal.          DISCHARGE DISPOSITION   Stable       Discharge Medications        Changes to Medications        Instructions Start Date   isosorbide mononitrate 60 MG 24 hr tablet  Commonly known as: IMDUR  What changed:   medication strength  how much to take   60 mg, Oral, Every 24 Hours Scheduled   Start Date: May 9, 2024            Continue These Medications        Instructions Start Date   amLODIPine 5 MG tablet  Commonly known as: NORVASC   5 mg, Oral, Every 24 Hours Scheduled   Start Date: May 9, 2024     aspirin 81 MG EC tablet   81 mg, Oral, Daily      atorvastatin 80 MG tablet  Commonly known as: LIPITOR   80 mg, Oral, Nightly      gabapentin 300 MG capsule  Commonly known as: NEURONTIN   300 mg, Oral, 2 Times Daily      losartan 50 MG tablet  Commonly known as: COZAAR   50 mg, Oral, Daily      metoprolol tartrate 25 MG tablet  Commonly known as: LOPRESSOR   Take 1/2 tablet by mouth Every 12 (Twelve) Hours for 30 days.      Mirabegron ER 50 MG tablet sustained-release 24 hour 24 hr tablet  Commonly known as: MYRBETRIQ   50 mg, Oral, Daily      mirtazapine 30 MG tablet  Commonly known as: REMERON   30 mg, Oral, Nightly      pantoprazole 40 MG EC tablet  Commonly known as: PROTONIX   TAKE 1 TABLET BY MOUTH EVERY MORNING FOR HEARTBURN      rOPINIRole 4 MG tablet  Commonly known as: REQUIP   4 mg, Oral, Nightly, Take 1 hour before bedtime.      ticagrelor 60 MG tablet  tablet  Commonly known as: BRILINTA   60 mg, Oral, 2 Times Daily                 Additional Instructions for the Follow-ups that You Need to Schedule       Discharge Follow-up with PCP   As directed       Currently Documented PCP:    Dread Burton MD    PCP Phone Number:    [phone]     Follow Up Details: within 1 week               Contact information for follow-up providers       Dread Burton MD .    Specialty: Family Medicine  Why: within 1 week  Contact information:  [address] [facility] [address]  [phone]                       Contact information for after-discharge care       Destination       [facility] .    Service: Skilled Nursing  Contact information:  [address] [facility] [address]  [phone]                                    TEST  RESULTS PENDING AT DISCHARGE      I will notify patient via phone-call should above lab work result with any significant abnormal findings     CODE STATUS  Code Status and Medical Interventions:   Ordered at: 04/26/24 2234     Code Status (Patient has no pulse and is not breathing):    CPR (Attempt to Resuscitate)     Medical Interventions (Patient has pulse or is breathing):    Full Support             Pravin Gallardo MD  [facility]  05/08/24  11:20 EDT    Please note that this discharge summary required more than 30 minutes to complete.        Electronically signed by Pravin Gallardo MD at 05/08/24 1124       Discharge Order (From admission, onward)       Start     Ordered    05/08/24 1119  Discharge patient  Once        Expected Discharge Date: 05/08/24   Discharge Disposition: Skilled Nursing Facility (DC - External)   Physician of Record for Attribution - Please select from Treatment Team: PRAVIN GALLARDO [[id number]]   Review needed by CMO to determine Physician of Record: No      Question Answer Comment   Physician of Record for Attribution - Please select from Treatment Team PRAVIN GALLARDO    Review needed by CMO  to determine Physician of Record No        05/08/24 1121

## 2024-05-08 NOTE — PLAN OF CARE
Goal Outcome Evaluation:              Outcome Evaluation: Patient has rested in bed this shift, no complaints or request at this time, VSS, Will continue plan of care.

## 2024-05-10 ENCOUNTER — LAB REQUISITION (OUTPATIENT)
Dept: LAB | Facility: HOSPITAL | Age: 69
End: 2024-05-10
Payer: MEDICARE

## 2024-05-10 DIAGNOSIS — I10 ESSENTIAL (PRIMARY) HYPERTENSION: ICD-10-CM

## 2024-05-10 LAB
ALBUMIN SERPL-MCNC: 3.5 G/DL (ref 3.5–5.2)
ALBUMIN/GLOB SERPL: 1 G/DL
ALP SERPL-CCNC: 252 U/L (ref 39–117)
ALT SERPL W P-5'-P-CCNC: 69 U/L (ref 1–33)
ANION GAP SERPL CALCULATED.3IONS-SCNC: 12.5 MMOL/L (ref 5–15)
AST SERPL-CCNC: 62 U/L (ref 1–32)
BASOPHILS # BLD AUTO: 0.05 10*3/MM3 (ref 0–0.2)
BASOPHILS NFR BLD AUTO: 0.6 % (ref 0–1.5)
BILIRUB SERPL-MCNC: 0.3 MG/DL (ref 0–1.2)
BUN SERPL-MCNC: 24 MG/DL (ref 8–23)
BUN/CREAT SERPL: 31.2 (ref 7–25)
CALCIUM SPEC-SCNC: 9.6 MG/DL (ref 8.6–10.5)
CHLORIDE SERPL-SCNC: 105 MMOL/L (ref 98–107)
CHOLEST SERPL-MCNC: 117 MG/DL (ref 0–200)
CO2 SERPL-SCNC: 22.5 MMOL/L (ref 22–29)
CREAT SERPL-MCNC: 0.77 MG/DL (ref 0.57–1)
DEPRECATED RDW RBC AUTO: 47.9 FL (ref 37–54)
EGFRCR SERPLBLD CKD-EPI 2021: 83.6 ML/MIN/1.73
EOSINOPHIL # BLD AUTO: 0.28 10*3/MM3 (ref 0–0.4)
EOSINOPHIL NFR BLD AUTO: 3.2 % (ref 0.3–6.2)
ERYTHROCYTE [DISTWIDTH] IN BLOOD BY AUTOMATED COUNT: 14.2 % (ref 12.3–15.4)
GLOBULIN UR ELPH-MCNC: 3.5 GM/DL
GLUCOSE SERPL-MCNC: 127 MG/DL (ref 65–99)
HCT VFR BLD AUTO: 30.9 % (ref 34–46.6)
HDLC SERPL-MCNC: 49 MG/DL (ref 40–60)
HGB BLD-MCNC: 10 G/DL (ref 12–15.9)
IMM GRANULOCYTES # BLD AUTO: 0.03 10*3/MM3 (ref 0–0.05)
IMM GRANULOCYTES NFR BLD AUTO: 0.3 % (ref 0–0.5)
LDLC SERPL CALC-MCNC: 56 MG/DL (ref 0–100)
LDLC/HDLC SERPL: 1.16 {RATIO}
LYMPHOCYTES # BLD AUTO: 2.03 10*3/MM3 (ref 0.7–3.1)
LYMPHOCYTES NFR BLD AUTO: 22.9 % (ref 19.6–45.3)
MCH RBC QN AUTO: 30 PG (ref 26.6–33)
MCHC RBC AUTO-ENTMCNC: 32.4 G/DL (ref 31.5–35.7)
MCV RBC AUTO: 92.8 FL (ref 79–97)
MONOCYTES # BLD AUTO: 0.74 10*3/MM3 (ref 0.1–0.9)
MONOCYTES NFR BLD AUTO: 8.3 % (ref 5–12)
NEUTROPHILS NFR BLD AUTO: 5.75 10*3/MM3 (ref 1.7–7)
NEUTROPHILS NFR BLD AUTO: 64.7 % (ref 42.7–76)
NRBC BLD AUTO-RTO: 0 /100 WBC (ref 0–0.2)
PLATELET # BLD AUTO: 231 10*3/MM3 (ref 140–450)
PMV BLD AUTO: 11.8 FL (ref 6–12)
POTASSIUM SERPL-SCNC: 3.9 MMOL/L (ref 3.5–5.2)
PROT SERPL-MCNC: 7 G/DL (ref 6–8.5)
RBC # BLD AUTO: 3.33 10*6/MM3 (ref 3.77–5.28)
SODIUM SERPL-SCNC: 140 MMOL/L (ref 136–145)
TRIGL SERPL-MCNC: 55 MG/DL (ref 0–150)
VLDLC SERPL-MCNC: 12 MG/DL (ref 5–40)
WBC NRBC COR # BLD AUTO: 8.88 10*3/MM3 (ref 3.4–10.8)

## 2024-05-10 PROCEDURE — 85025 COMPLETE CBC W/AUTO DIFF WBC: CPT | Performed by: NURSE PRACTITIONER

## 2024-05-10 PROCEDURE — 80053 COMPREHEN METABOLIC PANEL: CPT | Performed by: NURSE PRACTITIONER

## 2024-05-10 PROCEDURE — 80061 LIPID PANEL: CPT | Performed by: NURSE PRACTITIONER

## 2024-05-13 NOTE — DISCHARGE PLACEMENT REQUEST
"Regine Lock \"NEGIN\" (68 y.o. Female)       Date of Birth   1955    Social Security Number       Address   [address] [place] [address]    Home Phone   [phone]    MRN   [id number]       [facility]    Marital Status                               Admission Date   4/26/24    Admission Type   Emergency    Admitting Provider   Capo Su MD    Attending Provider       Department, Room/Bed   [address], 3348/2S       Discharge Date   5/8/2024    Discharge Disposition   Skilled Nursing Facility (DC - External)    Discharge Destination                                 Attending Provider: (none)   Allergies: Penicillins    Isolation: None   Infection: None   Code Status: Prior    Ht: 167.6 cm (66\")   Wt: 63.3 kg (139 lb 8 oz)    Admission Cmt: None   Principal Problem: Debility [R53.81]                   Active Insurance as of 4/26/2024       Primary Coverage       Payor Plan Insurance Group Employer/Plan Group    AETNA MEDICARE REPLACEMENT AETNA MEDICARE REPLACEMENT [id number]       Payor Plan Address Payor Plan Phone Number Payor Plan Fax Number Effective Dates    [address] [phone]  1/1/2024 - None Entered    [facility] [place] [address]         Subscriber Name Subscriber Birth Date Member ID       REGINE LOCK 1955 [id number]               Secondary Coverage       Payor Plan Insurance Group Employer/Plan Group    [place]        Payor Plan Address Payor Plan Phone Number Payor Plan Fax Number Effective Dates    [address] [phone]  4/26/2024 - None Entered    [facility] [place] [address]         Subscriber Name Subscriber Birth Date Member ID       REGINE LOCK 1955 [id number]                     Emergency Contacts        (Rel.) Home Phone Work Phone Mobile Phone    Yaa Elizondo (Friend) [phone] --    Karla Patel (Friend) [phone] -- --    Teo Sanchez (Friend) " [phone] -- --                 Discharge Summary        Matheus Lundberg MD at 24 1120              [facility] DISCHARGE SUMMARY    Patient Identification:  Name:  Regine Beckham  Age:  68 y.o.  Sex:  female  :  1955  MRN:  [id number]  Visit Number:  [id number]    Date of Admission: 2024  Date of Discharge: 24     PCP: Dread Burton MD    DISCHARGE DIAGNOSES  #Acute on chronic debility secondary to previous CVA   #Simple cystitis  #Multivessel nonocclusive CAD w/recent LHC   #Unstable Angina at rest    CONSULTS   Consults       Date and Time Order Name Status Description    2024  9:38 PM Inpatient Cardiology Consult Completed             PROCEDURES PERFORMED  -    HOSPITAL COURSE  In brief, Regine Beckham is a 68 y.o. female with above noted PMH that presented initially with progressive debility requesting SNF placement after unsuccessful prior trial of discharge home w/assist.     She was found to have bacteruria and LUTS, treated with macrobid x5 days with resolution. Had 1 episode of CP without new ischemic ecg changes or troponin delta thus imdur increased 30 -> 60mg with resolution and can cont outpatient. F/u pcp and cardiology      VITAL SIGNS:  Temp:  [97.5 °F (36.4 °C)-98.9 °F (37.2 °C)] 98 °F (36.7 °C)  Heart Rate:  [57-73] 61  Resp:  [16-18] 18  BP: (107-150)/(59-85) 142/64     on   ;   Device (Oxygen Therapy): room air    Body mass index is 22.52 kg/m².  Wt Readings from Last 3 Encounters:   24 63.3 kg (139 lb 8 oz)   24 68.4 kg (150 lb 12.8 oz)   24 68.1 kg (150 lb 2.1 oz)       PHYSICAL EXAM:  Physical Exam  Vitals and nursing note reviewed.   Constitutional:       General: She is not in acute distress.  HENT:      Head: Normocephalic and atraumatic.   Eyes:      General: No scleral icterus.     Extraocular Movements: Extraocular movements intact.   Pulmonary:      Effort: Pulmonary effort is normal. No respiratory  distress.   Abdominal:      Tenderness: There is no abdominal tenderness.   Musculoskeletal:      Right lower leg: No edema.      Left lower leg: No edema.   Neurological:      Mental Status: She is alert.      Motor: Weakness present.   Psychiatric:         Mood and Affect: Mood normal.         Behavior: Behavior normal.          DISCHARGE DISPOSITION   Stable       Discharge Medications        Changes to Medications        Instructions Start Date   isosorbide mononitrate 60 MG 24 hr tablet  Commonly known as: IMDUR  What changed:   medication strength  how much to take   60 mg, Oral, Every 24 Hours Scheduled   Start Date: May 9, 2024            Continue These Medications        Instructions Start Date   amLODIPine 5 MG tablet  Commonly known as: NORVASC   5 mg, Oral, Every 24 Hours Scheduled   Start Date: May 9, 2024     aspirin 81 MG EC tablet   81 mg, Oral, Daily      atorvastatin 80 MG tablet  Commonly known as: LIPITOR   80 mg, Oral, Nightly      gabapentin 300 MG capsule  Commonly known as: NEURONTIN   300 mg, Oral, 2 Times Daily      losartan 50 MG tablet  Commonly known as: COZAAR   50 mg, Oral, Daily      metoprolol tartrate 25 MG tablet  Commonly known as: LOPRESSOR   Take 1/2 tablet by mouth Every 12 (Twelve) Hours for 30 days.      Mirabegron ER 50 MG tablet sustained-release 24 hour 24 hr tablet  Commonly known as: MYRBETRIQ   50 mg, Oral, Daily      mirtazapine 30 MG tablet  Commonly known as: REMERON   30 mg, Oral, Nightly      pantoprazole 40 MG EC tablet  Commonly known as: PROTONIX   TAKE 1 TABLET BY MOUTH EVERY MORNING FOR HEARTBURN      rOPINIRole 4 MG tablet  Commonly known as: REQUIP   4 mg, Oral, Nightly, Take 1 hour before bedtime.      ticagrelor 60 MG tablet tablet  Commonly known as: BRILINTA   60 mg, Oral, 2 Times Daily                 Additional Instructions for the Follow-ups that You Need to Schedule       Discharge Follow-up with PCP   As directed       Currently Documented PCP:     Dread Burton MD    PCP Phone Number:    [phone]     Follow Up Details: within 1 week               Contact information for follow-up providers       Dread Burton MD .    Specialty: Family Medicine  Why: within 1 week  Contact information:  [address]  [facility] [address]  [phone]                       Contact information for after-discharge care       Destination       [facility] .    Service: Skilled Nursing  Contact information:  [address] [facility] [address]  [phone]                                    TEST  RESULTS PENDING AT DISCHARGE      I will notify patient via phone-call should above lab work result with any significant abnormal findings     CODE STATUS  Code Status and Medical Interventions:   Ordered at: 04/26/24 2234     Code Status (Patient has no pulse and is not breathing):    CPR (Attempt to Resuscitate)     Medical Interventions (Patient has pulse or is breathing):    Full Support             Matheus Lundberg MD  [facility]  05/08/24  11:20 EDT    Please note that this discharge summary required more than 30 minutes to complete.        Electronically signed by Matheus Lundberg MD at 05/08/24 9860

## 2024-05-14 ENCOUNTER — LAB REQUISITION (OUTPATIENT)
Dept: LAB | Facility: HOSPITAL | Age: 69
End: 2024-05-14
Payer: MEDICARE

## 2024-05-14 DIAGNOSIS — R30.0 DYSURIA: ICD-10-CM

## 2024-05-14 LAB
BACTERIA UR QL AUTO: ABNORMAL /HPF
BILIRUB UR QL STRIP: NEGATIVE
CLARITY UR: ABNORMAL
COLOR UR: YELLOW
GLUCOSE UR STRIP-MCNC: NEGATIVE MG/DL
HGB UR QL STRIP.AUTO: NEGATIVE
HYALINE CASTS UR QL AUTO: ABNORMAL /LPF
KETONES UR QL STRIP: NEGATIVE
LEUKOCYTE ESTERASE UR QL STRIP.AUTO: NEGATIVE
NITRITE UR QL STRIP: NEGATIVE
PH UR STRIP.AUTO: 6 [PH] (ref 5–8)
PROT UR QL STRIP: NEGATIVE
RBC # UR STRIP: ABNORMAL /HPF
REF LAB TEST METHOD: ABNORMAL
SP GR UR STRIP: 1.01 (ref 1–1.03)
SQUAMOUS #/AREA URNS HPF: ABNORMAL /HPF
UROBILINOGEN UR QL STRIP: ABNORMAL
WBC # UR STRIP: ABNORMAL /HPF

## 2024-05-14 PROCEDURE — 81001 URINALYSIS AUTO W/SCOPE: CPT | Performed by: INTERNAL MEDICINE

## 2024-05-14 PROCEDURE — 87086 URINE CULTURE/COLONY COUNT: CPT | Performed by: INTERNAL MEDICINE

## 2024-05-15 ENCOUNTER — LAB REQUISITION (OUTPATIENT)
Dept: LAB | Facility: HOSPITAL | Age: 69
End: 2024-05-15
Payer: MEDICARE

## 2024-05-15 DIAGNOSIS — I10 ESSENTIAL (PRIMARY) HYPERTENSION: ICD-10-CM

## 2024-05-15 LAB
ALBUMIN SERPL-MCNC: 3.4 G/DL (ref 3.5–5.2)
ALBUMIN/GLOB SERPL: 1 G/DL
ALP SERPL-CCNC: 279 U/L (ref 39–117)
ALT SERPL W P-5'-P-CCNC: 76 U/L (ref 1–33)
ANION GAP SERPL CALCULATED.3IONS-SCNC: 10.6 MMOL/L (ref 5–15)
AST SERPL-CCNC: 65 U/L (ref 1–32)
BASOPHILS # BLD AUTO: 0.07 10*3/MM3 (ref 0–0.2)
BASOPHILS NFR BLD AUTO: 1 % (ref 0–1.5)
BILIRUB SERPL-MCNC: 0.3 MG/DL (ref 0–1.2)
BUN SERPL-MCNC: 17 MG/DL (ref 8–23)
BUN/CREAT SERPL: 23 (ref 7–25)
CALCIUM SPEC-SCNC: 9.8 MG/DL (ref 8.6–10.5)
CHLORIDE SERPL-SCNC: 104 MMOL/L (ref 98–107)
CO2 SERPL-SCNC: 24.4 MMOL/L (ref 22–29)
CREAT SERPL-MCNC: 0.74 MG/DL (ref 0.57–1)
DEPRECATED RDW RBC AUTO: 49.6 FL (ref 37–54)
EGFRCR SERPLBLD CKD-EPI 2021: 87.7 ML/MIN/1.73
EOSINOPHIL # BLD AUTO: 0.29 10*3/MM3 (ref 0–0.4)
EOSINOPHIL NFR BLD AUTO: 4 % (ref 0.3–6.2)
ERYTHROCYTE [DISTWIDTH] IN BLOOD BY AUTOMATED COUNT: 14.6 % (ref 12.3–15.4)
GLOBULIN UR ELPH-MCNC: 3.4 GM/DL
GLUCOSE SERPL-MCNC: 116 MG/DL (ref 65–99)
HCT VFR BLD AUTO: 31 % (ref 34–46.6)
HGB BLD-MCNC: 10.1 G/DL (ref 12–15.9)
IMM GRANULOCYTES # BLD AUTO: 0.02 10*3/MM3 (ref 0–0.05)
IMM GRANULOCYTES NFR BLD AUTO: 0.3 % (ref 0–0.5)
LYMPHOCYTES # BLD AUTO: 1.88 10*3/MM3 (ref 0.7–3.1)
LYMPHOCYTES NFR BLD AUTO: 26.2 % (ref 19.6–45.3)
MCH RBC QN AUTO: 30.3 PG (ref 26.6–33)
MCHC RBC AUTO-ENTMCNC: 32.6 G/DL (ref 31.5–35.7)
MCV RBC AUTO: 93.1 FL (ref 79–97)
MONOCYTES # BLD AUTO: 0.69 10*3/MM3 (ref 0.1–0.9)
MONOCYTES NFR BLD AUTO: 9.6 % (ref 5–12)
NEUTROPHILS NFR BLD AUTO: 4.23 10*3/MM3 (ref 1.7–7)
NEUTROPHILS NFR BLD AUTO: 58.9 % (ref 42.7–76)
NRBC BLD AUTO-RTO: 0 /100 WBC (ref 0–0.2)
PLATELET # BLD AUTO: 246 10*3/MM3 (ref 140–450)
PMV BLD AUTO: 11.5 FL (ref 6–12)
POTASSIUM SERPL-SCNC: 4 MMOL/L (ref 3.5–5.2)
PROT SERPL-MCNC: 6.8 G/DL (ref 6–8.5)
RBC # BLD AUTO: 3.33 10*6/MM3 (ref 3.77–5.28)
SODIUM SERPL-SCNC: 139 MMOL/L (ref 136–145)
WBC NRBC COR # BLD AUTO: 7.18 10*3/MM3 (ref 3.4–10.8)

## 2024-05-15 PROCEDURE — 80053 COMPREHEN METABOLIC PANEL: CPT | Performed by: NURSE PRACTITIONER

## 2024-05-15 PROCEDURE — 85025 COMPLETE CBC W/AUTO DIFF WBC: CPT | Performed by: NURSE PRACTITIONER

## 2024-05-16 ENCOUNTER — CALL CENTER PROGRAMS (OUTPATIENT)
Dept: CALL CENTER | Facility: HOSPITAL | Age: 69
End: 2024-05-16
Payer: MEDICARE

## 2024-05-16 LAB — BACTERIA SPEC AEROBE CULT: NORMAL

## 2024-05-16 NOTE — OUTREACH NOTE
Stroke Suzette Survey      Flowsheet Row Responses   Facility patient discharged from? [facility]   Attempt successful No   Unsuccessful attempts Attempt 3   Call Center [place] Score 7            CARLI NG - Registered Nurse  
no

## 2024-05-20 ENCOUNTER — LAB REQUISITION (OUTPATIENT)
Dept: LAB | Facility: HOSPITAL | Age: 69
End: 2024-05-20
Payer: MEDICARE

## 2024-05-20 DIAGNOSIS — E87.6 HYPOKALEMIA: ICD-10-CM

## 2024-05-20 LAB
ALBUMIN SERPL-MCNC: 3.5 G/DL (ref 3.5–5.2)
ALBUMIN/GLOB SERPL: 1.1 G/DL
ALP SERPL-CCNC: 345 U/L (ref 39–117)
ALT SERPL W P-5'-P-CCNC: 106 U/L (ref 1–33)
ANION GAP SERPL CALCULATED.3IONS-SCNC: 6.9 MMOL/L (ref 5–15)
AST SERPL-CCNC: 99 U/L (ref 1–32)
BILIRUB SERPL-MCNC: 0.3 MG/DL (ref 0–1.2)
BUN SERPL-MCNC: 17 MG/DL (ref 8–23)
BUN/CREAT SERPL: 21.3 (ref 7–25)
CALCIUM SPEC-SCNC: 9.6 MG/DL (ref 8.6–10.5)
CHLORIDE SERPL-SCNC: 108 MMOL/L (ref 98–107)
CO2 SERPL-SCNC: 25.1 MMOL/L (ref 22–29)
CREAT SERPL-MCNC: 0.8 MG/DL (ref 0.57–1)
EGFRCR SERPLBLD CKD-EPI 2021: 79.9 ML/MIN/1.73
GLOBULIN UR ELPH-MCNC: 3.2 GM/DL
GLUCOSE SERPL-MCNC: 109 MG/DL (ref 65–99)
POTASSIUM SERPL-SCNC: 3.9 MMOL/L (ref 3.5–5.2)
PROT SERPL-MCNC: 6.7 G/DL (ref 6–8.5)
SODIUM SERPL-SCNC: 140 MMOL/L (ref 136–145)

## 2024-05-20 PROCEDURE — 80053 COMPREHEN METABOLIC PANEL: CPT | Performed by: INTERNAL MEDICINE

## 2024-05-31 ENCOUNTER — APPOINTMENT (OUTPATIENT)
Dept: GENERAL RADIOLOGY | Facility: HOSPITAL | Age: 69
End: 2024-05-31
Payer: MEDICARE

## 2024-05-31 ENCOUNTER — HOSPITAL ENCOUNTER (EMERGENCY)
Facility: HOSPITAL | Age: 69
Discharge: HOME | End: 2024-05-31
Attending: EMERGENCY MEDICINE
Payer: MEDICARE

## 2024-05-31 VITALS
HEIGHT: 66 IN | RESPIRATION RATE: 18 BRPM | DIASTOLIC BLOOD PRESSURE: 73 MMHG | WEIGHT: 141 LBS | OXYGEN SATURATION: 99 % | BODY MASS INDEX: 22.66 KG/M2 | TEMPERATURE: 98.3 F | SYSTOLIC BLOOD PRESSURE: 139 MMHG | HEART RATE: 68 BPM

## 2024-05-31 DIAGNOSIS — R07.9 CHEST PAIN, UNSPECIFIED TYPE: Primary | ICD-10-CM

## 2024-05-31 LAB
ALBUMIN SERPL-MCNC: 3.6 G/DL (ref 3.5–5.2)
ALBUMIN/GLOB SERPL: 1 G/DL
ALP SERPL-CCNC: 449 U/L (ref 39–117)
ALT SERPL W P-5'-P-CCNC: 93 U/L (ref 1–33)
ANION GAP SERPL CALCULATED.3IONS-SCNC: 9 MMOL/L (ref 5–15)
APTT PPP: 31.7 SECONDS (ref 26.5–34.5)
AST SERPL-CCNC: 89 U/L (ref 1–32)
BASOPHILS # BLD AUTO: 0.07 10*3/MM3 (ref 0–0.2)
BASOPHILS NFR BLD AUTO: 1 % (ref 0–1.5)
BILIRUB SERPL-MCNC: 0.5 MG/DL (ref 0–1.2)
BUN SERPL-MCNC: 15 MG/DL (ref 8–23)
BUN/CREAT SERPL: 18.3 (ref 7–25)
CALCIUM SPEC-SCNC: 9.7 MG/DL (ref 8.6–10.5)
CHLORIDE SERPL-SCNC: 104 MMOL/L (ref 98–107)
CO2 SERPL-SCNC: 27 MMOL/L (ref 22–29)
CREAT SERPL-MCNC: 0.82 MG/DL (ref 0.57–1)
DEPRECATED RDW RBC AUTO: 54.4 FL (ref 37–54)
EGFRCR SERPLBLD CKD-EPI 2021: 77.5 ML/MIN/1.73
EOSINOPHIL # BLD AUTO: 0.22 10*3/MM3 (ref 0–0.4)
EOSINOPHIL NFR BLD AUTO: 3.2 % (ref 0.3–6.2)
ERYTHROCYTE [DISTWIDTH] IN BLOOD BY AUTOMATED COUNT: 15.7 % (ref 12.3–15.4)
GEN 5 2HR TROPONIN T REFLEX: 20 NG/L
GLOBULIN UR ELPH-MCNC: 3.7 GM/DL
GLUCOSE SERPL-MCNC: 119 MG/DL (ref 65–99)
HCT VFR BLD AUTO: 33.8 % (ref 34–46.6)
HGB BLD-MCNC: 10.6 G/DL (ref 12–15.9)
IMM GRANULOCYTES # BLD AUTO: 0.02 10*3/MM3 (ref 0–0.05)
IMM GRANULOCYTES NFR BLD AUTO: 0.3 % (ref 0–0.5)
INR PPP: 0.97 (ref 0.9–1.1)
LYMPHOCYTES # BLD AUTO: 1.96 10*3/MM3 (ref 0.7–3.1)
LYMPHOCYTES NFR BLD AUTO: 28.8 % (ref 19.6–45.3)
MCH RBC QN AUTO: 29.5 PG (ref 26.6–33)
MCHC RBC AUTO-ENTMCNC: 31.4 G/DL (ref 31.5–35.7)
MCV RBC AUTO: 94.2 FL (ref 79–97)
MONOCYTES # BLD AUTO: 0.67 10*3/MM3 (ref 0.1–0.9)
MONOCYTES NFR BLD AUTO: 9.9 % (ref 5–12)
NEUTROPHILS NFR BLD AUTO: 3.86 10*3/MM3 (ref 1.7–7)
NEUTROPHILS NFR BLD AUTO: 56.8 % (ref 42.7–76)
NRBC BLD AUTO-RTO: 0 /100 WBC (ref 0–0.2)
NT-PROBNP SERPL-MCNC: 440.1 PG/ML (ref 0–900)
PLATELET # BLD AUTO: 283 10*3/MM3 (ref 140–450)
PMV BLD AUTO: 9.7 FL (ref 6–12)
POTASSIUM SERPL-SCNC: 3.5 MMOL/L (ref 3.5–5.2)
PROT SERPL-MCNC: 7.3 G/DL (ref 6–8.5)
PROTHROMBIN TIME: 13 SECONDS (ref 12.1–14.7)
QT INTERVAL: 410 MS
QTC INTERVAL: 433 MS
RBC # BLD AUTO: 3.59 10*6/MM3 (ref 3.77–5.28)
SODIUM SERPL-SCNC: 140 MMOL/L (ref 136–145)
TROPONIN T DELTA: -2 NG/L
TROPONIN T SERPL HS-MCNC: 22 NG/L
WBC NRBC COR # BLD AUTO: 6.8 10*3/MM3 (ref 3.4–10.8)

## 2024-05-31 PROCEDURE — 99284 EMERGENCY DEPT VISIT MOD MDM: CPT

## 2024-05-31 PROCEDURE — 71045 X-RAY EXAM CHEST 1 VIEW: CPT

## 2024-05-31 PROCEDURE — 84484 ASSAY OF TROPONIN QUANT: CPT | Performed by: NURSE PRACTITIONER

## 2024-05-31 PROCEDURE — 71045 X-RAY EXAM CHEST 1 VIEW: CPT | Performed by: RADIOLOGY

## 2024-05-31 PROCEDURE — 80053 COMPREHEN METABOLIC PANEL: CPT | Performed by: NURSE PRACTITIONER

## 2024-05-31 PROCEDURE — 85025 COMPLETE CBC W/AUTO DIFF WBC: CPT | Performed by: NURSE PRACTITIONER

## 2024-05-31 PROCEDURE — 85730 THROMBOPLASTIN TIME PARTIAL: CPT | Performed by: NURSE PRACTITIONER

## 2024-05-31 PROCEDURE — 85610 PROTHROMBIN TIME: CPT | Performed by: NURSE PRACTITIONER

## 2024-05-31 PROCEDURE — 93010 ELECTROCARDIOGRAM REPORT: CPT | Performed by: SPECIALIST

## 2024-05-31 PROCEDURE — 83880 ASSAY OF NATRIURETIC PEPTIDE: CPT | Performed by: NURSE PRACTITIONER

## 2024-05-31 PROCEDURE — 93005 ELECTROCARDIOGRAM TRACING: CPT | Performed by: NURSE PRACTITIONER

## 2024-05-31 PROCEDURE — 36415 COLL VENOUS BLD VENIPUNCTURE: CPT

## 2024-05-31 RX ORDER — SODIUM CHLORIDE 0.9 % (FLUSH) 0.9 %
10 SYRINGE (ML) INJECTION AS NEEDED
Status: DISCONTINUED | OUTPATIENT
Start: 2024-05-31 | End: 2024-05-31 | Stop reason: HOSPADM

## 2024-05-31 RX ORDER — ASPIRIN 81 MG/1
324 TABLET, CHEWABLE ORAL ONCE
Status: COMPLETED | OUTPATIENT
Start: 2024-05-31 | End: 2024-05-31

## 2024-05-31 RX ADMIN — ASPIRIN 324 MG: 81 TABLET, CHEWABLE ORAL at 17:07

## 2024-05-31 NOTE — ED PROVIDER NOTES
"Subjective   History of Present Illness  Patient is a 68-year-old female presents today complaining of substernal chest pain.  Patient reports the pain was aching in nature.  Patient reports she did have some radiation to the jaw on the right side.  Patient reports she had some very mild shortness of breath associated with this.  Patient denies any diaphoresis.  Patient does report a history of stroke and states that she does have a stent in her right carotid.  Patient reports pain was moderate and is worse.  Patient reports currently she is pain-free.    Chest Pain      Review of Systems   Constitutional: Negative.    HENT: Negative.     Eyes: Negative.    Respiratory: Negative.     Cardiovascular:  Positive for chest pain.   Gastrointestinal: Negative.    Endocrine: Negative.    Genitourinary: Negative.    Musculoskeletal: Negative.    Skin: Negative.    Allergic/Immunologic: Negative.    Neurological: Negative.    Hematological: Negative.    Psychiatric/Behavioral: Negative.         Past Medical History:   Diagnosis Date    Anemia     Anxiety     Arthritis     Depression     Legally blind     Restless leg syndrome     Sleep apnea     \"I don't use a cpap anymore since losing 106 lbs\"    Water retention        Allergies   Allergen Reactions    Penicillins Hives and Swelling       Past Surgical History:   Procedure Laterality Date    BACK SURGERY      CARDIAC CATHETERIZATION N/A 1/19/2024    Procedure: Percutaneous Manual Thrombectomy;  Surgeon: Efrain Hurley MD;  Location:  [facility];  Service: Interventional Radiology;  Laterality: N/A;    CARDIAC CATHETERIZATION N/A 4/12/2024    Procedure: Left Heart Cath;  Surgeon: Susan Mederos MD;  Location:  [facility];  Service: Cardiology;  Laterality: N/A;    GALLBLADDER SURGERY      HIP ARTHROSCOPY      JOINT REPLACEMENT Right        Family History   Problem Relation Age of Onset    Breast cancer Neg Hx        Social History " "    Socioeconomic History    Marital status:     Number of children: 0    Years of education: 1 yr college   Tobacco Use    Smoking status: Former     Current packs/day: 1.50     Average packs/day: 1.5 packs/day for 51.0 years (76.5 ttl pk-yrs)     Types: Cigarettes     Passive exposure: Past    Smokeless tobacco: Never   Vaping Use    Vaping status: Never Used   Substance and Sexual Activity    Alcohol use: Not Currently     Alcohol/week: 1.0 standard drink of alcohol     Types: 1 Glasses of wine per week     Comment: \"occasionally - once every two months\"    Drug use: Not Currently     Comment: alcohol - occasionally    Sexual activity: Defer           Objective   Physical Exam  Vitals and nursing note reviewed.   Constitutional:       Appearance: She is well-developed.   HENT:      Head: Normocephalic.      Right Ear: External ear normal.      Left Ear: External ear normal.   Eyes:      Conjunctiva/sclera: Conjunctivae normal.      Pupils: Pupils are equal, round, and reactive to light.   Cardiovascular:      Rate and Rhythm: Normal rate and regular rhythm.      Heart sounds: Normal heart sounds.   Pulmonary:      Effort: Pulmonary effort is normal.      Breath sounds: Normal breath sounds.   Abdominal:      General: Bowel sounds are normal.      Palpations: Abdomen is soft.   Musculoskeletal:         General: Normal range of motion.      Cervical back: Normal range of motion and neck supple.   Skin:     General: Skin is warm and dry.      Capillary Refill: Capillary refill takes less than 2 seconds.   Neurological:      Mental Status: She is alert and oriented to person, place, and time.   Psychiatric:         Behavior: Behavior normal.         Thought Content: Thought content normal.         Procedures           ED Course  ED Course as of 05/31/24 1948   Fri May 31, 2024   1659 EKG performed at 1652 reviewed and interpreted by me shows accelerated junctional rhythm with rate of 67 bpm, normal axis and QT " interval, nonspecific T waves, normal ST segments [JG]      ED Course User Index  [JG] Kiko Carreon DO                HEART Score: 5                            Results for orders placed or performed during the hospital encounter of 05/31/24   Comprehensive Metabolic Panel    Specimen: Arm, Right; Blood   Result Value Ref Range    Glucose 119 (H) 65 - 99 mg/dL    BUN 15 8 - 23 mg/dL    Creatinine 0.82 0.57 - 1.00 mg/dL    Sodium 140 136 - 145 mmol/L    Potassium 3.5 3.5 - 5.2 mmol/L    Chloride 104 98 - 107 mmol/L    CO2 27.0 22.0 - 29.0 mmol/L    Calcium 9.7 8.6 - 10.5 mg/dL    Total Protein 7.3 6.0 - 8.5 g/dL    Albumin 3.6 3.5 - 5.2 g/dL    ALT (SGPT) 93 (H) 1 - 33 U/L    AST (SGOT) 89 (H) 1 - 32 U/L    Alkaline Phosphatase 449 (H) 39 - 117 U/L    Total Bilirubin 0.5 0.0 - 1.2 mg/dL    Globulin 3.7 gm/dL    A/G Ratio 1.0 g/dL    BUN/Creatinine Ratio 18.3 7.0 - 25.0    Anion Gap 9.0 5.0 - 15.0 mmol/L    eGFR 77.5 >60.0 mL/min/1.73   aPTT    Specimen: Arm, Right; Blood   Result Value Ref Range    PTT 31.7 26.5 - 34.5 seconds   Protime-INR    Specimen: Arm, Right; Blood   Result Value Ref Range    Protime 13.0 12.1 - 14.7 Seconds    INR 0.97 0.90 - 1.10   BNP    Specimen: Arm, Right; Blood   Result Value Ref Range    proBNP 440.1 0.0 - 900.0 pg/mL   High Sensitivity Troponin T    Specimen: Arm, Right; Blood   Result Value Ref Range    HS Troponin T 22 (H) <14 ng/L   CBC Auto Differential    Specimen: Arm, Right; Blood   Result Value Ref Range    WBC 6.80 3.40 - 10.80 10*3/mm3    RBC 3.59 (L) 3.77 - 5.28 10*6/mm3    Hemoglobin 10.6 (L) 12.0 - 15.9 g/dL    Hematocrit 33.8 (L) 34.0 - 46.6 %    MCV 94.2 79.0 - 97.0 fL    MCH 29.5 26.6 - 33.0 pg    MCHC 31.4 (L) 31.5 - 35.7 g/dL    RDW 15.7 (H) 12.3 - 15.4 %    RDW-SD 54.4 (H) 37.0 - 54.0 fl    MPV 9.7 6.0 - 12.0 fL    Platelets 283 140 - 450 10*3/mm3    Neutrophil % 56.8 42.7 - 76.0 %    Lymphocyte % 28.8 19.6 - 45.3 %    Monocyte % 9.9 5.0 - 12.0 %    Eosinophil  % 3.2 0.3 - 6.2 %    Basophil % 1.0 0.0 - 1.5 %    Immature Grans % 0.3 0.0 - 0.5 %    Neutrophils, Absolute 3.86 1.70 - 7.00 10*3/mm3    Lymphocytes, Absolute 1.96 0.70 - 3.10 10*3/mm3    Monocytes, Absolute 0.67 0.10 - 0.90 10*3/mm3    Eosinophils, Absolute 0.22 0.00 - 0.40 10*3/mm3    Basophils, Absolute 0.07 0.00 - 0.20 10*3/mm3    Immature Grans, Absolute 0.02 0.00 - 0.05 10*3/mm3    nRBC 0.0 0.0 - 0.2 /100 WBC   High Sensitivity Troponin T 2Hr    Specimen: Blood   Result Value Ref Range    HS Troponin T 20 (H) <14 ng/L    Troponin T Delta -2 >=-4 - <+4 ng/L   ECG 12 Lead Chest Pain   Result Value Ref Range    QT Interval 410 ms    QTC Interval 433 ms     XR Chest AP   Final Result       1.  No acute process seen in the chest.       This report was finalized on 5/31/2024 6:20 PM by Michael Abarca MD.                Medical Decision Making  Amount and/or Complexity of Data Reviewed  Labs: ordered.  Radiology: ordered.  ECG/medicine tests: ordered.    Risk  OTC drugs.  Prescription drug management.        Final diagnoses:   Chest pain, unspecified type       ED Disposition  ED Disposition       ED Disposition   Discharge    Condition   Stable    Comment   --               Dread Burton MD  [address] [place] [address]  [phone]    Schedule an appointment as soon as possible for a visit   For further evaluation         Medication List      No changes were made to your prescriptions during this visit.            Soham Asencio P, APRN  05/31/24 1948

## 2024-05-31 NOTE — ED NOTES
Called house sup for ride back to the nursing home. Our EMS truck accepted transfer back after they get back to the hospital from a run.

## 2024-06-01 NOTE — ED NOTES
Attempted to call report to [facility], resident keeps answering phone and hanging up.

## 2024-06-01 NOTE — ED NOTES
Called [facility] for transport back to the [facility], [facility] to call back.

## 2024-07-01 ENCOUNTER — LAB REQUISITION (OUTPATIENT)
Dept: LAB | Facility: HOSPITAL | Age: 69
End: 2024-07-01
Payer: MEDICARE

## 2024-07-01 DIAGNOSIS — I10 ESSENTIAL (PRIMARY) HYPERTENSION: ICD-10-CM

## 2024-07-01 LAB
ANION GAP SERPL CALCULATED.3IONS-SCNC: 13.7 MMOL/L (ref 5–15)
BASOPHILS # BLD AUTO: 0.08 10*3/MM3 (ref 0–0.2)
BASOPHILS NFR BLD AUTO: 0.8 % (ref 0–1.5)
BUN SERPL-MCNC: 15 MG/DL (ref 8–23)
BUN/CREAT SERPL: 18.1 (ref 7–25)
CALCIUM SPEC-SCNC: 10 MG/DL (ref 8.6–10.5)
CHLORIDE SERPL-SCNC: 102 MMOL/L (ref 98–107)
CO2 SERPL-SCNC: 23.3 MMOL/L (ref 22–29)
CREAT SERPL-MCNC: 0.83 MG/DL (ref 0.57–1)
CRP SERPL-MCNC: 1.44 MG/DL (ref 0–0.5)
DEPRECATED RDW RBC AUTO: 50.8 FL (ref 37–54)
EGFRCR SERPLBLD CKD-EPI 2021: 76.4 ML/MIN/1.73
EOSINOPHIL # BLD AUTO: 0.16 10*3/MM3 (ref 0–0.4)
EOSINOPHIL NFR BLD AUTO: 1.5 % (ref 0.3–6.2)
ERYTHROCYTE [DISTWIDTH] IN BLOOD BY AUTOMATED COUNT: 14.7 % (ref 12.3–15.4)
ERYTHROCYTE [SEDIMENTATION RATE] IN BLOOD: 70 MM/HR (ref 0–30)
GLUCOSE SERPL-MCNC: 150 MG/DL (ref 65–99)
HCT VFR BLD AUTO: 41.3 % (ref 34–46.6)
HGB BLD-MCNC: 13.4 G/DL (ref 12–15.9)
IMM GRANULOCYTES # BLD AUTO: 0.03 10*3/MM3 (ref 0–0.05)
IMM GRANULOCYTES NFR BLD AUTO: 0.3 % (ref 0–0.5)
LYMPHOCYTES # BLD AUTO: 1.98 10*3/MM3 (ref 0.7–3.1)
LYMPHOCYTES NFR BLD AUTO: 19.1 % (ref 19.6–45.3)
MCH RBC QN AUTO: 30.3 PG (ref 26.6–33)
MCHC RBC AUTO-ENTMCNC: 32.4 G/DL (ref 31.5–35.7)
MCV RBC AUTO: 93.4 FL (ref 79–97)
MONOCYTES # BLD AUTO: 0.9 10*3/MM3 (ref 0.1–0.9)
MONOCYTES NFR BLD AUTO: 8.7 % (ref 5–12)
NEUTROPHILS NFR BLD AUTO: 69.6 % (ref 42.7–76)
NEUTROPHILS NFR BLD AUTO: 7.21 10*3/MM3 (ref 1.7–7)
NRBC BLD AUTO-RTO: 0 /100 WBC (ref 0–0.2)
PLATELET # BLD AUTO: 319 10*3/MM3 (ref 140–450)
PMV BLD AUTO: 10 FL (ref 6–12)
POTASSIUM SERPL-SCNC: 3.3 MMOL/L (ref 3.5–5.2)
RBC # BLD AUTO: 4.42 10*6/MM3 (ref 3.77–5.28)
SODIUM SERPL-SCNC: 139 MMOL/L (ref 136–145)
WBC NRBC COR # BLD AUTO: 10.36 10*3/MM3 (ref 3.4–10.8)

## 2024-07-01 PROCEDURE — 86140 C-REACTIVE PROTEIN: CPT | Performed by: NURSE PRACTITIONER

## 2024-07-01 PROCEDURE — 85652 RBC SED RATE AUTOMATED: CPT | Performed by: NURSE PRACTITIONER

## 2024-07-01 PROCEDURE — 80048 BASIC METABOLIC PNL TOTAL CA: CPT | Performed by: NURSE PRACTITIONER

## 2024-07-01 PROCEDURE — 85025 COMPLETE CBC W/AUTO DIFF WBC: CPT | Performed by: NURSE PRACTITIONER

## 2024-07-02 ENCOUNTER — LAB REQUISITION (OUTPATIENT)
Dept: LAB | Facility: HOSPITAL | Age: 69
End: 2024-07-02
Payer: MEDICARE

## 2024-07-02 DIAGNOSIS — I10 ESSENTIAL (PRIMARY) HYPERTENSION: ICD-10-CM

## 2024-07-02 LAB
BACTERIA UR QL AUTO: ABNORMAL /HPF
BILIRUB UR QL STRIP: NEGATIVE
CLARITY UR: ABNORMAL
COLOR UR: YELLOW
GLUCOSE UR STRIP-MCNC: NEGATIVE MG/DL
HGB UR QL STRIP.AUTO: NEGATIVE
HOLD SPECIMEN: NORMAL
HYALINE CASTS UR QL AUTO: ABNORMAL /LPF
KETONES UR QL STRIP: NEGATIVE
LEUKOCYTE ESTERASE UR QL STRIP.AUTO: ABNORMAL
NITRITE UR QL STRIP: NEGATIVE
PH UR STRIP.AUTO: 7.5 [PH] (ref 5–8)
PROT UR QL STRIP: ABNORMAL
RBC # UR STRIP: ABNORMAL /HPF
REF LAB TEST METHOD: ABNORMAL
SP GR UR STRIP: 1.01 (ref 1–1.03)
SQUAMOUS #/AREA URNS HPF: ABNORMAL /HPF
UROBILINOGEN UR QL STRIP: ABNORMAL
WBC # UR STRIP: ABNORMAL /HPF

## 2024-07-02 PROCEDURE — 81001 URINALYSIS AUTO W/SCOPE: CPT | Performed by: NURSE PRACTITIONER

## 2024-07-05 ENCOUNTER — APPOINTMENT (OUTPATIENT)
Dept: CT IMAGING | Facility: HOSPITAL | Age: 69
End: 2024-07-05
Payer: MEDICARE

## 2024-07-05 ENCOUNTER — LAB REQUISITION (OUTPATIENT)
Dept: LAB | Facility: HOSPITAL | Age: 69
End: 2024-07-05
Payer: MEDICARE

## 2024-07-05 ENCOUNTER — HOSPITAL ENCOUNTER (EMERGENCY)
Facility: HOSPITAL | Age: 69
Discharge: HOME | End: 2024-07-05
Attending: EMERGENCY MEDICINE
Payer: MEDICARE

## 2024-07-05 VITALS
SYSTOLIC BLOOD PRESSURE: 171 MMHG | HEART RATE: 51 BPM | DIASTOLIC BLOOD PRESSURE: 83 MMHG | BODY MASS INDEX: 22.68 KG/M2 | TEMPERATURE: 98.4 F | WEIGHT: 141.09 LBS | HEIGHT: 66 IN | RESPIRATION RATE: 17 BRPM | OXYGEN SATURATION: 98 %

## 2024-07-05 DIAGNOSIS — R41.82 ALTERED MENTAL STATUS, UNSPECIFIED: ICD-10-CM

## 2024-07-05 DIAGNOSIS — N39.0 URINARY TRACT INFECTION WITHOUT HEMATURIA, SITE UNSPECIFIED: Primary | ICD-10-CM

## 2024-07-05 LAB
ALBUMIN SERPL-MCNC: 3.6 G/DL (ref 3.5–5.2)
ALBUMIN/GLOB SERPL: 1 G/DL
ALP SERPL-CCNC: 128 U/L (ref 39–117)
ALT SERPL W P-5'-P-CCNC: 10 U/L (ref 1–33)
ANION GAP SERPL CALCULATED.3IONS-SCNC: 10.3 MMOL/L (ref 5–15)
AST SERPL-CCNC: 15 U/L (ref 1–32)
BACTERIA UR QL AUTO: ABNORMAL /HPF
BACTERIA UR QL AUTO: ABNORMAL /HPF
BASOPHILS # BLD AUTO: 0.05 10*3/MM3 (ref 0–0.2)
BASOPHILS NFR BLD AUTO: 0.8 % (ref 0–1.5)
BILIRUB SERPL-MCNC: 0.4 MG/DL (ref 0–1.2)
BILIRUB UR QL STRIP: NEGATIVE
BILIRUB UR QL STRIP: NEGATIVE
BUN SERPL-MCNC: 17 MG/DL (ref 8–23)
BUN/CREAT SERPL: 19.3 (ref 7–25)
CALCIUM SPEC-SCNC: 9.9 MG/DL (ref 8.6–10.5)
CHLORIDE SERPL-SCNC: 105 MMOL/L (ref 98–107)
CLARITY UR: ABNORMAL
CLARITY UR: ABNORMAL
CO2 SERPL-SCNC: 27.7 MMOL/L (ref 22–29)
COLOR UR: ABNORMAL
COLOR UR: YELLOW
CREAT SERPL-MCNC: 0.88 MG/DL (ref 0.57–1)
DEPRECATED RDW RBC AUTO: 51.9 FL (ref 37–54)
EGFRCR SERPLBLD CKD-EPI 2021: 71.2 ML/MIN/1.73
EOSINOPHIL # BLD AUTO: 0.18 10*3/MM3 (ref 0–0.4)
EOSINOPHIL NFR BLD AUTO: 2.7 % (ref 0.3–6.2)
ERYTHROCYTE [DISTWIDTH] IN BLOOD BY AUTOMATED COUNT: 15 % (ref 12.3–15.4)
GLOBULIN UR ELPH-MCNC: 3.5 GM/DL
GLUCOSE SERPL-MCNC: 112 MG/DL (ref 65–99)
GLUCOSE UR STRIP-MCNC: NEGATIVE MG/DL
GLUCOSE UR STRIP-MCNC: NEGATIVE MG/DL
HCT VFR BLD AUTO: 36.4 % (ref 34–46.6)
HGB BLD-MCNC: 11.9 G/DL (ref 12–15.9)
HGB UR QL STRIP.AUTO: ABNORMAL
HGB UR QL STRIP.AUTO: NEGATIVE
HOLD SPECIMEN: NORMAL
HYALINE CASTS UR QL AUTO: ABNORMAL /LPF
HYALINE CASTS UR QL AUTO: ABNORMAL /LPF
IMM GRANULOCYTES # BLD AUTO: 0.02 10*3/MM3 (ref 0–0.05)
IMM GRANULOCYTES NFR BLD AUTO: 0.3 % (ref 0–0.5)
KETONES UR QL STRIP: ABNORMAL
KETONES UR QL STRIP: NEGATIVE
LEUKOCYTE ESTERASE UR QL STRIP.AUTO: ABNORMAL
LEUKOCYTE ESTERASE UR QL STRIP.AUTO: ABNORMAL
LYMPHOCYTES # BLD AUTO: 2.12 10*3/MM3 (ref 0.7–3.1)
LYMPHOCYTES NFR BLD AUTO: 31.9 % (ref 19.6–45.3)
MCH RBC QN AUTO: 31.1 PG (ref 26.6–33)
MCHC RBC AUTO-ENTMCNC: 32.7 G/DL (ref 31.5–35.7)
MCV RBC AUTO: 95 FL (ref 79–97)
MONOCYTES # BLD AUTO: 0.56 10*3/MM3 (ref 0.1–0.9)
MONOCYTES NFR BLD AUTO: 8.4 % (ref 5–12)
NEUTROPHILS NFR BLD AUTO: 3.72 10*3/MM3 (ref 1.7–7)
NEUTROPHILS NFR BLD AUTO: 55.9 % (ref 42.7–76)
NITRITE UR QL STRIP: NEGATIVE
NITRITE UR QL STRIP: NEGATIVE
NRBC BLD AUTO-RTO: 0 /100 WBC (ref 0–0.2)
PH UR STRIP.AUTO: 6.5 [PH] (ref 5–8)
PH UR STRIP.AUTO: 6.5 [PH] (ref 5–8)
PLATELET # BLD AUTO: 270 10*3/MM3 (ref 140–450)
PMV BLD AUTO: 9.6 FL (ref 6–12)
POTASSIUM SERPL-SCNC: 3.7 MMOL/L (ref 3.5–5.2)
PROT SERPL-MCNC: 7.1 G/DL (ref 6–8.5)
PROT UR QL STRIP: ABNORMAL
PROT UR QL STRIP: ABNORMAL
RBC # BLD AUTO: 3.83 10*6/MM3 (ref 3.77–5.28)
RBC # UR STRIP: ABNORMAL /HPF
RBC # UR STRIP: ABNORMAL /HPF
REF LAB TEST METHOD: ABNORMAL
REF LAB TEST METHOD: ABNORMAL
SODIUM SERPL-SCNC: 143 MMOL/L (ref 136–145)
SP GR UR STRIP: 1.01 (ref 1–1.03)
SP GR UR STRIP: 1.02 (ref 1–1.03)
SQUAMOUS #/AREA URNS HPF: ABNORMAL /HPF
SQUAMOUS #/AREA URNS HPF: ABNORMAL /HPF
UROBILINOGEN UR QL STRIP: ABNORMAL
UROBILINOGEN UR QL STRIP: ABNORMAL
WBC # UR STRIP: ABNORMAL /HPF
WBC # UR STRIP: ABNORMAL /HPF
WBC NRBC COR # BLD AUTO: 6.65 10*3/MM3 (ref 3.4–10.8)
WHOLE BLOOD HOLD COAG: NORMAL
WHOLE BLOOD HOLD SPECIMEN: NORMAL

## 2024-07-05 PROCEDURE — 81001 URINALYSIS AUTO W/SCOPE: CPT | Performed by: EMERGENCY MEDICINE

## 2024-07-05 PROCEDURE — 80053 COMPREHEN METABOLIC PANEL: CPT

## 2024-07-05 PROCEDURE — 96365 THER/PROPH/DIAG IV INF INIT: CPT

## 2024-07-05 PROCEDURE — 87077 CULTURE AEROBIC IDENTIFY: CPT | Performed by: EMERGENCY MEDICINE

## 2024-07-05 PROCEDURE — 70450 CT HEAD/BRAIN W/O DYE: CPT | Performed by: RADIOLOGY

## 2024-07-05 PROCEDURE — 93010 ELECTROCARDIOGRAM REPORT: CPT | Performed by: INTERNAL MEDICINE

## 2024-07-05 PROCEDURE — 87186 SC STD MICRODIL/AGAR DIL: CPT | Performed by: EMERGENCY MEDICINE

## 2024-07-05 PROCEDURE — 87086 URINE CULTURE/COLONY COUNT: CPT | Performed by: EMERGENCY MEDICINE

## 2024-07-05 PROCEDURE — 93005 ELECTROCARDIOGRAM TRACING: CPT | Performed by: EMERGENCY MEDICINE

## 2024-07-05 PROCEDURE — 25010000002 CEFTRIAXONE PER 250 MG

## 2024-07-05 PROCEDURE — 99284 EMERGENCY DEPT VISIT MOD MDM: CPT

## 2024-07-05 PROCEDURE — P9612 CATHETERIZE FOR URINE SPEC: HCPCS

## 2024-07-05 PROCEDURE — 70450 CT HEAD/BRAIN W/O DYE: CPT

## 2024-07-05 PROCEDURE — 81001 URINALYSIS AUTO W/SCOPE: CPT | Performed by: NURSE PRACTITIONER

## 2024-07-05 PROCEDURE — 85025 COMPLETE CBC W/AUTO DIFF WBC: CPT

## 2024-07-05 RX ORDER — CEFDINIR 300 MG/1
300 CAPSULE ORAL 2 TIMES DAILY
Qty: 20 CAPSULE | Refills: 0 | Status: SHIPPED | OUTPATIENT
Start: 2024-07-05

## 2024-07-05 RX ORDER — SODIUM CHLORIDE 0.9 % (FLUSH) 0.9 %
10 SYRINGE (ML) INJECTION AS NEEDED
Status: DISCONTINUED | OUTPATIENT
Start: 2024-07-05 | End: 2024-07-05 | Stop reason: HOSPADM

## 2024-07-05 RX ADMIN — CEFTRIAXONE 1000 MG: 1 INJECTION, POWDER, FOR SOLUTION INTRAMUSCULAR; INTRAVENOUS at 11:38

## 2024-07-05 NOTE — ED PROVIDER NOTES
"Subjective   History of Present Illness  Patient is a 68-year-old female with past medical history of depression, arthritis, restless leg syndrome, and anemia.  Patient presents from [facility] nursing home.  Nursing home reports patient has been more confused than usual the last few days, and reports that she fell this morning.  Patient reports that she has not been confused recently, and reports that she fell this morning going out to smoke, however denies any injuries.  Patient presents to the ER completely alert and oriented with no complaints.  Patient presents EMS from nursing home.        Review of Systems   Constitutional: Negative.  Negative for fever.   HENT: Negative.     Respiratory: Negative.     Cardiovascular: Negative.  Negative for chest pain.   Gastrointestinal: Negative.  Negative for abdominal pain.   Endocrine: Negative.    Genitourinary: Negative.  Negative for dysuria.   Skin: Negative.    Neurological: Negative.    Psychiatric/Behavioral: Negative.     All other systems reviewed and are negative.      Past Medical History:   Diagnosis Date    Anemia     Anxiety     Arthritis     Depression     Legally blind     Restless leg syndrome     Sleep apnea     \"I don't use a cpap anymore since losing 106 lbs\"    Water retention        Allergies   Allergen Reactions    Penicillins Hives and Swelling       Past Surgical History:   Procedure Laterality Date    BACK SURGERY      CARDIAC CATHETERIZATION N/A 1/19/2024    Procedure: Percutaneous Manual Thrombectomy;  Surgeon: Efrain Hurley MD;  Location:  [facility];  Service: Interventional Radiology;  Laterality: N/A;    CARDIAC CATHETERIZATION N/A 4/12/2024    Procedure: Left Heart Cath;  Surgeon: Susan Mederos MD;  Location:  [facility];  Service: Cardiology;  Laterality: N/A;    GALLBLADDER SURGERY      HIP ARTHROSCOPY      JOINT REPLACEMENT Right        Family History   Problem Relation Age of Onset    Breast " "cancer Neg Hx        Social History     Socioeconomic History    Marital status:     Number of children: 0    Years of education: 1 yr college   Tobacco Use    Smoking status: Former     Current packs/day: 1.50     Average packs/day: 1.5 packs/day for 51.0 years (76.5 ttl pk-yrs)     Types: Cigarettes     Passive exposure: Past    Smokeless tobacco: Never   Vaping Use    Vaping status: Never Used   Substance and Sexual Activity    Alcohol use: Not Currently     Alcohol/week: 1.0 standard drink of alcohol     Types: 1 Glasses of wine per week     Comment: \"occasionally - once every two months\"    Drug use: Not Currently     Comment: alcohol - occasionally    Sexual activity: Defer           Objective   Physical Exam  Vitals and nursing note reviewed.   Constitutional:       General: She is not in acute distress.     Appearance: She is well-developed. She is not diaphoretic.   HENT:      Head: Normocephalic and atraumatic.      Right Ear: External ear normal.      Left Ear: External ear normal.      Nose: Nose normal.   Eyes:      Conjunctiva/sclera: Conjunctivae normal.      Pupils: Pupils are equal, round, and reactive to light.   Neck:      Vascular: No JVD.      Trachea: No tracheal deviation.   Cardiovascular:      Rate and Rhythm: Normal rate and regular rhythm.      Heart sounds: Normal heart sounds. No murmur heard.  Pulmonary:      Effort: Pulmonary effort is normal. No respiratory distress.      Breath sounds: Normal breath sounds. No wheezing.   Abdominal:      General: Bowel sounds are normal.      Palpations: Abdomen is soft.      Tenderness: There is no abdominal tenderness.   Musculoskeletal:         General: No deformity. Normal range of motion.      Cervical back: Normal range of motion and neck supple.   Skin:     General: Skin is warm and dry.      Coloration: Skin is not pale.      Findings: No erythema or rash.   Neurological:      Mental Status: She is alert and oriented to person, place, " and time.      Cranial Nerves: No cranial nerve deficit.   Psychiatric:         Behavior: Behavior normal.         Thought Content: Thought content normal.       Results for orders placed or performed during the hospital encounter of 07/05/24   Urinalysis With Culture If Indicated - Straight Cath    Specimen: Straight Cath; Urine   Result Value Ref Range    Color, UA Yellow Yellow, Straw    Appearance, UA Cloudy (A) Clear    pH, UA 6.5 5.0 - 8.0    Specific Gravity, UA 1.012 1.005 - 1.030    Glucose, UA Negative Negative    Ketones, UA Negative Negative    Bilirubin, UA Negative Negative    Blood, UA Small (1+) (A) Negative    Protein, UA Trace (A) Negative    Leuk Esterase, UA Large (3+) (A) Negative    Nitrite, UA Negative Negative    Urobilinogen, UA 1.0 E.U./dL 0.2 - 1.0 E.U./dL   Urinalysis, Microscopic Only - Straight Cath    Specimen: Straight Cath; Urine   Result Value Ref Range    RBC, UA 21-50 (A) None Seen, 0-2 /HPF    WBC, UA Too Numerous to Count (A) None Seen, 0-2 /HPF    Bacteria, UA 4+ (A) None Seen /HPF    Squamous Epithelial Cells, UA 3-6 (A) None Seen, 0-2 /HPF    Hyaline Casts, UA 0-2 None Seen /LPF    Methodology Automated Microscopy    Comprehensive Metabolic Panel    Specimen: Blood   Result Value Ref Range    Glucose 112 (H) 65 - 99 mg/dL    BUN 17 8 - 23 mg/dL    Creatinine 0.88 0.57 - 1.00 mg/dL    Sodium 143 136 - 145 mmol/L    Potassium 3.7 3.5 - 5.2 mmol/L    Chloride 105 98 - 107 mmol/L    CO2 27.7 22.0 - 29.0 mmol/L    Calcium 9.9 8.6 - 10.5 mg/dL    Total Protein 7.1 6.0 - 8.5 g/dL    Albumin 3.6 3.5 - 5.2 g/dL    ALT (SGPT) 10 1 - 33 U/L    AST (SGOT) 15 1 - 32 U/L    Alkaline Phosphatase 128 (H) 39 - 117 U/L    Total Bilirubin 0.4 0.0 - 1.2 mg/dL    Globulin 3.5 gm/dL    A/G Ratio 1.0 g/dL    BUN/Creatinine Ratio 19.3 7.0 - 25.0    Anion Gap 10.3 5.0 - 15.0 mmol/L    eGFR 71.2 >60.0 mL/min/1.73   CBC Auto Differential    Specimen: Blood   Result Value Ref Range    WBC 6.65 3.40 -  10.80 10*3/mm3    RBC 3.83 3.77 - 5.28 10*6/mm3    Hemoglobin 11.9 (L) 12.0 - 15.9 g/dL    Hematocrit 36.4 34.0 - 46.6 %    MCV 95.0 79.0 - 97.0 fL    MCH 31.1 26.6 - 33.0 pg    MCHC 32.7 31.5 - 35.7 g/dL    RDW 15.0 12.3 - 15.4 %    RDW-SD 51.9 37.0 - 54.0 fl    MPV 9.6 6.0 - 12.0 fL    Platelets 270 140 - 450 10*3/mm3    Neutrophil % 55.9 42.7 - 76.0 %    Lymphocyte % 31.9 19.6 - 45.3 %    Monocyte % 8.4 5.0 - 12.0 %    Eosinophil % 2.7 0.3 - 6.2 %    Basophil % 0.8 0.0 - 1.5 %    Immature Grans % 0.3 0.0 - 0.5 %    Neutrophils, Absolute 3.72 1.70 - 7.00 10*3/mm3    Lymphocytes, Absolute 2.12 0.70 - 3.10 10*3/mm3    Monocytes, Absolute 0.56 0.10 - 0.90 10*3/mm3    Eosinophils, Absolute 0.18 0.00 - 0.40 10*3/mm3    Basophils, Absolute 0.05 0.00 - 0.20 10*3/mm3    Immature Grans, Absolute 0.02 0.00 - 0.05 10*3/mm3    nRBC 0.0 0.0 - 0.2 /100 WBC   ECG 12 Lead Bradycardia   Result Value Ref Range    QT Interval 484 ms    QTC Interval 428 ms   Green Top (Gel)   Result Value Ref Range    Extra Tube Hold for add-ons.    Lavender Top   Result Value Ref Range    Extra Tube hold for add-on    Gold Top - SST   Result Value Ref Range    Extra Tube Hold for add-ons.    Light Blue Top   Result Value Ref Range    Extra Tube Hold for add-ons.      CT Head Without Contrast    Result Date: 7/5/2024   1. No acute parenchymal mass, hemorrhage, or midline shift 2. Atrophy, chronic microangiopathic change, and encephalomalacia from previous ischemic insult in the right parietal region  This report was finalized on 7/5/2024 10:44 AM by Dr. Hu Obrien MD.         Procedures           ED Course                                             Medical Decision Making  Patient is a 68-year-old female with past medical history of depression, arthritis, restless leg syndrome, and anemia.  Patient presents from [facility] nursing home.  Nursing home reports patient has been more confused than usual the last few days, and reports that she fell this  morning.  Patient reports that she has not been confused recently, and reports that she fell this morning going out to smoke, however denies any injuries.  Patient presents to the ER completely alert and oriented with no complaints.  Patient presents EMS from nursing home.    Problems Addressed:  Urinary tract infection without hematuria, site unspecified: complicated acute illness or injury    Amount and/or Complexity of Data Reviewed  Labs: ordered.  Radiology: ordered.  ECG/medicine tests: ordered.    Risk  Prescription drug management.        Final diagnoses:   Urinary tract infection without hematuria, site unspecified       ED Disposition  ED Disposition       ED Disposition   Discharge    Condition   Stable    Comment   --               Dread Burton MD  [address] [place] [address]  [phone]      If symptoms worsen         Medication List        New Prescriptions      cefdinir 300 MG capsule  Commonly known as: OMNICEF  Take 1 capsule by mouth 2 (Two) Times a Day.               Where to Get Your Medications        You can get these medications from any pharmacy    Bring a paper prescription for each of these medications  cefdinir 300 MG capsule            Iraida Pham, APRN  07/05/24 6517

## 2024-07-05 NOTE — ED NOTES
[facility] paging ride out now unable to give eta, aurelio magallanes advised our truck can take the pt back to [facility] but will have to be when the truck gets back [place]

## 2024-07-06 LAB
QT INTERVAL: 484 MS
QTC INTERVAL: 428 MS

## 2024-07-07 LAB — BACTERIA SPEC AEROBE CULT: ABNORMAL

## 2024-07-10 LAB — BACTERIA SPEC AEROBE CULT: ABNORMAL

## 2024-07-12 ENCOUNTER — LAB REQUISITION (OUTPATIENT)
Dept: LAB | Facility: HOSPITAL | Age: 69
End: 2024-07-12
Payer: MEDICARE

## 2024-07-12 DIAGNOSIS — I10 ESSENTIAL (PRIMARY) HYPERTENSION: ICD-10-CM

## 2024-07-12 LAB
ALBUMIN SERPL-MCNC: 3.8 G/DL (ref 3.5–5.2)
ALBUMIN/GLOB SERPL: 1 G/DL
ALP SERPL-CCNC: 120 U/L (ref 39–117)
ALT SERPL W P-5'-P-CCNC: 11 U/L (ref 1–33)
AMMONIA BLD-SCNC: 19 UMOL/L (ref 11–51)
ANION GAP SERPL CALCULATED.3IONS-SCNC: 10.3 MMOL/L (ref 5–15)
AST SERPL-CCNC: 17 U/L (ref 1–32)
BACTERIA UR QL AUTO: NORMAL /HPF
BASOPHILS # BLD AUTO: 0.07 10*3/MM3 (ref 0–0.2)
BASOPHILS NFR BLD AUTO: 0.8 % (ref 0–1.5)
BILIRUB SERPL-MCNC: 0.4 MG/DL (ref 0–1.2)
BILIRUB UR QL STRIP: NEGATIVE
BUN SERPL-MCNC: 12 MG/DL (ref 8–23)
BUN/CREAT SERPL: 16.7 (ref 7–25)
CALCIUM SPEC-SCNC: 10 MG/DL (ref 8.6–10.5)
CHLORIDE SERPL-SCNC: 103 MMOL/L (ref 98–107)
CLARITY UR: ABNORMAL
CO2 SERPL-SCNC: 27.7 MMOL/L (ref 22–29)
COLOR UR: YELLOW
CREAT SERPL-MCNC: 0.72 MG/DL (ref 0.57–1)
DEPRECATED RDW RBC AUTO: 49.4 FL (ref 37–54)
EGFRCR SERPLBLD CKD-EPI 2021: 90.6 ML/MIN/1.73
EOSINOPHIL # BLD AUTO: 0.18 10*3/MM3 (ref 0–0.4)
EOSINOPHIL NFR BLD AUTO: 1.9 % (ref 0.3–6.2)
ERYTHROCYTE [DISTWIDTH] IN BLOOD BY AUTOMATED COUNT: 14.5 % (ref 12.3–15.4)
GLOBULIN UR ELPH-MCNC: 3.9 GM/DL
GLUCOSE SERPL-MCNC: 135 MG/DL (ref 65–99)
GLUCOSE UR STRIP-MCNC: NEGATIVE MG/DL
HCT VFR BLD AUTO: 40.3 % (ref 34–46.6)
HGB BLD-MCNC: 13.1 G/DL (ref 12–15.9)
HGB UR QL STRIP.AUTO: NEGATIVE
HYALINE CASTS UR QL AUTO: NORMAL /LPF
IMM GRANULOCYTES # BLD AUTO: 0.02 10*3/MM3 (ref 0–0.05)
IMM GRANULOCYTES NFR BLD AUTO: 0.2 % (ref 0–0.5)
KETONES UR QL STRIP: NEGATIVE
LEUKOCYTE ESTERASE UR QL STRIP.AUTO: NEGATIVE
LYMPHOCYTES # BLD AUTO: 1.88 10*3/MM3 (ref 0.7–3.1)
LYMPHOCYTES NFR BLD AUTO: 20.3 % (ref 19.6–45.3)
MCH RBC QN AUTO: 30.5 PG (ref 26.6–33)
MCHC RBC AUTO-ENTMCNC: 32.5 G/DL (ref 31.5–35.7)
MCV RBC AUTO: 93.7 FL (ref 79–97)
MONOCYTES # BLD AUTO: 0.61 10*3/MM3 (ref 0.1–0.9)
MONOCYTES NFR BLD AUTO: 6.6 % (ref 5–12)
NEUTROPHILS NFR BLD AUTO: 6.5 10*3/MM3 (ref 1.7–7)
NEUTROPHILS NFR BLD AUTO: 70.2 % (ref 42.7–76)
NITRITE UR QL STRIP: NEGATIVE
NRBC BLD AUTO-RTO: 0 /100 WBC (ref 0–0.2)
PH UR STRIP.AUTO: 8.5 [PH] (ref 5–8)
PLATELET # BLD AUTO: 355 10*3/MM3 (ref 140–450)
PMV BLD AUTO: 9.7 FL (ref 6–12)
POTASSIUM SERPL-SCNC: 3.6 MMOL/L (ref 3.5–5.2)
PROT SERPL-MCNC: 7.7 G/DL (ref 6–8.5)
PROT UR QL STRIP: NEGATIVE
RBC # BLD AUTO: 4.3 10*6/MM3 (ref 3.77–5.28)
RBC # UR STRIP: NORMAL /HPF
REF LAB TEST METHOD: NORMAL
SODIUM SERPL-SCNC: 141 MMOL/L (ref 136–145)
SP GR UR STRIP: 1.01 (ref 1–1.03)
SQUAMOUS #/AREA URNS HPF: NORMAL /HPF
UROBILINOGEN UR QL STRIP: ABNORMAL
WBC # UR STRIP: NORMAL /HPF
WBC NRBC COR # BLD AUTO: 9.26 10*3/MM3 (ref 3.4–10.8)

## 2024-07-12 PROCEDURE — 87186 SC STD MICRODIL/AGAR DIL: CPT | Performed by: NURSE PRACTITIONER

## 2024-07-12 PROCEDURE — 81001 URINALYSIS AUTO W/SCOPE: CPT | Performed by: NURSE PRACTITIONER

## 2024-07-12 PROCEDURE — 82140 ASSAY OF AMMONIA: CPT | Performed by: NURSE PRACTITIONER

## 2024-07-12 PROCEDURE — 87077 CULTURE AEROBIC IDENTIFY: CPT | Performed by: NURSE PRACTITIONER

## 2024-07-12 PROCEDURE — 87086 URINE CULTURE/COLONY COUNT: CPT | Performed by: NURSE PRACTITIONER

## 2024-07-12 PROCEDURE — 85025 COMPLETE CBC W/AUTO DIFF WBC: CPT | Performed by: NURSE PRACTITIONER

## 2024-07-12 PROCEDURE — 80053 COMPREHEN METABOLIC PANEL: CPT | Performed by: NURSE PRACTITIONER

## 2024-07-14 LAB — BACTERIA SPEC AEROBE CULT: ABNORMAL

## 2024-07-15 ENCOUNTER — LAB REQUISITION (OUTPATIENT)
Dept: LAB | Facility: HOSPITAL | Age: 69
End: 2024-07-15
Payer: MEDICARE

## 2024-07-15 DIAGNOSIS — R30.0 DYSURIA: ICD-10-CM

## 2024-07-15 DIAGNOSIS — I10 ESSENTIAL (PRIMARY) HYPERTENSION: ICD-10-CM

## 2024-07-15 DIAGNOSIS — D64.9 ANEMIA, UNSPECIFIED: ICD-10-CM

## 2024-07-15 LAB
BACTERIA UR QL AUTO: ABNORMAL /HPF
BASOPHILS # BLD AUTO: 0.07 10*3/MM3 (ref 0–0.2)
BASOPHILS NFR BLD AUTO: 0.9 % (ref 0–1.5)
BILIRUB UR QL STRIP: NEGATIVE
CLARITY UR: ABNORMAL
COLOR UR: YELLOW
CRP SERPL-MCNC: 0.69 MG/DL (ref 0–0.5)
DEPRECATED RDW RBC AUTO: 50.3 FL (ref 37–54)
EOSINOPHIL # BLD AUTO: 0.15 10*3/MM3 (ref 0–0.4)
EOSINOPHIL NFR BLD AUTO: 1.9 % (ref 0.3–6.2)
ERYTHROCYTE [DISTWIDTH] IN BLOOD BY AUTOMATED COUNT: 14.6 % (ref 12.3–15.4)
GLUCOSE UR STRIP-MCNC: NEGATIVE MG/DL
HCT VFR BLD AUTO: 34.8 % (ref 34–46.6)
HGB BLD-MCNC: 11.4 G/DL (ref 12–15.9)
HGB UR QL STRIP.AUTO: NEGATIVE
HYALINE CASTS UR QL AUTO: ABNORMAL /LPF
IMM GRANULOCYTES # BLD AUTO: 0.02 10*3/MM3 (ref 0–0.05)
IMM GRANULOCYTES NFR BLD AUTO: 0.2 % (ref 0–0.5)
KETONES UR QL STRIP: NEGATIVE
LEUKOCYTE ESTERASE UR QL STRIP.AUTO: ABNORMAL
LYMPHOCYTES # BLD AUTO: 2.31 10*3/MM3 (ref 0.7–3.1)
LYMPHOCYTES NFR BLD AUTO: 28.8 % (ref 19.6–45.3)
MCH RBC QN AUTO: 31.1 PG (ref 26.6–33)
MCHC RBC AUTO-ENTMCNC: 32.8 G/DL (ref 31.5–35.7)
MCV RBC AUTO: 94.8 FL (ref 79–97)
MONOCYTES # BLD AUTO: 0.61 10*3/MM3 (ref 0.1–0.9)
MONOCYTES NFR BLD AUTO: 7.6 % (ref 5–12)
NEUTROPHILS NFR BLD AUTO: 4.85 10*3/MM3 (ref 1.7–7)
NEUTROPHILS NFR BLD AUTO: 60.6 % (ref 42.7–76)
NITRITE UR QL STRIP: NEGATIVE
NRBC BLD AUTO-RTO: 0 /100 WBC (ref 0–0.2)
PH UR STRIP.AUTO: 7 [PH] (ref 5–8)
PLATELET # BLD AUTO: 286 10*3/MM3 (ref 140–450)
PMV BLD AUTO: 9.9 FL (ref 6–12)
PROT UR QL STRIP: NEGATIVE
RBC # BLD AUTO: 3.67 10*6/MM3 (ref 3.77–5.28)
RBC # UR STRIP: ABNORMAL /HPF
REF LAB TEST METHOD: ABNORMAL
SP GR UR STRIP: <=1.005 (ref 1–1.03)
SQUAMOUS #/AREA URNS HPF: ABNORMAL /HPF
UROBILINOGEN UR QL STRIP: ABNORMAL
WBC # UR STRIP: ABNORMAL /HPF
WBC NRBC COR # BLD AUTO: 8.01 10*3/MM3 (ref 3.4–10.8)

## 2024-07-15 PROCEDURE — 86140 C-REACTIVE PROTEIN: CPT | Performed by: INTERNAL MEDICINE

## 2024-07-15 PROCEDURE — 87086 URINE CULTURE/COLONY COUNT: CPT | Performed by: NURSE PRACTITIONER

## 2024-07-15 PROCEDURE — 87077 CULTURE AEROBIC IDENTIFY: CPT | Performed by: NURSE PRACTITIONER

## 2024-07-15 PROCEDURE — 87186 SC STD MICRODIL/AGAR DIL: CPT | Performed by: NURSE PRACTITIONER

## 2024-07-15 PROCEDURE — 81001 URINALYSIS AUTO W/SCOPE: CPT | Performed by: NURSE PRACTITIONER

## 2024-07-15 PROCEDURE — 85025 COMPLETE CBC W/AUTO DIFF WBC: CPT | Performed by: INTERNAL MEDICINE

## 2024-07-16 ENCOUNTER — HOSPITAL ENCOUNTER (EMERGENCY)
Facility: HOSPITAL | Age: 69
Discharge: HOME | End: 2024-07-16
Attending: STUDENT IN AN ORGANIZED HEALTH CARE EDUCATION/TRAINING PROGRAM
Payer: MEDICARE

## 2024-07-16 ENCOUNTER — APPOINTMENT (OUTPATIENT)
Dept: GENERAL RADIOLOGY | Facility: HOSPITAL | Age: 69
End: 2024-07-16
Payer: MEDICARE

## 2024-07-16 VITALS
DIASTOLIC BLOOD PRESSURE: 71 MMHG | TEMPERATURE: 98 F | HEART RATE: 60 BPM | OXYGEN SATURATION: 97 % | SYSTOLIC BLOOD PRESSURE: 142 MMHG | RESPIRATION RATE: 18 BRPM | WEIGHT: 141 LBS | BODY MASS INDEX: 22.66 KG/M2 | HEIGHT: 66 IN

## 2024-07-16 DIAGNOSIS — N39.0 URINARY TRACT INFECTION IN FEMALE: Primary | ICD-10-CM

## 2024-07-16 LAB
ALBUMIN SERPL-MCNC: 3.4 G/DL (ref 3.5–5.2)
ALBUMIN/GLOB SERPL: 1.1 G/DL
ALP SERPL-CCNC: 102 U/L (ref 39–117)
ALT SERPL W P-5'-P-CCNC: 10 U/L (ref 1–33)
ANION GAP SERPL CALCULATED.3IONS-SCNC: 8.5 MMOL/L (ref 5–15)
AST SERPL-CCNC: 14 U/L (ref 1–32)
BASOPHILS # BLD AUTO: 0.09 10*3/MM3 (ref 0–0.2)
BASOPHILS NFR BLD AUTO: 1.1 % (ref 0–1.5)
BILIRUB SERPL-MCNC: 0.2 MG/DL (ref 0–1.2)
BUN SERPL-MCNC: 15 MG/DL (ref 8–23)
BUN/CREAT SERPL: 15.5 (ref 7–25)
CALCIUM SPEC-SCNC: 9.2 MG/DL (ref 8.6–10.5)
CHLORIDE SERPL-SCNC: 107 MMOL/L (ref 98–107)
CO2 SERPL-SCNC: 27.5 MMOL/L (ref 22–29)
CREAT SERPL-MCNC: 0.97 MG/DL (ref 0.57–1)
DEPRECATED RDW RBC AUTO: 51.2 FL (ref 37–54)
EGFRCR SERPLBLD CKD-EPI 2021: 63.4 ML/MIN/1.73
EOSINOPHIL # BLD AUTO: 0.18 10*3/MM3 (ref 0–0.4)
EOSINOPHIL NFR BLD AUTO: 2.2 % (ref 0.3–6.2)
ERYTHROCYTE [DISTWIDTH] IN BLOOD BY AUTOMATED COUNT: 14.6 % (ref 12.3–15.4)
GLOBULIN UR ELPH-MCNC: 3.1 GM/DL
GLUCOSE SERPL-MCNC: 123 MG/DL (ref 65–99)
HCT VFR BLD AUTO: 34.5 % (ref 34–46.6)
HGB BLD-MCNC: 10.9 G/DL (ref 12–15.9)
IMM GRANULOCYTES # BLD AUTO: 0.02 10*3/MM3 (ref 0–0.05)
IMM GRANULOCYTES NFR BLD AUTO: 0.2 % (ref 0–0.5)
LYMPHOCYTES # BLD AUTO: 2.81 10*3/MM3 (ref 0.7–3.1)
LYMPHOCYTES NFR BLD AUTO: 35.1 % (ref 19.6–45.3)
MCH RBC QN AUTO: 30.4 PG (ref 26.6–33)
MCHC RBC AUTO-ENTMCNC: 31.6 G/DL (ref 31.5–35.7)
MCV RBC AUTO: 96.4 FL (ref 79–97)
MONOCYTES # BLD AUTO: 0.74 10*3/MM3 (ref 0.1–0.9)
MONOCYTES NFR BLD AUTO: 9.2 % (ref 5–12)
NEUTROPHILS NFR BLD AUTO: 4.17 10*3/MM3 (ref 1.7–7)
NEUTROPHILS NFR BLD AUTO: 52.2 % (ref 42.7–76)
NRBC BLD AUTO-RTO: 0 /100 WBC (ref 0–0.2)
PLATELET # BLD AUTO: 265 10*3/MM3 (ref 140–450)
PMV BLD AUTO: 9.2 FL (ref 6–12)
POTASSIUM SERPL-SCNC: 3.6 MMOL/L (ref 3.5–5.2)
PROT SERPL-MCNC: 6.5 G/DL (ref 6–8.5)
QT INTERVAL: 452 MS
QTC INTERVAL: 408 MS
RBC # BLD AUTO: 3.58 10*6/MM3 (ref 3.77–5.28)
SODIUM SERPL-SCNC: 143 MMOL/L (ref 136–145)
WBC NRBC COR # BLD AUTO: 8.01 10*3/MM3 (ref 3.4–10.8)

## 2024-07-16 PROCEDURE — 36415 COLL VENOUS BLD VENIPUNCTURE: CPT

## 2024-07-16 PROCEDURE — 71045 X-RAY EXAM CHEST 1 VIEW: CPT | Performed by: RADIOLOGY

## 2024-07-16 PROCEDURE — 93005 ELECTROCARDIOGRAM TRACING: CPT | Performed by: STUDENT IN AN ORGANIZED HEALTH CARE EDUCATION/TRAINING PROGRAM

## 2024-07-16 PROCEDURE — 85025 COMPLETE CBC W/AUTO DIFF WBC: CPT | Performed by: STUDENT IN AN ORGANIZED HEALTH CARE EDUCATION/TRAINING PROGRAM

## 2024-07-16 PROCEDURE — 80053 COMPREHEN METABOLIC PANEL: CPT | Performed by: STUDENT IN AN ORGANIZED HEALTH CARE EDUCATION/TRAINING PROGRAM

## 2024-07-16 PROCEDURE — 71045 X-RAY EXAM CHEST 1 VIEW: CPT

## 2024-07-16 PROCEDURE — 99284 EMERGENCY DEPT VISIT MOD MDM: CPT

## 2024-07-16 RX ORDER — NITROFURANTOIN 25; 75 MG/1; MG/1
100 CAPSULE ORAL 2 TIMES DAILY
Qty: 14 CAPSULE | Refills: 0 | Status: SHIPPED | OUTPATIENT
Start: 2024-07-16 | End: 2024-07-23

## 2024-07-16 RX ORDER — NITROFURANTOIN 25; 75 MG/1; MG/1
100 CAPSULE ORAL ONCE
Status: COMPLETED | OUTPATIENT
Start: 2024-07-16 | End: 2024-07-16

## 2024-07-16 RX ADMIN — NITROFURANTOIN MONOHYDRATE/MACROCRYSTALLINE 100 MG: 25; 75 CAPSULE ORAL at 17:35

## 2024-07-16 NOTE — ED PROVIDER NOTES
"Subjective   History of Present Illness  Patient is a 68-year-old female is brought to the ER for \"medical clearance \"by the nursing home.  Patient apparently has paranoia and has a bed at [facility].  For some reason she was sent here for \"medical clearance \".  There is no active complaints or issues.  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  GPC in urine on the 15th.      Review of Systems   Constitutional:  Negative for chills, fatigue and fever.   HENT:  Negative for ear pain, sinus pain and sore throat.    Respiratory:  Negative for cough, chest tightness, shortness of breath and wheezing.    Cardiovascular:  Negative for chest pain, palpitations and leg swelling.   Gastrointestinal:  Negative for abdominal pain, constipation, diarrhea, nausea and vomiting.   Genitourinary:  Negative for dysuria, hematuria and urgency.   Musculoskeletal:  Negative for arthralgias and myalgias.   Neurological:  Negative for dizziness, syncope and light-headedness.   Psychiatric/Behavioral:  Negative for confusion.        Past Medical History:   Diagnosis Date    Anemia     Anxiety     Arthritis     Depression     Legally blind     Restless leg syndrome     Sleep apnea     \"I don't use a cpap anymore since losing 106 lbs\"    Water retention        Allergies   Allergen Reactions    Penicillins Hives and Swelling       Past Surgical History:   Procedure Laterality Date    BACK SURGERY      CARDIAC CATHETERIZATION N/A 1/19/2024    Procedure: Percutaneous Manual Thrombectomy;  Surgeon: Efrain Hurley MD;  Location:  [facility];  Service: Interventional Radiology;  Laterality: N/A;    CARDIAC CATHETERIZATION N/A 4/12/2024    Procedure: Left Heart Cath;  Surgeon: Susan Mederos MD;  Location:  [facility];  Service: Cardiology;  Laterality: N/A;    GALLBLADDER SURGERY      HIP ARTHROSCOPY      JOINT REPLACEMENT Right        Family " "History   Problem Relation Age of Onset    Breast cancer Neg Hx        Social History     Socioeconomic History    Marital status:     Number of children: 0    Years of education: 1 yr college   Tobacco Use    Smoking status: Former     Current packs/day: 1.50     Average packs/day: 1.5 packs/day for 51.0 years (76.5 ttl pk-yrs)     Types: Cigarettes     Passive exposure: Past    Smokeless tobacco: Never   Vaping Use    Vaping status: Never Used   Substance and Sexual Activity    Alcohol use: Not Currently     Alcohol/week: 1.0 standard drink of alcohol     Types: 1 Glasses of wine per week     Comment: \"occasionally - once every two months\"    Drug use: Not Currently     Comment: alcohol - occasionally    Sexual activity: Defer           Objective   Physical Exam  Vitals and nursing note reviewed.   Constitutional:       Appearance: She is well-developed and normal weight.   HENT:      Head: Normocephalic and atraumatic.      Mouth/Throat:      Mouth: Mucous membranes are moist.      Pharynx: Oropharynx is clear.   Eyes:      Extraocular Movements: Extraocular movements intact.      Pupils: Pupils are equal, round, and reactive to light.   Cardiovascular:      Rate and Rhythm: Normal rate and regular rhythm.   Pulmonary:      Effort: Pulmonary effort is normal.      Breath sounds: Normal breath sounds.   Abdominal:      General: Bowel sounds are normal.      Palpations: Abdomen is soft.   Musculoskeletal:         General: Normal range of motion.      Cervical back: Normal range of motion and neck supple.   Skin:     General: Skin is warm and dry.   Neurological:      Mental Status: She is alert.         Procedures           ED Course  ED Course as of 07/16/24 1842 Tue Jul 16, 2024   1721 EKG interpretation: Sinus bradycardia 49 bpm QTc is 408 nonspecific changes but no acute ST elevations or depressions at this time.    Electronically signed by Robles Ramachandran DO, 07/16/24, 5:22 PM EDT.   [GF]      ED " Course User Index  [GF] Robles Ramachandran,                                              Medical Decision Making  -- Labs unremarkable, previous urine culture GPC.  Will give Macrobid, discharged back to nursing facility    Problems Addressed:  Urinary tract infection in female: complicated acute illness or injury    Amount and/or Complexity of Data Reviewed  Labs: ordered.  Radiology: ordered.  ECG/medicine tests: ordered.    Risk  Prescription drug management.        Final diagnoses:   Urinary tract infection in female       ED Disposition  ED Disposition       ED Disposition   Discharge    Condition   Stable    Comment   --               Dread Burton MD  [address] [place] [address]  [phone]    In 1 week           Medication List        New Prescriptions      nitrofurantoin (macrocrystal-monohydrate) 100 MG capsule  Commonly known as: MACROBID  Take 1 capsule by mouth 2 (Two) Times a Day for 7 days.            Stop      cefdinir 300 MG capsule  Commonly known as: OMNICEF               Where to Get Your Medications        These medications were sent to [facility] [place], [place] - [address] [place] [address] - [phone]  - [phone]   [address] [address], [facility] [address]      Phone: [phone]   nitrofurantoin (macrocrystal-monohydrate) 100 MG capsule            Km Finn,   07/16/24 1842       Km iFnn DO  07/16/24 1842

## 2024-07-18 LAB — BACTERIA SPEC AEROBE CULT: ABNORMAL

## 2024-07-31 ENCOUNTER — LAB REQUISITION (OUTPATIENT)
Dept: LAB | Facility: HOSPITAL | Age: 69
End: 2024-07-31
Payer: MEDICARE

## 2024-07-31 DIAGNOSIS — I10 ESSENTIAL (PRIMARY) HYPERTENSION: ICD-10-CM

## 2024-07-31 LAB
ALBUMIN SERPL-MCNC: 3.4 G/DL (ref 3.5–5.2)
ALBUMIN/GLOB SERPL: 0.9 G/DL
ALP SERPL-CCNC: 92 U/L (ref 39–117)
ALT SERPL W P-5'-P-CCNC: 10 U/L (ref 1–33)
AMMONIA BLD-SCNC: 14 UMOL/L (ref 11–51)
ANION GAP SERPL CALCULATED.3IONS-SCNC: 9.7 MMOL/L (ref 5–15)
ANION GAP SERPL CALCULATED.3IONS-SCNC: 9.7 MMOL/L (ref 5–15)
AST SERPL-CCNC: 14 U/L (ref 1–32)
BASOPHILS # BLD AUTO: 0.09 10*3/MM3 (ref 0–0.2)
BASOPHILS NFR BLD AUTO: 1 % (ref 0–1.5)
BILIRUB SERPL-MCNC: 0.3 MG/DL (ref 0–1.2)
BUN SERPL-MCNC: 15 MG/DL (ref 8–23)
BUN SERPL-MCNC: 15 MG/DL (ref 8–23)
BUN/CREAT SERPL: 20.5 (ref 7–25)
BUN/CREAT SERPL: 20.5 (ref 7–25)
CALCIUM SPEC-SCNC: 9.4 MG/DL (ref 8.6–10.5)
CALCIUM SPEC-SCNC: 9.4 MG/DL (ref 8.6–10.5)
CHLORIDE SERPL-SCNC: 105 MMOL/L (ref 98–107)
CHLORIDE SERPL-SCNC: 105 MMOL/L (ref 98–107)
CO2 SERPL-SCNC: 27.3 MMOL/L (ref 22–29)
CO2 SERPL-SCNC: 27.3 MMOL/L (ref 22–29)
CREAT SERPL-MCNC: 0.73 MG/DL (ref 0.57–1)
CREAT SERPL-MCNC: 0.73 MG/DL (ref 0.57–1)
DEPRECATED RDW RBC AUTO: 50.5 FL (ref 37–54)
EGFRCR SERPLBLD CKD-EPI 2021: 89.2 ML/MIN/1.73
EGFRCR SERPLBLD CKD-EPI 2021: 89.2 ML/MIN/1.73
EOSINOPHIL # BLD AUTO: 0.16 10*3/MM3 (ref 0–0.4)
EOSINOPHIL NFR BLD AUTO: 1.8 % (ref 0.3–6.2)
ERYTHROCYTE [DISTWIDTH] IN BLOOD BY AUTOMATED COUNT: 14.3 % (ref 12.3–15.4)
GLOBULIN UR ELPH-MCNC: 3.9 GM/DL
GLUCOSE SERPL-MCNC: 112 MG/DL (ref 65–99)
GLUCOSE SERPL-MCNC: 112 MG/DL (ref 65–99)
HCT VFR BLD AUTO: 35.4 % (ref 34–46.6)
HGB BLD-MCNC: 11.5 G/DL (ref 12–15.9)
IMM GRANULOCYTES # BLD AUTO: 0.03 10*3/MM3 (ref 0–0.05)
IMM GRANULOCYTES NFR BLD AUTO: 0.3 % (ref 0–0.5)
LYMPHOCYTES # BLD AUTO: 2.88 10*3/MM3 (ref 0.7–3.1)
LYMPHOCYTES NFR BLD AUTO: 32.4 % (ref 19.6–45.3)
MCH RBC QN AUTO: 31.3 PG (ref 26.6–33)
MCHC RBC AUTO-ENTMCNC: 32.5 G/DL (ref 31.5–35.7)
MCV RBC AUTO: 96.5 FL (ref 79–97)
MONOCYTES # BLD AUTO: 0.84 10*3/MM3 (ref 0.1–0.9)
MONOCYTES NFR BLD AUTO: 9.4 % (ref 5–12)
NEUTROPHILS NFR BLD AUTO: 4.9 10*3/MM3 (ref 1.7–7)
NEUTROPHILS NFR BLD AUTO: 55.1 % (ref 42.7–76)
NRBC BLD AUTO-RTO: 0 /100 WBC (ref 0–0.2)
PLATELET # BLD AUTO: 283 10*3/MM3 (ref 140–450)
PMV BLD AUTO: 9.8 FL (ref 6–12)
POTASSIUM SERPL-SCNC: 3.6 MMOL/L (ref 3.5–5.2)
POTASSIUM SERPL-SCNC: 3.6 MMOL/L (ref 3.5–5.2)
PROT SERPL-MCNC: 7.3 G/DL (ref 6–8.5)
RBC # BLD AUTO: 3.67 10*6/MM3 (ref 3.77–5.28)
SODIUM SERPL-SCNC: 142 MMOL/L (ref 136–145)
SODIUM SERPL-SCNC: 142 MMOL/L (ref 136–145)
TSH SERPL DL<=0.05 MIU/L-ACNC: 2.02 UIU/ML (ref 0.27–4.2)
WBC NRBC COR # BLD AUTO: 8.9 10*3/MM3 (ref 3.4–10.8)

## 2024-07-31 PROCEDURE — 80053 COMPREHEN METABOLIC PANEL: CPT | Performed by: INTERNAL MEDICINE

## 2024-07-31 PROCEDURE — 84443 ASSAY THYROID STIM HORMONE: CPT | Performed by: INTERNAL MEDICINE

## 2024-07-31 PROCEDURE — 85025 COMPLETE CBC W/AUTO DIFF WBC: CPT | Performed by: INTERNAL MEDICINE

## 2024-07-31 PROCEDURE — 82140 ASSAY OF AMMONIA: CPT | Performed by: INTERNAL MEDICINE

## 2024-08-07 NOTE — PROGRESS NOTES
[facility]     Patient Identification:  Name:  Regine Beckham  Age:  68 y.o.  Sex:  female  :  1955  MRN:  [id number]  Visit Number:  [id number]  ROOM: [address] [place] [address]     Primary Care Provider:  Dread Burton MD     Date of Admission: 2024    Length of stay in inpatient status:  1    Subjective     Chief Compliant:    Chief Complaint   Patient presents with    Fall       Patient with no complaints this am. Some urgency reported but not significantly changed from baseline.         Objective       Vital Signs:  Temp:  [98.2 °F (36.8 °C)-98.7 °F (37.1 °C)] 98.7 °F (37.1 °C)  Heart Rate:  [70-83] 80  Resp:  [12-16] 16  BP: (102-123)/(53-67) 102/53  SpO2:  [96 %-97 %] 97 %  on   ;   Device (Oxygen Therapy): room air  Body mass index is 23.56 kg/m².      ----------------------------------------------------------------------------------------------------------------------  Physical Exam  Vitals and nursing note reviewed.   Constitutional:       General: She is not in acute distress.  HENT:      Head: Normocephalic and atraumatic.   Eyes:      General: No scleral icterus.     Extraocular Movements: Extraocular movements intact.   Pulmonary:      Effort: Pulmonary effort is normal. No respiratory distress.   Abdominal:      Tenderness: There is no abdominal tenderness.   Musculoskeletal:      Right lower leg: No edema.      Left lower leg: No edema.   Neurological:      Mental Status: She is alert.      Motor: Weakness present.   Psychiatric:         Mood and Affect: Mood normal.         Behavior: Behavior normal.       ----------------------------------------------------------------------------------------------------------------------          Assessment & Plan      #Acute on chronic debility secondary to previous CVA  -Residual left sided weakness, initially resistant to IPR due to co-pay but with insurance changes is now agreeable  -IPR consult placed    #Bacteruria,  done simple cystitis  -On UA bacteria concentration increasing across prior samples. Mild urgency reported, unsure reliability to report and more detailed hx  -Start Macrobid x5 days to cover  -f/u urine cx        Code Status and Medical Interventions:   Ordered at: 04/26/24 2698     Code Status (Patient has no pulse and is not breathing):    CPR (Attempt to Resuscitate)     Medical Interventions (Patient has pulse or is breathing):    Full Support         Disposition: pend ipr    I have reviewed any copied/forwarded text or data, verified its accuracy, and updated as necessary above.    Matheus Lundberg MD  [facility] Hospitalist  04/27/24  18:39 EDT

## 2024-08-09 ENCOUNTER — LAB REQUISITION (OUTPATIENT)
Dept: LAB | Facility: HOSPITAL | Age: 69
End: 2024-08-09
Payer: MEDICARE

## 2024-08-09 DIAGNOSIS — I10 ESSENTIAL (PRIMARY) HYPERTENSION: ICD-10-CM

## 2024-08-09 DIAGNOSIS — R29.6 REPEATED FALLS: ICD-10-CM

## 2024-08-09 DIAGNOSIS — E78.5 HYPERLIPIDEMIA, UNSPECIFIED: ICD-10-CM

## 2024-08-09 DIAGNOSIS — D64.9 ANEMIA, UNSPECIFIED: ICD-10-CM

## 2024-08-09 LAB
ANION GAP SERPL CALCULATED.3IONS-SCNC: 12.3 MMOL/L (ref 5–15)
BASOPHILS # BLD AUTO: 0.07 10*3/MM3 (ref 0–0.2)
BASOPHILS NFR BLD AUTO: 0.8 % (ref 0–1.5)
BILIRUB UR QL STRIP: NEGATIVE
BUN SERPL-MCNC: 16 MG/DL (ref 8–23)
BUN/CREAT SERPL: 19.8 (ref 7–25)
CALCIUM SPEC-SCNC: 9.6 MG/DL (ref 8.6–10.5)
CHLORIDE SERPL-SCNC: 102 MMOL/L (ref 98–107)
CLARITY UR: ABNORMAL
CO2 SERPL-SCNC: 25.7 MMOL/L (ref 22–29)
COLOR UR: YELLOW
CREAT SERPL-MCNC: 0.81 MG/DL (ref 0.57–1)
DEPRECATED RDW RBC AUTO: 46.8 FL (ref 37–54)
EGFRCR SERPLBLD CKD-EPI 2021: 78.7 ML/MIN/1.73
EOSINOPHIL # BLD AUTO: 0.09 10*3/MM3 (ref 0–0.4)
EOSINOPHIL NFR BLD AUTO: 1 % (ref 0.3–6.2)
ERYTHROCYTE [DISTWIDTH] IN BLOOD BY AUTOMATED COUNT: 13.8 % (ref 12.3–15.4)
GLUCOSE SERPL-MCNC: 135 MG/DL (ref 65–99)
GLUCOSE UR STRIP-MCNC: NEGATIVE MG/DL
HCT VFR BLD AUTO: 37.5 % (ref 34–46.6)
HGB BLD-MCNC: 12.5 G/DL (ref 12–15.9)
HGB UR QL STRIP.AUTO: NEGATIVE
IMM GRANULOCYTES # BLD AUTO: 0.02 10*3/MM3 (ref 0–0.05)
IMM GRANULOCYTES NFR BLD AUTO: 0.2 % (ref 0–0.5)
KETONES UR QL STRIP: NEGATIVE
LEUKOCYTE ESTERASE UR QL STRIP.AUTO: NEGATIVE
LYMPHOCYTES # BLD AUTO: 2.71 10*3/MM3 (ref 0.7–3.1)
LYMPHOCYTES NFR BLD AUTO: 31 % (ref 19.6–45.3)
MCH RBC QN AUTO: 31 PG (ref 26.6–33)
MCHC RBC AUTO-ENTMCNC: 33.3 G/DL (ref 31.5–35.7)
MCV RBC AUTO: 93.1 FL (ref 79–97)
MONOCYTES # BLD AUTO: 0.92 10*3/MM3 (ref 0.1–0.9)
MONOCYTES NFR BLD AUTO: 10.5 % (ref 5–12)
NEUTROPHILS NFR BLD AUTO: 4.93 10*3/MM3 (ref 1.7–7)
NEUTROPHILS NFR BLD AUTO: 56.5 % (ref 42.7–76)
NITRITE UR QL STRIP: NEGATIVE
NRBC BLD AUTO-RTO: 0 /100 WBC (ref 0–0.2)
PH UR STRIP.AUTO: 7.5 [PH] (ref 5–8)
PLATELET # BLD AUTO: 289 10*3/MM3 (ref 140–450)
PMV BLD AUTO: 10.5 FL (ref 6–12)
POTASSIUM SERPL-SCNC: 3.4 MMOL/L (ref 3.5–5.2)
PROT UR QL STRIP: ABNORMAL
RBC # BLD AUTO: 4.03 10*6/MM3 (ref 3.77–5.28)
SODIUM SERPL-SCNC: 140 MMOL/L (ref 136–145)
SP GR UR STRIP: 1.01 (ref 1–1.03)
UROBILINOGEN UR QL STRIP: ABNORMAL
WBC NRBC COR # BLD AUTO: 8.74 10*3/MM3 (ref 3.4–10.8)

## 2024-08-09 PROCEDURE — 85025 COMPLETE CBC W/AUTO DIFF WBC: CPT | Performed by: INTERNAL MEDICINE

## 2024-08-09 PROCEDURE — 80048 BASIC METABOLIC PNL TOTAL CA: CPT | Performed by: INTERNAL MEDICINE

## 2024-08-09 PROCEDURE — 81003 URINALYSIS AUTO W/O SCOPE: CPT | Performed by: INTERNAL MEDICINE

## 2024-08-12 ENCOUNTER — APPOINTMENT (OUTPATIENT)
Dept: CT IMAGING | Facility: HOSPITAL | Age: 69
End: 2024-08-12
Payer: MEDICARE

## 2024-08-12 ENCOUNTER — HOSPITAL ENCOUNTER (EMERGENCY)
Facility: HOSPITAL | Age: 69
Discharge: NURSING, REHAB OR LONG-TERM CARE | End: 2024-08-12
Attending: STUDENT IN AN ORGANIZED HEALTH CARE EDUCATION/TRAINING PROGRAM | Admitting: STUDENT IN AN ORGANIZED HEALTH CARE EDUCATION/TRAINING PROGRAM
Payer: MEDICARE

## 2024-08-12 ENCOUNTER — APPOINTMENT (OUTPATIENT)
Dept: GENERAL RADIOLOGY | Facility: HOSPITAL | Age: 69
End: 2024-08-12
Payer: MEDICARE

## 2024-08-12 ENCOUNTER — LAB REQUISITION (OUTPATIENT)
Dept: LAB | Facility: HOSPITAL | Age: 69
End: 2024-08-12
Payer: MEDICARE

## 2024-08-12 VITALS
TEMPERATURE: 97.1 F | DIASTOLIC BLOOD PRESSURE: 61 MMHG | BODY MASS INDEX: 22.5 KG/M2 | WEIGHT: 140 LBS | SYSTOLIC BLOOD PRESSURE: 131 MMHG | HEART RATE: 62 BPM | RESPIRATION RATE: 15 BRPM | HEIGHT: 66 IN | OXYGEN SATURATION: 98 %

## 2024-08-12 DIAGNOSIS — N39.0 URINARY TRACT INFECTION WITHOUT HEMATURIA, SITE UNSPECIFIED: Primary | ICD-10-CM

## 2024-08-12 DIAGNOSIS — Z79.899 OTHER LONG TERM (CURRENT) DRUG THERAPY: ICD-10-CM

## 2024-08-12 LAB
ALBUMIN SERPL-MCNC: 3.5 G/DL (ref 3.5–5.2)
ALBUMIN/GLOB SERPL: 0.8 G/DL
ALP SERPL-CCNC: 82 U/L (ref 39–117)
ALT SERPL W P-5'-P-CCNC: 15 U/L (ref 1–33)
AMPHET+METHAMPHET UR QL: NEGATIVE
AMPHETAMINES UR QL: NEGATIVE
ANION GAP SERPL CALCULATED.3IONS-SCNC: 12.5 MMOL/L (ref 5–15)
AST SERPL-CCNC: 22 U/L (ref 1–32)
BACTERIA UR QL AUTO: ABNORMAL /HPF
BARBITURATES UR QL SCN: NEGATIVE
BASOPHILS # BLD AUTO: 0.05 10*3/MM3 (ref 0–0.2)
BASOPHILS NFR BLD AUTO: 0.6 % (ref 0–1.5)
BENZODIAZ UR QL SCN: NEGATIVE
BILIRUB SERPL-MCNC: 0.4 MG/DL (ref 0–1.2)
BILIRUB UR QL STRIP: NEGATIVE
BUN SERPL-MCNC: 25 MG/DL (ref 8–23)
BUN/CREAT SERPL: 30.1 (ref 7–25)
BUPRENORPHINE SERPL-MCNC: NEGATIVE NG/ML
CALCIUM SPEC-SCNC: 9.9 MG/DL (ref 8.6–10.5)
CANNABINOIDS SERPL QL: NEGATIVE
CHLORIDE SERPL-SCNC: 106 MMOL/L (ref 98–107)
CLARITY UR: ABNORMAL
CO2 SERPL-SCNC: 26.5 MMOL/L (ref 22–29)
COCAINE UR QL: NEGATIVE
COLOR UR: ABNORMAL
CREAT SERPL-MCNC: 0.83 MG/DL (ref 0.57–1)
DEPRECATED RDW RBC AUTO: 47.4 FL (ref 37–54)
EGFRCR SERPLBLD CKD-EPI 2021: 76.4 ML/MIN/1.73
EOSINOPHIL # BLD AUTO: 0.03 10*3/MM3 (ref 0–0.4)
EOSINOPHIL NFR BLD AUTO: 0.4 % (ref 0.3–6.2)
ERYTHROCYTE [DISTWIDTH] IN BLOOD BY AUTOMATED COUNT: 13.5 % (ref 12.3–15.4)
ETHANOL BLD-MCNC: <10 MG/DL (ref 0–10)
ETHANOL UR QL: <0.01 %
FENTANYL UR-MCNC: NEGATIVE NG/ML
GLOBULIN UR ELPH-MCNC: 4.2 GM/DL
GLUCOSE SERPL-MCNC: 103 MG/DL (ref 65–99)
GLUCOSE UR STRIP-MCNC: NEGATIVE MG/DL
HCT VFR BLD AUTO: 39.4 % (ref 34–46.6)
HGB BLD-MCNC: 12.8 G/DL (ref 12–15.9)
HGB UR QL STRIP.AUTO: ABNORMAL
HOLD SPECIMEN: NORMAL
HOLD SPECIMEN: NORMAL
HYALINE CASTS UR QL AUTO: ABNORMAL /LPF
IMM GRANULOCYTES # BLD AUTO: 0.03 10*3/MM3 (ref 0–0.05)
IMM GRANULOCYTES NFR BLD AUTO: 0.4 % (ref 0–0.5)
KETONES UR QL STRIP: NEGATIVE
LEUKOCYTE ESTERASE UR QL STRIP.AUTO: ABNORMAL
LYMPHOCYTES # BLD AUTO: 1.97 10*3/MM3 (ref 0.7–3.1)
LYMPHOCYTES NFR BLD AUTO: 24.1 % (ref 19.6–45.3)
MCH RBC QN AUTO: 30.9 PG (ref 26.6–33)
MCHC RBC AUTO-ENTMCNC: 32.5 G/DL (ref 31.5–35.7)
MCV RBC AUTO: 95.2 FL (ref 79–97)
METHADONE UR QL SCN: NEGATIVE
MONOCYTES # BLD AUTO: 0.8 10*3/MM3 (ref 0.1–0.9)
MONOCYTES NFR BLD AUTO: 9.8 % (ref 5–12)
NEUTROPHILS NFR BLD AUTO: 5.28 10*3/MM3 (ref 1.7–7)
NEUTROPHILS NFR BLD AUTO: 64.7 % (ref 42.7–76)
NITRITE UR QL STRIP: NEGATIVE
NRBC BLD AUTO-RTO: 0 /100 WBC (ref 0–0.2)
OPIATES UR QL: NEGATIVE
OXYCODONE UR QL SCN: NEGATIVE
PCP UR QL SCN: NEGATIVE
PH UR STRIP.AUTO: 8.5 [PH] (ref 5–8)
PLATELET # BLD AUTO: 305 10*3/MM3 (ref 140–450)
PMV BLD AUTO: 9.6 FL (ref 6–12)
POTASSIUM SERPL-SCNC: 3.7 MMOL/L (ref 3.5–5.2)
PROT SERPL-MCNC: 7.7 G/DL (ref 6–8.5)
PROT UR QL STRIP: ABNORMAL
QT INTERVAL: 456 MS
QTC INTERVAL: 447 MS
RBC # BLD AUTO: 4.14 10*6/MM3 (ref 3.77–5.28)
RBC # UR STRIP: ABNORMAL /HPF
REF LAB TEST METHOD: ABNORMAL
SODIUM SERPL-SCNC: 145 MMOL/L (ref 136–145)
SP GR UR STRIP: 1.01 (ref 1–1.03)
SQUAMOUS #/AREA URNS HPF: ABNORMAL /HPF
TRICYCLICS UR QL SCN: NEGATIVE
UROBILINOGEN UR QL STRIP: ABNORMAL
VALPROATE SERPL-MCNC: 49.9 MCG/ML (ref 50–125)
WBC # UR STRIP: ABNORMAL /HPF
WBC NRBC COR # BLD AUTO: 8.16 10*3/MM3 (ref 3.4–10.8)
WHOLE BLOOD HOLD COAG: NORMAL
WHOLE BLOOD HOLD SPECIMEN: NORMAL

## 2024-08-12 PROCEDURE — 70450 CT HEAD/BRAIN W/O DYE: CPT | Performed by: RADIOLOGY

## 2024-08-12 PROCEDURE — 85025 COMPLETE CBC W/AUTO DIFF WBC: CPT

## 2024-08-12 PROCEDURE — 80307 DRUG TEST PRSMV CHEM ANLYZR: CPT

## 2024-08-12 PROCEDURE — 93005 ELECTROCARDIOGRAM TRACING: CPT

## 2024-08-12 PROCEDURE — 81001 URINALYSIS AUTO W/SCOPE: CPT

## 2024-08-12 PROCEDURE — 70450 CT HEAD/BRAIN W/O DYE: CPT

## 2024-08-12 PROCEDURE — P9612 CATHETERIZE FOR URINE SPEC: HCPCS

## 2024-08-12 PROCEDURE — 71045 X-RAY EXAM CHEST 1 VIEW: CPT | Performed by: RADIOLOGY

## 2024-08-12 PROCEDURE — 80164 ASSAY DIPROPYLACETIC ACD TOT: CPT | Performed by: INTERNAL MEDICINE

## 2024-08-12 PROCEDURE — 93010 ELECTROCARDIOGRAM REPORT: CPT | Performed by: INTERNAL MEDICINE

## 2024-08-12 PROCEDURE — 99284 EMERGENCY DEPT VISIT MOD MDM: CPT

## 2024-08-12 PROCEDURE — 71045 X-RAY EXAM CHEST 1 VIEW: CPT

## 2024-08-12 PROCEDURE — 82077 ASSAY SPEC XCP UR&BREATH IA: CPT

## 2024-08-12 PROCEDURE — 80053 COMPREHEN METABOLIC PANEL: CPT

## 2024-08-12 PROCEDURE — 36415 COLL VENOUS BLD VENIPUNCTURE: CPT

## 2024-08-12 RX ORDER — NITROFURANTOIN 25; 75 MG/1; MG/1
100 CAPSULE ORAL ONCE
Status: COMPLETED | OUTPATIENT
Start: 2024-08-12 | End: 2024-08-12

## 2024-08-12 RX ORDER — POLYETHYLENE GLYCOL 3350 17 G/17G
17 POWDER, FOR SOLUTION ORAL DAILY
Status: DISCONTINUED | OUTPATIENT
Start: 2024-08-12 | End: 2024-08-12

## 2024-08-12 RX ORDER — NITROFURANTOIN 25; 75 MG/1; MG/1
100 CAPSULE ORAL 2 TIMES DAILY
Qty: 14 CAPSULE | Refills: 0 | Status: SHIPPED | OUTPATIENT
Start: 2024-08-12

## 2024-08-12 RX ADMIN — NITROFURANTOIN MONOHYDRATE/MACROCRYSTALLINE 100 MG: 25; 75 CAPSULE ORAL at 14:54

## 2024-08-12 NOTE — ED PROVIDER NOTES
"Subjective   History of Present Illness  Patient is a 68-year-old female with past medical history of anemia, restless leg syndrome, stroke, depression, and arthritis.  Patient presents from [facility].  Nursing home reports that patient has been more lethargic today.  Nursing home reports that family visited patient yesterday and that is when the lethargy began.  Nursing home reports that patient was sent here for a urine tox screen.  Patient denies chest pain/pressure/tightness, shortness of breath, abdominal pain, nausea/vomiting, or any known fever.  Patient denies dysuria or difficulty urinating.  Patient does have slurred speech, however reports that she has had a stroke earlier this year and has left-sided deficits as well as slurred speech from the stroke. Patient presents awake and alert.  Patient is oriented x 4 upon arrival to the ER and in no acute distress.  Patient has no complaints upon arrival to the ER.  Patient presents EMS from [facility].        Review of Systems   Constitutional: Negative.  Negative for fever.   HENT: Negative.     Respiratory: Negative.  Negative for chest tightness and shortness of breath.    Cardiovascular: Negative.  Negative for chest pain.   Gastrointestinal: Negative.  Negative for abdominal pain.   Endocrine: Negative.    Genitourinary: Negative.  Negative for dysuria.   Skin: Negative.    Neurological: Negative.    Psychiatric/Behavioral: Negative.  Negative for confusion.    All other systems reviewed and are negative.      Past Medical History:   Diagnosis Date    Anemia     Anxiety     Arthritis     Depression     Legally blind     Restless leg syndrome     Sleep apnea     \"I don't use a cpap anymore since losing 106 lbs\"    Water retention        Allergies   Allergen Reactions    Penicillins Hives and Swelling       Past Surgical History:   Procedure Laterality Date    BACK SURGERY      CARDIAC CATHETERIZATION N/A 1/19/2024    Procedure: " "Percutaneous Manual Thrombectomy;  Surgeon: Efrain Hurley MD;  Location:  [facility];  Service: Interventional Radiology;  Laterality: N/A;    CARDIAC CATHETERIZATION N/A 4/12/2024    Procedure: Left Heart Cath;  Surgeon: Susan Mederos MD;  Location:  [facility];  Service: Cardiology;  Laterality: N/A;    GALLBLADDER SURGERY      HIP ARTHROSCOPY      JOINT REPLACEMENT Right        Family History   Problem Relation Age of Onset    Breast cancer Neg Hx        Social History     Socioeconomic History    Marital status:     Number of children: 0    Years of education: 1 yr college   Tobacco Use    Smoking status: Former     Current packs/day: 1.50     Average packs/day: 1.5 packs/day for 51.0 years (76.5 ttl pk-yrs)     Types: Cigarettes     Passive exposure: Past    Smokeless tobacco: Never   Vaping Use    Vaping status: Never Used   Substance and Sexual Activity    Alcohol use: Not Currently     Alcohol/week: 1.0 standard drink of alcohol     Types: 1 Glasses of wine per week     Comment: \"occasionally - once every two months\"    Drug use: Not Currently     Comment: alcohol - occasionally    Sexual activity: Defer           Objective   Physical Exam  Vitals and nursing note reviewed.   Constitutional:       General: She is not in acute distress.     Appearance: She is well-developed. She is not diaphoretic.   HENT:      Head: Normocephalic and atraumatic.      Right Ear: External ear normal.      Left Ear: External ear normal.      Nose: Nose normal.   Eyes:      Conjunctiva/sclera: Conjunctivae normal.      Pupils: Pupils are equal, round, and reactive to light.   Neck:      Vascular: No JVD.      Trachea: No tracheal deviation.   Cardiovascular:      Rate and Rhythm: Normal rate and regular rhythm.      Heart sounds: Normal heart sounds. No murmur heard.  Pulmonary:      Effort: Pulmonary effort is normal. No respiratory distress.      Breath sounds: Normal breath " sounds. No wheezing.   Abdominal:      General: Bowel sounds are normal.      Palpations: Abdomen is soft.      Tenderness: There is no abdominal tenderness.   Musculoskeletal:         General: No deformity. Normal range of motion.      Cervical back: Normal range of motion and neck supple.   Skin:     General: Skin is warm and dry.      Coloration: Skin is not pale.      Findings: No erythema or rash.   Neurological:      Mental Status: She is alert and oriented to person, place, and time.      GCS: GCS eye subscore is 4. GCS verbal subscore is 5. GCS motor subscore is 6.      Cranial Nerves: No cranial nerve deficit.      Comments: Patient noted to have left-sided weakness, a slight left-sided facial droop, and slurred speech.  Patient reports that this is not new and related to her stroke that she had earlier this year.   Psychiatric:         Behavior: Behavior normal.         Thought Content: Thought content normal.       Results for orders placed or performed during the hospital encounter of 08/12/24   Comprehensive Metabolic Panel    Specimen: Arm, Right; Blood   Result Value Ref Range    Glucose 103 (H) 65 - 99 mg/dL    BUN 25 (H) 8 - 23 mg/dL    Creatinine 0.83 0.57 - 1.00 mg/dL    Sodium 145 136 - 145 mmol/L    Potassium 3.7 3.5 - 5.2 mmol/L    Chloride 106 98 - 107 mmol/L    CO2 26.5 22.0 - 29.0 mmol/L    Calcium 9.9 8.6 - 10.5 mg/dL    Total Protein 7.7 6.0 - 8.5 g/dL    Albumin 3.5 3.5 - 5.2 g/dL    ALT (SGPT) 15 1 - 33 U/L    AST (SGOT) 22 1 - 32 U/L    Alkaline Phosphatase 82 39 - 117 U/L    Total Bilirubin 0.4 0.0 - 1.2 mg/dL    Globulin 4.2 gm/dL    A/G Ratio 0.8 g/dL    BUN/Creatinine Ratio 30.1 (H) 7.0 - 25.0    Anion Gap 12.5 5.0 - 15.0 mmol/L    eGFR 76.4 >60.0 mL/min/1.73   Urinalysis With Culture If Indicated - Straight Cath    Specimen: Straight Cath; Urine   Result Value Ref Range    Color, UA Dark Yellow (A) Yellow, Straw    Appearance, UA Turbid (A) Clear    pH, UA 8.5 (H) 5.0 - 8.0     Specific Gravity, UA 1.012 1.005 - 1.030    Glucose, UA Negative Negative    Ketones, UA Negative Negative    Bilirubin, UA Negative Negative    Blood, UA Small (1+) (A) Negative    Protein,  mg/dL (2+) (A) Negative    Leuk Esterase, UA Small (1+) (A) Negative    Nitrite, UA Negative Negative    Urobilinogen, UA 1.0 E.U./dL 0.2 - 1.0 E.U./dL   Urine Drug Screen - Urine, Clean Catch    Specimen: Urine, Clean Catch   Result Value Ref Range    THC, Screen, Urine Negative Negative    Phencyclidine (PCP), Urine Negative Negative    Cocaine Screen, Urine Negative Negative    Methamphetamine, Ur Negative Negative    Opiate Screen Negative Negative    Amphetamine Screen, Urine Negative Negative    Benzodiazepine Screen, Urine Negative Negative    Tricyclic Antidepressants Screen Negative Negative    Methadone Screen, Urine Negative Negative    Barbiturates Screen, Urine Negative Negative    Oxycodone Screen, Urine Negative Negative    Buprenorphine, Screen, Urine Negative Negative   Ethanol    Specimen: Arm, Right; Blood   Result Value Ref Range    Ethanol <10 0 - 10 mg/dL    Ethanol % <0.010 %   CBC Auto Differential    Specimen: Arm, Right; Blood   Result Value Ref Range    WBC 8.16 3.40 - 10.80 10*3/mm3    RBC 4.14 3.77 - 5.28 10*6/mm3    Hemoglobin 12.8 12.0 - 15.9 g/dL    Hematocrit 39.4 34.0 - 46.6 %    MCV 95.2 79.0 - 97.0 fL    MCH 30.9 26.6 - 33.0 pg    MCHC 32.5 31.5 - 35.7 g/dL    RDW 13.5 12.3 - 15.4 %    RDW-SD 47.4 37.0 - 54.0 fl    MPV 9.6 6.0 - 12.0 fL    Platelets 305 140 - 450 10*3/mm3    Neutrophil % 64.7 42.7 - 76.0 %    Lymphocyte % 24.1 19.6 - 45.3 %    Monocyte % 9.8 5.0 - 12.0 %    Eosinophil % 0.4 0.3 - 6.2 %    Basophil % 0.6 0.0 - 1.5 %    Immature Grans % 0.4 0.0 - 0.5 %    Neutrophils, Absolute 5.28 1.70 - 7.00 10*3/mm3    Lymphocytes, Absolute 1.97 0.70 - 3.10 10*3/mm3    Monocytes, Absolute 0.80 0.10 - 0.90 10*3/mm3    Eosinophils, Absolute 0.03 0.00 - 0.40 10*3/mm3    Basophils,  Absolute 0.05 0.00 - 0.20 10*3/mm3    Immature Grans, Absolute 0.03 0.00 - 0.05 10*3/mm3    nRBC 0.0 0.0 - 0.2 /100 WBC   Fentanyl, Urine - Urine, Clean Catch    Specimen: Urine, Clean Catch   Result Value Ref Range    Fentanyl, Urine Negative Negative   Urinalysis, Microscopic Only - Straight Cath    Specimen: Straight Cath; Urine   Result Value Ref Range    RBC, UA 0-2 None Seen, 0-2 /HPF    WBC, UA 0-2 None Seen, 0-2 /HPF    Bacteria, UA 4+ (A) None Seen /HPF    Squamous Epithelial Cells, UA 13-20 (A) None Seen, 0-2 /HPF    Hyaline Casts, UA None Seen None Seen /LPF    Methodology Manual Light Microscopy    ECG 12 Lead Altered Mental Status   Result Value Ref Range    QT Interval 456 ms    QTC Interval 447 ms   Green Top (Gel)   Result Value Ref Range    Extra Tube Hold for add-ons.    Lavender Top   Result Value Ref Range    Extra Tube hold for add-on    Gold Top - SST   Result Value Ref Range    Extra Tube Hold for add-ons.    Light Blue Top   Result Value Ref Range    Extra Tube Hold for add-ons.      XR Chest 1 View    Result Date: 8/12/2024    No acute cardiopulmonary findings identified.   This report was finalized on 8/12/2024 12:29 PM by Dr. Raymundo Menon MD.      CT Head Without Contrast    Result Date: 8/12/2024    Small vessel ischemic/degenerative changes.   This report was finalized on 8/12/2024 12:07 PM by Dr. Raymundo Menon MD.         Procedures           ED Course  ED Course as of 08/12/24 1438   Mon Aug 12, 2024   1238 EKG in sinus bradycardia.  58 bpm.  No acute ST elevation.  T wave version in the lateral leads.  QTc 447.  Electronically signed by Fransisco Marie DO, 08/12/24, 12:38 PM EDT.   [SF]      ED Course User Index  [SF] Fransisco Marie DO                                             Medical Decision Making  Patient is a 68-year-old female with past medical history of anemia, restless leg syndrome, stroke, depression, and arthritis.  Patient presents from [facility].  Nursing  home reports that patient has been more lethargic today.  Nursing home reports that family visited patient yesterday and that is when the lethargy began.  Nursing home reports that patient was sent here for a urine tox screen.  Patient denies chest pain/pressure/tightness, shortness of breath, abdominal pain, nausea/vomiting, or any known fever.  Patient denies dysuria or difficulty urinating.  Patient does have slurred speech, however reports that she has had a stroke earlier this year and has left-sided deficits as well as slurred speech from the stroke. Patient presents awake and alert.  Patient is oriented x 4 upon arrival to the ER and in no acute distress.  Patient has no complaints upon arrival to the ER.  Patient presents EMS from [facility].    Problems Addressed:  Urinary tract infection without hematuria, site unspecified: complicated acute illness or injury    Amount and/or Complexity of Data Reviewed  Labs: ordered.  Radiology: ordered.  ECG/medicine tests: ordered.    Risk  Prescription drug management.        Final diagnoses:   Urinary tract infection without hematuria, site unspecified       ED Disposition  ED Disposition       ED Disposition   Discharge    Condition   Stable    Comment   --               Dread Burton MD  [address] [place] [id number]  [phone]    Schedule an appointment as soon as possible for a visit   As needed    [facility] EMERGENCY DEPARTMENT  [address] [place] [id number] [phone]  Go to   If symptoms worsen         Medication List        New Prescriptions      nitrofurantoin (macrocrystal-monohydrate) 100 MG capsule  Commonly known as: MACROBID  Take 1 capsule by mouth 2 (Two) Times a Day.               Where to Get Your Medications        You can get these medications from any pharmacy    Bring a paper prescription for each of these medications  nitrofurantoin (macrocrystal-monohydrate) 100 MG capsule            Ankit  Iraida CARRILLO, APRN  08/12/24 1437

## 2024-08-12 NOTE — ED NOTES
Called [facility] for transport back to [facility], dispatch stated they would tone it out.   [Patient preference] : as per patient preference [Continuing, patient not seen in-person within last 12 months (provide details below)] : Telehealth services are continuing, patient not seen in-person within last 12 months.  [Telehealth (audio & video) - Individual/Group] : This visit was provided via telehealth using real-time 2-way audio visual technology. [Medical Office: ([facility])___] : The provider was located at the medical office in [unfilled]. [Home] : The patient, [unfilled], was located at home, [unfilled], at the time of the visit. [Verbal consent obtained from patient/other participant(s)] : Verbal consent for telehealth/telephonic services obtained from patient/other participant(s) [Patient] : Patient [Follow-Up Visit] : a follow-up visit [FreeTextEntry1] : medication management

## 2024-08-15 ENCOUNTER — HOSPITAL ENCOUNTER (OUTPATIENT)
Dept: MAMMOGRAPHY | Facility: HOSPITAL | Age: 69
Discharge: HOME | End: 2024-08-15
Payer: MEDICARE

## 2024-08-15 ENCOUNTER — HOSPITAL ENCOUNTER (OUTPATIENT)
Dept: ULTRASOUND IMAGING | Facility: HOSPITAL | Age: 69
Discharge: HOME | End: 2024-08-15
Payer: MEDICARE

## 2024-08-15 DIAGNOSIS — R92.8 ABNORMAL MAMMOGRAM: ICD-10-CM

## 2024-08-15 PROCEDURE — G0279 TOMOSYNTHESIS, MAMMO: HCPCS

## 2024-08-15 PROCEDURE — 76642 ULTRASOUND BREAST LIMITED: CPT

## 2024-08-15 PROCEDURE — 77065 DX MAMMO INCL CAD UNI: CPT

## 2024-09-25 ENCOUNTER — APPOINTMENT (OUTPATIENT)
Dept: CT IMAGING | Facility: HOSPITAL | Age: 69
End: 2024-09-25
Payer: MEDICARE

## 2024-09-25 ENCOUNTER — LAB REQUISITION (OUTPATIENT)
Dept: LAB | Facility: HOSPITAL | Age: 69
End: 2024-09-25
Payer: MEDICARE

## 2024-09-25 ENCOUNTER — HOSPITAL ENCOUNTER (EMERGENCY)
Facility: HOSPITAL | Age: 69
Discharge: NURSING, REHAB OR LONG-TERM CARE | End: 2024-09-25
Attending: STUDENT IN AN ORGANIZED HEALTH CARE EDUCATION/TRAINING PROGRAM | Admitting: STUDENT IN AN ORGANIZED HEALTH CARE EDUCATION/TRAINING PROGRAM
Payer: MEDICARE

## 2024-09-25 VITALS
DIASTOLIC BLOOD PRESSURE: 82 MMHG | OXYGEN SATURATION: 99 % | HEART RATE: 58 BPM | WEIGHT: 123.8 LBS | TEMPERATURE: 97.7 F | SYSTOLIC BLOOD PRESSURE: 116 MMHG | HEIGHT: 66 IN | RESPIRATION RATE: 18 BRPM | BODY MASS INDEX: 19.89 KG/M2
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
## 2024-09-25 DIAGNOSIS — I10 ESSENTIAL (PRIMARY) HYPERTENSION: ICD-10-CM

## 2024-09-25 LAB — VALPROATE SERPL-MCNC: 35.4 MCG/ML (ref 50–125)

## 2024-09-25 PROCEDURE — 70450 CT HEAD/BRAIN W/O DYE: CPT

## 2024-09-25 PROCEDURE — 99284 EMERGENCY DEPT VISIT MOD MDM: CPT

## 2024-09-25 PROCEDURE — 70450 CT HEAD/BRAIN W/O DYE: CPT | Performed by: RADIOLOGY

## 2024-09-25 PROCEDURE — 72125 CT NECK SPINE W/O DYE: CPT | Performed by: RADIOLOGY

## 2024-09-25 PROCEDURE — 72125 CT NECK SPINE W/O DYE: CPT

## 2024-09-25 PROCEDURE — 80164 ASSAY DIPROPYLACETIC ACD TOT: CPT | Performed by: NURSE PRACTITIONER

## 2024-09-26 NOTE — ED PROVIDER NOTES
"Subjective   68-year-old female who is legally blind as well as nursing home secondary to dementia that is longstanding presenting with complaint that she had a questionable fall out of bed.  According to nursing staff they found her hanging off the side of the bed.  They did not know if she hit her head or hit her neck.  She is not complaining of any pain.   at a baseline she is confused according to nursing home report    Review of Systems    Past Medical History:   Diagnosis Date    Anemia     Anxiety     Arthritis     Depression     Legally blind     Restless leg syndrome     Sleep apnea     \"I don't use a cpap anymore since losing 106 lbs\"    Water retention        Allergies   Allergen Reactions    Penicillins Hives and Swelling       Past Surgical History:   Procedure Laterality Date    BACK SURGERY      CARDIAC CATHETERIZATION N/A 1/19/2024    Procedure: Percutaneous Manual Thrombectomy;  Surgeon: Efrain Hurley MD;  Location:  [facility];  Service: Interventional Radiology;  Laterality: N/A;    CARDIAC CATHETERIZATION N/A 4/12/2024    Procedure: Left Heart Cath;  Surgeon: Susan Mederos MD;  Location:  [facility];  Service: Cardiology;  Laterality: N/A;    GALLBLADDER SURGERY      HIP ARTHROSCOPY      JOINT REPLACEMENT Right        Family History   Problem Relation Age of Onset    Breast cancer Neg Hx        Social History     Socioeconomic History    Marital status:     Number of children: 0    Years of education: 1 yr college   Tobacco Use    Smoking status: Former     Current packs/day: 1.50     Average packs/day: 1.5 packs/day for 51.0 years (76.5 ttl pk-yrs)     Types: Cigarettes     Passive exposure: Past    Smokeless tobacco: Never   Vaping Use    Vaping status: Never Used   Substance and Sexual Activity    Alcohol use: Not Currently     Alcohol/week: 1.0 standard drink of alcohol     Types: 1 Glasses of wine per week     Comment: \"occasionally - once " "every two months\"    Drug use: Not Currently     Comment: alcohol - occasionally    Sexual activity: Defer           Objective   Physical Exam  Vitals and nursing note reviewed. Exam conducted with a chaperone present.   Constitutional:       General: She is not in acute distress.     Appearance: Normal appearance. She is obese. She is not ill-appearing, toxic-appearing or diaphoretic.   HENT:      Head: Normocephalic and atraumatic.      Right Ear: Tympanic membrane normal.      Left Ear: Tympanic membrane normal.      Nose: Nose normal.      Mouth/Throat:      Pharynx: No oropharyngeal exudate or posterior oropharyngeal erythema.   Eyes:      Extraocular Movements: Extraocular movements intact.      Conjunctiva/sclera: Conjunctivae normal.      Pupils: Pupils are equal, round, and reactive to light.   Cardiovascular:      Rate and Rhythm: Normal rate and regular rhythm.      Pulses: Normal pulses.      Heart sounds: No murmur heard.     No gallop.   Pulmonary:      Effort: Pulmonary effort is normal. No respiratory distress.      Breath sounds: Normal breath sounds. No stridor. No wheezing, rhonchi or rales.   Abdominal:      General: Abdomen is flat. There is no distension.      Tenderness: There is no abdominal tenderness. There is no guarding or rebound.   Musculoskeletal:         General: No swelling, tenderness, deformity or signs of injury. Normal range of motion.      Cervical back: Normal range of motion. No rigidity or tenderness.      Right lower leg: No edema.      Left lower leg: No edema.   Skin:     General: Skin is warm and dry.      Capillary Refill: Capillary refill takes less than 2 seconds.      Coloration: Skin is not jaundiced.      Findings: No bruising, erythema or rash.      Comments: Chronic degenerative skin changes but no rash no obvious laceration or traumatic injury   Neurological:      Mental Status: She is alert. Mental status is at baseline. She is disoriented.      Cranial Nerves: " No cranial nerve deficit.      Sensory: No sensory deficit.      Motor: No weakness.   Psychiatric:         Mood and Affect: Mood normal.         Procedures           ED Course                                             Medical Decision Making  Patient here for evaluation of head versus neck trauma with a questionable history and an unreliable historian.  Will get scans of her head and neck but no other traumatic injuries appreciated on examination.    Problems Addressed:  Fall, initial encounter: complicated acute illness or injury    Amount and/or Complexity of Data Reviewed  Radiology: ordered.        Final diagnoses:   Fall, initial encounter       ED Disposition  ED Disposition       ED Disposition   Discharge    Condition   Stable    Comment   --               Dread Burton MD  [address] [place] [id number]  [phone]    In 3 days      [facility] EMERGENCY DEPARTMENT  [address] [facility] [id number] [phone]  Go to   If symptoms worsen         Medication List      No changes were made to your prescriptions during this visit.            Stefan Wu MD  09/26/24 0702

## 2024-10-19 ENCOUNTER — HOSPITAL ENCOUNTER (EMERGENCY)
Facility: HOSPITAL | Age: 69
Discharge: HOME | End: 2024-10-19
Attending: EMERGENCY MEDICINE
Payer: MEDICARE

## 2024-10-19 ENCOUNTER — APPOINTMENT (OUTPATIENT)
Dept: CT IMAGING | Facility: HOSPITAL | Age: 69
End: 2024-10-19
Payer: MEDICARE

## 2024-10-19 VITALS
SYSTOLIC BLOOD PRESSURE: 179 MMHG | RESPIRATION RATE: 18 BRPM | HEART RATE: 62 BPM | BODY MASS INDEX: 21.33 KG/M2 | TEMPERATURE: 98.1 F | OXYGEN SATURATION: 99 % | DIASTOLIC BLOOD PRESSURE: 85 MMHG | WEIGHT: 128 LBS | HEIGHT: 65 IN
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
## 2024-10-19 PROCEDURE — 70450 CT HEAD/BRAIN W/O DYE: CPT | Performed by: RADIOLOGY

## 2024-10-19 PROCEDURE — 72125 CT NECK SPINE W/O DYE: CPT

## 2024-10-19 PROCEDURE — 70450 CT HEAD/BRAIN W/O DYE: CPT

## 2024-10-19 PROCEDURE — 72125 CT NECK SPINE W/O DYE: CPT | Performed by: RADIOLOGY

## 2024-10-19 PROCEDURE — 99284 EMERGENCY DEPT VISIT MOD MDM: CPT

## 2024-10-19 NOTE — ED PROVIDER NOTES
"Subjective   History of Present Illness  Patient is a 68-year-old female who presents for complaints of a fall.  Patient reports that she does believe she hit her head but did not lose any consciousness.  Patient only complains of head pain upon arrival.  Patient reports she has a history of a stroke and just simply lost her balance.  Patient has no other complaints this date.  Patient presents EMS from [facility].        Review of Systems   Constitutional: Negative.  Negative for fever.   Respiratory: Negative.     Cardiovascular: Negative.  Negative for chest pain.   Gastrointestinal: Negative.  Negative for abdominal pain.   Endocrine: Negative.    Genitourinary: Negative.  Negative for dysuria.   Skin: Negative.    Neurological:  Positive for headaches.   Psychiatric/Behavioral: Negative.     All other systems reviewed and are negative.      Past Medical History:   Diagnosis Date    Anemia     Anxiety     Arthritis     Depression     Legally blind     Restless leg syndrome     Sleep apnea     \"I don't use a cpap anymore since losing 106 lbs\"    Water retention        Allergies   Allergen Reactions    Penicillins Hives and Swelling       Past Surgical History:   Procedure Laterality Date    BACK SURGERY      CARDIAC CATHETERIZATION N/A 1/19/2024    Procedure: Percutaneous Manual Thrombectomy;  Surgeon: Efrain Hurley MD;  Location:  [facility];  Service: Interventional Radiology;  Laterality: N/A;    CARDIAC CATHETERIZATION N/A 4/12/2024    Procedure: Left Heart Cath;  Surgeon: Susan Mederos MD;  [facility]:  [facility];  Service: Cardiology;  Laterality: N/A;    GALLBLADDER SURGERY      HIP ARTHROSCOPY      JOINT REPLACEMENT Right        Family History   Problem Relation Age of Onset    Breast cancer Neg Hx        Social History     Socioeconomic History    Marital status:     Number of children: 0    Years of education: 1 yr college   Tobacco Use    " "Smoking status: Former     Current packs/day: 1.50     Average packs/day: 1.5 packs/day for 51.0 years (76.5 ttl pk-yrs)     Types: Cigarettes     Passive exposure: Past    Smokeless tobacco: Never   Vaping Use    Vaping status: Never Used   Substance and Sexual Activity    Alcohol use: Not Currently     Alcohol/week: 1.0 standard drink of alcohol     Types: 1 Glasses of wine per week     Comment: \"occasionally - once every two months\"    Drug use: Not Currently     Comment: alcohol - occasionally    Sexual activity: Defer           Objective   Physical Exam  Vitals and nursing note reviewed.   Constitutional:       General: She is not in acute distress.     Appearance: She is well-developed. She is not diaphoretic.   HENT:      Head: Normocephalic and atraumatic.      Right Ear: External ear normal.      Left Ear: External ear normal.      Nose: Nose normal.   Eyes:      Conjunctiva/sclera: Conjunctivae normal.      Pupils: Pupils are equal, round, and reactive to light.   Neck:      Vascular: No JVD.      Trachea: No tracheal deviation.   Cardiovascular:      Rate and Rhythm: Normal rate and regular rhythm.      Heart sounds: Normal heart sounds. No murmur heard.  Pulmonary:      Effort: Pulmonary effort is normal. No respiratory distress.      Breath sounds: Normal breath sounds. No wheezing.   Abdominal:      General: Bowel sounds are normal.      Palpations: Abdomen is soft.      Tenderness: There is no abdominal tenderness.   Musculoskeletal:         General: No deformity. Normal range of motion.      Cervical back: Normal range of motion and neck supple.   Skin:     General: Skin is warm and dry.      Coloration: Skin is not pale.      Findings: No erythema or rash.   Neurological:      Mental Status: She is alert and oriented to person, place, and time.      Cranial Nerves: No cranial nerve deficit.   Psychiatric:         Behavior: Behavior normal.         Thought Content: Thought content normal.       CT " Cervical Spine Without Contrast    Result Date: 10/19/2024   1.  No acute fracture. 2.  No acute dislocation. 3.  Mild degenerative disc disease throughout the cervical spine. 4.  Small posterior bulging disc at the C3-4 and C4-5 levels with associated mild central canal stenosis. 5.  Endplate and facet hypertrophic changes contribute to neuroforaminal narrowing at the mid and upper cervical spine levels.    This report was finalized on 10/19/2024 3:38 AM by Michael Abarca MD.      CT Head Without Contrast    Result Date: 10/19/2024   1.  Mild generalized brain volume loss. 2.  Mild to moderate chronic small vessel ischemic type changes in the white matter. 3.  Prior infarcts on the right side in the right MCA distribution. 4.  Old lacunar infarcts in the basal ganglia, right greater than left. 5.  No acute intracranial hemorrhage, mass, infarct, or edema. 6.  Minimal mucosal thickening in the ethmoid air cells and right sphenoid sinus. 7.  No fracture. 8.  No foreign body. 9.  No scalp hematoma.  This report was finalized on 10/19/2024 3:35 AM by Michael Abarca MD.         Procedures           ED Course                                             Medical Decision Making  Patient is a 68-year-old female who presents for complaints of a fall.    Amount and/or Complexity of Data Reviewed  Radiology: ordered.        Final diagnoses:   Fall, initial encounter       ED Disposition  ED Disposition       ED Disposition   Discharge    Condition   Stable    Comment   --               Dread Burton MD  [address] [place] [id number]  [phone]    Schedule an appointment as soon as possible for a visit   As needed    [facility] EMERGENCY DEPARTMENT  [address] [place] [id number]  [phone]  Go to   If symptoms worsen         Medication List      No changes were made to your prescriptions during this visit.            Iraida Pham, APRN  10/19/24 0517

## 2024-10-22 ENCOUNTER — HOSPITAL ENCOUNTER (EMERGENCY)
Facility: HOSPITAL | Age: 69
Discharge: HOME | End: 2024-10-22
Attending: STUDENT IN AN ORGANIZED HEALTH CARE EDUCATION/TRAINING PROGRAM | Admitting: STUDENT IN AN ORGANIZED HEALTH CARE EDUCATION/TRAINING PROGRAM
Payer: MEDICARE

## 2024-10-22 VITALS
HEART RATE: 89 BPM | TEMPERATURE: 98 F | HEIGHT: 65 IN | DIASTOLIC BLOOD PRESSURE: 80 MMHG | BODY MASS INDEX: 21.34 KG/M2 | WEIGHT: 128.09 LBS | RESPIRATION RATE: 16 BRPM | SYSTOLIC BLOOD PRESSURE: 137 MMHG | OXYGEN SATURATION: 98 %

## 2024-10-22 DIAGNOSIS — R45.1 AGITATION: Primary | ICD-10-CM

## 2024-10-22 LAB
ALBUMIN SERPL-MCNC: 3.2 G/DL (ref 3.5–5.2)
ALBUMIN/GLOB SERPL: 0.9 G/DL
ALP SERPL-CCNC: 69 U/L (ref 39–117)
ALT SERPL W P-5'-P-CCNC: 15 U/L (ref 1–33)
ANION GAP SERPL CALCULATED.3IONS-SCNC: 12.3 MMOL/L (ref 5–15)
AST SERPL-CCNC: 17 U/L (ref 1–32)
BACTERIA UR QL AUTO: ABNORMAL /HPF
BASOPHILS # BLD AUTO: 0.05 10*3/MM3 (ref 0–0.2)
BASOPHILS NFR BLD AUTO: 0.6 % (ref 0–1.5)
BILIRUB SERPL-MCNC: 0.3 MG/DL (ref 0–1.2)
BILIRUB UR QL STRIP: NEGATIVE
BUN SERPL-MCNC: 13 MG/DL (ref 8–23)
BUN/CREAT SERPL: 16 (ref 7–25)
CALCIUM SPEC-SCNC: 9.1 MG/DL (ref 8.6–10.5)
CHLORIDE SERPL-SCNC: 107 MMOL/L (ref 98–107)
CLARITY UR: ABNORMAL
CO2 SERPL-SCNC: 20.7 MMOL/L (ref 22–29)
COLOR UR: YELLOW
CREAT SERPL-MCNC: 0.81 MG/DL (ref 0.57–1)
DEPRECATED RDW RBC AUTO: 50.3 FL (ref 37–54)
EGFRCR SERPLBLD CKD-EPI 2021: 78.7 ML/MIN/1.73
EOSINOPHIL # BLD AUTO: 0.08 10*3/MM3 (ref 0–0.4)
EOSINOPHIL NFR BLD AUTO: 1 % (ref 0.3–6.2)
ERYTHROCYTE [DISTWIDTH] IN BLOOD BY AUTOMATED COUNT: 14.3 % (ref 12.3–15.4)
GLOBULIN UR ELPH-MCNC: 3.5 GM/DL
GLUCOSE SERPL-MCNC: 143 MG/DL (ref 65–99)
GLUCOSE UR STRIP-MCNC: NEGATIVE MG/DL
HCT VFR BLD AUTO: 36.8 % (ref 34–46.6)
HGB BLD-MCNC: 11.9 G/DL (ref 12–15.9)
HGB UR QL STRIP.AUTO: ABNORMAL
HYALINE CASTS UR QL AUTO: ABNORMAL /LPF
IMM GRANULOCYTES # BLD AUTO: 0.02 10*3/MM3 (ref 0–0.05)
IMM GRANULOCYTES NFR BLD AUTO: 0.2 % (ref 0–0.5)
KETONES UR QL STRIP: NEGATIVE
LEUKOCYTE ESTERASE UR QL STRIP.AUTO: ABNORMAL
LYMPHOCYTES # BLD AUTO: 2.54 10*3/MM3 (ref 0.7–3.1)
LYMPHOCYTES NFR BLD AUTO: 31.4 % (ref 19.6–45.3)
MCH RBC QN AUTO: 31 PG (ref 26.6–33)
MCHC RBC AUTO-ENTMCNC: 32.3 G/DL (ref 31.5–35.7)
MCV RBC AUTO: 95.8 FL (ref 79–97)
MONOCYTES # BLD AUTO: 0.76 10*3/MM3 (ref 0.1–0.9)
MONOCYTES NFR BLD AUTO: 9.4 % (ref 5–12)
NEUTROPHILS NFR BLD AUTO: 4.64 10*3/MM3 (ref 1.7–7)
NEUTROPHILS NFR BLD AUTO: 57.4 % (ref 42.7–76)
NITRITE UR QL STRIP: NEGATIVE
NRBC BLD AUTO-RTO: 0 /100 WBC (ref 0–0.2)
PH UR STRIP.AUTO: 7 [PH] (ref 5–8)
PLATELET # BLD AUTO: 248 10*3/MM3 (ref 140–450)
PMV BLD AUTO: 9.7 FL (ref 6–12)
POTASSIUM SERPL-SCNC: 3.9 MMOL/L (ref 3.5–5.2)
PROT SERPL-MCNC: 6.7 G/DL (ref 6–8.5)
PROT UR QL STRIP: NEGATIVE
RBC # BLD AUTO: 3.84 10*6/MM3 (ref 3.77–5.28)
RBC # UR STRIP: ABNORMAL /HPF
REF LAB TEST METHOD: ABNORMAL
SODIUM SERPL-SCNC: 140 MMOL/L (ref 136–145)
SP GR UR STRIP: 1.01 (ref 1–1.03)
SQUAMOUS #/AREA URNS HPF: ABNORMAL /HPF
UROBILINOGEN UR QL STRIP: ABNORMAL
WBC # UR STRIP: ABNORMAL /HPF
WBC NRBC COR # BLD AUTO: 8.09 10*3/MM3 (ref 3.4–10.8)

## 2024-10-22 PROCEDURE — 36415 COLL VENOUS BLD VENIPUNCTURE: CPT

## 2024-10-22 PROCEDURE — 80053 COMPREHEN METABOLIC PANEL: CPT | Performed by: PHYSICIAN ASSISTANT

## 2024-10-22 PROCEDURE — 87086 URINE CULTURE/COLONY COUNT: CPT | Performed by: PHYSICIAN ASSISTANT

## 2024-10-22 PROCEDURE — 99283 EMERGENCY DEPT VISIT LOW MDM: CPT

## 2024-10-22 PROCEDURE — 85025 COMPLETE CBC W/AUTO DIFF WBC: CPT | Performed by: PHYSICIAN ASSISTANT

## 2024-10-22 PROCEDURE — 81001 URINALYSIS AUTO W/SCOPE: CPT | Performed by: PHYSICIAN ASSISTANT

## 2024-10-22 RX ORDER — HYDROXYZINE HYDROCHLORIDE 25 MG/1
25 TABLET, FILM COATED ORAL ONCE
Status: COMPLETED | OUTPATIENT
Start: 2024-10-22 | End: 2024-10-22

## 2024-10-22 RX ADMIN — HYDROXYZINE 25 MG: 25 TABLET, FILM COATED ORAL at 19:56

## 2024-10-22 NOTE — ED PROVIDER NOTES
"Subjective   History of Present Illness  Pt is from [facility]. Pt was sent for agressive behavior, got upset and was angry.  Pt is acting at baseline here in ER. Pt does not have any complaints   Pt has dementia     History provided by:  Nursing home  History limited by:  Dementia   used: No    Anxiety  Presents for initial visit. The problem has been resolved. Symptoms include confusion and nervous/anxious behavior. Symptoms occur constantly. The severity of symptoms is mild. Nighttime awakenings: none.     Her past medical history is significant for anxiety/panic attacks. Past treatments include nothing.       Review of Systems   Psychiatric/Behavioral:  Positive for confusion. The patient is nervous/anxious.        Past Medical History:   Diagnosis Date    Anemia     Anxiety     Arthritis     Depression     Legally blind     Restless leg syndrome     Sleep apnea     \"I don't use a cpap anymore since losing 106 lbs\"    Water retention        Allergies   Allergen Reactions    Penicillins Hives and Swelling       Past Surgical History:   Procedure Laterality Date    BACK SURGERY      CARDIAC CATHETERIZATION N/A 1/19/2024    Procedure: Percutaneous Manual Thrombectomy;  Surgeon: Efrain Hurley MD;  Location:  [facility];  Service: Interventional Radiology;  Laterality: N/A;    CARDIAC CATHETERIZATION N/A 4/12/2024    Procedure: Left Heart Cath;  Surgeon: Susan Mederos MD;  Location:  [facility];  Service: Cardiology;  Laterality: N/A;    GALLBLADDER SURGERY      HIP ARTHROSCOPY      JOINT REPLACEMENT Right        Family History   Problem Relation Age of Onset    Breast cancer Neg Hx        Social History     Socioeconomic History    Marital status:     Number of children: 0    Years of education: 1 yr college   Tobacco Use    Smoking status: Former     Current packs/day: 1.50     Average packs/day: 1.5 packs/day for 51.0 years (76.5 ttl " "pk-yrs)     Types: Cigarettes     Passive exposure: Past    Smokeless tobacco: Never   Vaping Use    Vaping status: Never Used   Substance and Sexual Activity    Alcohol use: Not Currently     Alcohol/week: 1.0 standard drink of alcohol     Types: 1 Glasses of wine per week     Comment: \"occasionally - once every two months\"    Drug use: Not Currently     Comment: alcohol - occasionally    Sexual activity: Defer           Objective   Physical Exam  Vitals and nursing note reviewed.   Constitutional:       Appearance: She is well-developed.   HENT:      Head: Normocephalic.   Cardiovascular:      Rate and Rhythm: Normal rate and regular rhythm.   Pulmonary:      Effort: Pulmonary effort is normal.      Breath sounds: Normal breath sounds.   Abdominal:      General: Bowel sounds are normal.      Palpations: Abdomen is soft.      Tenderness: There is no abdominal tenderness.   Musculoskeletal:         General: Normal range of motion.      Cervical back: Neck supple.   Skin:     General: Skin is warm and dry.   Neurological:      Mental Status: She is alert and oriented to person, place, and time.   Psychiatric:         Mood and Affect: Mood is anxious.         Procedures           ED Course  ED Course as of 10/22/24 1947   Tue Oct 22, 2024   1931 Will wait for urine culture [LC]      ED Course User Index  [LC] Nikole Bolivar PA      Results for orders placed or performed during the hospital encounter of 10/22/24   Comprehensive Metabolic Panel    Collection Time: 10/22/24  5:49 PM    Specimen: Blood   Result Value Ref Range    Glucose 143 (H) 65 - 99 mg/dL    BUN 13 8 - 23 mg/dL    Creatinine 0.81 0.57 - 1.00 mg/dL    Sodium 140 136 - 145 mmol/L    Potassium 3.9 3.5 - 5.2 mmol/L    Chloride 107 98 - 107 mmol/L    CO2 20.7 (L) 22.0 - 29.0 mmol/L    Calcium 9.1 8.6 - 10.5 mg/dL    Total Protein 6.7 6.0 - 8.5 g/dL    Albumin 3.2 (L) 3.5 - 5.2 g/dL    ALT (SGPT) 15 1 - 33 U/L    AST (SGOT) 17 1 - 32 U/L    Alkaline " Phosphatase 69 39 - 117 U/L    Total Bilirubin 0.3 0.0 - 1.2 mg/dL    Globulin 3.5 gm/dL    A/G Ratio 0.9 g/dL    BUN/Creatinine Ratio 16.0 7.0 - 25.0    Anion Gap 12.3 5.0 - 15.0 mmol/L    eGFR 78.7 >60.0 mL/min/1.73   CBC Auto Differential    Collection Time: 10/22/24  5:49 PM    Specimen: Blood   Result Value Ref Range    WBC 8.09 3.40 - 10.80 10*3/mm3    RBC 3.84 3.77 - 5.28 10*6/mm3    Hemoglobin 11.9 (L) 12.0 - 15.9 g/dL    Hematocrit 36.8 34.0 - 46.6 %    MCV 95.8 79.0 - 97.0 fL    MCH 31.0 26.6 - 33.0 pg    MCHC 32.3 31.5 - 35.7 g/dL    RDW 14.3 12.3 - 15.4 %    RDW-SD 50.3 37.0 - 54.0 fl    MPV 9.7 6.0 - 12.0 fL    Platelets 248 140 - 450 10*3/mm3    Neutrophil % 57.4 42.7 - 76.0 %    Lymphocyte % 31.4 19.6 - 45.3 %    Monocyte % 9.4 5.0 - 12.0 %    Eosinophil % 1.0 0.3 - 6.2 %    Basophil % 0.6 0.0 - 1.5 %    Immature Grans % 0.2 0.0 - 0.5 %    Neutrophils, Absolute 4.64 1.70 - 7.00 10*3/mm3    Lymphocytes, Absolute 2.54 0.70 - 3.10 10*3/mm3    Monocytes, Absolute 0.76 0.10 - 0.90 10*3/mm3    Eosinophils, Absolute 0.08 0.00 - 0.40 10*3/mm3    Basophils, Absolute 0.05 0.00 - 0.20 10*3/mm3    Immature Grans, Absolute 0.02 0.00 - 0.05 10*3/mm3    nRBC 0.0 0.0 - 0.2 /100 WBC   Urinalysis With Culture If Indicated - Urine, Clean Catch    Collection Time: 10/22/24  6:49 PM    Specimen: Urine, Clean Catch   Result Value Ref Range    Color, UA Yellow Yellow, Straw    Appearance, UA Turbid (A) Clear    pH, UA 7.0 5.0 - 8.0    Specific Gravity, UA 1.009 1.005 - 1.030    Glucose, UA Negative Negative    Ketones, UA Negative Negative    Bilirubin, UA Negative Negative    Blood, UA Moderate (2+) (A) Negative    Protein, UA Negative Negative    Leuk Esterase, UA Small (1+) (A) Negative    Nitrite, UA Negative Negative    Urobilinogen, UA 1.0 E.U./dL 0.2 - 1.0 E.U./dL   Urinalysis, Microscopic Only - Urine, Clean Catch    Collection Time: 10/22/24  6:49 PM    Specimen: Urine, Clean Catch   Result Value Ref Range    RBC,  UA 6-10 (A) None Seen, 0-2 /HPF    WBC, UA 11-20 (A) None Seen, 0-2 /HPF    Bacteria, UA 2+ (A) None Seen /HPF    Squamous Epithelial Cells, UA Too Numerous to Count (A) None Seen, 0-2 /HPF    Hyaline Casts, UA 7-12 None Seen /LPF    Methodology Automated Microscopy                                              Medical Decision Making      Final diagnoses:   Agitation       ED Disposition  ED Disposition       ED Disposition   Discharge    Condition   Stable    Comment   --               Dread Burton MD  [address] [place] [address]  [phone]    In 2 days           Medication List      No changes were made to your prescriptions during this visit.            Nikole Bolivar PA  10/22/24 1946       Nikole Bolivar PA  10/22/24 1947

## 2024-10-23 ENCOUNTER — LAB REQUISITION (OUTPATIENT)
Dept: LAB | Facility: HOSPITAL | Age: 69
End: 2024-10-23
Payer: MEDICARE

## 2024-10-23 DIAGNOSIS — N39.0 URINARY TRACT INFECTION, SITE NOT SPECIFIED: ICD-10-CM

## 2024-10-23 DIAGNOSIS — I10 ESSENTIAL (PRIMARY) HYPERTENSION: ICD-10-CM

## 2024-10-23 LAB
ALBUMIN SERPL-MCNC: 3.4 G/DL (ref 3.5–5.2)
ALBUMIN/GLOB SERPL: 1 G/DL
ALP SERPL-CCNC: 73 U/L (ref 39–117)
ALT SERPL W P-5'-P-CCNC: 14 U/L (ref 1–33)
AMMONIA BLD-SCNC: 50 UMOL/L (ref 11–51)
ANION GAP SERPL CALCULATED.3IONS-SCNC: 12.6 MMOL/L (ref 5–15)
AST SERPL-CCNC: 17 U/L (ref 1–32)
BACTERIA UR QL AUTO: ABNORMAL /HPF
BASOPHILS # BLD AUTO: 0.05 10*3/MM3 (ref 0–0.2)
BASOPHILS NFR BLD AUTO: 0.7 % (ref 0–1.5)
BILIRUB SERPL-MCNC: 0.5 MG/DL (ref 0–1.2)
BILIRUB UR QL STRIP: NEGATIVE
BUN SERPL-MCNC: 11 MG/DL (ref 8–23)
BUN/CREAT SERPL: 14.3 (ref 7–25)
CALCIUM SPEC-SCNC: 9.5 MG/DL (ref 8.6–10.5)
CHLORIDE SERPL-SCNC: 108 MMOL/L (ref 98–107)
CLARITY UR: ABNORMAL
CO2 SERPL-SCNC: 24.4 MMOL/L (ref 22–29)
COLOR UR: YELLOW
CREAT SERPL-MCNC: 0.77 MG/DL (ref 0.57–1)
CRP SERPL-MCNC: 1.08 MG/DL (ref 0–0.5)
DEPRECATED RDW RBC AUTO: 52.1 FL (ref 37–54)
EGFRCR SERPLBLD CKD-EPI 2021: 83.6 ML/MIN/1.73
EOSINOPHIL # BLD AUTO: 0.12 10*3/MM3 (ref 0–0.4)
EOSINOPHIL NFR BLD AUTO: 1.8 % (ref 0.3–6.2)
ERYTHROCYTE [DISTWIDTH] IN BLOOD BY AUTOMATED COUNT: 14.6 % (ref 12.3–15.4)
GLOBULIN UR ELPH-MCNC: 3.5 GM/DL
GLUCOSE SERPL-MCNC: 90 MG/DL (ref 65–99)
GLUCOSE UR STRIP-MCNC: NEGATIVE MG/DL
HCT VFR BLD AUTO: 38.7 % (ref 34–46.6)
HGB BLD-MCNC: 12.4 G/DL (ref 12–15.9)
HGB UR QL STRIP.AUTO: ABNORMAL
HYALINE CASTS UR QL AUTO: ABNORMAL /LPF
IMM GRANULOCYTES # BLD AUTO: 0.02 10*3/MM3 (ref 0–0.05)
IMM GRANULOCYTES NFR BLD AUTO: 0.3 % (ref 0–0.5)
KETONES UR QL STRIP: NEGATIVE
LDH SERPL-CCNC: 184 U/L (ref 135–214)
LEUKOCYTE ESTERASE UR QL STRIP.AUTO: ABNORMAL
LYMPHOCYTES # BLD AUTO: 1.94 10*3/MM3 (ref 0.7–3.1)
LYMPHOCYTES NFR BLD AUTO: 29 % (ref 19.6–45.3)
MCH RBC QN AUTO: 31 PG (ref 26.6–33)
MCHC RBC AUTO-ENTMCNC: 32 G/DL (ref 31.5–35.7)
MCV RBC AUTO: 96.8 FL (ref 79–97)
MONOCYTES # BLD AUTO: 0.64 10*3/MM3 (ref 0.1–0.9)
MONOCYTES NFR BLD AUTO: 9.6 % (ref 5–12)
NEUTROPHILS NFR BLD AUTO: 3.92 10*3/MM3 (ref 1.7–7)
NEUTROPHILS NFR BLD AUTO: 58.6 % (ref 42.7–76)
NITRITE UR QL STRIP: NEGATIVE
NRBC BLD AUTO-RTO: 0 /100 WBC (ref 0–0.2)
PH UR STRIP.AUTO: 6.5 [PH] (ref 5–8)
PLATELET # BLD AUTO: 262 10*3/MM3 (ref 140–450)
PMV BLD AUTO: 10.2 FL (ref 6–12)
POTASSIUM SERPL-SCNC: 3.6 MMOL/L (ref 3.5–5.2)
PROT SERPL-MCNC: 6.9 G/DL (ref 6–8.5)
PROT UR QL STRIP: NEGATIVE
RBC # BLD AUTO: 4 10*6/MM3 (ref 3.77–5.28)
RBC # UR STRIP: ABNORMAL /HPF
REF LAB TEST METHOD: ABNORMAL
SODIUM SERPL-SCNC: 145 MMOL/L (ref 136–145)
SP GR UR STRIP: 1.02 (ref 1–1.03)
SQUAMOUS #/AREA URNS HPF: ABNORMAL /HPF
T3 SERPL-MCNC: 102 NG/DL (ref 80–200)
T4 SERPL-MCNC: 6.67 MCG/DL (ref 4.5–11.7)
TSH SERPL DL<=0.05 MIU/L-ACNC: 2 UIU/ML (ref 0.27–4.2)
UROBILINOGEN UR QL STRIP: ABNORMAL
WBC # UR STRIP: ABNORMAL /HPF
WBC NRBC COR # BLD AUTO: 6.69 10*3/MM3 (ref 3.4–10.8)

## 2024-10-23 PROCEDURE — 84480 ASSAY TRIIODOTHYRONINE (T3): CPT | Performed by: INTERNAL MEDICINE

## 2024-10-23 PROCEDURE — 86140 C-REACTIVE PROTEIN: CPT | Performed by: INTERNAL MEDICINE

## 2024-10-23 PROCEDURE — 83615 LACTATE (LD) (LDH) ENZYME: CPT | Performed by: INTERNAL MEDICINE

## 2024-10-23 PROCEDURE — 87077 CULTURE AEROBIC IDENTIFY: CPT | Performed by: INTERNAL MEDICINE

## 2024-10-23 PROCEDURE — 82140 ASSAY OF AMMONIA: CPT | Performed by: INTERNAL MEDICINE

## 2024-10-23 PROCEDURE — 84443 ASSAY THYROID STIM HORMONE: CPT | Performed by: INTERNAL MEDICINE

## 2024-10-23 PROCEDURE — 84436 ASSAY OF TOTAL THYROXINE: CPT | Performed by: INTERNAL MEDICINE

## 2024-10-23 PROCEDURE — 85025 COMPLETE CBC W/AUTO DIFF WBC: CPT | Performed by: INTERNAL MEDICINE

## 2024-10-23 PROCEDURE — 87186 SC STD MICRODIL/AGAR DIL: CPT | Performed by: INTERNAL MEDICINE

## 2024-10-23 PROCEDURE — 81001 URINALYSIS AUTO W/SCOPE: CPT | Performed by: INTERNAL MEDICINE

## 2024-10-23 PROCEDURE — 87086 URINE CULTURE/COLONY COUNT: CPT | Performed by: INTERNAL MEDICINE

## 2024-10-23 PROCEDURE — 80053 COMPREHEN METABOLIC PANEL: CPT | Performed by: INTERNAL MEDICINE

## 2024-10-24 LAB — BACTERIA SPEC AEROBE CULT: NO GROWTH

## 2024-10-26 LAB
BACTERIA SPEC AEROBE CULT: ABNORMAL
BACTERIA SPEC AEROBE CULT: ABNORMAL

## 2024-11-13 ENCOUNTER — LAB REQUISITION (OUTPATIENT)
Dept: LAB | Facility: HOSPITAL | Age: 69
End: 2024-11-13
Payer: MEDICARE

## 2024-11-13 DIAGNOSIS — Z51.81 ENCOUNTER FOR THERAPEUTIC DRUG LEVEL MONITORING: ICD-10-CM

## 2024-11-13 LAB — VALPROATE SERPL-MCNC: 8.9 MCG/ML (ref 50–125)

## 2024-11-13 PROCEDURE — 80164 ASSAY DIPROPYLACETIC ACD TOT: CPT | Performed by: NURSE PRACTITIONER

## 2024-11-21 ENCOUNTER — APPOINTMENT (OUTPATIENT)
Dept: CT IMAGING | Facility: HOSPITAL | Age: 69
End: 2024-11-21
Payer: MEDICARE

## 2024-11-21 ENCOUNTER — LAB REQUISITION (OUTPATIENT)
Dept: LAB | Facility: HOSPITAL | Age: 69
End: 2024-11-21
Payer: MEDICARE

## 2024-11-21 ENCOUNTER — APPOINTMENT (OUTPATIENT)
Dept: GENERAL RADIOLOGY | Facility: HOSPITAL | Age: 69
End: 2024-11-21
Payer: MEDICARE

## 2024-11-21 ENCOUNTER — HOSPITAL ENCOUNTER (EMERGENCY)
Facility: HOSPITAL | Age: 69
Discharge: NURSING, REHAB OR LONG-TERM CARE | End: 2024-11-21
Attending: EMERGENCY MEDICINE | Admitting: EMERGENCY MEDICINE
Payer: MEDICARE

## 2024-11-21 VITALS
WEIGHT: 133.8 LBS | TEMPERATURE: 97.5 F | OXYGEN SATURATION: 96 % | DIASTOLIC BLOOD PRESSURE: 74 MMHG | BODY MASS INDEX: 21.5 KG/M2 | HEIGHT: 66 IN | SYSTOLIC BLOOD PRESSURE: 121 MMHG | RESPIRATION RATE: 16 BRPM | HEART RATE: 56 BPM
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
## 2024-11-21 DIAGNOSIS — I67.82 CEREBRAL ISCHEMIA: ICD-10-CM

## 2024-11-21 DIAGNOSIS — S09.90XA INJURY OF HEAD, INITIAL ENCOUNTER: ICD-10-CM

## 2024-11-21 LAB — VALPROATE SERPL-MCNC: 23.2 MCG/ML (ref 50–125)

## 2024-11-21 PROCEDURE — 70450 CT HEAD/BRAIN W/O DYE: CPT

## 2024-11-21 PROCEDURE — 99284 EMERGENCY DEPT VISIT MOD MDM: CPT

## 2024-11-21 PROCEDURE — 72125 CT NECK SPINE W/O DYE: CPT | Performed by: RADIOLOGY

## 2024-11-21 PROCEDURE — 80164 ASSAY DIPROPYLACETIC ACD TOT: CPT | Performed by: INTERNAL MEDICINE

## 2024-11-21 PROCEDURE — 72125 CT NECK SPINE W/O DYE: CPT

## 2024-11-21 PROCEDURE — 73523 X-RAY EXAM HIPS BI 5/> VIEWS: CPT | Performed by: RADIOLOGY

## 2024-11-21 PROCEDURE — 70450 CT HEAD/BRAIN W/O DYE: CPT | Performed by: RADIOLOGY

## 2024-11-21 PROCEDURE — 73523 X-RAY EXAM HIPS BI 5/> VIEWS: CPT

## 2024-11-21 NOTE — ED PROVIDER NOTES
"Subjective   History of Present Illness  Patient is a 69-year-old female with significant past medical history positive for anemia, anxiety, arthritis, depression, presenting to the ER from the local nursing home reports of a fall.  Patient denies any fall.  Patient denies any pain anywhere.  Nursing home staff states that she fell trying to get out of bed.  Nursing home staff reports head injury but no loss of consciousness.  Patient denies any of this.    History provided by:  Patient and nursing home   used: No        Review of Systems   Constitutional: Negative.  Negative for fever.   HENT: Negative.     Respiratory: Negative.     Cardiovascular: Negative.  Negative for chest pain.   Gastrointestinal: Negative.  Negative for abdominal pain.   Endocrine: Negative.    Genitourinary: Negative.  Negative for dysuria.   Skin: Negative.    Neurological: Negative.    Psychiatric/Behavioral: Negative.     All other systems reviewed and are negative.      Past Medical History:   Diagnosis Date    Anemia     Anxiety     Arthritis     Depression     Legally blind     Restless leg syndrome     Sleep apnea     \"I don't use a cpap anymore since losing 106 lbs\"    Water retention        Allergies   Allergen Reactions    Penicillins Hives and Swelling       Past Surgical History:   Procedure Laterality Date    BACK SURGERY      CARDIAC CATHETERIZATION N/A 1/19/2024    Procedure: Percutaneous Manual Thrombectomy;  Surgeon: Efrain Hurley MD;  Location:  [facility];  Service: Interventional Radiology;  Laterality: N/A;    CARDIAC CATHETERIZATION N/A 4/12/2024    Procedure: Left Heart Cath;  Surgeon: Susan Mederos MD;  Location:  [facility];  Service: Cardiology;  Laterality: N/A;    GALLBLADDER SURGERY      HIP ARTHROSCOPY      JOINT REPLACEMENT Right        Family History   Problem Relation Age of Onset    Breast cancer Neg Hx        Social History     Socioeconomic " "History    Marital status:     Number of children: 0    Years of education: 1 yr college   Tobacco Use    Smoking status: Former     Current packs/day: 1.50     Average packs/day: 1.5 packs/day for 51.0 years (76.5 ttl pk-yrs)     Types: Cigarettes     Passive exposure: Past    Smokeless tobacco: Never   Vaping Use    Vaping status: Never Used   Substance and Sexual Activity    Alcohol use: Not Currently     Alcohol/week: 1.0 standard drink of alcohol     Types: 1 Glasses of wine per week     Comment: \"occasionally - once every two months\"    Drug use: Not Currently     Comment: alcohol - occasionally    Sexual activity: Defer           Objective   Physical Exam  Vitals and nursing note reviewed.   Constitutional:       General: She is not in acute distress.     Appearance: She is well-developed. She is not diaphoretic.   HENT:      Head: Normocephalic and atraumatic.      Right Ear: External ear normal.      Left Ear: External ear normal.      Nose: Nose normal.   Eyes:      Conjunctiva/sclera: Conjunctivae normal.      Pupils: Pupils are equal, round, and reactive to light.   Neck:      Vascular: No JVD.      Trachea: No tracheal deviation.   Cardiovascular:      Rate and Rhythm: Normal rate and regular rhythm.      Heart sounds: Normal heart sounds. No murmur heard.  Pulmonary:      Effort: Pulmonary effort is normal. No respiratory distress.      Breath sounds: Normal breath sounds. No wheezing.   Abdominal:      General: Bowel sounds are normal.      Palpations: Abdomen is soft.      Tenderness: There is no abdominal tenderness.   Musculoskeletal:         General: No deformity. Normal range of motion.      Cervical back: Normal range of motion and neck supple.   Skin:     General: Skin is warm and dry.      Coloration: Skin is not pale.      Findings: No erythema or rash.   Neurological:      Mental Status: She is alert and oriented to person, place, and time.      Cranial Nerves: No cranial nerve " deficit.   Psychiatric:         Behavior: Behavior normal.         Thought Content: Thought content normal.         Procedures       XR Hips Bilateral With or Without Pelvis 5 View   Final Result   No acute osseous abnormality evident. Right hip prosthesis appears   intact. Moderate left hip joint space loss.       This report was finalized on 11/21/2024 3:51 AM by Alex Pallas, DO.          CT Cervical Spine Without Contrast   Final Result   No acute fracture.       This report was finalized on 11/21/2024 3:43 AM by Alex Pallas, DO.          CT Head Without Contrast   Final Result   No acute intracranial process.       This report was finalized on 11/21/2024 3:42 AM by Alex Pallas, DO.                ED Course                                                       Medical Decision Making  Patient is a 69-year-old female with significant past medical history positive for anemia, anxiety, arthritis, depression, presenting to the ER from the local nursing home reports of a fall.  Patient denies any fall.  Patient denies any pain anywhere.  Nursing home staff states that she fell trying to get out of bed.  Nursing home staff reports head injury but no loss of consciousness.  Patient denies any of this.    Work up and results were discussed throughly with the patients.  The patient will be discharged for further monitoring and management with their PCP.  Red flags, warning signs, worsening symptoms, and when to return to the ER discussed with and understood by the patients.  Patient will follow up with their PCP in a timely manner.  Vitals stable at discharge.    Problems Addressed:  Fall, initial encounter: complicated acute illness or injury  Injury of head, initial encounter: complicated acute illness or injury    Amount and/or Complexity of Data Reviewed  Radiology: ordered.        Final diagnoses:   Fall, initial encounter   Injury of head, initial encounter       ED Disposition  ED Disposition       ED Disposition    Discharge    Condition   Stable    Comment   --               Dread Burton MD  [address] [place], [place] [id number]  [phone]    Schedule an appointment as soon as possible for a visit            Medication List      No changes were made to your prescriptions during this visit.            Cassandra Salinas, APRN  11/21/24 0432

## 2024-11-24 ENCOUNTER — LAB REQUISITION (OUTPATIENT)
Dept: LAB | Facility: HOSPITAL | Age: 69
End: 2024-11-24
Payer: MEDICARE

## 2024-11-24 DIAGNOSIS — R62.7 ADULT FAILURE TO THRIVE: ICD-10-CM

## 2024-11-24 DIAGNOSIS — R29.6 REPEATED FALLS: ICD-10-CM

## 2024-11-24 LAB
BACTERIA UR QL AUTO: ABNORMAL /HPF
BILIRUB UR QL STRIP: NEGATIVE
CLARITY UR: ABNORMAL
COD CRY URNS QL: ABNORMAL /HPF
COLOR UR: YELLOW
GLUCOSE UR STRIP-MCNC: NEGATIVE MG/DL
HGB UR QL STRIP.AUTO: NEGATIVE
HYALINE CASTS UR QL AUTO: ABNORMAL /LPF
KETONES UR QL STRIP: ABNORMAL
LEUKOCYTE ESTERASE UR QL STRIP.AUTO: ABNORMAL
NITRITE UR QL STRIP: NEGATIVE
PH UR STRIP.AUTO: 6.5 [PH] (ref 5–8)
PROT UR QL STRIP: ABNORMAL
RBC # UR STRIP: ABNORMAL /HPF
REF LAB TEST METHOD: ABNORMAL
SP GR UR STRIP: 1.02 (ref 1–1.03)
SQUAMOUS #/AREA URNS HPF: ABNORMAL /HPF
UROBILINOGEN UR QL STRIP: ABNORMAL
WBC # UR STRIP: ABNORMAL /HPF

## 2024-11-24 PROCEDURE — 81001 URINALYSIS AUTO W/SCOPE: CPT | Performed by: INTERNAL MEDICINE

## 2024-11-24 PROCEDURE — 87086 URINE CULTURE/COLONY COUNT: CPT | Performed by: INTERNAL MEDICINE

## 2024-11-25 LAB — BACTERIA SPEC AEROBE CULT: NORMAL

## 2024-11-30 ENCOUNTER — APPOINTMENT (OUTPATIENT)
Dept: CT IMAGING | Facility: HOSPITAL | Age: 69
End: 2024-11-30
Payer: MEDICARE

## 2024-11-30 ENCOUNTER — HOSPITAL ENCOUNTER (EMERGENCY)
Facility: HOSPITAL | Age: 69
Discharge: HOME | End: 2024-12-01
Attending: EMERGENCY MEDICINE
Payer: MEDICARE

## 2024-11-30 VITALS
SYSTOLIC BLOOD PRESSURE: 129 MMHG | TEMPERATURE: 99.5 F | DIASTOLIC BLOOD PRESSURE: 68 MMHG | BODY MASS INDEX: 21.86 KG/M2 | OXYGEN SATURATION: 94 % | RESPIRATION RATE: 17 BRPM | HEIGHT: 66 IN | HEART RATE: 59 BPM | WEIGHT: 136 LBS

## 2024-11-30 DIAGNOSIS — S30.0XXA LUMBAR CONTUSION, INITIAL ENCOUNTER: Primary | ICD-10-CM

## 2024-11-30 PROCEDURE — 72131 CT LUMBAR SPINE W/O DYE: CPT

## 2024-11-30 PROCEDURE — 72131 CT LUMBAR SPINE W/O DYE: CPT | Performed by: RADIOLOGY

## 2024-11-30 PROCEDURE — 99284 EMERGENCY DEPT VISIT MOD MDM: CPT

## 2024-11-30 RX ORDER — HYDROCODONE BITARTRATE AND ACETAMINOPHEN 7.5; 325 MG/1; MG/1
1 TABLET ORAL ONCE
Status: COMPLETED | OUTPATIENT
Start: 2024-11-30 | End: 2024-11-30

## 2024-11-30 RX ADMIN — HYDROCODONE BITARTRATE AND ACETAMINOPHEN 1 TABLET: 7.5; 325 TABLET ORAL at 20:31

## 2024-12-01 NOTE — ED PROVIDER NOTES
"Subjective   History of Present Illness  Patient is a 69-year-old female presents today complaining of lower back pain after a fall.  Patient was sent from the nursing home.  Patient reports that she was getting in a wheelchair and slipped out.  Patient does have a history of some dementia but is alert and oriented.        Review of Systems   Constitutional: Negative.    HENT: Negative.     Eyes: Negative.    Respiratory: Negative.     Cardiovascular: Negative.    Gastrointestinal: Negative.    Endocrine: Negative.    Genitourinary: Negative.    Musculoskeletal:  Positive for back pain.   Skin: Negative.    Allergic/Immunologic: Negative.    Neurological: Negative.    Hematological: Negative.    Psychiatric/Behavioral: Negative.         Past Medical History:   Diagnosis Date    Anemia     Anxiety     Arthritis     Depression     Legally blind     Restless leg syndrome     Sleep apnea     \"I don't use a cpap anymore since losing 106 lbs\"    Water retention        Allergies   Allergen Reactions    Penicillins Hives and Swelling       Past Surgical History:   Procedure Laterality Date    BACK SURGERY      CARDIAC CATHETERIZATION N/A 1/19/2024    Procedure: Percutaneous Manual Thrombectomy;  Surgeon: Efrain Hurley MD;  Location:  [facility];  Service: Interventional Radiology;  Laterality: N/A;    CARDIAC CATHETERIZATION N/A 4/12/2024    Procedure: Left Heart Cath;  Surgeon: Susan Mederos MD;  [facility];  Service: Cardiology;  Laterality: N/A;    GALLBLADDER SURGERY      HIP ARTHROSCOPY      JOINT REPLACEMENT Right        Family History   Problem Relation Age of Onset    Breast cancer Neg Hx        Social History     Socioeconomic History    Marital status:     Number of children: 0    Years of education: 1 yr college   Tobacco Use    Smoking status: Former     Current packs/day: 1.50     Average packs/day: 1.5 packs/day for 51.0 years (76.5 ttl pk-yrs)     " "Types: Cigarettes     Passive exposure: Past    Smokeless tobacco: Never   Vaping Use    Vaping status: Never Used   Substance and Sexual Activity    Alcohol use: Not Currently     Alcohol/week: 1.0 standard drink of alcohol     Types: 1 Glasses of wine per week     Comment: \"occasionally - once every two months\"    Drug use: Not Currently     Comment: alcohol - occasionally    Sexual activity: Defer           Objective   Physical Exam  Vitals and nursing note reviewed.   Constitutional:       Appearance: She is well-developed.   HENT:      Head: Normocephalic.      Right Ear: External ear normal.      Left Ear: External ear normal.   Eyes:      Conjunctiva/sclera: Conjunctivae normal.      Pupils: Pupils are equal, round, and reactive to light.   Cardiovascular:      Rate and Rhythm: Normal rate and regular rhythm.      Heart sounds: Normal heart sounds.   Pulmonary:      Effort: Pulmonary effort is normal.      Breath sounds: Normal breath sounds.   Abdominal:      General: Bowel sounds are normal.      Palpations: Abdomen is soft.   Musculoskeletal:         General: Normal range of motion.      Cervical back: Normal range of motion and neck supple.      Lumbar back: Tenderness present.   Skin:     General: Skin is warm and dry.      Capillary Refill: Capillary refill takes less than 2 seconds.   Neurological:      Mental Status: She is alert and oriented to person, place, and time.   Psychiatric:         Behavior: Behavior normal.         Thought Content: Thought content normal.         Procedures           ED Course  ED Course as of 12/01/24 0108   Sat Nov 30, 2024 2154 Endorsed to Maverick Asencio PA-C  [QUINTEN]   2237 CT lumbar rad interpreted:  1. No acute fracture or traumatic listhesis.  2. Multilevel disc related degenerative changes and facet arthrosis as  above.   [RB]      ED Course User Index  [QUINTEN] Soham Asencio P, APRN  [RB] Maverick Asencio II, PA                                           Electronically " signed by NIKKIE Cunningham, 11/30/24, 9:55 PM EST.              Medical Decision Making      Final diagnoses:   Lumbar contusion, initial encounter       ED Disposition  ED Disposition       ED Disposition   Discharge    Condition   Stable    Comment   --               Dread Burton MD  [address] [place], [place] [id number]  [phone]    Schedule an appointment as soon as possible for a visit            Medication List      No changes were made to your prescriptions during this visit.            Maverick Asencio II, PA  12/01/24 0108

## 2024-12-05 ENCOUNTER — APPOINTMENT (OUTPATIENT)
Dept: GENERAL RADIOLOGY | Facility: HOSPITAL | Age: 69
End: 2024-12-05
Payer: MEDICARE

## 2024-12-05 ENCOUNTER — APPOINTMENT (OUTPATIENT)
Dept: CT IMAGING | Facility: HOSPITAL | Age: 69
End: 2024-12-05
Payer: MEDICARE

## 2024-12-05 ENCOUNTER — HOSPITAL ENCOUNTER (EMERGENCY)
Facility: HOSPITAL | Age: 69
Discharge: NURSING, REHAB OR LONG-TERM CARE | End: 2024-12-05
Attending: EMERGENCY MEDICINE | Admitting: EMERGENCY MEDICINE
Payer: MEDICARE

## 2024-12-05 VITALS
WEIGHT: 147 LBS | SYSTOLIC BLOOD PRESSURE: 160 MMHG | HEIGHT: 66 IN | HEART RATE: 54 BPM | BODY MASS INDEX: 23.63 KG/M2 | RESPIRATION RATE: 18 BRPM | OXYGEN SATURATION: 97 % | TEMPERATURE: 97.9 F | DIASTOLIC BLOOD PRESSURE: 72 MMHG
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
## 2024-12-05 DIAGNOSIS — S40.012A CONTUSION OF LEFT SHOULDER, INITIAL ENCOUNTER: ICD-10-CM

## 2024-12-05 DIAGNOSIS — S16.1XXA STRAIN OF NECK MUSCLE, INITIAL ENCOUNTER: ICD-10-CM

## 2024-12-05 DIAGNOSIS — S70.02XA CONTUSION OF LEFT HIP, INITIAL ENCOUNTER: ICD-10-CM
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
## 2024-12-05 PROCEDURE — 73502 X-RAY EXAM HIP UNI 2-3 VIEWS: CPT | Performed by: RADIOLOGY

## 2024-12-05 PROCEDURE — 73030 X-RAY EXAM OF SHOULDER: CPT | Performed by: RADIOLOGY

## 2024-12-05 PROCEDURE — 71045 X-RAY EXAM CHEST 1 VIEW: CPT | Performed by: RADIOLOGY

## 2024-12-05 PROCEDURE — 73060 X-RAY EXAM OF HUMERUS: CPT | Performed by: RADIOLOGY

## 2024-12-05 PROCEDURE — 71045 X-RAY EXAM CHEST 1 VIEW: CPT

## 2024-12-05 PROCEDURE — 73060 X-RAY EXAM OF HUMERUS: CPT

## 2024-12-05 PROCEDURE — 73502 X-RAY EXAM HIP UNI 2-3 VIEWS: CPT

## 2024-12-05 PROCEDURE — 73030 X-RAY EXAM OF SHOULDER: CPT

## 2024-12-05 PROCEDURE — 72125 CT NECK SPINE W/O DYE: CPT

## 2024-12-05 PROCEDURE — 99284 EMERGENCY DEPT VISIT MOD MDM: CPT

## 2024-12-05 PROCEDURE — 70450 CT HEAD/BRAIN W/O DYE: CPT

## 2024-12-05 PROCEDURE — 73552 X-RAY EXAM OF FEMUR 2/>: CPT

## 2024-12-05 PROCEDURE — 70450 CT HEAD/BRAIN W/O DYE: CPT | Performed by: RADIOLOGY

## 2024-12-05 PROCEDURE — 72125 CT NECK SPINE W/O DYE: CPT | Performed by: RADIOLOGY

## 2024-12-05 PROCEDURE — 73552 X-RAY EXAM OF FEMUR 2/>: CPT | Performed by: RADIOLOGY

## 2024-12-05 RX ORDER — HYDROCODONE BITARTRATE AND ACETAMINOPHEN 5; 325 MG/1; MG/1
1 TABLET ORAL ONCE
Status: COMPLETED | OUTPATIENT
Start: 2024-12-05 | End: 2024-12-05

## 2024-12-05 RX ADMIN — HYDROCODONE BITARTRATE AND ACETAMINOPHEN 1 TABLET: 5; 325 TABLET ORAL at 08:04

## 2024-12-05 NOTE — ED PROVIDER NOTES
"Subjective   History of Present Illness  Patient is a 69-year-old female presents by EMS from a local nursing home after a fall.  She reports that she was sitting on the side of her bed, reached for something on the table, fell out of bed landing on her left side.  She complains of pain in the back of her neck, left shoulder, left hip.  She is not sure if she hit her head, denies loss of consciousness, denies headache.  She denies back pain, chest pain, shortness of breath, abdominal pain, vomiting, syncope or near syncope, any new neurological deficits, other symptoms, other injuries or other complaints.      Review of Systems   All other systems reviewed and are negative.      Past Medical History:   Diagnosis Date    Anemia     Anxiety     Arthritis     Depression     Legally blind     Restless leg syndrome     Sleep apnea     \"I don't use a cpap anymore since losing 106 lbs\"    Water retention        Allergies   Allergen Reactions    Penicillins Hives and Swelling       Past Surgical History:   Procedure Laterality Date    BACK SURGERY      CARDIAC CATHETERIZATION N/A 1/19/2024    Procedure: Percutaneous Manual Thrombectomy;  Surgeon: Efrain Hurley MD;  Location:  [facility];  Service: Interventional Radiology;  Laterality: N/A;    CARDIAC CATHETERIZATION N/A 4/12/2024    Procedure: Left Heart Cath;  Surgeon: Susan Mederos MD;  [facility];  Service: Cardiology;  Laterality: N/A;    GALLBLADDER SURGERY      HIP ARTHROSCOPY      JOINT REPLACEMENT Right        Family History   Problem Relation Age of Onset    Breast cancer Neg Hx        Social History     Socioeconomic History    Marital status:     Number of children: 0    Years of education: 1 yr college   Tobacco Use    Smoking status: Former     Current packs/day: 1.50     Average packs/day: 1.5 packs/day for 51.0 years (76.5 ttl pk-yrs)     Types: Cigarettes     Passive exposure: Past    Smokeless " "tobacco: Never   Vaping Use    Vaping status: Never Used   Substance and Sexual Activity    Alcohol use: Not Currently     Alcohol/week: 1.0 standard drink of alcohol     Types: 1 Glasses of wine per week     Comment: \"occasionally - once every two months\"    Drug use: Not Currently     Comment: alcohol - occasionally    Sexual activity: Defer           Objective   Physical Exam  Vitals and nursing note reviewed.   Constitutional:       General: She is not in acute distress.     Appearance: She is well-developed. She is not toxic-appearing or diaphoretic.      Comments: Pleasant elderly female, awake and alert, in no apparent acute distress.   HENT:      Head: Normocephalic and atraumatic.      Comments: No evidence of head injury noted  Eyes:      General: No scleral icterus.     Pupils: Pupils are equal, round, and reactive to light.   Neck:      Trachea: No tracheal deviation.      Comments: Mild diffuse posterior tenderness of the neck without more focal findings, no appreciable deformity.  Cardiovascular:      Rate and Rhythm: Normal rate and regular rhythm.   Pulmonary:      Effort: Pulmonary effort is normal. No respiratory distress.      Breath sounds: Normal breath sounds.   Chest:      Chest wall: No tenderness.   Abdominal:      General: Bowel sounds are normal.      Palpations: Abdomen is soft.      Tenderness: There is no abdominal tenderness. There is no guarding or rebound.   Musculoskeletal:         General: Normal range of motion.      Cervical back: Normal range of motion and neck supple. No rigidity.      Right lower leg: No edema.      Left lower leg: No edema.      Comments: Tenderness to palpation of the left shoulder and the left hip.  No appreciable deformity.  She tolerates range of motion well.   Skin:     General: Skin is warm and dry.      Capillary Refill: Capillary refill takes less than 2 seconds.      Coloration: Skin is not pale.   Neurological:      Mental Status: She is alert.      " GCS: GCS eye subscore is 4. GCS verbal subscore is 5. GCS motor subscore is 6.      Comments: Patient is awake and alert.  There is left-sided hemiparesis that she states is due to old stroke and is completely at baseline.   Psychiatric:         Mood and Affect: Mood normal.         Behavior: Behavior normal.         Procedures  XR Femur 2 View Left   Final Result   No acute fracture.                       This report was finalized on 12/5/2024 8:48 AM by Marianna Caicedo M.D..          XR Hip With or Without Pelvis 2 - 3 View Left   Final Result   No fracture or dislocation.                   This report was finalized on 12/5/2024 8:52 AM by Marianna Caicedo M.D..          XR Humerus Left   Final Result   No acute fracture.                       This report was finalized on 12/5/2024 8:48 AM by Marianna Caicedo M.D..          CT Head Without Contrast   Final Result   No acute intracranial process.                   This report was finalized on 12/5/2024 8:37 AM by Marianna Caicedo M.D..          CT Cervical Spine Without Contrast   Final Result   No acute fracture or malalignment.                   This report was finalized on 12/5/2024 8:59 AM by Marianna Caicedo M.D..          XR Shoulder 2+ View Left   Final Result   No acute fracture.                    This report was finalized on 12/5/2024 8:36 AM by Marianna Caicedo M.D..          XR Chest 1 View   Final Result   No acute cardiopulmonary process.           This report was finalized on 12/5/2024 8:45 AM by Marianna Caicedo M.D..                     ED Course  ED Course as of 12/05/24 0937   Thu Dec 05, 2024   0935 Patient's emergency department stay has been entirely uneventful.  She has shown no signs of acute distress.  We discussed her test results and her plan of care.  She voices understanding and agreement. [CM]      ED Course User Index  [CM] Tulio Perez MD                                                        Medical Decision Making      Final diagnoses:   Fall at nursing home, initial encounter   Contusion of left shoulder, initial encounter   Contusion of left hip, initial encounter   Strain of neck muscle, initial encounter       ED Disposition  ED Disposition       ED Disposition   Discharge    Condition   Stable    Comment   --               Narinder Alvarado MD  [address] [facility] [address]  [phone]    Go in 2 days      [facility] EMERGENCY DEPARTMENT  [address] [facility] [address] [phone]  Go to   If symptoms worsen         Medication List      No changes were made to your prescriptions during this visit.       Please note that portions of this note were completed with a voice recognition program.        Tulio Perez MD  12/05/24 0922

## 2024-12-05 NOTE — DISCHARGE INSTRUCTIONS
Follow-up with Dr. Alvarado at the nursing home in 2 days.  Return to the emergency department right away for any problems.

## 2025-01-02 NOTE — PLAN OF CARE
Goal Outcome Evaluation: Patient remains pleasantly confused. Compliant with most medications and care as ordered. She remains on RA, saturations maintaining well above 90%. She ambulated with PT in hallway this shift. She also sat in a chair at bedside for lunch this evening, tolerated well. She has been setup for each meal, fair intake noted. She is currently resting in bed. Will continue with plan of care.    PRIOR NOTES  53-year-old female here to see me regarding gross hematuria  PMH: Smoking: Hyperlipidemia, hypertension  Social: 15 pack years, current smoking  UA 9/6/2024 and culture-culture negative, trace heme  Fhx: Mother had bladder ca  CT A/P with IV 9/30/24-5 mm nonobstructing stone in the right renal pelvis, some fatty infiltration of the bladder wall     Pt reports gross hematuria beginning August/September, intermittent. No other symptoms, no dysuria, urgency, frequency    12/30/24 - CTU -by my read, no obvious glaring abnormalities.  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  No other abnormalities throughout the genitourinary tract that I could appreciate.  Final radiology read pending    UPDATED SUBJECTIVE HISTORY  01/02/25 -reports no hematuria since I saw her last    Past Medical History  She has a past medical history of Allergic, Angina pectoris, unspecified (09/24/2021), Elevated blood-pressure reading, without diagnosis of hypertension (09/20/2021), Hypertension (sept 2022), Personal history of other diseases of the female genital tract (06/21/2021), Personal history of other medical treatment, Personal history of other specified conditions (06/06/2019), and Personal history of other specified conditions (09/20/2021).    Surgical History  She has a past surgical history that includes Other surgical history (06/06/2019); Other surgical history (06/06/2019); Other surgical history (06/06/2019); [place] tooth extraction; Tubal ligation; Breast surgery (Bilateral); and Breast biopsy (Right, 2013).     Social History  She reports that she has been smoking cigarettes. She has a 30 pack-year smoking history. She has never used smokeless tobacco. She reports current alcohol use of about 5.0 standard drinks of alcohol per week. She reports that she does not currently use drugs.    Family History  Family History   Problem  Relation Name Age of Onset    Cancer Mother Gabbie         bladder    Diabetes Mother Gabbie     Heart disease Mother Gabbie     Anesthesia related problems Mother Gabbie     Arthritis Mother Gabbie     Heart disease Father Ed     Hypertension Father Ed     Rheumatologic disease Maternal Grandmother Pattie     Arthritis Maternal Grandmother Pattie     Breast cancer Maternal Grandmother Pattie         Allergies  Cephalexin, Penicillins, Iodine, Latex, and Varenicline    ROS: 12 system review was completed and is negative with the exception of those signs and symptoms noted in the history of present illness: A 12 system review was completed and is negative with the exception of those signs and symptoms noted in the history of present illness.     Exam:  General: in NAD, appears stated age  Head: normocephalic, atraumatic  Respiratory: normal effort, no use of accessory muscles  Cardiovascular: no edema noted  Skin: normal turgor, no rashes  Neurologic: grossly intact, oriented to person/place/time  Psychiatric: mode and affect appropriate    Patient ID: Kerri Comer is a 53 y.o. female.    Cystoscopy    Date/Time: 1/2/2025 11:57 AM    Performed by: Aniceto Jara MD  Authorized by: Aniceto Jara MD      Comments:      The R/B/A to the following procedure were discussed and an informed consent was signed. A time-out was performed confirming the correct procedure, laterality (if applicable), and plan.    The patient was prepped and draped in the standard sterile fashion. A 16 Persian flexible cystoscope was inserted through the urethra. The following finding were noted:    Urethra -normal  Bladder mucosa-normal without tumors, stones, or other lesions  Ureteral orifices -visualized in orthotopic position    The patient tolerated the procedure well.           Last Recorded Vitals  Blood pressure (!) 158/103, pulse 96, temperature 35.4 °C (95.7 °F), temperature source Temporal.    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

## 2025-01-16 ENCOUNTER — LAB REQUISITION (OUTPATIENT)
Dept: LAB | Facility: HOSPITAL | Age: 70
End: 2025-01-16
Payer: MEDICARE

## 2025-01-16 DIAGNOSIS — R05.9 COUGH, UNSPECIFIED: ICD-10-CM

## 2025-01-16 DIAGNOSIS — R09.89 OTHER SPECIFIED SYMPTOMS AND SIGNS INVOLVING THE CIRCULATORY AND RESPIRATORY SYSTEMS: ICD-10-CM

## 2025-01-16 LAB
B PARAPERT DNA SPEC QL NAA+PROBE: NOT DETECTED
B PERT DNA SPEC QL NAA+PROBE: NOT DETECTED
C PNEUM DNA NPH QL NAA+NON-PROBE: NOT DETECTED
FLUAV H1 2009 PAND RNA NPH QL NAA+PROBE: DETECTED
FLUBV RNA ISLT QL NAA+PROBE: NOT DETECTED
HADV DNA SPEC NAA+PROBE: NOT DETECTED
HCOV 229E RNA SPEC QL NAA+PROBE: NOT DETECTED
HCOV HKU1 RNA SPEC QL NAA+PROBE: NOT DETECTED
HCOV NL63 RNA SPEC QL NAA+PROBE: NOT DETECTED
HCOV OC43 RNA SPEC QL NAA+PROBE: NOT DETECTED
HMPV RNA NPH QL NAA+NON-PROBE: NOT DETECTED
HPIV1 RNA ISLT QL NAA+PROBE: NOT DETECTED
HPIV2 RNA SPEC QL NAA+PROBE: NOT DETECTED
HPIV3 RNA NPH QL NAA+PROBE: NOT DETECTED
HPIV4 P GENE NPH QL NAA+PROBE: NOT DETECTED
M PNEUMO IGG SER IA-ACNC: NOT DETECTED
RHINOVIRUS RNA SPEC NAA+PROBE: NOT DETECTED
RSV RNA NPH QL NAA+NON-PROBE: NOT DETECTED
SARS-COV-2 RNA RESP QL NAA+PROBE: NOT DETECTED

## 2025-01-16 PROCEDURE — 0202U NFCT DS 22 TRGT SARS-COV-2: CPT | Performed by: INTERNAL MEDICINE

## 2025-02-04 ENCOUNTER — LAB REQUISITION (OUTPATIENT)
Dept: LAB | Facility: HOSPITAL | Age: 70
End: 2025-02-04
Payer: MEDICARE

## 2025-02-04 DIAGNOSIS — Z79.899 OTHER LONG TERM (CURRENT) DRUG THERAPY: ICD-10-CM

## 2025-02-04 LAB — VALPROATE SERPL-MCNC: 18 MCG/ML (ref 50–125)

## 2025-02-04 PROCEDURE — 80164 ASSAY DIPROPYLACETIC ACD TOT: CPT | Performed by: INTERNAL MEDICINE

## 2025-04-04 ENCOUNTER — LAB REQUISITION (OUTPATIENT)
Dept: LAB | Facility: HOSPITAL | Age: 70
End: 2025-04-04
Payer: MEDICARE

## 2025-04-04 DIAGNOSIS — R94.6 ABNORMAL RESULTS OF THYROID FUNCTION STUDIES: ICD-10-CM

## 2025-04-04 DIAGNOSIS — Z13.220 ENCOUNTER FOR SCREENING FOR LIPOID DISORDERS: ICD-10-CM

## 2025-04-04 DIAGNOSIS — R89.2 ABNORMAL LEVEL OF OTHER DRUGS, MEDICAMENTS AND BIOLOGICAL SUBSTANCES IN SPECIMENS FROM OTHER ORGANS, SYSTEMS AND TISSUES: ICD-10-CM

## 2025-04-04 DIAGNOSIS — I10 ESSENTIAL (PRIMARY) HYPERTENSION: ICD-10-CM

## 2025-04-04 LAB
ALBUMIN SERPL-MCNC: 3.5 G/DL (ref 3.5–5.2)
ALBUMIN/GLOB SERPL: 1.1 G/DL
ALP SERPL-CCNC: 78 U/L (ref 39–117)
ALT SERPL W P-5'-P-CCNC: 6 U/L (ref 1–33)
ANION GAP SERPL CALCULATED.3IONS-SCNC: 9.8 MMOL/L (ref 5–15)
AST SERPL-CCNC: 12 U/L (ref 1–32)
BASOPHILS # BLD AUTO: 0.06 10*3/MM3 (ref 0–0.2)
BASOPHILS NFR BLD AUTO: 0.6 % (ref 0–1.5)
BILIRUB SERPL-MCNC: 0.2 MG/DL (ref 0–1.2)
BUN SERPL-MCNC: 10 MG/DL (ref 8–23)
BUN/CREAT SERPL: 13 (ref 7–25)
CALCIUM SPEC-SCNC: 9.5 MG/DL (ref 8.6–10.5)
CHLORIDE SERPL-SCNC: 106 MMOL/L (ref 98–107)
CO2 SERPL-SCNC: 26.2 MMOL/L (ref 22–29)
CREAT SERPL-MCNC: 0.77 MG/DL (ref 0.57–1)
DEPRECATED RDW RBC AUTO: 44.2 FL (ref 37–54)
EGFRCR SERPLBLD CKD-EPI 2021: 83.1 ML/MIN/1.73
EOSINOPHIL # BLD AUTO: 0.1 10*3/MM3 (ref 0–0.4)
EOSINOPHIL NFR BLD AUTO: 1.1 % (ref 0.3–6.2)
ERYTHROCYTE [DISTWIDTH] IN BLOOD BY AUTOMATED COUNT: 12.3 % (ref 12.3–15.4)
GLOBULIN UR ELPH-MCNC: 3.3 GM/DL
GLUCOSE SERPL-MCNC: 123 MG/DL (ref 65–99)
HCT VFR BLD AUTO: 36.6 % (ref 34–46.6)
HGB BLD-MCNC: 11.6 G/DL (ref 12–15.9)
IMM GRANULOCYTES # BLD AUTO: 0.04 10*3/MM3 (ref 0–0.05)
IMM GRANULOCYTES NFR BLD AUTO: 0.4 % (ref 0–0.5)
LYMPHOCYTES # BLD AUTO: 2.15 10*3/MM3 (ref 0.7–3.1)
LYMPHOCYTES NFR BLD AUTO: 23 % (ref 19.6–45.3)
MCH RBC QN AUTO: 30.9 PG (ref 26.6–33)
MCHC RBC AUTO-ENTMCNC: 31.7 G/DL (ref 31.5–35.7)
MCV RBC AUTO: 97.6 FL (ref 79–97)
MONOCYTES # BLD AUTO: 0.55 10*3/MM3 (ref 0.1–0.9)
MONOCYTES NFR BLD AUTO: 5.9 % (ref 5–12)
NEUTROPHILS NFR BLD AUTO: 6.46 10*3/MM3 (ref 1.7–7)
NEUTROPHILS NFR BLD AUTO: 69 % (ref 42.7–76)
NRBC BLD AUTO-RTO: 0 /100 WBC (ref 0–0.2)
PLATELET # BLD AUTO: 228 10*3/MM3 (ref 140–450)
PMV BLD AUTO: 11.3 FL (ref 6–12)
POTASSIUM SERPL-SCNC: 4.7 MMOL/L (ref 3.5–5.2)
PROT SERPL-MCNC: 6.8 G/DL (ref 6–8.5)
RBC # BLD AUTO: 3.75 10*6/MM3 (ref 3.77–5.28)
SODIUM SERPL-SCNC: 142 MMOL/L (ref 136–145)
TSH SERPL DL<=0.05 MIU/L-ACNC: 2.05 UIU/ML (ref 0.27–4.2)
VALPROATE SERPL-MCNC: 38.7 MCG/ML (ref 50–125)
WBC NRBC COR # BLD AUTO: 9.36 10*3/MM3 (ref 3.4–10.8)

## 2025-04-04 PROCEDURE — 85025 COMPLETE CBC W/AUTO DIFF WBC: CPT | Performed by: INTERNAL MEDICINE

## 2025-04-04 PROCEDURE — 80053 COMPREHEN METABOLIC PANEL: CPT | Performed by: INTERNAL MEDICINE

## 2025-04-04 PROCEDURE — 84443 ASSAY THYROID STIM HORMONE: CPT | Performed by: INTERNAL MEDICINE

## 2025-04-04 PROCEDURE — 80164 ASSAY DIPROPYLACETIC ACD TOT: CPT | Performed by: INTERNAL MEDICINE

## 2025-04-29 NOTE — CONSULTS
Not a candidate for the stroke/diabetes telehealth class as no hx of diabetes and bmi less than 25.   [DrShruti  ___] : Dr. MCCRACKEN [DrShruti ___] : Dr. MCCRACKEN

## 2025-04-30 ENCOUNTER — LAB REQUISITION (OUTPATIENT)
Dept: LAB | Facility: HOSPITAL | Age: 70
End: 2025-04-30
Payer: MEDICARE

## 2025-04-30 DIAGNOSIS — I10 ESSENTIAL (PRIMARY) HYPERTENSION: ICD-10-CM

## 2025-04-30 LAB
ALBUMIN SERPL-MCNC: 2.9 G/DL (ref 3.5–5.2)
ALBUMIN/GLOB SERPL: 0.9 G/DL
ALP SERPL-CCNC: 68 U/L (ref 39–117)
ALT SERPL W P-5'-P-CCNC: <5 U/L (ref 1–33)
ANION GAP SERPL CALCULATED.3IONS-SCNC: 11.4 MMOL/L (ref 5–15)
AST SERPL-CCNC: 15 U/L (ref 1–32)
BASOPHILS # BLD AUTO: 0.08 10*3/MM3 (ref 0–0.2)
BASOPHILS NFR BLD AUTO: 1.1 % (ref 0–1.5)
BILIRUB SERPL-MCNC: 0.3 MG/DL (ref 0–1.2)
BUN SERPL-MCNC: 18 MG/DL (ref 8–23)
BUN/CREAT SERPL: 22 (ref 7–25)
CALCIUM SPEC-SCNC: 9.2 MG/DL (ref 8.6–10.5)
CHLORIDE SERPL-SCNC: 102 MMOL/L (ref 98–107)
CHOLEST SERPL-MCNC: 127 MG/DL (ref 0–200)
CO2 SERPL-SCNC: 23.6 MMOL/L (ref 22–29)
CREAT SERPL-MCNC: 0.82 MG/DL (ref 0.57–1)
DEPRECATED RDW RBC AUTO: 44.2 FL (ref 37–54)
EGFRCR SERPLBLD CKD-EPI 2021: 77.1 ML/MIN/1.73
EOSINOPHIL # BLD AUTO: 0.15 10*3/MM3 (ref 0–0.4)
EOSINOPHIL NFR BLD AUTO: 2.1 % (ref 0.3–6.2)
ERYTHROCYTE [DISTWIDTH] IN BLOOD BY AUTOMATED COUNT: 12.3 % (ref 12.3–15.4)
GLOBULIN UR ELPH-MCNC: 3.4 GM/DL
GLUCOSE SERPL-MCNC: 108 MG/DL (ref 65–99)
HCT VFR BLD AUTO: 33.3 % (ref 34–46.6)
HDLC SERPL-MCNC: 37 MG/DL (ref 40–60)
HGB BLD-MCNC: 10.8 G/DL (ref 12–15.9)
IMM GRANULOCYTES # BLD AUTO: 0.04 10*3/MM3 (ref 0–0.05)
IMM GRANULOCYTES NFR BLD AUTO: 0.6 % (ref 0–0.5)
LDLC SERPL CALC-MCNC: 75 MG/DL (ref 0–100)
LDLC/HDLC SERPL: 2.04 {RATIO}
LYMPHOCYTES # BLD AUTO: 3.27 10*3/MM3 (ref 0.7–3.1)
LYMPHOCYTES NFR BLD AUTO: 45.5 % (ref 19.6–45.3)
MCH RBC QN AUTO: 31.7 PG (ref 26.6–33)
MCHC RBC AUTO-ENTMCNC: 32.4 G/DL (ref 31.5–35.7)
MCV RBC AUTO: 97.7 FL (ref 79–97)
MONOCYTES # BLD AUTO: 0.81 10*3/MM3 (ref 0.1–0.9)
MONOCYTES NFR BLD AUTO: 11.3 % (ref 5–12)
NEUTROPHILS NFR BLD AUTO: 2.83 10*3/MM3 (ref 1.7–7)
NEUTROPHILS NFR BLD AUTO: 39.4 % (ref 42.7–76)
NRBC BLD AUTO-RTO: 0 /100 WBC (ref 0–0.2)
PLATELET # BLD AUTO: 242 10*3/MM3 (ref 140–450)
PMV BLD AUTO: 10.9 FL (ref 6–12)
POTASSIUM SERPL-SCNC: 3.5 MMOL/L (ref 3.5–5.2)
PROT SERPL-MCNC: 6.3 G/DL (ref 6–8.5)
RBC # BLD AUTO: 3.41 10*6/MM3 (ref 3.77–5.28)
SODIUM SERPL-SCNC: 137 MMOL/L (ref 136–145)
TRIGL SERPL-MCNC: 73 MG/DL (ref 0–150)
VALPROATE SERPL-MCNC: 43.1 MCG/ML (ref 50–125)
VLDLC SERPL-MCNC: 15 MG/DL (ref 5–40)
WBC NRBC COR # BLD AUTO: 7.18 10*3/MM3 (ref 3.4–10.8)

## 2025-04-30 PROCEDURE — 80061 LIPID PANEL: CPT | Performed by: NURSE PRACTITIONER

## 2025-04-30 PROCEDURE — 85025 COMPLETE CBC W/AUTO DIFF WBC: CPT | Performed by: NURSE PRACTITIONER

## 2025-04-30 PROCEDURE — 80053 COMPREHEN METABOLIC PANEL: CPT | Performed by: NURSE PRACTITIONER

## 2025-04-30 PROCEDURE — 80164 ASSAY DIPROPYLACETIC ACD TOT: CPT | Performed by: NURSE PRACTITIONER

## 2025-06-18 ENCOUNTER — LAB REQUISITION (OUTPATIENT)
Dept: LAB | Facility: HOSPITAL | Age: 70
End: 2025-06-18
Payer: MEDICARE

## 2025-06-18 DIAGNOSIS — E78.5 HYPERLIPIDEMIA, UNSPECIFIED: ICD-10-CM

## 2025-06-18 LAB
ALBUMIN SERPL-MCNC: 3.7 G/DL (ref 3.5–5.2)
ALBUMIN/GLOB SERPL: 1.3 G/DL
ALP SERPL-CCNC: 92 U/L (ref 39–117)
ALT SERPL W P-5'-P-CCNC: 9 U/L (ref 1–33)
ANION GAP SERPL CALCULATED.3IONS-SCNC: 12.1 MMOL/L (ref 5–15)
AST SERPL-CCNC: 16 U/L (ref 1–32)
BASOPHILS # BLD AUTO: 0.07 10*3/MM3 (ref 0–0.2)
BASOPHILS NFR BLD AUTO: 1 % (ref 0–1.5)
BILIRUB SERPL-MCNC: 0.5 MG/DL (ref 0–1.2)
BUN SERPL-MCNC: 9.8 MG/DL (ref 8–23)
BUN/CREAT SERPL: 11.4 (ref 7–25)
CALCIUM SPEC-SCNC: 9.5 MG/DL (ref 8.6–10.5)
CHLORIDE SERPL-SCNC: 106 MMOL/L (ref 98–107)
CO2 SERPL-SCNC: 25.9 MMOL/L (ref 22–29)
CREAT SERPL-MCNC: 0.86 MG/DL (ref 0.57–1)
CRP SERPL-MCNC: 1.23 MG/DL (ref 0–0.5)
DEPRECATED RDW RBC AUTO: 44.9 FL (ref 37–54)
EGFRCR SERPLBLD CKD-EPI 2021: 72.8 ML/MIN/1.73
EOSINOPHIL # BLD AUTO: 0.16 10*3/MM3 (ref 0–0.4)
EOSINOPHIL NFR BLD AUTO: 2.3 % (ref 0.3–6.2)
ERYTHROCYTE [DISTWIDTH] IN BLOOD BY AUTOMATED COUNT: 12.7 % (ref 12.3–15.4)
ERYTHROCYTE [SEDIMENTATION RATE] IN BLOOD: 46 MM/HR (ref 0–30)
GLOBULIN UR ELPH-MCNC: 2.9 GM/DL
GLUCOSE SERPL-MCNC: 176 MG/DL (ref 65–99)
HBA1C MFR BLD: 5.94 % (ref 4.8–5.6)
HCT VFR BLD AUTO: 39.8 % (ref 34–46.6)
HGB BLD-MCNC: 13 G/DL (ref 12–15.9)
IMM GRANULOCYTES # BLD AUTO: 0.02 10*3/MM3 (ref 0–0.05)
IMM GRANULOCYTES NFR BLD AUTO: 0.3 % (ref 0–0.5)
LYMPHOCYTES # BLD AUTO: 2.24 10*3/MM3 (ref 0.7–3.1)
LYMPHOCYTES NFR BLD AUTO: 32.2 % (ref 19.6–45.3)
MCH RBC QN AUTO: 31.5 PG (ref 26.6–33)
MCHC RBC AUTO-ENTMCNC: 32.7 G/DL (ref 31.5–35.7)
MCV RBC AUTO: 96.4 FL (ref 79–97)
MONOCYTES # BLD AUTO: 0.52 10*3/MM3 (ref 0.1–0.9)
MONOCYTES NFR BLD AUTO: 7.5 % (ref 5–12)
NEUTROPHILS NFR BLD AUTO: 3.94 10*3/MM3 (ref 1.7–7)
NEUTROPHILS NFR BLD AUTO: 56.7 % (ref 42.7–76)
NRBC BLD AUTO-RTO: 0 /100 WBC (ref 0–0.2)
PLATELET # BLD AUTO: 245 10*3/MM3 (ref 140–450)
PMV BLD AUTO: 9.7 FL (ref 6–12)
POTASSIUM SERPL-SCNC: 3.9 MMOL/L (ref 3.5–5.2)
PROT SERPL-MCNC: 6.6 G/DL (ref 6–8.5)
RBC # BLD AUTO: 4.13 10*6/MM3 (ref 3.77–5.28)
SODIUM SERPL-SCNC: 144 MMOL/L (ref 136–145)
T4 SERPL-MCNC: 6.44 MCG/DL (ref 4.5–11.7)
TSH SERPL DL<=0.05 MIU/L-ACNC: 4.57 UIU/ML (ref 0.27–4.2)
WBC NRBC COR # BLD AUTO: 6.95 10*3/MM3 (ref 3.4–10.8)

## 2025-06-18 PROCEDURE — 84443 ASSAY THYROID STIM HORMONE: CPT | Performed by: INTERNAL MEDICINE

## 2025-06-18 PROCEDURE — 85652 RBC SED RATE AUTOMATED: CPT | Performed by: INTERNAL MEDICINE

## 2025-06-18 PROCEDURE — 85025 COMPLETE CBC W/AUTO DIFF WBC: CPT | Performed by: INTERNAL MEDICINE

## 2025-06-18 PROCEDURE — 84436 ASSAY OF TOTAL THYROXINE: CPT | Performed by: INTERNAL MEDICINE

## 2025-06-18 PROCEDURE — 80053 COMPREHEN METABOLIC PANEL: CPT | Performed by: INTERNAL MEDICINE

## 2025-06-18 PROCEDURE — 83036 HEMOGLOBIN GLYCOSYLATED A1C: CPT | Performed by: INTERNAL MEDICINE

## 2025-06-18 PROCEDURE — 86140 C-REACTIVE PROTEIN: CPT | Performed by: INTERNAL MEDICINE

## 2025-06-19 ENCOUNTER — LAB REQUISITION (OUTPATIENT)
Dept: LAB | Facility: HOSPITAL | Age: 70
End: 2025-06-19
Payer: MEDICARE

## 2025-06-19 DIAGNOSIS — N39.0 URINARY TRACT INFECTION, SITE NOT SPECIFIED: ICD-10-CM

## 2025-06-19 LAB
BACTERIA UR QL AUTO: ABNORMAL /HPF
BILIRUB UR QL STRIP: ABNORMAL
CLARITY UR: ABNORMAL
COD CRY URNS QL: ABNORMAL /HPF
COLOR UR: ABNORMAL
GLUCOSE UR STRIP-MCNC: NEGATIVE MG/DL
HGB UR QL STRIP.AUTO: NEGATIVE
HOLD SPECIMEN: NORMAL
HYALINE CASTS UR QL AUTO: ABNORMAL /LPF
KETONES UR QL STRIP: ABNORMAL
LEUKOCYTE ESTERASE UR QL STRIP.AUTO: ABNORMAL
NITRITE UR QL STRIP: NEGATIVE
PH UR STRIP.AUTO: 5.5 [PH] (ref 5–8)
PROT UR QL STRIP: ABNORMAL
RBC # UR STRIP: ABNORMAL /HPF
REF LAB TEST METHOD: ABNORMAL
SP GR UR STRIP: 1.03 (ref 1–1.03)
SQUAMOUS #/AREA URNS HPF: ABNORMAL /HPF
UROBILINOGEN UR QL STRIP: ABNORMAL
WBC # UR STRIP: ABNORMAL /HPF
YEAST URNS QL MICRO: ABNORMAL /HPF

## 2025-06-19 PROCEDURE — 81001 URINALYSIS AUTO W/SCOPE: CPT | Performed by: NURSE PRACTITIONER

## (undated) DEVICE — LIMB HOLDER, WRIST/ANKLE: Brand: DEROYAL

## (undated) DEVICE — NEURO GUIDEWIRE WITH HYDROPHILIC COATING: Brand: SYNCHRO 14

## (undated) DEVICE — 360 - 360I 10"/10" CMSQ 8RA: Brand: MEDLINE

## (undated) DEVICE — BAREWIRE WORKHORSE FILTER DELIVERY WIRE 315 CM: Brand: BAREWIRE

## (undated) DEVICE — ANGIO-SEAL STS PLUS VASCULAR CLOSURE DEVICE: Brand: ANGIO-SEAL

## (undated) DEVICE — ST ACC MICROPUNCTURE .018 TRANSLSS/SS/TP 5F/10CM 21G/7CM

## (undated) DEVICE — CATH SELCT NEURON BER 5F 120CM

## (undated) DEVICE — DEV TORQ H2OTORQ GW .025TO.040IN ORNG

## (undated) DEVICE — MANIFLD NAMIC PRECEPTOR INTEGR/TRANSD RT/HND 1/PRT 500PSI

## (undated) DEVICE — ROTATING HEMOSTATIC VALVE .096": Brand: RHV

## (undated) DEVICE — FORCEPS BX L240CM JAW DIA2.8MM L CAP W/ NDL MIC MESH TOOTH

## (undated) DEVICE — INTRO FLEXOR TB SDARM .87 SHTL 6F90CM

## (undated) DEVICE — PROVUE RETRIEVER: Brand: TREVO NXT

## (undated) DEVICE — XACT CAROTID STENT SYSTEM 10.0 MM X 40 MM TAPERED
Type: IMPLANTABLE DEVICE | Status: NON-FUNCTIONAL
Brand: XACT

## (undated) DEVICE — PAD, DEFIB, ADULT, RADIOTRANS, ZOLL: Brand: MEDLINE

## (undated) DEVICE — CATH F5 INF 3DRC 100CM: Brand: INFINITI

## (undated) DEVICE — SHEATH INTRO SUPERSHEATH SHRT .035 4F 11CM

## (undated) DEVICE — GW J/TP MOVE/CORE 0.035 3MM/TP 145CM

## (undated) DEVICE — STPCK 3/WY HP M/RA W/OFF/HNDL 1050PSI STRL

## (undated) DEVICE — CANSTR COL ENGINE FOR INDIGO SYS

## (undated) DEVICE — SHEATH INTRO SUPERSHEATH JWIRE .035 6F 11CM

## (undated) DEVICE — DEV INFL MONARCH 25W

## (undated) DEVICE — EMBOSHIELD NAV6 EMBOLIC PROTECTION SYSTEM 7.2 MM X 190 CM: Brand: EMBOSHIELD  NAV6

## (undated) DEVICE — LEX NEURO ANGIOGRAPHY: Brand: MEDLINE INDUSTRIES, INC.

## (undated) DEVICE — CATH F5 INF JL 4.5 100CM: Brand: INFINITI

## (undated) DEVICE — LONG SHEATH: Brand: AXS INFINITY LS PLUS

## (undated) DEVICE — Device: Brand: OMNIWIRE PRESSURE GUIDE WIRE

## (undated) DEVICE — CATH MIC PHENOM .021 .018IN 160CM

## (undated) DEVICE — PK CATH CARD 70

## (undated) DEVICE — TBG CONN ASP PENUMBRASYSTEM HIFLO MAX/0.110IN

## (undated) DEVICE — VIATRAC 14 PLUS PERIPHERAL DILATATION CATHETER 5.0 MM X 30 MM X 135 CM: Brand: VIATRAC

## (undated) DEVICE — LN FLTR ORL/NASL MICROSTREAM NONINTUB A/LNG

## (undated) DEVICE — STPCK LP 1WY RA 200PSI

## (undated) DEVICE — ST INF PRI SMRTSTE 20DRP 2VLV 24ML 117

## (undated) DEVICE — CATH MIC PHENOM 27 .040IN 15CM

## (undated) DEVICE — INTERMEDIATE CATHETER: Brand: AXS VECTA 71

## (undated) DEVICE — ADULT DISPOSABLE SINGLE-PATIENT USE PULSE OXIMETER SENSOR: Brand: NONIN

## (undated) DEVICE — GW AMPLTZ SUPERSTIFF STR .035IN 260CM

## (undated) DEVICE — INTRO SHEATH ENGAGE W/50 GW .038 8F12

## (undated) DEVICE — 6F .070 XB LAD 3.5 100CM: Brand: VISTA BRITE TIP

## (undated) DEVICE — DRSNG SURESITE WNDW 4X4.5

## (undated) DEVICE — EP LEFT SUBCLAVIAN SHIELD-YELLOW: Brand: RADPAD

## (undated) DEVICE — ST EXT IV SMRTSTE 2VLV FIX M LL 6ML 41

## (undated) DEVICE — THE ARTICULATOR INJECTION NEEDLE IS A SINGLE USE, DISPOSABLE, FLEXIBLE SHEATH INJECTION NEEDLE USED FOR THE INJECTION OF VARIOUS TYPES OF MEDIA THROUGH FLEXIBLE ENDOSCOPES.: Brand: ARTICULATOR